# Patient Record
Sex: MALE | Race: WHITE | HISPANIC OR LATINO
[De-identification: names, ages, dates, MRNs, and addresses within clinical notes are randomized per-mention and may not be internally consistent; named-entity substitution may affect disease eponyms.]

---

## 2017-01-05 ENCOUNTER — FORM ENCOUNTER (OUTPATIENT)
Age: 77
End: 2017-01-05

## 2017-01-06 ENCOUNTER — OUTPATIENT (OUTPATIENT)
Dept: OUTPATIENT SERVICES | Facility: HOSPITAL | Age: 77
LOS: 1 days | End: 2017-01-06
Payer: MEDICARE

## 2017-01-06 ENCOUNTER — APPOINTMENT (OUTPATIENT)
Dept: ULTRASOUND IMAGING | Facility: IMAGING CENTER | Age: 77
End: 2017-01-06

## 2017-01-06 DIAGNOSIS — N45.3 EPIDIDYMO-ORCHITIS: ICD-10-CM

## 2017-01-06 PROCEDURE — 76870 US EXAM SCROTUM: CPT

## 2017-01-12 ENCOUNTER — APPOINTMENT (OUTPATIENT)
Dept: INFECTIOUS DISEASE | Facility: CLINIC | Age: 77
End: 2017-01-12

## 2017-01-12 ENCOUNTER — LABORATORY RESULT (OUTPATIENT)
Age: 77
End: 2017-01-12

## 2017-01-12 VITALS
BODY MASS INDEX: 24.51 KG/M2 | DIASTOLIC BLOOD PRESSURE: 77 MMHG | WEIGHT: 191 LBS | OXYGEN SATURATION: 96 % | HEART RATE: 93 BPM | TEMPERATURE: 98.7 F | HEIGHT: 74 IN | SYSTOLIC BLOOD PRESSURE: 141 MMHG

## 2017-01-13 LAB
ALBUMIN SERPL ELPH-MCNC: 4.1 G/DL
ALP BLD-CCNC: 90 U/L
ALT SERPL-CCNC: 11 U/L
ANION GAP SERPL CALC-SCNC: 13 MMOL/L
AST SERPL-CCNC: 21 U/L
BASOPHILS # BLD AUTO: 0.02 K/UL
BASOPHILS NFR BLD AUTO: 0.2 %
BILIRUB SERPL-MCNC: 0.4 MG/DL
BUN SERPL-MCNC: 27 MG/DL
CALCIUM SERPL-MCNC: 9.1 MG/DL
CHLORIDE SERPL-SCNC: 104 MMOL/L
CO2 SERPL-SCNC: 25 MMOL/L
CREAT SERPL-MCNC: 1.27 MG/DL
EOSINOPHIL # BLD AUTO: 0.09 K/UL
EOSINOPHIL NFR BLD AUTO: 0.9 %
GLUCOSE SERPL-MCNC: 100 MG/DL
HCT VFR BLD CALC: 43.3 %
HGB BLD-MCNC: 14.2 G/DL
IMM GRANULOCYTES NFR BLD AUTO: 0.3 %
LYMPHOCYTES # BLD AUTO: 2.95 K/UL
LYMPHOCYTES NFR BLD AUTO: 30.4 %
MAN DIFF?: NORMAL
MCHC RBC-ENTMCNC: 32.8 GM/DL
MCHC RBC-ENTMCNC: 35.1 PG
MCV RBC AUTO: 107.2 FL
MONOCYTES # BLD AUTO: 0.67 K/UL
MONOCYTES NFR BLD AUTO: 6.9 %
NEUTROPHILS # BLD AUTO: 5.94 K/UL
NEUTROPHILS NFR BLD AUTO: 61.3 %
PHOSPHATE SERPL-MCNC: 2.7 MG/DL
PLATELET # BLD AUTO: 227 K/UL
POTASSIUM SERPL-SCNC: 4.4 MMOL/L
PROT SERPL-MCNC: 7.5 G/DL
RBC # BLD: 4.04 M/UL
RBC # FLD: 13.7 %
SODIUM SERPL-SCNC: 142 MMOL/L
WBC # FLD AUTO: 9.7 K/UL

## 2017-01-23 ENCOUNTER — OTHER (OUTPATIENT)
Age: 77
End: 2017-01-23

## 2017-01-25 LAB
25(OH)D3 SERPL-MCNC: 25 NG/ML
APPEARANCE: ABNORMAL
BACTERIA: NEGATIVE
BILIRUBIN URINE: NEGATIVE
BLOOD URINE: ABNORMAL
CALCIUM SERPL-MCNC: 9.1 MG/DL
CD3 CELLS # BLD: 1587 /UL
CD3 CELLS NFR BLD: 56 %
CD3+CD4+ CELLS # BLD: 398 /UL
CD3+CD4+ CELLS NFR BLD: 14 %
CD3+CD4+ CELLS/CD3+CD8+ CLL SPEC: 0.34 RATIO
CD3+CD8+ CELLS # SPEC: 1168 /UL
CD3+CD8+ CELLS NFR BLD: 41 %
COLOR: YELLOW
CREAT SPEC-SCNC: 70 MG/DL
CREAT/PROT UR: 0.4 RATIO
GLUCOSE QUALITATIVE U: NORMAL MG/DL
HIV1 RNA # SERPL NAA+PROBE: NOT DETECTED COPIES/ML
HYALINE CASTS: 1 /LPF
KETONES URINE: NEGATIVE
LEUKOCYTE ESTERASE URINE: ABNORMAL
MICROSCOPIC-UA: NORMAL
NITRITE URINE: NEGATIVE
PARATHYROID HORMONE INTACT: 56 PG/ML
PH URINE: 6.5
PROT UR-MCNC: 31 MG/DL
PROTEIN URINE: ABNORMAL MG/DL
RED BLOOD CELLS URINE: 495 /HPF
SPECIFIC GRAVITY URINE: 1.02
SQUAMOUS EPITHELIAL CELLS: 0 /HPF
UROBILINOGEN URINE: NORMAL MG/DL
VIRAL LOAD LOG: NOT DETECTED LG10COP/ML
WHITE BLOOD CELLS URINE: 94 /HPF

## 2017-02-02 ENCOUNTER — APPOINTMENT (OUTPATIENT)
Dept: UROLOGY | Facility: CLINIC | Age: 77
End: 2017-02-02

## 2017-02-10 ENCOUNTER — APPOINTMENT (OUTPATIENT)
Dept: UROLOGY | Facility: CLINIC | Age: 77
End: 2017-02-10

## 2017-02-10 ENCOUNTER — OUTPATIENT (OUTPATIENT)
Dept: OUTPATIENT SERVICES | Facility: HOSPITAL | Age: 77
LOS: 1 days | End: 2017-02-10
Payer: MEDICARE

## 2017-02-10 VITALS
DIASTOLIC BLOOD PRESSURE: 77 MMHG | SYSTOLIC BLOOD PRESSURE: 155 MMHG | TEMPERATURE: 99.8 F | RESPIRATION RATE: 16 BRPM | HEART RATE: 66 BPM

## 2017-02-10 DIAGNOSIS — R35.0 FREQUENCY OF MICTURITION: ICD-10-CM

## 2017-02-10 PROCEDURE — 52000 CYSTOURETHROSCOPY: CPT

## 2017-02-10 PROCEDURE — 88112 CYTOPATH CELL ENHANCE TECH: CPT | Mod: 26

## 2017-02-17 ENCOUNTER — FORM ENCOUNTER (OUTPATIENT)
Age: 77
End: 2017-02-17

## 2017-02-17 DIAGNOSIS — C67.9 MALIGNANT NEOPLASM OF BLADDER, UNSPECIFIED: ICD-10-CM

## 2017-02-18 ENCOUNTER — APPOINTMENT (OUTPATIENT)
Dept: CT IMAGING | Facility: IMAGING CENTER | Age: 77
End: 2017-02-18

## 2017-02-18 ENCOUNTER — OUTPATIENT (OUTPATIENT)
Dept: OUTPATIENT SERVICES | Facility: HOSPITAL | Age: 77
LOS: 1 days | End: 2017-02-18
Payer: MEDICARE

## 2017-02-18 DIAGNOSIS — C67.9 MALIGNANT NEOPLASM OF BLADDER, UNSPECIFIED: ICD-10-CM

## 2017-02-18 PROCEDURE — 74178 CT ABD&PLV WO CNTR FLWD CNTR: CPT

## 2017-02-21 ENCOUNTER — APPOINTMENT (OUTPATIENT)
Dept: INFECTIOUS DISEASE | Facility: CLINIC | Age: 77
End: 2017-02-21

## 2017-02-21 ENCOUNTER — LABORATORY RESULT (OUTPATIENT)
Age: 77
End: 2017-02-21

## 2017-02-21 ENCOUNTER — OUTPATIENT (OUTPATIENT)
Dept: OUTPATIENT SERVICES | Facility: HOSPITAL | Age: 77
LOS: 1 days | End: 2017-02-21
Payer: MEDICARE

## 2017-02-21 VITALS
DIASTOLIC BLOOD PRESSURE: 76 MMHG | HEIGHT: 74 IN | BODY MASS INDEX: 24 KG/M2 | TEMPERATURE: 99.2 F | SYSTOLIC BLOOD PRESSURE: 148 MMHG | OXYGEN SATURATION: 97 % | HEART RATE: 89 BPM | WEIGHT: 187 LBS

## 2017-02-21 DIAGNOSIS — B20 HUMAN IMMUNODEFICIENCY VIRUS [HIV] DISEASE: ICD-10-CM

## 2017-02-21 PROCEDURE — 71020: CPT | Mod: 26

## 2017-02-21 PROCEDURE — 71046 X-RAY EXAM CHEST 2 VIEWS: CPT

## 2017-02-22 ENCOUNTER — OUTPATIENT (OUTPATIENT)
Dept: OUTPATIENT SERVICES | Facility: HOSPITAL | Age: 77
LOS: 1 days | End: 2017-02-22
Payer: MEDICARE

## 2017-02-22 VITALS
DIASTOLIC BLOOD PRESSURE: 74 MMHG | HEIGHT: 75 IN | HEART RATE: 67 BPM | TEMPERATURE: 99 F | SYSTOLIC BLOOD PRESSURE: 149 MMHG | OXYGEN SATURATION: 97 % | WEIGHT: 185.19 LBS | RESPIRATION RATE: 16 BRPM

## 2017-02-22 DIAGNOSIS — N32.89 OTHER SPECIFIED DISORDERS OF BLADDER: ICD-10-CM

## 2017-02-22 DIAGNOSIS — Z01.818 ENCOUNTER FOR OTHER PREPROCEDURAL EXAMINATION: ICD-10-CM

## 2017-02-22 DIAGNOSIS — C67.9 MALIGNANT NEOPLASM OF BLADDER, UNSPECIFIED: ICD-10-CM

## 2017-02-22 PROCEDURE — G0463: CPT

## 2017-02-22 PROCEDURE — 87086 URINE CULTURE/COLONY COUNT: CPT

## 2017-02-22 RX ORDER — CEFAZOLIN SODIUM 1 G
2000 VIAL (EA) INJECTION ONCE
Qty: 0 | Refills: 0 | Status: DISCONTINUED | OUTPATIENT
Start: 2017-03-01 | End: 2017-03-16

## 2017-02-22 NOTE — H&P PST ADULT - HISTORY OF PRESENT ILLNESS
76 year old male with recent history of epididymoorchitis in december of 2016 treated with Levaquin  Patient now scheduled for cystoscopy and transurethral resection of bladder tumor. 76 year old male with recent history of epididymoorchitis in december of 2016 treated with Levaquin.  Further work up done.   CT Chest Abdomen and Pelvis done this year showed  L bladder wall thickening.   Patient now scheduled for cystoscopy and transurethral resection of bladder tumor.

## 2017-02-22 NOTE — H&P PST ADULT - NSANTHOSAYNRD_GEN_A_CORE
No. KATELYN screening performed.  STOP BANG Legend: 0-2 = LOW Risk; 3-4 = INTERMEDIATE Risk; 5-8 = HIGH Risk

## 2017-02-22 NOTE — H&P PST ADULT - RS GEN PE MLT RESP DETAILS PC
clear to auscultation bilaterally/airway patent/respirations non-labored/breath sounds equal/good air movement

## 2017-02-22 NOTE — H&P PST ADULT - PROBLEM SELECTOR PLAN 1
patient scheduled for cystoscopy and transurethral resection of bladder tumor.  Patient instructed to remain on aspirin 81mg and to continue all medications.  Patient verbalized understanding.  Patient stated he is trying to get someone to bring him to the hospital and to remain with him at home overnight.  Dr. Gruber's office made aware.

## 2017-02-22 NOTE — H&P PST ADULT - PMH
Bladder mass    Chronic kidney disease, unspecified stage    Constipation, unspecified constipation type    Edema of both legs    HIV (human immunodeficiency virus infection)    Orchitis and epididymitis    Osteoporosis    Seborrheic keratosis    Unsteady gait    Vitamin D deficiency

## 2017-02-23 LAB
CULTURE RESULTS: NO GROWTH — SIGNIFICANT CHANGE UP
SPECIMEN SOURCE: SIGNIFICANT CHANGE UP

## 2017-02-28 ENCOUNTER — RESULT REVIEW (OUTPATIENT)
Age: 77
End: 2017-02-28

## 2017-02-28 ENCOUNTER — RX RENEWAL (OUTPATIENT)
Age: 77
End: 2017-02-28

## 2017-03-01 ENCOUNTER — OUTPATIENT (OUTPATIENT)
Dept: OUTPATIENT SERVICES | Facility: HOSPITAL | Age: 77
LOS: 1 days | End: 2017-03-01
Payer: MEDICARE

## 2017-03-01 ENCOUNTER — APPOINTMENT (OUTPATIENT)
Dept: UROLOGY | Facility: HOSPITAL | Age: 77
End: 2017-03-01

## 2017-03-01 ENCOUNTER — TRANSCRIPTION ENCOUNTER (OUTPATIENT)
Age: 77
End: 2017-03-01

## 2017-03-01 VITALS
SYSTOLIC BLOOD PRESSURE: 144 MMHG | RESPIRATION RATE: 18 BRPM | HEART RATE: 79 BPM | DIASTOLIC BLOOD PRESSURE: 76 MMHG | HEIGHT: 75 IN | WEIGHT: 185.19 LBS | OXYGEN SATURATION: 98 % | TEMPERATURE: 98 F

## 2017-03-01 VITALS
RESPIRATION RATE: 16 BRPM | TEMPERATURE: 98 F | OXYGEN SATURATION: 99 % | SYSTOLIC BLOOD PRESSURE: 142 MMHG | DIASTOLIC BLOOD PRESSURE: 69 MMHG | HEART RATE: 73 BPM

## 2017-03-01 DIAGNOSIS — C67.9 MALIGNANT NEOPLASM OF BLADDER, UNSPECIFIED: ICD-10-CM

## 2017-03-01 PROCEDURE — 88342 IMHCHEM/IMCYTCHM 1ST ANTB: CPT | Mod: 26

## 2017-03-01 PROCEDURE — 88307 TISSUE EXAM BY PATHOLOGIST: CPT | Mod: 26

## 2017-03-01 PROCEDURE — 88307 TISSUE EXAM BY PATHOLOGIST: CPT

## 2017-03-01 PROCEDURE — 76000 FLUOROSCOPY <1 HR PHYS/QHP: CPT

## 2017-03-01 PROCEDURE — 52240 CYSTOSCOPY AND TREATMENT: CPT

## 2017-03-01 PROCEDURE — 88341 IMHCHEM/IMCYTCHM EA ADD ANTB: CPT | Mod: 26

## 2017-03-01 PROCEDURE — 88341 IMHCHEM/IMCYTCHM EA ADD ANTB: CPT

## 2017-03-01 PROCEDURE — 74420 UROGRAPHY RTRGR +-KUB: CPT | Mod: 26

## 2017-03-01 PROCEDURE — 88342 IMHCHEM/IMCYTCHM 1ST ANTB: CPT

## 2017-03-01 PROCEDURE — 74420 UROGRAPHY RTRGR +-KUB: CPT

## 2017-03-01 RX ORDER — LIDOCAINE 4 G/100G
1 CREAM TOPICAL
Qty: 0 | Refills: 0 | Status: DISCONTINUED | OUTPATIENT
Start: 2017-03-01 | End: 2017-03-01

## 2017-03-01 RX ORDER — HYDROMORPHONE HYDROCHLORIDE 2 MG/ML
0.5 INJECTION INTRAMUSCULAR; INTRAVENOUS; SUBCUTANEOUS
Qty: 0 | Refills: 0 | Status: DISCONTINUED | OUTPATIENT
Start: 2017-03-01 | End: 2017-03-01

## 2017-03-01 RX ORDER — SODIUM CHLORIDE 9 MG/ML
1000 INJECTION, SOLUTION INTRAVENOUS
Qty: 0 | Refills: 0 | Status: DISCONTINUED | OUTPATIENT
Start: 2017-03-01 | End: 2017-03-16

## 2017-03-01 RX ORDER — ACETAMINOPHEN WITH CODEINE 300MG-30MG
1 TABLET ORAL
Qty: 30 | Refills: 0
Start: 2017-03-01

## 2017-03-01 RX ORDER — SODIUM CHLORIDE 9 MG/ML
3 INJECTION INTRAMUSCULAR; INTRAVENOUS; SUBCUTANEOUS EVERY 8 HOURS
Qty: 0 | Refills: 0 | Status: DISCONTINUED | OUTPATIENT
Start: 2017-03-01 | End: 2017-03-01

## 2017-03-01 RX ORDER — CEPHALEXIN 500 MG
1 CAPSULE ORAL
Qty: 9 | Refills: 0
Start: 2017-03-01 | End: 2017-03-04

## 2017-03-01 RX ORDER — LIDOCAINE HCL 20 MG/ML
20 VIAL (ML) INJECTION ONCE
Qty: 0 | Refills: 0 | Status: DISCONTINUED | OUTPATIENT
Start: 2017-03-01 | End: 2017-03-16

## 2017-03-01 RX ORDER — ONDANSETRON 8 MG/1
4 TABLET, FILM COATED ORAL ONCE
Qty: 0 | Refills: 0 | Status: DISCONTINUED | OUTPATIENT
Start: 2017-03-01 | End: 2017-03-01

## 2017-03-01 RX ADMIN — HYDROMORPHONE HYDROCHLORIDE 0.5 MILLIGRAM(S): 2 INJECTION INTRAMUSCULAR; INTRAVENOUS; SUBCUTANEOUS at 16:30

## 2017-03-01 NOTE — ASU DISCHARGE PLAN (ADULT/PEDIATRIC). - MEDICATION SUMMARY - MEDICATIONS TO TAKE
I will START or STAY ON the medications listed below when I get home from the hospital:    finasteride  -- 5 milligram(s) by mouth once a day  -- Indication: For BPH    acetaminophen-codeine 300 mg-30 mg oral tablet  -- 1 tab(s) by mouth every 4 hours, As Needed MDD:6  -- Caution federal law prohibits the transfer of this drug to any person other  than the person for whom it was prescribed.  May cause drowsiness.  Alcohol may intensify this effect.  Use care when operating dangerous machinery.  This product contains acetaminophen.  Do not use  with any other product containing acetaminophen to prevent possible liver damage.  Using more of this medication than prescribed may cause serious breathing problems.    -- Indication: For Moderate/severe pain    Aspirin Enteric Coated 81 mg oral delayed release tablet  -- 1 tab(s) by mouth once a day  -- Indication: For home med    tamsulosin 0.4 mg oral capsule  -- 1 cap(s) by mouth once a day  -- Indication: For BPH    Genvoya oral tablet  -- 1 tab(s) by mouth once a day  -- Indication: For home med    alendronate weekly  -- 70 milligram(s) by mouth once a week  -- Indication: For home med    Keflex 500 mg oral capsule  -- 1 cap(s) by mouth every 8 hours  -- Finish all this medication unless otherwise directed by prescriber.    -- Indication: For UTI prevention    ketoconazole 2% topical shampoo  -- Apply on skin to affected area 2 times a day, As Needed  -- Indication: For home med    polyethylene glycol 3350 with electrolytes oral powder  -- 1 application by mouth once a day, As Needed  -- Indication: For home med    multivitamin  -- 1 tab(s) by mouth once a day  -- Indication: For home med    Vitamin D3 2000 intl units oral capsule  -- 2 cap(s) by mouth once a day  -- Indication: For home med

## 2017-03-01 NOTE — ASU DISCHARGE PLAN (ADULT/PEDIATRIC). - SPECIAL INSTRUCTIONS
Take 3 days of Keflex  You will go home with tovar    Dr. Millan would like to see you next Thursday for tovar removal. Please call the office to schedule an appointment.    [facility]  [address] [place] [phone]

## 2017-03-06 ENCOUNTER — APPOINTMENT (OUTPATIENT)
Dept: UROLOGY | Facility: CLINIC | Age: 77
End: 2017-03-06

## 2017-03-06 ENCOUNTER — APPOINTMENT (OUTPATIENT)
Dept: ENDOCRINOLOGY | Facility: CLINIC | Age: 77
End: 2017-03-06

## 2017-03-06 VITALS
SYSTOLIC BLOOD PRESSURE: 142 MMHG | TEMPERATURE: 98.6 F | RESPIRATION RATE: 16 BRPM | DIASTOLIC BLOOD PRESSURE: 60 MMHG | HEART RATE: 61 BPM

## 2017-03-09 ENCOUNTER — APPOINTMENT (OUTPATIENT)
Dept: UROLOGY | Facility: CLINIC | Age: 77
End: 2017-03-09

## 2017-03-09 DIAGNOSIS — D30.3 BENIGN NEOPLASM OF BLADDER: ICD-10-CM

## 2017-03-10 LAB — SURGICAL PATHOLOGY STUDY: SIGNIFICANT CHANGE UP

## 2017-03-30 ENCOUNTER — RX RENEWAL (OUTPATIENT)
Age: 77
End: 2017-03-30

## 2017-05-15 ENCOUNTER — APPOINTMENT (OUTPATIENT)
Dept: INFECTIOUS DISEASE | Facility: CLINIC | Age: 77
End: 2017-05-15

## 2017-05-15 VITALS
HEART RATE: 63 BPM | TEMPERATURE: 98.4 F | DIASTOLIC BLOOD PRESSURE: 72 MMHG | SYSTOLIC BLOOD PRESSURE: 141 MMHG | BODY MASS INDEX: 23.87 KG/M2 | HEIGHT: 74 IN | WEIGHT: 186 LBS | OXYGEN SATURATION: 95 %

## 2017-05-23 LAB
25(OH)D3 SERPL-MCNC: 32.2 NG/ML
ADJUSTED MITOGEN: >10 IU/ML
ADJUSTED TB AG: 0.14 IU/ML
ALBUMIN SERPL ELPH-MCNC: 4 G/DL
ALP BLD-CCNC: 86 U/L
ALT SERPL-CCNC: 11 U/L
ANION GAP SERPL CALC-SCNC: 15 MMOL/L
AST SERPL-CCNC: 18 U/L
BASOPHILS # BLD AUTO: 0.02 K/UL
BASOPHILS NFR BLD AUTO: 0.2 %
BILIRUB SERPL-MCNC: 0.5 MG/DL
BUN SERPL-MCNC: 30 MG/DL
C TRACH RRNA SPEC QL NAA+PROBE: NORMAL
CALCIUM SERPL-MCNC: 9.7 MG/DL
CD3 CELLS # BLD: 1440 /UL
CD3 CELLS NFR BLD: 60 %
CD3+CD4+ CELLS # BLD: 399 /UL
CD3+CD4+ CELLS NFR BLD: 17 %
CD3+CD4+ CELLS/CD3+CD8+ CLL SPEC: 0.39 RATIO
CD3+CD8+ CELLS # SPEC: 1020 /UL
CD3+CD8+ CELLS NFR BLD: 42 %
CHLORIDE SERPL-SCNC: 101 MMOL/L
CHOLEST SERPL-MCNC: 182 MG/DL
CHOLEST/HDLC SERPL: 3 RATIO
CO2 SERPL-SCNC: 23 MMOL/L
CREAT SERPL-MCNC: 1.31 MG/DL
EOSINOPHIL # BLD AUTO: 0.17 K/UL
EOSINOPHIL NFR BLD AUTO: 2 %
GLUCOSE SERPL-MCNC: 91 MG/DL
HBA1C MFR BLD HPLC: 5.9 %
HCT VFR BLD CALC: 40.7 %
HCV AB SER QL: NONREACTIVE
HCV S/CO RATIO: 0.25 S/CO
HDLC SERPL-MCNC: 72 MG/DL
HGB BLD-MCNC: 13.6 G/DL
HIV1 RNA # SERPL NAA+PROBE: NOT DETECTED COPIES/ML
IMM GRANULOCYTES NFR BLD AUTO: 0.2 %
LDLC SERPL CALC-MCNC: 90 MG/DL
LYMPHOCYTES # BLD AUTO: 2.41 K/UL
LYMPHOCYTES NFR BLD AUTO: 29 %
M TB IFN-G BLD-IMP: NEGATIVE
MAN DIFF?: NORMAL
MCHC RBC-ENTMCNC: 33.4 GM/DL
MCHC RBC-ENTMCNC: 34.9 PG
MCV RBC AUTO: 104.4 FL
MONOCYTES # BLD AUTO: 0.64 K/UL
MONOCYTES NFR BLD AUTO: 7.7 %
N GONORRHOEA RRNA SPEC QL NAA+PROBE: NORMAL
NEUTROPHILS # BLD AUTO: 5.06 K/UL
NEUTROPHILS NFR BLD AUTO: 60.9 %
PLATELET # BLD AUTO: 246 K/UL
POTASSIUM SERPL-SCNC: 4.4 MMOL/L
PROT SERPL-MCNC: 7.1 G/DL
PSA SERPL-MCNC: 1.24 NG/ML
QUANTIFERON GOLD NIL: 0.32 IU/ML
RBC # BLD: 3.9 M/UL
RBC # FLD: 14.1 %
SODIUM SERPL-SCNC: 139 MMOL/L
SOURCE AMPLIFICATION: NORMAL
T PALLIDUM AB SER QL IA: NEGATIVE
TRIGL SERPL-MCNC: 100 MG/DL
VIRAL LOAD LOG: NOT DETECTED LG10COP/ML
WBC # FLD AUTO: 8.32 K/UL

## 2017-06-07 LAB
APPEARANCE: ABNORMAL
BACTERIA: NEGATIVE
BILIRUBIN URINE: NEGATIVE
BLOOD URINE: ABNORMAL
COLOR: YELLOW
GLUCOSE QUALITATIVE U: NORMAL MG/DL
HYALINE CASTS: 5 /LPF
KETONES URINE: NEGATIVE
LEUKOCYTE ESTERASE URINE: ABNORMAL
MICROSCOPIC-UA: NORMAL
NITRITE URINE: NEGATIVE
PH URINE: 7
PROTEIN URINE: 30 MG/DL
RED BLOOD CELLS URINE: 97 /HPF
SPECIFIC GRAVITY URINE: 1.02
SQUAMOUS EPITHELIAL CELLS: 1 /HPF
UROBILINOGEN URINE: NORMAL MG/DL
WHITE BLOOD CELLS URINE: >720 /HPF

## 2017-08-15 ENCOUNTER — RX RENEWAL (OUTPATIENT)
Age: 77
End: 2017-08-15

## 2017-09-15 ENCOUNTER — APPOINTMENT (OUTPATIENT)
Dept: INFECTIOUS DISEASE | Facility: CLINIC | Age: 77
End: 2017-09-15

## 2017-09-15 ENCOUNTER — LABORATORY RESULT (OUTPATIENT)
Age: 77
End: 2017-09-15

## 2017-09-15 ENCOUNTER — APPOINTMENT (OUTPATIENT)
Dept: INFECTIOUS DISEASE | Facility: CLINIC | Age: 77
End: 2017-09-15
Payer: MEDICARE

## 2017-09-15 VITALS
SYSTOLIC BLOOD PRESSURE: 142 MMHG | HEART RATE: 62 BPM | TEMPERATURE: 98.3 F | RESPIRATION RATE: 16 BRPM | HEIGHT: 74 IN | DIASTOLIC BLOOD PRESSURE: 76 MMHG | OXYGEN SATURATION: 95 % | WEIGHT: 194 LBS | BODY MASS INDEX: 24.9 KG/M2

## 2017-09-15 DIAGNOSIS — R06.09 OTHER FORMS OF DYSPNEA: ICD-10-CM

## 2017-09-15 PROCEDURE — 99215 OFFICE O/P EST HI 40 MIN: CPT

## 2017-09-20 LAB
25(OH)D3 SERPL-MCNC: 34.9 NG/ML
ALBUMIN SERPL ELPH-MCNC: 3.9 G/DL
ALP BLD-CCNC: 101 U/L
ALT SERPL-CCNC: 12 U/L
ANION GAP SERPL CALC-SCNC: 16 MMOL/L
AST SERPL-CCNC: 24 U/L
BASOPHILS # BLD AUTO: 0.02 K/UL
BASOPHILS NFR BLD AUTO: 0.2 %
BILIRUB SERPL-MCNC: 0.4 MG/DL
BUN SERPL-MCNC: 31 MG/DL
CALCIUM SERPL-MCNC: 9.7 MG/DL
CALCIUM SERPL-MCNC: 9.7 MG/DL
CD3 CELLS # BLD: 1388 /UL
CD3 CELLS NFR BLD: 53 %
CD3+CD4+ CELLS # BLD: 371 /UL
CD3+CD4+ CELLS NFR BLD: 14 %
CD3+CD4+ CELLS/CD3+CD8+ CLL SPEC: 0.37 RATIO
CD3+CD8+ CELLS # SPEC: 1004 /UL
CD3+CD8+ CELLS NFR BLD: 38 %
CHLORIDE SERPL-SCNC: 101 MMOL/L
CO2 SERPL-SCNC: 24 MMOL/L
CORE LAB FLUID CYTOLOGY: NORMAL
CREAT SERPL-MCNC: 1.53 MG/DL
CREAT SPEC-SCNC: 87 MG/DL
CREAT/PROT UR: 0.4 RATIO
EOSINOPHIL # BLD AUTO: 0.3 K/UL
EOSINOPHIL NFR BLD AUTO: 3.4 %
GLUCOSE SERPL-MCNC: 108 MG/DL
HCT VFR BLD CALC: 41.9 %
HGB BLD-MCNC: 14.1 G/DL
HIV1 RNA # SERPL NAA+PROBE: NORMAL
HIV1 RNA # SERPL NAA+PROBE: NOT DETECTED COPIES/ML
IMM GRANULOCYTES NFR BLD AUTO: 0.4 %
LYMPHOCYTES # BLD AUTO: 2.34 K/UL
LYMPHOCYTES NFR BLD AUTO: 26.3 %
MAN DIFF?: NORMAL
MCHC RBC-ENTMCNC: 33.7 GM/DL
MCHC RBC-ENTMCNC: 35.6 PG
MCV RBC AUTO: 105.8 FL
MONOCYTES # BLD AUTO: 0.69 K/UL
MONOCYTES NFR BLD AUTO: 7.8 %
NEUTROPHILS # BLD AUTO: 5.5 K/UL
NEUTROPHILS NFR BLD AUTO: 61.9 %
PARATHYROID HORMONE INTACT: 25 PG/ML
PHOSPHATE SERPL-MCNC: 3.4 MG/DL
PLATELET # BLD AUTO: 238 K/UL
POTASSIUM SERPL-SCNC: 3.9 MMOL/L
PROT SERPL-MCNC: 7.4 G/DL
PROT UR-MCNC: 31 MG/DL
RBC # BLD: 3.96 M/UL
RBC # FLD: 13.8 %
SODIUM SERPL-SCNC: 141 MMOL/L
VIRAL LOAD INTERP: NORMAL
VIRAL LOAD LOG: NOT DETECTED LG COP/ML
WBC # FLD AUTO: 8.89 K/UL

## 2017-11-03 ENCOUNTER — APPOINTMENT (OUTPATIENT)
Dept: CARDIOLOGY | Facility: CLINIC | Age: 77
End: 2017-11-03
Payer: MEDICARE

## 2017-11-03 ENCOUNTER — NON-APPOINTMENT (OUTPATIENT)
Age: 77
End: 2017-11-03

## 2017-11-03 VITALS — DIASTOLIC BLOOD PRESSURE: 73 MMHG | HEART RATE: 87 BPM | OXYGEN SATURATION: 98 % | SYSTOLIC BLOOD PRESSURE: 170 MMHG

## 2017-11-03 VITALS — SYSTOLIC BLOOD PRESSURE: 126 MMHG | DIASTOLIC BLOOD PRESSURE: 74 MMHG

## 2017-11-03 DIAGNOSIS — R60.0 LOCALIZED EDEMA: ICD-10-CM

## 2017-11-03 PROCEDURE — 93000 ELECTROCARDIOGRAM COMPLETE: CPT

## 2017-11-03 PROCEDURE — 99204 OFFICE O/P NEW MOD 45 MIN: CPT

## 2017-11-27 ENCOUNTER — RX RENEWAL (OUTPATIENT)
Age: 77
End: 2017-11-27

## 2017-12-27 ENCOUNTER — RX RENEWAL (OUTPATIENT)
Age: 77
End: 2017-12-27

## 2018-01-05 ENCOUNTER — RX RENEWAL (OUTPATIENT)
Age: 78
End: 2018-01-05

## 2018-01-19 ENCOUNTER — LABORATORY RESULT (OUTPATIENT)
Age: 78
End: 2018-01-19

## 2018-01-19 ENCOUNTER — APPOINTMENT (OUTPATIENT)
Dept: INFECTIOUS DISEASE | Facility: CLINIC | Age: 78
End: 2018-01-19
Payer: MEDICARE

## 2018-01-19 VITALS
BODY MASS INDEX: 24.38 KG/M2 | DIASTOLIC BLOOD PRESSURE: 64 MMHG | OXYGEN SATURATION: 98 % | WEIGHT: 190 LBS | TEMPERATURE: 98.3 F | HEIGHT: 74 IN | SYSTOLIC BLOOD PRESSURE: 138 MMHG | HEART RATE: 68 BPM

## 2018-01-19 PROCEDURE — 99215 OFFICE O/P EST HI 40 MIN: CPT

## 2018-02-16 LAB
25(OH)D3 SERPL-MCNC: 40 NG/ML
ADJUSTED MITOGEN: 7.7 IU/ML
ADJUSTED TB AG: -0.08 IU/ML
ALBUMIN SERPL ELPH-MCNC: 4 G/DL
ALP BLD-CCNC: 91 U/L
ALT SERPL-CCNC: 12 U/L
ANION GAP SERPL CALC-SCNC: 16 MMOL/L
APPEARANCE: ABNORMAL
AST SERPL-CCNC: 18 U/L
BACTERIA: NEGATIVE
BASOPHILS # BLD AUTO: 0.08 K/UL
BASOPHILS NFR BLD AUTO: 0.9 %
BILIRUB SERPL-MCNC: 0.4 MG/DL
BILIRUBIN URINE: NEGATIVE
BLOOD URINE: ABNORMAL
BUN SERPL-MCNC: 28 MG/DL
C TRACH RRNA SPEC QL NAA+PROBE: NOT DETECTED
CALCIUM SERPL-MCNC: 10.5 MG/DL
CD3 CELLS # BLD: 1185 /UL
CD3 CELLS NFR BLD: 54 %
CD3+CD4+ CELLS # BLD: 329 /UL
CD3+CD4+ CELLS NFR BLD: 15 %
CD3+CD4+ CELLS/CD3+CD8+ CLL SPEC: 0.39 RATIO
CD3+CD8+ CELLS # SPEC: 850 /UL
CD3+CD8+ CELLS NFR BLD: 39 %
CHLORIDE SERPL-SCNC: 101 MMOL/L
CHOLEST SERPL-MCNC: 210 MG/DL
CHOLEST/HDLC SERPL: 3.2 RATIO
CO2 SERPL-SCNC: 26 MMOL/L
COLOR: YELLOW
CREAT SERPL-MCNC: 1.56 MG/DL
EOSINOPHIL # BLD AUTO: 0.38 K/UL
EOSINOPHIL NFR BLD AUTO: 4.3
GLUCOSE QUALITATIVE U: NEGATIVE MG/DL
GLUCOSE SERPL-MCNC: 90 MG/DL
HBA1C MFR BLD HPLC: 5.6 %
HCT VFR BLD CALC: 43.3 %
HCV AB SER QL: NONREACTIVE
HCV S/CO RATIO: 0.17 S/CO
HDLC SERPL-MCNC: 65 MG/DL
HGB BLD-MCNC: 14.4 G/DL
HIV1 RNA # SERPL NAA+PROBE: NORMAL
HIV1 RNA # SERPL NAA+PROBE: NORMAL COPIES/ML
HYALINE CASTS: 2 /LPF
KETONES URINE: NEGATIVE
LDLC SERPL CALC-MCNC: 120 MG/DL
LEUKOCYTE ESTERASE URINE: ABNORMAL
LYMPHOCYTES # BLD AUTO: 1.99 K/UL
LYMPHOCYTES NFR BLD AUTO: 22.6 %
M TB IFN-G BLD-IMP: NEGATIVE
MAN DIFF?: NORMAL
MCHC RBC-ENTMCNC: 33.3 GM/DL
MCHC RBC-ENTMCNC: 36 PG
MCV RBC AUTO: 108.3 FL
MICROSCOPIC-UA: NORMAL
MONOCYTES # BLD AUTO: 0.85 K/UL
MONOCYTES NFR BLD AUTO: 9.6 %
N GONORRHOEA RRNA SPEC QL NAA+PROBE: NOT DETECTED
NEUTROPHILS # BLD AUTO: 5.44 K/UL
NEUTROPHILS NFR BLD AUTO: 61.7 %
NITRITE URINE: NEGATIVE
PH URINE: 6.5
PLATELET # BLD AUTO: 247 K/UL
POTASSIUM SERPL-SCNC: 4.3 MMOL/L
PROT SERPL-MCNC: 7.5 G/DL
PROTEIN URINE: ABNORMAL MG/DL
QUANTIFERON GOLD NIL: 0.4 IU/ML
RBC # BLD: 4 M/UL
RBC # FLD: 14.1 %
RED BLOOD CELLS URINE: 41 /HPF
SODIUM SERPL-SCNC: 143 MMOL/L
SOURCE AMPLIFICATION: NORMAL
SPECIFIC GRAVITY URINE: 1.01
SQUAMOUS EPITHELIAL CELLS: 0 /HPF
T PALLIDUM AB SER QL IA: NEGATIVE
TRIGL SERPL-MCNC: 127 MG/DL
UROBILINOGEN URINE: NEGATIVE MG/DL
VIRAL LOAD INTERP: NORMAL
VIRAL LOAD LOG: NORMAL LG COP/ML
WBC # FLD AUTO: 8.81 K/UL
WHITE BLOOD CELLS URINE: >720 /HPF

## 2018-05-21 ENCOUNTER — APPOINTMENT (OUTPATIENT)
Dept: INFECTIOUS DISEASE | Facility: CLINIC | Age: 78
End: 2018-05-21
Payer: MEDICARE

## 2018-05-21 ENCOUNTER — LABORATORY RESULT (OUTPATIENT)
Age: 78
End: 2018-05-21

## 2018-05-21 VITALS
HEART RATE: 72 BPM | HEIGHT: 74 IN | WEIGHT: 182 LBS | BODY MASS INDEX: 23.36 KG/M2 | TEMPERATURE: 98.7 F | OXYGEN SATURATION: 98 %

## 2018-05-21 VITALS — SYSTOLIC BLOOD PRESSURE: 136 MMHG | DIASTOLIC BLOOD PRESSURE: 70 MMHG

## 2018-05-21 DIAGNOSIS — E55.9 VITAMIN D DEFICIENCY, UNSPECIFIED: ICD-10-CM

## 2018-05-21 PROCEDURE — 99215 OFFICE O/P EST HI 40 MIN: CPT

## 2018-05-24 LAB
ALBUMIN SERPL ELPH-MCNC: 4 G/DL
ALP BLD-CCNC: 125 U/L
ALT SERPL-CCNC: 20 U/L
ANION GAP SERPL CALC-SCNC: 13 MMOL/L
AST SERPL-CCNC: 27 U/L
BASOPHILS # BLD AUTO: 0.02 K/UL
BASOPHILS NFR BLD AUTO: 0.2 %
BILIRUB SERPL-MCNC: 0.5 MG/DL
BUN SERPL-MCNC: 26 MG/DL
CALCIUM SERPL-MCNC: 9.7 MG/DL
CD3 CELLS # BLD: 1409 /UL
CD3 CELLS NFR BLD: 48 %
CD3+CD4+ CELLS # BLD: 326 /UL
CD3+CD4+ CELLS NFR BLD: 11 %
CD3+CD4+ CELLS/CD3+CD8+ CLL SPEC: 0.3 RATIO
CD3+CD8+ CELLS # SPEC: 1072 /UL
CD3+CD8+ CELLS NFR BLD: 37 %
CHLORIDE SERPL-SCNC: 104 MMOL/L
CO2 SERPL-SCNC: 26 MMOL/L
CREAT SERPL-MCNC: 1.47 MG/DL
EOSINOPHIL # BLD AUTO: 0.08 K/UL
EOSINOPHIL NFR BLD AUTO: 0.8 %
GLUCOSE SERPL-MCNC: 100 MG/DL
HCT VFR BLD CALC: 42 %
HGB BLD-MCNC: 14.1 G/DL
HIV1 RNA # SERPL NAA+PROBE: NORMAL
HIV1 RNA # SERPL NAA+PROBE: NORMAL COPIES/ML
IMM GRANULOCYTES NFR BLD AUTO: 0.4 %
LYMPHOCYTES # BLD AUTO: 2.52 K/UL
LYMPHOCYTES NFR BLD AUTO: 26.1 %
MAN DIFF?: NORMAL
MCHC RBC-ENTMCNC: 33.6 GM/DL
MCHC RBC-ENTMCNC: 35.7 PG
MCV RBC AUTO: 106.3 FL
MONOCYTES # BLD AUTO: 0.95 K/UL
MONOCYTES NFR BLD AUTO: 9.8 %
NEUTROPHILS # BLD AUTO: 6.06 K/UL
NEUTROPHILS NFR BLD AUTO: 62.7 %
PLATELET # BLD AUTO: 245 K/UL
POTASSIUM SERPL-SCNC: 4.4 MMOL/L
PROT SERPL-MCNC: 7.6 G/DL
RBC # BLD: 3.95 M/UL
RBC # FLD: 14.2 %
SODIUM SERPL-SCNC: 143 MMOL/L
VIRAL LOAD INTERP: NORMAL
VIRAL LOAD LOG: NORMAL LG COP/ML
WBC # FLD AUTO: 9.67 K/UL

## 2018-08-20 ENCOUNTER — LABORATORY RESULT (OUTPATIENT)
Age: 78
End: 2018-08-20

## 2018-08-20 ENCOUNTER — APPOINTMENT (OUTPATIENT)
Dept: INFECTIOUS DISEASE | Facility: CLINIC | Age: 78
End: 2018-08-20
Payer: MEDICARE

## 2018-08-20 VITALS
BODY MASS INDEX: 22.84 KG/M2 | OXYGEN SATURATION: 100 % | HEART RATE: 63 BPM | HEIGHT: 74 IN | DIASTOLIC BLOOD PRESSURE: 72 MMHG | WEIGHT: 178 LBS | SYSTOLIC BLOOD PRESSURE: 130 MMHG | TEMPERATURE: 98.4 F

## 2018-08-20 DIAGNOSIS — M81.0 AGE-RELATED OSTEOPOROSIS W/OUT CURRENT PATHOLOGICAL FRACTURE: ICD-10-CM

## 2018-08-20 PROCEDURE — 99215 OFFICE O/P EST HI 40 MIN: CPT

## 2018-08-21 PROBLEM — N18.9 CHRONIC KIDNEY DISEASE, UNSPECIFIED: Chronic | Status: ACTIVE | Noted: 2017-02-22

## 2018-08-21 PROBLEM — N45.3 EPIDIDYMO-ORCHITIS: Chronic | Status: ACTIVE | Noted: 2017-02-22

## 2018-08-21 PROBLEM — R60.0 LOCALIZED EDEMA: Chronic | Status: ACTIVE | Noted: 2017-02-22

## 2018-08-21 PROBLEM — M81.0 AGE-RELATED OSTEOPOROSIS WITHOUT CURRENT PATHOLOGICAL FRACTURE: Chronic | Status: ACTIVE | Noted: 2017-02-22

## 2018-08-21 PROBLEM — Z21 ASYMPTOMATIC HUMAN IMMUNODEFICIENCY VIRUS [HIV] INFECTION STATUS: Chronic | Status: ACTIVE | Noted: 2017-02-22

## 2018-08-21 PROBLEM — E55.9 VITAMIN D DEFICIENCY, UNSPECIFIED: Chronic | Status: ACTIVE | Noted: 2017-02-22

## 2018-08-21 PROBLEM — K59.00 CONSTIPATION, UNSPECIFIED: Chronic | Status: ACTIVE | Noted: 2017-02-22

## 2018-08-21 PROBLEM — N32.89 OTHER SPECIFIED DISORDERS OF BLADDER: Chronic | Status: ACTIVE | Noted: 2017-02-22

## 2018-08-21 PROBLEM — L82.1 OTHER SEBORRHEIC KERATOSIS: Chronic | Status: ACTIVE | Noted: 2017-02-22

## 2018-08-21 LAB
ALBUMIN SERPL ELPH-MCNC: 4.2 G/DL
ALP BLD-CCNC: 123 U/L
ALT SERPL-CCNC: 9 U/L
ANION GAP SERPL CALC-SCNC: 13 MMOL/L
AST SERPL-CCNC: 17 U/L
BASOPHILS # BLD AUTO: 0.09 K/UL
BASOPHILS NFR BLD AUTO: 0.9 %
BILIRUB SERPL-MCNC: 0.2 MG/DL
BUN SERPL-MCNC: 29 MG/DL
CALCIUM SERPL-MCNC: 10.4 MG/DL
CHLORIDE SERPL-SCNC: 99 MMOL/L
CO2 SERPL-SCNC: 28 MMOL/L
CREAT SERPL-MCNC: 1.61 MG/DL
EOSINOPHIL # BLD AUTO: 0.25 K/UL
EOSINOPHIL NFR BLD AUTO: 2.6 %
GLUCOSE SERPL-MCNC: 79 MG/DL
HCT VFR BLD CALC: 40.2 %
HGB BLD-MCNC: 13.7 G/DL
HIV1 RNA # SERPL NAA+PROBE: NORMAL
HIV1 RNA # SERPL NAA+PROBE: NORMAL COPIES/ML
LYMPHOCYTES # BLD AUTO: 2.36 K/UL
LYMPHOCYTES NFR BLD AUTO: 24.6 %
MAN DIFF?: NORMAL
MCHC RBC-ENTMCNC: 34.1 GM/DL
MCHC RBC-ENTMCNC: 36.4 PG
MCV RBC AUTO: 106.9 FL
MONOCYTES # BLD AUTO: 0.59 K/UL
MONOCYTES NFR BLD AUTO: 6.1 %
NEUTROPHILS # BLD AUTO: 5.98 K/UL
NEUTROPHILS NFR BLD AUTO: 62.3 %
PLATELET # BLD AUTO: 250 K/UL
POTASSIUM SERPL-SCNC: 4 MMOL/L
PROT SERPL-MCNC: 7.8 G/DL
RBC # BLD: 3.76 M/UL
RBC # FLD: 14 %
SODIUM SERPL-SCNC: 140 MMOL/L
VIRAL LOAD INTERP: NORMAL
VIRAL LOAD LOG: NORMAL LG COP/ML
WBC # FLD AUTO: 9.6 K/UL

## 2018-08-22 LAB
CD3 CELLS # BLD: 2225 /UL
CD3 CELLS NFR BLD: 63 %
CD3+CD4+ CELLS # BLD: 665 /UL
CD3+CD4+ CELLS NFR BLD: 19 %
CD3+CD4+ CELLS/CD3+CD8+ CLL SPEC: 0.43 RATIO
CD3+CD8+ CELLS # SPEC: 1543 /UL
CD3+CD8+ CELLS NFR BLD: 43 %

## 2018-08-28 ENCOUNTER — MEDICATION RENEWAL (OUTPATIENT)
Age: 78
End: 2018-08-28

## 2019-01-07 ENCOUNTER — LABORATORY RESULT (OUTPATIENT)
Age: 79
End: 2019-01-07

## 2019-01-07 ENCOUNTER — APPOINTMENT (OUTPATIENT)
Dept: INFECTIOUS DISEASE | Facility: CLINIC | Age: 79
End: 2019-01-07
Payer: MEDICARE

## 2019-01-07 VITALS
DIASTOLIC BLOOD PRESSURE: 68 MMHG | TEMPERATURE: 99.1 F | SYSTOLIC BLOOD PRESSURE: 139 MMHG | HEART RATE: 86 BPM | BODY MASS INDEX: 21.82 KG/M2 | WEIGHT: 170 LBS | HEIGHT: 74 IN | OXYGEN SATURATION: 98 %

## 2019-01-07 DIAGNOSIS — Z28.21 IMMUNIZATION NOT CARRIED OUT BECAUSE OF PATIENT REFUSAL: ICD-10-CM

## 2019-01-07 PROCEDURE — 99214 OFFICE O/P EST MOD 30 MIN: CPT

## 2019-01-10 NOTE — PHYSICAL EXAM
[General Appearance - Alert] : alert [General Appearance - In No Acute Distress] : in no acute distress [General Appearance - Well Nourished] : well nourished [Sclera] : the sclera and conjunctiva were normal [PERRL With Normal Accommodation] : pupils were equal in size, round, reactive to light [Examination Of The Oral Cavity] : the lips and gums were normal [Oropharynx] : the oropharynx was normal with no thrush [Neck Appearance] : the appearance of the neck was normal [Neck Cervical Mass (___cm)] : no neck mass was observed [Thyroid Diffuse Enlargement] : the thyroid was not enlarged [Respiration, Rhythm And Depth] : normal respiratory rhythm and effort [Auscultation Breath Sounds / Voice Sounds] : lungs were clear to auscultation bilaterally [Heart Rate And Rhythm] : heart rate was normal and rhythm regular [Heart Sounds] : normal S1 and S2 [Full Pulse] : the pedal pulses are present [Cervical Lymph Nodes Enlarged Posterior Bilaterally] : posterior cervical [Cervical Lymph Nodes Enlarged Anterior Bilaterally] : anterior cervical [Supraclavicular Lymph Nodes Enlarged Bilaterally] : supraclavicular [Skin Color & Pigmentation] : normal skin color and pigmentation [] : no rash [Skin Lesions] : no skin lesions [No Focal Deficits] : no focal deficits [Oriented To Time, Place, And Person] : oriented to person, place, and time [Affect] : the affect was normal [Edema] : there was no peripheral edema

## 2019-01-10 NOTE — REVIEW OF SYSTEMS
[As noted in HPI] : as noted in HPI [___ # of Missed Doses in The Past Week] : [unfilled] doses missed in the past week  [As Noted in HPI] : as noted in HPI [Negative] : Respiratory

## 2019-02-28 NOTE — ASSESSMENT
[Treatment Education] : treatment education [Treatment Adherence] : treatment adherence [Rx Dose / Side Effects] : Rx dose/side effects [Medical Care Issues] : medical care issues [HIV Education] : HIV Education [FreeTextEntry1] : 78yoM with HIV/AIDS, CKD, proteinuria, recurrent abn UAs likely 2/2 stone, dandruff, seborrheic keratosis, osteopenia, and LE edema.\par Cardiology - Dr. Celestin\par \par 1) HIV: Well controlled on HAART. Cont Genvoya PO daily with food.  Would prefer to switch to Biktarvy to eliminate booster.  Call pt s/p lab results to discuss further.  Cont CD4/VL monitoring.  Routine labs today.\par 2) CKD stage 3: Encouraged nephrology appt, last seen 8/2015. Encouraged hydration.\par 3) Osteopenia: previously on fosamax 70mg qweekly, pt stopped taking.  Due for repeat bone density scan 9/2018, referral last visit.  Encouraged endo appt.\par 4) HCM\par Vaccines:  Refuses all.\par Annual labs today\par Anal pap done 9/2017, pt refuses\par Dental 2018\par Pt plans to prioritize cardiology/stress echo, bone density, urology \par Then due for nephrology, cystoscopy, colonoscopy.\par \par RTC 4 months

## 2019-02-28 NOTE — HISTORY OF PRESENT ILLNESS
[FreeTextEntry1] : 78yoM with HIV, CKD, seborrheic keratosis, osteopenia, dandruff, here for f/u.  Pt doing well on Genvoya. Takes at lunch daily. \par \par Pt feeling well today.\par Having dental issues, needs 4 teeth pulled and then implants.\par Still pending cardiology (needs stress echo) and bone density.\par Plans to return to urologist, going to the bathroom every 1h15m at night\par Discussed kidney function, pt very reluctant to switch off Genvoya.\par \par Insurance issues and dental work have been consuming his time. Now on [employer] plan.\par Not on fosamax, didn't like dosing. Has not seen endo.   [Sexually Active] : The patient is not sexually active [de-identified] : assisted living facility.  very happy there, has a lot of friends

## 2019-03-15 LAB
25(OH)D3 SERPL-MCNC: 50.4 NG/ML
ALBUMIN SERPL ELPH-MCNC: 4.1 G/DL
ALP BLD-CCNC: 115 U/L
ALT SERPL-CCNC: 13 U/L
ANION GAP SERPL CALC-SCNC: 11 MMOL/L
APPEARANCE: ABNORMAL
AST SERPL-CCNC: 23 U/L
BACTERIA: ABNORMAL
BASOPHILS # BLD AUTO: 0.02 K/UL
BASOPHILS NFR BLD AUTO: 0.2 %
BILIRUB SERPL-MCNC: 0.3 MG/DL
BILIRUBIN URINE: NEGATIVE
BLOOD URINE: ABNORMAL
BUN SERPL-MCNC: 37 MG/DL
C TRACH RRNA SPEC QL NAA+PROBE: NOT DETECTED
CALCIUM SERPL-MCNC: 9.9 MG/DL
CD3 CELLS # BLD: 1168 /UL
CD3 CELLS NFR BLD: 50 %
CD3+CD4+ CELLS # BLD: 293 /UL
CD3+CD4+ CELLS NFR BLD: 12 %
CD3+CD4+ CELLS/CD3+CD8+ CLL SPEC: 0.34 RATIO
CD3+CD8+ CELLS # SPEC: 866 /UL
CD3+CD8+ CELLS NFR BLD: 37 %
CHLORIDE SERPL-SCNC: 101 MMOL/L
CHOLEST SERPL-MCNC: 182 MG/DL
CHOLEST/HDLC SERPL: 2.9 RATIO
CO2 SERPL-SCNC: 26 MMOL/L
COLOR: YELLOW
CREAT SERPL-MCNC: 1.82 MG/DL
EOSINOPHIL # BLD AUTO: 0.07 K/UL
EOSINOPHIL NFR BLD AUTO: 0.6 %
GLUCOSE QUALITATIVE U: NEGATIVE MG/DL
GLUCOSE SERPL-MCNC: 92 MG/DL
HBA1C MFR BLD HPLC: 5.7 %
HCT VFR BLD CALC: 38 %
HCV AB SER QL: NONREACTIVE
HCV S/CO RATIO: 0.17 S/CO
HDLC SERPL-MCNC: 63 MG/DL
HGB BLD-MCNC: 12.3 G/DL
HIV1 RNA # SERPL NAA+PROBE: ABNORMAL
HIV1 RNA # SERPL NAA+PROBE: ABNORMAL COPIES/ML
HYALINE CASTS: 0 /LPF
IMM GRANULOCYTES NFR BLD AUTO: 0.4 %
KETONES URINE: NEGATIVE
LDLC SERPL CALC-MCNC: 79 MG/DL
LEUKOCYTE ESTERASE URINE: ABNORMAL
LYMPHOCYTES # BLD AUTO: 2.44 K/UL
LYMPHOCYTES NFR BLD AUTO: 22.6 %
M TB IFN-G BLD-IMP: NEGATIVE
MAN DIFF?: NORMAL
MCHC RBC-ENTMCNC: 32.4 GM/DL
MCHC RBC-ENTMCNC: 35.1 PG
MCV RBC AUTO: 108.6 FL
MICROSCOPIC-UA: NORMAL
MONOCYTES # BLD AUTO: 1.17 K/UL
MONOCYTES NFR BLD AUTO: 10.9 %
N GONORRHOEA RRNA SPEC QL NAA+PROBE: NOT DETECTED
NEUTROPHILS # BLD AUTO: 7.04 K/UL
NEUTROPHILS NFR BLD AUTO: 65.3 %
NITRITE URINE: NEGATIVE
PH URINE: 6.5
PLATELET # BLD AUTO: 289 K/UL
POTASSIUM SERPL-SCNC: 4.4 MMOL/L
PROT SERPL-MCNC: 7.9 G/DL
PROTEIN URINE: 30 MG/DL
QUANTIFERON TB PLUS MITOGEN MINUS NIL: 6.85 IU/ML
QUANTIFERON TB PLUS NIL: 0.29 IU/ML
QUANTIFERON TB PLUS TB1 MINUS NIL: -0.17 IU/ML
QUANTIFERON TB PLUS TB2 MINUS NIL: -0.07 IU/ML
RBC # BLD: 3.5 M/UL
RBC # FLD: 14.2 %
RED BLOOD CELLS URINE: 263 /HPF
SODIUM SERPL-SCNC: 138 MMOL/L
SOURCE AMPLIFICATION: NORMAL
SPECIFIC GRAVITY URINE: 1.01
SQUAMOUS EPITHELIAL CELLS: 0 /HPF
T PALLIDUM AB SER QL IA: NEGATIVE
TRIGL SERPL-MCNC: 199 MG/DL
UROBILINOGEN URINE: NEGATIVE MG/DL
VIRAL LOAD INTERP: NORMAL
VIRAL LOAD LOG: ABNORMAL LG COP/ML
WBC # FLD AUTO: 10.78 K/UL
WHITE BLOOD CELLS URINE: >720 /HPF

## 2019-05-06 ENCOUNTER — APPOINTMENT (OUTPATIENT)
Dept: INFECTIOUS DISEASE | Facility: CLINIC | Age: 79
End: 2019-05-06
Payer: MEDICARE

## 2019-05-06 VITALS
WEIGHT: 170 LBS | OXYGEN SATURATION: 97 % | SYSTOLIC BLOOD PRESSURE: 149 MMHG | HEART RATE: 65 BPM | TEMPERATURE: 98.4 F | HEIGHT: 74 IN | BODY MASS INDEX: 21.82 KG/M2 | DIASTOLIC BLOOD PRESSURE: 70 MMHG

## 2019-05-06 DIAGNOSIS — L21.0 SEBORRHEA CAPITIS: ICD-10-CM

## 2019-05-06 PROCEDURE — 99214 OFFICE O/P EST MOD 30 MIN: CPT

## 2019-05-06 NOTE — REVIEW OF SYSTEMS
[As noted in HPI] : as noted in HPI [As Noted in HPI] : as noted in HPI [Negative] : Heme/Lymph [___ # of Missed Doses in The Past Week] : [unfilled] doses missed in the past week

## 2019-05-06 NOTE — PHYSICAL EXAM
[General Appearance - Alert] : alert [General Appearance - In No Acute Distress] : in no acute distress [General Appearance - Well Nourished] : well nourished [Sclera] : the sclera and conjunctiva were normal [PERRL With Normal Accommodation] : pupils were equal in size, round, reactive to light [Examination Of The Oral Cavity] : the lips and gums were normal [Oropharynx] : the oropharynx was normal with no thrush [Neck Appearance] : the appearance of the neck was normal [Neck Cervical Mass (___cm)] : no neck mass was observed [Thyroid Diffuse Enlargement] : the thyroid was not enlarged [Respiration, Rhythm And Depth] : normal respiratory rhythm and effort [Auscultation Breath Sounds / Voice Sounds] : lungs were clear to auscultation bilaterally [Heart Rate And Rhythm] : heart rate was normal and rhythm regular [Heart Sounds] : normal S1 and S2 [Full Pulse] : the pedal pulses are present [Cervical Lymph Nodes Enlarged Posterior Bilaterally] : posterior cervical [Edema] : there was no peripheral edema [Cervical Lymph Nodes Enlarged Anterior Bilaterally] : anterior cervical [Supraclavicular Lymph Nodes Enlarged Bilaterally] : supraclavicular [] : no rash [Skin Color & Pigmentation] : normal skin color and pigmentation [Skin Lesions] : no skin lesions [No Focal Deficits] : no focal deficits [Oriented To Time, Place, And Person] : oriented to person, place, and time [Affect] : the affect was normal

## 2019-05-08 NOTE — HISTORY OF PRESENT ILLNESS
[FreeTextEntry1] : 78yoM with HIV (dx 2013), CKD, seborrheic keratosis, osteopenia, dandruff, here for f/u.  Pt doing well on biktarvy. Takes at lunch daily. No missed doses.\par \par c/o worsening incontinence, not getting any sleep.  Pt long overdue for urology f/u, have previously discussed UA results at length.  Last appt was 2017, needed repeat cystoscopy at that time.  Has not been taking flomax, ran out.\par \par Also needs: nephrology, cardiology, bone density.\par Never restarted fosamax.  \par Requesting dandruff shampoo renewal\par Requesting handicapped parking permit, unsteady gait/shuffle, had outside eval by neuro several years ago to r/o Parkinsons.\par Had several teeth extracted [Sexually Active] : The patient is not sexually active [de-identified] : assisted living facility.  very happy there, has a lot of friends

## 2019-05-08 NOTE — ASSESSMENT
[Treatment Education] : treatment education [Treatment Adherence] : treatment adherence [Medical Care Issues] : medical care issues [Rx Dose / Side Effects] : Rx dose/side effects [HIV Education] : HIV Education [Drug Interactions / Side Effects] : drug interactions/side effects [FreeTextEntry1] : 78yoM with HIV/AIDS, CKD, proteinuria, recurrent abn UAs likely 2/2 stone, dandruff, seborrheic keratosis, osteopenia, and LE edema.\par Cardiology - Dr. Celestin\par \par 1) HIV: Well controlled on HAART with Biktarvy PO daily. Encouraged adherence.  Do routine labs today.\par 2) Urinary incontinence:  Pt agrees to make urology appt, will contact Dr. Millan's office.  Do UA, phos, pro/cr ratio today. Will renew tamsulosin for symptomatic relief pending uro appt.\par 3) CKD stage 3: Encouraged nephrology appt, last seen over 3 years ago. Encouraged hydration.  Pt says he will see urology first, then schedule nephrology appt.\par 4) Osteopenia: previously on fosamax 70mg qweekly, pt stopped taking.  Due for repeat bone density scan 9/2018, referral given last year. \par 5) HCM\par Vaccines:  Refuses all.\par Annual labs done 1/2019\par Anal pap last done 9/2017, pt refuses\par Dental utd 2019\par \par Pt plan:  urology, then nephrology, then cardiology/stress echo\par Bone density and colonoscopy are lower priorities for him\par \par RTC 3 months

## 2019-06-04 ENCOUNTER — RX RENEWAL (OUTPATIENT)
Age: 79
End: 2019-06-04

## 2019-06-10 LAB
ALBUMIN SERPL ELPH-MCNC: 4 G/DL
ALP BLD-CCNC: 108 U/L
ALT SERPL-CCNC: 13 U/L
ANION GAP SERPL CALC-SCNC: 14 MMOL/L
APPEARANCE: ABNORMAL
AST SERPL-CCNC: 19 U/L
BACTERIA: NEGATIVE
BASOPHILS # BLD AUTO: 0.05 K/UL
BASOPHILS NFR BLD AUTO: 0.4 %
BILIRUB SERPL-MCNC: 0.2 MG/DL
BILIRUBIN URINE: NEGATIVE
BLOOD URINE: ABNORMAL
BUN SERPL-MCNC: 42 MG/DL
CALCIUM SERPL-MCNC: 10 MG/DL
CD3 CELLS # BLD: 1382 /UL
CD3 CELLS NFR BLD: 47 %
CD3+CD4+ CELLS # BLD: 387 /UL
CD3+CD4+ CELLS NFR BLD: 13 %
CD3+CD4+ CELLS/CD3+CD8+ CLL SPEC: 0.39 RATIO
CD3+CD8+ CELLS # SPEC: 981 /UL
CD3+CD8+ CELLS NFR BLD: 34 %
CHLORIDE SERPL-SCNC: 102 MMOL/L
CO2 SERPL-SCNC: 26 MMOL/L
COLOR: YELLOW
CREAT SERPL-MCNC: 1.85 MG/DL
CREAT SPEC-SCNC: 60 MG/DL
CREAT/PROT UR: 0.9 RATIO
EOSINOPHIL # BLD AUTO: 0.12 K/UL
EOSINOPHIL NFR BLD AUTO: 1 %
GLUCOSE QUALITATIVE U: NEGATIVE
GLUCOSE SERPL-MCNC: 69 MG/DL
HCT VFR BLD CALC: 36.5 %
HGB BLD-MCNC: 11.9 G/DL
HIV1 RNA # SERPL NAA+PROBE: ABNORMAL
HIV1 RNA # SERPL NAA+PROBE: ABNORMAL COPIES/ML
HYALINE CASTS: 0 /LPF
IMM GRANULOCYTES NFR BLD AUTO: 0.6 %
KETONES URINE: NEGATIVE
LEUKOCYTE ESTERASE URINE: ABNORMAL
LYMPHOCYTES # BLD AUTO: 3.15 K/UL
LYMPHOCYTES NFR BLD AUTO: 27 %
MAN DIFF?: NORMAL
MCHC RBC-ENTMCNC: 32.6 GM/DL
MCHC RBC-ENTMCNC: 35.3 PG
MCV RBC AUTO: 108.3 FL
MICROSCOPIC-UA: NORMAL
MONOCYTES # BLD AUTO: 0.99 K/UL
MONOCYTES NFR BLD AUTO: 8.5 %
NEUTROPHILS # BLD AUTO: 7.29 K/UL
NEUTROPHILS NFR BLD AUTO: 62.5 %
NITRITE URINE: NEGATIVE
PH URINE: 6.5
PHOSPHATE SERPL-MCNC: 3.5 MG/DL
PLATELET # BLD AUTO: 250 K/UL
POTASSIUM SERPL-SCNC: 4 MMOL/L
PROT SERPL-MCNC: 7.1 G/DL
PROT UR-MCNC: 53 MG/DL
PROTEIN URINE: ABNORMAL
PSA SERPL-MCNC: 1.23 NG/ML
RBC # BLD: 3.37 M/UL
RBC # FLD: 13.3 %
RED BLOOD CELLS URINE: 109 /HPF
SODIUM SERPL-SCNC: 142 MMOL/L
SPECIFIC GRAVITY URINE: 1.02
SQUAMOUS EPITHELIAL CELLS: 0 /HPF
UROBILINOGEN URINE: NORMAL
VIRAL LOAD INTERP: NORMAL
VIRAL LOAD LOG: ABNORMAL LG COP/ML
WBC # FLD AUTO: 11.67 K/UL
WHITE BLOOD CELLS URINE: >720 /HPF

## 2019-07-23 ENCOUNTER — RX RENEWAL (OUTPATIENT)
Age: 79
End: 2019-07-23

## 2019-08-06 ENCOUNTER — APPOINTMENT (OUTPATIENT)
Dept: INFECTIOUS DISEASE | Facility: CLINIC | Age: 79
End: 2019-08-06
Payer: MEDICARE

## 2019-08-06 VITALS
DIASTOLIC BLOOD PRESSURE: 66 MMHG | OXYGEN SATURATION: 99 % | BODY MASS INDEX: 21.94 KG/M2 | HEART RATE: 64 BPM | WEIGHT: 171 LBS | SYSTOLIC BLOOD PRESSURE: 143 MMHG | HEIGHT: 74 IN | TEMPERATURE: 98.6 F

## 2019-08-06 LAB
APPEARANCE: ABNORMAL
BACTERIA: NEGATIVE
BILIRUBIN URINE: NEGATIVE
BLOOD URINE: ABNORMAL
CD3 CELLS # BLD: 1405 /UL
CD3 CELLS NFR BLD: 51 %
CD3+CD4+ CELLS # BLD: 403 /UL
CD3+CD4+ CELLS NFR BLD: 15 %
CD3+CD4+ CELLS/CD3+CD8+ CLL SPEC: 0.41 RATIO
CD3+CD8+ CELLS # SPEC: 983 /UL
CD3+CD8+ CELLS NFR BLD: 35 %
COLOR: YELLOW
GLUCOSE QUALITATIVE U: NEGATIVE
HYALINE CASTS: 0 /LPF
KETONES URINE: NEGATIVE
LEUKOCYTE ESTERASE URINE: ABNORMAL
MICROSCOPIC-UA: NORMAL
NITRITE URINE: NEGATIVE
PH URINE: 6.5
PROTEIN URINE: ABNORMAL
RED BLOOD CELLS URINE: 24 /HPF
SPECIFIC GRAVITY URINE: 1.01
SQUAMOUS EPITHELIAL CELLS: 1 /HPF
UROBILINOGEN URINE: NORMAL
WHITE BLOOD CELLS URINE: >720 /HPF

## 2019-08-06 PROCEDURE — 99214 OFFICE O/P EST MOD 30 MIN: CPT

## 2019-08-06 NOTE — PHYSICAL EXAM
[General Appearance - Alert] : alert [Sclera] : the sclera and conjunctiva were normal [General Appearance - In No Acute Distress] : in no acute distress [PERRL With Normal Accommodation] : pupils were equal in size, round, reactive to light [Extraocular Movements] : extraocular movements were intact [Outer Ear] : the ears and nose were normal in appearance [Oropharynx] : the oropharynx was normal with no thrush [Neck Appearance] : the appearance of the neck was normal [Neck Cervical Mass (___cm)] : no neck mass was observed [Jugular Venous Distention Increased] : there was no jugular-venous distention [Thyroid Diffuse Enlargement] : the thyroid was not enlarged [Heart Rate And Rhythm] : heart rate was normal and rhythm regular [Auscultation Breath Sounds / Voice Sounds] : lungs were clear to auscultation bilaterally [Heart Sounds Gallop] : no gallops [Heart Sounds] : normal S1 and S2 [Murmurs] : no murmurs [Full Pulse] : the pedal pulses are present [Heart Sounds Pericardial Friction Rub] : no pericardial rub [Edema] : there was no peripheral edema [Bowel Sounds] : normal bowel sounds [Abdomen Tenderness] : non-tender [Abdomen Soft] : soft [Abdomen Mass (___ Cm)] : no abdominal mass palpated [Costovertebral Angle Tenderness] : no CVA tenderness [Nail Clubbing] : no clubbing  or cyanosis of the fingernails [Musculoskeletal - Swelling] : no joint swelling [Skin Color & Pigmentation] : normal skin color and pigmentation [Motor Tone] : muscle strength and tone were normal [Oriented To Time, Place, And Person] : oriented to person, place, and time [] : no rash [Affect] : the affect was normal

## 2019-08-06 NOTE — ASSESSMENT
[FreeTextEntry1] : 8/6/2019----79 yoM with HIV (dx 2013), CKD, seborrheic keratosis, osteopenia, dandruff, here for f/u. Pt doing well on biktarvy. Takes at lunch daily. No missed doses. Concern of elevated creatinine, wbc, appears as ongoing urinary tract infection. Needs urology today. labs today see in a month. Needs Nephrolgy, with Sahil Mack.  [Treatment Education] : treatment education [Drug Interactions / Side Effects] : drug interactions/side effects [Treatment Adherence] : treatment adherence [HIV Education] : HIV Education [Anticipatory Guidance] : anticipatory guidance

## 2019-08-06 NOTE — HISTORY OF PRESENT ILLNESS
[FreeTextEntry1] : \par History of Present Illness\par 8/6/2019----79 yoM with HIV (dx 2013), CKD, seborrheic keratosis, osteopenia, dandruff, here for f/u. Pt doing well on biktarvy. Takes at lunch daily. No missed doses. Concern of elevated creatinine, wbc, appears as ongoing urinary tract infection. Needs urology today. labs today see in a month. Needs Nephrolgy, with Sahil Mack. \par \par c/o worsening incontinence, not getting any sleep. Pt long overdue for urology f/u, have previously discussed UA results at length. Last appt was 2017, needed repeat cystoscopy at that time. Has not been taking flomax, ran out.\par \par Also needs: nephrology, cardiology, bone density.\par Never restarted fosamax. \par Requesting dandruff shampoo renewal\par Requesting handicapped parking permit, unsteady gait/shuffle, had outside eval by neuro several years ago to r/o Parkinsons.\par Had several teeth extracted \par \par Sexual History: The patient is not sexually active. \par Lives with assisted living facility. very happy there, has a lot of friends. 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)\par Orchitis and epididymitis (604.90) (N45.3)\par Osteoporosis (733.00) (M81.0)\par Pneumococcal vaccine refused (V64.06) (Z28.21)\par Proteinuria (791.0) (R80.9)\par Refused influenza vaccine (V64.06) (Z28.21)\par Renal mass (593.9) (N28.89)\par Seborrheic keratosis (702.19) (L82.1)\par Shortness of breath on exertion (786.05) (R06.02)\par Skin tag (701.9) (L91.8)\par Urinary retention (788.20) (R33.9)\par Vitamin D deficiency (268.9) (E55.9)\par \par Past Medical History\par History of Antinuclear antibody (OWEN) titer greater than 1:80 (795.79) (R76.0)\par History of Creatinine elevation (790.99) (R79.89)\par History of Enlarged prostate (600.00) (N40.0)\par History of Hemorrhagic cyst\par History of anemia (V12.3) (Z86.2)\par History of athlete's foot (V12.09) (Z86.19)\par History of degenerative disc disease (V13.59) (Z87.39)\par History of fever (V13.89) (Z87.898)\par History of lymphadenopathy (V13.89) (Z87.898)\par History of osteopenia (V13.59) (Z87.39)\par History of Parkinson's disease (V12.49) (Z86.69)\par History of recurrent urinary tract infection (V13.02) (Z87.440)\par History of renal insufficiency syndrome (V13.09) (Z87.448)\par History of Left knee pain (719.46) (M25.562)\par Personal history of multiple pulmonary nodules (V12.69) (Z87.898)\par Personal history of scoliosis (V13.59) (Z87.39)\par Personal history of urinary tract infection (V13.02) (Z87.440)\par History of Tinea unguium (110.1) (B35.1)\par History of Tooth pain (525.9) (K08.89)\par History of Vaccine refused by parent (V64.05) (Z28.82)\par History of Visit for dental examination (V72.2) (Z01.20)\par History of Visit for eye and vision exam (V72.0) (Z01.00)\par History of Xerosis of skin (706.8) (L85.3)\par \par Current Meds\par Aspirin EC 81 MG Oral Tablet Delayed Release; TAKE 1 TABLET DAILY\par Biktarvy -25 MG Oral Tablet; TAKE 1 TABLET BY MOUTH EVERY DAY\par Finasteride 5 MG Oral Tablet; TAKE 1 TABLET BY MOUTH ONCE DAILY\par Fosamax 70 MG Oral Tablet; Take once a week, 1 hr before eating, with full glass of\par water. Do not lay down 1 hr after taking pill\par Ketoconazole 2 % External Shampoo; apply to scalp twice weekly. may use as body\par wash\par Multi-Vitamin Daily Oral Tablet; TAKE 1 TABLET DAILY\par Vitamin D3 2000 UNIT Oral Capsule; TAKE 2 CAPSULE Daily\par \par Allergies\par No Known Drug Allergies\par \par

## 2019-08-08 LAB
25(OH)D3 SERPL-MCNC: 53 NG/ML
ALBUMIN SERPL ELPH-MCNC: 3.9 G/DL
ALP BLD-CCNC: 121 U/L
ALT SERPL-CCNC: 13 U/L
ANION GAP SERPL CALC-SCNC: 12 MMOL/L
AST SERPL-CCNC: 15 U/L
BASOPHILS # BLD AUTO: 0.03 K/UL
BASOPHILS NFR BLD AUTO: 0.3 %
BILIRUB SERPL-MCNC: 0.3 MG/DL
BUN SERPL-MCNC: 38 MG/DL
CALCIUM SERPL-MCNC: 9.8 MG/DL
CHLORIDE SERPL-SCNC: 102 MMOL/L
CHOLEST SERPL-MCNC: 185 MG/DL
CHOLEST/HDLC SERPL: 3.4 RATIO
CO2 SERPL-SCNC: 27 MMOL/L
CREAT SERPL-MCNC: 2.43 MG/DL
CYSTATIN C SERPL-MCNC: 1.83 MG/L
EOSINOPHIL # BLD AUTO: 0.1 K/UL
EOSINOPHIL NFR BLD AUTO: 1 %
GFR/BSA.PRED SERPLBLD CYS-BASED-ARV: 33 ML/MIN
GLUCOSE SERPL-MCNC: 73 MG/DL
HCT VFR BLD CALC: 38 %
HDLC SERPL-MCNC: 55 MG/DL
HGB BLD-MCNC: 12.1 G/DL
HIV1 RNA # SERPL NAA+PROBE: NORMAL
HIV1 RNA # SERPL NAA+PROBE: NORMAL COPIES/ML
IMM GRANULOCYTES NFR BLD AUTO: 0.7 %
LDLC SERPL CALC-MCNC: 106 MG/DL
LYMPHOCYTES # BLD AUTO: 2.9 K/UL
LYMPHOCYTES NFR BLD AUTO: 28.6 %
MAN DIFF?: NORMAL
MCHC RBC-ENTMCNC: 31.8 GM/DL
MCHC RBC-ENTMCNC: 35.2 PG
MCV RBC AUTO: 110.5 FL
MONOCYTES # BLD AUTO: 1.08 K/UL
MONOCYTES NFR BLD AUTO: 10.7 %
NEUTROPHILS # BLD AUTO: 5.96 K/UL
NEUTROPHILS NFR BLD AUTO: 58.7 %
PLATELET # BLD AUTO: 279 K/UL
POTASSIUM SERPL-SCNC: 4.3 MMOL/L
PROT SERPL-MCNC: 7.5 G/DL
PSA SERPL-MCNC: 3.07 NG/ML
RBC # BLD: 3.44 M/UL
RBC # FLD: 13.2 %
SODIUM SERPL-SCNC: 141 MMOL/L
T PALLIDUM AB SER QL IA: NEGATIVE
TRIGL SERPL-MCNC: 118 MG/DL
VIRAL LOAD INTERP: NORMAL
VIRAL LOAD LOG: NORMAL LG COP/ML
WBC # FLD AUTO: 10.14 K/UL

## 2019-08-09 ENCOUNTER — RX RENEWAL (OUTPATIENT)
Age: 79
End: 2019-08-09

## 2019-08-09 LAB
M TB IFN-G BLD-IMP: NEGATIVE
QUANTIFERON TB PLUS MITOGEN MINUS NIL: 0.77 IU/ML
QUANTIFERON TB PLUS NIL: 0.04 IU/ML
QUANTIFERON TB PLUS TB1 MINUS NIL: -0.01 IU/ML
QUANTIFERON TB PLUS TB2 MINUS NIL: -0.02 IU/ML

## 2019-08-15 ENCOUNTER — APPOINTMENT (OUTPATIENT)
Dept: NEPHROLOGY | Facility: CLINIC | Age: 79
End: 2019-08-15
Payer: MEDICARE

## 2019-08-15 VITALS
WEIGHT: 175 LBS | HEIGHT: 72 IN | HEART RATE: 62 BPM | BODY MASS INDEX: 23.7 KG/M2 | OXYGEN SATURATION: 97 % | SYSTOLIC BLOOD PRESSURE: 153 MMHG | DIASTOLIC BLOOD PRESSURE: 71 MMHG

## 2019-08-15 DIAGNOSIS — N20.0 CALCULUS OF KIDNEY: ICD-10-CM

## 2019-08-15 DIAGNOSIS — Z87.448 PERSONAL HISTORY OF OTHER DISEASES OF URINARY SYSTEM: ICD-10-CM

## 2019-08-15 DIAGNOSIS — Z87.442 PERSONAL HISTORY OF URINARY CALCULI: ICD-10-CM

## 2019-08-15 PROCEDURE — 99205 OFFICE O/P NEW HI 60 MIN: CPT

## 2019-08-16 PROBLEM — N20.0 NEPHROLITHIASIS: Status: ACTIVE | Noted: 2019-08-16

## 2019-08-16 NOTE — ASSESSMENT
[FreeTextEntry1] : Mr. Monsivais is a 79 year old man with chronic kidney disease stage III with secondary hyperparathyroidism, a history of calcium-based nephrolithiasis, a host of urologic issues (including urinary incontinence, history of UTIs, a history of nephrogenic adenoma, and a history of hydronephrosis), HIV controlled on Biktarvy, and osteopenia/osteoporosis, here for renal evaluation and management.\par \par Mr. Monsivais has long-standing CKD, dating back to 2013. It has mostly been mild and stable, but he has had persistent proteinuria, although this is confounded to some degree by his UTIs. The precise cause of his renal dysfunction is uncertain, as no prior diagnostic workup has been confirmatory, but the timing of its initiation with his HIV diagnosis and medications suggest a link. As well, he has a multitude of urologic issues, including stones, UTIs, and a history of hydronephrosis, all of which could be contributory.\par \par Most urgently, while his baseline creatinine appears to have been closer to 1.8, his most recent creatinine has risen inexplicably to 2.4. Whether this is a mere blip or a sustained rise is as yet uncertain, so we will investigate with repeat labs. Regardless, we will also begin an investigation into the underlying cause of his CKD, with serologies and a renal ultrasound (the latter is particularly important given his history of nephrolithiasis and hydronephrosis).\par \par I would be cautious in making any adjustments to his medication regimen, and he should continue taking Biktarvy. The results of labs today should be elucidative of next steps. For today, however, no changes.\par \par Mr. Monsivais's blood pressure is high today, and appears to hover in the 140s frequently (occasionally into the 130s or as high as 170). I suspect he would benefit from antihypertensive therapy, but this is not urgent and we will address once his underlying renal issues are stabilized.\par 	\par For Mr. Monsivais's stone disease, recommended fluid intake, > 2 liters per day.\par \par Labs today.\par Mr. Monsivais should follow up in ~1 month.\par 	\par I have spent a total of 60 minutes in which >50% was spent in discussion with patient regarding chronic kidney disease, proteinuria, nephrolithiasis, urologic disorders, hypertension, and diet.

## 2019-08-16 NOTE — HISTORY OF PRESENT ILLNESS
[FreeTextEntry1] : Contacts:\par 	cell, [phone]\par 	Dr. 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 Monsivais is a 79 year old man with chronic kidney disease stage III with secondary hyperparathyroidism, a history of calcium-based nephrolithiasis, a host of urologic issues (including urinary incontinence, history of UTIs, a history of nephrogenic adenoma, and a history of hydronephrosis), HIV controlled on Biktarvy, and osteopenia/osteoporosis, here for renal evaluation and management.\par \par Mr. Monsivais has been in care at [facility] since  (with data available to me), when he was diagnosed with HIV. His initial creatinine was 1, but this quickly bulmaro later in  to ~1.2, and subsequently:\par 	2014: 1.2-1.4\par 	2015: 1.2-1.6\par 	2016: 1.3-1.4\par 	2017: 1.3-1.6\par 	2018: 1.5-1.6\par 	2019: 1.8\par \par Most recently, however, his creatinine was 2.4. There has been some mild degree of proteinuria on almost all urine dipstick samples since at least . Quantitative proteinuria has also been positive, >= 0.4 g/g, since .\par \par The patient's HIV regimen appears to have been the following, with Biktarvy current:\par elvitegravir/cobicistat/emtricitabine/tenofovir alafenamide\par 	-- started  -> ended Sep. 2016\par Genvoya (Elvitegravir, cobicistat, emtricitabine, and tenofovir alafenamide)\par 	-- started Sep. 2016 -> ended 2019\par Biktarvy (bictegravir, emtricitabine, and tenofovir alafenamide)\par 	-- started 2019\par 	\par Notably, the rise in creatinine to 1.8 in  predated the switch to Biktarvy.\par \par Mr. Monsivais saw nephrology in  for CKD III, with notable findings of left hydronephrosis, bladder stone, elevated OWEN, and culture positive urinalysis. There was no additional nephrology follow-up, but he was recommended to see urology. He followed with urology for some time (though last in ), and was noted to have LUTS with elevated residual and hydro thought to be from a passed stone. 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 Monsivais has a history of urinary infections, largely detected in urine cultures, but otherwise asymptomatic. He has chronic urinary incontinence. He has had no recent symptomatic stones. And he has not followed with urology for some time, nor has he taken finasteride or tamsulosin, although he has an upcoming appointment with them.\par \par Otherwise, Mr. Monsivais is feeling well. 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 These days, if he "exerts [himself]", he feels it, with some mild dyspnea with exertion. Has occaional lightheadedness, but not too much. No headaches, chest pain, abdominal pain, nausea, vomiting, diarrhea, dysuria, hematuria, joint swelling (although he has some mild ankle swelling). He has occasional constipation, alleviated by Miralax. He drinks when thirsty, but notes that he is not thirsty all that often.\par \par For occasional toothache, he takes occasional Advil. He averages about 4 doses per month.\par \par ROS: All other systems were reviewed and are negative, except as per HPI.\par 	\par -------------------------------------------------------------------------------\par Social History:\par 	Lifelong [place] Yorker; from [place], nows lives in [place] (at the [place])\par 	Never , no children; has no siblings\par 	Former  at [employer] for 40+ years\par 	No history of tobacco\par \par Family History:\par 	Mother  of leukemia\par 	Father had \par 	No known history of renal disease, hematuria, proteinuria, ESRD, dialysis, transplant\par \par -------------------------------------------------------------------------------\par Allergies: NKDA\par \par Medications:\par 	Biktarvy (bictegravir, emtricitabine, and tenofovir alafenamide)\par 	Multivitamin\par 	Tamar-C (vitamin C)\par 	Vitamin D 5000 units\par 	Calcium 1000mg\par 	Magnesium 400mg\par 	Zinc 15mg	\par 	\par -------------------------------------------------------------------------------\par Physical Exam:\par 	Gen: NAD, well-appearing; walks with cane\par 	HEENT: Supple neck\par 	Pulm: CTA\par 	CV: RRR\par 	Back: No spinal or CVA terndeness\par 	Abd: +BS, soft, nontender/nondistended\par 	UE: Warm, FROM, intact strength\par 	LE: Warm, FROM, intact strength; 1+ edema, R > L (chronic)\par 	Neuro: No focal deficits\par 	Psych: Normal affect\par 	Skin: Warm, without rashes\par 	\par -------------------------------------------------------------------------------\par Labs/Studies:\par \par 	2019\par \par 		141 | 102 | 38\par 		------------------< 73 Ca 9.8 \par 		4.3 |  27 | 2.43\par 		\par 		Albumin 3.9	\par 		\par 	Creatinine Trend\par 		2019 SCr 2.43\par 		2019 SCr 1.85\par 		2019 SCr 1.82\par 		2018 SCr 1.61\par 		2018 SCr 1.47\par 		2018 SCr 1.56\par 		2017-09-15 SCr 1.53\par 		2017-05-15 SCr 1.31\par 		2017 SCr 1.37\par 		2017 SCr 1.27\par 		2016-10-11 SCr 1.27\par 		2016 SCr 1.34\par 		2016 SCr 1.4\par 		2016 SCr 1.24\par 		2016 SCr 1.26\par 		2016 SCr 1.26\par 		2015 SCr 1.32\par 		2015 SCr 1.46\par 		2015 SCr 1.35\par 		2015-08-10 SCr 1.45\par 		2015 SCr 1.37\par 		2015 SCr 1.52\par 		2015 SCr 1.56\par 		2015 SCr 1.2\par 		2015 SCr 1.37\par 		2014-10-08 SCr 1.31\par 		2014 SCr 1.23\par 		2014 SCr 1.32\par 		2014 SCr 1.19\par 		2013 SCr 1.4\par 		2013 SCr 1.35\par 		2013-10-08 SCr 1.12\par 		2013-09-10 SCr 1.24\par 		2013 SCr 1.17\par 		2013 SCr 1\par 		2013 SCr 1.03\par \par 	2019 UPC ratio = 0.9 g/g\par 	2017-09-15 UPC ratio = 0.4 g/g\par 	2017 UPC ratio = 0.4 g/g\par 	2015 UPC ratio = 0.6 g/g\par 	2014 UPC ratio = 0.4 g/g\par 	\par 	2015-08-10 UAC ratio = 137 mg/g\par 	2014-10-08 UAC ratio = 34 mg/g\par 		\par 	2015-08-10 U/A: protein 30, blood moderate, LE large, RBC 24, WBC > 720, bacteria negative\par \par 	2019 CBC: WBC 10.1 / Hgb 12.1 / plt 279\par 	2019 Lipid: chol 185, , HDL 55, \par 	2019 HbA1c 5.7\par 	2019 Vitamin D (25OH) 53\par 	2017-09-15 PTH 25 (Ca 9.7)\par \par 	2019 HCV nonreactive\par \par 	2015-08-10 Complement C3 104\par 	2015-08-10 KL FLC: kappa 5.4, lambda 3.43\par \par 	2015-08-10 ANCA: negative (cANCA/pANCA)\par \par 	2016 OWEN < 1:80\par 	2015-08-10 OWEN 1:160 homogeneous\par 	2015 dsDNA < 12

## 2019-08-16 NOTE — CONSULT LETTER
[Dear  ___] : Dear  [unfilled], [Courtesy Letter:] : I had the pleasure of seeing your patient, [unfilled], in my office today. [Please see my note below.] : Please see my note below. [Sincerely,] : Sincerely, [FreeTextEntry3] : Presley Ferguson MD\par Nephrology\par  [___] : [unfilled]

## 2019-09-06 ENCOUNTER — APPOINTMENT (OUTPATIENT)
Dept: INFECTIOUS DISEASE | Facility: CLINIC | Age: 79
End: 2019-09-06
Payer: MEDICARE

## 2019-09-06 VITALS
OXYGEN SATURATION: 98 % | HEIGHT: 72 IN | TEMPERATURE: 98.1 F | WEIGHT: 172 LBS | HEART RATE: 62 BPM | BODY MASS INDEX: 23.3 KG/M2 | SYSTOLIC BLOOD PRESSURE: 146 MMHG | DIASTOLIC BLOOD PRESSURE: 65 MMHG

## 2019-09-06 LAB
BASOPHILS # BLD AUTO: 0.05 K/UL
BASOPHILS NFR BLD AUTO: 0.4 %
CYSTATIN C SERPL-MCNC: 1.8 MG/L
EOSINOPHIL # BLD AUTO: 0.13 K/UL
EOSINOPHIL NFR BLD AUTO: 1.1 %
GFR/BSA.PRED SERPLBLD CYS-BASED-ARV: 33 ML/MIN
HCT VFR BLD CALC: 35.9 %
HGB BLD-MCNC: 11.8 G/DL
IMM GRANULOCYTES NFR BLD AUTO: 0.7 %
LYMPHOCYTES # BLD AUTO: 2.51 K/UL
LYMPHOCYTES NFR BLD AUTO: 22.2 %
MAN DIFF?: NORMAL
MCHC RBC-ENTMCNC: 32.9 GM/DL
MCHC RBC-ENTMCNC: 36.3 PG
MCV RBC AUTO: 110.5 FL
MONOCYTES # BLD AUTO: 0.99 K/UL
MONOCYTES NFR BLD AUTO: 8.7 %
NEUTROPHILS # BLD AUTO: 7.56 K/UL
NEUTROPHILS NFR BLD AUTO: 66.9 %
PLATELET # BLD AUTO: 259 K/UL
RBC # BLD: 3.25 M/UL
RBC # FLD: 13.9 %
WBC # FLD AUTO: 11.32 K/UL

## 2019-09-06 PROCEDURE — 99214 OFFICE O/P EST MOD 30 MIN: CPT

## 2019-09-06 NOTE — PHYSICAL EXAM
[General Appearance - Alert] : alert [General Appearance - In No Acute Distress] : in no acute distress [Sclera] : the sclera and conjunctiva were normal [PERRL With Normal Accommodation] : pupils were equal in size, round, reactive to light [Extraocular Movements] : extraocular movements were intact [Outer Ear] : the ears and nose were normal in appearance [Oropharynx] : the oropharynx was normal with no thrush [Neck Appearance] : the appearance of the neck was normal [Jugular Venous Distention Increased] : there was no jugular-venous distention [Neck Cervical Mass (___cm)] : no neck mass was observed [Thyroid Diffuse Enlargement] : the thyroid was not enlarged [Auscultation Breath Sounds / Voice Sounds] : lungs were clear to auscultation bilaterally [Heart Rate And Rhythm] : heart rate was normal and rhythm regular [Heart Sounds] : normal S1 and S2 [Heart Sounds Gallop] : no gallops [Murmurs] : no murmurs [Heart Sounds Pericardial Friction Rub] : no pericardial rub [Full Pulse] : the pedal pulses are present [Edema] : there was no peripheral edema [Bowel Sounds] : normal bowel sounds [Abdomen Soft] : soft [Abdomen Tenderness] : non-tender [Abdomen Mass (___ Cm)] : no abdominal mass palpated [Costovertebral Angle Tenderness] : no CVA tenderness [Musculoskeletal - Swelling] : no joint swelling [Nail Clubbing] : no clubbing  or cyanosis of the fingernails [Motor Tone] : muscle strength and tone were normal [Skin Color & Pigmentation] : normal skin color and pigmentation [] : no rash [Oriented To Time, Place, And Person] : oriented to person, place, and time [Affect] : the affect was normal

## 2019-09-06 NOTE — HISTORY OF PRESENT ILLNESS
[FreeTextEntry1] : Reason For Visit\par 9/6/2019-----CARLINE GARBER is a 79 year old male being seen for a follow-up visit. seen for elevated creatinine...continue Biktarvy for now, I will place a call to Dr. Ferguson regarding should I make any changes. Has upcoming urology appt 9/12/19. Sahil Ferguson 10/4/19 at 11am . labs today see in 3 months. \par  \par \par \par History of Present Illness\par 8/6/2019----79 yoM with HIV (dx 2013), CKD, seborrheic keratosis, osteopenia, dandruff, here for f/u. Pt doing well on biktarvy. Takes at lunch daily. No missed doses. Concern of elevated creatinine, wbc, appears as ongoing urinary tract infection. Needs urology today. labs today see in a month. Needs Nephrolgy, with Sahil Mack. \par \par c/o worsening incontinence, not getting any sleep. Pt long overdue for urology f/u, have previously discussed UA results at length. Last appt was 2017, needed repeat cystoscopy at that time. Has not been taking flomax, ran out.\par \par Also needs: nephrology, cardiology, bone density.\par Never restarted fosamax. \par Requesting dandruff shampoo renewal\par Requesting handicapped parking permit, unsteady gait/shuffle, had outside eval by neuro several years ago to r/o Parkinsons.\par Had several teeth extracted \par \par Sexual History: The patient is not sexually active. \par Lives with assisted living facility. very happy there, has a lot of friends. 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)\par Orchitis and epididymitis (604.90) (N45.3)\par Osteoporosis (733.00) (M81.0)\par Pneumococcal vaccine refused (V64.06) (Z28.21)\par Proteinuria (791.0) (R80.9)\par Refused influenza vaccine (V64.06) (Z28.21)\par Renal mass (593.9) (N28.89)\par Seborrheic keratosis (702.19) (L82.1)\par Shortness of breath on exertion (786.05) (R06.02)\par Skin tag (701.9) (L91.8)\par Urinary retention (788.20) (R33.9)\par Vitamin D deficiency (268.9) (E55.9)\par \par Past Medical History\par History of Antinuclear antibody (OWEN) titer greater than 1:80 (795.79) (R76.0)\par History of Creatinine elevation (790.99) (R79.89)\par History of Enlarged prostate (600.00) (N40.0)\par History of Hemorrhagic cyst\par History of anemia (V12.3) (Z86.2)\par History of athlete's foot (V12.09) (Z86.19)\par History of degenerative disc disease (V13.59) (Z87.39)\par History of fever (V13.89) (Z87.898)\par History of lymphadenopathy (V13.89) (Z87.898)\par History of osteopenia (V13.59) (Z87.39)\par History of Parkinson's disease (V12.49) (Z86.69)\par History of recurrent urinary tract infection (V13.02) (Z87.440)\par History of renal insufficiency syndrome (V13.09) (Z87.448)\par History of Left knee pain (719.46) (M25.562)\par Personal history of multiple pulmonary nodules (V12.69) (Z87.898)\par Personal history of scoliosis (V13.59) (Z87.39)\par Personal history of urinary tract infection (V13.02) (Z87.440)\par History of Tinea unguium (110.1) (B35.1)\par History of Tooth pain (525.9) (K08.89)\par History of Vaccine refused by parent (V64.05) (Z28.82)\par History of Visit for dental examination (V72.2) (Z01.20)\par History of Visit for eye and vision exam (V72.0) (Z01.00)\par History of Xerosis of skin (706.8) (L85.3)\par \par Current Meds\par Aspirin EC 81 MG Oral Tablet Delayed Release; TAKE 1 TABLET DAILY\par Biktarvy -25 MG Oral Tablet; TAKE 1 TABLET BY MOUTH EVERY DAY\par Finasteride 5 MG Oral Tablet; TAKE 1 TABLET BY MOUTH ONCE DAILY\par Fosamax 70 MG Oral Tablet; Take once a week, 1 hr before eating, with full glass of\par water. Do not lay down 1 hr after taking pill\par Ketoconazole 2 % External Shampoo; apply to scalp twice weekly. may use as body\par wash\par Multi-Vitamin Daily Oral Tablet; TAKE 1 TABLET DAILY\par Vitamin D3 2000 UNIT Oral Capsule; TAKE 2 CAPSULE Daily\par \par Allergies\par No Known Drug Allergies\par \par 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)\par Orchitis and epididymitis (604.90) (N45.3)\par Osteoporosis (733.00) (M81.0)\par Pneumococcal vaccine refused (V64.06) (Z28.21)\par Proteinuria (791.0) (R80.9)\par Refused influenza vaccine (V64.06) (Z28.21)\par Renal mass (593.9) (N28.89)\par Seborrheic keratosis (702.19) (L82.1)\par Shortness of breath on exertion (786.05) (R06.02)\par Skin tag (701.9) (L91.8)\par Urinary incontinence, unspecified type (788.30) (R32)\par Urinary retention (788.20) (R33.9)\par Vitamin D deficiency (268.9) (E55.9)\par \par Past Medical History\par History of Antinuclear antibody (OWEN) titer greater than 1:80 (795.79) (R76.0)\par History of Creatinine elevation (790.99) (R79.89)\par History of Enlarged prostate (600.00) (N40.0)\par History of Hemorrhagic cyst\par History of anemia (V12.3) (Z86.2)\par History of athlete's foot (V12.09) (Z86.19)\par History of degenerative disc disease (V13.59) (Z87.39)\par History of fever (V13.89) (Z87.898)\par History of lymphadenopathy (V13.89) (Z87.898)\par History of osteopenia (V13.59) (Z87.39)\par History of Parkinson's disease (V12.49) (Z86.69)\par History of recurrent urinary tract infection (V13.02) (Z87.440)\par History of renal insufficiency syndrome (V13.09) (Z87.448)\par History of Left knee pain (719.46) (M25.562)\par Personal history of multiple pulmonary nodules (V12.69) (Z87.898)\par Personal history of scoliosis (V13.59) (Z87.39)\par Personal history of urinary tract infection (V13.02) (Z87.440)\par History of Tinea unguium (110.1) (B35.1)\par History of Tooth pain (525.9) (K08.89)\par History of Vaccine refused by parent (V64.05) (Z28.82)\par History of Visit for dental examination (V72.2) (Z01.20)\par History of Visit for eye and vision exam (V72.0) (Z01.00)\par History of Xerosis of skin (706.8) (L85.3)\par \par Current Meds\par Aspirin EC 81 MG Oral Tablet Delayed Release; TAKE 1 TABLET DAILY\par Biktarvy -25 MG Oral Tablet; TAKE 1 TABLET BY MOUTH EVERY DAY\par Finasteride 5 MG Oral Tablet; TAKE 1 TABLET BY MOUTH ONCE DAILY\par Fosamax 70 MG Oral Tablet; Take once a week, 1 hr before eating, with full glass of\par water. Do not lay down 1 hr after taking pill\par Ketoconazole 2 % External Shampoo; apply to scalp twice weekly. may use as body\par wash\par Multi-Vitamin Daily Oral Tablet; TAKE 1 TABLET DAILY\par Tamsulosin HCl - 0.4 MG Oral Capsule; TAKE 1 CAPSULE Daily\par Vitamin D3 2000 UNIT Oral Capsule; TAKE 2 CAPSULE Daily\par \par Allergies\par No Known Drug Allergies\par

## 2019-09-06 NOTE — ASSESSMENT
[FreeTextEntry1] : 9/6/2019-----CARLINE GARBER is a 79 year old male being seen for a follow-up visit. seen for elevated creatinine...continue Biktarvy for now, I will place a call to Dr. Ferguson regarding should I make any changes. Has upcoming urology appt 9/12/19. Sahil Ferguson 10/4/19 at 11am . labs today see in 3 months.  [Treatment Education] : treatment education [Treatment Adherence] : treatment adherence [Drug Interactions / Side Effects] : drug interactions/side effects [HIV Education] : HIV Education [Anticipatory Guidance] : anticipatory guidance

## 2019-09-09 ENCOUNTER — RX RENEWAL (OUTPATIENT)
Age: 79
End: 2019-09-09

## 2019-09-09 LAB
ALBUMIN SERPL ELPH-MCNC: 4 G/DL
ALP BLD-CCNC: 123 U/L
ALT SERPL-CCNC: 9 U/L
ANION GAP SERPL CALC-SCNC: 14 MMOL/L
APPEARANCE: ABNORMAL
AST SERPL-CCNC: 14 U/L
BACTERIA: NEGATIVE
BILIRUB SERPL-MCNC: 0.3 MG/DL
BILIRUBIN URINE: NEGATIVE
BLOOD URINE: ABNORMAL
BUN SERPL-MCNC: 31 MG/DL
CALCIUM SERPL-MCNC: 10.2 MG/DL
CHLORIDE SERPL-SCNC: 102 MMOL/L
CO2 SERPL-SCNC: 25 MMOL/L
COLOR: YELLOW
CREAT SERPL-MCNC: 2.05 MG/DL
GLUCOSE QUALITATIVE U: NEGATIVE
GLUCOSE SERPL-MCNC: 92 MG/DL
HIV1 RNA # SERPL NAA+PROBE: NORMAL
HIV1 RNA # SERPL NAA+PROBE: NORMAL COPIES/ML
HYALINE CASTS: 0 /LPF
KETONES URINE: NEGATIVE
LEUKOCYTE ESTERASE URINE: ABNORMAL
MICROSCOPIC-UA: NORMAL
NITRITE URINE: NEGATIVE
PH URINE: 6.5
POTASSIUM SERPL-SCNC: 4.5 MMOL/L
PROT SERPL-MCNC: 7.2 G/DL
PROTEIN URINE: ABNORMAL
RED BLOOD CELLS URINE: 23 /HPF
SODIUM SERPL-SCNC: 141 MMOL/L
SPECIFIC GRAVITY URINE: 1.01
SQUAMOUS EPITHELIAL CELLS: 0 /HPF
URINE COMMENTS: NORMAL
UROBILINOGEN URINE: NORMAL
VIRAL LOAD INTERP: NORMAL
VIRAL LOAD LOG: NORMAL LG COP/ML
WHITE BLOOD CELLS URINE: >720 /HPF

## 2019-09-12 ENCOUNTER — APPOINTMENT (OUTPATIENT)
Dept: UROLOGY | Facility: CLINIC | Age: 79
End: 2019-09-12
Payer: MEDICARE

## 2019-09-12 DIAGNOSIS — R32 UNSPECIFIED URINARY INCONTINENCE: ICD-10-CM

## 2019-09-12 PROCEDURE — 51798 US URINE CAPACITY MEASURE: CPT

## 2019-09-12 PROCEDURE — 99214 OFFICE O/P EST MOD 30 MIN: CPT | Mod: 25

## 2019-09-16 LAB
APPEARANCE: ABNORMAL
BACTERIA UR CULT: ABNORMAL
BACTERIA: NEGATIVE
BILIRUBIN URINE: NEGATIVE
BLOOD URINE: ABNORMAL
COLOR: YELLOW
GLUCOSE QUALITATIVE U: NEGATIVE
HYALINE CASTS: 0 /LPF
KETONES URINE: NEGATIVE
LEUKOCYTE ESTERASE URINE: ABNORMAL
MICROSCOPIC-UA: NORMAL
NITRITE URINE: NEGATIVE
PH URINE: 6.5
PROTEIN URINE: ABNORMAL
RED BLOOD CELLS URINE: 25 /HPF
SPECIFIC GRAVITY URINE: 1.01
SQUAMOUS EPITHELIAL CELLS: 0 /HPF
URINE COMMENTS: NORMAL
UROBILINOGEN URINE: NORMAL
WHITE BLOOD CELLS URINE: >720 /HPF

## 2019-09-25 LAB
HIV GENOSURE ARCHIVE 1: NORMAL
HIV1 PROVIR DNA RT + PR + IN MUT DET SEQ: NORMAL
HIV1 PROVIRAL DNA GENTYP BLD MC NAR: NORMAL

## 2019-10-04 ENCOUNTER — APPOINTMENT (OUTPATIENT)
Dept: NEPHROLOGY | Facility: CLINIC | Age: 79
End: 2019-10-04
Payer: MEDICARE

## 2019-10-04 VITALS
OXYGEN SATURATION: 96 % | BODY MASS INDEX: 23.03 KG/M2 | HEART RATE: 63 BPM | WEIGHT: 170 LBS | HEIGHT: 72 IN | DIASTOLIC BLOOD PRESSURE: 63 MMHG | SYSTOLIC BLOOD PRESSURE: 147 MMHG

## 2019-10-04 PROCEDURE — 99215 OFFICE O/P EST HI 40 MIN: CPT

## 2019-10-04 NOTE — HISTORY OF PRESENT ILLNESS
[FreeTextEntry1] : Contacts:\par 	cell, [phone]\par 	Dr. 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\par 		Secondary hyperparathyroidism\par 		\par 		2014: 1.2-1.4\par 		2015: 1.2-1.6\par 		2016: 1.3-1.4\par 		2017: 1.3-1.6\par 		2018: 1.5-1.6\par 		2019: 1.8\par 		\par 	Nephrolithiasis (60% calcium oxalate, 40% calcium phosphate)\par 	Urologic issues: urinary incontinence; history of UTIs; history of nephrogenic adenoma; hydronephrosis)\par 	IgG kappa monoclonal gammopathy\par 	HIV (diagnosed )\par 	Osteopenia/osteoporosis\par 	Chronic lower extremity edema, possible venous insufficiency\par \par -------------------------------------------------------------------------------\par HPI:\par Mr. Monsivais is a 79 year old man with chronic kidney disease stage III with proteinuria and secondary hyperparathyroidism, a history of calcium-based nephrolithiasis, a host of urologic issues (including urinary incontinence, history of UTIs, a history of nephrogenic adenoma, and a history of hydronephrosis), HIV controlled on Biktarvy, and osteopenia/osteoporosis, here for renal follow up.\par \par Met Mr. Monsivais is August. At that appt., we made no changes to his regimen, but checked some labs, and he returns today for follow up. In the interim, he followed with infectious idsease and urology, the latter for an E. coli UTI requiring ciprofloxacin. He is also on tamsulosin and finasteride. Labs that were done over the last 1-2 months, reveals elevated creatinine, though resolving from peak, and a monoclonal gammopathy.\par \par Otherwise, Mr. Monsivais is feeling well without significant interval change. Has occaional mild lightheadedness. No headaches, chest pain, abdominal pain, nausea, vomiting, diarrhea, dysuria, hematuria, joint swelling (although has some mild ankle swelling). Occasional constipation, alleviated by Miralax. Drinks when thirsty, but notes that he is not thirsty all that often. 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\par 	Never , no children; has no siblings\par 	Former  at [employer] for 40+ years\par 	No history of tobacco\par \par Family History:\par 	Mother  of leukemia\par 	Father had \par 	No known history of renal disease, hematuria, proteinuria, ESRD, dialysis, transplant\par \par -------------------------------------------------------------------------------\par Allergies: NKDA\par \par Medications:\par 	Biktarvy (bictegravir, emtricitabine, and tenofovir alafenamide)\par 	Multivitamin\par 	Tmaar-C (vitamin C)\par 	Vitamin D 5000 units\par 	Calcium 1000mg\par 	Magnesium 400mg\par 	Zinc 15mg	\par 	\par -------------------------------------------------------------------------------\par Physical Exam:\par 	Gen: NAD, well-appearing\par 	HEENT: Supple neck\par 	Pulm: CTA\par 	CV: RRR\par 	Back: No spinal or CVA terndeness\par 	Abd: +BS, soft, nontender/nondistended\par 	UE: Warm, FROM, intact strength\par 	LE: Warm, FROM, intact strength; 1+ edema, R > L (chronic)\par 	Neuro: No focal deficits\par 	Psych: Normal affect\par 	Skin: Warm, without rashes\par 	\par -------------------------------------------------------------------------------\par Labs/Studies:\par \par 	2019\par \par 		141 | 102 | 31\par 		------------------< 92 Ca 10.2 eGFR 30\par 		4.5 |  25 | 2.05\par 		\par 		Albumin 4		\par 		Cystatin-C eGFR 33\par 		\par 	Creatinine Trend\par 		2019 SCr 2.05\par 		2019-08-15 SCr 2.27\par 		2019 SCr 2.43\par 		2019 SCr 1.85\par 		2019 SCr 1.82\par 		2018 SCr 1.61\par 		2018 SCr 1.47\par 		2018 SCr 1.56\par 		2017-09-15 SCr 1.53\par 		2017-05-15 SCr 1.31\par 		2017 SCr 1.37\par 		2017 SCr 1.27\par 		2016-10-11 SCr 1.27\par 		2016 SCr 1.34\par 		2016 SCr 1.4\par 		2016 SCr 1.24\par 		2016 SCr 1.26\par 		2016 SCr 1.26\par 		2015 SCr 1.32\par 		2015 SCr 1.46\par 		2015 SCr 1.35\par 		2015-08-10 SCr 1.45\par 		2015 SCr 1.37\par 		2015 SCr 1.52\par 		2015 SCr 1.56\par 		2015 SCr 1.2\par 		2015 SCr 1.37\par 		2014-10-08 SCr 1.31\par 		2014 SCr 1.23\par 		2014 SCr 1.32\par 		2014 SCr 1.19\par 		2013 SCr 1.4\par 		2013 SCr 1.35\par 		2013-10-08 SCr 1.12\par 		2013-09-10 SCr 1.24\par 		2013 SCr 1.17\par 		2013 SCr 1\par 		2013 SCr 1.03\par \par 	2019-08-15 UPC ratio = 0.7 g/g\par 	2019 UPC ratio = 0.9 g/g\par 	2017-09-15 UPC ratio = 0.4 g/g\par 	2017 UPC ratio = 0.4 g/g\par 	2015 UPC ratio = 0.6 g/g\par 	2014 UPC ratio = 0.4 g/g\par 	\par 	2019-08-15 UAC ratio = 186 mg/g\par 	2015-08-10 UAC ratio = 137 mg/g\par 	2014-10-08 UAC ratio = 34 mg/g\par 		\par 	2019 U/A: portein 30, blood moderate, LE large, RBC 25, WBC > 720\par \par 	2019 CBC: WBC 11.3 / Hgb 11.8 / plt 259\par 	2019 Lipid: chol 185, , HDL 55, \par 	2019 HbA1c 5.7\par 	2019 Vitamin D (25OH) 53\par 	2019-08-15 PTH 15 (Ca 10.1)\par \par 	2019 HCV nonreactive\par \par 	2019-08-15 SPEP/BROCK: IgG kappa band\par 	2019-08-15 KL FLC: kappa 9.6, lambda 5.2, ratio = 1.86\par 	2019-08-15 Complement C3 129, C4 47\par 	2019-08-15 Cryoglobulin negative\par 	\par 	2015-08-10 ANCA: negative (cANCA/pANCA)\par \par 	2016 OWEN < 1:80\par 	2015-08-10 OWEN 1:160 homogeneous\par 	2015 dsDNA < 12 \par  \par 	2019-08-15 HBsAg nonreactive\par 	2019-08-15 HBcAb nonreactive\par 	2019-08-15 HBsAb nonreactive

## 2019-10-04 NOTE — ASSESSMENT
[FreeTextEntry1] : Mr. Monsivais is a 79 year old man with chronic kidney disease stage III with proteinuria and secondary hyperparathyroidism, a history of calcium-based nephrolithiasis, a host of urologic issues (including urinary incontinence, history of UTIs, a history of nephrogenic adenoma, and a history of hydronephrosis), HIV controlled on Biktarvy, and osteopenia/osteoporosis, here for renal follow up.\par \par Mr. Monsivais has long-standing CKD (at least since 2013) of uncertain etiology. He also has persistent proteinuria (with non-albumin protein out of proportion to albumin component), although interpretation is somewhat challenging with the UTIs. His serologic workup has revealed IgG kappa monoclonal gammopathy, although it it unclear what, if any, this is playing in his renal disease.\par \par Mr. Monsivais also has higher than target blood pressure, and would likely benefit from a proteinuria-reducing antihypertensive, which we will get to in short order. Firstly, though I would like to get his UTI situation under control and have him seen by hematology for gammopathy evaluation.\par \par We discussed the possibility of performing a renal biopsy for definitive diagnosis. I am inclined toward proceeding given that his renal function has worsened and he has proteinuria, although I am unsure as yet if this is necessary. We will monitor over the next couple of months and re-engage on this issue at the next appt.\par \par For today, no changes. Referred to hematology. Follow up in about 2 months.\par \par \par \par I have spent a total of 40 minutes in which >50% was spent in discussion with patient regarding chronic kidney disease, proteinuria, nephrolithiasis, urologic disorders, hypertension, and diet.

## 2019-10-30 ENCOUNTER — RX RENEWAL (OUTPATIENT)
Age: 79
End: 2019-10-30

## 2019-12-04 ENCOUNTER — OUTPATIENT (OUTPATIENT)
Dept: OUTPATIENT SERVICES | Facility: HOSPITAL | Age: 79
LOS: 1 days | Discharge: HOME | End: 2019-12-04

## 2019-12-04 DIAGNOSIS — D47.2 MONOCLONAL GAMMOPATHY: ICD-10-CM

## 2019-12-06 ENCOUNTER — LABORATORY RESULT (OUTPATIENT)
Age: 79
End: 2019-12-06

## 2019-12-06 ENCOUNTER — OUTPATIENT (OUTPATIENT)
Dept: OUTPATIENT SERVICES | Facility: HOSPITAL | Age: 79
LOS: 1 days | End: 2019-12-06
Payer: COMMERCIAL

## 2019-12-06 ENCOUNTER — APPOINTMENT (OUTPATIENT)
Dept: INFECTIOUS DISEASE | Facility: CLINIC | Age: 79
End: 2019-12-06

## 2019-12-06 VITALS
WEIGHT: 160 LBS | DIASTOLIC BLOOD PRESSURE: 65 MMHG | HEART RATE: 71 BPM | OXYGEN SATURATION: 98 % | TEMPERATURE: 97.4 F | HEIGHT: 72 IN | BODY MASS INDEX: 21.67 KG/M2 | SYSTOLIC BLOOD PRESSURE: 135 MMHG

## 2019-12-06 DIAGNOSIS — B20 HUMAN IMMUNODEFICIENCY VIRUS [HIV] DISEASE: ICD-10-CM

## 2019-12-06 LAB
25(OH)D3 SERPL-MCNC: 58.7 NG/ML
ALBUMIN SERPL ELPH-MCNC: 4.2 G/DL
ALP BLD-CCNC: 126 U/L
ALT SERPL-CCNC: 23 U/L
ANION GAP SERPL CALC-SCNC: 13 MMOL/L
AST SERPL-CCNC: 26 U/L
BASOPHILS # BLD AUTO: 0.06 K/UL
BASOPHILS NFR BLD AUTO: 0.6 %
BILIRUB SERPL-MCNC: 0.3 MG/DL
BUN SERPL-MCNC: 29 MG/DL
CALCIUM SERPL-MCNC: 9.6 MG/DL
CD3 CELLS # BLD: 1241 /UL
CD3 CELLS NFR BLD: 41 %
CD3+CD4+ CELLS # BLD: 354 /UL
CD3+CD4+ CELLS NFR BLD: 12 %
CD3+CD4+ CELLS/CD3+CD8+ CLL SPEC: 0.41 RATIO
CD3+CD8+ CELLS # SPEC: 868 /UL
CD3+CD8+ CELLS NFR BLD: 28 %
CHLORIDE SERPL-SCNC: 102 MMOL/L
CHOLEST SERPL-MCNC: 155 MG/DL
CHOLEST/HDLC SERPL: 2.8 RATIO
CO2 SERPL-SCNC: 26 MMOL/L
CREAT SERPL-MCNC: 1.79 MG/DL
CYSTATIN C SERPL-MCNC: 1.65 MG/L
EOSINOPHIL # BLD AUTO: 0.07 K/UL
EOSINOPHIL NFR BLD AUTO: 0.7 %
GFR/BSA.PRED SERPLBLD CYS-BASED-ARV: 37 ML/MIN
GLUCOSE SERPL-MCNC: 108 MG/DL
HCT VFR BLD CALC: 39.9 %
HDLC SERPL-MCNC: 55 MG/DL
HGB BLD-MCNC: 12.9 G/DL
IMM GRANULOCYTES NFR BLD AUTO: 0.8 %
LDLC SERPL CALC-MCNC: 81 MG/DL
LYMPHOCYTES # BLD AUTO: 2.88 K/UL
LYMPHOCYTES NFR BLD AUTO: 27.8 %
MAN DIFF?: NORMAL
MCHC RBC-ENTMCNC: 32.3 GM/DL
MCHC RBC-ENTMCNC: 34.7 PG
MCV RBC AUTO: 107.3 FL
MONOCYTES # BLD AUTO: 1.11 K/UL
MONOCYTES NFR BLD AUTO: 10.7 %
NEUTROPHILS # BLD AUTO: 6.17 K/UL
NEUTROPHILS NFR BLD AUTO: 59.4 %
PLATELET # BLD AUTO: 270 K/UL
POTASSIUM SERPL-SCNC: 4.4 MMOL/L
PROT SERPL-MCNC: 7.4 G/DL
PSA SERPL-MCNC: 0.75 NG/ML
RBC # BLD: 3.72 M/UL
RBC # FLD: 13.1 %
SODIUM SERPL-SCNC: 141 MMOL/L
TRIGL SERPL-MCNC: 96 MG/DL
WBC # FLD AUTO: 10.37 K/UL

## 2019-12-06 PROCEDURE — G0463: CPT

## 2019-12-06 NOTE — PHYSICAL EXAM
[General Appearance - Alert] : alert [General Appearance - In No Acute Distress] : in no acute distress [Sclera] : the sclera and conjunctiva were normal [PERRL With Normal Accommodation] : pupils were equal in size, round, reactive to light [Extraocular Movements] : extraocular movements were intact [Outer Ear] : the ears and nose were normal in appearance [Oropharynx] : the oropharynx was normal with no thrush [Neck Appearance] : the appearance of the neck was normal [Neck Cervical Mass (___cm)] : no neck mass was observed [Thyroid Diffuse Enlargement] : the thyroid was not enlarged [Jugular Venous Distention Increased] : there was no jugular-venous distention [Auscultation Breath Sounds / Voice Sounds] : lungs were clear to auscultation bilaterally [Heart Rate And Rhythm] : heart rate was normal and rhythm regular [Heart Sounds] : normal S1 and S2 [Heart Sounds Gallop] : no gallops [Murmurs] : no murmurs [Heart Sounds Pericardial Friction Rub] : no pericardial rub [Edema] : there was no peripheral edema [Full Pulse] : the pedal pulses are present [Abdomen Soft] : soft [Bowel Sounds] : normal bowel sounds [Abdomen Tenderness] : non-tender [Abdomen Mass (___ Cm)] : no abdominal mass palpated [Costovertebral Angle Tenderness] : no CVA tenderness [Nail Clubbing] : no clubbing  or cyanosis of the fingernails [Musculoskeletal - Swelling] : no joint swelling [Motor Tone] : muscle strength and tone were normal [Skin Color & Pigmentation] : normal skin color and pigmentation [FreeTextEntry1] : s [] : no rash [Oriented To Time, Place, And Person] : oriented to person, place, and time [Affect] : the affect was normal

## 2019-12-06 NOTE — ASSESSMENT
[Treatment Education] : treatment education [FreeTextEntry1] : 12/06/2019---- Kel Monsivais 80 y/o male patient in for a follow up visit. He is being followed by Nephrology, Urology. Patient reports noticing blood in his urine Thanksgiving but has since cleared up. He denies any pain or discomfort or any other complaints. He continues Biktarvy. Due to his high creatnine and low GFR, we will consider switching his ARV medications for a more renal friendly regimen. Special blood test will be done today amongst routine labs. F/u in 1 month.  [Treatment Adherence] : treatment adherence [HIV Education] : HIV Education [Anticipatory Guidance] : anticipatory guidance

## 2019-12-06 NOTE — HISTORY OF PRESENT ILLNESS
[FreeTextEntry1] : Reason For Visit\par 12/06/2019---- Carline Garber 78 y/o male patient in for a follow up visit. He is being followed by Nephrology, Urology. Patient reports noticing blood in his urine Thanksgiving but has since cleared up. He denies any pain or discomfort or any other complaints. He continues Biktarvy. Due to his high creatnine and low GFR, we will consider switching his ARV medications for a more renal friendly regimen. Special blood test will be done today amongst routine labs. F/u in 1 month. \par \par \par 9/6/2019-----CARLINE GARBER is a 79 year old male being seen for a follow-up visit. seen for elevated creatinine...continue Biktarvy for now, I will place a call to Dr. Ferguson regarding should I make any changes. Has upcoming urology appt 9/12/19. Sahil Ferguson 10/4/19 at 11am . labs today see in 3 months. \par  \par \par \par History of Present Illness\par 8/6/2019----79 yoM with HIV (dx 2013), CKD, seborrheic keratosis, osteopenia, dandruff, here for f/u. Pt doing well on biktarvy. Takes at lunch daily. No missed doses. Concern of elevated creatinine, wbc, appears as ongoing urinary tract infection. Needs urology today. labs today see in a month. Needs Nephrolgy, with Sahil Mack. \par \par c/o worsening incontinence, not getting any sleep. Pt long overdue for urology f/u, have previously discussed UA results at length. Last appt was 2017, needed repeat cystoscopy at that time. Has not been taking flomax, ran out.\par \par Also needs: nephrology, cardiology, bone density.\par Never restarted fosamax. \par Requesting dandruff shampoo renewal\par Requesting handicapped parking permit, unsteady gait/shuffle, had outside eval by neuro several years ago to r/o Parkinsons.\par Had several teeth extracted \par \par Sexual History: The patient is not sexually active. \par Lives with assisted living facility. very happy there, has a lot of friends. \par  \par

## 2019-12-09 LAB
APPEARANCE: ABNORMAL
BACTERIA: NEGATIVE
BILIRUBIN URINE: NEGATIVE
BLOOD URINE: ABNORMAL
COLOR: YELLOW
ESTIMATED AVERAGE GLUCOSE: 123 MG/DL
GLUCOSE QUALITATIVE U: NEGATIVE
HBA1C MFR BLD HPLC: 5.9 %
HIV1 RNA # SERPL NAA+PROBE: ABNORMAL
HIV1 RNA # SERPL NAA+PROBE: ABNORMAL COPIES/ML
HYALINE CASTS: 0 /LPF
KETONES URINE: NEGATIVE
LEUKOCYTE ESTERASE URINE: ABNORMAL
MICROSCOPIC-UA: NORMAL
NITRITE URINE: NEGATIVE
PH URINE: 7.5
PROTEIN URINE: NORMAL
RED BLOOD CELLS URINE: 15 /HPF
SPECIFIC GRAVITY URINE: 1.01
SQUAMOUS EPITHELIAL CELLS: 0 /HPF
UROBILINOGEN URINE: NORMAL
VIRAL LOAD INTERP: NORMAL
VIRAL LOAD LOG: ABNORMAL LG COP/ML
WHITE BLOOD CELLS URINE: 572 /HPF

## 2019-12-10 ENCOUNTER — APPOINTMENT (OUTPATIENT)
Dept: UROLOGY | Facility: CLINIC | Age: 79
End: 2019-12-10
Payer: MEDICARE

## 2019-12-10 PROCEDURE — 99214 OFFICE O/P EST MOD 30 MIN: CPT

## 2019-12-10 NOTE — ASSESSMENT
[FreeTextEntry1] : A new urine sample will be assessed today for culture.  I will empirically prescribe him Cipro based on his prior urinary tract infection history with enterococcus in the urine in September.  I do suspect he likely has another urinary tract infection causing the worsening symptoms.\par \par For now he will continue on tamsulosin and finasteride.  I did discuss with him that men who have recurrent urinary tract infections will often continue to experience them.  I would suggest that he consider undergoing a transurethral resection of the prostate in order to help minimize the risk of future infection.  Given that he had a significant residual volume in the bladder of 335 mL at his prior visit with me, I do suspect the surgery would be of good benefit to him to assist with the bladder emptying and to minimize the risk of infection.  Risks and benefits of this were briefly reviewed with the patient and I will discuss it with him again when he comes for follow-up.\par \par I will discuss the culture results with him by phone once available for review.

## 2019-12-10 NOTE — PHYSICAL EXAM
[General Appearance - Well Developed] : well developed [General Appearance - Well Nourished] : well nourished [Well Groomed] : well groomed [Normal Appearance] : normal appearance [General Appearance - In No Acute Distress] : no acute distress [Abdomen Soft] : soft [Abdomen Tenderness] : non-tender [Costovertebral Angle Tenderness] : no ~M costovertebral angle tenderness [Urethral Meatus] : meatus normal [Urinary Bladder Findings] : the bladder was normal on palpation [Scrotum] : the scrotum was normal [Testes Mass (___cm)] : there were no testicular masses [] : no respiratory distress [Edema] : no peripheral edema [Respiration, Rhythm And Depth] : normal respiratory rhythm and effort [Exaggerated Use Of Accessory Muscles For Inspiration] : no accessory muscle use [Oriented To Time, Place, And Person] : oriented to person, place, and time [Affect] : the affect was normal [Mood] : the mood was normal [Not Anxious] : not anxious [Normal Station and Gait] : the gait and station were normal for the patient's age [No Focal Deficits] : no focal deficits [No Palpable Adenopathy] : no palpable adenopathy

## 2019-12-10 NOTE — HISTORY OF PRESENT ILLNESS
[FreeTextEntry1] : Kel Monsivais returns to the office today.  He is a 79-year-old man who I had seen in September with worsening urinary symptoms including urinary incontinence, double voiding, sensations of incomplete bladder emptying after being off of his typical medications including finasteride and tamsulosin after he had run out of them.  I found him to have a urinary tract infection at that time which was treated with Cipro.  He had immediate improvement in his symptoms although he still does have some element of obstructive voiding symptoms that is present at baseline.  He says that a few weeks ago he started to develop dark-colored urine which cleared by the next day.  He is now again feeling increase in frequency and dysuria and thinks he may have another infection.  He denies any gross hematuria or suprapubic pain.  There have been no fevers or chills.  He denies any nausea or vomiting.

## 2019-12-13 LAB — HLA-B*57:01 QL: NEGATIVE

## 2019-12-14 LAB — BACTERIA UR CULT: NORMAL

## 2019-12-16 ENCOUNTER — LABORATORY RESULT (OUTPATIENT)
Age: 79
End: 2019-12-16

## 2019-12-16 ENCOUNTER — APPOINTMENT (OUTPATIENT)
Dept: HEMATOLOGY ONCOLOGY | Facility: CLINIC | Age: 79
End: 2019-12-16
Payer: MEDICARE

## 2019-12-16 ENCOUNTER — RESULT REVIEW (OUTPATIENT)
Age: 79
End: 2019-12-16

## 2019-12-16 VITALS
RESPIRATION RATE: 18 BRPM | SYSTOLIC BLOOD PRESSURE: 162 MMHG | HEIGHT: 72 IN | BODY MASS INDEX: 21.67 KG/M2 | WEIGHT: 159.99 LBS | TEMPERATURE: 98.6 F | DIASTOLIC BLOOD PRESSURE: 71 MMHG | HEART RATE: 74 BPM | OXYGEN SATURATION: 98 %

## 2019-12-16 LAB
APPEARANCE: ABNORMAL
BACTERIA: NEGATIVE
BASOPHILS # BLD AUTO: 0 K/UL — SIGNIFICANT CHANGE UP (ref 0–0.2)
BASOPHILS NFR BLD AUTO: 0.1 % — SIGNIFICANT CHANGE UP (ref 0–2)
BILIRUBIN URINE: NEGATIVE
BLOOD URINE: ABNORMAL
COLOR: YELLOW
EOSINOPHIL # BLD AUTO: 0.1 K/UL — SIGNIFICANT CHANGE UP (ref 0–0.5)
EOSINOPHIL NFR BLD AUTO: 1.6 % — SIGNIFICANT CHANGE UP (ref 0–6)
GLUCOSE QUALITATIVE U: NEGATIVE
HCT VFR BLD CALC: 38.3 % — LOW (ref 39–50)
HGB BLD-MCNC: 12.3 G/DL — LOW (ref 13–17)
HYALINE CASTS: 1 /LPF
KETONES URINE: NEGATIVE
LEUKOCYTE ESTERASE URINE: ABNORMAL
LYMPHOCYTES # BLD AUTO: 2.6 K/UL — SIGNIFICANT CHANGE UP (ref 1–3.3)
LYMPHOCYTES # BLD AUTO: 29.8 % — SIGNIFICANT CHANGE UP (ref 13–44)
MCHC RBC-ENTMCNC: 32.1 G/DL — SIGNIFICANT CHANGE UP (ref 32–36)
MCHC RBC-ENTMCNC: 34 PG — SIGNIFICANT CHANGE UP (ref 27–34)
MCV RBC AUTO: 106 FL — HIGH (ref 80–100)
MICROSCOPIC-UA: NORMAL
MONOCYTES # BLD AUTO: 0.9 K/UL — SIGNIFICANT CHANGE UP (ref 0–0.9)
MONOCYTES NFR BLD AUTO: 9.8 % — SIGNIFICANT CHANGE UP (ref 2–14)
NEUTROPHILS # BLD AUTO: 5.2 K/UL — SIGNIFICANT CHANGE UP (ref 1.8–7.4)
NEUTROPHILS NFR BLD AUTO: 58.6 % — SIGNIFICANT CHANGE UP (ref 43–77)
NITRITE URINE: NEGATIVE
PH URINE: 6.5
PLATELET # BLD AUTO: 231 K/UL — SIGNIFICANT CHANGE UP (ref 150–400)
PROTEIN URINE: ABNORMAL
RBC # BLD: 3.62 M/UL — LOW (ref 4.2–5.8)
RBC # FLD: 12.4 % — SIGNIFICANT CHANGE UP (ref 10.3–14.5)
RED BLOOD CELLS URINE: 14 /HPF
SPECIFIC GRAVITY URINE: 1.01
SQUAMOUS EPITHELIAL CELLS: 0 /HPF
UROBILINOGEN URINE: NORMAL
WBC # BLD: 8.9 K/UL — SIGNIFICANT CHANGE UP (ref 3.8–10.5)
WBC # FLD AUTO: 8.9 K/UL — SIGNIFICANT CHANGE UP (ref 3.8–10.5)
WHITE BLOOD CELLS URINE: >720 /HPF

## 2019-12-16 PROCEDURE — 99205 OFFICE O/P NEW HI 60 MIN: CPT

## 2019-12-16 RX ORDER — MULTIVIT-MIN/IRON/FOLIC ACID/K 18-600-40
500 CAPSULE ORAL
Qty: 30 | Refills: 5 | Status: ACTIVE | COMMUNITY
Start: 2019-12-16

## 2019-12-16 NOTE — REVIEW OF SYSTEMS
[Lower Ext Edema] : lower extremity edema [SOB on Exertion] : shortness of breath during exertion [Constipation] : constipation [Difficulty Walking] : difficulty walking [Negative] : Allergic/Immunologic [Fever] : no fever [Chills] : no chills [Fatigue] : no fatigue [Night Sweats] : no night sweats [Recent Change In Weight] : ~T no recent weight change [Dysphagia] : no dysphagia [Vision Problems] : no vision problems [Loss of Hearing] : no loss of hearing [Odynophagia] : no odynophagia [Mucosal Pain] : no mucosal pain [Chest Pain] : no chest pain [Palpitations] : no palpitations [Leg Claudication] : no intermittent leg claudication [Dysuria] : no dysuria [Shortness Of Breath] : no shortness of breath [Dizziness] : no dizziness [Confused] : no confusion [Incontinence] : no incontinence [Proptosis] : no proptosis [Fainting] : no fainting [Easy Bleeding] : no tendency for easy bleeding [Swollen Glands] : no swollen glands [Easy Bruising] : no tendency for easy bruising [FreeTextEntry5] : ankle swelling [de-identified] : uses cane for balance [FreeTextEntry7] : uses Miralax. Has never had a colonoscopy. Has  follow up for hx bladder tumor [FreeTextEntry8] : hx UTI's -currently on Abx for it

## 2019-12-16 NOTE — ASSESSMENT
[FreeTextEntry1] : 78 yo M with hx HIV on HAART, CRI (baseline creat 1.5-2.1), here for evaluation of monoclonal gammopathy IgG kappa\par \par -patient had a serum M spike of 0.2g/dl, creatinine stable around 1.5-2.1. Discussed with patient at length diagnosis of of MGUS vs. multiple myeloma and 1% annual risk of transformation to MM. Will repeat full blood work today -CMP, SPEP/BROCK/quantitative Ig's and free light chains. I answered all his questions\par \par -will get skeletal survey to r/o bone lesions\par \par -if renal fct worsens and M protein remains unchanged, pt may benefit from renal biopsy to r/o MGRS (Monoclonal Gammopathy of Renal Significance)\par \par -follow up in 4 months; will d/w pt results when available

## 2019-12-16 NOTE — PHYSICAL EXAM
[Restricted in physically strenuous activity but ambulatory and able to carry out work of a light or sedentary nature] : Status 1- Restricted in physically strenuous activity but ambulatory and able to carry out work of a light or sedentary nature, e.g., light house work, office work [Normal] : normal spine exam without palpable tenderness, no kyphosis or scoliosis [de-identified] : min LE edema b/l [de-identified] : no point tenderness [de-identified] : hyperpigmented shins c/w chronic stasis changes

## 2019-12-16 NOTE — HISTORY OF PRESENT ILLNESS
[de-identified] : Mr. Monsivais was referred to my office for evaluation of IgG kappa monoclonal gammopathy. The patient has been followed by his nephrologist who sent blood work in August 2019 for SPEP/BROCK and noted an M-spike of 0.2g/dl, IgG kappa. He has a history of HIV since 2013 on HAART, well controlled. He reports that he developed renal insufficiency since taking his HIV medications. He has some swelling in his feet chronically, and his renal function has been stable. He has no back pains.

## 2019-12-18 DIAGNOSIS — N40.1 BENIGN PROSTATIC HYPERPLASIA WITH LOWER URINARY TRACT SYMPTOMS: ICD-10-CM

## 2019-12-18 DIAGNOSIS — N18.3 CHRONIC KIDNEY DISEASE, STAGE 3 (MODERATE): ICD-10-CM

## 2019-12-18 DIAGNOSIS — I10 ESSENTIAL (PRIMARY) HYPERTENSION: ICD-10-CM

## 2020-01-07 ENCOUNTER — APPOINTMENT (OUTPATIENT)
Dept: INFECTIOUS DISEASE | Facility: CLINIC | Age: 80
End: 2020-01-07

## 2020-01-07 ENCOUNTER — OUTPATIENT (OUTPATIENT)
Dept: OUTPATIENT SERVICES | Facility: HOSPITAL | Age: 80
LOS: 1 days | End: 2020-01-07
Payer: COMMERCIAL

## 2020-01-07 VITALS
DIASTOLIC BLOOD PRESSURE: 74 MMHG | BODY MASS INDEX: 22.33 KG/M2 | HEIGHT: 74 IN | TEMPERATURE: 99.5 F | RESPIRATION RATE: 18 BRPM | SYSTOLIC BLOOD PRESSURE: 154 MMHG | HEART RATE: 81 BPM | OXYGEN SATURATION: 97 % | WEIGHT: 174 LBS

## 2020-01-07 DIAGNOSIS — B20 HUMAN IMMUNODEFICIENCY VIRUS [HIV] DISEASE: ICD-10-CM

## 2020-01-07 PROCEDURE — G0463: CPT

## 2020-01-07 RX ORDER — BICTEGRAVIR SODIUM, EMTRICITABINE, AND TENOFOVIR ALAFENAMIDE FUMARATE 50; 200; 25 MG/1; MG/1; MG/1
50-200-25 TABLET ORAL
Qty: 30 | Refills: 1 | Status: DISCONTINUED | COMMUNITY
Start: 2019-02-28 | End: 2020-01-07

## 2020-01-07 RX ORDER — VITAMIN E (DL,TOCOPHERYL ACET) 180 MG
CAPSULE ORAL DAILY
Refills: 0 | Status: DISCONTINUED | COMMUNITY
Start: 2019-12-16 | End: 2020-01-07

## 2020-01-07 NOTE — PHYSICAL EXAM
[General Appearance - Alert] : alert [Sclera] : the sclera and conjunctiva were normal [General Appearance - In No Acute Distress] : in no acute distress [PERRL With Normal Accommodation] : pupils were equal in size, round, reactive to light [Outer Ear] : the ears and nose were normal in appearance [Extraocular Movements] : extraocular movements were intact [Oropharynx] : the oropharynx was normal with no thrush [Heart Rate And Rhythm] : heart rate was normal and rhythm regular [Auscultation Breath Sounds / Voice Sounds] : lungs were clear to auscultation bilaterally [Murmurs] : no murmurs [Heart Sounds] : normal S1 and S2 [Heart Sounds Gallop] : no gallops [Heart Sounds Pericardial Friction Rub] : no pericardial rub [Deep Tendon Reflexes (DTR)] : deep tendon reflexes were 2+ and symmetric [] : no rash [Skin Color & Pigmentation] : normal skin color and pigmentation [Oriented To Time, Place, And Person] : oriented to person, place, and time [Sensation] : the sensory exam was normal to light touch and pinprick [No Focal Deficits] : no focal deficits [Affect] : the affect was normal

## 2020-01-07 NOTE — HISTORY OF PRESENT ILLNESS
[FreeTextEntry1] : 1/07/2020 Kel Monsivais 80 y/o male patient in for a 1 month follow up visit. He has seen Hem/Onc and will follow up in 4 months. Pt to make appt with Nephrology. Patient continues Biktarvy daily. Pt gives no complaints today. \par \par 12/06/2019---- Kel Monsivais 80 y/o male patient in for a follow up visit. He is being followed by Nephrology, Urology. Patient reports noticing blood in his urine Thanksgiving but has since cleared up. He denies any pain or discomfort or any other complaints. He continues Biktarvy. Due to his high creatnine and low GFR, we will consider switching his ARV medications for a more renal friendly regimen. Special blood test will be done today amongst routine labs. F/u in 1 month. \par

## 2020-01-07 NOTE — ASSESSMENT
[Treatment Education] : treatment education [Treatment Adherence] : treatment adherence [HIV Education] : HIV Education [Medical Care Issues] : medical care issues [Anticipatory Guidance] : anticipatory guidance [FreeTextEntry1] : 1/07/2020 Kel Anneaaliyah 80 y/o male patient in for a 1 month follow up visit. He has seen Hem/Onc and will follow up in 4 months. Pt to make appt with Nephrology. Patient continues Biktarvy daily. Pt gives no complaints today. Will switch ARV regimen to a more kidney friendly regimen today.

## 2020-01-08 DIAGNOSIS — N18.3 CHRONIC KIDNEY DISEASE, STAGE 3 (MODERATE): ICD-10-CM

## 2020-01-08 DIAGNOSIS — Z85.51 PERSONAL HISTORY OF MALIGNANT NEOPLASM OF BLADDER: ICD-10-CM

## 2020-01-08 LAB
ALBUMIN SERPL ELPH-MCNC: 4.1 G/DL
ALP BLD-CCNC: 122 U/L
ALT SERPL-CCNC: 10 U/L
ANION GAP SERPL CALC-SCNC: 15 MMOL/L
AST SERPL-CCNC: 18 U/L
BILIRUB SERPL-MCNC: 0.4 MG/DL
BUN SERPL-MCNC: 40 MG/DL
CALCIUM SERPL-MCNC: 9.9 MG/DL
CHLORIDE SERPL-SCNC: 100 MMOL/L
CO2 SERPL-SCNC: 24 MMOL/L
CREAT SERPL-MCNC: 1.84 MG/DL
CYSTATIN C SERPL-MCNC: 1.75 MG/L
GFR/BSA.PRED SERPLBLD CYS-BASED-ARV: 35 ML/MIN
GLUCOSE SERPL-MCNC: 85 MG/DL
MAGNESIUM SERPL-MCNC: 2.1 MG/DL
POTASSIUM SERPL-SCNC: 4.2 MMOL/L
PROT SERPL-MCNC: 7.5 G/DL
SODIUM SERPL-SCNC: 139 MMOL/L

## 2020-01-09 LAB — ZINC SERPL-MCNC: 78 UG/DL

## 2020-02-07 ENCOUNTER — APPOINTMENT (OUTPATIENT)
Dept: INFECTIOUS DISEASE | Facility: CLINIC | Age: 80
End: 2020-02-07

## 2020-02-07 ENCOUNTER — OUTPATIENT (OUTPATIENT)
Dept: OUTPATIENT SERVICES | Facility: HOSPITAL | Age: 80
LOS: 1 days | End: 2020-02-07
Payer: COMMERCIAL

## 2020-02-07 VITALS
WEIGHT: 172 LBS | OXYGEN SATURATION: 98 % | HEART RATE: 78 BPM | TEMPERATURE: 99 F | DIASTOLIC BLOOD PRESSURE: 72 MMHG | BODY MASS INDEX: 22.07 KG/M2 | HEIGHT: 74 IN | SYSTOLIC BLOOD PRESSURE: 158 MMHG | RESPIRATION RATE: 14 BRPM

## 2020-02-07 DIAGNOSIS — B20 HUMAN IMMUNODEFICIENCY VIRUS [HIV] DISEASE: ICD-10-CM

## 2020-02-07 LAB
ALBUMIN SERPL ELPH-MCNC: 4 G/DL
ALP BLD-CCNC: 109 U/L
ALT SERPL-CCNC: 12 U/L
ANION GAP SERPL CALC-SCNC: 14 MMOL/L
APPEARANCE: ABNORMAL
AST SERPL-CCNC: 16 U/L
BACTERIA: NEGATIVE
BASOPHILS # BLD AUTO: 0.07 K/UL
BASOPHILS NFR BLD AUTO: 0.6 %
BILIRUB SERPL-MCNC: 0.2 MG/DL
BILIRUBIN URINE: NEGATIVE
BLOOD URINE: ABNORMAL
BUN SERPL-MCNC: 31 MG/DL
CALCIUM SERPL-MCNC: 10 MG/DL
CD3 CELLS # BLD: 1549 /UL
CD3 CELLS NFR BLD: 45 %
CD3+CD4+ CELLS # BLD: 463 /UL
CD3+CD4+ CELLS NFR BLD: 14 %
CD3+CD4+ CELLS/CD3+CD8+ CLL SPEC: 0.44 RATIO
CD3+CD8+ CELLS # SPEC: 1053 /UL
CD3+CD8+ CELLS NFR BLD: 31 %
CHLORIDE SERPL-SCNC: 102 MMOL/L
CO2 SERPL-SCNC: 26 MMOL/L
COLOR: YELLOW
CREAT SERPL-MCNC: 1.81 MG/DL
CYSTATIN C SERPL-MCNC: 1.72 MG/L
EOSINOPHIL # BLD AUTO: 0.14 K/UL
EOSINOPHIL NFR BLD AUTO: 1.2 %
GFR/BSA.PRED SERPLBLD CYS-BASED-ARV: 35 ML/MIN
GLUCOSE QUALITATIVE U: NEGATIVE
GLUCOSE SERPL-MCNC: 74 MG/DL
HCT VFR BLD CALC: 38.1 %
HGB BLD-MCNC: 12.3 G/DL
HYALINE CASTS: 2 /LPF
IMM GRANULOCYTES NFR BLD AUTO: 1.1 %
KETONES URINE: NEGATIVE
LEUKOCYTE ESTERASE URINE: ABNORMAL
LYMPHOCYTES # BLD AUTO: 3.25 K/UL
LYMPHOCYTES NFR BLD AUTO: 27 %
MAN DIFF?: NORMAL
MCHC RBC-ENTMCNC: 32.3 GM/DL
MCHC RBC-ENTMCNC: 34.6 PG
MCV RBC AUTO: 107.3 FL
MICROSCOPIC-UA: NORMAL
MONOCYTES # BLD AUTO: 1.08 K/UL
MONOCYTES NFR BLD AUTO: 9 %
NEUTROPHILS # BLD AUTO: 7.38 K/UL
NEUTROPHILS NFR BLD AUTO: 61.1 %
NITRITE URINE: NEGATIVE
PH URINE: 6.5
PLATELET # BLD AUTO: 356 K/UL
POTASSIUM SERPL-SCNC: 4.5 MMOL/L
PROT SERPL-MCNC: 7.3 G/DL
PROTEIN URINE: NORMAL
RBC # BLD: 3.55 M/UL
RBC # FLD: 13.3 %
RED BLOOD CELLS URINE: 22 /HPF
SODIUM SERPL-SCNC: 141 MMOL/L
SPECIFIC GRAVITY URINE: 1.01
SQUAMOUS EPITHELIAL CELLS: 0 /HPF
UROBILINOGEN URINE: NORMAL
WBC # FLD AUTO: 12.05 K/UL
WHITE BLOOD CELLS URINE: 634 /HPF

## 2020-02-07 PROCEDURE — G0463: CPT

## 2020-02-07 RX ORDER — CIPROFLOXACIN HYDROCHLORIDE 250 MG/1
250 TABLET, FILM COATED ORAL
Qty: 6 | Refills: 0 | Status: DISCONTINUED | COMMUNITY
Start: 2020-02-07 | End: 2020-02-07

## 2020-02-07 NOTE — PHYSICAL EXAM
[General Appearance - Alert] : alert [General Appearance - In No Acute Distress] : in no acute distress [Sclera] : the sclera and conjunctiva were normal [PERRL With Normal Accommodation] : pupils were equal in size, round, reactive to light [Extraocular Movements] : extraocular movements were intact [Outer Ear] : the ears and nose were normal in appearance [Oropharynx] : the oropharynx was normal with no thrush [Neck Appearance] : the appearance of the neck was normal [Neck Cervical Mass (___cm)] : no neck mass was observed [Jugular Venous Distention Increased] : there was no jugular-venous distention [Thyroid Diffuse Enlargement] : the thyroid was not enlarged [Auscultation Breath Sounds / Voice Sounds] : lungs were clear to auscultation bilaterally [Heart Rate And Rhythm] : heart rate was normal and rhythm regular [Heart Sounds Gallop] : no gallops [Murmurs] : no murmurs [Heart Sounds] : normal S1 and S2 [Heart Sounds Pericardial Friction Rub] : no pericardial rub [Full Pulse] : the pedal pulses are present [Edema] : there was no peripheral edema [Abdomen Soft] : soft [Bowel Sounds] : normal bowel sounds [Abdomen Tenderness] : non-tender [Abdomen Mass (___ Cm)] : no abdominal mass palpated [Costovertebral Angle Tenderness] : no CVA tenderness [Musculoskeletal - Swelling] : no joint swelling [Nail Clubbing] : no clubbing  or cyanosis of the fingernails [Motor Tone] : muscle strength and tone were normal [Skin Color & Pigmentation] : normal skin color and pigmentation [] : no rash [Deep Tendon Reflexes (DTR)] : deep tendon reflexes were 2+ and symmetric [Sensation] : the sensory exam was normal to light touch and pinprick [No Focal Deficits] : no focal deficits [Oriented To Time, Place, And Person] : oriented to person, place, and time [Affect] : the affect was normal

## 2020-02-07 NOTE — HISTORY OF PRESENT ILLNESS
[FreeTextEntry1] : History of Present Illness\par 2/7/2020 Kel Monsivais 78 y/o male patient in for a 1 month follow up visit. He has seen Hem/Onc and will follow up again  in 4 months. Pt to make appt with Nephrology. Discontinued Biktarvy and start Juluca daily. Pt gives no complaints today. Here to check labs. \par \par 12/06/2019---- Kel Monsivais 78 y/o male patient in for a follow up visit. He is being followed by Nephrology, Urology. Patient reports noticing blood in his urine Thanksgiving but has since cleared up. He denies any pain or discomfort or any other complaints. He continues Biktarvy. Due to his high creatnine and low GFR, we will consider switching his ARV medications for a more renal friendly regimen. Special blood test will be done today amongst routine labs. F/u in 1 month. 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3)\par Osteoporosis (733.00) (M81.0)\par Pneumococcal vaccine refused (V64.06) (Z28.21)\par Proteinuria (791.0) (R80.9)\par Recurrent urinary tract infection (599.0) (N39.0)\par Refused influenza vaccine (V64.06) (Z28.21)\par Renal mass (593.9) (N28.89)\par Seborrheic keratosis (702.19) (L82.1)\par Shortness of breath on exertion (786.05) (R06.02)\par Skin tag (701.9) (L91.8)\par Urinary incontinence, unspecified type (788.30) (R32)\par Urinary retention (788.20) (R33.9)\par Vitamin D deficiency (268.9) (E55.9)\par \par Past Medical History\par History of Antinuclear antibody (OWEN) titer greater than 1:80 (795.79) (R76.0)\par History of Creatinine elevation (790.99) (R79.89)\par History of Enlarged prostate (600.00) (N40.0)\par History of Hemorrhagic cyst\par History of anemia (V12.3) (Z86.2)\par History of athlete's foot (V12.09) (Z86.19)\par History of chronic kidney disease (V13.09) (Z87.448)\par History of degenerative disc disease (V13.59) (Z87.39)\par History of fever (V13.89) (Z87.898)\par History of hydronephrosis (V13.09) (Z87.448)\par History of lymphadenopathy (V13.89) (Z87.898)\par History of osteopenia (V13.59) (Z87.39)\par History of Parkinson's disease (V12.49) (Z86.69)\par History of renal calculi (V13.01) (Z87.442)\par History of renal insufficiency syndrome (V13.09) (Z87.448)\par History of Left knee pain (719.46) (M25.562)\par Personal history of multiple pulmonary nodules (V12.69) (Z87.898)\par Personal history of scoliosis (V13.59) (Z87.39)\par Personal history of urinary tract infection (V13.02) (Z87.440)\par History of Tinea unguium (110.1) (B35.1)\par History of Tooth pain (525.9) (K08.89)\par History of Vaccine refused by parent (V64.05) (Z28.82)\par History of Visit for dental examination (V72.2) (Z01.20)\par History of Visit for eye and vision exam (V72.0) (Z01.00)\par History of Xerosis of skin (706.8) (L85.3)\par \par Current Meds\par Juluca\par Calcium-Magnesium-Zinc-D3 Oral Tablet; TAKE 1 TABLET DAILY\par Ciprofloxacin HCl - 500 MG Oral Tablet; TAKE 1 TABLET Every twelve hours\par Finasteride 5 MG Oral Tablet; TAKE 1 TABLET BY MOUTH ONCE DAILY\par Fosamax 70 MG Oral Tablet; Take once a week, 1 hr before eating, with full glass of\par water. Do not lay down 1 hr after taking pill\par Ketoconazole 2 % External Shampoo; apply to scalp twice weekly. may use as body\par wash\par Multi-Vitamin Daily Oral Tablet; TAKE 1 TABLET DAILY\par SM Vitamin D3 125 MCG (5000 UT) Oral Capsule; TAKE 1 CAPSULE Daily\par Tamsulosin HCl - 0.4 MG Oral Capsule; TAKE 1 CAPSULE Daily\par Vitamin C 500 MG Oral Capsule; TAKE 1 CAPSULE Daily\par \par Allergies\par No Known Drug Allergies\par

## 2020-02-10 DIAGNOSIS — N18.3 CHRONIC KIDNEY DISEASE, STAGE 3 (MODERATE): ICD-10-CM

## 2020-02-10 LAB
HIV1 RNA # SERPL NAA+PROBE: NORMAL
HIV1 RNA # SERPL NAA+PROBE: NORMAL COPIES/ML
VIRAL LOAD INTERP: NORMAL
VIRAL LOAD LOG: NORMAL LG COP/ML

## 2020-03-26 ENCOUNTER — OUTPATIENT (OUTPATIENT)
Dept: OUTPATIENT SERVICES | Facility: HOSPITAL | Age: 80
LOS: 1 days | End: 2020-03-26
Payer: COMMERCIAL

## 2020-03-26 ENCOUNTER — APPOINTMENT (OUTPATIENT)
Dept: INFECTIOUS DISEASE | Facility: CLINIC | Age: 80
End: 2020-03-26

## 2020-03-26 PROCEDURE — G0463: CPT

## 2020-03-30 ENCOUNTER — APPOINTMENT (OUTPATIENT)
Dept: INFECTIOUS DISEASE | Facility: CLINIC | Age: 80
End: 2020-03-30

## 2020-03-30 ENCOUNTER — OUTPATIENT (OUTPATIENT)
Dept: OUTPATIENT SERVICES | Facility: HOSPITAL | Age: 80
LOS: 1 days | End: 2020-03-30

## 2020-03-30 DIAGNOSIS — B20 HUMAN IMMUNODEFICIENCY VIRUS [HIV] DISEASE: ICD-10-CM

## 2020-03-30 DIAGNOSIS — N18.3 CHRONIC KIDNEY DISEASE, STAGE 3 (MODERATE): ICD-10-CM

## 2020-03-31 RX ORDER — DOLUTEGRAVIR SODIUM AND RILPIVIRINE HYDROCHLORIDE 50; 25 MG/1; MG/1
50-25 TABLET, FILM COATED ORAL
Qty: 30 | Refills: 3 | Status: DISCONTINUED | COMMUNITY
Start: 2020-01-07 | End: 2020-03-31

## 2020-04-01 DIAGNOSIS — B20 HUMAN IMMUNODEFICIENCY VIRUS [HIV] DISEASE: ICD-10-CM

## 2020-04-01 LAB
ALBUMIN SERPL ELPH-MCNC: 3.3 G/DL
ALP BLD-CCNC: 123 U/L
ALT SERPL-CCNC: 17 U/L
ANION GAP SERPL CALC-SCNC: 15 MMOL/L
APPEARANCE: ABNORMAL
AST SERPL-CCNC: 16 U/L
BACTERIA: NEGATIVE
BASOPHILS # BLD AUTO: 0.06 K/UL
BASOPHILS NFR BLD AUTO: 0.5 %
BILIRUB SERPL-MCNC: 0.2 MG/DL
BILIRUBIN URINE: NEGATIVE
BLOOD URINE: ABNORMAL
BUN SERPL-MCNC: 56 MG/DL
CALCIUM SERPL-MCNC: 9.4 MG/DL
CD3 CELLS # BLD: 1361 /UL
CD3 CELLS NFR BLD: 52 %
CD3+CD4+ CELLS # BLD: 423 /UL
CD3+CD4+ CELLS NFR BLD: 16 %
CD3+CD4+ CELLS/CD3+CD8+ CLL SPEC: 0.44 RATIO
CD3+CD8+ CELLS # SPEC: 959 /UL
CD3+CD8+ CELLS NFR BLD: 37 %
CHLORIDE SERPL-SCNC: 99 MMOL/L
CO2 SERPL-SCNC: 24 MMOL/L
COLOR: YELLOW
CREAT SERPL-MCNC: 3.1 MG/DL
CYSTATIN C SERPL-MCNC: 2.75 MG/L
EOSINOPHIL # BLD AUTO: 0.26 K/UL
EOSINOPHIL NFR BLD AUTO: 2.1 %
ESTIMATED AVERAGE GLUCOSE: 140 MG/DL
GFR/BSA.PRED SERPLBLD CYS-BASED-ARV: 19 ML/MIN
GLUCOSE QUALITATIVE U: NEGATIVE
GLUCOSE SERPL-MCNC: 89 MG/DL
HBA1C MFR BLD HPLC: 6.5 %
HCT VFR BLD CALC: 35.9 %
HGB BLD-MCNC: 11.1 G/DL
HIV1 RNA # SERPL NAA+PROBE: NORMAL
HIV1 RNA # SERPL NAA+PROBE: NORMAL COPIES/ML
HYALINE CASTS: 0 /LPF
IMM GRANULOCYTES NFR BLD AUTO: 1.6 %
KETONES URINE: NEGATIVE
LEUKOCYTE ESTERASE URINE: ABNORMAL
LYMPHOCYTES # BLD AUTO: 2.73 K/UL
LYMPHOCYTES NFR BLD AUTO: 22.4 %
MAN DIFF?: NORMAL
MCHC RBC-ENTMCNC: 30.9 GM/DL
MCHC RBC-ENTMCNC: 32.3 PG
MCV RBC AUTO: 104.4 FL
MICROSCOPIC-UA: NORMAL
MONOCYTES # BLD AUTO: 1.23 K/UL
MONOCYTES NFR BLD AUTO: 10.1 %
NEUTROPHILS # BLD AUTO: 7.71 K/UL
NEUTROPHILS NFR BLD AUTO: 63.3 %
NITRITE URINE: NEGATIVE
PH URINE: 6.5
PLATELET # BLD AUTO: 422 K/UL
POTASSIUM SERPL-SCNC: 5.4 MMOL/L
PROT SERPL-MCNC: 7.5 G/DL
PROTEIN URINE: ABNORMAL
RBC # BLD: 3.44 M/UL
RBC # FLD: 14.1 %
RED BLOOD CELLS URINE: 7 /HPF
SODIUM SERPL-SCNC: 138 MMOL/L
SPECIFIC GRAVITY URINE: 1.01
SQUAMOUS EPITHELIAL CELLS: 0 /HPF
URINE COMMENTS: NORMAL
UROBILINOGEN URINE: NORMAL
VIRAL LOAD INTERP: NORMAL
VIRAL LOAD LOG: NORMAL LG COP/ML
WBC # FLD AUTO: 12.19 K/UL
WHITE BLOOD CELLS URINE: >720 /HPF

## 2020-04-02 ENCOUNTER — OUTPATIENT (OUTPATIENT)
Dept: OUTPATIENT SERVICES | Facility: HOSPITAL | Age: 80
LOS: 1 days | End: 2020-04-02
Payer: MEDICARE

## 2020-04-02 ENCOUNTER — APPOINTMENT (OUTPATIENT)
Dept: UROLOGY | Facility: CLINIC | Age: 80
End: 2020-04-02
Payer: MEDICARE

## 2020-04-02 VITALS
TEMPERATURE: 97.9 F | HEIGHT: 74 IN | SYSTOLIC BLOOD PRESSURE: 166 MMHG | DIASTOLIC BLOOD PRESSURE: 72 MMHG | WEIGHT: 165 LBS | RESPIRATION RATE: 17 BRPM | BODY MASS INDEX: 21.17 KG/M2 | HEART RATE: 103 BPM

## 2020-04-02 PROCEDURE — 99214 OFFICE O/P EST MOD 30 MIN: CPT | Mod: 25

## 2020-04-02 PROCEDURE — 51701 INSERT BLADDER CATHETER: CPT | Mod: 59

## 2020-04-02 PROCEDURE — 76775 US EXAM ABDO BACK WALL LIM: CPT | Mod: 26

## 2020-04-02 PROCEDURE — 76775 US EXAM ABDO BACK WALL LIM: CPT

## 2020-04-02 PROCEDURE — 51798 US URINE CAPACITY MEASURE: CPT

## 2020-04-03 DIAGNOSIS — N13.30 UNSPECIFIED HYDRONEPHROSIS: ICD-10-CM

## 2020-04-03 DIAGNOSIS — N40.1 BENIGN PROSTATIC HYPERPLASIA WITH LOWER URINARY TRACT SYMPTOMS: ICD-10-CM

## 2020-04-03 LAB
APPEARANCE: ABNORMAL
BACTERIA: NEGATIVE
BILIRUBIN URINE: NEGATIVE
BLOOD URINE: ABNORMAL
COLOR: YELLOW
GLUCOSE QUALITATIVE U: NEGATIVE
HYALINE CASTS: 3 /LPF
KETONES URINE: NEGATIVE
LEUKOCYTE ESTERASE URINE: ABNORMAL
MICROSCOPIC-UA: NORMAL
NITRITE URINE: NEGATIVE
PH URINE: 7
PROTEIN URINE: ABNORMAL
RED BLOOD CELLS URINE: 10 /HPF
SPECIFIC GRAVITY URINE: 1.01
SQUAMOUS EPITHELIAL CELLS: 0 /HPF
UROBILINOGEN URINE: NORMAL
WHITE BLOOD CELLS URINE: >720 /HPF

## 2020-04-05 LAB — BACTERIA UR CULT: ABNORMAL

## 2020-04-09 ENCOUNTER — OUTPATIENT (OUTPATIENT)
Dept: OUTPATIENT SERVICES | Facility: HOSPITAL | Age: 80
LOS: 1 days | End: 2020-04-09
Payer: MEDICARE

## 2020-04-09 ENCOUNTER — APPOINTMENT (OUTPATIENT)
Dept: UROLOGY | Facility: CLINIC | Age: 80
End: 2020-04-09
Payer: MEDICARE

## 2020-04-09 ENCOUNTER — APPOINTMENT (OUTPATIENT)
Dept: CT IMAGING | Facility: IMAGING CENTER | Age: 80
End: 2020-04-09
Payer: MEDICARE

## 2020-04-09 ENCOUNTER — RESULT REVIEW (OUTPATIENT)
Age: 80
End: 2020-04-09

## 2020-04-09 VITALS
SYSTOLIC BLOOD PRESSURE: 148 MMHG | HEIGHT: 74 IN | TEMPERATURE: 98.6 F | HEART RATE: 92 BPM | DIASTOLIC BLOOD PRESSURE: 76 MMHG | WEIGHT: 159 LBS | RESPIRATION RATE: 17 BRPM | BODY MASS INDEX: 20.41 KG/M2

## 2020-04-09 DIAGNOSIS — N13.30 UNSPECIFIED HYDRONEPHROSIS: ICD-10-CM

## 2020-04-09 PROCEDURE — 51798 US URINE CAPACITY MEASURE: CPT

## 2020-04-09 PROCEDURE — 74176 CT ABD & PELVIS W/O CONTRAST: CPT

## 2020-04-09 PROCEDURE — 74176 CT ABD & PELVIS W/O CONTRAST: CPT | Mod: 26

## 2020-04-09 PROCEDURE — 99214 OFFICE O/P EST MOD 30 MIN: CPT | Mod: 25

## 2020-04-09 RX ORDER — AMOXICILLIN 500 MG/1
500 TABLET, FILM COATED ORAL
Qty: 21 | Refills: 0 | Status: DISCONTINUED | COMMUNITY
Start: 2020-02-07 | End: 2020-04-09

## 2020-04-10 LAB
ANION GAP SERPL CALC-SCNC: 14 MMOL/L
BUN SERPL-MCNC: 40 MG/DL
CALCIUM SERPL-MCNC: 9.6 MG/DL
CHLORIDE SERPL-SCNC: 102 MMOL/L
CO2 SERPL-SCNC: 24 MMOL/L
CREAT SERPL-MCNC: 2.2 MG/DL
GLUCOSE SERPL-MCNC: 99 MG/DL
POTASSIUM SERPL-SCNC: 4.8 MMOL/L
SODIUM SERPL-SCNC: 140 MMOL/L

## 2020-05-07 ENCOUNTER — APPOINTMENT (OUTPATIENT)
Dept: INFECTIOUS DISEASE | Facility: CLINIC | Age: 80
End: 2020-05-07

## 2020-05-07 ENCOUNTER — OUTPATIENT (OUTPATIENT)
Dept: OUTPATIENT SERVICES | Facility: HOSPITAL | Age: 80
LOS: 1 days | End: 2020-05-07
Payer: COMMERCIAL

## 2020-05-07 ENCOUNTER — OUTPATIENT (OUTPATIENT)
Dept: OUTPATIENT SERVICES | Facility: HOSPITAL | Age: 80
LOS: 1 days | Discharge: HOME | End: 2020-05-07

## 2020-05-07 DIAGNOSIS — D47.2 MONOCLONAL GAMMOPATHY: ICD-10-CM

## 2020-05-07 DIAGNOSIS — B20 HUMAN IMMUNODEFICIENCY VIRUS [HIV] DISEASE: ICD-10-CM

## 2020-05-07 PROCEDURE — G0463: CPT

## 2020-05-11 ENCOUNTER — APPOINTMENT (OUTPATIENT)
Dept: HEMATOLOGY ONCOLOGY | Facility: CLINIC | Age: 80
End: 2020-05-11
Payer: MEDICARE

## 2020-05-11 PROCEDURE — 99441: CPT | Mod: CR

## 2020-05-11 RX ORDER — CEFPODOXIME PROXETIL 100 MG/1
100 TABLET, FILM COATED ORAL
Qty: 14 | Refills: 0 | Status: DISCONTINUED | COMMUNITY
Start: 2020-04-05 | End: 2020-05-11

## 2020-05-11 RX ORDER — CIPROFLOXACIN HYDROCHLORIDE 500 MG/1
500 TABLET, FILM COATED ORAL
Qty: 14 | Refills: 0 | Status: DISCONTINUED | COMMUNITY
Start: 2019-09-16 | End: 2020-05-11

## 2020-05-11 NOTE — REVIEW OF SYSTEMS
[Lower Ext Edema] : lower extremity edema [Constipation] : constipation [Difficulty Walking] : difficulty walking [Negative] : Allergic/Immunologic [Fever] : no fever [Chills] : no chills [Night Sweats] : no night sweats [Fatigue] : no fatigue [Recent Change In Weight] : ~T no recent weight change [Vision Problems] : no vision problems [Loss of Hearing] : no loss of hearing [Dysphagia] : no dysphagia [Odynophagia] : no odynophagia [Mucosal Pain] : no mucosal pain [Chest Pain] : no chest pain [Palpitations] : no palpitations [Leg Claudication] : no intermittent leg claudication [Shortness Of Breath] : no shortness of breath [SOB on Exertion] : no shortness of breath during exertion [Dysuria] : no dysuria [Incontinence] : no incontinence [Confused] : no confusion [Dizziness] : no dizziness [Fainting] : no fainting [Easy Bleeding] : no tendency for easy bleeding [Proptosis] : no proptosis [Easy Bruising] : no tendency for easy bruising [Swollen Glands] : no swollen glands [FreeTextEntry7] : uses Miralax. Has never had a colonoscopy. Has  follow up for hx bladder tumor [FreeTextEntry5] : ankle swelling [FreeTextEntry8] : hx UTI's -currently on Abx for it [de-identified] : uses cane for balance

## 2020-05-11 NOTE — HISTORY OF PRESENT ILLNESS
[Home] : at home, [unfilled] , at the time of the visit. [Patient] : the patient [Medical Office: ([facility])___] : at the medical office located in  [Self] : self [de-identified] : Mr. Monsivais was referred to my office for evaluation of IgG kappa monoclonal gammopathy. The patient has been followed by his nephrologist who sent blood work in August 2019 for SPEP/BROCK and noted an M-spike of 0.2g/dl, IgG kappa. He has a history of HIV since 2013 on HAART, well controlled. He reports that he developed renal insufficiency since taking his HIV medications. He has some swelling in his feet chronically, and his renal function has been stable. He has no back pains.  [de-identified] : The patient is followed for IgG MGUS. Patient seen via Telehealth today in order to minimize risk of sheela COVID-19 infection. Verbal consent given on 5/11/20 at 12pm patient. The patient lives alone, is going grocery shopping once a week. He has not been sick. No back pains. \par \par \par

## 2020-05-11 NOTE — ASSESSMENT
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 This was done via telephone (pt does not have a cell phone). Patient reports he is going to [facility] tomorrow to get blood work done for his ID physician -thus, will add on SPEP/BROCK/Free light chains to his blood work. He feels well, asymptomatic, lives alone. Also discussed with patient signs/symptoms of COVID-19 infection and preventative measures\par \par -at last visit, patient had a serum M spike of 0.2g/dl, creatinine stable around 1.5-2.1; thus, likely MGUS. Skeletal survey not done -will schedule at next visit, pt has no back pain. \par \par -if renal fct worsens and M protein remains unchanged, pt may benefit from renal biopsy to r/o MGRS (Monoclonal Gammopathy of Renal Significance)\par \par -follow up in 6 months

## 2020-05-12 ENCOUNTER — OUTPATIENT (OUTPATIENT)
Dept: OUTPATIENT SERVICES | Facility: HOSPITAL | Age: 80
LOS: 1 days | End: 2020-05-12

## 2020-05-12 ENCOUNTER — APPOINTMENT (OUTPATIENT)
Dept: INFECTIOUS DISEASE | Facility: CLINIC | Age: 80
End: 2020-05-12

## 2020-05-12 DIAGNOSIS — B20 HUMAN IMMUNODEFICIENCY VIRUS [HIV] DISEASE: ICD-10-CM

## 2020-05-12 LAB
ALBUMIN SERPL ELPH-MCNC: 4 G/DL
ALBUMIN SERPL ELPH-MCNC: 4 G/DL
ALP BLD-CCNC: 87 U/L
ALP BLD-CCNC: 88 U/L
ALT SERPL-CCNC: 12 U/L
ALT SERPL-CCNC: 9 U/L
ANION GAP SERPL CALC-SCNC: 10 MMOL/L
ANION GAP SERPL CALC-SCNC: 11 MMOL/L
AST SERPL-CCNC: 16 U/L
AST SERPL-CCNC: 19 U/L
BASOPHILS # BLD AUTO: 0.04 K/UL
BASOPHILS NFR BLD AUTO: 0.5 %
BILIRUB SERPL-MCNC: 0.4 MG/DL
BILIRUB SERPL-MCNC: 0.4 MG/DL
BUN SERPL-MCNC: 24 MG/DL
BUN SERPL-MCNC: 24 MG/DL
CALCIUM SERPL-MCNC: 9.6 MG/DL
CALCIUM SERPL-MCNC: 9.7 MG/DL
CD3 CELLS # BLD: 1134 /UL
CD3 CELLS NFR BLD: 46 %
CD3+CD4+ CELLS # BLD: 335 /UL
CD3+CD4+ CELLS NFR BLD: 14 %
CD3+CD4+ CELLS/CD3+CD8+ CLL SPEC: 0.43 RATIO
CD3+CD8+ CELLS # SPEC: 780 /UL
CD3+CD8+ CELLS NFR BLD: 32 %
CHLORIDE SERPL-SCNC: 103 MMOL/L
CHLORIDE SERPL-SCNC: 105 MMOL/L
CO2 SERPL-SCNC: 27 MMOL/L
CO2 SERPL-SCNC: 27 MMOL/L
CREAT SERPL-MCNC: 1.79 MG/DL
CREAT SERPL-MCNC: 1.85 MG/DL
CYSTATIN C SERPL-MCNC: 1.86 MG/L
EOSINOPHIL # BLD AUTO: 0.12 K/UL
EOSINOPHIL NFR BLD AUTO: 1.4 %
GFR/BSA.PRED SERPLBLD CYS-BASED-ARV: 32 ML/MIN
GLUCOSE SERPL-MCNC: 150 MG/DL
GLUCOSE SERPL-MCNC: 151 MG/DL
HCT VFR BLD CALC: 36.6 %
HGB BLD-MCNC: 11.7 G/DL
IMM GRANULOCYTES NFR BLD AUTO: 0.7 %
LYMPHOCYTES # BLD AUTO: 2.49 K/UL
LYMPHOCYTES NFR BLD AUTO: 29.3 %
MAN DIFF?: NORMAL
MCHC RBC-ENTMCNC: 32 GM/DL
MCHC RBC-ENTMCNC: 33.5 PG
MCV RBC AUTO: 104.9 FL
MONOCYTES # BLD AUTO: 0.61 K/UL
MONOCYTES NFR BLD AUTO: 7.2 %
NEUTROPHILS # BLD AUTO: 5.19 K/UL
NEUTROPHILS NFR BLD AUTO: 60.9 %
PLATELET # BLD AUTO: 232 K/UL
POTASSIUM SERPL-SCNC: 4.5 MMOL/L
POTASSIUM SERPL-SCNC: 4.5 MMOL/L
PROT SERPL-MCNC: 7.1 G/DL
PROT SERPL-MCNC: 7.1 G/DL
PSA SERPL-MCNC: 0.86 NG/ML
RBC # BLD: 3.49 M/UL
RBC # FLD: 19.2 %
SODIUM SERPL-SCNC: 141 MMOL/L
SODIUM SERPL-SCNC: 142 MMOL/L
WBC # FLD AUTO: 8.51 K/UL

## 2020-05-13 LAB
ALBUMIN MFR SERPL ELPH: 53.2 %
ALBUMIN SERPL-MCNC: 3.8 G/DL
ALBUMIN/GLOB SERPL: 1.2 RATIO
ALPHA1 GLOB MFR SERPL ELPH: 3.4 %
ALPHA1 GLOB SERPL ELPH-MCNC: 0.2 G/DL
ALPHA2 GLOB MFR SERPL ELPH: 10.7 %
ALPHA2 GLOB SERPL ELPH-MCNC: 0.8 G/DL
APPEARANCE: ABNORMAL
B-GLOBULIN MFR SERPL ELPH: 12.7 %
B-GLOBULIN SERPL ELPH-MCNC: 0.9 G/DL
BACTERIA: ABNORMAL
BILIRUBIN URINE: NEGATIVE
BLOOD URINE: ABNORMAL
COLOR: YELLOW
DEPRECATED KAPPA LC FREE/LAMBDA SER: 1.35 RATIO
DEPRECATED KAPPA LC FREE/LAMBDA SER: 1.35 RATIO
GAMMA GLOB FLD ELPH-MCNC: 1.4 G/DL
GAMMA GLOB MFR SERPL ELPH: 20 %
GLUCOSE QUALITATIVE U: NEGATIVE
HIV1 RNA # SERPL NAA+PROBE: NORMAL
HIV1 RNA # SERPL NAA+PROBE: NORMAL COPIES/ML
HYALINE CASTS: 0 /LPF
IGA SER QL IEP: 475 MG/DL
IGG SER QL IEP: 1387 MG/DL
IGM SER QL IEP: 41 MG/DL
INTERPRETATION SERPL IEP-IMP: NORMAL
KAPPA LC CSF-MCNC: 5.02 MG/DL
KAPPA LC CSF-MCNC: 5.02 MG/DL
KAPPA LC SERPL-MCNC: 6.79 MG/DL
KAPPA LC SERPL-MCNC: 6.79 MG/DL
KETONES URINE: NEGATIVE
LEUKOCYTE ESTERASE URINE: ABNORMAL
M PROTEIN MFR SERPL ELPH: 4.6 %
M PROTEIN SPEC IFE-MCNC: NORMAL
MICROSCOPIC-UA: NORMAL
MONOCLON BAND OBS SERPL: 0.3 G/DL
NITRITE URINE: POSITIVE
PH URINE: 6.5
PROT SERPL-MCNC: 7.1 G/DL
PROT SERPL-MCNC: 7.1 G/DL
PROTEIN URINE: ABNORMAL
RED BLOOD CELLS URINE: 14 /HPF
SPECIFIC GRAVITY URINE: 1.01
SQUAMOUS EPITHELIAL CELLS: 3 /HPF
UROBILINOGEN URINE: NORMAL
VIRAL LOAD INTERP: NORMAL
VIRAL LOAD LOG: NORMAL LG COP/ML
WHITE BLOOD CELLS URINE: 497 /HPF

## 2020-05-18 ENCOUNTER — APPOINTMENT (OUTPATIENT)
Dept: HEMATOLOGY ONCOLOGY | Facility: CLINIC | Age: 80
End: 2020-05-18

## 2020-06-08 ENCOUNTER — RX RENEWAL (OUTPATIENT)
Age: 80
End: 2020-06-08

## 2020-06-09 ENCOUNTER — APPOINTMENT (OUTPATIENT)
Dept: UROLOGY | Facility: CLINIC | Age: 80
End: 2020-06-09
Payer: MEDICARE

## 2020-06-09 VITALS — TEMPERATURE: 98 F

## 2020-06-09 VITALS
SYSTOLIC BLOOD PRESSURE: 130 MMHG | HEART RATE: 87 BPM | TEMPERATURE: 98 F | DIASTOLIC BLOOD PRESSURE: 70 MMHG | RESPIRATION RATE: 17 BRPM

## 2020-06-09 PROCEDURE — 99213 OFFICE O/P EST LOW 20 MIN: CPT

## 2020-06-10 LAB
APPEARANCE: ABNORMAL
BACTERIA: ABNORMAL
BILIRUBIN URINE: NEGATIVE
BLOOD URINE: ABNORMAL
COLOR: YELLOW
GLUCOSE QUALITATIVE U: NORMAL
HYALINE CASTS: 1 /LPF
KETONES URINE: NEGATIVE
LEUKOCYTE ESTERASE URINE: ABNORMAL
MICROSCOPIC-UA: NORMAL
NITRITE URINE: NEGATIVE
PH URINE: 6.5
PROTEIN URINE: ABNORMAL
RED BLOOD CELLS URINE: 131 /HPF
SPECIFIC GRAVITY URINE: 1.02
SQUAMOUS EPITHELIAL CELLS: 1 /HPF
UROBILINOGEN URINE: NORMAL
WHITE BLOOD CELLS URINE: 372 /HPF

## 2020-06-15 LAB — BACTERIA UR CULT: ABNORMAL

## 2020-06-26 ENCOUNTER — OUTPATIENT (OUTPATIENT)
Dept: OUTPATIENT SERVICES | Facility: HOSPITAL | Age: 80
LOS: 1 days | End: 2020-06-26
Payer: MEDICARE

## 2020-06-26 ENCOUNTER — APPOINTMENT (OUTPATIENT)
Dept: UROLOGY | Facility: CLINIC | Age: 80
End: 2020-06-26
Payer: MEDICARE

## 2020-06-26 VITALS — DIASTOLIC BLOOD PRESSURE: 69 MMHG | OXYGEN SATURATION: 100 % | HEART RATE: 88 BPM | SYSTOLIC BLOOD PRESSURE: 141 MMHG

## 2020-06-26 VITALS — TEMPERATURE: 97.8 F

## 2020-06-26 DIAGNOSIS — R35.0 FREQUENCY OF MICTURITION: ICD-10-CM

## 2020-06-26 PROCEDURE — 51741 ELECTRO-UROFLOWMETRY FIRST: CPT | Mod: 26

## 2020-06-26 PROCEDURE — 51728 CYSTOMETROGRAM W/VP: CPT

## 2020-06-26 PROCEDURE — 51797 INTRAABDOMINAL PRESSURE TEST: CPT | Mod: 26

## 2020-06-26 PROCEDURE — 99213 OFFICE O/P EST LOW 20 MIN: CPT | Mod: 25

## 2020-06-26 PROCEDURE — 51784 ANAL/URINARY MUSCLE STUDY: CPT | Mod: 26

## 2020-06-26 PROCEDURE — 51728 CYSTOMETROGRAM W/VP: CPT | Mod: 26

## 2020-06-26 PROCEDURE — 51741 ELECTRO-UROFLOWMETRY FIRST: CPT

## 2020-06-26 PROCEDURE — 51784 ANAL/URINARY MUSCLE STUDY: CPT

## 2020-06-26 PROCEDURE — 51797 INTRAABDOMINAL PRESSURE TEST: CPT

## 2020-06-30 DIAGNOSIS — R33.9 RETENTION OF URINE, UNSPECIFIED: ICD-10-CM

## 2020-07-07 ENCOUNTER — APPOINTMENT (OUTPATIENT)
Dept: INFECTIOUS DISEASE | Facility: CLINIC | Age: 80
End: 2020-07-07

## 2020-07-12 ENCOUNTER — FORM ENCOUNTER (OUTPATIENT)
Age: 80
End: 2020-07-12

## 2020-07-13 ENCOUNTER — APPOINTMENT (OUTPATIENT)
Dept: INFECTIOUS DISEASE | Facility: CLINIC | Age: 80
End: 2020-07-13

## 2020-07-13 ENCOUNTER — OUTPATIENT (OUTPATIENT)
Dept: OUTPATIENT SERVICES | Facility: HOSPITAL | Age: 80
LOS: 1 days | End: 2020-07-13
Payer: COMMERCIAL

## 2020-07-13 DIAGNOSIS — R79.89 OTHER SPECIFIED ABNORMAL FINDINGS OF BLOOD CHEMISTRY: ICD-10-CM

## 2020-07-13 DIAGNOSIS — B20 HUMAN IMMUNODEFICIENCY VIRUS [HIV] DISEASE: ICD-10-CM

## 2020-07-13 PROCEDURE — G0463: CPT

## 2020-07-20 ENCOUNTER — APPOINTMENT (OUTPATIENT)
Dept: INFECTIOUS DISEASE | Facility: CLINIC | Age: 80
End: 2020-07-20

## 2020-07-20 ENCOUNTER — LABORATORY RESULT (OUTPATIENT)
Age: 80
End: 2020-07-20

## 2020-07-21 ENCOUNTER — LABORATORY RESULT (OUTPATIENT)
Age: 80
End: 2020-07-21

## 2020-07-21 LAB
ALBUMIN SERPL ELPH-MCNC: 3.8 G/DL
ALP BLD-CCNC: 120 U/L
ALT SERPL-CCNC: 10 U/L
ANION GAP SERPL CALC-SCNC: 13 MMOL/L
AST SERPL-CCNC: 16 U/L
BASOPHILS # BLD AUTO: 0.02 K/UL
BASOPHILS NFR BLD AUTO: 0.2 %
BILIRUB SERPL-MCNC: 0.2 MG/DL
BUN SERPL-MCNC: 29 MG/DL
CALCIUM SERPL-MCNC: 9.6 MG/DL
CD3 CELLS # BLD: 1168 /UL
CD3 CELLS NFR BLD: 53 %
CD3+CD4+ CELLS # BLD: 368 /UL
CD3+CD4+ CELLS NFR BLD: 17 %
CD3+CD4+ CELLS/CD3+CD8+ CLL SPEC: 0.47 RATIO
CD3+CD8+ CELLS # SPEC: 782 /UL
CD3+CD8+ CELLS NFR BLD: 36 %
CHLORIDE SERPL-SCNC: 103 MMOL/L
CHOLEST SERPL-MCNC: 168 MG/DL
CHOLEST/HDLC SERPL: 2.5 RATIO
CO2 SERPL-SCNC: 24 MMOL/L
CREAT SERPL-MCNC: 1.72 MG/DL
CYSTATIN C SERPL-MCNC: 1.87 MG/L
EOSINOPHIL # BLD AUTO: 0.05 K/UL
EOSINOPHIL NFR BLD AUTO: 0.4 %
ESTIMATED AVERAGE GLUCOSE: 114 MG/DL
GFR/BSA.PRED SERPLBLD CYS-BASED-ARV: 32 ML/MIN
GLUCOSE SERPL-MCNC: 84 MG/DL
HBA1C MFR BLD HPLC: 5.6 %
HCT VFR BLD CALC: 37.4 %
HDLC SERPL-MCNC: 66 MG/DL
HGB BLD-MCNC: 11.6 G/DL
IMM GRANULOCYTES NFR BLD AUTO: 0.5 %
LDLC SERPL CALC-MCNC: 76 MG/DL
LYMPHOCYTES # BLD AUTO: 2.38 K/UL
LYMPHOCYTES NFR BLD AUTO: 19.9 %
MAN DIFF?: NORMAL
MCHC RBC-ENTMCNC: 31 GM/DL
MCHC RBC-ENTMCNC: 34.4 PG
MCV RBC AUTO: 111 FL
MONOCYTES # BLD AUTO: 0.92 K/UL
MONOCYTES NFR BLD AUTO: 7.7 %
NEUTROPHILS # BLD AUTO: 8.51 K/UL
NEUTROPHILS NFR BLD AUTO: 71.3 %
PHOSPHATE SERPL-MCNC: 3.5 MG/DL
PLATELET # BLD AUTO: 197 K/UL
POTASSIUM SERPL-SCNC: 4.3 MMOL/L
PROT SERPL-MCNC: 7.2 G/DL
PSA SERPL-MCNC: 0.76 NG/ML
RBC # BLD: 3.37 M/UL
RBC # FLD: 14.1 %
SARS-COV-2 IGG SERPL IA-ACNC: 0.42 INDEX
SARS-COV-2 IGG SERPL QL IA: NEGATIVE
SODIUM SERPL-SCNC: 140 MMOL/L
TRIGL SERPL-MCNC: 129 MG/DL
WBC # FLD AUTO: 11.94 K/UL

## 2020-07-22 LAB
ALBUMIN MFR SERPL ELPH: 50.8 %
ALBUMIN SERPL ELPH-MCNC: 3.9 G/DL
ALBUMIN SERPL-MCNC: 3.8 G/DL
ALBUMIN/GLOB SERPL: 1 RATIO
ALPHA1 GLOB MFR SERPL ELPH: 3.7 %
ALPHA1 GLOB SERPL ELPH-MCNC: 0.3 G/DL
ALPHA2 GLOB MFR SERPL ELPH: 12.6 %
ALPHA2 GLOB SERPL ELPH-MCNC: 0.9 G/DL
ANION GAP SERPL CALC-SCNC: 15 MMOL/L
APPEARANCE: ABNORMAL
B-GLOBULIN MFR SERPL ELPH: 13.3 %
B-GLOBULIN SERPL ELPH-MCNC: 1 G/DL
BACTERIA UR CULT: NORMAL
BACTERIA: NEGATIVE
BILIRUBIN URINE: NEGATIVE
BLOOD URINE: ABNORMAL
BUN SERPL-MCNC: 37 MG/DL
C3 SERPL-MCNC: 129 MG/DL
C4 SERPL-MCNC: 47 MG/DL
CALCIUM SERPL-MCNC: 10.1 MG/DL
CALCIUM SERPL-MCNC: 10.1 MG/DL
CHLORIDE SERPL-SCNC: 101 MMOL/L
CO2 SERPL-SCNC: 26 MMOL/L
COLOR: YELLOW
CREAT SERPL-MCNC: 2.27 MG/DL
CREAT SPEC-SCNC: 58 MG/DL
CREAT SPEC-SCNC: 58 MG/DL
CREAT/PROT UR: 0.7 RATIO
CRYOGLOB SERPL-MCNC: NEGATIVE
DEPRECATED KAPPA LC FREE/LAMBDA SER: 1.86 RATIO
GAMMA GLOB FLD ELPH-MCNC: 1.5 G/DL
GAMMA GLOB MFR SERPL ELPH: 19.6 %
GLUCOSE QUALITATIVE U: NEGATIVE
GLUCOSE SERPL-MCNC: 82 MG/DL
HBV CORE IGG+IGM SER QL: NONREACTIVE
HBV SURFACE AB SER QL: NONREACTIVE
HBV SURFACE AG SER QL: NONREACTIVE
HYALINE CASTS: 0 /LPF
INTERPRETATION SERPL IEP-IMP: NORMAL
KAPPA LC CSF-MCNC: 5.16 MG/DL
KAPPA LC SERPL-MCNC: 9.62 MG/DL
KETONES URINE: NEGATIVE
LEUKOCYTE ESTERASE URINE: ABNORMAL
M PROTEIN MFR SERPL ELPH: 2.3 %
M PROTEIN SPEC IFE-MCNC: NORMAL
MICROALBUMIN 24H UR DL<=1MG/L-MCNC: 10.7 MG/DL
MICROALBUMIN/CREAT 24H UR-RTO: 186 MG/G
MICROSCOPIC-UA: NORMAL
MONOCLON BAND OBS SERPL: 0.2 G/DL
NITRITE URINE: NEGATIVE
PARATHYROID HORMONE INTACT: 15 PG/ML
PH URINE: 6.5
PHOSPHATE SERPL-MCNC: 3.8 MG/DL
POTASSIUM SERPL-SCNC: 4.6 MMOL/L
PROT SERPL-MCNC: 7.5 G/DL
PROT SERPL-MCNC: 7.5 G/DL
PROT UR-MCNC: 39 MG/DL
PROTEIN URINE: ABNORMAL
RED BLOOD CELLS URINE: 23 /HPF
SODIUM SERPL-SCNC: 142 MMOL/L
SPECIFIC GRAVITY URINE: 1.01
SQUAMOUS EPITHELIAL CELLS: 0 /HPF
URATE SERPL-MCNC: 6.8 MG/DL
UROBILINOGEN URINE: NORMAL
WHITE BLOOD CELLS URINE: >720 /HPF

## 2020-07-23 LAB
APPEARANCE: ABNORMAL
BILIRUBIN URINE: NEGATIVE
BLOOD URINE: ABNORMAL
COLOR: YELLOW
GLUCOSE QUALITATIVE U: ABNORMAL
KETONES URINE: NEGATIVE
LEUKOCYTE ESTERASE URINE: ABNORMAL
NITRITE URINE: NEGATIVE
PH URINE: 6.5
PROTEIN URINE: ABNORMAL
SPECIFIC GRAVITY URINE: 1.02
UROBILINOGEN URINE: NORMAL

## 2020-08-18 ENCOUNTER — INPATIENT (INPATIENT)
Facility: HOSPITAL | Age: 80
LOS: 23 days | Discharge: HOME | DRG: 853 | End: 2020-09-11
Attending: THORACIC SURGERY (CARDIOTHORACIC VASCULAR SURGERY) | Admitting: HOSPITALIST
Payer: MEDICARE

## 2020-08-18 VITALS
OXYGEN SATURATION: 97 % | RESPIRATION RATE: 16 BRPM | HEART RATE: 103 BPM | DIASTOLIC BLOOD PRESSURE: 79 MMHG | SYSTOLIC BLOOD PRESSURE: 136 MMHG

## 2020-08-18 LAB
ALBUMIN SERPL ELPH-MCNC: 3.7 G/DL — SIGNIFICANT CHANGE UP (ref 3.3–5)
ALP SERPL-CCNC: 122 U/L — HIGH (ref 40–120)
ALT FLD-CCNC: 31 U/L — SIGNIFICANT CHANGE UP (ref 10–45)
ANION GAP SERPL CALC-SCNC: 14 MMOL/L — SIGNIFICANT CHANGE UP (ref 5–17)
APTT BLD: 31.2 SEC — SIGNIFICANT CHANGE UP (ref 27.5–35.5)
AST SERPL-CCNC: 97 U/L — HIGH (ref 10–40)
BASE EXCESS BLDV CALC-SCNC: -1.1 MMOL/L — SIGNIFICANT CHANGE UP (ref -2–2)
BILIRUB SERPL-MCNC: 0.8 MG/DL — SIGNIFICANT CHANGE UP (ref 0.2–1.2)
BUN SERPL-MCNC: 46 MG/DL — HIGH (ref 7–23)
CA-I SERPL-SCNC: 1.15 MMOL/L — SIGNIFICANT CHANGE UP (ref 1.12–1.3)
CALCIUM SERPL-MCNC: 9 MG/DL — SIGNIFICANT CHANGE UP (ref 8.4–10.5)
CHLORIDE BLDV-SCNC: 108 MMOL/L — SIGNIFICANT CHANGE UP (ref 96–108)
CHLORIDE SERPL-SCNC: 106 MMOL/L — SIGNIFICANT CHANGE UP (ref 96–108)
CO2 BLDV-SCNC: 24 MMOL/L — SIGNIFICANT CHANGE UP (ref 22–30)
CO2 SERPL-SCNC: 22 MMOL/L — SIGNIFICANT CHANGE UP (ref 22–31)
CREAT SERPL-MCNC: 2.02 MG/DL — HIGH (ref 0.5–1.3)
GAS PNL BLDV: 143 MMOL/L — SIGNIFICANT CHANGE UP (ref 135–145)
GAS PNL BLDV: SIGNIFICANT CHANGE UP
GAS PNL BLDV: SIGNIFICANT CHANGE UP
GLUCOSE BLDV-MCNC: 145 MG/DL — HIGH (ref 70–99)
GLUCOSE SERPL-MCNC: 151 MG/DL — HIGH (ref 70–99)
HCO3 BLDV-SCNC: 23 MMOL/L — SIGNIFICANT CHANGE UP (ref 21–29)
HCT VFR BLD CALC: 38.9 % — LOW (ref 39–50)
HCT VFR BLDA CALC: 37 % — LOW (ref 39–50)
HGB BLD CALC-MCNC: 12.2 G/DL — LOW (ref 13–17)
HGB BLD-MCNC: 12.6 G/DL — LOW (ref 13–17)
INR BLD: 1.06 RATIO — SIGNIFICANT CHANGE UP (ref 0.88–1.16)
LACTATE BLDV-MCNC: 3.1 MMOL/L — HIGH (ref 0.7–2)
MCHC RBC-ENTMCNC: 32.4 GM/DL — SIGNIFICANT CHANGE UP (ref 32–36)
MCHC RBC-ENTMCNC: 33.7 PG — SIGNIFICANT CHANGE UP (ref 27–34)
MCV RBC AUTO: 104 FL — HIGH (ref 80–100)
OTHER CELLS CSF MANUAL: 9 ML/DL — LOW (ref 18–23)
PCO2 BLDV: 39 MMHG — SIGNIFICANT CHANGE UP (ref 35–50)
PH BLDV: 7.39 — SIGNIFICANT CHANGE UP (ref 7.35–7.45)
PLATELET # BLD AUTO: 273 K/UL — SIGNIFICANT CHANGE UP (ref 150–400)
PO2 BLDV: 31 MMHG — SIGNIFICANT CHANGE UP (ref 25–45)
POTASSIUM BLDV-SCNC: 4.2 MMOL/L — SIGNIFICANT CHANGE UP (ref 3.5–5.3)
POTASSIUM SERPL-MCNC: 4.2 MMOL/L — SIGNIFICANT CHANGE UP (ref 3.5–5.3)
POTASSIUM SERPL-SCNC: 4.2 MMOL/L — SIGNIFICANT CHANGE UP (ref 3.5–5.3)
PROT SERPL-MCNC: 7.7 G/DL — SIGNIFICANT CHANGE UP (ref 6–8.3)
PROTHROM AB SERPL-ACNC: 12.6 SEC — SIGNIFICANT CHANGE UP (ref 10.6–13.6)
RBC # BLD: 3.74 M/UL — LOW (ref 4.2–5.8)
RBC # FLD: 13.7 % — SIGNIFICANT CHANGE UP (ref 10.3–14.5)
SAO2 % BLDV: 55 % — LOW (ref 67–88)
SODIUM SERPL-SCNC: 142 MMOL/L — SIGNIFICANT CHANGE UP (ref 135–145)
WBC # BLD: 16.5 K/UL — HIGH (ref 3.8–10.5)
WBC # FLD AUTO: 16.5 K/UL — HIGH (ref 3.8–10.5)

## 2020-08-18 PROCEDURE — 72170 X-RAY EXAM OF PELVIS: CPT | Mod: 26

## 2020-08-18 PROCEDURE — 99285 EMERGENCY DEPT VISIT HI MDM: CPT | Mod: CS,GC

## 2020-08-18 PROCEDURE — 71045 X-RAY EXAM CHEST 1 VIEW: CPT | Mod: 26

## 2020-08-18 PROCEDURE — 93010 ELECTROCARDIOGRAM REPORT: CPT

## 2020-08-18 RX ORDER — SODIUM CHLORIDE 9 MG/ML
1000 INJECTION, SOLUTION INTRAVENOUS ONCE
Refills: 0 | Status: COMPLETED | OUTPATIENT
Start: 2020-08-18 | End: 2020-08-18

## 2020-08-18 RX ORDER — AMPICILLIN SODIUM AND SULBACTAM SODIUM 250; 125 MG/ML; MG/ML
1.5 INJECTION, POWDER, FOR SUSPENSION INTRAMUSCULAR; INTRAVENOUS EVERY 6 HOURS
Refills: 0 | Status: DISCONTINUED | OUTPATIENT
Start: 2020-08-18 | End: 2020-08-19

## 2020-08-18 RX ORDER — SODIUM CHLORIDE 9 MG/ML
1000 INJECTION, SOLUTION INTRAVENOUS
Refills: 0 | Status: DISCONTINUED | OUTPATIENT
Start: 2020-08-18 | End: 2020-08-19

## 2020-08-18 RX ORDER — TETANUS TOXOID, REDUCED DIPHTHERIA TOXOID AND ACELLULAR PERTUSSIS VACCINE, ADSORBED 5; 2.5; 8; 8; 2.5 [IU]/.5ML; [IU]/.5ML; UG/.5ML; UG/.5ML; UG/.5ML
0.5 SUSPENSION INTRAMUSCULAR ONCE
Refills: 0 | Status: COMPLETED | OUTPATIENT
Start: 2020-08-18 | End: 2020-08-18

## 2020-08-18 RX ADMIN — TETANUS TOXOID, REDUCED DIPHTHERIA TOXOID AND ACELLULAR PERTUSSIS VACCINE, ADSORBED 0.5 MILLILITER(S): 5; 2.5; 8; 8; 2.5 SUSPENSION INTRAMUSCULAR at 22:54

## 2020-08-18 RX ADMIN — AMPICILLIN SODIUM AND SULBACTAM SODIUM 100 GRAM(S): 250; 125 INJECTION, POWDER, FOR SUSPENSION INTRAMUSCULAR; INTRAVENOUS at 22:53

## 2020-08-18 RX ADMIN — SODIUM CHLORIDE 1000 MILLILITER(S): 9 INJECTION, SOLUTION INTRAVENOUS at 22:54

## 2020-08-18 NOTE — ED PROVIDER NOTE - OBJECTIVE STATEMENT
80M hx of HIV+, BPH, CAD s/p stent, no other known pmhx, presenting found down in his home in own urine/feces, reportedly per EMS pt had been using a scrotal device to clamp the testes but was unable to remove it and then slid to the ground while trying to maneuvering the device, and then was unable to get himself up. Per EMS, the heat in the home was significantly elevated. Pt denies anticoagulants or aspirin, denies any LOC or head trauma. Denies falling. Pt states he was exhausted and then could not get up. States he sometimes straight caths due to his BPH. Denies any chest pain or sob. 80M hx of HIV+, BPH, CAD s/p stent, no other known pmhx, presenting found down in his home in own urine/feces, reportedly per EMS pt had been using a scrotal device to clamp the testes but was unable to remove it and then slid to the ground while trying to maneuvering the device, and then was unable to get himself up. Per EMS, the heat in the home was significantly elevated. Suspected pt has been immobile x24 hours. Pt denies anticoagulants or aspirin, denies any LOC or head trauma. Denies falling. Pt states he was exhausted and then could not get up. States he recalls all events. Denies recent illness or fevers. States he sometimes straight caths due to his BPH. Denies any chest pain or sob.

## 2020-08-18 NOTE — ED PROVIDER NOTE - ATTENDING CONTRIBUTION TO CARE
Nina DO: I have personally performed a face to face medical and diagnostic evaluation of the patient. I have discussed with and reviewed the resident's note and agree with the History, ROS, Physical Exam and MDM unless otherwise indicated. A brief summary of my personal evaluation and impression can be found below.    80M immobile in hot home x 24 hours, swelling over left periorbital area with intact extraocular eye movement and vision grossly intact, no neuro deficits, oriented x 3, appears to recall all events and denies head trauma or LOC, however with scattered abrasions an a few early pressure ulcers, suspect fall, no spinal ttp, scattered left sided upper back ecchymosis, plan for ck to eval for suspected rhabdo, ekg to screen for arrhythmia, ct head/c-spine/ max/face , xrays, to eval for injuries, and tetanus update, pt afebrile but suspect infection could have precipitated this generalized weakness, plan for unasyn to cover for cellulitis, plan to expand abx if focal findings present on imaging. pt not endorsing symptoms.

## 2020-08-18 NOTE — ED PROVIDER NOTE - CLINICAL SUMMARY MEDICAL DECISION MAKING FREE TEXT BOX
Nina DO: 80M immobile in hot home x 24 hours, swelling over left periorbital area with intact extraocular eye movement and vision grossly intact, no neuro deficits, oriented x 3, appears to recall all events and denies head trauma or LOC, however with scattered abrasions an a few early pressure ulcers, suspect fall, no spinal ttp, mild left sided ecchymosis, plan for ck to eval for suspected rhabdo, ekg to screen for arrhythmia, ct head/c-spine/ max/face to eval for injuries, and tetanus update, pt afebrile but suspect infection could have precipitated this generalized weakness, plan for unasyn to cover for cellulitis, plan to expand abx if focal findings present on imaging. pt not endorsing symptoms. see attending attestation .

## 2020-08-18 NOTE — ED PROVIDER NOTE - NS ED ROS FT
Gen: No fever, normal appetite, + generalized weakness.   Eyes: No eye irritation or discharge  ENT: No ear pain, congestion, sore throat  Resp: No cough or trouble breathing  Cardiovascular: No chest pain or palpitation  Gastroenteric: No nausea/vomiting, diarrhea, constipation  :  No change in urine output; no dysuria  MS: + myalgias   Skin: No rashes  Neuro: No headache; no abnormal movements  Remainder negative, except as per the HPI

## 2020-08-18 NOTE — ED PROVIDER NOTE - PHYSICAL EXAMINATION
Gen: AAOx3, NAD  Head: NCAT  ENT: Airway patent, moist mucous membranes, nasal passageways clear, no pharyngeal erythema or exudates, uvula midline, no cervical lymphadenopathy  Cardiac: Normal rate, normal rhythm, no murmurs/rubs/gallops appreciated  Respiratory: Lungs CTA B/L  Gastrointestinal: +BS, Abdomen soft, nontender, nondistended, no rebound, no guarding, no organomegaly   MSK: No gross abnormalities, FROM of all four extremities, no edema  HEME: Extremities warm, pulses intact and symmetrical in all four extremities  Skin: No rashes, no lesions  Neuro: No gross neurologic deficits, CN II-XII intact, no facial asymmetry, no dysarthria, dysdiadochokinesia, strength equal in all four extremities, no gait abnormality Gen: AAOx3, NAD  Head: NCAT  ENT: Airway patent, dry mucous membranes, nasal passageways clear, no pharyngeal erythema or exudates, uvula midline, no cervical lymphadenopathy  Cardiac: Normal rate, normal rhythm, no murmurs/rubs/gallops appreciated  Respiratory: Lungs CTA B/L  Gastrointestinal:  Abdomen soft, nontender, nondistended, no rebound, no guarding   MSK: No gross abnormalities, FROM of all four extremities, no edema  HEME: Extremities warm, pulses intact and symmetrical in all four extremities  Skin: + stage II skin ulcer 2cm b/l knees   Neuro: No gross neurologic deficits, CN II-XII intact, no facial asymmetry except for mild + periorbital soft tissue edema,, no dysarthria, EOMI, b/l PERRLA, strength equal in all four extremities, unable to assess gait. Gen: AAOx3, NAD  Head: NCAT  ENT: Airway patent, dry mucous membranes, nasal passageways clear, c-collar in place , + L eye periorbital edema, but sclear clear and vision grossly intact, pupils reactive and normal in shape and symmetrical  Cardiac: Normal rate, normal rhythm, no murmurs  Respiratory: Lungs CTA B/L  Gastrointestinal:  Abdomen soft, nontender, nondistended, no rebound, no guarding   MSK: No gross abnormalities, FROM of all four extremities, no edema, no midline spinal ttp, pelvis stable, no pain on hip ROM. No chest wall ttp. No rib ttp.   HEME: Extremities warm, pulses intact and symmetrical in all four extremities  Skin: + stage II skin ulcer 2cm b/l knees , L upper thorax scattered ecchymosis   Neuro: No gross neurologic deficits, CN II-XII intact, no facial asymmetry except for mild + periorbital soft tissue edema,, no dysarthria, EOMI, b/l PERRLA, strength equal in all four extremities, unable to assess gait.

## 2020-08-18 NOTE — ED PROVIDER NOTE - PROGRESS NOTE DETAILS
Maryanne Bower MD 80 M hx of HIV CD4 unknown, viral count undetectable presents to the ED w/ trip and fall on the bathroom tile, pt reports laying on the L side of the face; and body, has bruising on the L upper back and anterior pelvis, was unable to stand up s/p fall, has elevated CPK, Dr Cagle: C collar cleared after neg CT neck and re-examination, FROM and no cervical midspine ttp.

## 2020-08-19 ENCOUNTER — TRANSCRIPTION ENCOUNTER (OUTPATIENT)
Age: 80
End: 2020-08-19

## 2020-08-19 DIAGNOSIS — B20 HUMAN IMMUNODEFICIENCY VIRUS [HIV] DISEASE: ICD-10-CM

## 2020-08-19 DIAGNOSIS — N40.1 BENIGN PROSTATIC HYPERPLASIA WITH LOWER URINARY TRACT SYMPTOMS: ICD-10-CM

## 2020-08-19 DIAGNOSIS — N13.30 UNSPECIFIED HYDRONEPHROSIS: ICD-10-CM

## 2020-08-19 DIAGNOSIS — N18.9 CHRONIC KIDNEY DISEASE, UNSPECIFIED: ICD-10-CM

## 2020-08-19 DIAGNOSIS — M62.82 RHABDOMYOLYSIS: ICD-10-CM
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
## 2020-08-19 DIAGNOSIS — A41.9 SEPSIS, UNSPECIFIED ORGANISM: ICD-10-CM

## 2020-08-19 DIAGNOSIS — N39.0 URINARY TRACT INFECTION, SITE NOT SPECIFIED: ICD-10-CM

## 2020-08-19 DIAGNOSIS — Z29.9 ENCOUNTER FOR PROPHYLACTIC MEASURES, UNSPECIFIED: ICD-10-CM

## 2020-08-19 DIAGNOSIS — Z02.9 ENCOUNTER FOR ADMINISTRATIVE EXAMINATIONS, UNSPECIFIED: ICD-10-CM

## 2020-08-19 DIAGNOSIS — I25.10 ATHEROSCLEROTIC HEART DISEASE OF NATIVE CORONARY ARTERY WITHOUT ANGINA PECTORIS: ICD-10-CM

## 2020-08-19 LAB
-  COAGULASE NEGATIVE STAPHYLOCOCCUS: SIGNIFICANT CHANGE UP
4/8 RATIO: 0.55 RATIO — LOW (ref 0.86–4.14)
ABS CD8: 585 /UL — SIGNIFICANT CHANGE UP (ref 90–775)
ALBUMIN SERPL ELPH-MCNC: 3.2 G/DL — LOW (ref 3.3–5)
ALBUMIN SERPL ELPH-MCNC: 3.2 G/DL — LOW (ref 3.3–5)
ALP SERPL-CCNC: 102 U/L — SIGNIFICANT CHANGE UP (ref 40–120)
ALP SERPL-CCNC: 124 U/L — HIGH (ref 40–120)
ALT FLD-CCNC: 28 U/L — SIGNIFICANT CHANGE UP (ref 10–45)
ALT FLD-CCNC: 36 U/L — SIGNIFICANT CHANGE UP (ref 10–45)
ANION GAP SERPL CALC-SCNC: 10 MMOL/L — SIGNIFICANT CHANGE UP (ref 5–17)
ANION GAP SERPL CALC-SCNC: 11 MMOL/L — SIGNIFICANT CHANGE UP (ref 5–17)
ANION GAP SERPL CALC-SCNC: 8 MMOL/L — SIGNIFICANT CHANGE UP (ref 5–17)
ANISOCYTOSIS BLD QL: SIGNIFICANT CHANGE UP
APPEARANCE UR: ABNORMAL
APTT BLD: 38.1 SEC — HIGH (ref 27.5–35.5)
AST SERPL-CCNC: 93 U/L — HIGH (ref 10–40)
AST SERPL-CCNC: 98 U/L — HIGH (ref 10–40)
BACTERIA # UR AUTO: ABNORMAL
BASE EXCESS BLDA CALC-SCNC: -1.9 MMOL/L — SIGNIFICANT CHANGE UP (ref -2–2)
BASOPHILS # BLD AUTO: 0 K/UL — SIGNIFICANT CHANGE UP (ref 0–0.2)
BASOPHILS # BLD AUTO: 0 K/UL — SIGNIFICANT CHANGE UP (ref 0–0.2)
BASOPHILS NFR BLD AUTO: 0 % — SIGNIFICANT CHANGE UP (ref 0–2)
BASOPHILS NFR BLD AUTO: 0 % — SIGNIFICANT CHANGE UP (ref 0–2)
BILIRUB SERPL-MCNC: 0.4 MG/DL — SIGNIFICANT CHANGE UP (ref 0.2–1.2)
BILIRUB SERPL-MCNC: 0.7 MG/DL — SIGNIFICANT CHANGE UP (ref 0.2–1.2)
BILIRUB UR-MCNC: NEGATIVE — SIGNIFICANT CHANGE UP
BUN SERPL-MCNC: 40 MG/DL — HIGH (ref 7–23)
BUN SERPL-MCNC: 43 MG/DL — HIGH (ref 7–23)
BUN SERPL-MCNC: 44 MG/DL — HIGH (ref 7–23)
CALCIUM SERPL-MCNC: 8.7 MG/DL — SIGNIFICANT CHANGE UP (ref 8.4–10.5)
CD16+CD56+ CELLS NFR BLD: 19 % — SIGNIFICANT CHANGE UP (ref 7–27)
CD16+CD56+ CELLS NFR SPEC: 257 /UL — SIGNIFICANT CHANGE UP (ref 80–426)
CD19 BLASTS SPEC-ACNC: 11 % — SIGNIFICANT CHANGE UP (ref 4–18)
CD19 BLASTS SPEC-ACNC: 148 /UL — SIGNIFICANT CHANGE UP (ref 32–326)
CD3 BLASTS SPEC-ACNC: 68 % — SIGNIFICANT CHANGE UP (ref 58–84)
CD3 BLASTS SPEC-ACNC: 912 /UL — SIGNIFICANT CHANGE UP (ref 396–2024)
CD4 %: 24 % — LOW (ref 30–56)
CD8 %: 43 % — SIGNIFICANT CHANGE UP (ref 11–43)
CHLORIDE SERPL-SCNC: 106 MMOL/L — SIGNIFICANT CHANGE UP (ref 96–108)
CHLORIDE SERPL-SCNC: 107 MMOL/L — SIGNIFICANT CHANGE UP (ref 96–108)
CHLORIDE SERPL-SCNC: 107 MMOL/L — SIGNIFICANT CHANGE UP (ref 96–108)
CK SERPL-CCNC: 2469 U/L — HIGH (ref 30–200)
CK SERPL-CCNC: 3333 U/L — HIGH (ref 30–200)
CO2 BLDA-SCNC: 28 MMOL/L — SIGNIFICANT CHANGE UP (ref 22–30)
CO2 SERPL-SCNC: 23 MMOL/L — SIGNIFICANT CHANGE UP (ref 22–31)
CO2 SERPL-SCNC: 24 MMOL/L — SIGNIFICANT CHANGE UP (ref 22–31)
CO2 SERPL-SCNC: 24 MMOL/L — SIGNIFICANT CHANGE UP (ref 22–31)
COLOR SPEC: YELLOW — SIGNIFICANT CHANGE UP
CREAT SERPL-MCNC: 1.58 MG/DL — HIGH (ref 0.5–1.3)
CREAT SERPL-MCNC: 1.59 MG/DL — HIGH (ref 0.5–1.3)
CREAT SERPL-MCNC: 1.83 MG/DL — HIGH (ref 0.5–1.3)
DACRYOCYTES BLD QL SMEAR: SLIGHT — SIGNIFICANT CHANGE UP
DIFF PNL FLD: ABNORMAL
EOSINOPHIL # BLD AUTO: 0 K/UL — SIGNIFICANT CHANGE UP (ref 0–0.5)
EOSINOPHIL # BLD AUTO: 0 K/UL — SIGNIFICANT CHANGE UP (ref 0–0.5)
EOSINOPHIL NFR BLD AUTO: 0 % — SIGNIFICANT CHANGE UP (ref 0–6)
EOSINOPHIL NFR BLD AUTO: 0 % — SIGNIFICANT CHANGE UP (ref 0–6)
EPI CELLS # UR: 3 — SIGNIFICANT CHANGE UP
FOLATE SERPL-MCNC: 14.9 NG/ML — SIGNIFICANT CHANGE UP
GAS PNL BLDA: SIGNIFICANT CHANGE UP
GLUCOSE BLDC GLUCOMTR-MCNC: 124 MG/DL — HIGH (ref 70–99)
GLUCOSE SERPL-MCNC: 123 MG/DL — HIGH (ref 70–99)
GLUCOSE SERPL-MCNC: 126 MG/DL — HIGH (ref 70–99)
GLUCOSE SERPL-MCNC: 137 MG/DL — HIGH (ref 70–99)
GLUCOSE UR QL: NEGATIVE — SIGNIFICANT CHANGE UP
GRAM STN FLD: SIGNIFICANT CHANGE UP
HCO3 BLDA-SCNC: 26 MMOL/L — SIGNIFICANT CHANGE UP (ref 21–29)
HCT VFR BLD CALC: 34.8 % — LOW (ref 39–50)
HCT VFR BLD CALC: 36.9 % — LOW (ref 39–50)
HGB BLD-MCNC: 11.6 G/DL — LOW (ref 13–17)
HGB BLD-MCNC: 11.9 G/DL — LOW (ref 13–17)
HOROWITZ INDEX BLDA+IHG-RTO: 100 — SIGNIFICANT CHANGE UP
HYALINE CASTS # UR AUTO: 0 /LPF — SIGNIFICANT CHANGE UP (ref 0–7)
INR BLD: 1.01 RATIO — SIGNIFICANT CHANGE UP (ref 0.88–1.16)
KETONES UR-MCNC: SIGNIFICANT CHANGE UP
LACTATE BLDV-MCNC: 2 MMOL/L — SIGNIFICANT CHANGE UP (ref 0.7–2)
LEUKOCYTE ESTERASE UR-ACNC: ABNORMAL
LYMPHOCYTES # BLD AUTO: 0.43 K/UL — LOW (ref 1–3.3)
LYMPHOCYTES # BLD AUTO: 0.86 K/UL — LOW (ref 1–3.3)
LYMPHOCYTES # BLD AUTO: 2.6 % — LOW (ref 13–44)
LYMPHOCYTES # BLD AUTO: 5.2 % — LOW (ref 13–44)
MACROCYTES BLD QL: SIGNIFICANT CHANGE UP
MAGNESIUM SERPL-MCNC: 2.2 MG/DL — SIGNIFICANT CHANGE UP (ref 1.6–2.6)
MANUAL SMEAR VERIFICATION: SIGNIFICANT CHANGE UP
MCHC RBC-ENTMCNC: 32.2 GM/DL — SIGNIFICANT CHANGE UP (ref 32–36)
MCHC RBC-ENTMCNC: 33.3 GM/DL — SIGNIFICANT CHANGE UP (ref 32–36)
MCHC RBC-ENTMCNC: 34.4 PG — HIGH (ref 27–34)
MCHC RBC-ENTMCNC: 35 PG — HIGH (ref 27–34)
MCV RBC AUTO: 105.1 FL — HIGH (ref 80–100)
MCV RBC AUTO: 106.6 FL — HIGH (ref 80–100)
METHOD TYPE: SIGNIFICANT CHANGE UP
MONOCYTES # BLD AUTO: 0.99 K/UL — HIGH (ref 0–0.9)
MONOCYTES # BLD AUTO: 1.15 K/UL — HIGH (ref 0–0.9)
MONOCYTES NFR BLD AUTO: 6 % — SIGNIFICANT CHANGE UP (ref 2–14)
MONOCYTES NFR BLD AUTO: 7 % — SIGNIFICANT CHANGE UP (ref 2–14)
NEUTROPHILS # BLD AUTO: 14.65 K/UL — HIGH (ref 1.8–7.4)
NEUTROPHILS # BLD AUTO: 14.89 K/UL — HIGH (ref 1.8–7.4)
NEUTROPHILS NFR BLD AUTO: 88.8 % — HIGH (ref 43–77)
NEUTROPHILS NFR BLD AUTO: 90.4 % — HIGH (ref 43–77)
NITRITE UR-MCNC: NEGATIVE — SIGNIFICANT CHANGE UP
NRBC # BLD: 0 /100 WBCS — SIGNIFICANT CHANGE UP (ref 0–0)
NT-PROBNP SERPL-SCNC: 4864 PG/ML — HIGH (ref 0–300)
PCO2 BLDA: 60 MMHG — HIGH (ref 32–46)
PH BLDA: 7.26 — LOW (ref 7.35–7.45)
PH UR: 7 — SIGNIFICANT CHANGE UP (ref 5–8)
PHOSPHATE SERPL-MCNC: 2.7 MG/DL — SIGNIFICANT CHANGE UP (ref 2.5–4.5)
PLAT MORPH BLD: NORMAL — SIGNIFICANT CHANGE UP
PLATELET # BLD AUTO: 218 K/UL — SIGNIFICANT CHANGE UP (ref 150–400)
PLATELET # BLD AUTO: 239 K/UL — SIGNIFICANT CHANGE UP (ref 150–400)
PLATELET COUNT - ESTIMATE: NORMAL — SIGNIFICANT CHANGE UP
PO2 BLDA: 138 MMHG — HIGH (ref 74–108)
POTASSIUM SERPL-MCNC: 4 MMOL/L — SIGNIFICANT CHANGE UP (ref 3.5–5.3)
POTASSIUM SERPL-MCNC: 4.1 MMOL/L — SIGNIFICANT CHANGE UP (ref 3.5–5.3)
POTASSIUM SERPL-MCNC: 4.2 MMOL/L — SIGNIFICANT CHANGE UP (ref 3.5–5.3)
POTASSIUM SERPL-SCNC: 4 MMOL/L — SIGNIFICANT CHANGE UP (ref 3.5–5.3)
POTASSIUM SERPL-SCNC: 4.1 MMOL/L — SIGNIFICANT CHANGE UP (ref 3.5–5.3)
POTASSIUM SERPL-SCNC: 4.2 MMOL/L — SIGNIFICANT CHANGE UP (ref 3.5–5.3)
PROT SERPL-MCNC: 6.5 G/DL — SIGNIFICANT CHANGE UP (ref 6–8.3)
PROT SERPL-MCNC: 7.3 G/DL — SIGNIFICANT CHANGE UP (ref 6–8.3)
PROT UR-MCNC: ABNORMAL
PROTHROM AB SERPL-ACNC: 12 SEC — SIGNIFICANT CHANGE UP (ref 10.6–13.6)
RBC # BLD: 3.31 M/UL — LOW (ref 4.2–5.8)
RBC # BLD: 3.46 M/UL — LOW (ref 4.2–5.8)
RBC # FLD: 13.9 % — SIGNIFICANT CHANGE UP (ref 10.3–14.5)
RBC # FLD: 14 % — SIGNIFICANT CHANGE UP (ref 10.3–14.5)
RBC BLD AUTO: ABNORMAL
RBC CASTS # UR COMP ASSIST: 10 /HPF — HIGH (ref 0–4)
SAO2 % BLDA: 98 % — HIGH (ref 92–96)
SARS-COV-2 IGG SERPL QL IA: NEGATIVE — SIGNIFICANT CHANGE UP
SARS-COV-2 IGM SERPL IA-ACNC: 0.92 INDEX — SIGNIFICANT CHANGE UP
SARS-COV-2 RNA SPEC QL NAA+PROBE: SIGNIFICANT CHANGE UP
SODIUM SERPL-SCNC: 137 MMOL/L — SIGNIFICANT CHANGE UP (ref 135–145)
SODIUM SERPL-SCNC: 141 MMOL/L — SIGNIFICANT CHANGE UP (ref 135–145)
SODIUM SERPL-SCNC: 142 MMOL/L — SIGNIFICANT CHANGE UP (ref 135–145)
SP GR SPEC: 1.01 — SIGNIFICANT CHANGE UP (ref 1.01–1.02)
SPECIMEN SOURCE: SIGNIFICANT CHANGE UP
SPECIMEN SOURCE: SIGNIFICANT CHANGE UP
SPHEROCYTES BLD QL SMEAR: SLIGHT — SIGNIFICANT CHANGE UP
T-CELL CD4 SUBSET PNL BLD: 324 /UL — LOW (ref 325–1251)
TROPONIN T, HIGH SENSITIVITY RESULT: 75 NG/L — HIGH (ref 0–51)
TROPONIN T, HIGH SENSITIVITY RESULT: 77 NG/L — HIGH (ref 0–51)
UROBILINOGEN FLD QL: NEGATIVE — SIGNIFICANT CHANGE UP
VIT B12 SERPL-MCNC: 632 PG/ML — SIGNIFICANT CHANGE UP (ref 232–1245)
WBC # BLD: 16.47 K/UL — HIGH (ref 3.8–10.5)
WBC # BLD: 21.13 K/UL — HIGH (ref 3.8–10.5)
WBC # FLD AUTO: 16.47 K/UL — HIGH (ref 3.8–10.5)
WBC # FLD AUTO: 21.13 K/UL — HIGH (ref 3.8–10.5)
WBC UR QL: >50 /HPF — HIGH (ref 0–5)

## 2020-08-19 PROCEDURE — 76377 3D RENDER W/INTRP POSTPROCES: CPT | Mod: 26

## 2020-08-19 PROCEDURE — 93010 ELECTROCARDIOGRAM REPORT: CPT | Mod: 76

## 2020-08-19 PROCEDURE — 71250 CT THORAX DX C-: CPT | Mod: 26

## 2020-08-19 PROCEDURE — 72125 CT NECK SPINE W/O DYE: CPT | Mod: 26

## 2020-08-19 PROCEDURE — 99291 CRITICAL CARE FIRST HOUR: CPT

## 2020-08-19 PROCEDURE — 74176 CT ABD & PELVIS W/O CONTRAST: CPT | Mod: 26

## 2020-08-19 PROCEDURE — 71045 X-RAY EXAM CHEST 1 VIEW: CPT | Mod: 26

## 2020-08-19 PROCEDURE — 70486 CT MAXILLOFACIAL W/O DYE: CPT | Mod: 26

## 2020-08-19 PROCEDURE — 99222 1ST HOSP IP/OBS MODERATE 55: CPT | Mod: GC

## 2020-08-19 PROCEDURE — 70450 CT HEAD/BRAIN W/O DYE: CPT | Mod: 26

## 2020-08-19 RX ORDER — SODIUM CHLORIDE 9 MG/ML
1000 INJECTION, SOLUTION INTRAVENOUS
Refills: 0 | Status: DISCONTINUED | OUTPATIENT
Start: 2020-08-19 | End: 2020-08-19

## 2020-08-19 RX ORDER — PIPERACILLIN AND TAZOBACTAM 4; .5 G/20ML; G/20ML
3.38 INJECTION, POWDER, LYOPHILIZED, FOR SOLUTION INTRAVENOUS ONCE
Refills: 0 | Status: COMPLETED | OUTPATIENT
Start: 2020-08-19 | End: 2020-08-19

## 2020-08-19 RX ORDER — ELVITEGRAVIR, COBICISTAT, EMTRICITABINE, AND TENOFOVIR ALAFENAMIDE 150; 150; 200; 10 MG/1; MG/1; MG/1; MG/1
1 TABLET ORAL
Qty: 0 | Refills: 0 | DISCHARGE

## 2020-08-19 RX ORDER — ABACAVIR 20 MG/ML
300 SOLUTION ORAL EVERY 12 HOURS
Refills: 0 | Status: DISCONTINUED | OUTPATIENT
Start: 2020-08-20 | End: 2020-08-31

## 2020-08-19 RX ORDER — ASPIRIN/CALCIUM CARB/MAGNESIUM 324 MG
1 TABLET ORAL
Qty: 0 | Refills: 0 | DISCHARGE

## 2020-08-19 RX ORDER — CEFTRIAXONE 500 MG/1
1 INJECTION, POWDER, FOR SOLUTION INTRAMUSCULAR; INTRAVENOUS EVERY 24 HOURS
Refills: 0 | Status: DISCONTINUED | OUTPATIENT
Start: 2020-08-19 | End: 2020-08-19

## 2020-08-19 RX ORDER — VANCOMYCIN HCL 1 G
1000 VIAL (EA) INTRAVENOUS EVERY 12 HOURS
Refills: 0 | Status: DISCONTINUED | OUTPATIENT
Start: 2020-08-19 | End: 2020-08-20

## 2020-08-19 RX ORDER — HEPARIN SODIUM 5000 [USP'U]/ML
5000 INJECTION INTRAVENOUS; SUBCUTANEOUS EVERY 8 HOURS
Refills: 0 | Status: COMPLETED | OUTPATIENT
Start: 2020-08-19 | End: 2020-08-23

## 2020-08-19 RX ORDER — TAMSULOSIN HYDROCHLORIDE 0.4 MG/1
0.4 CAPSULE ORAL AT BEDTIME
Refills: 0 | Status: DISCONTINUED | OUTPATIENT
Start: 2020-08-19 | End: 2020-08-31

## 2020-08-19 RX ORDER — IPRATROPIUM/ALBUTEROL SULFATE 18-103MCG
3 AEROSOL WITH ADAPTER (GRAM) INHALATION ONCE
Refills: 0 | Status: COMPLETED | OUTPATIENT
Start: 2020-08-19 | End: 2020-08-19

## 2020-08-19 RX ORDER — PIPERACILLIN AND TAZOBACTAM 4; .5 G/20ML; G/20ML
3.38 INJECTION, POWDER, LYOPHILIZED, FOR SOLUTION INTRAVENOUS EVERY 12 HOURS
Refills: 0 | Status: DISCONTINUED | OUTPATIENT
Start: 2020-08-19 | End: 2020-08-20

## 2020-08-19 RX ORDER — ALENDRONATE SODIUM 70 MG/1
70 TABLET ORAL
Qty: 0 | Refills: 0 | DISCHARGE

## 2020-08-19 RX ORDER — FINASTERIDE 5 MG/1
5 TABLET, FILM COATED ORAL DAILY
Refills: 0 | Status: DISCONTINUED | OUTPATIENT
Start: 2020-08-19 | End: 2020-08-31

## 2020-08-19 RX ORDER — ASCORBIC ACID 60 MG
500 TABLET,CHEWABLE ORAL DAILY
Refills: 0 | Status: DISCONTINUED | OUTPATIENT
Start: 2020-08-19 | End: 2020-08-31

## 2020-08-19 RX ORDER — LAMIVUDINE 150 MG
100 TABLET ORAL DAILY
Refills: 0 | Status: DISCONTINUED | OUTPATIENT
Start: 2020-08-19 | End: 2020-08-31

## 2020-08-19 RX ORDER — ABACAVIR 20 MG/ML
600 SOLUTION ORAL DAILY
Refills: 0 | Status: DISCONTINUED | OUTPATIENT
Start: 2020-08-19 | End: 2020-08-19

## 2020-08-19 RX ORDER — KETOCONAZOLE 20 MG/G
1 AEROSOL, FOAM TOPICAL
Qty: 0 | Refills: 0 | DISCHARGE

## 2020-08-19 RX ORDER — CHOLECALCIFEROL (VITAMIN D3) 125 MCG
2 CAPSULE ORAL
Qty: 0 | Refills: 0 | DISCHARGE

## 2020-08-19 RX ADMIN — Medication 1 TABLET(S): at 11:01

## 2020-08-19 RX ADMIN — Medication 100 MILLIGRAM(S): at 11:01

## 2020-08-19 RX ADMIN — AMPICILLIN SODIUM AND SULBACTAM SODIUM 1.5 GRAM(S): 250; 125 INJECTION, POWDER, FOR SUSPENSION INTRAMUSCULAR; INTRAVENOUS at 00:15

## 2020-08-19 RX ADMIN — AMPICILLIN SODIUM AND SULBACTAM SODIUM 100 GRAM(S): 250; 125 INJECTION, POWDER, FOR SUSPENSION INTRAMUSCULAR; INTRAVENOUS at 06:22

## 2020-08-19 RX ADMIN — ABACAVIR 600 MILLIGRAM(S): 20 SOLUTION ORAL at 11:01

## 2020-08-19 RX ADMIN — TAMSULOSIN HYDROCHLORIDE 0.4 MILLIGRAM(S): 0.4 CAPSULE ORAL at 21:35

## 2020-08-19 RX ADMIN — FINASTERIDE 5 MILLIGRAM(S): 5 TABLET, FILM COATED ORAL at 11:01

## 2020-08-19 RX ADMIN — PIPERACILLIN AND TAZOBACTAM 200 GRAM(S): 4; .5 INJECTION, POWDER, LYOPHILIZED, FOR SOLUTION INTRAVENOUS at 18:19

## 2020-08-19 RX ADMIN — Medication 500 MILLIGRAM(S): at 11:01

## 2020-08-19 RX ADMIN — SODIUM CHLORIDE 125 MILLILITER(S): 9 INJECTION, SOLUTION INTRAVENOUS at 21:35

## 2020-08-19 RX ADMIN — HEPARIN SODIUM 5000 UNIT(S): 5000 INJECTION INTRAVENOUS; SUBCUTANEOUS at 13:11

## 2020-08-19 RX ADMIN — SODIUM CHLORIDE 1000 MILLILITER(S): 9 INJECTION, SOLUTION INTRAVENOUS at 00:15

## 2020-08-19 RX ADMIN — SODIUM CHLORIDE 125 MILLILITER(S): 9 INJECTION, SOLUTION INTRAVENOUS at 00:50

## 2020-08-19 RX ADMIN — PIPERACILLIN AND TAZOBACTAM 25 GRAM(S): 4; .5 INJECTION, POWDER, LYOPHILIZED, FOR SOLUTION INTRAVENOUS at 23:44

## 2020-08-19 RX ADMIN — SODIUM CHLORIDE 125 MILLILITER(S): 9 INJECTION, SOLUTION INTRAVENOUS at 06:22

## 2020-08-19 RX ADMIN — CEFTRIAXONE 100 MILLIGRAM(S): 500 INJECTION, POWDER, FOR SOLUTION INTRAMUSCULAR; INTRAVENOUS at 11:02

## 2020-08-19 RX ADMIN — Medication 250 MILLIGRAM(S): at 20:24

## 2020-08-19 RX ADMIN — HEPARIN SODIUM 5000 UNIT(S): 5000 INJECTION INTRAVENOUS; SUBCUTANEOUS at 21:35

## 2020-08-19 RX ADMIN — SODIUM CHLORIDE 125 MILLILITER(S): 9 INJECTION, SOLUTION INTRAVENOUS at 11:02

## 2020-08-19 RX ADMIN — SODIUM CHLORIDE 1000 MILLILITER(S): 9 INJECTION, SOLUTION INTRAVENOUS at 01:15

## 2020-08-19 NOTE — PROGRESS NOTE ADULT - PROBLEM SELECTOR PLAN 3
- CK 3857 -> 3333 overnight s/p 2L IVF NS and mIVF  cc/hr  - rhabdo 2/2 being down 24 hrs after fall  - pt currently has weakness in bilateral shoulders, stiffness in UE and LE, decreased mobility and str  [ ] continue mIVF  cc/hr  [ ] CTM CK

## 2020-08-19 NOTE — PROGRESS NOTE ADULT - PROBLEM SELECTOR PLAN 5
Controlled; last CD4 368; undetectable viral load in July  - continue home abacavir, lamivudine, pifelo Controlled; last CD4 368; undetectable viral load in July  [ ] continue home abacavir, lamivudine, pifelo

## 2020-08-19 NOTE — H&P ADULT - ASSESSMENT
80M hx of HIV+, BPH, CAD presented after a fall at home and found in own urine/feces and is admitted for rhabdomyolysis. Also found to have UTI and severe b/l hydroureteronephrosis now s/p tovar placement 80M hx of HIV+, BPH, CAD presented after a mechanical fall at home and found in own urine/feces and is admitted for rhabdomyolysis. Also found to have UTI and severe b/l hydroureteronephrosis now s/p tovar placement

## 2020-08-19 NOTE — CONSULT NOTE ADULT - SUBJECTIVE AND OBJECTIVE BOX
Patient is a 80y old  Male who presents with a chief complaint of fall (19 Aug 2020 13:00)    HPI:  80M hx of HIV+, BPH, CAD presented after a fall at home and found in own urine/feces. Reportedly per EMS, patient had been using a scrotal device to clamp the testes but was unable to remove it and then slid to the ground while trying to maneuvering the device. Patient reports that he was too weak to get himself up. Denies trauma to his head and was laying on his L side. Per EMS, the heat in the home was significantly elevated. Denies anticoagulants or aspirin, denies any LOC and states he recalls all events. Denies recent illness, fever, chills, dysuria, dizziness, cp, sob, abd pain. Denies cough, sore throat. Of note, patient reports that he self straight caths 3 times a day due to BPH. He also had a bladder mass s/p resection and was told it was benign.    In the ED, vitals: T 99.2, , /60, RR 24 satting 97 ORA. got 2L NS, started on maintenance fluid. Urology placed tovar, draining blood tinged urine. Labs noted for WBC 16.5, Cr 2.2, CPK 3857, lactate 3.1, positive UA. (19 Aug 2020 04:49)    On exam, found to have mild left cheek tenderness and redness. He has significant left knee redness and there is an eschar, likely from the fall, conerning that he has a left knee cellulitis.    Reviewed clinic note, his HIV med should be abacavir 300mg BID, Lamivudine 100mg, Doravirine 100mg daily. Was on Juluca but due to up trending kidney function so now on this regimen.  Last VL undetectable on   Outpt urine culture: MSSA(), Coag neg Staph(), K.pneumoniae()  Based on his serum cystatin C level on , his GFR=32.  Per last  note on , based on urodynamic exam, he has detrusor muscle weakness rather than outlet obstruction. Will continue self catherization.     prior hospital charts reviewed [V]  primary team notes reviewed [V]  other consultant notes reviewed [V]    PAST MEDICAL & SURGICAL HISTORY:  Orchitis and epididymitis  Vitamin D deficiency  Bladder mass  Edema of both legs  Osteoporosis  Seborrheic keratosis  Constipation, unspecified constipation type  Chronic kidney disease, unspecified stage  HIV (human immunodeficiency virus infection)  Unsteady gait  S/P coronary artery stent placement  No Past Surgical History      SOCIAL HISTORY:    - denied smoking/vaping/alcohol/recreational drug use  - lives alone, no pet  - born in [place], no recent travel  - homosexual per clinic note  - still drives a car, has a cane and walker    FAMILY HISTORY:  Mother  of leukemia  Father  of heart failure    Allergies  No Known Allergies    ANTIMICROBIALS:  abacavir 600 daily  cefTRIAXone   IVPB 1 every 24 hours  lamiVUDine- daily      ANTIMICROBIALS (past 90 days):  MEDICATIONS  (STANDING):  abacavir   600 milliGRAM(s) Oral (20 @ 11:01)    ampicillin/sulbactam  IVPB   100 mL/Hr IV Intermittent (20 @ 06:22)   100 mL/Hr IV Intermittent (20 @ 22:53)    cefTRIAXone   IVPB   100 mL/Hr IV Intermittent (20 @ 11:02)    lamiVUDine-HBV   100 milliGRAM(s) Oral (20 @ 11:01)        OTHER MEDS:   MEDICATIONS  (STANDING):  finasteride 5 daily  heparin   Injectable 5000 every 8 hours  tamsulosin 0.4 at bedtime      REVIEW OF SYSTEMS  [  ] ROS unobtainable because:    [V  ] All other systems negative except as noted below:	    Constitutional:  [ ] fever [ ] chills  [ ] weight loss  [ ] weakness  Skin:  [V ] rash [ ] phlebitis	  Eyes: [ ] icterus [ ] pain  [ ] discharge	  ENMT: [ ] sore throat  [ ] thrush [ ] ulcers [ ] exudates  Respiratory: [ ] dyspnea [ ] hemoptysis [ ] cough [ ] sputum	  Cardiovascular:  [ ] chest pain [ ] palpitations [ ] edema	  Gastrointestinal:  [ ] nausea [ ] vomiting [ ] diarrhea [ ] constipation [ ] pain	  Genitourinary:  [ ] dysuria [ ] frequency [ ] hematuria [ ] discharge [ ] flank pain  [ ] incontinence  Musculoskeletal:  [ ] myalgias [V ] arthralgias [ ] arthritis  [ ] back pain  Neurological:  [ ] headache [ ] seizures  [ ] confusion/altered mental status  Psychiatric:  [ ] anxiety [ ] depression	  Hematology/Lymphatics:  [ ] lymphadenopathy  Endocrine:  [ ] adrenal [ ] thyroid  Allergic/Immunologic:	 [ ] transplant [ ] seasonal    Vital Signs Last 24 Hrs  T(F): 98 (20 @ 13:19), Max: 99.8 (20 @ 22:27)    Vital Signs Last 24 Hrs  HR: 105 (20 @ 14:58) (83 - 109)  BP: 121/68 (20 @ 14:58) (121/68 - 157/80)  RR: 19 (20 @ 13:19)  SpO2: 93% (20 @ 14:58) (93% - 98%)  Wt(kg): --    EXAM:  Constitutional: Not in acute distress  Eyes: pupils bilaterally reactive to light. No icterus.  Oral cavity: Clear, no lesions  Neck: No neck vein distension noted  RS: Chest clear to auscultation bilaterally. No wheeze/rhonchi/crepitations.  CVS: S1, S2 heard. Regular rate and rhythm. No murmurs/rubs/gallops.  Abdomen: Soft. No guarding/rigidity/tenderness.  : No acute abnormalities. Tovar in place. No CVA tenderness  Extremities: Warm. No pedal edema  Skin: No lesions noted  Vascular: No evidence of phlebitis  Neuro: Alert, oriented to time/place/person    Labs:                        11.6   16.47 )-----------( 218      ( 19 Aug 2020 03:25 )             34.8         141  |  107  |  43<H>  ----------------------------<  123<H>  4.1   |  24  |  1.58<H>    Ca    8.7      19 Aug 2020 15:18    TPro  6.5  /  Alb  3.2<L>  /  TBili  0.7  /  DBili  x   /  AST  93<H>  /  ALT  28  /  AlkPhos  102        Auto Eosinophil %: 0.0 % (20 @ 03:25)  Auto Eosinophil %: 0.0 % (20 @ 22:39)      WBC Trend:  WBC Count: 16.47 (20 @ 03:25)  WBC Count: 16.50 (20 @ 22:39)      Creatine Trend:  Creatinine, Serum: 1.58 (-)  Creatinine, Serum: 1.83 (-)  Creatinine, Serum: 2.02 (-)      Urinalysis Basic - ( 19 Aug 2020 03:23 )    Color: Yellow / Appearance: Turbid / S.015 / pH: x  Gluc: x / Ketone: Trace  / Bili: Negative / Urobili: Negative   Blood: x / Protein: 100 mg/dL / Nitrite: Negative   Leuk Esterase: Large / RBC: 10 /hpf / WBC >50 /HPF   Sq Epi: x / Non Sq Epi: 3 / Bacteria: Many        MICROBIOLOGY:    Full T Cell Subset (20 @ 03:26)    CD19 %: 11 %    CD3 %: 68: Reference ranges for pediatric population (0-18 years old) are from  Kel Multani et al. "Lymphocyte subsets in healthy children from  birth through 18 years of age: the Pediatric AIDS Clinical Trials Group   study. "Journal of Allergy and Clinical Immunology 112.5 (2003):  973-980.  Reference range for adult (18-65 years old) and geriatric (65 years old  and above) populations are developed at [place], [place]. %    CD4 %: 24 %    CD8 %: 43 %    ABS [id number]: 257 /uL    ABS CD19: 148 /uL    ABS CD3: 912 /uL    ABS CD4: 324 /uL    ABS CD8: 585 /uL    [id number] %: 19 %      RADIOLOGY:  imaging below personally reviewed    < from: CT Maxillofacial No Cont (20 @ 00:48) >  IMPRESSION:    HEAD CT:  No acute intracranial hemorrhage, mass effect, or acute osseous fracture. Left frontal scalp hematoma.    Mild chronic ischemic changes in the frontoparietal white matter and slitlike encephalomalacia in the left basal ganglia which may be secondary to an old infarct or old hemorrhage.    CERVICAL SPINE CT:  No acute cervical spine fracture or evidence of traumatic malalignment. Cervical spondylosis.    MAXILLOFACIAL CT:  Left periorbital and premalar soft tissue swelling/hematoma. No acute maxillofacial bone or mandibular fracture.    < end of copied text >    < from: CT Abdomen and Pelvis No Cont (20 @ 00:48) >    IMPRESSION:    Lack of intravenous contrast limits evaluation of the solid abdominal organs and vasculature. No evidence of acute traumatic injury to the chest, abdomen, or pelvis on this unenhanced exam.    Severe bilateral hydroureteronephrosis to the level of the bladder. Circumferential thickening of the bladder walls with mild distention of the bladder which may be due to chronic outlet obstruction in the setting of an enlarged prostate gland or cystitis. Recommend clinical correlation with urinalysis.    Clusters of nodular and tree-in-bud opacities in the left upper lobe and bilateral lower lobes may represent infection versus mucoid impaction of the distal airways.    < end of copied text >      OTHER TESTS:  COVID-19 PCR: NotDetec (20 @ 02:39)  Blood Gas Venous - Lactate: 2.0 ( @ 03:25)  Blood Gas Venous - Lactate: 3.1 ( @ 22:39) Patient is a 80y old  Male who presents with a chief complaint of fall (19 Aug 2020 13:00)    HPI:  80M hx of HIV+, BPH, CAD presented after a fall at home and found in own urine/feces. Reportedly per EMS, patient had been using a scrotal device to clamp the testes but was unable to remove it and then slid to the ground while trying to maneuvering the device. Patient reports that he was too weak to get himself up. Denies trauma to his head and was laying on his L side. Per EMS, the heat in the home was significantly elevated. Denies anticoagulants or aspirin, denies any LOC and states he recalls all events. Denies recent illness, fever, chills, dysuria, dizziness, cp, sob, abd pain. Denies cough, sore throat. Of note, patient reports that he self straight caths 3 times a day due to BPH. He also had a bladder mass s/p resection and was told it was benign.    In the ED, vitals: T 99.2, , /60, RR 24 satting 97 ORA. got 2L NS, started on maintenance fluid. Urology placed tovar, draining blood tinged urine. Labs noted for WBC 16.5, Cr 2.2, CPK 3857, lactate 3.1, positive UA. (19 Aug 2020 04:49)    On exam, found to have mild left cheek tenderness and redness. He has significant left knee redness and there is an eschar, likely from the fall, conerning that he has a left knee cellulitis.    Reviewed clinic note, his HIV med should be abacavir 300mg BID, Lamivudine 100mg, Doravirine 100mg daily. Was on Juluca but due to up trending kidney function so now on this regimen.  Last VL undetectable on   Outpt urine culture: MSSA(), Coag neg Staph(), K.pneumoniae()  Based on his serum cystatin C level on , his GFR=32.  Per last  note on , based on urodynamic exam, he has detrusor muscle weakness rather than outlet obstruction. Will continue self catherization.     prior hospital charts reviewed [V]  primary team notes reviewed [V]  other consultant notes reviewed [V]    PAST MEDICAL & SURGICAL HISTORY:  Orchitis and epididymitis  Vitamin D deficiency  Bladder mass  Edema of both legs  Osteoporosis  Seborrheic keratosis  Constipation, unspecified constipation type  Chronic kidney disease, unspecified stage  HIV (human immunodeficiency virus infection)  Unsteady gait  S/P coronary artery stent placement  No Past Surgical History      SOCIAL HISTORY:    - denied smoking/vaping/alcohol/recreational drug use  - lives alone, no pet  - born in [place], no recent travel  - homosexual per clinic note  - still drives a car, has a cane and walker    FAMILY HISTORY:  Mother  of leukemia  Father  of heart failure    Allergies  No Known Allergies    ANTIMICROBIALS:  abacavir 600 daily  cefTRIAXone   IVPB 1 every 24 hours  lamiVUDine- daily      ANTIMICROBIALS (past 90 days):  MEDICATIONS  (STANDING):  abacavir   600 milliGRAM(s) Oral (20 @ 11:01)    ampicillin/sulbactam  IVPB   100 mL/Hr IV Intermittent (20 @ 06:22)   100 mL/Hr IV Intermittent (20 @ 22:53)    cefTRIAXone   IVPB   100 mL/Hr IV Intermittent (20 @ 11:02)    lamiVUDine-HBV   100 milliGRAM(s) Oral (20 @ 11:01)        OTHER MEDS:   MEDICATIONS  (STANDING):  finasteride 5 daily  heparin   Injectable 5000 every 8 hours  tamsulosin 0.4 at bedtime      REVIEW OF SYSTEMS  [  ] ROS unobtainable because:    [V  ] All other systems negative except as noted below:	    Constitutional:  [ ] fever [ ] chills  [ ] weight loss  [ ] weakness  Skin:  [V ] rash [ ] phlebitis	  Eyes: [ ] icterus [ ] pain  [ ] discharge	  ENMT: [ ] sore throat  [ ] thrush [ ] ulcers [ ] exudates  Respiratory: [ ] dyspnea [ ] hemoptysis [ ] cough [ ] sputum	  Cardiovascular:  [ ] chest pain [ ] palpitations [ ] edema	  Gastrointestinal:  [ ] nausea [ ] vomiting [ ] diarrhea [ ] constipation [ ] pain	  Genitourinary:  [ ] dysuria [ ] frequency [ ] hematuria [ ] discharge [ ] flank pain  [ ] incontinence  Musculoskeletal:  [ ] myalgias [V ] arthralgias [ ] arthritis  [ ] back pain  Neurological:  [ ] headache [ ] seizures  [ ] confusion/altered mental status  Psychiatric:  [ ] anxiety [ ] depression	  Hematology/Lymphatics:  [ ] lymphadenopathy  Endocrine:  [ ] adrenal [ ] thyroid  Allergic/Immunologic:	 [ ] transplant [ ] seasonal    Vital Signs Last 24 Hrs  T(F): 98 (20 @ 13:19), Max: 99.8 (20 @ 22:27)    Vital Signs Last 24 Hrs  HR: 105 (20 @ 14:58) (83 - 109)  BP: 121/68 (20 @ 14:58) (121/68 - 157/80)  RR: 19 (20 @ 13:19)  SpO2: 93% (20 @ 14:58) (93% - 98%)  Wt(kg): --    EXAM:  Constitutional: Not in acute distress  Eyes: pupils bilaterally reactive to light. No icterus.  Oral cavity: Clear, no lesions  Neck: No neck vein distension noted  RS: Chest clear to auscultation bilaterally. No wheeze/rhonchi/crepitations.  CVS: S1, S2 heard. Regular rate and rhythm. Very loud systolic murmur that can hear on the back.  Abdomen: Soft. No guarding/rigidity/tenderness.  : No acute abnormalities. Tovar in place. No CVA tenderness  Extremities: Warm. No pedal edema  Skin: No lesions noted  Vascular: No evidence of phlebitis  Neuro: Alert, oriented to time/place/person    Labs:                        11.6   16.47 )-----------( 218      ( 19 Aug 2020 03:25 )             34.8         141  |  107  |  43<H>  ----------------------------<  123<H>  4.1   |  24  |  1.58<H>    Ca    8.7      19 Aug 2020 15:18    TPro  6.5  /  Alb  3.2<L>  /  TBili  0.7  /  DBili  x   /  AST  93<H>  /  ALT  28  /  AlkPhos  102        Auto Eosinophil %: 0.0 % (20 @ 03:25)  Auto Eosinophil %: 0.0 % (20 @ 22:39)      WBC Trend:  WBC Count: 16.47 (20 @ 03:25)  WBC Count: 16.50 (20 @ 22:39)      Creatine Trend:  Creatinine, Serum: 1.58 (-)  Creatinine, Serum: 1.83 (-)  Creatinine, Serum: 2.02 (-)      Urinalysis Basic - ( 19 Aug 2020 03:23 )    Color: Yellow / Appearance: Turbid / S.015 / pH: x  Gluc: x / Ketone: Trace  / Bili: Negative / Urobili: Negative   Blood: x / Protein: 100 mg/dL / Nitrite: Negative   Leuk Esterase: Large / RBC: 10 /hpf / WBC >50 /HPF   Sq Epi: x / Non Sq Epi: 3 / Bacteria: Many        MICROBIOLOGY:    Full T Cell Subset (20 @ 03:26)    CD19 %: 11 %    CD3 %: 68: Reference ranges for pediatric population (0-18 years old) are from  Kel Multani et al. "Lymphocyte subsets in healthy children from  birth through 18 years of age: the Pediatric AIDS Clinical Trials Group   study. "Journal of Allergy and Clinical Immunology 112.5 (2003):  973-980.  Reference range for adult (18-65 years old) and geriatric (65 years old  and above) populations are developed at [place], [place]. %    CD4 %: 24 %    CD8 %: 43 %    ABS [id number]: 257 /uL    ABS CD19: 148 /uL    ABS CD3: 912 /uL    ABS CD4: 324 /uL    ABS CD8: 585 /uL    [id number] %: 19 %      RADIOLOGY:  imaging below personally reviewed    < from: CT Maxillofacial No Cont (20 @ 00:48) >  IMPRESSION:    HEAD CT:  No acute intracranial hemorrhage, mass effect, or acute osseous fracture. Left frontal scalp hematoma.    Mild chronic ischemic changes in the frontoparietal white matter and slitlike encephalomalacia in the left basal ganglia which may be secondary to an old infarct or old hemorrhage.    CERVICAL SPINE CT:  No acute cervical spine fracture or evidence of traumatic malalignment. Cervical spondylosis.    MAXILLOFACIAL CT:  Left periorbital and premalar soft tissue swelling/hematoma. No acute maxillofacial bone or mandibular fracture.    < end of copied text >    < from: CT Abdomen and Pelvis No Cont (20 @ 00:48) >    IMPRESSION:    Lack of intravenous contrast limits evaluation of the solid abdominal organs and vasculature. No evidence of acute traumatic injury to the chest, abdomen, or pelvis on this unenhanced exam.    Severe bilateral hydroureteronephrosis to the level of the bladder. Circumferential thickening of the bladder walls with mild distention of the bladder which may be due to chronic outlet obstruction in the setting of an enlarged prostate gland or cystitis. Recommend clinical correlation with urinalysis.    Clusters of nodular and tree-in-bud opacities in the left upper lobe and bilateral lower lobes may represent infection versus mucoid impaction of the distal airways.    < end of copied text >      OTHER TESTS:  COVID-19 PCR: NotDetec (20 @ 02:39)  Blood Gas Venous - Lactate: 2.0 ( @ 03:25)  Blood Gas Venous - Lactate: 3.1 ( @ 22:39) Patient is a 80y old  Male who presents with a chief complaint of fall (19 Aug 2020 13:00)    HPI:  80M hx of HIV+, BPH, CAD presented after a fall at home and found in own urine/feces. Reportedly per EMS, patient had been using a scrotal device to clamp the testes but was unable to remove it and then slid to the ground while trying to maneuvering the device. Patient reports that he was too weak to get himself up. Denies trauma to his head and was laying on his L side. Per EMS, the heat in the home was significantly elevated. Denies anticoagulants or aspirin, denies any LOC and states he recalls all events. Denies recent illness, fever, chills, dysuria, dizziness, cp, sob, abd pain. Denies cough, sore throat. Of note, patient reports that he self straight caths 3 times a day due to BPH. He also had a bladder mass s/p resection and was told it was benign.    In the ED, vitals: T 99.2, , /60, RR 24 satting 97 ORA. got 2L NS, started on maintenance fluid. Urology placed tovar, draining blood tinged urine. Labs noted for WBC 16.5, Cr 2.2, CPK 3857, lactate 3.1, positive UA. (19 Aug 2020 04:49)    On exam, found to have mild left cheek tenderness and redness. He has significant left knee redness and there is an eschar, likely from the fall, conerning that he has a left knee cellulitis.    Reviewed clinic note, his HIV med should be abacavir 300mg BID, Lamivudine 100mg, Doravirine 100mg daily. Was on Juluca but due to up trending kidney function so now on this regimen.  Last VL undetectable on   Outpt urine culture: MSSA(), Coag neg Staph(), K.pneumoniae()  Based on his serum cystatin C level on , his GFR=32.  Per last  note on , based on urodynamic exam, he has detrusor muscle weakness rather than outlet obstruction. Will continue self catherization.     prior hospital charts reviewed [V]  primary team notes reviewed [V]  other consultant notes reviewed [V]    PAST MEDICAL & SURGICAL HISTORY:  Orchitis and epididymitis  Vitamin D deficiency  Bladder mass  Edema of both legs  Osteoporosis  Seborrheic keratosis  Constipation, unspecified constipation type  Chronic kidney disease, unspecified stage  HIV (human immunodeficiency virus infection)  Unsteady gait  S/P coronary artery stent placement  No Past Surgical History      SOCIAL HISTORY:    - denied smoking/vaping/alcohol/recreational drug use  - lives alone, no pet  - born in [place], no recent travel  - homosexual per clinic note  - still drives a car, has a cane and walker    FAMILY HISTORY:  Mother  of leukemia  Father  of heart failure    Allergies  No Known Allergies    ANTIMICROBIALS:  abacavir 600 daily  cefTRIAXone   IVPB 1 every 24 hours  lamiVUDine- daily      ANTIMICROBIALS (past 90 days):  MEDICATIONS  (STANDING):  abacavir   600 milliGRAM(s) Oral (20 @ 11:01)    ampicillin/sulbactam  IVPB   100 mL/Hr IV Intermittent (20 @ 06:22)   100 mL/Hr IV Intermittent (20 @ 22:53)    cefTRIAXone   IVPB   100 mL/Hr IV Intermittent (20 @ 11:02)    lamiVUDine-HBV   100 milliGRAM(s) Oral (20 @ 11:01)        OTHER MEDS:   MEDICATIONS  (STANDING):  finasteride 5 daily  heparin   Injectable 5000 every 8 hours  tamsulosin 0.4 at bedtime      REVIEW OF SYSTEMS  [  ] ROS unobtainable because:    [V  ] All other systems negative except as noted below:	    Constitutional:  [ ] fever [ ] chills  [ ] weight loss  [ ] weakness  Skin:  [V ] rash [ ] phlebitis	  Eyes: [ ] icterus [ ] pain  [ ] discharge	  ENMT: [ ] sore throat  [ ] thrush [ ] ulcers [ ] exudates  Respiratory: [ ] dyspnea [ ] hemoptysis [ ] cough [ ] sputum	  Cardiovascular:  [ ] chest pain [ ] palpitations [ ] edema	  Gastrointestinal:  [ ] nausea [ ] vomiting [ ] diarrhea [ ] constipation [ ] pain	  Genitourinary:  [ ] dysuria [ ] frequency [ ] hematuria [ ] discharge [ ] flank pain  [ ] incontinence  Musculoskeletal:  [ ] myalgias [V ] arthralgias [ ] arthritis  [ ] back pain  Neurological:  [ ] headache [ ] seizures  [ ] confusion/altered mental status  Psychiatric:  [ ] anxiety [ ] depression	  Hematology/Lymphatics:  [ ] lymphadenopathy  Endocrine:  [ ] adrenal [ ] thyroid  Allergic/Immunologic:	 [ ] transplant [ ] seasonal    Vital Signs Last 24 Hrs  T(F): 98 (20 @ 13:19), Max: 99.8 (20 @ 22:27)    Vital Signs Last 24 Hrs  HR: 105 (20 @ 14:58) (83 - 109)  BP: 121/68 (20 @ 14:58) (121/68 - 157/80)  RR: 19 (20 @ 13:19)  SpO2: 93% (20 @ 14:58) (93% - 98%)  Wt(kg): --    EXAM:  Constitutional: Not in acute distress  Eyes: pupils bilaterally reactive to light. No icterus.  Oral cavity: Clear, no lesions  Neck: No neck vein distension noted  RS: Chest clear to auscultation bilaterally. No wheeze/rhonchi/crepitations.  CVS: S1, S2 heard. Regular rate and rhythm. Very loud systolic murmur that can hear on the back.  Abdomen: Soft. No guarding/rigidity/tenderness.  : No acute abnormalities. Tovar in place. No CVA tenderness  Extremities: Warm. No pedal edema  Skin: No lesions noted  Vascular: No evidence of phlebitis  Neuro: Alert, oriented to time/place/person    Labs:                        11.6   16.47 )-----------( 218      ( 19 Aug 2020 03:25 )             34.8         141  |  107  |  43<H>  ----------------------------<  123<H>  4.1   |  24  |  1.58<H>    Ca    8.7      19 Aug 2020 15:18    TPro  6.5  /  Alb  3.2<L>  /  TBili  0.7  /  DBili  x   /  AST  93<H>  /  ALT  28  /  AlkPhos  102        Auto Eosinophil %: 0.0 % (20 @ 03:25)  Auto Eosinophil %: 0.0 % (20 @ 22:39)      WBC Trend:  WBC Count: 16.47 (20 @ 03:25)  WBC Count: 16.50 (20 @ 22:39)      Creatine Trend:  Creatinine, Serum: 1.58 (-)  Creatinine, Serum: 1.83 ()  Creatinine, Serum: 2.02 (-)      Urinalysis Basic - ( 19 Aug 2020 03:23 )    Color: Yellow / Appearance: Turbid / S.015 / pH: x  Gluc: x / Ketone: Trace  / Bili: Negative / Urobili: Negative   Blood: x / Protein: 100 mg/dL / Nitrite: Negative   Leuk Esterase: Large / RBC: 10 /hpf / WBC >50 /HPF   Sq Epi: x / Non Sq Epi: 3 / Bacteria: Many        MICROBIOLOGY:    Full T Cell Subset (20 @ 03:26)    CD19 %: 11 %    CD3 %: 68: Reference ranges for pediatric population (0-18 years old) are from  Kel Multani et al. "Lymphocyte subsets in healthy children from  birth through 18 years of age: the Pediatric AIDS Clinical Trials Group   study. "Journal of Allergy and Clinical Immunology 112.5 (2003):  973-980.  Reference range for adult (18-65 years old) and geriatric (65 years old  and above) populations are developed at [place], [place]. %    CD4 %: 24 %    CD8 %: 43 %    ABS [id number]: 257 /uL    ABS CD19: 148 /uL    ABS CD3: 912 /uL    ABS CD4: 324 /uL    ABS CD8: 585 /uL    [id number] %: 19 %    Urine Microscopic-Add On (NC) (20 @ 03:23)    Red Blood Cell - Urine: 10 /hpf    White Blood Cell - Urine: >50 /HPF    Hyaline Casts: 0 /LPF    Bacteria: Many    Epithelial Cells: 3      RADIOLOGY:  imaging below personally reviewed    < from: CT Maxillofacial No Cont (20 @ 00:48) >  IMPRESSION:    HEAD CT:  No acute intracranial hemorrhage, mass effect, or acute osseous fracture. Left frontal scalp hematoma.    Mild chronic ischemic changes in the frontoparietal white matter and slitlike encephalomalacia in the left basal ganglia which may be secondary to an old infarct or old hemorrhage.    CERVICAL SPINE CT:  No acute cervical spine fracture or evidence of traumatic malalignment. Cervical spondylosis.    MAXILLOFACIAL CT:  Left periorbital and premalar soft tissue swelling/hematoma. No acute maxillofacial bone or mandibular fracture.    < end of copied text >    < from: CT Abdomen and Pelvis No Cont (20 @ 00:48) >    IMPRESSION:    Lack of intravenous contrast limits evaluation of the solid abdominal organs and vasculature. No evidence of acute traumatic injury to the chest, abdomen, or pelvis on this unenhanced exam.    Severe bilateral hydroureteronephrosis to the level of the bladder. Circumferential thickening of the bladder walls with mild distention of the bladder which may be due to chronic outlet obstruction in the setting of an enlarged prostate gland or cystitis. Recommend clinical correlation with urinalysis.    Clusters of nodular and tree-in-bud opacities in the left upper lobe and bilateral lower lobes may represent infection versus mucoid impaction of the distal airways.    < end of copied text >      OTHER TESTS:  COVID-19 PCR: NotDetec (20 @ 02:39)  Blood Gas Venous - Lactate: 2.0 ( @ 03:25)  Blood Gas Venous - Lactate: 3.1 ( @ 22:39)

## 2020-08-19 NOTE — PROCEDURE NOTE - ADDITIONAL PROCEDURE DETAILS
Urology was called to place a Aden for the patient who been self cath for 4 month due to high PVR  Patient was seen and examined at bedside.    Procedure note: Patient is prepped and draped aseptically. A 16 Fr Aden was placed through urethral meatus with cloudy yellow urine return without difficulty. Patient tolerated the procedure very well. Procedure under sterile technique.    pt urologist Dr Silver

## 2020-08-19 NOTE — ED ADULT NURSE NOTE - NSIMPLEMENTINTERV_GEN_ALL_ED
Implemented All Fall with Harm Risk Interventions:  [place] to call system. Call bell, personal items and telephone within reach. Instruct patient to call for assistance. Room bathroom lighting operational. Non-slip footwear when patient is off stretcher. Physically safe environment: no spills, clutter or unnecessary equipment. Stretcher in lowest position, wheels locked, appropriate side rails in place. Provide visual cue, wrist band, yellow gown, etc. Monitor gait and stability. Monitor for mental status changes and reorient to person, place, and time. Review medications for side effects contributing to fall risk. Reinforce activity limits and safety measures with patient and family. Provide visual clues: red socks.

## 2020-08-19 NOTE — H&P ADULT - NSHPREVIEWOFSYSTEMS_GEN_ALL_CORE
CONSTITUTIONAL: +weakness, no fevers or chills  EYES/ENT: + visual changes (now improved);  No vertigo or throat pain   NECK: No pain or stiffness  RESPIRATORY: No cough, wheezing, hemoptysis; No shortness of breath  CARDIOVASCULAR: No chest pain or palpitations  GASTROINTESTINAL: No abdominal or epigastric pain. No nausea, vomiting, or hematemesis; No diarrhea or constipation. No melena or hematochezia.  GENITOURINARY: No dysuria, frequency or hematuria  NEUROLOGICAL: No numbness or weakness  HEME: easy bruising  SKIN: bruising on his face and L arm, small abrasion on L knee CONSTITUTIONAL: +weakness, no fevers or chills  EYES/ENT: + visual changes (now improved);  No vertigo or throat pain   NECK: No pain or stiffness  RESPIRATORY: No cough, wheezing, hemoptysis; No shortness of breath  CARDIOVASCULAR: No chest pain or palpitations  GASTROINTESTINAL: No abdominal or epigastric pain. No nausea, vomiting, or hematemesis; No diarrhea or constipation. No melena or hematochezia.  GENITOURINARY: No dysuria, frequency or hematuria, +BPH  NEUROLOGICAL: No numbness or weakness  HEME: easy bruising  SKIN: bruising on his face and L arm, small abrasion on L knee  MKS: No back pain  PSYCH: No anxiety or depression

## 2020-08-19 NOTE — H&P ADULT - NSICDXPASTMEDICALHX_GEN_ALL_CORE_FT
PAST MEDICAL HISTORY:  Bladder mass     Chronic kidney disease, unspecified stage     Constipation, unspecified constipation type     Edema of both legs     HIV (human immunodeficiency virus infection)     Orchitis and epididymitis     Osteoporosis     Seborrheic keratosis     Unsteady gait     Vitamin D deficiency

## 2020-08-19 NOTE — PROGRESS NOTE ADULT - ATTENDING COMMENTS
Seen, examined the patient this am with house staff  80M with h/o HIV on meds, CAD, BPH Admitted for sepsis due to UTI ( secondary to self cath), B/L severe hydronephrosis with SUDHEER, fall with rhabdomyolysis, debility  Sitting in bed, c/o weakness and muscle pain after he fell at the facility, no LOC, chest pain, palpitations  - Reviewed labs, imaging    WBC 16, CPK 3333 from 3857, Scr 1.8 from 2.0 with IV hydration. will f/u CPK daily  - On IV Ceftriaxone, Flomax, Proscar, will f/u urine and blood c/s    Urology consult requested for B/L severe hydronephrosis & SUDHEER  - ID consult called for HIV on treatment currently  - PT eval, fall precaution

## 2020-08-19 NOTE — PROGRESS NOTE ADULT - PROBLEM SELECTOR PLAN 7
- SUDHEER resolved  - sees Dr. Presley Ferguson outpt  - continue IVF for rhabdo, avoid nephrotoxic meds

## 2020-08-19 NOTE — CHART NOTE - NSCHARTNOTEFT_GEN_A_CORE
80M hx of HIV+, BPH, CAD presented after a mechanical fall at home, a/w rhabdomyolysis, sepsis 2/2 UTI and possible facial cellulitis, severe b/l hydroureteronephrosis s/p tovar placement - RRT called for acute hypoxia to 77% RA. On arrival, pt on 100%NRB, tachycardic to 120-130s and elevated BP 180s/110s, rectal temp 100.7; on exam, pt diffusely wheezing and b/l crackles. Most recent blood work showing Bcx+ coag neg staph in all bottles, abx broadened to vanco/zosyn. CXR showed significant vascular congestion compared to prior imaging and ABG showing respiratory acidosis with PCO2 60s. Pt given lasix 60 IV once, tovar in place and draining, IVF discontinued, labetalol 10mg IV for elevated BP and started on BiPAP; on monitor, pt tachycardic and irregular, c/f new afib 2/2 sepsis - pt transferred to tele floor for further monitoring.      #Acute hypoxic respiratory failure 2/2 flash pulmonary edema in setting of hypertensive urgency  #Sepsis 2/2 Staph bacteremia   #New Afib with RVR     See RRT sheet for detailed assessment and plan. I personally provided 35 minutes for critical care time for above diagnoses evaluated with CXR/ABG treated with lasix, labetalol and BiPAP. Case discussed with housestaff. 80M hx of HIV+, BPH, CAD presented after a mechanical fall at home, a/w rhabdomyolysis, sepsis 2/2 UTI and possible facial cellulitis, severe b/l hydroureteronephrosis s/p tovar placement - RRT called for acute hypoxia to 77% RA. On arrival, pt on 100%NRB, tachycardic to 120-130s and elevated BP 180s/110s, rectal temp 100.7; on exam, pt diffusely wheezing and b/l crackles. Most recent blood work showing Bcx+ coag neg staph in all bottles, abx broadened to vanco/zosyn. CXR showed significant vascular congestion compared to prior imaging and ABG showing respiratory acidosis with PCO2 60s. Pt given lasix 60 IV once, tovar in place and draining, IVF discontinued, labetalol 10mg IV for elevated BP and started on BiPAP; on monitor, pt tachycardic and ?irregular, - pt transferred to tele floor for further monitoring.      #Acute hypoxic respiratory failure 2/2 flash pulmonary edema in setting of hypertensive urgency  #Sepsis 2/2 Staph bacteremia     See RRT sheet for detailed assessment and plan. I personally provided 35 minutes for critical care time for above diagnoses evaluated with CXR/ABG treated with lasix, labetalol and BiPAP. Case discussed with housestaff.

## 2020-08-19 NOTE — H&P ADULT - NSHPSOCIALHISTORY_GEN_ALL_CORE
lives along in [facility], walks with a walker and cane  denies tobacco use. social drinker

## 2020-08-19 NOTE — PROGRESS NOTE ADULT - PROBLEM SELECTOR PLAN 2
- UA + large LE, >50 WBC, many bacteria, 10 RBC  - UCx with 50,000-99,000 staph aureus sensitive to cefazolin   - BCx pending  [ ] continue ceftriaxone 1g daily for 10 days

## 2020-08-19 NOTE — PROGRESS NOTE ADULT - PROBLEM SELECTOR PLAN 1
- CTAP with severe b/l ureterohydronephrosis, thickened bladder 2/2 inability to straight cath himself for 24 hrs after fall (normally straight caths 3x daily for BPH, follow urology outpt)  [ ] consulted urology, appreciate recs  [ ] tovar in place, draining brownish-red urine (rhabdo) - CTAP with severe b/l ureterohydronephrosis, thickened bladder 2/2 inability to straight cath himself for 24 hrs after fall (normally straight caths 3x daily for BPH, follow urology outpt)  [ ] consulted urology, appreciate recs  [ ] tovar in place, draining brownish-red urine (rhabdo)  [ ] continue home tamsulosin and finasteride

## 2020-08-19 NOTE — DISCHARGE NOTE NURSING/CASE MANAGEMENT/SOCIAL WORK - FLU SEASON?
1. I have entered a referral to pulmonology.  They are located at [address] [place] [address] on the 2nd floor.  Their direct line is [phone].  They typically have more openings at their [place] office.  They will review your chart and contact you to schedule an appointment.  I do recommend taking their first available appointment; even if it is in [place].     2. You lungs were clear today; I am not suspicious for pneumonia. Rest, stay well hydrated.  Please call my office if your symptoms worsen or do not improve.     3. Please keep your appointment with Dr. Solis on Monday 8/28/17.       Pulmonary Nodule  A pulmonary nodule is small area of abnormal tissue in the lung. It is usually found on an X-ray taken for other reasons. It is a single spot (lesion) up to about an inch in size, surrounded by normal lung tissue.   Most nodules are not cancerous (benign). However, a nodule could be an early stage of lung cancer. Or it may be a sign of cancer that has spread from another part of the body. When a nodule is found on a chest X-ray, further testing is needed to determine if it is benign or cancerous (malignant). To give your healthcare provider more information about the nodule, you may have one or more of these tests:  · Comparison of a new X-ray to earlier X-rays  · Chest CT scan  · Bronchoscopy (a procedure that allows the healthcare provider to see the air passages inside the lung)  · Needle biopsy  Test results  · If your nodule is benign, continued follow-up over the next 5 years is usually advised.  · If tests do not determine whether your nodule is benign or malignant, surgery may be advised.  · If tests show that the nodule is definitely malignant, surgery will probably be advised.  The best survival rates from lung cancer occur when the original tumor is small (less than 1 inch).  Follow your healthcare provider's advice on the timing of further testing. Prompt treatment gives the best chance of curing  lung cancer.  Prevention  Smoking remains one of the biggest risk factors for lung cancer. If you smoke, it is essential that you quit to lower your risk of lung cancer. Talk to your healthcare provider about things that can help you quit, including medicines and support groups. See the following websites for more information:  · www.smokefree.gov  · www.quitnet.com  Home care  Most people with a pulmonary nodule have no symptoms. So no special home care is required. You may return to your usual activities and diet.  Follow-up care  Follow up with your healthcare provider, or as advised.  More information about lung cancer is available from these resources:  · American Lung Association: 967.100.7024, www.lung.org  · National Cancer [place]: 170.862.2560, www.cancer.gov  When to seek medical advice  Call your healthcare provider right away if any of these occur:  · Fever of 100.4°F (38°C) or higher  · Unintended weight change  Call 911  Contact emergency services right away if any of these occur:  · Coughing up blood  · Chest pain or shortness of breath  © 5620-4924 [employer]. [address] [place] [address], [place] [address]. All rights reserved. This information is not intended as a substitute for professional medical care. Always follow your healthcare professional's instructions.         Yes...

## 2020-08-19 NOTE — CONSULT NOTE ADULT - ASSESSMENT
ASSESSMENT/RECOMMENDATIONS::  80M hx of HIV(YV2=320, VL undet), detrusor muscle weakness with self straight cath, CAD presented after a fall at home.  Labs noted for WBC 16.5, Cr 2.2, CPK 3857, lactate 3.1, positive UA.  On exam, found to have mild left cheek tenderness and redness. He has significant left knee redness and there is an eschar, likely from the fall, conerning that he has a left knee cellulitis.    #HIV:  - SX5=842, VL undet  - Follow VL this admission  - Change Abacavir from 600mg daily to 300mg BID  - c/w Lamivudine 100mg daily  - We dont have Doravirine here, but we will get it tomorrow, can leave it for now    #Left knee cellulitis:  - Will do Zosyn 3.375g q12h infuse over 12 hr, which can cover cellulitis and also if possible UTI although more like colonization  - D/C ceftriaxone  - follow up urine culture and blood culture    Vannessa Gamble MD, PGY4  Fellow, Infectious Diseases  Pager: [phone]  If no response, after 5pm and on Weekends: Call [phone]    d/w Dr. Almazan  Recs conveyed to primary team resident Kylah ASSESSMENT/RECOMMENDATIONS::  80M hx of HIV(PT7=173, VL undet), detrusor muscle weakness with self straight cath, CAD presented after a fall at home.  Labs noted for WBC 16.5, Cr 2.2, CPK 3857, lactate 3.1, positive UA.  On exam, found to have mild left cheek tenderness and redness. He has significant left knee redness and there is an eschar, likely from the fall, conerning that he has a left knee cellulitis.    #HIV:  - WH3=058, VL undet  - Follow VL this admission  - Change Abacavir from 600mg daily to 300mg BID  - c/w Lamivudine 100mg daily  - We dont have Doravirine here, but we will get it tomorrow, can leave it for now    #Left knee cellulitis:  - Will do Zosyn 3.375g q12h infuse over 12 hr, which can cover cellulitis and also if possible UTI although more like colonization  - D/C ceftriaxone  - follow up urine culture and blood culture  - Pt has very loud systolic murmur, please obtain Echo    Vannessa Gamble MD, PGY4  Fellow, Infectious Diseases  Pager: [phone]  If no response, after 5pm and on Weekends: Call [phone]    d/w Dr. Tha Sousa conveyed to primary team resident Kylah ASSESSMENT/RECOMMENDATIONS::  80M hx of HIV(ZG6=851, VL undet), detrusor muscle weakness with self straight cath, CAD presented after a fall at home.  Labs noted for WBC 16.5, Cr 2.2, CPK 3857, lactate 3.1, positive UA.  On exam, found to have mild left cheek tenderness and redness. He has significant left knee redness and there is an eschar, likely from the fall, conerning that he has a left knee cellulitis.    #HIV:  - QI7=695, VL undet  - Follow VL this admission  - Change Abacavir from 600mg daily to 300mg BID  - c/w Lamivudine 100mg daily  - Pharmacist ordered Doravirine 100mg daily, but will not get it until Friday, will do Tivicay 50mg daily for now until we have Doravirine here (We have ordered Tivicay)    #Left knee cellulitis:  - Will do Zosyn 3.375g q12h infuse over 12 hr, which can cover cellulitis and also if possible UTI although more like colonization  - D/C ceftriaxone  - follow up urine culture and blood culture  - Pt has very loud systolic murmur, please obtain Echo    Vannessa Gamble MD, PGY4  Fellow, Infectious Diseases  Pager: [phone]  If no response, after 5pm and on Weekends: Call [phone]    d/w Dr. Almazan  Recs conveyed to primary team resident Kylah

## 2020-08-19 NOTE — H&P ADULT - PROBLEM SELECTOR PLAN 3
controlled, last CD4 368, undetectable viral load in july  -continue home abacavir, lamivudine and pifelo

## 2020-08-19 NOTE — PHYSICAL THERAPY INITIAL EVALUATION ADULT - LEVEL OF INDEPENDENCE: GAIT, REHAB EVAL
TBD, unable to safely perform with 1 person assist 2/2 fatigue/weakness contact guard/as of 8/20/20/minimum assist (75% patients effort)

## 2020-08-19 NOTE — PROGRESS NOTE ADULT - PROBLEM SELECTOR PLAN 4
- pt denies palpitations, chest pain, dizziness. Endorses weakness prior to and since fall.   - ECG sinus tachy, LA enlargement, no ST elevation/depression or T wave inversion  - trop elevated to 75; repeat pending  - pt not volume overloaded on exam   - possibly 2/2 dehydration (warm in apt per EMS)  [ ] f/u repeat trop for trend - pt denies palpitations, chest pain, dizziness. Endorses weakness prior to and since fall.   - ECG sinus tachy, LA enlargement, no ST elevation/depression or T wave inversion  - trop elevated to 75; repeat pending  - pt not volume overloaded on exam   - possibly 2/2 dehydration (warm in apt per EMS)  [ ] f/u repeat trop for trend  [ ] PT consulted; f/u recs

## 2020-08-19 NOTE — PHARMACOTHERAPY INTERVENTION NOTE - COMMENTS
Dr. Almazan requested help regarding non-formulary doravirine which is part of patient's outpatient antiretroviral regimen.  Unfortunately, inpatient pharmacy as well as [facility] did not have any doravirine in stock so requested pharmacy to order (with ETA either Thursday or Friday).  Dr. Almazan will substitute dolutegravir in the meantime.  Doravirine will be entered in [place] when ETA confirmed.      Patient received abacavir 600mg and lamivudine around 11am this morning but per ID recommendations, abacavir changed to 300mg q12h but scheduled to begin 8/19 so adjusted to start 8/20 morning. Dr. Almazan requested help regarding non-formulary doravirine which is part of patient's outpatient antiretroviral regimen.  Unfortunately, inpatient pharmacy as well as [facility] did not have any doravirine in stock so requested pharmacy to order (with ETA either Thursday or Friday).  Dr. Almazan will substitute dolutegravir in the meantime.  Doravirine will be entered in [place] when ETA confirmed.      Patient received abacavir 600mg and lamivudine around 11am this morning but per ID recommendations, abacavir changed to 300mg q12h but scheduled to begin 8/19 so adjusted to start 8/20 morning.    Claudia Vance, PharmD  Clinical Pharmacist, Infectious Diseases  [phone]

## 2020-08-19 NOTE — PROGRESS NOTE ADULT - ASSESSMENT
Mr. Monsivais is a 81 y/o man with hx HIV, CAD, BPH presenting for fall, down for 24 hrs, admitted with rhabdo, bilateral severe ureterohydronephrosis and thickened bladder on CTAP 2/2 inability to continue his home straight cath 3x daily after fall, tovar in place. On mIVF LR and ceftriaxone. SUDHEER resolved.

## 2020-08-19 NOTE — CHART NOTE - NSCHARTNOTEFT_GEN_A_CORE
TO BE COMPLETED WITHIN 6 HOURS OF INITIAL ASSESSMENT:    For use in patients that have 2 sepsis criteria and new organ dysfunction   •	New or increased oxygen requirement  •	Creatinine >2mg/dL  •	Bilirubin>2mg/dL  •	Platelet <100,00/mm3  •	INR >1.5, PTT>60  •	Lactate >2    If patient persistent hypotension (SBP<90) or any lactate >4 then provider evaluation (Physician/PA/NP) within 30 minutes of bolus completion is required.    Vital Signs Last 24 Hrs  T(C): 37.1 (19 Aug 2020 03:00), Max: 37.7 (18 Aug 2020 22:27)  T(F): 98.7 (19 Aug 2020 03:00), Max: 99.8 (18 Aug 2020 22:27)  HR: 101 (19 Aug 2020 03:00) (83 - 109)  BP: 124/70 (19 Aug 2020 03:00) (124/70 - 152/91)  BP(mean): --  RR: 21 (19 Aug 2020 03:00) (16 - 24)  SpO2: 97% (19 Aug 2020 03:00) (94% - 98%)  		  LUNGS:  [x  ]Clear bilaterally [  ] Wheeze [  ] Rhonchi [  ] Rales [  ] Crackles; Other:  HEART: [x  ]RRR [  ] No murmur[  ]  Normal S1S2[  ] Tachycardia;  Other:  CAPILLARY REFULL:  	Fingers: [ x ] less than 2 seconds [  ] more than 2 seconds                                           Toes: [ x ]  less than 2 seconds [  ] more than 2 seconds   PERIPHERAL PULSES:  Radial: [x  ] Palpable  [  ]  non-palpable                                         Dorsalis Pedis: [ x ] Palpable  [  ] non-palpable                                         Posterior Tibial: [  ] Palpable  [  ] non-palpable                                          Other:  SKIN:   [  ]  Diaphoretic  [  ]  mottling  [  ]  Cold extremities  [ x ]  Warm [  x]  Dry                      Other:    BEDSIDE ULTRASOUND FINDINGS (IF APPLICABLE):    Labs:  18 Aug 2020 22:33    142    |  106    |  46     ----------------------------<  151    4.2     |  22     |  2.02     Ca    9.0        18 Aug 2020 22:33    TPro  7.7    /  Alb  3.7    /  TBili  0.8    /  DBili  x      /  AST  97     /  ALT  31     /  AlkPhos  122    18 Aug 2020 22:33                          11.6   16.47 )-----------( 218      ( 19 Aug 2020 03:25 )             34.8     PT/INR - ( 18 Aug 2020 22:39 )   PT: 12.6 sec;   INR: 1.06 ratio         PTT - ( 18 Aug 2020 22:39 )  PTT:31.2 sec  Lactate:    Plan (orders must be placed in EMR):     [ x ]  Check Repeat Lactate   [  ]  No change in current plan  [  ]  Start Vasopressors:  [  ]  Repeat Fluid Bolus:  [ x ] other: will check UA and start appropriate antibiotics depending on result.     Care Discussed with Consultants/Other Providers [ x] YES  [ ] NO

## 2020-08-19 NOTE — CONSULT NOTE ADULT - ATTENDING COMMENTS
Patient seen and examined with Dr. 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 He was  admitted with rhabdomyolysis following a fall off the toilet while at home and where he remained on the floor for 24hours    He also has a history of recurrent UTIs- straight caths himself at home  CT of head without evidence of fx or bleed  Exam notable for a loud systolic murmur heard throughout precordium    UTI-  WBCs and many bacteria  culture pending    left knee-  abrasion and cellulitis      Blood cultures with gram + cocci in clusters-    Suspect patient has endocarditis  Would obtain additional sets of blood cultures x2  Would start Vancomycin 1 gram IV q 12hr  check trough level prior to fourth dose  Would ask Cardiology to see patient  Obtain TTE and possibly NOHEMI if needed    Tung Almazan MD  [phone]  After 5pm/weekends [phone] Patient seen and examined with Dr. 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 He was  admitted with rhabdomyolysis following a fall off the toilet while at home and where he remained on the floor for 24hours    He also has a history of recurrent UTIs- straight caths himself at home  CT of head without evidence of fx or bleed  Exam notable for a loud systolic murmur heard throughout precordium    UTI-  WBCs and many bacteria  culture pending    left knee-  abrasion and cellulitis      Blood cultures with gram + cocci in clusters-    Suspect patient has endocarditis  Would obtain additional sets of blood cultures x2  Would start Vancomycin 1 gram IV q 12hr  check trough level prior to fourth dose  Would ask Cardiology to see patient  Obtain TTE and possibly NOHEMI if needed    would adjust antimicrobial regimen once organims are identified    Tung Almazan MD  [phone]  After 5pm/weekends [phone]

## 2020-08-19 NOTE — H&P ADULT - PROBLEM SELECTOR PLAN 1
2/2 immobility x24 hrs, CPK in 3000s, now downtrending on IVF  -continue IVF  -monitor UOP   -trend CPK and BMP 2/2 immobility x24 hrs, CPK in 3000s, now downtrending on IVF  -continue IVF throughout the day  -monitor UOP   -strict I&Os  -trend CPK and BMP q12

## 2020-08-19 NOTE — H&P ADULT - PROBLEM SELECTOR PLAN 5
severe hydroureteronephrosis (<--mild to moderate in april), likely 2/2 inability to straight cath while on the floor s/p fall  -s/p tovar placement  -monitor UOP, BMP  -c/w home flomax and finasteride severe hydroureteronephrosis (<--mild to moderate in april), likely 2/2 inability to straight cath while on the floor s/p fall  -s/p tovar placement per Urology  -monitor UOP, BMP  -c/w home flomax and finasteride

## 2020-08-19 NOTE — H&P ADULT - HISTORY OF PRESENT ILLNESS
80M hx of HIV+, BPH, CAD presented after a fall at home and found in own urine/feces. Reportedly per EMS, patient had been using a scrotal device to clamp the testes but was unable to remove it and then slid to the ground while trying to maneuvering the device. Patient reports that he was too weak to get himself up. Denies trauma to his head and was laying on his L side. Per EMS, the heat in the home was significantly elevated. Denies anticoagulants or aspirin, denies any LOC. Pt states he was exhausted and then could not get up. States he recalls all events. Denies recent illness, fever, chills, dysuria, dizziness, cp, sob, abd pain. Denies cough, sore throat. Of note, patient reports that he self straight caths 3 times a day due to BPH. He also had a bladder mass s/p resection and was told it was benign.    In the ED, vitals: T 99.2, , /60, RR 24 satting 97 ORA. got 2L NS, started on maintenance fluid. Urology placed tovar, draining blood tinged urine. Labs noted for WBC 16.5, Cr 2.2, CPK 3857, lactate 3.1, positive UA. 80M hx of HIV+, BPH, CAD presented after a fall at home and found in own urine/feces. Reportedly per EMS, patient had been using a scrotal device to clamp the testes but was unable to remove it and then slid to the ground while trying to maneuvering the device. Patient reports that he was too weak to get himself up. Denies trauma to his head and was laying on his L side. Per EMS, the heat in the home was significantly elevated. Denies anticoagulants or aspirin, denies any LOC and states he recalls all events. Denies recent illness, fever, chills, dysuria, dizziness, cp, sob, abd pain. Denies cough, sore throat. Of note, patient reports that he self straight caths 3 times a day due to BPH. He also had a bladder mass s/p resection and was told it was benign.    In the ED, vitals: T 99.2, , /60, RR 24 satting 97 ORA. got 2L NS, started on maintenance fluid. Urology placed tovar, draining blood tinged urine. Labs noted for WBC 16.5, Cr 2.2, CPK 3857, lactate 3.1, positive UA.

## 2020-08-19 NOTE — H&P ADULT - PROBLEM SELECTOR PLAN 4
baseline Cr 1.7-1.8, elevated Cr on admission, now back to baseline  -on maintanence fluid for rhado  -monitor UOP

## 2020-08-19 NOTE — RAPID RESPONSE TEAM SUMMARY - NSSITUATIONBACKGROUNDRRT_GEN_ALL_CORE
80M hx of HIV+, BPH, CAD presented after a fall at home and found in own urine/feces. In the ED, vitals: T 99.2, , /60, RR 24 satting 97 ORA. got 2L NS, started on maintenance fluid. Urology placed tovar, draining blood tinged urine. Labs noted for WBC 16.5, Cr 2.2, CPK 3857, lactate 3.1, positive UA. RRT called fro increased WOB and hypoxia low 80's.

## 2020-08-19 NOTE — PROGRESS NOTE ADULT - SUBJECTIVE AND OBJECTIVE BOX
Rosa M Riderathy PGY1    Patient is a 80y old  Male who presents with a chief complaint of fall (19 Aug 2020 04:49)      SUBJECTIVE / OVERNIGHT EVENTS:  Pt is a 81 y/o admitted last night from [facility] (rehab facility) for fall. Denies palpitations, chest pain, dizziness prior to fall; denies LOC; was down 24 hrs, EMS called by rehab, found pt covered in urine/feces. Pt straight caths himself 3x daily for BPH. Urinates little without straight cath. Sees urology outpatient. Found to have severe bilateral hydroureteronephrosis on CT AP. Urology consulted. Aden placed, draining red brown urine. Found to have rhabdo, CK 3333 today. On mIVF  cc/hr (s/p 2L NS bolus in ED). Cr back to baseline. ECG without ST changes or T wave inversions. Trop elevated to 85; repeat pending. On ceftriaxone for positive UA + thickened bladder wall on CT AP.     MEDICATIONS  (STANDING):  abacavir 600 milliGRAM(s) Oral daily  ascorbic acid 500 milliGRAM(s) Oral daily  cefTRIAXone   IVPB 1 milliGRAM(s) IV Intermittent every 24 hours  finasteride 5 milliGRAM(s) Oral daily  heparin   Injectable 5000 Unit(s) SubCutaneous every 8 hours  lactated ringers. 1000 milliLiter(s) (125 mL/Hr) IV Continuous <Continuous>  lamiVUDine- milliGRAM(s) Oral daily  multivitamin 1 Tablet(s) Oral daily  tamsulosin 0.4 milliGRAM(s) Oral at bedtime    MEDICATIONS  (PRN):      CAPILLARY BLOOD GLUCOSE        I&O's Summary    18 Aug 2020 07:01  -  19 Aug 2020 07:00  --------------------------------------------------------  IN: 400 mL / OUT: 800 mL / NET: -400 mL        Vital Signs Last 24 Hrs  T(C): 37.4 (19 Aug 2020 05:13), Max: 37.7 (18 Aug 2020 22:27)  T(F): 99.3 (19 Aug 2020 05:13), Max: 99.8 (18 Aug 2020 22:27)  HR: 107 (19 Aug 2020 05:13) (83 - 109)  BP: 135/76 (19 Aug 2020 05:13) (124/70 - 157/80)  BP(mean): --  RR: 20 (19 Aug 2020 05:13) (16 - 24)  SpO2: 94% (19 Aug 2020 05:13) (94% - 98%)    PHYSICAL EXAM:  GENERAL: thin, lying down, difficulty with mobility, no distress  PSYCH: A&Ox3  HEAD: L sided periorbital hematoma; swelling, no active bleeding   EYES: EOM intact  CHEST/LUNG: Clear to auscultation bilaterally; No wheezes or crackles  HEART: regular rate and rhythm, no murmurs  ABDOMEN: soft, nontender to palpation, no rebound tenderness  EXTREMITIES: no edema pulses 2+ PT  NEUROLOGY: able to move bilateral LE, 5/5 str in legs. Weakness in bilateral shoulder extension.   SKIN: No rashes or lesions    LABS:                        11.6   16.47 )-----------( 218      ( 19 Aug 2020 03:25 )             34.8      08-19    142  |  107  |  44<H>  ----------------------------<  126<H>  4.0   |  24  |  1.83<H>    Ca    8.7      19 Aug 2020 03:25    TPro  6.5  /  Alb  3.2<L>  /  TBili  0.7  /  DBili  x   /  AST  93<H>  /  ALT  28  /  AlkPhos  102  08-19    PT/INR - ( 18 Aug 2020 22:39 )   PT: 12.6 sec;   INR: 1.06 ratio         PTT - ( 18 Aug 2020 22:39 )  PTT:31.2 sec  CARDIAC MARKERS ( 19 Aug 2020 03:25 )  x     / x     / 3333 U/L / x     / x      CARDIAC MARKERS ( 18 Aug 2020 22:33 )  x     / x     / 3857 U/L / x     / x          Urinalysis Basic - ( 19 Aug 2020 03:23 )    Color: Yellow / Appearance: Turbid / S.015 / pH: x  Gluc: x / Ketone: Trace  / Bili: Negative / Urobili: Negative   Blood: x / Protein: 100 mg/dL / Nitrite: Negative   Leuk Esterase: Large / RBC: 10 /hpf / WBC >50 /HPF   Sq Epi: x / Non Sq Epi: 3 / Bacteria: Many        RADIOLOGY & ADDITIONAL TESTS:    Imaging Personally Reviewed:    Consultant(s) Notes Reviewed:      Care Discussed with Consultants/Other Providers: Rosa M Palma PGY1    Patient is a 80y old  Male who presents with a chief complaint of fall (19 Aug 2020 04:49)      SUBJECTIVE / OVERNIGHT EVENTS:  Pt is a 81 y/o admitted last night from [place] (rehab facility) for fall. Denies palpitations, chest pain, dizziness prior to fall; denies LOC; was down 24 hrs, EMS called by rehab, found pt covered in urine/feces. Pt straight caths himself 3x daily for BPH. Urinates little without straight cath. Sees urology outpatient. Found to have severe bilateral hydroureteronephrosis on CT AP 2/2 inability to straight cath himself after fall. Urology consulted. Aden placed, draining red brown urine. Found to have rhabdo, CK 3333 today. On mIVF  cc/hr (s/p 2L NS bolus in ED). Cr back to baseline. ECG without ST changes or T wave inversions. Trop elevated to 85; repeat pending. On ceftriaxone for positive UA + thickened bladder wall on CT AP.     Today: denies pain in abdomen, back, anywhere. Endorses weakness generalized. Weakness in b/l shoulder extension. Able to move arms and legs.     MEDICATIONS  (STANDING):  abacavir 600 milliGRAM(s) Oral daily  ascorbic acid 500 milliGRAM(s) Oral daily  cefTRIAXone   IVPB 1 milliGRAM(s) IV Intermittent every 24 hours  finasteride 5 milliGRAM(s) Oral daily  heparin   Injectable 5000 Unit(s) SubCutaneous every 8 hours  lactated ringers. 1000 milliLiter(s) (125 mL/Hr) IV Continuous <Continuous>  lamiVUDine- milliGRAM(s) Oral daily  multivitamin 1 Tablet(s) Oral daily  tamsulosin 0.4 milliGRAM(s) Oral at bedtime    MEDICATIONS  (PRN):      CAPILLARY BLOOD GLUCOSE        I&O's Summary    18 Aug 2020 07:01  -  19 Aug 2020 07:00  --------------------------------------------------------  IN: 400 mL / OUT: 800 mL / NET: -400 mL        Vital Signs Last 24 Hrs  T(C): 37.4 (19 Aug 2020 05:13), Max: 37.7 (18 Aug 2020 22:27)  T(F): 99.3 (19 Aug 2020 05:13), Max: 99.8 (18 Aug 2020 22:27)  HR: 107 (19 Aug 2020 05:13) (83 - 109)  BP: 135/76 (19 Aug 2020 05:13) (124/70 - 157/80)  BP(mean): --  RR: 20 (19 Aug 2020 05:13) (16 - 24)  SpO2: 94% (19 Aug 2020 05:13) (94% - 98%)    PHYSICAL EXAM:  GENERAL: thin, lying down, difficulty with mobility, no distress  PSYCH: A&Ox3  HEAD: L sided periorbital hematoma; swelling, no active bleeding   EYES: EOM intact  CHEST/LUNG: Clear to auscultation bilaterally; No wheezes or crackles  HEART: regular rate and rhythm, no murmurs  ABDOMEN: soft, nontender to palpation, no rebound tenderness  EXTREMITIES: no edema pulses 2+ PT  NEUROLOGY: able to move bilateral LE, 5/5 str in legs. Weakness in bilateral shoulder extension.   SKIN: No rashes or lesions    LABS:                        11.6   16.47 )-----------( 218      ( 19 Aug 2020 03:25 )             34.8      08-19    142  |  107  |  44<H>  ----------------------------<  126<H>  4.0   |  24  |  1.83<H>    Ca    8.7      19 Aug 2020 03:25    TPro  6.5  /  Alb  3.2<L>  /  TBili  0.7  /  DBili  x   /  AST  93<H>  /  ALT  28  /  AlkPhos  102  08-19    PT/INR - ( 18 Aug 2020 22:39 )   PT: 12.6 sec;   INR: 1.06 ratio         PTT - ( 18 Aug 2020 22:39 )  PTT:31.2 sec  CARDIAC MARKERS ( 19 Aug 2020 03:25 )  x     / x     / 3333 U/L / x     / x      CARDIAC MARKERS ( 18 Aug 2020 22:33 )  x     / x     / 3857 U/L / x     / x          Urinalysis Basic - ( 19 Aug 2020 03:23 )    Color: Yellow / Appearance: Turbid / S.015 / pH: x  Gluc: x / Ketone: Trace  / Bili: Negative / Urobili: Negative   Blood: x / Protein: 100 mg/dL / Nitrite: Negative   Leuk Esterase: Large / RBC: 10 /hpf / WBC >50 /HPF   Sq Epi: x / Non Sq Epi: 3 / Bacteria: Many        RADIOLOGY & ADDITIONAL TESTS:    Imaging Personally Reviewed:    Consultant(s) Notes Reviewed:      Care Discussed with Consultants/Other Providers:

## 2020-08-19 NOTE — H&P ADULT - NSHPLABSRESULTS_GEN_ALL_CORE
11.6   16.47 )-----------( 218      ( 19 Aug 2020 03:25 )             34.8     08-    142  |  107  |  44<H>  ----------------------------<  126<H>  4.0   |  24  |  1.83<H>    Ca    8.7      19 Aug 2020 03:25    TPro  6.5  /  Alb  3.2<L>  /  TBili  0.7  /  DBili  x   /  AST  93<H>  /  ALT  28  /  AlkPhos  102        Urinalysis Basic - ( 19 Aug 2020 03:23 )    Color: Yellow / Appearance: Turbid / S.015 / pH: x  Gluc: x / Ketone: Trace  / Bili: Negative / Urobili: Negative   Blood: x / Protein: 100 mg/dL / Nitrite: Negative   Leuk Esterase: Large / RBC: 10 /hpf / WBC >50 /HPF   Sq Epi: x / Non Sq Epi: 3 / Bacteria: Many    IMPRESSION:    Lack of intravenous contrast limits evaluation of the solid abdominal organs and vasculature. No evidence of acute traumatic injury to the chest, abdomen, or pelvis on this unenhanced exam.    Severe bilateral hydroureteronephrosis to the level of the bladder. Circumferential thickening of the bladder walls with mild distention of the bladder which may be due to chronic outlet obstruction in the setting of an enlarged prostate gland or cystitis. Recommend clinical correlation with urinalysis.    Clusters of nodular and tree-in-bud opacities in the left upper lobe and bilateral lower lobes may represent infection versus mucoid impaction of the distal airways.      IMPRESSION:    HEAD CT:  No acute intracranial hemorrhage, mass effect, or acute osseous fracture. Left frontal scalp hematoma.    Mild chronic ischemic changes in the frontoparietal white matter and slitlike encephalomalacia in the left basal ganglia which may be secondary to an old infarct or old hemorrhage.    CERVICAL SPINE CT:  No acute cervical spine fracture or evidence of traumatic malalignment. Cervical spondylosis.    MAXILLOFACIAL CT:  Left periorbital and premalar soft tissue swelling/hematoma. No acute maxillofacial bone or mandibular fracture. Personally reviewed labs.  Personally reviewed EKG.  Personally reviewed imaging.                11.6   16.47 )-----------( 218      ( 19 Aug 2020 03:25 )             34.8         142  |  107  |  44<H>  ----------------------------<  126<H>  4.0   |  24  |  1.83<H>    Ca    8.7      19 Aug 2020 03:25    TPro  6.5  /  Alb  3.2<L>  /  TBili  0.7  /  DBili  x   /  AST  93<H>  /  ALT  28  /  AlkPhos  102        Urinalysis Basic - ( 19 Aug 2020 03:23 )    Color: Yellow / Appearance: Turbid / S.015 / pH: x  Gluc: x / Ketone: Trace  / Bili: Negative / Urobili: Negative   Blood: x / Protein: 100 mg/dL / Nitrite: Negative   Leuk Esterase: Large / RBC: 10 /hpf / WBC >50 /HPF   Sq Epi: x / Non Sq Epi: 3 / Bacteria: Many    EKG: Sinus tachycardia, No ST elevations or depressions,     IMPRESSION:    Lack of intravenous contrast limits evaluation of the solid abdominal organs and vasculature. No evidence of acute traumatic injury to the chest, abdomen, or pelvis on this unenhanced exam.    Severe bilateral hydroureteronephrosis to the level of the bladder. Circumferential thickening of the bladder walls with mild distention of the bladder which may be due to chronic outlet obstruction in the setting of an enlarged prostate gland or cystitis. Recommend clinical correlation with urinalysis.    Clusters of nodular and tree-in-bud opacities in the left upper lobe and bilateral lower lobes may represent infection versus mucoid impaction of the distal airways.      IMPRESSION:    HEAD CT:  No acute intracranial hemorrhage, mass effect, or acute osseous fracture. Left frontal scalp hematoma.    Mild chronic ischemic changes in the frontoparietal white matter and slitlike encephalomalacia in the left basal ganglia which may be secondary to an old infarct or old hemorrhage.    CERVICAL SPINE CT:  No acute cervical spine fracture or evidence of traumatic malalignment. Cervical spondylosis.    MAXILLOFACIAL CT:  Left periorbital and premalar soft tissue swelling/hematoma. No acute maxillofacial bone or mandibular fracture.

## 2020-08-19 NOTE — RAPID RESPONSE TEAM SUMMARY - NSADDTLFINDINGSRRT_GEN_ALL_CORE
On arrival pt AOx3, follows commands and answers questions appropriately. RR 30's . T 100.7. 's 's -150's   Reports to have difficulty breathing however felt better post diuresis and NIPPV.

## 2020-08-19 NOTE — PROCEDURE NOTE - NSURITECHNIQUE_GU_A_CORE
The site was cleaned with soap/water and sterile solution (betadine)/The urinary drainage system is closed at the end of the procedure/All applicable medical record documentation is completed/The catheter was secured with a securement device (e.g. StatLock)/The collection bag is below the level of the patient and urinary bladder/Proper hand hygiene was performed/A sterile drape was used to cover all adjacent areas/The catheter was appropriately lubricated

## 2020-08-19 NOTE — H&P ADULT - ATTENDING COMMENTS
80M hx of HIV+, BPH, CAD presented after a mechanical fall at home. He was unable to get up and remained on the ground for 24 hrs. Labs concerning for rhabdomyolysis. Also found to have UTI and severe b/l hydroureteronephrosis and is now s/p tovar placement.    Management as per resident's note which I have reviewed and edited as appropriate.

## 2020-08-19 NOTE — DISCHARGE NOTE NURSING/CASE MANAGEMENT/SOCIAL WORK - PATIENT PORTAL LINK FT
You can access the FollowMyHealth Patient Portal offered by [facility] by registering at the following website: [email or web address]. By joining [facility]’s FollowMyHealth portal, you will also be able to view your health information using other applications (apps) compatible with our system.

## 2020-08-19 NOTE — H&P ADULT - NSHPPHYSICALEXAM_GEN_ALL_CORE
GENERAL: NAD, well-developed  HEAD:  Atraumatic, Normocephalic  EYES: EOMI, PERRLA, conjunctiva and sclera clear. Edema around the L eye  NECK: Supple, No JVD  CHEST/LUNG: Clear to auscultation bilaterally; No wheeze, ronchi or rales  HEART: Regular rate and rhythm; No murmurs, rubs, or gallops  ABDOMEN: Soft, Nontender, Nondistended; Bowel sounds present  EXTREMITIES:  2+ Peripheral Pulses, No clubbing, cyanosis, or edema  PSYCH: AAOx3  NEUROLOGY: non-focal  SKIN: bruises on L face and L arm, small 2cm abrasion on L knee with erythema around GENERAL: NAD, well-developed  HEAD:  +Facial bruising, Normocephalic  EYES: EOMI, PERRLA, conjunctiva and sclera clear. Edema around the L eye  NECK: Supple, No JVD  CHEST/LUNG: Clear to auscultation bilaterally; No wheeze, ronchi or rales  HEART: Regular rate and rhythm; No murmurs, rubs, or gallops  ABDOMEN: Soft, Nontender, Nondistended; Bowel sounds present  EXTREMITIES:  2+ Peripheral Pulses, No clubbing, cyanosis, or edema  PSYCH: AAOx3  NEUROLOGY: non-focal  SKIN: bruises on L face and L arm, small 2cm abrasion on L knee with erythema around

## 2020-08-19 NOTE — H&P ADULT - PROBLEM SELECTOR PLAN 2
leukocytosis, tachycardia, positive UA, abrasions s/p fall  -started on unasyn in ED for skin infection coverage  -given UTI, add gram negative coverage with ceftriaxone leukocytosis, tachycardia, positive UA, abrasions s/p fall  -started on unasyn in ED for skin infection coverage  -given UTI, would switch to ceftriaxone

## 2020-08-20 ENCOUNTER — TRANSCRIPTION ENCOUNTER (OUTPATIENT)
Age: 80
End: 2020-08-20

## 2020-08-20 ENCOUNTER — RX RENEWAL (OUTPATIENT)
Age: 80
End: 2020-08-20

## 2020-08-20 DIAGNOSIS — R78.81 BACTEREMIA: ICD-10-CM

## 2020-08-20 DIAGNOSIS — J81.0 ACUTE PULMONARY EDEMA: ICD-10-CM

## 2020-08-20 LAB
ALBUMIN SERPL ELPH-MCNC: 2.7 G/DL — LOW (ref 3.3–5)
ALP SERPL-CCNC: 131 U/L — HIGH (ref 40–120)
ALT FLD-CCNC: 34 U/L — SIGNIFICANT CHANGE UP (ref 10–45)
ANION GAP SERPL CALC-SCNC: 11 MMOL/L — SIGNIFICANT CHANGE UP (ref 5–17)
AST SERPL-CCNC: 75 U/L — HIGH (ref 10–40)
BILIRUB SERPL-MCNC: 0.4 MG/DL — SIGNIFICANT CHANGE UP (ref 0.2–1.2)
BUN SERPL-MCNC: 40 MG/DL — HIGH (ref 7–23)
CALCIUM SERPL-MCNC: 8.4 MG/DL — SIGNIFICANT CHANGE UP (ref 8.4–10.5)
CHLORIDE SERPL-SCNC: 101 MMOL/L — SIGNIFICANT CHANGE UP (ref 96–108)
CK SERPL-CCNC: 1248 U/L — HIGH (ref 30–200)
CO2 SERPL-SCNC: 23 MMOL/L — SIGNIFICANT CHANGE UP (ref 22–31)
CREAT SERPL-MCNC: 1.8 MG/DL — HIGH (ref 0.5–1.3)
CULTURE RESULTS: SIGNIFICANT CHANGE UP
CULTURE RESULTS: SIGNIFICANT CHANGE UP
GLUCOSE SERPL-MCNC: 138 MG/DL — HIGH (ref 70–99)
HCT VFR BLD CALC: 34.4 % — LOW (ref 39–50)
HGB BLD-MCNC: 11.2 G/DL — LOW (ref 13–17)
HIV-1 VIRAL LOAD RESULT: SIGNIFICANT CHANGE UP
HIV1 RNA # SERPL NAA+PROBE: SIGNIFICANT CHANGE UP
HIV1 RNA SER-IMP: SIGNIFICANT CHANGE UP
HIV1 RNA SERPL NAA+PROBE-ACNC: SIGNIFICANT CHANGE UP
HIV1 RNA SERPL NAA+PROBE-LOG#: SIGNIFICANT CHANGE UP LG COP/ML
MAGNESIUM SERPL-MCNC: 2.1 MG/DL — SIGNIFICANT CHANGE UP (ref 1.6–2.6)
MCHC RBC-ENTMCNC: 32.6 GM/DL — SIGNIFICANT CHANGE UP (ref 32–36)
MCHC RBC-ENTMCNC: 34.8 PG — HIGH (ref 27–34)
MCV RBC AUTO: 106.8 FL — HIGH (ref 80–100)
NRBC # BLD: 0 /100 WBCS — SIGNIFICANT CHANGE UP (ref 0–0)
ORGANISM # SPEC MICROSCOPIC CNT: SIGNIFICANT CHANGE UP
ORGANISM # SPEC MICROSCOPIC CNT: SIGNIFICANT CHANGE UP
PHOSPHATE SERPL-MCNC: 3.3 MG/DL — SIGNIFICANT CHANGE UP (ref 2.5–4.5)
PLATELET # BLD AUTO: 172 K/UL — SIGNIFICANT CHANGE UP (ref 150–400)
POTASSIUM SERPL-MCNC: 3.6 MMOL/L — SIGNIFICANT CHANGE UP (ref 3.5–5.3)
POTASSIUM SERPL-SCNC: 3.6 MMOL/L — SIGNIFICANT CHANGE UP (ref 3.5–5.3)
PROT SERPL-MCNC: 6.2 G/DL — SIGNIFICANT CHANGE UP (ref 6–8.3)
RBC # BLD: 3.22 M/UL — LOW (ref 4.2–5.8)
RBC # FLD: 13.8 % — SIGNIFICANT CHANGE UP (ref 10.3–14.5)
SODIUM SERPL-SCNC: 135 MMOL/L — SIGNIFICANT CHANGE UP (ref 135–145)
SPECIMEN SOURCE: SIGNIFICANT CHANGE UP
SPECIMEN SOURCE: SIGNIFICANT CHANGE UP
TROPONIN T, HIGH SENSITIVITY RESULT: 102 NG/L — HIGH (ref 0–51)
TROPONIN T, HIGH SENSITIVITY RESULT: 72 NG/L — HIGH (ref 0–51)
TROPONIN T, HIGH SENSITIVITY RESULT: 86 NG/L — HIGH (ref 0–51)
WBC # BLD: 15.56 K/UL — HIGH (ref 3.8–10.5)
WBC # FLD AUTO: 15.56 K/UL — HIGH (ref 3.8–10.5)

## 2020-08-20 PROCEDURE — 71045 X-RAY EXAM CHEST 1 VIEW: CPT | Mod: 26,77

## 2020-08-20 PROCEDURE — 99232 SBSQ HOSP IP/OBS MODERATE 35: CPT | Mod: GC

## 2020-08-20 PROCEDURE — 76770 US EXAM ABDO BACK WALL COMP: CPT | Mod: 26

## 2020-08-20 PROCEDURE — 99223 1ST HOSP IP/OBS HIGH 75: CPT | Mod: AI,GC

## 2020-08-20 PROCEDURE — 93010 ELECTROCARDIOGRAM REPORT: CPT

## 2020-08-20 PROCEDURE — 71045 X-RAY EXAM CHEST 1 VIEW: CPT | Mod: 26

## 2020-08-20 PROCEDURE — 12345: CPT | Mod: NC,GC

## 2020-08-20 PROCEDURE — 93306 TTE W/DOPPLER COMPLETE: CPT | Mod: 26

## 2020-08-20 RX ORDER — ALBUTEROL 90 UG/1
2.5 AEROSOL, METERED ORAL
Refills: 0 | Status: DISCONTINUED | OUTPATIENT
Start: 2020-08-20 | End: 2020-08-20

## 2020-08-20 RX ORDER — POTASSIUM CHLORIDE 20 MEQ
40 PACKET (EA) ORAL ONCE
Refills: 0 | Status: COMPLETED | OUTPATIENT
Start: 2020-08-20 | End: 2020-08-20

## 2020-08-20 RX ORDER — VANCOMYCIN HCL 1 G
1000 VIAL (EA) INTRAVENOUS ONCE
Refills: 0 | Status: COMPLETED | OUTPATIENT
Start: 2020-08-20 | End: 2020-08-20

## 2020-08-20 RX ORDER — VANCOMYCIN HCL 1 G
VIAL (EA) INTRAVENOUS
Refills: 0 | Status: DISCONTINUED | OUTPATIENT
Start: 2020-08-20 | End: 2020-08-22

## 2020-08-20 RX ORDER — VANCOMYCIN HCL 1 G
1000 VIAL (EA) INTRAVENOUS DAILY
Refills: 0 | Status: DISCONTINUED | OUTPATIENT
Start: 2020-08-21 | End: 2020-08-22

## 2020-08-20 RX ORDER — FUROSEMIDE 40 MG
20 TABLET ORAL ONCE
Refills: 0 | Status: COMPLETED | OUTPATIENT
Start: 2020-08-20 | End: 2020-08-20

## 2020-08-20 RX ORDER — ALBUTEROL 90 UG/1
2.5 AEROSOL, METERED ORAL
Refills: 0 | Status: DISCONTINUED | OUTPATIENT
Start: 2020-08-20 | End: 2020-08-26

## 2020-08-20 RX ORDER — DOLUTEGRAVIR SODIUM 25 MG/1
50 TABLET, FILM COATED ORAL DAILY
Refills: 0 | Status: DISCONTINUED | OUTPATIENT
Start: 2020-08-20 | End: 2020-08-20

## 2020-08-20 RX ORDER — PIPERACILLIN AND TAZOBACTAM 4; .5 G/20ML; G/20ML
3.38 INJECTION, POWDER, LYOPHILIZED, FOR SOLUTION INTRAVENOUS EVERY 8 HOURS
Refills: 0 | Status: DISCONTINUED | OUTPATIENT
Start: 2020-08-20 | End: 2020-08-22

## 2020-08-20 RX ADMIN — Medication 500 MILLIGRAM(S): at 12:19

## 2020-08-20 RX ADMIN — ALBUTEROL 2.5 MILLIGRAM(S): 90 AEROSOL, METERED ORAL at 17:50

## 2020-08-20 RX ADMIN — PIPERACILLIN AND TAZOBACTAM 25 GRAM(S): 4; .5 INJECTION, POWDER, LYOPHILIZED, FOR SOLUTION INTRAVENOUS at 21:51

## 2020-08-20 RX ADMIN — Medication 100 MILLIGRAM(S): at 12:20

## 2020-08-20 RX ADMIN — ABACAVIR 300 MILLIGRAM(S): 20 SOLUTION ORAL at 21:51

## 2020-08-20 RX ADMIN — Medication 20 MILLIGRAM(S): at 13:13

## 2020-08-20 RX ADMIN — HEPARIN SODIUM 5000 UNIT(S): 5000 INJECTION INTRAVENOUS; SUBCUTANEOUS at 21:51

## 2020-08-20 RX ADMIN — Medication 40 MILLIEQUIVALENT(S): at 12:19

## 2020-08-20 RX ADMIN — PIPERACILLIN AND TAZOBACTAM 25 GRAM(S): 4; .5 INJECTION, POWDER, LYOPHILIZED, FOR SOLUTION INTRAVENOUS at 13:13

## 2020-08-20 RX ADMIN — Medication 250 MILLIGRAM(S): at 09:51

## 2020-08-20 RX ADMIN — TAMSULOSIN HYDROCHLORIDE 0.4 MILLIGRAM(S): 0.4 CAPSULE ORAL at 21:51

## 2020-08-20 RX ADMIN — HEPARIN SODIUM 5000 UNIT(S): 5000 INJECTION INTRAVENOUS; SUBCUTANEOUS at 06:16

## 2020-08-20 RX ADMIN — Medication 1 TABLET(S): at 12:20

## 2020-08-20 RX ADMIN — HEPARIN SODIUM 5000 UNIT(S): 5000 INJECTION INTRAVENOUS; SUBCUTANEOUS at 12:22

## 2020-08-20 RX ADMIN — FINASTERIDE 5 MILLIGRAM(S): 5 TABLET, FILM COATED ORAL at 21:51

## 2020-08-20 RX ADMIN — ALBUTEROL 2.5 MILLIGRAM(S): 90 AEROSOL, METERED ORAL at 23:09

## 2020-08-20 RX ADMIN — Medication 3 MILLILITER(S): at 00:41

## 2020-08-20 RX ADMIN — ABACAVIR 300 MILLIGRAM(S): 20 SOLUTION ORAL at 12:20

## 2020-08-20 NOTE — DISCHARGE NOTE PROVIDER - NSDCACTIVITY_GEN_ALL_CORE
Walking - Outdoors allowed/Do not drive or operate machinery/Do not make important decisions/Stairs allowed/No heavy lifting/straining/Showering allowed/Walking - Indoors allowed

## 2020-08-20 NOTE — DISCHARGE NOTE PROVIDER - NSDCFUSCHEDAPPT_GEN_ALL_CORE_FT
CARLINE GARBER ; 10/08/2020 ; [facility] ; 11/11/2020 ; [facility] CARLINE GARBER ; 10/08/2020 ; [facility] [place] ; 10/13/2020 ; [facility] [place] ; 11/11/2020 ; [facility]

## 2020-08-20 NOTE — PROGRESS NOTE ADULT - ATTENDING COMMENTS
Patient seen and examined with Dr. Gamble    I agree with his interval history exam and plans as noted above  Mental status appears to be at baseline  Cardiac murmur- softer on exam today  Blood cultures 2/2 sets grew coag negative staph  right knee cellulitis improved    Repeat specimens sent and pending  Would obtain TTE to evaluate valves  Continue out pt ARV meds    Tung Almazan MD  [phone]  After 5pm/weekends [phone]

## 2020-08-20 NOTE — DISCHARGE NOTE PROVIDER - PROVIDER TOKENS
PROVIDER:[TOKEN:[[id number]],FOLLOWUP:[1 week]],PROVIDER:[TOKEN:[[id number]],FOLLOWUP:[Routine]],PROVIDER:[TOKEN:[[id number]],FOLLOWUP:[1 month]],PROVIDER:[TOKEN:[[id number]],SCHEDULEDAPPT:[10/13/2020],SCHEDULEDAPPTTIME:[02:30 PM]]

## 2020-08-20 NOTE — PROGRESS NOTE ADULT - SUBJECTIVE AND OBJECTIVE BOX
Rosa M Palma PGY1    Patient is a 80y old  Male who presents with a chief complaint of fall (19 Aug 2020 17:32)      SUBJECTIVE / OVERNIGHT EVENTS:  - overnight: RRT called for acute hypoxia with spo2 77% on RA. Pt was getting IVF LR for rhabdo. Placed on 100%NRB, tachy to 130s, BP elevated to 180/110, febrile to 100.7. On exam, diffuse wheeze and crackles bilaterally. CXR with mild pulm edema, more vascular congestion compared to prior. ABG showed resp acidosis and pCO2 ~60. Pt given lasix 60 mg IV once, IVF discontinued. labetalol 10 mg IV for elevated BP. Placed on BiPAP. Transferred to tele for further monitoring.      - am: pt denies chest pain. Feels better with BiPAP but it makes his throat dry and he wants to eat. On exam, mild crackles bilaterally posteriorly. 3/6 systolic murmur in lower sternal border. SpO2 99%. No edema in b/l LE.   - tele shows sinus 80-90s, 2.16 seconds paroxysmal atrial tachycardia, PVCs.   - repeat CXR pending.     MEDICATIONS  (STANDING):  abacavir 300 milliGRAM(s) Oral every 12 hours  ascorbic acid 500 milliGRAM(s) Oral daily  finasteride 5 milliGRAM(s) Oral daily  heparin   Injectable 5000 Unit(s) SubCutaneous every 8 hours  lamiVUDine- milliGRAM(s) Oral daily  multivitamin 1 Tablet(s) Oral daily  piperacillin/tazobactam IVPB.. 3.375 Gram(s) IV Intermittent every 12 hours  potassium chloride    Tablet ER 40 milliEquivalent(s) Oral once  tamsulosin 0.4 milliGRAM(s) Oral at bedtime  vancomycin  IVPB 1000 milliGRAM(s) IV Intermittent every 12 hours    MEDICATIONS  (PRN):      CAPILLARY BLOOD GLUCOSE      POCT Blood Glucose.: 124 mg/dL (19 Aug 2020 22:17)    I&O's Summary    19 Aug 2020 07:01  -  20 Aug 2020 07:00  --------------------------------------------------------  IN: 1320 mL / OUT: 1850 mL / NET: -530 mL        Vital Signs Last 24 Hrs  T(C): 36.4 (20 Aug 2020 04:47), Max: 37.7 (19 Aug 2020 20:11)  T(F): 97.5 (20 Aug 2020 04:47), Max: 99.9 (19 Aug 2020 20:11)  HR: 80 (20 Aug 2020 06:47) (79 - 105)  BP: 114/67 (20 Aug 2020 04:47) (106/58 - 130/87)  BP(mean): --  RR: 18 (20 Aug 2020 04:47) (18 - 19)  SpO2: 99% (20 Aug 2020 06:47) (93% - 100%)    PHYSICAL EXAM:  GENERAL: NAD, well-developed, well-nourished  HEAD: Atraumatic, Normocephalic  EYES: EOMI, PERRLA, conjunctiva and sclera clear  NECK: Supple, No JVD  CHEST/LUNG: Clear to auscultation bilaterally; No wheezes or crackles  HEART: Normal S1/S2; Regular rate and rhythm; No murmurs, rubs, or gallops  ABDOMEN: Soft, Nontender, Nondistended; Bowel sounds present  EXTREMITIES: 2+ Peripheral Pulses; No clubbing, cyanosis, or edema  PSYCH: A&Ox3  NEUROLOGY: no focal neurologic deficit  SKIN: No rashes or lesions    LABS:                        11.2   15.56 )-----------( 172      ( 20 Aug 2020 06:09 )             34.4      08-20    135  |  101  |  40<H>  ----------------------------<  138<H>  3.6   |  23  |  1.80<H>    Ca    8.4      20 Aug 2020 06:10  Phos  3.3     08-20  Mg     2.1     08-20    TPro  6.2  /  Alb  2.7<L>  /  TBili  0.4  /  DBili  x   /  AST  75<H>  /  ALT  34  /  AlkPhos  131<H>  08-20    PT/INR - ( 19 Aug 2020 22:35 )   PT: 12.0 sec;   INR: 1.01 ratio         PTT - ( 19 Aug 2020 22:35 )  PTT:38.1 sec  CARDIAC MARKERS ( 20 Aug 2020 06:10 )  x     / x     / 1248 U/L / x     / x      CARDIAC MARKERS ( 19 Aug 2020 15:18 )  x     / x     / 2469 U/L / x     / x      CARDIAC MARKERS ( 19 Aug 2020 03:25 )  x     / x     / 3333 U/L / x     / x      CARDIAC MARKERS ( 18 Aug 2020 22:33 )  x     / x     / 3857 U/L / x     / x          Urinalysis Basic - ( 19 Aug 2020 03:23 )    Color: Yellow / Appearance: Turbid / S.015 / pH: x  Gluc: x / Ketone: Trace  / Bili: Negative / Urobili: Negative   Blood: x / Protein: 100 mg/dL / Nitrite: Negative   Leuk Esterase: Large / RBC: 10 /hpf / WBC >50 /HPF   Sq Epi: x / Non Sq Epi: 3 / Bacteria: Many        RADIOLOGY & ADDITIONAL TESTS:    Imaging Personally Reviewed:    Consultant(s) Notes Reviewed:      Care Discussed with Consultants/Other Providers: Rosa M Palma PGY1    Patient is a 80y old  Male who presents with a chief complaint of fall (19 Aug 2020 17:32)      SUBJECTIVE / OVERNIGHT EVENTS:  - overnight: RRT called for acute hypoxia with spo2 77% on RA. Pt was getting IVF LR for rhabdo. Placed on 100%NRB, tachy to 130s, BP elevated to 180/110, febrile to 100.7. On exam, diffuse wheeze and crackles bilaterally. CXR with mild pulm edema, more vascular congestion compared to prior. ABG showed resp acidosis and pCO2 ~60. Pt given lasix 60 mg IV once, IVF discontinued. labetalol 10 mg IV for elevated BP. Placed on BiPAP. Transferred to tele for further monitoring.      - am: pt denies chest pain. Feels better with BiPAP but it makes his throat dry and he wants to eat. On exam, mild crackles bilaterally posteriorly. 3/6 systolic murmur in lower sternal border. SpO2 99%. No edema in b/l LE.   - tele shows sinus 80-90s, 2.16 seconds paroxysmal atrial tachycardia, PVCs.   - repeat CXR with decreased vascular congestion    MEDICATIONS  (STANDING):  abacavir 300 milliGRAM(s) Oral every 12 hours  ascorbic acid 500 milliGRAM(s) Oral daily  finasteride 5 milliGRAM(s) Oral daily  heparin   Injectable 5000 Unit(s) SubCutaneous every 8 hours  lamiVUDine- milliGRAM(s) Oral daily  multivitamin 1 Tablet(s) Oral daily  piperacillin/tazobactam IVPB.. 3.375 Gram(s) IV Intermittent every 12 hours  potassium chloride    Tablet ER 40 milliEquivalent(s) Oral once  tamsulosin 0.4 milliGRAM(s) Oral at bedtime  vancomycin  IVPB 1000 milliGRAM(s) IV Intermittent every 12 hours    MEDICATIONS  (PRN):      CAPILLARY BLOOD GLUCOSE      POCT Blood Glucose.: 124 mg/dL (19 Aug 2020 22:17)    I&O's Summary    19 Aug 2020 07:01  -  20 Aug 2020 07:00  --------------------------------------------------------  IN: 1320 mL / OUT: 1850 mL / NET: -530 mL        Vital Signs Last 24 Hrs  T(C): 36.4 (20 Aug 2020 04:47), Max: 37.7 (19 Aug 2020 20:11)  T(F): 97.5 (20 Aug 2020 04:47), Max: 99.9 (19 Aug 2020 20:11)  HR: 80 (20 Aug 2020 06:47) (79 - 105)  BP: 114/67 (20 Aug 2020 04:47) (106/58 - 130/87)  BP(mean): --  RR: 18 (20 Aug 2020 04:47) (18 - 19)  SpO2: 99% (20 Aug 2020 06:47) (93% - 100%)    PHYSICAL EXAM:  GENERAL: lying down with bipap in place, not in distress  HEAD: Atraumatic, Normocephalic  EYES: EOMI, PERRLA, conjunctiva and sclera clear  NECK: Supple, No JVD  CHEST/LUNG: clear to auscultation bilaterally anteriorly  HEART: regular rate and rhythm, 3/6 systolic murmur most noticeable at lower sternal borders  ABDOMEN: Soft, Nontender, Nondistended; Bowel sounds present  EXTREMITIES: 6 cm area of erythema and mild swelling in L knee concerning for cellulitis, scar likely from fall   PSYCH: A&Ox3  NEUROLOGY: no focal neurologic deficit, able to move his arm and legs, weakness in shoulder extension bilaterally  SKIN: No rashes or lesions    LABS:                        11.2   15.56 )-----------( 172      ( 20 Aug 2020 06:09 )             34.4      08-20    135  |  101  |  40<H>  ----------------------------<  138<H>  3.6   |  23  |  1.80<H>    Ca    8.4      20 Aug 2020 06:10  Phos  3.3     08-20  Mg     2.1     08-20    TPro  6.2  /  Alb  2.7<L>  /  TBili  0.4  /  DBili  x   /  AST  75<H>  /  ALT  34  /  AlkPhos  131<H>  08-20    PT/INR - ( 19 Aug 2020 22:35 )   PT: 12.0 sec;   INR: 1.01 ratio         PTT - ( 19 Aug 2020 22:35 )  PTT:38.1 sec  CARDIAC MARKERS ( 20 Aug 2020 06:10 )  x     / x     / 1248 U/L / x     / x      CARDIAC MARKERS ( 19 Aug 2020 15:18 )  x     / x     / 2469 U/L / x     / x      CARDIAC MARKERS ( 19 Aug 2020 03:25 )  x     / x     / 3333 U/L / x     / x      CARDIAC MARKERS ( 18 Aug 2020 22:33 )  x     / x     / 3857 U/L / x     / x          Urinalysis Basic - ( 19 Aug 2020 03:23 )    Color: Yellow / Appearance: Turbid / S.015 / pH: x  Gluc: x / Ketone: Trace  / Bili: Negative / Urobili: Negative   Blood: x / Protein: 100 mg/dL / Nitrite: Negative   Leuk Esterase: Large / RBC: 10 /hpf / WBC >50 /HPF   Sq Epi: x / Non Sq Epi: 3 / Bacteria: Many        RADIOLOGY & ADDITIONAL TESTS:    Imaging Personally Reviewed:    Consultant(s) Notes Reviewed:      Care Discussed with Consultants/Other Providers:

## 2020-08-20 NOTE — PROGRESS NOTE ADULT - PROBLEM SELECTOR PLAN 5
Controlled; last CD4 368; undetectable viral load in July  [ ] continue home abacavir, lamivudine, pifelo - CK 2469 -> 1248 s/p 2L IVF NS and mIVF  cc/hr. IVF stopped after flash pulm edema overnight.  - rhabdo 2/2 being down 24 hrs after fall  - pt currently has weakness in bilateral shoulders, stiffness in UE and LE, decreased mobility and str  - PT rec CYNTHIA  [ ] wait for echo read and decide on whether to restart IVF at 75 cc/hr for rhabdo  [ ] CTM CK

## 2020-08-20 NOTE — CONSULT NOTE ADULT - ASSESSMENT
79y/o M with PMHx of HIV, BPH, CAD, urinary retention self catheterizes TID, admitted after a fall found to have hydronephrosis likely from urinary retention    -Keep tovar for maximal bladder decompression  -Follow up urine culture  -Trend Scr   -Will consider repeating renal u/s if no improvement in SCr    ***Final plan to be discussed with attending 79y/o M with PMHx of HIV, BPH, CAD, urinary retention self catheterizes TID, admitted after a fall found to have hydronephrosis likely from urinary retention    - Keep Aden for maximal bladder decompression  - Follow up urine culture  - Trend SCr   - If SCr downtrends, would repeat renal US in 3 days to evaluate for improved hydro (renal US 1 day after Aden placement is not sufficiently long enough to identify improvement hydro)  - If SCr does not improve, would consider renal lasix scan to evaluate for evidence of obstruction   - Will follow        [facility]  [address] [place] [address], [address] [address] [phone]

## 2020-08-20 NOTE — DISCHARGE NOTE PROVIDER - HOSPITAL COURSE
Pt is a 79 y/o admitted last night from [facility] (rehab facility) for fall. Denies palpitations, chest pain, dizziness prior to fall; denies LOC; was down 24 hrs, EMS called by rehab, found pt covered in urine/feces. Pt straight caths himself 3x daily for detrusor muscle weakness + BPH. Urinates little without straight cath. Sees urology outpatient. Found to have severe bilateral hydroureteronephrosis on CT AP 2/2 inability to straight cath himself after fall. Urology consulted; recommended repeat US to check progress of hydronephrosis. Aden placed, draining red brown urine. Found to have rhabdo, CK . On mIVF  cc/hr (s/p 2L NS bolus in ED). SUDHEER resolved overnight with fluids. ECG without ST changes or T wave inversions. Trop elevated to 85; repeat pending. On ceftriaxone for positive UA + thickened bladder wall on CT AP.         BCx + for noncoag staph. Loud murmur in lower sternal borders concerning for possible endocarditis; echo showed: ____. ID following, recommended vanc 1 g daily for empiric coverage endocarditis and zosyn q 12 hrs for L knee cellulitis. UA + but UCx <10,000 normal oswaldo. Repeat BCx ____.         PT recommends CYNTHIA.         Rapid response called for SOB, hypoxic spo2 77 on RA, CXR with mild pulm edema s/p 60 mg IV lasix with improved repeat CXR. Pt placed on bipap -> weaned to 4L NC. IVF d/c'ed (was on it for rhabdo). ECG with rvr' in V1 concerning for RBBB, irregular, sinus rhythm, no ST changes or T wave inversions. Echo as above. 81 y/o man with PMHx of HIV, CAD, detrusor muscle weakness, BPH presenting for fall, unresponsive for 24 hrs, admitted with rhabdo, bilateral severe ureterohydronephrosis and thickened bladder on CTAP 2/2 inability to continue his home straight cath 3x daily after fall, tovar in place. Rhabdo improved, hydronephrosis improved with tovar (urology following). NOHEMI shows endocarditis (NOHEMI: 1.8x1.3 vegetation on medial aspect of anterior leaflet with partial flail component + 0.5x1.3cm vegetation no posterior leaflet w severe eccentric mitral regurg). BCx staph epidermidis, repeat NGTD, on vancomycin. s/p MRA without septic emboli. s/p cardiac cath  normal cors. Followed by ID Dr. Almazan cleared for OR. Elevated creatinine followed by Nephrology monitoring creatinine this afternoon. Endocrine consulted Dr. Izaguirre.    On 8/31/20 s/p MVR (T) / SHELLIE clip     Post op Course: Extubated POD #0    ID following for endocarditis; and HIV à Vancomycin 1g daily for now; Check daily labs for vancomycin random level, goal is 15-20.    Hyperglycemia à Endocrine following     SUDHEER on CKD à Renal following. Tovar in place.     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    9/4 overnight noted with Accelerated junctional / AV disassociation. Continue on AC therapy Coumadin 5 mg PO tonight.  V Paced à 80.  Monitor renal function.  Creatinine 2.1 will repeat BMP this afternoon.     9/5 EPM rate decreased to rate 70. Coumadin dosing tonight for INR 1.6. Tovar maintained. As per urology plan to d/c tovar when creatinine becomes stable, then pt can continue self cath at home for his baseline urinary retention. Lasix 40 mg PO added.     9/6 meds bulb 35 cc x 24 hrs. ext pacer turned down to 50? HB to 50's and sr with first degree currently off BB with EPS consult called today,   creat down 1.71    9/7 VVS; Continue with current medication regimen. Low dose coumadin for anticipated PICC lime placement.  Vanco dosed by level.  Awaiting this afternoon Vanco (T). ID following. EP following --> Afib w/ occasional V-Pacing @ 50; No indication for PPM at this time.     9/8 VSS, cleared by IR for bedside PICC line placement today. INR 1.86. D/C tovar and trial self catheterization. Pt still AV dissociation &  on tele - EP re-eval for possible Micra PPM.     9/9 VSS, RUE PICC in place. EPM lowered to VVI 30, no pacing required overnight. Continue to observe, INR 1.82 low dose Coumadin 1mg. Likely discharge to Rehab Friday.    9/10 VSS, PW cut per Dr. Camacho, no pacing requiring on tele, plan to discharge to Rehab Friday. INR 1.74, Coumadin 3mg.    9/11 VVS; No further pacing, temp. Epicardial pacing was removed yesterday. Per EP; PPM not indicated at this time. Patient to follow up with Dr. Cifuentes as scheduled on 10/13/20 at 2:15pm.  Per Dr. Camacho patient is medically cleared for discharged to Rehab today.

## 2020-08-20 NOTE — PROGRESS NOTE ADULT - SUBJECTIVE AND OBJECTIVE BOX
Follow Up:  1. GPC bacteremia  2. Left knee cellulitis  3. HIV  4. Pyuria    Interval History/ROS:Patient is a 80y old  Male who presents with a chief complaint of fall (20 Aug 2020 07:58)  - BCx: 2/2 Cog neg Staph 2/2 GPC  - On vancomycin now  - UC: < 1000 noral  oswaldo  - HIV VL undetec  - Cr=1.8  - WBC=15.56  - Getting Zosyn for left knee cellulitis tx      Allergies  No Known Allergies      ANTIMICROBIALS:    abacavir 300 every 12 hours  lamiVUDine- daily  piperacillin/tazobactam IVPB.. 3.375 every 8 hours  vancomycin  IVPB        OTHER MEDS: MEDICATIONS  (STANDING):  finasteride 5 daily  heparin   Injectable 5000 every 8 hours  tamsulosin 0.4 at bedtime      Vital Signs Last 24 Hrs  T(F): 97.5 (08-20-20 @ 04:47), Max: 99.9 (08-19-20 @ 20:11)    Vital Signs Last 24 Hrs  HR: 77 (--20 @ 10:01) (77 - 105)  BP: 114/67 (08-20-20 @ 04:47) (106/58 - 130/87)  RR: 18 (08-20-20 @ 04:47)  SpO2: 100% (08-20-20 @ 10:01) (93% - 100%)  Wt(kg): --    EXAM:  Constitutional: Not in acute distress  Eyes: pupils bilaterally reactive to light. No icterus.  Oral cavity: Clear, no lesions  Neck: No neck vein distension noted  RS: Chest clear to auscultation bilaterally. No wheeze/rhonchi/crepitations.  CVS: S1, S2 heard. Regular rate and rhythm. No murmurs/rubs/gallops.  Abdomen: Soft. No guarding/rigidity/tenderness.  : No acute abnormalities  Extremities: Warm. No pedal edema  Skin: No lesions noted  Vascular: No evidence of phlebitis  Neuro: Alert, oriented to time/place/person    Labs:                        11.2   15.56 )-----------( 172      ( 20 Aug 2020 06:09 )             34.4     08-20    135  |  101  |  40<H>  ----------------------------<  138<H>  3.6   |  23  |  1.80<H>    Ca    8.4      20 Aug 2020 06:10  Phos  3.3       Mg     2.1         TPro  6.2  /  Alb  2.7<L>  /  TBili  0.4  /  DBili  x   /  AST  75<H>  /  ALT  34  /  AlkPhos  131<H>        WBC Trend:  WBC Count: 15.56 (20 @ 06:09)  WBC Count: 21.13 (20 @ 22:46)  WBC Count: 16.47 (20 @ 03:25)  WBC Count: 16.50 (20 @ 22:39)      Creatine Trend:  Creatinine, Serum: 1.80 ()  Creatinine, Serum: 1.59 ()  Creatinine, Serum: 1.58 ()  Creatinine, Serum: 1.83 ()  Creatinine, Serum: 2.02 ()      Liver Biochemical Testing Trend:  Alanine Aminotransferase (ALT/SGPT): 34 ()  Alanine Aminotransferase (ALT/SGPT): 36 ()  Alanine Aminotransferase (ALT/SGPT): 28 ()  Aspartate Aminotransferase (AST/SGOT): 75 (20 @ 06:10)  Aspartate Aminotransferase (AST/SGOT): 98 (20 @ 22:35)  Aspartate Aminotransferase (AST/SGOT): 93 (20 @ 03:25)  Aspartate Aminotransferase (AST/SGOT): 97 (20 @ 22:33)  Bilirubin Total, Serum: 0.4 ()  Bilirubin Total, Serum: 0.4 ()  Bilirubin Total, Serum: 0.7 ()  Bilirubin Total, Serum: 0.8 ()      Urinalysis Basic - ( 19 Aug 2020 03:23 )    Color: Yellow / Appearance: Turbid / S.015 / pH: x  Gluc: x / Ketone: Trace  / Bili: Negative / Urobili: Negative   Blood: x / Protein: 100 mg/dL / Nitrite: Negative   Leuk Esterase: Large / RBC: 10 /hpf / WBC >50 /HPF   Sq Epi: x / Non Sq Epi: 3 / Bacteria: Many        MICROBIOLOGY:        Culture - Urine (collected 19 Aug 2020 09:00)  Source: .Urine Clean Catch (Midstream)  Final Report (20 Aug 2020 09:42):    <10,000 CFU/mL Normal Urogenital Oswaldo    Culture - Blood (collected 19 Aug 2020 01:39)  Source: .Blood Blood-Peripheral  Gram Stain (19 Aug 2020 19:50):    Growth in aerobic bottle: Gram Positive Cocci in Clusters    Growth in anaerobic bottle: Gram Positive Cocci in Clusters  Preliminary Report (19 Aug 2020 19:50):    Growth in aerobic bottle: Gram Positive Cocci in Clusters    Growth in anaerobic bottle: Gram Positive Cocci in Clusters    Culture - Blood (collected 19 Aug 2020 01:39)  Source: .Blood Blood-Peripheral  Gram Stain (19 Aug 2020 21:38):    Growth in aerobic bottle: Gram Positive Cocci in Clusters    Growth in anaerobic bottle: Gram Positive Cocci in Clusters  Preliminary Report (19 Aug 2020 21:38):    Growth in aerobic bottle: Gram Positive Cocci in Clusters    Growth in anaerobic bottle: Gram Positive Cocci in Clusters    "Due to technical problems, Proteus sp. will Not be reported as part of    the BCID panel until further notice"    ***Blood Panel PCR results on this specimen are available    approximately 3 hours after the Gram stain result.***    Gram stain, PCR, and/or culture results may not always    correspond due to difference in methodologies.    ************************************************************  Organism: Blood Culture PCR (19 Aug 2020 20:41)  Organism: Blood Culture PCR (19 Aug 2020 20:41)      -  Coagulase negative Staphylococcus: Detec      Method Type: PCR      HIV Virology:  HIV-1 Viral Load Result: NOT DET. (20 @ 04:46)  ABS CD4: 324 /uL (20 @ 03:26)      RADIOLOGY:    OTHER TESTS:  COVID-19 PCR: NotDetec (20 @ 02:39)  COVID-19 IgG Antibody Index: 0.92 Index (20 @ 09:26)    Blood Gas Venous - Lactate: 2.0 ( @ 03:25)  Blood Gas Venous - Lactate: 3.1 ( @ 22:39) Follow Up:  1. GPC bacteremia  2. Left knee cellulitis  3. HIV  4. Pyuria    Interval History/ROS:Patient is a 80y old  Male who presents with a chief complaint of fall (20 Aug 2020 07:58)  - Overnight: RRT called for acute hypoxia with spo2 77% on RA. Pt was getting IVF LR for rhabdo. Placed on 100%NRB, tachy to 130s, BP elevated to 180/110, febrile to 100.7. On exam, diffuse wheeze and crackles bilaterally. CXR with mild pulm edema, more vascular congestion compared to prior. ABG showed resp acidosis and pCO2 ~60. Pt given lasix 60 mg IV once, IVF discontinued. labetalol 10 mg IV for elevated BP. Placed on BiPAP. Transferred to [facility] for further monitoring.   - BCx: 2/2 Cog neg Staph 2/2 GPC  - On vancomycin now  - UC: < 1000 noral  oswaldo  - HIV VL undetec  - Cr=1.8  - WBC=15.56  - Getting Zosyn for left knee cellulitis tx    Allergies  No Known Allergies    ANTIMICROBIALS:    abacavir 300 every 12 hours  lamiVUDine- daily  piperacillin/tazobactam IVPB.. 3.375 every 8 hours  vancomycin  IVPB        OTHER MEDS: MEDICATIONS  (STANDING):  finasteride 5 daily  heparin   Injectable 5000 every 8 hours  tamsulosin 0.4 at bedtime    Vital Signs Last 24 Hrs  T(F): 97.5 (-20-20 @ 04:47), Max: 99.9 (08-19-20 @ 20:11)    Vital Signs Last 24 Hrs  HR: 77 (-20-20 @ 10:01) (77 - 105)  BP: 114/67 (-20-20 @ 04:47) (106/58 - 130/87)  RR: 18 (08-20-20 @ 04:47)  SpO2: 100% (-20-20 @ 10:01) (93% - 100%)  Wt(kg): --    EXAM:  Constitutional: Not in acute distress  Eyes: pupils bilaterally reactive to light. No icterus.  Oral cavity: Clear, no lesions  Neck: No neck vein distension noted  RS: Chest clear to auscultation bilaterally. No wheeze/rhonchi/crepitations.  CVS: S1, S2 heard. Regular rate and rhythm. Murmur disappeared.  Abdomen: Soft. No guarding/rigidity/tenderness.  : No acute abnormalities. Aden in place.  Extremities: Warm. No pedal edema. Left knee redness decreased. There is one eschar lesion on the left knee and right knee.  Vascular: No evidence of phlebitis  Neuro: Alert, oriented to time/place/person    Labs:                        11.2   15.56 )-----------( 172      ( 20 Aug 2020 06:09 )             34.4         135  |  101  |  40<H>  ----------------------------<  138<H>  3.6   |  23  |  1.80<H>    Ca    8.4      20 Aug 2020 06:10  Phos  3.3       Mg     2.1         TPro  6.2  /  Alb  2.7<L>  /  TBili  0.4  /  DBili  x   /  AST  75<H>  /  ALT  34  /  AlkPhos  131<H>        WBC Trend:  WBC Count: 15.56 (20 @ 06:09)  WBC Count: 21.13 (20 @ 22:46)  WBC Count: 16.47 (20 @ 03:25)  WBC Count: 16.50 (20 @ 22:39)      Creatine Trend:  Creatinine, Serum: 1.80 ()  Creatinine, Serum: 1.59 ()  Creatinine, Serum: 1.58 ()  Creatinine, Serum: 1.83 ()  Creatinine, Serum: 2.02 ()      Liver Biochemical Testing Trend:  Alanine Aminotransferase (ALT/SGPT): 34 ()  Alanine Aminotransferase (ALT/SGPT): 36 ()  Alanine Aminotransferase (ALT/SGPT): 28 ()  Aspartate Aminotransferase (AST/SGOT): 75 (20 @ 06:10)  Aspartate Aminotransferase (AST/SGOT): 98 (20 @ 22:35)  Aspartate Aminotransferase (AST/SGOT): 93 (20 @ 03:25)  Aspartate Aminotransferase (AST/SGOT): 97 (20 @ 22:33)  Bilirubin Total, Serum: 0.4 (08-20)  Bilirubin Total, Serum: 0.4 (08-19)  Bilirubin Total, Serum: 0.7 (08-19)  Bilirubin Total, Serum: 0.8 (08-18)      Urinalysis Basic - ( 19 Aug 2020 03:23 )    Color: Yellow / Appearance: Turbid / S.015 / pH: x  Gluc: x / Ketone: Trace  / Bili: Negative / Urobili: Negative   Blood: x / Protein: 100 mg/dL / Nitrite: Negative   Leuk Esterase: Large / RBC: 10 /hpf / WBC >50 /HPF   Sq Epi: x / Non Sq Epi: 3 / Bacteria: Many        MICROBIOLOGY:        Culture - Urine (collected 19 Aug 2020 09:00)  Source: .Urine Clean Catch (Midstream)  Final Report (20 Aug 2020 09:42):    <10,000 CFU/mL Normal Urogenital Oswaldo    Culture - Blood (collected 19 Aug 2020 01:39)  Source: .Blood Blood-Peripheral  Gram Stain (19 Aug 2020 19:50):    Growth in aerobic bottle: Gram Positive Cocci in Clusters    Growth in anaerobic bottle: Gram Positive Cocci in Clusters  Preliminary Report (19 Aug 2020 19:50):    Growth in aerobic bottle: Gram Positive Cocci in Clusters    Growth in anaerobic bottle: Gram Positive Cocci in Clusters    Culture - Blood (collected 19 Aug 2020 01:39)  Source: .Blood Blood-Peripheral  Gram Stain (19 Aug 2020 21:38):    Growth in aerobic bottle: Gram Positive Cocci in Clusters    Growth in anaerobic bottle: Gram Positive Cocci in Clusters  Preliminary Report (19 Aug 2020 21:38):    Growth in aerobic bottle: Gram Positive Cocci in Clusters    Growth in anaerobic bottle: Gram Positive Cocci in Clusters    "Due to technical problems, Proteus sp. will Not be reported as part of    the BCID panel until further notice"    ***Blood Panel PCR results on this specimen are available    approximately 3 hours after the Gram stain result.***    Gram stain, PCR, and/or culture results may not always    correspond due to difference in methodologies.    ************************************************************  Organism: Blood Culture PCR (19 Aug 2020 20:41)  Organism: Blood Culture PCR (19 Aug 2020 20:41)      -  Coagulase negative Staphylococcus: Detec      Method Type: PCR      HIV Virology:  HIV-1 Viral Load Result: NOT DET. (20 @ 04:46)  ABS CD4: 324 /uL (20 @ 03:26)      RADIOLOGY:  < from: Xray Chest 1 View-PORTABLE IMMEDIATE (20 @ 22:36) >  INTERPRETATION:  New onset mild pulmonary edema.     < end of copied text >      OTHER TESTS:  COVID-19 PCR: NotDetec (20 @ 02:39)  COVID-19 IgG Antibody Index: 0.92 Index (20 @ 09:26)    Blood Gas Venous - Lactate: 2.0 ( @ 03:25)  Blood Gas Venous - Lactate: 3.1 ( @ 22:39)

## 2020-08-20 NOTE — PROGRESS NOTE ADULT - ASSESSMENT
Mr. Monsivais is a 79 y/o man with hx HIV, CAD, BPH presenting for fall, down for 24 hrs, admitted with rhabdo, bilateral severe ureterohydronephrosis and thickened bladder on CTAP 2/2 inability to continue his home straight cath 3x daily after fall, tovar in place. On mIVF LR and ceftriaxone. SUDHEER resolved. Mr. Monsivais is a 79 y/o man with hx HIV, CAD, detrusor muscle weakness, BPH presenting for fall, down for 24 hrs, admitted with rhabdo, bilateral severe ureterohydronephrosis and thickened bladder on CTAP 2/2 inability to continue his home straight cath 3x daily after fall, tovar in place. On mIVF LR and ceftriaxone. SUDHEER resolved.

## 2020-08-20 NOTE — DISCHARGE NOTE PROVIDER - NSDCMRMEDTOKEN_GEN_ALL_CORE_FT
abacavir 300 mg oral tablet: 2 tab(s) orally once a day  finasteride: 5 milligram(s) orally once a day  lamiVUDine 100 mg oral tablet: 1 tab(s) orally once a day  Multiple Vitamins oral tablet: 1 tab(s) orally once a day  multivitamin: 1 tab(s) orally once a day  Pifeltro 100 mg oral tablet: 1 tab(s) orally once a day  polyethylene glycol 3350 with electrolytes oral powder: 1 application orally once a day, As Needed  tamsulosin 0.4 mg oral capsule: 1 cap(s) orally once a day  Vitamin C 500 mg oral tablet: 1 tab(s) orally once a day abacavir 300 mg oral tablet: 2 tab(s) orally once a day  acetaminophen 325 mg oral tablet: 2 tab(s) orally every 6 hours, As needed, Mild Pain (1 - 3)  ascorbic acid 500 mg oral tablet: 1 tab(s) orally once a day  aspirin 81 mg oral delayed release tablet: 1 tab(s) orally once a day  Coumadin 5 mg oral tablet: 1 tab(s) orally once a day (at bedtime)  for a Therapeutic INR 2-3  ferrous sulfate 325 mg (65 mg elemental iron) oral tablet: 1 tab(s) orally once a day  finasteride: 5 milligram(s) orally once a day  folic acid 1 mg oral tablet: 1 tab(s) orally once a day  furosemide 40 mg oral tablet: 1 tab(s) orally once a day  lamiVUDine 100 mg oral tablet: 1 tab(s) orally once a day  Multiple Vitamins oral tablet: 1 tab(s) orally once a day  multivitamin: 1 tab(s) orally once a day  pantoprazole 40 mg oral delayed release tablet: 1 tab(s) orally once a day (before a meal)  Pifeltro 100 mg oral tablet: 1 tab(s) orally once a day  polyethylene glycol 3350 with electrolytes oral powder: 1 application orally once a day, As Needed  PT / INR STAT on Monday 9/14 and on  Thursday 10/17 then weekly: PT / INR STAT on Monday 9/14 and on  Thursday 10/17 then weekly.  Please have your PCP dose your INR for a goal of 2-3  tamsulosin 0.4 mg oral capsule: 1 cap(s) orally once a day  vancomycin 1 g intravenous injection: 1 gram(s) intravenous once a day to be dose by level.  until 10/12/20 then discontinue  Vitamin C 500 mg oral tablet: 1 tab(s) orally once a day abacavir 300 mg oral tablet: 2 tab(s) orally once a day  acetaminophen 325 mg oral tablet: 2 tab(s) orally every 6 hours, As needed, Mild Pain (1 - 3)  aspirin 81 mg oral delayed release tablet: 1 tab(s) orally once a day  Coumadin 5 mg oral tablet: 1 tab(s) orally once a day (at bedtime)  for a Therapeutic INR 2-3  ferrous sulfate 325 mg (65 mg elemental iron) oral tablet: 1 tab(s) orally once a day  finasteride: 5 milligram(s) orally once a day  folic acid 1 mg oral tablet: 1 tab(s) orally once a day  furosemide 40 mg oral tablet: 1 tab(s) orally once a day  lamiVUDine 100 mg oral tablet: 1 tab(s) orally once a day  Multiple Vitamins oral tablet: 1 tab(s) orally once a day  pantoprazole 40 mg oral delayed release tablet: 1 tab(s) orally once a day (before a meal)  Pifeltro 100 mg oral tablet: 1 tab(s) orally once a day  polyethylene glycol 3350 with electrolytes oral powder: 1 application orally once a day, As Needed  PT / INR STAT on Monday 9/14 and on  Thursday 10/17 then weekly: PT / INR STAT on Monday 9/14 and on  Thursday 10/17 then weekly.  Please have your PCP dose your INR for a goal of 2-3  tamsulosin 0.4 mg oral capsule: 1 cap(s) orally once a day  vancomycin 1 g intravenous injection: 1 gram(s) intravenous once a day to be dose by level.  until 10/12/20 then discontinue  Vitamin C 500 mg oral tablet: 1 tab(s) orally once a day abacavir 300 mg oral tablet: 2 tab(s) orally once a day  acetaminophen 325 mg oral tablet: 2 tab(s) orally every 6 hours, As needed, Mild Pain (1 - 3)  aspirin 81 mg oral delayed release tablet: 1 tab(s) orally once a day  Coumadin 5 mg oral tablet: 1 tab(s) orally once a day (at bedtime)  for a Therapeutic INR 2-3  ferrous sulfate 325 mg (65 mg elemental iron) oral tablet: 1 tab(s) orally once a day  finasteride: 5 milligram(s) orally once a day  folic acid 1 mg oral tablet: 1 tab(s) orally once a day  furosemide 40 mg oral tablet: 1 tab(s) orally once a day x 5 days   lamiVUDine 100 mg oral tablet: 1 tab(s) orally once a day  Multiple Vitamins oral tablet: 1 tab(s) orally once a day  pantoprazole 40 mg oral delayed release tablet: 1 tab(s) orally once a day (before a meal)  Pifeltro 100 mg oral tablet: 1 tab(s) orally once a day  polyethylene glycol 3350 with electrolytes oral powder: 1 application orally once a day, As Needed  PT / INR STAT on Monday 9/14 and on  Thursday 10/17 then weekly: PT / INR STAT on Monday 9/14 and on  Thursday 10/17 then weekly.  Please have your PCP dose your INR for a goal of 2-3  tamsulosin 0.4 mg oral capsule: 1 cap(s) orally once a day  vancomycin 1 g intravenous injection: 1 gram(s) intravenous once a day to be dose by level.  until 10/12/20 then discontinue  Vitamin C 500 mg oral tablet: 1 tab(s) orally once a day

## 2020-08-20 NOTE — DISCHARGE NOTE PROVIDER - NSDCPNSUBOBJ_GEN_ALL_CORE
VITAL SIGNS        Subjective: Denies CP, palpitation, SOB, PRESCOTT, HA, dizziness, N/V/D, fever or chills.  No acute event noted overnight.         Telemetry: Aflutter 40-70          Vital Signs Last 24 Hrs    T(C): 37.1 (20 @ 04:11), Max: 37.1 (20 @ 04:11)    T(F): 98.8 (20 @ 04:11), Max: 98.8 (20 @ 04:11)    HR: 36 (20 @ :11) (36 - 78)    BP: 122/52 (20 @ 04:11) (101/52 - 122/52)    RR: 18 (20 @ :11) (18 - 18)    SpO2: 98% (20 @ 04:11) (98% - 99%)               09-10 @ 07:01  -   @ 07:00    --------------------------------------------------------    IN: 1210 mL / OUT: 2150 mL / NET: -940 mL        Daily       Daily Weight in k.5 (11 Sep 2020 08:13)        PHYSICAL EXAM        Neurology: alert and oriented x 3, nonfocal, no gross deficits        CV: (+) S1 and S2, No murmurs, rubs, gallops or clicks         Sternal Wound: MSI -->CDI , sternum stable        Lungs: CTA B/L         Abdomen: soft, nontender, nondistended, positive bowel sounds, (+) Flatus; (+) BM         :  Voiding                   Extremities:  B/L LE (+) 1 edema; negative calf tenderness; (+) 2 DP palpable ; RUE PICC line with occlusive dressing C/D/I            abacavir 600 milliGRAM(s) Oral daily    acetaminophen   Tablet .. 650 milliGRAM(s) Oral every 6 hours PRN    ascorbic acid 500 milliGRAM(s) Oral daily    aspirin enteric coated 81 milliGRAM(s) Oral daily    Doravirine (Pefeltro), 100 mG 1 Tablet(s) 1 Tablet(s) Oral daily    ferrous sulfate 325 milliGRAM(s) Oral daily    finasteride 5 milliGRAM(s) Oral daily    folic acid 1 milliGRAM(s) Oral daily    furosemide Tablet 40 milliGRAM(s) Oral daily    glucagon  Injectable 1 milliGRAM(s) IntraMuscular once PRN    lamiVUDine- milliGRAM(s) Oral daily    pantoprazole    Tablet 40 milliGRAM(s) Oral before breakfast    polyethylene glycol 3350 17 Gram(s) Oral daily    sodium chloride 0.9% lock flush 3 milliLiter(s) IV Push every 8 hours    tamsulosin 0.4 milliGRAM(s) Oral at bedtime    vancomycin  IVPB 1000 milliGRAM(s) IV Intermittent daily        Discussed with Cardiothoracic Team at AM rounds.        Spent 45 min face to face encounter with patient and discharge note.

## 2020-08-20 NOTE — DISCHARGE NOTE PROVIDER - CARE PROVIDERS DIRECT ADDRESSES
[email or web address]

## 2020-08-20 NOTE — PROGRESS NOTE ADULT - ATTENDING COMMENTS
Seen, examined the patient this am with house staff  80M with h/o HIV on meds, CAD, BPH Admitted for sepsis due to UTI ( secondary to self cath), B/L severe hydronephrosis with SUDHEER, fall with rhabdomyolysis, debility. Developed SOB, pulmonary edema  Resting in bed, on Bipap, overnight c/o SOB, CXR- Pulmonary edema. Also c/o weakness and muscle pain after he fell at the facility, no LOC, chest pain, palpitations  1. Bacteremia- coags neg staph, UTI  2. Pulmonary edema, from IV hydration ( likely non cardiogenic)  3. Rhabdomyolysis, s/p fall  4. Elevated Trop from demand ischemia in the setting of pulmonary edema and SUDHEER  5. HIV, on ART  6. Debility  - Reviewed labs, imaging- CXR Pulmonary edema    WBC 15, CPK 1242, Scr 1.8 from 2.0, Trop 102 from 72  - IV Lasix given 20mg x 1, on O2 via NC 2-4L, off Bispap    Echo pending result, daily I/O  - ID consult appreciated, noted bacteremia with coag neg staph, repeat blood c/s ordered    on IV zosyn and Vanco per ID    c/w ART, f/u Echo for Veg  - Urology consult requested for B/L severe hydronephrosis & SUDHEER    renal US requested  - OOB to chair, PT irving, erick precaution

## 2020-08-20 NOTE — DISCHARGE NOTE PROVIDER - CARE PROVIDER_API CALL
Elizabeth Camacho  SURGERY  [address] [place] [address]  Phone: [phone]  Fax: [phone]  Follow Up Time: 1 week    Nesha Izaguirre  Endocrinology, Diabetes and Metabolism  [address] [place] [address]  Phone: [phone]  Fax: [phone]  Follow Up Time: Routine    Dorita Almazan  INFECTIOUS DISEASE  [address] [place] [address]  Phone: [phone]  Fax: [phone]  Follow Up Time: 1 month    Thom Cifuentes)  Cardiac Electrophysiology; Cardiology  [address] [place] [id number]  Phone: [phone]  Fax: [phone]  Scheduled Appointment: 10/13/2020 02:30 PM

## 2020-08-20 NOTE — PROGRESS NOTE ADULT - ASSESSMENT
80M hx of HIV(GC1=408, VL undet), detrusor muscle weakness with self straight cath, CAD presented after a fall at home.  Labs noted for WBC 16.5, Cr 2.2, CPK 3857, lactate 3.1, positive UA.  On exam, found to have mild left cheek tenderness and redness. He has significant left knee redness and there is an eschar, likely from the fall, conerning that he has a left knee cellulitis.    #GPC bacteremia:  - BCx: 2/2 Cog neg Staph 2/2 GPC  - On vancomycin now  - Given loud murmur, will get Echo  - Repeat BCx * 2 sets    #HIV:  - ZG6=891, VL undet  - c/w Abacavir 300mg BID  - c/w Lamivudine 100mg daily  - Pharmacist ordered Doravirine 100mg daily, but will not get it until Friday, will do Tivicay 50mg daily for now until we have Doravirine here    #Left knee cellulitis:  - Will do Zosyn 3.375g q12h infuse over 12 hr    #Pyuria:  - UCx<1000 normal  oswaldo  - Likely colonization  - Aden should be removed before discharge from hospital, pt will do straight cath at home    Vannessa Gamble MD, PGY4  Fellow, Infectious Diseases  Pager: [phone]  If no response, after 5pm and on Weekends: Call [phone]    d/w Dr. Almazan 80M hx of HIV(YH5=705, VL undet), detrusor muscle weakness with self straight cath, CAD presented after a fall at home.  Labs noted for WBC 16.5, Cr 2.2, CPK 3857, lactate 3.1, positive UA.  On exam, found to have mild left cheek tenderness and redness. He has significant left knee redness and there is an eschar, likely from the fall, conerning that he has a left knee cellulitis.    #GPC bacteremia:  - BCx: 2/2 Cog neg Staph 2/2 GPC, maybe contamination, will need to wait for final BCx and repeat BCx result  - On vancomycin 1g q12h, Please check Vancomycin trough before 4th sequential dose. Target trough levels 15-20  - Given loud murmur (disappeared today though), will get Echo  - Repeat BCx * 2 sets    #HIV:  - ZM7=012, VL undet  - c/w Abacavir 300mg BID  - c/w Lamivudine 100mg daily  - Pharmacist ordered Doravirine 100mg daily, but will not get it until Friday, will do Tivicay 50mg daily for now until we have Doravirine here    #Left knee cellulitis:  - Will do Zosyn 3.375g q12h infuse over 12 hr    #Pyuria:  - UCx<1000 normal  oswaldo  - Likely colonization  - Aden should be removed before discharge from hospital, pt will do straight cath at home    Vannessa Gamble MD, PGY4  Fellow, Infectious Diseases  Pager: [phone]  If no response, after 5pm and on Weekends: Call [phone]    d/w Dr. Almazan

## 2020-08-20 NOTE — DISCHARGE NOTE PROVIDER - INSTRUCTIONS
Regular DASH / low carb diabetic diet - low fat, low cholesterol, no added salt.   Please add 3 cans of Glucerna 1.2 supplements daily Dysphagia 2 mechanical soft DASH / low carb diabetic diet - low fat, low cholesterol, no added salt.   Please add 3 cans of Glucerna 1.2 supplements daily

## 2020-08-20 NOTE — PHARMACOTHERAPY INTERVENTION NOTE - COMMENTS
Patient ordered for vancomycin 1g q12h 8/19 but order discontinued after 1 dose last night around 8pm.  Recommended restarting vancomycin 1g q24h based on renal function but because of concern of possible endocarditis, recommended starting in AM.  Would recommend checking vanco trough on 8/22.      Claudia Vance, PharmD  Clinical Pharmacist, Infectious Diseases  [phone]

## 2020-08-20 NOTE — PROGRESS NOTE ADULT - PROBLEM SELECTOR PLAN 9
- SUDHEER resolved  - sees Dr. Presley Ferguson outpt  - continue IVF for rhabdo, avoid nephrotoxic meds    #cellulitis  [ ] zosyn q 12 hrs daily per ID recs

## 2020-08-20 NOTE — PROGRESS NOTE ADULT - PROBLEM SELECTOR PLAN 4
- pt denies palpitations, chest pain, dizziness. Endorses weakness prior to and since fall.   - ECG sinus tachy, LA enlargement, no ST elevation/depression or T wave inversion  - trop elevated to 75; repeat pending  - pt not volume overloaded on exam   - possibly 2/2 dehydration (warm in apt per EMS)  [ ] f/u repeat trop for trend  [ ] PT consulted; f/u recs - UA + large LE, >50 WBC, many bacteria, 10 RBC  - UCx with <10,000 normal urine oswaldo  - thickened bladder seen on CT AP  - pt straight caths himself 3x daily for detrusor muscle weakness + BPH  - no dysuria, abdominal/back pain  - s/p 1 d ceftriaxone for concern for UTI  [ ] continue zosyn for L knee cellulitis and UTI

## 2020-08-20 NOTE — DISCHARGE NOTE PROVIDER - NSDCCPCAREPLAN_GEN_ALL_CORE_FT
PRINCIPAL DISCHARGE DIAGNOSIS  Diagnosis: Rhabdomyolysis  Assessment and Plan of Treatment: You were admitted for a fall.      SECONDARY DISCHARGE DIAGNOSES  Diagnosis: SUDHEER (acute kidney injury)  Assessment and Plan of Treatment: PRINCIPAL DISCHARGE DIAGNOSIS  Diagnosis: S/P MVR (mitral valve replacement)  Assessment and Plan of Treatment: 1. Daily Shower  2. Weight yourself daily and notify any weight gain greater than 2-3 pounds in 24 hours.  3. Regular DASH / low carb diabetic diet diet - low fat, low cholesterol, no added salt. Plus 3 cans of Glucerna 1.2 supplements daily.   4. Cleanse Midsternal incision daily while showering with warm water and mild soap, pat dry and maintain open to air.   5. Follow Cardiac Surgery Do's and Don'ts discharge instructions.   6. No driving until cleared by MD.   7. No heavy lifting nothing greater than 5 pounds until cleared by MD.   8. Call / Notify MD any fever greater than 101.0  9. Increase Activity as tolerated.   10. Right upper PICC line to be maintained clean an dry; Please have the dressing changed sterile every 7 days   11. Blood work CBC with diff / CMP / CRP /ESR weekly every Monday   please fax results to Dr. Almazan ID at [phone].  12. Please have your PT/INR levels checked on Monday 9/14 and Thursday 9/17 and then weekly, and follow up with your PCP for your coumadin dosage.      SECONDARY DISCHARGE DIAGNOSES  Diagnosis: Atrial flutter  Assessment and Plan of Treatment: 1. Patient to follow up with  Electrophysiology Dr. Cifuentes as scheduled on 10/13/20 at 2:15pm.   2. Coumadin 5 mg PO nightky   3. Please have your PT/INR levels checked on Monday 9/14 and Thursday 9/17 and then weekly, and follow up with your PCP for your coumadin dosage (Therapeutic INR level 2-3)      Diagnosis: Anticoagulated on Coumadin  Assessment and Plan of Treatment: 1. Coumadin 5 mg PO nightky   2 Please have your PT/INR levels checked on Monday 9/14 and Thursday 9/17 and then weekly, and follow up with your PCP for your coumadin dosage (Therapeutic INR level 2-3)    Diagnosis: S/P infectious endocarditis  Assessment and Plan of Treatment: 1. Continue with Vancomycin 1 gram IV every 24 hours until 10/12/20.  Please dose by level.  Vanco Trough to be maintain 15-20    Diagnosis: Hypothyroidism  Assessment and Plan of Treatment: 1. Repeat TFTs still subclinical; No need for management at this time. HIV meds may be contributing to suppressed TSH. Reccomend to follow up repeat TFTs in 4-6 weeks.    Diagnosis: SUDHEER (acute kidney injury)  Assessment and Plan of Treatment: 1. Creatinine 1.88 today   2. Monitor renal function  3. Avoid Nephrotoxin agents   4. Patient to resume self striaght cath three times a day per prior routine schedule PRINCIPAL DISCHARGE DIAGNOSIS  Diagnosis: S/P MVR (mitral valve replacement)  Assessment and Plan of Treatment: 1. Daily Shower  2. Weight yourself daily and notify any weight gain greater than 2-3 pounds in 24 hours.  3. Regular DASH / low carb diabetic diet diet - low fat, low cholesterol, no added salt. Plus 3 cans of Glucerna 1.2 supplements daily.   4. Cleanse Midsternal incision daily while showering with warm water and mild soap, pat dry and maintain open to air.   5. Follow Cardiac Surgery Do's and Don'ts discharge instructions.   6. No driving until cleared by MD.   7. No heavy lifting nothing greater than 5 pounds until cleared by MD.   8. Call / Notify MD any fever greater than 101.0  9. Increase Activity as tolerated.   10. Right upper PICC line to be maintained clean an dry; Please have the dressing changed sterile every 7 days.  PICC line to be removed after 10/12/20 upon completion of IV Antibiotic therapy.   11. Blood work CBC with diff / CMP / CRP /ESR weekly every Monday   please fax results to Dr. Almazan ID at [phone].  12. Please have your PT/INR levels checked on Monday 9/14 and Thursday 9/17 and then weekly, and follow up with your PCP for your coumadin dosage.  13. Notify MD and Dentist the need of antibiotic prophylaxis before any dental procedure to prevent infection of your new valve.      SECONDARY DISCHARGE DIAGNOSES  Diagnosis: Atrial flutter  Assessment and Plan of Treatment: 1. Patient to follow up with  Electrophysiology Dr. Cifuentes as scheduled on 10/13/20 at 2:15pm.   2. Coumadin 5 mg PO nightky   3. Please have your PT/INR levels checked on Monday 9/14 and Thursday 9/17 and then weekly, and follow up with your PCP for your coumadin dosage (Therapeutic INR level 2-3)      Diagnosis: Anticoagulated on Coumadin  Assessment and Plan of Treatment: 1. Coumadin 5 mg PO nightky   2 Please have your PT/INR levels checked on Monday 9/14 and Thursday 9/17 and then weekly, and follow up with your PCP for your coumadin dosage (Therapeutic INR level 2-3)    Diagnosis: S/P infectious endocarditis  Assessment and Plan of Treatment: 1. Continue with Vancomycin 1 gram IV every 24 hours until 10/12/20.  Please dose by level.  Vanco Trough to be maintain 15-20    Diagnosis: Hypothyroidism  Assessment and Plan of Treatment: 1. Repeat TFTs still subclinical; No need for management at this time. HIV meds may be contributing to suppressed TSH. Reccomend to follow up repeat TFTs in 4-6 weeks.    Diagnosis: SUDHEER (acute kidney injury)  Assessment and Plan of Treatment: 1. Creatinine 1.88 today   2. Monitor renal function  3. Avoid Nephrotoxin agents   4. Patient to resume self striaght cath three times a day per prior routine schedule PRINCIPAL DISCHARGE DIAGNOSIS  Diagnosis: S/P MVR (mitral valve replacement)  Assessment and Plan of Treatment: 1. Daily Shower  2. Weight yourself daily and notify any weight gain greater than 2-3 pounds in 24 hours.  3. Regular DASH / low carb diabetic diet diet - low fat, low cholesterol, no added salt. Plus 3 cans of Glucerna 1.2 supplements daily.   4. Cleanse Midsternal incision daily while showering with warm water and mild soap, pat dry and maintain open to air.   5. Follow Cardiac Surgery Do's and Don'ts discharge instructions.   6. No driving until cleared by MD.   7. No heavy lifting nothing greater than 5 pounds until cleared by MD.   8. Call / Notify MD any fever greater than 101.0  9. Increase Activity as tolerated.   10. Right upper PICC line to be maintained clean an dry; Please have the dressing changed sterile every 7 days.  PICC line to be removed after 10/12/20 upon completion of IV Antibiotic therapy.   11. Blood work CBC with diff / CMP / CRP /ESR weekly every Monday   please fax results to Dr. Almazan ID at [phone].  12. Please have your PT/INR levels checked on Monday 9/14 and Thursday 9/17 and then weekly, and follow up with your PCP for your coumadin dosage.  13. Notify MD and Dentist the need of antibiotic prophylaxis before any dental procedure to prevent infection of your new valve.      SECONDARY DISCHARGE DIAGNOSES  Diagnosis: Atrial flutter  Assessment and Plan of Treatment: 1. Patient to follow up with  Electrophysiology Dr. Cifuentes as scheduled on 10/13/20 at 2:15pm.   2. Coumadin 5 mg PO nightky   3. Please have your PT/INR levels checked on Monday 9/14 and Thursday 9/17 and then weekly, and follow up with your PCP for your coumadin dosage (Therapeutic INR level 2-3)      Diagnosis: Anticoagulated on Coumadin  Assessment and Plan of Treatment: 1. Coumadin 5 mg PO nightky   2 Please have your PT/INR levels checked on Monday 9/14 and Thursday 9/17 and then weekly, and follow up with your PCP for your coumadin dosage (Therapeutic INR level 2-3)    Diagnosis: S/P infectious endocarditis  Assessment and Plan of Treatment: 1. Continue with Vancomycin 1 gram IV every 24 hours until 10/12/20.  Please dose by level.  Vanco Trough to be maintain 15-20    Diagnosis: Hypothyroidism  Assessment and Plan of Treatment: 1. Repeat TFTs still subclinical; No need for management at this time. HIV meds may be contributing to suppressed TSH. Reccomend to follow up repeat TFTs in 4-6 weeks.    Diagnosis: SUDHEER (acute kidney injury)  Assessment and Plan of Treatment: 1. Creatinine 1.88 today (Baseline 1.6-1.8)  2. Monitor renal function  3. Avoid Nephrotoxin agents   4. Patient to resume self striaght cath three times a day per prior routine schedule PRINCIPAL DISCHARGE DIAGNOSIS  Diagnosis: S/P MVR (mitral valve replacement)  Assessment and Plan of Treatment: 1. Daily Shower  2. Weight yourself daily and notify any weight gain greater than 2-3 pounds in 24 hours.  3. Dysphagia 2 mechanical soft  DASH / low carb diabetic diet diet - low fat, low cholesterol, no added salt. Plus 3 cans of Glucerna 1.2 supplements daily.   4. Cleanse Midsternal incision daily while showering with warm water and mild soap, pat dry and maintain open to air.   5. Follow Cardiac Surgery Do's and Don'ts discharge instructions.   6. No driving until cleared by MD.   7. No heavy lifting nothing greater than 5 pounds until cleared by MD.   8. Call / Notify MD any fever greater than 101.0  9. Increase Activity as tolerated.   10. Right upper PICC line to be maintained clean an dry; Please have the dressing changed sterile every 7 days.  PICC line to be removed after 10/12/20 upon completion of IV Antibiotic therapy.   11. Blood work CBC with diff / CMP / CRP /ESR weekly every Monday   please fax results to Dr. Almazan ID at [phone].  12. Please have your PT/INR levels checked on Monday 9/14 and Thursday 9/17 and then weekly, and follow up with your PCP for your coumadin dosage.  13. Notify MD and Dentist the need of antibiotic prophylaxis before any dental procedure to prevent infection of your new valve.      SECONDARY DISCHARGE DIAGNOSES  Diagnosis: Atrial flutter  Assessment and Plan of Treatment: 1. Patient to follow up with  Electrophysiology Dr. Cifuentes as scheduled on 10/13/20 at 2:15pm.   2. Coumadin 5 mg PO nightky   3. Please have your PT/INR levels checked on Monday 9/14 and Thursday 9/17 and then weekly, and follow up with your PCP for your coumadin dosage (Therapeutic INR level 2-3)      Diagnosis: Anticoagulated on Coumadin  Assessment and Plan of Treatment: 1. Coumadin 5 mg PO nightky   2 Please have your PT/INR levels checked on Monday 9/14 and Thursday 9/17 and then weekly, and follow up with your PCP for your coumadin dosage (Therapeutic INR level 2-3)    Diagnosis: S/P infectious endocarditis  Assessment and Plan of Treatment: 1. Continue with Vancomycin 1 gram IV every 24 hours until 10/12/20.  Please dose by level.  Vanco Trough to be maintain 15-20    Diagnosis: Hypothyroidism  Assessment and Plan of Treatment: 1. Repeat TFTs still subclinical; No need for management at this time. HIV meds may be contributing to suppressed TSH. Reccomend to follow up repeat TFTs in 4-6 weeks.    Diagnosis: SUDHEER (acute kidney injury)  Assessment and Plan of Treatment: 1. Creatinine 1.88 today (Baseline 1.6-1.8)  2. Monitor renal function  3. Avoid Nephrotoxin agents   4. Patient to resume self striaght cath three times a day per prior routine schedule

## 2020-08-20 NOTE — PROGRESS NOTE ADULT - PROBLEM SELECTOR PLAN 8
[ ] continue heparin subq daily for DVT ppx  [ ] continue pureed diet Not on home meds.  [ ] echo pending read as above

## 2020-08-20 NOTE — DISCHARGE NOTE PROVIDER - NSDCFUADDAPPT_GEN_ALL_CORE_FT
1. Please schedule an appointment with Dr. Mcmahan in 7-10 days after discharged from rehab, please call the [facility] [place] office to schedule an appointment at [phone].  2. Please schedule an appointment with your Cardiologist in 1-2 weeks after discharged from rehab, please call the office to schedule an appointment.  3. Please schedule an appointment with your PCP for Coumadin dosing and weekly PT/INR blood draw after discharged from rehab, call the office to schedule an appointment.   4. Please schedule an appointment with Endocrinologist Dr. Izaguirre in 4-6 weeks to follow up on Thyroid function testing, please call the office to schedule an appointment.   5. Patient to follow up with  Electrophysiology Dr. Cifuentes as scheduled on 10/13/20 at 2:15pm. 1. Please schedule an appointment with Dr. Mcmahan in 7-10 days after discharged from rehab, please call the [facility] office to schedule an appointment at [phone].  2. Please schedule an appointment with your Cardiologist in 1-2 weeks after discharged from rehab, please call the office to schedule an appointment.  3. Please schedule an appointment with your PCP for Coumadin dosing and weekly PT/INR blood draw after discharged from rehab, call the office to schedule an appointment.   4. Please schedule an appointment with Endocrinologist Dr. Izaguirre in 4-6 weeks to follow up on Thyroid function testing, please call the office to schedule an appointment.   5. Patient to follow up with  Electrophysiology Dr. Cifuentes as scheduled on 10/13/20 at 2:15pm.  6. Please schedule an appointment with Dr. Almazan Infectious Disease after 10/12/20 upon completion of the antibiotic therapy, please call the office to schedule an appointment.     ***** Notify MD and Dentist the need of antibiotic prophylaxis before any dental procedure to prevent infection of your new valve. *****

## 2020-08-20 NOTE — PROGRESS NOTE ADULT - PROBLEM SELECTOR PLAN 2
- UA + large LE, >50 WBC, many bacteria, 10 RBC  - UCx with 50,000-99,000 staph aureus sensitive to cefazolin   - BCx pending  [ ] continue ceftriaxone 1g daily for 10 days BCx 8/19 one pos for coag neg staph; one pos for gram + cocci in clusters (pending final). Source likely UTI. ID following. Concern for endocarditis given 3/6 murmur in lower sternal borders.   [ ] vanc 1 g daily (also on zosyn q 12 hrs for cellulitis in L knee)  [ ] echo done 8/20; pending read  [ ] repeat BCx pending per ID recs

## 2020-08-20 NOTE — PROGRESS NOTE ADULT - PROBLEM SELECTOR PLAN 6
Not on home meds. - pt denies palpitations, chest pain, dizziness. Endorses weakness prior to and since fall.   - ECG 8/19 on admission sinus tachy, LA enlargement, no ST elevation/depression or T wave inversion. Repeat ECG 8/20 with RBBB (rsr' on V1), irregular, left atrial enlargement. No ST changes or T wave inversions.   - trop elevated to 75 8/19; 100 overnight during flash pulm edema; am pending.   - possibly 2/2 dehydration (warm in apt per EMS)   [ ] f/u repeat trop for trend  [ ] PT recommended: CYNTHIA on d/c

## 2020-08-20 NOTE — PROGRESS NOTE ADULT - PROBLEM SELECTOR PLAN 7
- SUDHEER resolved  - sees Dr. Presley Ferguson outpt  - continue IVF for rhabdo, avoid nephrotoxic meds Controlled; last CD4 368; undetectable viral load in July  [ ] continue home abacavir, lamivudine, pifelo

## 2020-08-20 NOTE — PROGRESS NOTE ADULT - PROBLEM SELECTOR PLAN 1
- CTAP with severe b/l ureterohydronephrosis, thickened bladder 2/2 inability to straight cath himself for 24 hrs after fall (normally straight caths 3x daily for BPH, follow urology outpt)  [ ] consulted urology, appreciate recs  [ ] tovar in place, draining brownish-red urine (rhabdo)  [ ] continue home tamsulosin and finasteride - overnight had shortness of breath with spo2 on RA 77. s/p lasix 60 mg IV, improved CXR and lung exam. Pt on Bipap 50% O2, transitioned to NC 4L this am. Hx CAD, concern for HF exacerbation. ECG today with more prominent RBBB (rvr' on V1) compared to yesterday, irregular, low voltage qrs. No ST changes or T wave inversions.  [ ] NC 4L, titrate as needed  [ ] q4 vitals   [ ] d/c IVF (was on it for rhabdo)  [ ] echo done, awaiting read to evaluate cardiac function

## 2020-08-21 DIAGNOSIS — R78.81 BACTEREMIA: ICD-10-CM

## 2020-08-21 DIAGNOSIS — I38 ENDOCARDITIS, VALVE UNSPECIFIED: ICD-10-CM

## 2020-08-21 LAB
-  AMPICILLIN/SULBACTAM: SIGNIFICANT CHANGE UP
-  CEFAZOLIN: SIGNIFICANT CHANGE UP
-  CLINDAMYCIN: SIGNIFICANT CHANGE UP
-  ERYTHROMYCIN: SIGNIFICANT CHANGE UP
-  GENTAMICIN: SIGNIFICANT CHANGE UP
-  OXACILLIN: SIGNIFICANT CHANGE UP
-  PENICILLIN: SIGNIFICANT CHANGE UP
-  RIFAMPIN: SIGNIFICANT CHANGE UP
-  TETRACYCLINE: SIGNIFICANT CHANGE UP
-  TRIMETHOPRIM/SULFAMETHOXAZOLE: SIGNIFICANT CHANGE UP
-  VANCOMYCIN: SIGNIFICANT CHANGE UP
ALBUMIN SERPL ELPH-MCNC: 2.8 G/DL — LOW (ref 3.3–5)
ALP SERPL-CCNC: 105 U/L — SIGNIFICANT CHANGE UP (ref 40–120)
ALT FLD-CCNC: 31 U/L — SIGNIFICANT CHANGE UP (ref 10–45)
ANION GAP SERPL CALC-SCNC: 8 MMOL/L — SIGNIFICANT CHANGE UP (ref 5–17)
AST SERPL-CCNC: 62 U/L — HIGH (ref 10–40)
BILIRUB SERPL-MCNC: 0.4 MG/DL — SIGNIFICANT CHANGE UP (ref 0.2–1.2)
BUN SERPL-MCNC: 33 MG/DL — HIGH (ref 7–23)
CALCIUM SERPL-MCNC: 8.3 MG/DL — LOW (ref 8.4–10.5)
CHLORIDE SERPL-SCNC: 105 MMOL/L — SIGNIFICANT CHANGE UP (ref 96–108)
CK SERPL-CCNC: 929 U/L — HIGH (ref 30–200)
CO2 SERPL-SCNC: 25 MMOL/L — SIGNIFICANT CHANGE UP (ref 22–31)
CREAT SERPL-MCNC: 1.54 MG/DL — HIGH (ref 0.5–1.3)
CULTURE RESULTS: SIGNIFICANT CHANGE UP
GLUCOSE SERPL-MCNC: 92 MG/DL — SIGNIFICANT CHANGE UP (ref 70–99)
HCT VFR BLD CALC: 31.1 % — LOW (ref 39–50)
HGB BLD-MCNC: 10.2 G/DL — LOW (ref 13–17)
MAGNESIUM SERPL-MCNC: 2 MG/DL — SIGNIFICANT CHANGE UP (ref 1.6–2.6)
MCHC RBC-ENTMCNC: 32.8 GM/DL — SIGNIFICANT CHANGE UP (ref 32–36)
MCHC RBC-ENTMCNC: 34.6 PG — HIGH (ref 27–34)
MCV RBC AUTO: 105.4 FL — HIGH (ref 80–100)
METHOD TYPE: SIGNIFICANT CHANGE UP
NRBC # BLD: 0 /100 WBCS — SIGNIFICANT CHANGE UP (ref 0–0)
ORGANISM # SPEC MICROSCOPIC CNT: SIGNIFICANT CHANGE UP
ORGANISM # SPEC MICROSCOPIC CNT: SIGNIFICANT CHANGE UP
PHOSPHATE SERPL-MCNC: 2.1 MG/DL — LOW (ref 2.5–4.5)
PLATELET # BLD AUTO: 179 K/UL — SIGNIFICANT CHANGE UP (ref 150–400)
POTASSIUM SERPL-MCNC: 3.5 MMOL/L — SIGNIFICANT CHANGE UP (ref 3.5–5.3)
POTASSIUM SERPL-SCNC: 3.5 MMOL/L — SIGNIFICANT CHANGE UP (ref 3.5–5.3)
PROT SERPL-MCNC: 6.1 G/DL — SIGNIFICANT CHANGE UP (ref 6–8.3)
RBC # BLD: 2.95 M/UL — LOW (ref 4.2–5.8)
RBC # FLD: 13.8 % — SIGNIFICANT CHANGE UP (ref 10.3–14.5)
SODIUM SERPL-SCNC: 138 MMOL/L — SIGNIFICANT CHANGE UP (ref 135–145)
SPECIMEN SOURCE: SIGNIFICANT CHANGE UP
WBC # BLD: 9.46 K/UL — SIGNIFICANT CHANGE UP (ref 3.8–10.5)
WBC # FLD AUTO: 9.46 K/UL — SIGNIFICANT CHANGE UP (ref 3.8–10.5)

## 2020-08-21 PROCEDURE — 99222 1ST HOSP IP/OBS MODERATE 55: CPT

## 2020-08-21 PROCEDURE — 99233 SBSQ HOSP IP/OBS HIGH 50: CPT | Mod: GC

## 2020-08-21 PROCEDURE — 99223 1ST HOSP IP/OBS HIGH 75: CPT

## 2020-08-21 PROCEDURE — 99221 1ST HOSP IP/OBS SF/LOW 40: CPT

## 2020-08-21 PROCEDURE — 99232 SBSQ HOSP IP/OBS MODERATE 35: CPT | Mod: GC

## 2020-08-21 RX ORDER — DOLUTEGRAVIR SODIUM 25 MG/1
50 TABLET, FILM COATED ORAL DAILY
Refills: 0 | Status: DISCONTINUED | OUTPATIENT
Start: 2020-08-21 | End: 2020-08-22

## 2020-08-21 RX ORDER — FUROSEMIDE 40 MG
60 TABLET ORAL
Refills: 0 | Status: DISCONTINUED | OUTPATIENT
Start: 2020-08-21 | End: 2020-08-21

## 2020-08-21 RX ORDER — POTASSIUM PHOSPHATE, MONOBASIC POTASSIUM PHOSPHATE, DIBASIC 236; 224 MG/ML; MG/ML
15 INJECTION, SOLUTION INTRAVENOUS ONCE
Refills: 0 | Status: COMPLETED | OUTPATIENT
Start: 2020-08-21 | End: 2020-08-21

## 2020-08-21 RX ORDER — FUROSEMIDE 40 MG
40 TABLET ORAL
Refills: 0 | Status: DISCONTINUED | OUTPATIENT
Start: 2020-08-21 | End: 2020-08-25

## 2020-08-21 RX ORDER — HYDRALAZINE HCL 50 MG
10 TABLET ORAL THREE TIMES A DAY
Refills: 0 | Status: DISCONTINUED | OUTPATIENT
Start: 2020-08-21 | End: 2020-08-31

## 2020-08-21 RX ADMIN — FINASTERIDE 5 MILLIGRAM(S): 5 TABLET, FILM COATED ORAL at 21:43

## 2020-08-21 RX ADMIN — TAMSULOSIN HYDROCHLORIDE 0.4 MILLIGRAM(S): 0.4 CAPSULE ORAL at 21:43

## 2020-08-21 RX ADMIN — PIPERACILLIN AND TAZOBACTAM 25 GRAM(S): 4; .5 INJECTION, POWDER, LYOPHILIZED, FOR SOLUTION INTRAVENOUS at 21:43

## 2020-08-21 RX ADMIN — Medication 1 TABLET(S): at 10:47

## 2020-08-21 RX ADMIN — Medication 500 MILLIGRAM(S): at 10:47

## 2020-08-21 RX ADMIN — HEPARIN SODIUM 5000 UNIT(S): 5000 INJECTION INTRAVENOUS; SUBCUTANEOUS at 21:43

## 2020-08-21 RX ADMIN — Medication 250 MILLIGRAM(S): at 11:46

## 2020-08-21 RX ADMIN — ALBUTEROL 2.5 MILLIGRAM(S): 90 AEROSOL, METERED ORAL at 17:46

## 2020-08-21 RX ADMIN — Medication 10 MILLIGRAM(S): at 21:43

## 2020-08-21 RX ADMIN — DOLUTEGRAVIR SODIUM 50 MILLIGRAM(S): 25 TABLET, FILM COATED ORAL at 13:14

## 2020-08-21 RX ADMIN — Medication 40 MILLIGRAM(S): at 17:45

## 2020-08-21 RX ADMIN — POTASSIUM PHOSPHATE, MONOBASIC POTASSIUM PHOSPHATE, DIBASIC 62.5 MILLIMOLE(S): 236; 224 INJECTION, SOLUTION INTRAVENOUS at 09:23

## 2020-08-21 RX ADMIN — ALBUTEROL 2.5 MILLIGRAM(S): 90 AEROSOL, METERED ORAL at 06:33

## 2020-08-21 RX ADMIN — PIPERACILLIN AND TAZOBACTAM 25 GRAM(S): 4; .5 INJECTION, POWDER, LYOPHILIZED, FOR SOLUTION INTRAVENOUS at 06:33

## 2020-08-21 RX ADMIN — ABACAVIR 300 MILLIGRAM(S): 20 SOLUTION ORAL at 10:46

## 2020-08-21 RX ADMIN — ALBUTEROL 2.5 MILLIGRAM(S): 90 AEROSOL, METERED ORAL at 12:00

## 2020-08-21 RX ADMIN — Medication 10 MILLIGRAM(S): at 14:20

## 2020-08-21 RX ADMIN — HEPARIN SODIUM 5000 UNIT(S): 5000 INJECTION INTRAVENOUS; SUBCUTANEOUS at 06:33

## 2020-08-21 RX ADMIN — PIPERACILLIN AND TAZOBACTAM 25 GRAM(S): 4; .5 INJECTION, POWDER, LYOPHILIZED, FOR SOLUTION INTRAVENOUS at 13:11

## 2020-08-21 RX ADMIN — ABACAVIR 300 MILLIGRAM(S): 20 SOLUTION ORAL at 21:42

## 2020-08-21 RX ADMIN — HEPARIN SODIUM 5000 UNIT(S): 5000 INJECTION INTRAVENOUS; SUBCUTANEOUS at 13:11

## 2020-08-21 RX ADMIN — Medication 100 MILLIGRAM(S): at 10:46

## 2020-08-21 NOTE — PROGRESS NOTE ADULT - PROBLEM SELECTOR PLAN 6
- pt denies palpitations, chest pain, dizziness. Endorses weakness prior to and since fall.   - ECG 8/19 on admission sinus tachy, LA enlargement, no ST elevation/depression or T wave inversion. Repeat ECG 8/20 with RBBB (rsr' on V1), irregular, left atrial enlargement. No ST changes or T wave inversions.   - possibly 2/2 dehydration (warm in apt per EMS)   [ ] PT recommended: CYNTHIA on d/c

## 2020-08-21 NOTE — PROGRESS NOTE ADULT - PROBLEM SELECTOR PLAN 2
rapid response called overnight 8/20 for flash pulm edema (via exam and CXR) and spo2 77 s/p lasix IV 60 mg -> bipap -> transitioned to NC 3L during day. Repeat CXR improved pulm edema.   [ ] continue to wean oxygen on NC  [ ] no further IVF for rhabdo (improved CK, does not need further IVF)  [ ] cardiology consulted, appreciate recs

## 2020-08-21 NOTE — PROGRESS NOTE ADULT - PROBLEM SELECTOR PLAN 3
- CTAP with severe b/l ureterohydronephrosis, thickened bladder 2/2 inability to straight cath himself for 24 hrs after fall (normally straight caths 3x daily for detrusor muscle weakness + BPH, follows urology outpt)  [ ] urology following, appreciate recs. repeat ultrasound 8/20 with mild - mod hydronephrosis  [ ] tovar in place  [ ] continue home tamsulosin and finasteride

## 2020-08-21 NOTE — SBIRT NOTE ADULT - NSSBIRTALCNOACTINTDET_GEN_A_CORE
Patient resides at [facility]. Patient reports he drinks socially. Patient denied that his drinking is a problem.

## 2020-08-21 NOTE — PROGRESS NOTE ADULT - PROBLEM SELECTOR PLAN 7
Controlled; last CD4 368; undetectable viral load in July  [ ] continue home abacavir, lamivudine, pifelo

## 2020-08-21 NOTE — CONSULT NOTE ADULT - ATTENDING COMMENTS
Agree with plan as outlined above.  Possible vegetation(s). Severe MR.   However, HD stable, afebrile, cleared cultures.  CTS consult  NOHEMI monday

## 2020-08-21 NOTE — CONSULT NOTE ADULT - SUBJECTIVE AND OBJECTIVE BOX
In-House Cardiology Consult Note  ---------------------------------------------    HPI:  81yo man PMHx of HIV+ (undetectable VL), detrusor muscle weakness requiring daily straight cath, BPH presented after a fall at home and found in own urine/feces. Reportedly per EMS, patient had been using a scrotal device to clamp the testes but was unable to remove it and then slid to the ground while trying to maneuvering the device. Patient reports that he was too weak to get himself up. Denies trauma to his head and was laying on his L side. Per EMS, the heat in the home was significantly elevated. Denies anticoagulants or aspirin, denies any LOC and states he recalls all events. Denies recent illness, fever, chills, dysuria, dizziness, cp, sob, abd pain. Denies cough, sore throat. Of note, patient reports that he self straight caths 3 times a day due to BPH. He also had a bladder mass s/p resection and was told it was benign.    In the ED, vitals: T 99.2, , /60, RR 24 satting 97 ORA. got 2L NS, started on maintenance fluid. Urology placed tovar, draining blood tinged urine. Labs noted for WBC 16.5, Cr 2.2, CPK 3857, lactate 3.1, positive UA. On exam, found to have mild left cheek tenderness and redness. He has significant left knee redness and there is an eschar, likely from the fall, concerning that he has a left knee cellulitis.  RRT was called 2 days ago for hypoxia; On exam, pt diffusely wheezing and b/l crackles. Most recent blood work showing Bcx+ staph epidermidis in all bottles, ABx broadened to vanco/Zosyn. CXR showed significant vascular congestion compared to prior imaging and ABG showing respiratory acidosis with PCO2 60s. Pt given lasix 60 IV once, tovar in place and draining, IVF discontinued, labetalol 10mg IV for elevated BP and started on BiPAP; on monitor, pt tachycardic and ?irregular, - pt transferred to tele floor for further monitoring.      Cardiology consulted because of 1.3cm echodensity on MV with severe MR.     PMHx:   Orchitis and epididymitis  Vitamin D deficiency  Bladder mass  Osteoporosis  Seborrheic keratosis  Constipation, unspecified constipation type  Chronic kidney disease, unspecified stage  HIV (human immunodeficiency virus infection)    PSHx:   -Bladder mass s/p resection     Allergies:  No Known Allergies    Current Medications:   abacavir 300 milliGRAM(s) Oral every 12 hours  ALBUTerol    0.083% 2.5 milliGRAM(s) Nebulizer four times a day  ascorbic acid 500 milliGRAM(s) Oral daily  Doravirine 100mg tablets (pifeltro) 100 milliGRAM(s) 100 milliGRAM(s) Oral daily  finasteride 5 milliGRAM(s) Oral daily  heparin   Injectable 5000 Unit(s) SubCutaneous every 8 hours  lamiVUDine- milliGRAM(s) Oral daily  multivitamin 1 Tablet(s) Oral daily  piperacillin/tazobactam IVPB.. 3.375 Gram(s) IV Intermittent every 8 hours  tamsulosin 0.4 milliGRAM(s) Oral at bedtime  vancomycin  IVPB      vancomycin  IVPB 1000 milliGRAM(s) IV Intermittent daily    FAMILY HISTORY:  No pertinent family history in first degree relatives    Social History:  Smoking History: Denies  Alcohol Use: Denies  Drug Use: Denies    REVIEW OF SYSTEMS:  CONSTITUTIONAL: No weakness, fevers or chills  EYES/ENT: No visual changes;  No dysphagia  NECK: No pain or stiffness  RESPIRATORY: No cough, wheezing, hemoptysis; No shortness of breath  CARDIOVASCULAR: No chest pain or palpitations; No lower extremity edema  GASTROINTESTINAL: No abdominal or epigastric pain. No nausea, vomiting, or hematemesis; No diarrhea or constipation. No melena or hematochezia.  GENITOURINARY: No dysuria, frequency or hematuria  NEUROLOGICAL: No numbness or weakness  SKIN: No itching, burning, rashes, or lesions   All other review of systems is negative unless indicated above.    Physical Exam:  T(F): 98.4 (08-21), Max: 99.5 (08-20)  HR: 83 (08-21) (83 - 91)  BP: 109/65 (08-21) (107/64 - 119/73)  RR: 18 (08-21)  SpO2: 98% (08-21)    GENERAL: No acute distress, well-developed  HEAD:  Atraumatic, Normocephalic  ENT: EOMI, PERRLA, conjunctiva and sclera clear, Neck supple, mild JVD, moist mucosa  CHEST/LUNG: Rales b/l in all lung fields  HEART: Regular rate and rhythm; S1/S2, grade 3/6 with systolic ejection murmur at the apex with radiation to axilla.  ABDOMEN: Soft, Nontender, Nondistended; Bowel sounds present  EXTREMITIES:  No clubbing, cyanosis, or edema  PSYCH: Nl behavior, nl affect  NEUROLOGY: AAOx3, non-focal, cranial nerves intact  SKIN: Normal color, No rashes or lesions    CXR: Personally reviewed    Labs: Personally reviewed                        10.2   9.46  )-----------( 179      ( 21 Aug 2020 06:15 )             31.1     08-21    138  |  105  |  33<H>  ----------------------------<  92  3.5   |  25  |  1.54<H>    Ca    8.3<L>      21 Aug 2020 06:15  Phos  2.1     08-21  Mg     2.0     08-21    TPro  6.1  /  Alb  2.8<L>  /  TBili  0.4  /  DBili  x   /  AST  62<H>  /  ALT  31  /  AlkPhos  105  08-21    PT/INR - ( 19 Aug 2020 22:35 )   PT: 12.0 sec;   INR: 1.01 ratio      PTT - ( 19 Aug 2020 22:35 )  PTT:38.1 sec    CARDIAC MARKERS ( 21 Aug 2020 06:15 )  x     / x     / x     / 929 U/L / x     / x      CARDIAC MARKERS ( 20 Aug 2020 11:43 )  86 ng/L / x     / x     / x     / x     / x      CARDIAC MARKERS ( 20 Aug 2020 06:10 )  102 ng/L / x     / x     / 1248 U/L / x     / x      CARDIAC MARKERS ( 19 Aug 2020 22:35 )  72 ng/L / x     / x     / x     / x     / x      CARDIAC MARKERS ( 19 Aug 2020 15:18 )  x     / x     / x     / 2469 U/L / x     / x            Serum Pro-Brain Natriuretic Peptide: 4864 pg/mL (08-19 @ 13:43)

## 2020-08-21 NOTE — PROGRESS NOTE ADULT - ATTENDING COMMENTS
Patient seen and examined with Dr. Gamble    I agree with his interval history exam and plans as noted above    Mental status backt o baseline  cellulitis of knees resolved  Murmur remains MR type    Bacteremia with coag negative staph on 2/2 sets blood cultures at time of admission  sensitivities pending  Check Vanco trough  Agree with changing antibiotics to Vancomycin plus Cefazolin pending sensitivities and repeat blood cultures  Will need NOHEMI to better assess valves and for vegetations  Appreciate Cardiology and CTS consults    Tung Almazan MD  [phone]  After 5pm/weekends [phone]

## 2020-08-21 NOTE — CONSULT NOTE ADULT - ASSESSMENT
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 Presented after mechanical fall, found down after 24 hours. Denies LOC. Inpatient, found to be febrile with leukocytosis and UTI. BCx with Staph epi in all 4 bottles as of 8/19. RRT called for hypoxia on 8/19, found to be in flash pulm edema. S/p Lasix, now on broad spectrum Abx.    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 LA appears normal in size, meaning valve insufficiency is likely acute in nature.  -Has been NPO since MN for NOHEMI; will f/u.  -Start Lasix 60mg IV BID to maintain euvolemic state.  -Consider starting hydralazine 10mg PO TID to initiate good afterload reduction (hold for SBP <90).  -Consult CT surgery regarding MVR evaluation.    Case discussed with Dr. Mack.    Ave Wadsworth MD PGY-5  Cardiology Fellow  All Cardiology service information can be found 24/7 on [email or web address], password: [email or web address]  Note is preliminary until signed by the attending. 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 Presented after mechanical fall, found down after 24 hours. Denies LOC. Inpatient, found to be febrile with leukocytosis and UTI. BCx with Staph epi in all 4 bottles as of 8/19. RRT called for hypoxia on 8/19, found to be in flash pulm edema. S/p Lasix, now on broad spectrum Abx.    #Severe Posteriorly Directed MR  #1.3cm MV echodensity  -Source of infection likely urinary considering Hx detrusor muscle weakness requiring frequent self-straight cath.  -ABx tailoring and BCx trending as per primary team and ID.  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 LA appears normal in size, meaning valve insufficiency is likely acute in nature.  -Has been NPO since MN for NOHEMI; will f/u.  -Start Lasix 60mg IV BID to maintain euvolemic state.  -Consider starting hydralazine 10mg PO TID to initiate good afterload reduction (hold for SBP <90).  -Consult CT surgery regarding MVR evaluation.    Case discussed with Dr. Mack.    Ave Wadsworth MD PGY-5  Cardiology Fellow  All Cardiology service information can be found 24/7 on [email or web address], password: [email or web address]  Note is preliminary until signed by the attending.

## 2020-08-21 NOTE — PROGRESS NOTE ADULT - PROBLEM SELECTOR PLAN 4
- UA + large LE, >50 WBC, many bacteria, 10 RBC  - UCx with <10,000 normal urine oswaldo  - thickened bladder seen on CT AP  - pt straight caths himself 3x daily for detrusor muscle weakness + BPH  - no dysuria, abdominal/back pain  - s/p 1 d ceftriaxone for concern for UTI  [ ] continue zosyn for L knee cellulitis and UTI

## 2020-08-21 NOTE — PROGRESS NOTE ADULT - PROBLEM SELECTOR PLAN 1
- BCx pos for staph epidermidis, on empiric vanc (also on zosyn for cellulitis)  - TTE showed 1.3-1.4 cm echodensity on atrial echodensity on atrial surface with severe acute mitral regurg (no dilation in mitral valve), tricupid valve leaflet thickening with mild tricuspid regurg concerning for vegetations. Normal LV and RV systolic function.  [ ] NOHEMI pending for endocarditis   [ ] continue vanc (also on zosyn for cellulitis)  [ ] repeat BCx pending (but done after pt started on abx)  [ ] f/u sensitivities for BCx  [ ] cardiology consulted for acute mitral regurg and endocarditis, appreciate recs  [ ] ID following, appreciate recs Afebrile, BCx pos for staph epidermidis, on empiric IV vanc (also on zosyn for cellulitis)  - TTE showed 1.3-1.4 cm echodensity on atrial echodensity on atrial surface with severe acute mitral regurg (no dilation in mitral valve), tricupid valve leaflet thickening with mild tricuspid regurg concerning for vegetations. Normal LV and RV systolic function.  [ ] NOHEMI pending for endocarditis   [ ] continue vanc (also on zosyn for cellulitis)  [ ] repeat BCx pending (but done after pt started on abx)  [ ] f/u sensitivities for BCx  [ ] cardiology consulted for acute mitral regurg and endocarditis, appreciate recs  [ ] ID following, appreciate recs

## 2020-08-21 NOTE — PROGRESS NOTE ADULT - ASSESSMENT
Mr. Monsivais is a 79 y/o man with hx HIV, CAD, detrusor muscle weakness, BPH presenting for fall, down for 24 hrs, admitted with rhabdo, bilateral severe ureterohydronephrosis and thickened bladder on CTAP 2/2 inability to continue his home straight cath 3x daily after fall, tovar in place. On mIVF LR and ceftriaxone. SUDHEER resolved. Mr. Monsivais is a 81 y/o man with hx HIV, CAD, detrusor muscle weakness, BPH presenting for fall, down for 24 hrs, admitted with rhabdo, bilateral severe ureterohydronephrosis and thickened bladder on CTAP 2/2 inability to continue his home straight cath 3x daily after fall, tovar in place. Rhabdo improved, hydronephrosis improving with tovar (urology following). 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 On vanc (also on zosyn for cellulitis). NOHEMI pending, ID and cardiology recs appreciated.

## 2020-08-21 NOTE — PROGRESS NOTE ADULT - SUBJECTIVE AND OBJECTIVE BOX
Urology Daily Progress Note    SUBJECTIVE:  Pt seen and examined, and is resting comfortably in bed. No acute events overnight. Pain is adequately controlled on current regimen. Pt has no complaints at this time.     OBJECTIVE:  Vital Signs Last 24 Hrs  T(C): 36.9 (21 Aug 2020 04:59), Max: 37.5 (20 Aug 2020 20:40)  T(F): 98.4 (21 Aug 2020 04:59), Max: 99.5 (20 Aug 2020 20:40)  HR: 83 (21 Aug 2020 04:59) (77 - 91)  BP: 109/65 (21 Aug 2020 04:59) (107/64 - 119/73)  BP(mean): --  RR: 18 (21 Aug 2020 04:59) (18 - 18)  SpO2: 98% (21 Aug 2020 04:59) (97% - 100%)    I&O's Detail    20 Aug 2020 07:01  -  21 Aug 2020 07:00  --------------------------------------------------------  IN:    IV PiggyBack: 650 mL    Oral Fluid: 360 mL  Total IN: 1010 mL    OUT:    Indwelling Catheter - Urethral: 3075 mL  Total OUT: 3075 mL    Total NET: -2065 mL        Exam:  GEN: A&Ox3, NAD  HEENT: atraumatic, normocephalic  RESP: no increased work of breathing, no use of accessory muscles  GI/ABD: soft, NT, ND  : urine with clear yellow urine  EXTREMITIES: warm, pink, well-perfused                        10.2   9.46  )-----------( 179      ( 21 Aug 2020 06:15 )             31.1       08-21    138  |  105  |  33<H>  ----------------------------<  92  3.5   |  25  |  1.54<H>    Ca    8.3<L>      21 Aug 2020 06:15  Phos  2.1     08-21  Mg     2.0     08-21    TPro  6.1  /  Alb  2.8<L>  /  TBili  0.4  /  DBili  x   /  AST  62<H>  /  ALT  31  /  AlkPhos  105  08-21      PT/INR - ( 19 Aug 2020 22:35 )   PT: 12.0 sec;   INR: 1.01 ratio         PTT - ( 19 Aug 2020 22:35 )  PTT:38.1 sec

## 2020-08-21 NOTE — PROGRESS NOTE ADULT - ASSESSMENT
79y/o M with PMHx of HIV, BPH, CAD, urinary retention self catheterizes TID, admitted after a fall found to have hydronephrosis likely from urinary retention.  SCr is downtrending    - Keep Aden for maximal bladder decompression  - Follow up urine culture  - Trend SCr   - If SCr downtrends, would repeat renal US in 3 days to evaluate for improved hydro (renal US 1 day after Aden placement is not sufficiently long enough to identify improvement hydro)  - If SCr does not continue to improve, would consider renal lasix scan to evaluate for evidence of obstruction   - Will follow        [facility]  [address] [place] [address], [place] [id number]  [place] [address] [phone]

## 2020-08-21 NOTE — PROGRESS NOTE ADULT - SUBJECTIVE AND OBJECTIVE BOX
Follow Up:  1. Staph epi bacteremia, possible endocarditis  2. Left knee cellulitis  3. HIV  4. Pyuria    Interval History/ROS:Patient is a 80y old  Male who presents with a chief complaint of fall (20 Aug 2020 07:58)  - Overnight: RRT called for acute hypoxia with spo2 77% on RA. Pt was getting IVF LR for rhabdo. Placed on 100%NRB, tachy to 130s, BP elevated to 180/110, febrile to 100.7. On exam, diffuse wheeze and crackles bilaterally. CXR with mild pulm edema, more vascular congestion compared to prior. ABG showed resp acidosis and pCO2 ~60. Pt given lasix 60 mg IV once, IVF discontinued. labetalol 10 mg IV for elevated BP. Placed on BiPAP. Transferred to [facility] for further monitoring.   - Staph.epi 2/2 bottles *2   - TTE: possible vegatations  - T=99.5 at 8:40pm  - WBC down trending to 9.46    Allergies  No Known Allergies    ANTIMICROBIALS:    abacavir 300 every 12 hours  lamiVUDine- daily  piperacillin/tazobactam IVPB.. 3.375 every 8 hours  vancomycin  IVPB    vancomycin  IVPB 1000 daily    OTHER MEDS:  MEDICATIONS  (STANDING):  ALBUTerol    0.083% 2.5 four times a day  finasteride 5 daily  heparin   Injectable 5000 every 8 hours  tamsulosin 0.4 at bedtime  SpO2: 100% (08-20-20 @ 10:01) (93% - 100%)  Wt(kg): --    EXAM:  Constitutional: Not in acute distress  Eyes: pupils bilaterally reactive to light. No icterus.  Oral cavity: Clear, no lesions  Neck: No neck vein distension noted  RS: Chest clear to auscultation bilaterally. No wheeze/rhonchi/crepitations.  CVS: S1, S2 heard. Regular rate and rhythm. Murmur disappeared.  Abdomen: Soft. No guarding/rigidity/tenderness.  : No acute abnormalities. Aedn in place.  Extremities: Warm. No pedal edema. Left knee redness decreased. There is one eschar lesion on the left knee and right knee.  Vascular: No evidence of phlebitis  Neuro: Alert, oriented to time/place/person    Labs:                        10.2   9.46  )-----------( 179      ( 21 Aug 2020 06:15 )             31.1     08-21    138  |  105  |  33<H>  ----------------------------<  92  3.5   |  25  |  1.54<H>    Ca    8.3<L>      21 Aug 2020 06:15  Phos  2.1     08-21  Mg     2.0     08-21    TPro  6.1  /  Alb  2.8<L>  /  TBili  0.4  /  DBili  x   /  AST  62<H>  /  ALT  31  /  AlkPhos  105  08-21      WBC Trend:  WBC Count: 9.46 (08-21-20 @ 06:15)  WBC Count: 15.56 (08-20-20 @ 06:09)  WBC Count: 21.13 (08-19-20 @ 22:46)  WBC Count: 16.47 (08-19-20 @ 03:25)      Creatine Trend:  Creatinine, Serum: 1.54 (08-21)  Creatinine, Serum: 1.80 (08-20)  Creatinine, Serum: 1.59 (08-19)  Creatinine, Serum: 1.58 (08-19)      Liver Biochemical Testing Trend:  Alanine Aminotransferase (ALT/SGPT): 31 (08-21)  Alanine Aminotransferase (ALT/SGPT): 34 (08-20)  Alanine Aminotransferase (ALT/SGPT): 36 (08-19)  Aspartate Aminotransferase (AST/SGOT): 62 (08-21-20 @ 06:15)  Aspartate Aminotransferase (AST/SGOT): 75 (08-20-20 @ 06:10)  Aspartate Aminotransferase (AST/SGOT): 98 (08-19-20 @ 22:35)  Aspartate Aminotransferase (AST/SGOT): 93 (08-19-20 @ 03:25)  Aspartate Aminotransferase (AST/SGOT): 97 (08-18-20 @ 22:33)  Bilirubin Total, Serum: 0.4 (08-21)  Bilirubin Total, Serum: 0.4 (08-20)  Bilirubin Total, Serum: 0.4 (08-19)  Bilirubin Total, Serum: 0.7 (08-19)  Bilirubin Total, Serum: 0.8 (08-18)      MICROBIOLOGY:      Culture - Urine (collected 19 Aug 2020 09:00)  Source: .Urine Clean Catch (Midstream)  Final Report:    <10,000 CFU/mL Normal Urogenital Kami    Culture - Blood (collected 19 Aug 2020 01:39)  Source: .Blood Blood-Peripheral  Preliminary Report:    Growth in aerobic and anaerobic bottles: Staphylococcus epidermidis    Culture - Blood (collected 19 Aug 2020 01:39)  Source: .Blood Blood-Peripheral  Final Report:    Growth in aerobic and anaerobic bottles: Staphylococcus epidermidis    "Due to technical problems, Proteus sp. will Not be reported as part of    the BCID panel until further notice"    ***Blood Panel PCR results on this specimen are available    approximately 3 hours after the Gram stain result.***    Gram stain, PCR, and/or culture results may not always    correspond due to difference in methodologies.    ************************************************************  Organism: Blood Culture PCR  Organism: Blood Culture PCR    Sensitivities:      -  Coagulase negative Staphylococcus: Detec      Method Type: PCR      ABS CD4: 324 /uL (08-19-20 @ 03:26)  HIV-1 Viral Load Result: NOT DET. (08-19-20 @ 04:46)        OTHER TESTS:  COVID-19 PCR: NotDetec (08-19-20 @ 02:39)    COVID-19 IgG Antibody Index: 0.92 Index (08-19-20 @ 09:26)      Blood Gas Venous - Lactate: 2.0 (08-19 @ 03:25)  Blood Gas Venous - Lactate: 3.1 (08-18 @ 22:39)      RADIOLOGY:  < from: Transthoracic Echocardiogram (08.20.20 @ 09:27) >  Conclusions:  1. Mitral valve not well visualized; however, the anterior  leaflet of the mitral valve appears thickened with a 1.3 cm  x 1.4 cm echodensity on the atrial surface (not well  visualized), possibly a vegetation (the ehcodensity does  not display obvious independent mobility, but study is  technically limited), which may be suggestive of  endocarditis given the clinical history. Findings  communicated to Ольга RICKS. Consider NOHEMI if clinically  indicated. Severe, eccentric posteriorly directed mitral  regurgitation (notably the left atrium is not dilated,  suggestive of acute mitral regurgitation).  2. Normal left ventricular internal dimensions and wall  thicknesses.  3. Normal left ventricular systolic function. No segmental  wall motion abnormalities.  4. Normal right ventricular size and function.  5. In some views, there is thickening of the tricuspid  leaflet, which may be suggestive of vegetation (not well  visualized). Mild tricuspid regurgitation.    < end of copied text >      < from: Xray Chest 1 View-PORTABLE IMMEDIATE (08.19.20 @ 22:36) >  INTERPRETATION:  New onset mild pulmonary edema.     < end of copied text >      OTHER TESTS:  COVID-19 PCR: NotDetec (08-19-20 @ 02:39)  COVID-19 IgG Antibody Index: 0.92 Index (08-19-20 @ 09:26)    Blood Gas Venous - Lactate: 2.0 (08-19 @ 03:25)  Blood Gas Venous - Lactate: 3.1 (08-18 @ 22:39) Follow Up:  1. Staph epi bacteremia, possible endocarditis  2. Left knee cellulitis  3. HIV  4. Pyuria    Interval History/ROS:Patient is a 80y old  Male who presents with a chief complaint of fall (20 Aug 2020 07:58)  - Overnight: RRT called for acute hypoxia with spo2 77% on RA. Pt was getting IVF LR for rhabdo. Placed on 100%NRB, tachy to 130s, BP elevated to 180/110, febrile to 100.7. On exam, diffuse wheeze and crackles bilaterally. CXR with mild pulm edema, more vascular congestion compared to prior. ABG showed resp acidosis and pCO2 ~60. Pt given lasix 60 mg IV once, IVF discontinued. labetalol 10 mg IV for elevated BP. Placed on BiPAP. Transferred to [facility] for further monitoring.   - Staph.epi 2/2 bottles *2   - TTE: possible vegatations  - T=99.5 at 8:40pm  - WBC down trending to 9.46    Allergies  No Known Allergies    ANTIMICROBIALS:    abacavir 300 every 12 hours  lamiVUDine- daily  piperacillin/tazobactam IVPB.. 3.375 every 8 hours  vancomycin  IVPB    vancomycin  IVPB 1000 daily    OTHER MEDS:  MEDICATIONS  (STANDING):  ALBUTerol    0.083% 2.5 four times a day  finasteride 5 daily  heparin   Injectable 5000 every 8 hours  tamsulosin 0.4 at bedtime  SpO2: 100% (08-20-20 @ 10:01) (93% - 100%)  Wt(kg): --    EXAM:  Constitutional: Not in acute distress  Eyes: pupils bilaterally reactive to light. No icterus.  Oral cavity: Clear, no lesions  Neck: No neck vein distension noted  RS: Chest clear to auscultation bilaterally. No wheeze/rhonchi/crepitations.  CVS: S1, S2 heard. Regular rate and rhythm. Pansystolic murmur at apex.  Abdomen: Soft. No guarding/rigidity/tenderness.  : No acute abnormalities. Aden in place.  Extremities: Warm. No pedal edema. Left knee redness decreased. There is one eschar lesion on the left knee and right knee.  Vascular: No evidence of phlebitis  Neuro: Alert, oriented to time/place/person    Labs:                        10.2   9.46  )-----------( 179      ( 21 Aug 2020 06:15 )             31.1     08-21    138  |  105  |  33<H>  ----------------------------<  92  3.5   |  25  |  1.54<H>    Ca    8.3<L>      21 Aug 2020 06:15  Phos  2.1     08-21  Mg     2.0     08-21    TPro  6.1  /  Alb  2.8<L>  /  TBili  0.4  /  DBili  x   /  AST  62<H>  /  ALT  31  /  AlkPhos  105  08-21      WBC Trend:  WBC Count: 9.46 (08-21-20 @ 06:15)  WBC Count: 15.56 (08-20-20 @ 06:09)  WBC Count: 21.13 (08-19-20 @ 22:46)  WBC Count: 16.47 (08-19-20 @ 03:25)      Creatine Trend:  Creatinine, Serum: 1.54 (08-21)  Creatinine, Serum: 1.80 (08-20)  Creatinine, Serum: 1.59 (08-19)  Creatinine, Serum: 1.58 (08-19)      Liver Biochemical Testing Trend:  Alanine Aminotransferase (ALT/SGPT): 31 (08-21)  Alanine Aminotransferase (ALT/SGPT): 34 (08-20)  Alanine Aminotransferase (ALT/SGPT): 36 (08-19)  Aspartate Aminotransferase (AST/SGOT): 62 (08-21-20 @ 06:15)  Aspartate Aminotransferase (AST/SGOT): 75 (08-20-20 @ 06:10)  Aspartate Aminotransferase (AST/SGOT): 98 (08-19-20 @ 22:35)  Aspartate Aminotransferase (AST/SGOT): 93 (08-19-20 @ 03:25)  Aspartate Aminotransferase (AST/SGOT): 97 (08-18-20 @ 22:33)  Bilirubin Total, Serum: 0.4 (08-21)  Bilirubin Total, Serum: 0.4 (08-20)  Bilirubin Total, Serum: 0.4 (08-19)  Bilirubin Total, Serum: 0.7 (08-19)  Bilirubin Total, Serum: 0.8 (08-18)      MICROBIOLOGY:      Culture - Urine (collected 19 Aug 2020 09:00)  Source: .Urine Clean Catch (Midstream)  Final Report:    <10,000 CFU/mL Normal Urogenital Kami    Culture - Blood (collected 19 Aug 2020 01:39)  Source: .Blood Blood-Peripheral  Preliminary Report:    Growth in aerobic and anaerobic bottles: Staphylococcus epidermidis    Culture - Blood (collected 19 Aug 2020 01:39)  Source: .Blood Blood-Peripheral  Final Report:    Growth in aerobic and anaerobic bottles: Staphylococcus epidermidis    "Due to technical problems, Proteus sp. will Not be reported as part of    the BCID panel until further notice"    ***Blood Panel PCR results on this specimen are available    approximately 3 hours after the Gram stain result.***    Gram stain, PCR, and/or culture results may not always    correspond due to difference in methodologies.    ************************************************************  Organism: Blood Culture PCR  Organism: Blood Culture PCR    Sensitivities:      -  Coagulase negative Staphylococcus: Detec      Method Type: PCR      ABS CD4: 324 /uL (08-19-20 @ 03:26)  HIV-1 Viral Load Result: NOT DET. (08-19-20 @ 04:46)        OTHER TESTS:  COVID-19 PCR: NotDetec (08-19-20 @ 02:39)    COVID-19 IgG Antibody Index: 0.92 Index (08-19-20 @ 09:26)      Blood Gas Venous - Lactate: 2.0 (08-19 @ 03:25)  Blood Gas Venous - Lactate: 3.1 (08-18 @ 22:39)      RADIOLOGY:  < from: Transthoracic Echocardiogram (08.20.20 @ 09:27) >  Conclusions:  1. Mitral valve not well visualized; however, the anterior  leaflet of the mitral valve appears thickened with a 1.3 cm  x 1.4 cm echodensity on the atrial surface (not well  visualized), possibly a vegetation (the ehcodensity does  not display obvious independent mobility, but study is  technically limited), which may be suggestive of  endocarditis given the clinical history. Findings  communicated to Ольга RICKS. Consider NOHEMI if clinically  indicated. Severe, eccentric posteriorly directed mitral  regurgitation (notably the left atrium is not dilated,  suggestive of acute mitral regurgitation).  2. Normal left ventricular internal dimensions and wall  thicknesses.  3. Normal left ventricular systolic function. No segmental  wall motion abnormalities.  4. Normal right ventricular size and function.  5. In some views, there is thickening of the tricuspid  leaflet, which may be suggestive of vegetation (not well  visualized). Mild tricuspid regurgitation.    < end of copied text >      < from: Xray Chest 1 View-PORTABLE IMMEDIATE (08.19.20 @ 22:36) >  INTERPRETATION:  New onset mild pulmonary edema.     < end of copied text >      OTHER TESTS:  COVID-19 PCR: NotDetec (08-19-20 @ 02:39)  COVID-19 IgG Antibody Index: 0.92 Index (08-19-20 @ 09:26)    Blood Gas Venous - Lactate: 2.0 (08-19 @ 03:25)  Blood Gas Venous - Lactate: 3.1 (08-18 @ 22:39)

## 2020-08-21 NOTE — CONSULT NOTE ADULT - SUBJECTIVE AND OBJECTIVE BOX
History of Present Illness:  80y Male    Past Medical History  Orchitis and epididymitis  Vitamin D deficiency  Bladder mass  Edema of both legs  Osteoporosis  Seborrheic keratosis  Constipation, unspecified constipation type  Chronic kidney disease, unspecified stage  HIV (human immunodeficiency virus infection)  Unsteady gait      Past Surgical History  S/P coronary artery stent placement  No Past Surgical History      MEDICATIONS  (STANDING):  abacavir 300 milliGRAM(s) Oral every 12 hours  ALBUTerol    0.083% 2.5 milliGRAM(s) Nebulizer four times a day  ascorbic acid 500 milliGRAM(s) Oral daily  dolutegravir 50 milliGRAM(s) Oral daily  Doravirine 100mg tablets (pifeltro) 100 milliGRAM(s) 100 milliGRAM(s) Oral daily  finasteride 5 milliGRAM(s) Oral daily  furosemide    Tablet 40 milliGRAM(s) Oral two times a day  heparin   Injectable 5000 Unit(s) SubCutaneous every 8 hours  hydrALAZINE 10 milliGRAM(s) Oral three times a day  lamiVUDine- milliGRAM(s) Oral daily  multivitamin 1 Tablet(s) Oral daily  piperacillin/tazobactam IVPB.. 3.375 Gram(s) IV Intermittent every 8 hours  tamsulosin 0.4 milliGRAM(s) Oral at bedtime  vancomycin  IVPB      vancomycin  IVPB 1000 milliGRAM(s) IV Intermittent daily    MEDICATIONS  (PRN):    Antiplatelet therapy:                           Last dose/amt:    Allergies: No Known Allergies      SOCIAL HISTORY:  Smoker: [ ] Yes  [ ] No        PACK YEARS:                         WHEN QUIT?  ETOH use: [ ] Yes  [ ] No              FREQUENCY / QUANTITY:  Ilicit Drug use:  [ ] Yes  [ ] No  Occupation:  Live with:  Assist device use:    Relevant Family History  FAMILY HISTORY:  No pertinent family history in first degree relatives      Review of Systems  GENERAL:  Fevers[] chills[] sweats[] fatigue[] weight loss[] weight gain []                                        NEURO:  parathesias[] seizures []  syncope []  confusion []                                                                                  EYES: glasses[]  blurry vision[]  discharge[] pain[] glaucoma []                                                                            ENMT:  difficulty hearing []  vertigo[]  dysphagia[] epistaxis[] recent dental work []                                      CV:  chest pain[] palpitations[] PRESCOTT [] diaphoresis [] edema[]                                                                                             RESPIRATORY:  wheezing[] SOB[] cough [] sputum[] hemoptysis[]                                                                    GI:  nausea[]  vomiting []  diarrhea[] constipation [] melena []                                                                        : hematuria[ ]  dysuria[ ] urgency[] incontinence[]                                                                                              MUSKULOSKELETAL:  arthritis[ ]  joint swelling [ ] muscle weakness [ ]                                                                  SKIN/BREAST:  rash[ ] itching [ ]  hair loss[ ] masses[ ]                                                                                                PSYCH:  dementia [ ] depresion [ ] anxiety[ ]                                                                                                                  HEME/LYMPH:  bruises easily[ ] enlarged lymph nodes[ ] tender lymph nodes[ ]                                                 ENDOCRINE:  cold intolerance[ ] heat intolerance[ ] polydipsia[ ]                                                                              PHYSICAL EXAM  Vital Signs Last 24 Hrs  T(C): 36.9 (21 Aug 2020 16:05), Max: 37.5 (20 Aug 2020 20:40)  T(F): 98.4 (21 Aug 2020 16:05), Max: 99.5 (20 Aug 2020 20:40)  HR: 88 (21 Aug 2020 16:05) (83 - 89)  BP: 113/64 (21 Aug 2020 16:05) (109/65 - 119/73)  BP(mean): --  RR: 18 (21 Aug 2020 16:05) (18 - 18)  SpO2: 96% (21 Aug 2020 16:17) (96% - 99%)    General: Well nourished, well developed, no acute distress.                                                         Neuro: Normal exam oriented to person/place & time with no focal motor or sensory  deficits.                    Eyes: Normal exam of conjunctiva & lids, pupils equally reactive.   ENT: Normal exam of nasal/oral mucosa with absence of cyanosis.   Neck: Normal exam of jugular veins, trachea & thyroid.   Chest: Normal lung exam with good air movement absence of wheezes, rales, or rhonchi:                                                                          CV:  Auscultation: normal [ ] S3[ ] S4[ ] Irregular [ ] Rub[ ] Clicks[ ]  Murmurs none:[ ]systolic [ ]  diastolic [ ] holosystolic [ ]  Carotids: No Bruits[ ] Other____________ Abdominal Aorta: normal [ ] nonpalpable[ ]                                                                         GI: Normal exam of abdomen, liver & spleen with no noted masses or tenderness.                                                                                              Extremities: Normal no evidence of cyanosis or deformity Edema: none[ ]trace[ ]1+[ ]2+[ ]3+[ ]4+[ ]  Lower Extremity Pulses: Right[ ] Left[ ]Varicosities[ ]  SKIN : Normal exam to inspection & palpation.                                                           LABS:                        10.2   9.46  )-----------( 179      ( 21 Aug 2020 06:15 )             31.1     08-21    138  |  105  |  33<H>  ----------------------------<  92  3.5   |  25  |  1.54<H>    Ca    8.3<L>      21 Aug 2020 06:15  Phos  2.1     08-21  Mg     2.0     08-21    TPro  6.1  /  Alb  2.8<L>  /  TBili  0.4  /  DBili  x   /  AST  62<H>  /  ALT  31  /  AlkPhos  105  08-21    PT/INR - ( 19 Aug 2020 22:35 )   PT: 12.0 sec;   INR: 1.01 ratio         PTT - ( 19 Aug 2020 22:35 )  PTT:38.1 sec    CARDIAC MARKERS ( 21 Aug 2020 06:15 )  x     / x     / 929 U/L / x     / x      CARDIAC MARKERS ( 20 Aug 2020 06:10 )  x     / x     / 1248 U/L / x     / x            TTE: < from: Transthoracic Echocardiogram (08.20.20 @ 09:27) >  Conclusions:  1. Mitral valve not well visualized; however, the anterior  leaflet of the mitral valve appears thickened with a 1.3 cm  x 1.4 cm echodensity on the atrial surface (not well  visualized), possibly a vegetation (the ehcodensity does  not display obvious independent mobility, but study is  technically limited), which may be suggestive of  endocarditis given the clinical history. Findings  communicated to Ольга RICKS. Consider NOHEMI if clinically  indicated. Severe, eccentric posteriorly directed mitral  regurgitation (notably the left atrium is not dilated,  suggestive of acute mitral regurgitation).  2. Normal left ventricular internal dimensions and wall  thicknesses.  3. Normal left ventricular systolic function. No segmental  wall motion abnormalities.  4. Normal right ventricular size and function.  5. In some views, there is thickening of the tricuspid  leaflet, which may be suggestive of vegetation (not well  visualized). Mild tricuspid regurgitation.    < end of copied text >      Assessment:  80y Male presents with Rhabdomyolysis echo revealed mitral regurgitation  and suspected endocarditis>referred for CTS eval    Plan:    NOHEMI planned for Monday evaluation of echodensity  Will need cath  Will discuss with Dr Camacho History of Present Illness:  79 y/o man with hx HIV, CAD, detrusor muscle weakness, BPH presenting for fall, down for 24 hrs, admitted with rhabdo, bilateral severe ureterohydronephrosis and thickened bladder on CTAP 2/2 inability to continue his home straight cath 3x daily after fall, tovar in place. Rhabdo improved, hydronephrosis improving with tovar (urology following). Echo shows1.3-1.4 cm echodensity on atrial surface and thickened tricuspid leaflet concerning for possible vegetations. Severe acute mitral regurg (acute because no dilation), mild tricupid regurg. Normal systolic LV function, normal RV function endocarditis with + BCx staph epidermidis. On vanc (also on zosyn for cellulitis).     Past Medical History  Orchitis and epididymitis  Vitamin D deficiency  Bladder mass  Edema of both legs  Osteoporosis  Seborrheic keratosis  Constipation, unspecified constipation type  Chronic kidney disease, unspecified stage  HIV (human immunodeficiency virus infection)  Unsteady gait      Past Surgical History  S/P coronary artery stent placement  No Past Surgical History      MEDICATIONS  (STANDING):  abacavir 300 milliGRAM(s) Oral every 12 hours  ALBUTerol    0.083% 2.5 milliGRAM(s) Nebulizer four times a day  ascorbic acid 500 milliGRAM(s) Oral daily  dolutegravir 50 milliGRAM(s) Oral daily  Doravirine 100mg tablets (pifeltro) 100 milliGRAM(s) 100 milliGRAM(s) Oral daily  finasteride 5 milliGRAM(s) Oral daily  furosemide    Tablet 40 milliGRAM(s) Oral two times a day  heparin   Injectable 5000 Unit(s) SubCutaneous every 8 hours  hydrALAZINE 10 milliGRAM(s) Oral three times a day  lamiVUDine- milliGRAM(s) Oral daily  multivitamin 1 Tablet(s) Oral daily  piperacillin/tazobactam IVPB.. 3.375 Gram(s) IV Intermittent every 8 hours  tamsulosin 0.4 milliGRAM(s) Oral at bedtime  vancomycin  IVPB      vancomycin  IVPB 1000 milliGRAM(s) IV Intermittent daily      Allergies: No Known Allergies      SOCIAL HISTORY:  Smoker: [ ] Yes  [x ] No        PACK YEARS:                         WHEN QUIT?  ETOH use: [ ] Yes  [x ] No              FREQUENCY / QUANTITY:  Ilicit Drug use:  [ ] Yes  [ x] No  Occupation: retired   Live with: [facility]  Assist device use: none    Relevant Family History  FAMILY HISTORY:  No pertinent family history in first degree relatives      Review of Systems  GENERAL:  Fevers[] chills[] sweats[] fatigue[] weight loss[] weight gain []                                        NEURO:  parathesias[] seizures []  syncope []  confusion []                                                                                  EYES: glasses[]  blurry vision[]  discharge[] pain[] glaucoma []                                                                            ENMT:  difficulty hearing []  vertigo[]  dysphagia[] epistaxis[] recent dental work [x]  ** extractions w/in last few months in preparation dental implants                                     CV:  chest pain[] palpitations[] PRESCOTT [] diaphoresis [] edema[]                                                                                             RESPIRATORY:  wheezing[] SOBx[] cough [] sputum[] hemoptysis[]                                                                    GI:  nausea[]  vomiting []  diarrhea[] constipation [] melena []                                                                        : hematuria[ ]  dysuria[ ] urgency[] incontinence[x]     > self caths 3 x daily                                                                                           MUSKULOSKELETAL:  arthritis[ ]  joint swelling [ ] muscle weakness [x ]                                                                  SKIN/BREAST:  rash[ ] itching [ ]  hair loss[ ] masses[ ]                                                                                                PSYCH:  dementia [ ] depresion [ ] anxiety[ ]                                                                                                                  HEME/LYMPH:  bruises easily[ ] enlarged lymph nodes[ ] tender lymph nodes[ ]                                                 ENDOCRINE:  cold intolerance[ ] heat intolerance[ ] polydipsia[ ]                                                                              PHYSICAL EXAM  Vital Signs Last 24 Hrs  T(C): 36.9 (21 Aug 2020 16:05), Max: 37.5 (20 Aug 2020 20:40)  T(F): 98.4 (21 Aug 2020 16:05), Max: 99.5 (20 Aug 2020 20:40)  HR: 88 (21 Aug 2020 16:05) (83 - 89)  BP: 113/64 (21 Aug 2020 16:05) (109/65 - 119/73)  BP(mean): --  RR: 18 (21 Aug 2020 16:05) (18 - 18)  SpO2: 96% (21 Aug 2020 16:17) (96% - 99%)    General: Well nourished, well developed, no acute distress.                                                         Neuro: Normal exam oriented to person/place & time with no focal motor or sensory  deficits.                    Eyes: Normal exam of conjunctiva & lids, pupils equally reactive.   ENT: Normal exam of nasal/oral mucosa with absence of cyanosis.   Neck: Normal exam of jugular veins, trachea & thyroid.   Chest: Normal lung exam with good air movement absence of wheezes, rales, or rhonchi:                                                                          CV:  Auscultation: normal [x ] S3[ ] S4[ ] Irregular [ ] Rub[ ] Clicks[ ]  Murmurs none:[ ]systolic [ ]  diastolic [ ] holosystolic [ ]  Carotids: No Bruits[x ] Other____________ Abdominal Aorta: normal [ ] nonpalpable[ ]                                                                         GI: Normal exam of abdomen, liver & spleen with no noted masses or tenderness.                                                                                              Extremities: Normal no evidence of cyanosis or deformity Edema: none[ ]trace[ ]1+[ ]2+[x ]3+[ ]4+[ ]  Lower Extremity Pulses: Right[ ] Left[ ]Varicosities[ ]  SKIN : Normal exam to inspection & palpation.                                                           LABS:                        10.2   9.46  )-----------( 179      ( 21 Aug 2020 06:15 )             31.1     08-21    138  |  105  |  33<H>  ----------------------------<  92  3.5   |  25  |  1.54<H>    Ca    8.3<L>      21 Aug 2020 06:15  Phos  2.1     08-21  Mg     2.0     08-21    TPro  6.1  /  Alb  2.8<L>  /  TBili  0.4  /  DBili  x   /  AST  62<H>  /  ALT  31  /  AlkPhos  105  08-21    PT/INR - ( 19 Aug 2020 22:35 )   PT: 12.0 sec;   INR: 1.01 ratio         PTT - ( 19 Aug 2020 22:35 )  PTT:38.1 sec    CARDIAC MARKERS ( 21 Aug 2020 06:15 )  x     / x     / 929 U/L / x     / x      CARDIAC MARKERS ( 20 Aug 2020 06:10 )  x     / x     / 1248 U/L / x     / x            TTE: < from: Transthoracic Echocardiogram (08.20.20 @ 09:27) >  Conclusions:  1. Mitral valve not well visualized; however, the anterior  leaflet of the mitral valve appears thickened with a 1.3 cm  x 1.4 cm echodensity on the atrial surface (not well  visualized), possibly a vegetation (the ehcodensity does  not display obvious independent mobility, but study is  technically limited), which may be suggestive of  endocarditis given the clinical history. Findings  communicated to Ольга RICKS. Consider NOHEMI if clinically  indicated. Severe, eccentric posteriorly directed mitral  regurgitation (notably the left atrium is not dilated,  suggestive of acute mitral regurgitation).  2. Normal left ventricular internal dimensions and wall  thicknesses.  3. Normal left ventricular systolic function. No segmental  wall motion abnormalities.  4. Normal right ventricular size and function.  5. In some views, there is thickening of the tricuspid  leaflet, which may be suggestive of vegetation (not well  visualized). Mild tricuspid regurgitation.    < end of copied text >      Assessment:  80y Male presents with Rhabdomyolysis echo revealed mitral regurgitation  and suspected endocarditis>referred for CTS eval    Plan:    NOHEMI planned for Monday evaluation of echodensity  Will need cath  Will discuss with Dr Camacho

## 2020-08-21 NOTE — PROGRESS NOTE ADULT - ATTENDING COMMENTS
Seen, examined the patient this am with house staff  80M with h/o HIV on meds, CAD, BPH Admitted for sepsis due to UTI ( secondary to self cath), B/L severe hydronephrosis with SUDHEER, fall with rhabdomyolysis, debility. Found to have severe MR +/_ IE w pulmonary edema  Resting in bed, no acute SOB, on 3L NC, no fever, chest pain, AAO x3, hemodyanmically stable  still c/o weakness and muscle pain after he fell.  1. Bacteremia- coags neg staph, UTI  2. Pulmonary edema, from severe MR/IV hydration  3. Rhabdomyolysis, s/p fall  4. Elevated Trop from demand ischemia in the setting of pulmonary edema and SUDHEER  5. HIV, on ART  6. Debility  - Reviewed labs, imaging- Echo showed preserved EF w severe MR +/- veg    WBC 9.4 from 15, , Scr 1.5  - Cardiology consult appreciated    On IV Lasix 40mg bid, added hydralazine 10mg tid for afterload reducer    NOHEMI ordered- r/o Veg and status for MR    CTS consult called    daily I/O  - Spoke to ID attending- c/w IV Vanco/zosyn for Staph bacteremia/UTI/cellulitis    c/w ART, c/w Abacavir 300mg BID and Lamivudine 100mg daily    Per ID Pharmacist ordered Doravirine 100mg daily, but will not get it until Friday, will do Tivicay 50mg daily until  - Urology plans to c/w tovar and repeat renal US in 2-3 days for left hydro    closely monitor ScrI  - OOB to chair, PT eval, fall precaution

## 2020-08-21 NOTE — PROGRESS NOTE ADULT - PROBLEM SELECTOR PLAN 5
Improved.  - CK 1248 -> 929 s/p 2L IVF NS and mIVF  cc/hr. IVF stopped after flash pulm edema 8/20 overnight.   - rhabdo 2/2 being down 24 hrs after fall  - pt currently has weakness in bilateral shoulders, stiffness in UE and LE, decreased mobility and str  [ ] CTM CK

## 2020-08-21 NOTE — PROGRESS NOTE ADULT - SUBJECTIVE AND OBJECTIVE BOX
Rosa M Palma PGY1    Patient is a 80y old  Male who presents with a chief complaint of fall (21 Aug 2020 07:50)      SUBJECTIVE / OVERNIGHT EVENTS:  - overnight - no events  - am - echo read came back with 1.3-1.4 cm echodensity on atrial surface and thickened tricuspid leaflet concerning for possible vegetations. Severe acute mitral regurg (acute because no dilation), mild tricupid regurg. Normal systolic LV function, normal RV function.     MEDICATIONS  (STANDING):  abacavir 300 milliGRAM(s) Oral every 12 hours  ALBUTerol    0.083% 2.5 milliGRAM(s) Nebulizer four times a day  ascorbic acid 500 milliGRAM(s) Oral daily  Doravirine 100mg tablets (pifeltro) 100 milliGRAM(s) 100 milliGRAM(s) Oral daily  finasteride 5 milliGRAM(s) Oral daily  heparin   Injectable 5000 Unit(s) SubCutaneous every 8 hours  lamiVUDine- milliGRAM(s) Oral daily  multivitamin 1 Tablet(s) Oral daily  piperacillin/tazobactam IVPB.. 3.375 Gram(s) IV Intermittent every 8 hours  potassium phosphate IVPB 15 milliMole(s) IV Intermittent once  tamsulosin 0.4 milliGRAM(s) Oral at bedtime  vancomycin  IVPB      vancomycin  IVPB 1000 milliGRAM(s) IV Intermittent daily    MEDICATIONS  (PRN):      CAPILLARY BLOOD GLUCOSE        I&O's Summary    20 Aug 2020 07:01  -  21 Aug 2020 07:00  --------------------------------------------------------  IN: 1010 mL / OUT: 3075 mL / NET: -2065 mL        Vital Signs Last 24 Hrs  T(C): 36.9 (21 Aug 2020 04:59), Max: 37.5 (20 Aug 2020 20:40)  T(F): 98.4 (21 Aug 2020 04:59), Max: 99.5 (20 Aug 2020 20:40)  HR: 83 (21 Aug 2020 04:59) (77 - 91)  BP: 109/65 (21 Aug 2020 04:59) (107/64 - 119/73)  BP(mean): --  RR: 18 (21 Aug 2020 04:59) (18 - 18)  SpO2: 98% (21 Aug 2020 04:59) (97% - 100%)    PHYSICAL EXAM:  GENERAL:   HEAD: Atraumatic, Normocephalic  EYES: EOMI, PERRLA, conjunctiva and sclera clear  NECK: Supple, No JVD  CHEST/LUNG: Clear to auscultation bilaterally; No wheezes or crackles  HEART: Normal S1/S2; Regular rate and rhythm; No murmurs, rubs, or gallops  ABDOMEN: Soft, Nontender, Nondistended; Bowel sounds present  EXTREMITIES: 2+ Peripheral Pulses; No clubbing, cyanosis, or edema  PSYCH: A&Ox3  NEUROLOGY: no focal neurologic deficit  SKIN: No rashes or lesions    LABS:                        10.2   9.46  )-----------( 179      ( 21 Aug 2020 06:15 )             31.1      08-21    138  |  105  |  33<H>  ----------------------------<  92  3.5   |  25  |  1.54<H>    Ca    8.3<L>      21 Aug 2020 06:15  Phos  2.1     08-21  Mg     2.0     08-21    TPro  6.1  /  Alb  2.8<L>  /  TBili  0.4  /  DBili  x   /  AST  62<H>  /  ALT  31  /  AlkPhos  105  08-21    PT/INR - ( 19 Aug 2020 22:35 )   PT: 12.0 sec;   INR: 1.01 ratio         PTT - ( 19 Aug 2020 22:35 )  PTT:38.1 sec  CARDIAC MARKERS ( 21 Aug 2020 06:15 )  x     / x     / 929 U/L / x     / x      CARDIAC MARKERS ( 20 Aug 2020 06:10 )  x     / x     / 1248 U/L / x     / x      CARDIAC MARKERS ( 19 Aug 2020 15:18 )  x     / x     / 2469 U/L / x     / x              RADIOLOGY & ADDITIONAL TESTS:    Imaging Personally Reviewed:    Consultant(s) Notes Reviewed:      Care Discussed with Consultants/Other Providers: Rosa M Palma PGY1    Patient is a 80y old  Male who presents with a chief complaint of fall (21 Aug 2020 07:50)      SUBJECTIVE / OVERNIGHT EVENTS:  - overnight - no events  - am - echo read came back with 1.3-1.4 cm echodensity on atrial surface and thickened tricuspid leaflet concerning for possible vegetations. Severe acute mitral regurg (acute because no dilation), mild tricupid regurg. Normal systolic LV function, normal RV function. Unable to do NOHEMI today; NPO sunday for NOHEMI.   Pt feels well, no SOB. sating well on 3L o2. Improved muscle str/weakness.     MEDICATIONS  (STANDING):  abacavir 300 milliGRAM(s) Oral every 12 hours  ALBUTerol    0.083% 2.5 milliGRAM(s) Nebulizer four times a day  ascorbic acid 500 milliGRAM(s) Oral daily  Doravirine 100mg tablets (pifeltro) 100 milliGRAM(s) 100 milliGRAM(s) Oral daily  finasteride 5 milliGRAM(s) Oral daily  heparin   Injectable 5000 Unit(s) SubCutaneous every 8 hours  lamiVUDine- milliGRAM(s) Oral daily  multivitamin 1 Tablet(s) Oral daily  piperacillin/tazobactam IVPB.. 3.375 Gram(s) IV Intermittent every 8 hours  potassium phosphate IVPB 15 milliMole(s) IV Intermittent once  tamsulosin 0.4 milliGRAM(s) Oral at bedtime  vancomycin  IVPB      vancomycin  IVPB 1000 milliGRAM(s) IV Intermittent daily    MEDICATIONS  (PRN):      CAPILLARY BLOOD GLUCOSE        I&O's Summary    20 Aug 2020 07:01  -  21 Aug 2020 07:00  --------------------------------------------------------  IN: 1010 mL / OUT: 3075 mL / NET: -2065 mL        Vital Signs Last 24 Hrs  T(C): 36.9 (21 Aug 2020 04:59), Max: 37.5 (20 Aug 2020 20:40)  T(F): 98.4 (21 Aug 2020 04:59), Max: 99.5 (20 Aug 2020 20:40)  HR: 83 (21 Aug 2020 04:59) (77 - 91)  BP: 109/65 (21 Aug 2020 04:59) (107/64 - 119/73)  BP(mean): --  RR: 18 (21 Aug 2020 04:59) (18 - 18)  SpO2: 98% (21 Aug 2020 04:59) (97% - 100%)    PHYSICAL EXAM:  GENERAL: lying down, comfortable  HEAD: Atraumatic, Normocephalic  EYES: EOMI, PERRLA, conjunctiva and sclera clear  NECK: Supple, No JVD  CHEST/LUNG: clear to auscultation, no crackles or wheezing  HEART: regular rate rhythm, 3/6 systolic murmur in lower sternal border, most noticeable on apex.   ABDOMEN: Soft, Nontender, Nondistended; Bowel sounds present  EXTREMITIES: improved str and mobility  PSYCH: A&Ox3  NEUROLOGY: no focal neurologic deficit  SKIN: No rashes or lesions    LABS:                        10.2   9.46  )-----------( 179      ( 21 Aug 2020 06:15 )             31.1      08-21    138  |  105  |  33<H>  ----------------------------<  92  3.5   |  25  |  1.54<H>    Ca    8.3<L>      21 Aug 2020 06:15  Phos  2.1     08-21  Mg     2.0     08-21    TPro  6.1  /  Alb  2.8<L>  /  TBili  0.4  /  DBili  x   /  AST  62<H>  /  ALT  31  /  AlkPhos  105  08-21    PT/INR - ( 19 Aug 2020 22:35 )   PT: 12.0 sec;   INR: 1.01 ratio         PTT - ( 19 Aug 2020 22:35 )  PTT:38.1 sec  CARDIAC MARKERS ( 21 Aug 2020 06:15 )  x     / x     / 929 U/L / x     / x      CARDIAC MARKERS ( 20 Aug 2020 06:10 )  x     / x     / 1248 U/L / x     / x      CARDIAC MARKERS ( 19 Aug 2020 15:18 )  x     / x     / 2469 U/L / x     / x              RADIOLOGY & ADDITIONAL TESTS:    Imaging Personally Reviewed:    Consultant(s) Notes Reviewed:      Care Discussed with Consultants/Other Providers:

## 2020-08-22 DIAGNOSIS — L03.90 CELLULITIS, UNSPECIFIED: ICD-10-CM

## 2020-08-22 LAB
A1C WITH ESTIMATED AVERAGE GLUCOSE RESULT: 5.6 % — SIGNIFICANT CHANGE UP (ref 4–5.6)
ALBUMIN SERPL ELPH-MCNC: 2.8 G/DL — LOW (ref 3.3–5)
ALP SERPL-CCNC: 106 U/L — SIGNIFICANT CHANGE UP (ref 40–120)
ALT FLD-CCNC: 33 U/L — SIGNIFICANT CHANGE UP (ref 10–45)
ANION GAP SERPL CALC-SCNC: 13 MMOL/L — SIGNIFICANT CHANGE UP (ref 5–17)
AST SERPL-CCNC: 52 U/L — HIGH (ref 10–40)
BILIRUB SERPL-MCNC: 0.4 MG/DL — SIGNIFICANT CHANGE UP (ref 0.2–1.2)
BUN SERPL-MCNC: 22 MG/DL — SIGNIFICANT CHANGE UP (ref 7–23)
CALCIUM SERPL-MCNC: 8.9 MG/DL — SIGNIFICANT CHANGE UP (ref 8.4–10.5)
CHLORIDE SERPL-SCNC: 103 MMOL/L — SIGNIFICANT CHANGE UP (ref 96–108)
CK SERPL-CCNC: 525 U/L — HIGH (ref 30–200)
CO2 SERPL-SCNC: 24 MMOL/L — SIGNIFICANT CHANGE UP (ref 22–31)
CREAT SERPL-MCNC: 1.51 MG/DL — HIGH (ref 0.5–1.3)
ESTIMATED AVERAGE GLUCOSE: 114 MG/DL — SIGNIFICANT CHANGE UP (ref 68–114)
GLUCOSE SERPL-MCNC: 88 MG/DL — SIGNIFICANT CHANGE UP (ref 70–99)
HCT VFR BLD CALC: 33.3 % — LOW (ref 39–50)
HGB BLD-MCNC: 10.9 G/DL — LOW (ref 13–17)
MAGNESIUM SERPL-MCNC: 2 MG/DL — SIGNIFICANT CHANGE UP (ref 1.6–2.6)
MCHC RBC-ENTMCNC: 32.7 GM/DL — SIGNIFICANT CHANGE UP (ref 32–36)
MCHC RBC-ENTMCNC: 34.5 PG — HIGH (ref 27–34)
MCV RBC AUTO: 105.4 FL — HIGH (ref 80–100)
MRSA PCR RESULT.: SIGNIFICANT CHANGE UP
NRBC # BLD: 0 /100 WBCS — SIGNIFICANT CHANGE UP (ref 0–0)
NT-PROBNP SERPL-SCNC: 1987 PG/ML — HIGH (ref 0–300)
PHOSPHATE SERPL-MCNC: 3.2 MG/DL — SIGNIFICANT CHANGE UP (ref 2.5–4.5)
PLATELET # BLD AUTO: 212 K/UL — SIGNIFICANT CHANGE UP (ref 150–400)
POTASSIUM SERPL-MCNC: 3.7 MMOL/L — SIGNIFICANT CHANGE UP (ref 3.5–5.3)
POTASSIUM SERPL-SCNC: 3.7 MMOL/L — SIGNIFICANT CHANGE UP (ref 3.5–5.3)
PROT SERPL-MCNC: 5.9 G/DL — LOW (ref 6–8.3)
RBC # BLD: 3.16 M/UL — LOW (ref 4.2–5.8)
RBC # FLD: 13.7 % — SIGNIFICANT CHANGE UP (ref 10.3–14.5)
S AUREUS DNA NOSE QL NAA+PROBE: DETECTED
SODIUM SERPL-SCNC: 140 MMOL/L — SIGNIFICANT CHANGE UP (ref 135–145)
T3 SERPL-MCNC: 78 NG/DL — LOW (ref 80–200)
T4 AB SER-ACNC: 5.3 UG/DL — SIGNIFICANT CHANGE UP (ref 4.6–12)
TSH SERPL-MCNC: 4.24 UIU/ML — HIGH (ref 0.27–4.2)
VANCOMYCIN TROUGH SERPL-MCNC: 11.2 UG/ML — SIGNIFICANT CHANGE UP (ref 10–20)
WBC # BLD: 9.88 K/UL — SIGNIFICANT CHANGE UP (ref 3.8–10.5)
WBC # FLD AUTO: 9.88 K/UL — SIGNIFICANT CHANGE UP (ref 3.8–10.5)

## 2020-08-22 PROCEDURE — 99233 SBSQ HOSP IP/OBS HIGH 50: CPT | Mod: GC

## 2020-08-22 RX ORDER — CEFAZOLIN SODIUM 1 G
1000 VIAL (EA) INJECTION ONCE
Refills: 0 | Status: COMPLETED | OUTPATIENT
Start: 2020-08-22 | End: 2020-08-22

## 2020-08-22 RX ORDER — VANCOMYCIN HCL 1 G
1250 VIAL (EA) INTRAVENOUS ONCE
Refills: 0 | Status: COMPLETED | OUTPATIENT
Start: 2020-08-22 | End: 2020-08-22

## 2020-08-22 RX ORDER — CEFAZOLIN SODIUM 1 G
VIAL (EA) INJECTION
Refills: 0 | Status: DISCONTINUED | OUTPATIENT
Start: 2020-08-22 | End: 2020-08-22

## 2020-08-22 RX ORDER — CEFAZOLIN SODIUM 1 G
VIAL (EA) INJECTION
Refills: 0 | Status: DISCONTINUED | OUTPATIENT
Start: 2020-08-22 | End: 2020-08-23

## 2020-08-22 RX ORDER — VANCOMYCIN HCL 1 G
1250 VIAL (EA) INTRAVENOUS DAILY
Refills: 0 | Status: DISCONTINUED | OUTPATIENT
Start: 2020-08-23 | End: 2020-08-24

## 2020-08-22 RX ORDER — VANCOMYCIN HCL 1 G
VIAL (EA) INTRAVENOUS
Refills: 0 | Status: DISCONTINUED | OUTPATIENT
Start: 2020-08-22 | End: 2020-08-24

## 2020-08-22 RX ORDER — CEFAZOLIN SODIUM 1 G
1000 VIAL (EA) INJECTION EVERY 12 HOURS
Refills: 0 | Status: DISCONTINUED | OUTPATIENT
Start: 2020-08-22 | End: 2020-08-23

## 2020-08-22 RX ADMIN — Medication 100 MILLIGRAM(S): at 09:31

## 2020-08-22 RX ADMIN — ALBUTEROL 2.5 MILLIGRAM(S): 90 AEROSOL, METERED ORAL at 05:13

## 2020-08-22 RX ADMIN — Medication 10 MILLIGRAM(S): at 22:03

## 2020-08-22 RX ADMIN — Medication 100 MILLIGRAM(S): at 09:33

## 2020-08-22 RX ADMIN — HEPARIN SODIUM 5000 UNIT(S): 5000 INJECTION INTRAVENOUS; SUBCUTANEOUS at 22:03

## 2020-08-22 RX ADMIN — Medication 100 MILLIGRAM(S): at 22:02

## 2020-08-22 RX ADMIN — ALBUTEROL 2.5 MILLIGRAM(S): 90 AEROSOL, METERED ORAL at 00:00

## 2020-08-22 RX ADMIN — Medication 10 MILLIGRAM(S): at 05:12

## 2020-08-22 RX ADMIN — Medication 10 MILLIGRAM(S): at 13:30

## 2020-08-22 RX ADMIN — ALBUTEROL 2.5 MILLIGRAM(S): 90 AEROSOL, METERED ORAL at 17:05

## 2020-08-22 RX ADMIN — Medication 166.67 MILLIGRAM(S): at 18:08

## 2020-08-22 RX ADMIN — Medication 500 MILLIGRAM(S): at 09:31

## 2020-08-22 RX ADMIN — ABACAVIR 300 MILLIGRAM(S): 20 SOLUTION ORAL at 22:02

## 2020-08-22 RX ADMIN — TAMSULOSIN HYDROCHLORIDE 0.4 MILLIGRAM(S): 0.4 CAPSULE ORAL at 22:03

## 2020-08-22 RX ADMIN — ABACAVIR 300 MILLIGRAM(S): 20 SOLUTION ORAL at 09:31

## 2020-08-22 RX ADMIN — HEPARIN SODIUM 5000 UNIT(S): 5000 INJECTION INTRAVENOUS; SUBCUTANEOUS at 05:12

## 2020-08-22 RX ADMIN — FINASTERIDE 5 MILLIGRAM(S): 5 TABLET, FILM COATED ORAL at 22:02

## 2020-08-22 RX ADMIN — Medication 40 MILLIGRAM(S): at 17:07

## 2020-08-22 RX ADMIN — ALBUTEROL 2.5 MILLIGRAM(S): 90 AEROSOL, METERED ORAL at 12:07

## 2020-08-22 RX ADMIN — Medication 1 TABLET(S): at 09:31

## 2020-08-22 RX ADMIN — HEPARIN SODIUM 5000 UNIT(S): 5000 INJECTION INTRAVENOUS; SUBCUTANEOUS at 13:30

## 2020-08-22 RX ADMIN — ALBUTEROL 2.5 MILLIGRAM(S): 90 AEROSOL, METERED ORAL at 23:53

## 2020-08-22 RX ADMIN — Medication 40 MILLIGRAM(S): at 05:12

## 2020-08-22 RX ADMIN — PIPERACILLIN AND TAZOBACTAM 25 GRAM(S): 4; .5 INJECTION, POWDER, LYOPHILIZED, FOR SOLUTION INTRAVENOUS at 05:13

## 2020-08-22 NOTE — PROGRESS NOTE ADULT - PROBLEM SELECTOR PLAN 2
rapid response called overnight 8/20 for flash pulm edema (via exam and CXR) and spo2 77 s/p lasix IV 60 mg -> bipap -> transitioned to NC 3L during day. Repeat CXR improved pulm edema.   [ ] continue to wean oxygen on NC  [ ] no further IVF for rhabdo (improved CK, does not need further IVF)  [ ] cardiology consulted, appreciate recs 2/2 severe acute mitral valve regurg on echo, rapid called 8/20 overnight, improved with lasix IV 60 mg  [ ] cardiology following: lasix 40 BID + hydralazine 10 TID, hold for SBP <100  [ ] continue to wean oxygen on NC  [ ] avoid IVF

## 2020-08-22 NOTE — PROGRESS NOTE ADULT - PROBLEM SELECTOR PLAN 7
Controlled; last CD4 368; undetectable viral load in July  [ ] continue home abacavir, lamivudine, Doravirine - L knee with abrasion and 5 cm area of erythema + mild swelling likely cellulitis 2/2 fall, improved clinically  [ ] cefazolin 1g q12h per ID recs

## 2020-08-22 NOTE — PROGRESS NOTE ADULT - PROBLEM SELECTOR PLAN 1
Afebrile, BCx pos for staph epidermidis, on empiric IV vanc (also on zosyn for cellulitis)  - TTE showed 1.3-1.4 cm echodensity on atrial echodensity on atrial surface with severe acute mitral regurg (no dilation in mitral valve), tricupid valve leaflet thickening with mild tricuspid regurg concerning for vegetations. Normal LV and RV systolic function.  [ ] NOHEMI Monday, NPO MN sunday  [ ] continue vanc 1g q24h with daily trough lvl + cefazolin 1g q12h  [ ] repeat BCx 8/20 NGTD (obtained after abx started)  [ ] f/u sensitivities for BCx  [ ] ID, cardiology, CT surgery following for endocarditis and severe acute mitral regurg Afebrile, BCx pos for staph epidermidis, on empiric IV vanc (also on zosyn for cellulitis)  - TTE showed 1.3-1.4 cm echodensity on atrial echodensity on atrial surface with severe acute mitral regurg (no dilation in mitral valve), tricupid valve leaflet thickening with mild tricuspid regurg concerning for vegetations. Normal LV and RV systolic function.  [ ] NOHEMI Monday, NPO MN sunday  [ ] continue vanc 1g q24h with daily trough lvl (also on cefazolin 1g q12h for cellulitis, UTI)  [ ] admission BCx both pos for staph epidermidis; repeat BCx 8/20 NGTD (obtained after abx started)  [ ] f/u sensitivities for BCx  [ ] ID, cardiology, CT surgery following for endocarditis and severe acute mitral regurg

## 2020-08-22 NOTE — PROGRESS NOTE ADULT - ATTENDING COMMENTS
Saw and examined patient and discussed with house staff.  80M with h/o HIV on ART, CAD, BPH, urinary retention (self caths at home). Admitted for sepsis due to UTI (secondary to self cath), B/L severe hydronephrosis with SUDHEER, fall with rhabdomyolysis, debility. Found to have severe MR +/- IE w pulmonary edema  Resting in bed, no acute SOB, on 3L NC, no fever, chest pain, AAO x3, hemodynamically stable  still c/o weakness and muscle pain after he fell.  1. Bacteremia, Staph MV endocarditis  - coag neg staph on blood cx, repeat negative  - Echo showed preserved EF w severe MR +/- veg  - NOHEMI on Monday for further eval of mitral valve  - f/u ID, CTS consults  - cont vancomycin  2. Pulmonary edema, from severe MR/IV hydration  - resolved, continue on Lasix, hydralazine  - f/u cardiology consult  - strict intake and output  3. Rhabdomyolysis, s/p fall  - resolving s/p fluids  4. Elevated Trop from demand ischemia in the setting of pulmonary edema and SUDHEER  5. HIV, on ART  - c/w ART, c/w Abacavir 300mg BID and Lamivudine 100mg daily  - Per ID Pharmacist ordered Doravirine 100mg daily, but will not get it until Friday, will do Tivicay 50mg daily until  6. Debility  - OOB to chair, PT eval, fall precaution  7. L knee cellulitis  - cont cefazolin  8. SUDHEER and hydronephrosis    - Urology plans to c/w tovar and repeat renal US on Monday for resolution of hydro  - closely monitor ScrI    Discussed with resident Dr. Louie Ramos DO  Pager #: [phone] Saw and examined patient and discussed with house staff.  80M with h/o HIV on ART, CAD, BPH, urinary retention (self caths at home). Admitted for sepsis due to UTI (secondary to self cath), B/L severe hydronephrosis with SUDHEER, fall with rhabdomyolysis, debility. Found to have severe MR +/- IE w pulmonary edema  Resting in bed, no acute SOB, on 3L NC, no fever, chest pain, AAO x3, hemodynamically stable  still c/o weakness and muscle pain after he fell.  1. Bacteremia, Staph MV endocarditis  - coag neg staph on blood cx, repeat negative  - Echo showed preserved EF w severe MR +/- veg  - NOHEMI on Monday for further eval of mitral valve  - f/u ID, CTS consults  - cont vancomycin  2. Pulmonary edema, from severe MR/IV hydration  - resolved, continue on Lasix, hydralazine  - f/u cardiology consult  - strict intake and output  3. Rhabdomyolysis, s/p fall  - resolving s/p fluids  4. Elevated Trop from demand ischemia in the setting of pulmonary edema and SUDHEER  5. HIV, on ART  - c/w ART, c/w Abacavir 300mg BID and Lamivudine 100mg daily  - Per ID Pharmacist ordered Doravirine 100mg daily, but will not get it until Friday, will do Tivicay 50mg daily until  6. Debility  - OOB to chair, PT eval, fall precaution  7. L knee cellulitis  - cont cefazolin  8. SUDHEER and hydronephrosis    - Urology plans to c/w tovar and repeat renal US on Monday for resolution of hydro  - closely monitor ScrI    Dispo: DC to [facility] when medically stable.    Discussed with resident Dr. Louie Ramos DO  Pager #: [phone]

## 2020-08-22 NOTE — PROGRESS NOTE ADULT - ASSESSMENT
Mr. Monsivais is a 79 y/o man with hx HIV, CAD, detrusor muscle weakness, BPH presenting for fall, down for 24 hrs, admitted with rhabdo, bilateral severe ureterohydronephrosis and thickened bladder on CTAP 2/2 inability to continue his home straight cath 3x daily after fall, tovar in place. Rhabdo improved, hydronephrosis improving with tovar (urology following). 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 On vanc (also on zosyn for cellulitis). NOHEMI pending, ID and cardiology recs appreciated. Mr. Monsivais is a 79 y/o man with hx HIV, CAD, detrusor muscle weakness, BPH presenting for fall, down for 24 hrs, admitted with rhabdo, bilateral severe ureterohydronephrosis and thickened bladder on CTAP 2/2 inability to continue his home straight cath 3x daily after fall, tovar in place. Rhabdo improved, hydronephrosis improving with tovar (urology following). Echo shows severe acute mitral regurg and possible vegetations with + BCx staph epidermidis. On vanc (also on cefazolin for cellulitis) concerning for endocarditis. NOHEMI pending Monday, ID, cardiology, CT surgery recs appreciated.

## 2020-08-22 NOTE — PROGRESS NOTE ADULT - PROBLEM SELECTOR PLAN 4
- UA + large LE, >50 WBC, many bacteria, 10 RBC  - UCx with <10,000 normal urine oswaldo  - thickened bladder seen on CT AP  - pt straight caths himself 3x daily for detrusor muscle weakness + BPH  - no dysuria, abdominal/back pain  - s/p 1 d ceftriaxone for concern for UTI  [ ] continue zosyn for L knee cellulitis and UTI - UA + large LE, >50 WBC, many bacteria, 10 RBC  - UCx with <10,000 normal urine oswaldo  - thickened bladder seen on CT AP  - pt straight caths himself 3x daily for detrusor muscle weakness + BPH  - no dysuria, abdominal/back pain  - s/p 1 d ceftriaxone for concern for UTI  [ ] cefazolin 1g q12h for suspected UTI + L knee cellulitis

## 2020-08-22 NOTE — PROGRESS NOTE ADULT - SUBJECTIVE AND OBJECTIVE BOX
Rosa M Palma PGY1    Patient is a 80y old  Male who presents with a chief complaint of fall (21 Aug 2020 17:45)      SUBJECTIVE / OVERNIGHT EVENTS:    MEDICATIONS  (STANDING):  abacavir 300 milliGRAM(s) Oral every 12 hours  ALBUTerol    0.083% 2.5 milliGRAM(s) Nebulizer four times a day  ascorbic acid 500 milliGRAM(s) Oral daily  dolutegravir 50 milliGRAM(s) Oral daily  Doravirine 100mg tablets (pifeltro) 100 milliGRAM(s) 100 milliGRAM(s) Oral daily  finasteride 5 milliGRAM(s) Oral daily  furosemide    Tablet 40 milliGRAM(s) Oral two times a day  heparin   Injectable 5000 Unit(s) SubCutaneous every 8 hours  hydrALAZINE 10 milliGRAM(s) Oral three times a day  lamiVUDine- milliGRAM(s) Oral daily  multivitamin 1 Tablet(s) Oral daily  piperacillin/tazobactam IVPB.. 3.375 Gram(s) IV Intermittent every 8 hours  tamsulosin 0.4 milliGRAM(s) Oral at bedtime  vancomycin  IVPB      vancomycin  IVPB 1000 milliGRAM(s) IV Intermittent daily    MEDICATIONS  (PRN):      CAPILLARY BLOOD GLUCOSE        I&O's Summary    21 Aug 2020 07:01  -  22 Aug 2020 07:00  --------------------------------------------------------  IN: 460 mL / OUT: 2550 mL / NET: -2090 mL        Vital Signs Last 24 Hrs  T(C): 36.7 (22 Aug 2020 04:20), Max: 37 (21 Aug 2020 12:07)  T(F): 98.1 (22 Aug 2020 04:20), Max: 98.6 (21 Aug 2020 12:07)  HR: 84 (22 Aug 2020 04:20) (80 - 89)  BP: 110/65 (22 Aug 2020 04:20) (109/64 - 121/66)  BP(mean): --  RR: 18 (22 Aug 2020 04:20) (18 - 18)  SpO2: 96% (22 Aug 2020 04:20) (96% - 100%)    PHYSICAL EXAM:  GENERAL: NAD, well-developed, well-nourished  HEAD: Atraumatic, Normocephalic  EYES: EOMI, PERRLA, conjunctiva and sclera clear  NECK: Supple, No JVD  CHEST/LUNG: Clear to auscultation bilaterally; No wheezes or crackles  HEART: Normal S1/S2; Regular rate and rhythm; No murmurs, rubs, or gallops  ABDOMEN: Soft, Nontender, Nondistended; Bowel sounds present  EXTREMITIES: 2+ Peripheral Pulses; No clubbing, cyanosis, or edema  PSYCH: A&Ox3  NEUROLOGY: no focal neurologic deficit  SKIN: No rashes or lesions    LABS:                        10.9   9.88  )-----------( 212      ( 22 Aug 2020 06:32 )             33.3      08-21    138  |  105  |  33<H>  ----------------------------<  92  3.5   |  25  |  1.54<H>    Ca    8.3<L>      21 Aug 2020 06:15  Phos  2.1     08-21  Mg     2.0     08-21    TPro  6.1  /  Alb  2.8<L>  /  TBili  0.4  /  DBili  x   /  AST  62<H>  /  ALT  31  /  AlkPhos  105  08-21      CARDIAC MARKERS ( 21 Aug 2020 06:15 )  x     / x     / 929 U/L / x     / x              RADIOLOGY & ADDITIONAL TESTS:    Imaging Personally Reviewed:    Consultant(s) Notes Reviewed:      Care Discussed with Consultants/Other Providers: Rosa M Palma PGY1    Patient is a 80y old  Male who presents with a chief complaint of fall (21 Aug 2020 17:45)      SUBJECTIVE / OVERNIGHT EVENTS:  - overnight - no events  - am - feels he is regaining str, improved mobility in extremities. Has diarrhea since yesterday "chocolate pudding" consistency, not watery but soft, ordered bismuth salicylate.  denies shortness of breath/difficulty breathing. On 2L O2. Denies chest pain.     MEDICATIONS  (STANDING):  abacavir 300 milliGRAM(s) Oral every 12 hours  ALBUTerol    0.083% 2.5 milliGRAM(s) Nebulizer four times a day  ascorbic acid 500 milliGRAM(s) Oral daily  dolutegravir 50 milliGRAM(s) Oral daily  Doravirine 100mg tablets (pifeltro) 100 milliGRAM(s) 100 milliGRAM(s) Oral daily  finasteride 5 milliGRAM(s) Oral daily  furosemide    Tablet 40 milliGRAM(s) Oral two times a day  heparin   Injectable 5000 Unit(s) SubCutaneous every 8 hours  hydrALAZINE 10 milliGRAM(s) Oral three times a day  lamiVUDine- milliGRAM(s) Oral daily  multivitamin 1 Tablet(s) Oral daily  piperacillin/tazobactam IVPB.. 3.375 Gram(s) IV Intermittent every 8 hours  tamsulosin 0.4 milliGRAM(s) Oral at bedtime  vancomycin  IVPB      vancomycin  IVPB 1000 milliGRAM(s) IV Intermittent daily    MEDICATIONS  (PRN):      CAPILLARY BLOOD GLUCOSE        I&O's Summary    21 Aug 2020 07:01  -  22 Aug 2020 07:00  --------------------------------------------------------  IN: 460 mL / OUT: 2550 mL / NET: -2090 mL        Vital Signs Last 24 Hrs  T(C): 36.7 (22 Aug 2020 04:20), Max: 37 (21 Aug 2020 12:07)  T(F): 98.1 (22 Aug 2020 04:20), Max: 98.6 (21 Aug 2020 12:07)  HR: 84 (22 Aug 2020 04:20) (80 - 89)  BP: 110/65 (22 Aug 2020 04:20) (109/64 - 121/66)  BP(mean): --  RR: 18 (22 Aug 2020 04:20) (18 - 18)  SpO2: 96% (22 Aug 2020 04:20) (96% - 100%)    PHYSICAL EXAM:  GENERAL: thin, looks comfortable, lying down  CHEST/LUNG: clear to auscultation bilaterally  HEART: regular rate and rhythm, 2/6 holosystolic murmur lower sternal borders, most noticeable at apex   ABDOMEN: nontender to palpation, no rebound/guarding  EXTREMITIES: L knee with abrasion, healing, resolved erythema/swelling from cellulitis  PSYCH: A&Ox3  NEUROLOGY: mild weakness generalized     LABS:                        10.9   9.88  )-----------( 212      ( 22 Aug 2020 06:32 )             33.3      08-21    138  |  105  |  33<H>  ----------------------------<  92  3.5   |  25  |  1.54<H>    Ca    8.3<L>      21 Aug 2020 06:15  Phos  2.1     08-21  Mg     2.0     08-21    TPro  6.1  /  Alb  2.8<L>  /  TBili  0.4  /  DBili  x   /  AST  62<H>  /  ALT  31  /  AlkPhos  105  08-21      CARDIAC MARKERS ( 21 Aug 2020 06:15 )  x     / x     / 929 U/L / x     / x              RADIOLOGY & ADDITIONAL TESTS:    Imaging Personally Reviewed:    Consultant(s) Notes Reviewed:      Care Discussed with Consultants/Other Providers:

## 2020-08-22 NOTE — PROGRESS NOTE ADULT - ASSESSMENT
79y/o M with PMHx of HIV, BPH, CAD, urinary retention self catheterizes TID, admitted after a fall found to have hydronephrosis likely from urinary retention.  SCr stable ~1.5    - Keep Aden for maximal bladder decompression  - Monitor SCr (appears at baseline)  - Repeat renal ultrasound on Monday 8/24 to evaluate for improved hydronephrosis (renal US 1 day after Aden placement is not sufficiently long enough to identify improvement hydro)  - If SCr does not continue to improve, would consider renal lasix scan to evaluate for evidence of obstruction   - Will follow        [facility]  [address] [place] [address], [address] [address] [phone]

## 2020-08-22 NOTE — PROGRESS NOTE ADULT - PROBLEM SELECTOR PLAN 5
Improved.  - CK 1248 -> 929 s/p 2L IVF NS and mIVF  cc/hr. IVF stopped after flash pulm edema 8/20 overnight.   - rhabdo 2/2 being down 24 hrs after fall  - improved mobility/str  [ ] CTM CK Improved.  -  today s/p 2L IVF NS and mIVF  cc/hr. IVF stopped after flash pulm edema 8/20 overnight 2/2 MVR  - rhabdo 2/2 being down 24 hrs after fall  - improved mobility/str  [ ] CTM CK

## 2020-08-22 NOTE — PROGRESS NOTE ADULT - PROBLEM SELECTOR PLAN 10
[ ] continue heparin subq daily for DVT ppx  [ ] continue pureed diet [ ] continue heparin subq daily for DVT ppx  [ ] continue DASH diet, ensure enlive [ ] continue heparin subq daily for DVT ppx  [ ] continue DASH diet, ensure enlive  [ ] bismuth salicylate ordered prn for diarrhea, CTM for signs of cdiff

## 2020-08-22 NOTE — PROGRESS NOTE ADULT - PROBLEM SELECTOR PLAN 6
- pt denies palpitations, chest pain, dizziness. Endorses weakness prior to and since fall.   - ECG 8/19 on admission sinus tachy, LA enlargement, no ST elevation/depression or T wave inversion. Repeat ECG 8/20 with RBBB (rsr' on V1), irregular, left atrial enlargement. No ST changes or T wave inversions.   - possibly 2/2 dehydration (warm in apt per EMS)   [ ] PT recommended: CYNTHIA on d/c - pt denies palpitations, chest pain, dizziness. Endorses weakness prior to and since fall.   - ECG 8/19 on admission sinus tachy, LA enlargement, no ST elevation/depression or T wave inversion. Repeat ECG 8/20 with RBBB (rsr' on V1), irregular, left atrial enlargement. No ST changes or T wave inversions.   - possibly 2/2 cardiogenic syncope (severe acute MVR)  [ ] PT recommended: CYNTHIA on d/c

## 2020-08-22 NOTE — PROGRESS NOTE ADULT - PROBLEM SELECTOR PLAN 3
- CTAP with severe b/l ureterohydronephrosis, thickened bladder 2/2 inability to straight cath himself for 24 hrs after fall (normally straight caths 3x daily for detrusor muscle weakness + BPH, follows urology outpt)  [ ] urology following, appreciate recs. repeat ultrasound 8/20 with mild - mod hydronephrosis  [ ] tovar in place  [ ] continue home tamsulosin and finasteride - CTAP with severe b/l ureterohydronephrosis, thickened bladder 2/2 inability to straight cath himself for 24 hrs after fall (normally straight caths 3x daily for detrusor muscle weakness + BPH, follows urology outpt)  - repeat US kidney + bladder with mild right and moderate left hydronephrosis  [ ] urology following, appreciate recs. repeat ultrasound 8/24 to monitor hydronephrosis, continue tovar, CTM Cr, if worsening Cr/hydronephrosis, consider lasix renal scan  [ ] tovar for bladder decompression  [ ] continue home tamsulosin and finasteride

## 2020-08-22 NOTE — PROGRESS NOTE ADULT - SUBJECTIVE AND OBJECTIVE BOX
UROLOGY DAILY PROGRESS NOTE:     Subjective:    No events overnight. Feels okay.  Tolerating diet. Feels a little fatigued.  Having soft bowel movements with fecal urgency.    Objective:    NAD, awake and alert  Respirations nonlabored  Abdomen soft, nontender, nondistended  Aden clear    MEDICATIONS  (STANDING):  abacavir 300 milliGRAM(s) Oral every 12 hours  ALBUTerol    0.083% 2.5 milliGRAM(s) Nebulizer four times a day  ascorbic acid 500 milliGRAM(s) Oral daily  ceFAZolin  Injectable.      Doravirine 100mg tablets (pifeltro) 100 milliGRAM(s) 100 milliGRAM(s) Oral daily  finasteride 5 milliGRAM(s) Oral daily  furosemide    Tablet 40 milliGRAM(s) Oral two times a day  heparin   Injectable 5000 Unit(s) SubCutaneous every 8 hours  hydrALAZINE 10 milliGRAM(s) Oral three times a day  lamiVUDine- milliGRAM(s) Oral daily  multivitamin 1 Tablet(s) Oral daily  tamsulosin 0.4 milliGRAM(s) Oral at bedtime    MEDICATIONS  (PRN):      Vital Signs Last 24 Hrs  T(C): 36.7 (22 Aug 2020 04:20), Max: 37 (21 Aug 2020 12:07)  T(F): 98.1 (22 Aug 2020 04:20), Max: 98.6 (21 Aug 2020 12:07)  HR: 84 (22 Aug 2020 04:20) (80 - 89)  BP: 110/65 (22 Aug 2020 04:20) (109/64 - 121/66)  BP(mean): --  RR: 18 (22 Aug 2020 04:20) (18 - 18)  SpO2: 96% (22 Aug 2020 04:20) (96% - 100%)    I&O's Detail    21 Aug 2020 07:01  -  22 Aug 2020 07:00  --------------------------------------------------------  IN:    IV PiggyBack: 100 mL    Oral Fluid: 360 mL  Total IN: 460 mL    OUT:    Indwelling Catheter - Urethral: 2550 mL  Total OUT: 2550 mL    Total NET: -2090 mL          Daily     Daily Weight in k.9 (22 Aug 2020 04:20)    LABS:                        10.9   9.88  )-----------( 212      ( 22 Aug 2020 06:32 )             33.3     08    140  |  103  |  22  ----------------------------<  88  3.7   |  24  |  1.51<H>    Ca    8.9      22 Aug 2020 06:32  Phos  3.2       Mg     2.0         TPro  5.9<L>  /  Alb  2.8<L>  /  TBili  0.4  /  DBili  x   /  AST  x   /  ALT  33  /  AlkPhos  106

## 2020-08-22 NOTE — PROGRESS NOTE ADULT - PROBLEM SELECTOR PLAN 9
- SUDHEER resolved  - sees Dr. Presley Ferguson outpt  - continue IVF for rhabdo, avoid nephrotoxic meds    #cellulitis  [ ] zosyn q 12 hrs daily per ID recs - sees Dr. Presley Ferguson outpt

## 2020-08-22 NOTE — PROGRESS NOTE ADULT - PROBLEM SELECTOR PLAN 8
Not on home meds.  [ ] echo as above  [ ] f/u outpatient cardiology Controlled; last CD4 368; undetectable viral load in July  [ ] continue home abacavir, lamivudine, Doravirine

## 2020-08-23 LAB
ALBUMIN SERPL ELPH-MCNC: 3.1 G/DL — LOW (ref 3.3–5)
ALP SERPL-CCNC: 115 U/L — SIGNIFICANT CHANGE UP (ref 40–120)
ALT FLD-CCNC: 31 U/L — SIGNIFICANT CHANGE UP (ref 10–45)
ANION GAP SERPL CALC-SCNC: 11 MMOL/L — SIGNIFICANT CHANGE UP (ref 5–17)
AST SERPL-CCNC: 37 U/L — SIGNIFICANT CHANGE UP (ref 10–40)
BILIRUB SERPL-MCNC: 0.3 MG/DL — SIGNIFICANT CHANGE UP (ref 0.2–1.2)
BUN SERPL-MCNC: 23 MG/DL — SIGNIFICANT CHANGE UP (ref 7–23)
CALCIUM SERPL-MCNC: 9.7 MG/DL — SIGNIFICANT CHANGE UP (ref 8.4–10.5)
CHLORIDE SERPL-SCNC: 100 MMOL/L — SIGNIFICANT CHANGE UP (ref 96–108)
CO2 SERPL-SCNC: 27 MMOL/L — SIGNIFICANT CHANGE UP (ref 22–31)
CREAT SERPL-MCNC: 1.59 MG/DL — HIGH (ref 0.5–1.3)
GLUCOSE SERPL-MCNC: 103 MG/DL — HIGH (ref 70–99)
HCT VFR BLD CALC: 36 % — LOW (ref 39–50)
HGB BLD-MCNC: 11.9 G/DL — LOW (ref 13–17)
MAGNESIUM SERPL-MCNC: 2 MG/DL — SIGNIFICANT CHANGE UP (ref 1.6–2.6)
MCHC RBC-ENTMCNC: 33.1 GM/DL — SIGNIFICANT CHANGE UP (ref 32–36)
MCHC RBC-ENTMCNC: 34.4 PG — HIGH (ref 27–34)
MCV RBC AUTO: 104 FL — HIGH (ref 80–100)
NRBC # BLD: 0 /100 WBCS — SIGNIFICANT CHANGE UP (ref 0–0)
PHOSPHATE SERPL-MCNC: 3.3 MG/DL — SIGNIFICANT CHANGE UP (ref 2.5–4.5)
PLATELET # BLD AUTO: 239 K/UL — SIGNIFICANT CHANGE UP (ref 150–400)
POTASSIUM SERPL-MCNC: 3.5 MMOL/L — SIGNIFICANT CHANGE UP (ref 3.5–5.3)
POTASSIUM SERPL-SCNC: 3.5 MMOL/L — SIGNIFICANT CHANGE UP (ref 3.5–5.3)
PROT SERPL-MCNC: 6.7 G/DL — SIGNIFICANT CHANGE UP (ref 6–8.3)
RBC # BLD: 3.46 M/UL — LOW (ref 4.2–5.8)
RBC # FLD: 13.5 % — SIGNIFICANT CHANGE UP (ref 10.3–14.5)
SODIUM SERPL-SCNC: 138 MMOL/L — SIGNIFICANT CHANGE UP (ref 135–145)
T4 FREE SERPL-MCNC: 1.2 NG/DL — SIGNIFICANT CHANGE UP (ref 0.9–1.8)
VANCOMYCIN TROUGH SERPL-MCNC: 18.1 UG/ML — SIGNIFICANT CHANGE UP (ref 10–20)
VIT B12 SERPL-MCNC: 542 PG/ML — SIGNIFICANT CHANGE UP (ref 232–1245)
WBC # BLD: 9.66 K/UL — SIGNIFICANT CHANGE UP (ref 3.8–10.5)
WBC # FLD AUTO: 9.66 K/UL — SIGNIFICANT CHANGE UP (ref 3.8–10.5)

## 2020-08-23 PROCEDURE — 99232 SBSQ HOSP IP/OBS MODERATE 35: CPT | Mod: GC

## 2020-08-23 PROCEDURE — 99233 SBSQ HOSP IP/OBS HIGH 50: CPT | Mod: GC

## 2020-08-23 PROCEDURE — 99232 SBSQ HOSP IP/OBS MODERATE 35: CPT

## 2020-08-23 RX ORDER — DOLUTEGRAVIR SODIUM 25 MG/1
50 TABLET, FILM COATED ORAL DAILY
Refills: 0 | Status: DISCONTINUED | OUTPATIENT
Start: 2020-08-23 | End: 2020-08-23

## 2020-08-23 RX ORDER — LACTOBACILLUS ACIDOPH-L.BULGARICUS 1 MILLION CELL CHEWABLE TABLET 1MM CELL
2 TABLET,CHEWABLE ORAL ONCE
Refills: 0 | Status: COMPLETED | OUTPATIENT
Start: 2020-08-23 | End: 2020-08-23

## 2020-08-23 RX ORDER — POTASSIUM CHLORIDE 20 MEQ
40 PACKET (EA) ORAL ONCE
Refills: 0 | Status: COMPLETED | OUTPATIENT
Start: 2020-08-23 | End: 2020-08-23

## 2020-08-23 RX ORDER — LACTOBACILLUS ACIDOPH-L.BULGARICUS 1 MILLION CELL CHEWABLE TABLET 1MM CELL
2 TABLET,CHEWABLE ORAL
Refills: 0 | Status: DISCONTINUED | OUTPATIENT
Start: 2020-08-23 | End: 2020-08-31

## 2020-08-23 RX ORDER — LACTOBACILLUS ACIDOPH-L.BULGARICUS 1 MILLION CELL CHEWABLE TABLET 1MM CELL
TABLET,CHEWABLE ORAL
Refills: 0 | Status: DISCONTINUED | OUTPATIENT
Start: 2020-08-23 | End: 2020-08-31

## 2020-08-23 RX ADMIN — LACTOBACILLUS ACIDOPH-L.BULGARICUS 1 MILLION CELL CHEWABLE TABLET 2 TABLET(S): at 17:52

## 2020-08-23 RX ADMIN — ABACAVIR 300 MILLIGRAM(S): 20 SOLUTION ORAL at 09:26

## 2020-08-23 RX ADMIN — Medication 500 MILLIGRAM(S): at 09:26

## 2020-08-23 RX ADMIN — HEPARIN SODIUM 5000 UNIT(S): 5000 INJECTION INTRAVENOUS; SUBCUTANEOUS at 22:24

## 2020-08-23 RX ADMIN — HEPARIN SODIUM 5000 UNIT(S): 5000 INJECTION INTRAVENOUS; SUBCUTANEOUS at 15:43

## 2020-08-23 RX ADMIN — Medication 40 MILLIEQUIVALENT(S): at 09:24

## 2020-08-23 RX ADMIN — Medication 1 TABLET(S): at 09:26

## 2020-08-23 RX ADMIN — LACTOBACILLUS ACIDOPH-L.BULGARICUS 1 MILLION CELL CHEWABLE TABLET 2 TABLET(S): at 15:43

## 2020-08-23 RX ADMIN — Medication 40 MILLIGRAM(S): at 17:52

## 2020-08-23 RX ADMIN — Medication 100 MILLIGRAM(S): at 09:26

## 2020-08-23 RX ADMIN — FINASTERIDE 5 MILLIGRAM(S): 5 TABLET, FILM COATED ORAL at 22:24

## 2020-08-23 RX ADMIN — TAMSULOSIN HYDROCHLORIDE 0.4 MILLIGRAM(S): 0.4 CAPSULE ORAL at 22:24

## 2020-08-23 RX ADMIN — Medication 10 MILLIGRAM(S): at 15:43

## 2020-08-23 RX ADMIN — ALBUTEROL 2.5 MILLIGRAM(S): 90 AEROSOL, METERED ORAL at 13:29

## 2020-08-23 RX ADMIN — Medication 10 MILLIGRAM(S): at 22:24

## 2020-08-23 RX ADMIN — ABACAVIR 300 MILLIGRAM(S): 20 SOLUTION ORAL at 22:24

## 2020-08-23 RX ADMIN — Medication 100 MILLIGRAM(S): at 09:25

## 2020-08-23 RX ADMIN — Medication 10 MILLIGRAM(S): at 05:42

## 2020-08-23 RX ADMIN — HEPARIN SODIUM 5000 UNIT(S): 5000 INJECTION INTRAVENOUS; SUBCUTANEOUS at 05:42

## 2020-08-23 RX ADMIN — ALBUTEROL 2.5 MILLIGRAM(S): 90 AEROSOL, METERED ORAL at 23:28

## 2020-08-23 RX ADMIN — Medication 40 MILLIGRAM(S): at 05:42

## 2020-08-23 RX ADMIN — ALBUTEROL 2.5 MILLIGRAM(S): 90 AEROSOL, METERED ORAL at 05:42

## 2020-08-23 RX ADMIN — ALBUTEROL 2.5 MILLIGRAM(S): 90 AEROSOL, METERED ORAL at 17:52

## 2020-08-23 RX ADMIN — Medication 166.67 MILLIGRAM(S): at 13:28

## 2020-08-23 NOTE — PROGRESS NOTE ADULT - ATTENDING COMMENTS
Patient seen and examined. Agree with assessment and plan as outlined above. Volume status seems compensated. Continue lasix. Plan for NOHEMI tomorrow. Continue Abx per ID.

## 2020-08-23 NOTE — PROGRESS NOTE ADULT - PROBLEM SELECTOR PLAN 6
- pt denies palpitations, chest pain, dizziness. Endorses weakness prior to and since fall.   - ECG 8/19 on admission sinus tachy, LA enlargement, no ST elevation/depression or T wave inversion. Repeat ECG 8/20 with RBBB (rsr' on V1), irregular, left atrial enlargement. No ST changes or T wave inversions.   - possibly 2/2 cardiogenic syncope (severe acute MVR)  [ ] PT recommended: CYNTHIA on d/c

## 2020-08-23 NOTE — PROGRESS NOTE ADULT - ASSESSMENT
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    #Severe Posteriorly Directed MR c/b flash pulmonary edema  #1.3cm MV echodensity  - Mitral valve regurgitation with suspected kimberlyn-annular insuffiencey with echodensity (valve appears thickened). LA appears normal in size, meaning valve insufficiency is likely acute in nature.  - Source of infection likely urinary, urology following  - ABx tailoring and BCx trending as per primary team and ID  - CTS following, maintain NPO after midnight for NOHEMI tomorrow  - Appear euvolemic on exam, continue Lasix 40mg PO BID  - Continue hydralazine 10mg PO TID for afterload reduction (hold for SBP <90)    Patient to be staffed with attending. Please await attending addendum.    Naima Miguel MD  Cardiology Fellow  [phone]  All Cardiology service information can be found 24/7 on [email or web address], password: [email or web address]

## 2020-08-23 NOTE — PROGRESS NOTE ADULT - SUBJECTIVE AND OBJECTIVE BOX
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   interval development of multiple episodes of watery stool      ROS:  CONSTITUTIONAL:  No fever, poor appetite  CARDIOVASCULAR:  No chest pain or palpitations  RESPIRATORY:  No dyspnea  GASTROINTESTINAL:  No nausea, vomiting,  c/o diarrhea, no abdominal pain  GENITOURINARY:  No dysuria  NEUROLOGIC:  No headache,     Allergies    No Known Allergies    Intolerances        ANTIBIOTICS/RELEVANT:  antimicrobials  abacavir 300 milliGRAM(s) Oral every 12 hours  lamiVUDine- milliGRAM(s) Oral daily  vancomycin  IVPB 1250 milliGRAM(s) IV Intermittent daily  vancomycin  IVPB        immunologic:    OTHER:  ALBUTerol    0.083% 2.5 milliGRAM(s) Nebulizer four times a day  ascorbic acid 500 milliGRAM(s) Oral daily  Doravirine 100mg tablets (pifeltro) 100 milliGRAM(s) 100 milliGRAM(s) Oral daily  finasteride 5 milliGRAM(s) Oral daily  furosemide    Tablet 40 milliGRAM(s) Oral two times a day  heparin   Injectable 5000 Unit(s) SubCutaneous every 8 hours  hydrALAZINE 10 milliGRAM(s) Oral three times a day  lactobacillus acidophilus and bulgaricus Chewable 2 Tablet(s) Chew once  lactobacillus acidophilus and bulgaricus Chewable 2 Tablet(s) Chew three times a day with meals  lactobacillus acidophilus and bulgaricus Chewable      multivitamin 1 Tablet(s) Oral daily  tamsulosin 0.4 milliGRAM(s) Oral at bedtime      Objective:  Vital Signs Last 24 Hrs  T(C): 36.3 (23 Aug 2020 11:53), Max: 36.7 (22 Aug 2020 21:37)  T(F): 97.4 (23 Aug 2020 11:53), Max: 98.1 (22 Aug 2020 21:37)  HR: 86 (23 Aug 2020 11:53) (79 - 90)  BP: 102/64 (23 Aug 2020 11:53) (102/64 - 113/61)  BP(mean): --  RR: 18 (23 Aug 2020 11:53) (17 - 18)  SpO2: 98% (23 Aug 2020 11:53) (97% - 100%)    PHYSICAL EXAM:  Constitutional:no acute distress, sitting in a chair  Eyes:EMILY, EOMI  Ear/Nose/Throat: no oral lesions, 	  Respiratory: clear BL  Cardiovascular: S1S2 soft systolic murmur best heard at apex  Gastrointestinal:soft, some RLQ tenderness  Extremities:no e/e/c cellulitis resolved  No Lymphadenopathy  IV sites not inflammed.    LABS:                        11.9   9.66  )-----------( 239      ( 23 Aug 2020 06:26 )             36.0     08-23    138  |  100  |  23  ----------------------------<  103<H>  3.5   |  27  |  1.59<H>    Ca    9.7      23 Aug 2020 06:26  Phos  3.3     08-23  Mg     2.0     08-23    TPro  6.7  /  Alb  3.1<L>  /  TBili  0.3  /  DBili  x   /  AST  37  /  ALT  31  /  AlkPhos  115  08-23          MICROBIOLOGY:    Vancomycin Level, Trough: 18.1: Vancomycin trough levels should be rapidly reached and maintained at  15-20 ug/ml for life threatening MRSA  infections such as sepsis, endocarditis, osteomyelitis and pneumonia. A  first trough level should be drawn  before the 3rd or 4th dose.  Risk of renal toxicity is increased for levels >15 ug/ml, in patients on  other nephrotoxic drugs, who are  hemodynamically unstable, have unstable renal function, or are on  Vancomycin therapy for >14 days. Renal function with  creatinine levels should be monitored for those patients. ug/mL (08.23.20 @ 06:26)            RECENT CULTURES:  08-20 @ 15:01  .Blood Blood-Peripheral  --  --  --    No growth to date.  --  08-19 @ 09:00  .Urine Clean Catch (Midstream)  --  --  --    <10,000 CFU/mL Normal Urogenital Kami  --  08-19 @ 01:39  .Blood Blood-Peripheral  Blood Culture PCR  Blood Culture PCR  PCR    Growth in aerobic and anaerobic bottles: Staphylococcus epidermidis  "Due to technical problems, Proteus sp. will Not be reported as part of  the BCID panel until further notice"  ***Blood Panel PCR results on this specimen are available  approximately 3 hours after the Gram stain result.***  Gram stain, PCR, and/or culture results may not always  correspond due to difference in methodologies.  ************************************************************  This PCR assay was performed using [employer].  The following targets are tested for: Enterococcus,  vancomycin resistant enterococci, Listeria monocytogenes,  coagulase negative staphylococci, S. aureus,  methicillin resistant S. aureus, Streptococcus agalactiae  (Group B), S. pneumoniae, S. pyogenes (Group A),  Acinetobacter baumannii, Enterobacter cloacae, E. coli,  Klebsiella oxytoca, K. pneumoniae, Proteus sp.,  Serratia marcescens, Haemophilus influenzae,  Neisseria meningitidis, Pseudomonas aeruginosa, Candida  albicans, C. glabrata, C krusei, C parapsilosis,  C. tropicalis and the KPC resistance gene.  --      RADIOLOGY & ADDITIONAL STUDIES:    < from: Xray Chest 1 View- PORTABLE-Routine (08.20.20 @ 16:55) >  IMPRESSION:    Left lower lung hazy opacity correlates with nodular opacity seen CT chest abdomen and pelvis from 8/19/2020. A follow-up is recommended to confirm resolution in 4-6 weeks.    < end of copied text >

## 2020-08-23 NOTE — PROGRESS NOTE ADULT - SUBJECTIVE AND OBJECTIVE BOX
Interval History: Doing well, weaning down on oxygen support. Continues to have robust diuresis    Review Of Systems:  Constitutional: [ ] Fever [ ] Chills [ ] Fatigue [ ] Weight change   HEENT: [ ] Blurred vision [ ] Eye Pain [ ] Headache [ ] Runny nose [ ] Sore Throat   Respiratory: [ ] Cough [ ] Wheezing [x] Shortness of breath  Cardiovascular: [ ] Chest Pain [ ] Palpitations [ ] PRESCOTT [ ] PND [ ] Orthopnea  Gastrointestinal: [ ] Abdominal Pain [ ] Diarrhea [ ] Constipation [ ] Hemorrhoids [ ] Nausea [ ] Vomiting  Genitourinary: [ ] Nocturia [ ] Dysuria [ ] Incontinence  Extremities: [ ] Swelling [ ] Joint Pain  Neurologic: [ ] Focal deficit [ ] Paresthesias [ ] Syncope  Lymphatic: [ ] Swelling [ ] Lymphadenopathy   Skin: [ ] Rash [ ] Ecchymoses [ ] Wounds [ ] Lesions  Psychiatry: [ ] Depression [ ] Suicidal/Homicidal Ideation [ ] Anxiety [ ] Sleep Disturbances  [x] 10 point review of systems is otherwise negative except as mentioned above    Medications:  abacavir 300 milliGRAM(s) Oral every 12 hours  ALBUTerol    0.083% 2.5 milliGRAM(s) Nebulizer four times a day  ascorbic acid 500 milliGRAM(s) Oral daily  ceFAZolin   IVPB      ceFAZolin   IVPB 1000 milliGRAM(s) IV Intermittent every 12 hours  Doravirine 100mg tablets (pifeltro) 100 milliGRAM(s) 100 milliGRAM(s) Oral daily  finasteride 5 milliGRAM(s) Oral daily  furosemide    Tablet 40 milliGRAM(s) Oral two times a day  heparin   Injectable 5000 Unit(s) SubCutaneous every 8 hours  hydrALAZINE 10 milliGRAM(s) Oral three times a day  lamiVUDine- milliGRAM(s) Oral daily  multivitamin 1 Tablet(s) Oral daily  potassium chloride    Tablet ER 40 milliEquivalent(s) Oral once  tamsulosin 0.4 milliGRAM(s) Oral at bedtime  vancomycin  IVPB 1250 milliGRAM(s) IV Intermittent daily  vancomycin  IVPB        Vitals:  ICU Vital Signs Last 24 Hrs  T(C): 36.7 (23 Aug 2020 05:40), Max: 36.7 (22 Aug 2020 21:37)  T(F): 98 (23 Aug 2020 05:40), Max: 98.1 (22 Aug 2020 21:37)  HR: 79 (23 Aug 2020 05:40) (79 - 90)  BP: 109/58 (23 Aug 2020 05:40) (104/55 - 113/61)  BP(mean): --  ABP: --  ABP(mean): --  RR: 17 (23 Aug 2020 05:40) (17 - 18)  SpO2: 99% (23 Aug 2020 05:40) (97% - 100%)    Daily     Daily Weight in k.7 (23 Aug 2020 06:39)  I&O's Summary    22 Aug 2020 07:01  -  23 Aug 2020 07:00  --------------------------------------------------------  IN: 1370 mL / OUT: 4125 mL / NET: -2755 mL    Physical Exam:  Appearance:  NAD, thin, wearing NC without increased WOB  HENT: NC/AT  Cardiovascular: Regular rate and rhythm, II/VI systolic murmur appreciated at apex radiating to back, no LE Edema or JVD Present  Respiratory: Clear to auscultation bilaterally with decreased breath sounds at bases  Gastrointestinal: Soft  Psychiatry: [x] AAOx3  [x] Follows Commands  Skin: Intact    Labs:                        11.9   9.66  )-----------( 239      ( 23 Aug 2020 06:26 )             36.0     08-23    138  |  100  |  23  ----------------------------<  103<H>  3.5   |  27  |  1.59<H>    Ca    9.7      23 Aug 2020 06:26  Phos  3.3     08-23  Mg     2.0         TPro  6.7  /  Alb  3.1<L>  /  TBili  0.3  /  DBili  x   /  AST  37  /  ALT  31  /  AlkPhos  115  08-23      CARDIAC MARKERS ( 22 Aug 2020 06:32 )  x     / x     / 525 U/L / x     / x          Serum Pro-Brain Natriuretic Peptide: 1987 pg/mL ( @ 06:32)  Serum Pro-Brain Natriuretic Peptide: 4864 pg/mL ( @ 13:43)      Culture - Blood (collected 20 @ 15:01)  Source: .Blood Blood-Venous  Preliminary Report (20 @ 16:01):    No growth to date.    Culture - Blood (collected 20 @ 15:01)  Source: .Blood Blood-Peripheral  Preliminary Report (20 @ 16:01):    No growth to date.    Culture - Urine (collected 20 @ 09:00)  Source: .Urine Clean Catch (Midstream)  Final Report (20 @ 09:42):    <10,000 CFU/mL Normal Urogenital Kami    Culture - Blood (collected 20 @ 01:39)  Source: .Blood Blood-Peripheral  Gram Stain (20 @ 19:50):    Growth in aerobic bottle: Gram Positive Cocci in Clusters    Growth in anaerobic bottle: Gram Positive Cocci in Clusters  Final Report (20 @ 17:31):    Growth in aerobic and anaerobic bottles: Staphylococcus epidermidis  Organism: Staphylococcus epidermidis (20 @ 17:31)  Organism: Staphylococcus epidermidis (20 @ 17:31)      -  Ampicillin/Sulbactam: R <=8/4      -  Cefazolin: R <=4      -  Clindamycin: S 0.5      -  Erythromycin: S <=0.25      -  Gentamicin: S <=1 Should not be used as monotherapy      -  Oxacillin: R >2      -  Penicillin: R >8      -  RIF- Rifampin: S <=1 Should not be used as monotherapy      -  Tetra/Doxy: S 2      -  Trimethoprim/Sulfamethoxazole: S <=0.5/9.5      -  Vancomycin: S 2      Method Type: FLORENCIA    Culture - Blood (collected 20 @ 01:39)  Source: .Blood Blood-Peripheral  Gram Stain (20 @ 21:38):    Growth in aerobic bottle: Gram Positive Cocci in Clusters    Growth in anaerobic bottle: Gram Positive Cocci in Clusters  Final Report (20 @ 19:25):    Growth in aerobic and anaerobic bottles: Staphylococcus epidermidis    "Due to technical problems, Proteus sp. will Not be reported as part of    the BCID panel until further notice"    ***Blood Panel PCR results on this specimen are available    approximately 3 hours after the Gram stain result.***    Gram stain, PCR, and/or culture results may not always    correspond due to difference in methodologies.    ************************************************************    This PCR assay was performed using [employer].    The following targets are tested for: Enterococcus,    vancomycin resistant enterococci, Listeria monocytogenes,    coagulase negative staphylococci, S. aureus,    methicillin resistant S. aureus, Streptococcus agalactiae    (Group B), S. pneumoniae, S. pyogenes (Group A),    Acinetobacter baumannii, Enterobacter cloacae, E. coli,    Klebsiella oxytoca, K. pneumoniae, Proteus sp.,    Serratia marcescens, Haemophilus influenzae,    Neisseria meningitidis, Pseudomonas aeruginosa, Candida    albicans, C. glabrata, C krusei, C parapsilosis,    C. tropicalis and the KPC resistance gene.  Organism: Blood Culture PCR (20 @ 19:25)  Organism: Blood Culture PCR (20 @ 19:25)      -  Coagulase negative Staphylococcus: Detec      Method Type: PCR    Echo:  < from: Transthoracic Echocardiogram (20 @ 09:27) >  Conclusions:  1. Mitral valve not well visualized; however, the anterior  leaflet of the mitral valve appears thickened with a 1.3 cm  x 1.4 cm echodensity on the atrial surface (not well  visualized), possibly a vegetation (the ehcodensity does  not display obvious independent mobility, but study is  technically limited), which may be suggestive of  endocarditis given the clinical history. Findings  communicated to Ольга RICKS. Consider NOHEMI if clinically  indicated. Severe, eccentric posteriorly directed mitral  regurgitation (notably the left atrium is not dilated,  suggestive of acute mitral regurgitation).  2. Normal left ventricular internal dimensions and wall  thicknesses.  3. Normal left ventricular systolic function. No segmental  wall motion abnormalities.  4. Normal right ventricular size and function.  5. In some views, there is thickening of the tricuspid  leaflet, which may be suggestive of vegetation (not well  visualized). Mild tricuspid regurgitation.    < end of copied text >    Imaging:  < from: Xray Chest 1 View- PORTABLE-Routine (20 @ 16:55) >  IMPRESSION:    Left lower lung hazy opacity correlates with nodular opacity seen CT chest abdomen and pelvis from 2020. A follow-up is recommended to confirm resolution in 4-6 weeks.    < end of copied text >    Interpretation of Telemetry: sinus rhythm

## 2020-08-23 NOTE — PROGRESS NOTE ADULT - ASSESSMENT
81y/o M with PMHx of HIV, BPH, CAD, urinary retention self catheterizes TID, admitted after a fall found to have hydronephrosis likely from urinary retention.  SCr stable ~1.5    - C/w Aden for maximal bladder decompression  - Trend SCr (appears at baseline), 1.59 today from 1.51  - Repeat renal ultrasound on Monday 8/24 to evaluate for improved hydronephrosis (renal US 1 day after Aden placement is not sufficiently long enough to identify improvement hydro)  - If SCr does not continue to improve, would consider renal lasix scan to evaluate for evidence of obstruction   - Will follow        [facility]  [address] [place] [address], [place] [id number]  [place] [address] [phone]

## 2020-08-23 NOTE — PROGRESS NOTE ADULT - SUBJECTIVE AND OBJECTIVE BOX
Subjective  No acute issues overnight.  Patient feels well, denies flank and abdominal pain. Tovar draining clear yellow urine.  Planned for endoscopy tomorrow.    Objective    Vital signs  T(F): , Max: 98.1 (08-22-20 @ 21:37)  HR: 86 (08-23-20 @ 11:53)  BP: 102/64 (08-23-20 @ 11:53)  SpO2: 98% (08-23-20 @ 11:53)  Wt(kg): --    Output     OUT:    Indwelling Catheter - Urethral: 4125 mL  Total OUT: 4125 mL    Total NET: -4125 mL      OUT:    Indwelling Catheter - Urethral: 700 mL  Total OUT: 700 mL    Total NET: -700 mL          Gen: NAD  Abd: soft, nontender, nondistended  : tovar secured in place, draining clear yellow urine    Labs      08-23 @ 06:26    WBC 9.66  / Hct 36.0  / SCr 1.59     08-22 @ 06:32    WBC 9.88  / Hct 33.3  / SCr 1.51           Urine Cx: ?  Blood Cx: ?    Imaging Subjective  No acute issues overnight.  Patient feels well, denies flank and abdominal pain. Tovar draining clear yellow urine.  Planned for NOHEMI tomorrow.    Objective    Vital signs  T(F): , Max: 98.1 (08-22-20 @ 21:37)  HR: 86 (08-23-20 @ 11:53)  BP: 102/64 (08-23-20 @ 11:53)  SpO2: 98% (08-23-20 @ 11:53)  Wt(kg): --    Output     OUT:    Indwelling Catheter - Urethral: 4125 mL  Total OUT: 4125 mL    Total NET: -4125 mL      OUT:    Indwelling Catheter - Urethral: 700 mL  Total OUT: 700 mL    Total NET: -700 mL          Gen: NAD  Abd: soft, nontender, nondistended  : tovar secured in place, draining clear yellow urine    Labs      08-23 @ 06:26    WBC 9.66  / Hct 36.0  / SCr 1.59     08-22 @ 06:32    WBC 9.88  / Hct 33.3  / SCr 1.51           Urine Cx: ?  Blood Cx: ?    Imaging normal...

## 2020-08-23 NOTE — PROGRESS NOTE ADULT - PROBLEM SELECTOR PLAN 10
[ ] continue heparin subq daily for DVT ppx; hold after mn for marco tomorrow  [ ] continue DASH diet, ensure enlive  [ ] bismuth salicylate ordered prn for diarrhea, CTM for signs of cdiff

## 2020-08-23 NOTE — PROGRESS NOTE ADULT - PROBLEM SELECTOR PLAN 3
- CTAP with severe b/l ureterohydronephrosis, thickened bladder 2/2 inability to straight cath himself for 24 hrs after fall (normally straight caths 3x daily for detrusor muscle weakness + BPH, follows urology outpt)  - repeat US kidney + bladder with mild right and moderate left hydronephrosis  [ ] urology following, appreciate recs. repeat ultrasound 8/24 to monitor hydronephrosis, continue tovar, CTM Cr, if worsening Cr/hydronephrosis, consider lasix renal scan  [ ] tovar for bladder decompression  [ ] continue home tamsulosin and finasteride  [ ] kidney/bladder US in am tomorrow

## 2020-08-23 NOTE — PROGRESS NOTE ADULT - PROBLEM SELECTOR PLAN 7
- L knee with abrasion and 5 cm area of erythema + mild swelling likely cellulitis 2/2 fall, improved clinically  [ ] cefazolin 1g q12h per ID recs

## 2020-08-23 NOTE — PROGRESS NOTE ADULT - ATTENDING COMMENTS
Saw and examined patient and discussed with house staff.  80M with h/o HIV on ART, CAD, BPH, urinary retention (self caths at home). Admitted for sepsis due to UTI (secondary to self cath), B/L severe hydronephrosis with SUDHEER, fall with rhabdomyolysis, debility. Found to have severe MR +/- IE w pulmonary edema  Resting in chair, no acute SOB, on 1L NC, no fever, chest pain, AAO x3, hemodynamically stable, in good spirits  1. Bacteremia, Staph MV endocarditis  - coag neg staph on blood cx, repeat negative  - Echo showed preserved EF w severe MR +/- veg  - NOHEMI on Monday for further eval of mitral valve  - f/u ID, CTS consults  - cont vancomycin  - hold AM heparin dose  2. Pulmonary edema, from severe MR/IV hydration  - resolved, continue on Lasix, hydralazine  - f/u cardiology consult  - strict intake and output  3. Rhabdomyolysis, s/p fall  - resolving s/p fluids  4. Elevated Trop from demand ischemia in the setting of pulmonary edema and SUDHEER  5. HIV, on ART  - c/w ART, c/w Abacavir 300mg BID and Lamivudine 100mg daily  - Per ID Pharmacist ordered Doravirine 100mg daily, but will not get it until Friday, will do Tivicay 50mg daily until  6. Debility  - OOB to chair, PT eval, fall precaution  7. L knee cellulitis  - cont cefazolin  8. SUDHEER and hydronephrosis    - Urology plans to c/w tovar and repeat renal US on Monday for resolution of hydro  - closely monitor ScrI    Dispo: DC to [facility] when medically stable.    Discussed with resident Dr. Louie Ramos DO  Pager #: [phone] Saw and examined patient and discussed with house staff.  80M with h/o HIV on ART, CAD, BPH, urinary retention (self caths at home). Admitted for sepsis due to UTI (secondary to self cath), B/L severe hydronephrosis with SUDHEER, fall with rhabdomyolysis, debility. Found to have severe MR +/- IE w pulmonary edema  Resting in chair, no acute SOB, on 1L NC, no fever, chest pain, AAO x3, hemodynamically stable, in good spirits  1. Bacteremia, Staph MV endocarditis  - coag neg staph on blood cx, repeat negative  - Echo showed preserved EF w severe MR +/- veg  - NOHEMI on Monday for further eval of mitral valve  - f/u ID, CTS consults  - cont vancomycin  - hold AM heparin dose  2. Pulmonary edema, from severe MR/IV hydration  - resolved, continue on Lasix, hydralazine  - f/u cardiology consult  - strict intake and output  3. Rhabdomyolysis, s/p fall  - resolving s/p fluids  4. Elevated Trop from demand ischemia in the setting of pulmonary edema and SUDHEER  5. HIV, on ART  - c/w ART, c/w Abacavir 300mg BID and Lamivudine 100mg daily  - Per ID Pharmacist ordered Doravirine 100mg daily, but will not get it until Friday, will do Tivicay 50mg daily until  6. Debility  - OOB to chair, PT eval, fall precaution  7. L knee cellulitis  - cont cefazolin  8. SUDHEER and hydronephrosis    - Urology plans to c/w tovar and repeat renal US on Monday for resolution of hydro  - closely monitor ScrI  9. Diarrhea  - prior to today described diarrhea as soft, but today says it has become watery  - send for C diff    Dispo: DC to [facility] when medically stable.    Discussed with resident Dr. Louie Ramos DO  Pager #: [phone]

## 2020-08-23 NOTE — PROGRESS NOTE ADULT - ASSESSMENT
Mr. Monsivais is a 81 y/o man with hx HIV, CAD, detrusor muscle weakness, BPH presenting for fall, down for 24 hrs, admitted with rhabdo, bilateral severe ureterohydronephrosis and thickened bladder on CTAP 2/2 inability to continue his home straight cath 3x daily after fall, tovar in place. Rhabdo improved, hydronephrosis improving with tovar (urology following). Echo shows severe acute mitral regurg and possible vegetations with + BCx staph epidermidis. On vanc (also on cefazolin for cellulitis) concerning for endocarditis. NOHEMI pending Monday, ID, cardiology, CT surgery recs appreciated.

## 2020-08-23 NOTE — PROGRESS NOTE ADULT - PROBLEM SELECTOR PLAN 1
Afebrile, BCx pos for staph epidermidis, on empiric IV vanc (also on zosyn for cellulitis)  - TTE showed 1.3-1.4 cm echodensity on atrial echodensity on atrial surface with severe acute mitral regurg (no dilation in mitral valve), tricupid valve leaflet thickening with mild tricuspid regurg concerning for vegetations. Normal LV and RV systolic function.  [ ] NOHEMI Monday, NPO MN sunday  [ ] continue vanc 1g q24h with daily trough lvl (also on cefazolin 1g q12h for cellulitis, UTI)  [ ] admission BCx both pos for staph epidermidis; repeat BCx 8/20 NGTD (obtained after abx started)  [ ] f/u sensitivities for BCx  [ ] ID, cardiology, CT surgery following for endocarditis and severe acute mitral regurg Afebrile, BCx pos for staph epidermidis, on empiric IV vanc (also on zosyn for cellulitis)  - TTE showed 1.3-1.4 cm echodensity on atrial echodensity on atrial surface with severe acute mitral regurg (no dilation in mitral valve), tricupid valve leaflet thickening with mild tricuspid regurg concerning for vegetations. Normal LV and RV systolic function.  [ ] NOHEMI Monday, NPO MN sunday  [ ] continue vanc 1g q24h with daily trough lvl (also on cefazolin 1g q12h for cellulitis, UTI)  [ ] admission BCx both pos for staph epidermidis, sensitive to vanc; repeat BCx 8/20 NGTD (obtained after abx started).  [ ] ID, cardiology, CT surgery following for endocarditis and severe acute mitral regurg

## 2020-08-23 NOTE — PROGRESS NOTE ADULT - PROBLEM SELECTOR PLAN 2
2/2 severe acute mitral valve regurg on echo, rapid called 8/20 overnight, improved with lasix IV 60 mg  [ ] cardiology following: lasix 40 BID + hydralazine 10 TID, hold for SBP <100  [ ] continue to wean oxygen on NC  [ ] avoid IVF

## 2020-08-23 NOTE — PROGRESS NOTE ADULT - SUBJECTIVE AND OBJECTIVE BOX
Rosa M Palma PGY1    Patient is a 80y old  Male who presents with a chief complaint of fall (22 Aug 2020 07:33)      SUBJECTIVE / OVERNIGHT EVENTS:  - overnight - no events  - am -     MEDICATIONS  (STANDING):  abacavir 300 milliGRAM(s) Oral every 12 hours  ALBUTerol    0.083% 2.5 milliGRAM(s) Nebulizer four times a day  ascorbic acid 500 milliGRAM(s) Oral daily  ceFAZolin   IVPB      ceFAZolin   IVPB 1000 milliGRAM(s) IV Intermittent every 12 hours  Doravirine 100mg tablets (pifeltro) 100 milliGRAM(s) 100 milliGRAM(s) Oral daily  finasteride 5 milliGRAM(s) Oral daily  furosemide    Tablet 40 milliGRAM(s) Oral two times a day  heparin   Injectable 5000 Unit(s) SubCutaneous every 8 hours  hydrALAZINE 10 milliGRAM(s) Oral three times a day  lamiVUDine- milliGRAM(s) Oral daily  multivitamin 1 Tablet(s) Oral daily  tamsulosin 0.4 milliGRAM(s) Oral at bedtime  vancomycin  IVPB 1250 milliGRAM(s) IV Intermittent daily  vancomycin  IVPB        MEDICATIONS  (PRN):      CAPILLARY BLOOD GLUCOSE        I&O's Summary    22 Aug 2020 07:01  -  23 Aug 2020 07:00  --------------------------------------------------------  IN: 1370 mL / OUT: 4125 mL / NET: -2755 mL        Vital Signs Last 24 Hrs  T(C): 36.7 (23 Aug 2020 05:40), Max: 36.7 (22 Aug 2020 21:37)  T(F): 98 (23 Aug 2020 05:40), Max: 98.1 (22 Aug 2020 21:37)  HR: 79 (23 Aug 2020 05:40) (79 - 90)  BP: 109/58 (23 Aug 2020 05:40) (104/55 - 113/61)  BP(mean): --  RR: 17 (23 Aug 2020 05:40) (17 - 18)  SpO2: 99% (23 Aug 2020 05:40) (97% - 100%)    PHYSICAL EXAM:  GENERAL: no distress  PSYCH: A&O x3  HEAD: Atraumatic, Normocephalic  NECK: Supple, No JVD  CHEST/LUNG: clear to auscultation bilaterally  HEART: regular rate and rhythm, no murmurs  ABDOMEN: nontender to palpation, no rebound tenderness/guarding  EXTREMITIES: no edema on bilateral LE  NEUROLOGY: no focal neurologic deficit  SKIN: No rashes or lesions    LABS:                        11.9   9.66  )-----------( 239      ( 23 Aug 2020 06:26 )             36.0      08-23    138  |  100  |  23  ----------------------------<  103<H>  3.5   |  27  |  1.59<H>    Ca    9.7      23 Aug 2020 06:26  Phos  3.3     08-23  Mg     2.0     08-23    TPro  6.7  /  Alb  3.1<L>  /  TBili  0.3  /  DBili  x   /  AST  37  /  ALT  31  /  AlkPhos  115  08-23      CARDIAC MARKERS ( 22 Aug 2020 06:32 )  x     / x     / 525 U/L / x     / x              RADIOLOGY & ADDITIONAL TESTS:    Imaging Personally Reviewed:    Consultant(s) Notes Reviewed:      Care Discussed with Consultants/Other Providers: Rosa M Louie PGY1    Patient is a 80y old  Male who presents with a chief complaint of fall (22 Aug 2020 07:33)      SUBJECTIVE / OVERNIGHT EVENTS:  - overnight - no events  - am - improved str and mobility. denies chest pain, sob, palpitations. continues to have "soft" BM, but not watery/no blood. No n/v. Aware about NOHEMI tomorrow, NPO after MN today.     MEDICATIONS  (STANDING):  abacavir 300 milliGRAM(s) Oral every 12 hours  ALBUTerol    0.083% 2.5 milliGRAM(s) Nebulizer four times a day  ascorbic acid 500 milliGRAM(s) Oral daily  ceFAZolin   IVPB      ceFAZolin   IVPB 1000 milliGRAM(s) IV Intermittent every 12 hours  Doravirine 100mg tablets (pifeltro) 100 milliGRAM(s) 100 milliGRAM(s) Oral daily  finasteride 5 milliGRAM(s) Oral daily  furosemide    Tablet 40 milliGRAM(s) Oral two times a day  heparin   Injectable 5000 Unit(s) SubCutaneous every 8 hours  hydrALAZINE 10 milliGRAM(s) Oral three times a day  lamiVUDine- milliGRAM(s) Oral daily  multivitamin 1 Tablet(s) Oral daily  tamsulosin 0.4 milliGRAM(s) Oral at bedtime  vancomycin  IVPB 1250 milliGRAM(s) IV Intermittent daily  vancomycin  IVPB        MEDICATIONS  (PRN):      CAPILLARY BLOOD GLUCOSE        I&O's Summary    22 Aug 2020 07:01  -  23 Aug 2020 07:00  --------------------------------------------------------  IN: 1370 mL / OUT: 4125 mL / NET: -2755 mL        Vital Signs Last 24 Hrs  T(C): 36.7 (23 Aug 2020 05:40), Max: 36.7 (22 Aug 2020 21:37)  T(F): 98 (23 Aug 2020 05:40), Max: 98.1 (22 Aug 2020 21:37)  HR: 79 (23 Aug 2020 05:40) (79 - 90)  BP: 109/58 (23 Aug 2020 05:40) (104/55 - 113/61)  BP(mean): --  RR: 17 (23 Aug 2020 05:40) (17 - 18)  SpO2: 99% (23 Aug 2020 05:40) (97% - 100%)    PHYSICAL EXAM:  GENERAL: thin, looks comfortable, lying down  CHEST/LUNG: clear to auscultation bilaterally  HEART: regular rate and rhythm, 2/6 holosystolic murmur lower sternal borders, most noticeable at apex   ABDOMEN: nontender to palpation, no rebound/guarding  EXTREMITIES: L knee with abrasion, healing, resolved erythema/swelling from cellulitis  PSYCH: A&Ox3  NEUROLOGY: no neuro deficits    LABS:                        11.9   9.66  )-----------( 239      ( 23 Aug 2020 06:26 )             36.0      08-23    138  |  100  |  23  ----------------------------<  103<H>  3.5   |  27  |  1.59<H>    Ca    9.7      23 Aug 2020 06:26  Phos  3.3     08-23  Mg     2.0     08-23    TPro  6.7  /  Alb  3.1<L>  /  TBili  0.3  /  DBili  x   /  AST  37  /  ALT  31  /  AlkPhos  115  08-23      CARDIAC MARKERS ( 22 Aug 2020 06:32 )  x     / x     / 525 U/L / x     / x              RADIOLOGY & ADDITIONAL TESTS:    Imaging Personally Reviewed:    Consultant(s) Notes Reviewed:      Care Discussed with Consultants/Other Providers:

## 2020-08-23 NOTE — PROGRESS NOTE ADULT - PROBLEM SELECTOR PLAN 8
Controlled; last CD4 368; undetectable viral load in July  [ ] continue home abacavir, lamivudine, Doravirine

## 2020-08-23 NOTE — PROGRESS NOTE ADULT - PROBLEM SELECTOR PLAN 4
- UA + large LE, >50 WBC, many bacteria, 10 RBC  - UCx with <10,000 normal urine oswaldo  - thickened bladder seen on CT AP  - pt straight caths himself 3x daily for detrusor muscle weakness + BPH  - no dysuria, abdominal/back pain  - s/p 1 d ceftriaxone for concern for UTI  [ ] cefazolin 1g q12h for suspected UTI + L knee cellulitis

## 2020-08-23 NOTE — PROGRESS NOTE ADULT - ASSESSMENT
80M hx of HIV(HQ0=655, VL undet), detrusor muscle weakness with self straight cath, CAD presented after a fall at home.  Labs noted for WBC 16.5, Cr 2.2, CPK 3857, lactate 3.1, positive UA on admission  cellulitis on left knee- resolved  bacteremia with 2/2 sets on 8/19 growing coag. negative staph in the setting of valvular heart disease    #GPC bacteremia:  - BCx: Staph epi 2/2 *2, could be contamination, but could be true pathogen if persistent bacteremia and endocarditis+  - TTE: possible vegetations noted with plans for NOHEMI on 8/24  -repeat blood culture on 8/20 is negative    #HIV:  - ZR4=484, VL undet  - c/w ABC/3TC/HUEY      #Left knee cellulitis:  - resolved    #Pyuria:  - UCx<1000 normal  oswaldo  - Likely colonization    # Diarrhea  multiple loose BMs  antibiotic related  add lactobacillus  would send stool for C.diff testing    Tung Almazan MD  [phone]  After 5pm/weekends [phone]

## 2020-08-23 NOTE — PROGRESS NOTE ADULT - PROBLEM SELECTOR PLAN 5
improved, not monitoring CK yet.   -  8/22 s/p 2L IVF NS and mIVF  cc/hr. IVF stopped after flash pulm edema 8/20 overnight 2/2 MVR  - rhabdo 2/2 being down 24 hrs after fall  - improved mobility/str

## 2020-08-24 DIAGNOSIS — I05.8 OTHER RHEUMATIC MITRAL VALVE DISEASES: ICD-10-CM

## 2020-08-24 DIAGNOSIS — R19.7 DIARRHEA, UNSPECIFIED: ICD-10-CM

## 2020-08-24 LAB
ALBUMIN SERPL ELPH-MCNC: 3.2 G/DL — LOW (ref 3.3–5)
ALP SERPL-CCNC: 121 U/L — HIGH (ref 40–120)
ALT FLD-CCNC: 29 U/L — SIGNIFICANT CHANGE UP (ref 10–45)
ANION GAP SERPL CALC-SCNC: 13 MMOL/L — SIGNIFICANT CHANGE UP (ref 5–17)
APTT BLD: 31.7 SEC — SIGNIFICANT CHANGE UP (ref 27.5–35.5)
AST SERPL-CCNC: 36 U/L — SIGNIFICANT CHANGE UP (ref 10–40)
BASOPHILS # BLD AUTO: 0.06 K/UL — SIGNIFICANT CHANGE UP (ref 0–0.2)
BASOPHILS NFR BLD AUTO: 0.6 % — SIGNIFICANT CHANGE UP (ref 0–2)
BILIRUB SERPL-MCNC: 0.2 MG/DL — SIGNIFICANT CHANGE UP (ref 0.2–1.2)
BLD GP AB SCN SERPL QL: NEGATIVE — SIGNIFICANT CHANGE UP
BUN SERPL-MCNC: 34 MG/DL — HIGH (ref 7–23)
CALCIUM SERPL-MCNC: 10.4 MG/DL — SIGNIFICANT CHANGE UP (ref 8.4–10.5)
CHLORIDE SERPL-SCNC: 97 MMOL/L — SIGNIFICANT CHANGE UP (ref 96–108)
CO2 SERPL-SCNC: 28 MMOL/L — SIGNIFICANT CHANGE UP (ref 22–31)
CREAT SERPL-MCNC: 1.64 MG/DL — HIGH (ref 0.5–1.3)
EOSINOPHIL # BLD AUTO: 0.43 K/UL — SIGNIFICANT CHANGE UP (ref 0–0.5)
EOSINOPHIL NFR BLD AUTO: 4.4 % — SIGNIFICANT CHANGE UP (ref 0–6)
GLUCOSE SERPL-MCNC: 103 MG/DL — HIGH (ref 70–99)
HCT VFR BLD CALC: 34.5 % — LOW (ref 39–50)
HGB BLD-MCNC: 11.4 G/DL — LOW (ref 13–17)
IMM GRANULOCYTES NFR BLD AUTO: 1.9 % — HIGH (ref 0–1.5)
INR BLD: 0.91 RATIO — SIGNIFICANT CHANGE UP (ref 0.88–1.16)
LYMPHOCYTES # BLD AUTO: 2.47 K/UL — SIGNIFICANT CHANGE UP (ref 1–3.3)
LYMPHOCYTES # BLD AUTO: 25.3 % — SIGNIFICANT CHANGE UP (ref 13–44)
MAGNESIUM SERPL-MCNC: 2.1 MG/DL — SIGNIFICANT CHANGE UP (ref 1.6–2.6)
MCHC RBC-ENTMCNC: 33 GM/DL — SIGNIFICANT CHANGE UP (ref 32–36)
MCHC RBC-ENTMCNC: 34.2 PG — HIGH (ref 27–34)
MCV RBC AUTO: 103.6 FL — HIGH (ref 80–100)
MONOCYTES # BLD AUTO: 0.87 K/UL — SIGNIFICANT CHANGE UP (ref 0–0.9)
MONOCYTES NFR BLD AUTO: 8.9 % — SIGNIFICANT CHANGE UP (ref 2–14)
NEUTROPHILS # BLD AUTO: 5.76 K/UL — SIGNIFICANT CHANGE UP (ref 1.8–7.4)
NEUTROPHILS NFR BLD AUTO: 58.9 % — SIGNIFICANT CHANGE UP (ref 43–77)
NRBC # BLD: 0 /100 WBCS — SIGNIFICANT CHANGE UP (ref 0–0)
PHOSPHATE SERPL-MCNC: 4.1 MG/DL — SIGNIFICANT CHANGE UP (ref 2.5–4.5)
PLATELET # BLD AUTO: 249 K/UL — SIGNIFICANT CHANGE UP (ref 150–400)
POTASSIUM SERPL-MCNC: 3.8 MMOL/L — SIGNIFICANT CHANGE UP (ref 3.5–5.3)
POTASSIUM SERPL-SCNC: 3.8 MMOL/L — SIGNIFICANT CHANGE UP (ref 3.5–5.3)
PROT SERPL-MCNC: 6.7 G/DL — SIGNIFICANT CHANGE UP (ref 6–8.3)
PROTHROM AB SERPL-ACNC: 10.9 SEC — SIGNIFICANT CHANGE UP (ref 10.6–13.6)
RBC # BLD: 3.33 M/UL — LOW (ref 4.2–5.8)
RBC # FLD: 13.5 % — SIGNIFICANT CHANGE UP (ref 10.3–14.5)
RH IG SCN BLD-IMP: POSITIVE — SIGNIFICANT CHANGE UP
SODIUM SERPL-SCNC: 138 MMOL/L — SIGNIFICANT CHANGE UP (ref 135–145)
VANCOMYCIN TROUGH SERPL-MCNC: 20.2 UG/ML — HIGH (ref 10–20)
WBC # BLD: 9.78 K/UL — SIGNIFICANT CHANGE UP (ref 3.8–10.5)
WBC # FLD AUTO: 9.78 K/UL — SIGNIFICANT CHANGE UP (ref 3.8–10.5)

## 2020-08-24 PROCEDURE — 99232 SBSQ HOSP IP/OBS MODERATE 35: CPT | Mod: GC

## 2020-08-24 PROCEDURE — 70546 MR ANGIOGRAPH HEAD W/O&W/DYE: CPT | Mod: 26

## 2020-08-24 PROCEDURE — 70544 MR ANGIOGRAPHY HEAD W/O DYE: CPT | Mod: 26

## 2020-08-24 PROCEDURE — 99232 SBSQ HOSP IP/OBS MODERATE 35: CPT

## 2020-08-24 PROCEDURE — 99233 SBSQ HOSP IP/OBS HIGH 50: CPT | Mod: GC

## 2020-08-24 PROCEDURE — 76770 US EXAM ABDO BACK WALL COMP: CPT | Mod: 26

## 2020-08-24 PROCEDURE — 93312 ECHO TRANSESOPHAGEAL: CPT | Mod: 26

## 2020-08-24 PROCEDURE — 93320 DOPPLER ECHO COMPLETE: CPT | Mod: 26

## 2020-08-24 PROCEDURE — 94010 BREATHING CAPACITY TEST: CPT | Mod: 26

## 2020-08-24 PROCEDURE — 76377 3D RENDER W/INTRP POSTPROCES: CPT | Mod: 26

## 2020-08-24 PROCEDURE — 93325 DOPPLER ECHO COLOR FLOW MAPG: CPT | Mod: 26

## 2020-08-24 RX ORDER — HEPARIN SODIUM 5000 [USP'U]/ML
5000 INJECTION INTRAVENOUS; SUBCUTANEOUS EVERY 8 HOURS
Refills: 0 | Status: COMPLETED | OUTPATIENT
Start: 2020-08-24 | End: 2020-08-27

## 2020-08-24 RX ORDER — VANCOMYCIN HCL 1 G
1000 VIAL (EA) INTRAVENOUS DAILY
Refills: 0 | Status: DISCONTINUED | OUTPATIENT
Start: 2020-08-24 | End: 2020-08-25

## 2020-08-24 RX ORDER — PREGABALIN 225 MG/1
1000 CAPSULE ORAL DAILY
Refills: 0 | Status: DISCONTINUED | OUTPATIENT
Start: 2020-08-24 | End: 2020-08-31

## 2020-08-24 RX ADMIN — HEPARIN SODIUM 5000 UNIT(S): 5000 INJECTION INTRAVENOUS; SUBCUTANEOUS at 13:16

## 2020-08-24 RX ADMIN — Medication 10 MILLIGRAM(S): at 22:21

## 2020-08-24 RX ADMIN — Medication 10 MILLIGRAM(S): at 06:11

## 2020-08-24 RX ADMIN — PREGABALIN 1000 MICROGRAM(S): 225 CAPSULE ORAL at 13:31

## 2020-08-24 RX ADMIN — ALBUTEROL 2.5 MILLIGRAM(S): 90 AEROSOL, METERED ORAL at 13:28

## 2020-08-24 RX ADMIN — Medication 250 MILLIGRAM(S): at 13:28

## 2020-08-24 RX ADMIN — TAMSULOSIN HYDROCHLORIDE 0.4 MILLIGRAM(S): 0.4 CAPSULE ORAL at 22:21

## 2020-08-24 RX ADMIN — ALBUTEROL 2.5 MILLIGRAM(S): 90 AEROSOL, METERED ORAL at 06:11

## 2020-08-24 RX ADMIN — ABACAVIR 300 MILLIGRAM(S): 20 SOLUTION ORAL at 22:21

## 2020-08-24 RX ADMIN — FINASTERIDE 5 MILLIGRAM(S): 5 TABLET, FILM COATED ORAL at 22:21

## 2020-08-24 RX ADMIN — ALBUTEROL 2.5 MILLIGRAM(S): 90 AEROSOL, METERED ORAL at 18:10

## 2020-08-24 RX ADMIN — ABACAVIR 300 MILLIGRAM(S): 20 SOLUTION ORAL at 13:28

## 2020-08-24 RX ADMIN — Medication 40 MILLIGRAM(S): at 06:11

## 2020-08-24 RX ADMIN — Medication 500 MILLIGRAM(S): at 13:28

## 2020-08-24 RX ADMIN — LACTOBACILLUS ACIDOPH-L.BULGARICUS 1 MILLION CELL CHEWABLE TABLET 2 TABLET(S): at 12:17

## 2020-08-24 RX ADMIN — Medication 100 MILLIGRAM(S): at 13:28

## 2020-08-24 RX ADMIN — Medication 1 TABLET(S): at 13:28

## 2020-08-24 RX ADMIN — HEPARIN SODIUM 5000 UNIT(S): 5000 INJECTION INTRAVENOUS; SUBCUTANEOUS at 22:24

## 2020-08-24 RX ADMIN — ALBUTEROL 2.5 MILLIGRAM(S): 90 AEROSOL, METERED ORAL at 23:07

## 2020-08-24 RX ADMIN — LACTOBACILLUS ACIDOPH-L.BULGARICUS 1 MILLION CELL CHEWABLE TABLET 2 TABLET(S): at 19:30

## 2020-08-24 RX ADMIN — Medication 40 MILLIGRAM(S): at 18:10

## 2020-08-24 NOTE — PROGRESS NOTE ADULT - PROBLEM SELECTOR PLAN 1
Afebrile, BCx pos for staph epidermidis, on empiric IV vanc (also on zosyn for cellulitis)  - TTE showed 1.3-1.4 cm echodensity on atrial echodensity on atrial surface with severe acute mitral regurg (no dilation in mitral valve), tricupid valve leaflet thickening with mild tricuspid regurg concerning for vegetations. Normal LV and RV systolic function.  [ ] NOHEMI Monday, NPO MN sunday  [ ] continue vanc 1g q24h with daily trough lvl (also on cefazolin 1g q12h for cellulitis, UTI)  [ ] admission BCx both pos for staph epidermidis, sensitive to vanc; repeat BCx 8/20 NGTD (obtained after abx started).  [ ] ID, cardiology, CT surgery following for endocarditis and severe acute mitral regurg Afebrile, BCx pos for staph epidermidis, on empiric IV vanc (also on zosyn for cellulitis)  - TTE showed 1.3-1.4 cm echodensity on atrial echodensity on atrial surface with severe acute mitral regurg (no dilation in mitral valve), tricupid valve leaflet thickening with mild tricuspid regurg concerning for vegetations. Normal LV and RV systolic function.  [ ] NOHEMI today; f/u results  [ ] continue vanc 1g q24h with daily trough lvl (also on cefazolin 1g q12h for cellulitis, UTI)  [ ] admission BCx both pos for staph epidermidis, sensitive to vanc; repeat BCx 8/20 NGTD (obtained after abx started).  [ ] ID, cardiology, CT surgery following for endocarditis and severe acute mitral regurg Afebrile, BCx pos for staph epidermidis, repeat BCx negative (drawn after abx)  - TTE showed 1.3-1.4 cm echodensity atrial surface with severe acute mitral regurg, tricupid valve leaflet thickening with mild tricuspid regurg concerning for vegetations. Normal LV and RV systolic function.  [ ] NOHEMI today; f/u results  [ ] continue vanc 1g q24h with daily trough lvl   [ ] admission BCx both pos for staph epidermidis, sensitive to vanc; repeat BCx 8/20 NGTD (obtained after abx started).  [ ] ID, cardiology, CT surgery following for endocarditis and severe acute mitral regurg

## 2020-08-24 NOTE — PROGRESS NOTE ADULT - SUBJECTIVE AND OBJECTIVE BOX
INFECTIOUS DISEASES FOLLOW UP    This is a follow up note for this  81 yo Male with  Rhabdomyolysis  Staph epidermidis bacteremia and possible endocarditis   interval development of multiple episodes of watery stool  Left knee cellulitis    Interval event:  - Went for NOHEMI this morning    ROS:  CONSTITUTIONAL:  No fever, poor appetite  CARDIOVASCULAR:  No chest pain or palpitations  RESPIRATORY:  No dyspnea  GASTROINTESTINAL:  No nausea, vomiting,  c/o diarrhea, no abdominal pain  GENITOURINARY:  No dysuria    ANTIMICROBIALS:    abacavir 300 every 12 hours  lamiVUDine- daily  vancomycin  IVPB 1000 daily    OTHER MEDS:  MEDICATIONS  (STANDING):  ALBUTerol    0.083% 2.5 four times a day  finasteride 5 daily  furosemide    Tablet 40 two times a day  hydrALAZINE 10 three times a day  tamsulosin 0.4 at bedtime  NEUROLOGIC:  No headache,     Allergies  No Known Allergies    Vital Signs Last 24 Hrs  T(F): 98 (08-24-20 @ 04:35), Max: 99.9 (08-19-20 @ 20:11)    Vital Signs Last 24 Hrs  HR: 88 (08-24-20 @ 04:35) (88 - 88)  BP: 105/55 (08-24-20 @ 09:40) (105/55 - 120/69)  RR: 18 (08-24-20 @ 04:35)  SpO2: 99% (08-24-20 @ 04:35) (97% - 99%)  Wt(kg): --    PHYSICAL EXAM:  Constitutional:no acute distress, sitting in a chair  Eyes:EMILY, EOMI  Ear/Nose/Throat: no oral lesions, 	  Respiratory: clear BL  Cardiovascular: S1S2 soft systolic murmur best heard at apex  Gastrointestinal:soft, some RLQ tenderness  Extremities:no e/e/c cellulitis resolved  No Lymphadenopathy  IV sites not inflammed.      Labs:                        11.4   9.78  )-----------( 249      ( 24 Aug 2020 06:50 )             34.5     08-24    138  |  97  |  34<H>  ----------------------------<  103<H>  3.8   |  28  |  1.64<H>    Ca    10.4      24 Aug 2020 06:50  Phos  4.1     08-24  Mg     2.1     08-24    TPro  6.7  /  Alb  3.2<L>  /  TBili  0.2  /  DBili  x   /  AST  36  /  ALT  29  /  AlkPhos  121<H>  08-24      WBC Trend:  WBC Count: 9.78 (08-24-20 @ 06:50)  WBC Count: 9.66 (08-23-20 @ 06:26)  WBC Count: 9.88 (08-22-20 @ 06:32)  WBC Count: 9.46 (08-21-20 @ 06:15)      Creatine Trend:  Creatinine, Serum: 1.64 (08-24)  Creatinine, Serum: 1.59 (08-23)  Creatinine, Serum: 1.51 (08-22)  Creatinine, Serum: 1.54 (08-21)      Liver Biochemical Testing Trend:  Alanine Aminotransferase (ALT/SGPT): 29 (08-24)  Alanine Aminotransferase (ALT/SGPT): 31 (08-23)  Alanine Aminotransferase (ALT/SGPT): 33 (08-22)  Aspartate Aminotransferase (AST/SGOT): 36 (08-24-20 @ 06:50)  Aspartate Aminotransferase (AST/SGOT): 37 (08-23-20 @ 06:26)  Aspartate Aminotransferase (AST/SGOT): 52 (08-22-20 @ 06:32)  Bilirubin Total, Serum: 0.2 (08-24)  Bilirubin Total, Serum: 0.3 (08-23)  Bilirubin Total, Serum: 0.4 (08-22)      MICROBIOLOGY:  Vancomycin Level, Trough: 20.2 (08-24 @ 06:50)  Vancomycin Level, Trough: 18.1 (08-23 @ 06:26)  Vancomycin Level, Trough: 11.2 (08-22 @ 15:33)      Culture - Blood (collected 20 Aug 2020 15:01)  Source: .Blood Blood-Venous  Preliminary Report:    No growth to date.    Culture - Blood (collected 20 Aug 2020 15:01)  Source: .Blood Blood-Peripheral  Preliminary Report:    No growth to date.    Culture - Urine (collected 19 Aug 2020 09:00)  Source: .Urine Clean Catch (Midstream)  Final Report:    <10,000 CFU/mL Normal Urogenital Kami    Culture - Blood (collected 19 Aug 2020 01:39)  Source: .Blood Blood-Peripheral  Final Report:    Growth in aerobic and anaerobic bottles: Staphylococcus epidermidis  Organism: Staphylococcus epidermidis  Organism: Staphylococcus epidermidis    Sensitivities:      -  Ampicillin/Sulbactam: R <=8/4      -  Cefazolin: R <=4      -  Clindamycin: S 0.5      -  Erythromycin: S <=0.25      -  Gentamicin: S <=1 Should not be used as monotherapy      -  Oxacillin: R >2      -  Penicillin: R >8      -  RIF- Rifampin: S <=1 Should not be used as monotherapy      -  Tetra/Doxy: S 2      -  Trimethoprim/Sulfamethoxazole: S <=0.5/9.5      -  Vancomycin: S 2      Method Type: FLORENCIA    Culture - Blood (collected 19 Aug 2020 01:39)  Source: .Blood Blood-Peripheral  Final Report:    Growth in aerobic and anaerobic bottles: Staphylococcus epidermidis    "Due to technical problems, Proteus sp. will Not be reported as part of    the BCID panel until further notice"    ***Blood Panel PCR results on this specimen are available    approximately 3 hours after the Gram stain result.***    Gram stain, PCR, and/or culture results may not always    correspond due to difference in methodologies.    ************************************************************    This PCR assay was performed using [employer].    The following targets are tested for: Enterococcus,    vancomycin resistant enterococci, Listeria monocytogenes,    coagulase negative staphylococci, S. aureus,    methicillin resistant S. aureus, Streptococcus agalactiae    (Group B), S. pneumoniae, S. pyogenes (Group A),    Acinetobacter baumannii, Enterobacter cloacae, E. coli,    Klebsiella oxytoca, K. pneumoniae, Proteus sp.,    Serratia marcescens, Haemophilus influenzae,    Neisseria meningitidis, Pseudomonas aeruginosa, Candida    albicans, C. glabrata, C krusei, C parapsilosis,    C. tropicalis and the KPC resistance gene.  Organism: Blood Culture PCR  Organism: Blood Culture PCR    Sensitivities:      -  Coagulase negative Staphylococcus: Detec      Method Type: PCR      ABS CD4: 324 /uL (08-19-20 @ 03:26)  HIV-1 RNA Quantitative, Viral Load: NOT DET. (08-19-20 @ 04:46)      OTHER TESTS:  COVID-19 PCR: NotDetec (08-19-20 @ 02:39)  COVID-19 IgG Antibody Index: 0.92 Index (08-19-20 @ 09:26)            RADIOLOGY & ADDITIONAL STUDIES:    < from: Xray Chest 1 View- PORTABLE-Routine (08.20.20 @ 16:55) >  IMPRESSION:    Left lower lung hazy opacity correlates with nodular opacity seen CT chest abdomen and pelvis from 8/19/2020. A follow-up is recommended to confirm resolution in 4-6 weeks.    < end of copied text >    < from: Transthoracic Echocardiogram (08.20.20 @ 09:27) >  Conclusions:  1. Mitral valve not well visualized; however, the anterior  leaflet of the mitral valve appears thickened with a 1.3 cm  x 1.4 cm echodensity on the atrial surface (not well  visualized), possibly a vegetation (the ehcodensity does  not display obvious independent mobility, but study is  technically limited), which may be suggestive of  endocarditis given the clinical history. Findings  communicated to Ольга RICKS. Consider NOHEMI if clinically  indicated. Severe, eccentric posteriorly directed mitral  regurgitation (notably the left atrium is not dilated,  suggestive of acute mitral regurgitation).  2. Normal left ventricular internal dimensions and wall  thicknesses.  3. Normal left ventricular systolic function. No segmental  wall motion abnormalities.  4. Normal right ventricular size and function.  5. In some views, there is thickening of the tricuspid  leaflet, which may be suggestive of vegetation (not well  visualized). Mild tricuspid regurgitation.    < end of copied text > INFECTIOUS DISEASES FOLLOW UP    This is a follow up note for this  79 yo Male with  Rhabdomyolysis  Staph epidermidis bacteremia and possible endocarditis   interval development of multiple episodes of watery stool  Left knee cellulitis    Interval event:  - Went for NOHEMI this morning  - Per primary team intern, there is a >1cm vegetations, will call CTS    ROS:  CONSTITUTIONAL:  No fever, poor appetite  CARDIOVASCULAR:  No chest pain or palpitations  RESPIRATORY:  No dyspnea  GASTROINTESTINAL:  No nausea, vomiting,  c/o diarrhea, no abdominal pain  GENITOURINARY:  No dysuria    ANTIMICROBIALS:    abacavir 300 every 12 hours  lamiVUDine- daily  vancomycin  IVPB 1000 daily    OTHER MEDS:  MEDICATIONS  (STANDING):  ALBUTerol    0.083% 2.5 four times a day  finasteride 5 daily  furosemide    Tablet 40 two times a day  hydrALAZINE 10 three times a day  tamsulosin 0.4 at bedtime  NEUROLOGIC:  No headache,     Allergies  No Known Allergies    Vital Signs Last 24 Hrs  T(F): 98 (08-24-20 @ 04:35), Max: 99.9 (08-19-20 @ 20:11)    Vital Signs Last 24 Hrs  HR: 88 (08-24-20 @ 04:35) (88 - 88)  BP: 105/55 (08-24-20 @ 09:40) (105/55 - 120/69)  RR: 18 (08-24-20 @ 04:35)  SpO2: 99% (08-24-20 @ 04:35) (97% - 99%)  Wt(kg): --    PHYSICAL EXAM:  Constitutional:no acute distress, sitting in a chair  Eyes:EMILY, EOMI  Ear/Nose/Throat: no oral lesions, 	  Respiratory: clear BL  Cardiovascular: S1S2 soft systolic murmur best heard at apex  Gastrointestinal:soft, some RLQ tenderness  Extremities:no e/e/c cellulitis resolved  No Lymphadenopathy  IV sites not inflammed.      Labs:                        11.4   9.78  )-----------( 249      ( 24 Aug 2020 06:50 )             34.5     08-24    138  |  97  |  34<H>  ----------------------------<  103<H>  3.8   |  28  |  1.64<H>    Ca    10.4      24 Aug 2020 06:50  Phos  4.1     08-24  Mg     2.1     08-24    TPro  6.7  /  Alb  3.2<L>  /  TBili  0.2  /  DBili  x   /  AST  36  /  ALT  29  /  AlkPhos  121<H>  08-24      WBC Trend:  WBC Count: 9.78 (08-24-20 @ 06:50)  WBC Count: 9.66 (08-23-20 @ 06:26)  WBC Count: 9.88 (08-22-20 @ 06:32)  WBC Count: 9.46 (08-21-20 @ 06:15)      Creatine Trend:  Creatinine, Serum: 1.64 (08-24)  Creatinine, Serum: 1.59 (08-23)  Creatinine, Serum: 1.51 (08-22)  Creatinine, Serum: 1.54 (08-21)      Liver Biochemical Testing Trend:  Alanine Aminotransferase (ALT/SGPT): 29 (08-24)  Alanine Aminotransferase (ALT/SGPT): 31 (08-23)  Alanine Aminotransferase (ALT/SGPT): 33 (08-22)  Aspartate Aminotransferase (AST/SGOT): 36 (08-24-20 @ 06:50)  Aspartate Aminotransferase (AST/SGOT): 37 (08-23-20 @ 06:26)  Aspartate Aminotransferase (AST/SGOT): 52 (08-22-20 @ 06:32)  Bilirubin Total, Serum: 0.2 (08-24)  Bilirubin Total, Serum: 0.3 (08-23)  Bilirubin Total, Serum: 0.4 (08-22)      MICROBIOLOGY:  Vancomycin Level, Trough: 20.2 (08-24 @ 06:50)  Vancomycin Level, Trough: 18.1 (08-23 @ 06:26)  Vancomycin Level, Trough: 11.2 (08-22 @ 15:33)      Culture - Blood (collected 20 Aug 2020 15:01)  Source: .Blood Blood-Venous  Preliminary Report:    No growth to date.    Culture - Blood (collected 20 Aug 2020 15:01)  Source: .Blood Blood-Peripheral  Preliminary Report:    No growth to date.    Culture - Urine (collected 19 Aug 2020 09:00)  Source: .Urine Clean Catch (Midstream)  Final Report:    <10,000 CFU/mL Normal Urogenital Kami    Culture - Blood (collected 19 Aug 2020 01:39)  Source: .Blood Blood-Peripheral  Final Report:    Growth in aerobic and anaerobic bottles: Staphylococcus epidermidis  Organism: Staphylococcus epidermidis  Organism: Staphylococcus epidermidis    Sensitivities:      -  Ampicillin/Sulbactam: R <=8/4      -  Cefazolin: R <=4      -  Clindamycin: S 0.5      -  Erythromycin: S <=0.25      -  Gentamicin: S <=1 Should not be used as monotherapy      -  Oxacillin: R >2      -  Penicillin: R >8      -  RIF- Rifampin: S <=1 Should not be used as monotherapy      -  Tetra/Doxy: S 2      -  Trimethoprim/Sulfamethoxazole: S <=0.5/9.5      -  Vancomycin: S 2      Method Type: FLORENCIA    Culture - Blood (collected 19 Aug 2020 01:39)  Source: .Blood Blood-Peripheral  Final Report:    Growth in aerobic and anaerobic bottles: Staphylococcus epidermidis    "Due to technical problems, Proteus sp. will Not be reported as part of    the BCID panel until further notice"    ***Blood Panel PCR results on this specimen are available    approximately 3 hours after the Gram stain result.***    Gram stain, PCR, and/or culture results may not always    correspond due to difference in methodologies.    ************************************************************    This PCR assay was performed using [employer].    The following targets are tested for: Enterococcus,    vancomycin resistant enterococci, Listeria monocytogenes,    coagulase negative staphylococci, S. aureus,    methicillin resistant S. aureus, Streptococcus agalactiae    (Group B), S. pneumoniae, S. pyogenes (Group A),    Acinetobacter baumannii, Enterobacter cloacae, E. coli,    Klebsiella oxytoca, K. pneumoniae, Proteus sp.,    Serratia marcescens, Haemophilus influenzae,    Neisseria meningitidis, Pseudomonas aeruginosa, Candida    albicans, C. glabrata, C krusei, C parapsilosis,    C. tropicalis and the KPC resistance gene.  Organism: Blood Culture PCR  Organism: Blood Culture PCR    Sensitivities:      -  Coagulase negative Staphylococcus: Detec      Method Type: PCR      ABS CD4: 324 /uL (08-19-20 @ 03:26)  HIV-1 RNA Quantitative, Viral Load: NOT DET. (08-19-20 @ 04:46)      OTHER TESTS:  COVID-19 PCR: NotDetec (08-19-20 @ 02:39)  COVID-19 IgG Antibody Index: 0.92 Index (08-19-20 @ 09:26)            RADIOLOGY & ADDITIONAL STUDIES:    < from: Xray Chest 1 View- PORTABLE-Routine (08.20.20 @ 16:55) >  IMPRESSION:    Left lower lung hazy opacity correlates with nodular opacity seen CT chest abdomen and pelvis from 8/19/2020. A follow-up is recommended to confirm resolution in 4-6 weeks.    < end of copied text >    < from: Transthoracic Echocardiogram (08.20.20 @ 09:27) >  Conclusions:  1. Mitral valve not well visualized; however, the anterior  leaflet of the mitral valve appears thickened with a 1.3 cm  x 1.4 cm echodensity on the atrial surface (not well  visualized), possibly a vegetation (the ehcodensity does  not display obvious independent mobility, but study is  technically limited), which may be suggestive of  endocarditis given the clinical history. Findings  communicated to Ольга RICKS. Consider NOHEMI if clinically  indicated. Severe, eccentric posteriorly directed mitral  regurgitation (notably the left atrium is not dilated,  suggestive of acute mitral regurgitation).  2. Normal left ventricular internal dimensions and wall  thicknesses.  3. Normal left ventricular systolic function. No segmental  wall motion abnormalities.  4. Normal right ventricular size and function.  5. In some views, there is thickening of the tricuspid  leaflet, which may be suggestive of vegetation (not well  visualized). Mild tricuspid regurgitation.    < end of copied text >

## 2020-08-24 NOTE — PROGRESS NOTE ADULT - SUBJECTIVE AND OBJECTIVE BOX
Daily In-House Cardiology Progress Note  -------------------------------------------------------    24-Hour Events/Subjective:      -No CP, SOB, or subjective fever.    Telemetry:  -NSR 80-100s.    ROS:   Constitutional: No Fatigue, weakness, fever, chills  HEENT: No sore throat, dryness, hoarseness, congestion  Cardiovascular: No chest pain, SOB, palpitations, PRESCOTT, lightheadedness, dizziness  Respiratory: No wheezing, cough, phlegm  GI: No nausea, vomiting, diarrhea, poor PO tolerance, dyspepsia, dysphagia  Musculoskeletal: No myalgias, arthralgias, joint swelling, back pain  Extremities: No swelling, coldness  Skin: No rashes, lesions, pruritis   Neuro: No weakness, confusion, blurry vision  Psych: No anxiety, depression    Current Meds:  abacavir 300 milliGRAM(s) Oral every 12 hours  ALBUTerol    0.083% 2.5 milliGRAM(s) Nebulizer four times a day  ascorbic acid 500 milliGRAM(s) Oral daily  cyanocobalamin 1000 MICROGram(s) Oral daily  Doravirine 100mg tablets (pifeltro) 100 milliGRAM(s) 100 milliGRAM(s) Oral daily  finasteride 5 milliGRAM(s) Oral daily  furosemide    Tablet 40 milliGRAM(s) Oral two times a day  hydrALAZINE 10 milliGRAM(s) Oral three times a day  lactobacillus acidophilus and bulgaricus Chewable 2 Tablet(s) Chew three times a day with meals  lactobacillus acidophilus and bulgaricus Chewable      lamiVUDine- milliGRAM(s) Oral daily  multivitamin 1 Tablet(s) Oral daily  tamsulosin 0.4 milliGRAM(s) Oral at bedtime  vancomycin  IVPB 1250 milliGRAM(s) IV Intermittent daily  vancomycin  IVPB        Vitals:  T(F): 98 (08-24), Max: 98.6 (08-23)  HR: 88 (08-24) (86 - 88)  BP: 105/55 (08-24) (102/64 - 120/69)  RR: 18 (08-24)  SpO2: 99% (08-24)    I&O's Summary  23 Aug 2020 07:01  -  24 Aug 2020 07:00  --------------------------------------------------------  IN: 560 mL / OUT: 2800 mL / NET: -2240 mL    Physical Exam:  Appearance: No acute distress; well appearing  Eyes: PERRL, EOMI, pink conjunctiva  HEENT: Normal oral mucosa  Cardiovascular: RRR, S1, S2, grade 3/6 systolic ejection murmur at apex and radiating to axilla; no edema; no JVD  Respiratory: Clear to auscultation bilaterally; decreased breath sounds at bases  Gastrointestinal: soft, non-tender, non-distended with normal bowel sounds  Musculoskeletal: No clubbing; no joint deformity   Neurologic: Non-focal  Lymphatic: No lymphadenopathy  Psychiatry: AAOx3, mood & affect appropriate  Skin: No rashes, ecchymoses, or cyanosis                          11.4   9.78  )-----------( 249      ( 24 Aug 2020 06:50 )             34.5     08-24    138  |  97  |  34<H>  ----------------------------<  103<H>  3.8   |  28  |  1.64<H>    Ca    10.4      24 Aug 2020 06:50  Phos  4.1     08-24  Mg     2.1     08-24    TPro  6.7  /  Alb  3.2<L>  /  TBili  0.2  /  DBili  x   /  AST  36  /  ALT  29  /  AlkPhos  121<H>  08-24    PT/INR - ( 24 Aug 2020 06:50 )   PT: 10.9 sec;   INR: 0.91 ratio         PTT - ( 24 Aug 2020 06:50 )  PTT:31.7 sec  CARDIAC MARKERS ( 22 Aug 2020 06:32 )  x     / x     / x     / 525 U/L / x     / x      CARDIAC MARKERS ( 21 Aug 2020 06:15 )  x     / x     / x     / 929 U/L / x     / x      CARDIAC MARKERS ( 20 Aug 2020 11:43 )  86 ng/L / x     / x     / x     / x     / x      CARDIAC MARKERS ( 20 Aug 2020 06:10 )  102 ng/L / x     / x     / 1248 U/L / x     / x      CARDIAC MARKERS ( 19 Aug 2020 22:35 )  72 ng/L / x     / x     / x     / x     / x          Serum Pro-Brain Natriuretic Peptide: 1987 pg/mL (08-22 @ 06:32)  Serum Pro-Brain Natriuretic Peptide: 4864 pg/mL (08-19 @ 13:43)

## 2020-08-24 NOTE — PROGRESS NOTE ADULT - PROBLEM SELECTOR PLAN 8
Controlled; last CD4 368; undetectable viral load in July  [ ] continue home abacavir, lamivudine, Doravirine - sees Dr. Presley Ferguson outpt

## 2020-08-24 NOTE — PROGRESS NOTE ADULT - PROBLEM SELECTOR PLAN 5
improved, not monitoring CK yet.   -  8/22 s/p 2L IVF NS and mIVF  cc/hr. IVF stopped after flash pulm edema 8/20 overnight 2/2 MVR  - rhabdo 2/2 being down 24 hrs after fall  - improved mobility/str - pt denies palpitations, chest pain, dizziness. Endorses weakness prior to and since fall.   - ECG 8/19 on admission sinus tachy, LA enlargement, no ST elevation/depression or T wave inversion. Repeat ECG 8/20 with RBBB (rsr' on V1), irregular, left atrial enlargement. No ST changes or T wave inversions.   - possibly 2/2 cardiogenic syncope (severe acute MVR)  [ ] PT recommended: CYNTHIA on d/c - rhabdo 2/2 being down 24 hrs after fall  - resolved; downtrended CK, improved mobility/str

## 2020-08-24 NOTE — PROGRESS NOTE ADULT - ATTENDING COMMENTS
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 For TE echo for better definition.

## 2020-08-24 NOTE — PROGRESS NOTE ADULT - SUBJECTIVE AND OBJECTIVE BOX
Rosa M Palma PGY1    Patient is a 80y old  Male who presents with a chief complaint of fall (23 Aug 2020 16:17)      SUBJECTIVE / OVERNIGHT EVENTS:  - overnight - no events  - am -     MEDICATIONS  (STANDING):  abacavir 300 milliGRAM(s) Oral every 12 hours  ALBUTerol    0.083% 2.5 milliGRAM(s) Nebulizer four times a day  ascorbic acid 500 milliGRAM(s) Oral daily  cyanocobalamin 1000 MICROGram(s) Oral daily  Doravirine 100mg tablets (pifeltro) 100 milliGRAM(s) 100 milliGRAM(s) Oral daily  finasteride 5 milliGRAM(s) Oral daily  furosemide    Tablet 40 milliGRAM(s) Oral two times a day  hydrALAZINE 10 milliGRAM(s) Oral three times a day  lactobacillus acidophilus and bulgaricus Chewable 2 Tablet(s) Chew three times a day with meals  lactobacillus acidophilus and bulgaricus Chewable      lamiVUDine- milliGRAM(s) Oral daily  multivitamin 1 Tablet(s) Oral daily  tamsulosin 0.4 milliGRAM(s) Oral at bedtime  vancomycin  IVPB 1250 milliGRAM(s) IV Intermittent daily  vancomycin  IVPB        MEDICATIONS  (PRN):      CAPILLARY BLOOD GLUCOSE        I&O's Summary    23 Aug 2020 07:01  -  24 Aug 2020 07:00  --------------------------------------------------------  IN: 560 mL / OUT: 2800 mL / NET: -2240 mL        Vital Signs Last 24 Hrs  T(C): 36.7 (24 Aug 2020 04:35), Max: 37 (23 Aug 2020 21:34)  T(F): 98 (24 Aug 2020 04:35), Max: 98.6 (23 Aug 2020 21:34)  HR: 88 (24 Aug 2020 04:35) (86 - 88)  BP: 105/55 (24 Aug 2020 06:09) (102/64 - 120/69)  BP(mean): --  RR: 18 (24 Aug 2020 04:35) (18 - 18)  SpO2: 99% (24 Aug 2020 04:35) (97% - 99%)    PHYSICAL EXAM:  GENERAL: no distress  PSYCH: A&O x3  HEAD: Atraumatic, Normocephalic  NECK: Supple, No JVD  CHEST/LUNG: clear to auscultation bilaterally  HEART: regular rate and rhythm, no murmurs  ABDOMEN: nontender to palpation, no rebound tenderness/guarding  EXTREMITIES: no edema on bilateral LE  NEUROLOGY: no focal neurologic deficit  SKIN: No rashes or lesions    LABS:                        11.4   9.78  )-----------( 249      ( 24 Aug 2020 06:50 )             34.5      08-23    138  |  100  |  23  ----------------------------<  103<H>  3.5   |  27  |  1.59<H>    Ca    9.7      23 Aug 2020 06:26  Phos  3.3     08-23  Mg     2.0     08-23    TPro  6.7  /  Alb  3.1<L>  /  TBili  0.3  /  DBili  x   /  AST  37  /  ALT  31  /  AlkPhos  115  08-23    PT/INR - ( 24 Aug 2020 06:50 )   PT: 10.9 sec;   INR: 0.91 ratio         PTT - ( 24 Aug 2020 06:50 )  PTT:31.7 sec          RADIOLOGY & ADDITIONAL TESTS:    Imaging Personally Reviewed:    Consultant(s) Notes Reviewed:      Care Discussed with Consultants/Other Providers: Rosa M Palma PGY1    Patient is a 80y old  Male who presents with a chief complaint of fall (23 Aug 2020 16:17)      SUBJECTIVE / OVERNIGHT EVENTS:  - overnight - no events  - am - feels good, off NC oxygen, was sitting in chair yesterday, regained str. No chest pain/sob. Went for NOHEMI this am. Said L scalp doesn't feel same as right, some numbness - no asymmetry in facial muscle movements, no weakness/numbness/tingling elsewhere.     MEDICATIONS  (STANDING):  abacavir 300 milliGRAM(s) Oral every 12 hours  ALBUTerol    0.083% 2.5 milliGRAM(s) Nebulizer four times a day  ascorbic acid 500 milliGRAM(s) Oral daily  cyanocobalamin 1000 MICROGram(s) Oral daily  Doravirine 100mg tablets (pifeltro) 100 milliGRAM(s) 100 milliGRAM(s) Oral daily  finasteride 5 milliGRAM(s) Oral daily  furosemide    Tablet 40 milliGRAM(s) Oral two times a day  hydrALAZINE 10 milliGRAM(s) Oral three times a day  lactobacillus acidophilus and bulgaricus Chewable 2 Tablet(s) Chew three times a day with meals  lactobacillus acidophilus and bulgaricus Chewable      lamiVUDine- milliGRAM(s) Oral daily  multivitamin 1 Tablet(s) Oral daily  tamsulosin 0.4 milliGRAM(s) Oral at bedtime  vancomycin  IVPB 1250 milliGRAM(s) IV Intermittent daily  vancomycin  IVPB        MEDICATIONS  (PRN):      CAPILLARY BLOOD GLUCOSE        I&O's Summary    23 Aug 2020 07:01  -  24 Aug 2020 07:00  --------------------------------------------------------  IN: 560 mL / OUT: 2800 mL / NET: -2240 mL        Vital Signs Last 24 Hrs  T(C): 36.7 (24 Aug 2020 04:35), Max: 37 (23 Aug 2020 21:34)  T(F): 98 (24 Aug 2020 04:35), Max: 98.6 (23 Aug 2020 21:34)  HR: 88 (24 Aug 2020 04:35) (86 - 88)  BP: 105/55 (24 Aug 2020 06:09) (102/64 - 120/69)  BP(mean): --  RR: 18 (24 Aug 2020 04:35) (18 - 18)  SpO2: 99% (24 Aug 2020 04:35) (97% - 99%)    PHYSICAL EXAM:  GENERAL: thin, looks comfortable, lying down  CHEST/LUNG: clear to auscultation bilaterally  HEART: regular rate and rhythm, 2/6 holosystolic murmur lower sternal borders, most noticeable at apex   ABDOMEN: nontender to palpation, no rebound/guarding  EXTREMITIES: L knee with abrasion, healing, resolved erythema/swelling from cellulitis  PSYCH: A&Ox3  NEUROLOGY: no neuro deficits  LABS:                        11.4   9.78  )-----------( 249      ( 24 Aug 2020 06:50 )             34.5      08-23    138  |  100  |  23  ----------------------------<  103<H>  3.5   |  27  |  1.59<H>    Ca    9.7      23 Aug 2020 06:26  Phos  3.3     08-23  Mg     2.0     08-23    TPro  6.7  /  Alb  3.1<L>  /  TBili  0.3  /  DBili  x   /  AST  37  /  ALT  31  /  AlkPhos  115  08-23    PT/INR - ( 24 Aug 2020 06:50 )   PT: 10.9 sec;   INR: 0.91 ratio         PTT - ( 24 Aug 2020 06:50 )  PTT:31.7 sec          RADIOLOGY & ADDITIONAL TESTS:    Imaging Personally Reviewed:    Consultant(s) Notes Reviewed:      Care Discussed with Consultants/Other Providers:

## 2020-08-24 NOTE — PROGRESS NOTE ADULT - ASSESSMENT
Mr. Monsivais is a 79 y/o man with hx HIV, CAD, detrusor muscle weakness, BPH presenting for fall, down for 24 hrs, admitted with rhabdo, bilateral severe ureterohydronephrosis and thickened bladder on CTAP 2/2 inability to continue his home straight cath 3x daily after fall, tovar in place. Rhabdo improved, hydronephrosis improving with tovar (urology following). Echo shows severe acute mitral regurg and possible vegetations with + BCx staph epidermidis. On vanc (also on cefazolin for cellulitis) concerning for endocarditis. NOHEMI pending Monday, ID, cardiology, CT surgery recs appreciated. Mr. Monsivais is a 79 y/o man with hx HIV, CAD, detrusor muscle weakness, BPH presenting for fall, down for 24 hrs, admitted with rhabdo, bilateral severe ureterohydronephrosis and thickened bladder on CTAP 2/2 inability to continue his home straight cath 3x daily after fall, tovar in place. Rhabdo improved, hydronephrosis improving with tovar (urology following). Echo shows severe acute mitral regurg and possible vegetations with + BCx staph epidermidis. On vanc (also on cefazolin for cellulitis) concerning for endocarditis. NOHEMI 8/24, ID, cardiology, CT surgery recs appreciated.

## 2020-08-24 NOTE — PROGRESS NOTE ADULT - ATTENDING COMMENTS
Agree with above with following addendum:    #Mitral valve endocarditis with resultant severe TR and flash pulm edema in setting of S. Epi bacteremia  -awaiting CT Sx recs in regards to surgery  -ordered MRA to evaluatee for mycotic aneurysms  -continue with vancomycin, appreciate ID recs  -monitor for further signs of surgical indication  -I discussed with patient who would be amenable to surgery if indicated.     #SUDHEER  -obstructive in nature, at around baseline  -repeat US renal today to look for resolution of hydro.  -avoid nephrotoxins, appreciate urology recs.     #Rhabdo  -resolved    #HIV  -continue with ART therapy.     rest as above

## 2020-08-24 NOTE — PROGRESS NOTE ADULT - PROBLEM SELECTOR PLAN 9
- sees Dr. Presley Ferguson outpt [ ] continue heparin subq daily for DVT ppx  [ ] continue DASH diet, ensure enlive  [ ] bismuth salicylate ordered prn for diarrhea, CTM for signs of cdiff

## 2020-08-24 NOTE — PROGRESS NOTE ADULT - ASSESSMENT
81 yo man PMHx of HIV+ (undetectable VL), detrusor muscle weakness requiring daily straight cath, BPH, found with Staph epi bacteremia 8/19, c/b flash pulmonary edema, TTE with findings concerning for mitral valve endocarditis.    #Severe Posteriorly Directed MR c/b flash pulmonary edema  #1.3cm MV echodensity  -Mitral valve regurgitation with suspected kimberlyn-annular insuffiencey with echodensity (valve appears thickened). LA appears normal in size, meaning valve insufficiency is likely acute in nature.  -Source of infection likely urinary, urology following  -ABx tailoring and BCx trending as per primary team and ID  -CTS following, f/u NOHEMI to be performed today  -Appear euvolemic on exam, continue Lasix 40mg PO BID  -Continue hydralazine 10mg PO TID for afterload reduction (hold for SBP <90)    Case discussed with Dr. Lance.    Ave Wadsworth MD PGY-5  Cardiology Fellow  All Cardiology service information can be found 24/7 on [email or web address], password: [email or web address]  Note is preliminary until signed by the attending. 81 yo man PMHx of HIV+ (undetectable VL), detrusor muscle weakness requiring daily straight cath, BPH, found with Staph epi bacteremia 8/19, c/b flash pulmonary edema, TTE with findings concerning for mitral valve endocarditis.    #Severe Posteriorly Directed MR c/b flash pulmonary edema  #1.3cm MV echodensity  -Mitral valve regurgitation with suspected kimberlyn-annular insuffiencey with echodensity (valve appears thickened). LA appears normal in size, meaning valve insufficiency is likely acute in nature.  -Source of infection likely urinary, urology following  -ABx tailoring and BCx trending as per primary team and ID  -CTS following, f/u NOHEMI to be performed today  -Appear euvolemic on exam, continue Lasix 40mg PO BID  -Continue hydralazine 10mg PO TID for afterload reduction (hold for SBP <90)    Case discussed with Dr. Lance.    Ave Wadsworth MD PGY-5  Cardiology Fellow  All Cardiology service information can be found 24/7 on [email or web address], password: [email or web address]

## 2020-08-24 NOTE — PROGRESS NOTE ADULT - SUBJECTIVE AND OBJECTIVE BOX
VITAL SIGNS      Vital Signs Last 24 Hrs  T(C): 36.6 (20 @ 13:23), Max: 37 (20 @ 21:34)  T(F): 97.8 (20 @ 13:23), Max: 98.6 (20 @ 21:34)  HR: 80 (20 @ 13:23) (80 - 88)  BP: 98/55 (20 @ 13:23) (98/55 - 120/69)  RR: 18 (20 @ 13:23) (18 - 18)  SpO2: 97% (20 @ 13:23) (97% - 99%)             @ 07:01  -   @ 07:00  --------------------------------------------------------  IN: 560 mL / OUT: 2800 mL / NET: -2240 mL     @ 07:01  -   @ 17:36  --------------------------------------------------------  IN: 120 mL / OUT: 1600 mL / NET: -1480 mL       Daily Height in cm: 190.5 (24 Aug 2020 09:40)    Daily Weight in k.1 (24 Aug 2020 04:35)  Admit Wt: Drug Dosing Weight  Height (cm): 190.5 (24 Aug 2020 09:40)  Weight (kg): 70.9 (24 Aug 2020 09:40)  BMI (kg/m2): 19.5 (24 Aug 2020 09:40)  BSA (m2): 1.98 (24 Aug 2020 09:40)    Bilirubin Total, Serum: 0.2 mg/dL ( @ 06:50)    CAPILLARY BLOOD GLUCOSE              MEDICATIONS  abacavir 300 milliGRAM(s) Oral every 12 hours  ALBUTerol    0.083% 2.5 milliGRAM(s) Nebulizer four times a day  ascorbic acid 500 milliGRAM(s) Oral daily  cyanocobalamin 1000 MICROGram(s) Oral daily  Doravirine 100mg tablets (pifeltro) 100 milliGRAM(s) 100 milliGRAM(s) Oral daily  finasteride 5 milliGRAM(s) Oral daily  furosemide    Tablet 40 milliGRAM(s) Oral two times a day  heparin   Injectable 5000 Unit(s) SubCutaneous every 8 hours  hydrALAZINE 10 milliGRAM(s) Oral three times a day  lactobacillus acidophilus and bulgaricus Chewable 2 Tablet(s) Chew three times a day with meals  lactobacillus acidophilus and bulgaricus Chewable      lamiVUDine- milliGRAM(s) Oral daily  multivitamin 1 Tablet(s) Oral daily  tamsulosin 0.4 milliGRAM(s) Oral at bedtime  vancomycin  IVPB 1000 milliGRAM(s) IV Intermittent daily          LABS      138  |  97  |  34<H>  ----------------------------<  103<H>  3.8   |  28  |  1.64<H>    Ca    10.4      24 Aug 2020 06:50  Phos  4.1       Mg     2.1         TPro  6.7  /  Alb  3.2<L>  /  TBili  0.2  /  DBili  x   /  AST  36  /  ALT  29  /  AlkPhos  121<H>                                   11.4   9.78  )-----------( 249      ( 24 Aug 2020 06:50 )             34.5          PT/INR - ( 24 Aug 2020 06:50 )   PT: 10.9 sec;   INR: 0.91 ratio         PTT - ( 24 Aug 2020 06:50 )  PTT:31.7 sec    Culture - Blood (20 @ 15:01)    Specimen Source: .Blood Blood-Venous    Culture Results:   No growth to date.       PAST MEDICAL & SURGICAL HISTORY:  Orchitis and epididymitis  Vitamin D deficiency  Bladder mass  Edema of both legs  Osteoporosis  Seborrheic keratosis  Constipation, unspecified constipation type  Chronic kidney disease, unspecified stage  HIV (human immunodeficiency virus infection)  Unsteady gait  S/P coronary artery stent placement  No Past Surgical History         < from: Transthoracic Echocardiogram (20 @ 09:27) >  1. Mitral valve not well visualized; however, the anterior  leaflet of the mitral valve appears thickened with a 1.3 cm  x 1.4 cm echodensity on the atrial surface (not well  visualized), possibly a vegetation (the ehcodensity does  not display obvious independent mobility, but study is  technically limited), which may be suggestive of  endocarditis given the clinical history. Findings  communicated to Ольга RICKS. Consider NOHEMI if clinically  indicated. Severe, eccentric posteriorly directed mitral  regurgitation (notably the left atrium is not dilated,  suggestive of acute mitral regurgitation).  2. Normal left ventricular internal dimensions and wall  thicknesses.  3. Normal left ventricular systolic function. No segmental  wall motion abnormalities.  4. Normal right ventricular size and function.  5. In some views, there is thickening of the tricuspid  leaflet, which may be suggestive of vegetation (not well  visualized). Mild tricuspid regurgitation.    < end of copied text >

## 2020-08-24 NOTE — PROGRESS NOTE ADULT - PROBLEM SELECTOR PLAN 7
- L knee with abrasion and 5 cm area of erythema + mild swelling likely cellulitis 2/2 fall, improved clinically  [ ] cefazolin 1g q12h per ID recs Controlled; last CD4 368; undetectable viral load in July  [ ] continue home abacavir, lamivudine, Doravirine - L knee with abrasion and 5 cm area of erythema + mild swelling likely cellulitis 2/2 fall   - resolved s/p cefazolin, off abx

## 2020-08-24 NOTE — PROGRESS NOTE ADULT - PROBLEM SELECTOR PLAN 1
Continue abx for CNS endocarditis  House nephrology consulted  Coronary cath scheduled this week depending on creatinine  CT angio brain r/o septic emboli  OR date for MVR with Dr Camacho to be determined

## 2020-08-24 NOTE — PROGRESS NOTE ADULT - SUBJECTIVE AND OBJECTIVE BOX
UROLOGY DAILY PROGRESS NOTE:     Subjective:    No events overnight.  Feels well. Tolerating tovar.    Objective:    NAD, awake and alert  Respirations nonlabored  Abdomen soft, nontender, nondistended  Tovar clear urine    MEDICATIONS  (STANDING):  abacavir 300 milliGRAM(s) Oral every 12 hours  ALBUTerol    0.083% 2.5 milliGRAM(s) Nebulizer four times a day  ascorbic acid 500 milliGRAM(s) Oral daily  cyanocobalamin 1000 MICROGram(s) Oral daily  Doravirine 100mg tablets (pifeltro) 100 milliGRAM(s) 100 milliGRAM(s) Oral daily  finasteride 5 milliGRAM(s) Oral daily  furosemide    Tablet 40 milliGRAM(s) Oral two times a day  hydrALAZINE 10 milliGRAM(s) Oral three times a day  lactobacillus acidophilus and bulgaricus Chewable 2 Tablet(s) Chew three times a day with meals  lactobacillus acidophilus and bulgaricus Chewable      lamiVUDine- milliGRAM(s) Oral daily  multivitamin 1 Tablet(s) Oral daily  tamsulosin 0.4 milliGRAM(s) Oral at bedtime  vancomycin  IVPB 1250 milliGRAM(s) IV Intermittent daily  vancomycin  IVPB        MEDICATIONS  (PRN):      Vital Signs Last 24 Hrs  T(C): 36.7 (24 Aug 2020 04:35), Max: 37 (23 Aug 2020 21:34)  T(F): 98 (24 Aug 2020 04:35), Max: 98.6 (23 Aug 2020 21:34)  HR: 88 (24 Aug 2020 04:35) (86 - 88)  BP: 105/55 (24 Aug 2020 06:09) (102/64 - 120/69)  BP(mean): --  RR: 18 (24 Aug 2020 04:35) (18 - 18)  SpO2: 99% (24 Aug 2020 04:35) (97% - 99%)    I&O's Detail    23 Aug 2020 07:01  -  24 Aug 2020 07:00  --------------------------------------------------------  IN:    Oral Fluid: 560 mL  Total IN: 560 mL    OUT:    Indwelling Catheter - Urethral: 2800 mL  Total OUT: 2800 mL    Total NET: -2240 mL          Daily     Daily Weight in k.1 (24 Aug 2020 04:35)    LABS:                        11.4   9.78  )-----------( 249      ( 24 Aug 2020 06:50 )             34.5     08-24    138  |  97  |  34<H>  ----------------------------<  103<H>  3.8   |  28  |  1.64<H>    Ca    10.4      24 Aug 2020 06:50  Phos  4.1     08-24  Mg     2.1     08-24    TPro  6.7  /  Alb  3.2<L>  /  TBili  0.2  /  DBili  x   /  AST  36  /  ALT  29  /  AlkPhos  121<H>  08-24    PT/INR - ( 24 Aug 2020 06:50 )   PT: 10.9 sec;   INR: 0.91 ratio         PTT - ( 24 Aug 2020 06:50 )  PTT:31.7 sec

## 2020-08-24 NOTE — PROGRESS NOTE ADULT - ATTENDING COMMENTS
Patient seen and discussed with Dr Guzman    I agree with his interval history exam and plans as noted above    NOHEMI with possible vegetation seen  CT surgery to weigh in  Vancomycin dosing to be 1 gram a day  Repeat level trough prior to next dose    Tung Almazan MD  [phone]  After 5pm/weekends [phone]

## 2020-08-24 NOTE — PROGRESS NOTE ADULT - PROBLEM SELECTOR PLAN 2
2/2 severe acute mitral valve regurg on echo, rapid called 8/20 overnight, improved with lasix IV 60 mg  [ ] cardiology following: lasix 40 BID + hydralazine 10 TID, hold for SBP <100  [ ] continue to wean oxygen on NC  [ ] avoid IVF 2/2 severe acute mitral valve regurg on echo, rapid called 8/20 overnight, improved with lasix IV 60 mg  - now on RA  [ ] cardiology following: lasix 40 BID + hydralazine 10 TID, hold for SBP <100  [ ] avoid IVF 2/2 severe acute mitral valve regurg on echo, rapid called 8/20 overnight, improved with lasix IV 60 mg  - on RA  [ ] cardiology following: lasix 40 BID + hydralazine 10 TID, hold for SBP <100  [ ] avoid IVF 2/2 severe acute mitral valve regurg on echo, rapid called 8/20 overnight, improved with lasix IV 60 mg  - on RA  [ ] cardiology following: lasix 40 BID + hydralazine 10 TID, hold for SBP <100

## 2020-08-24 NOTE — PROGRESS NOTE ADULT - PROBLEM SELECTOR PLAN 3
- CTAP with severe b/l ureterohydronephrosis, thickened bladder 2/2 inability to straight cath himself for 24 hrs after fall (normally straight caths 3x daily for detrusor muscle weakness + BPH, follows urology outpt)  - repeat US kidney + bladder with mild right and moderate left hydronephrosis  [ ] urology following, appreciate recs. repeat ultrasound 8/24 to monitor hydronephrosis, continue tovar, CTM Cr, if worsening Cr/hydronephrosis, consider lasix renal scan  [ ] tovar for bladder decompression  [ ] continue home tamsulosin and finasteride  [ ] kidney/bladder US today - CTAP with severe b/l ureterohydronephrosis, thickened bladder 2/2 inability to straight cath himself for 24 hrs after fall (normally straight caths 3x daily for detrusor muscle weakness + BPH, follows urology outpt)  - repeat US kidney + bladder with mild right and moderate left hydronephrosis  [ ] urology following, appreciate recs. repeat ultrasound 8/24 to monitor hydronephrosis, continue tovar, CTM Cr, if worsening Cr/hydronephrosis, consider lasix renal scan  [ ] continue tovar for bladder decompression  [ ] continue home tamsulosin and finasteride  [ ] kidney/bladder US 8/24; f/u results

## 2020-08-24 NOTE — PROGRESS NOTE ADULT - PROBLEM SELECTOR PLAN 6
- pt denies palpitations, chest pain, dizziness. Endorses weakness prior to and since fall.   - ECG 8/19 on admission sinus tachy, LA enlargement, no ST elevation/depression or T wave inversion. Repeat ECG 8/20 with RBBB (rsr' on V1), irregular, left atrial enlargement. No ST changes or T wave inversions.   - possibly 2/2 cardiogenic syncope (severe acute MVR)  [ ] PT recommended: CYNTHIA on d/c - L knee with abrasion and 5 cm area of erythema + mild swelling likely cellulitis 2/2 fall   - resolved s/p cefazolin, off abx

## 2020-08-24 NOTE — PROGRESS NOTE ADULT - ASSESSMENT
80y Male presents with Rhabdomyolysis echo revealed mitral regurgitation  and suspected endocarditis. NOHEMI confirms mitral valve vegetation.

## 2020-08-24 NOTE — PROGRESS NOTE ADULT - ASSESSMENT
79y/o M with PMHx of HIV, BPH, CAD, urinary retention self catheterizes TID, admitted after a fall found to have hydronephrosis likely from urinary retention.    SCr stable ~1.5-1.6    - C/w Aden for maximal bladder decompression  - Trend SCr (appears around baseline)  - Followup repeat renal ultrasound to evaluate for improved hydronephrosis (renal US 1 day after Aden placement is not sufficiently long enough to identify improvement hydro)  - Further plan pending US results  - Will follow        [facility]  [address] [place] [address], [address] [address] [phone]

## 2020-08-24 NOTE — PROGRESS NOTE ADULT - ASSESSMENT
80M hx of HIV(PD4=896, VL undet), detrusor muscle weakness with self straight cath, CAD presented after a fall at home.  Labs noted for WBC 16.5, Cr 2.2, CPK 3857, lactate 3.1, positive UA on admission  cellulitis on left knee- resolved  bacteremia with 2/2 sets on 8/19 growing coag. negative staph in the setting of valvular heart disease    #GPC bacteremia:  - BCx: Staph epi 2/2 *2, could be contamination, but could be true pathogen if persistent bacteremia and endocarditis+  - TTE: possible vegetations noted with plans for NOHEMI on 8/24  - repeat blood culture on 8/20 is negative  - Check daily vancomycin random level  - c/w Vancomycin by level, give 1g if level < 15    #Diarrhea  multiple loose BMs  antibiotic related  c/w lactobacillus  would send stool for C.diff testing    #HIV:  - AN6=647, VL undet  - c/w ABC/3TC/HUEY    #Left knee cellulitis:  - resolved  - s/p Zosyn and Cefazolin, no need to continue    #Pyuria:  - UCx<1000 normal  oswaldo  - Likely colonization    Vannessa Gamble MD, PGY4   ID fellow  Pager: [phone]  After 5pm/weekends call [phone] 80M hx of HIV(JX3=268, VL undet), detrusor muscle weakness with self straight cath, CAD presented after a fall at home.  Labs noted for WBC 16.5, Cr 2.2, CPK 3857, lactate 3.1, positive UA on admission  cellulitis on left knee- resolved  bacteremia with 2/2 sets on 8/19 growing coag. negative staph in the setting of valvular heart disease    #GPC bacteremia:  - BCx: Staph epi 2/2 *2, could be contamination, but could be true pathogen if persistent bacteremia and endocarditis+  - TTE: possible vegetations noted with plans for NOHEMI on 8/24  - repeat blood culture on 8/20 is negative  - Follow up NOHEMI result on 8/24  - Consult CTS for vegetation > 1cm  - Vancomycin 1g daily for now  - Check daily labs for vancomycin random level, goal is 15-20    #Diarrhea  multiple loose BMs  antibiotic related  c/w lactobacillus  would send stool for C.diff testing    #HIV:  - XK8=259, VL undet  - c/w ABC/3TC/HUEY    #Left knee cellulitis:  - resolved  - s/p Zosyn and Cefazolin, no need to continue    #Pyuria:  - UCx<1000 normal  oswaldo  - Likely colonization    Vannessa Gamble MD, PGY4   ID fellow  Pager: [phone]  After 5pm/weekends call [phone]    d/w Dr. Almazan  Recs conveyed to primary team

## 2020-08-24 NOTE — PROGRESS NOTE ADULT - PROBLEM SELECTOR PLAN 4
- UA + large LE, >50 WBC, many bacteria, 10 RBC  - UCx with <10,000 normal urine oswaldo  - thickened bladder seen on CT AP  - pt straight caths himself 3x daily for detrusor muscle weakness + BPH  - no dysuria, abdominal/back pain  - s/p 1 d ceftriaxone for concern for UTI  [ ] cefazolin 1g q12h for suspected UTI + L knee cellulitis - rhabdo 2/2 being down 24 hrs after fall  - resolved; downtrended CK, improved mobility/str "chocolate pudding" consistency, not dominick watery, likely 2/2 abx  - C diff sent, rejected as not watery  [ ] lactobacillus per ID recs

## 2020-08-25 DIAGNOSIS — N18.3 CHRONIC KIDNEY DISEASE, STAGE 3 (MODERATE): ICD-10-CM

## 2020-08-25 LAB
ALBUMIN SERPL ELPH-MCNC: 3.3 G/DL — SIGNIFICANT CHANGE UP (ref 3.3–5)
ALP SERPL-CCNC: 123 U/L — HIGH (ref 40–120)
ALT FLD-CCNC: 37 U/L — SIGNIFICANT CHANGE UP (ref 10–45)
ANION GAP SERPL CALC-SCNC: 12 MMOL/L — SIGNIFICANT CHANGE UP (ref 5–17)
APTT BLD: 30.9 SEC — SIGNIFICANT CHANGE UP (ref 27.5–35.5)
AST SERPL-CCNC: 51 U/L — HIGH (ref 10–40)
BASOPHILS # BLD AUTO: 0.04 K/UL — SIGNIFICANT CHANGE UP (ref 0–0.2)
BASOPHILS NFR BLD AUTO: 0.4 % — SIGNIFICANT CHANGE UP (ref 0–2)
BILIRUB SERPL-MCNC: 0.2 MG/DL — SIGNIFICANT CHANGE UP (ref 0.2–1.2)
BUN SERPL-MCNC: 37 MG/DL — HIGH (ref 7–23)
CALCIUM SERPL-MCNC: 9.5 MG/DL — SIGNIFICANT CHANGE UP (ref 8.4–10.5)
CHLORIDE SERPL-SCNC: 96 MMOL/L — SIGNIFICANT CHANGE UP (ref 96–108)
CO2 SERPL-SCNC: 27 MMOL/L — SIGNIFICANT CHANGE UP (ref 22–31)
CREAT SERPL-MCNC: 1.6 MG/DL — HIGH (ref 0.5–1.3)
CULTURE RESULTS: SIGNIFICANT CHANGE UP
CULTURE RESULTS: SIGNIFICANT CHANGE UP
EOSINOPHIL # BLD AUTO: 0.37 K/UL — SIGNIFICANT CHANGE UP (ref 0–0.5)
EOSINOPHIL NFR BLD AUTO: 3.8 % — SIGNIFICANT CHANGE UP (ref 0–6)
GLUCOSE SERPL-MCNC: 101 MG/DL — HIGH (ref 70–99)
HCT VFR BLD CALC: 34.4 % — LOW (ref 39–50)
HGB BLD-MCNC: 11.4 G/DL — LOW (ref 13–17)
IMM GRANULOCYTES NFR BLD AUTO: 1.8 % — HIGH (ref 0–1.5)
INR BLD: 0.98 RATIO — SIGNIFICANT CHANGE UP (ref 0.88–1.16)
LYMPHOCYTES # BLD AUTO: 2.6 K/UL — SIGNIFICANT CHANGE UP (ref 1–3.3)
LYMPHOCYTES # BLD AUTO: 26.4 % — SIGNIFICANT CHANGE UP (ref 13–44)
MAGNESIUM SERPL-MCNC: 2.2 MG/DL — SIGNIFICANT CHANGE UP (ref 1.6–2.6)
MCHC RBC-ENTMCNC: 33.1 GM/DL — SIGNIFICANT CHANGE UP (ref 32–36)
MCHC RBC-ENTMCNC: 34.5 PG — HIGH (ref 27–34)
MCV RBC AUTO: 104.2 FL — HIGH (ref 80–100)
MONOCYTES # BLD AUTO: 0.78 K/UL — SIGNIFICANT CHANGE UP (ref 0–0.9)
MONOCYTES NFR BLD AUTO: 7.9 % — SIGNIFICANT CHANGE UP (ref 2–14)
NEUTROPHILS # BLD AUTO: 5.89 K/UL — SIGNIFICANT CHANGE UP (ref 1.8–7.4)
NEUTROPHILS NFR BLD AUTO: 59.7 % — SIGNIFICANT CHANGE UP (ref 43–77)
NRBC # BLD: 0 /100 WBCS — SIGNIFICANT CHANGE UP (ref 0–0)
PHOSPHATE SERPL-MCNC: 4.2 MG/DL — SIGNIFICANT CHANGE UP (ref 2.5–4.5)
PLATELET # BLD AUTO: 245 K/UL — SIGNIFICANT CHANGE UP (ref 150–400)
POTASSIUM SERPL-MCNC: 3.5 MMOL/L — SIGNIFICANT CHANGE UP (ref 3.5–5.3)
POTASSIUM SERPL-SCNC: 3.5 MMOL/L — SIGNIFICANT CHANGE UP (ref 3.5–5.3)
PROT SERPL-MCNC: 7.1 G/DL — SIGNIFICANT CHANGE UP (ref 6–8.3)
PROTHROM AB SERPL-ACNC: 11.7 SEC — SIGNIFICANT CHANGE UP (ref 10.6–13.6)
RBC # BLD: 3.3 M/UL — LOW (ref 4.2–5.8)
RBC # FLD: 13.9 % — SIGNIFICANT CHANGE UP (ref 10.3–14.5)
SODIUM SERPL-SCNC: 135 MMOL/L — SIGNIFICANT CHANGE UP (ref 135–145)
SPECIMEN SOURCE: SIGNIFICANT CHANGE UP
SPECIMEN SOURCE: SIGNIFICANT CHANGE UP
VANCOMYCIN FLD-MCNC: 21.9 UG/ML — SIGNIFICANT CHANGE UP
WBC # BLD: 9.86 K/UL — SIGNIFICANT CHANGE UP (ref 3.8–10.5)
WBC # FLD AUTO: 9.86 K/UL — SIGNIFICANT CHANGE UP (ref 3.8–10.5)

## 2020-08-25 PROCEDURE — 99222 1ST HOSP IP/OBS MODERATE 55: CPT | Mod: GC

## 2020-08-25 PROCEDURE — 99152 MOD SED SAME PHYS/QHP 5/>YRS: CPT

## 2020-08-25 PROCEDURE — 93458 L HRT ARTERY/VENTRICLE ANGIO: CPT | Mod: 26

## 2020-08-25 PROCEDURE — 99232 SBSQ HOSP IP/OBS MODERATE 35: CPT | Mod: GC

## 2020-08-25 PROCEDURE — 99233 SBSQ HOSP IP/OBS HIGH 50: CPT | Mod: GC

## 2020-08-25 RX ORDER — FUROSEMIDE 40 MG
40 TABLET ORAL DAILY
Refills: 0 | Status: DISCONTINUED | OUTPATIENT
Start: 2020-08-26 | End: 2020-08-27

## 2020-08-25 RX ORDER — MUPIROCIN 20 MG/G
1 OINTMENT TOPICAL
Refills: 0 | Status: DISCONTINUED | OUTPATIENT
Start: 2020-08-25 | End: 2020-08-27

## 2020-08-25 RX ADMIN — TAMSULOSIN HYDROCHLORIDE 0.4 MILLIGRAM(S): 0.4 CAPSULE ORAL at 21:32

## 2020-08-25 RX ADMIN — FINASTERIDE 5 MILLIGRAM(S): 5 TABLET, FILM COATED ORAL at 21:32

## 2020-08-25 RX ADMIN — ABACAVIR 300 MILLIGRAM(S): 20 SOLUTION ORAL at 21:32

## 2020-08-25 RX ADMIN — LACTOBACILLUS ACIDOPH-L.BULGARICUS 1 MILLION CELL CHEWABLE TABLET 2 TABLET(S): at 09:03

## 2020-08-25 RX ADMIN — Medication 10 MILLIGRAM(S): at 21:32

## 2020-08-25 RX ADMIN — Medication 10 MILLIGRAM(S): at 06:20

## 2020-08-25 RX ADMIN — ALBUTEROL 2.5 MILLIGRAM(S): 90 AEROSOL, METERED ORAL at 23:34

## 2020-08-25 RX ADMIN — ALBUTEROL 2.5 MILLIGRAM(S): 90 AEROSOL, METERED ORAL at 18:37

## 2020-08-25 RX ADMIN — MUPIROCIN 1 APPLICATION(S): 20 OINTMENT TOPICAL at 18:37

## 2020-08-25 RX ADMIN — HEPARIN SODIUM 5000 UNIT(S): 5000 INJECTION INTRAVENOUS; SUBCUTANEOUS at 06:21

## 2020-08-25 RX ADMIN — HEPARIN SODIUM 5000 UNIT(S): 5000 INJECTION INTRAVENOUS; SUBCUTANEOUS at 21:32

## 2020-08-25 RX ADMIN — LACTOBACILLUS ACIDOPH-L.BULGARICUS 1 MILLION CELL CHEWABLE TABLET 2 TABLET(S): at 18:36

## 2020-08-25 RX ADMIN — Medication 40 MILLIGRAM(S): at 06:20

## 2020-08-25 RX ADMIN — ABACAVIR 300 MILLIGRAM(S): 20 SOLUTION ORAL at 09:04

## 2020-08-25 RX ADMIN — Medication 100 MILLIGRAM(S): at 18:36

## 2020-08-25 RX ADMIN — ALBUTEROL 2.5 MILLIGRAM(S): 90 AEROSOL, METERED ORAL at 06:25

## 2020-08-25 NOTE — CONSULT NOTE ADULT - ASSESSMENT
80y M with CKD, hydroureteronephrosis, endocarditis    #CKD3  - Patient with baseline CKD since 2015, Scr baseline 1.6-1.8mg/dl  - Pt currently at his baseline Scr  - Repeat renal US with improved hydro, would c/w tovar at this time   - On the day of cardiac catherization would recommend to hold diuretics to minimize the risk of contrast associated nephropathy, would not give IVF or alkalinizing therapy  - Will monitor closely post cath and prior to MVR replacement  -Monitor UOP and daily weights. Avoid volume depletion, NSAIDs, PPI's, ARB/ACE-I. Dose medications as per eGFR.

## 2020-08-25 NOTE — PROGRESS NOTE ADULT - ATTENDING COMMENTS
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  -hold lasix tonight and restart tomorrow at 40mg daily.

## 2020-08-25 NOTE — PROGRESS NOTE ADULT - SUBJECTIVE AND OBJECTIVE BOX
Daily In-House Cardiology Progress Note  -------------------------------------------------------    24-Hour Events/Subjective:      -No SOB, palpitations or CP.    Telemetry:  -NSR 80-90s.    ROS:   Constitutional: No Fatigue, weakness, fever, chills  HEENT: No sore throat, dryness, hoarseness, congestion  Cardiovascular: No chest pain, SOB, palpitations, PRESCOTT, lightheadedness, dizziness  Respiratory: No wheezing, cough, phlegm  GI: No nausea, vomiting, diarrhea, poor PO tolerance, dyspepsia, dysphagia  Musculoskeletal: No myalgias, arthralgias, joint swelling, back pain  Extremities: No swelling, coldness  Skin: No rashes, lesions, pruritis   Neuro: No weakness, confusion, blurry vision  Psych: No anxiety, depression    Current Meds:  abacavir 300 milliGRAM(s) Oral every 12 hours  ALBUTerol    0.083% 2.5 milliGRAM(s) Nebulizer four times a day  ascorbic acid 500 milliGRAM(s) Oral daily  cyanocobalamin 1000 MICROGram(s) Oral daily  Doravirine 100mg tablets (pifeltro) 100 milliGRAM(s) 100 milliGRAM(s) Oral daily  finasteride 5 milliGRAM(s) Oral daily  furosemide    Tablet 40 milliGRAM(s) Oral two times a day  heparin   Injectable 5000 Unit(s) SubCutaneous every 8 hours  hydrALAZINE 10 milliGRAM(s) Oral three times a day  lactobacillus acidophilus and bulgaricus Chewable 2 Tablet(s) Chew three times a day with meals  lactobacillus acidophilus and bulgaricus Chewable      lamiVUDine- milliGRAM(s) Oral daily  multivitamin 1 Tablet(s) Oral daily  tamsulosin 0.4 milliGRAM(s) Oral at bedtime    Vitals:  T(F): 98.3 (08-25), Max: 98.3 (08-25)  HR: 76 (08-25) (76 - 93)  BP: 105/61 (08-25) (97/49 - 120/69)  RR: 18 (08-25)  SpO2: 96% (08-25)    I&O's Summary  24 Aug 2020 07:01  -  25 Aug 2020 07:00  --------------------------------------------------------  IN: 960 mL / OUT: 3020 mL / NET: -2060 mL    25 Aug 2020 07:01  -  25 Aug 2020 10:11  --------------------------------------------------------  IN: 240 mL / OUT: 0 mL / NET: 240 mL    Physical Exam:  Appearance: No acute distress; well appearing  Eyes: PERRL, EOMI, pink conjunctiva  HEENT: Normal oral mucosa  Cardiovascular: RRR, S1, S2, grade 3/6 systolic ejection murmur at apex and radiating to axilla; no edema; no JVD  Respiratory: Clear to auscultation bilaterally; decreased breath sounds at bases  Gastrointestinal: soft, non-tender, non-distended with normal bowel sounds  Musculoskeletal: No clubbing; no joint deformity   Neurologic: Non-focal  Lymphatic: No lymphadenopathy  Psychiatry: AAOx3, mood & affect appropriate  Skin: No rashes, ecchymoses, or cyanosis               11.4   9.86  )-----------( 245      ( 25 Aug 2020 05:19 )             34.4     08-25    135  |  96  |  37<H>  ----------------------------<  101<H>  3.5   |  27  |  1.60<H>    Ca    9.5      25 Aug 2020 05:19  Phos  4.2     08-25  Mg     2.2     08-25    TPro  7.1  /  Alb  3.3  /  TBili  0.2  /  DBili  x   /  AST  51<H>  /  ALT  37  /  AlkPhos  123<H>  08-25    PT/INR - ( 25 Aug 2020 05:20 )   PT: 11.7 sec;   INR: 0.98 ratio       PTT - ( 25 Aug 2020 05:20 )  PTT:30.9 sec  CARDIAC MARKERS ( 22 Aug 2020 06:32 )  x     / x     / x     / 525 U/L / x     / x      CARDIAC MARKERS ( 21 Aug 2020 06:15 )  x     / x     / x     / 929 U/L / x     / x      CARDIAC MARKERS ( 20 Aug 2020 11:43 )  86 ng/L / x     / x     / x     / x     / x      CARDIAC MARKERS ( 20 Aug 2020 06:10 )  102 ng/L / x     / x     / 1248 U/L / x     / x      CARDIAC MARKERS ( 19 Aug 2020 22:35 )  72 ng/L / x     / x     / x     / x     / x        Serum Pro-Brain Natriuretic Peptide: 1987 pg/mL (08-22 @ 06:32)  Serum Pro-Brain Natriuretic Peptide: 4864 pg/mL (08-19 @ 13:43)

## 2020-08-25 NOTE — DIETITIAN INITIAL EVALUATION ADULT. - PROBLEM SELECTOR PLAN 5
severe hydroureteronephrosis (<--mild to moderate in april), likely 2/2 inability to straight cath while on the floor s/p fall  -s/p tovar placement per Urology  -monitor UOP, BMP  -c/w home flomax and finasteride

## 2020-08-25 NOTE — PROGRESS NOTE ADULT - SUBJECTIVE AND OBJECTIVE BOX
UROLOGY DAILY PROGRESS NOTE:     Subjective:    NOHEMI with evidence of mitral vegetation.  No events overnight. Pt feels well.    Objective:    NAD, awake and alert  Respirations nonlabored  Abdomen soft, nontender, nondistended  Aden clear    MEDICATIONS  (STANDING):  abacavir 300 milliGRAM(s) Oral every 12 hours  ALBUTerol    0.083% 2.5 milliGRAM(s) Nebulizer four times a day  ascorbic acid 500 milliGRAM(s) Oral daily  cyanocobalamin 1000 MICROGram(s) Oral daily  Doravirine 100mg tablets (pifeltro) 100 milliGRAM(s) 100 milliGRAM(s) Oral daily  finasteride 5 milliGRAM(s) Oral daily  furosemide    Tablet 40 milliGRAM(s) Oral two times a day  heparin   Injectable 5000 Unit(s) SubCutaneous every 8 hours  hydrALAZINE 10 milliGRAM(s) Oral three times a day  lactobacillus acidophilus and bulgaricus Chewable 2 Tablet(s) Chew three times a day with meals  lactobacillus acidophilus and bulgaricus Chewable      lamiVUDine- milliGRAM(s) Oral daily  multivitamin 1 Tablet(s) Oral daily  mupirocin 2% Nasal 1 Application(s) Nasal two times a day  tamsulosin 0.4 milliGRAM(s) Oral at bedtime    MEDICATIONS  (PRN):      Vital Signs Last 24 Hrs  T(C): 36.8 (25 Aug 2020 08:14), Max: 36.8 (25 Aug 2020 00:16)  T(F): 98.3 (25 Aug 2020 08:14), Max: 98.3 (25 Aug 2020 08:14)  HR: 76 (25 Aug 2020 08:14) (76 - 93)  BP: 105/61 (25 Aug 2020 08:14) (97/49 - 120/69)  BP(mean): --  RR: 18 (25 Aug 2020 08:14) (18 - 18)  SpO2: 96% (25 Aug 2020 08:14) (95% - 97%)    I&O's Detail    24 Aug 2020 07:01  -  25 Aug 2020 07:00  --------------------------------------------------------  IN:    Oral Fluid: 960 mL  Total IN: 960 mL    OUT:    Indwelling Catheter - Urethral: 3020 mL  Total OUT: 3020 mL    Total NET: -2060 mL      25 Aug 2020 07:01  -  25 Aug 2020 11:11  --------------------------------------------------------  IN:    Oral Fluid: 240 mL  Total IN: 240 mL    OUT:  Total OUT: 0 mL    Total NET: 240 mL          Daily     Daily Weight in k.8 (25 Aug 2020 10:42)    LABS:                        11.4   9.86  )-----------( 245      ( 25 Aug 2020 05:19 )             34.4     08    135  |  96  |  37<H>  ----------------------------<  101<H>  3.5   |  27  |  1.60<H>    Ca    9.5      25 Aug 2020 05:19  Phos  4.2       Mg     2.2         TPro  7.1  /  Alb  3.3  /  TBili  0.2  /  DBili  x   /  AST  51<H>  /  ALT  37  /  AlkPhos  123<H>  0825    PT/INR - ( 25 Aug 2020 05:20 )   PT: 11.7 sec;   INR: 0.98 ratio         PTT - ( 25 Aug 2020 05:20 )  PTT:30.9 sec

## 2020-08-25 NOTE — PROGRESS NOTE ADULT - ASSESSMENT
Mr. Monsivais is a 81 y/o man with hx HIV, CAD, detrusor muscle weakness, BPH presenting for fall, down for 24 hrs, admitted with rhabdo, bilateral severe ureterohydronephrosis and thickened bladder on CTAP 2/2 inability to continue his home straight cath 3x daily after fall, tovar in place. Rhabdo improved, hydronephrosis improving with tovar (urology following). Echo shows severe acute mitral regurg and possible vegetations with + BCx staph epidermidis. On vanc (also on cefazolin for cellulitis) concerning for endocarditis. NOHEMI 8/24, ID, cardiology, CT surgery recs appreciated. Mr. Monsivais is a 81 y/o man with hx HIV, CAD, detrusor muscle weakness, BPH presenting for fall, down for 24 hrs, admitted with rhabdo, bilateral severe ureterohydronephrosis and thickened bladder on CTAP 2/2 inability to continue his home straight cath 3x daily after fall, tovar in place. Rhabdo improved, hydronephrosis improved with tovar (urology following).   - NOHEMI shows endocarditis (NOHEMI: 1.8x1.3 vegetation on medial aspect of anterior leaflet with partial flail component + 0.5x1.3cm vegetation no posterior leaflet w severe eccentric mitral regurg). BCx staph epidermidis, repeat NGTD, on vancomycin. s/p MRA without septic emboli. s/p cardiac cath to eval prior to valve surgery per CT surgery.

## 2020-08-25 NOTE — PROGRESS NOTE ADULT - PROBLEM SELECTOR PLAN 8
- sees Dr. Presley Ferguson outpt [ ] continue heparin subq daily for DVT ppx  [ ] continue DASH diet, ensure enlive

## 2020-08-25 NOTE — PROGRESS NOTE ADULT - ASSESSMENT
80M hx of HIV(VI4=304, VL undet), detrusor muscle weakness with self straight cath, CAD presented after a fall at home.  Labs noted for WBC 16.5, Cr 2.2, CPK 3857, lactate 3.1, positive UA on admission  cellulitis on left knee- resolved  bacteremia with 2/2 sets on 8/19 growing coag. negative staph in the setting of valvular heart disease    #Infective endocarditis:  - NOHEMI:  1. Large mobile about 1.8cm x 1.3 cm vegetation seen on the medial aspect of the anterior leaflet with a partial flail component of the medial A2/ A3 area. There us also a vegetation see at the annulus of P3 which measures 0.5cm x 1.3cm  Severe eccentric posterior mitral regurgitation.  - On exam, pansystolic murmur at apex consistent with MR  - BCx(8/19): Growth in aerobic and anaerobic bottles: Staphylococcus epidermidis  - BCx(8/20): no growth  - Vancomycin 1g daily for now  - Check daily labs for vancomycin random level, goal is 15-20  - CTS planning to do: Coronary cath scheduled this week depending on creatinine. CT angio brain r/o septic emboli. OR date for MVR with Dr Camacho to be determined.     #Diarrhea  - multiple loose BMs  - antibiotic related  - c/w lactobacillus  - Stool rejected for C.diff as not watery    #HIV:  - YQ4=794, VL undet  - c/w ABC/3TC/HUEY    #Left knee cellulitis:  - resolved  - s/p Zosyn and Cefazolin, no need to continue    #Pyuria:  - UCx<1000 normal  oswaldo  - Likely colonization    Vannessa Gamble MD, PGY4   ID fellow  Pager: [phone]  After 5pm/weekends call [phone]    d/w Dr. Almazan 80M hx of HIV(OI8=640, VL undet), detrusor muscle weakness with self straight cath, CAD presented after a fall at home.  Labs noted for WBC 16.5, Cr 2.2, CPK 3857, lactate 3.1, positive UA on admission  cellulitis on left knee- resolved  bacteremia with 2/2 sets on 8/19 growing coag. negative staph in the setting of valvular heart disease    #Infective endocarditis:  - NOHEMI:  1. Large mobile about 1.8cm x 1.3 cm vegetation seen on the medial aspect of the anterior leaflet with a partial flail component of the medial A2/ A3 area. There us also a vegetation see at the annulus of P3 which measures 0.5cm x 1.3cm  Severe eccentric posterior mitral regurgitation.  - On exam, pansystolic murmur at apex consistent with MR  - BCx(8/19): Growth in aerobic and anaerobic bottles: Staphylococcus epidermidis  - BCx(8/20): no growth  - Vancomycin 1g daily for now  - Check daily labs for vancomycin random level, goal is 15-20  - CTS planning to do: Coronary cath scheduled this week depending on creatinine. CT angio brain r/o septic emboli. OR date for MVR with Dr Camacho to be determined.     #Diarrhea  - multiple loose BMs  - antibiotic related  - c/w lactobacillus  - Stool rejected for C.diff as not watery    #HIV:  - HN6=425, VL undet  - c/w ABC/3TC/HUEY    #Left knee cellulitis:  - resolved  - s/p Zosyn and Cefazolin, no need to continue    #Pyuria:  - UCx<1000 normal  oswaldo  - Likely colonization  - Per , Patient's significant bilateral hydronephrosis has resolved since admission. Recommend removing tovar catheter and having patient return back to his [facility] routine    Vannessa Gamble MD, PGY4   ID fellow  Pager: [phone]  After 5pm/weekends call [phone]    d/w Dr. Almazan Breath sounds clear and equal bilaterally.

## 2020-08-25 NOTE — PROGRESS NOTE ADULT - ASSESSMENT
79y/o M with PMHx of HIV, BPH, CAD, urinary retention self catheterizes TID, admitted after a fall found to have hydronephrosis likely from urinary retention.    SCr stable ~1.5-1.6    US with mild left hydronephrosis and no right hydronephrosis    - Patient's significant bilateral hydronephrosis has resolved since admission  - Recommend removing tovar catheter and having patient return back to his [facility] routine  - Patient agrees with this plan  - Trend SCr  - D/w Dr. Silver      [facility]  [address] [place] [address], [address]  [place] [address] [phone]

## 2020-08-25 NOTE — DIETITIAN INITIAL EVALUATION ADULT. - OTHER INFO
Reason for admission : fall  Diet PTA : roll with milk and vanilla in the microwave to make a bread pudding, soup or leftovers for lunch, chicken or whatever "[employer] [facility]" provides; which is where he lives. he drinks Ensure in between meals.   Intake : >75%  Denies nausea/vomit :  Denies difficulty swallow : he is having difficulty chewing at this time. prior to COVID he was in the process of having implants - he doesn't have any teeth now.   Denies diarrhea/constipation:  Last BM : this morning  NKFA  IBW +/- 10%= 196pounds  Ht: 75"  Ht taken from pt  Usual Weight PTA: 165pounds  BMI: 19.3  BMI calculated using wt from flow sheet  BMI calculated using ht from pt  Education Provided : N/A  pressure injury: none  edema: none

## 2020-08-25 NOTE — CONSULT NOTE ADULT - ATTENDING COMMENTS
I have seen this patient with the fellow and agree with their assessment and plan. In addition,    Patient has underlying Stage 3 CKD/ urinary retention requiring self-catheterization. He was admitted for altered mental status and found to have Staph epi bacteremia and MV vegetation. Renal was consulted for CKD management prior to MVR.     # CKD stage 3 (follows Dr Presley Ferguson). His baseline serum creatinine 1.6-1.8. Serum creatinine appears stable.     # HTN - blood pressure acceptable. Continue hydralazine.     # Medication toxicity Monitoring: medication dose reviewed. Please dose medications to CrCl~30    The rest of the recommendations as per fellow's note.    Beth Stanford MD  Attending Nephrologist  [phone]

## 2020-08-25 NOTE — PROGRESS NOTE ADULT - ATTENDING COMMENTS
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 TE echo confirms large vegetation on mitral valve with torrential MR. Heart failure due to valvular disease maintained compensated on hospital regimen. Seeking control of apparent  source of infection and CT Surgery involved for anticipated valve replacement.

## 2020-08-25 NOTE — PROGRESS NOTE ADULT - ASSESSMENT
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    #Severe Posteriorly Directed MR c/b flash pulmonary edema  #1.3cm x 1.8cm MV Anterior Leaflet Vegetation   -Vegetation confirmed per NOHEMI; CTS now following.  -C today.  -Source of infection likely urinary, urology following  -ABx tailoring and BCx trending as per primary team and ID  -Appear euvolemic on exam, continue Lasix 40mg PO BID  -Continue hydralazine 10mg PO TID for afterload reduction (hold for SBP <90)    Case discussed with Dr. Lance.    Ave Wadsworth MD PGY-5  Cardiology Fellow  All Cardiology service information can be found 24/7 on [email or web address], password: [email or web address]

## 2020-08-25 NOTE — DIETITIAN INITIAL EVALUATION ADULT. - PROBLEM SELECTOR PLAN 2
leukocytosis, tachycardia, positive UA, abrasions s/p fall  -started on unasyn in ED for skin infection coverage  -given UTI, would switch to ceftriaxone

## 2020-08-25 NOTE — PROGRESS NOTE ADULT - PROBLEM SELECTOR PLAN 3
- pt straight caths daily 3x at home for detrusor muscle weakness and BPH, able to urinate little without straight cath; admitted with severe hydronephrosis  - repeat US kidney/bladder with resolved R hydronephrosis; mild L hydronephrosis, urothelial thickening  [ ] urology following, appreciate recs. continue tovar + CTM Cr  [ ] continue tovar for bladder decompression  [ ] continue home tamsulosin and finasteride

## 2020-08-25 NOTE — DIETITIAN INITIAL EVALUATION ADULT. - PROBLEM SELECTOR PLAN 1
2/2 immobility x24 hrs, CPK in 3000s, now downtrending on IVF  -continue IVF throughout the day  -monitor UOP   -strict I&Os  -trend CPK and BMP q12

## 2020-08-25 NOTE — CONSULT NOTE ADULT - SUBJECTIVE AND OBJECTIVE BOX
[facility] DIVISION OF KIDNEY DISEASES AND HYPERTENSION -- INITIAL CONSULT NOTE  --------------------------------------------------------------------------------  Chester Trujillo   Nephrology Fellow  Pager NS: [phone]/ [facility]: [phone]  (After 5 pm or on weekends please page the on-call fellow)    HPI: Patient is a 80y old M HIV, BPH, CAD, urinary retention with straight catherizations presented from home with AMS, found to have rhabdo and b/l hydroureteronephrosis. Hospital course has been complicated by bacteremia,  BCx staph epidermidis, NOHEMI with large mitral valve vegetation. Ct surgery following for MVR. Nephrology consulted for CKD management. On review of [facility] patient has had CKD since at least 2015, Scr ~1.3-1.5mg/dl, slowly increased throughout the years and now his baseline is noted to be around 1.6-1.8mg/dl ( 2135-6202). On this admission his Scr was noted to be 2mg/dl, now improved to 1.6mg/dl. He is non-oliguric on lasix 40mg BID. Vital signs stable. Denies any complaints            PAST HISTORY  --------------------------------------------------------------------------------  PAST MEDICAL & SURGICAL HISTORY:  Orchitis and epididymitis  Vitamin D deficiency  Bladder mass  Edema of both legs  Osteoporosis  Seborrheic keratosis  Constipation, unspecified constipation type  Chronic kidney disease, unspecified stage  HIV (human immunodeficiency virus infection)  Unsteady gait  S/P coronary artery stent placement  No Past Surgical History    FAMILY HISTORY:  No pertinent family history in first degree relatives    PAST SOCIAL HISTORY: No smoking hx, denies ETOH use    ALLERGIES & MEDICATIONS  --------------------------------------------------------------------------------  Allergies    No Known Allergies    Intolerances      Standing Inpatient Medications  abacavir 300 milliGRAM(s) Oral every 12 hours  ALBUTerol    0.083% 2.5 milliGRAM(s) Nebulizer four times a day  ascorbic acid 500 milliGRAM(s) Oral daily  cyanocobalamin 1000 MICROGram(s) Oral daily  Doravirine 100mg tablets (pifeltro) 100 milliGRAM(s) 100 milliGRAM(s) Oral daily  finasteride 5 milliGRAM(s) Oral daily  furosemide    Tablet 40 milliGRAM(s) Oral two times a day  heparin   Injectable 5000 Unit(s) SubCutaneous every 8 hours  hydrALAZINE 10 milliGRAM(s) Oral three times a day  lactobacillus acidophilus and bulgaricus Chewable 2 Tablet(s) Chew three times a day with meals  lactobacillus acidophilus and bulgaricus Chewable      lamiVUDine- milliGRAM(s) Oral daily  multivitamin 1 Tablet(s) Oral daily  tamsulosin 0.4 milliGRAM(s) Oral at bedtime    PRN Inpatient Medications      REVIEW OF SYSTEMS  --------------------------------------------------------------------------------  Gen: No fevers/chills  Head/Eyes/Ears/Mouth: No headache; Normal hearing; Normal vision    Respiratory: No dyspnea, cough  CV: No chest pain  GI: No abdominal pain, diarrhea, constipation, nausea, vomiting  : No increased frequency, dysuria  MSK: No joint pain/swelling; no edema    All other systems were reviewed and are negative, except as noted.    VITALS/PHYSICAL EXAM  --------------------------------------------------------------------------------  T(C): 36.8 (08-25-20 @ 08:14), Max: 36.8 (08-25-20 @ 00:16)  HR: 76 (08-25-20 @ 08:14) (76 - 93)  BP: 105/61 (08-25-20 @ 08:14) (97/49 - 120/69)  RR: 18 (08-25-20 @ 08:14) (18 - 18)  SpO2: 96% (08-25-20 @ 08:14) (95% - 97%)  Wt(kg): --  Height (cm): 190.5 (08-24-20 @ 09:40)  Weight (kg): 70.9 (08-24-20 @ 09:40)  BMI (kg/m2): 19.5 (08-24-20 @ 09:40)  BSA (m2): 1.98 (08-24-20 @ 09:40)      08-24-20 @ 07:01  -  08-25-20 @ 07:00  --------------------------------------------------------  IN: 960 mL / OUT: 3020 mL / NET: -2060 mL    08-25-20 @ 07:01  -  08-25-20 @ 09:53  --------------------------------------------------------  IN: 240 mL / OUT: 0 mL / NET: 240 mL      Physical Exam:    	Gen: NAD, well-appearing  	HEENT: Anicteric   	Pulm: CTA B/L  	CV: RRR, S1S2; no rub  	Abd: +BS, soft, nontender/nondistended       	: No suprapubic tenderness  	MSK: Warm, no clubbing, intact strength; no edema  	Neuro: No focal deficits, AOX3, no asterixis       	Vascular Access:      LABS/STUDIES  --------------------------------------------------------------------------------              11.4   9.86  >-----------<  245      [08-25-20 @ 05:19]              34.4     135  |  96  |  37  ----------------------------<  101      [08-25-20 @ 05:19]  3.5   |  27  |  1.60        Ca     9.5     [08-25-20 @ 05:19]      Mg     2.2     [08-25-20 @ 05:19]      Phos  4.2     [08-25-20 @ 05:19]    TPro  7.1  /  Alb  3.3  /  TBili  0.2  /  DBili  x   /  AST  51  /  ALT  37  /  AlkPhos  123  [08-25-20 @ 05:19]    PT/INR: PT 11.7 , INR 0.98       [08-25-20 @ 05:20]  PTT: 30.9       [08-25-20 @ 05:20]      Creatinine Trend:  SCr 1.60 [08-25 @ 05:19]  SCr 1.64 [08-24 @ 06:50]  SCr 1.59 [08-23 @ 06:26]  SCr 1.51 [08-22 @ 06:32]  SCr 1.54 [08-21 @ 06:15]    Urinalysis - [08-19-20 @ 03:23]      Color Yellow / Appearance Turbid / SG 1.015 / pH 7.0      Gluc Negative / Ketone Trace  / Bili Negative / Urobili Negative       Blood Moderate / Protein 100 mg/dL / Leuk Est Large / Nitrite Negative      RBC 10 / WBC >50 / Hyaline 0 / Gran  / Sq Epi  / Non Sq Epi 3 / Bacteria Many      TSH 4.24      [08-22-20 @ 12:42] [facility] DIVISION OF KIDNEY DISEASES AND HYPERTENSION -- INITIAL CONSULT NOTE  --------------------------------------------------------------------------------  Chester Trujillo   Nephrology Fellow  Pager NS: [phone]/ [facility]: [phone]  (After 5 pm or on weekends please page the on-call fellow)    HPI: Patient is a 80y old M HIV, BPH, CAD, urinary retention with straight catherizations presented from home with AMS, found to have rhabdo and b/l hydroureteronephrosis. Hospital course has been complicated by bacteremia,  BCx staph epidermidis, NOHEMI with large mitral valve vegetation. Ct surgery following for MVR. Nephrology consulted for CKD management. On review of [facility] patient has had CKD since at least 2015, Scr ~1.3-1.5mg/dl, slowly increased throughout the years and now his baseline is noted to be around 1.6-1.8mg/dl ( [id number]). On this admission his Scr was noted to be 2mg/dl, now improved to 1.6mg/dl. He is non-oliguric on lasix 40mg BID. Vital signs stable. Denies any complaints            PAST HISTORY  --------------------------------------------------------------------------------  PAST MEDICAL & SURGICAL HISTORY:  Orchitis and epididymitis  Vitamin D deficiency  Bladder mass  Edema of both legs  Osteoporosis  Seborrheic keratosis  Constipation, unspecified constipation type  Chronic kidney disease, unspecified stage  HIV (human immunodeficiency virus infection)  Unsteady gait  S/P coronary artery stent placement  No Past Surgical History    FAMILY HISTORY:  No pertinent family history in first degree relatives    PAST SOCIAL HISTORY: No smoking hx, denies ETOH use    ALLERGIES & MEDICATIONS  --------------------------------------------------------------------------------  Allergies    No Known Allergies    Intolerances      Standing Inpatient Medications  abacavir 300 milliGRAM(s) Oral every 12 hours  ALBUTerol    0.083% 2.5 milliGRAM(s) Nebulizer four times a day  ascorbic acid 500 milliGRAM(s) Oral daily  cyanocobalamin 1000 MICROGram(s) Oral daily  Doravirine 100mg tablets (pifeltro) 100 milliGRAM(s) 100 milliGRAM(s) Oral daily  finasteride 5 milliGRAM(s) Oral daily  furosemide    Tablet 40 milliGRAM(s) Oral two times a day  heparin   Injectable 5000 Unit(s) SubCutaneous every 8 hours  hydrALAZINE 10 milliGRAM(s) Oral three times a day  lactobacillus acidophilus and bulgaricus Chewable 2 Tablet(s) Chew three times a day with meals  lactobacillus acidophilus and bulgaricus Chewable      lamiVUDine- milliGRAM(s) Oral daily  multivitamin 1 Tablet(s) Oral daily  tamsulosin 0.4 milliGRAM(s) Oral at bedtime    PRN Inpatient Medications      REVIEW OF SYSTEMS  --------------------------------------------------------------------------------  Gen: No fevers/chills  Head/Eyes/Ears/Mouth: No headache; Normal hearing; Normal vision    Respiratory: No dyspnea, cough  CV: No chest pain  GI: No abdominal pain, diarrhea, constipation, nausea, vomiting  : No increased frequency, dysuria  MSK: No joint pain/swelling; no edema    All other systems were reviewed and are negative, except as noted.    VITALS/PHYSICAL EXAM  --------------------------------------------------------------------------------  T(C): 36.8 (08-25-20 @ 08:14), Max: 36.8 (08-25-20 @ 00:16)  HR: 76 (08-25-20 @ 08:14) (76 - 93)  BP: 105/61 (08-25-20 @ 08:14) (97/49 - 120/69)  RR: 18 (08-25-20 @ 08:14) (18 - 18)  SpO2: 96% (08-25-20 @ 08:14) (95% - 97%)  Wt(kg): --  Height (cm): 190.5 (08-24-20 @ 09:40)  Weight (kg): 70.9 (08-24-20 @ 09:40)  BMI (kg/m2): 19.5 (08-24-20 @ 09:40)  BSA (m2): 1.98 (08-24-20 @ 09:40)      08-24-20 @ 07:01  -  08-25-20 @ 07:00  --------------------------------------------------------  IN: 960 mL / OUT: 3020 mL / NET: -2060 mL    08-25-20 @ 07:01  -  08-25-20 @ 09:53  --------------------------------------------------------  IN: 240 mL / OUT: 0 mL / NET: 240 mL      Physical Exam:    	Gen: NAD, well-appearing  	HEENT: Anicteric   	Pulm: CTA B/L  	CV: RRR, S1S2; no rub  	Abd: +BS, soft, nontender/nondistended       	: + tovar  	MSK: Warm, no clubbing, intact strength; no edema  	Neuro: No focal deficits, AOX3, no asterixis       	Vascular Access:      LABS/STUDIES  --------------------------------------------------------------------------------              11.4   9.86  >-----------<  245      [08-25-20 @ 05:19]              34.4     135  |  96  |  37  ----------------------------<  101      [08-25-20 @ 05:19]  3.5   |  27  |  1.60        Ca     9.5     [08-25-20 @ 05:19]      Mg     2.2     [08-25-20 @ 05:19]      Phos  4.2     [08-25-20 @ 05:19]    TPro  7.1  /  Alb  3.3  /  TBili  0.2  /  DBili  x   /  AST  51  /  ALT  37  /  AlkPhos  123  [08-25-20 @ 05:19]    PT/INR: PT 11.7 , INR 0.98       [08-25-20 @ 05:20]  PTT: 30.9       [08-25-20 @ 05:20]      Creatinine Trend:  SCr 1.60 [08-25 @ 05:19]  SCr 1.64 [08-24 @ 06:50]  SCr 1.59 [08-23 @ 06:26]  SCr 1.51 [08-22 @ 06:32]  SCr 1.54 [08-21 @ 06:15]    Urinalysis - [08-19-20 @ 03:23]      Color Yellow / Appearance Turbid / SG 1.015 / pH 7.0      Gluc Negative / Ketone Trace  / Bili Negative / Urobili Negative       Blood Moderate / Protein 100 mg/dL / Leuk Est Large / Nitrite Negative      RBC 10 / WBC >50 / Hyaline 0 / Gran  / Sq Epi  / Non Sq Epi 3 / Bacteria Many      TSH 4.24      [08-22-20 @ 12:42]

## 2020-08-25 NOTE — PROGRESS NOTE ADULT - PROBLEM SELECTOR PLAN 4
"chocolate pudding" consistency, not dominick watery, likely 2/2 abx  - C diff sent, rejected as not watery  [ ] lactobacillus per ID recs

## 2020-08-25 NOTE — PROGRESS NOTE ADULT - PROBLEM SELECTOR PLAN 2
2/2 severe acute mitral valve regurg on echo with partial flail leaflet component 2/2 vegetation visualized on NOHEMI  - on RA spo2 wnl  [ ] cardiology following: lasix 40 BID + hydralazine 10 TID, hold for SBP <100 2/2 severe acute mitral valve regurg on echo with partial flail leaflet component 2/2 vegetation visualized on NOHEMI  - on RA spo2 wnl  [ ] cardiology following: lasix 40 daily + hydralazine 10 TID, hold for SBP <100  (lasix reduced from BID to QD as good UO and will be getting contrast during cath/surgery)

## 2020-08-25 NOTE — PROGRESS NOTE ADULT - SUBJECTIVE AND OBJECTIVE BOX
INFECTIOUS DISEASES FOLLOW UP    This is a follow up note for this  79 yo Male with  Rhabdomyolysis  Staph epidermidis bacteremia and MV endocarditis   interval development of multiple episodes of watery stool  Left knee cellulitis (resolved)    Interval event:  - CTS planning to do: Coronary cath scheduled this week depending on creatinine. CT angio brain r/o septic emboli. OR date for MVR with Dr Camacho to be determined.   - Stool rejected for C.diff as not watery    ROS:  CONSTITUTIONAL:  No fever, poor appetite  CARDIOVASCULAR:  No chest pain or palpitations  RESPIRATORY:  No dyspnea  GASTROINTESTINAL:  No nausea, vomiting,  c/o diarrhea, no abdominal pain  GENITOURINARY:  No dysuria    ANTIMICROBIALS:    abacavir 300 every 12 hours  lamiVUDine- daily  doravirine 100mg daily (freetext)    OTHER MEDS:  MEDICATIONS  (STANDING):  ALBUTerol    0.083% 2.5 four times a day  finasteride 5 daily  furosemide    Tablet 40 two times a day  heparin   Injectable 5000 every 8 hours  hydrALAZINE 10 three times a day  tamsulosin 0.4 at bedtime      Allergies  No Known Allergies    Vital Signs Last 24 Hrs  T(F): 98.3 (08-25-20 @ 08:14), Max: 99.9 (08-19-20 @ 20:11)    Vital Signs Last 24 Hrs  HR: 76 (08-25-20 @ 08:14) (76 - 93)  BP: 105/61 (08-25-20 @ 08:14) (97/49 - 120/69)  RR: 18 (08-25-20 @ 08:14)  SpO2: 96% (08-25-20 @ 08:14) (95% - 97%)  Wt(kg): --    PHYSICAL EXAM:  Constitutional:no acute distress, sitting in a chair  Eyes:EMILY, EOMI  Ear/Nose/Throat: no oral lesions, 	  Respiratory: clear BL  Cardiovascular: pansystolic murmur best heard at apex  Gastrointestinal:soft, some RLQ tenderness  Extremities:no e/e/c cellulitis resolved  No Lymphadenopathy  IV sites not inflammed.          Labs:                        11.4   9.86  )-----------( 245      ( 25 Aug 2020 05:19 )             34.4     08-25    135  |  96  |  37<H>  ----------------------------<  101<H>  3.5   |  27  |  1.60<H>    Ca    9.5      25 Aug 2020 05:19  Phos  4.2     08-25  Mg     2.2     08-25    TPro  7.1  /  Alb  3.3  /  TBili  0.2  /  DBili  x   /  AST  51<H>  /  ALT  37  /  AlkPhos  123<H>  08-25      WBC Trend:  WBC Count: 9.86 (08-25-20 @ 05:19)  WBC Count: 9.78 (08-24-20 @ 06:50)  WBC Count: 9.66 (08-23-20 @ 06:26)  WBC Count: 9.88 (08-22-20 @ 06:32)      Creatine Trend:  Creatinine, Serum: 1.60 (08-25)  Creatinine, Serum: 1.64 (08-24)  Creatinine, Serum: 1.59 (08-23)  Creatinine, Serum: 1.51 (08-22)      Liver Biochemical Testing Trend:  Alanine Aminotransferase (ALT/SGPT): 37 (08-25)  Alanine Aminotransferase (ALT/SGPT): 29 (08-24)  Alanine Aminotransferase (ALT/SGPT): 31 (08-23)  Aspartate Aminotransferase (AST/SGOT): 51 (08-25-20 @ 05:19)  Aspartate Aminotransferase (AST/SGOT): 36 (08-24-20 @ 06:50)  Aspartate Aminotransferase (AST/SGOT): 37 (08-23-20 @ 06:26)  Bilirubin Total, Serum: 0.2 (08-25)  Bilirubin Total, Serum: 0.2 (08-24)  Bilirubin Total, Serum: 0.3 (08-23)      Trend LDH          MICROBIOLOGY:  Vancomycin Level, Random: 21.9 (08-25 @ 05:21)  Vancomycin Level, Trough: 20.2 (08-24 @ 06:50)  Vancomycin Level, Trough: 18.1 (08-23 @ 06:26)  Vancomycin Level, Trough: 11.2 (08-22 @ 15:33)      Culture - Blood (collected 20 Aug 2020 15:01)  Source: .Blood Blood-Venous  Preliminary Report:    No growth to date.    Culture - Blood (collected 20 Aug 2020 15:01)  Source: .Blood Blood-Peripheral  Preliminary Report:    No growth to date.    Culture - Urine (collected 19 Aug 2020 09:00)  Source: .Urine Clean Catch (Midstream)  Final Report:    <10,000 CFU/mL Normal Urogenital Kami    Culture - Blood (collected 19 Aug 2020 01:39)  Source: .Blood Blood-Peripheral  Final Report:    Growth in aerobic and anaerobic bottles: Staphylococcus epidermidis  Organism: Staphylococcus epidermidis  Organism: Staphylococcus epidermidis    Sensitivities:      -  Ampicillin/Sulbactam: R <=8/4      -  Cefazolin: R <=4      -  Clindamycin: S 0.5      -  Erythromycin: S <=0.25      -  Gentamicin: S <=1 Should not be used as monotherapy      -  Oxacillin: R >2      -  Penicillin: R >8      -  RIF- Rifampin: S <=1 Should not be used as monotherapy      -  Tetra/Doxy: S 2      -  Trimethoprim/Sulfamethoxazole: S <=0.5/9.5      -  Vancomycin: S 2      Method Type: FLORENCIA    Culture - Blood (collected 19 Aug 2020 01:39)  Source: .Blood Blood-Peripheral  Final Report:    Growth in aerobic and anaerobic bottles: Staphylococcus epidermidis    "Due to technical problems, Proteus sp. will Not be reported as part of    the BCID panel until further notice"    ***Blood Panel PCR results on this specimen are available    approximately 3 hours after the Gram stain result.***    Gram stain, PCR, and/or culture results may not always    correspond due to difference in methodologies.    ************************************************************    This PCR assay was performed using [employer].    The following targets are tested for: Enterococcus,    vancomycin resistant enterococci, Listeria monocytogenes,    coagulase negative staphylococci, S. aureus,    methicillin resistant S. aureus, Streptococcus agalactiae    (Group B), S. pneumoniae, S. pyogenes (Group A),    Acinetobacter baumannii, Enterobacter cloacae, E. coli,    Klebsiella oxytoca, K. pneumoniae, Proteus sp.,    Serratia marcescens, Haemophilus influenzae,    Neisseria meningitidis, Pseudomonas aeruginosa, Candida    albicans, C. glabrata, C krusei, C parapsilosis,    C. tropicalis and the KPC resistance gene.  Organism: Blood Culture PCR  Organism: Blood Culture PCR    Sensitivities:      -  Coagulase negative Staphylococcus: Detec      Method Type: PCR      ABS CD4: 324 /uL (08-19-20 @ 03:26)  HIV-1 Viral Load Result: NOT DET. (08-19-20 @ 04:46)        OTHER TESTS:  COVID-19 PCR: NotDetec (08-19-20 @ 02:39)    COVID-19 IgG Antibody Index: 0.92 Index (08-19-20 @ 09:26)      RADIOLOGY & ADDITIONAL STUDIES:    < from: Transesophageal Echocardiogram w/o TTE (08.24.20 @ 10:00) >  Conclusions:  1. Large mobile about 1.8cm x 1.3 cm vegetation seen on the  medial aspect of the anterior leaflet with a partial flail  component of the medial A2/ A3 area. There us also a  vegetation see at the annulus of P3 which measures 0.5cm x  1.3cm  Severe eccentric posterior mitral regurgitation.  2. Normal left ventricular systolic function.  3. Normal right ventricular size and function.  4. Tricuspid valve prolapse. Mildtricuspid regurgitation.  Discussed with Team member  at time of Study (Ivan Palma )    < end of copied text >        < from: Xray Chest 1 View- PORTABLE-Routine (08.20.20 @ 16:55) >  IMPRESSION:    Left lower lung hazy opacity correlates with nodular opacity seen CT chest abdomen and pelvis from 8/19/2020. A follow-up is recommended to confirm resolution in 4-6 weeks.    < end of copied text >    < from: Transthoracic Echocardiogram (08.20.20 @ 09:27) >  Conclusions:  1. Mitral valve not well visualized; however, the anterior  leaflet of the mitral valve appears thickened with a 1.3 cm  x 1.4 cm echodensity on the atrial surface (not well  visualized), possibly a vegetation (the ehcodensity does  not display obvious independent mobility, but study is  technically limited), which may be suggestive of  endocarditis given the clinical history. Findings  communicated to Ольга RICKS. Consider NOHEMI if clinically  indicated. Severe, eccentric posteriorly directed mitral  regurgitation (notably the left atrium is not dilated,  suggestive of acute mitral regurgitation).  2. Normal left ventricular internal dimensions and wall  thicknesses.  3. Normal left ventricular systolic function. No segmental  wall motion abnormalities.  4. Normal right ventricular size and function.  5. In some views, there is thickening of the tricuspid  leaflet, which may be suggestive of vegetation (not well  visualized). Mild tricuspid regurgitation.    < end of copied text > INFECTIOUS DISEASES FOLLOW UP    This is a follow up note for this  79 yo Male with  Rhabdomyolysis  Staph epidermidis bacteremia and MV endocarditis   interval development of multiple episodes of watery stool  Left knee cellulitis (resolved)    Interval event:  - Pt is in cath lab this morning?  - CTS planning to do: Coronary cath scheduled this week depending on creatinine. CT angio brain r/o septic emboli. OR date for MVR with Dr Camacho to be determined.   - Stool rejected for C.diff as not watery    ROS:  CONSTITUTIONAL:  No fever, poor appetite  CARDIOVASCULAR:  No chest pain or palpitations  RESPIRATORY:  No dyspnea  GASTROINTESTINAL:  No nausea, vomiting,  c/o diarrhea, no abdominal pain  GENITOURINARY:  No dysuria    ANTIMICROBIALS:    abacavir 300 every 12 hours  lamiVUDine- daily  doravirine 100mg daily (freetext)    OTHER MEDS:  MEDICATIONS  (STANDING):  ALBUTerol    0.083% 2.5 four times a day  finasteride 5 daily  furosemide    Tablet 40 two times a day  heparin   Injectable 5000 every 8 hours  hydrALAZINE 10 three times a day  tamsulosin 0.4 at bedtime      Allergies  No Known Allergies    Vital Signs Last 24 Hrs  T(F): 98.3 (08-25-20 @ 08:14), Max: 99.9 (08-19-20 @ 20:11)    Vital Signs Last 24 Hrs  HR: 76 (08-25-20 @ 08:14) (76 - 93)  BP: 105/61 (08-25-20 @ 08:14) (97/49 - 120/69)  RR: 18 (08-25-20 @ 08:14)  SpO2: 96% (08-25-20 @ 08:14) (95% - 97%)  Wt(kg): --    PHYSICAL EXAM:  Constitutional:no acute distress, sitting in a chair  Eyes:EMILY, EOMI  Ear/Nose/Throat: no oral lesions, 	  Respiratory: clear BL  Cardiovascular: pansystolic murmur best heard at apex  Gastrointestinal:soft, some RLQ tenderness  Extremities:no e/e/c cellulitis resolved  No Lymphadenopathy  IV sites not inflammed.          Labs:                        11.4   9.86  )-----------( 245      ( 25 Aug 2020 05:19 )             34.4     08-25    135  |  96  |  37<H>  ----------------------------<  101<H>  3.5   |  27  |  1.60<H>    Ca    9.5      25 Aug 2020 05:19  Phos  4.2     08-25  Mg     2.2     08-25    TPro  7.1  /  Alb  3.3  /  TBili  0.2  /  DBili  x   /  AST  51<H>  /  ALT  37  /  AlkPhos  123<H>  08-25      WBC Trend:  WBC Count: 9.86 (08-25-20 @ 05:19)  WBC Count: 9.78 (08-24-20 @ 06:50)  WBC Count: 9.66 (08-23-20 @ 06:26)  WBC Count: 9.88 (08-22-20 @ 06:32)      Creatine Trend:  Creatinine, Serum: 1.60 (08-25)  Creatinine, Serum: 1.64 (08-24)  Creatinine, Serum: 1.59 (08-23)  Creatinine, Serum: 1.51 (08-22)      Liver Biochemical Testing Trend:  Alanine Aminotransferase (ALT/SGPT): 37 (08-25)  Alanine Aminotransferase (ALT/SGPT): 29 (08-24)  Alanine Aminotransferase (ALT/SGPT): 31 (08-23)  Aspartate Aminotransferase (AST/SGOT): 51 (08-25-20 @ 05:19)  Aspartate Aminotransferase (AST/SGOT): 36 (08-24-20 @ 06:50)  Aspartate Aminotransferase (AST/SGOT): 37 (08-23-20 @ 06:26)  Bilirubin Total, Serum: 0.2 (08-25)  Bilirubin Total, Serum: 0.2 (08-24)  Bilirubin Total, Serum: 0.3 (08-23)      Trend LDH          MICROBIOLOGY:  Vancomycin Level, Random: 21.9 (08-25 @ 05:21)  Vancomycin Level, Trough: 20.2 (08-24 @ 06:50)  Vancomycin Level, Trough: 18.1 (08-23 @ 06:26)  Vancomycin Level, Trough: 11.2 (08-22 @ 15:33)      Culture - Blood (collected 20 Aug 2020 15:01)  Source: .Blood Blood-Venous  Preliminary Report:    No growth to date.    Culture - Blood (collected 20 Aug 2020 15:01)  Source: .Blood Blood-Peripheral  Preliminary Report:    No growth to date.    Culture - Urine (collected 19 Aug 2020 09:00)  Source: .Urine Clean Catch (Midstream)  Final Report:    <10,000 CFU/mL Normal Urogenital Kami    Culture - Blood (collected 19 Aug 2020 01:39)  Source: .Blood Blood-Peripheral  Final Report:    Growth in aerobic and anaerobic bottles: Staphylococcus epidermidis  Organism: Staphylococcus epidermidis  Organism: Staphylococcus epidermidis    Sensitivities:      -  Ampicillin/Sulbactam: R <=8/4      -  Cefazolin: R <=4      -  Clindamycin: S 0.5      -  Erythromycin: S <=0.25      -  Gentamicin: S <=1 Should not be used as monotherapy      -  Oxacillin: R >2      -  Penicillin: R >8      -  RIF- Rifampin: S <=1 Should not be used as monotherapy      -  Tetra/Doxy: S 2      -  Trimethoprim/Sulfamethoxazole: S <=0.5/9.5      -  Vancomycin: S 2      Method Type: FLORENCIA    Culture - Blood (collected 19 Aug 2020 01:39)  Source: .Blood Blood-Peripheral  Final Report:    Growth in aerobic and anaerobic bottles: Staphylococcus epidermidis    "Due to technical problems, Proteus sp. will Not be reported as part of    the BCID panel until further notice"    ***Blood Panel PCR results on this specimen are available    approximately 3 hours after the Gram stain result.***    Gram stain, PCR, and/or culture results may not always    correspond due to difference in methodologies.    ************************************************************    This PCR assay was performed using [employer].    The following targets are tested for: Enterococcus,    vancomycin resistant enterococci, Listeria monocytogenes,    coagulase negative staphylococci, S. aureus,    methicillin resistant S. aureus, Streptococcus agalactiae    (Group B), S. pneumoniae, S. pyogenes (Group A),    Acinetobacter baumannii, Enterobacter cloacae, E. coli,    Klebsiella oxytoca, K. pneumoniae, Proteus sp.,    Serratia marcescens, Haemophilus influenzae,    Neisseria meningitidis, Pseudomonas aeruginosa, Candida    albicans, C. glabrata, C krusei, C parapsilosis,    C. tropicalis and the KPC resistance gene.  Organism: Blood Culture PCR  Organism: Blood Culture PCR    Sensitivities:      -  Coagulase negative Staphylococcus: Detec      Method Type: PCR      ABS CD4: 324 /uL (08-19-20 @ 03:26)  HIV-1 Viral Load Result: NOT DET. (08-19-20 @ 04:46)        OTHER TESTS:  COVID-19 PCR: NotDetec (08-19-20 @ 02:39)    COVID-19 IgG Antibody Index: 0.92 Index (08-19-20 @ 09:26)      RADIOLOGY & ADDITIONAL STUDIES:    < from: Transesophageal Echocardiogram w/o TTE (08.24.20 @ 10:00) >  Conclusions:  1. Large mobile about 1.8cm x 1.3 cm vegetation seen on the  medial aspect of the anterior leaflet with a partial flail  component of the medial A2/ A3 area. There us also a  vegetation see at the annulus of P3 which measures 0.5cm x  1.3cm  Severe eccentric posterior mitral regurgitation.  2. Normal left ventricular systolic function.  3. Normal right ventricular size and function.  4. Tricuspid valve prolapse. Mildtricuspid regurgitation.  Discussed with Team member  at time of Study (Ivan Palma )    < end of copied text >        < from: Xray Chest 1 View- PORTABLE-Routine (08.20.20 @ 16:55) >  IMPRESSION:    Left lower lung hazy opacity correlates with nodular opacity seen CT chest abdomen and pelvis from 8/19/2020. A follow-up is recommended to confirm resolution in 4-6 weeks.    < end of copied text >    < from: Transthoracic Echocardiogram (08.20.20 @ 09:27) >  Conclusions:  1. Mitral valve not well visualized; however, the anterior  leaflet of the mitral valve appears thickened with a 1.3 cm  x 1.4 cm echodensity on the atrial surface (not well  visualized), possibly a vegetation (the ehcodensity does  not display obvious independent mobility, but study is  technically limited), which may be suggestive of  endocarditis given the clinical history. Findings  communicated to Ольга RICKS. Consider NOHEMI if clinically  indicated. Severe, eccentric posteriorly directed mitral  regurgitation (notably the left atrium is not dilated,  suggestive of acute mitral regurgitation).  2. Normal left ventricular internal dimensions and wall  thicknesses.  3. Normal left ventricular systolic function. No segmental  wall motion abnormalities.  4. Normal right ventricular size and function.  5. In some views, there is thickening of the tricuspid  leaflet, which may be suggestive of vegetation (not well  visualized). Mild tricuspid regurgitation.    < end of copied text >

## 2020-08-25 NOTE — PROGRESS NOTE ADULT - SUBJECTIVE AND OBJECTIVE BOX
Rosa M Palma PGY1    Patient is a 80y old  Male who presents with a chief complaint of fall (24 Aug 2020 17:29)      SUBJECTIVE / OVERNIGHT EVENTS:  overnight - no events. BP low at 97/49.   am -     MEDICATIONS  (STANDING):  abacavir 300 milliGRAM(s) Oral every 12 hours  ALBUTerol    0.083% 2.5 milliGRAM(s) Nebulizer four times a day  ascorbic acid 500 milliGRAM(s) Oral daily  cyanocobalamin 1000 MICROGram(s) Oral daily  Doravirine 100mg tablets (pifeltro) 100 milliGRAM(s) 100 milliGRAM(s) Oral daily  finasteride 5 milliGRAM(s) Oral daily  furosemide    Tablet 40 milliGRAM(s) Oral two times a day  heparin   Injectable 5000 Unit(s) SubCutaneous every 8 hours  hydrALAZINE 10 milliGRAM(s) Oral three times a day  lactobacillus acidophilus and bulgaricus Chewable 2 Tablet(s) Chew three times a day with meals  lactobacillus acidophilus and bulgaricus Chewable      lamiVUDine- milliGRAM(s) Oral daily  multivitamin 1 Tablet(s) Oral daily  tamsulosin 0.4 milliGRAM(s) Oral at bedtime  vancomycin  IVPB 1000 milliGRAM(s) IV Intermittent daily    MEDICATIONS  (PRN):      CAPILLARY BLOOD GLUCOSE        I&O's Summary    24 Aug 2020 07:01  -  25 Aug 2020 07:00  --------------------------------------------------------  IN: 720 mL / OUT: 3020 mL / NET: -2300 mL        Vital Signs Last 24 Hrs  T(C): 36.8 (25 Aug 2020 04:41), Max: 36.8 (25 Aug 2020 00:16)  T(F): 98.2 (25 Aug 2020 04:41), Max: 98.2 (25 Aug 2020 00:16)  HR: 86 (25 Aug 2020 04:41) (80 - 93)  BP: 106/62 (25 Aug 2020 04:41) (97/49 - 120/69)  BP(mean): --  RR: 18 (25 Aug 2020 04:41) (18 - 18)  SpO2: 96% (25 Aug 2020 04:41) (95% - 97%)    PHYSICAL EXAM:  GENERAL: no distress  PSYCH: A&O x3  HEAD: Atraumatic, Normocephalic  NECK: Supple, No JVD  CHEST/LUNG: clear to auscultation bilaterally  HEART: regular rate and rhythm, no murmurs  ABDOMEN: nontender to palpation, no rebound tenderness/guarding  EXTREMITIES: no edema on bilateral LE  NEUROLOGY: no focal neurologic deficit  SKIN: No rashes or lesions    LABS:                        11.4   9.86  )-----------( 245      ( 25 Aug 2020 05:19 )             34.4      08-25    135  |  96  |  37<H>  ----------------------------<  101<H>  3.5   |  27  |  1.60<H>    Ca    9.5      25 Aug 2020 05:19  Phos  4.2     08-25  Mg     2.2     08-25    TPro  7.1  /  Alb  3.3  /  TBili  0.2  /  DBili  x   /  AST  51<H>  /  ALT  37  /  AlkPhos  123<H>  08-25    PT/INR - ( 25 Aug 2020 05:20 )   PT: 11.7 sec;   INR: 0.98 ratio         PTT - ( 25 Aug 2020 05:20 )  PTT:30.9 sec          RADIOLOGY & ADDITIONAL TESTS:    Imaging Personally Reviewed:    Consultant(s) Notes Reviewed:      Care Discussed with Consultants/Other Providers: Rosa M Louie PGY1    Patient is a 80y old  Male who presents with a chief complaint of fall (24 Aug 2020 17:29)      SUBJECTIVE / OVERNIGHT EVENTS:  overnight - no events. BP low at 97/49.   am - he is feeling well, feels stronger and improved mobility. Understands the plan regarding endocarditis + surgery. Denies chest pain, shortness of breath.     MEDICATIONS  (STANDING):  abacavir 300 milliGRAM(s) Oral every 12 hours  ALBUTerol    0.083% 2.5 milliGRAM(s) Nebulizer four times a day  ascorbic acid 500 milliGRAM(s) Oral daily  cyanocobalamin 1000 MICROGram(s) Oral daily  Doravirine 100mg tablets (pifeltro) 100 milliGRAM(s) 100 milliGRAM(s) Oral daily  finasteride 5 milliGRAM(s) Oral daily  furosemide    Tablet 40 milliGRAM(s) Oral two times a day  heparin   Injectable 5000 Unit(s) SubCutaneous every 8 hours  hydrALAZINE 10 milliGRAM(s) Oral three times a day  lactobacillus acidophilus and bulgaricus Chewable 2 Tablet(s) Chew three times a day with meals  lactobacillus acidophilus and bulgaricus Chewable      lamiVUDine- milliGRAM(s) Oral daily  multivitamin 1 Tablet(s) Oral daily  tamsulosin 0.4 milliGRAM(s) Oral at bedtime  vancomycin  IVPB 1000 milliGRAM(s) IV Intermittent daily    MEDICATIONS  (PRN):      CAPILLARY BLOOD GLUCOSE        I&O's Summary    24 Aug 2020 07:01  -  25 Aug 2020 07:00  --------------------------------------------------------  IN: 720 mL / OUT: 3020 mL / NET: -2300 mL        Vital Signs Last 24 Hrs  T(C): 36.8 (25 Aug 2020 04:41), Max: 36.8 (25 Aug 2020 00:16)  T(F): 98.2 (25 Aug 2020 04:41), Max: 98.2 (25 Aug 2020 00:16)  HR: 86 (25 Aug 2020 04:41) (80 - 93)  BP: 106/62 (25 Aug 2020 04:41) (97/49 - 120/69)  BP(mean): --  RR: 18 (25 Aug 2020 04:41) (18 - 18)  SpO2: 96% (25 Aug 2020 04:41) (95% - 97%)    PHYSICAL EXAM:  GENERAL: thin, looks comfortable, lying down  CHEST/LUNG: clear to auscultation bilaterally  HEART: regular rate and rhythm, 2/6 holosystolic murmur lower sternal borders, most noticeable at apex   ABDOMEN: nontender to palpation, no rebound/guarding  EXTREMITIES: L knee with abrasion, healing, resolved erythema/swelling from cellulitis  PSYCH: A&Ox3  NEUROLOGY: no neuro deficits    LABS:                        11.4   9.86  )-----------( 245      ( 25 Aug 2020 05:19 )             34.4      08-25    135  |  96  |  37<H>  ----------------------------<  101<H>  3.5   |  27  |  1.60<H>    Ca    9.5      25 Aug 2020 05:19  Phos  4.2     08-25  Mg     2.2     08-25    TPro  7.1  /  Alb  3.3  /  TBili  0.2  /  DBili  x   /  AST  51<H>  /  ALT  37  /  AlkPhos  123<H>  08-25    PT/INR - ( 25 Aug 2020 05:20 )   PT: 11.7 sec;   INR: 0.98 ratio         PTT - ( 25 Aug 2020 05:20 )  PTT:30.9 sec          RADIOLOGY & ADDITIONAL TESTS:    Imaging Personally Reviewed:    Consultant(s) Notes Reviewed:      Care Discussed with Consultants/Other Providers:

## 2020-08-25 NOTE — PROGRESS NOTE ADULT - PROBLEM SELECTOR PLAN 1
Afebrile, BCx pos for staph epidermidis, repeat BCx negative (drawn after abx)  - BCx both pos for staph epi, repeat BCx NGTD  - NOHEMI 8/24: large mobile 1.8 x 1.3 cm vegetation on medial aspect of anterior leaflet mitral valve with partial flail component. second vegetaion 0.5 x 1.3cm on posterior leaflet w severe eccentric mitral regurg. Normal LV + RV systolic function. 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   [ ] continue vanc 1g daily with daily vanc lvl (goal 15-20). ID following, appreciate recs.   [ ] appreciate CT surgery recs: cath scheduled depending on Cr; OR date for MVR with Dr. Doug SUERO  [ ] f/u MRA brain to r/o septic emboli  [ ] cardiology following, appreciate recs - NOHEMI 8/24: large mobile 1.8 x 1.3 cm vegetation on medial aspect of anterior leaflet mitral valve with partial flail component. second vegetaion 0.5 x 1.3cm on posterior leaflet w severe eccentric mitral regurg. Normal LV + RV systolic function. 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   - MRA 8/24 w/o septic emboli  [ ] vancomycin 1g daily, vanc lvl daily goal 15-20, ID following - appreciate recs  [ ] cardiac cath to eval for valve surgery  [ ] valve repair with Dr. Camacho  [ ] nephro following, appreciate recs: hold diuretics for cardiac cath to min risk of ASHVIN, no IVF or alkalinizing therapy, will monitor closely post cath and prior to MVR replacement. Monitor UOP and daily weights. Avoid volume depletion, NSAIDs, PPI's, ARB/ACE-I. Dose medications as per eGFR.

## 2020-08-25 NOTE — PROGRESS NOTE ADULT - PROBLEM SELECTOR PLAN 6
- rhabdo 2/2 being down 24 hrs after fall  - resolved; downtrended CK, improved mobility/str Controlled; last CD4 368; undetectable viral load in July  [ ] continue home abacavir, lamivudine, Doravirine

## 2020-08-25 NOTE — PROGRESS NOTE ADULT - ATTENDING COMMENTS
Patient discussed with Dr. Gamble    I agree with his interval history exam and plans as noted above    Sizable Vegetation seen on the mitral valve in the setting of blood cultures + for staph epi in 1 set of cultures  Source is possibly related to his repetitive straight cath urine   Follow up blood cultures have remained negative on Vancomycin  Please dose 1 gram IV QD  Timing of CTS surgery  pending  Tung Almazan MD  [phone]  After 5pm/weekends [phone]

## 2020-08-25 NOTE — DIETITIAN INITIAL EVALUATION ADULT. - PERTINENT MEDS FT
abacavir 300  ALBUTerol    0.083% 2.5  ascorbic acid 500  cyanocobalamin 1000  Doravirine 100mg tablets (pifeltro) 100 milliGRAM(s) 100  finasteride 5  furosemide    Tablet 40  heparin   Injectable 5000  hydrALAZINE 10  lactobacillus acidophilus and bulgaricus Chewable 2  lactobacillus acidophilus and bulgaricus Chewable   lamiVUDine-  multivitamin 1  tamsulosin 0.4

## 2020-08-25 NOTE — PROGRESS NOTE ADULT - PROBLEM SELECTOR PLAN 5
- pt denies palpitations, chest pain, dizziness. Endorses weakness prior to and since fall.   - ECG 8/19 on admission sinus tachy, LA enlargement, no ST elevation/depression or T wave inversion. Repeat ECG 8/20 with RBBB (rsr' on V1), irregular, left atrial enlargement. No ST changes or T wave inversions.   - possibly 2/2 cardiogenic syncope (severe acute MVR)  [ ] PT recommended: CYNTHIA on d/c - possibly 2/2 cardiogenic syncope (severe acute MVR). Tele since admission shows 2 seconds of PAT on first day, no events since. No hx arrythmia.   [ ] PT recommended: CYNTHIA on d/c

## 2020-08-26 LAB
ALBUMIN SERPL ELPH-MCNC: 3.6 G/DL — SIGNIFICANT CHANGE UP (ref 3.3–5)
ALP SERPL-CCNC: 126 U/L — HIGH (ref 40–120)
ALT FLD-CCNC: 45 U/L — SIGNIFICANT CHANGE UP (ref 10–45)
ANION GAP SERPL CALC-SCNC: 16 MMOL/L — SIGNIFICANT CHANGE UP (ref 5–17)
AST SERPL-CCNC: 51 U/L — HIGH (ref 10–40)
BASOPHILS # BLD AUTO: 0.06 K/UL — SIGNIFICANT CHANGE UP (ref 0–0.2)
BASOPHILS NFR BLD AUTO: 0.6 % — SIGNIFICANT CHANGE UP (ref 0–2)
BILIRUB SERPL-MCNC: 0.2 MG/DL — SIGNIFICANT CHANGE UP (ref 0.2–1.2)
BUN SERPL-MCNC: 41 MG/DL — HIGH (ref 7–23)
CALCIUM SERPL-MCNC: 10.7 MG/DL — HIGH (ref 8.4–10.5)
CHLORIDE SERPL-SCNC: 96 MMOL/L — SIGNIFICANT CHANGE UP (ref 96–108)
CO2 SERPL-SCNC: 26 MMOL/L — SIGNIFICANT CHANGE UP (ref 22–31)
CREAT SERPL-MCNC: 1.64 MG/DL — HIGH (ref 0.5–1.3)
EOSINOPHIL # BLD AUTO: 0.29 K/UL — SIGNIFICANT CHANGE UP (ref 0–0.5)
EOSINOPHIL NFR BLD AUTO: 2.8 % — SIGNIFICANT CHANGE UP (ref 0–6)
GLUCOSE SERPL-MCNC: 81 MG/DL — SIGNIFICANT CHANGE UP (ref 70–99)
HCT VFR BLD CALC: 34.8 % — LOW (ref 39–50)
HGB BLD-MCNC: 11.3 G/DL — LOW (ref 13–17)
IMM GRANULOCYTES NFR BLD AUTO: 1.6 % — HIGH (ref 0–1.5)
LYMPHOCYTES # BLD AUTO: 2.39 K/UL — SIGNIFICANT CHANGE UP (ref 1–3.3)
LYMPHOCYTES # BLD AUTO: 23.4 % — SIGNIFICANT CHANGE UP (ref 13–44)
MAGNESIUM SERPL-MCNC: 2.4 MG/DL — SIGNIFICANT CHANGE UP (ref 1.6–2.6)
MCHC RBC-ENTMCNC: 32.5 GM/DL — SIGNIFICANT CHANGE UP (ref 32–36)
MCHC RBC-ENTMCNC: 33.8 PG — SIGNIFICANT CHANGE UP (ref 27–34)
MCV RBC AUTO: 104.2 FL — HIGH (ref 80–100)
MONOCYTES # BLD AUTO: 1.06 K/UL — HIGH (ref 0–0.9)
MONOCYTES NFR BLD AUTO: 10.4 % — SIGNIFICANT CHANGE UP (ref 2–14)
NEUTROPHILS # BLD AUTO: 6.24 K/UL — SIGNIFICANT CHANGE UP (ref 1.8–7.4)
NEUTROPHILS NFR BLD AUTO: 61.2 % — SIGNIFICANT CHANGE UP (ref 43–77)
NRBC # BLD: 0 /100 WBCS — SIGNIFICANT CHANGE UP (ref 0–0)
PHOSPHATE SERPL-MCNC: 4.1 MG/DL — SIGNIFICANT CHANGE UP (ref 2.5–4.5)
PLATELET # BLD AUTO: 280 K/UL — SIGNIFICANT CHANGE UP (ref 150–400)
POTASSIUM SERPL-MCNC: 4.2 MMOL/L — SIGNIFICANT CHANGE UP (ref 3.5–5.3)
POTASSIUM SERPL-SCNC: 4.2 MMOL/L — SIGNIFICANT CHANGE UP (ref 3.5–5.3)
PREALB SERPL-MCNC: 28 MG/DL — SIGNIFICANT CHANGE UP (ref 20–40)
PROT SERPL-MCNC: 7.2 G/DL — SIGNIFICANT CHANGE UP (ref 6–8.3)
RBC # BLD: 3.34 M/UL — LOW (ref 4.2–5.8)
RBC # FLD: 13.8 % — SIGNIFICANT CHANGE UP (ref 10.3–14.5)
SODIUM SERPL-SCNC: 138 MMOL/L — SIGNIFICANT CHANGE UP (ref 135–145)
VANCOMYCIN FLD-MCNC: 14.3 UG/ML — SIGNIFICANT CHANGE UP
WBC # BLD: 10.2 K/UL — SIGNIFICANT CHANGE UP (ref 3.8–10.5)
WBC # FLD AUTO: 10.2 K/UL — SIGNIFICANT CHANGE UP (ref 3.8–10.5)

## 2020-08-26 PROCEDURE — 99233 SBSQ HOSP IP/OBS HIGH 50: CPT | Mod: GC

## 2020-08-26 PROCEDURE — 99232 SBSQ HOSP IP/OBS MODERATE 35: CPT | Mod: GC

## 2020-08-26 RX ORDER — VANCOMYCIN HCL 1 G
1000 VIAL (EA) INTRAVENOUS ONCE
Refills: 0 | Status: COMPLETED | OUTPATIENT
Start: 2020-08-26 | End: 2020-08-26

## 2020-08-26 RX ORDER — POLYETHYLENE GLYCOL 3350 17 G/17G
17 POWDER, FOR SOLUTION ORAL DAILY
Refills: 0 | Status: DISCONTINUED | OUTPATIENT
Start: 2020-08-26 | End: 2020-08-31

## 2020-08-26 RX ADMIN — Medication 250 MILLIGRAM(S): at 08:33

## 2020-08-26 RX ADMIN — HEPARIN SODIUM 5000 UNIT(S): 5000 INJECTION INTRAVENOUS; SUBCUTANEOUS at 22:06

## 2020-08-26 RX ADMIN — Medication 100 MILLIGRAM(S): at 08:38

## 2020-08-26 RX ADMIN — Medication 10 MILLIGRAM(S): at 22:06

## 2020-08-26 RX ADMIN — Medication 10 MILLIGRAM(S): at 06:10

## 2020-08-26 RX ADMIN — LACTOBACILLUS ACIDOPH-L.BULGARICUS 1 MILLION CELL CHEWABLE TABLET 2 TABLET(S): at 17:28

## 2020-08-26 RX ADMIN — MUPIROCIN 1 APPLICATION(S): 20 OINTMENT TOPICAL at 06:11

## 2020-08-26 RX ADMIN — ALBUTEROL 2.5 MILLIGRAM(S): 90 AEROSOL, METERED ORAL at 06:11

## 2020-08-26 RX ADMIN — Medication 10 MILLIGRAM(S): at 13:01

## 2020-08-26 RX ADMIN — TAMSULOSIN HYDROCHLORIDE 0.4 MILLIGRAM(S): 0.4 CAPSULE ORAL at 22:06

## 2020-08-26 RX ADMIN — PREGABALIN 1000 MICROGRAM(S): 225 CAPSULE ORAL at 08:37

## 2020-08-26 RX ADMIN — HEPARIN SODIUM 5000 UNIT(S): 5000 INJECTION INTRAVENOUS; SUBCUTANEOUS at 13:01

## 2020-08-26 RX ADMIN — LACTOBACILLUS ACIDOPH-L.BULGARICUS 1 MILLION CELL CHEWABLE TABLET 2 TABLET(S): at 08:31

## 2020-08-26 RX ADMIN — LACTOBACILLUS ACIDOPH-L.BULGARICUS 1 MILLION CELL CHEWABLE TABLET 2 TABLET(S): at 13:00

## 2020-08-26 RX ADMIN — MUPIROCIN 1 APPLICATION(S): 20 OINTMENT TOPICAL at 17:28

## 2020-08-26 RX ADMIN — Medication 1 TABLET(S): at 08:38

## 2020-08-26 RX ADMIN — Medication 500 MILLIGRAM(S): at 08:38

## 2020-08-26 RX ADMIN — Medication 40 MILLIGRAM(S): at 06:10

## 2020-08-26 RX ADMIN — ABACAVIR 300 MILLIGRAM(S): 20 SOLUTION ORAL at 22:06

## 2020-08-26 RX ADMIN — ABACAVIR 300 MILLIGRAM(S): 20 SOLUTION ORAL at 08:37

## 2020-08-26 RX ADMIN — FINASTERIDE 5 MILLIGRAM(S): 5 TABLET, FILM COATED ORAL at 22:06

## 2020-08-26 RX ADMIN — HEPARIN SODIUM 5000 UNIT(S): 5000 INJECTION INTRAVENOUS; SUBCUTANEOUS at 06:10

## 2020-08-26 NOTE — PROGRESS NOTE ADULT - PROBLEM SELECTOR PLAN 1
- NOHEMI 8/24: large mobile 1.8 x 1.3 cm vegetation on medial aspect of anterior leaflet mitral valve with partial flail component. second vegetaion 0.5 x 1.3cm on posterior leaflet w severe eccentric mitral regurg. Normal LV + RV systolic function. 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   - MRA 8/24 w/o septic emboli  [ ] vancomycin 1g daily, vanc lvl daily goal 15-20, ID following - appreciate recs  [ ] cardiac cath to eval for valve surgery  [ ] valve repair with Dr. Camacho  [ ] nephro following, appreciate recs: hold diuretics for cardiac cath to min risk of ASHVIN, no IVF or alkalinizing therapy, will monitor closely post cath and prior to MVR replacement. Monitor UOP and daily weights. Avoid volume depletion, NSAIDs, PPI's, ARB/ACE-I. Dose medications as per eGFR. - NOHEMI 8/24: large mobile 1.8 x 1.3 cm vegetation anterior leaflet mitral valve with partial flail component. second vegetaion 0.5 x 1.3cm on posterior leaflet w severe eccentric mitral regurg. Normal LV + RV systolic function. 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   - MRA 8/24 w/o septic emboli  - cath 8/25 prior to valve surgery showed clean arteries  [ ] vancomycin 1g daily, vanc lvl daily goal 15-20, ID following - appreciate recs  [ ] valve repair with Dr. Camacho  [ ] nephro following, appreciate recs: no IVF or alkalinizing therapy, will monitor closely post cath and prior to MVR replacement. Monitor UOP and daily weights. Avoid volume depletion, NSAIDs, PPI's, ARB/ACE-I. Dose medications as per eGFR.

## 2020-08-26 NOTE — PROGRESS NOTE ADULT - ASSESSMENT
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    #Severe Posteriorly Directed MR c/b flash pulmonary edema  #1.3cm x 1.8cm MV Anterior Leaflet Vegetation   -Vegetation confirmed per NOHEMI; CTS now following.  -C with clean coronary arteries.  -Source of infection likely urinary, urology following  -ABx tailoring and BCx trending as per primary team and ID  -Continue Lasix 40mg PO BID  -Continue hydralazine 10mg PO TID for afterload reduction (hold for SBP <90)    Case discussed with Dr. Lance.    Ave Wadsworth MD PGY-5  Cardiology Fellow  All Cardiology service information can be found 24/7 on [email or web address], password: [email or web address]

## 2020-08-26 NOTE — PROGRESS NOTE ADULT - PROBLEM SELECTOR PLAN 4
"chocolate pudding" consistency, not dominick watery, likely 2/2 abx  - C diff sent, rejected as not watery  [ ] lactobacillus per ID recs - improved  [ ] lactobacillus per ID recs

## 2020-08-26 NOTE — PROGRESS NOTE ADULT - ATTENDING COMMENTS
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

## 2020-08-26 NOTE — PROGRESS NOTE ADULT - PROBLEM SELECTOR PLAN 3
- pt straight caths daily 3x at home for detrusor muscle weakness and BPH, able to urinate little without straight cath; admitted with severe hydronephrosis  - repeat US kidney/bladder with resolved R hydronephrosis; mild L hydronephrosis, urothelial thickening  [ ] urology following, appreciate recs. continue tovar + CTM Cr  [ ] continue tovar for bladder decompression  [ ] continue home tamsulosin and finasteride - pt straight caths daily 3x at home for detrusor muscle weakness and BPH, able to urinate little without straight cath; admitted with severe hydronephrosis  - repeat US kidney/bladder with resolved R hydronephrosis; mild L hydronephrosis, urothelial thickening  [ ] urology following, appreciate recs.   [ ] tovar removed 8/26, straight cath 3x daily as needed   [ ] continue home tamsulosin and finasteride

## 2020-08-26 NOTE — PROGRESS NOTE ADULT - SUBJECTIVE AND OBJECTIVE BOX
INFECTIOUS DISEASES FOLLOW UP    This is a follow up note for this  79 yo Male with  Rhabdomyolysis  Transient Staph epidermidis bacteremia  MV endocarditis     Interval event:  - Aden removed  - Vanc level=14.3 this morning, given 1g vancomycin IV  - CTS planning to do: OR date for MVR with Dr Camacho on Friday 8/28    ANTIMICROBIALS:    abacavir 300 every 12 hours  lamiVUDine- daily  Doravirine 100mg daily    OTHER MEDS:  MEDICATIONS  (STANDING):  finasteride 5 daily  furosemide    Tablet 40 daily  heparin   Injectable 5000 every 8 hours  hydrALAZINE 10 three times a day  polyethylene glycol 3350 17 daily PRN  tamsulosin 0.4 at bedtime      Allergies  No Known Allergies    Vital Signs Last 24 Hrs  T(F): 98.5 (08-26-20 @ 14:42), Max: 99.9 (08-19-20 @ 20:11)    Vital Signs Last 24 Hrs  HR: 91 (08-26-20 @ 14:42) (57 - 94)  BP: 119/66 (08-26-20 @ 14:42) (97/59 - 119/66)  RR: 19 (08-26-20 @ 14:42)  SpO2: 97% (08-26-20 @ 14:42) (95% - 100%)  Wt(kg): --    PHYSICAL EXAM:  Constitutional:no acute distress, sitting in a chair  Eyes:EMILY, EOMI  Ear/Nose/Throat: no oral lesions, 	  Respiratory: clear BL  Cardiovascular: pansystolic murmur best heard at apex  Gastrointestinal:soft, some RLQ tenderness  Extremities:no e/e/c cellulitis resolved  No Lymphadenopathy  IV sites not inflammed.    Labs:                        11.3   10.20 )-----------( 280      ( 26 Aug 2020 07:21 )             34.8     08-26    138  |  96  |  41<H>  ----------------------------<  81  4.2   |  26  |  1.64<H>    Ca    10.7<H>      26 Aug 2020 07:20  Phos  4.1     08-26  Mg     2.4     08-26    TPro  7.2  /  Alb  3.6  /  TBili  0.2  /  DBili  x   /  AST  51<H>  /  ALT  45  /  AlkPhos  126<H>  08-26      WBC Trend:  WBC Count: 10.20 (08-26-20 @ 07:21)  WBC Count: 9.86 (08-25-20 @ 05:19)  WBC Count: 9.78 (08-24-20 @ 06:50)  WBC Count: 9.66 (08-23-20 @ 06:26)      Creatine Trend:  Creatinine, Serum: 1.64 (08-26)  Creatinine, Serum: 1.60 (08-25)  Creatinine, Serum: 1.64 (08-24)  Creatinine, Serum: 1.59 (08-23)      Liver Biochemical Testing Trend:  Alanine Aminotransferase (ALT/SGPT): 45 (08-26)  Alanine Aminotransferase (ALT/SGPT): 37 (08-25)  Alanine Aminotransferase (ALT/SGPT): 29 (08-24)  Aspartate Aminotransferase (AST/SGOT): 51 (08-26-20 @ 07:20)  Aspartate Aminotransferase (AST/SGOT): 51 (08-25-20 @ 05:19)  Aspartate Aminotransferase (AST/SGOT): 36 (08-24-20 @ 06:50)  Bilirubin Total, Serum: 0.2 (08-26)  Bilirubin Total, Serum: 0.2 (08-25)  Bilirubin Total, Serum: 0.2 (08-24)      Trend LDH          MICROBIOLOGY:  Vancomycin Level, Random: 14.3 (08-26 @ 07:21)  Vancomycin Level, Random: 21.9 (08-25 @ 05:21)  Vancomycin Level, Trough: 20.2 (08-24 @ 06:50)  Vancomycin Level, Trough: 18.1 (08-23 @ 06:26)  Vancomycin Level, Trough: 11.2 (08-22 @ 15:33)      Culture - Blood (collected 20 Aug 2020 15:01)  Source: .Blood Blood-Venous  Final Report:    No Growth Final    Culture - Blood (collected 20 Aug 2020 15:01)  Source: .Blood Blood-Peripheral  Final Report:    No Growth Final    Culture - Urine (collected 19 Aug 2020 09:00)  Source: .Urine Clean Catch (Midstream)  Final Report:    <10,000 CFU/mL Normal Urogenital Kami    Culture - Blood (collected 19 Aug 2020 01:39)  Source: .Blood Blood-Peripheral  Final Report:    Growth in aerobic and anaerobic bottles: Staphylococcus epidermidis  Organism: Staphylococcus epidermidis  Organism: Staphylococcus epidermidis    Sensitivities:      -  Ampicillin/Sulbactam: R <=8/4      -  Cefazolin: R <=4      -  Clindamycin: S 0.5      -  Erythromycin: S <=0.25      -  Gentamicin: S <=1 Should not be used as monotherapy      -  Oxacillin: R >2      -  Penicillin: R >8      -  RIF- Rifampin: S <=1 Should not be used as monotherapy      -  Tetra/Doxy: S 2      -  Trimethoprim/Sulfamethoxazole: S <=0.5/9.5      -  Vancomycin: S 2      Method Type: FLORENCIA    Culture - Blood (collected 19 Aug 2020 01:39)  Source: .Blood Blood-Peripheral  Final Report:    Growth in aerobic and anaerobic bottles: Staphylococcus epidermidis    "Due to technical problems, Proteus sp. will Not be reported as part of    the BCID panel until further notice"    ***Blood Panel PCR results on this specimen are available    approximately 3 hours after the Gram stain result.***    Gram stain, PCR, and/or culture results may not always    correspond due to difference in methodologies.    ************************************************************    This PCR assay was performed using [employer].    The following targets are tested for: Enterococcus,    vancomycin resistant enterococci, Listeria monocytogenes,    coagulase negative staphylococci, S. aureus,    methicillin resistant S. aureus, Streptococcus agalactiae    (Group B), S. pneumoniae, S. pyogenes (Group A),    Acinetobacter baumannii, Enterobacter cloacae, E. coli,    Klebsiella oxytoca, K. pneumoniae, Proteus sp.,    Serratia marcescens, Haemophilus influenzae,    Neisseria meningitidis, Pseudomonas aeruginosa, Candida    albicans, C. glabrata, C krusei, C parapsilosis,    C. tropicalis and the KPC resistance gene.  Organism: Blood Culture PCR  Organism: Blood Culture PCR    Sensitivities:      -  Coagulase negative Staphylococcus: Detec      Method Type: PCR      ABS CD4: 324 /uL (08-19-20 @ 03:26)    HIV-1 RNA Quantitative, Viral Load:   NOT DET. (08-19-20 @ 04:46)  HIV-1 Viral Load Result: NOT DET. (08-19-20 @ 04:46)          OTHER TESTS:  COVID-19 PCR: NotDetec (08-19-20 @ 02:39)    COVID-19 IgG Antibody Index: 0.92 Index (08-19-20 @ 09:26)          RADIOLOGY & ADDITIONAL STUDIES:  < from: MR Angio Head w/wo IV Cont (08.24.20 @ 21:57) >    IMPRESSION:    Dominant anterior circulation, fetal bilateral PCAs with origin infundibula, hypoplastic basilar artery and P1 segments likely anatomic variation, no flow-limitingstenosis or aneurysms, tiny aneurysms may be below resolution of MRA technique.      < end of copied text >        < from: Transesophageal Echocardiogram w/o TTE (08.24.20 @ 10:00) >  Conclusions:  1. Large mobile about 1.8cm x 1.3 cm vegetation seen on the  medial aspect of the anterior leaflet with a partial flail  component of the medial A2/ A3 area. There us also a  vegetation see at the annulus of P3 which measures 0.5cm x  1.3cm  Severe eccentric posterior mitral regurgitation.  2. Normal left ventricular systolic function.  3. Normal right ventricular size and function.  4. Tricuspid valve prolapse. Mildtricuspid regurgitation.  Discussed with Team member  at time of Study (Ivan Palma )    < end of copied text >        < from: Xray Chest 1 View- PORTABLE-Routine (08.20.20 @ 16:55) >  IMPRESSION:    Left lower lung hazy opacity correlates with nodular opacity seen CT chest abdomen and pelvis from 8/19/2020. A follow-up is recommended to confirm resolution in 4-6 weeks.    < end of copied text >    < from: Transthoracic Echocardiogram (08.20.20 @ 09:27) >  Conclusions:  1. Mitral valve not well visualized; however, the anterior  leaflet of the mitral valve appears thickened with a 1.3 cm  x 1.4 cm echodensity on the atrial surface (not well  visualized), possibly a vegetation (the ehcodensity does  not display obvious independent mobility, but study is  technically limited), which may be suggestive of  endocarditis given the clinical history. Findings  communicated to Ольга RICKS. Consider NOHEMI if clinically  indicated. Severe, eccentric posteriorly directed mitral  regurgitation (notably the left atrium is not dilated,  suggestive of acute mitral regurgitation).  2. Normal left ventricular internal dimensions and wall  thicknesses.  3. Normal left ventricular systolic function. No segmental  wall motion abnormalities.  4. Normal right ventricular size and function.  5. In some views, there is thickening of the tricuspid  leaflet, which may be suggestive of vegetation (not well  visualized). Mild tricuspid regurgitation.    < end of copied text >

## 2020-08-26 NOTE — PROGRESS NOTE ADULT - PROBLEM SELECTOR PLAN 5
- possibly 2/2 cardiogenic syncope (severe acute MVR). Tele since admission shows 2 seconds of PAT on first day, no events since. No hx arrythmia.   [ ] PT recommended: CYNTHIA on d/c - unclear cause. c/b rhabdo, resolved. Tele since admission shows 2 seconds of PAT on first day, no events since. No hx arrythmia.   [ ] PT recommended: CYNTHIA on d/c

## 2020-08-26 NOTE — PROGRESS NOTE ADULT - PROBLEM SELECTOR PLAN 6
Controlled; last CD4 368; undetectable viral load in July  [ ] continue home abacavir, lamivudine, Doravirine Controlled; CD4 324 and undected viral load during admission  [ ] continue home abacavir, lamivudine, Doravirine

## 2020-08-26 NOTE — PROGRESS NOTE ADULT - ASSESSMENT
Mr. Monsivais is a 81 y/o man with hx HIV, CAD, detrusor muscle weakness, BPH presenting for fall, down for 24 hrs, admitted with rhabdo, bilateral severe ureterohydronephrosis and thickened bladder on CTAP 2/2 inability to continue his home straight cath 3x daily after fall, tovar in place. Rhabdo improved, hydronephrosis improved with tovar (urology following).   - NOHEMI shows endocarditis (NOHEMI: 1.8x1.3 vegetation on medial aspect of anterior leaflet with partial flail component + 0.5x1.3cm vegetation no posterior leaflet w severe eccentric mitral regurg). BCx staph epidermidis, repeat NGTD, on vancomycin. s/p MRA without septic emboli. s/p cardiac cath to eval prior to valve surgery per CT surgery.

## 2020-08-26 NOTE — PROGRESS NOTE ADULT - SUBJECTIVE AND OBJECTIVE BOX
Rosa M Palma PGY1    Patient is a 80y old  Male who presents with a chief complaint of fall (25 Aug 2020 11:10)      SUBJECTIVE / OVERNIGHT EVENTS:  - overnight - no acute events, had cath yesterday prior to valve surgery  - am -     MEDICATIONS  (STANDING):  abacavir 300 milliGRAM(s) Oral every 12 hours  ALBUTerol    0.083% 2.5 milliGRAM(s) Nebulizer four times a day  ascorbic acid 500 milliGRAM(s) Oral daily  cyanocobalamin 1000 MICROGram(s) Oral daily  Doravirine 100mg tablets (pifeltro) 100 milliGRAM(s) 100 milliGRAM(s) Oral daily  finasteride 5 milliGRAM(s) Oral daily  furosemide    Tablet 40 milliGRAM(s) Oral daily  heparin   Injectable 5000 Unit(s) SubCutaneous every 8 hours  hydrALAZINE 10 milliGRAM(s) Oral three times a day  lactobacillus acidophilus and bulgaricus Chewable 2 Tablet(s) Chew three times a day with meals  lactobacillus acidophilus and bulgaricus Chewable      lamiVUDine- milliGRAM(s) Oral daily  multivitamin 1 Tablet(s) Oral daily  mupirocin 2% Nasal 1 Application(s) Nasal two times a day  tamsulosin 0.4 milliGRAM(s) Oral at bedtime    MEDICATIONS  (PRN):      CAPILLARY BLOOD GLUCOSE        I&O's Summary    25 Aug 2020 07:01  -  26 Aug 2020 07:00  --------------------------------------------------------  IN: 980 mL / OUT: 1450 mL / NET: -470 mL        Vital Signs Last 24 Hrs  T(C): 36.7 (26 Aug 2020 04:19), Max: 37.1 (25 Aug 2020 11:16)  T(F): 98 (26 Aug 2020 04:19), Max: 98.8 (25 Aug 2020 11:16)  HR: 93 (26 Aug 2020 04:19) (75 - 94)  BP: 107/55 (26 Aug 2020 04:19) (97/59 - 177/65)  BP(mean): --  RR: 18 (26 Aug 2020 04:19) (16 - 18)  SpO2: 95% (26 Aug 2020 04:19) (95% - 100%)    PHYSICAL EXAM:  GENERAL: no distress  PSYCH: A&O x3  HEAD: Atraumatic, Normocephalic  NECK: Supple, No JVD  CHEST/LUNG: clear to auscultation bilaterally  HEART: regular rate and rhythm, no murmurs  ABDOMEN: nontender to palpation, no rebound tenderness/guarding  EXTREMITIES: no edema on bilateral LE  NEUROLOGY: no focal neurologic deficit  SKIN: No rashes or lesions    LABS:                        11.4   9.86  )-----------( 245      ( 25 Aug 2020 05:19 )             34.4      08-25    135  |  96  |  37<H>  ----------------------------<  101<H>  3.5   |  27  |  1.60<H>    Ca    9.5      25 Aug 2020 05:19  Phos  4.2     08-25  Mg     2.2     08-25    TPro  7.1  /  Alb  3.3  /  TBili  0.2  /  DBili  x   /  AST  51<H>  /  ALT  37  /  AlkPhos  123<H>  08-25    PT/INR - ( 25 Aug 2020 05:20 )   PT: 11.7 sec;   INR: 0.98 ratio         PTT - ( 25 Aug 2020 05:20 )  PTT:30.9 sec          RADIOLOGY & ADDITIONAL TESTS:    Imaging Personally Reviewed:    Consultant(s) Notes Reviewed:      Care Discussed with Consultants/Other Providers: Rosa M Palma PGY1    Patient is a 80y old  Male who presents with a chief complaint of fall (25 Aug 2020 11:10)      SUBJECTIVE / OVERNIGHT EVENTS:  - overnight - no acute events, had cath yesterday prior to valve surgery  - am - feels well, no shortness of breath, no chest pain. Aware that cath yesterday showed no blockages in arteries. Aware CT surgery will see him to discuss valve surgery.     MEDICATIONS  (STANDING):  abacavir 300 milliGRAM(s) Oral every 12 hours  ALBUTerol    0.083% 2.5 milliGRAM(s) Nebulizer four times a day  ascorbic acid 500 milliGRAM(s) Oral daily  cyanocobalamin 1000 MICROGram(s) Oral daily  Doravirine 100mg tablets (pifeltro) 100 milliGRAM(s) 100 milliGRAM(s) Oral daily  finasteride 5 milliGRAM(s) Oral daily  furosemide    Tablet 40 milliGRAM(s) Oral daily  heparin   Injectable 5000 Unit(s) SubCutaneous every 8 hours  hydrALAZINE 10 milliGRAM(s) Oral three times a day  lactobacillus acidophilus and bulgaricus Chewable 2 Tablet(s) Chew three times a day with meals  lactobacillus acidophilus and bulgaricus Chewable      lamiVUDine- milliGRAM(s) Oral daily  multivitamin 1 Tablet(s) Oral daily  mupirocin 2% Nasal 1 Application(s) Nasal two times a day  tamsulosin 0.4 milliGRAM(s) Oral at bedtime    MEDICATIONS  (PRN):      CAPILLARY BLOOD GLUCOSE        I&O's Summary    25 Aug 2020 07:01  -  26 Aug 2020 07:00  --------------------------------------------------------  IN: 980 mL / OUT: 1450 mL / NET: -470 mL        Vital Signs Last 24 Hrs  T(C): 36.7 (26 Aug 2020 04:19), Max: 37.1 (25 Aug 2020 11:16)  T(F): 98 (26 Aug 2020 04:19), Max: 98.8 (25 Aug 2020 11:16)  HR: 93 (26 Aug 2020 04:19) (75 - 94)  BP: 107/55 (26 Aug 2020 04:19) (97/59 - 177/65)  BP(mean): --  RR: 18 (26 Aug 2020 04:19) (16 - 18)  SpO2: 95% (26 Aug 2020 04:19) (95% - 100%)    PHYSICAL EXAM:  GENERAL: no distress  PSYCH: A&O x3  HEAD: Atraumatic, Normocephalic  NECK: Supple, No JVD  CHEST/LUNG: clear to auscultation bilaterally  HEART: regular rate and rhythm, 2/6 systolic murmur most noticeable at apex  ABDOMEN: nontender to palpation, no rebound tenderness/guarding  EXTREMITIES: no edema on bilateral LE  NEUROLOGY: no focal neurologic deficit, 5/5 str in UE and LE  SKIN: No rashes or lesions    LABS:                        11.4   9.86  )-----------( 245      ( 25 Aug 2020 05:19 )             34.4      08-25    135  |  96  |  37<H>  ----------------------------<  101<H>  3.5   |  27  |  1.60<H>    Ca    9.5      25 Aug 2020 05:19  Phos  4.2     08-25  Mg     2.2     08-25    TPro  7.1  /  Alb  3.3  /  TBili  0.2  /  DBili  x   /  AST  51<H>  /  ALT  37  /  AlkPhos  123<H>  08-25    PT/INR - ( 25 Aug 2020 05:20 )   PT: 11.7 sec;   INR: 0.98 ratio         PTT - ( 25 Aug 2020 05:20 )  PTT:30.9 sec          RADIOLOGY & ADDITIONAL TESTS:    Imaging Personally Reviewed:    Consultant(s) Notes Reviewed:      Care Discussed with Consultants/Other Providers:

## 2020-08-26 NOTE — PROGRESS NOTE ADULT - ASSESSMENT
79y/o M with PMHx of HIV, BPH, CAD, urinary retention self catheterizes TID, admitted after a fall found to have hydronephrosis likely from urinary retention.    SCr stable ~1.5-1.6    US with mild left hydronephrosis and no right hydronephrosis    - Patient's significant bilateral hydronephrosis has resolved since admission  - Aden catheter removed this AM  - Have patient perform his standard CIC regimen (three times per day)  - Trend SCr  - D/w Dr. Silver      [facility]  [address] [place] [address], [address] [address] [phone]

## 2020-08-26 NOTE — PROGRESS NOTE ADULT - PROBLEM SELECTOR PLAN 2
2/2 severe acute mitral valve regurg on echo with partial flail leaflet component 2/2 vegetation visualized on NOHEMI  - on RA spo2 wnl  [ ] cardiology following: lasix 40 daily + hydralazine 10 TID, hold for SBP <100  (lasix reduced from BID to QD as good UO and will be getting contrast during cath/surgery) 2/2 severe acute mitral valve regurg on echo with partial flail leaflet component 2/2 vegetation visualized on NOHEMI  - on RA spo2 wnl  [ ] cardiology following: lasix 40 daily + hydralazine 10 TID, hold for SBP <100  (lasix reduced from BID to QD as good UO and reduce risk of ASHVIN)

## 2020-08-26 NOTE — PROGRESS NOTE ADULT - ATTENDING COMMENTS
Agree with above with following addendum;    #Endocarditis with resultant severe valvular reguritation of mitral valve  -continue vanc.  -for OR on Friday  -appreciate ID, ordered brucella, bartonella and q-fever Abx for AM    #severe MR  -continue with reduced dose of lasix as patient appears euvolemic.    #HIV  -cont ART    #Urinary retention s/p straight cath  -patient uncomfortable with hospital cath system. Team to speak to urology to see if we have his cath sets in hospital.  RN to assist in straight cath until able to get other kit.     rest as above

## 2020-08-26 NOTE — PROGRESS NOTE ADULT - ASSESSMENT
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  Labs noted for WBC 16.5, Cr 2.2, CPK 3857, lactate 3.1, positive UA on admission  cellulitis on left knee- resolved  bacteremia with 2/2 sets on 8/19 growing coag. negative staph in the setting of valvular heart disease    #Infective endocarditis:  - NOHEMI:  1. Large mobile about 1.8cm x 1.3 cm vegetation seen on the medial aspect of the anterior leaflet with a partial flail component of the medial A2/ A3 area. There us also a vegetation see at the annulus of P3 which measures 0.5cm x 1.3cm  Severe eccentric posterior mitral regurgitation.  - On exam, pansystolic murmur at apex consistent with MR  - BCx(8/19): Growth in aerobic and anaerobic bottles: Staphylococcus epidermidis  - BCx(8/20): no growth  - Vancomycin 1g daily for now  - Check daily labs for vancomycin random level, goal is 15-20  - CTS planning to do: OR date for MVR with Dr Camacho on Friday 8/28  - f pt goes to OR, vegatation tissue has to send for culture and PCR (probably can send for 16S genome sequencing). We will contact micro lab for that.  - it's unclear if S.epi is the true pathogen given it's transient and still could be contamination. We would like to send common pathogens f culture negative endocarditis: Brucella ab, Q fever ab, Bartonella ab    #Diarrhea  - multiple loose BMs  - antibiotic related  - c/w lactobacillus  - Stool rejected for C.diff as not watery    #HIV:  - RA9=369, VL undet  - c/w ABC/3TC/HUEY    #Left knee cellulitis:  - resolved  - s/p Zosyn and Cefazolin, no need to continue    #Pyuria:  - UCx<1000 normal  oswaldo  - Likely colonization  - Per , Patient's significant bilateral hydronephrosis has resolved since admission. Recommend removing tovar catheter and having patient return back to his [facility] routine    Vannessa Gamble MD, PGY4   ID fellow  Pager: [phone]  After 5pm/weekends call [phone]    d/w Dr. Almazan  Recs conveyed to primary team T1

## 2020-08-26 NOTE — PROGRESS NOTE ADULT - SUBJECTIVE AND OBJECTIVE BOX
Daily In-House Cardiology Progress Note  -------------------------------------------------------    24-Hour Events/Subjective:      -No chest pain, SOB, palpitations or lightheadedness.    Telemetry:  -NSR .    ROS:   Constitutional: No Fatigue, weakness, fever, chills  HEENT: No sore throat, dryness, hoarseness, congestion  Cardiovascular: No chest pain, SOB, palpitations, PRESCOTT, lightheadedness, dizziness  Respiratory: No wheezing, cough, phlegm  GI: No nausea, vomiting, diarrhea, poor PO tolerance, dyspepsia, dysphagia  Musculoskeletal: No myalgias, arthralgias, joint swelling, back pain  Extremities: No swelling, coldness  Skin: No rashes, lesions, pruritis   Neuro: No weakness, confusion, blurry vision  Psych: No anxiety, depression    Current Meds:  abacavir 300 milliGRAM(s) Oral every 12 hours  ascorbic acid 500 milliGRAM(s) Oral daily  cyanocobalamin 1000 MICROGram(s) Oral daily  Doravirine 100mg tablets (pifeltro) 100 milliGRAM(s) 100 milliGRAM(s) Oral daily  finasteride 5 milliGRAM(s) Oral daily  furosemide    Tablet 40 milliGRAM(s) Oral daily  heparin   Injectable 5000 Unit(s) SubCutaneous every 8 hours  hydrALAZINE 10 milliGRAM(s) Oral three times a day  lactobacillus acidophilus and bulgaricus Chewable 2 Tablet(s) Chew three times a day with meals  lactobacillus acidophilus and bulgaricus Chewable      lamiVUDine- milliGRAM(s) Oral daily  multivitamin 1 Tablet(s) Oral daily  mupirocin 2% Nasal 1 Application(s) Nasal two times a day  tamsulosin 0.4 milliGRAM(s) Oral at bedtime    Vitals:  T(F): 98 (08-26), Max: 98.8 (08-25)  HR: 93 (08-26) (75 - 94)  BP: 107/55 (08-26) (97/59 - 177/65)  RR: 18 (08-26)  SpO2: 95% (08-26)    I&O's Summary  25 Aug 2020 07:01  -  26 Aug 2020 07:00  --------------------------------------------------------  IN: 980 mL / OUT: 1450 mL / NET: -470 mL    26 Aug 2020 07:01  -  26 Aug 2020 10:16  --------------------------------------------------------  IN: 0 mL / OUT: 400 mL / NET: -400 mL    Physical Exam:  Appearance: No acute distress; well appearing  Eyes: PERRL, EOMI, pink conjunctiva  HEENT: Normal oral mucosa  Cardiovascular: RRR, S1, S2, grade 3/6 holosystolic murmur at apex and radiating to axilla; no edema; no JVD  Respiratory: Clear to auscultation bilaterally; decreased breath sounds at bases  Gastrointestinal: soft, non-tender, non-distended with normal bowel sounds  Musculoskeletal: No clubbing; no joint deformity   Neurologic: Non-focal  Lymphatic: No lymphadenopathy  Psychiatry: AAOx3, mood & affect appropriate  Skin: No rashes, ecchymoses, or cyanosis               11.3   10.20 )-----------( 280      ( 26 Aug 2020 07:21 )             34.8     08-26    138  |  96  |  41<H>  ----------------------------<  81  4.2   |  26  |  1.64<H>    Ca    10.7<H>      26 Aug 2020 07:20  Phos  4.1     08-26  Mg     2.4     08-26    TPro  7.2  /  Alb  3.6  /  TBili  0.2  /  DBili  x   /  AST  51<H>  /  ALT  45  /  AlkPhos  126<H>  08-26    PT/INR - ( 25 Aug 2020 05:20 )   PT: 11.7 sec;   INR: 0.98 ratio       PTT - ( 25 Aug 2020 05:20 )  PTT:30.9 sec  CARDIAC MARKERS ( 22 Aug 2020 06:32 )  x     / x     / x     / 525 U/L / x     / x      CARDIAC MARKERS ( 21 Aug 2020 06:15 )  x     / x     / x     / 929 U/L / x     / x      CARDIAC MARKERS ( 20 Aug 2020 11:43 )  86 ng/L / x     / x     / x     / x     / x      CARDIAC MARKERS ( 20 Aug 2020 06:10 )  102 ng/L / x     / x     / 1248 U/L / x     / x      CARDIAC MARKERS ( 19 Aug 2020 22:35 )  72 ng/L / x     / x     / x     / x     / x        Serum Pro-Brain Natriuretic Peptide: 1987 pg/mL (08-22 @ 06:32)  Serum Pro-Brain Natriuretic Peptide: 4864 pg/mL (08-19 @ 13:43)

## 2020-08-27 DIAGNOSIS — E03.9 HYPOTHYROIDISM, UNSPECIFIED: ICD-10-CM

## 2020-08-27 LAB
BLD GP AB SCN SERPL QL: NEGATIVE — SIGNIFICANT CHANGE UP
HCT VFR BLD CALC: 35.5 % — LOW (ref 39–50)
HGB BLD-MCNC: 11.7 G/DL — LOW (ref 13–17)
MCHC RBC-ENTMCNC: 33 GM/DL — SIGNIFICANT CHANGE UP (ref 32–36)
MCHC RBC-ENTMCNC: 34.4 PG — HIGH (ref 27–34)
MCV RBC AUTO: 104.4 FL — HIGH (ref 80–100)
NRBC # BLD: 0 /100 WBCS — SIGNIFICANT CHANGE UP (ref 0–0)
PLATELET # BLD AUTO: 295 K/UL — SIGNIFICANT CHANGE UP (ref 150–400)
RBC # BLD: 3.4 M/UL — LOW (ref 4.2–5.8)
RBC # FLD: 13.9 % — SIGNIFICANT CHANGE UP (ref 10.3–14.5)
RH IG SCN BLD-IMP: POSITIVE — SIGNIFICANT CHANGE UP
SARS-COV-2 RNA SPEC QL NAA+PROBE: SIGNIFICANT CHANGE UP
VANCOMYCIN FLD-MCNC: 13.4 UG/ML — SIGNIFICANT CHANGE UP
WBC # BLD: 10.01 K/UL — SIGNIFICANT CHANGE UP (ref 3.8–10.5)
WBC # FLD AUTO: 10.01 K/UL — SIGNIFICANT CHANGE UP (ref 3.8–10.5)

## 2020-08-27 PROCEDURE — 99233 SBSQ HOSP IP/OBS HIGH 50: CPT | Mod: GC

## 2020-08-27 PROCEDURE — 93010 ELECTROCARDIOGRAM REPORT: CPT

## 2020-08-27 PROCEDURE — 99232 SBSQ HOSP IP/OBS MODERATE 35: CPT | Mod: GC

## 2020-08-27 PROCEDURE — 99232 SBSQ HOSP IP/OBS MODERATE 35: CPT

## 2020-08-27 RX ORDER — VANCOMYCIN HCL 1 G
1000 VIAL (EA) INTRAVENOUS ONCE
Refills: 0 | Status: COMPLETED | OUTPATIENT
Start: 2020-08-27 | End: 2020-08-27

## 2020-08-27 RX ORDER — CHLORHEXIDINE GLUCONATE 213 G/1000ML
30 SOLUTION TOPICAL ONCE
Refills: 0 | Status: COMPLETED | OUTPATIENT
Start: 2020-08-27 | End: 2020-08-31

## 2020-08-27 RX ORDER — CHLORHEXIDINE GLUCONATE 213 G/1000ML
1 SOLUTION TOPICAL ONCE
Refills: 0 | Status: COMPLETED | OUTPATIENT
Start: 2020-08-27 | End: 2020-08-27

## 2020-08-27 RX ORDER — CEFUROXIME AXETIL 250 MG
1500 TABLET ORAL ONCE
Refills: 0 | Status: DISCONTINUED | OUTPATIENT
Start: 2020-08-27 | End: 2020-08-28

## 2020-08-27 RX ORDER — MUPIROCIN 20 MG/G
1 OINTMENT TOPICAL
Refills: 0 | Status: DISCONTINUED | OUTPATIENT
Start: 2020-08-27 | End: 2020-08-31

## 2020-08-27 RX ADMIN — FINASTERIDE 5 MILLIGRAM(S): 5 TABLET, FILM COATED ORAL at 21:05

## 2020-08-27 RX ADMIN — ABACAVIR 300 MILLIGRAM(S): 20 SOLUTION ORAL at 21:05

## 2020-08-27 RX ADMIN — LACTOBACILLUS ACIDOPH-L.BULGARICUS 1 MILLION CELL CHEWABLE TABLET 2 TABLET(S): at 13:03

## 2020-08-27 RX ADMIN — CHLORHEXIDINE GLUCONATE 1 APPLICATION(S): 213 SOLUTION TOPICAL at 22:23

## 2020-08-27 RX ADMIN — HEPARIN SODIUM 5000 UNIT(S): 5000 INJECTION INTRAVENOUS; SUBCUTANEOUS at 13:03

## 2020-08-27 RX ADMIN — Medication 40 MILLIGRAM(S): at 06:27

## 2020-08-27 RX ADMIN — Medication 10 MILLIGRAM(S): at 21:05

## 2020-08-27 RX ADMIN — PREGABALIN 1000 MICROGRAM(S): 225 CAPSULE ORAL at 08:52

## 2020-08-27 RX ADMIN — LACTOBACILLUS ACIDOPH-L.BULGARICUS 1 MILLION CELL CHEWABLE TABLET 2 TABLET(S): at 18:10

## 2020-08-27 RX ADMIN — Medication 1 TABLET(S): at 08:52

## 2020-08-27 RX ADMIN — Medication 10 MILLIGRAM(S): at 06:27

## 2020-08-27 RX ADMIN — ABACAVIR 300 MILLIGRAM(S): 20 SOLUTION ORAL at 08:52

## 2020-08-27 RX ADMIN — Medication 10 MILLIGRAM(S): at 13:03

## 2020-08-27 RX ADMIN — Medication 250 MILLIGRAM(S): at 13:03

## 2020-08-27 RX ADMIN — MUPIROCIN 1 APPLICATION(S): 20 OINTMENT TOPICAL at 06:27

## 2020-08-27 RX ADMIN — Medication 100 MILLIGRAM(S): at 08:52

## 2020-08-27 RX ADMIN — MUPIROCIN 1 APPLICATION(S): 20 OINTMENT TOPICAL at 21:08

## 2020-08-27 RX ADMIN — HEPARIN SODIUM 5000 UNIT(S): 5000 INJECTION INTRAVENOUS; SUBCUTANEOUS at 06:27

## 2020-08-27 RX ADMIN — Medication 500 MILLIGRAM(S): at 08:52

## 2020-08-27 RX ADMIN — TAMSULOSIN HYDROCHLORIDE 0.4 MILLIGRAM(S): 0.4 CAPSULE ORAL at 21:06

## 2020-08-27 RX ADMIN — HEPARIN SODIUM 5000 UNIT(S): 5000 INJECTION INTRAVENOUS; SUBCUTANEOUS at 21:06

## 2020-08-27 NOTE — PROGRESS NOTE ADULT - SUBJECTIVE AND OBJECTIVE BOX
Cardiac Surgery Pre-op Note:    CC: Patient is a 80y old  Male who presents with a chief complaint of fall  underwent  ECHO found to have MR suspected  endocarditis now for MVR  with Dr Camacho in am      Referring Physician: DR Wood                                                                                                           Surgeon: Dr Camacho    Procedure: 8/28 MVR    Allergies    No Known Allergies    Intolerances        HPI:  80M hx of HIV+, BPH, CAD presented after a fall at home and found in own urine/feces. Reportedly per EMS, patient had been using a scrotal device to clamp the testes but was unable to remove it and then slid to the ground while trying to maneuvering the device. Patient reports that he was too weak to get himself up. Denies trauma to his head and was laying on his L side. Per EMS, the heat in the home was significantly elevated. Denies anticoagulants or aspirin, denies any LOC and states he recalls all events. Denies recent illness, fever, chills, dysuria, dizziness, cp, sob, abd pain. Denies cough, sore throat. Of note, patient reports that he self straight caths 3 times a day due to BPH. He also had a bladder mass s/p resection and was told it was benign.    In the ED, vitals: T 99.2, , /60, RR 24 satting 97 ORA. got 2L NS, started on maintenance fluid. Urology placed tovar, draining blood tinged urine. Labs noted for WBC 16.5, Cr 2.2, CPK 3857, lactate 3.1, positive UA. (19 Aug 2020 04:49)      PAST MEDICAL & SURGICAL HISTORY:  Orchitis and epididymitis  Vitamin D deficiency  Bladder mass  Edema of both legs  Osteoporosis  Seborrheic keratosis  Constipation, unspecified constipation type  Chronic kidney disease, unspecified stage  HIV (human immunodeficiency virus infection)  Unsteady gait  S/P coronary artery stent placement  No Past Surgical History      MEDICATIONS  (STANDING):  abacavir 300 milliGRAM(s) Oral every 12 hours  ascorbic acid 500 milliGRAM(s) Oral daily  cefuroxime  IVPB 1500 milliGRAM(s) IV Intermittent once  chlorhexidine 0.12% Liquid 30 milliLiter(s) Swish and Spit once  chlorhexidine 4% Liquid 1 Application(s) Topical once  cyanocobalamin 1000 MICROGram(s) Oral daily  Doravirine 100mg tablets (pifeltro) 100 milliGRAM(s) 100 milliGRAM(s) Oral daily  finasteride 5 milliGRAM(s) Oral daily  heparin   Injectable 5000 Unit(s) SubCutaneous every 8 hours  hydrALAZINE 10 milliGRAM(s) Oral three times a day  lactobacillus acidophilus and bulgaricus Chewable 2 Tablet(s) Chew three times a day with meals  lactobacillus acidophilus and bulgaricus Chewable      lamiVUDine- milliGRAM(s) Oral daily  multivitamin 1 Tablet(s) Oral daily  mupirocin 2% Nasal 1 Application(s) Nasal two times a day  tamsulosin 0.4 milliGRAM(s) Oral at bedtime    MEDICATIONS  (PRN):  polyethylene glycol 3350 17 Gram(s) Oral daily PRN Constipation    T(C): 36.4 (08-27-20 @ 08:24), Max: 37.3 (08-26-20 @ 18:00)  T(F): 97.6 (08-27-20 @ 08:24), Max: 99.1 (08-26-20 @ 18:00)  HR: 71 (08-27-20 @ 08:24) (69 - 91)  BP: 96/59 (08-27-20 @ 08:24) (96/59 - 129/66)  RR: 18 (08-27-20 @ 08:24) (18 - 19)  SpO2: 94% (08-27-20 @ 08:24) (94% - 99%)  height 190   weight 70.9      Labs:                        11.7   10.01 )-----------( 295      ( 27 Aug 2020 07:10 )             35.5     08-27    136  |  95<L>  |  54<H>  ----------------------------<  105<H>  4.3   |  27  |  1.83<H>    Ca    10.5      27 Aug 2020 07:09  Phos  4.2     08-27  Mg     2.6     08-27    TPro  7.4  /  Alb  3.6  /  TBili  0.3  /  DBili  x   /  AST  41<H>  /  ALT  45  /  AlkPhos  130<H>  08-27        Blood Type: ABO Interpretation: A (08-24 @ 06:23)    HGB A1C:   Prealbumin: Prealbumin, Serum: 28 mg/dL (08-26 @ 14:18)    Pro-BNP:   Thyroid Panel: 08-23 @ 08:16/--  1.2/--/--  08-22 @ 12:42/4.24  --/5.3/78    MRSA:  / MSSA:       CXR: < from: Xray Chest 1 View- PORTABLE-Routine (08.20.20 @ 16:55) >  Left lower lung hazy opacity correlates with nodular opacity seen CT chest abdomen and pelvis from 8/19/2020. A follow-up is recommended to confirm resolution in 4-6 weeks.       < from: CT Chest No Cont (08.19.20 @ 00:49) >  Lack of intravenous contrast limits evaluation of the solid abdominal organs and vasculature. No evidence of acute traumatic injury to the chest, abdomen, or pelvis on this unenhanced exam.    Severe bilateral hydroureteronephrosis to the level of the bladder. Circumferential thickening of the bladder walls with mild distention of the bladder which may be due to chronic outlet obstruction in the setting of an enlarged prostate gland or cystitis. Recommend clinical correlation with urinalysis.    Clusters of nodular and tree-in-bud opacities in the left upper lobe and bilateral lower lobes may represent infection versus mucoid impaction of the distal airways.                EKG:< from: 12 Lead ECG (08.20.20 @ 07:43) >  Diagnosis Line NORMAL SINUS RHYTHM WITH SINUS ARRHYTHMIA  LEFT ATRIAL ENLARGEMENT  LOW VOLTAGE QRS  RSR' OR QR PATTERN IN V1 SUGGESTS RIGHT VENTRICULAR CONDUCTION DELAY  ABNORMAL ECG  WHEN COMPARED WITH ECG OF 08/19/2020  RATE SLOWER    < end of copied text >      Carotid Duplex:      PFT's:    Echocardiogram:< from: Transesophageal Echocardiogram w/o TTE (08.24.20 @ 10:00) >  1. Large mobile about 1.8cm x 1.3 cm vegetation seen on the  medial aspect of the anterior leaflet with a partial flail  component of the medial A2/ A3 area. There us also a  vegetation see at the annulus of P3 which measures 0.5cm x  1.3cm  Severe eccentric posterior mitral regurgitation.  2. Normal left ventricular systolic function.  3. Normal right ventricular size and function.  4. Tricuspid valve prolapse. Mildtricuspid regurgitation.  Discussed with Team member  at time of Study (Ivan Palma )    < end of copied text >      Cardiac catheterization: < from: Cardiac Cath Lab - Adult (08.25.20 @ 14:56) >  CORONARY VESSELS: The coronary circulation is right dominant.  LM:   --  LM: Normal.  LAD:   --  LAD: Normal.  CX:   --  Circumflex: Normal.  RCA:   --  RCA: Normal.  COMPLICATIONS: There were no complications.    < end of copied text >      Vein Mapping:    Gen: WN/WD NAD  Neuro: AAOx3, nonfocal  Pulm: CTA B/L  CV: RRR, S1S2  Abd: Soft, NT, ND +BS  Ext: No edema, + peripheral pulses      Pt has AICD/PPM [ ] Yes  [x ] No             Brand Name:  Pre-op Beta Blocker ordered within 24 hrs of surgery (CABG ONLY)?  [x ] Yes  [ ] No  If not, Why?  Type & Cross  [x ] Yes  [ ] No  NPO after Midnight [x] Yes  [ ] No  Pre-op ABX ordered, to be taped on chart:  [x ] Yes  [ ] No     Hibiclens/Peridex ordered x[ ] Yes  [ ] No  Intraop on Hold: PRBCs, CXR, NOHEMI x[ ]   Consent obtained  [x ] Yes  [ ] No

## 2020-08-27 NOTE — PROGRESS NOTE ADULT - ASSESSMENT
80y M with CKD, hydroureteronephrosis, endocarditis    #CKD3  - Patient with baseline CKD since 2015, Scr baseline 1.6-1.8mg/dl  - Pt currently still at his baseline Scr, slight increase in Scr may be 2/2 contrast/ diuretics or removal of tovar catheter  - Would perform serial bladder scans to assess for urinary retention post intermittent catherization  - Agree with holding diuretics for now  - Tentatively planned for MVR tmrw if Scr remains stable, discussed that given his CKD history and recent contrast and planned surgery that he is at risk for ATN and possible renal replacement therapy post surgery. He verbalized understanding.  -Monitor UOP and daily weights. Avoid volume depletion, NSAIDs, PPI's, ARB/ACE-I. Dose medications as per eGFR.

## 2020-08-27 NOTE — PROGRESS NOTE ADULT - SUBJECTIVE AND OBJECTIVE BOX
INFECTIOUS DISEASES FOLLOW UP    This is a follow up note for this  79 yo Male with  Rhabdomyolysis  Transient Staph epidermidis bacteremia  MV endocarditis     Interval event:  - CTS planning to do: OR date for MVR with Dr Camacho on Friday 8/28  - Vanc level=13.4, received 1g IV this afternoon  - WBC=10.01, Cr=1.83, COVID PCR neg prior to surgery  - Per , pt does not like the hospital supply, he can stay with [facility] in the hospital and d/c home with self-cath    ANTIMICROBIALS:    abacavir 300 every 12 hours  cefuroxime  IVPB 1500 once  lamiVUDine- daily  Doravirine 100mg daily    OTHER MEDS:  MEDICATIONS  (STANDING):  finasteride 5 daily  heparin   Injectable 5000 every 8 hours  hydrALAZINE 10 three times a day  polyethylene glycol 3350 17 daily PRN  tamsulosin 0.4 at bedtime      Allergies  No Known Allergies    Vital Signs Last 24 Hrs  T(F): 97.6 (08-27-20 @ 12:44), Max: 99.5 (08-20-20 @ 20:40)    Vital Signs Last 24 Hrs  HR: 82 (08-27-20 @ 12:44) (69 - 91)  BP: 106/59 (08-27-20 @ 12:44) (96/59 - 129/66)  RR: 18 (08-27-20 @ 12:44)  SpO2: 95% (08-27-20 @ 12:44) (94% - 99%)  Wt(kg): --    PHYSICAL EXAM:  Constitutional:no acute distress, sitting in a chair  Eyes:EMILY, EOMI  Ear/Nose/Throat: no oral lesions, 	  Respiratory: clear BL  Cardiovascular: pansystolic murmur best heard at apex  Gastrointestinal:soft, some RLQ tenderness  Extremities:no e/e/c cellulitis resolved  No Lymphadenopathy  IV sites not inflammed.    Labs:                        11.7   10.01 )-----------( 295      ( 27 Aug 2020 07:10 )             35.5     08-27    136  |  95<L>  |  54<H>  ----------------------------<  105<H>  4.3   |  27  |  1.83<H>    Ca    10.5      27 Aug 2020 07:09  Phos  4.2     08-27  Mg     2.6     08-27    TPro  7.4  /  Alb  3.6  /  TBili  0.3  /  DBili  x   /  AST  41<H>  /  ALT  45  /  AlkPhos  130<H>  08-27      WBC Trend:  WBC Count: 10.01 (08-27-20 @ 07:10)  WBC Count: 10.20 (08-26-20 @ 07:21)  WBC Count: 9.86 (08-25-20 @ 05:19)  WBC Count: 9.78 (08-24-20 @ 06:50)      Creatine Trend:  Creatinine, Serum: 1.83 (08-27)  Creatinine, Serum: 1.64 (08-26)  Creatinine, Serum: 1.60 (08-25)  Creatinine, Serum: 1.64 (08-24)      Liver Biochemical Testing Trend:  Alanine Aminotransferase (ALT/SGPT): 45 (08-27)  Alanine Aminotransferase (ALT/SGPT): 45 (08-26)  Alanine Aminotransferase (ALT/SGPT): 37 (08-25)  Aspartate Aminotransferase (AST/SGOT): 41 (08-27-20 @ 07:09)  Aspartate Aminotransferase (AST/SGOT): 51 (08-26-20 @ 07:20)  Aspartate Aminotransferase (AST/SGOT): 51 (08-25-20 @ 05:19)  Bilirubin Total, Serum: 0.3 (08-27)  Bilirubin Total, Serum: 0.2 (08-26)  Bilirubin Total, Serum: 0.2 (08-25)      Trend LDH          MICROBIOLOGY:  Vancomycin Level, Random: 13.4 (08-27 @ 11:28)  Vancomycin Level, Random: 14.3 (08-26 @ 07:21)  Vancomycin Level, Random: 21.9 (08-25 @ 05:21)  Vancomycin Level, Trough: 20.2 (08-24 @ 06:50)  Vancomycin Level, Trough: 18.1 (08-23 @ 06:26)  Vancomycin Level, Trough: 11.2 (08-22 @ 15:33)      Culture - Blood (collected 20 Aug 2020 15:01)  Source: .Blood Blood-Venous  Final Report:    No Growth Final    Culture - Blood (collected 20 Aug 2020 15:01)  Source: .Blood Blood-Peripheral  Final Report:    No Growth Final    Culture - Urine (collected 19 Aug 2020 09:00)  Source: .Urine Clean Catch (Midstream)  Final Report:    <10,000 CFU/mL Normal Urogenital Kami    Culture - Blood (collected 19 Aug 2020 01:39)  Source: .Blood Blood-Peripheral  Final Report:    Growth in aerobic and anaerobic bottles: Staphylococcus epidermidis  Organism: Staphylococcus epidermidis  Organism: Staphylococcus epidermidis    Sensitivities:      -  Ampicillin/Sulbactam: R <=8/4      -  Cefazolin: R <=4      -  Clindamycin: S 0.5      -  Erythromycin: S <=0.25      -  Gentamicin: S <=1 Should not be used as monotherapy      -  Oxacillin: R >2      -  Penicillin: R >8      -  RIF- Rifampin: S <=1 Should not be used as monotherapy      -  Tetra/Doxy: S 2      -  Trimethoprim/Sulfamethoxazole: S <=0.5/9.5      -  Vancomycin: S 2      Method Type: FLORENCIA    Culture - Blood (collected 19 Aug 2020 01:39)  Source: .Blood Blood-Peripheral  Final Report:    Growth in aerobic and anaerobic bottles: Staphylococcus epidermidis    "Due to technical problems, Proteus sp. will Not be reported as part of    the BCID panel until further notice"    ***Blood Panel PCR results on this specimen are available    approximately 3 hours after the Gram stain result.***    Gram stain, PCR, and/or culture results may not always    correspond due to difference in methodologies.    ************************************************************    This PCR assay was performed using [employer].    The following targets are tested for: Enterococcus,    vancomycin resistant enterococci, Listeria monocytogenes,    coagulase negative staphylococci, S. aureus,    methicillin resistant S. aureus, Streptococcus agalactiae    (Group B), S. pneumoniae, S. pyogenes (Group A),    Acinetobacter baumannii, Enterobacter cloacae, E. coli,    Klebsiella oxytoca, K. pneumoniae, Proteus sp.,    Serratia marcescens, Haemophilus influenzae,    Neisseria meningitidis, Pseudomonas aeruginosa, Candida    albicans, C. glabrata, C krusei, C parapsilosis,    C. tropicalis and the KPC resistance gene.  Organism: Blood Culture PCR  Organism: Blood Culture PCR    Sensitivities:      -  Coagulase negative Staphylococcus: Detec      Method Type: PCR    ABS CD4: 324 /uL (08.19.20 @ 03:26)  HIV-1 Viral Load Result: NOT DET. (08.19.20 @ 04:46)      OTHER TESTS:  COVID-19 PCR . (08.27.20 @ 10:25) COVID-19 PCR: NotDetec  COVID-19 PCR: NotDetec (08-19-20 @ 02:39)    COVID-19 IgG Antibody Index: 0.92 Index (08-19-20 @ 09:26)          RADIOLOGY & ADDITIONAL STUDIES:  < from: MR Angio Head w/wo IV Cont (08.24.20 @ 21:57) >    IMPRESSION:    Dominant anterior circulation, fetal bilateral PCAs with origin infundibula, hypoplastic basilar artery and P1 segments likely anatomic variation, no flow-limitingstenosis or aneurysms, tiny aneurysms may be below resolution of MRA technique.      < end of copied text >        < from: Transesophageal Echocardiogram w/o TTE (08.24.20 @ 10:00) >  Conclusions:  1. Large mobile about 1.8cm x 1.3 cm vegetation seen on the  medial aspect of the anterior leaflet with a partial flail  component of the medial A2/ A3 area. There us also a  vegetation see at the annulus of P3 which measures 0.5cm x  1.3cm  Severe eccentric posterior mitral regurgitation.  2. Normal left ventricular systolic function.  3. Normal right ventricular size and function.  4. Tricuspid valve prolapse. Mildtricuspid regurgitation.  Discussed with Team member  at time of Study (Ivan Palma )    < end of copied text >        < from: Xray Chest 1 View- PORTABLE-Routine (08.20.20 @ 16:55) >  IMPRESSION:    Left lower lung hazy opacity correlates with nodular opacity seen CT chest abdomen and pelvis from 8/19/2020. A follow-up is recommended to confirm resolution in 4-6 weeks.    < end of copied text >    < from: Transthoracic Echocardiogram (08.20.20 @ 09:27) >  Conclusions:  1. Mitral valve not well visualized; however, the anterior  leaflet of the mitral valve appears thickened with a 1.3 cm  x 1.4 cm echodensity on the atrial surface (not well  visualized), possibly a vegetation (the ehcodensity does  not display obvious independent mobility, but study is  technically limited), which may be suggestive of  endocarditis given the clinical history. Findings  communicated to Ольга RICKS. Consider NOHEMI if clinically  indicated. Severe, eccentric posteriorly directed mitral  regurgitation (notably the left atrium is not dilated,  suggestive of acute mitral regurgitation).  2. Normal left ventricular internal dimensions and wall  thicknesses.  3. Normal left ventricular systolic function. No segmental  wall motion abnormalities.  4. Normal right ventricular size and function.  5. In some views, there is thickening of the tricuspid  leaflet, which may be suggestive of vegetation (not well  visualized). Mild tricuspid regurgitation.    < end of copied text >

## 2020-08-27 NOTE — PROGRESS NOTE ADULT - ASSESSMENT
81 yo man PMHx of HIV+ (undetectable VL), detrusor muscle weakness requiring daily straight cath, BPH, found with Staph epi bacteremia 8/19, c/b flash pulmonary edema, TTE with findings concerning for mitral valve endocarditis.    #Severe Posteriorly Directed MR c/b flash pulmonary edema  #1.3cm x 1.8cm MV Anterior Leaflet Vegetation   -Vegetation confirmed per NOHEMI; CTS now following.  -C with clean coronary arteries.  -Source of infection likely urinary, urology following  -ABx tailoring and BCx trending as per primary team and ID  -Continue Lasix 40mg PO BID  -Continue hydralazine 10mg PO TID for afterload reduction (hold for SBP <90)    Case discussed with Dr. Lance.    Ave Wadsworth MD PGY-5  Cardiology Fellow  All Cardiology service information can be found 24/7 on [email or web address], password: [email or web address]

## 2020-08-27 NOTE — PROGRESS NOTE ADULT - PROBLEM SELECTOR PLAN 2
2/2 severe acute mitral valve regurg on echo with partial flail leaflet component 2/2 vegetation visualized on NOHEMI  - on RA spo2 wnl  [ ] cardiology following: lasix 40 daily + hydralazine 10 TID, hold for SBP <100  (lasix reduced from BID to QD as good UO and reduce risk of ASHVIN)

## 2020-08-27 NOTE — PROGRESS NOTE ADULT - PROBLEM SELECTOR PLAN 5
- unclear cause. c/b rhabdo, resolved. Tele since admission shows 2 seconds of PAT on first day, no events since. No hx arrythmia.   [ ] PT recommended: CYNTHIA on d/c

## 2020-08-27 NOTE — PROGRESS NOTE ADULT - PROBLEM SELECTOR PLAN 3
- pt straight caths daily 3x at home for detrusor muscle weakness and BPH, able to urinate little without straight cath; admitted with severe hydronephrosis  - repeat US kidney/bladder with resolved R hydronephrosis; mild L hydronephrosis, urothelial thickening  [ ] urology following, appreciate recs.   [ ] tovar removed 8/26, straight cath 3x daily as needed   [ ] continue home tamsulosin and finasteride - pt straight caths daily 3x at home for detrusor muscle weakness and BPH, able to urinate little without straight cath; admitted with severe hydronephrosis  - repeat US kidney/bladder with resolved R hydronephrosis; mild L hydronephrosis, urothelial thickening  [ ] urology following, appreciate recs.   [ ] urology rec tovar reinserted but pt is alright with straight cath with nursing assistance TID; continue straight cath for now  [ ] continue home tamsulosin and finasteride

## 2020-08-27 NOTE — PROGRESS NOTE ADULT - SUBJECTIVE AND OBJECTIVE BOX
[facility] DIVISION OF KIDNEY DISEASES AND HYPERTENSION -- FOLLOW UP NOTE  --------------------------------------------------------------------------------  Chester Trujillo   Nephrology Fellow  Pager NS: [phone]/ [facility]: [phone]  (After 5 pm or on weekends please page the on-call fellow)    24 hour events/subjective: Patient seen and examined at the bedside. Aden catheter removed, has been attempting intermittent cath but is having difficulty with the catherization kits. Scr slightly up to 1.8mg/dl. He endorses good PO intake, no CP/SOB/LE swelling. Lasix 40mg QD was stopped        PAST HISTORY  --------------------------------------------------------------------------------  No significant changes to PMH, PSH, FHx, SHx, unless otherwise noted    ALLERGIES & MEDICATIONS  --------------------------------------------------------------------------------  Allergies    No Known Allergies    Intolerances      Standing Inpatient Medications  abacavir 300 milliGRAM(s) Oral every 12 hours  ascorbic acid 500 milliGRAM(s) Oral daily  cefuroxime  IVPB 1500 milliGRAM(s) IV Intermittent once  chlorhexidine 0.12% Liquid 30 milliLiter(s) Swish and Spit once  chlorhexidine 4% Liquid 1 Application(s) Topical once  cyanocobalamin 1000 MICROGram(s) Oral daily  Doravirine 100mg tablets (pifeltro) 100 milliGRAM(s) 100 milliGRAM(s) Oral daily  finasteride 5 milliGRAM(s) Oral daily  heparin   Injectable 5000 Unit(s) SubCutaneous every 8 hours  hydrALAZINE 10 milliGRAM(s) Oral three times a day  lactobacillus acidophilus and bulgaricus Chewable 2 Tablet(s) Chew three times a day with meals  lactobacillus acidophilus and bulgaricus Chewable      lamiVUDine- milliGRAM(s) Oral daily  multivitamin 1 Tablet(s) Oral daily  mupirocin 2% Nasal 1 Application(s) Nasal two times a day  tamsulosin 0.4 milliGRAM(s) Oral at bedtime    PRN Inpatient Medications  polyethylene glycol 3350 17 Gram(s) Oral daily PRN      REVIEW OF SYSTEMS  --------------------------------------------------------------------------------  Gen: No fevers/chills  Head/Eyes/Ears/Mouth: No headache; Normal hearing; Normal vision    Respiratory: No dyspnea, cough  CV: No chest pain  GI: No abdominal pain, diarrhea, constipation, nausea, vomiting  : No increased frequency, dysuria  MSK: No joint pain/swelling; no edema    All other systems were reviewed and are negative, except as noted.    >>> <<<    VITALS/PHYSICAL EXAM  --------------------------------------------------------------------------------  T(C): 36.4 (08-27-20 @ 12:44), Max: 37.3 (08-26-20 @ 18:00)  HR: 82 (08-27-20 @ 12:44) (69 - 91)  BP: 106/59 (08-27-20 @ 12:44) (96/59 - 129/66)  RR: 18 (08-27-20 @ 12:44) (18 - 19)  SpO2: 95% (08-27-20 @ 12:44) (94% - 99%)  Wt(kg): --        08-26-20 @ 07:01  -  08-27-20 @ 07:00  --------------------------------------------------------  IN: 400 mL / OUT: 1325 mL / NET: -925 mL    08-27-20 @ 07:01  -  08-27-20 @ 14:30  --------------------------------------------------------  IN: 360 mL / OUT: 500 mL / NET: -140 mL      Physical Exam:    	Gen: NAD, well-appearing  	HEENT: Anicteric   	Pulm: CTA B/L  	CV: RRR, S1S2; no rub  	Abd: +BS, soft, nontender/nondistended       	: No suprapubic tenderness  	MSK: Warm, no clubbing, intact strength; no edema  	Neuro: No focal deficits, AOX3, no asterixis      	Vascular Access:      LABS/STUDIES  --------------------------------------------------------------------------------              11.7   10.01 >-----------<  295      [08-27-20 @ 07:10]              35.5     136  |  95  |  54  ----------------------------<  105      [08-27-20 @ 07:09]  4.3   |  27  |  1.83        Ca     10.5     [08-27-20 @ 07:09]      Mg     2.6     [08-27-20 @ 07:09]      Phos  4.2     [08-27-20 @ 07:09]    TPro  7.4  /  Alb  3.6  /  TBili  0.3  /  DBili  x   /  AST  41  /  ALT  45  /  AlkPhos  130  [08-27-20 @ 07:09]          Creatinine Trend:  SCr 1.83 [08-27 @ 07:09]  SCr 1.64 [08-26 @ 07:20]  SCr 1.60 [08-25 @ 05:19]  SCr 1.64 [08-24 @ 06:50]  SCr 1.59 [08-23 @ 06:26]    Urinalysis - [08-19-20 @ 03:23]      Color Yellow / Appearance Turbid / SG 1.015 / pH 7.0      Gluc Negative / Ketone Trace  / Bili Negative / Urobili Negative       Blood Moderate / Protein 100 mg/dL / Leuk Est Large / Nitrite Negative      RBC 10 / WBC >50 / Hyaline 0 / Gran  / Sq Epi  / Non Sq Epi 3 / Bacteria Many      TSH 4.24      [08-22-20 @ 12:42]

## 2020-08-27 NOTE — PROGRESS NOTE ADULT - SUBJECTIVE AND OBJECTIVE BOX
Daily In-House Cardiology Progress Note  -------------------------------------------------------    24-Hour Events/Subjective:      -Feeling less fatigued than previous days; ambulated with help of walker, and did not have PRESCOTT.    Telemetry:  -NSR 70-100s, PVCs.    ROS:   Constitutional: No Fatigue, weakness, fever, chills  HEENT: No sore throat, dryness, hoarseness, congestion  Cardiovascular: No chest pain, SOB, palpitations, PRESCOTT, lightheadedness, dizziness  Respiratory: No wheezing, cough, phlegm  GI: No nausea, vomiting, diarrhea, poor PO tolerance, dyspepsia, dysphagia  Musculoskeletal: No myalgias, arthralgias, joint swelling, back pain  Extremities: No swelling, coldness  Skin: No rashes, lesions, pruritis   Neuro: No weakness, confusion, blurry vision  Psych: No anxiety, depression    Current Meds:  abacavir 300 milliGRAM(s) Oral every 12 hours  ascorbic acid 500 milliGRAM(s) Oral daily  cefuroxime  IVPB 1500 milliGRAM(s) IV Intermittent once  chlorhexidine 0.12% Liquid 30 milliLiter(s) Swish and Spit once  chlorhexidine 4% Liquid 1 Application(s) Topical once  cyanocobalamin 1000 MICROGram(s) Oral daily  Doravirine 100mg tablets (pifeltro) 100 milliGRAM(s) 100 milliGRAM(s) Oral daily  finasteride 5 milliGRAM(s) Oral daily  heparin   Injectable 5000 Unit(s) SubCutaneous every 8 hours  hydrALAZINE 10 milliGRAM(s) Oral three times a day  lactobacillus acidophilus and bulgaricus Chewable 2 Tablet(s) Chew three times a day with meals  lactobacillus acidophilus and bulgaricus Chewable      lamiVUDine- milliGRAM(s) Oral daily  multivitamin 1 Tablet(s) Oral daily  mupirocin 2% Nasal 1 Application(s) Nasal two times a day  polyethylene glycol 3350 17 Gram(s) Oral daily PRN  tamsulosin 0.4 milliGRAM(s) Oral at bedtime    Vitals:  T(F): 97.6 (08-27), Max: 99.1 (08-26)  HR: 71 (08-27) (57 - 91)  BP: 96/59 (08-27) (96/59 - 129/66)  RR: 18 (08-27)  SpO2: 94% (08-27)    I&O's Summary  26 Aug 2020 07:01  -  27 Aug 2020 07:00  --------------------------------------------------------  IN: 400 mL / OUT: 1325 mL / NET: -925 mL    Physical Exam:  Appearance: No acute distress; well appearing  Eyes: PERRL, EOMI, pink conjunctiva  HEENT: Normal oral mucosa  Cardiovascular: RRR, S1, S2, grade 3/6 holosystolic murmur at apex and radiating to axilla; no edema; no JVD  Respiratory: Clear to auscultation bilaterally; decreased breath sounds at bases  Gastrointestinal: soft, non-tender, non-distended with normal bowel sounds  Musculoskeletal: No clubbing; no joint deformity   Neurologic: Non-focal  Lymphatic: No lymphadenopathy  Psychiatry: AAOx3, mood & affect appropriate  Skin: No rashes, ecchymoses, or cyanosis                        11.7   10.01 )-----------( 295      ( 27 Aug 2020 07:10 )             35.5     08-27    136  |  95<L>  |  54<H>  ----------------------------<  105<H>  4.3   |  27  |  1.83<H>    Ca    10.5      27 Aug 2020 07:09  Phos  4.2     08-27  Mg     2.6     08-27    TPro  7.4  /  Alb  3.6  /  TBili  0.3  /  DBili  x   /  AST  41<H>  /  ALT  45  /  AlkPhos  130<H>  08-27      CARDIAC MARKERS ( 22 Aug 2020 06:32 )  x     / x     / x     / 525 U/L / x     / x      CARDIAC MARKERS ( 21 Aug 2020 06:15 )  x     / x     / x     / 929 U/L / x     / x      CARDIAC MARKERS ( 20 Aug 2020 11:43 )  86 ng/L / x     / x     / x     / x     / x          Serum Pro-Brain Natriuretic Peptide: 1987 pg/mL (08-22 @ 06:32)

## 2020-08-27 NOTE — PROGRESS NOTE ADULT - ASSESSMENT
81y/o M with PMHx of HIV, BPH, CAD, urinary retention self catheterizes TID, admitted after a fall found to have hydronephrosis likely from urinary retention.    US with mild left hydronephrosis and no right hydronephrosis    - Patient does not like the hospital supplies for straight catheterizing and is not doing it as often as he does at home  - Given this, would reinsert indwelling tovar catheter while patient is in the hospital  - Can dc tovar prior to discharge for patient to resume CIC at home with his own supplies  - Trend SCr  - Plan discussed with patient      [facility]  [address] [place] [address], [address]  [place] [address] [phone]

## 2020-08-27 NOTE — CONSULT NOTE ADULT - PROBLEM SELECTOR RECOMMENDATION 9
HIV meds may contribute to elevated TSH. Patient is subclinical, Free T4 in acceptable range. Likely no need for management at this time.   Will order TPO ab and FU.

## 2020-08-27 NOTE — PROGRESS NOTE ADULT - PROBLEM SELECTOR PLAN 1
NPo at midnight   hibiclens shower tonight and am  peridex rinse adriel   zinacef on callt o OR taped to chart   afternoon creatinine   f/u COVID  MVR in am withDr Camacho

## 2020-08-27 NOTE — PROGRESS NOTE ADULT - ASSESSMENT
80M hx of HIV(EL6=050, VL undet), detrusor muscle weakness with self straight cath, CAD presented after a fall at home.  Labs noted for WBC 16.5, Cr 2.2, CPK 3857, lactate 3.1, positive UA on admission  cellulitis on left knee- resolved  bacteremia with 2/2 sets on 8/19 growing coag. negative staph in the setting of valvular heart disease    #Infective endocarditis:  - NOHEMI:  1. Large mobile about 1.8cm x 1.3 cm vegetation seen on the medial aspect of the anterior leaflet with a partial flail component of the medial A2/ A3 area. There us also a vegetation see at the annulus of P3 which measures 0.5cm x 1.3cm  Severe eccentric posterior mitral regurgitation.  - On exam, pansystolic murmur at apex consistent with MR  - BCx(8/19): Growth in aerobic and anaerobic bottles: Staphylococcus epidermidis  - BCx(8/20): no growth  - Vancomycin 1g daily for now  - Check daily labs for vancomycin random level, goal is 15-20  - CTS planning to do: OR date for MVR with Dr Camacho on Friday 8/28  - Ok to do kimberlyn-op abx: cefuroxime  - f pt goes to OR, vegatation tissue has to send for culture and PCR (probably can send for 16S genome sequencing). We will contact micro lab for that.  - it's unclear if S.epi is the true pathogen given it's transient and still could be contamination. Follow up culture negative endocarditis workup: Brucella ab, Q fever ab, Bartonella ab, Legionella Ab    #Diarrhea  - multiple loose BMs  - antibiotic related  - c/w lactobacillus  - Stool rejected for C.diff as not watery    #HIV:  - EO9=514, VL undet  - c/w ABC/3TC/HUEY    #Left knee cellulitis:  - resolved  - s/p Zosyn and Cefazolin, no need to continue    #Pyuria:  - UCx<1000 normal  oswaldo  - Likely colonization  - Per , pt does not like the hospital supply, he can stay with Aden in the hospital and d/c home with self-cath      Vannessa Gamble MD, PGY4   ID fellow  Pager: [phone]  After 5pm/weekends call [phone]    d/w Dr. Almazan

## 2020-08-27 NOTE — PROGRESS NOTE ADULT - SUBJECTIVE AND OBJECTIVE BOX
UROLOGY DAILY PROGRESS NOTE:     Subjective:    Patient feels well. No straight cathing often due to not liking hospital supplies.     Objective:    NAD, awake and alert  Respirations nonlabored  Abdomen soft, nontender, nondistended    MEDICATIONS  (STANDING):  abacavir 300 milliGRAM(s) Oral every 12 hours  ascorbic acid 500 milliGRAM(s) Oral daily  cefuroxime  IVPB 1500 milliGRAM(s) IV Intermittent once  chlorhexidine 0.12% Liquid 30 milliLiter(s) Swish and Spit once  chlorhexidine 4% Liquid 1 Application(s) Topical once  cyanocobalamin 1000 MICROGram(s) Oral daily  Doravirine 100mg tablets (pifeltro) 100 milliGRAM(s) 100 milliGRAM(s) Oral daily  finasteride 5 milliGRAM(s) Oral daily  heparin   Injectable 5000 Unit(s) SubCutaneous every 8 hours  hydrALAZINE 10 milliGRAM(s) Oral three times a day  lactobacillus acidophilus and bulgaricus Chewable 2 Tablet(s) Chew three times a day with meals  lactobacillus acidophilus and bulgaricus Chewable      lamiVUDine- milliGRAM(s) Oral daily  multivitamin 1 Tablet(s) Oral daily  mupirocin 2% Nasal 1 Application(s) Nasal two times a day  tamsulosin 0.4 milliGRAM(s) Oral at bedtime    MEDICATIONS  (PRN):  polyethylene glycol 3350 17 Gram(s) Oral daily PRN Constipation      Vital Signs Last 24 Hrs  T(C): 36.4 (27 Aug 2020 12:44), Max: 37.3 (26 Aug 2020 18:00)  T(F): 97.6 (27 Aug 2020 12:44), Max: 99.1 (26 Aug 2020 18:00)  HR: 82 (27 Aug 2020 12:44) (69 - 91)  BP: 106/59 (27 Aug 2020 12:44) (96/59 - 129/66)  BP(mean): --  RR: 18 (27 Aug 2020 12:44) (18 - 19)  SpO2: 95% (27 Aug 2020 12:44) (94% - 99%)    I&O's Detail    26 Aug 2020 07:01  -  27 Aug 2020 07:00  --------------------------------------------------------  IN:    Oral Fluid: 400 mL  Total IN: 400 mL    OUT:    Indwelling Catheter - Urethral: 400 mL    Intermittent Catheterization - Urethral: 800 mL    Voided: 125 mL  Total OUT: 1325 mL    Total NET: -925 mL      27 Aug 2020 07:01  -  27 Aug 2020 13:28  --------------------------------------------------------  IN:    Oral Fluid: 360 mL  Total IN: 360 mL    OUT:  Total OUT: 0 mL    Total NET: 360 mL          Daily     Daily Weight in k.7 (27 Aug 2020 04:39)    LABS:                        11.7   10.01 )-----------( 295      ( 27 Aug 2020 07:10 )             35.5         136  |  95<L>  |  54<H>  ----------------------------<  105<H>  4.3   |  27  |  1.83<H>    Ca    10.5      27 Aug 2020 07:09  Phos  4.2       Mg     2.6         TPro  7.4  /  Alb  3.6  /  TBili  0.3  /  DBili  x   /  AST  41<H>  /  ALT  45  /  AlkPhos  130<H>

## 2020-08-27 NOTE — PROGRESS NOTE ADULT - PROBLEM SELECTOR PLAN 1
- NOHEMI 8/24: large mobile 1.8 x 1.3 cm vegetation anterior leaflet mitral valve with partial flail component. second vegetaion 0.5 x 1.3cm on posterior leaflet w severe eccentric mitral regurg. Normal LV + RV systolic function. 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   - MRA 8/24 w/o septic emboli  - cath 8/25 prior to valve surgery showed clean arteries  [ ] vancomycin 1g daily, vanc lvl daily goal 15-20, ID following - appreciate recs  [ ] valve repair with Dr. Camacho  [ ] nephro following, appreciate recs: no IVF or alkalinizing therapy, will monitor closely post cath and prior to MVR replacement. Monitor UOP and daily weights. Avoid volume depletion, NSAIDs, PPI's, ARB/ACE-I. Dose medications as per eGFR. - NOHEMI 8/24: large mobile 1.8 x 1.3 cm vegetation anterior leaflet mitral valve with partial flail component. second vegetaion 0.5 x 1.3cm on posterior leaflet w severe eccentric mitral regurg. Normal LV + RV systolic function. 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   - MRA 8/24 w/o septic emboli  - cath 8/25 prior to valve surgery showed clean arteries  [ ] vancomycin 1g daily, vanc lvl daily goal 15-20, cefuroxime perisurgery abx, ID following - appreciate recs  [ ] valve repair with Dr. Camacho tomorrow if passes contrast challenge renal function test  [ ] nephro following, appreciate recs: Cr elevation can be from mild volume depletion or contrast-induced nephropathy. Agree with holding further diuretics. Continue intermittent catheterization.  If serum creatinine reaches a plateau by tomorrow, no objection in proceeding with MVR surgery tomorrow.  [ ] f/u Brucella ab, Q fever ab, Bartonella ab, Legionella Ab, staph epi thought to be possible contamination - NOHEMI 8/24: large mobile 1.8 x 1.3 cm vegetation anterior leaflet mitral valve with partial flail component. second vegetaion 0.5 x 1.3cm on posterior leaflet w severe eccentric mitral regurg. Normal LV + RV systolic function. 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   - MRA 8/24 w/o septic emboli  - cath 8/25 prior to valve surgery showed clean arteries  [ ] vancomycin 1g daily, vanc lvl daily goal 15-20, cefuroxime perisurgery abx, ID following - appreciate recs  [ ] valve repair with Dr. Camacho   [ ] nephro following, appreciate recs: Cr elevation can be from mild volume depletion or contrast-induced nephropathy. Agree with holding further diuretics. Continue intermittent catheterization.  If serum creatinine reaches a plateau by tomorrow, no objection in proceeding with MVR surgery tomorrow.  [ ] f/u Brucella ab, Q fever ab, Bartonella ab, Legionella Ab, staph epi thought to be possible contamination

## 2020-08-27 NOTE — PROGRESS NOTE ADULT - ATTENDING COMMENTS
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 TE echo confirms large vegetation on mitral valve with torrential MR. Heart failure due to valvular disease maintained compensated on hospital regimen. Seeking control of apparent  source of infection and CT Surgery involved for anticipated valve replacement, hopefully tomorrow if renal function remains stable following contrast challenge.

## 2020-08-27 NOTE — CONSULT NOTE ADULT - SUBJECTIVE AND OBJECTIVE BOX
HPI:  80M hx of HIV+, BPH, CAD presented after a fall at home and found in own urine/feces. Reportedly per EMS, patient had been using a scrotal device to clamp the testes but was unable to remove it and then slid to the ground while trying to maneuvering the device. Patient reports that he was too weak to get himself up. Denies trauma to his head and was laying on his L side. Per EMS, the heat in the home was significantly elevated. Denies anticoagulants or aspirin, denies any LOC and states he recalls all events. Denies recent illness, fever, chills, dysuria, dizziness, cp, sob, abd pain. Denies cough, sore throat. Of note, patient reports that he self straight caths 3 times a day due to BPH. He also had a bladder mass s/p resection and was told it was benign.    In the ED, vitals: T 99.2, , /60, RR 24 satting 97 ORA. got 2L NS, started on maintenance fluid. Urology placed tovar, draining blood tinged urine. Labs noted for WBC 16.5, Cr 2.2, CPK 3857, lactate 3.1, positive UA. (19 Aug 2020 04:49)  Patient has no history thyroid disease, was not on any thyroid supplements, TSH slightly elevated, on HIV meds/triple therapy.  Endo was consulted for thyroid management.      PAST MEDICAL & SURGICAL HISTORY:  Orchitis and epididymitis  Vitamin D deficiency  Bladder mass  Edema of both legs  Osteoporosis  Seborrheic keratosis  Constipation, unspecified constipation type  Chronic kidney disease, unspecified stage  HIV (human immunodeficiency virus infection)  Unsteady gait  S/P coronary artery stent placement  No Past Surgical History      FAMILY HISTORY:  No pertinent family history in first degree relatives      Social History:    Outpatient Medications:    MEDICATIONS  (STANDING):  abacavir 300 milliGRAM(s) Oral every 12 hours  ascorbic acid 500 milliGRAM(s) Oral daily  cefuroxime  IVPB 1500 milliGRAM(s) IV Intermittent once  chlorhexidine 0.12% Liquid 30 milliLiter(s) Swish and Spit once  chlorhexidine 4% Liquid 1 Application(s) Topical once  cyanocobalamin 1000 MICROGram(s) Oral daily  Doravirine 100mg tablets (pifeltro) 100 milliGRAM(s) 100 milliGRAM(s) Oral daily  finasteride 5 milliGRAM(s) Oral daily  heparin   Injectable 5000 Unit(s) SubCutaneous every 8 hours  hydrALAZINE 10 milliGRAM(s) Oral three times a day  lactobacillus acidophilus and bulgaricus Chewable 2 Tablet(s) Chew three times a day with meals  lactobacillus acidophilus and bulgaricus Chewable      lamiVUDine- milliGRAM(s) Oral daily  multivitamin 1 Tablet(s) Oral daily  mupirocin 2% Nasal 1 Application(s) Nasal two times a day  tamsulosin 0.4 milliGRAM(s) Oral at bedtime    MEDICATIONS  (PRN):  polyethylene glycol 3350 17 Gram(s) Oral daily PRN Constipation      Allergies    No Known Allergies    Intolerances      Review of Systems:  Constitutional: No fever, no chills  Eyes: No blurry vision  Neuro: No tremors  HEENT: No pain, no neck swelling  Cardiovascular: No chest pain, no palpitations  Respiratory: Has SOB, no cough  GI: No nausea, vomiting, abdominal pain  : No dysuria  Skin: no rash  MSK: Has leg swelling.  Psych: no depression  Endocrine: no polyuria, polydipsia    ALL OTHER SYSTEMS REVIEWED AND NEGATIVE    UNABLE TO OBTAIN    PHYSICAL EXAM:  VITALS: T(C): 36.4 (08-27-20 @ 12:44)  T(F): 97.6 (08-27-20 @ 12:44), Max: 99.1 (08-26-20 @ 18:00)  HR: 82 (08-27-20 @ 12:44) (69 - 91)  BP: 106/59 (08-27-20 @ 12:44) (96/59 - 129/66)  RR:  (18 - 19)  SpO2:  (94% - 99%)  Wt(kg): --  GENERAL: NAD, well-groomed, well-developed  EYES: No proptosis, no lid lag  HEENT:  Atraumatic, Normocephalic  THYROID: Normal size, no palpable nodules  RESPIRATORY: Clear to auscultation bilaterally; No rales, rhonchi, wheezing  CARDIOVASCULAR: Si S2, No murmurs;  GI: Soft, non distended, normal bowel sounds  SKIN: Dry, intact, No rashes or lesions  MUSCULOSKELETAL: Has BL lower extremity edema.  NEURO:  no tremor, sensation decreased in feet BL,                              11.7   10.01 )-----------( 295      ( 27 Aug 2020 07:10 )             35.5       08-27    136  |  95<L>  |  54<H>  ----------------------------<  105<H>  4.3   |  27  |  1.83<H>    EGFR if : 40<L>  EGFR if non : 35<L>    Ca    10.5      08-27  Mg     2.6     08-27  Phos  4.2     08-27    TPro  7.4  /  Alb  3.6  /  TBili  0.3  /  DBili  x   /  AST  41<H>  /  ALT  45  /  AlkPhos  130<H>  08-27      Thyroid Function Tests:  08-23 @ 08:16 TSH -- FreeT4 1.2 T3 -- Anti TPO -- Anti Thyroglobulin Ab -- TSI --  08-22 @ 12:42 TSH 4.24 FreeT4 -- T3 78 Anti TPO -- Anti Thyroglobulin Ab -- TSI --              Radiology:

## 2020-08-27 NOTE — PROGRESS NOTE ADULT - PROBLEM SELECTOR PLAN 6
Controlled; CD4 324 and undected viral load during admission  [ ] continue home abacavir, lamivudine, Doravirine

## 2020-08-27 NOTE — PROGRESS NOTE ADULT - ATTENDING COMMENTS
Patient seen and discussed with Dr Gamble    I agree with his interval history exam and plans as noted above    Plan for OR for MVR in AM  Discussed with CT surgery and micro lab- please send a piece of vvle tissue for gram stain and culture  If cultures are negative will also send for PCR testing for culture negative endocarditis diagnosis  Serologies drawn for Q fever, Bartonella and Brucella    Tung Almazan MD  [phone]  After 5pm/weekends [phone]

## 2020-08-27 NOTE — CONSULT NOTE ADULT - ASSESSMENT
Assessment  Hypothyroidism: 80y male with no history thyroid disease, was not on any thyroid supplements, TSH borderline elevated, subclinical hypothyroid, he is on HIV meds/triple therapy. Patient appears alert and comfortable, denies acute complaints, planning MVR tomorrow with Dr. Camacho.  Endocarditis: Planning MVR, on meds, stable, monitored.          Nesha Izaguirre MD  Cell: [phone]  Office: [phone] Assessment  Hypothyroidism: 80y male with no history thyroid disease, was not on any thyroid supplements, TSH borderline elevated, subclinical hypothyroid, he is on HIV meds/triple therapy.  Patient appears alert and comfortable, denies acute complaints, planning MVR tomorrow with Dr. Camacho.  Endocarditis: Planning MVR, on meds, stable, monitored.          Nesha Izaguirre MD  Cell: [phone]  Office: [phone]

## 2020-08-27 NOTE — PROGRESS NOTE ADULT - ATTENDING COMMENTS
I have seen this patient with the fellow and agree with their assessment and plan. In addition,    Patient has underlying Stage 3 CKD/ urinary retention requiring self-catheterization. He was admitted for altered mental status and found to have Staph epi bacteremia and MV vegetation. Renal was consulted for CKD management prior to MVR.     #SUDHEER on CKD. His serum creatinine edged up to 1.8 today. This can be from mild volume depletion or contrast-induced nephropathy. Agree with holding further diuretics. Continue intermittent catheterization. We will monitor closely. If serum creatinine reaches a plateau by tomorrow, no objection in proceeding with MVR surgery tomorrow.    We discussed with patient regarding risk of kidney injury during surgery (ATN) and potential need for renal replacement therapy. He understood.     # CKD stage 3 (follows Dr Presley Ferguson). His baseline serum creatinine 1.6.     # HTN - blood pressure acceptable. Continue hydralazine.     # Medication toxicity Monitoring: medication dose reviewed. Please dose medications to CrCl~30    The rest of the recommendations as per fellow's note.    Beth Stanford MD  Attending Nephrologist  [phone]

## 2020-08-27 NOTE — PROGRESS NOTE ADULT - ASSESSMENT
This   is a retired   79 y/o man with hx HIV, CAD, detrusor muscle weakness, BPH presenting for fall, down for 24 hrs, admitted with rhabdo, bilateral severe ureterohydronephrosis and thickened bladder on CTAP 2/2 inability to continue his home straight cath 3x daily after fall, tovar in place. Rhabdo improved, hydronephrosis improved with tovar (urology following).   - NOHEMI shows endocarditis (NOHEMI: 1.8x1.3 vegetation on medial aspect of anterior leaflet with partial flail component + 0.5x1.3cm vegetation no posterior leaflet w severe eccentric mitral regurg). BCx staph epidermidis, repeat NGTD, on vancomycin. s/p MRA without septic emboli. s/p cardiac cath  normal cors. Folled by ID Dr Almazan  cleared for OR. elevated creatine d folloed by Nephrology monitoring creatine thi afternoon.  Sceduled for MVR in am with Dr Camacho This   is a retired   81 y/o man with hx HIV, CAD, detrusor muscle weakness, BPH presenting for fall, down for 24 hrs, admitted with rhabdo, bilateral severe ureterohydronephrosis and thickened bladder on CTAP 2/2 inability to continue his home straight cath 3x daily after fall, tovar in place. Rhabdo improved, hydronephrosis improved with tovar (urology following).   - NOHEMI shows endocarditis (NOHEMI: 1.8x1.3 vegetation on medial aspect of anterior leaflet with partial flail component + 0.5x1.3cm vegetation no posterior leaflet w severe eccentric mitral regurg). BCx staph epidermidis, repeat NGTD, on vancomycin. s/p MRA without septic emboli. s/p cardiac cath  normal cors. Folled by ID Dr Almazan  cleared for OR. elevated creatine d folloed by Nephrology monitoring creatine thi afternoon. Endocrine consulted Dr Izaguirre.  Sceduled for MVR in am with Dr Camacho

## 2020-08-27 NOTE — PROGRESS NOTE ADULT - SUBJECTIVE AND OBJECTIVE BOX
Rosa M Palma PGY1    Patient is a 80y old  Male who presents with a chief complaint of fall (26 Aug 2020 16:32)      SUBJECTIVE / OVERNIGHT EVENTS:    MEDICATIONS  (STANDING):  abacavir 300 milliGRAM(s) Oral every 12 hours  ascorbic acid 500 milliGRAM(s) Oral daily  cyanocobalamin 1000 MICROGram(s) Oral daily  Doravirine 100mg tablets (pifeltro) 100 milliGRAM(s) 100 milliGRAM(s) Oral daily  finasteride 5 milliGRAM(s) Oral daily  furosemide    Tablet 40 milliGRAM(s) Oral daily  heparin   Injectable 5000 Unit(s) SubCutaneous every 8 hours  hydrALAZINE 10 milliGRAM(s) Oral three times a day  lactobacillus acidophilus and bulgaricus Chewable 2 Tablet(s) Chew three times a day with meals  lactobacillus acidophilus and bulgaricus Chewable      lamiVUDine- milliGRAM(s) Oral daily  multivitamin 1 Tablet(s) Oral daily  mupirocin 2% Nasal 1 Application(s) Nasal two times a day  tamsulosin 0.4 milliGRAM(s) Oral at bedtime    MEDICATIONS  (PRN):  polyethylene glycol 3350 17 Gram(s) Oral daily PRN Constipation      CAPILLARY BLOOD GLUCOSE        I&O's Summary    26 Aug 2020 07:01  -  27 Aug 2020 07:00  --------------------------------------------------------  IN: 400 mL / OUT: 1325 mL / NET: -925 mL        Vital Signs Last 24 Hrs  T(C): 36.7 (27 Aug 2020 04:39), Max: 37.3 (26 Aug 2020 18:00)  T(F): 98.1 (27 Aug 2020 04:39), Max: 99.1 (26 Aug 2020 18:00)  HR: 69 (27 Aug 2020 04:39) (57 - 91)  BP: 112/61 (27 Aug 2020 04:39) (112/61 - 129/66)  BP(mean): --  RR: 18 (27 Aug 2020 04:39) (18 - 19)  SpO2: 99% (27 Aug 2020 04:39) (95% - 99%)    PHYSICAL EXAM:  GENERAL: no distress  PSYCH: A&O x3  HEAD: Atraumatic, Normocephalic  NECK: Supple, No JVD  CHEST/LUNG: clear to auscultation bilaterally  HEART: regular rate and rhythm, no murmurs  ABDOMEN: nontender to palpation, no rebound tenderness/guarding  EXTREMITIES: no edema on bilateral LE  NEUROLOGY: no focal neurologic deficit  SKIN: No rashes or lesions    LABS:                        11.3   10.20 )-----------( 280      ( 26 Aug 2020 07:21 )             34.8      08-26    138  |  96  |  41<H>  ----------------------------<  81  4.2   |  26  |  1.64<H>    Ca    10.7<H>      26 Aug 2020 07:20  Phos  4.1     08-26  Mg     2.4     08-26    TPro  7.2  /  Alb  3.6  /  TBili  0.2  /  DBili  x   /  AST  51<H>  /  ALT  45  /  AlkPhos  126<H>  08-26              RADIOLOGY & ADDITIONAL TESTS:    Imaging Personally Reviewed:    Consultant(s) Notes Reviewed:      Care Discussed with Consultants/Other Providers: Rosa M Louie PGY1    Patient is a 80y old  Male who presents with a chief complaint of fall (26 Aug 2020 16:32)      SUBJECTIVE / OVERNIGHT EVENTS:  - overnight - no events  - am - tele with sinus 70-90 with occasional pvc. Denies chest pain, shortness of breath. Straight caths with nursing assistance. Aware about valve surgery tomorrow.     MEDICATIONS  (STANDING):  abacavir 300 milliGRAM(s) Oral every 12 hours  ascorbic acid 500 milliGRAM(s) Oral daily  cyanocobalamin 1000 MICROGram(s) Oral daily  Doravirine 100mg tablets (pifeltro) 100 milliGRAM(s) 100 milliGRAM(s) Oral daily  finasteride 5 milliGRAM(s) Oral daily  furosemide    Tablet 40 milliGRAM(s) Oral daily  heparin   Injectable 5000 Unit(s) SubCutaneous every 8 hours  hydrALAZINE 10 milliGRAM(s) Oral three times a day  lactobacillus acidophilus and bulgaricus Chewable 2 Tablet(s) Chew three times a day with meals  lactobacillus acidophilus and bulgaricus Chewable      lamiVUDine- milliGRAM(s) Oral daily  multivitamin 1 Tablet(s) Oral daily  mupirocin 2% Nasal 1 Application(s) Nasal two times a day  tamsulosin 0.4 milliGRAM(s) Oral at bedtime    MEDICATIONS  (PRN):  polyethylene glycol 3350 17 Gram(s) Oral daily PRN Constipation      CAPILLARY BLOOD GLUCOSE        I&O's Summary    26 Aug 2020 07:01  -  27 Aug 2020 07:00  --------------------------------------------------------  IN: 400 mL / OUT: 1325 mL / NET: -925 mL        Vital Signs Last 24 Hrs  T(C): 36.7 (27 Aug 2020 04:39), Max: 37.3 (26 Aug 2020 18:00)  T(F): 98.1 (27 Aug 2020 04:39), Max: 99.1 (26 Aug 2020 18:00)  HR: 69 (27 Aug 2020 04:39) (57 - 91)  BP: 112/61 (27 Aug 2020 04:39) (112/61 - 129/66)  BP(mean): --  RR: 18 (27 Aug 2020 04:39) (18 - 19)  SpO2: 99% (27 Aug 2020 04:39) (95% - 99%)    PHYSICAL EXAM:  GENERAL: no distress  PSYCH: A&O x3  HEAD: Atraumatic, Normocephalic  NECK: Supple, No JVD  CHEST/LUNG: clear to auscultation bilaterally  HEART: regular rate and rhythm, 2/6 systolic murmur most noticeable at apex  ABDOMEN: nontender to palpation, no rebound tenderness/guarding  EXTREMITIES: no edema on bilateral LE  NEUROLOGY: no focal neurologic deficit, 5/5 str in UE and LE  SKIN: No rashes or lesions    LABS:                        11.3   10.20 )-----------( 280      ( 26 Aug 2020 07:21 )             34.8      08-26    138  |  96  |  41<H>  ----------------------------<  81  4.2   |  26  |  1.64<H>    Ca    10.7<H>      26 Aug 2020 07:20  Phos  4.1     08-26  Mg     2.4     08-26    TPro  7.2  /  Alb  3.6  /  TBili  0.2  /  DBili  x   /  AST  51<H>  /  ALT  45  /  AlkPhos  126<H>  08-26              RADIOLOGY & ADDITIONAL TESTS:    Imaging Personally Reviewed:    Consultant(s) Notes Reviewed:      Care Discussed with Consultants/Other Providers:

## 2020-08-28 DIAGNOSIS — N17.9 ACUTE KIDNEY FAILURE, UNSPECIFIED: ICD-10-CM

## 2020-08-28 LAB
ALBUMIN SERPL ELPH-MCNC: 3.3 G/DL — SIGNIFICANT CHANGE UP (ref 3.3–5)
ALP SERPL-CCNC: 114 U/L — SIGNIFICANT CHANGE UP (ref 40–120)
ALT FLD-CCNC: 38 U/L — SIGNIFICANT CHANGE UP (ref 10–45)
ANION GAP SERPL CALC-SCNC: 13 MMOL/L — SIGNIFICANT CHANGE UP (ref 5–17)
APTT BLD: 32.8 SEC — SIGNIFICANT CHANGE UP (ref 27.5–35.5)
AST SERPL-CCNC: 33 U/L — SIGNIFICANT CHANGE UP (ref 10–40)
BILIRUB SERPL-MCNC: 0.2 MG/DL — SIGNIFICANT CHANGE UP (ref 0.2–1.2)
BUN SERPL-MCNC: 60 MG/DL — HIGH (ref 7–23)
CALCIUM SERPL-MCNC: 10.4 MG/DL — SIGNIFICANT CHANGE UP (ref 8.4–10.5)
CHLORIDE SERPL-SCNC: 96 MMOL/L — SIGNIFICANT CHANGE UP (ref 96–108)
CO2 SERPL-SCNC: 28 MMOL/L — SIGNIFICANT CHANGE UP (ref 22–31)
CREAT SERPL-MCNC: 1.98 MG/DL — HIGH (ref 0.5–1.3)
GLUCOSE SERPL-MCNC: 103 MG/DL — HIGH (ref 70–99)
HCT VFR BLD CALC: 31.6 % — LOW (ref 39–50)
HGB BLD-MCNC: 10.6 G/DL — LOW (ref 13–17)
INR BLD: 0.9 RATIO — SIGNIFICANT CHANGE UP (ref 0.88–1.16)
MAGNESIUM SERPL-MCNC: 2.6 MG/DL — SIGNIFICANT CHANGE UP (ref 1.6–2.6)
MCHC RBC-ENTMCNC: 33.5 GM/DL — SIGNIFICANT CHANGE UP (ref 32–36)
MCHC RBC-ENTMCNC: 34.8 PG — HIGH (ref 27–34)
MCV RBC AUTO: 103.6 FL — HIGH (ref 80–100)
NRBC # BLD: 0 /100 WBCS — SIGNIFICANT CHANGE UP (ref 0–0)
PHOSPHATE SERPL-MCNC: 4.5 MG/DL — SIGNIFICANT CHANGE UP (ref 2.5–4.5)
PLATELET # BLD AUTO: 258 K/UL — SIGNIFICANT CHANGE UP (ref 150–400)
POTASSIUM SERPL-MCNC: 4 MMOL/L — SIGNIFICANT CHANGE UP (ref 3.5–5.3)
POTASSIUM SERPL-SCNC: 4 MMOL/L — SIGNIFICANT CHANGE UP (ref 3.5–5.3)
PROT SERPL-MCNC: 6.6 G/DL — SIGNIFICANT CHANGE UP (ref 6–8.3)
PROTHROM AB SERPL-ACNC: 10.8 SEC — SIGNIFICANT CHANGE UP (ref 10.6–13.6)
RBC # BLD: 3.05 M/UL — LOW (ref 4.2–5.8)
RBC # FLD: 13.8 % — SIGNIFICANT CHANGE UP (ref 10.3–14.5)
SODIUM SERPL-SCNC: 137 MMOL/L — SIGNIFICANT CHANGE UP (ref 135–145)
THYROPEROXIDASE AB SERPL-ACNC: <10 IU/ML — SIGNIFICANT CHANGE UP
VANCOMYCIN FLD-MCNC: 19.7 UG/ML — SIGNIFICANT CHANGE UP
WBC # BLD: 9.51 K/UL — SIGNIFICANT CHANGE UP (ref 3.8–10.5)
WBC # FLD AUTO: 9.51 K/UL — SIGNIFICANT CHANGE UP (ref 3.8–10.5)

## 2020-08-28 PROCEDURE — 99232 SBSQ HOSP IP/OBS MODERATE 35: CPT

## 2020-08-28 PROCEDURE — 99233 SBSQ HOSP IP/OBS HIGH 50: CPT | Mod: GC

## 2020-08-28 RX ORDER — HEPARIN SODIUM 5000 [USP'U]/ML
5000 INJECTION INTRAVENOUS; SUBCUTANEOUS EVERY 8 HOURS
Refills: 0 | Status: DISCONTINUED | OUTPATIENT
Start: 2020-08-28 | End: 2020-08-29

## 2020-08-28 RX ORDER — VANCOMYCIN HCL 1 G
1000 VIAL (EA) INTRAVENOUS ONCE
Refills: 0 | Status: DISCONTINUED | OUTPATIENT
Start: 2020-08-28 | End: 2020-08-28

## 2020-08-28 RX ADMIN — Medication 10 MILLIGRAM(S): at 13:28

## 2020-08-28 RX ADMIN — LACTOBACILLUS ACIDOPH-L.BULGARICUS 1 MILLION CELL CHEWABLE TABLET 2 TABLET(S): at 13:28

## 2020-08-28 RX ADMIN — PREGABALIN 1000 MICROGRAM(S): 225 CAPSULE ORAL at 08:55

## 2020-08-28 RX ADMIN — ABACAVIR 300 MILLIGRAM(S): 20 SOLUTION ORAL at 20:54

## 2020-08-28 RX ADMIN — Medication 100 MILLIGRAM(S): at 08:56

## 2020-08-28 RX ADMIN — Medication 10 MILLIGRAM(S): at 05:27

## 2020-08-28 RX ADMIN — HEPARIN SODIUM 5000 UNIT(S): 5000 INJECTION INTRAVENOUS; SUBCUTANEOUS at 21:09

## 2020-08-28 RX ADMIN — MUPIROCIN 1 APPLICATION(S): 20 OINTMENT TOPICAL at 05:32

## 2020-08-28 RX ADMIN — ABACAVIR 300 MILLIGRAM(S): 20 SOLUTION ORAL at 08:55

## 2020-08-28 RX ADMIN — Medication 500 MILLIGRAM(S): at 08:55

## 2020-08-28 RX ADMIN — FINASTERIDE 5 MILLIGRAM(S): 5 TABLET, FILM COATED ORAL at 20:54

## 2020-08-28 RX ADMIN — HEPARIN SODIUM 5000 UNIT(S): 5000 INJECTION INTRAVENOUS; SUBCUTANEOUS at 13:28

## 2020-08-28 RX ADMIN — LACTOBACILLUS ACIDOPH-L.BULGARICUS 1 MILLION CELL CHEWABLE TABLET 2 TABLET(S): at 08:55

## 2020-08-28 RX ADMIN — Medication 10 MILLIGRAM(S): at 21:08

## 2020-08-28 RX ADMIN — Medication 1 TABLET(S): at 08:56

## 2020-08-28 RX ADMIN — MUPIROCIN 1 APPLICATION(S): 20 OINTMENT TOPICAL at 17:27

## 2020-08-28 RX ADMIN — LACTOBACILLUS ACIDOPH-L.BULGARICUS 1 MILLION CELL CHEWABLE TABLET 2 TABLET(S): at 17:26

## 2020-08-28 RX ADMIN — TAMSULOSIN HYDROCHLORIDE 0.4 MILLIGRAM(S): 0.4 CAPSULE ORAL at 20:54

## 2020-08-28 NOTE — PROGRESS NOTE ADULT - PROBLEM SELECTOR PLAN 1
- NOHEMI 8/24: large mobile 1.8 x 1.3 cm vegetation anterior leaflet mitral valve with partial flail component. second vegetaion 0.5 x 1.3cm on posterior leaflet w severe eccentric mitral regurg. Normal LV + RV systolic function. 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   - MRA 8/24 w/o septic emboli  - cath 8/25 prior to valve surgery showed clean arteries  [ ] vancomycin 1g daily, vanc lvl daily goal 15-20, cefuroxime perisurgery abx, ID following - appreciate recs  [ ] valve repair with Dr. Camacho   [ ] nephro following, appreciate recs: Cr elevation can be from mild volume depletion or contrast-induced nephropathy. Agree with holding further diuretics. Continue intermittent catheterization.  If serum creatinine reaches a plateau by tomorrow, no objection in proceeding with MVR surgery tomorrow.  [ ] f/u Brucella ab, Q fever ab, Bartonella ab, Legionella Ab, staph epi thought to be possible contamination - NOHEMI 8/24: large mobile 1.8 x 1.3 cm vegetation anterior leaflet mitral valve with partial flail component. second vegetaion 0.5 x 1.3cm on posterior leaflet w severe eccentric mitral regurg. Normal LV + RV systolic function. 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   - MRA 8/24 w/o septic emboli  - cath 8/25 prior to valve surgery showed clean arteries  [ ] vancomycin 1g daily, vanc lvl daily goal 15-20, cefuroxime perisurgery abx, ID following - appreciate recs.   [ ] valve repair with Dr. Camacho pending Cr improvement to reduce post surgery dialysis need  [ ] nephro following, appreciate recs: Cr elevation can be from mild volume depletion or contrast-induced nephropathy or tovar removal. Agree with holding further diuretics. Continue tovar.   [ ] f/u Brucella ab, Q fever ab, Bartonella ab, Legionella Ab, staph epi thought to be possible contamination

## 2020-08-28 NOTE — PROGRESS NOTE ADULT - SUBJECTIVE AND OBJECTIVE BOX
INFECTIOUS DISEASES FOLLOW UP    This is a follow up note for this  81 yo Male with  Rhabdomyolysis  Transient Staph epidermidis bacteremia  MV endocarditis     Interval event:  - Surgery cancelled 2/2 SUDHEER Cr=1.98, scheduling next week  - WBC=9.51  - Vanc=19.7    ANTIMICROBIALS:    abacavir 300 every 12 hours  lamiVUDine- daily  Doravirine 100mg daily    OTHER MEDS:  MEDICATIONS  (STANDING):  finasteride 5 daily  heparin   Injectable 5000 every 8 hours  hydrALAZINE 10 three times a day  polyethylene glycol 3350 17 daily PRN  tamsulosin 0.4 at bedtime      Allergies  No Known Allergies    Vital Signs Last 24 Hrs  T(F): 98.4 (08-28-20 @ 04:49), Max: 99.1 (08-26-20 @ 18:00)    Vital Signs Last 24 Hrs  HR: 82 (08-28-20 @ 05:26) (82 - 86)  BP: 108/57 (08-28-20 @ 05:26) (98/54 - 116/64)  RR: 18 (08-28-20 @ 04:49)  SpO2: 96% (08-28-20 @ 04:49) (95% - 96%)  Wt(kg): --    PHYSICAL EXAM:  Constitutional:no acute distress, sitting in a chair  Eyes:EMILY, EOMI  Ear/Nose/Throat: no oral lesions, 	  Respiratory: clear BL  Cardiovascular: pansystolic murmur best heard at apex  Gastrointestinal:soft, some RLQ tenderness  Extremities:no e/e/c cellulitis resolved  No Lymphadenopathy  IV sites not inflammed.  Aden in place    Labs:                        10.6   9.51  )-----------( 258      ( 28 Aug 2020 04:22 )             31.6     08-28    137  |  96  |  60<H>  ----------------------------<  103<H>  4.0   |  28  |  1.98<H>    Ca    10.4      28 Aug 2020 04:21  Phos  4.5     08-28  Mg     2.6     08-28    TPro  6.6  /  Alb  3.3  /  TBili  0.2  /  DBili  x   /  AST  33  /  ALT  38  /  AlkPhos  114  08-28      WBC Trend:  WBC Count: 9.51 (08-28-20 @ 04:22)  WBC Count: 10.01 (08-27-20 @ 07:10)  WBC Count: 10.20 (08-26-20 @ 07:21)  WBC Count: 9.86 (08-25-20 @ 05:19)      Auto Neutrophil #: 6.24 K/uL (08-26-20 @ 07:21)  Auto Neutrophil #: 5.89 K/uL (08-25-20 @ 05:19)  Auto Neutrophil #: 5.76 K/uL (08-24-20 @ 06:50)  Auto Neutrophil #: 14.89 K/uL (08-19-20 @ 03:25)  Auto Neutrophil #: 14.65 K/uL (08-18-20 @ 22:39)      Creatine Trend:  Creatinine, Serum: 1.98 (08-28)  Creatinine, Serum: 1.83 (08-27)  Creatinine, Serum: 1.64 (08-26)  Creatinine, Serum: 1.60 (08-25)      Liver Biochemical Testing Trend:  Alanine Aminotransferase (ALT/SGPT): 38 (08-28)  Alanine Aminotransferase (ALT/SGPT): 45 (08-27)  Alanine Aminotransferase (ALT/SGPT): 45 (08-26)  Aspartate Aminotransferase (AST/SGOT): 33 (08-28-20 @ 04:21)  Aspartate Aminotransferase (AST/SGOT): 41 (08-27-20 @ 07:09)  Aspartate Aminotransferase (AST/SGOT): 51 (08-26-20 @ 07:20)  Bilirubin Total, Serum: 0.2 (08-28)  Bilirubin Total, Serum: 0.3 (08-27)  Bilirubin Total, Serum: 0.2 (08-26)      Trend LDH          MICROBIOLOGY:  Vancomycin Level, Random: 19.7 (08-28 @ 04:21)  Vancomycin Level, Random: 13.4 (08-27 @ 11:28)  Vancomycin Level, Random: 14.3 (08-26 @ 07:21)  Vancomycin Level, Random: 21.9 (08-25 @ 05:21)  Vancomycin Level, Trough: 20.2 (08-24 @ 06:50)      Culture - Blood (collected 20 Aug 2020 15:01)  Source: .Blood Blood-Venous  Final Report:    No Growth Final    Culture - Blood (collected 20 Aug 2020 15:01)  Source: .Blood Blood-Peripheral  Final Report:    No Growth Final    Culture - Urine (collected 19 Aug 2020 09:00)  Source: .Urine Clean Catch (Midstream)  Final Report:    <10,000 CFU/mL Normal Urogenital Kami    Culture - Blood (collected 19 Aug 2020 01:39)  Source: .Blood Blood-Peripheral  Final Report:    Growth in aerobic and anaerobic bottles: Staphylococcus epidermidis  Organism: Staphylococcus epidermidis  Organism: Staphylococcus epidermidis    Sensitivities:      -  Ampicillin/Sulbactam: R <=8/4      -  Cefazolin: R <=4      -  Clindamycin: S 0.5      -  Erythromycin: S <=0.25      -  Gentamicin: S <=1 Should not be used as monotherapy      -  Oxacillin: R >2      -  Penicillin: R >8      -  RIF- Rifampin: S <=1 Should not be used as monotherapy      -  Tetra/Doxy: S 2      -  Trimethoprim/Sulfamethoxazole: S <=0.5/9.5      -  Vancomycin: S 2      Method Type: FLORENCIA    Culture - Blood (collected 19 Aug 2020 01:39)  Source: .Blood Blood-Peripheral  Final Report:    Growth in aerobic and anaerobic bottles: Staphylococcus epidermidis    "Due to technical problems, Proteus sp. will Not be reported as part of    the BCID panel until further notice"    ***Blood Panel PCR results on this specimen are available    approximately 3 hours after the Gram stain result.***    Gram stain, PCR, and/or culture results may not always    correspond due to difference in methodologies.    ************************************************************    This PCR assay was performed using [employer].    The following targets are tested for: Enterococcus,    vancomycin resistant enterococci, Listeria monocytogenes,    coagulase negative staphylococci, S. aureus,    methicillin resistant S. aureus, Streptococcus agalactiae    (Group B), S. pneumoniae, S. pyogenes (Group A),    Acinetobacter baumannii, Enterobacter cloacae, E. coli,    Klebsiella oxytoca, K. pneumoniae, Proteus sp.,    Serratia marcescens, Haemophilus influenzae,    Neisseria meningitidis, Pseudomonas aeruginosa, Candida    albicans, C. glabrata, C krusei, C parapsilosis,    C. tropicalis and the KPC resistance gene.  Organism: Blood Culture PCR  Organism: Blood Culture PCR    Sensitivities:      -  Coagulase negative Staphylococcus: Detec      Method Type: PCR        ABS CD4: 324 /uL (08-19-20 @ 03:26)  HIV-1 Viral Load Result: NOT DET. (08-19-20 @ 04:46)          OTHER TESTS:  COVID-19 PCR: NotDetec (08-27-20 @ 10:25)  COVID-19 PCR: NotDetec (08-19-20 @ 02:39)    COVID-19 IgG Antibody Index: 0.92 Index (08-19-20 @ 09:26)            RADIOLOGY & ADDITIONAL STUDIES:  < from: MR Angio Head w/wo IV Cont (08.24.20 @ 21:57) >    IMPRESSION:    Dominant anterior circulation, fetal bilateral PCAs with origin infundibula, hypoplastic basilar artery and P1 segments likely anatomic variation, no flow-limitingstenosis or aneurysms, tiny aneurysms may be below resolution of MRA technique.      < end of copied text >        < from: Transesophageal Echocardiogram w/o TTE (08.24.20 @ 10:00) >  Conclusions:  1. Large mobile about 1.8cm x 1.3 cm vegetation seen on the  medial aspect of the anterior leaflet with a partial flail  component of the medial A2/ A3 area. There us also a  vegetation see at the annulus of P3 which measures 0.5cm x  1.3cm  Severe eccentric posterior mitral regurgitation.  2. Normal left ventricular systolic function.  3. Normal right ventricular size and function.  4. Tricuspid valve prolapse. Mildtricuspid regurgitation.  Discussed with Team member  at time of Study (Ivan Palma )    < end of copied text >        < from: Xray Chest 1 View- PORTABLE-Routine (08.20.20 @ 16:55) >  IMPRESSION:    Left lower lung hazy opacity correlates with nodular opacity seen CT chest abdomen and pelvis from 8/19/2020. A follow-up is recommended to confirm resolution in 4-6 weeks.    < end of copied text >    < from: Transthoracic Echocardiogram (08.20.20 @ 09:27) >  Conclusions:  1. Mitral valve not well visualized; however, the anterior  leaflet of the mitral valve appears thickened with a 1.3 cm  x 1.4 cm echodensity on the atrial surface (not well  visualized), possibly a vegetation (the ehcodensity does  not display obvious independent mobility, but study is  technically limited), which may be suggestive of  endocarditis given the clinical history. Findings  communicated to Ольга RICKS. Consider NOHEMI if clinically  indicated. Severe, eccentric posteriorly directed mitral  regurgitation (notably the left atrium is not dilated,  suggestive of acute mitral regurgitation).  2. Normal left ventricular internal dimensions and wall  thicknesses.  3. Normal left ventricular systolic function. No segmental  wall motion abnormalities.  4. Normal right ventricular size and function.  5. In some views, there is thickening of the tricuspid  leaflet, which may be suggestive of vegetation (not well  visualized). Mild tricuspid regurgitation.    < end of copied text >

## 2020-08-28 NOTE — PROGRESS NOTE ADULT - PROBLEM SELECTOR PLAN 2
2/2 severe acute mitral valve regurg on echo with partial flail leaflet component 2/2 vegetation visualized on NOHEMI  - on RA spo2 wnl  [ ] cardiology following: lasix 40 daily + hydralazine 10 TID, hold for SBP <100  (lasix reduced from BID to QD as good UO and reduce risk of ASHVIN) 2/2 severe acute mitral valve regurg on echo with partial flail leaflet component 2/2 vegetation visualized on NOHEMI  - on RA spo2 wnl  [ ] cardiology following: holding lasix 40 daily per nephrology recs given elevated cr, mild volume depletion concern. Continue hydralazine 10 TID for afterload reduction, hold for SBP <100

## 2020-08-28 NOTE — PROGRESS NOTE ADULT - ASSESSMENT
81 yo man PMHx of HIV+ (undetectable VL), detrusor muscle weakness requiring daily straight cath, BPH, found with Staph epi bacteremia 8/19, c/b flash pulmonary edema, TTE with findings concerning for mitral valve endocarditis.    #Severe Posteriorly Directed MR c/b flash pulmonary edema  #1.3cm x 1.8cm MV Anterior Leaflet Vegetation   -Vegetation confirmed per NOHEMI; CTS now following. Planning for MVR when GFR is more permissive.  -C with clean coronary arteries.  -Source of infection likely urinary, urology following  -ABx tailoring and BCx trending as per primary team and ID  -Continue Lasix 40mg PO BID  -Continue hydralazine 10mg PO TID for afterload reduction (hold for SBP <90)    Case discussed with Dr. Lance.    Ave Wadsworth MD PGY-5  Cardiology Fellow  All Cardiology service information can be found 24/7 on [email or web address], password: [facility]

## 2020-08-28 NOTE — CDI QUERY NOTE - NSCDIOTHERTXTBX2_GEN_ALL_CORE_FT
Internal Medicine Note 8/20 - SUBJECTIVE / OVERNIGHT EVENTS:    "- overnight: RRT called for acute hypoxia with spo2 77% on RA. Pt was getting IVF LR for rhabdo. Placed on 100%NRB, tachy to 130s, BP elevated to 180/110, febrile to 100.7. On exam, diffuse wheeze and crackles bilaterally. CXR with mild pulm edema, more vascular congestion compared to prior. ABG showed resp acidosis and pCO2 ~60. Pt given lasix 60 mg IV once, IVF discontinued. labetalol 10 mg IV for elevated BP. Placed on BiPAP. Transferred to [facility] for further monitoring.     4. Elevated Trop from demand ischemia in the setting of pulmonary edema and SUDHEER - Khan, Mohsinuzzaman (TIM)  (Signed 20-Aug-2020 12:50)".    Troponin on 8/19 75 up to 102 on 8/20.  Patient documented with RBBB.  LHC done with normal coronaries.    Based on your judgment and above clinical indicators, would you please clarify the significance of the above, after study?    (1) Elevated Troponin 2/2 Type II MI from Demand Ischemia  (2) Elevated Troponin 2/2 Demand Ischemia only  (3) Other (please specify)  (4) Clinically Undetermined    Thank you for your help,    Muriel  [phone]

## 2020-08-28 NOTE — PROGRESS NOTE ADULT - ASSESSMENT
80y M with CKD, hydroureteronephrosis, endocarditis    #SUDHEER on CKD3  - Patient with baseline CKD since 2015, Scr baseline 1.6-1.8mg/dl  - Pt Scr up to 1.98mg/dl, BUN 60. This maybe 2/2 contrast/ diuretics or removal of tovar catheter  - Would replace tovar catheter and repeat renal US to assess for hydro  - Agree with holding diuretics for now  - BP soft, would be cautious with hydralazine   - MVR procedure deferred in light of rising SCR, will re-eval for early next week.  We have discussed with the patient that given his CKD history and recent contrast and planned surgery that he is at risk for ATN and possible renal replacement therapy post surgery. He verbalized understanding.  -Monitor UOP and daily weights. Avoid volume depletion, NSAIDs, PPI's, ARB/ACE-I. Dose medications as per eGFR.

## 2020-08-28 NOTE — PROGRESS NOTE ADULT - ASSESSMENT
Mr. Monsivais is a 79 y/o man with hx HIV, CAD, detrusor muscle weakness, BPH presenting for fall, down for 24 hrs, admitted with rhabdo, bilateral severe ureterohydronephrosis and thickened bladder on CTAP 2/2 inability to continue his home straight cath 3x daily after fall, tovar in place. Rhabdo improved, hydronephrosis improved with tovar (urology following).   - NOHEMI shows endocarditis (NOHEMI: 1.8x1.3 vegetation on medial aspect of anterior leaflet with partial flail component + 0.5x1.3cm vegetation no posterior leaflet w severe eccentric mitral regurg). BCx staph epidermidis, repeat NGTD, on vancomycin. s/p MRA without septic emboli. s/p cardiac cath to eval prior to valve surgery per CT surgery.

## 2020-08-28 NOTE — PROGRESS NOTE ADULT - SUBJECTIVE AND OBJECTIVE BOX
Chief complaint  Patient is a 80y old  Male who presents with a chief complaint of fall (28 Aug 2020 13:58)   Review of systems  Patient in bed, looks comfortable.    Labs and Fingersticks  CAPILLARY BLOOD GLUCOSE          Anion Gap, Serum: 13 (08-28 @ 04:21)  Anion Gap, Serum: 14 (08-27 @ 07:09)      Calcium, Total Serum: 10.4 (08-28 @ 04:21)  Calcium, Total Serum: 10.5 (08-27 @ 07:09)  Albumin, Serum: 3.3 (08-28 @ 04:21)  Albumin, Serum: 3.6 (08-27 @ 07:09)    Alanine Aminotransferase (ALT/SGPT): 38 (08-28 @ 04:21)  Alanine Aminotransferase (ALT/SGPT): 45 (08-27 @ 07:09)  Alkaline Phosphatase, Serum: 114 (08-28 @ 04:21)  Alkaline Phosphatase, Serum: 130 <H> (08-27 @ 07:09)  Aspartate Aminotransferase (AST/SGOT): 33 (08-28 @ 04:21)  Aspartate Aminotransferase (AST/SGOT): 41 <H> (08-27 @ 07:09)        08-28    137  |  96  |  60<H>  ----------------------------<  103<H>  4.0   |  28  |  1.98<H>    Ca    10.4      28 Aug 2020 04:21  Phos  4.5     08-28  Mg     2.6     08-28    TPro  6.6  /  Alb  3.3  /  TBili  0.2  /  DBili  x   /  AST  33  /  ALT  38  /  AlkPhos  114  08-28                        10.6   9.51  )-----------( 258      ( 28 Aug 2020 04:22 )             31.6     Medications  MEDICATIONS  (STANDING):  abacavir 300 milliGRAM(s) Oral every 12 hours  ascorbic acid 500 milliGRAM(s) Oral daily  chlorhexidine 0.12% Liquid 30 milliLiter(s) Swish and Spit once  cyanocobalamin 1000 MICROGram(s) Oral daily  Doravirine 100mg tablets (pifeltro) 100 milliGRAM(s) 100 milliGRAM(s) Oral daily  finasteride 5 milliGRAM(s) Oral daily  heparin   Injectable 5000 Unit(s) SubCutaneous every 8 hours  hydrALAZINE 10 milliGRAM(s) Oral three times a day  lactobacillus acidophilus and bulgaricus Chewable 2 Tablet(s) Chew three times a day with meals  lactobacillus acidophilus and bulgaricus Chewable      lamiVUDine- milliGRAM(s) Oral daily  multivitamin 1 Tablet(s) Oral daily  mupirocin 2% Ointment 1 Application(s) Topical two times a day  tamsulosin 0.4 milliGRAM(s) Oral at bedtime      Physical Exam  General: Patient comfortable in bed  Vital Signs Last 12 Hrs  T(F): 98.4 (08-28-20 @ 04:49), Max: 98.4 (08-28-20 @ 04:49)  HR: 82 (08-28-20 @ 05:26) (82 - 86)  BP: 108/57 (08-28-20 @ 05:26) (98/54 - 108/57)  BP(mean): --  RR: 18 (08-28-20 @ 04:49) (18 - 18)  SpO2: 96% (08-28-20 @ 04:49) (96% - 96%)  Neck: No palpable thyroid nodules.  CVS: S1S2, No murmurs  Respiratory: No wheezing, no crepitations  GI: Abdomen soft, bowel sounds positive  Musculoskeletal:  edema lower extremities.   Skin: No skin rashes, no ecchymosis    Diagnostics    Thyroperoxidase Antibody: AM (08-27 @ 13:42) Chief complaint  Patient is a 80y old  Male who presents with a chief complaint of fall (28 Aug 2020 13:58)   Review of systems  Patient in bed, looks comfortable.    Labs and Fingersticks  CAPILLARY BLOOD GLUCOSE      Anion Gap, Serum: 13 (08-28 @ 04:21)  Anion Gap, Serum: 14 (08-27 @ 07:09)      Calcium, Total Serum: 10.4 (08-28 @ 04:21)  Calcium, Total Serum: 10.5 (08-27 @ 07:09)  Albumin, Serum: 3.3 (08-28 @ 04:21)  Albumin, Serum: 3.6 (08-27 @ 07:09)    Alanine Aminotransferase (ALT/SGPT): 38 (08-28 @ 04:21)  Alanine Aminotransferase (ALT/SGPT): 45 (08-27 @ 07:09)  Alkaline Phosphatase, Serum: 114 (08-28 @ 04:21)  Alkaline Phosphatase, Serum: 130 <H> (08-27 @ 07:09)  Aspartate Aminotransferase (AST/SGOT): 33 (08-28 @ 04:21)  Aspartate Aminotransferase (AST/SGOT): 41 <H> (08-27 @ 07:09)        08-28    137  |  96  |  60<H>  ----------------------------<  103<H>  4.0   |  28  |  1.98<H>    Ca    10.4      28 Aug 2020 04:21  Phos  4.5     08-28  Mg     2.6     08-28    TPro  6.6  /  Alb  3.3  /  TBili  0.2  /  DBili  x   /  AST  33  /  ALT  38  /  AlkPhos  114  08-28                        10.6   9.51  )-----------( 258      ( 28 Aug 2020 04:22 )             31.6     Medications  MEDICATIONS  (STANDING):  abacavir 300 milliGRAM(s) Oral every 12 hours  ascorbic acid 500 milliGRAM(s) Oral daily  chlorhexidine 0.12% Liquid 30 milliLiter(s) Swish and Spit once  cyanocobalamin 1000 MICROGram(s) Oral daily  Doravirine 100mg tablets (pifeltro) 100 milliGRAM(s) 100 milliGRAM(s) Oral daily  finasteride 5 milliGRAM(s) Oral daily  heparin   Injectable 5000 Unit(s) SubCutaneous every 8 hours  hydrALAZINE 10 milliGRAM(s) Oral three times a day  lactobacillus acidophilus and bulgaricus Chewable 2 Tablet(s) Chew three times a day with meals  lactobacillus acidophilus and bulgaricus Chewable      lamiVUDine- milliGRAM(s) Oral daily  multivitamin 1 Tablet(s) Oral daily  mupirocin 2% Ointment 1 Application(s) Topical two times a day  tamsulosin 0.4 milliGRAM(s) Oral at bedtime      Physical Exam  General: Patient comfortable in bed  Vital Signs Last 12 Hrs  T(F): 98.4 (08-28-20 @ 04:49), Max: 98.4 (08-28-20 @ 04:49)  HR: 82 (08-28-20 @ 05:26) (82 - 86)  BP: 108/57 (08-28-20 @ 05:26) (98/54 - 108/57)  BP(mean): --  RR: 18 (08-28-20 @ 04:49) (18 - 18)  SpO2: 96% (08-28-20 @ 04:49) (96% - 96%)  Neck: No palpable thyroid nodules.  CVS: S1S2, No murmurs  Respiratory: No wheezing, no crepitations  GI: Abdomen soft, bowel sounds positive  Musculoskeletal:  edema lower extremities.   Skin: No skin rashes, no ecchymosis    Diagnostics    Thyroperoxidase Antibody: AM (08-27 @ 13:42)

## 2020-08-28 NOTE — PROGRESS NOTE ADULT - ATTENDING COMMENTS
Patient seen and examined with Dr. Gamble    I agree with his itnerval history exam and plans as noted above    Patient clinically unchanged  Surgery cancelled for today secondary to bump in creatinine after IV contrast  Hydration over the weekend  tentative plans for valve repair for endocarditis- possibly culture negative endocarditis on Monday 8/31    Lab aware to expect specimens for culture/stains and PCR testing    Tung Almazan MD  [phone]  After 5pm/weekends [phone]

## 2020-08-28 NOTE — PROGRESS NOTE ADULT - PROBLEM SELECTOR PLAN 3
- pt straight caths daily 3x at home for detrusor muscle weakness and BPH, able to urinate little without straight cath; admitted with severe hydronephrosis  - repeat US kidney/bladder with resolved R hydronephrosis; mild L hydronephrosis, urothelial thickening  [ ] urology following, appreciate recs.   [ ] urology rec tovar reinserted but pt is alright with straight cath with nursing assistance TID; continue straight cath for now  [ ] continue home tamsulosin and finasteride

## 2020-08-28 NOTE — PROGRESS NOTE ADULT - ATTENDING COMMENTS
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  -continue vanc by level    #SUDHEER on CKD III  -multifactorial in setting of arterial contrast, diuresis and obstruction.  -tovar replaced.   -trend creatinine.    rest as above

## 2020-08-28 NOTE — PROGRESS NOTE ADULT - SUBJECTIVE AND OBJECTIVE BOX
[facility] DIVISION OF KIDNEY DISEASES AND HYPERTENSION -- FOLLOW UP NOTE  --------------------------------------------------------------------------------  Chester Trujillo   Nephrology Fellow  Pager NS: [phone]/ [facility]: [phone]  (After 5 pm or on weekends please page the on-call fellow)    24 hour events/subjective: Patient seen and examined at the bedside. MVR procedure canceled this AM, Scr up to 1.98mg/dl. Pt has been performing intermittent straight catherizations.        PAST HISTORY  --------------------------------------------------------------------------------  No significant changes to PMH, PSH, FHx, SHx, unless otherwise noted    ALLERGIES & MEDICATIONS  --------------------------------------------------------------------------------  Allergies    No Known Allergies    Intolerances      Standing Inpatient Medications  abacavir 300 milliGRAM(s) Oral every 12 hours  ascorbic acid 500 milliGRAM(s) Oral daily  chlorhexidine 0.12% Liquid 30 milliLiter(s) Swish and Spit once  cyanocobalamin 1000 MICROGram(s) Oral daily  Doravirine 100mg tablets (pifeltro) 100 milliGRAM(s) 100 milliGRAM(s) Oral daily  finasteride 5 milliGRAM(s) Oral daily  heparin   Injectable 5000 Unit(s) SubCutaneous every 8 hours  hydrALAZINE 10 milliGRAM(s) Oral three times a day  lactobacillus acidophilus and bulgaricus Chewable 2 Tablet(s) Chew three times a day with meals  lactobacillus acidophilus and bulgaricus Chewable      lamiVUDine- milliGRAM(s) Oral daily  multivitamin 1 Tablet(s) Oral daily  mupirocin 2% Ointment 1 Application(s) Topical two times a day  tamsulosin 0.4 milliGRAM(s) Oral at bedtime    PRN Inpatient Medications  polyethylene glycol 3350 17 Gram(s) Oral daily PRN      REVIEW OF SYSTEMS  --------------------------------------------------------------------------------  Gen: No fevers/chills  Head/Eyes/Ears/Mouth: No headache; Normal hearing; Normal vision    Respiratory: No dyspnea, cough  CV: No chest pain  GI: No abdominal pain, diarrhea, constipation, nausea, vomiting  : No increased frequency, dysuria  MSK: No joint pain/swelling; no edema    All other systems were reviewed and are negative, except as noted.    >>> <<<    VITALS/PHYSICAL EXAM  --------------------------------------------------------------------------------  T(C): 36.9 (08-28-20 @ 04:49), Max: 37 (08-27-20 @ 20:50)  HR: 82 (08-28-20 @ 05:26) (82 - 86)  BP: 108/57 (08-28-20 @ 05:26) (98/54 - 116/64)  RR: 18 (08-28-20 @ 04:49) (18 - 18)  SpO2: 96% (08-28-20 @ 04:49) (95% - 96%)  Wt(kg): --        08-27-20 @ 07:01  -  08-28-20 @ 07:00  --------------------------------------------------------  IN: 360 mL / OUT: 1200 mL / NET: -840 mL      Physical Exam:    	Gen: NAD, well-appearing  	HEENT: Anicteric   	Pulm: CTA B/L  	CV: RRR, S1S2; no rub  	Abd: +BS, soft, nontender/nondistended       	: No suprapubic tenderness  	MSK: Warm, no clubbing, intact strength; no edema  	Neuro: No focal deficits, AOX3, no asterixis      	Vascular Access:      LABS/STUDIES  --------------------------------------------------------------------------------              10.6   9.51  >-----------<  258      [08-28-20 @ 04:22]              31.6     137  |  96  |  60  ----------------------------<  103      [08-28-20 @ 04:21]  4.0   |  28  |  1.98        Ca     10.4     [08-28-20 @ 04:21]      Mg     2.6     [08-28-20 @ 04:21]      Phos  4.5     [08-28-20 @ 04:21]    TPro  6.6  /  Alb  3.3  /  TBili  0.2  /  DBili  x   /  AST  33  /  ALT  38  /  AlkPhos  114  [08-28-20 @ 04:21]    PT/INR: PT 10.8 , INR 0.90       [08-28-20 @ 07:08]  PTT: 32.8       [08-28-20 @ 07:08]      Creatinine Trend:  SCr 1.98 [08-28 @ 04:21]  SCr 1.83 [08-27 @ 07:09]  SCr 1.64 [08-26 @ 07:20]  SCr 1.60 [08-25 @ 05:19]  SCr 1.64 [08-24 @ 06:50]    Urinalysis - [08-19-20 @ 03:23]      Color Yellow / Appearance Turbid / SG 1.015 / pH 7.0      Gluc Negative / Ketone Trace  / Bili Negative / Urobili Negative       Blood Moderate / Protein 100 mg/dL / Leuk Est Large / Nitrite Negative      RBC 10 / WBC >50 / Hyaline 0 / Gran  / Sq Epi  / Non Sq Epi 3 / Bacteria Many      TSH 4.24      [08-22-20 @ 12:42]

## 2020-08-28 NOTE — PROGRESS NOTE ADULT - ATTENDING COMMENTS
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 TE echo confirms large vegetation on mitral valve with torrential MR. Heart failure due to valvular disease maintained compensated on hospital regimen. Seeking control of apparent  source of infection and CT Surgery involved for anticipated valve replacement, postponed because creatinine has risen again today. Anticipate will be ready on Monday.

## 2020-08-28 NOTE — PROGRESS NOTE ADULT - PROBLEM SELECTOR PLAN 1
Subclinical; No need for management at this time.  TPO antibodies pending, will continue monitoring and FU,

## 2020-08-28 NOTE — PROGRESS NOTE ADULT - ASSESSMENT
Assessment  Hypothyroidism: 80y male with no history thyroid disease, was not on any thyroid supplements, TSH borderline elevated, subclinical hypothyroid, he is on HIV meds/triple therapy. Patient resting in bed, appears comfortable, OR cancelled today due to elevated Cr.  Endocarditis: Planning MVR, on meds, stable, monitored.          Nesha Izaguirre MD  Cell: [phone]  Office: [phone] Assessment  Hypothyroidism: 80y male with no history thyroid disease, was not on any thyroid supplements, TSH borderline elevated, subclinical hypothyroid, he is on HIV meds/triple therapy.  Patient resting in bed, appears comfortable, OR cancelled today due to elevated Cr.  Endocarditis: Planning MVR, on meds, stable, monitored.          Nesha Izaguirre MD  Cell: [phone]  Office: [phone]

## 2020-08-28 NOTE — PROGRESS NOTE ADULT - ASSESSMENT
80M hx of HIV(XT1=672, VL undet), detrusor muscle weakness with self straight cath, CAD presented after a fall at home.  Labs noted for WBC 16.5, Cr 2.2, CPK 3857, lactate 3.1, positive UA on admission  cellulitis on left knee- resolved  bacteremia with 2/2 sets on 8/19 growing coag. negative staph in the setting of valvular heart disease    #Infective endocarditis:  - NOHEMI:  1. Large mobile about 1.8cm x 1.3 cm vegetation seen on the medial aspect of the anterior leaflet with a partial flail component of the medial A2/ A3 area. There us also a vegetation see at the annulus of P3 which measures 0.5cm x 1.3cm  Severe eccentric posterior mitral regurgitation.  - On exam, pansystolic murmur at apex consistent with MR  - BCx(8/19): Growth in aerobic and anaerobic bottles: Staphylococcus epidermidis  - BCx(8/20): no growth  - Vancomycin 1g daily for now  - Check daily labs for vancomycin random level, goal is 15-20  - OR date for MVR with Dr Camacho next week  - Ok to do kimberlyn-op abx: cefuroxime  - If pt goes to OR, vegatation tissue has to send for culture and PCR (probably can send for 16S genome sequencing). We contacted micro lab.  - it's unclear if S.epi is the true pathogen given it's transient and still could be contamination. Follow up culture negative endocarditis workup: Brucella ab, Q fever ab, Bartonella ab, Legionella Ab    #SUDHEER:  - Nephro following  - Patient with baseline CKD since 2015, Scr baseline 1.6-1.8mg/dl  - Pt Scr up to 1.98mg/dl, BUN 60. This maybe 2/2 contrast/ diuretics or removal of tovar catheter  - Would replace tovar catheter and repeat renal US to assess for hydro      #Diarrhea  - multiple loose BMs  - antibiotic related  - c/w lactobacillus  - Stool rejected for C.diff as not watery    #HIV:  - PK9=635, VL undet  - c/w ABC/3TC/HUEY    #Left knee cellulitis:  - resolved  - s/p Zosyn and Cefazolin, no need to continue    #Pyuria:  - UCx<1000 normal  oswaldo  - Likely colonization  - Per , pt does not like the hospital supply, he can stay with Tovar in the hospital and d/c home with self-cath      Vannessa Gamble MD, PGY4   ID fellow  Pager: [phone]  After 5pm/weekends call [phone]    d/w Dr. Almazan

## 2020-08-28 NOTE — PROGRESS NOTE ADULT - ATTENDING COMMENTS
I have seen this patient with the fellow and agree with their assessment and plan. In addition,    Patient has underlying Stage 3 CKD/ urinary retention requiring self-catheterization. He was admitted for altered mental status and found to have Staph epi bacteremia and MV vegetation. Renal was consulted for CKD management prior to MVR.     Aden catheter placed.     #SUDHEER on CKD. His serum creatinine bulmaro to 1.98 today. This can be contrast-induced nephropathy. Agree with holding further diuretics. We will monitor closely.     We discussed with patient regarding risk of kidney injury during surgery (ATN) and potential need for renal replacement therapy. He understood.     # CKD stage 3 (follows Dr Presley Ferguson). His baseline serum creatinine 1.6.     # HTN - blood pressure acceptable. Continue hydralazine.     # Medication toxicity Monitoring: medication dose reviewed. Please dose medications to CrCl~15    The rest of the recommendations as per fellow's note.    Beth Stanford MD  Attending Nephrologist  [phone]

## 2020-08-28 NOTE — CDI QUERY NOTE - NSCDIOTHERTXTBX_GEN_ALL_CORE_HH
Patient presented s/p being down for about 24 hours, denies fall or loss of consciousness.  On admission, , /79, RR 24, Temp 99.8, WBC 16.47K, Lactate 3.1, Neutrophils 88.8%, serum creatinine 2.02 with documented baseline of 1.7, BNP 3857, blood cultures done with Staphylococcus epidermidis, UA with < 10,000 normal urogenital oswaldo.  Patient with history of BPH with urinary retention self catheterizes at home.    Patient admitted with Sepsis 2/2 UTI 2/2 self cath.  Patient documented by ID (8/21) with bacteremia on admission 2/2 staph MV Endocarditis.  Patient started on Vancomycin for Endocarditis on 8/19.  Patient documented with UTI vs Pyuria, likely contamination.    Based on your judgment and above clinical criteria, would you please clarify the etiology of Sepsis, present on admission, after study?    (1) Sepsis 2/2 UTI 2/2 self catheterization and/or Staph MV Endocarditis Bacteremia  (2) Sepsis 2/2 UTI 2/2 self catheterization   (3) Sepsis 2/2 Staph MV Endocarditis bacteremia   (4) Other (please specify)  (5) Clinically Undetermined

## 2020-08-28 NOTE — PROGRESS NOTE ADULT - SUBJECTIVE AND OBJECTIVE BOX
JAMAL Graham Rosa M Louie PGY1    Patient is a 80y old  Male who presents with a chief complaint of fall (28 Aug 2020 08:32)      SUBJECTIVE / OVERNIGHT EVENTS:  - overnight - no events  - am - tovar placed, draining yellow urine, pt feels tired, no chest pain/sob. denies weakness. denies abdominal pain.     MEDICATIONS  (STANDING):  abacavir 300 milliGRAM(s) Oral every 12 hours  ascorbic acid 500 milliGRAM(s) Oral daily  chlorhexidine 0.12% Liquid 30 milliLiter(s) Swish and Spit once  cyanocobalamin 1000 MICROGram(s) Oral daily  Doravirine 100mg tablets (pifeltro) 100 milliGRAM(s) 100 milliGRAM(s) Oral daily  finasteride 5 milliGRAM(s) Oral daily  heparin   Injectable 5000 Unit(s) SubCutaneous every 8 hours  hydrALAZINE 10 milliGRAM(s) Oral three times a day  lactobacillus acidophilus and bulgaricus Chewable 2 Tablet(s) Chew three times a day with meals  lactobacillus acidophilus and bulgaricus Chewable      lamiVUDine- milliGRAM(s) Oral daily  multivitamin 1 Tablet(s) Oral daily  mupirocin 2% Ointment 1 Application(s) Topical two times a day  tamsulosin 0.4 milliGRAM(s) Oral at bedtime    MEDICATIONS  (PRN):  polyethylene glycol 3350 17 Gram(s) Oral daily PRN Constipation      CAPILLARY BLOOD GLUCOSE        I&O's Summary    27 Aug 2020 07:01  -  28 Aug 2020 07:00  --------------------------------------------------------  IN: 360 mL / OUT: 1200 mL / NET: -840 mL        Vital Signs Last 24 Hrs  T(C): 36.9 (28 Aug 2020 04:49), Max: 37 (27 Aug 2020 20:50)  T(F): 98.4 (28 Aug 2020 04:49), Max: 98.6 (27 Aug 2020 20:50)  HR: 82 (28 Aug 2020 05:26) (82 - 86)  BP: 108/57 (28 Aug 2020 05:26) (98/54 - 116/64)  BP(mean): --  RR: 18 (28 Aug 2020 04:49) (18 - 18)  SpO2: 96% (28 Aug 2020 04:49) (95% - 96%)    PHYSICAL EXAM:  GENERAL: no distress  PSYCH: A&O x3  HEAD: Atraumatic, Normocephalic  NECK: Supple, No JVD  CHEST/LUNG: clear to auscultation bilaterally  HEART: regular rate and rhythm, no murmurs  ABDOMEN: nontender to palpation, no rebound tenderness/guarding  EXTREMITIES: no edema on bilateral LE  NEUROLOGY: no focal neurologic deficit  SKIN: No rashes or lesions    LABS:                        10.6   9.51  )-----------( 258      ( 28 Aug 2020 04:22 )             31.6      08-28    137  |  96  |  60<H>  ----------------------------<  103<H>  4.0   |  28  |  1.98<H>    Ca    10.4      28 Aug 2020 04:21  Phos  4.5     08-28  Mg     2.6     08-28    TPro  6.6  /  Alb  3.3  /  TBili  0.2  /  DBili  x   /  AST  33  /  ALT  38  /  AlkPhos  114  08-28    PT/INR - ( 28 Aug 2020 07:08 )   PT: 10.8 sec;   INR: 0.90 ratio         PTT - ( 28 Aug 2020 07:08 )  PTT:32.8 sec          RADIOLOGY & ADDITIONAL TESTS:    Imaging Personally Reviewed:    Consultant(s) Notes Reviewed:      Care Discussed with Consultants/Other Providers: Rosa M Louie PGY1    Patient is a 80y old  Male who presents with a chief complaint of fall (28 Aug 2020 08:32)      SUBJECTIVE / OVERNIGHT EVENTS:  - overnight - no events  - am - tovar placed, draining yellow urine, pt feels tired, no chest pain/sob. denies weakness. denies abdominal pain.     MEDICATIONS  (STANDING):  abacavir 300 milliGRAM(s) Oral every 12 hours  ascorbic acid 500 milliGRAM(s) Oral daily  chlorhexidine 0.12% Liquid 30 milliLiter(s) Swish and Spit once  cyanocobalamin 1000 MICROGram(s) Oral daily  Doravirine 100mg tablets (pifeltro) 100 milliGRAM(s) 100 milliGRAM(s) Oral daily  finasteride 5 milliGRAM(s) Oral daily  heparin   Injectable 5000 Unit(s) SubCutaneous every 8 hours  hydrALAZINE 10 milliGRAM(s) Oral three times a day  lactobacillus acidophilus and bulgaricus Chewable 2 Tablet(s) Chew three times a day with meals  lactobacillus acidophilus and bulgaricus Chewable      lamiVUDine- milliGRAM(s) Oral daily  multivitamin 1 Tablet(s) Oral daily  mupirocin 2% Ointment 1 Application(s) Topical two times a day  tamsulosin 0.4 milliGRAM(s) Oral at bedtime    MEDICATIONS  (PRN):  polyethylene glycol 3350 17 Gram(s) Oral daily PRN Constipation      CAPILLARY BLOOD GLUCOSE        I&O's Summary    27 Aug 2020 07:01  -  28 Aug 2020 07:00  --------------------------------------------------------  IN: 360 mL / OUT: 1200 mL / NET: -840 mL        Vital Signs Last 24 Hrs  T(C): 36.9 (28 Aug 2020 04:49), Max: 37 (27 Aug 2020 20:50)  T(F): 98.4 (28 Aug 2020 04:49), Max: 98.6 (27 Aug 2020 20:50)  HR: 82 (28 Aug 2020 05:26) (82 - 86)  BP: 108/57 (28 Aug 2020 05:26) (98/54 - 116/64)  BP(mean): --  RR: 18 (28 Aug 2020 04:49) (18 - 18)  SpO2: 96% (28 Aug 2020 04:49) (95% - 96%)    PHYSICAL EXAM:  GENERAL: lying down with eyes closed, feels tired, no acute distress  PSYCH: A&O x3  CHEST/LUNG: clear to auscultation bilaterally  HEART: regular rate and rhythm, no murmurs  ABDOMEN: nontender to palpation, no rebound tenderness/guarding  EXTREMITIES: no edema on bilateral LE  NEUROLOGY: no focal neurologic deficit  SKIN: No rashes or lesions  Tovar in place draining clear yellow urine.     LABS:                        10.6   9.51  )-----------( 258      ( 28 Aug 2020 04:22 )             31.6      08-28    137  |  96  |  60<H>  ----------------------------<  103<H>  4.0   |  28  |  1.98<H>    Ca    10.4      28 Aug 2020 04:21  Phos  4.5     08-28  Mg     2.6     08-28    TPro  6.6  /  Alb  3.3  /  TBili  0.2  /  DBili  x   /  AST  33  /  ALT  38  /  AlkPhos  114  08-28    PT/INR - ( 28 Aug 2020 07:08 )   PT: 10.8 sec;   INR: 0.90 ratio         PTT - ( 28 Aug 2020 07:08 )  PTT:32.8 sec          RADIOLOGY & ADDITIONAL TESTS:    Imaging Personally Reviewed:    Consultant(s) Notes Reviewed:      Care Discussed with Consultants/Other Providers:

## 2020-08-28 NOTE — CHART NOTE - NSCHARTNOTEFT_GEN_A_CORE
80y Male presents with Rhabdomyolysis echo revealed mitral regurgitation  and suspected endocarditis. NOHEMI confirms mitral valve vegetation.   Planned for OR this am however creat 1.9 and surgery was cancelled  Will; continue to follow>OR date to be determined  Renal following

## 2020-08-29 LAB
ALBUMIN SERPL ELPH-MCNC: 3.6 G/DL — SIGNIFICANT CHANGE UP (ref 3.3–5)
ALP SERPL-CCNC: 114 U/L — SIGNIFICANT CHANGE UP (ref 40–120)
ALT FLD-CCNC: 40 U/L — SIGNIFICANT CHANGE UP (ref 10–45)
ANION GAP SERPL CALC-SCNC: 12 MMOL/L — SIGNIFICANT CHANGE UP (ref 5–17)
AST SERPL-CCNC: 36 U/L — SIGNIFICANT CHANGE UP (ref 10–40)
BILIRUB SERPL-MCNC: 0.2 MG/DL — SIGNIFICANT CHANGE UP (ref 0.2–1.2)
BUN SERPL-MCNC: 55 MG/DL — HIGH (ref 7–23)
CALCIUM SERPL-MCNC: 10.5 MG/DL — SIGNIFICANT CHANGE UP (ref 8.4–10.5)
CHLORIDE SERPL-SCNC: 98 MMOL/L — SIGNIFICANT CHANGE UP (ref 96–108)
CO2 SERPL-SCNC: 28 MMOL/L — SIGNIFICANT CHANGE UP (ref 22–31)
CREAT SERPL-MCNC: 1.78 MG/DL — HIGH (ref 0.5–1.3)
GLUCOSE SERPL-MCNC: 108 MG/DL — HIGH (ref 70–99)
HCT VFR BLD CALC: 32.3 % — LOW (ref 39–50)
HGB BLD-MCNC: 10.9 G/DL — LOW (ref 13–17)
LEGIONELLA AG UR QL: NEGATIVE — SIGNIFICANT CHANGE UP
MAGNESIUM SERPL-MCNC: 2.6 MG/DL — SIGNIFICANT CHANGE UP (ref 1.6–2.6)
MCHC RBC-ENTMCNC: 33.7 GM/DL — SIGNIFICANT CHANGE UP (ref 32–36)
MCHC RBC-ENTMCNC: 35.4 PG — HIGH (ref 27–34)
MCV RBC AUTO: 104.9 FL — HIGH (ref 80–100)
NRBC # BLD: 0 /100 WBCS — SIGNIFICANT CHANGE UP (ref 0–0)
PHOSPHATE SERPL-MCNC: 4.8 MG/DL — HIGH (ref 2.5–4.5)
PLATELET # BLD AUTO: 274 K/UL — SIGNIFICANT CHANGE UP (ref 150–400)
POTASSIUM SERPL-MCNC: 4 MMOL/L — SIGNIFICANT CHANGE UP (ref 3.5–5.3)
POTASSIUM SERPL-SCNC: 4 MMOL/L — SIGNIFICANT CHANGE UP (ref 3.5–5.3)
PROT SERPL-MCNC: 7 G/DL — SIGNIFICANT CHANGE UP (ref 6–8.3)
RBC # BLD: 3.08 M/UL — LOW (ref 4.2–5.8)
RBC # FLD: 14 % — SIGNIFICANT CHANGE UP (ref 10.3–14.5)
SODIUM SERPL-SCNC: 138 MMOL/L — SIGNIFICANT CHANGE UP (ref 135–145)
VANCOMYCIN FLD-MCNC: 12 UG/ML — SIGNIFICANT CHANGE UP
WBC # BLD: 9.3 K/UL — SIGNIFICANT CHANGE UP (ref 3.8–10.5)
WBC # FLD AUTO: 9.3 K/UL — SIGNIFICANT CHANGE UP (ref 3.8–10.5)

## 2020-08-29 PROCEDURE — 99233 SBSQ HOSP IP/OBS HIGH 50: CPT | Mod: GC

## 2020-08-29 RX ORDER — HEPARIN SODIUM 5000 [USP'U]/ML
5000 INJECTION INTRAVENOUS; SUBCUTANEOUS EVERY 8 HOURS
Refills: 0 | Status: COMPLETED | OUTPATIENT
Start: 2020-08-29 | End: 2020-08-30

## 2020-08-29 RX ORDER — VANCOMYCIN HCL 1 G
1000 VIAL (EA) INTRAVENOUS ONCE
Refills: 0 | Status: COMPLETED | OUTPATIENT
Start: 2020-08-29 | End: 2020-08-29

## 2020-08-29 RX ADMIN — ABACAVIR 300 MILLIGRAM(S): 20 SOLUTION ORAL at 09:12

## 2020-08-29 RX ADMIN — Medication 500 MILLIGRAM(S): at 12:15

## 2020-08-29 RX ADMIN — MUPIROCIN 1 APPLICATION(S): 20 OINTMENT TOPICAL at 17:20

## 2020-08-29 RX ADMIN — Medication 100 MILLIGRAM(S): at 12:16

## 2020-08-29 RX ADMIN — TAMSULOSIN HYDROCHLORIDE 0.4 MILLIGRAM(S): 0.4 CAPSULE ORAL at 21:58

## 2020-08-29 RX ADMIN — Medication 1 TABLET(S): at 12:15

## 2020-08-29 RX ADMIN — PREGABALIN 1000 MICROGRAM(S): 225 CAPSULE ORAL at 12:15

## 2020-08-29 RX ADMIN — Medication 10 MILLIGRAM(S): at 05:57

## 2020-08-29 RX ADMIN — FINASTERIDE 5 MILLIGRAM(S): 5 TABLET, FILM COATED ORAL at 21:58

## 2020-08-29 RX ADMIN — ABACAVIR 300 MILLIGRAM(S): 20 SOLUTION ORAL at 21:58

## 2020-08-29 RX ADMIN — HEPARIN SODIUM 5000 UNIT(S): 5000 INJECTION INTRAVENOUS; SUBCUTANEOUS at 21:58

## 2020-08-29 RX ADMIN — LACTOBACILLUS ACIDOPH-L.BULGARICUS 1 MILLION CELL CHEWABLE TABLET 2 TABLET(S): at 18:24

## 2020-08-29 RX ADMIN — MUPIROCIN 1 APPLICATION(S): 20 OINTMENT TOPICAL at 06:04

## 2020-08-29 RX ADMIN — LACTOBACILLUS ACIDOPH-L.BULGARICUS 1 MILLION CELL CHEWABLE TABLET 2 TABLET(S): at 10:15

## 2020-08-29 RX ADMIN — HEPARIN SODIUM 5000 UNIT(S): 5000 INJECTION INTRAVENOUS; SUBCUTANEOUS at 05:58

## 2020-08-29 RX ADMIN — Medication 10 MILLIGRAM(S): at 14:18

## 2020-08-29 RX ADMIN — Medication 250 MILLIGRAM(S): at 09:14

## 2020-08-29 RX ADMIN — HEPARIN SODIUM 5000 UNIT(S): 5000 INJECTION INTRAVENOUS; SUBCUTANEOUS at 14:18

## 2020-08-29 NOTE — PROGRESS NOTE ADULT - ATTENDING COMMENTS
agree with above    #sepsis 2/2 MV endocarditis  -vanc given this AM for subtherapeutic vanc troph.   -for MV replacement hopefully monday    #SUDHEER  -multifactorial to retention, contrast, now resolved    #haed pain  -since fall. STill some pain on left side of head. MR without any bleed.  -maxillofacial on admission without fx, still likely sequelae of fall.    #HIV  -continue ART

## 2020-08-29 NOTE — PROGRESS NOTE ADULT - SUBJECTIVE AND OBJECTIVE BOX
Chief complaint  Patient is a 80y old  Male who presents with a chief complaint of fall (29 Aug 2020 10:22)   Review of systems  Patient in bed, appears comfortable.    Labs and Fingersticks  CAPILLARY BLOOD GLUCOSE    Anion Gap, Serum: 12 (08-29 @ 06:34)  Anion Gap, Serum: 13 (08-28 @ 04:21)      Calcium, Total Serum: 10.5 (08-29 @ 06:34)  Calcium, Total Serum: 10.4 (08-28 @ 04:21)  Albumin, Serum: 3.6 (08-29 @ 06:34)  Albumin, Serum: 3.3 (08-28 @ 04:21)    Alanine Aminotransferase (ALT/SGPT): 40 (08-29 @ 06:34)  Alanine Aminotransferase (ALT/SGPT): 38 (08-28 @ 04:21)  Alkaline Phosphatase, Serum: 114 (08-29 @ 06:34)  Alkaline Phosphatase, Serum: 114 (08-28 @ 04:21)  Aspartate Aminotransferase (AST/SGOT): 36 (08-29 @ 06:34)  Aspartate Aminotransferase (AST/SGOT): 33 (08-28 @ 04:21)        08-29    138  |  98  |  55<H>  ----------------------------<  108<H>  4.0   |  28  |  1.78<H>    Ca    10.5      29 Aug 2020 06:34  Phos  4.8     08-29  Mg     2.6     08-29    TPro  7.0  /  Alb  3.6  /  TBili  0.2  /  DBili  x   /  AST  36  /  ALT  40  /  AlkPhos  114  08-29                        10.9   9.30  )-----------( 274      ( 29 Aug 2020 06:36 )             32.3     Medications  MEDICATIONS  (STANDING):  abacavir 300 milliGRAM(s) Oral every 12 hours  ascorbic acid 500 milliGRAM(s) Oral daily  chlorhexidine 0.12% Liquid 30 milliLiter(s) Swish and Spit once  cyanocobalamin 1000 MICROGram(s) Oral daily  Doravirine 100mg tablets (pifeltro) 100 milliGRAM(s) 100 milliGRAM(s) Oral daily  finasteride 5 milliGRAM(s) Oral daily  heparin   Injectable 5000 Unit(s) SubCutaneous every 8 hours  hydrALAZINE 10 milliGRAM(s) Oral three times a day  lactobacillus acidophilus and bulgaricus Chewable 2 Tablet(s) Chew three times a day with meals  lactobacillus acidophilus and bulgaricus Chewable      lamiVUDine- milliGRAM(s) Oral daily  multivitamin 1 Tablet(s) Oral daily  mupirocin 2% Ointment 1 Application(s) Topical two times a day  tamsulosin 0.4 milliGRAM(s) Oral at bedtime      Physical Exam  General: Patient comfortable in bed  Vital Signs Last 12 Hrs  T(F): 97.8 (08-29-20 @ 12:14), Max: 97.8 (08-29-20 @ 12:14)  HR: 72 (08-29-20 @ 12:14) (72 - 72)  BP: 107/62 (08-29-20 @ 12:14) (107/62 - 107/62)  BP(mean): --  RR: 18 (08-29-20 @ 12:14) (18 - 18)  SpO2: 97% (08-29-20 @ 12:14) (97% - 97%)  Neck: No palpable thyroid nodules.  CVS: S1S2, No murmurs  Respiratory: No wheezing, no crepitations  GI: Abdomen soft, bowel sounds positive  Musculoskeletal:  edema lower extremities.     Diagnostics

## 2020-08-29 NOTE — PROGRESS NOTE ADULT - SUBJECTIVE AND OBJECTIVE BOX
Jose Chan MD, PGY-2  [facility] Contact Information  [place] Pager: [phone]   After 7 PM on weekdays and 12 PM on weekends, for URGENT issues, low teams page #[phone], high teams #[phone]   --------------------------------------------------------------------------------------------  [facility] Contact Information  [place] Pager: [phone]  After 7 PM on weekdays and 12 PM on weekends, for URGENT issues, low teams #[phone], high teams #[phone]  ---------------------------------------------------------------------------------------------  Patient is a 80y old  Male who presents with a chief complaint of fall (28 Aug 2020 15:16)    SUBJECTIVE / OVERNIGHT EVENTS: No acute events overnight. Pt seen and examined at bedside. Complaining of lack of eggs during breakfast, otherwise without acute complaints. Denies any headache, fever, chills, nausea, vomiting, diarrhea, chest pain, sob.      MEDICATIONS  (STANDING):  abacavir 300 milliGRAM(s) Oral every 12 hours  ascorbic acid 500 milliGRAM(s) Oral daily  chlorhexidine 0.12% Liquid 30 milliLiter(s) Swish and Spit once  cyanocobalamin 1000 MICROGram(s) Oral daily  Doravirine 100mg tablets (pifeltro) 100 milliGRAM(s) 100 milliGRAM(s) Oral daily  finasteride 5 milliGRAM(s) Oral daily  heparin   Injectable 5000 Unit(s) SubCutaneous every 8 hours  hydrALAZINE 10 milliGRAM(s) Oral three times a day  lactobacillus acidophilus and bulgaricus Chewable 2 Tablet(s) Chew three times a day with meals  lactobacillus acidophilus and bulgaricus Chewable      lamiVUDine- milliGRAM(s) Oral daily  multivitamin 1 Tablet(s) Oral daily  mupirocin 2% Ointment 1 Application(s) Topical two times a day  tamsulosin 0.4 milliGRAM(s) Oral at bedtime    MEDICATIONS  (PRN):  polyethylene glycol 3350 17 Gram(s) Oral daily PRN Constipation      CAPILLARY BLOOD GLUCOSE    I&O's Summary    28 Aug 2020 07:01  -  29 Aug 2020 07:00  --------------------------------------------------------  IN: 560 mL / OUT: 3000 mL / NET: -2440 mL    PHYSICAL EXAM:  Vital Signs Last 24 Hrs  T(C): 36.7 (29 Aug 2020 04:34), Max: 36.8 (28 Aug 2020 11:34)  T(F): 98 (29 Aug 2020 04:34), Max: 98.2 (28 Aug 2020 11:34)  HR: 77 (29 Aug 2020 04:34) (73 - 78)  BP: 111/57 (29 Aug 2020 04:34) (104/57 - 114/52)  RR: 18 (29 Aug 2020 04:34) (18 - 18)  SpO2: 97% (29 Aug 2020 04:34) (94% - 98%)    GENERAL: NAD, pleasant elderly male sitting up in bed eating breakfast  EYES: EOMI, conjunctiva clear   NECK: Supple, No JVD  CHEST/LUNG: CTABL  HEART: RRR, 3/6 systolic murmur at mitral area  ABDOMEN: soft, nt, nd  EXTREMITIES:  no peripheral edema, dunlap, b/l chronic, patchy hyperpigmented LEs  PSYCH: AAOx3  NEUROLOGY: non-focal  SKIN: No rashes or lesions    LABS:             10.9   9.30  )-----------( 274      ( 29 Aug 2020 06:36 )             32.3     08-29    138  |  98  |  55<H>  ----------------------------<  108<H>  4.0   |  28  |  1.78<H>    Ca    10.5      29 Aug 2020 06:34  Phos  4.8     08-29  Mg     2.6     08-29    TPro  7.0  /  Alb  3.6  /  TBili  0.2  /  DBili  x   /  AST  36  /  ALT  40  /  AlkPhos  114  08-29    PT/INR - ( 28 Aug 2020 07:08 )   PT: 10.8 sec;   INR: 0.90 ratio         PTT - ( 28 Aug 2020 07:08 )  PTT:32.8 sec    RADIOLOGY & ADDITIONAL TESTS:  Results Reviewed:   Imaging Personally Reviewed:  Electrocardiogram Personally Reviewed:    Tele: NSR 60s-80s. AK interval measured on tele .21   COORDINATION OF CARE:  Care Discussed with Consultants/Other Providers [Y/N]:  Prior or Outpatient Records Reviewed [Y/N]:

## 2020-08-29 NOTE — PROGRESS NOTE ADULT - ASSESSMENT
Assessment  Hypothyroidism: 80y male with no history thyroid disease, was not on any thyroid supplements, TSH borderline elevated, subclinical hypothyroid, he is on HIV meds/triple therapy.  Patient resting in bed, appears comfortable, surgery was cancelled due to elevated Cr.  Endocarditis: Planning MVR, on meds, stable, monitored.          Nesha Izaguirre MD  Cell: [phone]  Office: [phone]

## 2020-08-29 NOTE — PROGRESS NOTE ADULT - PROBLEM SELECTOR PLAN 2
2/2 severe acute mitral valve regurg on echo with partial flail leaflet component 2/2 vegetation visualized on NOHEMI  - on RA spo2 wnl  [ ] cardiology following: holding lasix 40 daily per nephrology recs given elevated cr, mild volume depletion concern. Continue hydralazine 10 TID for afterload reduction, hold for SBP <100

## 2020-08-29 NOTE — PROGRESS NOTE ADULT - PROBLEM SELECTOR PLAN 1
- NOHEMI 8/24: large mobile 1.8 x 1.3 cm vegetation anterior leaflet mitral valve with partial flail component. second vegetaion 0.5 x 1.3cm on posterior leaflet w severe eccentric mitral regurg. Normal LV + RV systolic function. 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   - MRA 8/24 w/o septic emboli  - cath 8/25 prior to valve surgery showed clean arteries  [ ] vanc by level, goal 15-20. received vanc 1g 8/29   - cefuroxime perisurgery abx, ID following - appreciate recs.   [ ] valve repair with Dr. Camacho pending Cr improvement to reduce post surgery dialysis need  [ ] nephro following, appreciate recs: Cr elevation can be from mild volume depletion or contrast-induced nephropathy or tovar removal. Agree with holding further diuretics. Continue tovar.   [ ] f/u Brucella ab, Q fever ab, Bartonella ab, Legionella Ab, staph epi thought to be possible contamination  - plan for OR Monday 8/31. NPO after MN on 8/30, preop labs

## 2020-08-30 LAB
ALBUMIN SERPL ELPH-MCNC: 3.4 G/DL — SIGNIFICANT CHANGE UP (ref 3.3–5)
ALP SERPL-CCNC: 108 U/L — SIGNIFICANT CHANGE UP (ref 40–120)
ALT FLD-CCNC: 34 U/L — SIGNIFICANT CHANGE UP (ref 10–45)
ANION GAP SERPL CALC-SCNC: 14 MMOL/L — SIGNIFICANT CHANGE UP (ref 5–17)
AST SERPL-CCNC: 28 U/L — SIGNIFICANT CHANGE UP (ref 10–40)
BILIRUB SERPL-MCNC: 0.2 MG/DL — SIGNIFICANT CHANGE UP (ref 0.2–1.2)
BLD GP AB SCN SERPL QL: NEGATIVE — SIGNIFICANT CHANGE UP
BUN SERPL-MCNC: 54 MG/DL — HIGH (ref 7–23)
CALCIUM SERPL-MCNC: 10 MG/DL — SIGNIFICANT CHANGE UP (ref 8.4–10.5)
CHLORIDE SERPL-SCNC: 99 MMOL/L — SIGNIFICANT CHANGE UP (ref 96–108)
CO2 SERPL-SCNC: 26 MMOL/L — SIGNIFICANT CHANGE UP (ref 22–31)
CREAT SERPL-MCNC: 1.8 MG/DL — HIGH (ref 0.5–1.3)
GLUCOSE SERPL-MCNC: 101 MG/DL — HIGH (ref 70–99)
HCT VFR BLD CALC: 32.4 % — LOW (ref 39–50)
HGB BLD-MCNC: 10.8 G/DL — LOW (ref 13–17)
MAGNESIUM SERPL-MCNC: 2.6 MG/DL — SIGNIFICANT CHANGE UP (ref 1.6–2.6)
MCHC RBC-ENTMCNC: 33.3 GM/DL — SIGNIFICANT CHANGE UP (ref 32–36)
MCHC RBC-ENTMCNC: 35 PG — HIGH (ref 27–34)
MCV RBC AUTO: 104.9 FL — HIGH (ref 80–100)
NRBC # BLD: 0 /100 WBCS — SIGNIFICANT CHANGE UP (ref 0–0)
PHOSPHATE SERPL-MCNC: 4.4 MG/DL — SIGNIFICANT CHANGE UP (ref 2.5–4.5)
PLATELET # BLD AUTO: 251 K/UL — SIGNIFICANT CHANGE UP (ref 150–400)
POTASSIUM SERPL-MCNC: 3.9 MMOL/L — SIGNIFICANT CHANGE UP (ref 3.5–5.3)
POTASSIUM SERPL-SCNC: 3.9 MMOL/L — SIGNIFICANT CHANGE UP (ref 3.5–5.3)
PROT SERPL-MCNC: 6.8 G/DL — SIGNIFICANT CHANGE UP (ref 6–8.3)
RBC # BLD: 3.09 M/UL — LOW (ref 4.2–5.8)
RBC # FLD: 14 % — SIGNIFICANT CHANGE UP (ref 10.3–14.5)
RH IG SCN BLD-IMP: POSITIVE — SIGNIFICANT CHANGE UP
SARS-COV-2 RNA SPEC QL NAA+PROBE: SIGNIFICANT CHANGE UP
SODIUM SERPL-SCNC: 139 MMOL/L — SIGNIFICANT CHANGE UP (ref 135–145)
VANCOMYCIN FLD-MCNC: 15.7 UG/ML — SIGNIFICANT CHANGE UP
WBC # BLD: 8.96 K/UL — SIGNIFICANT CHANGE UP (ref 3.8–10.5)
WBC # FLD AUTO: 8.96 K/UL — SIGNIFICANT CHANGE UP (ref 3.8–10.5)

## 2020-08-30 PROCEDURE — 99232 SBSQ HOSP IP/OBS MODERATE 35: CPT

## 2020-08-30 PROCEDURE — 93880 EXTRACRANIAL BILAT STUDY: CPT | Mod: 26

## 2020-08-30 PROCEDURE — 99233 SBSQ HOSP IP/OBS HIGH 50: CPT | Mod: GC

## 2020-08-30 RX ORDER — VANCOMYCIN HCL 1 G
1000 VIAL (EA) INTRAVENOUS ONCE
Refills: 0 | Status: COMPLETED | OUTPATIENT
Start: 2020-08-30 | End: 2020-08-30

## 2020-08-30 RX ORDER — CEFUROXIME AXETIL 250 MG
1500 TABLET ORAL ONCE
Refills: 0 | Status: DISCONTINUED | OUTPATIENT
Start: 2020-08-30 | End: 2020-08-31

## 2020-08-30 RX ORDER — CHLORHEXIDINE GLUCONATE 213 G/1000ML
30 SOLUTION TOPICAL ONCE
Refills: 0 | Status: COMPLETED | OUTPATIENT
Start: 2020-08-30 | End: 2020-08-31

## 2020-08-30 RX ORDER — CHLORHEXIDINE GLUCONATE 213 G/1000ML
1 SOLUTION TOPICAL ONCE
Refills: 0 | Status: COMPLETED | OUTPATIENT
Start: 2020-08-30 | End: 2020-08-30

## 2020-08-30 RX ORDER — CHLORHEXIDINE GLUCONATE 213 G/1000ML
1 SOLUTION TOPICAL ONCE
Refills: 0 | Status: DISCONTINUED | OUTPATIENT
Start: 2020-08-31 | End: 2020-08-31

## 2020-08-30 RX ADMIN — Medication 10 MILLIGRAM(S): at 21:31

## 2020-08-30 RX ADMIN — FINASTERIDE 5 MILLIGRAM(S): 5 TABLET, FILM COATED ORAL at 21:31

## 2020-08-30 RX ADMIN — HEPARIN SODIUM 5000 UNIT(S): 5000 INJECTION INTRAVENOUS; SUBCUTANEOUS at 05:43

## 2020-08-30 RX ADMIN — MUPIROCIN 1 APPLICATION(S): 20 OINTMENT TOPICAL at 18:03

## 2020-08-30 RX ADMIN — Medication 1 TABLET(S): at 11:52

## 2020-08-30 RX ADMIN — HEPARIN SODIUM 5000 UNIT(S): 5000 INJECTION INTRAVENOUS; SUBCUTANEOUS at 21:31

## 2020-08-30 RX ADMIN — Medication 10 MILLIGRAM(S): at 13:34

## 2020-08-30 RX ADMIN — Medication 500 MILLIGRAM(S): at 11:52

## 2020-08-30 RX ADMIN — Medication 250 MILLIGRAM(S): at 11:51

## 2020-08-30 RX ADMIN — PREGABALIN 1000 MICROGRAM(S): 225 CAPSULE ORAL at 11:53

## 2020-08-30 RX ADMIN — LACTOBACILLUS ACIDOPH-L.BULGARICUS 1 MILLION CELL CHEWABLE TABLET 2 TABLET(S): at 18:00

## 2020-08-30 RX ADMIN — CHLORHEXIDINE GLUCONATE 1 APPLICATION(S): 213 SOLUTION TOPICAL at 21:29

## 2020-08-30 RX ADMIN — MUPIROCIN 1 APPLICATION(S): 20 OINTMENT TOPICAL at 05:47

## 2020-08-30 RX ADMIN — LACTOBACILLUS ACIDOPH-L.BULGARICUS 1 MILLION CELL CHEWABLE TABLET 2 TABLET(S): at 11:54

## 2020-08-30 RX ADMIN — ABACAVIR 300 MILLIGRAM(S): 20 SOLUTION ORAL at 22:01

## 2020-08-30 RX ADMIN — HEPARIN SODIUM 5000 UNIT(S): 5000 INJECTION INTRAVENOUS; SUBCUTANEOUS at 13:33

## 2020-08-30 RX ADMIN — Medication 100 MILLIGRAM(S): at 11:53

## 2020-08-30 RX ADMIN — TAMSULOSIN HYDROCHLORIDE 0.4 MILLIGRAM(S): 0.4 CAPSULE ORAL at 21:31

## 2020-08-30 RX ADMIN — LACTOBACILLUS ACIDOPH-L.BULGARICUS 1 MILLION CELL CHEWABLE TABLET 2 TABLET(S): at 09:04

## 2020-08-30 RX ADMIN — ABACAVIR 300 MILLIGRAM(S): 20 SOLUTION ORAL at 09:05

## 2020-08-30 RX ADMIN — Medication 10 MILLIGRAM(S): at 05:42

## 2020-08-30 NOTE — PROGRESS NOTE ADULT - PROBLEM SELECTOR PLAN 1
- NOHEMI 8/24: large mobile 1.8 x 1.3 cm vegetation anterior leaflet mitral valve with partial flail component. second vegetaion 0.5 x 1.3cm on posterior leaflet w severe eccentric mitral regurg. Normal LV + RV systolic function. 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   - MRA 8/24 w/o septic emboli  - cath 8/25 prior to valve surgery showed clean arteries  [ ] vanc by level, goal 15-20. received vanc 1g 8/29   - cefuroxime perisurgery abx, ID following - appreciate recs.   [ ] valve repair with Dr. Camacho pending Cr improvement to reduce post surgery dialysis need  [ ] nephro following, appreciate recs: Cr elevation can be from mild volume depletion or contrast-induced nephropathy or tovar removal. Agree with holding further diuretics. Continue tovar.   [ ] f/u Brucella ab, Q fever ab, Bartonella ab, Legionella Ab, staph epi thought to be possible contamination  - plan for OR Monday 8/31. NPO after MN on 8/30, preop labs - NOHEMI 8/24: large mobile 1.8 x 1.3 cm vegetation anterior leaflet mitral valve with partial flail component. second vegetaion 0.5 x 1.3cm on posterior leaflet w severe eccentric mitral regurg. Normal LV + RV systolic function. 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   - MRA 8/24 w/o septic emboli  - cath 8/25 prior to valve surgery showed clean arteries  [ ] vanc by level, goal 15-20. received vanc 1g 8/30  - cefuroxime perisurgery abx, ID following - appreciate recs.   [ ] valve repair with Dr. Camacho pending Cr improvement to reduce post surgery dialysis need  [ ] nephro following, appreciate recs: Cr elevation can be from mild volume depletion or contrast-induced nephropathy or tovar removal. Agree with holding further diuretics. Continue tovar.   [ ] f/u Brucella ab, Q fever ab, Bartonella ab, Legionella Ab, staph epi thought to be possible contamination  - plan for OR Monday 8/31. NPO after MN on 8/30, preop labs - NOHEMI 8/24: large mobile 1.8 x 1.3 cm vegetation anterior leaflet mitral valve with partial flail component. second vegetation 0.5 x 1.3cm on posterior leaflet w severe eccentric mitral regurg. Normal LV + RV systolic function. 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   - MRA 8/24 w/o septic emboli  - cath 8/25 prior to valve surgery showed clean arteries  [ ] vanc by level, goal 15-20. received vanc 1g 8/30  [ ] cefuroxime perisurgery abx, ID following - appreciate recs.   [ ] valve repair with Dr. Camacho: plan for OR Monday 8/31. NPO after MN on 8/30, preop labs  [ ] nephro following, appreciate recs: Cr elevation can be from mild volume depletion or contrast-induced nephropathy or tovar removal. hold lasix. Continue tovar.   [ ] f/u Brucella ab, Q fever ab, Bartonella ab, Legionella Ab, staph epi thought to be possible contamination

## 2020-08-30 NOTE — PROGRESS NOTE ADULT - SUBJECTIVE AND OBJECTIVE BOX
Rosa M Palma PGY1    Patient is a 80y old  Male who presents with a chief complaint of fall (29 Aug 2020 16:39)      SUBJECTIVE / OVERNIGHT EVENTS:  - overnight - no events  - am -     MEDICATIONS  (STANDING):  abacavir 300 milliGRAM(s) Oral every 12 hours  ascorbic acid 500 milliGRAM(s) Oral daily  chlorhexidine 0.12% Liquid 30 milliLiter(s) Swish and Spit once  cyanocobalamin 1000 MICROGram(s) Oral daily  Doravirine 100mg tablets (pifeltro) 100 milliGRAM(s) 100 milliGRAM(s) Oral daily  finasteride 5 milliGRAM(s) Oral daily  heparin   Injectable 5000 Unit(s) SubCutaneous every 8 hours  hydrALAZINE 10 milliGRAM(s) Oral three times a day  lactobacillus acidophilus and bulgaricus Chewable 2 Tablet(s) Chew three times a day with meals  lactobacillus acidophilus and bulgaricus Chewable      lamiVUDine- milliGRAM(s) Oral daily  multivitamin 1 Tablet(s) Oral daily  mupirocin 2% Ointment 1 Application(s) Topical two times a day  tamsulosin 0.4 milliGRAM(s) Oral at bedtime    MEDICATIONS  (PRN):  polyethylene glycol 3350 17 Gram(s) Oral daily PRN Constipation      CAPILLARY BLOOD GLUCOSE        I&O's Summary    29 Aug 2020 07:01  -  30 Aug 2020 07:00  --------------------------------------------------------  IN: 1370 mL / OUT: 3050 mL / NET: -1680 mL        Vital Signs Last 24 Hrs  T(C): 36.6 (30 Aug 2020 04:09), Max: 37 (29 Aug 2020 23:37)  T(F): 97.8 (30 Aug 2020 04:09), Max: 98.6 (29 Aug 2020 23:37)  HR: 80 (30 Aug 2020 04:09) (55 - 86)  BP: 103/50 (30 Aug 2020 04:09) (96/60 - 118/58)  BP(mean): --  RR: 18 (30 Aug 2020 04:09) (18 - 18)  SpO2: 95% (30 Aug 2020 04:09) (95% - 97%)    PHYSICAL EXAM:  GENERAL: no distress  PSYCH: A&O x3  HEAD: Atraumatic, Normocephalic  NECK: Supple, No JVD  CHEST/LUNG: clear to auscultation bilaterally  HEART: regular rate and rhythm, no murmurs  ABDOMEN: nontender to palpation, no rebound tenderness/guarding  EXTREMITIES: no edema on bilateral LE  NEUROLOGY: no focal neurologic deficit  SKIN: No rashes or lesions    LABS:                        10.8   8.96  )-----------( 251      ( 30 Aug 2020 05:16 )             32.4      08-30    139  |  99  |  54<H>  ----------------------------<  101<H>  3.9   |  26  |  1.80<H>    Ca    10.0      30 Aug 2020 05:15  Phos  4.4     08-30  Mg     2.6     08-30    TPro  6.8  /  Alb  3.4  /  TBili  0.2  /  DBili  x   /  AST  28  /  ALT  34  /  AlkPhos  108  08-30              RADIOLOGY & ADDITIONAL TESTS:    Imaging Personally Reviewed:    Consultant(s) Notes Reviewed:      Care Discussed with Consultants/Other Providers: Rosa M Palma PGY1    Patient is a 80y old  Male who presents with a chief complaint of fall (29 Aug 2020 16:39)      SUBJECTIVE / OVERNIGHT EVENTS:  - overnight - no events  - am - no chest pain, no shortness of breath. had bm this am. tovar in place draining yellow urine. pt is wondering about when he will get surgery, discussed Cr with him.     MEDICATIONS  (STANDING):  abacavir 300 milliGRAM(s) Oral every 12 hours  ascorbic acid 500 milliGRAM(s) Oral daily  chlorhexidine 0.12% Liquid 30 milliLiter(s) Swish and Spit once  cyanocobalamin 1000 MICROGram(s) Oral daily  Doravirine 100mg tablets (pifeltro) 100 milliGRAM(s) 100 milliGRAM(s) Oral daily  finasteride 5 milliGRAM(s) Oral daily  heparin   Injectable 5000 Unit(s) SubCutaneous every 8 hours  hydrALAZINE 10 milliGRAM(s) Oral three times a day  lactobacillus acidophilus and bulgaricus Chewable 2 Tablet(s) Chew three times a day with meals  lactobacillus acidophilus and bulgaricus Chewable      lamiVUDine- milliGRAM(s) Oral daily  multivitamin 1 Tablet(s) Oral daily  mupirocin 2% Ointment 1 Application(s) Topical two times a day  tamsulosin 0.4 milliGRAM(s) Oral at bedtime    MEDICATIONS  (PRN):  polyethylene glycol 3350 17 Gram(s) Oral daily PRN Constipation      CAPILLARY BLOOD GLUCOSE        I&O's Summary    29 Aug 2020 07:01  -  30 Aug 2020 07:00  --------------------------------------------------------  IN: 1370 mL / OUT: 3050 mL / NET: -1680 mL        Vital Signs Last 24 Hrs  T(C): 36.6 (30 Aug 2020 04:09), Max: 37 (29 Aug 2020 23:37)  T(F): 97.8 (30 Aug 2020 04:09), Max: 98.6 (29 Aug 2020 23:37)  HR: 80 (30 Aug 2020 04:09) (55 - 86)  BP: 103/50 (30 Aug 2020 04:09) (96/60 - 118/58)  BP(mean): --  RR: 18 (30 Aug 2020 04:09) (18 - 18)  SpO2: 95% (30 Aug 2020 04:09) (95% - 97%)    PHYSICAL EXAM:  GENERAL: sitting up, waiting for food, no distress   PSYCH: A&O x3  HEAD: Atraumatic, Normocephalic  CHEST/LUNG: clear to auscultation bilaterally  HEART: regular rate and rhythm, no murmurs  ABDOMEN: nontender to palpation, no rebound tenderness/guarding  EXTREMITIES: no edema on bilateral LE  NEUROLOGY: no focal neurologic deficit  SKIN: No rashes or lesions  tovar in place draining yellow urine    LABS:                        10.8   8.96  )-----------( 251      ( 30 Aug 2020 05:16 )             32.4      08-30    139  |  99  |  54<H>  ----------------------------<  101<H>  3.9   |  26  |  1.80<H>    Ca    10.0      30 Aug 2020 05:15  Phos  4.4     08-30  Mg     2.6     08-30    TPro  6.8  /  Alb  3.4  /  TBili  0.2  /  DBili  x   /  AST  28  /  ALT  34  /  AlkPhos  108  08-30              RADIOLOGY & ADDITIONAL TESTS:    Imaging Personally Reviewed:    Consultant(s) Notes Reviewed:      Care Discussed with Consultants/Other Providers:

## 2020-08-30 NOTE — PROGRESS NOTE ADULT - ATTENDING COMMENTS
creatinine is grossly stable. Per patient, is on for tomorrow OR at 7:15. Vanc 1 g given today for vanc level.

## 2020-08-30 NOTE — PROGRESS NOTE ADULT - ASSESSMENT
This   is a retired   79 y/o man with hx HIV, CAD, detrusor muscle weakness, BPH presenting for fall, down for 24 hrs, admitted with rhabdo, bilateral severe ureterohydronephrosis and thickened bladder on CTAP 2/2 inability to continue his home straight cath 3x daily after fall, tovar in place. Rhabdo improved, hydronephrosis improved with tovar (urology following).   - NOHEMI shows endocarditis (NOHEMI: 1.8x1.3 vegetation on medial aspect of anterior leaflet with partial flail component + 0.5x1.3cm vegetation no posterior leaflet w severe eccentric mitral regurg). BCx staph epidermidis, repeat NGTD, on vancomycin. s/p MRA without septic emboli. s/p cardiac cath  normal cors. Folled by ID Dr Almazan  cleared for OR. elevated creatine d folloed by Nephrology monitoring creatine thi afternoon. Endocrine consulted Dr Izaguirre.

## 2020-08-30 NOTE — PROGRESS NOTE ADULT - ASSESSMENT
Assessment  Hypothyroidism: 80y male with no history thyroid disease, was not on any thyroid supplements, on HIV meds, TSH borderline elevated, subclinical hypothyroid, TPO ab WNL, patient seen resting comfortably, no acute complaints.  Endocarditis: MVR cancelled, on meds, stable, monitored.          Nesha Izaguirre MD  Cell: [phone]  Office: [phone] Assessment  Hypothyroidism: 80y male with no history thyroid disease, was not on any thyroid supplements, on HIV meds, TSH borderline elevated, subclinical hypothyroid, TPO ab WNL,  patient seen resting comfortably, no acute complaints.  Endocarditis: MVR cancelled, on meds, stable, monitored.          Nesha Izaguirre MD  Cell: [phone]  Office: [phone]

## 2020-08-30 NOTE — PROGRESS NOTE ADULT - SUBJECTIVE AND OBJECTIVE BOX
Cardiac Surgery Pre-op Note:     Surgeon: Dr. Camacho    Procedure: 8/31/2020 MVR    HPI:  80M hx of HIV+, BPH, CAD presented after a fall at home and found in own urine/feces. Reportedly per EMS, patient had been using a scrotal device to clamp the testes but was unable to remove it and then slid to the ground while trying to maneuvering the device. Patient reports that he was too weak to get himself up. Denies trauma to his head and was laying on his L side. Per EMS, the heat in the home was significantly elevated. Denies anticoagulants or aspirin, denies any LOC and states he recalls all events. Denies recent illness, fever, chills, dysuria, dizziness, cp, sob, abd pain. Denies cough, sore throat. Of note, patient reports that he self straight caths 3 times a day due to BPH. He also had a bladder mass s/p resection and was told it was benign.    In the ED, vitals: T 99.2, , /60, RR 24 satting 97 ORA. got 2L NS, started on maintenance fluid. Urology placed tovar, draining blood tinged urine. Labs noted for WBC 16.5, Cr 2.2, CPK 3857, lactate 3.1, positive UA. (19 Aug 2020 04:49)      PAST MEDICAL & SURGICAL HISTORY:  Orchitis and epididymitis  Vitamin D deficiency  Bladder mass  Edema of both legs  Osteoporosis  Seborrheic keratosis  Constipation, unspecified constipation type  Chronic kidney disease, unspecified stage  HIV (human immunodeficiency virus infection)  Unsteady gait  S/P coronary artery stent placement  No Past Surgical History      MEDICATIONS  (STANDING):  abacavir 300 milliGRAM(s) Oral every 12 hours  ascorbic acid 500 milliGRAM(s) Oral daily  cefuroxime  IVPB 1500 milliGRAM(s) IV Intermittent once  chlorhexidine 0.12% Liquid 30 milliLiter(s) Swish and Spit once  chlorhexidine 0.12% Liquid 30 milliLiter(s) Swish and Spit once  chlorhexidine 4% Liquid 1 Application(s) Topical once  cyanocobalamin 1000 MICROGram(s) Oral daily  Doravirine 100mg tablets (pifeltro) 100 milliGRAM(s) 100 milliGRAM(s) Oral daily  finasteride 5 milliGRAM(s) Oral daily  heparin   Injectable 5000 Unit(s) SubCutaneous every 8 hours  hydrALAZINE 10 milliGRAM(s) Oral three times a day  lactobacillus acidophilus and bulgaricus Chewable 2 Tablet(s) Chew three times a day with meals  lactobacillus acidophilus and bulgaricus Chewable      lamiVUDine- milliGRAM(s) Oral daily  multivitamin 1 Tablet(s) Oral daily  mupirocin 2% Ointment 1 Application(s) Topical two times a day  tamsulosin 0.4 milliGRAM(s) Oral at bedtime    MEDICATIONS  (PRN):  polyethylene glycol 3350 17 Gram(s) Oral daily PRN Constipation      Vital Signs Last 24 Hrs  T(C): 36.5 (08-30-20 @ 11:54), Max: 37 (08-29-20 @ 23:37)  T(F): 97.7 (08-30-20 @ 11:54), Max: 98.6 (08-29-20 @ 23:37)  HR: 85 (08-30-20 @ 11:54) (55 - 86)  BP: 102/56 (08-30-20 @ 11:54) (96/60 - 118/58)  RR: 18 (08-30-20 @ 11:54) (18 - 18)  SpO2: 96% (08-30-20 @ 11:54) (95% - 96%)           Height (cm): 190 (25 Aug 2020 11:16)  Weight (kg): 70.9 (25 Aug 2020 11:16)  BMI (kg/m2): 19.6 (25 Aug 2020 11:16)      Labs:                        10.8   8.96  )-----------( 251      ( 30 Aug 2020 05:16 )             32.4     139  |  99  |  54<H>  ----------------------------<  101<H>  3.9   |  26  |  1.80<H>    AST  28  /  ALT  34  /  AlkPhos  108  08-30    ABO Interpretation: A (08-30 @ 09:43)    Thyroid Panel:   MRSA:  / MSSA:       CXR:     Carotid Duplex:      PFT's:    Echocardiogram:    Cardiac catheterization:    PHYSICAL EXAM:  Neuro: A&Ox3, NAD  Pulm: B/L BS CTA  CV: RRR,+S1S2  Abd: Soft, NT/ND +BSX4Q  Ext: B/L LE no edema, +PP, no calf tenderness    Pt has AICD/PPM [ ] Yes  [x ] No             Brand Name:  Pre-op Beta Blocker ordered within 24 hrs of surgery (CABG ONLY)?  [x ] Yes  [ ] No  If not, Why?  Type & Cross  [ ] Yes  [ ] No  NPO after Midnight [x ] Yes  [ ] No  Pre-op ABX ordered, to be taped on chart:  [ x] Yes  [ ] No     Hibiclens/Peridex ordered [x ] Yes  [ ] No  Intraop on Hold: PRBCs, CXR, NOHEMI [x ]   Consent obtained  [x ] Yes  [ ] No Cardiac Surgery Pre-op Note:     Surgeon: Dr. Camacho    Procedure: 8/31/2020 MVR    HPI:  80M hx of HIV+, BPH, CAD presented after a fall at home and found in own urine/feces. Reportedly per EMS, patient had been using a scrotal device to clamp the testes but was unable to remove it and then slid to the ground while trying to maneuvering the device. Patient reports that he was too weak to get himself up. Denies trauma to his head and was laying on his L side. Per EMS, the heat in the home was significantly elevated. Denies anticoagulants or aspirin, denies any LOC and states he recalls all events. Denies recent illness, fever, chills, dysuria, dizziness, cp, sob, abd pain. Denies cough, sore throat. Of note, patient reports that he self straight caths 3 times a day due to BPH. He also had a bladder mass s/p resection and was told it was benign.    In the ED, vitals: T 99.2, , /60, RR 24 satting 97 ORA. got 2L NS, started on maintenance fluid. Urology placed tovar, draining blood tinged urine. Labs noted for WBC 16.5, Cr 2.2, CPK 3857, lactate 3.1, positive UA. (19 Aug 2020 04:49)      PAST MEDICAL & SURGICAL HISTORY:  Orchitis and epididymitis  Vitamin D deficiency  Bladder mass  Edema of both legs  Osteoporosis  Seborrheic keratosis  Constipation, unspecified constipation type  Chronic kidney disease, unspecified stage  HIV (human immunodeficiency virus infection)  Unsteady gait  S/P coronary artery stent placement  No Past Surgical History      MEDICATIONS  (STANDING):  abacavir 300 milliGRAM(s) Oral every 12 hours  ascorbic acid 500 milliGRAM(s) Oral daily  cefuroxime  IVPB 1500 milliGRAM(s) IV Intermittent once  chlorhexidine 0.12% Liquid 30 milliLiter(s) Swish and Spit once  chlorhexidine 0.12% Liquid 30 milliLiter(s) Swish and Spit once  chlorhexidine 4% Liquid 1 Application(s) Topical once  cyanocobalamin 1000 MICROGram(s) Oral daily  Doravirine 100mg tablets (pifeltro) 100 milliGRAM(s) 100 milliGRAM(s) Oral daily  finasteride 5 milliGRAM(s) Oral daily  heparin   Injectable 5000 Unit(s) SubCutaneous every 8 hours  hydrALAZINE 10 milliGRAM(s) Oral three times a day  lactobacillus acidophilus and bulgaricus Chewable 2 Tablet(s) Chew three times a day with meals  lactobacillus acidophilus and bulgaricus Chewable      lamiVUDine- milliGRAM(s) Oral daily  multivitamin 1 Tablet(s) Oral daily  mupirocin 2% Ointment 1 Application(s) Topical two times a day  tamsulosin 0.4 milliGRAM(s) Oral at bedtime        Vital Signs Last 24 Hrs  T(C): 36.5 (08-30-20 @ 11:54), Max: 37 (08-29-20 @ 23:37)  T(F): 97.7 (08-30-20 @ 11:54), Max: 98.6 (08-29-20 @ 23:37)  HR: 85 (08-30-20 @ 11:54) (55 - 86)  BP: 102/56 (08-30-20 @ 11:54) (96/60 - 118/58)  RR: 18 (08-30-20 @ 11:54) (18 - 18)  SpO2: 96% (08-30-20 @ 11:54) (95% - 96%)             Height (cm): 190   Weight (kg): 70.9   BMI (kg/m2): 19.6   (25 Aug 2020 11:16)      Labs:                        10.8   8.96  )-----------( 251      ( 30 Aug 2020 05:16 )             32.4     139  |  99  |  54<H>  ----------------------------<  101<H>  3.9   |  26  |  1.80<H>    AST  28  /  ALT  34  /  AlkPhos  108  08-30    ABO Interpretation: A (08-30 @ 09:43)    Thyroid Stimulating Hormone, Serum: 4.24 uIU/mL (08.22.20 @ 12:42)      A1C with Estimated Average Glucose Result: 5.6     < from: Xray Chest 1 View- PORTABLE-Routine (08.20.20 @ 16:55) >  Left lower lung hazy opacity. No pleural effusion or pneumothorax. Heart size is normal. Bones and soft tissues are unremarkable.      PFT's: completed 8/28    < from: Transthoracic Echocardiogram (08.20.20 @ 09:27) >  Observations:  Mitral Valve: Mitral valve not well visualized; however,  the anterior leaflet of the mitral valve appears thickened  with a 1.3 cm x 1.4 cm echodensity on the atrial surface  (not well visualized), possibly a vegetation (the  ehcodensity does not display obvious independent mobility,  but study is technically limited), which may be suggestive  of endocarditis given the clinical history. Findings  communicated to Ольга RICKS. Consider NOHEMI if clinically  indicated. Severe, eccentric posteriorly directed mitral  regurgitation (notably the left atrium is not dilated,  suggestive of acute mitral regurgitation).  Aortic Valve/Aorta: Normal trileaflet aortic valve. Peak  transaortic valve gradient equals4 mm Hg, mean transaortic  valve gradient equals 2 mm Hg, aortic valve velocity time  integral equals 20 cm. Peak left ventricular outflow tract  gradient equals 3 mm Hg, mean gradient is equal to 1 mm Hg,  LVOT velocity time integral equals 18 cm.  Aortic Root: 3.5 cm.  Left Atrium: Normal left atrium.  Left Ventricle: Normal left ventricular systolic function.  No segmental wall motion abnormalities. Normal left  ventricular internal dimensions and wall thicknesses. 8  Right Heart: Normal rightatrium. Normal right ventricular  size and function. In some views, there is thickening of  the tricuspid leaflet, which may be suggestive of  vegetation (not well visualized). Mild tricuspid  regurgitation. Normal pulmonic valve. Mild pulmonic  regurgitation.  Pericardium/Pleura: Normal pericardium with no pericardial  effusion.  Hemodynamic: Estimated right atrial pressure is 8 mm Hg.  Estimated right ventricular systolic pressure equals 39 mm  Hg, assuming right atrial pressure equals 8 mm Hg,  consistent with borderline pulmonary hypertension.      < from: Cardiac Cath Lab - Adult (08.25.20 @ 14:56) >  CORONARY VESSELS: The coronary circulation is right dominant.  LM:   --  LM: Normal.  LAD:   --  LAD: Normal.  CX:   --  Circumflex: Normal.  RCA:   --  RCA: Normal.  COMPLICATIONS: There were no complications.  DIAGNOSTIC RECOMMENDATIONS: The patient should continue with the present  medications.        PHYSICAL EXAM:  Neuro: A&Ox3, NAD  Pulm: B/L BS CTA  CV: RRR,+S1S2  Abd: Soft, NT/ND +BSX4Q  : +Tovar catheter with clear, yellow urine  Ext: B/L LE no edema, +PP, no calf tenderness      Pt has AICD/PPM [ ] Yes  [x ] No              Pre-op Beta Blocker ordered within 24 hrs of surgery (CABG ONLY)?  [x ] Yes  [ ] No  If not, Why?  Type & Cross  [X] Yes  [ ] No  NPO after Midnight [x ] Yes  [ ] No  Pre-op ABX ordered, to be taped on chart:  [ x] Yes  [ ] No     Hibiclens/Peridex ordered [x ] Yes  [ ] No  Intraop on Hold: PRBCs, CXR, NOHEMI [x ]   Consent obtained  [x ] Yes  [ ] No

## 2020-08-30 NOTE — PROGRESS NOTE ADULT - SUBJECTIVE AND OBJECTIVE BOX
Chief complaint  Patient is a 80y old  Male who presents with a chief complaint of fall (30 Aug 2020 07:42)   Review of systems  Patient in bed, looks comfortable.    Medications  MEDICATIONS  (STANDING):  abacavir 300 milliGRAM(s) Oral every 12 hours  ascorbic acid 500 milliGRAM(s) Oral daily  cefuroxime  IVPB 1500 milliGRAM(s) IV Intermittent once  chlorhexidine 0.12% Liquid 30 milliLiter(s) Swish and Spit once  chlorhexidine 0.12% Liquid 30 milliLiter(s) Swish and Spit once  chlorhexidine 4% Liquid 1 Application(s) Topical once  cyanocobalamin 1000 MICROGram(s) Oral daily  Doravirine 100mg tablets (pifeltro) 100 milliGRAM(s) 100 milliGRAM(s) Oral daily  finasteride 5 milliGRAM(s) Oral daily  heparin   Injectable 5000 Unit(s) SubCutaneous every 8 hours  hydrALAZINE 10 milliGRAM(s) Oral three times a day  lactobacillus acidophilus and bulgaricus Chewable 2 Tablet(s) Chew three times a day with meals  lactobacillus acidophilus and bulgaricus Chewable      lamiVUDine- milliGRAM(s) Oral daily  multivitamin 1 Tablet(s) Oral daily  mupirocin 2% Ointment 1 Application(s) Topical two times a day  tamsulosin 0.4 milliGRAM(s) Oral at bedtime      Physical Exam  General: Patient comfortable in bed  Vital Signs Last 12 Hrs  T(F): 97.7 (08-30-20 @ 11:54), Max: 97.8 (08-30-20 @ 04:09)  HR: 85 (08-30-20 @ 11:54) (80 - 85)  BP: 102/56 (08-30-20 @ 11:54) (102/56 - 103/50)  BP(mean): --  RR: 18 (08-30-20 @ 11:54) (18 - 18)  SpO2: 96% (08-30-20 @ 11:54) (95% - 96%)    Diagnostics    Thyroperoxidase Antibody: AM (08-27 @ 13:42)

## 2020-08-30 NOTE — PROGRESS NOTE ADULT - PROBLEM SELECTOR PLAN 3
- pt straight caths daily 3x at home for detrusor muscle weakness and BPH, able to urinate little without straight cath; admitted with severe hydronephrosis  - repeat US kidney/bladder with resolved R hydronephrosis; mild L hydronephrosis, urothelial thickening  [ ] urology following, appreciate recs.   [ ] urology rec tovar reinserted but pt is alright with straight cath with nursing assistance TID; continue straight cath for now  [ ] continue home tamsulosin and finasteride - pt straight caths daily 3x at home for detrusor muscle weakness and BPH, able to urinate little without straight cath; admitted with severe hydronephrosis  - repeat US kidney/bladder with resolved R hydronephrosis; mild L hydronephrosis, urothelial thickening  [ ] urology following, appreciate recs.   [ ] continue tovar (trial of straight cath may have contributed to elevated Cr)  [ ] continue home tamsulosin and finasteride

## 2020-08-30 NOTE — PRE-OP CHECKLIST - SELECT TESTS ORDERED
CBC/Spirometry/Type and Screen/INR/BMP/CMP/PT/PTT/Type and Cross BMP/INR/Type and Cross/EKG/Urinalysis/CMP/Results in MD note/CBC/PT/PTT/Spirometry/Type and Screen

## 2020-08-30 NOTE — PROGRESS NOTE ADULT - PROBLEM SELECTOR PLAN 1
Keep NPO after midnight   hibiclens shower tonight and am  peridex rinse in am   zinacef on call to OR taped to chart  COVID negative 8/30  Plan for MVR 2nd case Monday 8/31 with Dr Camacho

## 2020-08-30 NOTE — PROGRESS NOTE ADULT - PROBLEM SELECTOR PLAN 1
Subclinical; No need for management at this time.  HIV meds may be contributing.  will continue monitoring and FU.

## 2020-08-31 ENCOUNTER — RESULT REVIEW (OUTPATIENT)
Age: 80
End: 2020-08-31

## 2020-08-31 ENCOUNTER — APPOINTMENT (OUTPATIENT)
Dept: CARDIOTHORACIC SURGERY | Facility: HOSPITAL | Age: 80
End: 2020-08-31

## 2020-08-31 LAB
ALBUMIN SERPL ELPH-MCNC: 2.8 G/DL — LOW (ref 3.3–5)
ALBUMIN SERPL ELPH-MCNC: 3.3 G/DL — SIGNIFICANT CHANGE UP (ref 3.3–5)
ALP SERPL-CCNC: 111 U/L — SIGNIFICANT CHANGE UP (ref 40–120)
ALP SERPL-CCNC: 83 U/L — SIGNIFICANT CHANGE UP (ref 40–120)
ALT FLD-CCNC: 27 U/L — SIGNIFICANT CHANGE UP (ref 10–45)
ALT FLD-CCNC: 33 U/L — SIGNIFICANT CHANGE UP (ref 10–45)
ANION GAP SERPL CALC-SCNC: 12 MMOL/L — SIGNIFICANT CHANGE UP (ref 5–17)
ANION GAP SERPL CALC-SCNC: 14 MMOL/L — SIGNIFICANT CHANGE UP (ref 5–17)
APTT BLD: 31.1 SEC — SIGNIFICANT CHANGE UP (ref 27.5–35.5)
APTT BLD: 37.7 SEC — HIGH (ref 27.5–35.5)
AST SERPL-CCNC: 27 U/L — SIGNIFICANT CHANGE UP (ref 10–40)
AST SERPL-CCNC: 70 U/L — HIGH (ref 10–40)
BASE EXCESS BLDV CALC-SCNC: 0.8 MMOL/L — SIGNIFICANT CHANGE UP (ref -2–2)
BASE EXCESS BLDV CALC-SCNC: 1 MMOL/L — SIGNIFICANT CHANGE UP (ref -2–2)
BASE EXCESS BLDV CALC-SCNC: 1.4 MMOL/L — SIGNIFICANT CHANGE UP (ref -2–2)
BASE EXCESS BLDV CALC-SCNC: 1.8 MMOL/L — SIGNIFICANT CHANGE UP (ref -2–2)
BASE EXCESS BLDV CALC-SCNC: 3.1 MMOL/L — HIGH (ref -2–2)
BASOPHILS # BLD AUTO: 0 K/UL — SIGNIFICANT CHANGE UP (ref 0–0.2)
BASOPHILS # BLD AUTO: 0.05 K/UL — SIGNIFICANT CHANGE UP (ref 0–0.2)
BASOPHILS NFR BLD AUTO: 0 % — SIGNIFICANT CHANGE UP (ref 0–2)
BASOPHILS NFR BLD AUTO: 0.6 % — SIGNIFICANT CHANGE UP (ref 0–2)
BILIRUB SERPL-MCNC: 0.2 MG/DL — SIGNIFICANT CHANGE UP (ref 0.2–1.2)
BILIRUB SERPL-MCNC: 0.9 MG/DL — SIGNIFICANT CHANGE UP (ref 0.2–1.2)
BLOOD GAS VENOUS - CREATININE: SIGNIFICANT CHANGE UP MG/DL (ref 0.5–1.3)
BUN SERPL-MCNC: 40 MG/DL — HIGH (ref 7–23)
BUN SERPL-MCNC: 50 MG/DL — HIGH (ref 7–23)
CA-I SERPL-SCNC: 1.03 MMOL/L — LOW (ref 1.12–1.3)
CA-I SERPL-SCNC: 1.04 MMOL/L — LOW (ref 1.12–1.3)
CA-I SERPL-SCNC: 1.04 MMOL/L — LOW (ref 1.12–1.3)
CA-I SERPL-SCNC: 1.05 MMOL/L — LOW (ref 1.12–1.3)
CA-I SERPL-SCNC: 1.16 MMOL/L — SIGNIFICANT CHANGE UP (ref 1.12–1.3)
CALCIUM SERPL-MCNC: 8.3 MG/DL — LOW (ref 8.4–10.5)
CALCIUM SERPL-MCNC: 9.7 MG/DL — SIGNIFICANT CHANGE UP (ref 8.4–10.5)
CHLORIDE BLDV-SCNC: SIGNIFICANT CHANGE UP MMOL/L (ref 96–108)
CHLORIDE SERPL-SCNC: 101 MMOL/L — SIGNIFICANT CHANGE UP (ref 96–108)
CHLORIDE SERPL-SCNC: 99 MMOL/L — SIGNIFICANT CHANGE UP (ref 96–108)
CK MB BLD-MCNC: 16.5 % — HIGH (ref 0–3.5)
CK MB CFR SERPL CALC: 83.4 NG/ML — HIGH (ref 0–6.7)
CK SERPL-CCNC: 506 U/L — HIGH (ref 30–200)
CO2 SERPL-SCNC: 23 MMOL/L — SIGNIFICANT CHANGE UP (ref 22–31)
CO2 SERPL-SCNC: 25 MMOL/L — SIGNIFICANT CHANGE UP (ref 22–31)
CREAT SERPL-MCNC: 1.56 MG/DL — HIGH (ref 0.5–1.3)
CREAT SERPL-MCNC: 1.83 MG/DL — HIGH (ref 0.5–1.3)
EOSINOPHIL # BLD AUTO: 0 K/UL — SIGNIFICANT CHANGE UP (ref 0–0.5)
EOSINOPHIL # BLD AUTO: 0.16 K/UL — SIGNIFICANT CHANGE UP (ref 0–0.5)
EOSINOPHIL NFR BLD AUTO: 0 % — SIGNIFICANT CHANGE UP (ref 0–6)
EOSINOPHIL NFR BLD AUTO: 1.8 % — SIGNIFICANT CHANGE UP (ref 0–6)
FIBRINOGEN PPP-MCNC: 362 MG/DL — SIGNIFICANT CHANGE UP (ref 350–510)
GAS PNL BLDA: SIGNIFICANT CHANGE UP
GAS PNL BLDV: 135 MMOL/L — SIGNIFICANT CHANGE UP (ref 135–145)
GAS PNL BLDV: 135 MMOL/L — SIGNIFICANT CHANGE UP (ref 135–145)
GAS PNL BLDV: 136 MMOL/L — SIGNIFICANT CHANGE UP (ref 135–145)
GAS PNL BLDV: 136 MMOL/L — SIGNIFICANT CHANGE UP (ref 135–145)
GAS PNL BLDV: 137 MMOL/L — SIGNIFICANT CHANGE UP (ref 135–145)
GAS PNL BLDV: SIGNIFICANT CHANGE UP
GLUCOSE BLDV-MCNC: 113 MG/DL — HIGH (ref 70–99)
GLUCOSE BLDV-MCNC: 130 MG/DL — HIGH (ref 70–99)
GLUCOSE BLDV-MCNC: 154 MG/DL — HIGH (ref 70–99)
GLUCOSE BLDV-MCNC: 156 MG/DL — HIGH (ref 70–99)
GLUCOSE BLDV-MCNC: 181 MG/DL — HIGH (ref 70–99)
GLUCOSE SERPL-MCNC: 107 MG/DL — HIGH (ref 70–99)
GLUCOSE SERPL-MCNC: 98 MG/DL — SIGNIFICANT CHANGE UP (ref 70–99)
HCO3 BLDV-SCNC: 26 MMOL/L — SIGNIFICANT CHANGE UP (ref 21–29)
HCO3 BLDV-SCNC: 27 MMOL/L — SIGNIFICANT CHANGE UP (ref 21–29)
HCO3 BLDV-SCNC: 27 MMOL/L — SIGNIFICANT CHANGE UP (ref 21–29)
HCT VFR BLD CALC: 29.5 % — LOW (ref 39–50)
HCT VFR BLD CALC: 33.4 % — LOW (ref 39–50)
HCT VFR BLDA CALC: 24 % — LOW (ref 39–50)
HCT VFR BLDA CALC: 25 % — LOW (ref 39–50)
HCT VFR BLDA CALC: 27 % — LOW (ref 39–50)
HCT VFR BLDA CALC: 28 % — LOW (ref 39–50)
HCT VFR BLDA CALC: 29 % — LOW (ref 39–50)
HEPARINASE TEG R TIME: 6.4 MIN — SIGNIFICANT CHANGE UP (ref 4.3–8.3)
HGB BLD CALC-MCNC: 7.5 G/DL — LOW (ref 13–17)
HGB BLD CALC-MCNC: 8 G/DL — LOW (ref 13–17)
HGB BLD CALC-MCNC: 8.6 G/DL — LOW (ref 13–17)
HGB BLD CALC-MCNC: 9.1 G/DL — LOW (ref 13–17)
HGB BLD CALC-MCNC: 9.4 G/DL — LOW (ref 13–17)
HGB BLD-MCNC: 10.6 G/DL — LOW (ref 13–17)
HGB BLD-MCNC: 9.8 G/DL — LOW (ref 13–17)
HOROWITZ INDEX BLDV+IHG-RTO: 0 — SIGNIFICANT CHANGE UP
IMM GRANULOCYTES NFR BLD AUTO: 1.4 % — SIGNIFICANT CHANGE UP (ref 0–1.5)
INR BLD: 0.92 RATIO — SIGNIFICANT CHANGE UP (ref 0.88–1.16)
INR BLD: 1.12 RATIO — SIGNIFICANT CHANGE UP (ref 0.88–1.16)
LACTATE BLDV-MCNC: 0.4 MMOL/L — LOW (ref 0.7–2)
LACTATE BLDV-MCNC: 0.8 MMOL/L — SIGNIFICANT CHANGE UP (ref 0.7–2)
LACTATE BLDV-MCNC: 0.8 MMOL/L — SIGNIFICANT CHANGE UP (ref 0.7–2)
LACTATE BLDV-MCNC: 0.9 MMOL/L — SIGNIFICANT CHANGE UP (ref 0.7–2)
LACTATE BLDV-MCNC: 1.9 MMOL/L — SIGNIFICANT CHANGE UP (ref 0.7–2)
LYMPHOCYTES # BLD AUTO: 1.52 K/UL — SIGNIFICANT CHANGE UP (ref 1–3.3)
LYMPHOCYTES # BLD AUTO: 1.7 K/UL — SIGNIFICANT CHANGE UP (ref 1–3.3)
LYMPHOCYTES # BLD AUTO: 16.7 % — SIGNIFICANT CHANGE UP (ref 13–44)
LYMPHOCYTES # BLD AUTO: 5 % — LOW (ref 13–44)
MAGNESIUM SERPL-MCNC: 2.5 MG/DL — SIGNIFICANT CHANGE UP (ref 1.6–2.6)
MAGNESIUM SERPL-MCNC: 3.8 MG/DL — HIGH (ref 1.6–2.6)
MANUAL SMEAR VERIFICATION: SIGNIFICANT CHANGE UP
MCHC RBC-ENTMCNC: 31.7 GM/DL — LOW (ref 32–36)
MCHC RBC-ENTMCNC: 33.2 GM/DL — SIGNIFICANT CHANGE UP (ref 32–36)
MCHC RBC-ENTMCNC: 33.9 PG — SIGNIFICANT CHANGE UP (ref 27–34)
MCHC RBC-ENTMCNC: 35.1 PG — HIGH (ref 27–34)
MCV RBC AUTO: 105.7 FL — HIGH (ref 80–100)
MCV RBC AUTO: 106.7 FL — HIGH (ref 80–100)
MONOCYTES # BLD AUTO: 0.75 K/UL — SIGNIFICANT CHANGE UP (ref 0–0.9)
MONOCYTES # BLD AUTO: 2.38 K/UL — HIGH (ref 0–0.9)
MONOCYTES NFR BLD AUTO: 7 % — SIGNIFICANT CHANGE UP (ref 2–14)
MONOCYTES NFR BLD AUTO: 8.3 % — SIGNIFICANT CHANGE UP (ref 2–14)
NEUTROPHILS # BLD AUTO: 29.94 K/UL — HIGH (ref 1.8–7.4)
NEUTROPHILS # BLD AUTO: 6.47 K/UL — SIGNIFICANT CHANGE UP (ref 1.8–7.4)
NEUTROPHILS NFR BLD AUTO: 71.2 % — SIGNIFICANT CHANGE UP (ref 43–77)
NEUTROPHILS NFR BLD AUTO: 82 % — HIGH (ref 43–77)
NEUTS BAND # BLD: 6 % — SIGNIFICANT CHANGE UP (ref 0–8)
NRBC # BLD: 0 /100 WBCS — SIGNIFICANT CHANGE UP (ref 0–0)
NRBC # BLD: 0 /100 — SIGNIFICANT CHANGE UP (ref 0–0)
PCO2 BLDV: 37 MMHG — SIGNIFICANT CHANGE UP (ref 35–50)
PCO2 BLDV: 44 MMHG — SIGNIFICANT CHANGE UP (ref 35–50)
PCO2 BLDV: 47 MMHG — SIGNIFICANT CHANGE UP (ref 35–50)
PCO2 BLDV: 48 MMHG — SIGNIFICANT CHANGE UP (ref 35–50)
PCO2 BLDV: 48 MMHG — SIGNIFICANT CHANGE UP (ref 35–50)
PH BLDV: 7.36 — SIGNIFICANT CHANGE UP (ref 7.35–7.45)
PH BLDV: 7.36 — SIGNIFICANT CHANGE UP (ref 7.35–7.45)
PH BLDV: 7.37 — SIGNIFICANT CHANGE UP (ref 7.35–7.45)
PH BLDV: 7.39 — SIGNIFICANT CHANGE UP (ref 7.35–7.45)
PH BLDV: 7.47 — HIGH (ref 7.35–7.45)
PHOSPHATE SERPL-MCNC: 2.5 MG/DL — SIGNIFICANT CHANGE UP (ref 2.5–4.5)
PHOSPHATE SERPL-MCNC: 3.9 MG/DL — SIGNIFICANT CHANGE UP (ref 2.5–4.5)
PLAT MORPH BLD: NORMAL — SIGNIFICANT CHANGE UP
PLATELET # BLD AUTO: 151 K/UL — SIGNIFICANT CHANGE UP (ref 150–400)
PLATELET # BLD AUTO: 257 K/UL — SIGNIFICANT CHANGE UP (ref 150–400)
PO2 BLDV: 46 MMHG — HIGH (ref 25–45)
PO2 BLDV: 54 MMHG — HIGH (ref 25–45)
PO2 BLDV: 66 MMHG — HIGH (ref 25–45)
PO2 BLDV: 74 MMHG — HIGH (ref 25–45)
PO2 BLDV: 77 MMHG — HIGH (ref 25–45)
POTASSIUM BLDV-SCNC: 4 MMOL/L — SIGNIFICANT CHANGE UP (ref 3.5–5)
POTASSIUM BLDV-SCNC: 4.4 MMOL/L — SIGNIFICANT CHANGE UP (ref 3.5–5)
POTASSIUM BLDV-SCNC: 4.7 MMOL/L — SIGNIFICANT CHANGE UP (ref 3.5–5)
POTASSIUM BLDV-SCNC: 4.9 MMOL/L — SIGNIFICANT CHANGE UP (ref 3.5–5)
POTASSIUM BLDV-SCNC: 5.2 MMOL/L — HIGH (ref 3.5–5)
POTASSIUM SERPL-MCNC: 4.1 MMOL/L — SIGNIFICANT CHANGE UP (ref 3.5–5.3)
POTASSIUM SERPL-MCNC: 4.6 MMOL/L — SIGNIFICANT CHANGE UP (ref 3.5–5.3)
POTASSIUM SERPL-SCNC: 4.1 MMOL/L — SIGNIFICANT CHANGE UP (ref 3.5–5.3)
POTASSIUM SERPL-SCNC: 4.6 MMOL/L — SIGNIFICANT CHANGE UP (ref 3.5–5.3)
PROT SERPL-MCNC: 5.2 G/DL — LOW (ref 6–8.3)
PROT SERPL-MCNC: 6.7 G/DL — SIGNIFICANT CHANGE UP (ref 6–8.3)
PROTHROM AB SERPL-ACNC: 10.9 SEC — SIGNIFICANT CHANGE UP (ref 10.6–13.6)
PROTHROM AB SERPL-ACNC: 13.3 SEC — SIGNIFICANT CHANGE UP (ref 10.6–13.6)
RAPIDTEG MAXIMUM AMPLITUDE: 64.7 MM — SIGNIFICANT CHANGE UP (ref 52–70)
RBC # BLD: 2.79 M/UL — LOW (ref 4.2–5.8)
RBC # BLD: 3.13 M/UL — LOW (ref 4.2–5.8)
RBC # FLD: 14.1 % — SIGNIFICANT CHANGE UP (ref 10.3–14.5)
RBC # FLD: 14.2 % — SIGNIFICANT CHANGE UP (ref 10.3–14.5)
RBC BLD AUTO: SIGNIFICANT CHANGE UP
SAO2 % BLDV: 77 % — SIGNIFICANT CHANGE UP (ref 67–88)
SAO2 % BLDV: 85 % — SIGNIFICANT CHANGE UP (ref 67–88)
SAO2 % BLDV: 93 % — HIGH (ref 67–88)
SAO2 % BLDV: 95 % — HIGH (ref 67–88)
SAO2 % BLDV: 95 % — HIGH (ref 67–88)
SODIUM SERPL-SCNC: 136 MMOL/L — SIGNIFICANT CHANGE UP (ref 135–145)
SODIUM SERPL-SCNC: 138 MMOL/L — SIGNIFICANT CHANGE UP (ref 135–145)
TEG FUNCTIONAL FIBRINOGEN: 29.4 MM — SIGNIFICANT CHANGE UP (ref 15–32)
TEG MAXIMUM AMPLITUDE: 66.9 MM — SIGNIFICANT CHANGE UP (ref 52–69)
TEG REACTION TIME: 6.2 MIN — SIGNIFICANT CHANGE UP (ref 4.6–9.1)
TROPONIN T, HIGH SENSITIVITY RESULT: 1434 NG/L — HIGH (ref 0–51)
VANCOMYCIN FLD-MCNC: 18.5 UG/ML — SIGNIFICANT CHANGE UP
VANCOMYCIN TROUGH SERPL-MCNC: 30.4 UG/ML — CRITICAL HIGH (ref 10–20)
WBC # BLD: 34.02 K/UL — HIGH (ref 3.8–10.5)
WBC # BLD: 9.08 K/UL — SIGNIFICANT CHANGE UP (ref 3.8–10.5)
WBC # FLD AUTO: 34.02 K/UL — HIGH (ref 3.8–10.5)
WBC # FLD AUTO: 9.08 K/UL — SIGNIFICANT CHANGE UP (ref 3.8–10.5)

## 2020-08-31 PROCEDURE — 33430 REPLACEMENT OF MITRAL VALVE: CPT

## 2020-08-31 PROCEDURE — 88305 TISSUE EXAM BY PATHOLOGIST: CPT | Mod: 26

## 2020-08-31 PROCEDURE — 71045 X-RAY EXAM CHEST 1 VIEW: CPT | Mod: 26

## 2020-08-31 PROCEDURE — 99291 CRITICAL CARE FIRST HOUR: CPT

## 2020-08-31 PROCEDURE — 93010 ELECTROCARDIOGRAM REPORT: CPT

## 2020-08-31 PROCEDURE — 99232 SBSQ HOSP IP/OBS MODERATE 35: CPT

## 2020-08-31 PROCEDURE — 99233 SBSQ HOSP IP/OBS HIGH 50: CPT | Mod: GC

## 2020-08-31 PROCEDURE — 99232 SBSQ HOSP IP/OBS MODERATE 35: CPT | Mod: GC

## 2020-08-31 RX ORDER — DEXTROSE 50 % IN WATER 50 %
25 SYRINGE (ML) INTRAVENOUS
Refills: 0 | Status: DISCONTINUED | OUTPATIENT
Start: 2020-08-31 | End: 2020-09-11

## 2020-08-31 RX ORDER — SODIUM CHLORIDE 9 MG/ML
1000 INJECTION, SOLUTION INTRAVENOUS
Refills: 0 | Status: DISCONTINUED | OUTPATIENT
Start: 2020-08-31 | End: 2020-09-03

## 2020-08-31 RX ORDER — CHLORHEXIDINE GLUCONATE 213 G/1000ML
1 SOLUTION TOPICAL
Refills: 0 | Status: DISCONTINUED | OUTPATIENT
Start: 2020-08-31 | End: 2020-09-03

## 2020-08-31 RX ORDER — MUPIROCIN 20 MG/G
1 OINTMENT TOPICAL
Refills: 0 | Status: COMPLETED | OUTPATIENT
Start: 2020-08-31 | End: 2020-09-05

## 2020-08-31 RX ORDER — INSULIN HUMAN 100 [IU]/ML
2 INJECTION, SOLUTION SUBCUTANEOUS
Qty: 100 | Refills: 0 | Status: DISCONTINUED | OUTPATIENT
Start: 2020-08-31 | End: 2020-08-31

## 2020-08-31 RX ORDER — PANTOPRAZOLE SODIUM 20 MG/1
40 TABLET, DELAYED RELEASE ORAL DAILY
Refills: 0 | Status: DISCONTINUED | OUTPATIENT
Start: 2020-08-31 | End: 2020-09-01

## 2020-08-31 RX ORDER — CEFUROXIME AXETIL 250 MG
1500 TABLET ORAL EVERY 8 HOURS
Refills: 0 | Status: COMPLETED | OUTPATIENT
Start: 2020-08-31 | End: 2020-09-02

## 2020-08-31 RX ORDER — DEXTROSE 50 % IN WATER 50 %
50 SYRINGE (ML) INTRAVENOUS
Refills: 0 | Status: DISCONTINUED | OUTPATIENT
Start: 2020-08-31 | End: 2020-09-11

## 2020-08-31 RX ORDER — DOBUTAMINE HCL 250MG/20ML
2 VIAL (ML) INTRAVENOUS
Qty: 500 | Refills: 0 | Status: DISCONTINUED | OUTPATIENT
Start: 2020-08-31 | End: 2020-09-03

## 2020-08-31 RX ORDER — POLYETHYLENE GLYCOL 3350 17 G/17G
17 POWDER, FOR SOLUTION ORAL DAILY
Refills: 0 | Status: DISCONTINUED | OUTPATIENT
Start: 2020-08-31 | End: 2020-09-11

## 2020-08-31 RX ORDER — CHLORHEXIDINE GLUCONATE 213 G/1000ML
15 SOLUTION TOPICAL EVERY 12 HOURS
Refills: 0 | Status: DISCONTINUED | OUTPATIENT
Start: 2020-08-31 | End: 2020-08-31

## 2020-08-31 RX ORDER — ASPIRIN/CALCIUM CARB/MAGNESIUM 324 MG
81 TABLET ORAL DAILY
Refills: 0 | Status: DISCONTINUED | OUTPATIENT
Start: 2020-08-31 | End: 2020-09-03

## 2020-08-31 RX ORDER — INSULIN HUMAN 100 [IU]/ML
3 INJECTION, SOLUTION SUBCUTANEOUS
Qty: 100 | Refills: 0 | Status: DISCONTINUED | OUTPATIENT
Start: 2020-08-31 | End: 2020-09-01

## 2020-08-31 RX ORDER — VANCOMYCIN HCL 1 G
1000 VIAL (EA) INTRAVENOUS ONCE
Refills: 0 | Status: COMPLETED | OUTPATIENT
Start: 2020-08-31 | End: 2020-08-31

## 2020-08-31 RX ORDER — MEPERIDINE HYDROCHLORIDE 50 MG/ML
25 INJECTION INTRAMUSCULAR; INTRAVENOUS; SUBCUTANEOUS ONCE
Refills: 0 | Status: DISCONTINUED | OUTPATIENT
Start: 2020-08-31 | End: 2020-09-01

## 2020-08-31 RX ORDER — SODIUM CHLORIDE 9 MG/ML
1000 INJECTION INTRAMUSCULAR; INTRAVENOUS; SUBCUTANEOUS
Refills: 0 | Status: DISCONTINUED | OUTPATIENT
Start: 2020-08-31 | End: 2020-09-03

## 2020-08-31 RX ADMIN — PREGABALIN 1000 MICROGRAM(S): 225 CAPSULE ORAL at 11:14

## 2020-08-31 RX ADMIN — Medication 100 MILLIGRAM(S): at 22:00

## 2020-08-31 RX ADMIN — Medication 500 MILLIGRAM(S): at 11:14

## 2020-08-31 RX ADMIN — Medication 100 MILLIGRAM(S): at 11:15

## 2020-08-31 RX ADMIN — ABACAVIR 300 MILLIGRAM(S): 20 SOLUTION ORAL at 08:16

## 2020-08-31 RX ADMIN — Medication 1 TABLET(S): at 11:15

## 2020-08-31 RX ADMIN — Medication 250 MILLIGRAM(S): at 07:50

## 2020-08-31 RX ADMIN — CHLORHEXIDINE GLUCONATE 30 MILLILITER(S): 213 SOLUTION TOPICAL at 11:13

## 2020-08-31 RX ADMIN — Medication 10 MILLIGRAM(S): at 05:18

## 2020-08-31 RX ADMIN — HYDROMORPHONE HYDROCHLORIDE 0.5 MILLIGRAM(S): 2 INJECTION INTRAMUSCULAR; INTRAVENOUS; SUBCUTANEOUS at 22:00

## 2020-08-31 RX ADMIN — CHLORHEXIDINE GLUCONATE 30 MILLILITER(S): 213 SOLUTION TOPICAL at 05:17

## 2020-08-31 RX ADMIN — HYDROMORPHONE HYDROCHLORIDE 0.5 MILLIGRAM(S): 2 INJECTION INTRAMUSCULAR; INTRAVENOUS; SUBCUTANEOUS at 22:30

## 2020-08-31 RX ADMIN — MUPIROCIN 1 APPLICATION(S): 20 OINTMENT TOPICAL at 05:18

## 2020-08-31 NOTE — PROGRESS NOTE ADULT - ASSESSMENT
80M hx of HIV(BR4=252, VL undet), detrusor muscle weakness with self straight cath, CAD presented after a fall at home.  Labs noted for WBC 16.5, Cr 2.2, CPK 3857, lactate 3.1, positive UA on admission  cellulitis on left knee- resolved  bacteremia with 2/2 sets on 8/19 growing coag. negative staph in the setting of valvular heart disease    #Infective endocarditis:  - NOHEMI:  1. Large mobile about 1.8cm x 1.3 cm vegetation seen on the medial aspect of the anterior leaflet with a partial flail component of the medial A2/ A3 area. There us also a vegetation see at the annulus of P3 which measures 0.5cm x 1.3cm  Severe eccentric posterior mitral regurgitation.  - On exam, pansystolic murmur at apex consistent with MR  - BCx(8/19): Growth in aerobic and anaerobic bottles: Staphylococcus epidermidis  - BCx(8/20): no growth  - Vancomycin 1g daily for now  - Check daily labs for vancomycin random level, goal is 15-20  - OR date for MVR with Dr Camacho next week  - Ok to do kimberlyn-op abx: cefuroxime  - If pt goes to OR, vegatation tissue has to send for culture and PCR (probably can send for 16S genome sequencing). We contacted micro lab.  - it's unclear if S.epi is the true pathogen given it's transient and still could be contamination. Follow up culture negative endocarditis workup: Brucella ab, Q fever ab, Bartonella ab, Legionella Ab    #SUDHEER:  - Nephro following  - Patient with baseline CKD since 2015, Scr baseline 1.6-1.8mg/dl  - Pt undergoing surgical repair of mitral valve today    A portion of the vegetation to be sent to Microbiology for gram stain and culture, fungal stain and culture, AFB stain and culture    Micro lab is aware    Tung Almazan MD  [phone]  After 5pm/weekends [phone]

## 2020-08-31 NOTE — PROGRESS NOTE ADULT - SUBJECTIVE AND OBJECTIVE BOX
CARLINE GARBER  MRN-[id number]  Patient is a 80y old  Male who presents with a chief complaint of fall (31 Aug 2020 18:55)    HPI:  80M hx of HIV+, BPH, CAD presented after a fall at home and found in own urine/feces. Reportedly per EMS, patient had been using a scrotal device to clamp the testes but was unable to remove it and then slid to the ground while trying to maneuvering the device. Patient reports that he was too weak to get himself up. Denies trauma to his head and was laying on his L side. Per EMS, the heat in the home was significantly elevated. Denies anticoagulants or aspirin, denies any LOC and states he recalls all events. Denies recent illness, fever, chills, dysuria, dizziness, cp, sob, abd pain. Denies cough, sore throat. Of note, patient reports that he self straight caths 3 times a day due to BPH. He also had a bladder mass s/p resection and was told it was benign.    In the ED, vitals: T 99.2, , /60, RR 24 satting 97 ORA. got 2L NS, started on maintenance fluid. Urology placed tovar, draining blood tinged urine. Labs noted for WBC 16.5, Cr 2.2, CPK 3857, lactate 3.1, positive UA. (19 Aug 2020 04:49)      Surgery/Hospital course:  8/31 MVR procedure deferred in light of rising SCR    Today/Overnight:  MVR deferred    Vital Signs Last 24 Hrs  T(C): 36.8 (31 Aug 2020 13:58), Max: 37.2 (31 Aug 2020 04:38)  T(F): 98.2 (31 Aug 2020 11:56), Max: 98.9 (31 Aug 2020 04:38)  HR: 47 (31 Aug 2020 20:20) (47 - 82)  BP: 110/65 (31 Aug 2020 13:58) (103/58 - 147/65)  BP(mean): 73 (31 Aug 2020 04:38) (73 - 92)  RR: 13 (31 Aug 2020 20:15) (13 - 18)  SpO2: 100% (31 Aug 2020 20:20) (96% - 100%)  ============================I/O===========================  I&O's Summary    30 Aug 2020 07:01  -  31 Aug 2020 07:00  --------------------------------------------------------  Total In: 1110 mL // Total Out: 3350 mL // Total Net: -2240 mL     31 Aug 2020 07:01  -  31 Aug 2020 21:17  --------------------------------------------------------  Total In: 281.6 mL // Total Out: 990 mL // Total Net: -708.4 mL       ============================ LABS =========================                        9.8    34.02 )-----------( 151      ( 31 Aug 2020 20:38 )             29.5     08-31    138  |  101  |  40<H>  ----------------------------<  107<H>  4.6   |  23  |  1.56<H>    Ca    8.3<L>      31 Aug 2020 20:38  Phos  2.5     08-31  Mg     3.8     08-31    TPro  5.2<L>  /  Alb  2.8<L>  /  TBili  0.9  /  DBili  x   /  AST  70<H>  /  ALT  27  /  AlkPhos  83  08-31    LIVER FUNCTIONS - ( 31 Aug 2020 20:38 )  Alb: 2.8 g/dL / Pro: 5.2 g/dL / ALK PHOS: 83 U/L / ALT: 27 U/L / AST: 70 U/L / GGT: x           PT/INR - ( 31 Aug 2020 20:38 )   PT: 13.3 sec;   INR: 1.12 ratio         PTT - ( 31 Aug 2020 20:38 )  PTT:37.7 sec  ABG - ( 31 Aug 2020 20:34 )  pH, Arterial: 7.40  pH, Blood: x     /  pCO2: 41    /  pO2: 315   / HCO3: 25    / Base Excess: .6    /  SaO2: 100       ======================Micro/Rad/Cardio=================  Culture: Reviewed   CXR: Reviewed  Echo: Reviewed  ======================================================  PAST MEDICAL & SURGICAL HISTORY:  Orchitis and epididymitis  Vitamin D deficiency  Bladder mass  Edema of both legs  Osteoporosis  Seborrheic keratosis  Constipation, unspecified constipation type  Chronic kidney disease, unspecified stage  HIV (human immunodeficiency virus infection)  Unsteady gait  S/P coronary artery stent placement  No Past Surgical History    ========================ASSESSMENT ================  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 Continue to monitor neuro status as per ICU protocol.    meperidine     Injectable 25 milliGRAM(s) IV Push once    ==================== RESPIRATORY======================  Respiratory status required full ventilatory support, close monitoring of respiratory rate and breathing pattern, the following of ABG’s with A-line monitoring, continuous pulse oximetry monitoring    Mechanical Ventilation:  Mode: AC/ CMV (Assist Control/ Continuous Mandatory Ventilation)  RR (machine): 12  TV (machine): 600  FiO2: 40  PEEP: 5  ITime: 1  MAP: 8  PIP: 21      ====================CARDIOVASCULAR==================  Patient with endocarditis of mitral valve. Continue ASA for CAD and Dobutamine infusion for inotropic support.     aspirin enteric coated 81 milliGRAM(s) Oral daily  DOBUTamine Infusion 1.25 MICROgram(s)/kG/Min (2.57 mL/Hr) IV Continuous <Continuous>    ===================HEMATOLOGIC/ONC ===================  Monitor hemoglobin and hematocrit levels.    ===================== RENAL =========================  Patient with acute kidney injury on chronic kidney disease. Optimize renal perfusion with adequate volume resuscitation and continued monitoring of urine output, fluid balance, BUN/Creatinine.     ==================== GASTROINTESTINAL===================  NPO after recent procedure, advance diet as tolerated.  Protonix for stress ulcer prophylaxis.  Continue bowel regimen with Miralax.    dextrose 5%. 1000 milliLiter(s) (15 mL/Hr) IV Continuous <Continuous>  pantoprazole  Injectable 40 milliGRAM(s) IV Push daily  polyethylene glycol 3350 17 Gram(s) Oral daily  sodium chloride 0.9%. 1000 milliLiter(s) (10 mL/Hr) IV Continuous <Continuous>    =======================    ENDOCRINE  =====================  Stress hyperglycemia required an IV regular Insulin drip while following serial glucose levels to help achieve and maintain euglycemia.      dextrose 50% Injectable 50 milliLiter(s) IV Push every 15 minutes  dextrose 50% Injectable 25 milliLiter(s) IV Push every 15 minutes  insulin regular Infusion 3 Unit(s)/Hr (3 mL/Hr) IV Continuous <Continuous>    ========================INFECTIOUS DISEASE================  Continue Cefuroxime for perioperative antibiotic coverage.  Patient is afebrile, WBC 34.02.     cefuroxime  IVPB 1500 milliGRAM(s) IV Intermittent every 8 hours      Patient requires continuous monitoring with bedside rhythm monitoring, pulse oximetry monitoring, and continuous central venous and arterial pressure monitoring; and intermittent blood gas analysis.  Care plan discussed with ICU care team.    Patient remained critical, at risk for life threatening decompensation.   I have spent _____ minutes providing acute care with multiple re-evaluations throughout the evening.     By signing my name below, I, Jordan Power, attest that this documentation has been prepared under the direction and in the presence of Myranda Swift MD.  Electronically signed: Bogdan Watts, 08-31-20 @ 21:17    I, Myranda Swift MD, personally performed the services described in this documentation. All medical record entries made by the scribe were at my direction and in my presence. I have reviewed the chart and agree that the record reflects my personal performance and is accurate and complete  Electronically signed: Myranda Swift MD, 08-31-20 @ 21:17 CARLINE GARBER  MRN-[id number]  Patient is a 80y old  Male who presents with a chief complaint of fall (31 Aug 2020 18:55)    HPI:  80M hx of HIV+, BPH, CAD presented after a fall at home and found in own urine/feces. Reportedly per EMS, patient had been using a scrotal device to clamp the testes but was unable to remove it and then slid to the ground while trying to maneuvering the device. Patient reports that he was too weak to get himself up. Denies trauma to his head and was laying on his L side. Per EMS, the heat in the home was significantly elevated. Denies anticoagulants or aspirin, denies any LOC and states he recalls all events. Denies recent illness, fever, chills, dysuria, dizziness, cp, sob, abd pain. Denies cough, sore throat. Of note, patient reports that he self straight caths 3 times a day due to BPH. He also had a bladder mass s/p resection and was told it was benign.    In the ED, vitals: T 99.2, , /60, RR 24 satting 97 ORA. got 2L NS, started on maintenance fluid. Urology placed tovar, draining blood tinged urine. Labs noted for WBC 16.5, Cr 2.2, CPK 3857, lactate 3.1, positive UA. (19 Aug 2020 04:49)      Surgery/Hospital course:  8/31 MVR     Today/Overnight:  MVR     Vital Signs Last 24 Hrs  T(C): 36.8 (31 Aug 2020 13:58), Max: 37.2 (31 Aug 2020 04:38)  T(F): 98.2 (31 Aug 2020 11:56), Max: 98.9 (31 Aug 2020 04:38)  HR: 47 (31 Aug 2020 20:20) (47 - 82)  BP: 110/65 (31 Aug 2020 13:58) (103/58 - 147/65)  BP(mean): 73 (31 Aug 2020 04:38) (73 - 92)  RR: 13 (31 Aug 2020 20:15) (13 - 18)  SpO2: 100% (31 Aug 2020 20:20) (96% - 100%)  ============================I/O===========================  I&O's Summary    30 Aug 2020 07:01  -  31 Aug 2020 07:00  --------------------------------------------------------  Total In: 1110 mL // Total Out: 3350 mL // Total Net: -2240 mL     31 Aug 2020 07:01  -  31 Aug 2020 21:17  --------------------------------------------------------  Total In: 281.6 mL // Total Out: 990 mL // Total Net: -708.4 mL       ============================ LABS =========================                        9.8    34.02 )-----------( 151      ( 31 Aug 2020 20:38 )             29.5     08-31    138  |  101  |  40<H>  ----------------------------<  107<H>  4.6   |  23  |  1.56<H>    Ca    8.3<L>      31 Aug 2020 20:38  Phos  2.5     08-31  Mg     3.8     08-31    TPro  5.2<L>  /  Alb  2.8<L>  /  TBili  0.9  /  DBili  x   /  AST  70<H>  /  ALT  27  /  AlkPhos  83  08-31    LIVER FUNCTIONS - ( 31 Aug 2020 20:38 )  Alb: 2.8 g/dL / Pro: 5.2 g/dL / ALK PHOS: 83 U/L / ALT: 27 U/L / AST: 70 U/L / GGT: x           PT/INR - ( 31 Aug 2020 20:38 )   PT: 13.3 sec;   INR: 1.12 ratio         PTT - ( 31 Aug 2020 20:38 )  PTT:37.7 sec  ABG - ( 31 Aug 2020 20:34 )  pH, Arterial: 7.40  pH, Blood: x     /  pCO2: 41    /  pO2: 315   / HCO3: 25    / Base Excess: .6    /  SaO2: 100       ======================Micro/Rad/Cardio=================  Culture: Reviewed   CXR: Reviewed  Echo: Reviewed  ======================================================  PAST MEDICAL & SURGICAL HISTORY:  Orchitis and epididymitis  Vitamin D deficiency  Bladder mass  Edema of both legs  Osteoporosis  Seborrheic keratosis  Constipation, unspecified constipation type  Chronic kidney disease, unspecified stage  HIV (human immunodeficiency virus infection)  Unsteady gait  S/P coronary artery stent placement  No Past Surgical History    ========================ASSESSMENT ================  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 Continue to monitor neuro status as per ICU protocol.    ==================== RESPIRATORY======================  Respiratory status required full ventilatory support, close monitoring of respiratory rate and breathing pattern, the following of ABG’s with A-line monitoring, continuous pulse oximetry monitoring    Mechanical Ventilation:  Mode: AC/ CMV (Assist Control/ Continuous Mandatory Ventilation)  RR (machine): 12  TV (machine): 600  FiO2: 40  PEEP: 5  ITime: 1  MAP: 8  PIP: 21    ====================CARDIOVASCULAR==================  Patient with endocarditis of mitral valve s/p mitral valve repair 8/31. Continue ASA for CAD and Dobutamine infusion for inotropic support.     ===================HEMATOLOGIC/ONC ===================  Monitor hemoglobin and hematocrit levels.    ===================== RENAL =========================  Patient with acute kidney injury on chronic kidney disease since 2015, with serum creatinine baseline of 1.6-1.8 mg/dl. Optimize renal perfusion with adequate volume resuscitation and continued monitoring of urine output, fluid balance, BUN/Creatinine.     ==================== GASTROINTESTINAL===================  NPO after recent procedure, advance diet as tolerated.  Protonix for stress ulcer prophylaxis.  Continue bowel regimen with Miralax.    =======================    ENDOCRINE  =====================  Stress hyperglycemia required an IV regular Insulin drip while following serial glucose levels to help achieve and maintain euglycemia.      ========================INFECTIOUS DISEASE================  Continue Cefuroxime for perioperative antibiotic coverage.  Patient is afebrile, WBC 34.02.       Patient requires continuous monitoring with bedside rhythm monitoring, pulse oximetry monitoring, and continuous central venous and arterial pressure monitoring; and intermittent blood gas analysis.  Care plan discussed with ICU care team.    Patient remained critical, at risk for life threatening decompensation.   I have spent _____ minutes providing acute care with multiple re-evaluations throughout the evening.     By signing my name below, I, Jordan Power, attest that this documentation has been prepared under the direction and in the presence of Myranda Swift MD.  Electronically signed: Bogdan Watts, 08-31-20 @ 21:17    I, Myranda Swift MD, personally performed the services described in this documentation. All medical record entries made by the katyaibe were at my direction and in my presence. I have reviewed the chart and agree that the record reflects my personal performance and is accurate and complete  Electronically signed: Myranda Swift MD, 08-31-20 @ 21:17 CARLINE GARBER  MRN-[id number]  Patient is a 80y old  Male who presents with a chief complaint of fall (31 Aug 2020 18:55)    HPI:  80M hx of HIV+, BPH, CAD presented after a fall at home and found in own urine/feces. Reportedly per EMS, patient had been using a scrotal device to clamp the testes but was unable to remove it and then slid to the ground while trying to maneuvering the device. Patient reports that he was too weak to get himself up. Denies trauma to his head and was laying on his L side. Per EMS, the heat in the home was significantly elevated. Denies anticoagulants or aspirin, denies any LOC and states he recalls all events. Denies recent illness, fever, chills, dysuria, dizziness, cp, sob, abd pain. Denies cough, sore throat. Of note, patient reports that he self straight caths 3 times a day due to BPH. He also had a bladder mass s/p resection and was told it was benign.    In the ED, vitals: T 99.2, , /60, RR 24 satting 97 ORA. got 2L NS, started on maintenance fluid. Urology placed tovar, draining blood tinged urine. Labs noted for WBC 16.5, Cr 2.2, CPK 3857, lactate 3.1, positive UA. (19 Aug 2020 04:49)      Surgery/Hospital course:  8/31 MVR     Today/Overnight:  MVR     Vital Signs Last 24 Hrs  T(C): 36.8 (31 Aug 2020 13:58), Max: 37.2 (31 Aug 2020 04:38)  T(F): 98.2 (31 Aug 2020 11:56), Max: 98.9 (31 Aug 2020 04:38)  HR: 47 (31 Aug 2020 20:20) (47 - 82)  BP: 110/65 (31 Aug 2020 13:58) (103/58 - 147/65)  BP(mean): 73 (31 Aug 2020 04:38) (73 - 92)  RR: 13 (31 Aug 2020 20:15) (13 - 18)  SpO2: 100% (31 Aug 2020 20:20) (96% - 100%)  ============================I/O===========================  I&O's Summary    30 Aug 2020 07:01  -  31 Aug 2020 07:00  --------------------------------------------------------  Total In: 1110 mL // Total Out: 3350 mL // Total Net: -2240 mL     31 Aug 2020 07:01  -  31 Aug 2020 21:17  --------------------------------------------------------  Total In: 281.6 mL // Total Out: 990 mL // Total Net: -708.4 mL       ============================ LABS =========================                        9.8    34.02 )-----------( 151      ( 31 Aug 2020 20:38 )             29.5     08-31    138  |  101  |  40<H>  ----------------------------<  107<H>  4.6   |  23  |  1.56<H>    Ca    8.3<L>      31 Aug 2020 20:38  Phos  2.5     08-31  Mg     3.8     08-31    TPro  5.2<L>  /  Alb  2.8<L>  /  TBili  0.9  /  DBili  x   /  AST  70<H>  /  ALT  27  /  AlkPhos  83  08-31    LIVER FUNCTIONS - ( 31 Aug 2020 20:38 )  Alb: 2.8 g/dL / Pro: 5.2 g/dL / ALK PHOS: 83 U/L / ALT: 27 U/L / AST: 70 U/L / GGT: x           PT/INR - ( 31 Aug 2020 20:38 )   PT: 13.3 sec;   INR: 1.12 ratio         PTT - ( 31 Aug 2020 20:38 )  PTT:37.7 sec  ABG - ( 31 Aug 2020 20:34 )  pH, Arterial: 7.40  pH, Blood: x     /  pCO2: 41    /  pO2: 315   / HCO3: 25    / Base Excess: .6    /  SaO2: 100       ======================Micro/Rad/Cardio=================  Culture: Reviewed   CXR: Reviewed  Echo: Reviewed  ======================================================  PAST MEDICAL & SURGICAL HISTORY:  Orchitis and epididymitis  Vitamin D deficiency  Bladder mass  Edema of both legs  Osteoporosis  Seborrheic keratosis  Constipation, unspecified constipation type  Chronic kidney disease, unspecified stage  HIV (human immunodeficiency virus infection)  Unsteady gait  S/P coronary artery stent placement  No Past Surgical History    ========================ASSESSMENT ================  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 Sedated with Diprivan, weaned now to off in order to assess neurologic status. Patient developed agitation and now transitioning to an IV Dexmedetomidine infusion.     ==================== RESPIRATORY======================  Respiratory status required full ventilatory support, close monitoring of respiratory rate and breathing pattern, the following of ABG’s with A-line monitoring, and continuous pulse oximetry monitoring. Fi02 weaned to 40%.    Mode: AC/ CMV (Assist Control/ Continuous Mandatory Ventilation)  RR (machine): 12  TV (machine): 600  FiO2: 40  PEEP: 5  ITime: 1  MAP: 8  PIP: 21    ====================CARDIOVASCULAR==================  Patient with endocarditis of mitral valve s/p mitral valve repair 8/31. Continue ASA for CAD and Dobutamine infusion for chronotropy as well as inotropic support for mild systolic heart dysfunction/failure post MVR.     ===================HEMATOLOGIC/ONC ===================  Anemia due to acute blood loss as well as chronic anemia. Nor current indication for transfusion. Monitor hemoglobin and hematocrit levels.    ===================== RENAL =========================  Patient with acute kidney injury on chronic kidney disease since 2015, with serum creatinine baseline of 1.6-1.8 mg/dl, CKD stage 3. Optimize renal perfusion with adequate volume resuscitation and continued monitoring of urine output, fluid balance, BUN/Creatinine.     ==================== GASTROINTESTINAL===================  NPO after recent procedure, advance diet as tolerated.  Protonix for stress ulcer prophylaxis.  Continue bowel regimen with Miralax.    =======================    ENDOCRINE  =====================  Stress hyperglycemia required an IV regular Insulin drip while following serial glucose levels to help achieve and maintain euglycemia.      ========================INFECTIOUS DISEASE================  Continue Cefuroxime for perioperative antibiotic coverage.  Patient is afebrile, WBC 34.02.       Patient requires continuous monitoring with bedside rhythm monitoring, pulse oximetry monitoring, and continuous central venous and arterial pressure monitoring; and intermittent blood gas analysis.  Care plan discussed with ICU care team.    Patient remained critical, at risk for life threatening decompensation.   I have spent 35 minutes providing acute care with multiple re-evaluations throughout the evening.     By signing my name below, I, Jordan Power, attest that this documentation has been prepared under the direction and in the presence of Myranda Swift MD.  Electronically signed: Bogdan Watts, 08-31-20 @ 21:17    I, Myranda Swift MD, personally performed the services described in this documentation. All medical record entries made by the scribe were at my direction and in my presence. I have reviewed the chart and agree that the record reflects my personal performance and is accurate and complete  Electronically signed: Myranda Swift MD, 08-31-20 @ 21:17 CARLINE GARBER  MRN-[id number]  Patient is a 80y old  Male who presents with a chief complaint of fall (31 Aug 2020 18:55)    HPI:  80M hx of HIV+, BPH, CAD presented after a fall at home and found in own urine/feces. Reportedly per EMS, patient had been using a scrotal device to clamp the testes but was unable to remove it and then slid to the ground while trying to maneuvering the device. Patient reports that he was too weak to get himself up. Denies trauma to his head and was laying on his L side. Per EMS, the heat in the home was significantly elevated. Denies anticoagulants or aspirin, denies any LOC and states he recalls all events. Denies recent illness, fever, chills, dysuria, dizziness, cp, sob, abd pain. Denies cough, sore throat. Of note, patient reports that he self straight caths 3 times a day due to BPH. He also had a bladder mass s/p resection and was told it was benign.    In the ED, vitals: T 99.2, , /60, RR 24 satting 97 ORA. got 2L NS, started on maintenance fluid. Urology placed tovar, draining blood tinged urine. Labs noted for WBC 16.5, Cr 2.2, CPK 3857, lactate 3.1, positive UA. (19 Aug 2020 04:49)      Surgery/Hospital course:  8/31 MVR     Today/Overnight:  MVR     Vital Signs Last 24 Hrs  T(C): 36.8 (31 Aug 2020 13:58), Max: 37.2 (31 Aug 2020 04:38)  T(F): 98.2 (31 Aug 2020 11:56), Max: 98.9 (31 Aug 2020 04:38)  HR: 47 (31 Aug 2020 20:20) (47 - 82)  BP: 110/65 (31 Aug 2020 13:58) (103/58 - 147/65)  BP(mean): 73 (31 Aug 2020 04:38) (73 - 92)  RR: 13 (31 Aug 2020 20:15) (13 - 18)  SpO2: 100% (31 Aug 2020 20:20) (96% - 100%)  ============================I/O===========================  I&O's Summary    30 Aug 2020 07:01  -  31 Aug 2020 07:00  --------------------------------------------------------  Total In: 1110 mL // Total Out: 3350 mL // Total Net: -2240 mL     31 Aug 2020 07:01  -  31 Aug 2020 21:17  --------------------------------------------------------  Total In: 281.6 mL // Total Out: 990 mL // Total Net: -708.4 mL       ============================ LABS =========================                        9.8    34.02 )-----------( 151      ( 31 Aug 2020 20:38 )             29.5     08-31    138  |  101  |  40<H>  ----------------------------<  107<H>  4.6   |  23  |  1.56<H>    Ca    8.3<L>      31 Aug 2020 20:38  Phos  2.5     08-31  Mg     3.8     08-31    TPro  5.2<L>  /  Alb  2.8<L>  /  TBili  0.9  /  DBili  x   /  AST  70<H>  /  ALT  27  /  AlkPhos  83  08-31    LIVER FUNCTIONS - ( 31 Aug 2020 20:38 )  Alb: 2.8 g/dL / Pro: 5.2 g/dL / ALK PHOS: 83 U/L / ALT: 27 U/L / AST: 70 U/L / GGT: x           PT/INR - ( 31 Aug 2020 20:38 )   PT: 13.3 sec;   INR: 1.12 ratio         PTT - ( 31 Aug 2020 20:38 )  PTT:37.7 sec  ABG - ( 31 Aug 2020 20:34 )  pH, Arterial: 7.40  pH, Blood: x     /  pCO2: 41    /  pO2: 315   / HCO3: 25    / Base Excess: .6    /  SaO2: 100       ======================Micro/Rad/Cardio=================  Culture: Reviewed   CXR: Reviewed  Echo: Reviewed  ======================================================  PAST MEDICAL & SURGICAL HISTORY:  Orchitis and epididymitis  Vitamin D deficiency  Bladder mass  Edema of both legs  Osteoporosis  Seborrheic keratosis  Constipation, unspecified constipation type  Chronic kidney disease, unspecified stage  HIV (human immunodeficiency virus infection)  Unsteady gait  S/P coronary artery stent placement  No Past Surgical History    ========================ASSESSMENT ================  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 Sedated with Diprivan, weaned now to off in order to assess neurologic status. Patient developed agitation and now transitioning to an IV Dexmedetomidine infusion.     ==================== RESPIRATORY======================  Respiratory status initially required full ventilatory support, close monitoring of respiratory rate and breathing pattern, the following of ABG’s with A-line monitoring, and continuous pulse oximetry monitoring. Fi02 weaned to 40% and later pressure support and advanced to extubation.    ====================CARDIOVASCULAR==================  Patient with endocarditis of mitral valve s/p mitral valve repair 8/31. Continue ASA for CAD and Dobutamine infusion for chronotropy as well as inotropic support for mild systolic heart dysfunction/failure post MVR.     ===================HEMATOLOGIC/ONC ===================  Anemia due to acute blood loss as well as chronic anemia. No current indication for transfusion. Monitor hemoglobin and hematocrit levels.    ===================== RENAL =========================  Patient with acute kidney injury on chronic kidney disease since 2015, with serum creatinine baseline of 1.6-1.8 mg/dl, CKD stage 3. Optimize renal perfusion with adequate volume resuscitation and continued monitoring of urine output, fluid balance, BUN/Creatinine.     ==================== GASTROINTESTINAL===================  NPO after recent procedure, advance diet as tolerated.  Protonix for stress ulcer prophylaxis.  Continue bowel regimen with Miralax.    =======================    ENDOCRINE  =====================  Stress hyperglycemia required an IV regular Insulin infusion while following serial glucose levels to help achieve and maintain euglycemia.      ========================INFECTIOUS DISEASE================  Continue Cefuroxime and Vancomycin while following levels, empirically for endocarditis. BC positive for Staph epi, but only one set. Further work up sent for culture negative endocarditis.  Patient is afebrile, WBC 34.02.       Patient requires continuous monitoring with bedside rhythm monitoring, pulse oximetry monitoring, and continuous central venous and arterial pressure monitoring; and intermittent blood gas analysis.  Care plan discussed with ICU care team.    Patient remained critical, at risk for life threatening decompensation.   I have spent 35 minutes providing acute care with multiple re-evaluations throughout the evening.     By signing my name below, I, Jordan Power, attest that this documentation has been prepared under the direction and in the presence of Myranda Swift MD.  Electronically signed: Bogdan Watts, 08-31-20 @ 21:17    I, Myranda Swift MD, personally performed the services described in this documentation. All medical record entries made by the scribe were at my direction and in my presence. I have reviewed the chart and agree that the record reflects my personal performance and is accurate and complete  Electronically signed: Myranda Swift MD, 08-31-20 @ 21:17

## 2020-08-31 NOTE — BRIEF OPERATIVE NOTE - BRIEF OP NOTE DRAINS
32fr right angle mediastinal chest tube x1  straight mediastinal flat chest drain x1  19Fr mediastinal marques elizabeth x1

## 2020-08-31 NOTE — PROGRESS NOTE ADULT - PROBLEM SELECTOR PROBLEM 6
Addended by: VERNON DUFFY on: 8/18/2020 01:46 PM     Modules accepted: Orders     HIV (human immunodeficiency virus infection)

## 2020-08-31 NOTE — PROGRESS NOTE ADULT - PROBLEM SELECTOR PLAN 3
- pt straight caths daily 3x at home for detrusor muscle weakness and BPH, able to urinate little without straight cath; admitted with severe hydronephrosis  - repeat US kidney/bladder with resolved R hydronephrosis; mild L hydronephrosis, urothelial thickening  [ ] urology following, appreciate recs.   [ ] continue tovar (trial of straight cath may have contributed to elevated Cr)  [ ] continue home tamsulosin and finasteride

## 2020-08-31 NOTE — BRIEF OPERATIVE NOTE - OPERATION/FINDINGS
h/o endocarditis now s/p mitral valve debridement, mitral valve replacement (#33 [facility] Epic bovine valve) and left atrial appendage clip w/#45 atricure clip

## 2020-08-31 NOTE — PROGRESS NOTE ADULT - ASSESSMENT
80y M with CKD, hydroureteronephrosis, endocarditis    #SUDHEER on CKD3  - Patient with baseline CKD since 2015, Scr baseline 1.6-1.8mg/dl  - Pt Scr up to 1.98mg/dl, BUN 60. This maybe 2/2 contrast/ diuretics or removal of tovar catheter  - sCr has remained stable at 1.8 today.   - repeat renal US to assess for hydro  - cotninue holding diuretics for now  - avoid hypotension   - MVR procedure deferred in light of rising SCR, will re-eval for early next week.  We have discussed with the patient that given his CKD history and recent contrast and planned surgery that he is at risk for ATN and possible renal replacement therapy post surgery. He verbalized understanding.  -Monitor UOP and daily weights. Avoid volume depletion, NSAIDs, PPI's, ARB/ACE-I. Dose medications as per eGFR.

## 2020-08-31 NOTE — PROGRESS NOTE ADULT - ASSESSMENT
Mr. Monsivais is a 81 y/o man with hx HIV, CAD, detrusor muscle weakness, BPH presenting for fall, down for 24 hrs, admitted with rhabdo, bilateral severe ureterohydronephrosis and thickened bladder on CTAP 2/2 inability to continue his home straight cath 3x daily after fall, tovar in place. Rhabdo improved, hydronephrosis improved with tovar (urology following).   - NOHEMI shows endocarditis (NOHEMI: 1.8x1.3 vegetation on medial aspect of anterior leaflet with partial flail component + 0.5x1.3cm vegetation no posterior leaflet w severe eccentric mitral regurg). BCx staph epidermidis, repeat NGTD, on vancomycin. s/p MRA without septic emboli. s/p cardiac cath to eval prior to valve surgery per CT surgery. Mr. Monsivais is a 79 y/o man with hx HIV, CAD, detrusor muscle weakness, BPH presenting for fall, down for 24 hrs, admitted with rhabdo, bilateral severe ureterohydronephrosis and thickened bladder on CTAP 2/2 inability to continue his home straight cath 3x daily after fall, tovar in place. Rhabdo improved, hydronephrosis improved with tovar (urology following).   - NOHEMI shows endocarditis (NOHEMI: 1.8x1.3 vegetation on medial aspect of anterior leaflet with partial flail component + 0.5x1.3cm vegetation no posterior leaflet w severe eccentric mitral regurg). BCx staph epidermidis, repeat NGTD, on vancomycin. s/p MRA without septic emboli. Cardiac cath clean arteries. Valve surgery today per CT surgery.

## 2020-08-31 NOTE — PROGRESS NOTE ADULT - SUBJECTIVE AND OBJECTIVE BOX
Rosa M Palma PGY1    Patient is a 80y old  Male who presents with a chief complaint of fall (30 Aug 2020 16:09)      SUBJECTIVE / OVERNIGHT EVENTS:  - overnight - no events, preop labs ordered for today am  - am -     MEDICATIONS  (STANDING):  abacavir 300 milliGRAM(s) Oral every 12 hours  ascorbic acid 500 milliGRAM(s) Oral daily  cefuroxime  IVPB 1500 milliGRAM(s) IV Intermittent once  chlorhexidine 0.12% Liquid 30 milliLiter(s) Swish and Spit once  chlorhexidine 4% Liquid 1 Application(s) Topical once  cyanocobalamin 1000 MICROGram(s) Oral daily  Doravirine 100mg tablets (pifeltro) 100 milliGRAM(s) 100 milliGRAM(s) Oral daily  finasteride 5 milliGRAM(s) Oral daily  hydrALAZINE 10 milliGRAM(s) Oral three times a day  lactobacillus acidophilus and bulgaricus Chewable 2 Tablet(s) Chew three times a day with meals  lactobacillus acidophilus and bulgaricus Chewable      lamiVUDine- milliGRAM(s) Oral daily  multivitamin 1 Tablet(s) Oral daily  mupirocin 2% Ointment 1 Application(s) Topical two times a day  tamsulosin 0.4 milliGRAM(s) Oral at bedtime    MEDICATIONS  (PRN):  polyethylene glycol 3350 17 Gram(s) Oral daily PRN Constipation      CAPILLARY BLOOD GLUCOSE        I&O's Summary    30 Aug 2020 07:01  -  31 Aug 2020 07:00  --------------------------------------------------------  IN: 1110 mL / OUT: 3350 mL / NET: -2240 mL        Vital Signs Last 24 Hrs  T(C): 37.2 (31 Aug 2020 04:38), Max: 37.2 (31 Aug 2020 04:38)  T(F): 98.9 (31 Aug 2020 04:38), Max: 98.9 (31 Aug 2020 04:38)  HR: 82 (31 Aug 2020 04:38) (60 - 85)  BP: 103/58 (31 Aug 2020 04:38) (102/56 - 147/65)  BP(mean): 73 (31 Aug 2020 04:38) (73 - 92)  RR: 18 (31 Aug 2020 04:38) (18 - 18)  SpO2: 96% (31 Aug 2020 04:38) (96% - 99%)    PHYSICAL EXAM:  GENERAL: no distress  PSYCH: A&O x3  HEAD: Atraumatic, Normocephalic  NECK: Supple, No JVD  CHEST/LUNG: clear to auscultation bilaterally  HEART: regular rate and rhythm, no murmurs  ABDOMEN: nontender to palpation, no rebound tenderness/guarding  EXTREMITIES: no edema on bilateral LE  NEUROLOGY: no focal neurologic deficit  SKIN: No rashes or lesions    LABS:                        10.6   9.08  )-----------( 257      ( 31 Aug 2020 05:45 )             33.4      08-31    136  |  99  |  50<H>  ----------------------------<  98  4.1   |  25  |  1.83<H>    Ca    9.7      31 Aug 2020 05:44  Phos  3.9     08-31  Mg     2.5     08-31    TPro  6.7  /  Alb  3.3  /  TBili  0.2  /  DBili  x   /  AST  27  /  ALT  33  /  AlkPhos  111  08-31              RADIOLOGY & ADDITIONAL TESTS:    Imaging Personally Reviewed:    Consultant(s) Notes Reviewed:      Care Discussed with Consultants/Other Providers: Rosa M Louie PGY1    Patient is a 80y old  Male who presents with a chief complaint of fall (30 Aug 2020 16:09)      SUBJECTIVE / OVERNIGHT EVENTS:  - overnight - no events, preop labs ordered for today am  - am - surgery today at 1pm. patient aware. feels mildly anxious but okay overall. no chest pain/palpitations/sob.     MEDICATIONS  (STANDING):  abacavir 300 milliGRAM(s) Oral every 12 hours  ascorbic acid 500 milliGRAM(s) Oral daily  cefuroxime  IVPB 1500 milliGRAM(s) IV Intermittent once  chlorhexidine 0.12% Liquid 30 milliLiter(s) Swish and Spit once  chlorhexidine 4% Liquid 1 Application(s) Topical once  cyanocobalamin 1000 MICROGram(s) Oral daily  Doravirine 100mg tablets (pifeltro) 100 milliGRAM(s) 100 milliGRAM(s) Oral daily  finasteride 5 milliGRAM(s) Oral daily  hydrALAZINE 10 milliGRAM(s) Oral three times a day  lactobacillus acidophilus and bulgaricus Chewable 2 Tablet(s) Chew three times a day with meals  lactobacillus acidophilus and bulgaricus Chewable      lamiVUDine- milliGRAM(s) Oral daily  multivitamin 1 Tablet(s) Oral daily  mupirocin 2% Ointment 1 Application(s) Topical two times a day  tamsulosin 0.4 milliGRAM(s) Oral at bedtime    MEDICATIONS  (PRN):  polyethylene glycol 3350 17 Gram(s) Oral daily PRN Constipation      CAPILLARY BLOOD GLUCOSE        I&O's Summary    30 Aug 2020 07:01  -  31 Aug 2020 07:00  --------------------------------------------------------  IN: 1110 mL / OUT: 3350 mL / NET: -2240 mL        Vital Signs Last 24 Hrs  T(C): 37.2 (31 Aug 2020 04:38), Max: 37.2 (31 Aug 2020 04:38)  T(F): 98.9 (31 Aug 2020 04:38), Max: 98.9 (31 Aug 2020 04:38)  HR: 82 (31 Aug 2020 04:38) (60 - 85)  BP: 103/58 (31 Aug 2020 04:38) (102/56 - 147/65)  BP(mean): 73 (31 Aug 2020 04:38) (73 - 92)  RR: 18 (31 Aug 2020 04:38) (18 - 18)  SpO2: 96% (31 Aug 2020 04:38) (96% - 99%)    PHYSICAL EXAM:  GENERAL: no distress  PSYCH: A&O x3  HEAD: Atraumatic, Normocephalic  CHEST/LUNG: clear to auscultation bilaterally  HEART: regular rate and rhythm, 2/6 systolic murmur most noticeable at apex  ABDOMEN: nontender to palpation, no rebound tenderness/guarding  EXTREMITIES: no edema on bilateral LE  NEUROLOGY: no focal neurologic deficit    LABS:                        10.6   9.08  )-----------( 257      ( 31 Aug 2020 05:45 )             33.4      08-31    136  |  99  |  50<H>  ----------------------------<  98  4.1   |  25  |  1.83<H>    Ca    9.7      31 Aug 2020 05:44  Phos  3.9     08-31  Mg     2.5     08-31    TPro  6.7  /  Alb  3.3  /  TBili  0.2  /  DBili  x   /  AST  27  /  ALT  33  /  AlkPhos  111  08-31              RADIOLOGY & ADDITIONAL TESTS:    Imaging Personally Reviewed:    Consultant(s) Notes Reviewed:      Care Discussed with Consultants/Other Providers:

## 2020-08-31 NOTE — PROGRESS NOTE ADULT - PROBLEM SELECTOR PLAN 1
Subclinical; No need for management at this time. HIV meds may be contributing to suppressed TSH.  will continue monitoring TFTs and FU.

## 2020-08-31 NOTE — BRIEF OPERATIVE NOTE - NSICDXBRIEFPROCEDURE_GEN_ALL_CORE_FT
PROCEDURES:  Clipping, left atrial appendage 01-Sep-2020 06:19:16  Sylvester Rizzo  Mitral valve replacement 01-Sep-2020 06:19:08  Sylvester Rizzo

## 2020-08-31 NOTE — PROGRESS NOTE ADULT - ASSESSMENT
Assessment  Hypothyroidism: 80y male with no history thyroid disease, was not on any thyroid supplements, on HIV meds, TSH borderline elevated, subclinical hypothyroid, TPO ab WNL. Patient was NPO overnight, planning MVR today with Dr Camacho.  Endocarditis: Planning MVR, on meds, stable, monitored.          Nesha Izaguirre MD  Cell: [phone]  Office: [phone] Assessment  Hypothyroidism: 80y male with no history thyroid disease, was not on any thyroid supplements,  on HIV meds, TSH borderline elevated, subclinical hypothyroid, TPO ab WNL. Patient was NPO overnight, planning MVR today with Dr Camacho.  Endocarditis: Planning MVR, on meds, stable, monitored.        Nesha Izaguirre MD  Cell: [phone]  Office: [phone]

## 2020-08-31 NOTE — PROGRESS NOTE ADULT - SUBJECTIVE AND OBJECTIVE BOX
[facility] DIVISION OF KIDNEY DISEASES AND HYPERTENSION -- FOLLOW UP NOTE  --------------------------------------------------------------------------------  If any questions, please feel free to contact me  NS pager: [phone], [facility]: [phone]  Parker Harding M.D.  Nephrology Fellow    (After 5 pm or on weekends please page the on-call fellow)  --------------------------------------------------------------------------------    Chief Complaint:  Patient is a 80y old  Male who presents with a chief complaint of fall (31 Aug 2020 13:07)    24 hour events/subjective:  Patient seen and examined at bedside, in NAD  no acute events overnight. sCr has remained stable today at 1.8  UOP: 1.7L - BP has remained stable.       PAST HISTORY  --------------------------------------------------------------------------------  No significant changes to PMH, PSH, FHx, SHx, unless otherwise noted    ALLERGIES & MEDICATIONS  --------------------------------------------------------------------------------  Allergies    No Known Allergies    Intolerances      Standing Inpatient Medications  abacavir 300 milliGRAM(s) Oral every 12 hours  ascorbic acid 500 milliGRAM(s) Oral daily  cefuroxime  IVPB 1500 milliGRAM(s) IV Intermittent once  chlorhexidine 4% Liquid 1 Application(s) Topical once  cyanocobalamin 1000 MICROGram(s) Oral daily  Doravirine 100mg tablets (pifeltro) 100 milliGRAM(s) 100 milliGRAM(s) Oral daily  finasteride 5 milliGRAM(s) Oral daily  hydrALAZINE 10 milliGRAM(s) Oral three times a day  lactobacillus acidophilus and bulgaricus Chewable 2 Tablet(s) Chew three times a day with meals  lactobacillus acidophilus and bulgaricus Chewable      lamiVUDine- milliGRAM(s) Oral daily  multivitamin 1 Tablet(s) Oral daily  mupirocin 2% Ointment 1 Application(s) Topical two times a day  tamsulosin 0.4 milliGRAM(s) Oral at bedtime    PRN Inpatient Medications  polyethylene glycol 3350 17 Gram(s) Oral daily PRN      REVIEW OF SYSTEMS  --------------------------------------------------------------------------------  Gen: No fevers/chills  Skin: No rashes  Head/Eyes/Ears: Normal hearing,   Respiratory: No dyspnea, cough  CV: No chest pain  GI: No abdominal pain, diarrhea  : No dysuria, hematuria  MSK: No  edema  Heme: No easy bruising or bleeding  Psych: No significant depression      All other systems were reviewed and are negative, except as noted.    VITALS/PHYSICAL EXAM  --------------------------------------------------------------------------------  T(C): 36.8 (08-31-20 @ 13:58), Max: 37.2 (08-31-20 @ 04:38)  HR: 79 (08-31-20 @ 13:58) (60 - 82)  BP: 110/65 (08-31-20 @ 13:58) (103/58 - 147/65)  RR: 18 (08-31-20 @ 13:58) (18 - 18)  SpO2: 99% (08-31-20 @ 13:58) (96% - 99%)  Wt(kg): --  Height (cm): 190.5 (08-31-20 @ 13:58)  Weight (kg): 68.4 (08-31-20 @ 13:58)  BMI (kg/m2): 18.8 (08-31-20 @ 13:58)  BSA (m2): 1.95 (08-31-20 @ 13:58)      08-30-20 @ 07:01  -  08-31-20 @ 07:00  --------------------------------------------------------  IN: 1110 mL / OUT: 3350 mL / NET: -2240 mL    08-31-20 @ 07:01  -  08-31-20 @ 14:20  --------------------------------------------------------  IN: 250 mL / OUT: 920 mL / NET: -670 mL        Physical Exam:  	Gen: NAD, well-appearing  	HEENT: Anicteric   	Pulm: CTA B/L  	CV: RRR, S1S2; no rub  	Abd: +BS, soft, nontender/nondistended       	: No suprapubic tenderness  	MSK: Warm, no clubbing, intact strength; no edema  	Neuro: No focal deficits, AOX3, no asterixis      LABS/STUDIES  --------------------------------------------------------------------------------              10.6   9.08  >-----------<  257      [08-31-20 @ 05:45]              33.4     136  |  99  |  50  ----------------------------<  98      [08-31-20 @ 05:44]  4.1   |  25  |  1.83        Ca     9.7     [08-31-20 @ 05:44]      Mg     2.5     [08-31-20 @ 05:44]      Phos  3.9     [08-31-20 @ 05:44]    TPro  6.7  /  Alb  3.3  /  TBili  0.2  /  DBili  x   /  AST  27  /  ALT  33  /  AlkPhos  111  [08-31-20 @ 05:44]    PT/INR: PT 10.9 , INR 0.92       [08-31-20 @ 08:53]  PTT: 31.1       [08-31-20 @ 08:53]      Creatinine Trend:  SCr 1.83 [08-31 @ 05:44]  SCr 1.80 [08-30 @ 05:15]  SCr 1.78 [08-29 @ 06:34]  SCr 1.98 [08-28 @ 04:21]  SCr 1.83 [08-27 @ 07:09]    Urinalysis - [08-19-20 @ 03:23]      Color Yellow / Appearance Turbid / SG 1.015 / pH 7.0      Gluc Negative / Ketone Trace  / Bili Negative / Urobili Negative       Blood Moderate / Protein 100 mg/dL / Leuk Est Large / Nitrite Negative      RBC 10 / WBC >50 / Hyaline 0 / Gran  / Sq Epi  / Non Sq Epi 3 / Bacteria Many      TSH 4.24      [08-22-20 @ 12:42]

## 2020-08-31 NOTE — PROGRESS NOTE ADULT - PROBLEM SELECTOR PLAN 1
- NOHEMI 8/24: large mobile 1.8 x 1.3 cm vegetation anterior leaflet mitral valve with partial flail component. second vegetation 0.5 x 1.3cm on posterior leaflet w severe eccentric mitral regurg. Normal LV + RV systolic function. 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   - MRA 8/24 w/o septic emboli  - cath 8/25 prior to valve surgery showed clean arteries  [ ] valve repair with Dr. Camacho: today 2nd case. stayed npo after mn, covid neg.   [ ] vanc by level, goal 15-20. received vanc 1g 8/31  [ ] cefuroxime perisurgery abx, ID following - appreciate recs.   [ ] nephro following, appreciate recs: Cr elevation can be from mild volume depletion or contrast-induced nephropathy or tovar removal. hold lasix. Continue tovar, no ivf.   [ ] f/u Brucella ab, Q fever ab, Bartonella ab, Legionella Ab, staph epi thought to be possible contamination

## 2020-08-31 NOTE — AIRWAY REMOVAL NOTE  ADULT & PEDS - ARTIFICAL AIRWAY REMOVAL COMMENTS
Written order for extubation verified. The patient was identified by full name and birth date compared to the identification band. Present during the procedure was MILL[place]

## 2020-08-31 NOTE — PROGRESS NOTE ADULT - SUBJECTIVE AND OBJECTIVE BOX
Chief complaint  Patient is a 80y old  Male who presents with a chief complaint of fall (31 Aug 2020 07:18)   Review of systems  Patient in bed, looks comfortable, planning MVR today.    Labs and Fingersticks  CAPILLARY BLOOD GLUCOSE          Anion Gap, Serum: 12 (08-31 @ 05:44)  Anion Gap, Serum: 14 (08-30 @ 05:15)      Calcium, Total Serum: 9.7 (08-31 @ 05:44)  Calcium, Total Serum: 10.0 (08-30 @ 05:15)  Albumin, Serum: 3.3 (08-31 @ 05:44)  Albumin, Serum: 3.4 (08-30 @ 05:15)    Alanine Aminotransferase (ALT/SGPT): 33 (08-31 @ 05:44)  Alanine Aminotransferase (ALT/SGPT): 34 (08-30 @ 05:15)  Alkaline Phosphatase, Serum: 111 (08-31 @ 05:44)  Alkaline Phosphatase, Serum: 108 (08-30 @ 05:15)  Aspartate Aminotransferase (AST/SGOT): 27 (08-31 @ 05:44)  Aspartate Aminotransferase (AST/SGOT): 28 (08-30 @ 05:15)        08-31    136  |  99  |  50<H>  ----------------------------<  98  4.1   |  25  |  1.83<H>    Ca    9.7      31 Aug 2020 05:44  Phos  3.9     08-31  Mg     2.5     08-31    TPro  6.7  /  Alb  3.3  /  TBili  0.2  /  DBili  x   /  AST  27  /  ALT  33  /  AlkPhos  111  08-31                        10.6   9.08  )-----------( 257      ( 31 Aug 2020 05:45 )             33.4     Medications  MEDICATIONS  (STANDING):  abacavir 300 milliGRAM(s) Oral every 12 hours  ascorbic acid 500 milliGRAM(s) Oral daily  cefuroxime  IVPB 1500 milliGRAM(s) IV Intermittent once  chlorhexidine 4% Liquid 1 Application(s) Topical once  cyanocobalamin 1000 MICROGram(s) Oral daily  Doravirine 100mg tablets (pifeltro) 100 milliGRAM(s) 100 milliGRAM(s) Oral daily  finasteride 5 milliGRAM(s) Oral daily  hydrALAZINE 10 milliGRAM(s) Oral three times a day  lactobacillus acidophilus and bulgaricus Chewable 2 Tablet(s) Chew three times a day with meals  lactobacillus acidophilus and bulgaricus Chewable      lamiVUDine- milliGRAM(s) Oral daily  multivitamin 1 Tablet(s) Oral daily  mupirocin 2% Ointment 1 Application(s) Topical two times a day  tamsulosin 0.4 milliGRAM(s) Oral at bedtime      Physical Exam  General: Patient comfortable in bed  Vital Signs Last 12 Hrs  T(F): 98.2 (08-31-20 @ 11:56), Max: 98.9 (08-31-20 @ 04:38)  HR: 79 (08-31-20 @ 11:56) (79 - 82)  BP: 110/65 (08-31-20 @ 11:56) (103/58 - 110/65)  BP(mean): 73 (08-31-20 @ 04:38) (73 - 73)  RR: 18 (08-31-20 @ 11:56) (18 - 18)  SpO2: 99% (08-31-20 @ 11:56) (96% - 99%)  Neck: No palpable thyroid nodules.  CVS: S1S2, No murmurs  Respiratory: No wheezing, no crepitations  GI: Abdomen soft, bowel sounds positive  Musculoskeletal:  edema lower extremities.   Skin: No skin rashes, no ecchymosis    Diagnostics    Thyroperoxidase Antibody: AM (08-27 @ 13:42) Chief complaint  Patient is a 80y old  Male who presents with a chief complaint of fall (31 Aug 2020 07:18)   Review of systems  Patient in bed, looks comfortable, planning MVR today.    Labs and Fingersticks  CAPILLARY BLOOD GLUCOSE  Anion Gap, Serum: 12 (08-31 @ 05:44)  Anion Gap, Serum: 14 (08-30 @ 05:15)      Calcium, Total Serum: 9.7 (08-31 @ 05:44)  Calcium, Total Serum: 10.0 (08-30 @ 05:15)  Albumin, Serum: 3.3 (08-31 @ 05:44)  Albumin, Serum: 3.4 (08-30 @ 05:15)    Alanine Aminotransferase (ALT/SGPT): 33 (08-31 @ 05:44)  Alanine Aminotransferase (ALT/SGPT): 34 (08-30 @ 05:15)  Alkaline Phosphatase, Serum: 111 (08-31 @ 05:44)  Alkaline Phosphatase, Serum: 108 (08-30 @ 05:15)  Aspartate Aminotransferase (AST/SGOT): 27 (08-31 @ 05:44)  Aspartate Aminotransferase (AST/SGOT): 28 (08-30 @ 05:15)        08-31    136  |  99  |  50<H>  ----------------------------<  98  4.1   |  25  |  1.83<H>    Ca    9.7      31 Aug 2020 05:44  Phos  3.9     08-31  Mg     2.5     08-31    TPro  6.7  /  Alb  3.3  /  TBili  0.2  /  DBili  x   /  AST  27  /  ALT  33  /  AlkPhos  111  08-31                        10.6   9.08  )-----------( 257      ( 31 Aug 2020 05:45 )             33.4     Medications  MEDICATIONS  (STANDING):  abacavir 300 milliGRAM(s) Oral every 12 hours  ascorbic acid 500 milliGRAM(s) Oral daily  cefuroxime  IVPB 1500 milliGRAM(s) IV Intermittent once  chlorhexidine 4% Liquid 1 Application(s) Topical once  cyanocobalamin 1000 MICROGram(s) Oral daily  Doravirine 100mg tablets (pifeltro) 100 milliGRAM(s) 100 milliGRAM(s) Oral daily  finasteride 5 milliGRAM(s) Oral daily  hydrALAZINE 10 milliGRAM(s) Oral three times a day  lactobacillus acidophilus and bulgaricus Chewable 2 Tablet(s) Chew three times a day with meals  lactobacillus acidophilus and bulgaricus Chewable      lamiVUDine- milliGRAM(s) Oral daily  multivitamin 1 Tablet(s) Oral daily  mupirocin 2% Ointment 1 Application(s) Topical two times a day  tamsulosin 0.4 milliGRAM(s) Oral at bedtime      Physical Exam  General: Patient comfortable in bed  Vital Signs Last 12 Hrs  T(F): 98.2 (08-31-20 @ 11:56), Max: 98.9 (08-31-20 @ 04:38)  HR: 79 (08-31-20 @ 11:56) (79 - 82)  BP: 110/65 (08-31-20 @ 11:56) (103/58 - 110/65)  BP(mean): 73 (08-31-20 @ 04:38) (73 - 73)  RR: 18 (08-31-20 @ 11:56) (18 - 18)  SpO2: 99% (08-31-20 @ 11:56) (96% - 99%)  Neck: No palpable thyroid nodules.  CVS: S1S2, No murmurs  Respiratory: No wheezing, no crepitations  GI: Abdomen soft, bowel sounds positive  Musculoskeletal:  edema lower extremities.   Skin: No skin rashes, no ecchymosis    Diagnostics    Thyroperoxidase Antibody: AM (08-27 @ 13:42)

## 2020-08-31 NOTE — PROGRESS NOTE ADULT - SUBJECTIVE AND OBJECTIVE BOX
INFECTIOUS DISEASES FOLLOW UP-- Dorita Almazan  [phone]    This is a follow up note for this  80yMale with  Rhabdomyolysis    for MVR today       ROS:  CONSTITUTIONAL:  No fever, good appetite  CARDIOVASCULAR:  No chest pain or palpitations  RESPIRATORY:  No dyspnea  GASTROINTESTINAL:  No nausea, vomiting, diarrhea, or abdominal pain  GENITOURINARY:  No dysuria  NEUROLOGIC:  No headache,     Allergies    No Known Allergies    Intolerances        ANTIBIOTICS/RELEVANT:  antimicrobials  abacavir 300 milliGRAM(s) Oral every 12 hours  cefuroxime  IVPB 1500 milliGRAM(s) IV Intermittent once  lamiVUDine- milliGRAM(s) Oral daily    immunologic:    OTHER:  ascorbic acid 500 milliGRAM(s) Oral daily  chlorhexidine 4% Liquid 1 Application(s) Topical once  cyanocobalamin 1000 MICROGram(s) Oral daily  Doravirine 100mg tablets (pifeltro) 100 milliGRAM(s) 100 milliGRAM(s) Oral daily  finasteride 5 milliGRAM(s) Oral daily  hydrALAZINE 10 milliGRAM(s) Oral three times a day  lactobacillus acidophilus and bulgaricus Chewable 2 Tablet(s) Chew three times a day with meals  lactobacillus acidophilus and bulgaricus Chewable      multivitamin 1 Tablet(s) Oral daily  mupirocin 2% Ointment 1 Application(s) Topical two times a day  polyethylene glycol 3350 17 Gram(s) Oral daily PRN  tamsulosin 0.4 milliGRAM(s) Oral at bedtime      Objective:  Vital Signs Last 24 Hrs  T(C): 36.8 (31 Aug 2020 13:58), Max: 37.2 (31 Aug 2020 04:38)  T(F): 98.2 (31 Aug 2020 11:56), Max: 98.9 (31 Aug 2020 04:38)  HR: 79 (31 Aug 2020 13:58) (60 - 82)  BP: 110/65 (31 Aug 2020 13:58) (103/58 - 147/65)  BP(mean): 73 (31 Aug 2020 04:38) (73 - 92)  RR: 18 (31 Aug 2020 13:58) (18 - 18)  SpO2: 99% (31 Aug 2020 13:58) (96% - 99%)        LABS:                        10.6   9.08  )-----------( 257      ( 31 Aug 2020 05:45 )             33.4     08-31    136  |  99  |  50<H>  ----------------------------<  98  4.1   |  25  |  1.83<H>    Ca    9.7      31 Aug 2020 05:44  Phos  3.9     08-31  Mg     2.5     08-31    TPro  6.7  /  Alb  3.3  /  TBili  0.2  /  DBili  x   /  AST  27  /  ALT  33  /  AlkPhos  111  08-31    PT/INR - ( 31 Aug 2020 08:53 )   PT: 10.9 sec;   INR: 0.92 ratio         PTT - ( 31 Aug 2020 08:53 )  PTT:31.1 sec      MICROBIOLOGY:    COVID-19 PCR: NotDetec: Testing is performed using polymerase chain reaction (PCR) or  transcription mediated amplification (TMA). This COVID-19 (SARS-CoV-2)  nucleic acid amplification test was validated by [employer] and is  in use under the FDA Emergency Use Authorization (EUA) for clinical labs  CLIA-certified to perform high complexity testing. Test results should be  correlated with clinical presentation, patient history, and epidemiology. (08.30.20 @ 09:08)            RECENT CULTURES:      RADIOLOGY & ADDITIONAL STUDIES:    < from: VA Duplex Carotid, [place] (08.30.20 @ 17:57) >  IMPRESSION: No significant hemodynamic stenosis of either carotid artery.    Measurement of carotid stenosis is based on velocity parameters that correlate the residual internal carotid diameter with that of the more distal vessel in accordance with a method such as the [employer] Symptomatic Carotid Endarterectomy Trial (NASCET).    < end of copied text >

## 2020-09-01 LAB
APTT BLD: 28.9 SEC — SIGNIFICANT CHANGE UP (ref 27.5–35.5)
BASE EXCESS BLDV CALC-SCNC: -0.6 MMOL/L — SIGNIFICANT CHANGE UP (ref -2–2)
BASE EXCESS BLDV CALC-SCNC: -1.4 MMOL/L — SIGNIFICANT CHANGE UP (ref -2–2)
BASE EXCESS BLDV CALC-SCNC: -2.1 MMOL/L — LOW (ref -2–2)
CA-I SERPL-SCNC: 1.06 MMOL/L — LOW (ref 1.12–1.3)
CA-I SERPL-SCNC: 1.13 MMOL/L — SIGNIFICANT CHANGE UP (ref 1.12–1.3)
CA-I SERPL-SCNC: 1.14 MMOL/L — SIGNIFICANT CHANGE UP (ref 1.12–1.3)
CHLORIDE BLDV-SCNC: 102 MMOL/L — SIGNIFICANT CHANGE UP (ref 96–108)
CHLORIDE BLDV-SCNC: 104 MMOL/L — SIGNIFICANT CHANGE UP (ref 96–108)
CHLORIDE BLDV-SCNC: 98 MMOL/L — SIGNIFICANT CHANGE UP (ref 96–108)
CO2 BLDV-SCNC: 25 MMOL/L — SIGNIFICANT CHANGE UP (ref 22–30)
CO2 BLDV-SCNC: 25 MMOL/L — SIGNIFICANT CHANGE UP (ref 22–30)
CO2 BLDV-SCNC: 26 MMOL/L — SIGNIFICANT CHANGE UP (ref 22–30)
GAS PNL BLDA: SIGNIFICANT CHANGE UP
GAS PNL BLDV: 131 MMOL/L — LOW (ref 135–145)
GAS PNL BLDV: 131 MMOL/L — LOW (ref 135–145)
GAS PNL BLDV: 133 MMOL/L — LOW (ref 135–145)
GAS PNL BLDV: SIGNIFICANT CHANGE UP
GLUCOSE BLDV-MCNC: 161 MG/DL — HIGH (ref 70–99)
GLUCOSE BLDV-MCNC: 192 MG/DL — HIGH (ref 70–99)
GLUCOSE BLDV-MCNC: 86 MG/DL — SIGNIFICANT CHANGE UP (ref 70–99)
GRAM STN FLD: SIGNIFICANT CHANGE UP
HCO3 BLDV-SCNC: 23 MMOL/L — SIGNIFICANT CHANGE UP (ref 21–29)
HCO3 BLDV-SCNC: 24 MMOL/L — SIGNIFICANT CHANGE UP (ref 21–29)
HCO3 BLDV-SCNC: 25 MMOL/L — SIGNIFICANT CHANGE UP (ref 21–29)
HCT VFR BLDA CALC: 27 % — LOW (ref 39–50)
HCT VFR BLDA CALC: 30 % — LOW (ref 39–50)
HCT VFR BLDA CALC: 30 % — LOW (ref 39–50)
HGB BLD CALC-MCNC: 8.8 G/DL — LOW (ref 13–17)
HGB BLD CALC-MCNC: 9.6 G/DL — LOW (ref 13–17)
HGB BLD CALC-MCNC: 9.7 G/DL — LOW (ref 13–17)
HOROWITZ INDEX BLDV+IHG-RTO: 28 — SIGNIFICANT CHANGE UP
INR BLD: 1.03 RATIO — SIGNIFICANT CHANGE UP (ref 0.88–1.16)
LACTATE BLDV-MCNC: 1.1 MMOL/L — SIGNIFICANT CHANGE UP (ref 0.7–2)
LACTATE BLDV-MCNC: 1.4 MMOL/L — SIGNIFICANT CHANGE UP (ref 0.7–2)
LACTATE BLDV-MCNC: 1.7 MMOL/L — SIGNIFICANT CHANGE UP (ref 0.7–2)
NIGHT BLUE STAIN TISS: SIGNIFICANT CHANGE UP
OTHER CELLS CSF MANUAL: 7 ML/DL — LOW (ref 18–23)
OTHER CELLS CSF MANUAL: 8 ML/DL — LOW (ref 18–23)
OTHER CELLS CSF MANUAL: 8 ML/DL — LOW (ref 18–23)
PCO2 BLDV: 45 MMHG — SIGNIFICANT CHANGE UP (ref 35–50)
PCO2 BLDV: 46 MMHG — SIGNIFICANT CHANGE UP (ref 35–50)
PCO2 BLDV: 46 MMHG — SIGNIFICANT CHANGE UP (ref 35–50)
PH BLDV: 7.33 — LOW (ref 7.35–7.45)
PH BLDV: 7.34 — LOW (ref 7.35–7.45)
PH BLDV: 7.35 — SIGNIFICANT CHANGE UP (ref 7.35–7.45)
PO2 BLDV: 29 MMHG — SIGNIFICANT CHANGE UP (ref 25–45)
PO2 BLDV: 32 MMHG — SIGNIFICANT CHANGE UP (ref 25–45)
PO2 BLDV: 34 MMHG — SIGNIFICANT CHANGE UP (ref 25–45)
POTASSIUM BLDV-SCNC: 4 MMOL/L — SIGNIFICANT CHANGE UP (ref 3.5–5.3)
POTASSIUM BLDV-SCNC: 4.5 MMOL/L — SIGNIFICANT CHANGE UP (ref 3.5–5.3)
POTASSIUM BLDV-SCNC: 4.7 MMOL/L — SIGNIFICANT CHANGE UP (ref 3.5–5.3)
PROTHROM AB SERPL-ACNC: 12.2 SEC — SIGNIFICANT CHANGE UP (ref 10.6–13.6)
SAO2 % BLDV: 51 % — LOW (ref 67–88)
SAO2 % BLDV: 58 % — LOW (ref 67–88)
SAO2 % BLDV: 62 % — LOW (ref 67–88)
SPECIMEN SOURCE: SIGNIFICANT CHANGE UP
SPECIMEN SOURCE: SIGNIFICANT CHANGE UP
VANCOMYCIN TROUGH SERPL-MCNC: 18.6 UG/ML — SIGNIFICANT CHANGE UP (ref 10–20)

## 2020-09-01 PROCEDURE — 99291 CRITICAL CARE FIRST HOUR: CPT

## 2020-09-01 PROCEDURE — 99232 SBSQ HOSP IP/OBS MODERATE 35: CPT | Mod: GC

## 2020-09-01 PROCEDURE — 71045 X-RAY EXAM CHEST 1 VIEW: CPT | Mod: 26

## 2020-09-01 PROCEDURE — 99232 SBSQ HOSP IP/OBS MODERATE 35: CPT

## 2020-09-01 RX ORDER — HYDROMORPHONE HYDROCHLORIDE 2 MG/ML
0.5 INJECTION INTRAMUSCULAR; INTRAVENOUS; SUBCUTANEOUS ONCE
Refills: 0 | Status: DISCONTINUED | OUTPATIENT
Start: 2020-09-01 | End: 2020-09-01

## 2020-09-01 RX ORDER — ACETAMINOPHEN 500 MG
1000 TABLET ORAL ONCE
Refills: 0 | Status: COMPLETED | OUTPATIENT
Start: 2020-09-01 | End: 2020-09-01

## 2020-09-01 RX ORDER — CALCIUM GLUCONATE 100 MG/ML
1 VIAL (ML) INTRAVENOUS ONCE
Refills: 0 | Status: COMPLETED | OUTPATIENT
Start: 2020-09-01 | End: 2020-09-01

## 2020-09-01 RX ORDER — VASOPRESSIN 20 [USP'U]/ML
0.05 INJECTION INTRAVENOUS
Qty: 50 | Refills: 0 | Status: DISCONTINUED | OUTPATIENT
Start: 2020-09-01 | End: 2020-09-01

## 2020-09-01 RX ORDER — FENTANYL CITRATE 50 UG/ML
50 INJECTION INTRAVENOUS ONCE
Refills: 0 | Status: DISCONTINUED | OUTPATIENT
Start: 2020-09-01 | End: 2020-09-01

## 2020-09-01 RX ORDER — NOREPINEPHRINE BITARTRATE/D5W 8 MG/250ML
0.05 PLASTIC BAG, INJECTION (ML) INTRAVENOUS
Qty: 8 | Refills: 0 | Status: DISCONTINUED | OUTPATIENT
Start: 2020-09-01 | End: 2020-09-01

## 2020-09-01 RX ORDER — OXYCODONE AND ACETAMINOPHEN 5; 325 MG/1; MG/1
1 TABLET ORAL EVERY 6 HOURS
Refills: 0 | Status: DISCONTINUED | OUTPATIENT
Start: 2020-09-01 | End: 2020-09-06

## 2020-09-01 RX ORDER — INSULIN LISPRO 100/ML
VIAL (ML) SUBCUTANEOUS
Refills: 0 | Status: DISCONTINUED | OUTPATIENT
Start: 2020-09-01 | End: 2020-09-09

## 2020-09-01 RX ORDER — ENOXAPARIN SODIUM 100 MG/ML
40 INJECTION SUBCUTANEOUS DAILY
Refills: 0 | Status: DISCONTINUED | OUTPATIENT
Start: 2020-09-01 | End: 2020-09-03

## 2020-09-01 RX ORDER — HYDROMORPHONE HYDROCHLORIDE 2 MG/ML
0.5 INJECTION INTRAMUSCULAR; INTRAVENOUS; SUBCUTANEOUS ONCE
Refills: 0 | Status: DISCONTINUED | OUTPATIENT
Start: 2020-09-01 | End: 2020-08-31

## 2020-09-01 RX ORDER — OXYCODONE AND ACETAMINOPHEN 5; 325 MG/1; MG/1
2 TABLET ORAL EVERY 6 HOURS
Refills: 0 | Status: DISCONTINUED | OUTPATIENT
Start: 2020-09-01 | End: 2020-09-06

## 2020-09-01 RX ORDER — LAMIVUDINE 150 MG
100 TABLET ORAL DAILY
Refills: 0 | Status: DISCONTINUED | OUTPATIENT
Start: 2020-09-01 | End: 2020-09-11

## 2020-09-01 RX ORDER — ALBUMIN HUMAN 25 %
250 VIAL (ML) INTRAVENOUS ONCE
Refills: 0 | Status: COMPLETED | OUTPATIENT
Start: 2020-09-01 | End: 2020-09-01

## 2020-09-01 RX ORDER — FINASTERIDE 5 MG/1
5 TABLET, FILM COATED ORAL DAILY
Refills: 0 | Status: DISCONTINUED | OUTPATIENT
Start: 2020-09-01 | End: 2020-09-11

## 2020-09-01 RX ORDER — INSULIN LISPRO 100/ML
VIAL (ML) SUBCUTANEOUS
Refills: 0 | Status: DISCONTINUED | OUTPATIENT
Start: 2020-09-01 | End: 2020-09-01

## 2020-09-01 RX ORDER — TAMSULOSIN HYDROCHLORIDE 0.4 MG/1
0.4 CAPSULE ORAL AT BEDTIME
Refills: 0 | Status: DISCONTINUED | OUTPATIENT
Start: 2020-09-01 | End: 2020-09-11

## 2020-09-01 RX ORDER — INSULIN LISPRO 100/ML
VIAL (ML) SUBCUTANEOUS AT BEDTIME
Refills: 0 | Status: DISCONTINUED | OUTPATIENT
Start: 2020-09-01 | End: 2020-09-09

## 2020-09-01 RX ORDER — ABACAVIR 20 MG/ML
600 SOLUTION ORAL DAILY
Refills: 0 | Status: DISCONTINUED | OUTPATIENT
Start: 2020-09-01 | End: 2020-09-11

## 2020-09-01 RX ORDER — PANTOPRAZOLE SODIUM 20 MG/1
40 TABLET, DELAYED RELEASE ORAL
Refills: 0 | Status: DISCONTINUED | OUTPATIENT
Start: 2020-09-01 | End: 2020-09-11

## 2020-09-01 RX ORDER — WARFARIN SODIUM 2.5 MG/1
5 TABLET ORAL ONCE
Refills: 0 | Status: COMPLETED | OUTPATIENT
Start: 2020-09-01 | End: 2020-09-01

## 2020-09-01 RX ADMIN — Medication 8: at 11:53

## 2020-09-01 RX ADMIN — Medication 1000 MILLIGRAM(S): at 04:15

## 2020-09-01 RX ADMIN — FENTANYL CITRATE 50 MICROGRAM(S): 50 INJECTION INTRAVENOUS at 13:21

## 2020-09-01 RX ADMIN — Medication 125 MILLILITER(S): at 03:46

## 2020-09-01 RX ADMIN — Medication 125 MILLILITER(S): at 03:45

## 2020-09-01 RX ADMIN — OXYCODONE AND ACETAMINOPHEN 1 TABLET(S): 5; 325 TABLET ORAL at 13:38

## 2020-09-01 RX ADMIN — HYDROMORPHONE HYDROCHLORIDE 0.5 MILLIGRAM(S): 2 INJECTION INTRAMUSCULAR; INTRAVENOUS; SUBCUTANEOUS at 08:40

## 2020-09-01 RX ADMIN — Medication 400 MILLIGRAM(S): at 03:47

## 2020-09-01 RX ADMIN — FINASTERIDE 5 MILLIGRAM(S): 5 TABLET, FILM COATED ORAL at 11:00

## 2020-09-01 RX ADMIN — ABACAVIR 600 MILLIGRAM(S): 20 SOLUTION ORAL at 09:27

## 2020-09-01 RX ADMIN — ENOXAPARIN SODIUM 40 MILLIGRAM(S): 100 INJECTION SUBCUTANEOUS at 11:00

## 2020-09-01 RX ADMIN — OXYCODONE AND ACETAMINOPHEN 1 TABLET(S): 5; 325 TABLET ORAL at 13:08

## 2020-09-01 RX ADMIN — WARFARIN SODIUM 5 MILLIGRAM(S): 2.5 TABLET ORAL at 21:33

## 2020-09-01 RX ADMIN — Medication 81 MILLIGRAM(S): at 11:00

## 2020-09-01 RX ADMIN — HYDROMORPHONE HYDROCHLORIDE 0.5 MILLIGRAM(S): 2 INJECTION INTRAMUSCULAR; INTRAVENOUS; SUBCUTANEOUS at 21:00

## 2020-09-01 RX ADMIN — Medication 100 MILLIGRAM(S): at 21:12

## 2020-09-01 RX ADMIN — HYDROMORPHONE HYDROCHLORIDE 0.5 MILLIGRAM(S): 2 INJECTION INTRAMUSCULAR; INTRAVENOUS; SUBCUTANEOUS at 08:55

## 2020-09-01 RX ADMIN — FENTANYL CITRATE 50 MICROGRAM(S): 50 INJECTION INTRAVENOUS at 11:00

## 2020-09-01 RX ADMIN — POLYETHYLENE GLYCOL 3350 17 GRAM(S): 17 POWDER, FOR SOLUTION ORAL at 11:00

## 2020-09-01 RX ADMIN — TAMSULOSIN HYDROCHLORIDE 0.4 MILLIGRAM(S): 0.4 CAPSULE ORAL at 21:12

## 2020-09-01 RX ADMIN — MUPIROCIN 1 APPLICATION(S): 20 OINTMENT TOPICAL at 06:36

## 2020-09-01 RX ADMIN — Medication 100 MILLIGRAM(S): at 13:49

## 2020-09-01 RX ADMIN — MUPIROCIN 1 APPLICATION(S): 20 OINTMENT TOPICAL at 17:01

## 2020-09-01 RX ADMIN — HYDROMORPHONE HYDROCHLORIDE 0.5 MILLIGRAM(S): 2 INJECTION INTRAMUSCULAR; INTRAVENOUS; SUBCUTANEOUS at 18:55

## 2020-09-01 RX ADMIN — PANTOPRAZOLE SODIUM 40 MILLIGRAM(S): 20 TABLET, DELAYED RELEASE ORAL at 08:40

## 2020-09-01 RX ADMIN — CHLORHEXIDINE GLUCONATE 1 APPLICATION(S): 213 SOLUTION TOPICAL at 01:19

## 2020-09-01 RX ADMIN — HYDROMORPHONE HYDROCHLORIDE 0.5 MILLIGRAM(S): 2 INJECTION INTRAMUSCULAR; INTRAVENOUS; SUBCUTANEOUS at 21:30

## 2020-09-01 RX ADMIN — FENTANYL CITRATE 50 MICROGRAM(S): 50 INJECTION INTRAVENOUS at 17:29

## 2020-09-01 RX ADMIN — Medication 100 MILLIGRAM(S): at 09:27

## 2020-09-01 RX ADMIN — FENTANYL CITRATE 50 MICROGRAM(S): 50 INJECTION INTRAVENOUS at 17:59

## 2020-09-01 RX ADMIN — FENTANYL CITRATE 50 MICROGRAM(S): 50 INJECTION INTRAVENOUS at 11:15

## 2020-09-01 RX ADMIN — FENTANYL CITRATE 50 MICROGRAM(S): 50 INJECTION INTRAVENOUS at 13:06

## 2020-09-01 RX ADMIN — HYDROMORPHONE HYDROCHLORIDE 0.5 MILLIGRAM(S): 2 INJECTION INTRAMUSCULAR; INTRAVENOUS; SUBCUTANEOUS at 19:00

## 2020-09-01 RX ADMIN — Medication 100 MILLIGRAM(S): at 06:37

## 2020-09-01 RX ADMIN — Medication 100 GRAM(S): at 07:19

## 2020-09-01 NOTE — PHYSICAL THERAPY INITIAL EVALUATION ADULT - PLANNED THERAPY INTERVENTIONS, PT EVAL
bed mobility training/gait training/transfer training/strengthening/balance training
bed mobility training/gait training/transfer training

## 2020-09-01 NOTE — PROGRESS NOTE ADULT - SUBJECTIVE AND OBJECTIVE BOX
[facility] DIVISION OF KIDNEY DISEASES AND HYPERTENSION -- FOLLOW UP NOTE  --------------------------------------------------------------------------------  If any questions, please feel free to contact me  NS pager: [phone], [facility]: [phone]  Parker Harding M.D.  Nephrology Fellow    (After 5 pm or on weekends please page the on-call fellow)  --------------------------------------------------------------------------------    Chief Complaint:  Patient is a 80y old  Male who presents with a chief complaint of fall (31 Aug 2020 21:17)    24 hour events/subjective:  Patient seen and examined at bedside, in NAD, s/p mitral valve replacement on 8/31  no acute events overnight. sCr has remained stable ~ 1.8  UOP: ~1.7L - BP has remained stable.      PAST HISTORY  --------------------------------------------------------------------------------  No significant changes to PMH, PSH, FHx, SHx, unless otherwise noted    ALLERGIES & MEDICATIONS  --------------------------------------------------------------------------------  Allergies    No Known Allergies    Intolerances      Standing Inpatient Medications  abacavir 600 milliGRAM(s) Oral daily  aspirin enteric coated 81 milliGRAM(s) Oral daily  cefuroxime  IVPB 1500 milliGRAM(s) IV Intermittent every 8 hours  chlorhexidine 2% Cloths 1 Application(s) Topical <User Schedule>  dextrose 5%. 1000 milliLiter(s) IV Continuous <Continuous>  dextrose 50% Injectable 50 milliLiter(s) IV Push every 15 minutes  dextrose 50% Injectable 25 milliLiter(s) IV Push every 15 minutes  DOBUTamine Infusion 2.5 MICROgram(s)/kG/Min IV Continuous <Continuous>  enoxaparin Injectable 40 milliGRAM(s) SubCutaneous daily  finasteride 5 milliGRAM(s) Oral daily  insulin lispro (HumaLOG) corrective regimen sliding scale   SubCutaneous three times a day before meals  insulin regular Infusion 3 Unit(s)/Hr IV Continuous <Continuous>  lamiVUDine- milliGRAM(s) Oral daily  mupirocin 2% Ointment 1 Application(s) Both Nostrils two times a day  pantoprazole    Tablet 40 milliGRAM(s) Oral before breakfast  polyethylene glycol 3350 17 Gram(s) Oral daily  sodium chloride 0.9%. 1000 milliLiter(s) IV Continuous <Continuous>  tamsulosin 0.4 milliGRAM(s) Oral at bedtime  warfarin 5 milliGRAM(s) Oral once    PRN Inpatient Medications      REVIEW OF SYSTEMS  --------------------------------------------------------------------------------  Gen: No fevers/chills  Skin: No rashes  Head/Eyes/Ears: Normal hearing,   Respiratory: No dyspnea, cough  CV: chest pain at surgery site   GI: No abdominal pain, diarrhea  : No dysuria, hematuria  MSK: No  edema  Heme: No easy bruising or bleeding  Psych: No significant depression      All other systems were reviewed and are negative, except as noted.    VITALS/PHYSICAL EXAM  --------------------------------------------------------------------------------  T(C): 37 (09-01-20 @ 00:00), Max: 37 (09-01-20 @ 00:00)  HR: 55 (09-01-20 @ 08:00) (47 - 98)  BP: 125/57 (09-01-20 @ 08:00) (90/50 - 125/57)  RR: 31 (09-01-20 @ 08:00) (6 - 42)  SpO2: 100% (09-01-20 @ 08:00) (99% - 100%)  Wt(kg): --  Height (cm): 190.5 (08-31-20 @ 13:58)  Weight (kg): 68.4 (08-31-20 @ 13:58)  BMI (kg/m2): 18.8 (08-31-20 @ 13:58)  BSA (m2): 1.95 (08-31-20 @ 13:58)      08-31-20 @ 07:01  -  09-01-20 @ 07:00  --------------------------------------------------------  IN: 3094.4 mL / OUT: 1925 mL / NET: 1169.4 mL    09-01-20 @ 07:01  -  09-01-20 @ 08:45  --------------------------------------------------------  IN: 256.1 mL / OUT: 115 mL / NET: 141.1 mL        Physical Exam:  	Gen: NAD, well-appearing  	HEENT: Anicteric   	Pulm: CTA B/L, no wheezing   	CV: RRR, S1S2; no rub   	Abd: +BS, soft, nontender/nondistended       	: No suprapubic tenderness  	MSK: Warm, no clubbing, intact strength; no edema  	Neuro: No focal deficits, AOX3, no asterixis      LABS/STUDIES  --------------------------------------------------------------------------------              8.3    19.47 >-----------<  107      [09-01-20 @ 02:11]              25.4     138  |  100  |  41  ----------------------------<  117      [09-01-20 @ 02:11]  4.9   |  23  |  1.83        Ca     8.3     [09-01-20 @ 02:11]      Mg     3.3     [09-01-20 @ 02:11]      Phos  3.5     [09-01-20 @ 02:11]    TPro  5.4  /  Alb  3.5  /  TBili  1.0  /  DBili  x   /  AST  68  /  ALT  24  /  AlkPhos  65  [09-01-20 @ 02:11]    PT/INR: PT 13.2 , INR 1.12       [09-01-20 @ 02:11]  PTT: 40.4       [09-01-20 @ 02:11]          [08-31-20 @ 20:38]    Creatinine Trend:  SCr 1.83 [09-01 @ 02:11]  SCr 1.56 [08-31 @ 20:38]  SCr 1.83 [08-31 @ 05:44]  SCr 1.80 [08-30 @ 05:15]  SCr 1.78 [08-29 @ 06:34]    Urinalysis - [08-19-20 @ 03:23]      Color Yellow / Appearance Turbid / SG 1.015 / pH 7.0      Gluc Negative / Ketone Trace  / Bili Negative / Urobili Negative       Blood Moderate / Protein 100 mg/dL / Leuk Est Large / Nitrite Negative      RBC 10 / WBC >50 / Hyaline 0 / Gran  / Sq Epi  / Non Sq Epi 3 / Bacteria Many      TSH 4.24      [08-22-20 @ 12:42]

## 2020-09-01 NOTE — PROGRESS NOTE ADULT - ASSESSMENT
80y M with CKD, hydroureteronephrosis, endocarditis, now s/p mitral valve replacement on 8/31/20   nephrology consulted for SUDHEER      #SUDHEER on CKD3  - patient is s/p mitral valve replacement on 8/31/2020  - Patient with baseline CKD since 2015, Scr baseline 1.6-1.8mg/dl  - Pt Scr up to 1.98mg/dl, BUN 60.  2/2 contrast/ diuretics or removal of tovar catheter  - sCr has remained stable at 1.8 today.   - Consider repeat renal US to assess for hydro  - avoid hypotension, and overdiuresis   - will monitor sCr after MV procedure,  We have discussed with the patient that given his CKD history and recent contrast and planned surgery that he is at risk for ATN and possible renal replacement therapy post surgery. He verbalized understanding.  - Monitor UOP and daily weights. Avoid volume depletion, NSAIDs, PPI's, ARB/ACE-I.   - Dose medications as per eGFR.

## 2020-09-01 NOTE — PROGRESS NOTE ADULT - PROBLEM SELECTOR PLAN 1
Will adjust Humalog correction scale coverage to be ac/hs.  Will continue monitoring FS, log, and FU.

## 2020-09-01 NOTE — PHYSICAL THERAPY INITIAL EVALUATION ADULT - PERTINENT HX OF CURRENT PROBLEM, REHAB EVAL
81 y/o M with PMH of HIV, BPH, CAD presents s/p fall at home, found in own urine/feces. Pt a/w rhabdomyolysis, UTI, and severe b/l hydroureteronephrosis s/p tovar placement. CT HEAD/NECK: no significant findings; possible old infarct or hemorrhage in L basal ganglia. CT ABDOMEN/PELVIS/CHEST: no acute injuries, mild compression fx of L3, unchanged.
81 y/o M with PMH of HIV, BPH, CAD presents s/p fall at home, found in own urine/feces. Pt a/w rhabdomyolysis, UTI, and severe b/l hydroureteronephrosis s/p tovar placement. CT HEAD/NECK: no significant findings; possible old infarct or hemorrhage in L basal ganglia. CT ABDOMEN/PELVIS/CHEST: no acute injuries, mild compression fx of L3, unchanged.; NOHEMI + Endocarditis and now s/p MVR

## 2020-09-01 NOTE — PROGRESS NOTE ADULT - SUBJECTIVE AND OBJECTIVE BOX
Chief complaint  Patient is a 80y old  Male who presents with a chief complaint of fall (01 Sep 2020 08:55)   Review of systems  Patient in bed, looks comfortable.    Labs and Fingersticks  CAPILLARY BLOOD GLUCOSE      POCT Blood Glucose.: 119 mg/dL (01 Sep 2020 09:19)  POCT Blood Glucose.: 82 mg/dL (01 Sep 2020 08:03)  POCT Blood Glucose.: 102 mg/dL (01 Sep 2020 06:51)  POCT Blood Glucose.: 130 mg/dL (01 Sep 2020 05:04)  POCT Blood Glucose.: 127 mg/dL (01 Sep 2020 04:37)  POCT Blood Glucose.: 113 mg/dL (01 Sep 2020 03:09)  POCT Blood Glucose.: 127 mg/dL (01 Sep 2020 01:09)  POCT Blood Glucose.: 139 mg/dL (31 Aug 2020 22:14)      Anion Gap, Serum: 15 (09-01 @ 02:11)  Anion Gap, Serum: 14 (08-31 @ 20:38)  Anion Gap, Serum: 12 (08-31 @ 05:44)      Calcium, Total Serum: 8.3 <L> (09-01 @ 02:11)  Calcium, Total Serum: 8.3 <L> (08-31 @ 20:38)  Calcium, Total Serum: 9.7 (08-31 @ 05:44)  Albumin, Serum: 3.5 (09-01 @ 02:11)  Albumin, Serum: 2.8 <L> (08-31 @ 20:38)  Albumin, Serum: 3.3 (08-31 @ 05:44)    Alanine Aminotransferase (ALT/SGPT): 24 (09-01 @ 02:11)  Alanine Aminotransferase (ALT/SGPT): 27 (08-31 @ 20:38)  Alanine Aminotransferase (ALT/SGPT): 33 (08-31 @ 05:44)  Alkaline Phosphatase, Serum: 65 (09-01 @ 02:11)  Alkaline Phosphatase, Serum: 83 (08-31 @ 20:38)  Alkaline Phosphatase, Serum: 111 (08-31 @ 05:44)  Aspartate Aminotransferase (AST/SGOT): 68 <H> (09-01 @ 02:11)  Aspartate Aminotransferase (AST/SGOT): 70 <H> (08-31 @ 20:38)  Aspartate Aminotransferase (AST/SGOT): 27 (08-31 @ 05:44)        09-01    138  |  100  |  41<H>  ----------------------------<  117<H>  4.9   |  23  |  1.83<H>    Ca    8.3<L>      01 Sep 2020 02:11  Phos  3.5     09-01  Mg     3.3     09-01    TPro  5.4<L>  /  Alb  3.5  /  TBili  1.0  /  DBili  x   /  AST  68<H>  /  ALT  24  /  AlkPhos  65  09-01                        8.3    19.47 )-----------( 107      ( 01 Sep 2020 02:11 )             25.4     Medications  MEDICATIONS  (STANDING):  abacavir 600 milliGRAM(s) Oral daily  aspirin enteric coated 81 milliGRAM(s) Oral daily  cefuroxime  IVPB 1500 milliGRAM(s) IV Intermittent every 8 hours  chlorhexidine 2% Cloths 1 Application(s) Topical <User Schedule>  dextrose 5%. 1000 milliLiter(s) (15 mL/Hr) IV Continuous <Continuous>  dextrose 50% Injectable 50 milliLiter(s) IV Push every 15 minutes  dextrose 50% Injectable 25 milliLiter(s) IV Push every 15 minutes  DOBUTamine Infusion 2.5 MICROgram(s)/kG/Min (5.13 mL/Hr) IV Continuous <Continuous>  Doravirine (Pefeltro), 100 mG 1 Tablet(s) 1 Tablet(s) Oral daily  enoxaparin Injectable 40 milliGRAM(s) SubCutaneous daily  finasteride 5 milliGRAM(s) Oral daily  insulin lispro (HumaLOG) corrective regimen sliding scale   SubCutaneous three times a day before meals  insulin regular Infusion 3 Unit(s)/Hr (3 mL/Hr) IV Continuous <Continuous>  lamiVUDine- milliGRAM(s) Oral daily  mupirocin 2% Ointment 1 Application(s) Both Nostrils two times a day  pantoprazole    Tablet 40 milliGRAM(s) Oral before breakfast  polyethylene glycol 3350 17 Gram(s) Oral daily  sodium chloride 0.9%. 1000 milliLiter(s) (10 mL/Hr) IV Continuous <Continuous>  tamsulosin 0.4 milliGRAM(s) Oral at bedtime  warfarin 5 milliGRAM(s) Oral once      Physical Exam  Culture - Tissue with Gram Stain (collected 08-31-20 @ 21:15)  Source: .Tissue Other  Gram Stain (09-01-20 @ 00:41):    No polymorphonuclear cells seen per low power field    No organisms seen per oil power field    Culture - Fungal, Tissue (collected 08-31-20 @ 21:15)  Source: .Tissue mitral vegetation  Preliminary Report (09-01-20 @ 08:33):    Testing in progress    Culture - Acid Fast - Tissue w/Smear (collected 08-31-20 @ 21:15)  Source: .Tissue Other    Culture - Fungal, Other (collected 08-31-20 @ 21:12)  Source: .Other Other  Preliminary Report (09-01-20 @ 08:33):    Testing in progress      General: Patient comfortable in bed  Vital Signs Last 12 Hrs  T(F): 97.6 (09-01-20 @ 12:00), Max: 97.6 (09-01-20 @ 12:00)  HR: 56 (09-01-20 @ 13:00) (50 - 63)  BP: 127/60 (09-01-20 @ 13:00) (103/54 - 129/62)  BP(mean): 87 (09-01-20 @ 13:00) (76 - 105)  RR: 31 (09-01-20 @ 13:00) (6 - 42)  SpO2: 97% (09-01-20 @ 13:00) (97% - 100%)  Neck: No palpable thyroid nodules.  CVS: S1S2, No murmurs  Respiratory: No wheezing, no crepitations  GI: Abdomen soft, bowel sounds positive  Musculoskeletal:  edema lower extremities.   Skin: No skin rashes, no ecchymosis    Diagnostics    Thyroperoxidase Antibody: AM (08-27 @ 13:42)

## 2020-09-01 NOTE — PHYSICAL THERAPY INITIAL EVALUATION ADULT - BED MOBILITY TRAINING, PT EVAL
GOAL: Pt will perform all bed mobility independently in 2 weeks
1. LTG Pt will be indep w/ bed mobs w/in 2 wks

## 2020-09-01 NOTE — PROGRESS NOTE ADULT - ASSESSMENT
80M hx of HIV(XF7=285, VL undet), detrusor muscle weakness with self straight cath, CAD presented after a fall at home.  Labs noted for WBC 16.5, Cr 2.2, CPK 3857, lactate 3.1, positive UA on admission  cellulitis on left knee- resolved  bacteremia with 2/2 sets on 8/19 growing coag. negative staph in the setting of valvular heart disease    #Infective endocarditis:  - NOHEMI:  1. Large mobile about 1.8cm x 1.3 cm vegetation seen on the medial aspect of the anterior leaflet with a partial flail component of the medial A2/ A3 area. There us also a vegetation see at the annulus of P3 which measures 0.5cm x 1.3cm  Severe eccentric posterior mitral regurgitation.  - On exam, pansystolic murmur at apex consistent with MR  - BCx(8/19): Growth in aerobic and anaerobic bottles: Staphylococcus epidermidis  - BCx(8/20): no growth  - Vancomycin 1g daily for now  - Check daily labs for vancomycin random level, goal is 15-20  - OR date for MVR with Dr Camacho next week  - Ok to do kimberlyn-op abx: cefuroxime  - If pt goes to OR, vegatation tissue has to send for culture and PCR (probably can send for 16S genome sequencing). We contacted micro lab.  - it's unclear if S.epi is the true pathogen given it's transient and still could be contamination. Follow up culture negative endocarditis workup: Brucella ab, Q fever ab, Bartonella ab, Legionella Ab    #SUDHEER:  - Nephro following  - Patient with baseline CKD since 2015, Scr baseline 1.6-1.8mg/dl  - Pt undergoing surgical repair of mitral valve today    A portion of the vegetation was sent to the Microbiology lab for testing and stains are negative for AFB, fungus, bacteria and cultures are pending        Tung Almazan MD  [phone]  After 5pm/weekends [phone]

## 2020-09-01 NOTE — PHYSICAL THERAPY INITIAL EVALUATION ADULT - MANUAL MUSCLE TESTING RESULTS, REHAB EVAL
grossly 3/5 x 4 ext
BUE and BLE grossly at least 3/5 minus L shoulder flexion grossly 3-/5/grossly assessed due to

## 2020-09-01 NOTE — PHYSICAL THERAPY INITIAL EVALUATION ADULT - GAIT TRAINING, PT EVAL
GOAL: Pt will ambulate 150 feet with least restrictive device as appropriate independently in 2 weeks
3. LTG Pt will be indep to amb approx 150ft w/ rolling walker w/in 2 wks

## 2020-09-01 NOTE — PHYSICAL THERAPY INITIAL EVALUATION ADULT - TRANSFER TRAINING, PT EVAL
GOAL: Pt will transfer sit to/from stand with least restrictive device as appropriate independently in 2 weeks
2. LTG Pt will be indep w/ transfers w/in 2 wks

## 2020-09-01 NOTE — PHYSICAL THERAPY INITIAL EVALUATION ADULT - GENERAL OBSERVATIONS, REHAB EVAL
Pt moise 40 min eval well. Pt a/w rhabdomylosis and UTI. Pt rec'd in bed, peripheral IV line, tovar, NAD. Pt A&Ox4. Pt agreed to session. Pt reported feeling generally stiff. Pt moise therex in bed.
Sitting in chair, + A-line, Ext pacer, NCO2 2L, RIJ, Chest tube x 2, [place]

## 2020-09-01 NOTE — PROGRESS NOTE ADULT - SUBJECTIVE AND OBJECTIVE BOX
INFECTIOUS DISEASES FOLLOW UP-- Dorita Almazan  [phone]    This is a follow up note for this  80yMale with  s/p MVR for endocarditis  stains and cultures are negative to date on the vegetation      ROS:  CONSTITUTIONAL:  No fever, good appetite  CARDIOVASCULAR:  No chest pain or palpitations, pain at the chest tube sites  RESPIRATORY:  No dyspnea  GASTROINTESTINAL:  No nausea, vomiting, diarrhea, or abdominal pain  GENITOURINARY:  No dysuria  NEUROLOGIC:  No headache,     Allergies    No Known Allergies    Intolerances        ANTIBIOTICS/RELEVANT:  antimicrobials  abacavir 600 milliGRAM(s) Oral daily  cefuroxime  IVPB 1500 milliGRAM(s) IV Intermittent every 8 hours  lamiVUDine- milliGRAM(s) Oral daily    immunologic:    OTHER:  aspirin enteric coated 81 milliGRAM(s) Oral daily  chlorhexidine 2% Cloths 1 Application(s) Topical <User Schedule>  dextrose 40% Gel 15 Gram(s) Oral once PRN  dextrose 5%. 1000 milliLiter(s) IV Continuous <Continuous>  dextrose 5%. 1000 milliLiter(s) IV Continuous <Continuous>  dextrose 50% Injectable 50 milliLiter(s) IV Push every 15 minutes  dextrose 50% Injectable 25 milliLiter(s) IV Push every 15 minutes  DOBUTamine Infusion 2 MICROgram(s)/kG/Min IV Continuous <Continuous>  Doravirine (Pefeltro), 100 mG 1 Tablet(s) 1 Tablet(s) Oral daily  enoxaparin Injectable 40 milliGRAM(s) SubCutaneous daily  finasteride 5 milliGRAM(s) Oral daily  glucagon  Injectable 1 milliGRAM(s) IntraMuscular once PRN  insulin lispro (HumaLOG) corrective regimen sliding scale   SubCutaneous three times a day before meals  insulin lispro (HumaLOG) corrective regimen sliding scale   SubCutaneous at bedtime  mupirocin 2% Ointment 1 Application(s) Both Nostrils two times a day  oxycodone    5 mG/acetaminophen 325 mG 1 Tablet(s) Oral every 6 hours PRN  oxycodone    5 mG/acetaminophen 325 mG 2 Tablet(s) Oral every 6 hours PRN  pantoprazole    Tablet 40 milliGRAM(s) Oral before breakfast  polyethylene glycol 3350 17 Gram(s) Oral daily  sodium chloride 0.9%. 1000 milliLiter(s) IV Continuous <Continuous>  tamsulosin 0.4 milliGRAM(s) Oral at bedtime      Objective:  Vital Signs Last 24 Hrs  T(C): 37.3 (01 Sep 2020 16:00), Max: 37.3 (01 Sep 2020 16:00)  T(F): 99.2 (01 Sep 2020 16:00), Max: 99.2 (01 Sep 2020 16:00)  HR: 54 (01 Sep 2020 21:00) (50 - 98)  BP: 111/56 (01 Sep 2020 21:00) (90/50 - 143/65)  BP(mean): 80 (01 Sep 2020 21:00) (63 - 105)  RR: 24 (01 Sep 2020 21:00) (6 - 42)  SpO2: 96% (01 Sep 2020 21:00) (96% - 100%)    PHYSICAL EXAM:  Constitutional:no acute distress c/o pain at chest tube sites  Eyes:EMILY, EOMI  Ear/Nose/Throat: no oral lesions, 	  Respiratory: clear BL sternal dressing intact  Cardiovascular: S1S2  Gastrointestinal:soft, (+) BS, no tenderness  Extremities:no e/e/c  No Lymphadenopathy  IV sites not inflammed.    LABS:                        8.3    19.47 )-----------( 107      ( 01 Sep 2020 02:11 )             25.4     09-01    138  |  100  |  41<H>  ----------------------------<  117<H>  4.9   |  23  |  1.83<H>    Ca    8.3<L>      01 Sep 2020 02:11  Phos  3.5     09-01  Mg     3.3     09-01    TPro  5.4<L>  /  Alb  3.5  /  TBili  1.0  /  DBili  x   /  AST  68<H>  /  ALT  24  /  AlkPhos  65  09-01    PT/INR - ( 01 Sep 2020 09:18 )   PT: 12.2 sec;   INR: 1.03 ratio         PTT - ( 01 Sep 2020 09:18 )  PTT:28.9 sec      MICROBIOLOGY:            RECENT CULTURES:  08-31 @ 21:15  .Tissue Other  --  --  --    Testing in progress  --  08-31 @ 21:12  .Surgical Swab mitral vegetation  --  --  --    No growth  --      RADIOLOGY & ADDITIONAL STUDIES:    < from: Xray Chest 1 View- PORTABLE-Routine (09.01.20 @ 03:22) >    IMPRESSION:    Interval removal of endotracheal and enteric tubes. Clear lungs.    < end of copied text >

## 2020-09-01 NOTE — PROGRESS NOTE ADULT - SUBJECTIVE AND OBJECTIVE BOX
CARLINE GARBER  MRN-[id number]  Patient is a 80y old  Male who presents with a chief complaint of fall (01 Sep 2020 08:44)    HPI:  80M hx of HIV+, BPH, CAD presented after a fall at home and found in own urine/feces. Reportedly per EMS, patient had been using a scrotal device to clamp the testes but was unable to remove it and then slid to the ground while trying to maneuvering the device. Patient reports that he was too weak to get himself up. Denies trauma to his head and was laying on his L side. Per EMS, the heat in the home was significantly elevated. Denies anticoagulants or aspirin, denies any LOC and states he recalls all events. Denies recent illness, fever, chills, dysuria, dizziness, cp, sob, abd pain. Denies cough, sore throat. Of note, patient reports that he self straight caths 3 times a day due to BPH. He also had a bladder mass s/p resection and was told it was benign.    In the ED, vitals: T 99.2, , /60, RR 24 satting 97 ORA. got 2L NS, started on maintenance fluid. Urology placed tovar, draining blood tinged urine. Labs noted for WBC 16.5, Cr 2.2, CPK 3857, lactate 3.1, positive UA. (19 Aug 2020 04:49)      Surgery/Hospital Course:  8/31 MVR     Today:  Extubated overnight     ICU Vital Signs Last 24 Hrs  T(C): 37 (01 Sep 2020 00:00), Max: 37 (01 Sep 2020 00:00)  T(F): 98.6 (01 Sep 2020 00:00), Max: 98.6 (01 Sep 2020 00:00)  HR: 55 (01 Sep 2020 08:00) (47 - 98)  BP: 125/57 (01 Sep 2020 08:00) (90/50 - 125/57)  BP(mean): 82 (01 Sep 2020 08:00) (63 - 90)  ABP: 159/40 (01 Sep 2020 08:00) (95/32 - 168/57)  ABP(mean): 66 (01 Sep 2020 08:00) (46 - 79)  RR: 31 (01 Sep 2020 08:00) (6 - 42)  SpO2: 100% (01 Sep 2020 08:00) (99% - 100%)      Physical Exam:  Gen:  Awake, alert   CNS: non focal 	  Neck: no JVD  RES : clear , no wheezing              CVS: Regular  rhythm. Normal S1/S2  Abd: Soft, non-distended. Bowel sounds present.  Skin: No rash.  Ext:  no edema    ============================I/O===========================   I&O's Detail    31 Aug 2020 07:01  -  01 Sep 2020 07:00  --------------------------------------------------------  IN:    Albumin 5%  - 250 mL: 1250 mL    DOBUTamine Infusion: 38.7 mL    DOBUTamine Infusion: 17.6 mL    insulin regular Infusion: 25 mL    IV PiggyBack: 550 mL    norepinephrine Infusion: 56.1 mL    Oral Fluid: 320 mL    Packed Red Blood Cells: 700 mL    sodium chloride 0.9%.: 120 mL    vasopressin Infusion: 17 mL  Total IN: 3094.4 mL    OUT:    Chest Tube: 140 mL    Chest Tube: 160 mL    Indwelling Catheter - Urethral: 705 mL    Intermittent Catheterization - Urethral: 500 mL    Voided: 420 mL  Total OUT: 1925 mL    Total NET: 1169.4 mL      01 Sep 2020 07:01  -  01 Sep 2020 08:56  --------------------------------------------------------  IN:    DOBUTamine Infusion: 6.1 mL    Oral Fluid: 240 mL    sodium chloride 0.9%.: 10 mL  Total IN: 256.1 mL    OUT:    Chest Tube: 15 mL    Chest Tube: 15 mL    Indwelling Catheter - Urethral: 85 mL  Total OUT: 115 mL    Total NET: 141.1 mL        ============================ LABS =========================                        8.3    19.47 )-----------( 107      ( 01 Sep 2020 02:11 )             25.4     09-01    138  |  100  |  41<H>  ----------------------------<  117<H>  4.9   |  23  |  1.83<H>    Ca    8.3<L>      01 Sep 2020 02:11  Phos  3.5     09-01  Mg     3.3     09-01    TPro  5.4<L>  /  Alb  3.5  /  TBili  1.0  /  DBili  x   /  AST  68<H>  /  ALT  24  /  AlkPhos  65  09-01    LIVER FUNCTIONS - ( 01 Sep 2020 02:11 )  Alb: 3.5 g/dL / Pro: 5.4 g/dL / ALK PHOS: 65 U/L / ALT: 24 U/L / AST: 68 U/L / GGT: x           PT/INR - ( 01 Sep 2020 02:11 )   PT: 13.2 sec;   INR: 1.12 ratio         PTT - ( 01 Sep 2020 02:11 )  PTT:40.4 sec  ABG - ( 01 Sep 2020 08:45 )  pH, Arterial: 7.40  pH, Blood: x     /  pCO2: 40    /  pO2: 110   / HCO3: 24    / Base Excess: -.2   /  SaO2: 99                  ======================Micro/Rad/Cardio=================  Culture: Reviewed   CXR: Reviewed  Echo:Reviewed  ======================================================  PAST MEDICAL & SURGICAL HISTORY:  Orchitis and epididymitis  Vitamin D deficiency  Bladder mass  Edema of both legs  Osteoporosis  Seborrheic keratosis  Constipation, unspecified constipation type  Chronic kidney disease, unspecified stage  HIV (human immunodeficiency virus infection)  Unsteady gait  S/P coronary artery stent placement  No Past Surgical History    ====================ASSESSMENT ==============  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  SUDHEER on CKD  HIV     Plan:  ====================== NEUROLOGY=====================  Off sedation, monitor neuro status     ==================== RESPIRATORY======================  Extubated overnight, comfortable on RA   Encourage incentive spirometry, continue pulse ox monitoring, follow ABGs      ====================CARDIOVASCULAR==================  Endocarditis of mitral valve s/p MVR   Mild systolic heart dysfunction/failure post MVR  Inotropic support with Dobutamine drip for chronotropy   ASA for CAD     aspirin enteric coated 81 milliGRAM(s) Oral daily  DOBUTamine Infusion 2.5 MICROgram(s)/kG/Min (5.13 mL/Hr) IV Continuous <Continuous>    ===================HEMATOLOGIC/ONC ===================  Monitor H&H   Coumadin dosed for today for INR of 1.12, continue monitoring INR daily     VTE prophylaxis, enoxaparin Injectable 40 milliGRAM(s) SubCutaneous daily  warfarin 5 milliGRAM(s) Oral once    ===================== RENAL =========================  SUDHEER on CKD   Continue monitoring urine output, I&OS, BUN/Cr   Flomax and Finasteride for hx of BPH     tamsulosin 0.4 milliGRAM(s) Oral at bedtime  finasteride 5 milliGRAM(s) Oral daily    ==================== GASTROINTESTINAL===================  Tolerating pureed consistent carb diet   Bowel regimen with Miralax     dextrose 5%. 1000 milliLiter(s) (15 mL/Hr) IV Continuous <Continuous>  GI prophylaxis, pantoprazole    Tablet 40 milliGRAM(s) Oral before breakfast  polyethylene glycol 3350 17 Gram(s) Oral daily  sodium chloride 0.9%. 1000 milliLiter(s) (10 mL/Hr) IV Continuous <Continuous>    =======================    ENDOCRINE  =====================  Glucose control with insulin drip and humalog sliding scale for stress hyperglycemia     dextrose 50% Injectable 50 milliLiter(s) IV Push every 15 minutes  dextrose 50% Injectable 25 milliLiter(s) IV Push every 15 minutes  insulin lispro (HumaLOG) corrective regimen sliding scale   SubCutaneous three times a day before meals  insulin regular Infusion 3 Unit(s)/Hr (3 mL/Hr) IV Continuous <Continuous>    ========================INFECTIOUS DISEASE================  Continue cefuroxime for perioperative coverage   Hx of HIV, c/w abacavir and lamiVUDine-HBV    abacavir 600 milliGRAM(s) Oral daily  cefuroxime  IVPB 1500 milliGRAM(s) IV Intermittent every 8 hours  lamiVUDine- milliGRAM(s) Oral daily      Patient requires continuous monitoring with bedside rhythm monitoring, pulse ox monitoring, and intermittent blood gas analysis. Care plan discussed with ICU care team. Patient remained critical and at risk for life threatening decompensation.     By signing my name below, I, Debi Sandoval, attest that this documentation has been prepared under the direction and in the presence of Marbin Ríos MD   Electronically signed: Bogdan Dorman, 09-01-20 @ 08:56    I, Marbin Ríos, personally performed the services described in this documentation. all medical record entries made by the katyaibcali were at my direction and in my presence. I have reviewed the chart and agree that the record reflects my personal performance and is accurate and complete  Electronically signed: Marbin Ríos MD CARLINE GARBER  MRN-[id number]  Patient is a 80y old  Male who presents with a chief complaint of fall (01 Sep 2020 08:44)    HPI:  80M hx of HIV+, BPH, CAD presented after a fall at home and found in own urine/feces. Reportedly per EMS, patient had been using a scrotal device to clamp the testes but was unable to remove it and then slid to the ground while trying to maneuvering the device. Patient reports that he was too weak to get himself up. Denies trauma to his head and was laying on his L side. Per EMS, the heat in the home was significantly elevated. Denies anticoagulants or aspirin, denies any LOC and states he recalls all events. Denies recent illness, fever, chills, dysuria, dizziness, cp, sob, abd pain. Denies cough, sore throat. Of note, patient reports that he self straight caths 3 times a day due to BPH. He also had a bladder mass s/p resection and was told it was benign.    In the ED, vitals: T 99.2, , /60, RR 24 satting 97 ORA. got 2L NS, started on maintenance fluid. Urology placed tovar, draining blood tinged urine. Labs noted for WBC 16.5, Cr 2.2, CPK 3857, lactate 3.1, positive UA. (19 Aug 2020 04:49)      Surgery/Hospital Course:  8/31 MVR     Today:  S/p MVR for staphylococcus epidermidis endocarditis, awaiting OR cultures. Continue IV Vanco and Zinacef, pending ID evaluation. Maintain IV dobutamine drip secondary to ongoing cardiogenic shock and chronotropy support.     ICU Vital Signs Last 24 Hrs  T(C): 37 (01 Sep 2020 00:00), Max: 37 (01 Sep 2020 00:00)  T(F): 98.6 (01 Sep 2020 00:00), Max: 98.6 (01 Sep 2020 00:00)  HR: 55 (01 Sep 2020 08:00) (47 - 98)  BP: 125/57 (01 Sep 2020 08:00) (90/50 - 125/57)  BP(mean): 82 (01 Sep 2020 08:00) (63 - 90)  ABP: 159/40 (01 Sep 2020 08:00) (95/32 - 168/57)  ABP(mean): 66 (01 Sep 2020 08:00) (46 - 79)  RR: 31 (01 Sep 2020 08:00) (6 - 42)  SpO2: 100% (01 Sep 2020 08:00) (99% - 100%)      Physical Exam:  Gen:  Awake, alert   CNS: non focal 	  Neck: no JVD  RES : clear , no wheezing              CVS: Regular  rhythm. Normal S1/S2  Abd: Soft, non-distended. Bowel sounds present.  Skin: No rash.  Ext:  no edema    ============================I/O===========================   I&O's Detail    31 Aug 2020 07:01  -  01 Sep 2020 07:00  --------------------------------------------------------  IN:    Albumin 5%  - 250 mL: 1250 mL    DOBUTamine Infusion: 38.7 mL    DOBUTamine Infusion: 17.6 mL    insulin regular Infusion: 25 mL    IV PiggyBack: 550 mL    norepinephrine Infusion: 56.1 mL    Oral Fluid: 320 mL    Packed Red Blood Cells: 700 mL    sodium chloride 0.9%.: 120 mL    vasopressin Infusion: 17 mL  Total IN: 3094.4 mL    OUT:    Chest Tube: 140 mL    Chest Tube: 160 mL    Indwelling Catheter - Urethral: 705 mL    Intermittent Catheterization - Urethral: 500 mL    Voided: 420 mL  Total OUT: 1925 mL    Total NET: 1169.4 mL      01 Sep 2020 07:01  -  01 Sep 2020 08:56  --------------------------------------------------------  IN:    DOBUTamine Infusion: 6.1 mL    Oral Fluid: 240 mL    sodium chloride 0.9%.: 10 mL  Total IN: 256.1 mL    OUT:    Chest Tube: 15 mL    Chest Tube: 15 mL    Indwelling Catheter - Urethral: 85 mL  Total OUT: 115 mL    Total NET: 141.1 mL        ============================ LABS =========================                        8.3    19.47 )-----------( 107      ( 01 Sep 2020 02:11 )             25.4     09-01    138  |  100  |  41<H>  ----------------------------<  117<H>  4.9   |  23  |  1.83<H>    Ca    8.3<L>      01 Sep 2020 02:11  Phos  3.5     09-01  Mg     3.3     09-01    TPro  5.4<L>  /  Alb  3.5  /  TBili  1.0  /  DBili  x   /  AST  68<H>  /  ALT  24  /  AlkPhos  65  09-01    LIVER FUNCTIONS - ( 01 Sep 2020 02:11 )  Alb: 3.5 g/dL / Pro: 5.4 g/dL / ALK PHOS: 65 U/L / ALT: 24 U/L / AST: 68 U/L / GGT: x           PT/INR - ( 01 Sep 2020 02:11 )   PT: 13.2 sec;   INR: 1.12 ratio         PTT - ( 01 Sep 2020 02:11 )  PTT:40.4 sec  ABG - ( 01 Sep 2020 08:45 )  pH, Arterial: 7.40  pH, Blood: x     /  pCO2: 40    /  pO2: 110   / HCO3: 24    / Base Excess: -.2   /  SaO2: 99                  ======================Micro/Rad/Cardio=================  Culture: Reviewed   CXR: Reviewed  Echo:Reviewed  ======================================================  PAST MEDICAL & SURGICAL HISTORY:  Orchitis and epididymitis  Vitamin D deficiency  Bladder mass  Edema of both legs  Osteoporosis  Seborrheic keratosis  Constipation, unspecified constipation type  Chronic kidney disease, unspecified stage  HIV (human immunodeficiency virus infection)  Unsteady gait  S/P coronary artery stent placement  No Past Surgical History    ====================ASSESSMENT ==============  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  Cardiogenic shock   SUDHEER on CKD  HIV     Plan:  ====================== NEUROLOGY=====================  Off sedation, monitor neuro status     ==================== RESPIRATORY======================  Extubated overnight, comfortable on RA   Encourage incentive spirometry, continue pulse ox monitoring, follow ABGs      ====================CARDIOVASCULAR==================  Endocarditis of mitral valve s/p MVR   Mild systolic heart dysfunction/failure post MVR  Inotropic support with Dobutamine drip for chronotropy and cardiogenic shock    ASA for CAD     aspirin enteric coated 81 milliGRAM(s) Oral daily  DOBUTamine Infusion 2.5 MICROgram(s)/kG/Min (5.13 mL/Hr) IV Continuous <Continuous>    ===================HEMATOLOGIC/ONC ===================  Monitor H&H   Coumadin dosed for today for INR of 1.12, continue monitoring INR daily     VTE prophylaxis, enoxaparin Injectable 40 milliGRAM(s) SubCutaneous daily  warfarin 5 milliGRAM(s) Oral once    ===================== RENAL =========================  SUDHEER on CKD   Continue monitoring urine output, I&OS, BUN/Cr   Flomax and Finasteride for hx of BPH     tamsulosin 0.4 milliGRAM(s) Oral at bedtime  finasteride 5 milliGRAM(s) Oral daily    ==================== GASTROINTESTINAL===================  Tolerating pureed consistent carb diet   Bowel regimen with Miralax     dextrose 5%. 1000 milliLiter(s) (15 mL/Hr) IV Continuous <Continuous>  GI prophylaxis, pantoprazole    Tablet 40 milliGRAM(s) Oral before breakfast  polyethylene glycol 3350 17 Gram(s) Oral daily  sodium chloride 0.9%. 1000 milliLiter(s) (10 mL/Hr) IV Continuous <Continuous>    =======================    ENDOCRINE  =====================  Glucose control with insulin drip and humalog sliding scale for stress hyperglycemia     dextrose 50% Injectable 50 milliLiter(s) IV Push every 15 minutes  dextrose 50% Injectable 25 milliLiter(s) IV Push every 15 minutes  insulin lispro (HumaLOG) corrective regimen sliding scale   SubCutaneous three times a day before meals  insulin regular Infusion 3 Unit(s)/Hr (3 mL/Hr) IV Continuous <Continuous>    ========================INFECTIOUS DISEASE================  Continue cefuroxime for perioperative coverage; awaiting OR cultures.   Continue IV Vanco and Zinacef, pending ID evaluation   Hx of HIV, c/w abacavir and lamiVUDine-HBV    abacavir 600 milliGRAM(s) Oral daily  cefuroxime  IVPB 1500 milliGRAM(s) IV Intermittent every 8 hours  lamiVUDine- milliGRAM(s) Oral daily      Patient requires continuous monitoring with bedside rhythm monitoring, pulse ox monitoring, and intermittent blood gas analysis. Care plan discussed with ICU care team. Patient remained critical and at risk for life threatening decompensation.     By signing my name below, Debi MAJOR, attest that this documentation has been prepared under the direction and in the presence of Marbin Ríos MD   Electronically signed: Bogdan Dorman, 09-01-20 @ 08:56    Marbin MAJOR, personally performed the services described in this documentation. all medical record entries made by the scribe were at my direction and in my presence. I have reviewed the chart and agree that the record reflects my personal performance and is accurate and complete  Electronically signed: Marbin Ríos MD

## 2020-09-01 NOTE — PROGRESS NOTE ADULT - ASSESSMENT
Assessment  Hyperglycemia: No hx DM, A1C 5.7%, postop with stress-induced hyperglycemia requiring IV insulin, now on moderate-scale insulin coverage, blood sugars trending up, no hypoglycemic episodes, diet advanced.  Hypothyroidism: 80y male with no history thyroid disease, was not on any thyroid supplements, on HIV meds, TSH borderline elevated, subclinical hypothyroid, TPO ab WNL.  Endocarditis: S/P MVR, on meds, stable, monitored.        Nesha Izaguirre MD  Cell: [phone]  Office: [phone] Assessment  Hyperglycemia: No hx DM, A1C 5.7%, postop with stress-induced hyperglycemia requiring IV insulin, now on moderate-scale insulin coverage, blood sugars trending up,  no hypoglycemic episodes, diet advanced.  Hypothyroidism: 80y male with no history thyroid disease, was not on any thyroid supplements, on HIV meds, TSH borderline elevated, subclinical hypothyroid, TPO ab WNL.  Endocarditis: S/P MVR, on meds, stable, monitored.        Nesha Izaguirre MD  Cell: [phone]  Office: [phone]

## 2020-09-02 LAB
B HENSELAE IGG SER-ACNC: SIGNIFICANT CHANGE UP TITER
B HENSELAE IGM SER-ACNC: SIGNIFICANT CHANGE UP TITER
B QUINTANA IGG SERPL-ACNC: SIGNIFICANT CHANGE UP TITER
B QUINTANA IGM SER-ACNC: SIGNIFICANT CHANGE UP TITER
BASE EXCESS BLDV CALC-SCNC: -0.2 MMOL/L — SIGNIFICANT CHANGE UP (ref -2–2)
BRUCELLA IGG+IGM SER-IMP: SIGNIFICANT CHANGE UP
C BURNET PH1 + PH2 IGG+IGM SER-IMP: SIGNIFICANT CHANGE UP
C BURNET PH1 IGG TITR FLD: SIGNIFICANT CHANGE UP
C BURNET PH1 IGG TITR FLD: SIGNIFICANT CHANGE UP
C BURNET PH1 IGM TITR FLD: SIGNIFICANT CHANGE UP
C BURNET PH1 IGM TITR FLD: SIGNIFICANT CHANGE UP
CO2 BLDV-SCNC: 25 MMOL/L — SIGNIFICANT CHANGE UP (ref 22–30)
GAS PNL BLDV: SIGNIFICANT CHANGE UP
HCO3 BLDV-SCNC: 24 MMOL/L — SIGNIFICANT CHANGE UP (ref 21–29)
INR BLD: 1.36 RATIO — HIGH (ref 0.88–1.16)
PCO2 BLDV: 39 MMHG — SIGNIFICANT CHANGE UP (ref 35–50)
PH BLDV: 7.4 — SIGNIFICANT CHANGE UP (ref 7.35–7.45)
PO2 BLDV: 30 MMHG — SIGNIFICANT CHANGE UP (ref 25–45)
PROTHROM AB SERPL-ACNC: 15.9 SEC — HIGH (ref 10.6–13.6)
SAO2 % BLDV: 54 % — LOW (ref 67–88)
VANCOMYCIN FLD-MCNC: 15 UG/ML — SIGNIFICANT CHANGE UP

## 2020-09-02 PROCEDURE — 99232 SBSQ HOSP IP/OBS MODERATE 35: CPT | Mod: GC

## 2020-09-02 PROCEDURE — 71045 X-RAY EXAM CHEST 1 VIEW: CPT | Mod: 26

## 2020-09-02 PROCEDURE — 99232 SBSQ HOSP IP/OBS MODERATE 35: CPT

## 2020-09-02 PROCEDURE — 93010 ELECTROCARDIOGRAM REPORT: CPT

## 2020-09-02 PROCEDURE — 99291 CRITICAL CARE FIRST HOUR: CPT

## 2020-09-02 RX ORDER — DOLUTEGRAVIR SODIUM 25 MG/1
50 TABLET, FILM COATED ORAL
Refills: 0 | Status: DISCONTINUED | OUTPATIENT
Start: 2020-09-02 | End: 2020-09-03

## 2020-09-02 RX ORDER — SODIUM CHLORIDE 9 MG/ML
3 INJECTION INTRAMUSCULAR; INTRAVENOUS; SUBCUTANEOUS EVERY 8 HOURS
Refills: 0 | Status: DISCONTINUED | OUTPATIENT
Start: 2020-09-03 | End: 2020-09-11

## 2020-09-02 RX ORDER — FENTANYL CITRATE 50 UG/ML
50 INJECTION INTRAVENOUS ONCE
Refills: 0 | Status: DISCONTINUED | OUTPATIENT
Start: 2020-09-02 | End: 2020-09-02

## 2020-09-02 RX ORDER — HYDROMORPHONE HYDROCHLORIDE 2 MG/ML
0.5 INJECTION INTRAMUSCULAR; INTRAVENOUS; SUBCUTANEOUS ONCE
Refills: 0 | Status: DISCONTINUED | OUTPATIENT
Start: 2020-09-02 | End: 2020-09-01

## 2020-09-02 RX ORDER — WARFARIN SODIUM 2.5 MG/1
3 TABLET ORAL ONCE
Refills: 0 | Status: COMPLETED | OUTPATIENT
Start: 2020-09-02 | End: 2020-09-02

## 2020-09-02 RX ORDER — VANCOMYCIN HCL 1 G
1000 VIAL (EA) INTRAVENOUS ONCE
Refills: 0 | Status: COMPLETED | OUTPATIENT
Start: 2020-09-02 | End: 2020-09-02

## 2020-09-02 RX ADMIN — MUPIROCIN 1 APPLICATION(S): 20 OINTMENT TOPICAL at 06:47

## 2020-09-02 RX ADMIN — ABACAVIR 600 MILLIGRAM(S): 20 SOLUTION ORAL at 11:39

## 2020-09-02 RX ADMIN — Medication 100 MILLIGRAM(S): at 05:27

## 2020-09-02 RX ADMIN — CHLORHEXIDINE GLUCONATE 1 APPLICATION(S): 213 SOLUTION TOPICAL at 06:48

## 2020-09-02 RX ADMIN — WARFARIN SODIUM 3 MILLIGRAM(S): 2.5 TABLET ORAL at 21:47

## 2020-09-02 RX ADMIN — DOLUTEGRAVIR SODIUM 50 MILLIGRAM(S): 25 TABLET, FILM COATED ORAL at 18:01

## 2020-09-02 RX ADMIN — PANTOPRAZOLE SODIUM 40 MILLIGRAM(S): 20 TABLET, DELAYED RELEASE ORAL at 05:27

## 2020-09-02 RX ADMIN — MUPIROCIN 1 APPLICATION(S): 20 OINTMENT TOPICAL at 17:26

## 2020-09-02 RX ADMIN — FINASTERIDE 5 MILLIGRAM(S): 5 TABLET, FILM COATED ORAL at 11:40

## 2020-09-02 RX ADMIN — Medication 81 MILLIGRAM(S): at 11:40

## 2020-09-02 RX ADMIN — FENTANYL CITRATE 50 MICROGRAM(S): 50 INJECTION INTRAVENOUS at 06:48

## 2020-09-02 RX ADMIN — ENOXAPARIN SODIUM 40 MILLIGRAM(S): 100 INJECTION SUBCUTANEOUS at 11:38

## 2020-09-02 RX ADMIN — POLYETHYLENE GLYCOL 3350 17 GRAM(S): 17 POWDER, FOR SOLUTION ORAL at 11:46

## 2020-09-02 RX ADMIN — Medication 100 MILLIGRAM(S): at 11:39

## 2020-09-02 RX ADMIN — TAMSULOSIN HYDROCHLORIDE 0.4 MILLIGRAM(S): 0.4 CAPSULE ORAL at 21:47

## 2020-09-02 RX ADMIN — Medication 250 MILLIGRAM(S): at 13:14

## 2020-09-02 RX ADMIN — FENTANYL CITRATE 50 MICROGRAM(S): 50 INJECTION INTRAVENOUS at 06:30

## 2020-09-02 NOTE — PROGRESS NOTE ADULT - ASSESSMENT
80M hx of HIV(NZ4=278, VL undet), detrusor muscle weakness with self straight cath, CAD presented after a fall at home.  Labs noted for WBC 16.5, Cr 2.2, CPK 3857, lactate 3.1, positive UA on admission  cellulitis on left knee- resolved  bacteremia with 2/2 sets on 8/19 growing coag. negative staph in the setting of valvular heart disease but no prior prosthetic valve  All other cultures were negative      A portion of the vegetation was sent to the Microbiology lab for testing and stains are negative for AFB, fungus, bacteria and cultures are no growth to date    Q fever, Bartonella, Brucella and Legionella titers/serology were negative    Unclear if the vegetation was related to the coag negative staph bacteremia, is culture negative endocarditis or is non infectious  will continue the Vancomycin for now 9/2 level therapeutic  Will ask the lab tos end out the vegetation for PCR testing    Tung Almazan MD  [phone]  After 5pm/weekends [phone]

## 2020-09-02 NOTE — PROGRESS NOTE ADULT - PROBLEM SELECTOR PLAN 1
Will continue Humalog correction scale coverage for now.  Will continue monitoring FS, log, and FU. Patient in prediabetic range, A1C 5.7%. Will continue monitoring and make recommendations for outpatient management prior to DC.

## 2020-09-02 NOTE — PROGRESS NOTE ADULT - ASSESSMENT
Assessment  Hyperglycemia: No hx DM, A1C 5.7%, had stress induced-hyperglycemia, now on moderate-scale insulin coverage, blood sugars improving and in acceptable range, no hypoglycemic episodes, eating meals, RD eval noted.  Hypothyroidism: 80y male with no history thyroid disease, was not on any thyroid supplements, on HIV meds, TSH borderline elevated, subclinical hypothyroid, TPO ab WNL.  Endocarditis: S/P MVR, on meds, stable, monitored.        Nesha Izaguirre MD  Cell: [phone]  Office: [phone] Assessment  Hyperglycemia: No hx DM, A1C 5.7%, had stress induced-hyperglycemia, now on moderate-scale insulin coverage, blood sugars improving and in acceptable range,  no hypoglycemic episodes, eating meals, RD eval noted.  Hypothyroidism: 80y male with no history thyroid disease, was not on any thyroid supplements, on HIV meds, TSH borderline elevated, subclinical hypothyroid, TPO ab WNL.  Endocarditis: S/P MVR, on meds, stable, monitored.        Nesha Izaguirre MD  Cell: [phone]  Office: [phone]

## 2020-09-02 NOTE — PROGRESS NOTE ADULT - SUBJECTIVE AND OBJECTIVE BOX
[facility] DIVISION OF KIDNEY DISEASES AND HYPERTENSION -- FOLLOW UP NOTE  --------------------------------------------------------------------------------  If any questions, please feel free to contact me  NS pager: [phone], [facility]: [phone]  Parker Harding M.D.  Nephrology Fellow    (After 5 pm or on weekends please page the on-call fellow)  --------------------------------------------------------------------------------    Chief Complaint:  Patient is a 80y old  Male who presents with a chief complaint of fall (02 Sep 2020 08:38)    24 hour events/subjective:  Patient seen and examined at bedside, in NAD, s/p mitral valve replacement on 8/31  no acute events overnight. sCr has remained stable 1.8> 1.9  UOP: 1.7L - BP has remained stable.          PAST HISTORY  --------------------------------------------------------------------------------  No significant changes to PMH, PSH, FHx, SHx, unless otherwise noted    ALLERGIES & MEDICATIONS  --------------------------------------------------------------------------------  Allergies    No Known Allergies    Intolerances      Standing Inpatient Medications  abacavir 600 milliGRAM(s) Oral daily  aspirin enteric coated 81 milliGRAM(s) Oral daily  chlorhexidine 2% Cloths 1 Application(s) Topical <User Schedule>  dextrose 5%. 1000 milliLiter(s) IV Continuous <Continuous>  dextrose 5%. 1000 milliLiter(s) IV Continuous <Continuous>  dextrose 50% Injectable 50 milliLiter(s) IV Push every 15 minutes  dextrose 50% Injectable 25 milliLiter(s) IV Push every 15 minutes  DOBUTamine Infusion 2 MICROgram(s)/kG/Min IV Continuous <Continuous>  Doravirine (Pefeltro), 100 mG 1 Tablet(s) 1 Tablet(s) Oral daily  enoxaparin Injectable 40 milliGRAM(s) SubCutaneous daily  finasteride 5 milliGRAM(s) Oral daily  insulin lispro (HumaLOG) corrective regimen sliding scale   SubCutaneous three times a day before meals  insulin lispro (HumaLOG) corrective regimen sliding scale   SubCutaneous at bedtime  lamiVUDine- milliGRAM(s) Oral daily  mupirocin 2% Ointment 1 Application(s) Both Nostrils two times a day  pantoprazole    Tablet 40 milliGRAM(s) Oral before breakfast  polyethylene glycol 3350 17 Gram(s) Oral daily  sodium chloride 0.9%. 1000 milliLiter(s) IV Continuous <Continuous>  tamsulosin 0.4 milliGRAM(s) Oral at bedtime    PRN Inpatient Medications  dextrose 40% Gel 15 Gram(s) Oral once PRN  glucagon  Injectable 1 milliGRAM(s) IntraMuscular once PRN  oxycodone    5 mG/acetaminophen 325 mG 1 Tablet(s) Oral every 6 hours PRN  oxycodone    5 mG/acetaminophen 325 mG 2 Tablet(s) Oral every 6 hours PRN      REVIEW OF SYSTEMS  --------------------------------------------------------------------------------  Gen: No fevers/chills  Skin: No rashes  Head/Eyes/Ears: Normal hearing,   Respiratory: No dyspnea, cough  CV: No chest pain  GI: No abdominal pain, diarrhea  : No dysuria, hematuria  MSK: No  edema  Heme: No easy bruising or bleeding  Psych: No significant depression      All other systems were reviewed and are negative, except as noted.    VITALS/PHYSICAL EXAM  --------------------------------------------------------------------------------  T(C): 37.5 (09-02-20 @ 09:00), Max: 37.6 (09-02-20 @ 08:00)  HR: 54 (09-02-20 @ 11:00) (50 - 84)  BP: 131/63 (09-02-20 @ 11:00) (110/55 - 145/65)  RR: 28 (09-02-20 @ 11:00) (12 - 48)  SpO2: 100% (09-02-20 @ 11:00) (95% - 100%)  Wt(kg): --  Height (cm): 190.5 (08-31-20 @ 13:58)  Weight (kg): 68.4 (08-31-20 @ 13:58)  BMI (kg/m2): 18.8 (08-31-20 @ 13:58)  BSA (m2): 1.95 (08-31-20 @ 13:58)      09-01-20 @ 07:01  -  09-02-20 @ 07:00  --------------------------------------------------------  IN: 1476.3 mL / OUT: 1972 mL / NET: -495.7 mL    09-02-20 @ 07:01  -  09-02-20 @ 11:27  --------------------------------------------------------  IN: 492.3 mL / OUT: 545 mL / NET: -52.7 mL        Physical Exam:  	Gen: NAD, well-appearing  	HEENT: Anicteric   	Pulm: CTA B/L, no wheezing   	CV: RRR, S1S2; no rub   	Abd: +BS, soft, nontender/nondistended       	: No suprapubic tenderness  	MSK: Warm, no clubbing, intact strength; no edema  	Neuro: No focal deficits, AOX3, no asterixis      LABS/STUDIES  --------------------------------------------------------------------------------              8.9    14.50 >-----------<  77       [09-02-20 @ 00:46]              26.4     134  |  98  |  43  ----------------------------<  164      [09-02-20 @ 00:46]  4.3   |  23  |  1.96        Ca     8.3     [09-02-20 @ 00:46]      Mg     2.8     [09-02-20 @ 00:46]      Phos  4.7     [09-02-20 @ 00:46]    TPro  5.9  /  Alb  3.6  /  TBili  0.4  /  DBili  x   /  AST  76  /  ALT  26  /  AlkPhos  83  [09-02-20 @ 00:46]    PT/INR: PT 12.2 , INR 1.03       [09-01-20 @ 09:18]  PTT: 28.9       [09-01-20 @ 09:18]          [08-31-20 @ 20:38]    Creatinine Trend:  SCr 1.96 [09-02 @ 00:46]  SCr 1.83 [09-01 @ 02:11]  SCr 1.56 [08-31 @ 20:38]  SCr 1.83 [08-31 @ 05:44]  SCr 1.80 [08-30 @ 05:15]    Urinalysis - [08-19-20 @ 03:23]      Color Yellow / Appearance Turbid / SG 1.015 / pH 7.0      Gluc Negative / Ketone Trace  / Bili Negative / Urobili Negative       Blood Moderate / Protein 100 mg/dL / Leuk Est Large / Nitrite Negative      RBC 10 / WBC >50 / Hyaline 0 / Gran  / Sq Epi  / Non Sq Epi 3 / Bacteria Many      TSH 4.24      [08-22-20 @ 12:42]

## 2020-09-02 NOTE — PROGRESS NOTE ADULT - SUBJECTIVE AND OBJECTIVE BOX
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  single chest tube remians  feeling well      ROS:  CONSTITUTIONAL:  No fever, good appetite  CARDIOVASCULAR:  No chest pain or palpitations  RESPIRATORY:  No dyspnea  GASTROINTESTINAL:  No nausea, vomiting, diarrhea, or abdominal pain  GENITOURINARY:  No dysuria  NEUROLOGIC:  No headache,     Allergies    No Known Allergies    Intolerances        ANTIBIOTICS/RELEVANT:  antimicrobials  abacavir 600 milliGRAM(s) Oral daily  dolutegravir 50 milliGRAM(s) Oral two times a day  lamiVUDine- milliGRAM(s) Oral daily    immunologic:    OTHER:  aspirin enteric coated 81 milliGRAM(s) Oral daily  chlorhexidine 2% Cloths 1 Application(s) Topical <User Schedule>  dextrose 40% Gel 15 Gram(s) Oral once PRN  dextrose 5%. 1000 milliLiter(s) IV Continuous <Continuous>  dextrose 5%. 1000 milliLiter(s) IV Continuous <Continuous>  dextrose 50% Injectable 50 milliLiter(s) IV Push every 15 minutes  dextrose 50% Injectable 25 milliLiter(s) IV Push every 15 minutes  DOBUTamine Infusion 2 MICROgram(s)/kG/Min IV Continuous <Continuous>  Doravirine (Pefeltro), 100 mG 1 Tablet(s) 1 Tablet(s) Oral daily  enoxaparin Injectable 40 milliGRAM(s) SubCutaneous daily  finasteride 5 milliGRAM(s) Oral daily  glucagon  Injectable 1 milliGRAM(s) IntraMuscular once PRN  insulin lispro (HumaLOG) corrective regimen sliding scale   SubCutaneous three times a day before meals  insulin lispro (HumaLOG) corrective regimen sliding scale   SubCutaneous at bedtime  mupirocin 2% Ointment 1 Application(s) Both Nostrils two times a day  oxycodone    5 mG/acetaminophen 325 mG 1 Tablet(s) Oral every 6 hours PRN  oxycodone    5 mG/acetaminophen 325 mG 2 Tablet(s) Oral every 6 hours PRN  pantoprazole    Tablet 40 milliGRAM(s) Oral before breakfast  polyethylene glycol 3350 17 Gram(s) Oral daily  sodium chloride 0.9%. 1000 milliLiter(s) IV Continuous <Continuous>  tamsulosin 0.4 milliGRAM(s) Oral at bedtime  warfarin 3 milliGRAM(s) Oral once      Objective:  Vital Signs Last 24 Hrs  T(C): 37.3 (02 Sep 2020 16:00), Max: 37.6 (02 Sep 2020 08:00)  T(F): 99.1 (02 Sep 2020 16:00), Max: 99.7 (02 Sep 2020 08:00)  HR: 68 (02 Sep 2020 16:00) (52 - 84)  BP: 127/61 (02 Sep 2020 15:00) (111/56 - 145/65)  BP(mean): 88 (02 Sep 2020 15:00) (76 - 94)  RR: 24 (02 Sep 2020 16:00) (12 - 48)  SpO2: 91% (02 Sep 2020 16:00) (91% - 100%)    PHYSICAL EXAM:  Constitutional:no acute distress  Eyes:EMILY, EOMI  Ear/Nose/Throat: no oral lesions, 	  Respiratory: clear BL  Cardiovascular: S1S2  Gastrointestinal:soft, (+) BS, no tenderness  Extremities:no e/e/c  No Lymphadenopathy  IV sites not inflammed.    LABS:                        8.9    14.50 )-----------( 77       ( 02 Sep 2020 00:46 )             26.4     09-02    134<L>  |  98  |  43<H>  ----------------------------<  164<H>  4.3   |  23  |  1.96<H>    Ca    8.3<L>      02 Sep 2020 00:46  Phos  4.7     09-02  Mg     2.8     09-02    TPro  5.9<L>  /  Alb  3.6  /  TBili  0.4  /  DBili  x   /  AST  76<H>  /  ALT  26  /  AlkPhos  83  09-02    PT/INR - ( 02 Sep 2020 14:03 )   PT: 15.9 sec;   INR: 1.36 ratio         PTT - ( 01 Sep 2020 09:18 )  PTT:28.9 sec      MICROBIOLOGY:            RECENT CULTURES:  08-31 @ 21:15  .Tissue Other  --  --  --    Culture is being performed.  --  08-31 @ 21:12  .Surgical Swab mitral vegetation  --  --  --    No growth  --      RADIOLOGY & ADDITIONAL STUDIES:    < from: Xray Chest 1 View- PORTABLE-Routine (09.02.20 @ 03:00) >  IMPRESSION:    Clear lungs.    < end of copied text >

## 2020-09-02 NOTE — PROGRESS NOTE ADULT - SUBJECTIVE AND OBJECTIVE BOX
CARLINE GARBER  MRN-[id number]  Patient is a 80y old  Male who presents with a chief complaint of fall (01 Sep 2020 21:34)    HPI:  80M hx of HIV+, BPH, CAD presented after a fall at home and found in own urine/feces. Reportedly per EMS, patient had been using a scrotal device to clamp the testes but was unable to remove it and then slid to the ground while trying to maneuvering the device. Patient reports that he was too weak to get himself up. Denies trauma to his head and was laying on his L side. Per EMS, the heat in the home was significantly elevated. Denies anticoagulants or aspirin, denies any LOC and states he recalls all events. Denies recent illness, fever, chills, dysuria, dizziness, cp, sob, abd pain. Denies cough, sore throat. Of note, patient reports that he self straight caths 3 times a day due to BPH. He also had a bladder mass s/p resection and was told it was benign.    In the ED, vitals: T 99.2, , /60, RR 24 satting 97 ORA. got 2L NS, started on maintenance fluid. Urology placed tovar, draining blood tinged urine. Labs noted for WBC 16.5, Cr 2.2, CPK 3857, lactate 3.1, positive UA. (19 Aug 2020 04:49)      Surgery/Hospital Course:  8/31 MVR     Today:  No acute events     ICU Vital Signs Last 24 Hrs  T(C): 37.1 (02 Sep 2020 04:00), Max: 37.3 (01 Sep 2020 16:00)  T(F): 98.8 (02 Sep 2020 04:00), Max: 99.2 (01 Sep 2020 16:00)  HR: 57 (02 Sep 2020 07:00) (50 - 84)  BP: 119/58 (02 Sep 2020 07:00) (110/55 - 145/65)  BP(mean): 84 (02 Sep 2020 07:00) (77 - 105)  ABP: 144/109 (01 Sep 2020 23:00) (103/47 - 345/333)  ABP(mean): 117 (01 Sep 2020 23:00) (63 - 345)  RR: 31 (02 Sep 2020 07:00) (12 - 48)  SpO2: 96% (02 Sep 2020 07:00) (95% - 100%)      Physical Exam:  Gen:  Awake, alert   CNS: non focal 	  Neck: no JVD  RES : clear , no wheezing              CVS: Regular  rhythm. Normal S1/S2  Abd: Soft, non-distended. Bowel sounds present.  Skin: No rash.  Ext:  no edema    ============================I/O===========================   I&O's Detail    01 Sep 2020 07:01  -  02 Sep 2020 07:00  --------------------------------------------------------  IN:    DOBUTamine Infusion: 53.3 mL    DOBUTamine Infusion: 6.1 mL    DOBUTamine Infusion: 36.9 mL    IV PiggyBack: 100 mL    Oral Fluid: 1040 mL    sodium chloride 0.9%.: 240 mL  Total IN: 1476.3 mL    OUT:    Bulb: 22 mL    Chest Tube: 80 mL    Chest Tube: 165 mL    Indwelling Catheter - Urethral: 1705 mL  Total OUT: 1972 mL    Total NET: -495.7 mL        ============================ LABS =========================                        8.9    14.50 )-----------( 77       ( 02 Sep 2020 00:46 )             26.4     09-02    134<L>  |  98  |  43<H>  ----------------------------<  164<H>  4.3   |  23  |  1.96<H>    Ca    8.3<L>      02 Sep 2020 00:46  Phos  4.7     09-02  Mg     2.8     09-02    TPro  5.9<L>  /  Alb  3.6  /  TBili  0.4  /  DBili  x   /  AST  76<H>  /  ALT  26  /  AlkPhos  83  09-02    LIVER FUNCTIONS - ( 02 Sep 2020 00:46 )  Alb: 3.6 g/dL / Pro: 5.9 g/dL / ALK PHOS: 83 U/L / ALT: 26 U/L / AST: 76 U/L / GGT: x           PT/INR - ( 01 Sep 2020 09:18 )   PT: 12.2 sec;   INR: 1.03 ratio         PTT - ( 01 Sep 2020 09:18 )  PTT:28.9 sec  ABG - ( 01 Sep 2020 11:27 )  pH, Arterial: 7.41  pH, Blood: x     /  pCO2: 36    /  pO2: 117   / HCO3: 23    / Base Excess: -1.0  /  SaO2: 99                  ======================Micro/Rad/Cardio=================  Culture: Reviewed   CXR: Reviewed  Echo:Reviewed  ======================================================  PAST MEDICAL & SURGICAL HISTORY:  Orchitis and epididymitis  Vitamin D deficiency  Bladder mass  Edema of both legs  Osteoporosis  Seborrheic keratosis  Constipation, unspecified constipation type  Chronic kidney disease, unspecified stage  HIV (human immunodeficiency virus infection)  Unsteady gait  S/P coronary artery stent placement  No Past Surgical History    ====================ASSESSMENT ==============  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  Cardiogenic shock   SUDHEER on CKD  HIV     Plan:  ====================== NEUROLOGY=====================  Continue to monitor neuro status   Oxycodone prn for analgesia     oxycodone    5 mG/acetaminophen 325 mG 1 Tablet(s) Oral every 6 hours PRN Mild Pain (1 - 3)  oxycodone    5 mG/acetaminophen 325 mG 2 Tablet(s) Oral every 6 hours PRN Severe Pain (7 - 10)    ==================== RESPIRATORY======================  Comfortable on RA   Encourage incentive spirometry, continue pulse ox monitoring, follow ABGs     ====================CARDIOVASCULAR==================  Endocarditis of mitral valve s/p MVR   Mild systolic heart dysfunction/failure post MVR  Inotropic support with Dobutamine drip for chronotropy and cardiogenic shock    ASA for CAD     aspirin enteric coated 81 milliGRAM(s) Oral daily  DOBUTamine Infusion 2 MICROgram(s)/kG/Min (4.1 mL/Hr) IV Continuous <Continuous>    ===================HEMATOLOGIC/ONC ===================  Monitor H&H     VTE prophylaxis, enoxaparin Injectable 40 milliGRAM(s) SubCutaneous daily    ===================== RENAL =========================  SUDHEER on CKD   Continue monitoring urine output, I&OS, BUN/Cr   Flomax and Finasteride for hx of BPH     tamsulosin 0.4 milliGRAM(s) Oral at bedtime  finasteride 5 milliGRAM(s) Oral daily    ==================== GASTROINTESTINAL===================  Tolerating pureed consistent carb diet   Bowel regimen with Miralax     dextrose 5%. 1000 milliLiter(s) (50 mL/Hr) IV Continuous <Continuous>  dextrose 5%. 1000 milliLiter(s) (15 mL/Hr) IV Continuous <Continuous>  GI prophylaxis, pantoprazole    Tablet 40 milliGRAM(s) Oral before breakfast  polyethylene glycol 3350 17 Gram(s) Oral daily  sodium chloride 0.9%. 1000 milliLiter(s) (10 mL/Hr) IV Continuous <Continuous>    =======================    ENDOCRINE  =====================  Glucose control with humalog sliding scale and glucagon prn for stress hyperglycemia     dextrose 40% Gel 15 Gram(s) Oral once PRN Blood Glucose LESS THAN 70 milliGRAM(s)/deciLiter  dextrose 50% Injectable 50 milliLiter(s) IV Push every 15 minutes  dextrose 50% Injectable 25 milliLiter(s) IV Push every 15 minutes  glucagon  Injectable 1 milliGRAM(s) IntraMuscular once PRN Glucose <70 milliGRAM(s)/deciLiter  insulin lispro (HumaLOG) corrective regimen sliding scale   SubCutaneous three times a day before meals  insulin lispro (HumaLOG) corrective regimen sliding scale   SubCutaneous at bedtime    ========================INFECTIOUS DISEASE================  Continue IV Vanco by level for endocarditis   Hx of HIV, c/w abacavir and lamiVUDine-HBV    abacavir 600 milliGRAM(s) Oral daily  lamiVUDine- milliGRAM(s) Oral daily      Patient requires continuous monitoring with bedside rhythm monitoring, pulse ox monitoring, and intermittent blood gas analysis. Care plan discussed with ICU care team. Patient remained critical and at risk for life threatening decompensation.     By signing my name below, I, Debi Sandoval, attest that this documentation has been prepared under the direction and in the presence of Marbin Ríos MD   Electronically signed: Bogdan Dorman, 09-02-20 @ 08:38    I, Marbin Ríos, personally performed the services described in this documentation. all medical record entries made by the katyaibcali were at my direction and in my presence. I have reviewed the chart and agree that the record reflects my personal performance and is accurate and complete  Electronically signed: Marbin Ríos MD CARLINE GARBER  MRN-[id number]  Patient is a 80y old  Male who presents with a chief complaint of fall (01 Sep 2020 21:34)    HPI:  80M hx of HIV+, BPH, CAD presented after a fall at home and found in own urine/feces. Reportedly per EMS, patient had been using a scrotal device to clamp the testes but was unable to remove it and then slid to the ground while trying to maneuvering the device. Patient reports that he was too weak to get himself up. Denies trauma to his head and was laying on his L side. Per EMS, the heat in the home was significantly elevated. Denies anticoagulants or aspirin, denies any LOC and states he recalls all events. Denies recent illness, fever, chills, dysuria, dizziness, cp, sob, abd pain. Denies cough, sore throat. Of note, patient reports that he self straight caths 3 times a day due to BPH. He also had a bladder mass s/p resection and was told it was benign.    In the ED, vitals: T 99.2, , /60, RR 24 satting 97 ORA. got 2L NS, started on maintenance fluid. Urology placed tovar, draining blood tinged urine. Labs noted for WBC 16.5, Cr 2.2, CPK 3857, lactate 3.1, positive UA. (19 Aug 2020 04:49)      Surgery/Hospital Course:  8/31 MVR     Today:  Resolving cardiogenic shock on low dose dobutamine combined with hemodynamically significant hypovolemia and anemia s/p 1 unit pRBCs. Following blood transfusion will d/c IV dobutamine and check venous saturation. Pt is a candidate for step down unit    ICU Vital Signs Last 24 Hrs  T(C): 37.1 (02 Sep 2020 04:00), Max: 37.3 (01 Sep 2020 16:00)  T(F): 98.8 (02 Sep 2020 04:00), Max: 99.2 (01 Sep 2020 16:00)  HR: 57 (02 Sep 2020 07:00) (50 - 84)  BP: 119/58 (02 Sep 2020 07:00) (110/55 - 145/65)  BP(mean): 84 (02 Sep 2020 07:00) (77 - 105)  ABP: 144/109 (01 Sep 2020 23:00) (103/47 - 345/333)  ABP(mean): 117 (01 Sep 2020 23:00) (63 - 345)  RR: 31 (02 Sep 2020 07:00) (12 - 48)  SpO2: 96% (02 Sep 2020 07:00) (95% - 100%)      Physical Exam:  Gen:  Awake, alert   CNS: non focal 	  Neck: no JVD  RES : clear , no wheezing              CVS: Regular  rhythm. Normal S1/S2  Abd: Soft, non-distended. Bowel sounds present.  Skin: No rash.  Ext:  no edema    ============================I/O===========================   I&O's Detail    01 Sep 2020 07:01  -  02 Sep 2020 07:00  --------------------------------------------------------  IN:    DOBUTamine Infusion: 53.3 mL    DOBUTamine Infusion: 6.1 mL    DOBUTamine Infusion: 36.9 mL    IV PiggyBack: 100 mL    Oral Fluid: 1040 mL    sodium chloride 0.9%.: 240 mL  Total IN: 1476.3 mL    OUT:    Bulb: 22 mL    Chest Tube: 80 mL    Chest Tube: 165 mL    Indwelling Catheter - Urethral: 1705 mL  Total OUT: 1972 mL    Total NET: -495.7 mL        ============================ LABS =========================                        8.9    14.50 )-----------( 77       ( 02 Sep 2020 00:46 )             26.4     09-02    134<L>  |  98  |  43<H>  ----------------------------<  164<H>  4.3   |  23  |  1.96<H>    Ca    8.3<L>      02 Sep 2020 00:46  Phos  4.7     09-02  Mg     2.8     09-02    TPro  5.9<L>  /  Alb  3.6  /  TBili  0.4  /  DBili  x   /  AST  76<H>  /  ALT  26  /  AlkPhos  83  09-02    LIVER FUNCTIONS - ( 02 Sep 2020 00:46 )  Alb: 3.6 g/dL / Pro: 5.9 g/dL / ALK PHOS: 83 U/L / ALT: 26 U/L / AST: 76 U/L / GGT: x           PT/INR - ( 01 Sep 2020 09:18 )   PT: 12.2 sec;   INR: 1.03 ratio         PTT - ( 01 Sep 2020 09:18 )  PTT:28.9 sec  ABG - ( 01 Sep 2020 11:27 )  pH, Arterial: 7.41  pH, Blood: x     /  pCO2: 36    /  pO2: 117   / HCO3: 23    / Base Excess: -1.0  /  SaO2: 99                  ======================Micro/Rad/Cardio=================  Culture: Reviewed   CXR: Reviewed  Echo:Reviewed  ======================================================  PAST MEDICAL & SURGICAL HISTORY:  Orchitis and epididymitis  Vitamin D deficiency  Bladder mass  Edema of both legs  Osteoporosis  Seborrheic keratosis  Constipation, unspecified constipation type  Chronic kidney disease, unspecified stage  HIV (human immunodeficiency virus infection)  Unsteady gait  S/P coronary artery stent placement  No Past Surgical History    ====================ASSESSMENT ==============  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  Cardiogenic shock   SUDHEER on CKD  HIV       Plan:  ====================== NEUROLOGY=====================  Continue to monitor neuro status   Oxycodone prn for analgesia     oxycodone    5 mG/acetaminophen 325 mG 1 Tablet(s) Oral every 6 hours PRN Mild Pain (1 - 3)  oxycodone    5 mG/acetaminophen 325 mG 2 Tablet(s) Oral every 6 hours PRN Severe Pain (7 - 10)    ==================== RESPIRATORY======================  Comfortable on RA   Encourage incentive spirometry, continue pulse ox monitoring, follow ABGs     ====================CARDIOVASCULAR==================  Endocarditis of mitral valve s/p MVR   Mild systolic heart dysfunction/failure post MVR  Inotropic support with Dobutamine drip for chronotropy and resolving cardiogenic shock    Following blood transfusion will d/c IV dobutamine and check venous saturation  ASA for CAD     aspirin enteric coated 81 milliGRAM(s) Oral daily  DOBUTamine Infusion 2 MICROgram(s)/kG/Min (4.1 mL/Hr) IV Continuous <Continuous>    ===================HEMATOLOGIC/ONC ===================  Monitor H&H   Hemodynamically significant hypovolemia and anemia s/p 1 unit pRBCs    VTE prophylaxis, enoxaparin Injectable 40 milliGRAM(s) SubCutaneous daily    ===================== RENAL =========================  SUDHEER on CKD   Continue monitoring urine output, I&OS, BUN/Cr   Flomax and Finasteride for hx of BPH     tamsulosin 0.4 milliGRAM(s) Oral at bedtime  finasteride 5 milliGRAM(s) Oral daily    ==================== GASTROINTESTINAL===================  Tolerating pureed consistent carb diet   Bowel regimen with Miralax     dextrose 5%. 1000 milliLiter(s) (50 mL/Hr) IV Continuous <Continuous>  dextrose 5%. 1000 milliLiter(s) (15 mL/Hr) IV Continuous <Continuous>  GI prophylaxis, pantoprazole    Tablet 40 milliGRAM(s) Oral before breakfast  polyethylene glycol 3350 17 Gram(s) Oral daily  sodium chloride 0.9%. 1000 milliLiter(s) (10 mL/Hr) IV Continuous <Continuous>    =======================    ENDOCRINE  =====================  Glucose control with humalog sliding scale and glucagon prn for stress hyperglycemia     dextrose 40% Gel 15 Gram(s) Oral once PRN Blood Glucose LESS THAN 70 milliGRAM(s)/deciLiter  dextrose 50% Injectable 50 milliLiter(s) IV Push every 15 minutes  dextrose 50% Injectable 25 milliLiter(s) IV Push every 15 minutes  glucagon  Injectable 1 milliGRAM(s) IntraMuscular once PRN Glucose <70 milliGRAM(s)/deciLiter  insulin lispro (HumaLOG) corrective regimen sliding scale   SubCutaneous three times a day before meals  insulin lispro (HumaLOG) corrective regimen sliding scale   SubCutaneous at bedtime    ========================INFECTIOUS DISEASE================  Continue IV Vanco by level for endocarditis   Hx of HIV, c/w abacavir and lamiVUDine-HBV    abacavir 600 milliGRAM(s) Oral daily  lamiVUDine- milliGRAM(s) Oral daily      Pt is a candidate for step down unit      Patient requires continuous monitoring with bedside rhythm monitoring, pulse ox monitoring, and intermittent blood gas analysis. Care plan discussed with ICU care team. Patient remained critical and at risk for life threatening decompensation.     By signing my name below, I, Debi Sandoval, attest that this documentation has been prepared under the direction and in the presence of Marbin Ríos MD   Electronically signed: Bogdan Dorman, 09-02-20 @ 08:38    I, Marbin Ríos, personally performed the services described in this documentation. all medical record entries made by the scribe were at my direction and in my presence. I have reviewed the chart and agree that the record reflects my personal performance and is accurate and complete  Electronically signed: Marbin Ríos MD

## 2020-09-02 NOTE — PROGRESS NOTE ADULT - ASSESSMENT
80y M with CKD, hydroureteronephrosis, endocarditis, now s/p mitral valve replacement on 8/31/20   nephrology consulted for SUDHEER      #SUDHEER on CKD3  - patient is s/p mitral valve replacement on 8/31/2020  - Patient with baseline CKD since 2015, Scr baseline 1.6-1.8mg/dl  - Pt Scr up to 1.98mg/dl, BUN 60.  2/2 contrast/ diuretics or removal of tovar catheter  - sCr has remained stable at 1.8>> 1.9    - Consider repeat renal US to assess for hydro  - avoid hypotension, and overdiuresis   - will monitor sCr after MV procedure,  We have discussed with the patient that given his CKD history and recent contrast and recent surgery that he is at risk for ATN and possible renal replacement therapy post surgery. He verbalized understanding.  - Monitor UOP and daily weights. Avoid volume depletion, NSAIDs, PPI's, ARB/ACE-I.   - Dose medications as per eGFR.

## 2020-09-02 NOTE — CHART NOTE - NSCHARTNOTEFT_GEN_A_CORE
Nutrition Follow Up Note    Source: EMR, patient    Chart reviewed, events noted. S/p MVR for endocarditis on . Inotropic support with Dobutamine drip for chronotropy and resolving cardiogenic shock. Noted with SUDHEER on CKD. On IV Vanco by level for endocarditis, Hx of HIV, on abacavir and lamiVUDine-HBV. Endocrine following for subclinical hypothyroidism.    Diet : Consistent Carbohydrate, Puree    Patient reports fair to good appetite and intake. Notes mild swallowing difficulty described as 'tightness' following extubation. Tolerating current diet order of Puree. Requests to receive juice and Ensure supplements as was previously receiving. RD reviewed stress hyperglycemia and components of current diet order, pt amenable to trial Glucerna shakes to promote BG maintenance. Denies GI distress. Reports last BM 2 days ago.      PO intake : %     Source for PO intake: patient, flowsheets     Enteral/Parenteral Nutrition: none    Daily Weight in k.5 (), Weight in k.2 (), Weight in k.5 (), Weight in k.9 (), Weight in k.7 () - Noted current BMI <19; however, must question accuracy of dosing weight given weight fluctuations    Pertinent Medications: MEDICATIONS  (STANDING):  abacavir 600 milliGRAM(s) Oral daily  aspirin enteric coated 81 milliGRAM(s) Oral daily  chlorhexidine 2% Cloths 1 Application(s) Topical <User Schedule>  dextrose 5%. 1000 milliLiter(s) (50 mL/Hr) IV Continuous <Continuous>  dextrose 5%. 1000 milliLiter(s) (15 mL/Hr) IV Continuous <Continuous>  dextrose 50% Injectable 50 milliLiter(s) IV Push every 15 minutes  dextrose 50% Injectable 25 milliLiter(s) IV Push every 15 minutes  DOBUTamine Infusion 2 MICROgram(s)/kG/Min (4.1 mL/Hr) IV Continuous <Continuous>  Doravirine (Pefeltro), 100 mG 1 Tablet(s) 1 Tablet(s) Oral daily  enoxaparin Injectable 40 milliGRAM(s) SubCutaneous daily  finasteride 5 milliGRAM(s) Oral daily  insulin lispro (HumaLOG) corrective regimen sliding scale   SubCutaneous three times a day before meals  insulin lispro (HumaLOG) corrective regimen sliding scale   SubCutaneous at bedtime  lamiVUDine- milliGRAM(s) Oral daily  mupirocin 2% Ointment 1 Application(s) Both Nostrils two times a day  pantoprazole    Tablet 40 milliGRAM(s) Oral before breakfast  polyethylene glycol 3350 17 Gram(s) Oral daily  sodium chloride 0.9%. 1000 milliLiter(s) (10 mL/Hr) IV Continuous <Continuous>  tamsulosin 0.4 milliGRAM(s) Oral at bedtime    MEDICATIONS  (PRN):  dextrose 40% Gel 15 Gram(s) Oral once PRN Blood Glucose LESS THAN 70 milliGRAM(s)/deciLiter  glucagon  Injectable 1 milliGRAM(s) IntraMuscular once PRN Glucose <70 milliGRAM(s)/deciLiter  oxycodone    5 mG/acetaminophen 325 mG 1 Tablet(s) Oral every 6 hours PRN Mild Pain (1 - 3)  oxycodone    5 mG/acetaminophen 325 mG 2 Tablet(s) Oral every 6 hours PRN Severe Pain (7 - 10)    Pertinent Labs:  @ 00:46: Na 134<L>, BUN 43<H>, Cr 1.96<H>, <H>, K+ 4.3, Phos 4.7<H>, Mg 2.8<H>, Alk Phos 83, ALT/SGPT 26, AST/SGOT 76<H>, HbA1c --    Finger Sticks:  POCT Blood Glucose.: 148 mg/dL ( @ 08:52)  POCT Blood Glucose.: 122 mg/dL ( @ 16:08)    Skin per nursing documentation: midsternal surgical incision  Edema per nursing documentation: none noted    Estimated Needs:   [ ] no change since previous assessment  [ x] recalculated:   - Using dosing weight 68.4 kg ()  - Energy needs (25-30cal/kg): 3059-6535  - Protein needs (1.2-1.4 g/kg): 82-96    Previous Nutrition Diagnosis: none    New Nutrition Diagnosis: increased nutrient needs  Related to: increased physiological demand to promote wound healing   As evidenced by: s/p CABG via sternotomy     Interventions: Encouraged PO intake, reviewed stress-induced hyperglycemia and limiting sugar-sweetened foods/beverages to promote better outcomes, pt amenable to oral nutrition supplements to meet increased nutrient needs as discussed. Food preferences obtained and honored.    Recommend  1) Add Glucerna x3 daily - continue Puree, Consistent Carbohydrate diet, defer texture/consistency to team   2) Encourage PO intake and provide feeding assistance PRN   3) Ongoing education on heart healthy nutrition, increased nutrient needs    Continue monitoring and evaluating:   - Labs: blood glucose, electrolytes, renal indices  - GI function and BMs   - Nutritional intake, weight trends, skin integrity    RD remains available PRN and will follow up per protocol.  Nevaeh Mcnulty RD CDN    Pager# [phone]

## 2020-09-02 NOTE — PROGRESS NOTE ADULT - SUBJECTIVE AND OBJECTIVE BOX
Chief complaint  Patient is a 80y old  Male who presents with a chief complaint of fall (02 Sep 2020 11:27)   Review of systems  Patient in bed, looks comfortable, no hypoglycemic episodes.    Labs and Fingersticks  CAPILLARY BLOOD GLUCOSE      POCT Blood Glucose.: 133 mg/dL (02 Sep 2020 12:47)  POCT Blood Glucose.: 148 mg/dL (02 Sep 2020 08:52)  POCT Blood Glucose.: 122 mg/dL (01 Sep 2020 16:08)    Medications  MEDICATIONS  (STANDING):  abacavir 600 milliGRAM(s) Oral daily  aspirin enteric coated 81 milliGRAM(s) Oral daily  chlorhexidine 2% Cloths 1 Application(s) Topical <User Schedule>  dextrose 5%. 1000 milliLiter(s) (50 mL/Hr) IV Continuous <Continuous>  dextrose 5%. 1000 milliLiter(s) (15 mL/Hr) IV Continuous <Continuous>  dextrose 50% Injectable 50 milliLiter(s) IV Push every 15 minutes  dextrose 50% Injectable 25 milliLiter(s) IV Push every 15 minutes  DOBUTamine Infusion 2 MICROgram(s)/kG/Min (4.1 mL/Hr) IV Continuous <Continuous>  Doravirine (Pefeltro), 100 mG 1 Tablet(s) 1 Tablet(s) Oral daily  enoxaparin Injectable 40 milliGRAM(s) SubCutaneous daily  finasteride 5 milliGRAM(s) Oral daily  insulin lispro (HumaLOG) corrective regimen sliding scale   SubCutaneous three times a day before meals  insulin lispro (HumaLOG) corrective regimen sliding scale   SubCutaneous at bedtime  lamiVUDine- milliGRAM(s) Oral daily  mupirocin 2% Ointment 1 Application(s) Both Nostrils two times a day  pantoprazole    Tablet 40 milliGRAM(s) Oral before breakfast  polyethylene glycol 3350 17 Gram(s) Oral daily  sodium chloride 0.9%. 1000 milliLiter(s) (10 mL/Hr) IV Continuous <Continuous>  tamsulosin 0.4 milliGRAM(s) Oral at bedtime      Physical Exam  General: Patient comfortable in bed  Vital Signs Last 12 Hrs  T(F): 98.4 (09-02-20 @ 12:00), Max: 99.7 (09-02-20 @ 08:00)  HR: 55 (09-02-20 @ 14:00) (52 - 84)  BP: 124/73 (09-02-20 @ 14:00) (115/54 - 145/65)  BP(mean): 93 (09-02-20 @ 14:00) (76 - 94)  RR: 32 (09-02-20 @ 14:00) (12 - 48)  SpO2: 98% (09-02-20 @ 14:00) (96% - 100%)

## 2020-09-02 NOTE — PROGRESS NOTE ADULT - ATTENDING COMMENTS
80y M with CKD, hydroureteronephrosis, endocarditis, now s/p mitral valve replacement on 8/31/20  SUDHEER / CKD  SCr 1.96  IN: 492.3 mL / OUT: 545 mL / NET: -52.7 mL

## 2020-09-03 LAB
APPEARANCE UR: CLEAR — SIGNIFICANT CHANGE UP
APTT BLD: 33 SEC — SIGNIFICANT CHANGE UP (ref 27.5–35.5)
BACTERIA # UR AUTO: NEGATIVE — SIGNIFICANT CHANGE UP
BILIRUB UR-MCNC: NEGATIVE — SIGNIFICANT CHANGE UP
COLOR SPEC: YELLOW — SIGNIFICANT CHANGE UP
DIFF PNL FLD: ABNORMAL
EPI CELLS # UR: 1 — SIGNIFICANT CHANGE UP
GLUCOSE BLDC GLUCOMTR-MCNC: 104 MG/DL — HIGH (ref 70–99)
GLUCOSE UR QL: NEGATIVE — SIGNIFICANT CHANGE UP
GRAN CASTS # UR COMP ASSIST: 2 /LPF — SIGNIFICANT CHANGE UP
HEPARIN-PF4 AB RESULT: <0.6 U/ML — SIGNIFICANT CHANGE UP (ref 0–0.9)
HYALINE CASTS # UR AUTO: 6 /LPF — HIGH (ref 0–7)
INR BLD: 1.23 RATIO — HIGH (ref 0.88–1.16)
KETONES UR-MCNC: NEGATIVE — SIGNIFICANT CHANGE UP
LEUKOCYTE ESTERASE UR-ACNC: ABNORMAL
NITRITE UR-MCNC: NEGATIVE — SIGNIFICANT CHANGE UP
PF4 HEPARIN CMPLX AB SER-ACNC: NEGATIVE — SIGNIFICANT CHANGE UP
PH UR: 6 — SIGNIFICANT CHANGE UP (ref 5–8)
PROT UR-MCNC: ABNORMAL
PROTHROM AB SERPL-ACNC: 14.5 SEC — HIGH (ref 10.6–13.6)
RBC CASTS # UR COMP ASSIST: 68 /HPF — HIGH (ref 0–4)
SP GR SPEC: 1.02 — SIGNIFICANT CHANGE UP (ref 1.01–1.02)
UROBILINOGEN FLD QL: NEGATIVE — SIGNIFICANT CHANGE UP
VANCOMYCIN TROUGH SERPL-MCNC: 14.3 UG/ML — SIGNIFICANT CHANGE UP (ref 10–20)
WBC UR QL: 29 /HPF — HIGH (ref 0–5)

## 2020-09-03 PROCEDURE — 99232 SBSQ HOSP IP/OBS MODERATE 35: CPT | Mod: GC

## 2020-09-03 PROCEDURE — 71045 X-RAY EXAM CHEST 1 VIEW: CPT | Mod: 26

## 2020-09-03 PROCEDURE — 93010 ELECTROCARDIOGRAM REPORT: CPT | Mod: 77

## 2020-09-03 PROCEDURE — 99232 SBSQ HOSP IP/OBS MODERATE 35: CPT

## 2020-09-03 PROCEDURE — 93010 ELECTROCARDIOGRAM REPORT: CPT

## 2020-09-03 PROCEDURE — 99291 CRITICAL CARE FIRST HOUR: CPT

## 2020-09-03 RX ORDER — METOPROLOL TARTRATE 50 MG
25 TABLET ORAL EVERY 12 HOURS
Refills: 0 | Status: DISCONTINUED | OUTPATIENT
Start: 2020-09-03 | End: 2020-09-05

## 2020-09-03 RX ORDER — WARFARIN SODIUM 2.5 MG/1
3 TABLET ORAL ONCE
Refills: 0 | Status: COMPLETED | OUTPATIENT
Start: 2020-09-03 | End: 2020-09-03

## 2020-09-03 RX ORDER — ASPIRIN/CALCIUM CARB/MAGNESIUM 324 MG
81 TABLET ORAL DAILY
Refills: 0 | Status: DISCONTINUED | OUTPATIENT
Start: 2020-09-03 | End: 2020-09-11

## 2020-09-03 RX ADMIN — Medication 2: at 08:14

## 2020-09-03 RX ADMIN — SODIUM CHLORIDE 3 MILLILITER(S): 9 INJECTION INTRAMUSCULAR; INTRAVENOUS; SUBCUTANEOUS at 21:15

## 2020-09-03 RX ADMIN — Medication 81 MILLIGRAM(S): at 09:36

## 2020-09-03 RX ADMIN — Medication 100 MILLIGRAM(S): at 12:11

## 2020-09-03 RX ADMIN — FINASTERIDE 5 MILLIGRAM(S): 5 TABLET, FILM COATED ORAL at 11:54

## 2020-09-03 RX ADMIN — ABACAVIR 600 MILLIGRAM(S): 20 SOLUTION ORAL at 12:11

## 2020-09-03 RX ADMIN — Medication 2: at 17:22

## 2020-09-03 RX ADMIN — TAMSULOSIN HYDROCHLORIDE 0.4 MILLIGRAM(S): 0.4 CAPSULE ORAL at 21:13

## 2020-09-03 RX ADMIN — PANTOPRAZOLE SODIUM 40 MILLIGRAM(S): 20 TABLET, DELAYED RELEASE ORAL at 04:51

## 2020-09-03 RX ADMIN — MUPIROCIN 1 APPLICATION(S): 20 OINTMENT TOPICAL at 17:23

## 2020-09-03 RX ADMIN — MUPIROCIN 1 APPLICATION(S): 20 OINTMENT TOPICAL at 06:37

## 2020-09-03 RX ADMIN — CHLORHEXIDINE GLUCONATE 1 APPLICATION(S): 213 SOLUTION TOPICAL at 00:42

## 2020-09-03 RX ADMIN — Medication 25 MILLIGRAM(S): at 17:23

## 2020-09-03 RX ADMIN — Medication 25 MILLIGRAM(S): at 02:40

## 2020-09-03 RX ADMIN — DOLUTEGRAVIR SODIUM 50 MILLIGRAM(S): 25 TABLET, FILM COATED ORAL at 04:51

## 2020-09-03 RX ADMIN — WARFARIN SODIUM 3 MILLIGRAM(S): 2.5 TABLET ORAL at 21:13

## 2020-09-03 RX ADMIN — SODIUM CHLORIDE 3 MILLILITER(S): 9 INJECTION INTRAMUSCULAR; INTRAVENOUS; SUBCUTANEOUS at 13:24

## 2020-09-03 NOTE — PROGRESS NOTE ADULT - ATTENDING COMMENTS
80y M with CKD, hydroureteronephrosis, endocarditis, now s/p mitral valve replacement on 8/31/20  SUDHEER / CKD  SCr 1.96  Stable SCr  recovering from VR

## 2020-09-03 NOTE — PHARMACOTHERAPY INTERVENTION NOTE - COMMENTS
Patient has been on abacavir, lamivudine, and doravirine as his antiretroviral regimen as an outpatient.  This regimen was continued inpatient once doravirine was obtained on 8/20.  Because doravirine is non-formulary, the order was entered as a freetext.  The order was missed and patient ordered for dolutegravir in addition to doravirine.  Orders clarified with ID provider and dolutegravir d/c'd.     Claudia Vance, PharmD  Clinical Pharmacist, Infectious Diseases  [phone]

## 2020-09-03 NOTE — PROGRESS NOTE ADULT - SUBJECTIVE AND OBJECTIVE BOX
INFECTIOUS DISEASES FOLLOW UP-- Dorita Almazan  [phone]    This is a follow up note for this  80yMale with  s/p MVR   vegetation seen on valve- stains and cultures of vegetation are NGTD  transferred to the floor and feeling well      ROS:  CONSTITUTIONAL:  No fever, good appetite  CARDIOVASCULAR:  No chest pain or palpitations  RESPIRATORY:  No dyspnea  GASTROINTESTINAL:  No nausea, vomiting, diarrhea, or abdominal pain  GENITOURINARY:  No dysuria  NEUROLOGIC:  No headache,     Allergies    No Known Allergies    Intolerances        ANTIBIOTICS/RELEVANT:  antimicrobials  abacavir 600 milliGRAM(s) Oral daily  lamiVUDine- milliGRAM(s) Oral daily    immunologic:    OTHER:  aspirin enteric coated 81 milliGRAM(s) Oral daily  dextrose 40% Gel 15 Gram(s) Oral once PRN  dextrose 50% Injectable 50 milliLiter(s) IV Push every 15 minutes  dextrose 50% Injectable 25 milliLiter(s) IV Push every 15 minutes  Doravirine (Pefeltro), 100 mG 1 Tablet(s) 1 Tablet(s) Oral daily  finasteride 5 milliGRAM(s) Oral daily  glucagon  Injectable 1 milliGRAM(s) IntraMuscular once PRN  insulin lispro (HumaLOG) corrective regimen sliding scale   SubCutaneous three times a day before meals  insulin lispro (HumaLOG) corrective regimen sliding scale   SubCutaneous at bedtime  metoprolol tartrate 25 milliGRAM(s) Oral every 12 hours  mupirocin 2% Ointment 1 Application(s) Both Nostrils two times a day  oxycodone    5 mG/acetaminophen 325 mG 1 Tablet(s) Oral every 6 hours PRN  oxycodone    5 mG/acetaminophen 325 mG 2 Tablet(s) Oral every 6 hours PRN  pantoprazole    Tablet 40 milliGRAM(s) Oral before breakfast  polyethylene glycol 3350 17 Gram(s) Oral daily  sodium chloride 0.9% lock flush 3 milliLiter(s) IV Push every 8 hours  tamsulosin 0.4 milliGRAM(s) Oral at bedtime  warfarin 3 milliGRAM(s) Oral once      Objective:  Vital Signs Last 24 Hrs  T(C): 36.6 (03 Sep 2020 13:24), Max: 37.9 (02 Sep 2020 20:00)  T(F): 97.9 (03 Sep 2020 13:24), Max: 100.2 (02 Sep 2020 20:00)  HR: 54 (03 Sep 2020 13:24) (48 - 67)  BP: 128/74 (03 Sep 2020 13:24) (107/53 - 141/72)  BP(mean): 81 (03 Sep 2020 09:00) (76 - 85)  RR: 20 (03 Sep 2020 13:24) (20 - 40)  SpO2: 96% (03 Sep 2020 13:24) (90% - 98%)    PHYSICAL EXAM:  Constitutional:no acute distress  Eyes:EMILY, EOMI  Ear/Nose/Throat: no oral lesions, 	  Respiratory: sternotomy dressing CDI  Cardiovascular: S1S2  Gastrointestinal:soft, (+) BS, no tenderness  tovar catheter in place  Extremities:no e/e/c  No Lymphadenopathy  IV sites not inflammed.    LABS:                        9.8    16.26 )-----------( 68       ( 03 Sep 2020 00:29 )             28.7     09-03    133<L>  |  100  |  43<H>  ----------------------------<  131<H>  4.0   |  22  |  1.75<H>    Ca    8.1<L>      03 Sep 2020 00:29  Phos  2.8     09-03  Mg     2.4     09-03    TPro  5.9<L>  /  Alb  3.3  /  TBili  0.4  /  DBili  x   /  AST  50<H>  /  ALT  32  /  AlkPhos  140<H>  09-03    PT/INR - ( 03 Sep 2020 15:10 )   PT: 14.5 sec;   INR: 1.23 ratio         PTT - ( 03 Sep 2020 15:10 )  PTT:33.0 sec  Urinalysis Basic - ( 03 Sep 2020 08:24 )    Color: Yellow / Appearance: Clear / S.021 / pH: x  Gluc: x / Ketone: Negative  / Bili: Negative / Urobili: Negative   Blood: x / Protein: 30 mg/dL / Nitrite: Negative   Leuk Esterase: Large / RBC: 68 /hpf / WBC 29 /HPF   Sq Epi: x / Non Sq Epi: 1 / Bacteria: Negative        MICROBIOLOGY:            RECENT CULTURES:   @ 21:15  .Tissue Other  --  --  --    Culture is being performed.  --   @ 21:12  .Surgical Swab mitral vegetation  --  --  --    No growth  --      RADIOLOGY & ADDITIONAL STUDIES:    < from: Xray Chest 1 View- PORTABLE-Routine (20 @ 03:11) >  IMPRESSION:    The mediastinal cardiac silhouette is unremarkable. Sternotomy and atrial clip. Mediastinal drains.    Trace bilateral effusions and bibasilar atelectasis. No pneumothorax.    No acute osseous finding.    < end of copied text >

## 2020-09-03 NOTE — PROGRESS NOTE ADULT - SUBJECTIVE AND OBJECTIVE BOX
[facility] DIVISION OF KIDNEY DISEASES AND HYPERTENSION -- FOLLOW UP NOTE  --------------------------------------------------------------------------------  If any questions, please feel free to contact me  NS pager: [phone], [facility]: [phone]  Parker Harding M.D.  Nephrology Fellow    (After 5 pm or on weekends please page the on-call fellow)  --------------------------------------------------------------------------------  Chief Complaint:  Patient is a 80y old  Male who presents with a chief complaint of fall (02 Sep 2020 16:59)    24 hour events/subjective:  Patient seen and examined at bedside, in NAD, s/p mitral valve replacement on 8/31  no acute events overnight. sCr has remained stable 1.8> 1.9 > 1.7  UOP: 1.9L - BP has remained stable.        PAST HISTORY  --------------------------------------------------------------------------------  No significant changes to PMH, PSH, FHx, SHx, unless otherwise noted    ALLERGIES & MEDICATIONS  --------------------------------------------------------------------------------  Allergies    No Known Allergies    Intolerances      Standing Inpatient Medications  abacavir 600 milliGRAM(s) Oral daily  chlorhexidine 2% Cloths 1 Application(s) Topical <User Schedule>  dextrose 5%. 1000 milliLiter(s) IV Continuous <Continuous>  dextrose 5%. 1000 milliLiter(s) IV Continuous <Continuous>  dextrose 50% Injectable 50 milliLiter(s) IV Push every 15 minutes  dextrose 50% Injectable 25 milliLiter(s) IV Push every 15 minutes  dolutegravir 50 milliGRAM(s) Oral two times a day  Doravirine (Pefeltro), 100 mG 1 Tablet(s) 1 Tablet(s) Oral daily  finasteride 5 milliGRAM(s) Oral daily  insulin lispro (HumaLOG) corrective regimen sliding scale   SubCutaneous three times a day before meals  insulin lispro (HumaLOG) corrective regimen sliding scale   SubCutaneous at bedtime  lamiVUDine- milliGRAM(s) Oral daily  metoprolol tartrate 25 milliGRAM(s) Oral every 12 hours  mupirocin 2% Ointment 1 Application(s) Both Nostrils two times a day  pantoprazole    Tablet 40 milliGRAM(s) Oral before breakfast  polyethylene glycol 3350 17 Gram(s) Oral daily  sodium chloride 0.9%. 1000 milliLiter(s) IV Continuous <Continuous>  tamsulosin 0.4 milliGRAM(s) Oral at bedtime    PRN Inpatient Medications  dextrose 40% Gel 15 Gram(s) Oral once PRN  glucagon  Injectable 1 milliGRAM(s) IntraMuscular once PRN  oxycodone    5 mG/acetaminophen 325 mG 1 Tablet(s) Oral every 6 hours PRN  oxycodone    5 mG/acetaminophen 325 mG 2 Tablet(s) Oral every 6 hours PRN      REVIEW OF SYSTEMS  --------------------------------------------------------------------------------  Gen: No fevers/chills  Skin: No rashes  Head/Eyes/Ears: Normal hearing,   Respiratory: No dyspnea, cough  CV: No chest pain  GI: No abdominal pain, diarrhea  : No dysuria, hematuria  MSK: No  edema  Heme: No easy bruising or bleeding  Psych: No significant depression      All other systems were reviewed and are negative, except as noted.    VITALS/PHYSICAL EXAM  --------------------------------------------------------------------------------  T(C): 36.6 (09-03-20 @ 08:00), Max: 37.9 (09-02-20 @ 20:00)  HR: 56 (09-03-20 @ 08:00) (48 - 68)  BP: 112/63 (09-03-20 @ 08:00) (107/53 - 150/71)  RR: 38 (09-03-20 @ 08:00) (21 - 40)  SpO2: 90% (09-03-20 @ 08:00) (90% - 100%)  Wt(kg): --        09-02-20 @ 07:01  -  09-03-20 @ 07:00  --------------------------------------------------------  IN: 1142.3 mL / OUT: 2105 mL / NET: -962.7 mL    09-03-20 @ 07:01  -  09-03-20 @ 08:40  --------------------------------------------------------  IN: 240 mL / OUT: 125 mL / NET: 115 mL        Physical Exam:  	Gen: NAD, well-appearing  	HEENT: Anicteric   	Pulm: CTA B/L, no wheezing   	CV: RRR, S1S2; no rub   	Abd: +BS, soft, nontender/nondistended       	: No suprapubic tenderness  	MSK: Warm, no clubbing, intact strength; no edema  	Neuro: No focal deficits, AOX3, no asterixis      LABS/STUDIES  --------------------------------------------------------------------------------              9.8    16.26 >-----------<  68       [09-03-20 @ 00:29]              28.7     133  |  100  |  43  ----------------------------<  131      [09-03-20 @ 00:29]  4.0   |  22  |  1.75        Ca     8.1     [09-03-20 @ 00:29]      Mg     2.4     [09-03-20 @ 00:29]      Phos  2.8     [09-03-20 @ 00:29]    TPro  5.9  /  Alb  3.3  /  TBili  0.4  /  DBili  x   /  AST  50  /  ALT  32  /  AlkPhos  140  [09-03-20 @ 00:29]    PT/INR: PT 15.9 , INR 1.36       [09-02-20 @ 14:03]  PTT: 28.9       [09-01-20 @ 09:18]      Creatinine Trend:  SCr 1.75 [09-03 @ 00:29]  SCr 1.96 [09-02 @ 00:46]  SCr 1.83 [09-01 @ 02:11]  SCr 1.56 [08-31 @ 20:38]  SCr 1.83 [08-31 @ 05:44]    Urinalysis - [08-19-20 @ 03:23]      Color Yellow / Appearance Turbid / SG 1.015 / pH 7.0      Gluc Negative / Ketone Trace  / Bili Negative / Urobili Negative       Blood Moderate / Protein 100 mg/dL / Leuk Est Large / Nitrite Negative      RBC 10 / WBC >50 / Hyaline 0 / Gran  / Sq Epi  / Non Sq Epi 3 / Bacteria Many      TSH 4.24      [08-22-20 @ 12:42]

## 2020-09-03 NOTE — PROGRESS NOTE ADULT - SUBJECTIVE AND OBJECTIVE BOX
CARLINE GARBER  MRN-[id number]  Patient is a 80y old  Male who presents with a chief complaint of fall (03 Sep 2020 08:39)    HPI:  80M hx of HIV+, BPH, CAD presented after a fall at home and found in own urine/feces. Reportedly per EMS, patient had been using a scrotal device to clamp the testes but was unable to remove it and then slid to the ground while trying to maneuvering the device. Patient reports that he was too weak to get himself up. Denies trauma to his head and was laying on his L side. Per EMS, the heat in the home was significantly elevated. Denies anticoagulants or aspirin, denies any LOC and states he recalls all events. Denies recent illness, fever, chills, dysuria, dizziness, cp, sob, abd pain. Denies cough, sore throat. Of note, patient reports that he self straight caths 3 times a day due to BPH. He also had a bladder mass s/p resection and was told it was benign.    In the ED, vitals: T 99.2, , /60, RR 24 satting 97 ORA. got 2L NS, started on maintenance fluid. Urology placed tovar, draining blood tinged urine. Labs noted for WBC 16.5, Cr 2.2, CPK 3857, lactate 3.1, positive UA. (19 Aug 2020 04:49)      Surgery/Hospital Course:  8/31 MVR     Today:  No acute events     ICU Vital Signs Last 24 Hrs  T(C): 36.6 (03 Sep 2020 08:00), Max: 37.9 (02 Sep 2020 20:00)  T(F): 97.9 (03 Sep 2020 08:00), Max: 100.2 (02 Sep 2020 20:00)  HR: 56 (03 Sep 2020 08:00) (48 - 68)  BP: 112/63 (03 Sep 2020 08:00) (107/53 - 150/71)  BP(mean): 82 (03 Sep 2020 08:00) (76 - 100)  ABP: --  ABP(mean): --  RR: 38 (03 Sep 2020 08:00) (21 - 40)  SpO2: 90% (03 Sep 2020 08:00) (90% - 100%)      Physical Exam:  Gen:  Awake, alert   CNS: non focal 	  Neck: no JVD  RES : clear , no wheezing              CVS: Regular  rhythm. Normal S1/S2  Abd: Soft, non-distended. Bowel sounds present.  Skin: No rash.  Ext:  no edema    ============================I/O===========================   I&O's Detail    02 Sep 2020 07:01  -  03 Sep 2020 07:00  --------------------------------------------------------  IN:    DOBUTamine Infusion: 12.3 mL    IV PiggyBack: 250 mL    Oral Fluid: 500 mL    Packed Red Blood Cells: 350 mL    sodium chloride 0.9%.: 30 mL  Total IN: 1142.3 mL    OUT:    Bulb: 45 mL    Chest Tube: 90 mL    Indwelling Catheter - Urethral: 1970 mL  Total OUT: 2105 mL    Total NET: -962.7 mL      03 Sep 2020 07:01  -  03 Sep 2020 08:56  --------------------------------------------------------  IN:    Oral Fluid: 240 mL  Total IN: 240 mL    OUT:    Indwelling Catheter - Urethral: 125 mL  Total OUT: 125 mL    Total NET: 115 mL        ============================ LABS =========================                        9.8    16.26 )-----------( 68       ( 03 Sep 2020 00:29 )             28.7     09-03    133<L>  |  100  |  43<H>  ----------------------------<  131<H>  4.0   |  22  |  1.75<H>    Ca    8.1<L>      03 Sep 2020 00:29  Phos  2.8     09-03  Mg     2.4     09-03    TPro  5.9<L>  /  Alb  3.3  /  TBili  0.4  /  DBili  x   /  AST  50<H>  /  ALT  32  /  AlkPhos  140<H>  09-03    LIVER FUNCTIONS - ( 03 Sep 2020 00:29 )  Alb: 3.3 g/dL / Pro: 5.9 g/dL / ALK PHOS: 140 U/L / ALT: 32 U/L / AST: 50 U/L / GGT: x           PT/INR - ( 02 Sep 2020 14:03 )   PT: 15.9 sec;   INR: 1.36 ratio         PTT - ( 01 Sep 2020 09:18 )  PTT:28.9 sec  ABG - ( 01 Sep 2020 11:27 )  pH, Arterial: 7.41  pH, Blood: x     /  pCO2: 36    /  pO2: 117   / HCO3: 23    / Base Excess: -1.0  /  SaO2: 99                  ======================Micro/Rad/Cardio=================  Culture: Reviewed   CXR: Reviewed  Echo:Reviewed  ======================================================  PAST MEDICAL & SURGICAL HISTORY:  Orchitis and epididymitis  Vitamin D deficiency  Bladder mass  Edema of both legs  Osteoporosis  Seborrheic keratosis  Constipation, unspecified constipation type  Chronic kidney disease, unspecified stage  HIV (human immunodeficiency virus infection)  Unsteady gait  S/P coronary artery stent placement  No Past Surgical History    ====================ASSESSMENT ==============  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  Cardiogenic shock   SUDHEER on CKD  HIV     Plan:  ====================== NEUROLOGY=====================  Continue to monitor neuro status   Oxycodone prn for analgesia     oxycodone    5 mG/acetaminophen 325 mG 1 Tablet(s) Oral every 6 hours PRN Mild Pain (1 - 3)  oxycodone    5 mG/acetaminophen 325 mG 2 Tablet(s) Oral every 6 hours PRN Severe Pain (7 - 10)    ==================== RESPIRATORY======================  Comfortable on RA   Encourage incentive spirometry, continue pulse ox monitoring, follow ABGs     ====================CARDIOVASCULAR==================  Endocarditis of mitral valve s/p MVR   Mild systolic heart dysfunction/failure post MVR  Blood pressure support with Lopressor     metoprolol tartrate 25 milliGRAM(s) Oral every 12 hours    ===================HEMATOLOGIC/ONC ===================  Monitor H&H     ===================== RENAL =========================  SUDHEER on CKD   Continue monitoring urine output, I&OS, BUN/Cr   Flomax and Finasteride for hx of BPH     tamsulosin 0.4 milliGRAM(s) Oral at bedtime  finasteride 5 milliGRAM(s) Oral daily    ==================== GASTROINTESTINAL===================  Tolerating pureed consistent carb diet   Bowel regimen with Miralax     dextrose 5%. 1000 milliLiter(s) (50 mL/Hr) IV Continuous <Continuous>  dextrose 5%. 1000 milliLiter(s) (15 mL/Hr) IV Continuous <Continuous>  GI prophylaxis, pantoprazole    Tablet 40 milliGRAM(s) Oral before breakfast  polyethylene glycol 3350 17 Gram(s) Oral daily  sodium chloride 0.9%. 1000 milliLiter(s) (10 mL/Hr) IV Continuous <Continuous>    =======================    ENDOCRINE  =====================  Glucose control with humalog sliding scale and glucagon prn for stress hyperglycemia     dextrose 40% Gel 15 Gram(s) Oral once PRN Blood Glucose LESS THAN 70 milliGRAM(s)/deciLiter  dextrose 50% Injectable 50 milliLiter(s) IV Push every 15 minutes  dextrose 50% Injectable 25 milliLiter(s) IV Push every 15 minutes  glucagon  Injectable 1 milliGRAM(s) IntraMuscular once PRN Glucose <70 milliGRAM(s)/deciLiter  insulin lispro (HumaLOG) corrective regimen sliding scale   SubCutaneous three times a day before meals  insulin lispro (HumaLOG) corrective regimen sliding scale   SubCutaneous at bedtime    ========================INFECTIOUS DISEASE================  Continue IV Vanco by level for endocarditis   Hx of HIV, c/w abacavir, lamiVUDine-HBV, dolutegravir     abacavir 600 milliGRAM(s) Oral daily  dolutegravir 50 milliGRAM(s) Oral two times a day  lamiVUDine- milliGRAM(s) Oral daily      Patient requires continuous monitoring with bedside rhythm monitoring, pulse ox monitoring, and intermittent blood gas analysis. Care plan discussed with ICU care team. Patient remained critical and at risk for life threatening decompensation.     By signing my name below, I, Debi Sandoval, attest that this documentation has been prepared under the direction and in the presence of Marbin Ríos MD   Electronically signed: Bogdan Dorman, 09-03-20 @ 08:56    I, Marbin Ríos, personally performed the services described in this documentation. all medical record entries made by the katyaibcali were at my direction and in my presence. I have reviewed the chart and agree that the record reflects my personal performance and is accurate and complete  Electronically signed: Marbin Ríos MD

## 2020-09-03 NOTE — PHARMACOTHERAPY INTERVENTION NOTE - NSPHARMCOMMASP
ASP - De-escalation
ASP - Dose optimization/Non-Renal dose adjustment
ASP - Therapy recommended/ Alternative therapy

## 2020-09-03 NOTE — PROGRESS NOTE ADULT - SUBJECTIVE AND OBJECTIVE BOX
Chief complaint  Patient is a 80y old  Male who presents with a chief complaint of fall (03 Sep 2020 08:56)   Review of systems  Patient sitting up in chair, awake, looks comfortable, no hypoglycemic episodes.    Labs and Fingersticks  CAPILLARY BLOOD GLUCOSE      POCT Blood Glucose.: 156 mg/dL (03 Sep 2020 07:54)  POCT Blood Glucose.: 138 mg/dL (02 Sep 2020 21:50)  POCT Blood Glucose.: 138 mg/dL (02 Sep 2020 17:24)  POCT Blood Glucose.: 133 mg/dL (02 Sep 2020 12:47)    Medications  MEDICATIONS  (STANDING):  abacavir 600 milliGRAM(s) Oral daily  aspirin enteric coated 81 milliGRAM(s) Oral daily  dextrose 50% Injectable 50 milliLiter(s) IV Push every 15 minutes  dextrose 50% Injectable 25 milliLiter(s) IV Push every 15 minutes  dolutegravir 50 milliGRAM(s) Oral two times a day  Doravirine (Pefeltro), 100 mG 1 Tablet(s) 1 Tablet(s) Oral daily  finasteride 5 milliGRAM(s) Oral daily  insulin lispro (HumaLOG) corrective regimen sliding scale   SubCutaneous three times a day before meals  insulin lispro (HumaLOG) corrective regimen sliding scale   SubCutaneous at bedtime  lamiVUDine- milliGRAM(s) Oral daily  metoprolol tartrate 25 milliGRAM(s) Oral every 12 hours  mupirocin 2% Ointment 1 Application(s) Both Nostrils two times a day  pantoprazole    Tablet 40 milliGRAM(s) Oral before breakfast  polyethylene glycol 3350 17 Gram(s) Oral daily  sodium chloride 0.9% lock flush 3 milliLiter(s) IV Push every 8 hours  tamsulosin 0.4 milliGRAM(s) Oral at bedtime      Physical Exam  General: Patient comfortable in bed  Vital Signs Last 12 Hrs  T(F): 97.6 (09-03-20 @ 10:00), Max: 100.2 (09-03-20 @ 04:00)  HR: 51 (09-03-20 @ 10:00) (48 - 64)  BP: 141/72 (09-03-20 @ 10:00) (107/53 - 141/72)  BP(mean): 81 (09-03-20 @ 09:00) (76 - 84)  RR: 20 (09-03-20 @ 10:00) (20 - 40)  SpO2: 98% (09-03-20 @ 10:00) (90% - 98%)

## 2020-09-03 NOTE — PROVIDER CONTACT NOTE (OTHER) - ASSESSMENT
Pt a&ox4, VSS. Pt sitting calmly in chair at this time. Pt denies chest pain or discomfort. Pt asymptomatic

## 2020-09-03 NOTE — PROGRESS NOTE ADULT - ASSESSMENT
80y M with CKD, hydroureteronephrosis, endocarditis, now s/p mitral valve replacement on 8/31/20   nephrology consulted for SUDHEER      #SUDHEER on CKD3  - patient is s/p mitral valve replacement on 8/31/2020  - Patient with baseline CKD since 2015, Scr baseline 1.6-1.8mg/dl  - Pt Scr up to 1.98mg/dl, BUN 60.  2/2 contrast/ diuretics or removal of tovar catheter  - sCr has remained stable at 1.8>> 1.9 > 1.7    - avoid hypotension, and overdiuresis   - will monitor sCr after MV procedure,  We have discussed with the patient that given his CKD history and recent contrast and recent surgery that he is at risk for ATN and possible renal replacement therapy post surgery. He verbalized understanding.  - Monitor UOP and daily weights. Avoid volume depletion, NSAIDs, PPI's, ARB/ACE-I.   - Dose medications as per eGFR.

## 2020-09-03 NOTE — PROGRESS NOTE ADULT - PROBLEM SELECTOR PLAN 1
Will continue current insulin regimen for now, continue monitoring FS and FU.  Patient in prediabetic range, A1C 5.7%. Will continue monitoring and make recommendations for outpatient management prior to DC.  Counseled for compliance with consistent low carb diet.

## 2020-09-03 NOTE — PROGRESS NOTE ADULT - ASSESSMENT
Assessment  Hyperglycemia: No hx DM, A1C 5.7%, had postoperative hyperglycemia, now on moderate-scale insulin coverage, blood sugars trending within overall acceptable range, no hypoglycemic episodes. Patient is postop, stepped down to 2cohen this AM, he is eating meals, appears comfortable.  Hypothyroidism: 80y male with no history thyroid disease, was not on any thyroid supplements, on HIV meds, TSH borderline elevated, subclinical hypothyroid, TPO ab WNL.  Endocarditis: S/P MVR, on meds, stable, monitored.        Nesha Izaguirre MD  Cell: [phone]  Office: [phone] Assessment  Hyperglycemia: No hx DM, A1C 5.7%, had postoperative hyperglycemia, now on moderate-scale insulin coverage, blood sugars trending within overall acceptable range, no hypoglycemic episodes. Patient is postop, stepped down to 2cohen this AM, he is eating meals, appears comfortable.  Hypothyroidism: 80y male with no history thyroid disease, was not on any thyroid supplements,  on HIV meds, TSH borderline elevated, subclinical hypothyroid, TPO ab WNL.  Endocarditis: S/P MVR, on meds, stable, monitored.        Nesha Izaguirre MD  Cell: [phone]  Office: [phone]

## 2020-09-03 NOTE — PROGRESS NOTE ADULT - ASSESSMENT
80M hx of HIV(KS9=592, VL undet), detrusor muscle weakness with self straight cath, CAD presented after a fall at home.  Labs noted for WBC 16.5, Cr 2.2, CPK 3857, lactate 3.1, positive UA on admission  cellulitis on left knee- resolved  bacteremia with 2/2 sets on 8/19 growing coag. negative staph in the setting of valvular heart disease but no prior prosthetic valve  All other cultures were negative      A portion of the vegetation was sent to the Microbiology lab for testing and stains are negative for AFB, fungus, bacteria and cultures are no growth to date    Q fever, Bartonella, Brucella and Legionella titers/serology were negative    Unclear if the vegetation was related to the coag negative staph bacteremia, is culture negative endocarditis or is non infectious  will continue the Vancomycin trough therapeutic on 9/3    Vegetation sent out by Microbiology labfor PCR testing    Tung Almazan MD  [phone]  After 5pm/weekends [phone] 80M hx of HIV(XF7=015, VL undet), detrusor muscle weakness with self straight cath, CAD presented after a fall at home.  Labs noted for WBC 16.5, Cr 2.2, CPK 3857, lactate 3.1, positive UA on admission  cellulitis on left knee- resolved  bacteremia with 2/2 sets on 8/19 growing coag. negative staph in the setting of valvular heart disease but no prior prosthetic valve  All other cultures were negative      A portion of the vegetation was sent to the Microbiology lab for testing and stains are negative for AFB, fungus, bacteria and cultures are no growth to date    Q fever, Bartonella, Brucella and Legionella titers/serology were negative    Unclear if the vegetation was related to the coag negative staph bacteremia, is culture negative endocarditis or is non infectious (marantic)  will continue the Vancomycin trough therapeutic on 9/3    Vegetation sent out by Microbiology labfor PCR testing    Tung Almazan MD  [phone]  After 5pm/weekends [phone]

## 2020-09-04 DIAGNOSIS — I45.89 OTHER SPECIFIED CONDUCTION DISORDERS: ICD-10-CM

## 2020-09-04 DIAGNOSIS — Z95.2 PRESENCE OF PROSTHETIC HEART VALVE: ICD-10-CM

## 2020-09-04 DIAGNOSIS — E11.9 TYPE 2 DIABETES MELLITUS WITHOUT COMPLICATIONS: ICD-10-CM

## 2020-09-04 LAB
ANION GAP SERPL CALC-SCNC: 10 MMOL/L — SIGNIFICANT CHANGE UP (ref 5–17)
ANION GAP SERPL CALC-SCNC: 11 MMOL/L — SIGNIFICANT CHANGE UP (ref 5–17)
APTT BLD: 31.3 SEC — SIGNIFICANT CHANGE UP (ref 27.5–35.5)
BRUCELLA IGG SER QL IA: NEGATIVE — SIGNIFICANT CHANGE UP
BRUCELLA IGG SER QL IA: NEGATIVE — SIGNIFICANT CHANGE UP
BRUCELLA IGM SER QL IA: NEGATIVE — SIGNIFICANT CHANGE UP
BRUCELLA IGM SER QL IA: SIGNIFICANT CHANGE UP
BUN SERPL-MCNC: 41 MG/DL — HIGH (ref 7–23)
BUN SERPL-MCNC: 44 MG/DL — HIGH (ref 7–23)
CALCIUM SERPL-MCNC: 8.4 MG/DL — SIGNIFICANT CHANGE UP (ref 8.4–10.5)
CALCIUM SERPL-MCNC: 8.5 MG/DL — SIGNIFICANT CHANGE UP (ref 8.4–10.5)
CHLORIDE SERPL-SCNC: 100 MMOL/L — SIGNIFICANT CHANGE UP (ref 96–108)
CHLORIDE SERPL-SCNC: 99 MMOL/L — SIGNIFICANT CHANGE UP (ref 96–108)
CO2 SERPL-SCNC: 24 MMOL/L — SIGNIFICANT CHANGE UP (ref 22–31)
CO2 SERPL-SCNC: 24 MMOL/L — SIGNIFICANT CHANGE UP (ref 22–31)
CREAT SERPL-MCNC: 1.9 MG/DL — HIGH (ref 0.5–1.3)
CREAT SERPL-MCNC: 2.12 MG/DL — HIGH (ref 0.5–1.3)
GLUCOSE SERPL-MCNC: 101 MG/DL — HIGH (ref 70–99)
GLUCOSE SERPL-MCNC: 112 MG/DL — HIGH (ref 70–99)
HCT VFR BLD CALC: 27.6 % — LOW (ref 39–50)
HGB BLD-MCNC: 9.2 G/DL — LOW (ref 13–17)
INR BLD: 1.31 RATIO — HIGH (ref 0.88–1.16)
MAGNESIUM SERPL-MCNC: 2.5 MG/DL — SIGNIFICANT CHANGE UP (ref 1.6–2.6)
MCHC RBC-ENTMCNC: 33.3 GM/DL — SIGNIFICANT CHANGE UP (ref 32–36)
MCHC RBC-ENTMCNC: 33.8 PG — SIGNIFICANT CHANGE UP (ref 27–34)
MCV RBC AUTO: 101.5 FL — HIGH (ref 80–100)
NRBC # BLD: 0 /100 WBCS — SIGNIFICANT CHANGE UP (ref 0–0)
PHOSPHATE SERPL-MCNC: 2.7 MG/DL — SIGNIFICANT CHANGE UP (ref 2.5–4.5)
PLATELET # BLD AUTO: 78 K/UL — LOW (ref 150–400)
POTASSIUM SERPL-MCNC: 3.8 MMOL/L — SIGNIFICANT CHANGE UP (ref 3.5–5.3)
POTASSIUM SERPL-MCNC: 4.4 MMOL/L — SIGNIFICANT CHANGE UP (ref 3.5–5.3)
POTASSIUM SERPL-SCNC: 3.8 MMOL/L — SIGNIFICANT CHANGE UP (ref 3.5–5.3)
POTASSIUM SERPL-SCNC: 4.4 MMOL/L — SIGNIFICANT CHANGE UP (ref 3.5–5.3)
PROTHROM AB SERPL-ACNC: 15.4 SEC — HIGH (ref 10.6–13.6)
RBC # BLD: 2.72 M/UL — LOW (ref 4.2–5.8)
RBC # FLD: 16.5 % — HIGH (ref 10.3–14.5)
SODIUM SERPL-SCNC: 134 MMOL/L — LOW (ref 135–145)
SODIUM SERPL-SCNC: 134 MMOL/L — LOW (ref 135–145)
SURGICAL PATHOLOGY STUDY: SIGNIFICANT CHANGE UP
WBC # BLD: 12.29 K/UL — HIGH (ref 3.8–10.5)
WBC # FLD AUTO: 12.29 K/UL — HIGH (ref 3.8–10.5)

## 2020-09-04 PROCEDURE — 99232 SBSQ HOSP IP/OBS MODERATE 35: CPT | Mod: GC

## 2020-09-04 PROCEDURE — 99232 SBSQ HOSP IP/OBS MODERATE 35: CPT

## 2020-09-04 PROCEDURE — 93010 ELECTROCARDIOGRAM REPORT: CPT

## 2020-09-04 RX ORDER — POTASSIUM CHLORIDE 20 MEQ
20 PACKET (EA) ORAL ONCE
Refills: 0 | Status: COMPLETED | OUTPATIENT
Start: 2020-09-04 | End: 2020-09-04

## 2020-09-04 RX ORDER — VANCOMYCIN HCL 1 G
1000 VIAL (EA) INTRAVENOUS ONCE
Refills: 0 | Status: COMPLETED | OUTPATIENT
Start: 2020-09-04 | End: 2020-09-04

## 2020-09-04 RX ORDER — WARFARIN SODIUM 2.5 MG/1
5 TABLET ORAL ONCE
Refills: 0 | Status: COMPLETED | OUTPATIENT
Start: 2020-09-04 | End: 2020-09-04

## 2020-09-04 RX ADMIN — Medication 20 MILLIEQUIVALENT(S): at 11:19

## 2020-09-04 RX ADMIN — SODIUM CHLORIDE 3 MILLILITER(S): 9 INJECTION INTRAMUSCULAR; INTRAVENOUS; SUBCUTANEOUS at 14:47

## 2020-09-04 RX ADMIN — FINASTERIDE 5 MILLIGRAM(S): 5 TABLET, FILM COATED ORAL at 11:22

## 2020-09-04 RX ADMIN — WARFARIN SODIUM 5 MILLIGRAM(S): 2.5 TABLET ORAL at 21:21

## 2020-09-04 RX ADMIN — Medication 81 MILLIGRAM(S): at 11:22

## 2020-09-04 RX ADMIN — TAMSULOSIN HYDROCHLORIDE 0.4 MILLIGRAM(S): 0.4 CAPSULE ORAL at 21:21

## 2020-09-04 RX ADMIN — ABACAVIR 600 MILLIGRAM(S): 20 SOLUTION ORAL at 11:21

## 2020-09-04 RX ADMIN — OXYCODONE AND ACETAMINOPHEN 2 TABLET(S): 5; 325 TABLET ORAL at 01:24

## 2020-09-04 RX ADMIN — MUPIROCIN 1 APPLICATION(S): 20 OINTMENT TOPICAL at 21:22

## 2020-09-04 RX ADMIN — Medication 250 MILLIGRAM(S): at 14:48

## 2020-09-04 RX ADMIN — SODIUM CHLORIDE 3 MILLILITER(S): 9 INJECTION INTRAMUSCULAR; INTRAVENOUS; SUBCUTANEOUS at 21:23

## 2020-09-04 RX ADMIN — POLYETHYLENE GLYCOL 3350 17 GRAM(S): 17 POWDER, FOR SOLUTION ORAL at 11:23

## 2020-09-04 RX ADMIN — SODIUM CHLORIDE 3 MILLILITER(S): 9 INJECTION INTRAMUSCULAR; INTRAVENOUS; SUBCUTANEOUS at 05:35

## 2020-09-04 RX ADMIN — MUPIROCIN 1 APPLICATION(S): 20 OINTMENT TOPICAL at 05:34

## 2020-09-04 RX ADMIN — Medication 100 MILLIGRAM(S): at 11:23

## 2020-09-04 RX ADMIN — Medication 25 MILLIGRAM(S): at 21:21

## 2020-09-04 RX ADMIN — PANTOPRAZOLE SODIUM 40 MILLIGRAM(S): 20 TABLET, DELAYED RELEASE ORAL at 05:35

## 2020-09-04 RX ADMIN — OXYCODONE AND ACETAMINOPHEN 2 TABLET(S): 5; 325 TABLET ORAL at 02:25

## 2020-09-04 NOTE — PROGRESS NOTE ADULT - PROBLEM SELECTOR PLAN 5
Renal following   Monitor Renal function   Daily BMP; Repeat this afternoon 4pm   Maintain tovar --> SD   (+) UA / will send off Urine CNS   Avoid Nephrotoxic toxic agent Renal following   Monitor Renal function   Daily BMP; Repeat this afternoon 4pm   Maintain tvoar --> SD   (+) UA likely colonization per ID   Urology following will discuss timing for TOV    Avoid Nephrotoxic toxic agent

## 2020-09-04 NOTE — PROGRESS NOTE ADULT - SUBJECTIVE AND OBJECTIVE BOX
Chief complaint  Patient is a 80y old  Male who presents with a chief complaint of fall (03 Sep 2020 18:53)   Review of systems  Patient in bed, looks comfortable, no hypoglycemic episodes.    Labs and Fingersticks  CAPILLARY BLOOD GLUCOSE      POCT Blood Glucose.: 101 mg/dL (04 Sep 2020 11:24)  POCT Blood Glucose.: 104 mg/dL (04 Sep 2020 07:38)  POCT Blood Glucose.: 135 mg/dL (03 Sep 2020 21:48)  POCT Blood Glucose.: 162 mg/dL (03 Sep 2020 16:49)    Medications  MEDICATIONS  (STANDING):  abacavir 600 milliGRAM(s) Oral daily  aspirin enteric coated 81 milliGRAM(s) Oral daily  dextrose 50% Injectable 50 milliLiter(s) IV Push every 15 minutes  dextrose 50% Injectable 25 milliLiter(s) IV Push every 15 minutes  Doravirine (Pefeltro), 100 mG 1 Tablet(s) 1 Tablet(s) Oral daily  finasteride 5 milliGRAM(s) Oral daily  insulin lispro (HumaLOG) corrective regimen sliding scale   SubCutaneous three times a day before meals  insulin lispro (HumaLOG) corrective regimen sliding scale   SubCutaneous at bedtime  lamiVUDine- milliGRAM(s) Oral daily  metoprolol tartrate 25 milliGRAM(s) Oral every 12 hours  mupirocin 2% Ointment 1 Application(s) Both Nostrils two times a day  pantoprazole    Tablet 40 milliGRAM(s) Oral before breakfast  polyethylene glycol 3350 17 Gram(s) Oral daily  sodium chloride 0.9% lock flush 3 milliLiter(s) IV Push every 8 hours  tamsulosin 0.4 milliGRAM(s) Oral at bedtime      Physical Exam  General: Patient comfortable in bed  Vital Signs Last 12 Hrs  T(F): 99.8 (09-04-20 @ 11:20), Max: 99.8 (09-04-20 @ 11:20)  HR: 79 (09-04-20 @ 11:20) (52 - 79)  BP: 107/62 (09-04-20 @ 11:20) (96/51 - 110/57)  BP(mean): --  RR: 18 (09-04-20 @ 05:30) (18 - 18)  SpO2: 96% (09-04-20 @ 11:20) (95% - 97%)

## 2020-09-04 NOTE — PROGRESS NOTE ADULT - ATTENDING COMMENTS
80y M with CKD, hydroureteronephrosis, endocarditis, now s/p mitral valve replacement on 8/31/20  SUDHEER / CKD  SCr 1.96  Stable SCr  recovering from VR  Looks much better today

## 2020-09-04 NOTE — PROGRESS NOTE ADULT - PROBLEM SELECTOR PLAN 2
ID Following  Vancomycin to be dosed by level  Check daily labs for vancomycin random level, goal is 15-20 ID Following  Vancomycin to be dosed by level  Vanco (T) 14.3 --> Vanco 1 gram IV x 1   Check daily labs for vancomycin random level, goal is 15-20

## 2020-09-04 NOTE — PROGRESS NOTE ADULT - PROBLEM SELECTOR PLAN 1
Continue with ASA 81 mg PO Daily.   Continue with Lopressor 25 mg PO BID.   Continue with Statin   Daily PT/ INR   Coumadin 5 mg PO tonight   Maintain PW --> EPM   Increase activity as tolerated.   Encourage Chest PT / Pulmonary toileting and Incentive spirometry every 1 hour x 10 while awake.   Continue with PUD and DVT prophylaxis.   Shower on POD #5.   Maintain tovar --> SD   D/C plan home once medically cleared   Plan of care discussed with attending

## 2020-09-04 NOTE — PROGRESS NOTE ADULT - ASSESSMENT
80y M with CKD, hydroureteronephrosis, endocarditis, now s/p mitral valve replacement on 8/31/20   nephrology consulted for SUDHEER      #SUDHEER on CKD3  - patient is s/p mitral valve replacement on 8/31/2020  - Patient with baseline CKD since 2015, Scr baseline 1.6-1.8mg/dl  - SUDHEER  2/2 contrast/ diuretics or removal of tovar catheter  - sCr has remained stable at 1.8>> 1.9 > 1.7 > 2.0   - avoid hypotension, and overdiuresis   - Monitor UOP and daily weights. Avoid volume depletion, NSAIDs, PPI's, ARB/ACE-I.   - Dose medications as per eGFR.

## 2020-09-04 NOTE — PROGRESS NOTE ADULT - ASSESSMENT
81 y/o man with PMHx of HIV, CAD, detrusor muscle weakness, BPH presenting for fall, unresponsive for 24 hrs, admitted with rhabdo, bilateral severe ureterohydronephrosis and thickened bladder on CTAP 2/2 inability to continue his home straight cath 3x daily after fall, tovar in place. Rhabdo improved, hydronephrosis improved with tovar (urology following). NOHEMI shows endocarditis (NOHEMI: 1.8x1.3 vegetation on medial aspect of anterior leaflet with partial flail component + 0.5x1.3cm vegetation no posterior leaflet w severe eccentric mitral regurg). BCx staph epidermidis, repeat NGTD, on vancomycin. s/p MRA without septic emboli. s/p cardiac cath  normal cors. Followed by ID Dr. Almazan cleared for OR. Elevated creatinine followed by Nephrology monitoring creatinine this afternoon. Endocrine consulted Dr. Izaguirre.  On 8/31/20 s/p MVR (T) / SHELLIE clip   Post op Course: Extubated POD #0  ID following for endocarditis; and HIV à Vancomycin 1g daily for now; Check daily labs for vancomycin random level, goal is 15-20.  Hyperglycemia à Endocrine following   SUDHEER on CKD à Renal following. Tovar in place.   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  9/4 overnight noted with Accelerated junctional / AV disassociation. Continue on AC therapy Coumadin 5 mg PO tonight.  V Paced à 80.  Monitor renal function.  Creatinine 2.1 will repeat BMP this afternoon.

## 2020-09-04 NOTE — PROGRESS NOTE ADULT - SUBJECTIVE AND OBJECTIVE BOX
[facility] DIVISION OF KIDNEY DISEASES AND HYPERTENSION -- FOLLOW UP NOTE  --------------------------------------------------------------------------------  If any questions, please feel free to contact me  NS pager: [phone], [facility]: [phone]  Parker Harding M.D.  Nephrology Fellow    (After 5 pm or on weekends please page the on-call fellow)  --------------------------------------------------------------------------------    Chief Complaint:  Patient is a 80y old  Male who presents with a chief complaint of fall (04 Sep 2020 13:37)    24 hour events/subjective:  Patient seen and examined at beside, in NAD  no acute events overnight, sCr ~1.7-2   labs/imaging/vitals reviewed       PAST HISTORY  --------------------------------------------------------------------------------  No significant changes to PMH, PSH, FHx, SHx, unless otherwise noted    ALLERGIES & MEDICATIONS  --------------------------------------------------------------------------------  Allergies    No Known Allergies    Intolerances      Standing Inpatient Medications  abacavir 600 milliGRAM(s) Oral daily  aspirin enteric coated 81 milliGRAM(s) Oral daily  dextrose 50% Injectable 50 milliLiter(s) IV Push every 15 minutes  dextrose 50% Injectable 25 milliLiter(s) IV Push every 15 minutes  Doravirine (Pefeltro), 100 mG 1 Tablet(s) 1 Tablet(s) Oral daily  finasteride 5 milliGRAM(s) Oral daily  insulin lispro (HumaLOG) corrective regimen sliding scale   SubCutaneous three times a day before meals  insulin lispro (HumaLOG) corrective regimen sliding scale   SubCutaneous at bedtime  lamiVUDine- milliGRAM(s) Oral daily  metoprolol tartrate 25 milliGRAM(s) Oral every 12 hours  mupirocin 2% Ointment 1 Application(s) Both Nostrils two times a day  pantoprazole    Tablet 40 milliGRAM(s) Oral before breakfast  polyethylene glycol 3350 17 Gram(s) Oral daily  sodium chloride 0.9% lock flush 3 milliLiter(s) IV Push every 8 hours  tamsulosin 0.4 milliGRAM(s) Oral at bedtime    PRN Inpatient Medications  dextrose 40% Gel 15 Gram(s) Oral once PRN  glucagon  Injectable 1 milliGRAM(s) IntraMuscular once PRN  oxycodone    5 mG/acetaminophen 325 mG 1 Tablet(s) Oral every 6 hours PRN  oxycodone    5 mG/acetaminophen 325 mG 2 Tablet(s) Oral every 6 hours PRN      REVIEW OF SYSTEMS  --------------------------------------------------------------------------------  Gen: No fevers/chills  Skin: No rashes  Head/Eyes/Ears: Normal hearing,   Respiratory: No dyspnea, cough  CV: No chest pain  GI: No abdominal pain, diarrhea  : No dysuria, hematuria  MSK: No  edema  Heme: No easy bruising or bleeding  Psych: No significant depression      All other systems were reviewed and are negative, except as noted.    VITALS/PHYSICAL EXAM  --------------------------------------------------------------------------------  T(C): 37.7 (09-04-20 @ 11:20), Max: 37.7 (09-04-20 @ 11:20)  HR: 79 (09-04-20 @ 11:20) (51 - 79)  BP: 107/62 (09-04-20 @ 11:20) (96/51 - 130/72)  RR: 18 (09-04-20 @ 05:30) (18 - 20)  SpO2: 96% (09-04-20 @ 11:20) (95% - 97%)  Wt(kg): --        09-03-20 @ 07:01  -  09-04-20 @ 07:00  --------------------------------------------------------  IN: 780 mL / OUT: 1705 mL / NET: -925 mL    09-04-20 @ 07:01  -  09-04-20 @ 13:44  --------------------------------------------------------  IN: 400 mL / OUT: 50 mL / NET: 350 mL        Physical Exam:  	Gen: NAD, well-appearing  	HEENT: Anicteric   	Pulm: CTA B/L, no wheezing   	CV: RRR, S1S2; no rub   	Abd: +BS, soft, nontender/nondistended       	: No suprapubic tenderness  	MSK: Warm, no clubbing, intact strength; no edema  	Neuro: No focal deficits, AOX3, no asterixis        LABS/STUDIES  --------------------------------------------------------------------------------              9.2    12.29 >-----------<  78       [09-04-20 @ 06:17]              27.6     134  |  99  |  44  ----------------------------<  112      [09-04-20 @ 06:17]  3.8   |  24  |  2.12        Ca     8.4     [09-04-20 @ 06:17]      Mg     2.5     [09-04-20 @ 06:17]      Phos  2.7     [09-04-20 @ 06:17]    TPro  5.9  /  Alb  3.3  /  TBili  0.4  /  DBili  x   /  AST  50  /  ALT  32  /  AlkPhos  140  [09-03-20 @ 00:29]    PT/INR: PT 15.4 , INR 1.31       [09-04-20 @ 06:17]  PTT: 31.3       [09-04-20 @ 06:17]      Creatinine Trend:  SCr 2.12 [09-04 @ 06:17]  SCr 1.75 [09-03 @ 00:29]  SCr 1.96 [09-02 @ 00:46]  SCr 1.83 [09-01 @ 02:11]  SCr 1.56 [08-31 @ 20:38]    Urinalysis - [09-03-20 @ 08:24]      Color Yellow / Appearance Clear / SG 1.021 / pH 6.0      Gluc Negative / Ketone Negative  / Bili Negative / Urobili Negative       Blood Moderate / Protein 30 mg/dL / Leuk Est Large / Nitrite Negative      RBC 68 / WBC 29 / Hyaline 6 / Gran 2 / Sq Epi  / Non Sq Epi 1 / Bacteria Negative      TSH 4.24      [08-22-20 @ 12:42]

## 2020-09-04 NOTE — PROGRESS NOTE ADULT - SUBJECTIVE AND OBJECTIVE BOX
VITAL SIGNS    Subjective: "I'm feeling ok." Denies CP, palpitation, SOB, PRESCOTT, HA, dizziness, N/V/D, fever or chills.  No acute event noted overnight.     Telemetry: Afib / Junctional / AV dissociation 40-60     Vital Signs Last 24 Hrs  T(C): 37.7 (20 @ 11:20), Max: 37.7 (20 @ 11:20)  T(F): 99.8 (20 @ 11:20), Max: 99.8 (20 @ 11:20)  HR: 79 (20 @ 11:20) (51 - 79)  BP: 107/62 (20 @ 11:20) (96/51 - 130/72)  RR: 18 (20 @ 05:30) (18 - 20)  SpO2: 96% (20 @ 11:20) (95% - 97%)              @ 07:01  -   @ 07:00  --------------------------------------------------------  IN: 780 mL / OUT: 1705 mL / NET: -925 mL     @ 07:01  -   @ 13:46  --------------------------------------------------------  IN: 400 mL / OUT: 50 mL / NET: 350 mL    Daily     Daily Weight in k.4 (04 Sep 2020 08:01)    CAPILLARY BLOOD GLUCOSE    POCT Blood Glucose.: 101 mg/dL (04 Sep 2020 11:24)  POCT Blood Glucose.: 104 mg/dL (04 Sep 2020 07:38)  POCT Blood Glucose.: 135 mg/dL (03 Sep 2020 21:48)  POCT Blood Glucose.: 162 mg/dL (03 Sep 2020 16:49)       PHYSICAL EXAM    Neurology: alert and oriented x 3, nonfocal, no gross deficits    CV: (+) S1 and S2, No murmurs, rubs, gallops or clicks     Sternal Wound: MSI -->CDI , sternum stable    Lungs: CTA B/L     Abdomen: soft, nontender, nondistended, positive bowel sounds, (+) Flatus; (+) BM     : Indwelling tovar cath --> SD             Extremities:  B/L LE (+) edema; negative calf tenderness; (+) 2 DP palpable        abacavir 600 milliGRAM(s) Oral daily  aspirin enteric coated 81 milliGRAM(s) Oral daily  Doravirine (Pefeltro), 100 mG 1 Tablet(s) 1 Tablet(s) Oral daily  finasteride 5 milliGRAM(s) Oral daily  glucagon  Injectable 1 milliGRAM(s) IntraMuscular once PRN  insulin lispro (HumaLOG) corrective regimen sliding scale SubCutaneous three times a day before meals  insulin lispro (HumaLOG) corrective regimen sliding scale SubCutaneous at bedtime  lamiVUDine- milliGRAM(s) Oral daily  metoprolol tartrate 25 milliGRAM(s) Oral every 12 hours  mupirocin 2% Ointment 1 Application(s) Both Nostrils two times a day  oxycodone 5 mG/acetaminophen 325 mG 1 Tablet(s) Oral every 6 hours PRN  oxycodone 5 mG/acetaminophen 325 mG 2 Tablet(s) Oral every 6 hours PRN  pantoprazole Tablet 40 milliGRAM(s) Oral before breakfast  polyethylene glycol 3350 17 Gram(s) Oral daily  sodium chloride 0.9% lock flush 3 milliLiter(s) IV Push every 8 hours  tamsulosin 0.4 milliGRAM(s) Oral at bedtime  warfarin 5 milliGRAM(s) Oral once    Physical Therapy Rec:   Home  [  ]   Home w/ PT  [  ]  Rehab  [  ]    Discussed with Cardiothoracic Team at AM rounds. VITAL SIGNS    Subjective: "I'm feeling ok." Denies CP, palpitation, SOB, PRESCOTT, HA, dizziness, N/V/D, fever or chills.  No acute event noted overnight.     Telemetry: Afib / Junctional / AV dissociation 40-60     Vital Signs Last 24 Hrs  T(C): 37.7 (20 @ 11:20), Max: 37.7 (20 @ 11:20)  T(F): 99.8 (20 @ 11:20), Max: 99.8 (20 @ 11:20)  HR: 79 (20 @ 11:20) (51 - 79)  BP: 107/62 (20 @ 11:20) (96/51 - 130/72)  RR: 18 (20 @ 05:30) (18 - 20)  SpO2: 96% (20 @ 11:20) (95% - 97%)              @ 07:01  -   @ 07:00  --------------------------------------------------------  IN: 780 mL / OUT: 1705 mL / NET: -925 mL     @ 07:01  -   @ 13:46  --------------------------------------------------------  IN: 400 mL / OUT: 50 mL / NET: 350 mL    Daily     Daily Weight in k.4 (04 Sep 2020 08:01)    CAPILLARY BLOOD GLUCOSE    POCT Blood Glucose.: 101 mg/dL (04 Sep 2020 11:24)  POCT Blood Glucose.: 104 mg/dL (04 Sep 2020 07:38)  POCT Blood Glucose.: 135 mg/dL (03 Sep 2020 21:48)  POCT Blood Glucose.: 162 mg/dL (03 Sep 2020 16:49)       PHYSICAL EXAM    Neurology: alert and oriented x 3, nonfocal, no gross deficits    CV: (+) S1 and S2, No murmurs, rubs, gallops or clicks     Sternal Wound: MSI -->CDI, sternum stable; MS marques drain --> SS. PW x 2 --> EPM  80      Lungs: CTA B/L     Abdomen: soft, nontender, nondistended, positive bowel sounds, (+) Flatus; (+) BM     : Indwelling tovar cath --> SD             Extremities:  B/L LE (+) edema; negative calf tenderness; (+) 2 DP palpable        abacavir 600 milliGRAM(s) Oral daily  aspirin enteric coated 81 milliGRAM(s) Oral daily  Doravirine (Pefeltro), 100 mG 1 Tablet(s) 1 Tablet(s) Oral daily  finasteride 5 milliGRAM(s) Oral daily  glucagon Injectable 1 milliGRAM(s) IntraMuscular once PRN  insulin lispro (HumaLOG) corrective regimen sliding scale SubCutaneous three times a day before meals  insulin lispro (HumaLOG) corrective regimen sliding scale SubCutaneous at bedtime  lamiVUDine- milliGRAM(s) Oral daily  metoprolol tartrate 25 milliGRAM(s) Oral every 12 hours  mupirocin 2% Ointment 1 Application(s) Both Nostrils two times a day  oxycodone 5 mG/acetaminophen 325 mG 1 Tablet(s) Oral every 6 hours PRN  oxycodone 5 mG/acetaminophen 325 mG 2 Tablet(s) Oral every 6 hours PRN  pantoprazole Tablet 40 milliGRAM(s) Oral before breakfast  polyethylene glycol 3350 17 Gram(s) Oral daily  sodium chloride 0.9% lock flush 3 milliLiter(s) IV Push every 8 hours  tamsulosin 0.4 milliGRAM(s) Oral at bedtime  warfarin 5 milliGRAM(s) Oral once    Physical Therapy Rec:   Home  [  ]   Home w/ PT  [  ]  Rehab  [  ]    Discussed with Cardiothoracic Team at AM rounds.

## 2020-09-04 NOTE — CHART NOTE - NSCHARTNOTEFT_GEN_A_CORE
Urology called to discuss removing tovar catheter in the setting of an uptrending SCr. Pt has baseline urinary retention which he manages at home with self catheterization TID. Patient does not like the hospital supplies for straight catheterizing therefore advised to keep indwelling tovar while he is admitted to hospital. Continue to trend SCr and can have tovar removed once SCr nadirs prior to discharge so he may resume CIC at home.

## 2020-09-04 NOTE — PROGRESS NOTE ADULT - ASSESSMENT
80M hx of HIV(CB5=766, VL undet), detrusor muscle weakness with self straight cath, CAD presented after a fall at home.  Labs noted for WBC 16.5, Cr 2.2, CPK 3857, lactate 3.1, positive UA on admission  cellulitis on left knee- resolved  bacteremia with 2/2 sets on 8/19 growing coag. negative staph in the setting of valvular heart disease but no prior prosthetic valve  All other cultures were negative      A portion of the vegetation was sent to the Microbiology lab for testing and stains are negative for AFB, fungus, bacteria and cultures are no growth to date    Q fever, Bartonella, Brucella and Legionella titers/serology were negative    Unclear if the vegetation was related to the coag negative staph bacteremia which would represent a very unusual cause of endocarditis in a native valve, or is this culture negative endocarditis or is it non infectious (marantic)  will continue the Vancomycin trough therapeutic on 9/4    Vegetation sent out by Microbiology lab for further PCR testing    HIV:  He is receiving his out pt regimen of Doravirine/epivir/abacavir  Would ask the pharmacy to check for interactions with the doravirine and his new cardiac medications  IF needed he can be switched to Dolutegravir in place of Doravirine    Tung Almazan MD  [phone]  After 5pm/weekends [phone]

## 2020-09-04 NOTE — PROGRESS NOTE ADULT - PROBLEM SELECTOR PLAN 1
Will continue current insulin regimen for now, continue monitoring FS and FU.  Patient in pre-diabetic range, A1C 5.7%, blood sugars trending at target with minimal management. No need to d/c on DM meds, patient was counseled for compliance with consistent low carb diet and physical activity as tolerated outpatient. Will FU outpatient endo in 3 months.

## 2020-09-04 NOTE — PROGRESS NOTE ADULT - ASSESSMENT
Assessment  Hyperglycemia: No hx DM, A1C 5.7%, had postoperative hyperglycemia, on moderate-scale insulin coverage, blood sugars now improved and in acceptable range, no hypoglycemic episodes, eating meals, appears comfortable.  Hypothyroidism: 80y male with no history thyroid disease, was not on any thyroid supplements, on HIV meds, TSH borderline elevated, subclinical hypothyroid, TPO ab WNL.  Endocarditis: S/P MVR, on meds, stable, monitored.        Nesha Izaguirre MD  Cell: [phone]  Office: [phone] Assessment  Hyperglycemia: No hx DM, A1C 5.7%, had postoperative hyperglycemia, on moderate-scale insulin coverage, blood sugars now improved and in acceptable range, no hypoglycemic episodes, eating meals, appears comfortable.  Hypothyroidism: 80y male with no history thyroid disease, was not on any thyroid supplements, on HIV meds, TSH borderline elevated, subclinical hypothyroid, TPO ab WNL.  Endocarditis: S/P MVR, on meds, stable, monitored.        Nesha Izaguirre MD  Cell: [phone]  Office: [phone]

## 2020-09-04 NOTE — PROGRESS NOTE ADULT - PROBLEM SELECTOR PLAN 6
Endo following   Continue with Steady Carb diet  Melony MESSINA and Endo following   Continue with Steady Carb diet  Accucheck AC and HS and cover with RISS

## 2020-09-04 NOTE — PROGRESS NOTE ADULT - SUBJECTIVE AND OBJECTIVE BOX
INFECTIOUS DISEASES FOLLOW UP-- Dorita Almazan  [phone]    This is a follow up note for this  80yMale with  subacute/acute endocarditis requiring MVR  developed A-V dissociation earlier today requiring pacing  feeling well this afternoon sitting in a chair      ROS:  CONSTITUTIONAL:  No fever, good appetite  CARDIOVASCULAR:  No chest pain or palpitations  RESPIRATORY:  No dyspnea  GASTROINTESTINAL:  No nausea, vomiting, diarrhea, or abdominal pain  GENITOURINARY:  No dysuria  NEUROLOGIC:  No headache,     Allergies    No Known Allergies    Intolerances        ANTIBIOTICS/RELEVANT:  antimicrobials  abacavir 600 milliGRAM(s) Oral daily  lamiVUDine- milliGRAM(s) Oral daily    immunologic:    OTHER:  aspirin enteric coated 81 milliGRAM(s) Oral daily  dextrose 40% Gel 15 Gram(s) Oral once PRN  dextrose 50% Injectable 50 milliLiter(s) IV Push every 15 minutes  dextrose 50% Injectable 25 milliLiter(s) IV Push every 15 minutes  Doravirine (Pefeltro), 100 mG 1 Tablet(s) 1 Tablet(s) Oral daily  finasteride 5 milliGRAM(s) Oral daily  glucagon  Injectable 1 milliGRAM(s) IntraMuscular once PRN  insulin lispro (HumaLOG) corrective regimen sliding scale   SubCutaneous three times a day before meals  insulin lispro (HumaLOG) corrective regimen sliding scale   SubCutaneous at bedtime  metoprolol tartrate 25 milliGRAM(s) Oral every 12 hours  mupirocin 2% Ointment 1 Application(s) Both Nostrils two times a day  oxycodone    5 mG/acetaminophen 325 mG 1 Tablet(s) Oral every 6 hours PRN  oxycodone    5 mG/acetaminophen 325 mG 2 Tablet(s) Oral every 6 hours PRN  pantoprazole    Tablet 40 milliGRAM(s) Oral before breakfast  polyethylene glycol 3350 17 Gram(s) Oral daily  sodium chloride 0.9% lock flush 3 milliLiter(s) IV Push every 8 hours  tamsulosin 0.4 milliGRAM(s) Oral at bedtime  warfarin 5 milliGRAM(s) Oral once      Objective:  Vital Signs Last 24 Hrs  T(C): 37.7 (04 Sep 2020 11:20), Max: 37.7 (04 Sep 2020 11:20)  T(F): 99.8 (04 Sep 2020 11:20), Max: 99.8 (04 Sep 2020 11:20)  HR: 79 (04 Sep 2020 11:20) (51 - 79)  BP: 107/62 (04 Sep 2020 11:20) (96/51 - 130/72)  BP(mean): --  RR: 18 (04 Sep 2020 05:30) (18 - 20)  SpO2: 96% (04 Sep 2020 11:20) (95% - 97%)    PHYSICAL EXAM:  Constitutional:no acute distress  Eyes:EMILY, EOMI  Ear/Nose/Throat: no oral lesions, 	  Respiratory: clear BL  sternotomy site no erythema or drainage  Cardiovascular: [id number]  Gastrointestinal:soft, (+) BS, no tenderness  Extremities:no e/e/c  No Lymphadenopathy  IV sites not inflammed.    LABS:                        9.2    12.29 )-----------( 78       ( 04 Sep 2020 06:17 )             27.6     09-04    134<L>  |  100  |  41<H>  ----------------------------<  101<H>  4.4   |  24  |  1.90<H>    Ca    8.5      04 Sep 2020 17:29  Phos  2.7     09-04  Mg     2.5     09-04    TPro  5.9<L>  /  Alb  3.3  /  TBili  0.4  /  DBili  x   /  AST  50<H>  /  ALT  32  /  AlkPhos  140<H>  09-03    PT/INR - ( 04 Sep 2020 06:17 )   PT: 15.4 sec;   INR: 1.31 ratio         PTT - ( 04 Sep 2020 06:17 )  PTT:31.3 sec  Urinalysis Basic - ( 03 Sep 2020 08:24 )    Color: Yellow / Appearance: Clear / S.021 / pH: x  Gluc: x / Ketone: Negative  / Bili: Negative / Urobili: Negative   Blood: x / Protein: 30 mg/dL / Nitrite: Negative   Leuk Esterase: Large / RBC: 68 /hpf / WBC 29 /HPF   Sq Epi: x / Non Sq Epi: 1 / Bacteria: Negative        MICROBIOLOGY:            RECENT CULTURES:   @ 21:15  .Tissue Other  --  --  --    Culture is being performed.  --   @ 21:12  .Surgical Swab mitral vegetation  --  --  --    No growth  --      RADIOLOGY & ADDITIONAL STUDIES:    < from: Xray Chest 1 View- PORTABLE-Routine (20 @ 03:11) >  IMPRESSION:    The mediastinal cardiac silhouette is unremarkable. Sternotomy and atrial clip. Mediastinal drains.    Trace bilateral effusions and bibasilar atelectasis. No pneumothorax.    No acute osseous finding.    < end of copied text >

## 2020-09-04 NOTE — PROGRESS NOTE ADULT - PROBLEM SELECTOR PLAN 3
ID Following   Continue with abacavir 600 mg PO daily  Continue with Doravirine 100 mg PO daily  Continue with lamiVUDine- mg PO daily

## 2020-09-05 LAB
ANION GAP SERPL CALC-SCNC: 10 MMOL/L — SIGNIFICANT CHANGE UP (ref 5–17)
BUN SERPL-MCNC: 36 MG/DL — HIGH (ref 7–23)
CALCIUM SERPL-MCNC: 8.3 MG/DL — LOW (ref 8.4–10.5)
CHLORIDE SERPL-SCNC: 101 MMOL/L — SIGNIFICANT CHANGE UP (ref 96–108)
CO2 SERPL-SCNC: 22 MMOL/L — SIGNIFICANT CHANGE UP (ref 22–31)
CREAT SERPL-MCNC: 1.74 MG/DL — HIGH (ref 0.5–1.3)
CULTURE RESULTS: NO GROWTH — SIGNIFICANT CHANGE UP
CULTURE RESULTS: NO GROWTH — SIGNIFICANT CHANGE UP
GLUCOSE SERPL-MCNC: 118 MG/DL — HIGH (ref 70–99)
HCT VFR BLD CALC: 27.8 % — LOW (ref 39–50)
HGB BLD-MCNC: 9 G/DL — LOW (ref 13–17)
INR BLD: 1.63 RATIO — HIGH (ref 0.88–1.16)
MCHC RBC-ENTMCNC: 32.4 GM/DL — SIGNIFICANT CHANGE UP (ref 32–36)
MCHC RBC-ENTMCNC: 33 PG — SIGNIFICANT CHANGE UP (ref 27–34)
MCV RBC AUTO: 101.8 FL — HIGH (ref 80–100)
NRBC # BLD: 0 /100 WBCS — SIGNIFICANT CHANGE UP (ref 0–0)
PLATELET # BLD AUTO: 108 K/UL — LOW (ref 150–400)
POTASSIUM SERPL-MCNC: 4.1 MMOL/L — SIGNIFICANT CHANGE UP (ref 3.5–5.3)
POTASSIUM SERPL-SCNC: 4.1 MMOL/L — SIGNIFICANT CHANGE UP (ref 3.5–5.3)
PROTHROM AB SERPL-ACNC: 18.9 SEC — HIGH (ref 10.6–13.6)
RBC # BLD: 2.73 M/UL — LOW (ref 4.2–5.8)
RBC # FLD: 15.8 % — HIGH (ref 10.3–14.5)
SODIUM SERPL-SCNC: 133 MMOL/L — LOW (ref 135–145)
SPECIMEN SOURCE: SIGNIFICANT CHANGE UP
SPECIMEN SOURCE: SIGNIFICANT CHANGE UP
VANCOMYCIN TROUGH SERPL-MCNC: 11.4 UG/ML — SIGNIFICANT CHANGE UP (ref 10–20)
WBC # BLD: 9.98 K/UL — SIGNIFICANT CHANGE UP (ref 3.8–10.5)
WBC # FLD AUTO: 9.98 K/UL — SIGNIFICANT CHANGE UP (ref 3.8–10.5)

## 2020-09-05 RX ORDER — FOLIC ACID 0.8 MG
1 TABLET ORAL DAILY
Refills: 0 | Status: DISCONTINUED | OUTPATIENT
Start: 2020-09-05 | End: 2020-09-11

## 2020-09-05 RX ORDER — ASCORBIC ACID 60 MG
500 TABLET,CHEWABLE ORAL DAILY
Refills: 0 | Status: DISCONTINUED | OUTPATIENT
Start: 2020-09-05 | End: 2020-09-11

## 2020-09-05 RX ORDER — FERROUS SULFATE 325(65) MG
325 TABLET ORAL DAILY
Refills: 0 | Status: DISCONTINUED | OUTPATIENT
Start: 2020-09-05 | End: 2020-09-11

## 2020-09-05 RX ORDER — WARFARIN SODIUM 2.5 MG/1
3 TABLET ORAL ONCE
Refills: 0 | Status: COMPLETED | OUTPATIENT
Start: 2020-09-05 | End: 2020-09-05

## 2020-09-05 RX ORDER — FUROSEMIDE 40 MG
40 TABLET ORAL DAILY
Refills: 0 | Status: DISCONTINUED | OUTPATIENT
Start: 2020-09-05 | End: 2020-09-11

## 2020-09-05 RX ADMIN — Medication 1 MILLIGRAM(S): at 13:02

## 2020-09-05 RX ADMIN — OXYCODONE AND ACETAMINOPHEN 2 TABLET(S): 5; 325 TABLET ORAL at 01:37

## 2020-09-05 RX ADMIN — SODIUM CHLORIDE 3 MILLILITER(S): 9 INJECTION INTRAMUSCULAR; INTRAVENOUS; SUBCUTANEOUS at 22:05

## 2020-09-05 RX ADMIN — WARFARIN SODIUM 3 MILLIGRAM(S): 2.5 TABLET ORAL at 21:38

## 2020-09-05 RX ADMIN — Medication 40 MILLIGRAM(S): at 13:03

## 2020-09-05 RX ADMIN — TAMSULOSIN HYDROCHLORIDE 0.4 MILLIGRAM(S): 0.4 CAPSULE ORAL at 21:38

## 2020-09-05 RX ADMIN — OXYCODONE AND ACETAMINOPHEN 2 TABLET(S): 5; 325 TABLET ORAL at 01:04

## 2020-09-05 RX ADMIN — FINASTERIDE 5 MILLIGRAM(S): 5 TABLET, FILM COATED ORAL at 12:54

## 2020-09-05 RX ADMIN — Medication 325 MILLIGRAM(S): at 13:02

## 2020-09-05 RX ADMIN — Medication 81 MILLIGRAM(S): at 12:54

## 2020-09-05 RX ADMIN — MUPIROCIN 1 APPLICATION(S): 20 OINTMENT TOPICAL at 05:45

## 2020-09-05 RX ADMIN — SODIUM CHLORIDE 3 MILLILITER(S): 9 INJECTION INTRAMUSCULAR; INTRAVENOUS; SUBCUTANEOUS at 13:05

## 2020-09-05 RX ADMIN — Medication 100 MILLIGRAM(S): at 12:55

## 2020-09-05 RX ADMIN — ABACAVIR 600 MILLIGRAM(S): 20 SOLUTION ORAL at 12:54

## 2020-09-05 RX ADMIN — SODIUM CHLORIDE 3 MILLILITER(S): 9 INJECTION INTRAMUSCULAR; INTRAVENOUS; SUBCUTANEOUS at 05:46

## 2020-09-05 RX ADMIN — Medication 500 MILLIGRAM(S): at 13:02

## 2020-09-05 RX ADMIN — MUPIROCIN 1 APPLICATION(S): 20 OINTMENT TOPICAL at 17:14

## 2020-09-05 RX ADMIN — Medication 25 MILLIGRAM(S): at 05:45

## 2020-09-05 RX ADMIN — PANTOPRAZOLE SODIUM 40 MILLIGRAM(S): 20 TABLET, DELAYED RELEASE ORAL at 05:45

## 2020-09-05 NOTE — PROGRESS NOTE ADULT - SUBJECTIVE AND OBJECTIVE BOX
Chief complaint  Patient is a 80y old  Male who presents with a chief complaint of fall (04 Sep 2020 18:43)   Review of systems  Patient in bed, appears comfortable.    Labs and Fingersticks  CAPILLARY BLOOD GLUCOSE    POCT Blood Glucose.: 95 mg/dL (05 Sep 2020 11:51)  POCT Blood Glucose.: 87 mg/dL (05 Sep 2020 07:53)  POCT Blood Glucose.: 112 mg/dL (04 Sep 2020 21:33)  POCT Blood Glucose.: 122 mg/dL (04 Sep 2020 16:58)      Anion Gap, Serum: 10 (09-05 @ 06:09)  Anion Gap, Serum: 10 (09-04 @ 17:29)  Anion Gap, Serum: 11 (09-04 @ 06:17)      Calcium, Total Serum: 8.3 <L> (09-05 @ 06:09)  Calcium, Total Serum: 8.5 (09-04 @ 17:29)  Calcium, Total Serum: 8.4 (09-04 @ 06:17)          09-05    133<L>  |  101  |  36<H>  ----------------------------<  118<H>  4.1   |  22  |  1.74<H>    Ca    8.3<L>      05 Sep 2020 06:09  Phos  2.7     09-04  Mg     2.5     09-04                          9.0    9.98  )-----------( 108      ( 05 Sep 2020 06:09 )             27.8     Medications  MEDICATIONS  (STANDING):  abacavir 600 milliGRAM(s) Oral daily  ascorbic acid 500 milliGRAM(s) Oral daily  aspirin enteric coated 81 milliGRAM(s) Oral daily  dextrose 50% Injectable 50 milliLiter(s) IV Push every 15 minutes  dextrose 50% Injectable 25 milliLiter(s) IV Push every 15 minutes  Doravirine (Pefeltro), 100 mG 1 Tablet(s) 1 Tablet(s) Oral daily  ferrous    sulfate 325 milliGRAM(s) Oral daily  finasteride 5 milliGRAM(s) Oral daily  folic acid 1 milliGRAM(s) Oral daily  furosemide    Tablet 40 milliGRAM(s) Oral daily  insulin lispro (HumaLOG) corrective regimen sliding scale   SubCutaneous three times a day before meals  insulin lispro (HumaLOG) corrective regimen sliding scale   SubCutaneous at bedtime  lamiVUDine- milliGRAM(s) Oral daily  mupirocin 2% Ointment 1 Application(s) Both Nostrils two times a day  pantoprazole    Tablet 40 milliGRAM(s) Oral before breakfast  polyethylene glycol 3350 17 Gram(s) Oral daily  sodium chloride 0.9% lock flush 3 milliLiter(s) IV Push every 8 hours  tamsulosin 0.4 milliGRAM(s) Oral at bedtime      Physical Exam  General: Patient comfortable in bed  Vital Signs Last 12 Hrs  T(F): 98.6 (09-05-20 @ 05:29), Max: 98.8 (09-05-20 @ 01:00)  HR: 79 (09-05-20 @ 05:29) (79 - 80)  BP: 114/68 (09-05-20 @ 05:29) (113/65 - 114/68)  BP(mean): 84 (09-05-20 @ 05:29) (84 - 84)  RR: 18 (09-05-20 @ 05:29) (18 - 18)  SpO2: 97% (09-05-20 @ 05:29) (95% - 97%)  Neck: No palpable thyroid nodules.  CVS: S1S2, No murmurs  Respiratory: No wheezing, no crepitations  GI: Abdomen soft, bowel sounds positive  Musculoskeletal:  edema lower extremities.     Diagnostics

## 2020-09-05 NOTE — PROGRESS NOTE ADULT - ASSESSMENT
81 y/o man with PMHx of HIV, CAD, detrusor muscle weakness, BPH presenting for fall, unresponsive for 24 hrs, admitted with rhabdo, bilateral severe ureterohydronephrosis and thickened bladder on CTAP 2/2 inability to continue his home straight cath 3x daily after fall, tovar in place. Rhabdo improved, hydronephrosis improved with tovar (urology following). NOHEMI shows endocarditis (NOHEMI: 1.8x1.3 vegetation on medial aspect of anterior leaflet with partial flail component + 0.5x1.3cm vegetation no posterior leaflet w severe eccentric mitral regurg). BCx staph epidermidis, repeat NGTD, on vancomycin. s/p MRA without septic emboli. s/p cardiac cath  normal cors. Followed by ID Dr. Almazan cleared for OR. Elevated creatinine followed by Nephrology monitoring creatinine this afternoon. Endocrine consulted Dr. Izaguirre.  On 8/31/20 s/p MVR (T) / SHELLIE clip   Post op Course: Extubated POD #0  ID following for endocarditis; and HIV à Vancomycin 1g daily for now; Check daily labs for vancomycin random level, goal is 15-20.  Hyperglycemia à Endocrine following   SUDHEER on CKD à Renal following. Tovar in place.   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  9/4 overnight noted with Accelerated junctional / AV disassociation. Continue on AC therapy Coumadin 5 mg PO tonight.  V Paced à 80.  Monitor renal function.  Creatinine 2.1 will repeat BMP this afternoon.   9/5 EPM rate decreased to rate 70. Coumadin dosing tonight for INR 1.6. Tovar maintained. As per urology plan to d/c tovar when creatinine becomes stable, then pt can continue self cath at home for his baseline urinary retention. 81 y/o man with PMHx of HIV, CAD, detrusor muscle weakness, BPH presenting for fall, unresponsive for 24 hrs, admitted with rhabdo, bilateral severe ureterohydronephrosis and thickened bladder on CTAP 2/2 inability to continue his home straight cath 3x daily after fall, tovar in place. Rhabdo improved, hydronephrosis improved with tovar (urology following). NOHEMI shows endocarditis (NOHEMI: 1.8x1.3 vegetation on medial aspect of anterior leaflet with partial flail component + 0.5x1.3cm vegetation no posterior leaflet w severe eccentric mitral regurg). BCx staph epidermidis, repeat NGTD, on vancomycin. s/p MRA without septic emboli. s/p cardiac cath  normal cors. Followed by ID Dr. Almazan cleared for OR. Elevated creatinine followed by Nephrology monitoring creatinine this afternoon. Endocrine consulted Dr. Izaguirre.  On 8/31/20 s/p MVR (T) / SHELLIE clip   Post op Course: Extubated POD #0  ID following for endocarditis; and HIV à Vancomycin 1g daily for now; Check daily labs for vancomycin random level, goal is 15-20.  Hyperglycemia à Endocrine following   SUDHEER on CKD à Renal following. Tovar in place.   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  9/4 overnight noted with Accelerated junctional / AV disassociation. Continue on AC therapy Coumadin 5 mg PO tonight.  V Paced à 80.  Monitor renal function.  Creatinine 2.1 will repeat BMP this afternoon.   9/5 EPM rate decreased to rate 70. Coumadin dosing tonight for INR 1.6. Tovar maintained. As per urology plan to d/c tovar when creatinine becomes stable, then pt can continue self cath at home for his baseline urinary retention. Lasix 40 mg PO added.

## 2020-09-05 NOTE — PROGRESS NOTE ADULT - PROBLEM SELECTOR PLAN 5
Renal following   Monitor Renal function   Maintain tovar --> SD   (+) UA likely colonization per ID   Urology following will discuss timing for TOV    urology recs noted, to d/c tovar when SCr stabilizes  plan then is for pt to continue self cath at home (intermittently)   Avoid Nephrotoxic toxic agent

## 2020-09-05 NOTE — PROGRESS NOTE ADULT - ASSESSMENT
Assessment  Hyperglycemia: No hx DM, A1C 5.7%, had postoperative hyperglycemia, on moderate-scale insulin coverage, blood sugars now improved and in acceptable range, no hypoglycemic episodes, eating meals, appears comfortable.  Hypothyroidism: 80y male with no history thyroid disease, was not on any thyroid supplements, on HIV meds, TSH borderline elevated, subclinical hypothyroid, TPO ab WNL, will repeat TFTs and FU  Endocarditis: S/P MVR, on meds, stable, monitored.        Nesha Izaguirre MD  Cell: [phone]  Office: [phone]

## 2020-09-05 NOTE — PROGRESS NOTE ADULT - SUBJECTIVE AND OBJECTIVE BOX
Interval Hx; Events Overnight:  SUBJECTIVE: " I feel ok "    LABS:                9.0                  133  | 22   | 36           9.98  >-----------< 108     ------------------------< 118                   27.8                 4.1  | 101  | 1.74                                         Ca 8.3   Mg x     Ph x          PT/INR - ( 05 Sep 2020 06:09 )   PT: 18.9 sec;   INR: 1.63 ratio             VITAL SIGNS    Telemetry: V- paced 70     Daily     Daily       Vital Signs Last 24 Hrs  T(C): 37.7 (09-05-20 @ 12:40), Max: 37.7 (09-05-20 @ 12:40)  T(F): 99.9 (09-05-20 @ 12:40), Max: 99.9 (09-05-20 @ 12:40)  HR: 70 (09-05-20 @ 12:40) (70 - 80)  BP: 118/61 (09-05-20 @ 12:40) (102/53 - 136/72)  RR: 18 (09-05-20 @ 12:40) (18 - 18)  SpO2: 98% (09-05-20 @ 12:40) (95% - 98%)             I&O's Detail    04 Sep 2020 07:01  -  05 Sep 2020 07:00  --------------------------------------------------------  IN:    IV PiggyBack: 250 mL    Oral Fluid: 1200 mL  Total IN: 1450 mL    OUT:    Bulb: 125 mL    Voided: 2650 mL  Total OUT: 2775 mL    Total NET: -1325 mL      05 Sep 2020 07:01  -  05 Sep 2020 15:10  --------------------------------------------------------  IN:    Oral Fluid: 160 mL  Total IN: 160 mL    OUT:    Indwelling Catheter - Urethral: 650 mL  Total OUT: 650 mL    Total NET: -490 mL                    GLUCOSE  CAPILLARY BLOOD GLUCOSE      POCT Blood Glucose.: 95 mg/dL (05 Sep 2020 11:51)  POCT Blood Glucose.: 87 mg/dL (05 Sep 2020 07:53)  POCT Blood Glucose.: 112 mg/dL (04 Sep 2020 21:33)  POCT Blood Glucose.: 122 mg/dL (04 Sep 2020 16:58)          Stef: [x ] mediastinal             PACER WIRE: YES [x ] NO [ ]  Setting: VVI 70        PHYSICAL EXAM      General: NAD, well appearing, in no distress  Neurology: A&O x3, non focal, no neuro deficits. Moves all extremities to command.  CV : s1 s2 RRR, no murmurs, gallops, clicks. +PW paced  Sternal Wound :  Midsternal opsite open  Lungs: clear to auscultation  Abdomen: soft, nontender, nondistended, positive bowel sounds, +BM  :    +tovar          Extremities:    no  edema. + pedal pulses      Skin: intact, no lesions        MEDICATIONS  abacavir 600 milliGRAM(s) Oral daily  ascorbic acid 500 milliGRAM(s) Oral daily  aspirin enteric coated 81 milliGRAM(s) Oral daily  dextrose 40% Gel 15 Gram(s) Oral once PRN  dextrose 50% Injectable 50 milliLiter(s) IV Push every 15 minutes  dextrose 50% Injectable 25 milliLiter(s) IV Push every 15 minutes  Doravirine (Pefeltro), 100 mG 1 Tablet(s) 1 Tablet(s) Oral daily  ferrous    sulfate 325 milliGRAM(s) Oral daily  finasteride 5 milliGRAM(s) Oral daily  folic acid 1 milliGRAM(s) Oral daily  furosemide    Tablet 40 milliGRAM(s) Oral daily  glucagon  Injectable 1 milliGRAM(s) IntraMuscular once PRN  insulin lispro (HumaLOG) corrective regimen sliding scale   SubCutaneous three times a day before meals  insulin lispro (HumaLOG) corrective regimen sliding scale   SubCutaneous at bedtime  lamiVUDine- milliGRAM(s) Oral daily  mupirocin 2% Ointment 1 Application(s) Both Nostrils two times a day  oxycodone    5 mG/acetaminophen 325 mG 1 Tablet(s) Oral every 6 hours PRN  oxycodone    5 mG/acetaminophen 325 mG 2 Tablet(s) Oral every 6 hours PRN  pantoprazole    Tablet 40 milliGRAM(s) Oral before breakfast  polyethylene glycol 3350 17 Gram(s) Oral daily  sodium chloride 0.9% lock flush 3 milliLiter(s) IV Push every 8 hours  tamsulosin 0.4 milliGRAM(s) Oral at bedtime

## 2020-09-06 LAB
ALBUMIN SERPL ELPH-MCNC: 2.9 G/DL — LOW (ref 3.3–5)
ALP SERPL-CCNC: 164 U/L — HIGH (ref 40–120)
ALT FLD-CCNC: 24 U/L — SIGNIFICANT CHANGE UP (ref 10–45)
ANION GAP SERPL CALC-SCNC: 10 MMOL/L — SIGNIFICANT CHANGE UP (ref 5–17)
AST SERPL-CCNC: 22 U/L — SIGNIFICANT CHANGE UP (ref 10–40)
BASOPHILS # BLD AUTO: 0.03 K/UL — SIGNIFICANT CHANGE UP (ref 0–0.2)
BASOPHILS NFR BLD AUTO: 0.3 % — SIGNIFICANT CHANGE UP (ref 0–2)
BILIRUB SERPL-MCNC: 0.3 MG/DL — SIGNIFICANT CHANGE UP (ref 0.2–1.2)
BUN SERPL-MCNC: 33 MG/DL — HIGH (ref 7–23)
CALCIUM SERPL-MCNC: 8.6 MG/DL — SIGNIFICANT CHANGE UP (ref 8.4–10.5)
CHLORIDE SERPL-SCNC: 100 MMOL/L — SIGNIFICANT CHANGE UP (ref 96–108)
CO2 SERPL-SCNC: 24 MMOL/L — SIGNIFICANT CHANGE UP (ref 22–31)
CREAT SERPL-MCNC: 1.71 MG/DL — HIGH (ref 0.5–1.3)
EOSINOPHIL # BLD AUTO: 0.16 K/UL — SIGNIFICANT CHANGE UP (ref 0–0.5)
EOSINOPHIL NFR BLD AUTO: 1.7 % — SIGNIFICANT CHANGE UP (ref 0–6)
GLUCOSE SERPL-MCNC: 112 MG/DL — HIGH (ref 70–99)
HCT VFR BLD CALC: 27.9 % — LOW (ref 39–50)
HGB BLD-MCNC: 9.1 G/DL — LOW (ref 13–17)
IMM GRANULOCYTES NFR BLD AUTO: 1.2 % — SIGNIFICANT CHANGE UP (ref 0–1.5)
INR BLD: 2.18 RATIO — HIGH (ref 0.88–1.16)
LYMPHOCYTES # BLD AUTO: 1.76 K/UL — SIGNIFICANT CHANGE UP (ref 1–3.3)
LYMPHOCYTES # BLD AUTO: 18.8 % — SIGNIFICANT CHANGE UP (ref 13–44)
MCHC RBC-ENTMCNC: 32.6 GM/DL — SIGNIFICANT CHANGE UP (ref 32–36)
MCHC RBC-ENTMCNC: 33.5 PG — SIGNIFICANT CHANGE UP (ref 27–34)
MCV RBC AUTO: 102.6 FL — HIGH (ref 80–100)
MONOCYTES # BLD AUTO: 1.16 K/UL — HIGH (ref 0–0.9)
MONOCYTES NFR BLD AUTO: 12.4 % — SIGNIFICANT CHANGE UP (ref 2–14)
NEUTROPHILS # BLD AUTO: 6.16 K/UL — SIGNIFICANT CHANGE UP (ref 1.8–7.4)
NEUTROPHILS NFR BLD AUTO: 65.6 % — SIGNIFICANT CHANGE UP (ref 43–77)
NRBC # BLD: 0 /100 WBCS — SIGNIFICANT CHANGE UP (ref 0–0)
PLATELET # BLD AUTO: 124 K/UL — LOW (ref 150–400)
POTASSIUM SERPL-MCNC: 3.8 MMOL/L — SIGNIFICANT CHANGE UP (ref 3.5–5.3)
POTASSIUM SERPL-SCNC: 3.8 MMOL/L — SIGNIFICANT CHANGE UP (ref 3.5–5.3)
PROT SERPL-MCNC: 5.6 G/DL — LOW (ref 6–8.3)
PROTHROM AB SERPL-ACNC: 25 SEC — HIGH (ref 10.6–13.6)
RBC # BLD: 2.72 M/UL — LOW (ref 4.2–5.8)
RBC # FLD: 15.7 % — HIGH (ref 10.3–14.5)
SODIUM SERPL-SCNC: 134 MMOL/L — LOW (ref 135–145)
T4 FREE SERPL-MCNC: 1.2 NG/DL — SIGNIFICANT CHANGE UP (ref 0.9–1.8)
TSH SERPL-MCNC: 6.9 UIU/ML — HIGH (ref 0.27–4.2)
WBC # BLD: 9.38 K/UL — SIGNIFICANT CHANGE UP (ref 3.8–10.5)
WBC # FLD AUTO: 9.38 K/UL — SIGNIFICANT CHANGE UP (ref 3.8–10.5)

## 2020-09-06 PROCEDURE — 93010 ELECTROCARDIOGRAM REPORT: CPT

## 2020-09-06 PROCEDURE — 99232 SBSQ HOSP IP/OBS MODERATE 35: CPT | Mod: GC

## 2020-09-06 RX ORDER — VANCOMYCIN HCL 1 G
1000 VIAL (EA) INTRAVENOUS ONCE
Refills: 0 | Status: COMPLETED | OUTPATIENT
Start: 2020-09-06 | End: 2020-09-06

## 2020-09-06 RX ORDER — WARFARIN SODIUM 2.5 MG/1
1 TABLET ORAL ONCE
Refills: 0 | Status: COMPLETED | OUTPATIENT
Start: 2020-09-06 | End: 2020-09-06

## 2020-09-06 RX ADMIN — OXYCODONE AND ACETAMINOPHEN 2 TABLET(S): 5; 325 TABLET ORAL at 07:02

## 2020-09-06 RX ADMIN — OXYCODONE AND ACETAMINOPHEN 2 TABLET(S): 5; 325 TABLET ORAL at 07:32

## 2020-09-06 RX ADMIN — OXYCODONE AND ACETAMINOPHEN 1 TABLET(S): 5; 325 TABLET ORAL at 01:47

## 2020-09-06 RX ADMIN — POLYETHYLENE GLYCOL 3350 17 GRAM(S): 17 POWDER, FOR SOLUTION ORAL at 11:48

## 2020-09-06 RX ADMIN — Medication 325 MILLIGRAM(S): at 11:48

## 2020-09-06 RX ADMIN — Medication 500 MILLIGRAM(S): at 11:48

## 2020-09-06 RX ADMIN — ABACAVIR 600 MILLIGRAM(S): 20 SOLUTION ORAL at 11:48

## 2020-09-06 RX ADMIN — Medication 250 MILLIGRAM(S): at 14:46

## 2020-09-06 RX ADMIN — Medication 1 MILLIGRAM(S): at 11:48

## 2020-09-06 RX ADMIN — FINASTERIDE 5 MILLIGRAM(S): 5 TABLET, FILM COATED ORAL at 11:48

## 2020-09-06 RX ADMIN — PANTOPRAZOLE SODIUM 40 MILLIGRAM(S): 20 TABLET, DELAYED RELEASE ORAL at 06:24

## 2020-09-06 RX ADMIN — WARFARIN SODIUM 1 MILLIGRAM(S): 2.5 TABLET ORAL at 21:34

## 2020-09-06 RX ADMIN — TAMSULOSIN HYDROCHLORIDE 0.4 MILLIGRAM(S): 0.4 CAPSULE ORAL at 21:34

## 2020-09-06 RX ADMIN — Medication 81 MILLIGRAM(S): at 11:48

## 2020-09-06 RX ADMIN — SODIUM CHLORIDE 3 MILLILITER(S): 9 INJECTION INTRAMUSCULAR; INTRAVENOUS; SUBCUTANEOUS at 21:26

## 2020-09-06 RX ADMIN — SODIUM CHLORIDE 3 MILLILITER(S): 9 INJECTION INTRAMUSCULAR; INTRAVENOUS; SUBCUTANEOUS at 06:24

## 2020-09-06 RX ADMIN — Medication 100 MILLIGRAM(S): at 11:48

## 2020-09-06 RX ADMIN — SODIUM CHLORIDE 3 MILLILITER(S): 9 INJECTION INTRAMUSCULAR; INTRAVENOUS; SUBCUTANEOUS at 13:54

## 2020-09-06 RX ADMIN — Medication 40 MILLIGRAM(S): at 06:24

## 2020-09-06 RX ADMIN — OXYCODONE AND ACETAMINOPHEN 1 TABLET(S): 5; 325 TABLET ORAL at 01:14

## 2020-09-06 NOTE — PROGRESS NOTE ADULT - SUBJECTIVE AND OBJECTIVE BOX
VITAL SIGNS    Telemetry:      Vital Signs Last 24 Hrs  T(C): 37.1 (09-06-20 @ 04:45), Max: 37.7 (09-05-20 @ 12:40)  T(F): 98.8 (09-06-20 @ 04:45), Max: 99.9 (09-05-20 @ 12:40)  HR: 70 (09-06-20 @ 04:45) (70 - 70)  BP: 105/60 (09-06-20 @ 04:45) (95/51 - 118/63)  RR: 18 (09-06-20 @ 04:45) (18 - 18)  SpO2: 97% (09-06-20 @ 04:45) (96% - 98%)                   Daily     Daily         CAPILLARY BLOOD GLUCOSE      POCT Blood Glucose.: 109 mg/dL (05 Sep 2020 21:22)  POCT Blood Glucose.: 123 mg/dL (05 Sep 2020 16:02)  POCT Blood Glucose.: 95 mg/dL (05 Sep 2020 11:51)  POCT Blood Glucose.: 87 mg/dL (05 Sep 2020 07:53)          Drains:     MS         [  ] Drainage:                 L Pleural  [  ]  Drainage:                R Pleural  [  ]  Drainage:                         PHYSICAL EXAM    Neurology: alert and oriented x 3, moves all extremities with no defecits  CV :  RRR  Sternal Wound :  CDI , Stable  Lungs:   CTA B/L  Abdomen: soft, nontender, nondistended, positive bowel sounds, last bowel movement   Extremities: VITAL SIGNS    Telemetry:    sr   first degree/  paced at times    Vital Signs Last 24 Hrs  T(C): 37.1 (09-06-20 @ 04:45), Max: 37.7 (09-05-20 @ 12:40)  T(F): 98.8 (09-06-20 @ 04:45), Max: 99.9 (09-05-20 @ 12:40)  HR: 70 (09-06-20 @ 04:45) (70 - 70)  BP: 105/60 (09-06-20 @ 04:45) (95/51 - 118/63)  RR: 18 (09-06-20 @ 04:45) (18 - 18)  SpO2: 97% (09-06-20 @ 04:45) (96% - 98%)                   Daily     Daily         CAPILLARY BLOOD GLUCOSE      POCT Blood Glucose.: 109 mg/dL (05 Sep 2020 21:22)  POCT Blood Glucose.: 123 mg/dL (05 Sep 2020 16:02)  POCT Blood Glucose.: 95 mg/dL (05 Sep 2020 11:51)  POCT Blood Glucose.: 87 mg/dL (05 Sep 2020 07:53)          Drains:     MS        bulb  to SS                       PHYSICAL EXAM    Neurology: alert and oriented x 3, moves all extremities with no defecits  CV :  RRR  Sternal Wound :  CDI , Stable  with meds bulb  +   pw/epm  Lungs:   CTA B/L  Abdomen: soft, nontender, nondistended, positive bowel sounds  Extremities:     trace  le edema

## 2020-09-06 NOTE — CONSULT NOTE ADULT - CONSULT REASON
Mitral Regurgitation  Suspected endocarditis
Abnormal TFTs
Concern for complete heart block
Endocarditis
HIV and UTI
CKD
Hydronephrosis

## 2020-09-06 NOTE — PROGRESS NOTE ADULT - PROBLEM SELECTOR PLAN 6
Endo following   Continue with Steady Carb diet  Accucheck AC and HS and cover with RISS Endo following   Continue with  Carb diet  Accucheck AC and HS and cover with RISS

## 2020-09-06 NOTE — CONSULT NOTE ADULT - SUBJECTIVE AND OBJECTIVE BOX
HISTORY OF PRESENT ILLNESS:    81 y/o man with PMHx of HIV, CAD, detrusor muscle weakness, BPH presenting for fall, unresponsive for 24 hrs, admitted with rhabdo, bilateral severe ureterohydronephrosis and thickened bladder on CTAP 2/2 inability to continue his home straight cath 3x daily after fall, tovar in place. Rhabdo improved, hydronephrosis improved with tovar (urology following). Found to have endocarditis (NOHEMI: 1.8x1.3 vegetation on medial aspect of anterior leaflet with partial flail component + 0.5x1.3cm vegetation no posterior leaflet w severe eccentric mitral regurg). BCx staph epidermidis, repeat NGTD, on vancomycin. s/p MRA without septic emboli. s/p cardiac cath  normal cors. On 8/31 had MVR with SHELLIE clip. Had been doing well transitioned to floor. While testing pacemaker pt. found to have dropped beats and concern for Complete heart block. EP consulted.       Allergies    No Known Allergies    Intolerances    	    MEDICATIONS:  aspirin enteric coated 81 milliGRAM(s) Oral daily  furosemide    Tablet 40 milliGRAM(s) Oral daily  tamsulosin 0.4 milliGRAM(s) Oral at bedtime  warfarin 1 milliGRAM(s) Oral once    abacavir 600 milliGRAM(s) Oral daily  lamiVUDine- milliGRAM(s) Oral daily  vancomycin  IVPB 1000 milliGRAM(s) IV Intermittent once      oxycodone    5 mG/acetaminophen 325 mG 1 Tablet(s) Oral every 6 hours PRN  oxycodone    5 mG/acetaminophen 325 mG 2 Tablet(s) Oral every 6 hours PRN    pantoprazole    Tablet 40 milliGRAM(s) Oral before breakfast  polyethylene glycol 3350 17 Gram(s) Oral daily    dextrose 40% Gel 15 Gram(s) Oral once PRN  dextrose 50% Injectable 50 milliLiter(s) IV Push every 15 minutes  dextrose 50% Injectable 25 milliLiter(s) IV Push every 15 minutes  finasteride 5 milliGRAM(s) Oral daily  glucagon  Injectable 1 milliGRAM(s) IntraMuscular once PRN  insulin lispro (HumaLOG) corrective regimen sliding scale   SubCutaneous three times a day before meals  insulin lispro (HumaLOG) corrective regimen sliding scale   SubCutaneous at bedtime    ascorbic acid 500 milliGRAM(s) Oral daily  ferrous    sulfate 325 milliGRAM(s) Oral daily  folic acid 1 milliGRAM(s) Oral daily  sodium chloride 0.9% lock flush 3 milliLiter(s) IV Push every 8 hours      PAST MEDICAL & SURGICAL HISTORY:  Orchitis and epididymitis  Vitamin D deficiency  Bladder mass  Edema of both legs  Osteoporosis  Seborrheic keratosis  Constipation, unspecified constipation type  Chronic kidney disease, unspecified stage  HIV (human immunodeficiency virus infection)  Unsteady gait  S/P coronary artery stent placement  No Past Surgical History      FAMILY HISTORY:  No pertinent family history in first degree relatives      SOCIAL HISTORY:    [ ] Non-smoker  [ ] Smoker  [ ] Alcohol      REVIEW OF SYSTEMS:  General: no fatigue/malaise, weight loss/gain.  Skin: no rashes.  Ophthalmologic: no blurred vision, no loss of vision. 	  ENT: no sore throat, rhinorrhea, sinus congestion.  Respiratory: no SOB, cough or wheeze.  Gastrointestinal:  no N/V/D, no melena/hematemesis/hematochezia.  Genitourinary: no dysuria/hesitancy or hematuria.  Musculoskeletal: no myalgias or arthralgias.  Neurological: no changes in vision or hearing, no lightheadedness/dizziness, no syncope/near syncope	  Psychiatric: no unusual stress/anxiety.   Hematology/Lymphatics: no unusual bleeding, bruising and no lymphadenopathy.  Endocrine: no unusual thirst.   All others negative except as stated above and in HPI.    PHYSICAL EXAM:  T(C): 37.1 (09-06-20 @ 04:45), Max: 37.7 (09-05-20 @ 12:40)  HR: 58 (09-06-20 @ 10:07) (58 - 70)  BP: 100/56 (09-06-20 @ 10:07) (95/51 - 118/63)  RR: 18 (09-06-20 @ 10:07) (18 - 18)  SpO2: 96% (09-06-20 @ 10:07) (96% - 98%)  Wt(kg): --  I&O's Summary    05 Sep 2020 07:01  -  06 Sep 2020 07:00  --------------------------------------------------------  IN: 280 mL / OUT: 2585 mL / NET: -2305 mL    06 Sep 2020 07:01  -  06 Sep 2020 12:30  --------------------------------------------------------  IN: 420 mL / OUT: 15 mL / NET: 405 mL        Appearance: Normal	  HEENT:   Normal oral mucosa, PERRL, EOMI	  Lymphatic: No lymphadenopathy  Cardiovascular: surgical scar, Normal S1 S2, No JVD, No murmurs, No edema  Respiratory: Lungs clear to auscultation	  Psychiatry: A & O x 3, Mood & affect appropriate  Gastrointestinal:  Soft, Non-tender, + BS	  Skin: No rashes, No ecchymoses, No cyanosis	  Neurologic: Non-focal  Extremities: Normal range of motion, No clubbing, cyanosis or edema  Vascular: Peripheral pulses palpable 2+ bilaterally        LABS:	 	    CBC Full  -  ( 06 Sep 2020 07:04 )  WBC Count : 9.38 K/uL  Hemoglobin : 9.1 g/dL  Hematocrit : 27.9 %  Platelet Count - Automated : 124 K/uL  Mean Cell Volume : 102.6 fl  Mean Cell Hemoglobin : 33.5 pg  Mean Cell Hemoglobin Concentration : 32.6 gm/dL  Auto Neutrophil # : 6.16 K/uL  Auto Lymphocyte # : 1.76 K/uL  Auto Monocyte # : 1.16 K/uL  Auto Eosinophil # : 0.16 K/uL  Auto Basophil # : 0.03 K/uL  Auto Neutrophil % : 65.6 %  Auto Lymphocyte % : 18.8 %  Auto Monocyte % : 12.4 %  Auto Eosinophil % : 1.7 %  Auto Basophil % : 0.3 %    09-06    134<L>  |  100  |  33<H>  ----------------------------<  112<H>  3.8   |  24  |  1.71<H>  09-05    133<L>  |  101  |  36<H>  ----------------------------<  118<H>  4.1   |  22  |  1.74<H>    Ca    8.6      06 Sep 2020 07:04  Ca    8.3<L>      05 Sep 2020 06:09    TPro  5.6<L>  /  Alb  2.9<L>  /  TBili  0.3  /  DBili  x   /  AST  22  /  ALT  24  /  AlkPhos  164<H>  09-06      proBNP:   Lipid Profile:   HgA1c:   TSH: Thyroid Stimulating Hormone, Serum: 6.90 uIU/mL (09-06 @ 09:42)        CARDIAC MARKERS:        TELEMETRY: 	 Sinus prolonged NE with mobitz 1  ECG:  	  RADIOLOGY:  OTHER: 	    PREVIOUS DIAGNOSTIC TESTING:    [ ] Echocardiogram: < from: Intra-Operative Transesophageal Echo (08.31.20 @ 17:51) >  Conclusions:  1. 1.3x0.7cm mobile echogenic structure is seen on the  atrial surface of the anterior mitral valve leaflet A3  consistent with vegetation. Eccentric jet. Moderate-severe  mitral regurgitation.  2. Normal trileaflet aortic valve. No aortic valve  regurgitation seen.  3. Ascending Aorta: 3.1 cm. Normal aortic root.  4. Normal left ventricular internal dimensions and wall  thicknesses.  5. Hyperdynamic left ventricular systolic function.No  regional wall motion abnormalities.  6. Normal right atrium.  7. Normal right ventricular size and function.  8. Normal tricuspid valve. Mild tricuspid regurgitation.  9. Normal pericardium with no pericardial effusion.  All findings discussed with Dr. Camacho.    < end of copied text >    [ ]  Catheterization:   < from: Cardiac Cath Lab - Adult (08.25.20 @ 14:56) >  CORONARY VESSELS: The coronary circulation is right dominant.  LM:   --  LM: Normal.  LAD:   --  LAD: Normal.  CX:   --  Circumflex: Normal.  RCA:   --  RCA: Normal.  COMPLICATIONS: There were no complications.    < end of copied text >    [ ] Stress Test:  	  	  ASSESSMENT/PLAN: 	  81 YO M hx HIV, CAD, BPH presented for uinresponsiveness. Found to have rhabdo 2/2 urinary retention. s/p tovar whgich improved. Later found to hav stepah epi endocarditis with flair MV leaflet. Taken to the OR s/p MVR and SHELLIE clip.     Tele  Pt. with sinus mobitz I and prolonged NE  Likely normal in setting of MVR  No need for PPM at this time  Continue to monitor on tele      Alek Alejandra  Cardiology Fellow  [phone]  All Cardiology service information can be found 24/7 on [email or web address], password: [email or web address]

## 2020-09-06 NOTE — PROGRESS NOTE ADULT - ASSESSMENT
79 y/o man with PMHx of HIV, CAD, detrusor muscle weakness, BPH presenting for fall, unresponsive for 24 hrs, admitted with rhabdo, bilateral severe ureterohydronephrosis and thickened bladder on CTAP 2/2 inability to continue his home straight cath 3x daily after fall, tovar in place. Rhabdo improved, hydronephrosis improved with tovar (urology following). NOHEMI shows endocarditis (NOHEMI: 1.8x1.3 vegetation on medial aspect of anterior leaflet with partial flail component + 0.5x1.3cm vegetation no posterior leaflet w severe eccentric mitral regurg). BCx staph epidermidis, repeat NGTD, on vancomycin. s/p MRA without septic emboli. s/p cardiac cath  normal cors. Followed by ID Dr. Almazan cleared for OR. Elevated creatinine followed by Nephrology monitoring creatinine this afternoon. Endocrine consulted Dr. Izaguirre.  On 8/31/20 s/p MVR (T) / SHELLIE clip   Post op Course: Extubated POD #0  ID following for endocarditis; and HIV à Vancomycin 1g daily for now; Check daily labs for vancomycin random level, goal is 15-20.  Hyperglycemia à Endocrine following   SUDHEER on CKD à Renal following. Tovar in place.   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  9/4 overnight noted with Accelerated junctional / AV disassociation. Continue on AC therapy Coumadin 5 mg PO tonight.  V Paced à 80.  Monitor renal function.  Creatinine 2.1 will repeat BMP this afternoon.   9/5 EPM rate decreased to rate 70. Coumadin dosing tonight for INR 1.6. Tovar maintained. As per urology plan to d/c tovar when creatinine becomes stable, then pt can continue self cath at home for his baseline urinary retention. Lasix 40 mg PO added. 79 y/o man with PMHx of HIV, CAD, detrusor muscle weakness, BPH presenting for fall, unresponsive for 24 hrs, admitted with rhabdo, bilateral severe ureterohydronephrosis and thickened bladder on CTAP 2/2 inability to continue his home straight cath 3x daily after fall, tovar in place. Rhabdo improved, hydronephrosis improved with tovar (urology following). NOHEMI shows endocarditis (NOHEMI: 1.8x1.3 vegetation on medial aspect of anterior leaflet with partial flail component + 0.5x1.3cm vegetation no posterior leaflet w severe eccentric mitral regurg). BCx staph epidermidis, repeat NGTD, on vancomycin. s/p MRA without septic emboli. s/p cardiac cath  normal cors. Followed by ID Dr. Almazan cleared for OR. Elevated creatinine followed by Nephrology monitoring creatinine this afternoon. Endocrine consulted Dr. Izaguirre.  On 8/31/20 s/p MVR (T) / SHELLIE clip   Post op Course: Extubated POD #0  ID following for endocarditis; and HIV à Vancomycin 1g daily for now; Check daily labs for vancomycin random level, goal is 15-20.  Hyperglycemia à Endocrine following   SUDHEER on CKD à Renal following. Tovar in place.   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  9/4 overnight noted with Accelerated junctional / AV disassociation. Continue on AC therapy Coumadin 5 mg PO tonight.  V Paced à 80.  Monitor renal function.  Creatinine 2.1 will repeat BMP this afternoon.   9/5 EPM rate decreased to rate 70. Coumadin dosing tonight for INR 1.6. Tovar maintained. As per urology plan to d/c tovar when creatinine becomes stable, then pt can continue self cath at home for his baseline urinary retention. Lasix 40 mg PO added.   9/6      meds bulb  35 cc   24 hrs   ext pacer turned down to 50      ? HB to 50's  and sr with first degree  currently off BB   with ep cslt called today,   creat down 1.71

## 2020-09-06 NOTE — CONSULT NOTE ADULT - CONSULT REQUESTED DATE/TIME
21-Aug-2020
06-Sep-2020 12:30
19-Aug-2020 17:32
21-Aug-2020 10:44
27-Aug-2020 13:42
20-Aug-2020
25-Aug-2020 09:52

## 2020-09-06 NOTE — PROGRESS NOTE ADULT - SUBJECTIVE AND OBJECTIVE BOX
Chief complaint  Patient is a 80y old  Male who presents with a chief complaint of sp   MVR t  SHELLIE (06 Sep 2020 05:52)   Review of systems  Patient in chair, awake, looks comfortable, no hypoglycemic episodes.    Labs and Fingersticks  CAPILLARY BLOOD GLUCOSE      POCT Blood Glucose.: 107 mg/dL (06 Sep 2020 07:38)  POCT Blood Glucose.: 109 mg/dL (05 Sep 2020 21:22)  POCT Blood Glucose.: 123 mg/dL (05 Sep 2020 16:02)      Anion Gap, Serum: 10 (09-06 @ 07:04)  Anion Gap, Serum: 10 (09-05 @ 06:09)  Anion Gap, Serum: 10 (09-04 @ 17:29)      Calcium, Total Serum: 8.6 (09-06 @ 07:04)  Calcium, Total Serum: 8.3 <L> (09-05 @ 06:09)  Calcium, Total Serum: 8.5 (09-04 @ 17:29)  Albumin, Serum: 2.9 <L> (09-06 @ 07:04)    Alanine Aminotransferase (ALT/SGPT): 24 (09-06 @ 07:04)  Alkaline Phosphatase, Serum: 164 <H> (09-06 @ 07:04)  Aspartate Aminotransferase (AST/SGOT): 22 (09-06 @ 07:04)        09-06    134<L>  |  100  |  33<H>  ----------------------------<  112<H>  3.8   |  24  |  1.71<H>    Ca    8.6      06 Sep 2020 07:04    TPro  5.6<L>  /  Alb  2.9<L>  /  TBili  0.3  /  DBili  x   /  AST  22  /  ALT  24  /  AlkPhos  164<H>  09-06                        9.1    9.38  )-----------( 124      ( 06 Sep 2020 07:04 )             27.9     Medications  MEDICATIONS  (STANDING):  abacavir 600 milliGRAM(s) Oral daily  ascorbic acid 500 milliGRAM(s) Oral daily  aspirin enteric coated 81 milliGRAM(s) Oral daily  dextrose 50% Injectable 50 milliLiter(s) IV Push every 15 minutes  dextrose 50% Injectable 25 milliLiter(s) IV Push every 15 minutes  Doravirine (Pefeltro), 100 mG 1 Tablet(s) 1 Tablet(s) Oral daily  ferrous    sulfate 325 milliGRAM(s) Oral daily  finasteride 5 milliGRAM(s) Oral daily  folic acid 1 milliGRAM(s) Oral daily  furosemide    Tablet 40 milliGRAM(s) Oral daily  insulin lispro (HumaLOG) corrective regimen sliding scale   SubCutaneous three times a day before meals  insulin lispro (HumaLOG) corrective regimen sliding scale   SubCutaneous at bedtime  lamiVUDine- milliGRAM(s) Oral daily  pantoprazole    Tablet 40 milliGRAM(s) Oral before breakfast  polyethylene glycol 3350 17 Gram(s) Oral daily  sodium chloride 0.9% lock flush 3 milliLiter(s) IV Push every 8 hours  tamsulosin 0.4 milliGRAM(s) Oral at bedtime  vancomycin  IVPB 1000 milliGRAM(s) IV Intermittent once  warfarin 1 milliGRAM(s) Oral once      Physical Exam  General: Patient comfortable in bed  Vital Signs Last 12 Hrs  T(F): 98.8 (09-06-20 @ 04:45), Max: 98.8 (09-06-20 @ 04:45)  HR: 58 (09-06-20 @ 10:07) (58 - 70)  BP: 100/56 (09-06-20 @ 10:07) (100/56 - 105/60)  BP(mean): 70 (09-06-20 @ 10:07) (70 - 70)  RR: 18 (09-06-20 @ 10:07) (18 - 18)  SpO2: 96% (09-06-20 @ 10:07) (96% - 97%)  Neck: No palpable thyroid nodules.  CVS: S1S2, No murmurs  Respiratory: No wheezing, no crepitations  GI: Abdomen soft, bowel sounds positive  Musculoskeletal:  edema lower extremities.   Skin: No skin rashes, no ecchymosis    Diagnostics    Thyroperoxidase Antibody: AM (08-27 @ 13:42) Chief complaint  Patient is a 80y old  Male who presents with a chief complaint of sp   MVR t  SHELLIE (06 Sep 2020 05:52)   Review of systems  Patient in chair, awake, looks comfortable, no hypoglycemic episodes.    Labs and Fingersticks  CAPILLARY BLOOD GLUCOSE    POCT Blood Glucose.: 107 mg/dL (06 Sep 2020 07:38)  POCT Blood Glucose.: 109 mg/dL (05 Sep 2020 21:22)  POCT Blood Glucose.: 123 mg/dL (05 Sep 2020 16:02)      Anion Gap, Serum: 10 (09-06 @ 07:04)  Anion Gap, Serum: 10 (09-05 @ 06:09)  Anion Gap, Serum: 10 (09-04 @ 17:29)      Calcium, Total Serum: 8.6 (09-06 @ 07:04)  Calcium, Total Serum: 8.3 <L> (09-05 @ 06:09)  Calcium, Total Serum: 8.5 (09-04 @ 17:29)  Albumin, Serum: 2.9 <L> (09-06 @ 07:04)    Alanine Aminotransferase (ALT/SGPT): 24 (09-06 @ 07:04)  Alkaline Phosphatase, Serum: 164 <H> (09-06 @ 07:04)  Aspartate Aminotransferase (AST/SGOT): 22 (09-06 @ 07:04)        09-06    134<L>  |  100  |  33<H>  ----------------------------<  112<H>  3.8   |  24  |  1.71<H>    Ca    8.6      06 Sep 2020 07:04    TPro  5.6<L>  /  Alb  2.9<L>  /  TBili  0.3  /  DBili  x   /  AST  22  /  ALT  24  /  AlkPhos  164<H>  09-06                        9.1    9.38  )-----------( 124      ( 06 Sep 2020 07:04 )             27.9     Medications  MEDICATIONS  (STANDING):  abacavir 600 milliGRAM(s) Oral daily  ascorbic acid 500 milliGRAM(s) Oral daily  aspirin enteric coated 81 milliGRAM(s) Oral daily  dextrose 50% Injectable 50 milliLiter(s) IV Push every 15 minutes  dextrose 50% Injectable 25 milliLiter(s) IV Push every 15 minutes  Doravirine (Pefeltro), 100 mG 1 Tablet(s) 1 Tablet(s) Oral daily  ferrous    sulfate 325 milliGRAM(s) Oral daily  finasteride 5 milliGRAM(s) Oral daily  folic acid 1 milliGRAM(s) Oral daily  furosemide    Tablet 40 milliGRAM(s) Oral daily  insulin lispro (HumaLOG) corrective regimen sliding scale   SubCutaneous three times a day before meals  insulin lispro (HumaLOG) corrective regimen sliding scale   SubCutaneous at bedtime  lamiVUDine- milliGRAM(s) Oral daily  pantoprazole    Tablet 40 milliGRAM(s) Oral before breakfast  polyethylene glycol 3350 17 Gram(s) Oral daily  sodium chloride 0.9% lock flush 3 milliLiter(s) IV Push every 8 hours  tamsulosin 0.4 milliGRAM(s) Oral at bedtime  vancomycin  IVPB 1000 milliGRAM(s) IV Intermittent once  warfarin 1 milliGRAM(s) Oral once      Physical Exam  General: Patient comfortable in bed  Vital Signs Last 12 Hrs  T(F): 98.8 (09-06-20 @ 04:45), Max: 98.8 (09-06-20 @ 04:45)  HR: 58 (09-06-20 @ 10:07) (58 - 70)  BP: 100/56 (09-06-20 @ 10:07) (100/56 - 105/60)  BP(mean): 70 (09-06-20 @ 10:07) (70 - 70)  RR: 18 (09-06-20 @ 10:07) (18 - 18)  SpO2: 96% (09-06-20 @ 10:07) (96% - 97%)  Neck: No palpable thyroid nodules.  CVS: S1S2, No murmurs  Respiratory: No wheezing, no crepitations  GI: Abdomen soft, bowel sounds positive  Musculoskeletal:  edema lower extremities.   Skin: No skin rashes, no ecchymosis    Diagnostics    Thyroperoxidase Antibody: AM (08-27 @ 13:42)

## 2020-09-06 NOTE — CONSULT NOTE ADULT - ATTENDING COMMENTS
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 This would be expected to resolve spontaneously. No immediate indication for permanent cardiac pacing.

## 2020-09-06 NOTE — PROGRESS NOTE ADULT - NSHPATTENDINGPLANDISCUSS_GEN_ALL_CORE
CTS team
CT Surgery and Nephrology. Call Cardiology Fellow or Attending as listed on [email or web address] password: [email or web address].
CT Surgery and Nephrology. Call Cardiology Fellow or Attending as listed on [email or web address] password: [email or web address].
Call Cardiology Fellow or Attending as listed on [email or web address] password: [email or web address].
Call Cardiology Fellow or Attending as listed on [email or web address] password: [email or web address].
Call Cardiology Fellow or Attending as listed on [email or web address] password: [email or web address].
Dr. Camacho
Dr eldridge
house staff

## 2020-09-06 NOTE — PROGRESS NOTE ADULT - PROBLEM SELECTOR PLAN 2
Repeat TFTs still subclinical; No need for management at this time. HIV meds may be contributing to suppressed TSH.  Will continue monitoring TFTs and FU.

## 2020-09-06 NOTE — PROGRESS NOTE ADULT - PROBLEM SELECTOR PLAN 1
Continue with Lopressor 25 mg PO BID.   Daily PT/ INR   Coumadin 5 mg PO tonight   Maintain PW --> EPM   Increase activity as tolerated.   Encourage Chest PT / Pulmonary toileting and Incentive spirometry every 1 hour x 10 while awake.   Continue with PUD and DVT prophylaxis.   Shower on POD #5.   Maintain tovar --> SD   D/C plan home once medically cleared   Plan of care discussed with attending BB on hold   Daily PT/ INR   Coumadin 1 mg PO tonight   Maintain PW --> EPM   coumadin dosing 1 mg tonight  Maintain tovar --> SD   pt self caths at home

## 2020-09-06 NOTE — PROGRESS NOTE ADULT - PROBLEM SELECTOR PLAN 2
ID Following  Vancomycin to be dosed by level  Vanco (T) 14.3 --> Vanco 1 gram IV x 1   Check daily labs for vancomycin random level, goal is 15-20 ID Following  Vancomycin to be dosed by level    Check daily labs for vancomycin random level, goal is 15-20

## 2020-09-06 NOTE — PROGRESS NOTE ADULT - ASSESSMENT
Assessment  Hyperglycemia: No hx DM, A1C 5.7%, had postoperative hyperglycemia, now on insulin coverage, blood sugars improved, no hypoglycemic episodes, comfortable without acute complaints.  Hypothyroidism: 80y male with no history thyroid disease, was not on any thyroid supplements, on HIV meds, TSH borderline elevated, subclinical hypothyroid, TPO ab WNL, repeat TFTs still subclinical.  Endocarditis: S/P MVR, on meds, stable, monitored.        Nesha Izaguirre MD  Cell: [phone]  Office: [phone] Assessment  Hyperglycemia: No hx DM, A1C 5.7%, had postoperative hyperglycemia, now on insulin coverage, blood sugars improved, no hypoglycemic episodes, comfortable without acute complaints.  Hypothyroidism: 80y male with no history thyroid disease, was not on any thyroid supplements, on HIV meds, TSH borderline elevated, subclinical hypothyroid,  TPO ab WNL, repeat TFTs still subclinical.  Endocarditis: S/P MVR, on meds, stable, monitored.        Nesha Izaguirre MD  Cell: [phone]  Office: [phone]

## 2020-09-07 LAB
ANION GAP SERPL CALC-SCNC: 12 MMOL/L — SIGNIFICANT CHANGE UP (ref 5–17)
APTT BLD: 36.5 SEC — HIGH (ref 27.5–35.5)
BUN SERPL-MCNC: 35 MG/DL — HIGH (ref 7–23)
CALCIUM SERPL-MCNC: 9 MG/DL — SIGNIFICANT CHANGE UP (ref 8.4–10.5)
CHLORIDE SERPL-SCNC: 97 MMOL/L — SIGNIFICANT CHANGE UP (ref 96–108)
CO2 SERPL-SCNC: 25 MMOL/L — SIGNIFICANT CHANGE UP (ref 22–31)
CREAT SERPL-MCNC: 1.82 MG/DL — HIGH (ref 0.5–1.3)
GLUCOSE SERPL-MCNC: 115 MG/DL — HIGH (ref 70–99)
HCT VFR BLD CALC: 30.1 % — LOW (ref 39–50)
HGB BLD-MCNC: 9.8 G/DL — LOW (ref 13–17)
INR BLD: 2.09 RATIO — HIGH (ref 0.88–1.16)
MCHC RBC-ENTMCNC: 32.6 GM/DL — SIGNIFICANT CHANGE UP (ref 32–36)
MCHC RBC-ENTMCNC: 33.1 PG — SIGNIFICANT CHANGE UP (ref 27–34)
MCV RBC AUTO: 101.7 FL — HIGH (ref 80–100)
NRBC # BLD: 0 /100 WBCS — SIGNIFICANT CHANGE UP (ref 0–0)
PLATELET # BLD AUTO: 162 K/UL — SIGNIFICANT CHANGE UP (ref 150–400)
POTASSIUM SERPL-MCNC: 4.2 MMOL/L — SIGNIFICANT CHANGE UP (ref 3.5–5.3)
POTASSIUM SERPL-SCNC: 4.2 MMOL/L — SIGNIFICANT CHANGE UP (ref 3.5–5.3)
PROTHROM AB SERPL-ACNC: 24 SEC — HIGH (ref 10.6–13.6)
RBC # BLD: 2.96 M/UL — LOW (ref 4.2–5.8)
RBC # FLD: 15.7 % — HIGH (ref 10.3–14.5)
SODIUM SERPL-SCNC: 134 MMOL/L — LOW (ref 135–145)
VANCOMYCIN TROUGH SERPL-MCNC: 11.9 UG/ML — SIGNIFICANT CHANGE UP (ref 10–20)
WBC # BLD: 10.2 K/UL — SIGNIFICANT CHANGE UP (ref 3.8–10.5)
WBC # FLD AUTO: 10.2 K/UL — SIGNIFICANT CHANGE UP (ref 3.8–10.5)

## 2020-09-07 PROCEDURE — 93010 ELECTROCARDIOGRAM REPORT: CPT

## 2020-09-07 RX ORDER — VANCOMYCIN HCL 1 G
1000 VIAL (EA) INTRAVENOUS ONCE
Refills: 0 | Status: COMPLETED | OUTPATIENT
Start: 2020-09-07 | End: 2020-09-07

## 2020-09-07 RX ORDER — WARFARIN SODIUM 2.5 MG/1
1 TABLET ORAL ONCE
Refills: 0 | Status: COMPLETED | OUTPATIENT
Start: 2020-09-07 | End: 2020-09-07

## 2020-09-07 RX ADMIN — Medication 250 MILLIGRAM(S): at 20:09

## 2020-09-07 RX ADMIN — ABACAVIR 600 MILLIGRAM(S): 20 SOLUTION ORAL at 11:48

## 2020-09-07 RX ADMIN — SODIUM CHLORIDE 3 MILLILITER(S): 9 INJECTION INTRAMUSCULAR; INTRAVENOUS; SUBCUTANEOUS at 21:31

## 2020-09-07 RX ADMIN — TAMSULOSIN HYDROCHLORIDE 0.4 MILLIGRAM(S): 0.4 CAPSULE ORAL at 21:37

## 2020-09-07 RX ADMIN — Medication 40 MILLIGRAM(S): at 06:00

## 2020-09-07 RX ADMIN — Medication 81 MILLIGRAM(S): at 11:47

## 2020-09-07 RX ADMIN — Medication 1 MILLIGRAM(S): at 11:47

## 2020-09-07 RX ADMIN — FINASTERIDE 5 MILLIGRAM(S): 5 TABLET, FILM COATED ORAL at 11:47

## 2020-09-07 RX ADMIN — WARFARIN SODIUM 1 MILLIGRAM(S): 2.5 TABLET ORAL at 21:37

## 2020-09-07 RX ADMIN — SODIUM CHLORIDE 3 MILLILITER(S): 9 INJECTION INTRAMUSCULAR; INTRAVENOUS; SUBCUTANEOUS at 05:57

## 2020-09-07 RX ADMIN — Medication 325 MILLIGRAM(S): at 11:47

## 2020-09-07 RX ADMIN — Medication 100 MILLIGRAM(S): at 11:48

## 2020-09-07 RX ADMIN — PANTOPRAZOLE SODIUM 40 MILLIGRAM(S): 20 TABLET, DELAYED RELEASE ORAL at 06:00

## 2020-09-07 RX ADMIN — SODIUM CHLORIDE 3 MILLILITER(S): 9 INJECTION INTRAMUSCULAR; INTRAVENOUS; SUBCUTANEOUS at 13:24

## 2020-09-07 RX ADMIN — Medication 500 MILLIGRAM(S): at 11:47

## 2020-09-07 NOTE — PROGRESS NOTE ADULT - ASSESSMENT
79 y/o man with PMHx of HIV, CAD, detrusor muscle weakness, BPH presenting for fall, unresponsive for 24 hrs, admitted with rhabdo, bilateral severe ureterohydronephrosis and thickened bladder on CTAP 2/2 inability to continue his home straight cath 3x daily after fall, tovar in place. Rhabdo improved, hydronephrosis improved with tovar (urology following). NOHEMI shows endocarditis (NOHEMI: 1.8x1.3 vegetation on medial aspect of anterior leaflet with partial flail component + 0.5x1.3cm vegetation no posterior leaflet w severe eccentric mitral regurg). BCx staph epidermidis, repeat NGTD, on vancomycin. s/p MRA without septic emboli. s/p cardiac cath  normal cors. Followed by ID Dr. Almazan cleared for OR. Elevated creatinine followed by Nephrology monitoring creatinine this afternoon. Endocrine consulted Dr. Izaguirre.  On 8/31/20 s/p MVR (T) / SHELLIE clip   Post op Course: Extubated POD #0  ID following for endocarditis; and HIV à Vancomycin 1g daily for now; Check daily labs for vancomycin random level, goal is 15-20.  Hyperglycemia à Endocrine following   SUDHEER on CKD à Renal following. Tovar in place.   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  9/4 overnight noted with Accelerated junctional / AV disassociation. Continue on AC therapy Coumadin 5 mg PO tonight.  V Paced à 80.  Monitor renal function.  Creatinine 2.1 will repeat BMP this afternoon.   9/5 EPM rate decreased to rate 70. Coumadin dosing tonight for INR 1.6. Tovar maintained. As per urology plan to d/c tovar when creatinine becomes stable, then pt can continue self cath at home for his baseline urinary retention. Lasix 40 mg PO added.   9/6 meds bulb 35 cc x 24 hrs. ext pacer turned down to 50? HB to 50's and sr with first degree currently off BB with EPS consult called today,   creat down 1.71  9/7 VVS; Continue with current medication regimen. Low dose coumadin for anticipated PICC lime placement.  Vanco dosed by level.  Awaiting this afternoon Vanco (T). ID following. EP following --> Afib w/ occasional V-Pacing @ 50; No indication for PPM at this time.

## 2020-09-07 NOTE — PROGRESS NOTE ADULT - PROBLEM SELECTOR PLAN 2
ID Following  Vancomycin to be dosed by level  Check daily labs for vancomycin random level, goal is 15-20  Plan for PICC line

## 2020-09-07 NOTE — PROGRESS NOTE ADULT - SUBJECTIVE AND OBJECTIVE BOX
Chief complaint  Patient is a 80y old  Male who presents with a chief complaint of fall (07 Sep 2020 12:53)   Review of systems  Patient in bed, appears comfortable.    Labs and Fingersticks  CAPILLARY BLOOD GLUCOSE      POCT Blood Glucose.: 128 mg/dL (07 Sep 2020 11:36)  POCT Blood Glucose.: 118 mg/dL (07 Sep 2020 07:34)  POCT Blood Glucose.: 125 mg/dL (06 Sep 2020 21:30)  POCT Blood Glucose.: 98 mg/dL (06 Sep 2020 17:29)      Anion Gap, Serum: 12 (09-07 @ 06:46)  Anion Gap, Serum: 10 (09-06 @ 07:04)      Calcium, Total Serum: 9.0 (09-07 @ 06:46)  Calcium, Total Serum: 8.6 (09-06 @ 07:04)  Albumin, Serum: 2.9 <L> (09-06 @ 07:04)    Alanine Aminotransferase (ALT/SGPT): 24 (09-06 @ 07:04)  Alkaline Phosphatase, Serum: 164 <H> (09-06 @ 07:04)  Aspartate Aminotransferase (AST/SGOT): 22 (09-06 @ 07:04)        09-07    134<L>  |  97  |  35<H>  ----------------------------<  115<H>  4.2   |  25  |  1.82<H>    Ca    9.0      07 Sep 2020 06:46    TPro  5.6<L>  /  Alb  2.9<L>  /  TBili  0.3  /  DBili  x   /  AST  22  /  ALT  24  /  AlkPhos  164<H>  09-06                        9.8    10.20 )-----------( 162      ( 07 Sep 2020 06:46 )             30.1     Medications  MEDICATIONS  (STANDING):  abacavir 600 milliGRAM(s) Oral daily  ascorbic acid 500 milliGRAM(s) Oral daily  aspirin enteric coated 81 milliGRAM(s) Oral daily  dextrose 50% Injectable 50 milliLiter(s) IV Push every 15 minutes  dextrose 50% Injectable 25 milliLiter(s) IV Push every 15 minutes  Doravirine (Pefeltro), 100 mG 1 Tablet(s) 1 Tablet(s) Oral daily  ferrous    sulfate 325 milliGRAM(s) Oral daily  finasteride 5 milliGRAM(s) Oral daily  folic acid 1 milliGRAM(s) Oral daily  furosemide    Tablet 40 milliGRAM(s) Oral daily  insulin lispro (HumaLOG) corrective regimen sliding scale   SubCutaneous three times a day before meals  insulin lispro (HumaLOG) corrective regimen sliding scale   SubCutaneous at bedtime  lamiVUDine- milliGRAM(s) Oral daily  pantoprazole    Tablet 40 milliGRAM(s) Oral before breakfast  polyethylene glycol 3350 17 Gram(s) Oral daily  sodium chloride 0.9% lock flush 3 milliLiter(s) IV Push every 8 hours  tamsulosin 0.4 milliGRAM(s) Oral at bedtime  warfarin 1 milliGRAM(s) Oral once      Physical Exam  General: Patient comfortable in bed  Vital Signs Last 12 Hrs  T(F): 97.9 (09-07-20 @ 11:46), Max: 98.9 (09-07-20 @ 04:40)  HR: 53 (09-07-20 @ 11:46) (53 - 53)  BP: 129/55 (09-07-20 @ 11:46) (110/53 - 129/55)  BP(mean): --  RR: 16 (09-07-20 @ 11:46) (16 - 18)  SpO2: 98% (09-07-20 @ 11:46) (96% - 98%)  Neck: No palpable thyroid nodules.  CVS: S1S2, No murmurs  Respiratory: No wheezing, no crepitations  GI: Abdomen soft, bowel sounds positive  Musculoskeletal:  edema lower extremities.     Diagnostics    Thyroid Stimulating Hormone, Serum: AM Sched. Collection: 06-Sep-2020 06:00 (09-05 @ 12:57)  Free Thyroxine, Serum: AM Sched. Collection: 06-Sep-2020 06:00 (09-05 @ 12:57)

## 2020-09-07 NOTE — PROGRESS NOTE ADULT - SUBJECTIVE AND OBJECTIVE BOX
VITAL SIGNS    Subjective: "I'm feeling ok. Denies CP, palpitation, SOB, PRESCOTT, HA, dizziness, N/V/D, fever or chills.  No acute event noted overnight.     Telemetry: Afib / V-paved 50-70       Vital Signs Last 24 Hrs  T(C): 36.6 (20 @ 11:46), Max: 37.2 (20 @ 19:37)  T(F): 97.9 (20 @ 11:46), Max: 99 (20 @ 19:37)  HR: 53 (20 @ 11:46) (53 - 59)  BP: 129/55 (20 @ 11:46) (95/48 - 129/55)  RR: 16 (20 @ 11:46) (16 - 18)  SpO2: 98% (20 @ 11:46) (96% - 98%)            07:01  -   @ 07:00  --------------------------------------------------------  IN: 790 mL / OUT: 2615 mL / NET: -1825 mL     07:01  -   @ 12:53  --------------------------------------------------------  IN: 120 mL / OUT: 0 mL / NET: 120 mL    Daily     Daily Weight in k.7 (07 Sep 2020 07:52)    CAPILLARY BLOOD GLUCOSE    POCT Blood Glucose.: 128 mg/dL (07 Sep 2020 11:36)  POCT Blood Glucose.: 118 mg/dL (07 Sep 2020 07:34)  POCT Blood Glucose.: 125 mg/dL (06 Sep 2020 21:30)  POCT Blood Glucose.: 98 mg/dL (06 Sep 2020 17:29)        PHYSICAL EXAM    Neurology: alert and oriented x 3, nonfocal, no gross deficits    CV: (+) S1 and S2, No murmurs, rubs, gallops or clicks     Sternal Wound:  MSI -->CDI , sternum stable; PW --> EPM VVI 50 / 10     Lungs: CTA B/L     Abdomen: soft, nontender, nondistended, positive bowel sounds, (+) Flatus; (+) BM     :  Indwelling tovar cath --> SD     Extremities:  B/L LE (+) edema; negative calf tenderness; (+) 2 DP palpable; B/L ACE wraps applied         abacavir 600 milliGRAM(s) Oral daily  ascorbic acid 500 milliGRAM(s) Oral daily  aspirin enteric coated 81 milliGRAM(s) Oral daily  Doravirine (Pefeltro), 100 mG 1 Tablet(s) 1 Tablet(s) Oral daily  ferrous sulfate 325 milliGRAM(s) Oral daily  finasteride 5 milliGRAM(s) Oral daily  folic acid 1 milliGRAM(s) Oral daily  furosemide Tablet 40 milliGRAM(s) Oral daily  glucagon  Injectable 1 milliGRAM(s) IntraMuscular once PRN  insulin lispro (HumaLOG) corrective regimen sliding scale SubCutaneous three times a day before meals  insulin lispro (HumaLOG) corrective regimen sliding scale SubCutaneous at bedtime  lamiVUDine- milliGRAM(s) Oral daily  oxycodone 5 mG/acetaminophen 325 mG 1 Tablet(s) Oral every 6 hours PRN  oxycodone 5 mG/acetaminophen 325 mG 2 Tablet(s) Oral every 6 hours PRN  pantoprazole Tablet 40 milliGRAM(s) Oral before breakfast  polyethylene glycol 3350 17 Gram(s) Oral daily  sodium chloride 0.9% lock flush 3 milliLiter(s) IV Push every 8 hours  tamsulosin 0.4 milliGRAM(s) Oral at bedtime    Physical Therapy Rec:   Home  [  ]   Home w/ PT  [  ]  Rehab  [ x ]    Discussed with Cardiothoracic Team at AM rounds.

## 2020-09-07 NOTE — PROGRESS NOTE ADULT - PROBLEM SELECTOR PLAN 1
Continue with ASA 81 mg PO Daily.   Hold BB for now 2/2 AVB  --> EP following   Daily PT/ INR   Coumadin 1 mg PO tonight   Maintain PW --> EPM VVI 50/10  Maintain tovar --> SD. Urology following. Plan to D/C once patient is able to Self cath  Increase activity as tolerated.   Encourage Chest PT / Pulmonary toileting and Incentive spirometry every 1 hour x 10 while awake.   Continue with PUD and DVT prophylaxis.   Shower on POD #5.   D/C plan Rehab once medically cleared   Plan of care discussed with attending

## 2020-09-07 NOTE — PROGRESS NOTE ADULT - PROBLEM SELECTOR PLAN 4
Maintain PW --> VVI 50     EP following  Continue to hold BB for 1st degree AVB and periods of Wenckebach AV block with narrow QRS post MVR and SHELLIE ligation; expecting rhythm to recover.  No need of PPM at this time

## 2020-09-07 NOTE — PROGRESS NOTE ADULT - ASSESSMENT
Assessment  Hyperglycemia: No hx DM, A1C 5.7%, had postoperative hyperglycemia, now on insulin coverage, blood sugars in acceptable range, no hypoglycemic episodes, comfortable without acute complaints.  Hypothyroidism: 80y male with no history thyroid disease, was not on any thyroid supplements, on HIV meds, TSH borderline elevated, subclinical hypothyroid,  TPO ab WNL, repeat TFTs still subclinical.  Endocarditis: S/P MVR, on meds, stable, monitored.        Nesha Izaguirre MD  Cell: [phone]  Office: [phone]

## 2020-09-08 LAB
ANION GAP SERPL CALC-SCNC: 10 MMOL/L — SIGNIFICANT CHANGE UP (ref 5–17)
BUN SERPL-MCNC: 35 MG/DL — HIGH (ref 7–23)
CALCIUM SERPL-MCNC: 8.6 MG/DL — SIGNIFICANT CHANGE UP (ref 8.4–10.5)
CHLORIDE SERPL-SCNC: 97 MMOL/L — SIGNIFICANT CHANGE UP (ref 96–108)
CO2 SERPL-SCNC: 25 MMOL/L — SIGNIFICANT CHANGE UP (ref 22–31)
CREAT SERPL-MCNC: 1.86 MG/DL — HIGH (ref 0.5–1.3)
GLUCOSE SERPL-MCNC: 98 MG/DL — SIGNIFICANT CHANGE UP (ref 70–99)
HCT VFR BLD CALC: 28.3 % — LOW (ref 39–50)
HGB BLD-MCNC: 9.3 G/DL — LOW (ref 13–17)
INR BLD: 1.87 RATIO — HIGH (ref 0.88–1.16)
MAGNESIUM SERPL-MCNC: 2 MG/DL — SIGNIFICANT CHANGE UP (ref 1.6–2.6)
MCHC RBC-ENTMCNC: 32.9 GM/DL — SIGNIFICANT CHANGE UP (ref 32–36)
MCHC RBC-ENTMCNC: 33.5 PG — SIGNIFICANT CHANGE UP (ref 27–34)
MCV RBC AUTO: 101.8 FL — HIGH (ref 80–100)
NRBC # BLD: 0 /100 WBCS — SIGNIFICANT CHANGE UP (ref 0–0)
PHOSPHATE SERPL-MCNC: 3.4 MG/DL — SIGNIFICANT CHANGE UP (ref 2.5–4.5)
PLATELET # BLD AUTO: 170 K/UL — SIGNIFICANT CHANGE UP (ref 150–400)
POTASSIUM SERPL-MCNC: 3.8 MMOL/L — SIGNIFICANT CHANGE UP (ref 3.5–5.3)
POTASSIUM SERPL-SCNC: 3.8 MMOL/L — SIGNIFICANT CHANGE UP (ref 3.5–5.3)
PROTHROM AB SERPL-ACNC: 21.6 SEC — HIGH (ref 10.6–13.6)
RBC # BLD: 2.78 M/UL — LOW (ref 4.2–5.8)
RBC # FLD: 15.5 % — HIGH (ref 10.3–14.5)
SODIUM SERPL-SCNC: 132 MMOL/L — LOW (ref 135–145)
VANCOMYCIN TROUGH SERPL-MCNC: 14.1 UG/ML — SIGNIFICANT CHANGE UP (ref 10–20)
WBC # BLD: 10.84 K/UL — HIGH (ref 3.8–10.5)
WBC # FLD AUTO: 10.84 K/UL — HIGH (ref 3.8–10.5)

## 2020-09-08 PROCEDURE — 99232 SBSQ HOSP IP/OBS MODERATE 35: CPT

## 2020-09-08 PROCEDURE — 93010 ELECTROCARDIOGRAM REPORT: CPT

## 2020-09-08 PROCEDURE — 71045 X-RAY EXAM CHEST 1 VIEW: CPT | Mod: 26

## 2020-09-08 PROCEDURE — 93306 TTE W/DOPPLER COMPLETE: CPT | Mod: 26

## 2020-09-08 PROCEDURE — 99223 1ST HOSP IP/OBS HIGH 75: CPT

## 2020-09-08 PROCEDURE — 99232 SBSQ HOSP IP/OBS MODERATE 35: CPT | Mod: GC

## 2020-09-08 RX ORDER — WARFARIN SODIUM 2.5 MG/1
3 TABLET ORAL ONCE
Refills: 0 | Status: COMPLETED | OUTPATIENT
Start: 2020-09-08 | End: 2020-09-08

## 2020-09-08 RX ORDER — VANCOMYCIN HCL 1 G
1000 VIAL (EA) INTRAVENOUS DAILY
Refills: 0 | Status: DISCONTINUED | OUTPATIENT
Start: 2020-09-08 | End: 2020-09-11

## 2020-09-08 RX ORDER — POTASSIUM CHLORIDE 20 MEQ
20 PACKET (EA) ORAL ONCE
Refills: 0 | Status: COMPLETED | OUTPATIENT
Start: 2020-09-08 | End: 2020-09-08

## 2020-09-08 RX ADMIN — SODIUM CHLORIDE 3 MILLILITER(S): 9 INJECTION INTRAMUSCULAR; INTRAVENOUS; SUBCUTANEOUS at 13:18

## 2020-09-08 RX ADMIN — TAMSULOSIN HYDROCHLORIDE 0.4 MILLIGRAM(S): 0.4 CAPSULE ORAL at 21:45

## 2020-09-08 RX ADMIN — Medication 40 MILLIGRAM(S): at 05:13

## 2020-09-08 RX ADMIN — SODIUM CHLORIDE 3 MILLILITER(S): 9 INJECTION INTRAMUSCULAR; INTRAVENOUS; SUBCUTANEOUS at 05:12

## 2020-09-08 RX ADMIN — PANTOPRAZOLE SODIUM 40 MILLIGRAM(S): 20 TABLET, DELAYED RELEASE ORAL at 05:13

## 2020-09-08 RX ADMIN — Medication 20 MILLIEQUIVALENT(S): at 09:56

## 2020-09-08 RX ADMIN — FINASTERIDE 5 MILLIGRAM(S): 5 TABLET, FILM COATED ORAL at 11:44

## 2020-09-08 RX ADMIN — Medication 81 MILLIGRAM(S): at 11:45

## 2020-09-08 RX ADMIN — WARFARIN SODIUM 3 MILLIGRAM(S): 2.5 TABLET ORAL at 21:45

## 2020-09-08 RX ADMIN — ABACAVIR 600 MILLIGRAM(S): 20 SOLUTION ORAL at 11:44

## 2020-09-08 RX ADMIN — Medication 500 MILLIGRAM(S): at 11:44

## 2020-09-08 RX ADMIN — Medication 325 MILLIGRAM(S): at 11:44

## 2020-09-08 RX ADMIN — Medication 100 MILLIGRAM(S): at 11:44

## 2020-09-08 RX ADMIN — Medication 1 MILLIGRAM(S): at 11:44

## 2020-09-08 RX ADMIN — Medication 250 MILLIGRAM(S): at 17:01

## 2020-09-08 RX ADMIN — SODIUM CHLORIDE 3 MILLILITER(S): 9 INJECTION INTRAMUSCULAR; INTRAVENOUS; SUBCUTANEOUS at 21:42

## 2020-09-08 NOTE — PROGRESS NOTE ADULT - PROBLEM SELECTOR PLAN 4
Maintain PW --> VVI 50     EP following, Dr. dang  Continue to hold BB for 1st degree AVB and periods of Wenckebach AV block with narrow QRS post MVR and SHELLIE ligation; expecting rhythm to recover.  No need of PPM at this time

## 2020-09-08 NOTE — PROGRESS NOTE ADULT - SUBJECTIVE AND OBJECTIVE BOX
Subjective: Pt states "Hello" denies any CP or SOB. No acute events overnight.     Telemetry:  AV dissociation/ 50 - 80  Vital Signs Last 24 Hrs  T(C): 37.1 (20 @ 05:00), Max: 37.1 (20 @ 05:00)  T(F): 98.8 (20 @ 05:00), Max: 98.8 (20 @ 05:00)  HR: 54 (20 @ 05:00) (53 - 72)  BP: 111/63 (20 @ 05:00) (111/63 - 133/68)  RR: 18 (20 @ 05:00) (16 - 18)  SpO2: 96% (20 @ 05:00) (96% - 98%)           @ 07:01  -   @ 07:00  --------------------------------------------------------  IN: 730 mL / OUT: 2700 mL / NET: -1970 mL        Daily Weight in k.6 (08 Sep 2020 09:59)                        9.3    10.84 )-----------( 170      ( 08 Sep 2020 05:24 )             28.3     132<L>  |  97  |  35<H>  ----------------------------<  98  3.8   |  25  |  1.86<H>      CAPILLARY BLOOD GLUCOSE  93 - 138      PHYSICAL EXAM  Neurology: A&Ox3, NAD  CV : RRR+S1S2  Sternal Wound: MSI CDI CAR, Stable   +PW - EPM VVI 50/13  Lungs: Respirations non-labored, B/L BS clear, diminished at bases  Abdomen: Soft, NT/ND, +BSx4Q, last BM  (-)N/V/D  : +Aden with clear yellow urine  Extremities: B/L LE +1 edema, negative calf tenderness, +PP           MEDICATIONS  abacavir 600 milliGRAM(s) Oral daily  ascorbic acid 500 milliGRAM(s) Oral daily  aspirin enteric coated 81 milliGRAM(s) Oral daily  Doravirine (Pefeltro), 100 mG 1 Tablet(s) 1 Tablet(s) Oral daily  ferrous    sulfate 325 milliGRAM(s) Oral daily  finasteride 5 milliGRAM(s) Oral daily  folic acid 1 milliGRAM(s) Oral daily  furosemide    Tablet 40 milliGRAM(s) Oral daily  insulin lispro (HumaLOG) corrective regimen sliding scale   SubCutaneous three times a day before meals  insulin lispro (HumaLOG) corrective regimen sliding scale   SubCutaneous at bedtime  lamiVUDine- milliGRAM(s) Oral daily  oxycodone    5 mG/acetaminophen 325 mG 1 Tablet(s) Oral every 6 hours PRN  oxycodone    5 mG/acetaminophen 325 mG 2 Tablet(s) Oral every 6 hours PRN  pantoprazole    Tablet 40 milliGRAM(s) Oral before breakfast  polyethylene glycol 3350 17 Gram(s) Oral daily  sodium chloride 0.9% lock flush 3 milliLiter(s) IV Push every 8 hours  tamsulosin 0.4 milliGRAM(s) Oral at bedtime  warfarin 3 milliGRAM(s) Oral once      Physical Therapy Rec:   Home  [  ]   Home w/ PT  [  ]  Rehab  [ X ]    Discussed with Cardiothoracic Team at AM rounds.

## 2020-09-08 NOTE — PROGRESS NOTE ADULT - ASSESSMENT
80 year old with hx of HIV, CAD, BPH presented after a fall at home and found to have rhabdo 2/2 urinary retention. Later found to have bacteremia growing staph epidermidis as well as endocarditis with flair MV leaflet. Patient s/p MVR (t) and SHELLIE clip on 8/31/20. EP consulted for AV dissociation noted on telemetry. Telemetry strips and ECG revealed sinus rhythm with prolonged ND and periods of 2nd degree AVB type I (Wenckebach).       1)Bacteremia (staph epidermidis) w/ MV endocarditis s/p MVR (t) and SHELLIE clipping on 8/31  2) Post-op SR w/ 1st degree w/ periods of Wenckebach, narrow QRS complex     Plan:   -Continue to monitor telemetry  -Temp. epicardial pacing changed from VVI 50 bpm to 30 bpm  -No indication for PPM at this time    ALBERTO Preciado, BLANK  [id number]

## 2020-09-08 NOTE — PROGRESS NOTE ADULT - PROBLEM SELECTOR PLAN 2
ID Following, Dr. Almazan  Vancomycin 1g daily to be dosed by level  Check daily labs for vancomycin random level, goal is 15-20  Pending PICC line placement

## 2020-09-08 NOTE — PROGRESS NOTE ADULT - PROBLEM SELECTOR PLAN 5
Renal following, Dr. Aviles   Monitor Renal function daily BMP  (+) UA likely colonization per ID   Urology following, to d/c tovar when SCr stabilizes  plan then is for pt to continue self cath at home (intermittently)   Avoid Nephrotoxic toxic agent

## 2020-09-08 NOTE — PROGRESS NOTE ADULT - SUBJECTIVE AND OBJECTIVE BOX
[facility] DIVISION OF KIDNEY DISEASES AND HYPERTENSION -- FOLLOW UP NOTE  --------------------------------------------------------------------------------  If any questions, please feel free to contact me  NS pager: [phone], [facility]: [phone]  Parker Harding M.D.  Nephrology Fellow    (After 5 pm or on weekends please page the on-call fellow)  --------------------------------------------------------------------------------    Chief Complaint:  Patient is a 80y old  Male who presents with a chief complaint of fall (07 Sep 2020 15:17)    24 hour events/subjective:  Patient seen and examined at bedside, in NAD  sCr has remained stable 1.7-2.0 - today at 1.8  no acute events overnight. --UOP: 2.7L in the past 24hrs.       PAST HISTORY  --------------------------------------------------------------------------------  No significant changes to PMH, PSH, FHx, SHx, unless otherwise noted    ALLERGIES & MEDICATIONS  --------------------------------------------------------------------------------  Allergies    No Known Allergies    Intolerances      Standing Inpatient Medications  abacavir 600 milliGRAM(s) Oral daily  ascorbic acid 500 milliGRAM(s) Oral daily  aspirin enteric coated 81 milliGRAM(s) Oral daily  dextrose 50% Injectable 50 milliLiter(s) IV Push every 15 minutes  dextrose 50% Injectable 25 milliLiter(s) IV Push every 15 minutes  Doravirine (Pefeltro), 100 mG 1 Tablet(s) 1 Tablet(s) Oral daily  ferrous    sulfate 325 milliGRAM(s) Oral daily  finasteride 5 milliGRAM(s) Oral daily  folic acid 1 milliGRAM(s) Oral daily  furosemide    Tablet 40 milliGRAM(s) Oral daily  insulin lispro (HumaLOG) corrective regimen sliding scale   SubCutaneous three times a day before meals  insulin lispro (HumaLOG) corrective regimen sliding scale   SubCutaneous at bedtime  lamiVUDine- milliGRAM(s) Oral daily  pantoprazole    Tablet 40 milliGRAM(s) Oral before breakfast  polyethylene glycol 3350 17 Gram(s) Oral daily  sodium chloride 0.9% lock flush 3 milliLiter(s) IV Push every 8 hours  tamsulosin 0.4 milliGRAM(s) Oral at bedtime  warfarin 3 milliGRAM(s) Oral once    PRN Inpatient Medications  dextrose 40% Gel 15 Gram(s) Oral once PRN  glucagon  Injectable 1 milliGRAM(s) IntraMuscular once PRN  oxycodone    5 mG/acetaminophen 325 mG 1 Tablet(s) Oral every 6 hours PRN  oxycodone    5 mG/acetaminophen 325 mG 2 Tablet(s) Oral every 6 hours PRN      REVIEW OF SYSTEMS  --------------------------------------------------------------------------------  Gen: No fevers/chills  Skin: No rashes  Head/Eyes/Ears: Normal hearing,   Respiratory: No dyspnea, cough  CV: No chest pain  GI: No abdominal pain, diarrhea  : No dysuria, hematuria  MSK: No  edema  Heme: No easy bruising or bleeding  Psych: No significant depression      All other systems were reviewed and are negative, except as noted.    VITALS/PHYSICAL EXAM  --------------------------------------------------------------------------------  T(C): 37.1 (09-08-20 @ 05:00), Max: 37.1 (09-08-20 @ 05:00)  HR: 54 (09-08-20 @ 05:00) (53 - 72)  BP: 111/63 (09-08-20 @ 05:00) (111/63 - 133/68)  RR: 18 (09-08-20 @ 05:00) (16 - 18)  SpO2: 96% (09-08-20 @ 05:00) (96% - 98%)  Wt(kg): --        09-07-20 @ 07:01  -  09-08-20 @ 07:00  --------------------------------------------------------  IN: 730 mL / OUT: 2700 mL / NET: -1970 mL    09-08-20 @ 07:01  -  09-08-20 @ 10:51  --------------------------------------------------------  IN: 0 mL / OUT: 1200 mL / NET: -1200 mL        Physical Exam:  	Gen: NAD, well-appearing  	HEENT: Anicteric   	Pulm: CTA B/L, no wheezing   	CV: RRR, S1S2; no rub   	Abd: +BS, soft, nontender/nondistended       	: No suprapubic tenderness  	MSK: Warm, no clubbing, intact strength; no edema  	Neuro: No focal deficits, AOX3, no asterixis    LABS/STUDIES  --------------------------------------------------------------------------------              9.3    10.84 >-----------<  170      [09-08-20 @ 05:24]              28.3     132  |  97  |  35  ----------------------------<  98      [09-08-20 @ 05:24]  3.8   |  25  |  1.86        Ca     8.6     [09-08-20 @ 05:24]      Mg     2.0     [09-08-20 @ 05:24]      Phos  3.4     [09-08-20 @ 05:24]      PT/INR: PT 21.6 , INR 1.87       [09-08-20 @ 05:24]  PTT: 36.5       [09-07-20 @ 06:46]      Creatinine Trend:  SCr 1.86 [09-08 @ 05:24]  SCr 1.82 [09-07 @ 06:46]  SCr 1.71 [09-06 @ 07:04]  SCr 1.74 [09-05 @ 06:09]  SCr 1.90 [09-04 @ 17:29]    Urinalysis - [09-03-20 @ 08:24]      Color Yellow / Appearance Clear / SG 1.021 / pH 6.0      Gluc Negative / Ketone Negative  / Bili Negative / Urobili Negative       Blood Moderate / Protein 30 mg/dL / Leuk Est Large / Nitrite Negative      RBC 68 / WBC 29 / Hyaline 6 / Gran 2 / Sq Epi  / Non Sq Epi 1 / Bacteria Negative      TSH 6.90      [09-06-20 @ 09:42]

## 2020-09-08 NOTE — PROGRESS NOTE ADULT - ATTENDING COMMENTS
seen and examined with NP. I agree with H & P, A & P.  AV kaylan disease post OHS.  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 Hopefully will improve.  Agree with lowering to VVI 30 bpm.  Will assess conduction with ambulation.  Should he need PPM would favor Micra AV.

## 2020-09-08 NOTE — PROGRESS NOTE ADULT - PROBLEM SELECTOR PLAN 1
Continue with ASA 81 mg PO Daily.   Hold BB for now 2/2 AVB  --> EP following   Continue AC on Coumadin  Daily PT/ INR   Increase activity as tolerated.   Encourage Chest PT / Pulmonary toileting and Incentive spirometry every 1 hour x 10 while awake.   Continue with PUD prophylaxis.   Shower on POD #5.   D/C plan Subacute Rehab once medically cleared   Plan of care discussed with attending

## 2020-09-08 NOTE — PROGRESS NOTE ADULT - SUBJECTIVE AND OBJECTIVE BOX
24H hour events: pt without complaints, no acute events overnight, Tele: SR with 1st degree with periods of Wenckebach, rates 50-60's      MEDICATIONS:  aspirin enteric coated 81 milliGRAM(s) Oral daily  furosemide    Tablet 40 milliGRAM(s) Oral daily  tamsulosin 0.4 milliGRAM(s) Oral at bedtime  warfarin 3 milliGRAM(s) Oral once    abacavir 600 milliGRAM(s) Oral daily  lamiVUDine- milliGRAM(s) Oral daily  vancomycin  IVPB 1000 milliGRAM(s) IV Intermittent daily      oxycodone    5 mG/acetaminophen 325 mG 1 Tablet(s) Oral every 6 hours PRN  oxycodone    5 mG/acetaminophen 325 mG 2 Tablet(s) Oral every 6 hours PRN    pantoprazole    Tablet 40 milliGRAM(s) Oral before breakfast  polyethylene glycol 3350 17 Gram(s) Oral daily    dextrose 40% Gel 15 Gram(s) Oral once PRN  dextrose 50% Injectable 50 milliLiter(s) IV Push every 15 minutes  dextrose 50% Injectable 25 milliLiter(s) IV Push every 15 minutes  finasteride 5 milliGRAM(s) Oral daily  glucagon  Injectable 1 milliGRAM(s) IntraMuscular once PRN  insulin lispro (HumaLOG) corrective regimen sliding scale   SubCutaneous three times a day before meals  insulin lispro (HumaLOG) corrective regimen sliding scale   SubCutaneous at bedtime    ascorbic acid 500 milliGRAM(s) Oral daily  ferrous    sulfate 325 milliGRAM(s) Oral daily  folic acid 1 milliGRAM(s) Oral daily  sodium chloride 0.9% lock flush 3 milliLiter(s) IV Push every 8 hours      REVIEW OF SYSTEMS:  Complete 10point ROS negative.    PHYSICAL EXAM:  T(C): 36.8 (09-08-20 @ 11:45), Max: 37.1 (09-08-20 @ 05:00)  HR: 59 (09-08-20 @ 11:45) (54 - 72)  BP: 94/50 (09-08-20 @ 11:45) (94/50 - 133/68)  RR: 18 (09-08-20 @ 11:45) (16 - 18)  SpO2: 97% (09-08-20 @ 11:45) (96% - 98%)  Wt(kg): --  I&O's Summary    07 Sep 2020 07:01  -  08 Sep 2020 07:00  --------------------------------------------------------  IN: 730 mL / OUT: 2700 mL / NET: -1970 mL    08 Sep 2020 07:01  -  08 Sep 2020 14:26  --------------------------------------------------------  IN: 400 mL / OUT: 1950 mL / NET: -1550 mL        Appearance: Normal	  HEENT:   Normal oral mucosa, PERRL, EOMI	  Lymphatic: No lymphadenopathy  Cardiovascular: Normal S1 S2, No JVD, No murmurs, No edema  Respiratory: Lungs clear to auscultation	  Psychiatry: A & O x 3, Mood & affect appropriate  Gastrointestinal:  Soft, Non-tender, + BS	  Skin: No rashes, No ecchymoses, No cyanosis	  Neurologic: Non-focal  Extremities: Normal range of motion, No clubbing, cyanosis or edema  Vascular: Peripheral pulses palpable 2+ bilaterally        LABS:	 	    CBC Full  -  ( 08 Sep 2020 05:24 )  WBC Count : 10.84 K/uL  Hemoglobin : 9.3 g/dL  Hematocrit : 28.3 %  Platelet Count - Automated : 170 K/uL  Mean Cell Volume : 101.8 fl  Mean Cell Hemoglobin : 33.5 pg  Mean Cell Hemoglobin Concentration : 32.9 gm/dL  Auto Neutrophil # : x  Auto Lymphocyte # : x  Auto Monocyte # : x  Auto Eosinophil # : x  Auto Basophil # : x  Auto Neutrophil % : x  Auto Lymphocyte % : x  Auto Monocyte % : x  Auto Eosinophil % : x  Auto Basophil % : x    09-08    132<L>  |  97  |  35<H>  ----------------------------<  98  3.8   |  25  |  1.86<H>  09-07    134<L>  |  97  |  35<H>  ----------------------------<  115<H>  4.2   |  25  |  1.82<H>    Ca    8.6      08 Sep 2020 05:24  Ca    9.0      07 Sep 2020 06:46  Phos  3.4     09-08  Mg     2.0     09-08    Thyroid Stimulating Hormone, Serum in AM (09.06.20 @ 09:42)    Thyroid Stimulating Hormone, Serum: 6.90 uIU/mL        TELEMETRY: SR with 1st degree with periods of Wenckebach, rates 50-60's   	    ECG: SR with 1st degree with periods of Wenckebach, rates 50-60's, narrow QRS complex (MN interval 214ms, QRSd 78ms)     < from: Transthoracic Echocardiogram (08.20.20 @ 09:27) >  Dimensions:    Normal Values:  LA:     3.4    2.0 - 4.0 cm  Ao:     3.5    2.0 - 3.8 cm  SEPTUM: 0.9    0.6 - 1.2 cm  PWT:    1.1    0.6 - 1.1 cm  LVIDd:  5.1    3.0 - 5.6 cm  LVIDs:  3.4    1.8 - 4.0 cm  Derived variables:  LVMI: 95 g/m2  RWT: 0.43  Fractional short: 33 %  EF (Visual Estimate): 60 %  Doppler Peak Velocity (m/sec): AoV=1.0  ------------------------------------------------------------------------  Observations:  Mitral Valve: Mitral valve not well visualized; however,  the anterior leaflet of the mitral valve appears thickened  with a 1.3 cm x 1.4 cm echodensity on the atrial surface  (not well visualized), possibly a vegetation (the  ehcodensity does not display obvious independent mobility,  but study is technically limited), which may be suggestive  of endocarditis given the clinical history. Findings  communicated to Ольга RICKS. Consider NOHEMI if clinically  indicated. Severe, eccentric posteriorly directed mitral  regurgitation (notably the left atrium is not dilated,  suggestive of acute mitral regurgitation).  Aortic Valve/Aorta: Normal trileaflet aortic valve. Peak  transaortic valve gradient equals4 mm Hg, mean transaortic  valve gradient equals 2 mm Hg, aortic valve velocity time  integral equals 20 cm. Peak left ventricular outflow tract  gradient equals 3 mm Hg, mean gradient is equal to 1 mm Hg,  LVOT velocity time integral equals 18 cm.  Aortic Root: 3.5 cm.  Left Atrium: Normal left atrium.  Left Ventricle: Normal left ventricular systolic function.  No segmental wall motion abnormalities. Normal left  ventricular internal dimensions and wall thicknesses. 8  Right Heart: Normal rightatrium. Normal right ventricular  size and function. In some views, there is thickening of  the tricuspid leaflet, which may be suggestive of  vegetation (not well visualized). Mild tricuspid  regurgitation. Normal pulmonic valve. Mild pulmonic  regurgitation.  Pericardium/Pleura: Normal pericardium with no pericardialeffusion.  Hemodynamic: Estimated right atrial pressure is 8 mm Hg.  Estimated right ventricular systolic pressure equals 39 mm  Hg, assuming right atrial pressure equals 8 mm Hg,  consistent with borderline pulmonary hypertension.  ------------------------------------------------------------------------  Conclusions:  1. Mitral valve not well visualized; however, the anterior  leaflet of the mitral valve appears thickened with a 1.3 cm  x 1.4 cm echodensity on the atrial surface (not well  visualized), possibly a vegetation (the ehcodensity does  not display obvious independent mobility, but study is  technically limited), which may be suggestive of  endocarditis given the clinical history. Findings  communicated to Ольга RICKS. Consider NOHEMI if clinically  indicated. Severe, eccentric posteriorly directed mitral  regurgitation (notably the left atrium is not dilated,  suggestive of acute mitral regurgitation).  2. Normal left ventricular internal dimensions and wall  thicknesses.  3. Normal left ventricular systolic function. No segmental  wall motion abnormalities.  4. Normal right ventricular size and function.  5. In some views, there is thickening of the tricuspid  leaflet, which may be suggestive of vegetation (not wellvisualized). Mild tricuspid regurgitation.    < end of copied text >    < from: Transesophageal Echocardiogram w/o TTE (08.24.20 @ 10:00) >  Observations:  Mitral Valve: Large mobile about 1.8cm x 1.3 cm vegetation  seen on the medial aspect of the anterior leaflet with a  partial flail component of the medial A2/ A3 area. There us  also a vegetation see at the annulus of P3 which measures  0.5cm x 1.3cm  Severe eccentric posterior mitral  regurgitation.  Aortic Valve/Aorta: Sclerotic aortic valve leaflets with  normal opening.  Moderate atheroma noted in aortic arch/descending aorta.  Left Atrium: No left atrium or left atrial appendage  thrombus.  Left Ventricle: Normal left ventricular systolic function.  Right Heart: Normal right atrium. Normal right ventricular  size and function. Tricuspid valve prolapse. Mild tricuspid  regurgitation. Normal pulmonic valve. Minimal pulmonic  regurgitation.  Pericardium/Pleura: Normal pericardium with no pericardial  effusion.  Hemodynamic: Contrast injection demonstrates no evidence of  a patent foramen ovale.  ------------------------------------------------------------------------  Conclusions:  1. Large mobile about 1.8cm x 1.3 cm vegetation seen on the  medial aspect of the anterior leaflet with a partial flail  component of the medial A2/ A3 area. There us also a  vegetation see at the annulus of P3 which measures 0.5cm x  1.3cm  Severe eccentric posterior mitral regurgitation.  2. Normal left ventricular systolic function.  3. Normal right ventricular size and function.  4. Tricuspid valve prolapse. Mildtricuspid regurgitation.  Discussed with Team member  at time of Study (Ivan Palma )    < end of copied text >    < from: Cardiac Cath Lab - Adult (08.25.20 @ 14:56) >  HISTORY: There was no prior cardiac history. The patient has hypertension.  PROCEDURE:  --  Left heart catheterization.  --  Left coronary angiography.  --  Right coronary angiography.  TECHNIQUE: The risks and alternatives of the procedures and conscious  sedation were explained to the patient and informed consent was obtained.  Cardiac catheterization performed electively. Coronary intervention  performed electively.  Local anesthetic given. Right radial artery access. Left heart  catheterization. Left coronary artery angiography. The vessel was injected  utilizinga catheter. Right coronary artery angiography. The vessel was  injected utilizing a catheter. RADIATION EXPOSURE: 3.9 min.  CONTRAST GIVEN: Omnipaque 50 ml.  MEDICATIONS GIVEN: Fentanyl, 25 mcg, IV. Midazolam, 1 mg, IV. Verapamil  (Isoptin, Calan, Covera), 2.5 mg, IA. Heparin, 3000 units, IA.  CORONARY VESSELS: The coronary circulation is right dominant.  LM:   --  LM: Normal.  LAD:   --  LAD: Normal.  CX:   --  Circumflex: Normal.  RCA:   --  RCA: Normal.  COMPLICATIONS: There were no complications.  DIAGNOSTIC RECOMMENDATIONS: The patient should continue with the present  medications.    < end of copied text >

## 2020-09-08 NOTE — PROGRESS NOTE ADULT - ASSESSMENT
81 y/o man with PMHx of HIV, CAD, detrusor muscle weakness, BPH presenting for fall, unresponsive for 24 hrs, admitted with rhabdo, bilateral severe ureterohydronephrosis and thickened bladder on CTAP 2/2 inability to continue his home straight cath 3x daily after fall, tovar in place. Rhabdo improved, hydronephrosis improved with tovar (urology following). NOHEMI shows endocarditis (NOHEMI: 1.8x1.3 vegetation on medial aspect of anterior leaflet with partial flail component + 0.5x1.3cm vegetation no posterior leaflet w severe eccentric mitral regurg). BCx staph epidermidis, repeat NGTD, on vancomycin. s/p MRA without septic emboli. s/p cardiac cath  normal cors. Followed by ID Dr. Almazan cleared for OR. Elevated creatinine followed by Nephrology monitoring creatinine this afternoon. Endocrine consulted Dr. Izaguirre.  On 8/31/20 s/p MVR (T) / SHELLIE clip   Post op Course: Extubated POD #0  ID following for endocarditis; and HIV à Vancomycin 1g daily for now; Check daily labs for vancomycin random level, goal is 15-20.  Hyperglycemia à Endocrine following   SUDHEER on CKD à Renal following. Tovar in place.   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  9/4 overnight noted with Accelerated junctional / AV disassociation. Continue on AC therapy Coumadin 5 mg PO tonight.  V Paced à 80.  Monitor renal function.  Creatinine 2.1 will repeat BMP this afternoon.   9/5 EPM rate decreased to rate 70. Coumadin dosing tonight for INR 1.6. Tovar maintained. As per urology plan to d/c tovar when creatinine becomes stable, then pt can continue self cath at home for his baseline urinary retention. Lasix 40 mg PO added.   9/6 meds bulb 35 cc x 24 hrs. ext pacer turned down to 50? HB to 50's and sr with first degree currently off BB with EPS consult called today,   creat down 1.71  9/7 VVS; Continue with current medication regimen. Low dose coumadin for anticipated PICC lime placement.  Vanco dosed by level.  Awaiting this afternoon Vanco (T). ID following. EP following --> Afib w/ occasional V-Pacing @ 50; No indication for PPM at this time.   9/8 VSS, cleared by IR for bedside PICC line placement today. INR 1.86. D/C tovar and trial self catheterization. Pt still AV dissociation &  on tele - EP re-eval for possible Micra PPM.

## 2020-09-08 NOTE — PROGRESS NOTE ADULT - SUBJECTIVE AND OBJECTIVE BOX
Chief complaint  Patient is a 80y old  Male who presents with a chief complaint of fall (08 Sep 2020 10:50)   Review of systems  Patient in bed, looks comfortable, no hypoglycemic episodes.    Labs and Fingersticks  CAPILLARY BLOOD GLUCOSE      POCT Blood Glucose.: 123 mg/dL (08 Sep 2020 11:24)  POCT Blood Glucose.: 93 mg/dL (08 Sep 2020 07:28)  POCT Blood Glucose.: 138 mg/dL (07 Sep 2020 21:36)  POCT Blood Glucose.: 113 mg/dL (07 Sep 2020 16:52)    Medications  MEDICATIONS  (STANDING):  abacavir 600 milliGRAM(s) Oral daily  ascorbic acid 500 milliGRAM(s) Oral daily  aspirin enteric coated 81 milliGRAM(s) Oral daily  dextrose 50% Injectable 50 milliLiter(s) IV Push every 15 minutes  dextrose 50% Injectable 25 milliLiter(s) IV Push every 15 minutes  Doravirine (Pefeltro), 100 mG 1 Tablet(s) 1 Tablet(s) Oral daily  ferrous    sulfate 325 milliGRAM(s) Oral daily  finasteride 5 milliGRAM(s) Oral daily  folic acid 1 milliGRAM(s) Oral daily  furosemide    Tablet 40 milliGRAM(s) Oral daily  insulin lispro (HumaLOG) corrective regimen sliding scale   SubCutaneous three times a day before meals  insulin lispro (HumaLOG) corrective regimen sliding scale   SubCutaneous at bedtime  lamiVUDine- milliGRAM(s) Oral daily  pantoprazole    Tablet 40 milliGRAM(s) Oral before breakfast  polyethylene glycol 3350 17 Gram(s) Oral daily  sodium chloride 0.9% lock flush 3 milliLiter(s) IV Push every 8 hours  tamsulosin 0.4 milliGRAM(s) Oral at bedtime  vancomycin  IVPB 1000 milliGRAM(s) IV Intermittent daily  warfarin 3 milliGRAM(s) Oral once      Physical Exam  General: Patient comfortable in bed  Vital Signs Last 12 Hrs  T(F): 98.3 (09-08-20 @ 11:45), Max: 98.8 (09-08-20 @ 05:00)  HR: 59 (09-08-20 @ 11:45) (54 - 59)  BP: 94/50 (09-08-20 @ 11:45) (94/50 - 111/63)  BP(mean): 79 (09-08-20 @ 05:00) (79 - 79)  RR: 18 (09-08-20 @ 11:45) (18 - 18)  SpO2: 97% (09-08-20 @ 11:45) (96% - 97%)

## 2020-09-08 NOTE — PROGRESS NOTE ADULT - SUBJECTIVE AND OBJECTIVE BOX
CC: F/U for Endocarditis    Saw/spoke to patient. No fevers, no chills. No new complaints. Unchanged.    Allergies  No Known Allergies    ANTIMICROBIALS:  abacavir 600 daily  lamiVUDine- daily  vancomycin  IVPB 1000 daily    PE:    Vital Signs Last 24 Hrs  T(C): 36.8 (08 Sep 2020 11:45), Max: 37.1 (08 Sep 2020 05:00)  T(F): 98.3 (08 Sep 2020 11:45), Max: 98.8 (08 Sep 2020 05:00)  HR: 60 (08 Sep 2020 15:05) (54 - 72)  BP: 115/56 (08 Sep 2020 15:05) (94/50 - 133/68)  BP(mean): 79 (08 Sep 2020 05:00) (79 - 89)  RR: 18 (08 Sep 2020 11:45) (16 - 18)  SpO2: 97% (08 Sep 2020 11:45) (96% - 98%)    Gen: AOx3, NAD, non-toxic, pleasant  CV: Midsternal wound clean, without erythema or purulence  Resp: Clear bilat, no resp distress, no crackles/wheezes  Abd: Soft, nontender, +BS  Ext: No LE edema, no wounds    LABS:                        9.3    10.84 )-----------( 170      ( 08 Sep 2020 05:24 )             28.3     09-08    132<L>  |  97  |  35<H>  ----------------------------<  98  3.8   |  25  |  1.86<H>    Ca    8.6      08 Sep 2020 05:24  Phos  3.4     09-08  Mg     2.0     09-08    MICROBIOLOGY:  Vancomycin Level, Trough: 11.9 ug/mL (09-07-20 @ 15:40)    .Tissue Other  08-31-20   Culture is being performed.  --    No polymorphonuclear cells seen per low power field  No organisms seen per oil power field    .Surgical Swab mitral vegetation  08-31-20   No growth    .Blood Blood-Peripheral  08-20-20   No Growth Final    .Urine Clean Catch (Midstream)  08-19-20   <10,000 CFU/mL Normal Urogenital Kami    .Blood Blood-Peripheral  08-19-20   Growth in aerobic and anaerobic bottles: Staphylococcus epidermidis    HIV-1 RNA Quantitative, Viral Load Log: NOT DET. lg /mL (08-19-20 @ 04:46)    (otherwise reviewed)    RADIOLOGY:    9/3 CXR:    IMPRESSION:    The mediastinal cardiac silhouette is unremarkable. Sternotomy and atrial clip. Mediastinal drains.    Trace bilateral effusions and bibasilar atelectasis. No pneumothorax.    No acute osseous finding.

## 2020-09-08 NOTE — PROGRESS NOTE ADULT - ASSESSMENT
80M hx of HIV(IS1=558, VL undet), self caths at home, CAD presented after a fall at home.  Bacteremia with 2/2 sets on 8/19 growing coag. 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

## 2020-09-08 NOTE — PROGRESS NOTE ADULT - ASSESSMENT
80y M with CKD, hydroureteronephrosis, endocarditis, now s/p mitral valve replacement on 8/31/20   nephrology consulted for SUDHEER      #SUDHEER on CKD3  - patient is s/p mitral valve replacement on 8/31/2020  - Patient with baseline CKD since 2015, Scr baseline 1.6-1.8mg/dl  - SUDHEER  2/2 contrast/ diuretics or removal of tovar catheter  - sCr has remained stable at 1.7-2.0 - today at 1.8   - avoid hypotension, and overdiuresis   - Monitor UOP and daily weights. Avoid volume depletion, NSAIDs, PPI's, ARB/ACE-I.   - Dose medications as per eGFR. 80y M with CKD, hydroureteronephrosis, endocarditis, now s/p mitral valve replacement on 8/31/20   nephrology consulted for SUDHEER      #SUDHEER on CKD3  - patient is s/p mitral valve replacement on 8/31/2020  - Patient with baseline CKD since 2015, Scr baseline 1.6-1.8mg/dl  - SUDHEER  2/2 contrast/ diuretics or removal of tovar catheter  - sCr has remained stable at 1.7-2.0 - today at 1.8 - at baseline  - avoid hypotension, and overdiuresis   - Monitor UOP and daily weights. Avoid volume depletion, NSAIDs, PPI's, ARB/ACE-I.   - Dose medications as per eGFR.    Will sign off at this time, reconsult as needed

## 2020-09-08 NOTE — PROGRESS NOTE ADULT - PROBLEM SELECTOR PLAN 6
Endocrine following, Dr. Izaguirre   Continue with Consistent Carb diet  Accucheck AC and HS and cover with HISS

## 2020-09-08 NOTE — PROGRESS NOTE ADULT - ASSESSMENT
Assessment  Hyperglycemia: No hx DM, A1C 5.7%, had postoperative hyperglycemia, now on insulin coverage, blood sugars improved and trending within acceptable range, no hypoglycemic episodes, eating meals, well-appearing.  Hypothyroidism: 80y male with no history thyroid disease, was not on any thyroid supplements, on HIV meds, TSH borderline elevated, subclinical hypothyroid,  TPO ab WNL, repeat TFTs still subclinical.  Endocarditis: S/P MVR, on meds, stable, monitored.        Nesha Izaguirre MD  Cell: [phone]  Office: [phone] Assessment  Hyperglycemia: No hx DM, A1C 5.7%, had postoperative hyperglycemia, now on insulin coverage, blood sugars improved and trending within acceptable range, no hypoglycemic episodes, eating meals, well-appearing.  Hypothyroidism: 80y male with no history thyroid disease, was not on any thyroid supplements, on HIV meds, TSH borderline elevated, subclinical hypothyroid,  TPO ab WNL, repeat TFTs still subclinical.  Endocarditis: S/P MVR, on meds,  stable, monitored.        Nesha Izaguirre MD  Cell: [phone]  Office: [phone]

## 2020-09-09 LAB
ANION GAP SERPL CALC-SCNC: 11 MMOL/L — SIGNIFICANT CHANGE UP (ref 5–17)
BUN SERPL-MCNC: 40 MG/DL — HIGH (ref 7–23)
CALCIUM SERPL-MCNC: 9 MG/DL — SIGNIFICANT CHANGE UP (ref 8.4–10.5)
CHLORIDE SERPL-SCNC: 98 MMOL/L — SIGNIFICANT CHANGE UP (ref 96–108)
CO2 SERPL-SCNC: 25 MMOL/L — SIGNIFICANT CHANGE UP (ref 22–31)
CREAT SERPL-MCNC: 1.91 MG/DL — HIGH (ref 0.5–1.3)
GLUCOSE SERPL-MCNC: 104 MG/DL — HIGH (ref 70–99)
HCT VFR BLD CALC: 28.8 % — LOW (ref 39–50)
HGB BLD-MCNC: 9.2 G/DL — LOW (ref 13–17)
INR BLD: 1.82 RATIO — HIGH (ref 0.88–1.16)
MAGNESIUM SERPL-MCNC: 2.2 MG/DL — SIGNIFICANT CHANGE UP (ref 1.6–2.6)
MCHC RBC-ENTMCNC: 31.9 GM/DL — LOW (ref 32–36)
MCHC RBC-ENTMCNC: 32.5 PG — SIGNIFICANT CHANGE UP (ref 27–34)
MCV RBC AUTO: 101.8 FL — HIGH (ref 80–100)
NRBC # BLD: 0 /100 WBCS — SIGNIFICANT CHANGE UP (ref 0–0)
PHOSPHATE SERPL-MCNC: 3.8 MG/DL — SIGNIFICANT CHANGE UP (ref 2.5–4.5)
PLATELET # BLD AUTO: 195 K/UL — SIGNIFICANT CHANGE UP (ref 150–400)
POTASSIUM SERPL-MCNC: 3.9 MMOL/L — SIGNIFICANT CHANGE UP (ref 3.5–5.3)
POTASSIUM SERPL-SCNC: 3.9 MMOL/L — SIGNIFICANT CHANGE UP (ref 3.5–5.3)
PROTHROM AB SERPL-ACNC: 21 SEC — HIGH (ref 10.6–13.6)
RBC # BLD: 2.83 M/UL — LOW (ref 4.2–5.8)
RBC # FLD: 15.4 % — HIGH (ref 10.3–14.5)
SODIUM SERPL-SCNC: 134 MMOL/L — LOW (ref 135–145)
WBC # BLD: 10.08 K/UL — SIGNIFICANT CHANGE UP (ref 3.8–10.5)
WBC # FLD AUTO: 10.08 K/UL — SIGNIFICANT CHANGE UP (ref 3.8–10.5)

## 2020-09-09 PROCEDURE — 99232 SBSQ HOSP IP/OBS MODERATE 35: CPT

## 2020-09-09 PROCEDURE — 99233 SBSQ HOSP IP/OBS HIGH 50: CPT

## 2020-09-09 RX ORDER — WARFARIN SODIUM 2.5 MG/1
1 TABLET ORAL ONCE
Refills: 0 | Status: COMPLETED | OUTPATIENT
Start: 2020-09-09 | End: 2020-09-09

## 2020-09-09 RX ADMIN — Medication 250 MILLIGRAM(S): at 13:12

## 2020-09-09 RX ADMIN — Medication 40 MILLIGRAM(S): at 05:16

## 2020-09-09 RX ADMIN — Medication 1 MILLIGRAM(S): at 12:35

## 2020-09-09 RX ADMIN — SODIUM CHLORIDE 3 MILLILITER(S): 9 INJECTION INTRAMUSCULAR; INTRAVENOUS; SUBCUTANEOUS at 05:16

## 2020-09-09 RX ADMIN — Medication 500 MILLIGRAM(S): at 12:35

## 2020-09-09 RX ADMIN — Medication 325 MILLIGRAM(S): at 12:35

## 2020-09-09 RX ADMIN — Medication 81 MILLIGRAM(S): at 12:35

## 2020-09-09 RX ADMIN — SODIUM CHLORIDE 3 MILLILITER(S): 9 INJECTION INTRAMUSCULAR; INTRAVENOUS; SUBCUTANEOUS at 14:00

## 2020-09-09 RX ADMIN — Medication 100 MILLIGRAM(S): at 12:35

## 2020-09-09 RX ADMIN — WARFARIN SODIUM 1 MILLIGRAM(S): 2.5 TABLET ORAL at 22:20

## 2020-09-09 RX ADMIN — SODIUM CHLORIDE 3 MILLILITER(S): 9 INJECTION INTRAMUSCULAR; INTRAVENOUS; SUBCUTANEOUS at 22:19

## 2020-09-09 RX ADMIN — TAMSULOSIN HYDROCHLORIDE 0.4 MILLIGRAM(S): 0.4 CAPSULE ORAL at 22:20

## 2020-09-09 RX ADMIN — ABACAVIR 600 MILLIGRAM(S): 20 SOLUTION ORAL at 12:35

## 2020-09-09 RX ADMIN — FINASTERIDE 5 MILLIGRAM(S): 5 TABLET, FILM COATED ORAL at 12:34

## 2020-09-09 RX ADMIN — PANTOPRAZOLE SODIUM 40 MILLIGRAM(S): 20 TABLET, DELAYED RELEASE ORAL at 05:16

## 2020-09-09 NOTE — PROGRESS NOTE ADULT - ASSESSMENT
Assessment  Hyperglycemia: No hx DM, A1C 5.7%, had postoperative hyperglycemia requiring insulin, now off insulin, blood sugars trending within acceptable range, no hypoglycemic episodes, eating meals, well-appearing, planning DC CYNTHIA Friday.  Hypothyroidism: 80y male with no history thyroid disease, was not on any thyroid supplements, on HIV meds, TSH borderline elevated, subclinical hypothyroid, TPO ab WNL, repeat TFTs still subclinical.  Endocarditis: S/P MVR, on meds,  stable, monitored.        Nesha Izaguirre MD  Cell: [phone]  Office: [phone] Assessment  Hyperglycemia: No hx DM, A1C 5.7%, had postoperative hyperglycemia requiring insulin, now off insulin,  blood sugars trending within acceptable range, no hypoglycemic episodes, eating meals, well-appearing, planning DC CYNTHIA Friday.  Hypothyroidism: 80y male with no history thyroid disease, was not on any thyroid supplements, on HIV meds, TSH borderline elevated, subclinical hypothyroid, TPO ab WNL, repeat TFTs still subclinical.  Endocarditis: S/P MVR, on meds,  stable, monitored.        Nesha Izaguirre MD  Cell: [phone]  Office: [phone]

## 2020-09-09 NOTE — PROGRESS NOTE ADULT - PROBLEM SELECTOR PLAN 1
Will continue monitoring FS and FU.  Patient in pre-diabetic range, A1C 5.7%, blood sugars trending at target without management. No need to d/c on DM meds, patient was counseled for compliance with consistent low carb diet and physical activity as tolerated outpatient. Will FU outpatient endo in 3 months.

## 2020-09-09 NOTE — PROGRESS NOTE ADULT - ASSESSMENT
81 y/o man with PMHx of HIV, CAD, detrusor muscle weakness, BPH presenting for fall, unresponsive for 24 hrs, admitted with rhabdo, bilateral severe ureterohydronephrosis and thickened bladder on CTAP 2/2 inability to continue his home straight cath 3x daily after fall, tovar in place. Rhabdo improved, hydronephrosis improved with tovar (urology following). NOHEMI shows endocarditis (NOHEMI: 1.8x1.3 vegetation on medial aspect of anterior leaflet with partial flail component + 0.5x1.3cm vegetation no posterior leaflet w severe eccentric mitral regurg). BCx staph epidermidis, repeat NGTD, on vancomycin. s/p MRA without septic emboli. s/p cardiac cath  normal cors. Followed by ID Dr. Almazan cleared for OR. Elevated creatinine followed by Nephrology monitoring creatinine this afternoon. Endocrine consulted Dr. Izaguirre.  On 8/31/20 s/p MVR (T) / SHELLIE clip   Post op Course: Extubated POD #0  ID following for endocarditis; and HIV à Vancomycin 1g daily for now; Check daily labs for vancomycin random level, goal is 15-20.  Hyperglycemia à Endocrine following   SUDHEER on CKD à Renal following. Tovar in place.   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  9/4 overnight noted with Accelerated junctional / AV disassociation. Continue on AC therapy Coumadin 5 mg PO tonight.  V Paced à 80.  Monitor renal function.  Creatinine 2.1 will repeat BMP this afternoon.   9/5 EPM rate decreased to rate 70. Coumadin dosing tonight for INR 1.6. Tovar maintained. As per urology plan to d/c tovar when creatinine becomes stable, then pt can continue self cath at home for his baseline urinary retention. Lasix 40 mg PO added.   9/6 meds bulb 35 cc x 24 hrs. ext pacer turned down to 50? HB to 50's and sr with first degree currently off BB with EPS consult called today,   creat down 1.71  9/7 VVS; Continue with current medication regimen. Low dose coumadin for anticipated PICC lime placement.  Vanco dosed by level.  Awaiting this afternoon Vanco (T). ID following. EP following --> Afib w/ occasional V-Pacing @ 50; No indication for PPM at this time.   9/8 VSS, cleared by IR for bedside PICC line placement today. INR 1.86. D/C tovar and trial self catheterization. Pt still AV dissociation &  on tele - EP re-eval for possible Micra PPM. 79 y/o man with PMHx of HIV, CAD, detrusor muscle weakness, BPH presenting for fall, unresponsive for 24 hrs, admitted with rhabdo, bilateral severe ureterohydronephrosis and thickened bladder on CTAP 2/2 inability to continue his home straight cath 3x daily after fall, tovar in place. Rhabdo improved, hydronephrosis improved with tovar (urology following). NOHEMI shows endocarditis (NOHEMI: 1.8x1.3 vegetation on medial aspect of anterior leaflet with partial flail component + 0.5x1.3cm vegetation no posterior leaflet w severe eccentric mitral regurg). BCx staph epidermidis, repeat NGTD, on vancomycin. s/p MRA without septic emboli. s/p cardiac cath  normal cors. Followed by ID Dr. Almazan cleared for OR. Elevated creatinine followed by Nephrology monitoring creatinine this afternoon. Endocrine consulted Dr. Izaguirre.  On 8/31/20 s/p MVR (T) / SHELLIE clip   Post op Course: Extubated POD #0  ID following for endocarditis; and HIV à Vancomycin 1g daily for now; Check daily labs for vancomycin random level, goal is 15-20.  Hyperglycemia à Endocrine following   SUDHEER on CKD à Renal following. Tovar in place.   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  9/4 overnight noted with Accelerated junctional / AV disassociation. Continue on AC therapy Coumadin 5 mg PO tonight.  V Paced à 80.  Monitor renal function.  Creatinine 2.1 will repeat BMP this afternoon.   9/5 EPM rate decreased to rate 70. Coumadin dosing tonight for INR 1.6. Tovar maintained. As per urology plan to d/c tovar when creatinine becomes stable, then pt can continue self cath at home for his baseline urinary retention. Lasix 40 mg PO added.   9/6 meds bulb 35 cc x 24 hrs. ext pacer turned down to 50? HB to 50's and sr with first degree currently off BB with EPS consult called today,   creat down 1.71  9/7 VVS; Continue with current medication regimen. Low dose coumadin for anticipated PICC lime placement.  Vanco dosed by level.  Awaiting this afternoon Vanco (T). ID following. EP following --> Afib w/ occasional V-Pacing @ 50; No indication for PPM at this time.   9/8 VSS, cleared by IR for bedside PICC line placement today. INR 1.86. D/C tovar and trial self catheterization. Pt still AV dissociation &  on tele - EP re-eval for possible Micra PPM.   9/9 VSS, RUE PICC in place. EPM lowered to VVI 30, no pacing required overnight. Continue to observe, INR 1.82 low dose Coumadin 1mg. Likely discharge to Rehab Friday.

## 2020-09-09 NOTE — PROGRESS NOTE ADULT - PROBLEM SELECTOR PLAN 4
Maintain PW --> VVI 50     EP following, Dr. dang  Continue to hold BB for 1st degree AVB and periods of Wenckebach AV block with narrow QRS post MVR and SHELLIE ligation; expecting rhythm to recover.  No need of PPM at this time Maintain PW --> VVI 30     EP following, Dr. Lester  Continue to hold BB for 1st degree AVB and periods of Wenckebach AV block with narrow QRS post MVR and SHELLIE ligation; expecting rhythm to recover.

## 2020-09-09 NOTE — PROGRESS NOTE ADULT - PROBLEM SELECTOR PLAN 2
ID Following, Dr. Almazan  Vancomycin 1g daily to be dosed by level  Check daily labs for vancomycin random level, goal is 15-20  Pending PICC line placement ID Following, Dr. lAmazan  Vancomycin 1g daily to be dosed by level  Check daily labs for vancomycin random level, goal is 15-20  s/p RUE PICC on 9/8, recommend 6 weeks of IV abx per ID thru 10/12

## 2020-09-09 NOTE — PROGRESS NOTE ADULT - ASSESSMENT
80M hx of HIV(YC1=304, VL undet), self caths at home, CAD presented after a fall at home.  Bacteremia with 2/2 sets on 8/19 growing coag. 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

## 2020-09-09 NOTE — PROGRESS NOTE ADULT - PROBLEM SELECTOR PLAN 2
Repeat TFTs still subclinical; No need for management at this time. HIV meds may be contributing to suppressed TSH.  Suggest to FU, repeat TFTs in 4-6 weeks, Discussed plan with patient.

## 2020-09-09 NOTE — PROGRESS NOTE ADULT - ASSESSMENT
80 year old with hx of HIV, CAD, BPH presented after a fall at home and found to have rhabdo 2/2 urinary retention. Later found to have bacteremia growing staph epidermidis as well as endocarditis with flair MV leaflet. Patient s/p MVR (t) and SHELLIE clip on 8/31/20. EP consulted for AV dissociation noted on telemetry. 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 Patient with PAF/AFL prior to OHS and now back in Afib since 8:25am today.       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 epicardial pacing changed to VVI at 30 bpm  -Back in Afib w/ VHR 80's since 8:25am today  -On coumadin, INR 1.82  -Should he need PPM would favor Micra AV    S. Ozzy, BLANK  [id number]

## 2020-09-09 NOTE — PROGRESS NOTE ADULT - SUBJECTIVE AND OBJECTIVE BOX
24H hour events: Pt without complaints, converted from SR to Afib this morning at 8:25am     MEDICATIONS:  aspirin enteric coated 81 milliGRAM(s) Oral daily  furosemide Tablet 40 milliGRAM(s) Oral daily  tamsulosin 0.4 milliGRAM(s) Oral at bedtime    abacavir 600 milliGRAM(s) Oral daily  lamiVUDine- milliGRAM(s) Oral daily  vancomycin  IVPB 1000 milliGRAM(s) IV Intermittent daily    pantoprazole    Tablet 40 milliGRAM(s) Oral before breakfast  polyethylene glycol 3350 17 Gram(s) Oral daily    dextrose 40% Gel 15 Gram(s) Oral once PRN  dextrose 50% Injectable 50 milliLiter(s) IV Push every 15 minutes  dextrose 50% Injectable 25 milliLiter(s) IV Push every 15 minutes  finasteride 5 milliGRAM(s) Oral daily  glucagon  Injectable 1 milliGRAM(s) IntraMuscular once PRN    ascorbic acid 500 milliGRAM(s) Oral daily  ferrous    sulfate 325 milliGRAM(s) Oral daily  folic acid 1 milliGRAM(s) Oral daily  sodium chloride 0.9% lock flush 3 milliLiter(s) IV Push every 8 hours      REVIEW OF SYSTEMS:  Complete 10point ROS negative.    PHYSICAL EXAM:  T(C): 36.5 (09-09-20 @ 04:30), Max: 36.8 (09-08-20 @ 11:45)  HR: 59 (09-09-20 @ 09:40) (53 - 62)  BP: 110/63 (09-09-20 @ 09:40) (94/50 - 120/70)  RR: 18 (09-09-20 @ 04:30) (18 - 18)  SpO2: 97% (09-09-20 @ 04:30) (97% - 97%)  Wt(kg): --  I&O's Summary    08 Sep 2020 07:01  -  09 Sep 2020 07:00  --------------------------------------------------------  IN: 1130 mL / OUT: 3625 mL / NET: -2495 mL    09 Sep 2020 07:01  -  09 Sep 2020 10:55  --------------------------------------------------------  IN: 240 mL / OUT: 0 mL / NET: 240 mL      Appearance: Normal	  HEENT: Normal oral mucosa, PERRL, EOMI	  Lymphatic: No lymphadenopathy  Cardiovascular: Normal S1 S2, No JVD, No murmurs, No edema  Respiratory: Lungs clear to auscultation	  Psychiatry: A & O x 3, Mood & affect appropriate  Gastrointestinal:  Soft, Non-tender, + BS	  Skin: No rashes, No ecchymoses, No cyanosis	  Neurologic: Non-focal  Extremities: Normal range of motion, No clubbing, cyanosis or edema  Vascular: Peripheral pulses palpable 2+ bilaterally        LABS:	 	    CBC Full  -  ( 09 Sep 2020 04:40 )  WBC Count : 10.08 K/uL  Hemoglobin : 9.2 g/dL  Hematocrit : 28.8 %  Platelet Count - Automated : 195 K/uL  Mean Cell Volume : 101.8 fl  Mean Cell Hemoglobin : 32.5 pg  Mean Cell Hemoglobin Concentration : 31.9 gm/dL  Auto Neutrophil # : x  Auto Lymphocyte # : x  Auto Monocyte # : x  Auto Eosinophil # : x  Auto Basophil # : x  Auto Neutrophil % : x  Auto Lymphocyte % : x  Auto Monocyte % : x  Auto Eosinophil % : x  Auto Basophil % : x    09-09    134<L>  |  98  |  40<H>  ----------------------------<  104<H>  3.9   |  25  |  1.91<H>  09-08    132<L>  |  97  |  35<H>  ----------------------------<  98  3.8   |  25  |  1.86<H>    Ca    9.0      09 Sep 2020 04:40  Ca    8.6      08 Sep 2020 05:24  Phos  3.8     09-09  Phos  3.4     09-08  Mg     2.2     09-09  Mg     2.0     09-08    Free Thyroxine, Serum in AM (09.06.20 @ 09:42)    Free Thyroxine, Serum: 1.2 ng/dL    Thyroid Stimulating Hormone, Serum in AM (09.06.20 @ 09:42)    Thyroid Stimulating Hormone, Serum: 6.90 uIU/mL      TELEMETRY: converted from SR to Afib this morning at 8:25am 24H hour events: Pt without complaints, converted from SR to Afib this morning at 8:25am     MEDICATIONS:  aspirin enteric coated 81 milliGRAM(s) Oral daily  furosemide Tablet 40 milliGRAM(s) Oral daily  tamsulosin 0.4 milliGRAM(s) Oral at bedtime    abacavir 600 milliGRAM(s) Oral daily  lamiVUDine- milliGRAM(s) Oral daily  vancomycin  IVPB 1000 milliGRAM(s) IV Intermittent daily    pantoprazole    Tablet 40 milliGRAM(s) Oral before breakfast  polyethylene glycol 3350 17 Gram(s) Oral daily    dextrose 40% Gel 15 Gram(s) Oral once PRN  dextrose 50% Injectable 50 milliLiter(s) IV Push every 15 minutes  dextrose 50% Injectable 25 milliLiter(s) IV Push every 15 minutes  finasteride 5 milliGRAM(s) Oral daily  glucagon  Injectable 1 milliGRAM(s) IntraMuscular once PRN    ascorbic acid 500 milliGRAM(s) Oral daily  ferrous    sulfate 325 milliGRAM(s) Oral daily  folic acid 1 milliGRAM(s) Oral daily  sodium chloride 0.9% lock flush 3 milliLiter(s) IV Push every 8 hours      REVIEW OF SYSTEMS:  Complete 10point ROS negative.    PHYSICAL EXAM:  T(C): 36.5 (09-09-20 @ 04:30), Max: 36.8 (09-08-20 @ 11:45)  HR: 59 (09-09-20 @ 09:40) (53 - 62)  BP: 110/63 (09-09-20 @ 09:40) (94/50 - 120/70)  RR: 18 (09-09-20 @ 04:30) (18 - 18)  SpO2: 97% (09-09-20 @ 04:30) (97% - 97%)  Wt(kg): --  I&O's Summary    08 Sep 2020 07:01  -  09 Sep 2020 07:00  --------------------------------------------------------  IN: 1130 mL / OUT: 3625 mL / NET: -2495 mL    09 Sep 2020 07:01  -  09 Sep 2020 10:55  --------------------------------------------------------  IN: 240 mL / OUT: 0 mL / NET: 240 mL      Appearance: NAD, OOB sitting up in chair 	  HEENT: Normal oral mucosa, PERRL, EOMI	  Cardiovascular: Normal S1 S2, irregularly irregular   Respiratory: Lungs clear to auscultation	  Psychiatry: A & O x 3, Mood & affect appropriate  Gastrointestinal:  Soft, Non-tender, + BS	  Skin: Midsternal surgical incision wound site C/D/I with normal wound healing 	  Extremities: Normal range of motion, No clubbing, or cyanosis. B/L LE +2 edema. Right arm PICC in place.   Vascular: Peripheral pulses palpable 2+ bilaterally        LABS:	 	    CBC Full  -  ( 09 Sep 2020 04:40 )  WBC Count : 10.08 K/uL  Hemoglobin : 9.2 g/dL  Hematocrit : 28.8 %  Platelet Count - Automated : 195 K/uL  Mean Cell Volume : 101.8 fl  Mean Cell Hemoglobin : 32.5 pg  Mean Cell Hemoglobin Concentration : 31.9 gm/dL  Auto Neutrophil # : x  Auto Lymphocyte # : x  Auto Monocyte # : x  Auto Eosinophil # : x  Auto Basophil # : x  Auto Neutrophil % : x  Auto Lymphocyte % : x  Auto Monocyte % : x  Auto Eosinophil % : x  Auto Basophil % : x    09-09    134<L>  |  98  |  40<H>  ----------------------------<  104<H>  3.9   |  25  |  1.91<H>  09-08    132<L>  |  97  |  35<H>  ----------------------------<  98  3.8   |  25  |  1.86<H>    Ca    9.0      09 Sep 2020 04:40  Ca    8.6      08 Sep 2020 05:24  Phos  3.8     09-09  Phos  3.4     09-08  Mg     2.2     09-09  Mg     2.0     09-08    Free Thyroxine, Serum in AM (09.06.20 @ 09:42)    Free Thyroxine, Serum: 1.2 ng/dL    Thyroid Stimulating Hormone, Serum in AM (09.06.20 @ 09:42)    Thyroid Stimulating Hormone, Serum: 6.90 uIU/mL      TELEMETRY: converted from SR to Afib this morning at 8:25am      < from: Transthoracic Echocardiogram (09.08.20 @ 14:34) >  Dimensions:    Normal Values:  LA:     2.4    2.0 - 4.0 cm  Ao:     3.5    2.0 - 3.8 cm  SEPTUM: 1.0    0.6 - 1.2 cm  PWT:0.9    0.6 - 1.1 cm  LVIDd:  4.2    3.0 - 5.6 cm  LVIDs:  2.9    1.8 - 4.0 cm  Derived variables:  LVMI: 68 g/m2  RWT: 0.42  Fractional short: 29 %  EF (Visual Estimate): 70 %  Doppler Peak Velocity (m/sec): MV=2.0  ------------------------------------------------------------------------  Observations:  Mitral Valve: Bioprosthetic mitral valve replacement. No  mitral regurgitation seen. Mean transmitral valve gradient  equals 4 mm Hg, which is probably normal in the setting of  a bioprosthetic mitral valve replacement.  Aortic Valve/Aorta: Aortic valve not well visualized;  appears trileaflet with normal opening. No aortic valve  regurgitation seen.  Aortic Root: 3.5 cm.  Left Atrium: Normal left atrium.  LA volume index = 31  cc/m2.  Left Ventricle: Irregular heart beat; ejection fraction  varies with R-R interval. Endocardium not well visualized;  grossly normal left ventricular systolic function.  Paradoxical septal motion consistent with prior cardiac  surgery. Normal left ventricularinternal dimensions and  wall thickness. Normal diastolic function  Right Heart: Normal right atrium. The right ventricle is  not well visualized; grossly normal right ventricular size  and systolic function. Normal tricuspid valve. Minimal  tricuspid regurgitation. Normal pulmonic valve. Mild  pulmonic regurgitation.  Pericardium/Pleura: Small pericardial effusion. The  effusion messures up to approximately 1.0 cm posterior to  the LV. No echocardiographic evidence of pericardial  tamponade.Hemodynamic: Estimated right atrial pressure is 8 mm Hg.  Estimated right ventricular systolic pressure equals 26 mm  Hg, assuming right atrial pressure equals 8 mm Hg,  consistent with normal pulmonary pressures.  ------------------------------------------------------------------------  Conclusions:  1. Bioprosthetic mitral valve replacement. No mitral  regurgitation seen. Mean transmitral valve gradient equals  4 mm Hg, which is probably normal in the setting of a  bioprosthetic mitral valve replacement.  2. Aortic valve not well visualized; appears trileaflet  with normal opening. No aortic valve regurgitation seen.  3. Irregular heart beat; ejection fraction varies with R-R  interval. Endocardium not well visualized; grossly normal  left ventricular systolic function. Paradoxical septal  motion consistent with prior cardiac surgery.  4. The right ventricle is not well visualized; grossly  normal right ventricular size and systolic function.  5. Small pericardial effusion. The effusion messures up to  approximately 1.0 cm posterior to the LV. No  echocardiographic evidence of pericardial tamponade.  *** Compared with echocardiogram of 8/31/2020, patient  status-post bioprosthetic MVR.    < end of copied text >

## 2020-09-09 NOTE — PROGRESS NOTE ADULT - SUBJECTIVE AND OBJECTIVE BOX
Chief complaint  Patient is a 80y old  Male who presents with a chief complaint of fall (09 Sep 2020 09:49)   Review of systems  Patient in chair eating lunch, looks comfortable, no hypoglycemic episodes.    Labs and Fingersticks  CAPILLARY BLOOD GLUCOSE      POCT Blood Glucose.: 113 mg/dL (09 Sep 2020 07:33)  POCT Blood Glucose.: 107 mg/dL (08 Sep 2020 21:25)  POCT Blood Glucose.: 103 mg/dL (08 Sep 2020 16:42)      Anion Gap, Serum: 11 (09-09 @ 04:40)  Anion Gap, Serum: 10 (09-08 @ 05:24)      Calcium, Total Serum: 9.0 (09-09 @ 04:40)  Calcium, Total Serum: 8.6 (09-08 @ 05:24)          09-09    134<L>  |  98  |  40<H>  ----------------------------<  104<H>  3.9   |  25  |  1.91<H>    Ca    9.0      09 Sep 2020 04:40  Phos  3.8     09-09  Mg     2.2     09-09                          9.2    10.08 )-----------( 195      ( 09 Sep 2020 04:40 )             28.8     Medications  MEDICATIONS  (STANDING):  abacavir 600 milliGRAM(s) Oral daily  ascorbic acid 500 milliGRAM(s) Oral daily  aspirin enteric coated 81 milliGRAM(s) Oral daily  dextrose 50% Injectable 50 milliLiter(s) IV Push every 15 minutes  dextrose 50% Injectable 25 milliLiter(s) IV Push every 15 minutes  Doravirine (Pefeltro), 100 mG 1 Tablet(s) 1 Tablet(s) Oral daily  ferrous    sulfate 325 milliGRAM(s) Oral daily  finasteride 5 milliGRAM(s) Oral daily  folic acid 1 milliGRAM(s) Oral daily  furosemide    Tablet 40 milliGRAM(s) Oral daily  lamiVUDine- milliGRAM(s) Oral daily  pantoprazole    Tablet 40 milliGRAM(s) Oral before breakfast  polyethylene glycol 3350 17 Gram(s) Oral daily  sodium chloride 0.9% lock flush 3 milliLiter(s) IV Push every 8 hours  tamsulosin 0.4 milliGRAM(s) Oral at bedtime  vancomycin  IVPB 1000 milliGRAM(s) IV Intermittent daily  warfarin 1 milliGRAM(s) Oral once      Physical Exam  General: Patient comfortable in bed  Vital Signs Last 12 Hrs  T(F): 97.8 (09-09-20 @ 11:51), Max: 97.8 (09-09-20 @ 11:51)  HR: 83 (09-09-20 @ 12:00) (53 - 92)  BP: 149/84 (09-09-20 @ 11:51) (110/63 - 149/84)  BP(mean): 78 (09-09-20 @ 09:40) (78 - 78)  RR: 18 (09-09-20 @ 11:51) (18 - 18)  SpO2: 100% (09-09-20 @ 11:51) (97% - 100%)    Diagnostics    Thyroperoxidase Antibody: AM (08-27 @ 13:42)

## 2020-09-09 NOTE — PROGRESS NOTE ADULT - SUBJECTIVE AND OBJECTIVE BOX
Subjective: Pt states "Hello" denies any CP or SOB. No acute events overnight.     Telemetry:  2nd degree type2 HB 40 - 60s  Vital Signs Last 24 Hrs  T(C): 36.5 (20 @ 04:30), Max: 36.8 (20 @ 11:45)  T(F): 97.7 (20 @ 04:30), Max: 98.3 (20 @ 11:45)  HR: 53 (20 @ 04:30) (53 - 62)  BP: 120/70 (20 @ 04:30) (94/50 - 120/70)  RR: 18 (20 @ 04:30) (18 - 18)  SpO2: 97% (20 @ 04:30) (97% - 97%)              @ 07:01  -   @ 07:00  --------------------------------------------------------  IN: 1130 mL / OUT: 3625 mL / NET: -2495 mL         Daily Weight in k.7 (09 Sep 2020 08:10)                        9.2    10.08 )-----------( 195      ( 09 Sep 2020 04:40 )             28.8     134<L>  |  98  |  40<H>  ----------------------------<  104<H>  3.9   |  25  |  1.91<H>    Ca    9.0      09 Sep 2020 04:40  Phos  3.8     09-09  Mg     2.2     09-09          CAPILLARY BLOOD GLUCOSE  107 - 113            PHYSICAL EXAM  Neurology: A&Ox3, NAD  CV : RRR+S1S2  Sternal Wound: MSI CDI CAR, Stable   +PW - EPM VVI 50/13  Lungs: Respirations non-labored, B/L BS clear, diminished at bases  Abdomen: Soft, NT/ND, +BSx4Q, last BM  (-)N/V/D  : +Aden with clear yellow urine  Extremities: B/L LE +1 edema, negative calf tenderness, +PP          MEDICATIONS  abacavir 600 milliGRAM(s) Oral daily  ascorbic acid 500 milliGRAM(s) Oral daily  aspirin enteric coated 81 milliGRAM(s) Oral daily  dextrose 40% Gel 15 Gram(s) Oral once PRN  dextrose 50% Injectable 50 milliLiter(s) IV Push every 15 minutes  dextrose 50% Injectable 25 milliLiter(s) IV Push every 15 minutes  Doravirine (Pefeltro), 100 mG 1 Tablet(s) 1 Tablet(s) Oral daily  ferrous    sulfate 325 milliGRAM(s) Oral daily  finasteride 5 milliGRAM(s) Oral daily  folic acid 1 milliGRAM(s) Oral daily  furosemide    Tablet 40 milliGRAM(s) Oral daily  glucagon  Injectable 1 milliGRAM(s) IntraMuscular once PRN  insulin lispro (HumaLOG) corrective regimen sliding scale   SubCutaneous three times a day before meals  insulin lispro (HumaLOG) corrective regimen sliding scale   SubCutaneous at bedtime  lamiVUDine- milliGRAM(s) Oral daily  pantoprazole    Tablet 40 milliGRAM(s) Oral before breakfast  polyethylene glycol 3350 17 Gram(s) Oral daily  sodium chloride 0.9% lock flush 3 milliLiter(s) IV Push every 8 hours  tamsulosin 0.4 milliGRAM(s) Oral at bedtime  vancomycin  IVPB 1000 milliGRAM(s) IV Intermittent daily      Physical Therapy Rec:   Home  [  ]   Home w/ PT  [  ]  Rehab  [ X ]    Discussed with Cardiothoracic Team at AM rounds. Subjective: Pt states "Hello" denies any CP or SOB. No acute events overnight.     Telemetry:  2nd degree type2 HB 40 - 60s  Vital Signs Last 24 Hrs  T(C): 36.5 (20 @ 04:30), Max: 36.8 (20 @ 11:45)  T(F): 97.7 (20 @ 04:30), Max: 98.3 (20 @ 11:45)  HR: 53 (20 @ 04:30) (53 - 62)  BP: 120/70 (20 @ 04:30) (94/50 - 120/70)  RR: 18 (20 @ 04:30) (18 - 18)  SpO2: 97% (20 @ 04:30) (97% - 97%)              @ 07:01  -   @ 07:00  --------------------------------------------------------  IN: 1130 mL / OUT: 3625 mL / NET: -2495 mL         Daily Weight in k.7 (09 Sep 2020 08:10)                        9.2    10.08 )-----------( 195      ( 09 Sep 2020 04:40 )             28.8     134<L>  |  98  |  40<H>  ----------------------------<  104<H>  3.9   |  25  |  1.91<H>    Ca    9.0      09 Sep 2020 04:40  Phos  3.8     09-  Mg     2.2     09-          CAPILLARY BLOOD GLUCOSE  107 - 113            PHYSICAL EXAM  Neurology: A&Ox3, NAD  CV : RRR+S1S2  Sternal Wound: MSI CDI CAR, Stable   +PW - EPM VVI   Lungs: Respirations non-labored, B/L BS clear, diminished at bases  Abdomen: Soft, NT/ND, +BSx4Q, last BM  (-)N/V/D  : Voiding, pt self-caths  Extremities: B/L LE trace edema, negative calf tenderness, +PP            MEDICATIONS  abacavir 600 milliGRAM(s) Oral daily  ascorbic acid 500 milliGRAM(s) Oral daily  aspirin enteric coated 81 milliGRAM(s) Oral daily  Doravirine (Pefeltro), 100 mG 1 Tablet(s) 1 Tablet(s) Oral daily  ferrous    sulfate 325 milliGRAM(s) Oral daily  finasteride 5 milliGRAM(s) Oral daily  folic acid 1 milliGRAM(s) Oral daily  furosemide    Tablet 40 milliGRAM(s) Oral daily  insulin lispro (HumaLOG) corrective regimen sliding scale   SubCutaneous three times a day before meals  insulin lispro (HumaLOG) corrective regimen sliding scale   SubCutaneous at bedtime  lamiVUDine- milliGRAM(s) Oral daily  pantoprazole    Tablet 40 milliGRAM(s) Oral before breakfast  polyethylene glycol 3350 17 Gram(s) Oral daily  sodium chloride 0.9% lock flush 3 milliLiter(s) IV Push every 8 hours  tamsulosin 0.4 milliGRAM(s) Oral at bedtime  vancomycin  IVPB 1000 milliGRAM(s) IV Intermittent daily      Physical Therapy Rec:   Home  [  ]   Home w/ PT  [  ]  Rehab  [ X ]    Discussed with Cardiothoracic Team at AM rounds.

## 2020-09-09 NOTE — PROGRESS NOTE ADULT - SUBJECTIVE AND OBJECTIVE BOX
CC: F/U for Endocarditis    Saw/spoke to patient. No fevers, no chills. No new complaints. Unchanged.    Allergies  No Known Allergies    ANTIMICROBIALS:  abacavir 600 daily  lamiVUDine- daily  vancomycin  IVPB 1000 daily    PE:    Vital Signs Last 24 Hrs  T(C): 36.6 (09 Sep 2020 11:51), Max: 36.7 (08 Sep 2020 20:46)  T(F): 97.8 (09 Sep 2020 11:51), Max: 98.1 (08 Sep 2020 20:46)  HR: 83 (09 Sep 2020 12:00) (53 - 92)  BP: 149/84 (09 Sep 2020 11:51) (109/52 - 149/84)  BP(mean): 78 (09 Sep 2020 09:40) (78 - 78)  RR: 18 (09 Sep 2020 11:51) (18 - 18)  SpO2: 100% (09 Sep 2020 11:51) (97% - 100%)    Gen: AOx3, NAD, non-toxic  CV: S1+S2 normal, nontachycardic  Resp: Clear bilat, no resp distress, no crackles/wheezes  Abd: Soft, nontender, +BS  Ext: No LE edema, no wounds    LABS:                        9.2    10.08 )-----------( 195      ( 09 Sep 2020 04:40 )             28.8     09-09    134<L>  |  98  |  40<H>  ----------------------------<  104<H>  3.9   |  25  |  1.91<H>    Ca    9.0      09 Sep 2020 04:40  Phos  3.8     09-09  Mg     2.2     09-09    MICROBIOLOGY:  Vancomycin Level, Trough: 14.1 ug/mL (09-08-20 @ 15:54)    .Tissue Other  08-31-20   Culture is being performed.  --    No polymorphonuclear cells seen per low power field  No organisms seen per oil power field    .Surgical Swab mitral vegetation  08-31-20   No growth    .Blood Blood-Peripheral  08-20-20   No Growth Final    .Urine Clean Catch (Midstream)  08-19-20   <10,000 CFU/mL Normal Urogenital Kami    .Blood Blood-Peripheral  08-19-20   Growth in aerobic and anaerobic bottles: Staphylococcus epidermidis    (otherwise reviewed)    RADIOLOGY:    9/8 CXR:    Small bilateral pleural effusions likely with associated bibasilar areas of atelectasis, left greater than right. No pneumothorax.

## 2020-09-10 LAB
ANION GAP SERPL CALC-SCNC: 13 MMOL/L — SIGNIFICANT CHANGE UP (ref 5–17)
BUN SERPL-MCNC: 37 MG/DL — HIGH (ref 7–23)
CALCIUM SERPL-MCNC: 8.9 MG/DL — SIGNIFICANT CHANGE UP (ref 8.4–10.5)
CHLORIDE SERPL-SCNC: 99 MMOL/L — SIGNIFICANT CHANGE UP (ref 96–108)
CO2 SERPL-SCNC: 24 MMOL/L — SIGNIFICANT CHANGE UP (ref 22–31)
CREAT SERPL-MCNC: 1.79 MG/DL — HIGH (ref 0.5–1.3)
GLUCOSE SERPL-MCNC: 133 MG/DL — HIGH (ref 70–99)
HCT VFR BLD CALC: 29.4 % — LOW (ref 39–50)
HGB BLD-MCNC: 9.5 G/DL — LOW (ref 13–17)
INR BLD: 1.74 RATIO — HIGH (ref 0.88–1.16)
MCHC RBC-ENTMCNC: 32.3 GM/DL — SIGNIFICANT CHANGE UP (ref 32–36)
MCHC RBC-ENTMCNC: 33.3 PG — SIGNIFICANT CHANGE UP (ref 27–34)
MCV RBC AUTO: 103.2 FL — HIGH (ref 80–100)
NRBC # BLD: 0 /100 WBCS — SIGNIFICANT CHANGE UP (ref 0–0)
PLATELET # BLD AUTO: 212 K/UL — SIGNIFICANT CHANGE UP (ref 150–400)
POTASSIUM SERPL-MCNC: 3.7 MMOL/L — SIGNIFICANT CHANGE UP (ref 3.5–5.3)
POTASSIUM SERPL-SCNC: 3.7 MMOL/L — SIGNIFICANT CHANGE UP (ref 3.5–5.3)
PROTHROM AB SERPL-ACNC: 20.2 SEC — HIGH (ref 10.6–13.6)
RBC # BLD: 2.85 M/UL — LOW (ref 4.2–5.8)
RBC # FLD: 15.3 % — HIGH (ref 10.3–14.5)
SARS-COV-2 RNA SPEC QL NAA+PROBE: SIGNIFICANT CHANGE UP
SODIUM SERPL-SCNC: 136 MMOL/L — SIGNIFICANT CHANGE UP (ref 135–145)
VANCOMYCIN TROUGH SERPL-MCNC: 15.9 UG/ML — SIGNIFICANT CHANGE UP (ref 10–20)
WBC # BLD: 10.6 K/UL — HIGH (ref 3.8–10.5)
WBC # FLD AUTO: 10.6 K/UL — HIGH (ref 3.8–10.5)

## 2020-09-10 PROCEDURE — 99232 SBSQ HOSP IP/OBS MODERATE 35: CPT

## 2020-09-10 RX ORDER — WARFARIN SODIUM 2.5 MG/1
3 TABLET ORAL ONCE
Refills: 0 | Status: COMPLETED | OUTPATIENT
Start: 2020-09-10 | End: 2020-09-10

## 2020-09-10 RX ORDER — ACETAMINOPHEN 500 MG
650 TABLET ORAL EVERY 6 HOURS
Refills: 0 | Status: DISCONTINUED | OUTPATIENT
Start: 2020-09-10 | End: 2020-09-11

## 2020-09-10 RX ADMIN — Medication 650 MILLIGRAM(S): at 13:30

## 2020-09-10 RX ADMIN — SODIUM CHLORIDE 3 MILLILITER(S): 9 INJECTION INTRAMUSCULAR; INTRAVENOUS; SUBCUTANEOUS at 06:05

## 2020-09-10 RX ADMIN — Medication 1 MILLIGRAM(S): at 09:55

## 2020-09-10 RX ADMIN — Medication 100 MILLIGRAM(S): at 09:55

## 2020-09-10 RX ADMIN — Medication 40 MILLIGRAM(S): at 06:01

## 2020-09-10 RX ADMIN — Medication 325 MILLIGRAM(S): at 09:56

## 2020-09-10 RX ADMIN — SODIUM CHLORIDE 3 MILLILITER(S): 9 INJECTION INTRAMUSCULAR; INTRAVENOUS; SUBCUTANEOUS at 21:18

## 2020-09-10 RX ADMIN — ABACAVIR 600 MILLIGRAM(S): 20 SOLUTION ORAL at 09:56

## 2020-09-10 RX ADMIN — Medication 250 MILLIGRAM(S): at 12:25

## 2020-09-10 RX ADMIN — FINASTERIDE 5 MILLIGRAM(S): 5 TABLET, FILM COATED ORAL at 09:55

## 2020-09-10 RX ADMIN — WARFARIN SODIUM 3 MILLIGRAM(S): 2.5 TABLET ORAL at 21:18

## 2020-09-10 RX ADMIN — PANTOPRAZOLE SODIUM 40 MILLIGRAM(S): 20 TABLET, DELAYED RELEASE ORAL at 06:01

## 2020-09-10 RX ADMIN — Medication 81 MILLIGRAM(S): at 09:55

## 2020-09-10 RX ADMIN — TAMSULOSIN HYDROCHLORIDE 0.4 MILLIGRAM(S): 0.4 CAPSULE ORAL at 21:18

## 2020-09-10 RX ADMIN — Medication 500 MILLIGRAM(S): at 09:56

## 2020-09-10 RX ADMIN — SODIUM CHLORIDE 3 MILLILITER(S): 9 INJECTION INTRAMUSCULAR; INTRAVENOUS; SUBCUTANEOUS at 13:11

## 2020-09-10 RX ADMIN — Medication 650 MILLIGRAM(S): at 14:15

## 2020-09-10 NOTE — PROGRESS NOTE ADULT - SUBJECTIVE AND OBJECTIVE BOX
Chief complaint  Patient is a 80y old  Male who presents with a chief complaint of fall (10 Sep 2020 11:55)   Review of systems  Patient in bed, looks comfortable, no hypoglycemic episodes.    Labs and Fingersticks  CAPILLARY BLOOD GLUCOSE      POCT Blood Glucose.: 139 mg/dL (10 Sep 2020 07:40)  POCT Blood Glucose.: 122 mg/dL (09 Sep 2020 21:45)  POCT Blood Glucose.: 105 mg/dL (09 Sep 2020 16:59)      Anion Gap, Serum: 13 (09-10 @ 06:44)  Anion Gap, Serum: 11 (09-09 @ 04:40)      Calcium, Total Serum: 8.9 (09-10 @ 06:44)  Calcium, Total Serum: 9.0 (09-09 @ 04:40)          09-10    136  |  99  |  37<H>  ----------------------------<  133<H>  3.7   |  24  |  1.79<H>    Ca    8.9      10 Sep 2020 06:44  Phos  3.8     09-09  Mg     2.2     09-09                          9.5    10.60 )-----------( 212      ( 10 Sep 2020 06:44 )             29.4     Medications  MEDICATIONS  (STANDING):  abacavir 600 milliGRAM(s) Oral daily  ascorbic acid 500 milliGRAM(s) Oral daily  aspirin enteric coated 81 milliGRAM(s) Oral daily  dextrose 50% Injectable 50 milliLiter(s) IV Push every 15 minutes  dextrose 50% Injectable 25 milliLiter(s) IV Push every 15 minutes  Doravirine (Pefeltro), 100 mG 1 Tablet(s) 1 Tablet(s) Oral daily  ferrous    sulfate 325 milliGRAM(s) Oral daily  finasteride 5 milliGRAM(s) Oral daily  folic acid 1 milliGRAM(s) Oral daily  furosemide    Tablet 40 milliGRAM(s) Oral daily  lamiVUDine- milliGRAM(s) Oral daily  pantoprazole    Tablet 40 milliGRAM(s) Oral before breakfast  polyethylene glycol 3350 17 Gram(s) Oral daily  sodium chloride 0.9% lock flush 3 milliLiter(s) IV Push every 8 hours  tamsulosin 0.4 milliGRAM(s) Oral at bedtime  vancomycin  IVPB 1000 milliGRAM(s) IV Intermittent daily  warfarin 3 milliGRAM(s) Oral once      Physical Exam  General: Patient comfortable in bed  Vital Signs Last 12 Hrs  T(F): 98 (09-10-20 @ 12:23), Max: 98.6 (09-10-20 @ 04:57)  HR: 78 (09-10-20 @ 12:23) (48 - 78)  BP: 114/61 (09-10-20 @ 12:23) (108/57 - 114/61)  BP(mean): --  RR: 18 (09-10-20 @ 12:23) (18 - 18)  SpO2: 99% (09-10-20 @ 12:23) (97% - 99%)  Neck: No palpable thyroid nodules.  CVS: S1S2, No murmurs  Respiratory: No wheezing, no crepitations  GI: Abdomen soft, bowel sounds positive  Musculoskeletal:  edema lower extremities.   Skin: No skin rashes, no ecchymosis    Diagnostics    Thyroperoxidase Antibody: AM (08-27 @ 13:42)

## 2020-09-10 NOTE — PROGRESS NOTE ADULT - ASSESSMENT
81 y/o man with PMHx of HIV, CAD, detrusor muscle weakness, BPH presenting for fall, unresponsive for 24 hrs, admitted with rhabdo, bilateral severe ureterohydronephrosis and thickened bladder on CTAP 2/2 inability to continue his home straight cath 3x daily after fall, tovar in place. Rhabdo improved, hydronephrosis improved with tovar (urology following). NOHEMI shows endocarditis (NOHEMI: 1.8x1.3 vegetation on medial aspect of anterior leaflet with partial flail component + 0.5x1.3cm vegetation no posterior leaflet w severe eccentric mitral regurg). BCx staph epidermidis, repeat NGTD, on vancomycin. s/p MRA without septic emboli. s/p cardiac cath  normal cors. Followed by ID Dr. Almazan cleared for OR. Elevated creatinine followed by Nephrology monitoring creatinine this afternoon. Endocrine consulted Dr. Izaguirre.  On 8/31/20 s/p MVR (T) / SHELLIE clip   Post op Course: Extubated POD #0  ID following for endocarditis; and HIV à Vancomycin 1g daily for now; Check daily labs for vancomycin random level, goal is 15-20.  Hyperglycemia à Endocrine following   SUDHEER on CKD à Renal following. Tovar in place.   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  9/4 overnight noted with Accelerated junctional / AV disassociation. Continue on AC therapy Coumadin 5 mg PO tonight.  V Paced à 80.  Monitor renal function.  Creatinine 2.1 will repeat BMP this afternoon.   9/5 EPM rate decreased to rate 70. Coumadin dosing tonight for INR 1.6. Tovar maintained. As per urology plan to d/c tovar when creatinine becomes stable, then pt can continue self cath at home for his baseline urinary retention. Lasix 40 mg PO added.   9/6 meds bulb 35 cc x 24 hrs. ext pacer turned down to 50? HB to 50's and sr with first degree currently off BB with EPS consult called today,   creat down 1.71  9/7 VVS; Continue with current medication regimen. Low dose coumadin for anticipated PICC lime placement.  Vanco dosed by level.  Awaiting this afternoon Vanco (T). ID following. EP following --> Afib w/ occasional V-Pacing @ 50; No indication for PPM at this time.   9/8 VSS, cleared by IR for bedside PICC line placement today. INR 1.86. D/C tovar and trial self catheterization. Pt still AV dissociation &  on tele - EP re-eval for possible Micra PPM.   9/9 VSS, RUE PICC in place. EPM lowered to VVI 30, no pacing required overnight. Continue to observe, INR 1.82 low dose Coumadin 1mg. Likely discharge to Rehab Friday.  9/10 VSS, PW cut per Dr. Camacho, no pacing requiring on tele, plan to discharge to Rehab Friday. INR 1.74, Coumadin 3mg.

## 2020-09-10 NOTE — PROGRESS NOTE ADULT - PROBLEM SELECTOR PLAN 2
ID Following, Dr. Almazan  Vancomycin 1g daily to be dosed by level  Check daily labs for vancomycin random level, goal is 15-20  s/p RUE PICC on 9/8, recommend 6 weeks of IV abx per ID thru 10/12

## 2020-09-10 NOTE — PROGRESS NOTE ADULT - SUBJECTIVE AND OBJECTIVE BOX
24H hour events: Pt without complaints, no acute events overnight, temp. epicardial wire was removed this am, Tele: Afib with rates 40-60's, did not require pacing overnight.     MEDICATIONS:  aspirin enteric coated 81 milliGRAM(s) Oral daily  furosemide    Tablet 40 milliGRAM(s) Oral daily  tamsulosin 0.4 milliGRAM(s) Oral at bedtime  warfarin 3 milliGRAM(s) Oral once  abacavir 600 milliGRAM(s) Oral daily  lamiVUDine- milliGRAM(s) Oral daily  vancomycin  IVPB 1000 milliGRAM(s) IV Intermittent daily  pantoprazole    Tablet 40 milliGRAM(s) Oral before breakfast  polyethylene glycol 3350 17 Gram(s) Oral daily    dextrose 40% Gel 15 Gram(s) Oral once PRN  dextrose 50% Injectable 50 milliLiter(s) IV Push every 15 minutes  dextrose 50% Injectable 25 milliLiter(s) IV Push every 15 minutes  finasteride 5 milliGRAM(s) Oral daily  glucagon  Injectable 1 milliGRAM(s) IntraMuscular once PRN    ascorbic acid 500 milliGRAM(s) Oral daily  ferrous    sulfate 325 milliGRAM(s) Oral daily  folic acid 1 milliGRAM(s) Oral daily  sodium chloride 0.9% lock flush 3 milliLiter(s) IV Push every 8 hours      REVIEW OF SYSTEMS:  Complete 10point ROS negative.    PHYSICAL EXAM:  T(C): 36.7 (09-10-20 @ 12:23), Max: 37 (09-10-20 @ 04:57)  HR: 78 (09-10-20 @ 12:23) (48 - 78)  BP: 114/61 (09-10-20 @ 12:23) (99/54 - 114/61)  RR: 18 (09-10-20 @ 12:23) (18 - 18)  SpO2: 99% (09-10-20 @ 12:23) (97% - 99%)  Wt(kg): --  I&O's Summary    09 Sep 2020 07:01  -  10 Sep 2020 07:00  --------------------------------------------------------  IN: 1190 mL / OUT: 1600 mL / NET: -410 mL    10 Sep 2020 07:01  -  10 Sep 2020 12:32  --------------------------------------------------------  IN: 610 mL / OUT: 300 mL / NET: 310 mL      Appearance: NAD, OOB sitting up in chair 	  HEENT: Normal oral mucosa, PERRL, EOMI	  Cardiovascular: Normal S1 S2, irregularly irregular   Respiratory: Lungs clear to auscultation	  Psychiatry: A & O x 3, Mood & affect appropriate  Gastrointestinal:  Soft, Non-tender, + BS	  Skin: No rashes, No ecchymoses, No cyanosis. Right arm PICC line site C/D/I 	  Extremities: Normal range of motion, No clubbing, or cyanosis. B/L LE +2 edema  Vascular: Peripheral pulses palpable 2+ bilaterally        LABS:	 	    CBC Full  -  ( 10 Sep 2020 06:44 )  WBC Count : 10.60 K/uL  Hemoglobin : 9.5 g/dL  Hematocrit : 29.4 %  Platelet Count - Automated : 212 K/uL  Mean Cell Volume : 103.2 fl  Mean Cell Hemoglobin : 33.3 pg  Mean Cell Hemoglobin Concentration : 32.3 gm/dL  Auto Neutrophil # : x  Auto Lymphocyte # : x  Auto Monocyte # : x  Auto Eosinophil # : x  Auto Basophil # : x  Auto Neutrophil % : x  Auto Lymphocyte % : x  Auto Monocyte % : x  Auto Eosinophil % : x  Auto Basophil % : x    09-10    136  |  99  |  37<H>  ----------------------------<  133<H>  3.7   |  24  |  1.79<H>  09-09    134<L>  |  98  |  40<H>  ----------------------------<  104<H>  3.9   |  25  |  1.91<H>    Ca    8.9      10 Sep 2020 06:44  Ca    9.0      09 Sep 2020 04:40  Phos  3.8     09-09  Mg     2.2     09-09    Thyroid Stimulating Hormone, Serum in AM (09.06.20 @ 09:42)    Thyroid Stimulating Hormone, Serum: 6.90 uIU/mL    Free Thyroxine, Serum in AM (09.06.20 @ 09:42)    Free Thyroxine, Serum: 1.2 ng/dL      TELEMETRY: Afib with rates 40-60's	      < from: Transthoracic Echocardiogram (09.08.20 @ 14:34) >  Dimensions:    Normal Values:  LA:     2.4    2.0 - 4.0 cm  Ao:     3.5    2.0 - 3.8 cm  SEPTUM: 1.0    0.6 - 1.2 cm  PWT:0.9    0.6 - 1.1 cm  LVIDd:  4.2    3.0 - 5.6 cm  LVIDs:  2.9    1.8 - 4.0 cm  Derived variables:  LVMI: 68 g/m2  RWT: 0.42  Fractional short: 29 %  EF (Visual Estimate): 70 %  Doppler Peak Velocity (m/sec): MV=2.0  ------------------------------------------------------------------------  Observations:  Mitral Valve: Bioprosthetic mitral valve replacement. No  mitral regurgitation seen. Mean transmitral valve gradient  equals 4 mm Hg, which is probably normal in the setting of  a bioprosthetic mitral valve replacement.  Aortic Valve/Aorta: Aortic valve not well visualized;  appears trileaflet with normal opening. No aortic valve  regurgitation seen.  Aortic Root: 3.5 cm.  Left Atrium: Normal left atrium.  LA volume index = 31  cc/m2.  Left Ventricle: Irregular heart beat; ejection fraction  varies with R-R interval. Endocardium not well visualized;  grossly normal left ventricular systolic function.  Paradoxical septal motion consistent with prior cardiac  surgery. Normal left ventricularinternal dimensions and  wall thickness. Normal diastolic function  Right Heart: Normal right atrium. The right ventricle is  not well visualized; grossly normal right ventricular size  and systolic function. Normal tricuspid valve. Minimal  tricuspid regurgitation. Normal pulmonic valve. Mild  pulmonic regurgitation.  Pericardium/Pleura: Small pericardial effusion. The  effusion measures up to approximately 1.0 cm posterior to  the LV. No echocardiographic evidence of pericardial  tamponade.  Hemodynamic: Estimated right atrial pressure is 8 mm Hg.  Estimated right ventricular systolic pressure equals 26 mm  Hg, assuming right atrial pressure equals 8 mm Hg,  consistent with normal pulmonary pressures.  -----------------------------------------------------------------------  Conclusions:  1. Bioprosthetic mitral valve replacement. No mitral  regurgitation seen. Mean transmitral valve gradient equals  4 mm Hg, which is probably normal in the setting of a  bioprosthetic mitral valve replacement.  2. Aortic valve not well visualized; appears trileaflet  with normal opening. No aortic valve regurgitation seen.  3. Irregular heart beat; ejection fraction varies with R-R  interval. Endocardium not well visualized; grossly normal  left ventricular systolic function. Paradoxical septal  motion consistent with prior cardiac surgery.  4. The right ventricle is not well visualized; grossly  normal right ventricular size and systolic function.  5. Small pericardial effusion. The effusion messures up to  approximately 1.0 cm posterior to the LV. No  echocardiographic evidence of pericardial tamponade.  *** Compared with echocardiogram of 8/31/2020, patient  status-post bioprosthetic MVR.    < end of copied text >

## 2020-09-10 NOTE — PROGRESS NOTE ADULT - ASSESSMENT
80 year old with hx of HIV, CAD, BPH presented after a fall at home and found to have rhabdo 2/2 urinary retention. Later found to have bacteremia growing staph epidermidis as well as endocarditis with flair MV leaflet. Patient s/p MVR (t) and SHELLIE clip on 8/31/20. EP consulted for AV dissociation noted on telemetry. 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 Patient with PAF/AFL prior to OHS and now back in Afib since 9/9/20 at 8:25am.       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 epicardial pacing was removed this morning  -c/w AC   -PPM not indicated at this time     ALBERTO Preciado, NP  [phone] 80 year old with hx of HIV, CAD, BPH presented after a fall at home and found to have rhabdo 2/2 urinary retention. Later found to have bacteremia growing staph epidermidis as well as endocarditis with flair MV leaflet. Patient s/p MVR (t) and SHELLIE clip on 8/31/20. EP consulted for AV dissociation noted on telemetry. 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 Patient with PAF/AFL prior to OHS and now remain in Afib since 9/9/20 at 8:25am.       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 epicardial pacing was removed this morning  -c/w AC   -PPM not indicated at this time   -Patient to follow up with Dr. Cifuentes as scheduled on 10/13/20 at 2:15pm  -Discharge planning per primary team    ALBERTO Preciado NP  [phone]

## 2020-09-10 NOTE — PROGRESS NOTE ADULT - ASSESSMENT
Assessment  Hyperglycemia: No hx DM, A1C 5.7%, had postoperative hyperglycemia requiring insulin, now off insulin, blood sugars trending within acceptable range, no hypoglycemic episodes. Patient is eating meals, comfortable, planning DC to [facility] tomorrow.  Hypothyroidism: 80y male with no history thyroid disease, was not on any thyroid supplements, on HIV meds, TSH borderline elevated, subclinical hypothyroid, TPO ab WNL, repeat TFTs still subclinical.  Endocarditis: S/P MVR, on meds,  stable, monitored.        Nesha Izaguirre MD  Cell: [phone]  Office: [phone] Assessment  Hyperglycemia: No hx DM, A1C 5.7%,  had postoperative hyperglycemia requiring insulin, now off insulin, blood sugars trending within acceptable range, no hypoglycemic episodes. Patient is eating meals, comfortable, planning DC to [facility] tomorrow.  Hypothyroidism: 80y male with no history thyroid disease, was not on any thyroid supplements, on HIV meds, TSH borderline elevated, subclinical hypothyroid, TPO ab WNL, repeat TFTs still subclinical.  Endocarditis: S/P MVR, on meds,  stable, monitored.        Nesha Izaguirre MD  Cell: [phone]  Office: [phone]

## 2020-09-10 NOTE — PROGRESS NOTE ADULT - SUBJECTIVE AND OBJECTIVE BOX
Subjective: Pt states "Hello" denies any CP or SOB. No acute events overnight.     Telemetry:  Afib 45 -70  Vital Signs Last 24 Hrs  T(C): 37 (09-10-20 @ 04:57), Max: 37 (09-10-20 @ 04:57)  T(F): 98.6 (09-10-20 @ 04:57), Max: 98.6 (09-10-20 @ 04:57)  HR: 48 (09-10-20 @ :57) (48 - 83)  BP: 108/57 (09-10-20 @ 04:57) (99/54 - 108/57)  RR: 18 (09-10-20 @ 04:57) (18 - 18)  SpO2: 97% (09-10-20 @ :57) (97% - 97%)              @ 07:01  -  09-10 @ 07:00  --------------------------------------------------------  IN: 1190 mL / OUT: 1600 mL / NET: -410 mL      Daily Weight in k (10 Sep 2020 08:30)                        9.5    10.60 )-----------( 212      ( 10 Sep 2020 06:44 )             29.4     136  |  99  |  37<H>  ----------------------------<  133<H>  3.7   |  24  |  1.79<H>              CAPILLARY BLOOD GLUCOSE  122 - 139            PHYSICAL EXAM  Neurology: A&Ox3, NAD  CV : RRR+S1S2  Sternal Wound: MSI CDI CAR, Stable     Lungs: Respirations non-labored, B/L BS clear, diminished at bases  Abdomen: Soft, NT/ND, +BSx4Q, +BM (-)N/V/D  : Voiding, pt self-caths  Extremities: B/L LE trace edema, negative calf tenderness, +PP        MEDICATIONS  abacavir 600 milliGRAM(s) Oral daily  ascorbic acid 500 milliGRAM(s) Oral daily  aspirin enteric coated 81 milliGRAM(s) Oral daily  Doravirine (Pefeltro), 100 mG 1 Tablet(s) 1 Tablet(s) Oral daily  ferrous    sulfate 325 milliGRAM(s) Oral daily  finasteride 5 milliGRAM(s) Oral daily  folic acid 1 milliGRAM(s) Oral daily  furosemide    Tablet 40 milliGRAM(s) Oral daily  lamiVUDine- milliGRAM(s) Oral daily  pantoprazole    Tablet 40 milliGRAM(s) Oral before breakfast  polyethylene glycol 3350 17 Gram(s) Oral daily  sodium chloride 0.9% lock flush 3 milliLiter(s) IV Push every 8 hours  tamsulosin 0.4 milliGRAM(s) Oral at bedtime  vancomycin  IVPB 1000 milliGRAM(s) IV Intermittent daily  warfarin 3 milliGRAM(s) Oral once      Physical Therapy Rec:   Home  [  ]   Home w/ PT  [  ]  Rehab  [ X ]    Discussed with Cardiothoracic Team at AM rounds.

## 2020-09-10 NOTE — PROGRESS NOTE ADULT - PROBLEM SELECTOR PLAN 4
EP following, Dr. Lester  Continue to hold BB for 1st degree AVB and periods of Wenckebach AV block with narrow QRS post MVR and SHELLIE ligation; expecting rhythm to recover.  No PPM at this time

## 2020-09-11 ENCOUNTER — NON-APPOINTMENT (OUTPATIENT)
Age: 80
End: 2020-09-11

## 2020-09-11 VITALS
SYSTOLIC BLOOD PRESSURE: 108 MMHG | OXYGEN SATURATION: 99 % | RESPIRATION RATE: 18 BRPM | TEMPERATURE: 98 F | HEART RATE: 83 BPM | DIASTOLIC BLOOD PRESSURE: 52 MMHG

## 2020-09-11 LAB
ANION GAP SERPL CALC-SCNC: 12 MMOL/L — SIGNIFICANT CHANGE UP (ref 5–17)
BUN SERPL-MCNC: 36 MG/DL — HIGH (ref 7–23)
CALCIUM SERPL-MCNC: 8.9 MG/DL — SIGNIFICANT CHANGE UP (ref 8.4–10.5)
CHLORIDE SERPL-SCNC: 101 MMOL/L — SIGNIFICANT CHANGE UP (ref 96–108)
CO2 SERPL-SCNC: 24 MMOL/L — SIGNIFICANT CHANGE UP (ref 22–31)
CREAT SERPL-MCNC: 1.88 MG/DL — HIGH (ref 0.5–1.3)
GLUCOSE SERPL-MCNC: 92 MG/DL — SIGNIFICANT CHANGE UP (ref 70–99)
HCT VFR BLD CALC: 30 % — LOW (ref 39–50)
HGB BLD-MCNC: 9.7 G/DL — LOW (ref 13–17)
INR BLD: 1.64 RATIO — HIGH (ref 0.88–1.16)
MCHC RBC-ENTMCNC: 32.3 GM/DL — SIGNIFICANT CHANGE UP (ref 32–36)
MCHC RBC-ENTMCNC: 33.4 PG — SIGNIFICANT CHANGE UP (ref 27–34)
MCV RBC AUTO: 103.4 FL — HIGH (ref 80–100)
NRBC # BLD: 0 /100 WBCS — SIGNIFICANT CHANGE UP (ref 0–0)
PLATELET # BLD AUTO: 232 K/UL — SIGNIFICANT CHANGE UP (ref 150–400)
POTASSIUM SERPL-MCNC: 3.9 MMOL/L — SIGNIFICANT CHANGE UP (ref 3.5–5.3)
POTASSIUM SERPL-SCNC: 3.9 MMOL/L — SIGNIFICANT CHANGE UP (ref 3.5–5.3)
PROTHROM AB SERPL-ACNC: 19.1 SEC — HIGH (ref 10.6–13.6)
RBC # BLD: 2.9 M/UL — LOW (ref 4.2–5.8)
RBC # FLD: 15.3 % — HIGH (ref 10.3–14.5)
SODIUM SERPL-SCNC: 137 MMOL/L — SIGNIFICANT CHANGE UP (ref 135–145)
VANCOMYCIN TROUGH SERPL-MCNC: 14.6 UG/ML — SIGNIFICANT CHANGE UP (ref 10–20)
WBC # BLD: 12.27 K/UL — HIGH (ref 3.8–10.5)
WBC # FLD AUTO: 12.27 K/UL — HIGH (ref 3.8–10.5)

## 2020-09-11 PROCEDURE — 85027 COMPLETE CBC AUTOMATED: CPT

## 2020-09-11 PROCEDURE — 86376 MICROSOMAL ANTIBODY EACH: CPT

## 2020-09-11 PROCEDURE — 97530 THERAPEUTIC ACTIVITIES: CPT

## 2020-09-11 PROCEDURE — 84100 ASSAY OF PHOSPHORUS: CPT

## 2020-09-11 PROCEDURE — 94640 AIRWAY INHALATION TREATMENT: CPT

## 2020-09-11 PROCEDURE — 87040 BLOOD CULTURE FOR BACTERIA: CPT

## 2020-09-11 PROCEDURE — 82330 ASSAY OF CALCIUM: CPT

## 2020-09-11 PROCEDURE — 87116 MYCOBACTERIA CULTURE: CPT

## 2020-09-11 PROCEDURE — 72170 X-RAY EXAM OF PELVIS: CPT

## 2020-09-11 PROCEDURE — 84443 ASSAY THYROID STIM HORMONE: CPT

## 2020-09-11 PROCEDURE — 76770 US EXAM ABDO BACK WALL COMP: CPT

## 2020-09-11 PROCEDURE — 84484 ASSAY OF TROPONIN QUANT: CPT

## 2020-09-11 PROCEDURE — U0003: CPT

## 2020-09-11 PROCEDURE — 87536 HIV-1 QUANT&REVRSE TRNSCRPJ: CPT

## 2020-09-11 PROCEDURE — 71045 X-RAY EXAM CHEST 1 VIEW: CPT

## 2020-09-11 PROCEDURE — 82962 GLUCOSE BLOOD TEST: CPT

## 2020-09-11 PROCEDURE — 87015 SPECIMEN INFECT AGNT CONCNTJ: CPT

## 2020-09-11 PROCEDURE — 80053 COMPREHEN METABOLIC PANEL: CPT

## 2020-09-11 PROCEDURE — 96361 HYDRATE IV INFUSION ADD-ON: CPT

## 2020-09-11 PROCEDURE — 85014 HEMATOCRIT: CPT

## 2020-09-11 PROCEDURE — 97116 GAIT TRAINING THERAPY: CPT

## 2020-09-11 PROCEDURE — 87150 DNA/RNA AMPLIFIED PROBE: CPT

## 2020-09-11 PROCEDURE — 82746 ASSAY OF FOLIC ACID SERUM: CPT

## 2020-09-11 PROCEDURE — 88305 TISSUE EXAM BY PATHOLOGIST: CPT

## 2020-09-11 PROCEDURE — 86713 LEGIONELLA ANTIBODY: CPT

## 2020-09-11 PROCEDURE — 86022 PLATELET ANTIBODIES: CPT

## 2020-09-11 PROCEDURE — 87449 NOS EACH ORGANISM AG IA: CPT

## 2020-09-11 PROCEDURE — 82803 BLOOD GASES ANY COMBINATION: CPT

## 2020-09-11 PROCEDURE — 85610 PROTHROMBIN TIME: CPT

## 2020-09-11 PROCEDURE — 80202 ASSAY OF VANCOMYCIN: CPT

## 2020-09-11 PROCEDURE — 86357 NK CELLS TOTAL COUNT: CPT

## 2020-09-11 PROCEDURE — 90471 IMMUNIZATION ADMIN: CPT

## 2020-09-11 PROCEDURE — 86622 BRUCELLA ANTIBODY: CPT

## 2020-09-11 PROCEDURE — 86923 COMPATIBILITY TEST ELECTRIC: CPT

## 2020-09-11 PROCEDURE — 84436 ASSAY OF TOTAL THYROXINE: CPT

## 2020-09-11 PROCEDURE — 99231 SBSQ HOSP IP/OBS SF/LOW 25: CPT

## 2020-09-11 PROCEDURE — 99285 EMERGENCY DEPT VISIT HI MDM: CPT | Mod: 25

## 2020-09-11 PROCEDURE — 87640 STAPH A DNA AMP PROBE: CPT

## 2020-09-11 PROCEDURE — 87102 FUNGUS ISOLATION CULTURE: CPT

## 2020-09-11 PROCEDURE — 70486 CT MAXILLOFACIAL W/O DYE: CPT

## 2020-09-11 PROCEDURE — 51702 INSERT TEMP BLADDER CATH: CPT

## 2020-09-11 PROCEDURE — 93458 L HRT ARTERY/VENTRICLE ANGIO: CPT

## 2020-09-11 PROCEDURE — 84439 ASSAY OF FREE THYROXINE: CPT

## 2020-09-11 PROCEDURE — 83605 ASSAY OF LACTIC ACID: CPT

## 2020-09-11 PROCEDURE — 83735 ASSAY OF MAGNESIUM: CPT

## 2020-09-11 PROCEDURE — 93312 ECHO TRANSESOPHAGEAL: CPT

## 2020-09-11 PROCEDURE — 97110 THERAPEUTIC EXERCISES: CPT

## 2020-09-11 PROCEDURE — 99232 SBSQ HOSP IP/OBS MODERATE 35: CPT

## 2020-09-11 PROCEDURE — 94660 CPAP INITIATION&MGMT: CPT

## 2020-09-11 PROCEDURE — 84134 ASSAY OF PREALBUMIN: CPT

## 2020-09-11 PROCEDURE — 86355 B CELLS TOTAL COUNT: CPT

## 2020-09-11 PROCEDURE — P9016: CPT

## 2020-09-11 PROCEDURE — 70450 CT HEAD/BRAIN W/O DYE: CPT

## 2020-09-11 PROCEDURE — 72125 CT NECK SPINE W/O DYE: CPT

## 2020-09-11 PROCEDURE — C1889: CPT

## 2020-09-11 PROCEDURE — 87086 URINE CULTURE/COLONY COUNT: CPT

## 2020-09-11 PROCEDURE — 84132 ASSAY OF SERUM POTASSIUM: CPT

## 2020-09-11 PROCEDURE — 97161 PT EVAL LOW COMPLEX 20 MIN: CPT

## 2020-09-11 PROCEDURE — C1769: CPT

## 2020-09-11 PROCEDURE — 36569 INSJ PICC 5 YR+ W/O IMAGING: CPT

## 2020-09-11 PROCEDURE — 93325 DOPPLER ECHO COLOR FLOW MAPG: CPT

## 2020-09-11 PROCEDURE — 82550 ASSAY OF CK (CPK): CPT

## 2020-09-11 PROCEDURE — 80048 BASIC METABOLIC PNL TOTAL CA: CPT

## 2020-09-11 PROCEDURE — 86769 SARS-COV-2 COVID-19 ANTIBODY: CPT

## 2020-09-11 PROCEDURE — 85396 CLOTTING ASSAY WHOLE BLOOD: CPT

## 2020-09-11 PROCEDURE — 82565 ASSAY OF CREATININE: CPT

## 2020-09-11 PROCEDURE — 85018 HEMOGLOBIN: CPT

## 2020-09-11 PROCEDURE — 93320 DOPPLER ECHO COMPLETE: CPT

## 2020-09-11 PROCEDURE — 82553 CREATINE MB FRACTION: CPT

## 2020-09-11 PROCEDURE — 86850 RBC ANTIBODY SCREEN: CPT

## 2020-09-11 PROCEDURE — 94010 BREATHING CAPACITY TEST: CPT

## 2020-09-11 PROCEDURE — 83880 ASSAY OF NATRIURETIC PEPTIDE: CPT

## 2020-09-11 PROCEDURE — 70546 MR ANGIOGRAPH HEAD W/O&W/DYE: CPT

## 2020-09-11 PROCEDURE — 94003 VENT MGMT INPAT SUBQ DAY: CPT

## 2020-09-11 PROCEDURE — 90715 TDAP VACCINE 7 YRS/> IM: CPT

## 2020-09-11 PROCEDURE — 82607 VITAMIN B-12: CPT

## 2020-09-11 PROCEDURE — 87641 MR-STAPH DNA AMP PROBE: CPT

## 2020-09-11 PROCEDURE — 85384 FIBRINOGEN ACTIVITY: CPT

## 2020-09-11 PROCEDURE — 97162 PT EVAL MOD COMPLEX 30 MIN: CPT

## 2020-09-11 PROCEDURE — 86900 BLOOD TYPING SEROLOGIC ABO: CPT

## 2020-09-11 PROCEDURE — 86901 BLOOD TYPING SEROLOGIC RH(D): CPT

## 2020-09-11 PROCEDURE — 96365 THER/PROPH/DIAG IV INF INIT: CPT | Mod: XU

## 2020-09-11 PROCEDURE — 76377 3D RENDER W/INTRP POSTPROCES: CPT

## 2020-09-11 PROCEDURE — 86891 AUTOLOGOUS BLOOD OP SALVAGE: CPT

## 2020-09-11 PROCEDURE — C1887: CPT

## 2020-09-11 PROCEDURE — 86638 Q FEVER ANTIBODY: CPT

## 2020-09-11 PROCEDURE — 74176 CT ABD & PELVIS W/O CONTRAST: CPT

## 2020-09-11 PROCEDURE — C1894: CPT

## 2020-09-11 PROCEDURE — 87070 CULTURE OTHR SPECIMN AEROBIC: CPT

## 2020-09-11 PROCEDURE — 83036 HEMOGLOBIN GLYCOSYLATED A1C: CPT

## 2020-09-11 PROCEDURE — 84295 ASSAY OF SERUM SODIUM: CPT

## 2020-09-11 PROCEDURE — 93005 ELECTROCARDIOGRAM TRACING: CPT

## 2020-09-11 PROCEDURE — 87186 SC STD MICRODIL/AGAR DIL: CPT

## 2020-09-11 PROCEDURE — 87075 CULTR BACTERIA EXCEPT BLOOD: CPT

## 2020-09-11 PROCEDURE — 85730 THROMBOPLASTIN TIME PARTIAL: CPT

## 2020-09-11 PROCEDURE — P9045: CPT

## 2020-09-11 PROCEDURE — 81001 URINALYSIS AUTO W/SCOPE: CPT

## 2020-09-11 PROCEDURE — 93306 TTE W/DOPPLER COMPLETE: CPT

## 2020-09-11 PROCEDURE — C1751: CPT

## 2020-09-11 PROCEDURE — 86611 BARTONELLA ANTIBODY: CPT

## 2020-09-11 PROCEDURE — 87206 SMEAR FLUORESCENT/ACID STAI: CPT

## 2020-09-11 PROCEDURE — 99152 MOD SED SAME PHYS/QHP 5/>YRS: CPT

## 2020-09-11 PROCEDURE — 82435 ASSAY OF BLOOD CHLORIDE: CPT

## 2020-09-11 PROCEDURE — 71250 CT THORAX DX C-: CPT

## 2020-09-11 PROCEDURE — 84480 ASSAY TRIIODOTHYRONINE (T3): CPT

## 2020-09-11 PROCEDURE — 93880 EXTRACRANIAL BILAT STUDY: CPT

## 2020-09-11 PROCEDURE — 82947 ASSAY GLUCOSE BLOOD QUANT: CPT

## 2020-09-11 RX ORDER — VANCOMYCIN HCL 1 G
1 VIAL (EA) INTRAVENOUS
Qty: 0 | Refills: 0 | DISCHARGE
Start: 2020-09-11 | End: 2020-10-12

## 2020-09-11 RX ORDER — FUROSEMIDE 40 MG
1 TABLET ORAL
Qty: 0 | Refills: 0 | DISCHARGE
Start: 2020-09-11

## 2020-09-11 RX ORDER — PANTOPRAZOLE SODIUM 20 MG/1
1 TABLET, DELAYED RELEASE ORAL
Qty: 0 | Refills: 0 | DISCHARGE
Start: 2020-09-11

## 2020-09-11 RX ORDER — ASCORBIC ACID 60 MG
1 TABLET,CHEWABLE ORAL
Qty: 0 | Refills: 0 | DISCHARGE
Start: 2020-09-11

## 2020-09-11 RX ORDER — FERROUS SULFATE 325(65) MG
1 TABLET ORAL
Qty: 0 | Refills: 0 | DISCHARGE
Start: 2020-09-11

## 2020-09-11 RX ORDER — POTASSIUM CHLORIDE 20 MEQ
20 PACKET (EA) ORAL ONCE
Refills: 0 | Status: COMPLETED | OUTPATIENT
Start: 2020-09-11 | End: 2020-09-11

## 2020-09-11 RX ORDER — ASPIRIN/CALCIUM CARB/MAGNESIUM 324 MG
1 TABLET ORAL
Qty: 0 | Refills: 0 | DISCHARGE
Start: 2020-09-11

## 2020-09-11 RX ORDER — FOLIC ACID 0.8 MG
1 TABLET ORAL
Qty: 0 | Refills: 0 | DISCHARGE
Start: 2020-09-11

## 2020-09-11 RX ORDER — ACETAMINOPHEN 500 MG
2 TABLET ORAL
Qty: 0 | Refills: 0 | DISCHARGE
Start: 2020-09-11

## 2020-09-11 RX ADMIN — Medication 250 MILLIGRAM(S): at 13:11

## 2020-09-11 RX ADMIN — Medication 500 MILLIGRAM(S): at 13:11

## 2020-09-11 RX ADMIN — PANTOPRAZOLE SODIUM 40 MILLIGRAM(S): 20 TABLET, DELAYED RELEASE ORAL at 05:22

## 2020-09-11 RX ADMIN — Medication 30 MILLILITER(S): at 05:25

## 2020-09-11 RX ADMIN — SODIUM CHLORIDE 3 MILLILITER(S): 9 INJECTION INTRAMUSCULAR; INTRAVENOUS; SUBCUTANEOUS at 14:09

## 2020-09-11 RX ADMIN — Medication 20 MILLIEQUIVALENT(S): at 10:04

## 2020-09-11 RX ADMIN — FINASTERIDE 5 MILLIGRAM(S): 5 TABLET, FILM COATED ORAL at 13:11

## 2020-09-11 RX ADMIN — SODIUM CHLORIDE 3 MILLILITER(S): 9 INJECTION INTRAMUSCULAR; INTRAVENOUS; SUBCUTANEOUS at 05:25

## 2020-09-11 RX ADMIN — Medication 40 MILLIGRAM(S): at 05:22

## 2020-09-11 RX ADMIN — ABACAVIR 600 MILLIGRAM(S): 20 SOLUTION ORAL at 13:10

## 2020-09-11 RX ADMIN — Medication 81 MILLIGRAM(S): at 13:11

## 2020-09-11 RX ADMIN — Medication 325 MILLIGRAM(S): at 13:11

## 2020-09-11 RX ADMIN — Medication 1 MILLIGRAM(S): at 13:11

## 2020-09-11 RX ADMIN — Medication 100 MILLIGRAM(S): at 13:11

## 2020-09-11 NOTE — PROGRESS NOTE ADULT - PROVIDER SPECIALTY LIST ADULT
CT Surgery
Cardiology
Critical Care
Electrophysiology
Endocrinology
Infectious Disease
Internal Medicine
Nephrology
Urology
Critical Care
Cardiology
Internal Medicine
Infectious Disease
CT Surgery
Endocrinology
CT Surgery

## 2020-09-11 NOTE — PROGRESS NOTE ADULT - PROBLEM SELECTOR PLAN 1
Patient in pre-diabetic range, A1C 5.7%, blood sugars trending at target without management. No need to d/c on DM meds, patient was counseled for compliance with consistent low carb diet and physical activity as tolerated outpatient. Will FU outpatient endo in 3 months.

## 2020-09-11 NOTE — PROGRESS NOTE ADULT - SUBJECTIVE AND OBJECTIVE BOX
24H hour events: preparing for discharge to rehab no over night events     MEDICATIONS:  aspirin enteric coated 81 milliGRAM(s) Oral daily  furosemide    Tablet 40 milliGRAM(s) Oral daily  tamsulosin 0.4 milliGRAM(s) Oral at bedtime    abacavir 600 milliGRAM(s) Oral daily  lamiVUDine- milliGRAM(s) Oral daily  vancomycin  IVPB 1000 milliGRAM(s) IV Intermittent daily      acetaminophen   Tablet .. 650 milliGRAM(s) Oral every 6 hours PRN    pantoprazole    Tablet 40 milliGRAM(s) Oral before breakfast  polyethylene glycol 3350 17 Gram(s) Oral daily    dextrose 40% Gel 15 Gram(s) Oral once PRN  dextrose 50% Injectable 50 milliLiter(s) IV Push every 15 minutes  dextrose 50% Injectable 25 milliLiter(s) IV Push every 15 minutes  finasteride 5 milliGRAM(s) Oral daily  glucagon  Injectable 1 milliGRAM(s) IntraMuscular once PRN    ascorbic acid 500 milliGRAM(s) Oral daily  ferrous    sulfate 325 milliGRAM(s) Oral daily  folic acid 1 milliGRAM(s) Oral daily  sodium chloride 0.9% lock flush 3 milliLiter(s) IV Push every 8 hours      REVIEW OF SYSTEMS:  See HPI, otherwise ROS negative.    PHYSICAL EXAM:  T(C): 36.7 (09-11-20 @ 13:33), Max: 37.1 (09-11-20 @ 04:11)  HR: 83 (09-11-20 @ 13:33) (36 - 83)  BP: 108/52 (09-11-20 @ 13:33) (101/52 - 122/52)  RR: 18 (09-11-20 @ 13:33) (18 - 18)  SpO2: 99% (09-11-20 @ 13:33) (98% - 99%)  Wt(kg): --  I&O's Summary    10 Sep 2020 07:01  -  11 Sep 2020 07:00  --------------------------------------------------------  IN: 1210 mL / OUT: 2150 mL / NET: -940 mL    11 Sep 2020 07:01  -  11 Sep 2020 16:08  --------------------------------------------------------  IN: 240 mL / OUT: 0 mL / NET: 240 mL        Appearance: Alert. NAD	  Cardiovascular: +S1S2 RRR no m/g/r  Respiratory: CTA B/L	  Psychiatry: A & O x 3, Mood & affect appropriate  Gastrointestinal:  Soft, NT. ND. +BS	  Skin: No rashes	  Neurologic: Non-focal  Extremities: No edema BLE  Vascular: Peripheral pulses palpable 2+ bilaterally      LABS:	 	    CBC Full  -  ( 11 Sep 2020 05:45 )  WBC Count : 12.27 K/uL  Hemoglobin : 9.7 g/dL  Hematocrit : 30.0 %  Platelet Count - Automated : 232 K/uL  Mean Cell Volume : 103.4 fl  Mean Cell Hemoglobin : 33.4 pg  Mean Cell Hemoglobin Concentration : 32.3 gm/dL  Auto Neutrophil # : x  Auto Lymphocyte # : x  Auto Monocyte # : x  Auto Eosinophil # : x  Auto Basophil # : x  Auto Neutrophil % : x  Auto Lymphocyte % : x  Auto Monocyte % : x  Auto Eosinophil % : x  Auto Basophil % : x    09-11    137  |  101  |  36<H>  ----------------------------<  92  3.9   |  24  |  1.88<H>  09-10    136  |  99  |  37<H>  ----------------------------<  133<H>  3.7   |  24  |  1.79<H>    Ca    8.9      11 Sep 2020 05:45  Ca    8.9      10 Sep 2020 06:44    TELEMETRY:   aflutter 60's  over night HR to 38 - 40's lasting seconds  	    < from: Transthoracic Echocardiogram (09.08.20 @ 14:34) >  Dimensions:    Normal Values:  LA:     2.4    2.0 - 4.0 cm  Ao:     3.5    2.0 - 3.8 cm  SEPTUM: 1.0    0.6 - 1.2 cm  PWT:0.9    0.6 - 1.1 cm  LVIDd:  4.2    3.0 - 5.6 cm  LVIDs:  2.9    1.8 - 4.0 cm  Derived variables:  LVMI: 68 g/m2  RWT: 0.42  Fractional short: 29 %  EF (Visual Estimate): 70 %  Doppler Peak Velocity (m/sec): MV=2.0  ------------------------------------------------------------------------  Observations:  Mitral Valve: Bioprosthetic mitral valve replacement. No  mitral regurgitation seen. Mean transmitral valve gradient  equals 4 mm Hg, which is probably normal in the setting of  a bioprosthetic mitral valve replacement.  Aortic Valve/Aorta: Aortic valve not well visualized;  appears trileaflet with normal opening. No aortic valve  regurgitation seen.  Aortic Root: 3.5 cm.  Left Atrium: Normal left atrium.  LA volume index = 31  cc/m2.  Left Ventricle: Irregular heart beat; ejection fraction  varies with R-R interval. Endocardium not well visualized;  grossly normal left ventricular systolic function.  Paradoxical septal motion consistent with prior cardiac  surgery. Normal left ventricularinternal dimensions and  wall thickness. Normal diastolic function  Right Heart: Normal right atrium. The right ventricle is  not well visualized; grossly normal right ventricular size  and systolic function. Normal tricuspid valve. Minimal  tricuspid regurgitation. Normal pulmonic valve. Mild  pulmonic regurgitation.  Pericardium/Pleura: Small pericardial effusion. The  effusion measures up to approximately 1.0 cm posterior to  the LV. No echocardiographic evidence of pericardial  tamponade.  Hemodynamic: Estimated right atrial pressure is 8 mm Hg.  Estimated right ventricular systolic pressure equals 26 mm  Hg, assuming right atrial pressure equals 8 mm Hg,  consistent with normal pulmonary pressures.  -----------------------------------------------------------------------  Conclusions:  1. Bioprosthetic mitral valve replacement. No mitral  regurgitation seen. Mean transmitral valve gradient equals  4 mm Hg, which is probably normal in the setting of a  bioprosthetic mitral valve replacement.  2. Aortic valve not well visualized; appears trileaflet  with normal opening. No aortic valve regurgitation seen.  3. Irregular heart beat; ejection fraction varies with R-R  interval. Endocardium not well visualized; grossly normal  left ventricular systolic function. Paradoxical septal  motion consistent with prior cardiac surgery.  4. The right ventricle is not well visualized; grossly  normal right ventricular size and systolic function.  5. Small pericardial effusion. The effusion messures up to  approximately 1.0 cm posterior to the LV. No  echocardiographic evidence of pericardial tamponade.  *** Compared with echocardiogram of 8/31/2020, patient  status-post bioprosthetic MVR.    < end of copied text >

## 2020-09-11 NOTE — PROGRESS NOTE ADULT - ASSESSMENT
80 year old male  with pmx hx of HIV, CAD, Afib/ Flutter BPH s/p fall with rhabdo 2/2 urinary retention, bacteremia staph epidermidis with endocarditis with flair MV leaflet now s/p MVR (t) and SHELLIE clip on 8/31/20. 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 Remains in AF/AFL prior to OHS and now remain in Afib since 9/9/20 at 8:25am.       1)Bacteremia (staph epidermidis) w/ MV endocarditis s/p MVR (t) and SHELLIE clipping on 8/31  2) Post-op SR w/ 1st degree w/ periods of 2nd degree AV block (AV kaylan disease)  3) PAF/AFL    Plan:   - telemetry  monitoring Afib flutter 60's with brief bradycardia during sleep lasting seconds  - c/w AC  - hold BB AVN blocking agents   - PPM not indicated at this time   - Patient to follow up with Dr. Cifuentes as scheduled on 10/13/20 at 2:15pm  - clear from EP perspective for  discharge to [facility] 5 pm

## 2020-09-11 NOTE — PROGRESS NOTE ADULT - ASSESSMENT
Assessment  Hyperglycemia: No hx DM, A1C 5.7%,  had postoperative hyperglycemia requiring insulin, now off insulin, blood sugars trending at target, eating meals, no acute events, planning DC to [facility] today.  Hypothyroidism: 80y male with no history thyroid disease, was not on any thyroid supplements, on HIV meds, TSH borderline elevated, subclinical hypothyroid, TPO ab WNL, repeat TFTs still subclinical.  Endocarditis: S/P MVR, on meds,  stable, monitored.        Nesha Izaguirre MD  Cell: [phone]  Office: [phone] Assessment  Hyperglycemia: No hx DM, A1C 5.7%,  had postoperative hyperglycemia requiring insulin,  now off insulin, blood sugars trending at target, eating meals, no acute events, planning DC to [facility] today.  Hypothyroidism: 80y male with no history thyroid disease, was not on any thyroid supplements, on HIV meds, TSH borderline elevated, subclinical hypothyroid, TPO ab WNL, repeat TFTs still subclinical.  Endocarditis: S/P MVR, on meds,  stable, monitored.        Nesha Izaguirre MD  Cell: [phone]  Office: [phone]

## 2020-09-11 NOTE — PROGRESS NOTE ADULT - ASSESSMENT
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 Almazan will resume care of this patient on 9/14    Memo Ji MD  Pager [phone]  After 5pm and on weekends call [phone]

## 2020-09-11 NOTE — PROGRESS NOTE ADULT - SUBJECTIVE AND OBJECTIVE BOX
CC: F/U for Endocarditis    Saw/spoke to patient. No new complaints. Unchanged.    Allergies  No Known Allergies    ANTIMICROBIALS:  abacavir 600 daily  lamiVUDine- daily  vancomycin  IVPB 1000 daily  Doravirine (Pefeltro), 100 mG 1 Tablet(s) 1 Tablet(s) Oral daily    PE:    Vital Signs Last 24 Hrs  T(C): 36.7 (11 Sep 2020 13:33), Max: 37.1 (11 Sep 2020 04:11)  T(F): 98 (11 Sep 2020 13:33), Max: 98.8 (11 Sep 2020 04:11)  HR: 83 (11 Sep 2020 13:33) (36 - 83)  BP: 108/52 (11 Sep 2020 13:33) (101/52 - 122/52)  RR: 18 (11 Sep 2020 13:33) (18 - 18)  SpO2: 99% (11 Sep 2020 13:33) (98% - 99%)    Gen: AOx3, NAD, non-toxic  CV: S1+S2 normal, nontachycardic  Resp: Clear bilat, no resp distress, no crackles/wheezes  Abd: Soft, nontender, +BS  Ext: No LE edema, no wounds    LABS:                        9.7    12.27 )-----------( 232      ( 11 Sep 2020 05:45 )             30.0     09-11    137  |  101  |  36<H>  ----------------------------<  92  3.9   |  24  |  1.88<H>    Ca    8.9      11 Sep 2020 05:45    MICROBIOLOGY:  Vancomycin Level, Trough: 14.6 ug/mL (09-11-20 @ 12:56)    .Tissue Other  08-31-20   No growth at 1 week.  --    No polymorphonuclear cells seen per low power field  No organisms seen per oil power field    .Surgical Swab mitral vegetation  08-31-20   No growth     .Blood Blood-Peripheral  08-20-20   No Growth Final     .Urine Clean Catch (Midstream)  08-19-20   <10,000 CFU/mL Normal Urogenital Kami    .Blood Blood-Peripheral  08-19-20   Growth in aerobic and anaerobic bottles: Staphylococcus epidermidis    HIV-1 RNA Quantitative, Viral Load Log: NOT DET. lg /mL (08-19-20 @ 04:46)    (otherwise reviewed)    RADIOLOGY:    9/8 XR:    IMPRESSION: Status post sternotomy and left atrial appendage clipping. Right-sided PICC line with the tip in the superior vena cava.    Small bilateral pleural effusions likely with associated bibasilar areas of atelectasis, left greater than right. No pneumothorax.

## 2020-09-11 NOTE — PROGRESS NOTE ADULT - SUBJECTIVE AND OBJECTIVE BOX
Chief complaint  Patient is a 80y old  Male who presents with a chief complaint of fall (10 Sep 2020 14:39)   Review of systems  Patient in bed, looks comfortable, planning DC today.    Labs and Fingersticks  CAPILLARY BLOOD GLUCOSE          Anion Gap, Serum: 12 (09-11 @ 05:45)  Anion Gap, Serum: 13 (09-10 @ 06:44)      Calcium, Total Serum: 8.9 (09-11 @ 05:45)  Calcium, Total Serum: 8.9 (09-10 @ 06:44)          09-11    137  |  101  |  36<H>  ----------------------------<  92  3.9   |  24  |  1.88<H>    Ca    8.9      11 Sep 2020 05:45                          9.7    12.27 )-----------( 232      ( 11 Sep 2020 05:45 )             30.0     Medications  MEDICATIONS  (STANDING):  abacavir 600 milliGRAM(s) Oral daily  ascorbic acid 500 milliGRAM(s) Oral daily  aspirin enteric coated 81 milliGRAM(s) Oral daily  dextrose 50% Injectable 50 milliLiter(s) IV Push every 15 minutes  dextrose 50% Injectable 25 milliLiter(s) IV Push every 15 minutes  Doravirine (Pefeltro), 100 mG 1 Tablet(s) 1 Tablet(s) Oral daily  ferrous    sulfate 325 milliGRAM(s) Oral daily  finasteride 5 milliGRAM(s) Oral daily  folic acid 1 milliGRAM(s) Oral daily  furosemide    Tablet 40 milliGRAM(s) Oral daily  lamiVUDine- milliGRAM(s) Oral daily  pantoprazole    Tablet 40 milliGRAM(s) Oral before breakfast  polyethylene glycol 3350 17 Gram(s) Oral daily  sodium chloride 0.9% lock flush 3 milliLiter(s) IV Push every 8 hours  tamsulosin 0.4 milliGRAM(s) Oral at bedtime  vancomycin  IVPB 1000 milliGRAM(s) IV Intermittent daily      Physical Exam  General: Patient comfortable in bed  Vital Signs Last 12 Hrs  T(F): 98.8 (09-11-20 @ 04:11), Max: 98.8 (09-11-20 @ 04:11)  HR: 36 (09-11-20 @ 04:11) (36 - 36)  BP: 122/52 (09-11-20 @ 04:11) (122/52 - 122/52)  BP(mean): --  RR: 18 (09-11-20 @ 04:11) (18 - 18)  SpO2: 98% (09-11-20 @ 04:11) (98% - 98%)  Neck: No palpable thyroid nodules.  CVS: S1S2, No murmurs  Respiratory: No wheezing, no crepitations  GI: Abdomen soft, bowel sounds positive  Musculoskeletal:  edema lower extremities.   Skin: No skin rashes, no ecchymosis    Diagnostics    Thyroperoxidase Antibody: AM (08-27 @ 13:42) Chief complaint  Patient is a 80y old  Male who presents with a chief complaint of fall (10 Sep 2020 14:39)   Review of systems  Patient in bed, looks comfortable, planning DC today.    Labs and Fingersticks  CAPILLARY BLOOD GLUCOSE  Anion Gap, Serum: 12 (09-11 @ 05:45)  Anion Gap, Serum: 13 (09-10 @ 06:44)      Calcium, Total Serum: 8.9 (09-11 @ 05:45)  Calcium, Total Serum: 8.9 (09-10 @ 06:44)          09-11    137  |  101  |  36<H>  ----------------------------<  92  3.9   |  24  |  1.88<H>    Ca    8.9      11 Sep 2020 05:45                          9.7    12.27 )-----------( 232      ( 11 Sep 2020 05:45 )             30.0     Medications  MEDICATIONS  (STANDING):  abacavir 600 milliGRAM(s) Oral daily  ascorbic acid 500 milliGRAM(s) Oral daily  aspirin enteric coated 81 milliGRAM(s) Oral daily  dextrose 50% Injectable 50 milliLiter(s) IV Push every 15 minutes  dextrose 50% Injectable 25 milliLiter(s) IV Push every 15 minutes  Doravirine (Pefeltro), 100 mG 1 Tablet(s) 1 Tablet(s) Oral daily  ferrous    sulfate 325 milliGRAM(s) Oral daily  finasteride 5 milliGRAM(s) Oral daily  folic acid 1 milliGRAM(s) Oral daily  furosemide    Tablet 40 milliGRAM(s) Oral daily  lamiVUDine- milliGRAM(s) Oral daily  pantoprazole    Tablet 40 milliGRAM(s) Oral before breakfast  polyethylene glycol 3350 17 Gram(s) Oral daily  sodium chloride 0.9% lock flush 3 milliLiter(s) IV Push every 8 hours  tamsulosin 0.4 milliGRAM(s) Oral at bedtime  vancomycin  IVPB 1000 milliGRAM(s) IV Intermittent daily      Physical Exam  General: Patient comfortable in bed  Vital Signs Last 12 Hrs  T(F): 98.8 (09-11-20 @ 04:11), Max: 98.8 (09-11-20 @ 04:11)  HR: 36 (09-11-20 @ 04:11) (36 - 36)  BP: 122/52 (09-11-20 @ 04:11) (122/52 - 122/52)  BP(mean): --  RR: 18 (09-11-20 @ 04:11) (18 - 18)  SpO2: 98% (09-11-20 @ 04:11) (98% - 98%)  Neck: No palpable thyroid nodules.  CVS: S1S2, No murmurs  Respiratory: No wheezing, no crepitations  GI: Abdomen soft, bowel sounds positive  Musculoskeletal:  edema lower extremities.   Skin: No skin rashes, no ecchymosis    Diagnostics    Thyroperoxidase Antibody: AM (08-27 @ 13:42)

## 2020-09-12 ENCOUNTER — TRANSCRIPTION ENCOUNTER (OUTPATIENT)
Age: 80
End: 2020-09-12

## 2020-09-14 ENCOUNTER — TRANSCRIPTION ENCOUNTER (OUTPATIENT)
Age: 80
End: 2020-09-14

## 2020-09-17 ENCOUNTER — TRANSCRIPTION ENCOUNTER (OUTPATIENT)
Age: 80
End: 2020-09-17

## 2020-09-25 ENCOUNTER — TRANSCRIPTION ENCOUNTER (OUTPATIENT)
Age: 80
End: 2020-09-25

## 2020-09-28 ENCOUNTER — TRANSCRIPTION ENCOUNTER (OUTPATIENT)
Age: 80
End: 2020-09-28

## 2020-09-30 LAB
CULTURE RESULTS: SIGNIFICANT CHANGE UP
CULTURE RESULTS: SIGNIFICANT CHANGE UP
SPECIMEN SOURCE: SIGNIFICANT CHANGE UP
SPECIMEN SOURCE: SIGNIFICANT CHANGE UP

## 2020-10-08 ENCOUNTER — TRANSCRIPTION ENCOUNTER (OUTPATIENT)
Age: 80
End: 2020-10-08

## 2020-10-12 ENCOUNTER — TRANSCRIPTION ENCOUNTER (OUTPATIENT)
Age: 80
End: 2020-10-12

## 2020-10-13 ENCOUNTER — APPOINTMENT (OUTPATIENT)
Dept: ELECTROPHYSIOLOGY | Facility: CLINIC | Age: 80
End: 2020-10-13

## 2020-10-21 LAB
CULTURE RESULTS: SIGNIFICANT CHANGE UP
SPECIMEN SOURCE: SIGNIFICANT CHANGE UP

## 2020-10-23 ENCOUNTER — RX RENEWAL (OUTPATIENT)
Age: 80
End: 2020-10-23

## 2020-10-29 ENCOUNTER — APPOINTMENT (OUTPATIENT)
Dept: INFECTIOUS DISEASE | Facility: CLINIC | Age: 80
End: 2020-10-29

## 2020-10-29 ENCOUNTER — OUTPATIENT (OUTPATIENT)
Dept: OUTPATIENT SERVICES | Facility: HOSPITAL | Age: 80
LOS: 1 days | End: 2020-10-29
Payer: COMMERCIAL

## 2020-10-29 VITALS
HEIGHT: 74 IN | SYSTOLIC BLOOD PRESSURE: 150 MMHG | TEMPERATURE: 98.4 F | BODY MASS INDEX: 20.92 KG/M2 | HEART RATE: 97 BPM | DIASTOLIC BLOOD PRESSURE: 71 MMHG | OXYGEN SATURATION: 99 % | WEIGHT: 163 LBS

## 2020-10-29 DIAGNOSIS — Z23 ENCOUNTER FOR IMMUNIZATION: ICD-10-CM

## 2020-10-29 DIAGNOSIS — B20 HUMAN IMMUNODEFICIENCY VIRUS [HIV] DISEASE: ICD-10-CM

## 2020-10-29 LAB
BASOPHILS # BLD AUTO: 0.02 K/UL
BASOPHILS NFR BLD AUTO: 0.2 %
CD3 CELLS # BLD: 881 /UL
CD3 CELLS NFR BLD: 61 %
CD3+CD4+ CELLS # BLD: 363 /UL
CD3+CD4+ CELLS NFR BLD: 25 %
CD3+CD4+ CELLS/CD3+CD8+ CLL SPEC: 0.71 RATIO
CD3+CD8+ CELLS # SPEC: 508 /UL
CD3+CD8+ CELLS NFR BLD: 35 %
EOSINOPHIL # BLD AUTO: 0.17 K/UL
EOSINOPHIL NFR BLD AUTO: 1.9 %
ESTIMATED AVERAGE GLUCOSE: 123 MG/DL
HBA1C MFR BLD HPLC: 5.9 %
HCT VFR BLD CALC: 32.4 %
HGB BLD-MCNC: 10.4 G/DL
IMM GRANULOCYTES NFR BLD AUTO: 1.5 %
LYMPHOCYTES # BLD AUTO: 1.48 K/UL
LYMPHOCYTES NFR BLD AUTO: 16.6 %
MAN DIFF?: NORMAL
MCHC RBC-ENTMCNC: 32.1 GM/DL
MCHC RBC-ENTMCNC: 32.9 PG
MCV RBC AUTO: 102.5 FL
MONOCYTES # BLD AUTO: 0.71 K/UL
MONOCYTES NFR BLD AUTO: 8 %
NEUTROPHILS # BLD AUTO: 6.39 K/UL
NEUTROPHILS NFR BLD AUTO: 71.8 %
PLATELET # BLD AUTO: 182 K/UL
RBC # BLD: 3.16 M/UL
RBC # FLD: 16.4 %
WBC # FLD AUTO: 8.9 K/UL

## 2020-10-29 PROCEDURE — G0463: CPT

## 2020-10-29 RX ORDER — AMOXICILLIN AND CLAVULANATE POTASSIUM 875; 125 MG/1; MG/1
875-125 TABLET, COATED ORAL TWICE DAILY
Qty: 10 | Refills: 0 | Status: COMPLETED | COMMUNITY
Start: 2020-06-15 | End: 2020-10-29

## 2020-10-29 NOTE — HISTORY OF PRESENT ILLNESS
[FreeTextEntry1] : Discussion/Summary\par \par Aug 17th 2020 fell on bathroom floor. on floor 24 hours, Spent 23 days in [facility] and then in Rehab, , Replaced the heart valve with vegitation. PICC line removed. \par Follow up with cardiology next week. \par Kel Monsivais 81 y/o male patient call for a telehealth follow up visit. He has seen Hem/Onc and will follow up again in November 2020. \par Pt to make appt with Nephrology. \par We stopped Biktarvy and started Juluca, then stopped Juluca, since creatinine still rising so I changed to Abacivir 300mg BID daily, lamivudine 100mg daily and doravirine one tablet daily. \par recently seen by Dr. Memo Silver in Urology and pt doing well straight cathing and taking Tamulosin and finesteride daily. \par He is happy to mention he has now been sleeping through the night without waking to urinate. \par [facility] is handling all meds. \par review of systems 7/13/20\par Constitutional, Eyes, ENT, Cardiovascular, Respiratory, Gastrointestinal, Genitourinary, Musculoskeletal, Integumentary, Neurological, Psychiatric and Heme/Lymph are otherwise negative. \par new meds after discharge, Ferrous sulfate 325mg daily, folic acid 1mg daily , 81mg aspirin, protonix 40mg delated release, warfarin 5mg,  finasteride and tamsulosin , pt still using self catherization. Vitamin D . \par \par Plan 10/29/2020: \par 1) All labs today\par 2) continue all current meds. \par 3) follow up with cardiology this week. \par 4) See neuro left side of head feels numb. \par \par 12/06/2019---- Kel Monsivais 80 y/o male patient in for a follow up visit. He is being followed by Nephrology, Urology. Patient reports noticing blood in his urine Thanksgiving but has since cleared up. He denies any pain or discomfort or any other complaints. He continues Biktarvy. Due to his high creatnine and low GFR, we will consider switching his ARV medications for a more renal friendly regimen. Special blood test will be done today amongst routine labs. F/u in 1 month. \par \par

## 2020-10-30 LAB
25(OH)D3 SERPL-MCNC: 55.4 NG/ML
ALBUMIN SERPL ELPH-MCNC: 3.6 G/DL
ALP BLD-CCNC: 199 U/L
ALT SERPL-CCNC: 38 U/L
ANION GAP SERPL CALC-SCNC: 13 MMOL/L
APPEARANCE: ABNORMAL
AST SERPL-CCNC: 32 U/L
BACTERIA: ABNORMAL
BILIRUB SERPL-MCNC: 0.2 MG/DL
BILIRUBIN URINE: NEGATIVE
BLOOD URINE: ABNORMAL
BUN SERPL-MCNC: 33 MG/DL
CALCIUM SERPL-MCNC: 9.2 MG/DL
CHLORIDE SERPL-SCNC: 105 MMOL/L
CHOLEST SERPL-MCNC: 139 MG/DL
CO2 SERPL-SCNC: 22 MMOL/L
COLOR: YELLOW
CREAT SERPL-MCNC: 1.84 MG/DL
CYSTATIN C SERPL-MCNC: 2.18 MG/L
GFR/BSA.PRED SERPLBLD CYS-BASED-ARV: 26 ML/MIN
GLUCOSE QUALITATIVE U: NEGATIVE
GLUCOSE SERPL-MCNC: 111 MG/DL
HDLC SERPL-MCNC: 44 MG/DL
HYALINE CASTS: 2 /LPF
KETONES URINE: NEGATIVE
LDLC SERPL CALC-MCNC: 74 MG/DL
LEUKOCYTE ESTERASE URINE: ABNORMAL
MICROSCOPIC-UA: NORMAL
NITRITE URINE: NEGATIVE
NONHDLC SERPL-MCNC: 95 MG/DL
PH URINE: 6
POTASSIUM SERPL-SCNC: 4.3 MMOL/L
PROT SERPL-MCNC: 6.8 G/DL
PROTEIN URINE: ABNORMAL
PSA SERPL-MCNC: 0.67 NG/ML
RED BLOOD CELLS URINE: 53 /HPF
SODIUM SERPL-SCNC: 141 MMOL/L
SPECIFIC GRAVITY URINE: 1.01
SQUAMOUS EPITHELIAL CELLS: 1 /HPF
TRIGL SERPL-MCNC: 107 MG/DL
UROBILINOGEN URINE: NORMAL
WHITE BLOOD CELLS URINE: 694 /HPF

## 2020-11-02 ENCOUNTER — APPOINTMENT (OUTPATIENT)
Dept: CARDIOTHORACIC SURGERY | Facility: CLINIC | Age: 80
End: 2020-11-02
Payer: MEDICARE

## 2020-11-02 ENCOUNTER — NON-APPOINTMENT (OUTPATIENT)
Age: 80
End: 2020-11-02

## 2020-11-02 VITALS
BODY MASS INDEX: 20.92 KG/M2 | RESPIRATION RATE: 18 BRPM | TEMPERATURE: 98.6 F | HEIGHT: 74 IN | HEART RATE: 95 BPM | OXYGEN SATURATION: 98 % | SYSTOLIC BLOOD PRESSURE: 135 MMHG | DIASTOLIC BLOOD PRESSURE: 70 MMHG | WEIGHT: 163 LBS

## 2020-11-02 LAB
HIV1 RNA # SERPL NAA+PROBE: NORMAL
HIV1 RNA # SERPL NAA+PROBE: NORMAL COPIES/ML
INR PPP: 2.41 RATIO
M TB IFN-G BLD-IMP: NEGATIVE
PT BLD: 27.7 SEC
QUANTIFERON TB PLUS MITOGEN MINUS NIL: 3.9 IU/ML
QUANTIFERON TB PLUS NIL: 0.01 IU/ML
QUANTIFERON TB PLUS TB1 MINUS NIL: 0 IU/ML
QUANTIFERON TB PLUS TB2 MINUS NIL: 0 IU/ML
SARS-COV-2 N GENE NPH QL NAA+PROBE: NOT DETECTED
T PALLIDUM AB SER QL IA: NEGATIVE
VIRAL LOAD INTERP: NORMAL
VIRAL LOAD LOG: NORMAL LG COP/ML

## 2020-11-02 PROCEDURE — 99024 POSTOP FOLLOW-UP VISIT: CPT | Mod: PD

## 2020-11-02 RX ORDER — VANCOMYCIN HYDROCHLORIDE 1 G/20ML
1 INJECTION, POWDER, LYOPHILIZED, FOR SOLUTION INTRAVENOUS
Refills: 0 | Status: COMPLETED | COMMUNITY
End: 2020-11-02

## 2020-11-03 ENCOUNTER — INPATIENT (INPATIENT)
Facility: HOSPITAL | Age: 80
LOS: 4 days | Discharge: HOME | DRG: 292 | End: 2020-11-08
Attending: THORACIC SURGERY (CARDIOTHORACIC VASCULAR SURGERY) | Admitting: THORACIC SURGERY (CARDIOTHORACIC VASCULAR SURGERY)
Payer: MEDICARE

## 2020-11-03 ENCOUNTER — NON-APPOINTMENT (OUTPATIENT)
Age: 80
End: 2020-11-03

## 2020-11-03 VITALS
RESPIRATION RATE: 16 BRPM | HEART RATE: 73 BPM | DIASTOLIC BLOOD PRESSURE: 68 MMHG | TEMPERATURE: 98 F | SYSTOLIC BLOOD PRESSURE: 121 MMHG | OXYGEN SATURATION: 98 %

## 2020-11-03 DIAGNOSIS — N40.0 BENIGN PROSTATIC HYPERPLASIA WITHOUT LOWER URINARY TRACT SYMPTOMS: ICD-10-CM

## 2020-11-03 DIAGNOSIS — N18.9 CHRONIC KIDNEY DISEASE, UNSPECIFIED: ICD-10-CM

## 2020-11-03 DIAGNOSIS — E87.70 FLUID OVERLOAD, UNSPECIFIED: ICD-10-CM

## 2020-11-03 DIAGNOSIS — I48.92 UNSPECIFIED ATRIAL FLUTTER: ICD-10-CM

## 2020-11-03 DIAGNOSIS — B20 HUMAN IMMUNODEFICIENCY VIRUS [HIV] DISEASE: ICD-10-CM

## 2020-11-03 DIAGNOSIS — Z95.2 PRESENCE OF PROSTHETIC HEART VALVE: Chronic | ICD-10-CM

## 2020-11-03 DIAGNOSIS — Z29.9 ENCOUNTER FOR PROPHYLACTIC MEASURES, UNSPECIFIED: ICD-10-CM

## 2020-11-03 DIAGNOSIS — R06.02 SHORTNESS OF BREATH: ICD-10-CM

## 2020-11-03 DIAGNOSIS — Z95.2 PRESENCE OF PROSTHETIC HEART VALVE: ICD-10-CM

## 2020-11-03 LAB
ALBUMIN SERPL ELPH-MCNC: 4.1 G/DL — SIGNIFICANT CHANGE UP (ref 3.3–5)
ALP SERPL-CCNC: 174 U/L — HIGH (ref 40–120)
ALT FLD-CCNC: 29 U/L — SIGNIFICANT CHANGE UP (ref 10–45)
ANION GAP SERPL CALC-SCNC: 10 MMOL/L — SIGNIFICANT CHANGE UP (ref 5–17)
APPEARANCE UR: ABNORMAL
APTT BLD: 139.8 SEC — CRITICAL HIGH (ref 27.5–35.5)
APTT BLD: 41.1 SEC — HIGH (ref 27.5–35.5)
AST SERPL-CCNC: 26 U/L — SIGNIFICANT CHANGE UP (ref 10–40)
BACTERIA # UR AUTO: ABNORMAL
BASOPHILS # BLD AUTO: 0.05 K/UL — SIGNIFICANT CHANGE UP (ref 0–0.2)
BASOPHILS NFR BLD AUTO: 0.4 % — SIGNIFICANT CHANGE UP (ref 0–2)
BILIRUB SERPL-MCNC: 0.3 MG/DL — SIGNIFICANT CHANGE UP (ref 0.2–1.2)
BILIRUB UR-MCNC: NEGATIVE — SIGNIFICANT CHANGE UP
BLD GP AB SCN SERPL QL: NEGATIVE — SIGNIFICANT CHANGE UP
BUN SERPL-MCNC: 40 MG/DL — HIGH (ref 7–23)
CALCIUM SERPL-MCNC: 9.7 MG/DL — SIGNIFICANT CHANGE UP (ref 8.4–10.5)
CHLORIDE SERPL-SCNC: 100 MMOL/L — SIGNIFICANT CHANGE UP (ref 96–108)
CO2 SERPL-SCNC: 26 MMOL/L — SIGNIFICANT CHANGE UP (ref 22–31)
COLOR SPEC: ABNORMAL
CREAT SERPL-MCNC: 2.22 MG/DL — HIGH (ref 0.5–1.3)
DIFF PNL FLD: ABNORMAL
EOSINOPHIL # BLD AUTO: 0.14 K/UL — SIGNIFICANT CHANGE UP (ref 0–0.5)
EOSINOPHIL NFR BLD AUTO: 1.1 % — SIGNIFICANT CHANGE UP (ref 0–6)
EPI CELLS # UR: SIGNIFICANT CHANGE UP
GLUCOSE SERPL-MCNC: 76 MG/DL — SIGNIFICANT CHANGE UP (ref 70–99)
GLUCOSE UR QL: NEGATIVE — SIGNIFICANT CHANGE UP
HCT VFR BLD CALC: 34.1 % — LOW (ref 39–50)
HGB BLD-MCNC: 11 G/DL — LOW (ref 13–17)
IMM GRANULOCYTES NFR BLD AUTO: 1.7 % — HIGH (ref 0–1.5)
INR BLD: 1.78 RATIO — HIGH (ref 0.88–1.16)
KETONES UR-MCNC: NEGATIVE — SIGNIFICANT CHANGE UP
LEUKOCYTE ESTERASE UR-ACNC: ABNORMAL
LYMPHOCYTES # BLD AUTO: 24.5 % — SIGNIFICANT CHANGE UP (ref 13–44)
LYMPHOCYTES # BLD AUTO: 3.18 K/UL — SIGNIFICANT CHANGE UP (ref 1–3.3)
MCHC RBC-ENTMCNC: 32.3 GM/DL — SIGNIFICANT CHANGE UP (ref 32–36)
MCHC RBC-ENTMCNC: 33.1 PG — SIGNIFICANT CHANGE UP (ref 27–34)
MCV RBC AUTO: 102.7 FL — HIGH (ref 80–100)
MONOCYTES # BLD AUTO: 1.18 K/UL — HIGH (ref 0–0.9)
MONOCYTES NFR BLD AUTO: 9.1 % — SIGNIFICANT CHANGE UP (ref 2–14)
NEUTROPHILS # BLD AUTO: 8.23 K/UL — HIGH (ref 1.8–7.4)
NEUTROPHILS NFR BLD AUTO: 63.2 % — SIGNIFICANT CHANGE UP (ref 43–77)
NITRITE UR-MCNC: POSITIVE
NRBC # BLD: 0 /100 WBCS — SIGNIFICANT CHANGE UP (ref 0–0)
NT-PROBNP SERPL-SCNC: 2924 PG/ML — HIGH (ref 0–300)
PH UR: 7.5 — SIGNIFICANT CHANGE UP (ref 5–8)
PLATELET # BLD AUTO: 293 K/UL — SIGNIFICANT CHANGE UP (ref 150–400)
POTASSIUM SERPL-MCNC: 4.1 MMOL/L — SIGNIFICANT CHANGE UP (ref 3.5–5.3)
POTASSIUM SERPL-SCNC: 4.1 MMOL/L — SIGNIFICANT CHANGE UP (ref 3.5–5.3)
PROT SERPL-MCNC: 8.2 G/DL — SIGNIFICANT CHANGE UP (ref 6–8.3)
PROT UR-MCNC: ABNORMAL
PROTHROM AB SERPL-ACNC: 20.8 SEC — HIGH (ref 10.6–13.6)
RBC # BLD: 3.32 M/UL — LOW (ref 4.2–5.8)
RBC # FLD: 17.2 % — HIGH (ref 10.3–14.5)
RBC CASTS # UR COMP ASSIST: SIGNIFICANT CHANGE UP /HPF (ref 0–4)
RH IG SCN BLD-IMP: POSITIVE — SIGNIFICANT CHANGE UP
SODIUM SERPL-SCNC: 136 MMOL/L — SIGNIFICANT CHANGE UP (ref 135–145)
SP GR SPEC: 1.01 — SIGNIFICANT CHANGE UP (ref 1.01–1.02)
UROBILINOGEN FLD QL: NEGATIVE — SIGNIFICANT CHANGE UP
WBC # BLD: 13 K/UL — HIGH (ref 3.8–10.5)
WBC # FLD AUTO: 13 K/UL — HIGH (ref 3.8–10.5)
WBC UR QL: >50

## 2020-11-03 PROCEDURE — 93010 ELECTROCARDIOGRAM REPORT: CPT

## 2020-11-03 PROCEDURE — 71046 X-RAY EXAM CHEST 2 VIEWS: CPT | Mod: 26

## 2020-11-03 PROCEDURE — 99223 1ST HOSP IP/OBS HIGH 75: CPT | Mod: 24

## 2020-11-03 RX ORDER — TAMSULOSIN HYDROCHLORIDE 0.4 MG/1
0.4 CAPSULE ORAL AT BEDTIME
Refills: 0 | Status: DISCONTINUED | OUTPATIENT
Start: 2020-11-03 | End: 2020-11-08

## 2020-11-03 RX ORDER — HEPARIN SODIUM 5000 [USP'U]/ML
1000 INJECTION INTRAVENOUS; SUBCUTANEOUS
Qty: 25000 | Refills: 0 | Status: DISCONTINUED | OUTPATIENT
Start: 2020-11-03 | End: 2020-11-04

## 2020-11-03 RX ORDER — SODIUM CHLORIDE 9 MG/ML
3 INJECTION INTRAMUSCULAR; INTRAVENOUS; SUBCUTANEOUS EVERY 8 HOURS
Refills: 0 | Status: DISCONTINUED | OUTPATIENT
Start: 2020-11-03 | End: 2020-11-08

## 2020-11-03 RX ORDER — CHOLECALCIFEROL (VITAMIN D3) 125 MCG
5000 CAPSULE ORAL DAILY
Refills: 0 | Status: DISCONTINUED | OUTPATIENT
Start: 2020-11-03 | End: 2020-11-08

## 2020-11-03 RX ORDER — HEPARIN SODIUM 5000 [USP'U]/ML
5000 INJECTION INTRAVENOUS; SUBCUTANEOUS EVERY 8 HOURS
Refills: 0 | Status: DISCONTINUED | OUTPATIENT
Start: 2020-11-03 | End: 2020-11-03

## 2020-11-03 RX ORDER — HEPARIN SODIUM 5000 [USP'U]/ML
6500 INJECTION INTRAVENOUS; SUBCUTANEOUS ONCE
Refills: 0 | Status: COMPLETED | OUTPATIENT
Start: 2020-11-03 | End: 2020-11-03

## 2020-11-03 RX ORDER — WARFARIN SODIUM 2.5 MG/1
5 TABLET ORAL ONCE
Refills: 0 | Status: COMPLETED | OUTPATIENT
Start: 2020-11-03 | End: 2020-11-03

## 2020-11-03 RX ORDER — FUROSEMIDE 40 MG
40 TABLET ORAL ONCE
Refills: 0 | Status: COMPLETED | OUTPATIENT
Start: 2020-11-03 | End: 2020-11-03

## 2020-11-03 RX ORDER — ASPIRIN/CALCIUM CARB/MAGNESIUM 324 MG
81 TABLET ORAL DAILY
Refills: 0 | Status: DISCONTINUED | OUTPATIENT
Start: 2020-11-03 | End: 2020-11-08

## 2020-11-03 RX ORDER — LAMIVUDINE 150 MG
100 TABLET ORAL DAILY
Refills: 0 | Status: DISCONTINUED | OUTPATIENT
Start: 2020-11-03 | End: 2020-11-08

## 2020-11-03 RX ORDER — FERROUS SULFATE 325(65) MG
325 TABLET ORAL DAILY
Refills: 0 | Status: DISCONTINUED | OUTPATIENT
Start: 2020-11-03 | End: 2020-11-08

## 2020-11-03 RX ORDER — SOD SULF/SODIUM/NAHCO3/KCL/PEG
1 SOLUTION, RECONSTITUTED, ORAL ORAL
Qty: 0 | Refills: 0 | DISCHARGE

## 2020-11-03 RX ORDER — PANTOPRAZOLE SODIUM 20 MG/1
40 TABLET, DELAYED RELEASE ORAL
Refills: 0 | Status: DISCONTINUED | OUTPATIENT
Start: 2020-11-03 | End: 2020-11-08

## 2020-11-03 RX ORDER — ASCORBIC ACID 60 MG
500 TABLET,CHEWABLE ORAL DAILY
Refills: 0 | Status: DISCONTINUED | OUTPATIENT
Start: 2020-11-03 | End: 2020-11-06

## 2020-11-03 RX ORDER — FINASTERIDE 5 MG/1
5 TABLET, FILM COATED ORAL DAILY
Refills: 0 | Status: DISCONTINUED | OUTPATIENT
Start: 2020-11-03 | End: 2020-11-08

## 2020-11-03 RX ORDER — ABACAVIR 20 MG/ML
600 SOLUTION ORAL DAILY
Refills: 0 | Status: DISCONTINUED | OUTPATIENT
Start: 2020-11-03 | End: 2020-11-08

## 2020-11-03 RX ORDER — WARFARIN SODIUM 2.5 MG/1
5 TABLET ORAL ONCE
Refills: 0 | Status: DISCONTINUED | OUTPATIENT
Start: 2020-11-03 | End: 2020-11-03

## 2020-11-03 RX ADMIN — Medication 40 MILLIGRAM(S): at 14:21

## 2020-11-03 RX ADMIN — WARFARIN SODIUM 5 MILLIGRAM(S): 2.5 TABLET ORAL at 23:02

## 2020-11-03 RX ADMIN — SODIUM CHLORIDE 3 MILLILITER(S): 9 INJECTION INTRAMUSCULAR; INTRAVENOUS; SUBCUTANEOUS at 14:15

## 2020-11-03 RX ADMIN — TAMSULOSIN HYDROCHLORIDE 0.4 MILLIGRAM(S): 0.4 CAPSULE ORAL at 23:02

## 2020-11-03 RX ADMIN — HEPARIN SODIUM 6500 UNIT(S): 5000 INJECTION INTRAVENOUS; SUBCUTANEOUS at 18:18

## 2020-11-03 RX ADMIN — HEPARIN SODIUM 10 UNIT(S)/HR: 5000 INJECTION INTRAVENOUS; SUBCUTANEOUS at 18:18

## 2020-11-03 NOTE — H&P ADULT - NSHPPHYSICALEXAM_GEN_ALL_CORE
General: NAD  HEENT:  NC/AT  Neuro: A&Ox4, speech clear, no focal deficits noted  Respiratory: BS CTA b/l, no wheezes, rales or rhonchi noted  Cardiovascular: RRR, (+) S1/S2, no murmur appreciated  Tele: Aflutter 80s-100s  GI: Abd soft, NT/ND, (+) BSx4Q (+) BM  Peripheral Vascular:  2+ LE edema b/l, 2+ peripheral pulses b/l  Musculoskeletal: B/L UE and LE 5/5 strength   Psychiatric: Normal mood, normal affect observed  Skin: Prior MSI healing well, Normal exam to inspection and palpation

## 2020-11-03 NOTE — H&P ADULT - NSICDXPASTSURGICALHX_GEN_ALL_CORE_FT
PAST SURGICAL HISTORY:  S/P coronary artery stent placement     S/P MVR (mitral valve replacement) & SHELLIE clip, 8/31/2020

## 2020-11-03 NOTE — CONSULT NOTE ADULT - ASSESSMENT
80 year old male  with PMH of HIV (VL undetectable), CAD, detrusor muscle weakness, Afib/ Aflutter (on Coumadin), BPH (pt intermittently self caths at home), recent fall with rhabdo 2/2 urinary retention, bacteremia staph epidermidis with mitral valve endocarditis, s/p MVR (t) and SHELLIE clip on 8/31/20 with post-op SR w/ 1st degree w/ periods of 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 Patient had pAF/AFL prior to OHS and was discharged to Rehab in persistent rate controlled Afib in the 60'a since 9/9/20. Patient presented to the CT surgery office at [facility] on 11/2/20 for post-op visit with c/o worsening PRESCOTT and b/l LE edema, found to be in rate controlled atrial flutter. He is now admitted for diuresis and further management of atrial arrhythmia.        1)Bacteremia (staph epidermidis) w/ MV endocarditis s/p MVR (t) and SHELLIE clipping on 8/31  2) Post-op SR w/ 1st degree w/ periods of 2nd degree AV block (AV kaylan disease)  3) Afib/Aflutter    -Continue coumadin to maintain goal INR between 2.0-3.0  -AV kaylan agent or AAD not needed at this time   -Repeat TTE  -Plan is for NOHEMI/DCCV once optimized from heart failure standpoint    ALBERTO Preciado NP  [id number]   80 year old male  with PMH of HIV (VL undetectable), CAD, detrusor muscle weakness, Afib/ Aflutter (on Coumadin), BPH (pt intermittently self caths at home), recent fall with rhabdo 2/2 urinary retention, bacteremia staph epidermidis with mitral valve endocarditis, s/p MVR (t) and SHELLIE clip on 8/31/20 with post-op SR w/ 1st degree w/ periods of 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 Patient had pAF/AFL prior to OHS and was discharged to Rehab in persistent rate controlled Afib in the 60'a since 9/9/20. Patient presented to the CT surgery office at [facility] on 11/2/20 for post-op visit with c/o worsening PRESCOTT and b/l LE edema, found to be in rate controlled atrial flutter. He is now admitted for diuresis and further management of atrial arrhythmia.        1)Bacteremia (staph epidermidis) w/ MV endocarditis s/p MVR (t) and SHELLIE clipping on 8/31  2) Post-op SR w/ 1st degree w/ periods of 2nd degree AV block (AV kaylan disease)  3) Afib/Aflutter    -Continue coumadin/Heparin bridge to maintain goal INR between 2.0-3.0  -AV kaylan agent or AAD not needed at this time   -Repeat TTE  -Plan is for NOHEMI/DCCV once optimized from heart failure standpoint    ALBERTO Preciado, BLANK  [id number]   80 year old male  with PMH of HIV (VL undetectable), CAD, detrusor muscle weakness, Afib/ Aflutter (on Coumadin), BPH (pt intermittently self caths at home), recent fall with rhabdo 2/2 urinary retention, bacteremia staph epidermidis with mitral valve endocarditis, s/p MVR (t) and SHELLIE clip on 8/31/20 with post-op SR w/ 1st degree w/ periods of 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 Patient had pAF/AFL prior to OHS and was discharged to Rehab in persistent rate controlled Afib in the 60'a since 9/9/20. Patient presented to the CT surgery office at [facility] on 11/2/20 for post-op visit with c/o worsening PRESCOTT and b/l LE edema, found to be in rate controlled atrial flutter. He is now admitted for diuresis and further management of atrial arrhythmia.        1)Bacteremia (staph epidermidis) w/ MV endocarditis s/p MVR (t) and SHELLIE clipping on 8/31  2) Post-op SR w/ 1st degree w/ periods of 2nd degree AV block (AV kaylan disease)  3) Afib/Aflutter    -Continue coumadin/Heparin bridge to maintain goal INR between 2.0-3.0  -AV kaylan agent or AAD not needed at this time   -Repeat TTE  -Plan is for NOHEMI/[place]  [id number]

## 2020-11-03 NOTE — H&P ADULT - PROBLEM SELECTOR PLAN 6
Pt intermittently self caths prn   Continue home doses of Finasteride 5 mg QD & Tamsulosin 0.4 mg QD

## 2020-11-03 NOTE — H&P ADULT - NSHPREVIEWOFSYSTEMS_GEN_ALL_CORE
REVIEW OF SYSTEMS:  CONSTITUTIONAL: Denies fever, weight loss or fatigue  EYES: Denies eye pain, visual disturbances, or discharge  ENMT:  Denies difficulty hearing, tinnitus, vertigo, sinus or throat pain  NECK: Denies pain or stiffness  RESPIRATORY: (+) PRESCOTT, Denies cough, wheezing, chills, hemoptysis.  CARDIOVASCULAR: (+) b/l LE edema, Denies chest pain, palpitations or dizziness.  GASTROINTESTINAL: Denies abdominal or epigastric pain, nausea, vomiting, hematemesis, diarrhea or melena  GENITOURINARY: Denies dysuria, frequency, hematuria, or incontinence  NEUROLOGICAL: Denies headaches, memory loss, loss of strength, numbness or tremors  SKIN: Denies itching, burning, rashes, or lesions   LYMPH NODES: Denies enlarged glands  ENDOCRINE: Denies heat or cold intolerance or hair loss  MUSCULOSKELETAL: Denies joint pain or swelling, muscle, back or extremity pain  PSYCHIATRIC: Denies depression, anxiety, mood swings or difficulty sleeping  HEME/LYMPH: Denies easy bruising or bleeding gums  ALLERGY: Denies hives or eczema

## 2020-11-03 NOTE — H&P ADULT - ASSESSMENT
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 Camacho on 8/31/20 (finished course of IV Vanco on 10/12/20), post-op course significant for accelerated junctional/AV dissociation and later new afib/flutter, presented to the CT surgery office at [facility] on 11/2 for a post-op visit, c/o worsening PRESCOTT and b/l LE edema. Pt was found to be fluid overloaded w/ 2+ b/l LE edema and in rate controlled aflutter. INR drawn in the office 11/2 was 2.41. Decision was subsequently made to admit the pt for diuresis, INR management and further management of aflutter.     Pt seen and evaluated at bedside - VSS, NAD. Breathing comfortably, O2 sat 98% on RA. Aflutter 80s-100s on tele - will consult EP to evaluate for cardioversion per Dr. Camacho. BUN/Cr elevated, 40/2.22. Lasix 40 mg PO x 1 given on arrival - will discuss further diuretic regimen w/ Dr. Camacho given elevated Cr.  INR 1.78 today, down from 2.41 in office yesterday - will give home dose of Coumadin 5 mg tonight and trend INR tomorrow. Will check CXR PA/lateral and TTE per Dr. Camacho.    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 Camacho on 8/31/20 (finished course of IV Vanco on 10/12/20), post-op course significant for accelerated junctional/AV dissociation and later new afib/flutter, presented to the CT surgery office at [facility] on 11/2 for a post-op visit, c/o worsening PRESCOTT and b/l LE edema. Pt was found to be fluid overloaded w/ 2+ b/l LE edema and in rate controlled aflutter. INR drawn in the office 11/2 was 2.41. Decision was subsequently made to admit the pt for diuresis, INR management and further management of aflutter.     Pt seen and evaluated at bedside - VSS, NAD. Breathing comfortably, O2 sat 98% on RA. Aflutter 80s-100s on tele - AV kaylan blockers being held for now given accelerated junctional rhythm/AV dissociation post-op. Will consult EP to evaluate for cardioversion per Dr. Camacho - per EP note from last admission PPM was not indicated at that time.. BUN/Cr elevated, 40/2.22. Lasix 40 mg PO x 1 given on arrival - will discuss further diuretic regimen w/ Dr. Camacho given elevated Cr.  INR 1.78 today, down from 2.41 in office yesterday - pt reports holding Coumadin last night per Dr. Camacho's recommendation given concern for supratherapeutic INR, will give home dose of Coumadin 5 mg tonight. Will check CXR PA/lateral and TTE per Dr. Camacho.    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 Camacho on 8/31/20 (finished course of IV Vanco on 10/12/20), post-op course significant for accelerated junctional/AV dissociation and later new afib/flutter, presented to the CT surgery office at [facility] on 11/2 for a post-op visit, c/o worsening PRESCOTT and b/l LE edema. Pt was found to be fluid overloaded w/ 2+ b/l LE edema and in rate controlled aflutter. INR drawn in the office 11/2 was 2.41. Decision was subsequently made to admit the pt for diuresis, INR management and further management of aflutter.     Pt seen and evaluated at bedside - VSS, NAD. Breathing comfortably, O2 sat 98% on RA. Aflutter 80s-100s on tele - AV kaylan blockers being held for now given accelerated junctional rhythm/AV dissociation post-op. Will consult EP to evaluate for cardioversion per Dr. Camacho - per EP note from last admission PPM was not indicated at that time.. BUN/Cr elevated 40/2.22 (Cr on outpatient labs drawn 10/29 was 1.84). Lasix 40 mg PO x 1 given on arrival - will discuss further diuretic regimen w/ Dr. Camacho given elevated Cr.  INR 1.78 today, down from 2.41 in office yesterday - pt reports holding Coumadin last night per Dr. Camacho's recommendation given concern for supratherapeutic INR, will give home dose of Coumadin 5 mg tonight. Will check CXR PA/lateral and TTE per Dr. Camacho.    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 Camacho on 8/31/20 (finished course of IV Vanco on 10/12/20), post-op course significant for accelerated junctional/AV dissociation and later new afib/flutter, presented to the CT surgery office at [facility] on 11/2 for a post-op visit, c/o worsening PRESCOTT and b/l LE edema. Pt was found to be fluid overloaded w/ 2+ b/l LE edema and in rate controlled aflutter. INR drawn in the office 11/2 was 2.41. Decision was subsequently made to admit the pt for diuresis, INR management and further management of aflutter.     Pt seen and evaluated at bedside - VSS, NAD. Breathing comfortably, O2 sat 98% on RA. Aflutter 80s-100s on tele - AV kaylan blockers being held for now given accelerated junctional rhythm/AV dissociation post-op. Will consult EP to evaluate for cardioversion per Dr. Camacho - per EP note from last admission PPM was not indicated at that time.. BUN/Cr elevated 40/2.22 (Cr on outpatient labs drawn 10/29 was 1.84) - will consult Renal Dr. Carreno per Dr. Camacho. Lasix 40 mg PO x 1 given on arrival - will discuss further diuretic regimen w/ Dr. Camacho given elevated Cr.  INR 1.78 today, down from 2.41 in office yesterday - pt reports holding Coumadin last night per Dr. Camacho's recommendation given concern for supratherapeutic INR, will give home dose of Coumadin 5 mg tonight. Will check CXR PA/lateral and TTE per Dr. Camacho.    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 Camacho on 8/31/20 (finished course of IV Vanco on 10/12/20), post-op course significant for accelerated junctional/AV dissociation and later new afib/flutter, presented to the CT surgery office at [facility] on 11/2 for a post-op visit, c/o worsening PRESCOTT and b/l LE edema. Pt was found to be fluid overloaded w/ 2+ b/l LE edema and in rate controlled aflutter. INR drawn in the office 11/2 was 2.41. Decision was subsequently made to admit the pt for diuresis, INR management and further management of aflutter.     Pt seen and evaluated at bedside - VSS, NAD. Breathing comfortably, O2 sat 98% on RA. Aflutter 80s-100s on tele - AV kaylan blockers being held for now given accelerated junctional rhythm/AV dissociation post-op. Will consult EP to evaluate for cardioversion per Dr. Camacho - per EP note from last admission PPM was not indicated at that time.. BUN/Cr elevated 40/2.22 (Cr on outpatient labs drawn 10/29 was 1.84) - will consult Renal Dr. Aviles per Dr. Camacho. Lasix 40 mg PO x 1 given on arrival - will discuss further diuretic regimen w/ Dr. Camacho given elevated Cr.  INR 1.78 today, down from 2.41 in office yesterday - pt reports holding Coumadin last night per Dr. Camacho's recommendation given concern for supratherapeutic INR, will give home dose of Coumadin 5 mg tonight. Will check CXR PA/lateral and TTE per Dr. Camacho.    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 Camacho on 8/31/20 (finished course of IV Vanco on 10/12/20), post-op course significant for accelerated junctional/AV dissociation and later new afib/flutter, presented to the CT surgery office at [facility] on 11/2 for a post-op visit, c/o worsening PRESCOTT and b/l LE edema. Pt was found to be fluid overloaded w/ 2+ b/l LE edema and in rate controlled aflutter. INR drawn in the office 11/2 was 2.41. Decision was subsequently made to admit the pt for diuresis, INR management and further management of aflutter.     Pt seen and evaluated at bedside - VSS, NAD. Breathing comfortably, O2 sat 98% on RA. Aflutter 80s-100s on tele - AV kaylan blockers being held for now given accelerated junctional rhythm/AV dissociation post-op. Will consult EP to evaluate for cardioversion per Dr. Camacho - per EP note from last admission PPM was not indicated at that time.. BUN/Cr elevated 40/2.22 (Cr on outpatient labs drawn 10/29 was 1.84) - will consult Renal Dr. Aviles per Dr. Camacho. Lasix 40 mg PO x 1 given on arrival - will discuss further diuretic regimen w/ Dr. Camacho given elevated Cr.  INR 1.78 today, down from 2.41 in office yesterday - pt reports holding Coumadin last night per Dr. Camacho's recommendation given concern for supratherapeutic INR, will hold Coumadin for now and start a Heparin gtt per Dr. Camacho. Will check CXR PA/lateral and TTE.

## 2020-11-03 NOTE — CONSULT NOTE ADULT - SUBJECTIVE AND OBJECTIVE BOX
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 Patient had pAF/AFL prior to OHS and was discharged to Rehab in persistent rate controlled Afib. Patient presented to the CT surgery office at [facility] on 11/2/20 for post-op visit with c/o worsening PRESCOTT and b/l LE edema, found to be in rate controlled atrial flutter. He is now admitted for diuresis and further management of atrial arrhythmia.            Allergies    No Known Allergies    Intolerances    	    MEDICATIONS:  aspirin enteric coated 81 milliGRAM(s) Oral daily  tamsulosin 0.4 milliGRAM(s) Oral at bedtime  abacavir 600 milliGRAM(s) Oral daily  lamiVUDine- milliGRAM(s) Oral daily  pantoprazole    Tablet 40 milliGRAM(s) Oral before breakfast  finasteride 5 milliGRAM(s) Oral daily  ascorbic acid 500 milliGRAM(s) Oral daily  cholecalciferol 5000 Unit(s) Oral daily  ferrous    sulfate 325 milliGRAM(s) Oral daily  multivitamin 1 Tablet(s) Oral daily  sodium chloride 0.9% lock flush 3 milliLiter(s) IV Push every 8 hours      PAST MEDICAL & SURGICAL HISTORY:  H/O endocarditis  MV endocarditis    Orchitis and epididymitis    Vitamin D deficiency    Bladder mass    Edema of both legs    Osteoporosis    Seborrheic keratosis    Constipation, unspecified constipation type    Chronic kidney disease, unspecified stage    HIV (human immunodeficiency virus infection)    Unsteady gait    S/P MVR (mitral valve replacement)  &amp; SHELLIE clip, 8/31/2020    S/P coronary artery stent placement        FAMILY HISTORY:  No pertinent family history in first degree relatives      SOCIAL HISTORY:    Lives alone in [facility] living [place], ambulates unassisted in the home but with a walker or cane outside the home  Social EtOH   Denies tobacco use        REVIEW OF SYSTEMS:  See HPI. Otherwise, 10 point ROS done and otherwise negative.    PHYSICAL EXAM:  T(C): 36.9 (11-03-20 @ 13:12), Max: 36.9 (11-03-20 @ 13:12)  HR: 73 (11-03-20 @ 13:12) (73 - 73)  BP: 121/68 (11-03-20 @ 13:12) (121/68 - 121/68)  RR: 16 (11-03-20 @ 13:12) (16 - 16)  SpO2: 98% (11-03-20 @ 13:12) (98% - 98%)  Wt(kg): --  I&O's Summary      Appearance: Normal	  HEENT: Normal oral mucosa, PERRL, EOMI	  Lymphatic: No lymphadenopathy  Cardiovascular: Normal S1 S2, No murmurs  Respiratory: Lungs clear to auscultation	  Psychiatry: A & O x 3, Mood & affect appropriate  Gastrointestinal:  Soft, Non-tender, + BS	  Skin: No rashes, No ecchymoses, No cyanosis	  Neurologic: Non-focal  Extremities: Normal range of motion, No clubbing, or cyanosis. B/L LE edema (R>L)  Vascular: Peripheral pulses palpable 2+ bilaterally      LABS:	 	    CBC Full  -  ( 03 Nov 2020 13:34 )  WBC Count : 13.00 K/uL  Hemoglobin : 11.0 g/dL  Hematocrit : 34.1 %  Platelet Count - Automated : 293 K/uL  Mean Cell Volume : 102.7 fl  Mean Cell Hemoglobin : 33.1 pg  Mean Cell Hemoglobin Concentration : 32.3 gm/dL  Auto Neutrophil # : 8.23 K/uL  Auto Lymphocyte # : 3.18 K/uL  Auto Monocyte # : 1.18 K/uL  Auto Eosinophil # : 0.14 K/uL  Auto Basophil # : 0.05 K/uL  Auto Neutrophil % : 63.2 %  Auto Lymphocyte % : 24.5 %  Auto Monocyte % : 9.1 %  Auto Eosinophil % : 1.1 %  Auto Basophil % : 0.4 %    11-03    136  |  100  |  40<H>  ----------------------------<  76  4.1   |  26  |  2.22<H>    Ca    9.7      03 Nov 2020 13:34    TPro  8.2  /  Alb  4.1  /  TBili  0.3  /  DBili  x   /  AST  26  /  ALT  29  /  AlkPhos  174<H>  11-03      proBNP: Serum Pro-Brain Natriuretic Peptide: 2924 pg/mL (11-03 @ 13:34)    Thyroid Stimulating Hormone, Serum in AM (09.06.20 @ 09:42)    Thyroid Stimulating Hormone, Serum: 6.90 uIU/mL    Free Thyroxine, Serum in AM (09.06.20 @ 09:42)    Free Thyroxine, Serum: 1.2 ng/dL      TELEMETRY: Atrial flutter with VHR 's (up to 140's briefly    	    ECG:  	  RADIOLOGY:  OTHER: 	    PREVIOUS DIAGNOSTIC TESTING:    [ ] Echocardiogram:  [ ]  Catheterization:  [ ] Stress Test:  	  	  ASSESSMENT/PLAN: 	     CHIEF COMPLAINT: Worsening PRESCOTT and b/l LE edema     HISTORY OF PRESENT ILLNESS:  80 year old male  with PMH of HIV (VL undetectable), CAD, detrusor muscle weakness, Afib/ Aflutter (on Coumadin), BPH (pt intermittently self caths at home), recent fall with rhabdo 2/2 urinary retention, bacteremia staph epidermidis with mitral valve endocarditis, s/p MVR (t) and SHELLIE clip on 8/31/20 with post-op SR w/ 1st degree w/ periods of 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 Patient had pAF/AFL prior to OHS and was discharged to Rehab in persistent rate controlled Afib. Patient presented to the CT surgery office at [facility] on 11/2/20 for post-op visit with c/o worsening PRESCOTT and b/l LE edema, found to be in rate controlled atrial flutter. He is now admitted for diuresis and further management of atrial arrhythmia.            Allergies    No Known Allergies    Intolerances    	    MEDICATIONS:  aspirin enteric coated 81 milliGRAM(s) Oral daily  tamsulosin 0.4 milliGRAM(s) Oral at bedtime  abacavir 600 milliGRAM(s) Oral daily  lamiVUDine- milliGRAM(s) Oral daily  pantoprazole    Tablet 40 milliGRAM(s) Oral before breakfast  finasteride 5 milliGRAM(s) Oral daily  ascorbic acid 500 milliGRAM(s) Oral daily  cholecalciferol 5000 Unit(s) Oral daily  ferrous    sulfate 325 milliGRAM(s) Oral daily  multivitamin 1 Tablet(s) Oral daily  sodium chloride 0.9% lock flush 3 milliLiter(s) IV Push every 8 hours      PAST MEDICAL & SURGICAL HISTORY:  H/O endocarditis  MV endocarditis    Orchitis and epididymitis    Vitamin D deficiency    Bladder mass    Edema of both legs    Osteoporosis    Seborrheic keratosis    Constipation, unspecified constipation type    Chronic kidney disease, unspecified stage    HIV (human immunodeficiency virus infection)    Unsteady gait    S/P MVR (mitral valve replacement)  &amp; SHELLIE clip, 8/31/2020    S/P coronary artery stent placement        FAMILY HISTORY:  No pertinent family history in first degree relatives      SOCIAL HISTORY:    Lives alone in [facility] living [place], ambulates unassisted in the home but with a walker or cane outside the home  Social EtOH   Denies tobacco use        REVIEW OF SYSTEMS:  See HPI. Otherwise, 10 point ROS done and otherwise negative.    PHYSICAL EXAM:  T(C): 36.9 (11-03-20 @ 13:12), Max: 36.9 (11-03-20 @ 13:12)  HR: 73 (11-03-20 @ 13:12) (73 - 73)  BP: 121/68 (11-03-20 @ 13:12) (121/68 - 121/68)  RR: 16 (11-03-20 @ 13:12) (16 - 16)  SpO2: 98% (11-03-20 @ 13:12) (98% - 98%)  Wt(kg): --  I&O's Summary      Appearance: Normal	  HEENT: Normal oral mucosa, PERRL, EOMI	  Lymphatic: No lymphadenopathy  Cardiovascular: Normal S1 S2, No murmurs  Respiratory: Coarse BS	  Psychiatry: A & O x 3, Mood & affect appropriate  Gastrointestinal:  Soft, Non-tender, + BS	  Skin: No rashes, No ecchymoses, No cyanosis	  Neurologic: Non-focal  Extremities: Normal range of motion, No clubbing, or cyanosis. B/L LE edema (R>L)  Vascular: Peripheral pulses palpable 2+ bilaterally      LABS:	 	    CBC Full  -  ( 03 Nov 2020 13:34 )  WBC Count : 13.00 K/uL  Hemoglobin : 11.0 g/dL  Hematocrit : 34.1 %  Platelet Count - Automated : 293 K/uL  Mean Cell Volume : 102.7 fl  Mean Cell Hemoglobin : 33.1 pg  Mean Cell Hemoglobin Concentration : 32.3 gm/dL  Auto Neutrophil # : 8.23 K/uL  Auto Lymphocyte # : 3.18 K/uL  Auto Monocyte # : 1.18 K/uL  Auto Eosinophil # : 0.14 K/uL  Auto Basophil # : 0.05 K/uL  Auto Neutrophil % : 63.2 %  Auto Lymphocyte % : 24.5 %  Auto Monocyte % : 9.1 %  Auto Eosinophil % : 1.1 %  Auto Basophil % : 0.4 %    11-03    136  |  100  |  40<H>  ----------------------------<  76  4.1   |  26  |  2.22<H>    Ca    9.7      03 Nov 2020 13:34    TPro  8.2  /  Alb  4.1  /  TBili  0.3  /  DBili  x   /  AST  26  /  ALT  29  /  AlkPhos  174<H>  11-03      proBNP: Serum Pro-Brain Natriuretic Peptide: 2924 pg/mL (11-03 @ 13:34)    Thyroid Stimulating Hormone, Serum in AM (09.06.20 @ 09:42)    Thyroid Stimulating Hormone, Serum: 6.90 uIU/mL    Free Thyroxine, Serum in AM (09.06.20 @ 09:42)    Free Thyroxine, Serum: 1.2 ng/dL      TELEMETRY: Atrial flutter with VHR 's (up to 140's briefly    	    ECG: personally reviewed   	  < from: Transthoracic Echocardiogram (09.08.20 @ 14:34) >  Dimensions:    Normal Values:  LA:     2.4    2.0 - 4.0 cm  Ao:     3.5    2.0 - 3.8 cm  SEPTUM: 1.0    0.6 - 1.2 cm  PWT:0.9    0.6 - 1.1 cm  LVIDd:  4.2    3.0 - 5.6 cm  LVIDs:  2.9    1.8 - 4.0 cm  Derived variables:  LVMI: 68 g/m2  RWT: 0.42  Fractional short: 29 %  EF (Visual Estimate): 70 %  Doppler Peak Velocity (m/sec): MV=2.0  ------------------------------------------------------------------------  Observations:  Mitral Valve: Bioprosthetic mitral valve replacement. No  mitral regurgitation seen. Mean transmitral valve gradient  equals 4 mm Hg, which is probably normal in the setting of  a bioprosthetic mitral valve replacement.  Aortic Valve/Aorta: Aortic valve not well visualized;  appears trileaflet with normal opening. No aortic valve  regurgitation seen.  Aortic Root: 3.5 cm.  Left Atrium: Normal left atrium.  LA volume index = 31  cc/m2.  Left Ventricle: Irregular heart beat; ejection fraction  varies with R-R interval. Endocardium not well visualized;  grossly normal left ventricular systolic function.  Paradoxical septal motion consistent with prior cardiac  surgery. Normal left ventricularinternal dimensions and  wall thickness. Normal diastolic function  Right Heart: Normal right atrium. The right ventricle is  not well visualized; grossly normal right ventricular size  and systolic function. Normal tricuspid valve. Minimal  tricuspid regurgitation. Normal pulmonic valve. Mild  pulmonic regurgitation.  Pericardium/Pleura: Small pericardial effusion. The  effusion messures up to approximately 1.0 cm posterior to  the LV. No echocardiographic evidence of pericardial  tamponade.  Hemodynamic: Estimated right atrial pressure is 8 mm Hg.  Estimated right ventricular systolic pressure equals 26 mm  Hg, assuming right atrial pressure equals 8 mm Hg,  consistent with normal pulmonary pressures.  ------------------------------------------------------------------------  Conclusions:  1. Bioprosthetic mitral valve replacement. No mitral  regurgitation seen. Mean transmitral valve gradient equals  4 mm Hg, which is probably normal in the setting of a  bioprosthetic mitral valve replacement.  2. Aortic valve not well visualized; appears trileaflet  with normal opening. No aortic valve regurgitation seen.  3. Irregular heart beat; ejection fraction varies with R-R  interval. Endocardium not well visualized; grossly normal  left ventricular systolic function. Paradoxical septal  motion consistent with prior cardiac surgery.  4. The right ventricle is not well visualized; grossly  normal right ventricular size and systolic function.  5. Small pericardial effusion. The effusion messures up to  approximately 1.0 cm posterior to the LV. No  echocardiographic evidence of pericardial tamponade.  *** Compared with echocardiogram of 8/31/2020, patient  status-post bioprosthetic MVR.    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 Patient had pAF/AFL prior to OHS and was discharged to Rehab in persistent rate controlled Afib. Patient presented to the CT surgery office at [facility] on 11/2/20 for post-op visit with c/o worsening PRESCOTT and b/l LE edema, found to be in rate controlled atrial flutter. He is now admitted for diuresis and further management of atrial arrhythmia.            Allergies    No Known Allergies    Intolerances    	    MEDICATIONS:  aspirin enteric coated 81 milliGRAM(s) Oral daily  tamsulosin 0.4 milliGRAM(s) Oral at bedtime  abacavir 600 milliGRAM(s) Oral daily  lamiVUDine- milliGRAM(s) Oral daily  pantoprazole    Tablet 40 milliGRAM(s) Oral before breakfast  finasteride 5 milliGRAM(s) Oral daily  ascorbic acid 500 milliGRAM(s) Oral daily  cholecalciferol 5000 Unit(s) Oral daily  ferrous    sulfate 325 milliGRAM(s) Oral daily  multivitamin 1 Tablet(s) Oral daily  sodium chloride 0.9% lock flush 3 milliLiter(s) IV Push every 8 hours      PAST MEDICAL & SURGICAL HISTORY:  H/O endocarditis  MV endocarditis    Orchitis and epididymitis    Vitamin D deficiency    Bladder mass    Edema of both legs    Osteoporosis    Seborrheic keratosis    Constipation, unspecified constipation type    Chronic kidney disease, unspecified stage    HIV (human immunodeficiency virus infection)    Unsteady gait    S/P MVR (mitral valve replacement)  &amp; SHELLIE clip, 8/31/2020    S/P coronary artery stent placement        FAMILY HISTORY:  No pertinent family history in first degree relatives      SOCIAL HISTORY:    Lives alone in [facility] living [place], ambulates unassisted in the home but with a walker or cane outside the home  Social EtOH   Denies tobacco use      REVIEW OF SYSTEMS:  See HPI. Otherwise, 10 point ROS done and otherwise negative.    PHYSICAL EXAM:  T(C): 36.9 (11-03-20 @ 13:12), Max: 36.9 (11-03-20 @ 13:12)  HR: 73 (11-03-20 @ 13:12) (73 - 73)  BP: 121/68 (11-03-20 @ 13:12) (121/68 - 121/68)  RR: 16 (11-03-20 @ 13:12) (16 - 16)  SpO2: 98% (11-03-20 @ 13:12) (98% - 98%)  Wt(kg): --  I&O's Summary      Appearance: Normal	  HEENT: Normal oral mucosa, PERRL, EOMI	  Lymphatic: No lymphadenopathy  Cardiovascular: Normal S1 S2, No murmurs  Respiratory: Clear to ascultation   Psychiatry: A & O x 3, Mood & affect appropriate  Gastrointestinal:  Soft, Non-tender, + BS	  Skin: No rashes, No ecchymoses, No cyanosis	  Neurologic: Non-focal  Extremities: Normal range of motion, No clubbing, or cyanosis. B/L LE +2 edema (R>L)  Vascular: Peripheral pulses palpable 2+ bilaterally      LABS:	 	    CBC Full  -  ( 03 Nov 2020 13:34 )  WBC Count : 13.00 K/uL  Hemoglobin : 11.0 g/dL  Hematocrit : 34.1 %  Platelet Count - Automated : 293 K/uL  Mean Cell Volume : 102.7 fl  Mean Cell Hemoglobin : 33.1 pg  Mean Cell Hemoglobin Concentration : 32.3 gm/dL  Auto Neutrophil # : 8.23 K/uL  Auto Lymphocyte # : 3.18 K/uL  Auto Monocyte # : 1.18 K/uL  Auto Eosinophil # : 0.14 K/uL  Auto Basophil # : 0.05 K/uL  Auto Neutrophil % : 63.2 %  Auto Lymphocyte % : 24.5 %  Auto Monocyte % : 9.1 %  Auto Eosinophil % : 1.1 %  Auto Basophil % : 0.4 %    11-03    136  |  100  |  40<H>  ----------------------------<  76  4.1   |  26  |  2.22<H>    Ca    9.7      03 Nov 2020 13:34    TPro  8.2  /  Alb  4.1  /  TBili  0.3  /  DBili  x   /  AST  26  /  ALT  29  /  AlkPhos  174<H>  11-03      proBNP: Serum Pro-Brain Natriuretic Peptide: 2924 pg/mL (11-03 @ 13:34)    Thyroid Stimulating Hormone, Serum in AM (09.06.20 @ 09:42)    Thyroid Stimulating Hormone, Serum: 6.90 uIU/mL    Free Thyroxine, Serum in AM (09.06.20 @ 09:42)    Free Thyroxine, Serum: 1.2 ng/dL      TELEMETRY: Atrial flutter with VHR 's (up to 140's briefly    	    ECG: personally reviewed   	  < from: Transthoracic Echocardiogram (09.08.20 @ 14:34) >  Dimensions:    Normal Values:  LA:     2.4    2.0 - 4.0 cm  Ao:     3.5    2.0 - 3.8 cm  SEPTUM: 1.0    0.6 - 1.2 cm  PWT:0.9    0.6 - 1.1 cm  LVIDd:  4.2    3.0 - 5.6 cm  LVIDs:  2.9    1.8 - 4.0 cm  Derived variables:  LVMI: 68 g/m2  RWT: 0.42  Fractional short: 29 %  EF (Visual Estimate): 70 %  Doppler Peak Velocity (m/sec): MV=2.0  ------------------------------------------------------------------------  Observations:  Mitral Valve: Bioprosthetic mitral valve replacement. No  mitral regurgitation seen. Mean transmitral valve gradient  equals 4 mm Hg, which is probably normal in the setting of  a bioprosthetic mitral valve replacement.  Aortic Valve/Aorta: Aortic valve not well visualized;  appears trileaflet with normal opening. No aortic valve  regurgitation seen.  Aortic Root: 3.5 cm.  Left Atrium: Normal left atrium.  LA volume index = 31  cc/m2.  Left Ventricle: Irregular heart beat; ejection fraction  varies with R-R interval. Endocardium not well visualized;  grossly normal left ventricular systolic function.  Paradoxical septal motion consistent with prior cardiac  surgery. Normal left ventricularinternal dimensions and  wall thickness. Normal diastolic function  Right Heart: Normal right atrium. The right ventricle is  not well visualized; grossly normal right ventricular size  and systolic function. Normal tricuspid valve. Minimal  tricuspid regurgitation. Normal pulmonic valve. Mild  pulmonic regurgitation.  Pericardium/Pleura: Small pericardial effusion. The  effusion messures up to approximately 1.0 cm posterior to  the LV. No echocardiographic evidence of pericardial  tamponade.  Hemodynamic: Estimated right atrial pressure is 8 mm Hg.  Estimated right ventricular systolic pressure equals 26 mm  Hg, assuming right atrial pressure equals 8 mm Hg,  consistent with normal pulmonary pressures.  ------------------------------------------------------------------------  Conclusions:  1. Bioprosthetic mitral valve replacement. No mitral  regurgitation seen. Mean transmitral valve gradient equals  4 mm Hg, which is probably normal in the setting of a  bioprosthetic mitral valve replacement.  2. Aortic valve not well visualized; appears trileaflet  with normal opening. No aortic valve regurgitation seen.  3. Irregular heart beat; ejection fraction varies with R-R  interval. Endocardium not well visualized; grossly normal  left ventricular systolic function. Paradoxical septal  motion consistent with prior cardiac surgery.  4. The right ventricle is not well visualized; grossly  normal right ventricular size and systolic function.  5. Small pericardial effusion. The effusion messures up to  approximately 1.0 cm posterior to the LV. No  echocardiographic evidence of pericardial tamponade.  *** Compared with echocardiogram of 8/31/2020, patient  status-post bioprosthetic MVR.    < end of copied text >

## 2020-11-03 NOTE — H&P ADULT - HISTORY OF PRESENT ILLNESS
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 Camacho on 8/31/20 (finished course of IV Vanco on 10/12/20), post-op course significant for accelerated junctional/AV dissociation and later new afib/flutter, presented to the CT surgery office at [facility] on 11/2 for a post-op visit. At that time, pt was c/o worsening PRESCOTT and b/l LE edema. Additionally, pt reported not having his INR checked in approximately 2 weeks since he was d/c'd home from rehab. Pt was found to be fluid overloaded w/ 2+ b/l LE edema and in rate controlled aflutter. INR drawn in the office 11/2 was 2.41 and COVID PCR was negative. Decision was subsequently made to admit the pt for diuresis, INR management and further management of aflutter.

## 2020-11-03 NOTE — H&P ADULT - DOES THIS PATIENT HAVE A HISTORY OF OR HAS BEEN DX WITH HEART FAILURE?
[facility]UROS[facility] DISCHARGE SUMMARY      Trae Lew .     MRN:  [id number]   CSN:  [id number]   :  1968     DATE OF ADMISSION:  2019   DATE OF DISCHARGE:  2019     ATTENDING PHYSICIAN:  Arun Valentine   Primary Care Physician:  Reyes Perez on call of difficulties prior to  follow-up.    Patient had post op BM. He is tolerating PO intake and ambulating independently. Pain controlled with PRN Norco. He states he has a prescription for this already with > 3 days worth.     DISCHARGE MEDS:    • amLODIPine  5 mg Oral Daily   • atorvastatin  80 mg Oral Nightly   • fluticasone-vilanterol  1 puff Inhalation Daily Resp   • gabapentin  800 mg Oral TID   • losartan  50 mg Oral Daily   • nicotine  1 patch Transdermal Q24H   • spironolactone  25 mg Oral Daily   • docusate calcium  240 mg Oral BID    Or   • docusate sodium  100 mg Per NG tube BID   • sodium chloride (PF)  2 mL Injection 2 times per day   • polyethylene glycol  17 g Oral Daily   • insulin lispro   Subcutaneous TID AC   • sodium chloride (PF)  10 mL Injection Once       DISCHARGE EXAM:  Visit Vitals  /70 (BP [facility]: [facility] [place], Patient Position: Semi-Stack's)   Pulse 67   Temp 97.9 °F (36.6 °C) (Oral)   Resp 18   Ht 6' 1\" (1.854 m)   Wt (!) 143.7 kg   SpO2 99%   BMI 41.80 kg/m²     General:  Well-developed, well-nourished male.  In no acute distress.  Head:  Incision noted below.   Eyes:  Eyelids and conjunctivae appear normal, no excessive tearing or discharge.  Nose:  No flaring or discharge present.   Respiratory:  Symmetric chest movement, no excessive respiratory effort, no use of accessory muscles.  Neurologic:    Mental Status:  The patient is oriented to person, place, and time.  Recent and remote memory functions are normal.  The person is attentive with normal concentration.  Language is fluent.  Speech is normal.  Cranial Nerves:  Pupils were equal, round, and reactive to light bilaterally and consensually.  EOMI without nystagmus.  Visual fields were full to confrontation.  Face was symmetric.  There was intact light touch to V1, V2, V3.  Hearing intact bilaterally.  Palate elevates symmetrically.  Shoulder shrug was normal.  Tongue protruded midline.  Motor:  Tone is normal in all four extremities without  unknown

## 2020-11-03 NOTE — H&P ADULT - PROBLEM SELECTOR PLAN 1
Pt breathing comfortably at rest, O2 sat stable on RA  Lasix 40 mg PO x 1 given - will discuss further diuresis regimen w/ Dr. Camacho.   F/u CXR PA/lat & TTE  Strict Is/Os Pt breathing comfortably at rest, O2 sat stable on RA  Lasix 40 mg PO x 1 given - will discuss further diuresis regimen w/ Dr. Camacho   F/u CXR PA/lat & TTE  Strict Is/Os Pt breathing comfortably at rest, O2 sat stable on RA  Lasix 40 mg PO x 1 given - will discuss further diuretic regimen w/ Dr. Camacho   F/u CXR PA/lat & TTE  Strict Is/Os

## 2020-11-03 NOTE — H&P ADULT - NSHPLABSRESULTS_GEN_ALL_CORE
11.0   13.00 )-----------( 293      ( 03 Nov 2020 13:34 )             34.1     11-03    136  |  100  |  40<H>  ----------------------------<  76  4.1   |  26  |  2.22<H>    Ca    9.7      03 Nov 2020 13:34    TPro  8.2  /  Alb  4.1  /  TBili  0.3  /  DBili  x   /  AST  26  /  ALT  29  /  AlkPhos  174<H>  11-03    INR: 1.78, Prothrombin Time, Plasma: 20.8 sec (11.03.20 @ 13:34)     Activated Partial Thromboplastin Time: 41.1 sec (11.03.20 @ 13:34)     [facility] Negative

## 2020-11-03 NOTE — H&P ADULT - NSHPSOCIALHISTORY_GEN_ALL_CORE
Lives alone in [facility] assisted living home, ambulates unassisted in the home but with a walker or cane outside the home  Social EtOH   Denies tobacco use

## 2020-11-03 NOTE — H&P ADULT - NSICDXPASTMEDICALHX_GEN_ALL_CORE_FT
PAST MEDICAL HISTORY:  Bladder mass     Chronic kidney disease, unspecified stage     Constipation, unspecified constipation type     Edema of both legs     H/O endocarditis MV endocarditis    HIV (human immunodeficiency virus infection)     Orchitis and epididymitis     Osteoporosis     Seborrheic keratosis     Unsteady gait     Vitamin D deficiency

## 2020-11-03 NOTE — H&P ADULT - PROBLEM SELECTOR PLAN 4
Continue AC w/ Coumadin - will dose 5 mg tonight for INR 1.78  F/u TTE Continue AC w/ Coumadin - will hold Coumadin for now and start heparin gtt per Dr. Camacho  F/u TTE

## 2020-11-03 NOTE — H&P ADULT - PROBLEM SELECTOR PLAN 3
Cr 2.22, up from 1.88 upon last discharge   Daily BMP to trend Cr   Avoid further nephrotoxic agents Cr 2.22, up from 1.88 upon last discharge   Renal consult pending   Daily BMP to trend Cr   Avoid further nephrotoxic agents Cr 2.22, up from 1.88 upon last discharge   Renal consult placed - f/u recs  Daily BMP to trend Cr   Avoid further nephrotoxic agents

## 2020-11-03 NOTE — H&P ADULT - PROBLEM SELECTOR PLAN 2
Currently Aflutter 80s-100s  Continue off AV kaylan blockers for now given accelerated junctional rhythm/AV dissociation post-op EP consulted for possible cardioversion - f/u recs  Currently Aflutter 80s-100s  Continue off AV kaylan blockers for now given accelerated junctional rhythm/AV dissociation post-op  Continue AC w/ Coumadin - will dose 5 mg tonight for INR 1.78  Check lytes daily and repelte prn EP consulted for possible cardioversion - f/u recs  Currently Aflutter 80s-100s  Continue off AV kaylan blockers for now given accelerated junctional rhythm/AV dissociation post-op  Continue AC w/ Coumadin - will hold Coumadin for now and start heparin gtt per Dr. Camacho  Check lytes daily and repelte prn

## 2020-11-04 DIAGNOSIS — N19 UNSPECIFIED KIDNEY FAILURE: ICD-10-CM

## 2020-11-04 LAB
ANION GAP SERPL CALC-SCNC: 13 MMOL/L — SIGNIFICANT CHANGE UP (ref 5–17)
APTT BLD: 50.5 SEC — HIGH (ref 27.5–35.5)
APTT BLD: 71.1 SEC — HIGH (ref 27.5–35.5)
APTT BLD: 76.7 SEC — HIGH (ref 27.5–35.5)
BUN SERPL-MCNC: 44 MG/DL — HIGH (ref 7–23)
CALCIUM SERPL-MCNC: 9.1 MG/DL — SIGNIFICANT CHANGE UP (ref 8.4–10.5)
CHLORIDE SERPL-SCNC: 101 MMOL/L — SIGNIFICANT CHANGE UP (ref 96–108)
CO2 SERPL-SCNC: 25 MMOL/L — SIGNIFICANT CHANGE UP (ref 22–31)
CREAT ?TM UR-MCNC: 26 MG/DL — SIGNIFICANT CHANGE UP
CREAT SERPL-MCNC: 2.28 MG/DL — HIGH (ref 0.5–1.3)
GLUCOSE SERPL-MCNC: 109 MG/DL — HIGH (ref 70–99)
HCT VFR BLD CALC: 30.8 % — LOW (ref 39–50)
HGB BLD-MCNC: 10.2 G/DL — LOW (ref 13–17)
INR BLD: 1.57 RATIO — HIGH (ref 0.88–1.16)
MAGNESIUM SERPL-MCNC: 2.2 MG/DL — SIGNIFICANT CHANGE UP (ref 1.6–2.6)
MCHC RBC-ENTMCNC: 33.1 GM/DL — SIGNIFICANT CHANGE UP (ref 32–36)
MCHC RBC-ENTMCNC: 33.3 PG — SIGNIFICANT CHANGE UP (ref 27–34)
MCV RBC AUTO: 100.7 FL — HIGH (ref 80–100)
NRBC # BLD: 0 /100 WBCS — SIGNIFICANT CHANGE UP (ref 0–0)
PLATELET # BLD AUTO: 277 K/UL — SIGNIFICANT CHANGE UP (ref 150–400)
POTASSIUM SERPL-MCNC: 3.9 MMOL/L — SIGNIFICANT CHANGE UP (ref 3.5–5.3)
POTASSIUM SERPL-SCNC: 3.9 MMOL/L — SIGNIFICANT CHANGE UP (ref 3.5–5.3)
PROT ?TM UR-MCNC: 28 MG/DL — HIGH (ref 0–12)
PROT/CREAT UR-RTO: 1.1 RATIO — HIGH (ref 0–0.2)
PROTHROM AB SERPL-ACNC: 18.4 SEC — HIGH (ref 10.6–13.6)
RBC # BLD: 3.06 M/UL — LOW (ref 4.2–5.8)
RBC # FLD: 17.1 % — HIGH (ref 10.3–14.5)
SARS-COV-2 IGG SERPL QL IA: NEGATIVE — SIGNIFICANT CHANGE UP
SARS-COV-2 IGM SERPL IA-ACNC: 0.85 INDEX — SIGNIFICANT CHANGE UP
SODIUM SERPL-SCNC: 139 MMOL/L — SIGNIFICANT CHANGE UP (ref 135–145)
WBC # BLD: 12.16 K/UL — HIGH (ref 3.8–10.5)
WBC # FLD AUTO: 12.16 K/UL — HIGH (ref 3.8–10.5)

## 2020-11-04 PROCEDURE — 93325 DOPPLER ECHO COLOR FLOW MAPG: CPT | Mod: 26

## 2020-11-04 PROCEDURE — 93010 ELECTROCARDIOGRAM REPORT: CPT

## 2020-11-04 PROCEDURE — 92960 CARDIOVERSION ELECTRIC EXT: CPT

## 2020-11-04 PROCEDURE — 99232 SBSQ HOSP IP/OBS MODERATE 35: CPT | Mod: 24

## 2020-11-04 PROCEDURE — 93320 DOPPLER ECHO COMPLETE: CPT | Mod: 26

## 2020-11-04 PROCEDURE — 99222 1ST HOSP IP/OBS MODERATE 55: CPT | Mod: GC

## 2020-11-04 PROCEDURE — 93312 ECHO TRANSESOPHAGEAL: CPT | Mod: 26

## 2020-11-04 RX ORDER — WARFARIN SODIUM 2.5 MG/1
5 TABLET ORAL ONCE
Refills: 0 | Status: COMPLETED | OUTPATIENT
Start: 2020-11-04 | End: 2020-11-04

## 2020-11-04 RX ORDER — HEPARIN SODIUM 5000 [USP'U]/ML
1100 INJECTION INTRAVENOUS; SUBCUTANEOUS
Qty: 25000 | Refills: 0 | Status: DISCONTINUED | OUTPATIENT
Start: 2020-11-04 | End: 2020-11-06

## 2020-11-04 RX ORDER — FUROSEMIDE 40 MG
40 TABLET ORAL DAILY
Refills: 0 | Status: DISCONTINUED | OUTPATIENT
Start: 2020-11-04 | End: 2020-11-06

## 2020-11-04 RX ORDER — ALENDRONATE SODIUM 70 MG/1
1 TABLET ORAL
Qty: 0 | Refills: 0 | DISCHARGE

## 2020-11-04 RX ADMIN — PANTOPRAZOLE SODIUM 40 MILLIGRAM(S): 20 TABLET, DELAYED RELEASE ORAL at 05:16

## 2020-11-04 RX ADMIN — SODIUM CHLORIDE 3 MILLILITER(S): 9 INJECTION INTRAMUSCULAR; INTRAVENOUS; SUBCUTANEOUS at 21:25

## 2020-11-04 RX ADMIN — Medication 1 TABLET(S): at 11:33

## 2020-11-04 RX ADMIN — Medication 81 MILLIGRAM(S): at 11:32

## 2020-11-04 RX ADMIN — HEPARIN SODIUM 9 UNIT(S)/HR: 5000 INJECTION INTRAVENOUS; SUBCUTANEOUS at 00:35

## 2020-11-04 RX ADMIN — SODIUM CHLORIDE 3 MILLILITER(S): 9 INJECTION INTRAMUSCULAR; INTRAVENOUS; SUBCUTANEOUS at 05:02

## 2020-11-04 RX ADMIN — Medication 325 MILLIGRAM(S): at 11:33

## 2020-11-04 RX ADMIN — HEPARIN SODIUM 11 UNIT(S)/HR: 5000 INJECTION INTRAVENOUS; SUBCUTANEOUS at 19:41

## 2020-11-04 RX ADMIN — Medication 40 MILLIGRAM(S): at 11:32

## 2020-11-04 RX ADMIN — FINASTERIDE 5 MILLIGRAM(S): 5 TABLET, FILM COATED ORAL at 11:32

## 2020-11-04 RX ADMIN — SODIUM CHLORIDE 3 MILLILITER(S): 9 INJECTION INTRAMUSCULAR; INTRAVENOUS; SUBCUTANEOUS at 11:09

## 2020-11-04 RX ADMIN — ABACAVIR 600 MILLIGRAM(S): 20 SOLUTION ORAL at 11:31

## 2020-11-04 RX ADMIN — Medication 100 MILLIGRAM(S): at 11:32

## 2020-11-04 RX ADMIN — HEPARIN SODIUM 10 UNIT(S)/HR: 5000 INJECTION INTRAVENOUS; SUBCUTANEOUS at 06:31

## 2020-11-04 RX ADMIN — HEPARIN SODIUM 11 UNIT(S)/HR: 5000 INJECTION INTRAVENOUS; SUBCUTANEOUS at 11:02

## 2020-11-04 RX ADMIN — Medication 5000 UNIT(S): at 11:32

## 2020-11-04 RX ADMIN — WARFARIN SODIUM 5 MILLIGRAM(S): 2.5 TABLET ORAL at 21:26

## 2020-11-04 RX ADMIN — Medication 500 MILLIGRAM(S): at 11:32

## 2020-11-04 RX ADMIN — SODIUM CHLORIDE 3 MILLILITER(S): 9 INJECTION INTRAMUSCULAR; INTRAVENOUS; SUBCUTANEOUS at 03:35

## 2020-11-04 RX ADMIN — TAMSULOSIN HYDROCHLORIDE 0.4 MILLIGRAM(S): 0.4 CAPSULE ORAL at 21:26

## 2020-11-04 NOTE — PROGRESS NOTE ADULT - PROBLEM SELECTOR PLAN 2
DCCV this afternoon  Currently Aflutter 80s-100s  Continue off AV kaylan blockers for now given accelerated junctional rhythm/AV dissociation post-op  Continue AC w/ Coumadin /heparin gtt

## 2020-11-04 NOTE — CONSULT NOTE ADULT - SUBJECTIVE AND OBJECTIVE BOX
[facility] DIVISION OF KIDNEY DISEASES AND HYPERTENSION -- [phone]  -- INITIAL CONSULT NOTE  --------------------------------------------------------------------------------  HPI: 80-year-old male HIV (VL undetectable), CAD, detrusor muscle weakness (self catheterizes), Afib/ Aflutter (on Coumadin), BPH, s/p MVR 8/31/20 was sent from CT surgery office due to hypervolemia and found to be irregular rhythm on 11/2/20.  Pt. given 1 dose of IV Lasix in the ER. Pt. evaluated by EP and planned for DCCV. Nephrology team consulted for elevated serum creatinine. Pt. receives his outpatient kidney care from Dr. Ferguson. Pt. with multifactorial CKD as per outpatient notes. Upon review of labs on [facility], Scr was 2.05 on 9/6/2019. Scr peaked at 3.10 on 3/3/20. Scr prior to hospitalization was 1.84. on 10/29/20. Scr on arrival was 2.22 and today is 2.28. Pt. currently on PO Lasix with good urine output.     Pt. evaluated at bedside, in no acute distress. Reports improvement in swelling.    PAST HISTORY  --------------------------------------------------------------------------------  PAST MEDICAL & SURGICAL HISTORY:  H/O endocarditis  MV endocarditis    Orchitis and epididymitis    Vitamin D deficiency    Bladder mass    Edema of both legs    Osteoporosis    Seborrheic keratosis    Constipation, unspecified constipation type    Chronic kidney disease, unspecified stage    HIV (human immunodeficiency virus infection)    Unsteady gait    S/P MVR (mitral valve replacement)  &amp; SHELLIE clip, 8/31/2020    S/P coronary artery stent placement      FAMILY HISTORY:  No pertinent family history in first degree relatives      PAST SOCIAL HISTORY: denies any current drugs or EtOH abuse    ALLERGIES & MEDICATIONS  --------------------------------------------------------------------------------  Allergies    No Known Allergies    Intolerances    Standing Inpatient Medications  abacavir 600 milliGRAM(s) Oral daily  ascorbic acid 500 milliGRAM(s) Oral daily  aspirin enteric coated 81 milliGRAM(s) Oral daily  cholecalciferol 5000 Unit(s) Oral daily  ferrous    sulfate 325 milliGRAM(s) Oral daily  finasteride 5 milliGRAM(s) Oral daily  furosemide    Tablet 40 milliGRAM(s) Oral daily  heparin  Infusion 1100 Unit(s)/Hr IV Continuous <Continuous>  lamiVUDine- milliGRAM(s) Oral daily  multivitamin 1 Tablet(s) Oral daily  pantoprazole    Tablet 40 milliGRAM(s) Oral before breakfast  Pifeltro (Doravirine) tablet 100 milliGRAM(s) 100 milliGRAM(s) Oral daily  sodium chloride 0.9% lock flush 3 milliLiter(s) IV Push every 8 hours  tamsulosin 0.4 milliGRAM(s) Oral at bedtime  warfarin 5 milliGRAM(s) Oral once    REVIEW OF SYSTEMS  --------------------------------------------------------------------------------  Gen: no fatigue  Respiratory: No dyspnea  CV: see HPI  GI: No abdominal pain  MSK: + LE edema  Neuro: No dizziness  Heme: No bleeding    All other systems were reviewed and are negative, except as noted.    VITALS/PHYSICAL EXAM  --------------------------------------------------------------------------------  T(C): 36.8 (11-04-20 @ 12:28), Max: 37.2 (11-03-20 @ 20:42)  HR: 79 (11-04-20 @ 12:28) (75 - 82)  BP: 105/65 (11-04-20 @ 12:28) (105/65 - 127/67)  RR: 18 (11-04-20 @ 12:28) (18 - 18)  SpO2: 97% (11-04-20 @ 12:28) (97% - 98%)  Wt(kg): --    Weight (kg): 84.5 (11-03-20 @ 17:10)    11-03-20 @ 07:01  -  11-04-20 @ 07:00  --------------------------------------------------------  IN: 439 mL / OUT: 2025 mL / NET: -1586 mL    11-04-20 @ 07:01  -  11-04-20 @ 13:33  --------------------------------------------------------  IN: 0 mL / OUT: 1450 mL / NET: -1450 mL    Physical Exam:  	Gen: NAD  	HEENT: MMM  	Pulm: good air entry B/L  	CV: S1S2  	Abd: Soft, +BS   	Ext: LE pitting edema B/L  	Neuro: Awake  	Skin: Warm and dry  	  LABS/STUDIES  --------------------------------------------------------------------------------              10.2   12.16 >-----------<  277      [11-04-20 @ 05:21]              30.8     139  |  101  |  44  ----------------------------<  109      [11-04-20 @ 05:21]  3.9   |  25  |  2.28        Ca     9.1     [11-04-20 @ 05:21]      Mg     2.2     [11-04-20 @ 05:21]    Creatinine Trend:  SCr 2.28 [11-04 @ 05:21]  SCr 2.22 [11-03 @ 13:34]    Urinalysis - [11-03-20 @ 14:00]      Color Light Orange / Appearance Turbid / SG 1.013 / pH 7.5      Gluc Negative / Ketone Negative  / Bili Negative / Urobili Negative       Blood Moderate / Protein 30 mg/dL / Leuk Est Large / Nitrite Positive      RBC 4-6 / WBC >50 / Hyaline  / Gran  / Sq Epi  / Non Sq Epi Occasional / Bacteria Moderate

## 2020-11-04 NOTE — PROGRESS NOTE ADULT - SUBJECTIVE AND OBJECTIVE BOX
Subjective:  "Im just waiting on that test, I havent eaten a thing"  OOB chair, watching TV    Tele:      Aflutter 70-90                          T(C): 36.8 (11-04-20 @ 05:13), Max: 37.2 (11-03-20 @ 20:42)  HR: 75 (11-04-20 @ 05:13) (73 - 82)  BP: 117/63 (11-04-20 @ 05:13) (117/63 - 127/67)  RR: 18 (11-04-20 @ 05:13) (16 - 18)  SpO2: 97% (11-04-20 @ 05:13) (97% - 98%)      11-04    139  |  101  |  44<H>  ----------------------------<  109<H>  3.9   |  25  |  2.28<H>    Ca    9.1      04 Nov 2020 05:21  Mg     2.2     11-04    TPro  8.2  /  Alb  4.1  /  TBili  0.3  /  DBili  x   /  AST  26  /  ALT  29  /  AlkPhos  174<H>  11-03                               10.2   12.16 )-----------( 277      ( 04 Nov 2020 05:21 )             30.8        PT/INR - ( 04 Nov 2020 05:21 )   PT: 18.4 sec;   INR: 1.57 ratio         PTT - ( 04 Nov 2020 05:21 )  PTT:50.5 sec    Echo:         CXR:      Physical Exam: General: NAD    Neuro: A&Ox4, speech clear, no focal deficits noted    Respiratory: BS CTA b/l, no wheezes, rales or rhonchi noted    Cardiovascular: RRR, (+) S1/S2, no murmur appreciated    GI: Abd soft,reports BM    Ext:  2+ LE edema b/l, 2+ peripheral pulses b/l    Skin: Prior MSI healing well,           MEDICATIONS  (STANDING):  abacavir 600 milliGRAM(s) Oral daily  ascorbic acid 500 milliGRAM(s) Oral daily  aspirin enteric coated 81 milliGRAM(s) Oral daily  cholecalciferol 5000 Unit(s) Oral daily  ferrous    sulfate 325 milliGRAM(s) Oral daily  finasteride 5 milliGRAM(s) Oral daily  furosemide    Tablet 40 milliGRAM(s) Oral daily  heparin  Infusion 1100 Unit(s)/Hr (11 mL/Hr) IV Continuous <Continuous>  lamiVUDine- milliGRAM(s) Oral daily  multivitamin 1 Tablet(s) Oral daily  pantoprazole    Tablet 40 milliGRAM(s) Oral before breakfast  Pifeltro (Doravirine) tablet 100 milliGRAM(s) 100 milliGRAM(s) Oral daily  sodium chloride 0.9% lock flush 3 milliLiter(s) IV Push every 8 hours  tamsulosin 0.4 milliGRAM(s) Oral at bedtime  warfarin 5 milliGRAM(s) Oral once       PAST MEDICAL & SURGICAL HISTORY:  H/O endocarditis  MV endocarditis    Orchitis and epididymitis    Vitamin D deficiency    Bladder mass    Edema of both legs    Osteoporosis    Seborrheic keratosis    Constipation, unspecified constipation type    Chronic kidney disease, unspecified stage    HIV (human immunodeficiency virus infection)    Unsteady gait    S/P MVR (mitral valve replacement)  &amp; SHELLIE clip, 8/31/2020    S/P coronary artery stent placement

## 2020-11-04 NOTE — PROGRESS NOTE ADULT - ASSESSMENT
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 Patient had pAF/AFL prior to OHS and was discharged to Rehab in persistent rate controlled Afib in the 60's since 9/9/20. Patient presented to the CT surgery office at [facility] on 11/2/20 for post-op visit with c/o worsening PRESCOTT and b/l LE edema, found to be in rate controlled atrial flutter. He is now admitted for diuresis and further management of atrial arrhythmia.        1) Bacteremia (staph epidermidis) w/ MV endocarditis s/p MVR (t) and SHELLIE clipping on 8/31  2) Post-op SR w/ 1st degree w/ periods of 2nd degree AV block (AV kaylan disease)  3) Afib/Aflutter    -Continue coumadin/Heparin bridge to maintain goal INR between 2.0-3.0  -AV kaylan agent or AAD not needed at this time   -Follow up TTE  -NPO for NOHEMI/DCCV once PTT is therapeutic, F/U stat PTT this morning     [id number]

## 2020-11-04 NOTE — CONSULT NOTE ADULT - PROBLEM SELECTOR RECOMMENDATION 9
Pt. with renal failure in the setting of CHF exacerbation. However, exact duration of renal failure unknown. Upon lab review on [facility], Scr was 1.84 on 10/29/20. Scr on arrival was 2.28 and today is 2.24. Pt. with multifactorial CKD since 2013 secondary to numerous urological issues and ? MGRS. UA significant for proteinuria. Pt. with likely hemodynamically mediated SUDHEER on CKD. Check spot urine TP/Cr ratio. Agree with Lasix 40 mg PO for diuresis. Recommend frequent straight catheterization (especially within first 6 hours of Lasix dose). Monitor labs and daily weights. Avoid potential nephrotoxins. Dose medications as per eGFR.    If any questions, please feel free to contact me  Eric Morgan   Nephrology Fellow  [phone]  (After 5 pm or on weekends please page the on-call fellow)

## 2020-11-04 NOTE — PROGRESS NOTE ADULT - SUBJECTIVE AND OBJECTIVE BOX
24H hour events: Patient resting comfortably in bed, denies c/o CP, palpitations or SOB.     MEDICATIONS:  aspirin enteric coated 81 milliGRAM(s) Oral daily  heparin  Infusion 1000 Unit(s)/Hr IV Continuous <Continuous>  tamsulosin 0.4 milliGRAM(s) Oral at bedtime    abacavir 600 milliGRAM(s) Oral daily  lamiVUDine- milliGRAM(s) Oral daily    pantoprazole    Tablet 40 milliGRAM(s) Oral before breakfast    finasteride 5 milliGRAM(s) Oral daily    ascorbic acid 500 milliGRAM(s) Oral daily  cholecalciferol 5000 Unit(s) Oral daily  ferrous    sulfate 325 milliGRAM(s) Oral daily  multivitamin 1 Tablet(s) Oral daily  sodium chloride 0.9% lock flush 3 milliLiter(s) IV Push every 8 hours      REVIEW OF SYSTEMS:  Complete 10point ROS negative.    PHYSICAL EXAM:  T(C): 36.8 (11-04-20 @ 05:13), Max: 37.2 (11-03-20 @ 20:42)  HR: 75 (11-04-20 @ 05:13) (73 - 82)  BP: 117/63 (11-04-20 @ 05:13) (117/63 - 127/67)  RR: 18 (11-04-20 @ 05:13) (16 - 18)  SpO2: 97% (11-04-20 @ 05:13) (97% - 98%)    03 Nov 2020 07:01  -  04 Nov 2020 07:00  --------------------------------------------------------  IN: 439 mL / OUT: 2025 mL / NET: -1586 mL    04 Nov 2020 07:01  -  04 Nov 2020 10:37  --------------------------------------------------------  IN: 0 mL / OUT: 1450 mL / NET: -1450 mL    Appearance: Normal	   Cardiovascular: Normal S1 S2, regular. No JVD, No murmurs, No edema  Respiratory: Lungs clear to auscultation	  Psychiatry: A & O x 3, Mood & affect appropriatel  Extremities: Normal range of motion, trace BL/LE edema, Right > Left   Vascular: Peripheral pulses palpable 2+ bilaterally      LABS:	 	    CBC Full  -  ( 04 Nov 2020 05:21 )  WBC Count : 12.16 K/uL  Hemoglobin : 10.2 g/dL  Hematocrit : 30.8 %  Platelet Count - Automated : 277 K/uL  Mean Cell Volume : 100.7 fl  Mean Cell Hemoglobin : 33.3 pg  Mean Cell Hemoglobin Concentration : 33.1 gm/dL  Auto Neutrophil # : x  Auto Lymphocyte # : x  Auto Monocyte # : x  Auto Eosinophil # : x  Auto Basophil # : x  Auto Neutrophil % : x  Auto Lymphocyte % : x  Auto Monocyte % : x  Auto Eosinophil % : x  Auto Basophil % : x    11-04    139  |  101  |  44<H>  ----------------------------<  109<H>  3.9   |  25  |  2.28<H>  11-03    136  |  100  |  40<H>  ----------------------------<  76  4.1   |  26  |  2.22<H>    Ca    9.1      04 Nov 2020 05:21  Ca    9.7      03 Nov 2020 13:34  Mg     2.2     11-04    TPro  8.2  /  Alb  4.1  /  TBili  0.3  /  DBili  x   /  AST  26  /  ALT  29  /  AlkPhos  174<H>  11-03      proBNP: Serum Pro-Brain Natriuretic Peptide: 2924 pg/mL (11-03 @ 13:34)      TELEMETRY: 	  Atrial flutter 70-80

## 2020-11-04 NOTE — PROGRESS NOTE ADULT - PROBLEM SELECTOR PLAN 1
Started daily lasix as per heart failure  NOHEMI/DCCV this afternoon when PTT therapeutic  Dose coumadin  Strict Is/Os

## 2020-11-04 NOTE — PROGRESS NOTE ADULT - ASSESSMENT
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 Camacho on 8/31/20 (finished course of IV Vanco on 10/12/20), post-op course significant for accelerated junctional/AV dissociation and later new afib/flutter, presented to the CT surgery office at [facility] on 11/2 for a post-op visit, c/o worsening PRESCOTT and b/l LE edema. Pt was found to be fluid overloaded w/ 2+ b/l LE edema and in rate controlled aflutter. INR drawn in the office 11/2 was 2.41. Decision was subsequently made to admit the pt for diuresis, INR management and further management of aflutter.     Pt seen and evaluated at bedside - VSS, NAD. Breathing comfortably, O2 sat 98% on RA. Aflutter 80s-100s on tele - AV kaylan blockers being held for now given accelerated junctional rhythm/AV dissociation post-op. Will consult EP to evaluate for cardioversion per Dr. Camacho - per EP note from last admission PPM was not indicated at that time.. BUN/Cr elevated 40/2.22 (Cr on outpatient labs drawn 10/29 was 1.84) - will consult Renal Dr. Aviles per Dr. Camacho. Lasix 40 mg PO x 1 given on arrival - will discuss further diuretic regimen w/ Dr. Camacho given elevated Cr.  INR 1.78 today, down from 2.41 in office yesterday - pt reports holding Coumadin last night per Dr. Camacho's recommendation given concern for supratherapeutic INR, will hold Coumadin for now and start a Heparin gtt per Dr. Camacho. Will check CXR PA/lateral and TTE.   11/4  NOHEMI / DCCV this afternoon if PTT therapeutic  Dose coumadin Hep gtt 1100/hr

## 2020-11-04 NOTE — PROGRESS NOTE ADULT - REASON FOR ADMISSION
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

## 2020-11-05 LAB
ANION GAP SERPL CALC-SCNC: 13 MMOL/L — SIGNIFICANT CHANGE UP (ref 5–17)
APPEARANCE UR: ABNORMAL
APTT BLD: 81.3 SEC — HIGH (ref 27.5–35.5)
APTT BLD: 84.2 SEC — HIGH (ref 27.5–35.5)
APTT BLD: 86.3 SEC — HIGH (ref 27.5–35.5)
BACTERIA # UR AUTO: NEGATIVE — SIGNIFICANT CHANGE UP
BILIRUB UR-MCNC: NEGATIVE — SIGNIFICANT CHANGE UP
BUN SERPL-MCNC: 43 MG/DL — HIGH (ref 7–23)
CALCIUM SERPL-MCNC: 10 MG/DL — SIGNIFICANT CHANGE UP (ref 8.4–10.5)
CHLORIDE SERPL-SCNC: 96 MMOL/L — SIGNIFICANT CHANGE UP (ref 96–108)
CO2 SERPL-SCNC: 28 MMOL/L — SIGNIFICANT CHANGE UP (ref 22–31)
COLOR SPEC: SIGNIFICANT CHANGE UP
CREAT SERPL-MCNC: 2.63 MG/DL — HIGH (ref 0.5–1.3)
DIFF PNL FLD: ABNORMAL
EPI CELLS # UR: 0 /HPF — SIGNIFICANT CHANGE UP (ref 0–5)
GLUCOSE SERPL-MCNC: 204 MG/DL — HIGH (ref 70–99)
GLUCOSE UR QL: NEGATIVE — SIGNIFICANT CHANGE UP
HCT VFR BLD CALC: 34.8 % — LOW (ref 39–50)
HGB BLD-MCNC: 11.3 G/DL — LOW (ref 13–17)
HYALINE CASTS # UR AUTO: 2 /LPF — SIGNIFICANT CHANGE UP (ref 0–7)
INR BLD: 1.66 RATIO — HIGH (ref 0.88–1.16)
KETONES UR-MCNC: NEGATIVE — SIGNIFICANT CHANGE UP
LEUKOCYTE ESTERASE UR-ACNC: ABNORMAL
MCHC RBC-ENTMCNC: 32.5 GM/DL — SIGNIFICANT CHANGE UP (ref 32–36)
MCHC RBC-ENTMCNC: 32.9 PG — SIGNIFICANT CHANGE UP (ref 27–34)
MCV RBC AUTO: 101.5 FL — HIGH (ref 80–100)
NITRITE UR-MCNC: NEGATIVE — SIGNIFICANT CHANGE UP
NRBC # BLD: 0 /100 WBCS — SIGNIFICANT CHANGE UP (ref 0–0)
PH UR: 7 — SIGNIFICANT CHANGE UP (ref 5–8)
PLATELET # BLD AUTO: 332 K/UL — SIGNIFICANT CHANGE UP (ref 150–400)
POTASSIUM SERPL-MCNC: 3.8 MMOL/L — SIGNIFICANT CHANGE UP (ref 3.5–5.3)
POTASSIUM SERPL-SCNC: 3.8 MMOL/L — SIGNIFICANT CHANGE UP (ref 3.5–5.3)
PROT UR-MCNC: SIGNIFICANT CHANGE UP
PROTHROM AB SERPL-ACNC: 19.5 SEC — HIGH (ref 10.6–13.6)
RBC # BLD: 3.43 M/UL — LOW (ref 4.2–5.8)
RBC # FLD: 16.9 % — HIGH (ref 10.3–14.5)
RBC CASTS # UR COMP ASSIST: 11 /HPF — HIGH (ref 0–4)
SODIUM SERPL-SCNC: 137 MMOL/L — SIGNIFICANT CHANGE UP (ref 135–145)
SP GR SPEC: 1.01 — LOW (ref 1.01–1.02)
UROBILINOGEN FLD QL: SIGNIFICANT CHANGE UP
WBC # BLD: 11.22 K/UL — HIGH (ref 3.8–10.5)
WBC # FLD AUTO: 11.22 K/UL — HIGH (ref 3.8–10.5)
WBC UR QL: 514 /HPF — HIGH (ref 0–5)

## 2020-11-05 PROCEDURE — 99222 1ST HOSP IP/OBS MODERATE 55: CPT | Mod: GC

## 2020-11-05 PROCEDURE — 93306 TTE W/DOPPLER COMPLETE: CPT | Mod: 26

## 2020-11-05 RX ORDER — WARFARIN SODIUM 2.5 MG/1
5 TABLET ORAL ONCE
Refills: 0 | Status: COMPLETED | OUTPATIENT
Start: 2020-11-05 | End: 2020-11-05

## 2020-11-05 RX ADMIN — HEPARIN SODIUM 11 UNIT(S)/HR: 5000 INJECTION INTRAVENOUS; SUBCUTANEOUS at 09:30

## 2020-11-05 RX ADMIN — Medication 100 MILLIGRAM(S): at 12:47

## 2020-11-05 RX ADMIN — Medication 500 MILLIGRAM(S): at 12:46

## 2020-11-05 RX ADMIN — HEPARIN SODIUM 10 UNIT(S)/HR: 5000 INJECTION INTRAVENOUS; SUBCUTANEOUS at 18:10

## 2020-11-05 RX ADMIN — SODIUM CHLORIDE 3 MILLILITER(S): 9 INJECTION INTRAMUSCULAR; INTRAVENOUS; SUBCUTANEOUS at 12:59

## 2020-11-05 RX ADMIN — Medication 1 TABLET(S): at 12:46

## 2020-11-05 RX ADMIN — HEPARIN SODIUM 11 UNIT(S)/HR: 5000 INJECTION INTRAVENOUS; SUBCUTANEOUS at 06:22

## 2020-11-05 RX ADMIN — PANTOPRAZOLE SODIUM 40 MILLIGRAM(S): 20 TABLET, DELAYED RELEASE ORAL at 05:08

## 2020-11-05 RX ADMIN — FINASTERIDE 5 MILLIGRAM(S): 5 TABLET, FILM COATED ORAL at 12:46

## 2020-11-05 RX ADMIN — Medication 325 MILLIGRAM(S): at 12:46

## 2020-11-05 RX ADMIN — Medication 40 MILLIGRAM(S): at 05:08

## 2020-11-05 RX ADMIN — Medication 5000 UNIT(S): at 12:46

## 2020-11-05 RX ADMIN — SODIUM CHLORIDE 3 MILLILITER(S): 9 INJECTION INTRAMUSCULAR; INTRAVENOUS; SUBCUTANEOUS at 05:07

## 2020-11-05 RX ADMIN — ABACAVIR 600 MILLIGRAM(S): 20 SOLUTION ORAL at 12:46

## 2020-11-05 RX ADMIN — TAMSULOSIN HYDROCHLORIDE 0.4 MILLIGRAM(S): 0.4 CAPSULE ORAL at 21:53

## 2020-11-05 RX ADMIN — SODIUM CHLORIDE 3 MILLILITER(S): 9 INJECTION INTRAMUSCULAR; INTRAVENOUS; SUBCUTANEOUS at 20:48

## 2020-11-05 RX ADMIN — Medication 81 MILLIGRAM(S): at 12:46

## 2020-11-05 RX ADMIN — WARFARIN SODIUM 5 MILLIGRAM(S): 2.5 TABLET ORAL at 21:53

## 2020-11-05 NOTE — PROGRESS NOTE ADULT - PROBLEM SELECTOR PLAN 2
DCCV this afternoon  Currently Aflutter 80s-100s  Continue off AV kaylan blockers for now  d/w EP   Continue AC w/ Coumadin /heparin gtt

## 2020-11-05 NOTE — PROGRESS NOTE ADULT - ASSESSMENT
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 Camacho on 8/31/20 (finished course of IV Vanco on 10/12/20), post-op course significant for accelerated junctional/AV dissociation and later new afib/flutter, presented to the CT surgery office at [facility] on 11/2 for a post-op visit, c/o worsening PRESCOTT and b/l LE edema. Pt was found to be fluid overloaded w/ 2+ b/l LE edema and in rate controlled aflutter. INR drawn in the office 11/2 was 2.41. Decision was subsequently made to admit the pt for diuresis, INR management and further management of aflutter.     Pt seen and evaluated at bedside - VSS, NAD. Breathing comfortably, O2 sat 98% on RA. Aflutter 80s-100s on tele - AV kaylan blockers being held for now given accelerated junctional rhythm/AV dissociation post-op. Will consult EP to evaluate for cardioversion per Dr. Camacho - per EP note from last admission PPM was not indicated at that time.. BUN/Cr elevated 40/2.22 (Cr on outpatient labs drawn 10/29 was 1.84) - will consult Renal Dr. Aviles per Dr. Camacho. Lasix 40 mg PO x 1 given on arrival - will discuss further diuretic regimen w/ Dr. Camacho given elevated Cr.  INR 1.78 today, down from 2.41 in office yesterday - pt reports holding Coumadin last night per Dr. Camacho's recommendation given concern for supratherapeutic INR, will hold Coumadin for now and start a Heparin gtt per Dr. Camacho. Will check CXR PA/lateral and TTE.   11/4  NOHEMI / DCCV this afternoon if PTT therapeutic  Dose coumadin Hep gtt 1100/hr  11/5 VSS NSR d/w EP BB - not at this time . 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 Camacho on 8/31/20 (finished course of IV Vanco on 10/12/20), post-op course significant for accelerated junctional/AV dissociation and later new afib/flutter, presented to the CT surgery office at [facility] on 11/2 for a post-op visit, c/o worsening PRESCOTT and b/l LE edema. Pt was found to be fluid overloaded w/ 2+ b/l LE edema and in rate controlled aflutter. INR drawn in the office 11/2 was 2.41. Decision was subsequently made to admit the pt for diuresis, INR management and further management of aflutter.     Pt seen and evaluated at bedside - VSS, NAD. Breathing comfortably, O2 sat 98% on RA. Aflutter 80s-100s on tele - AV kaylan blockers being held for now given accelerated junctional rhythm/AV dissociation post-op. Will consult EP to evaluate for cardioversion per Dr. Camacho - per EP note from last admission PPM was not indicated at that time.. BUN/Cr elevated 40/2.22 (Cr on outpatient labs drawn 10/29 was 1.84) - will consult Renal Dr. Aviles per Dr. Camacho. Lasix 40 mg PO x 1 given on arrival - will discuss further diuretic regimen w/ Dr. Camacho given elevated Cr.  INR 1.78 today, down from 2.41 in office yesterday - pt reports holding Coumadin last night per Dr. Camacho's recommendation given concern for supratherapeutic INR, will hold Coumadin for now and start a Heparin gtt per Dr. Camacho. Will check CXR PA/lateral and TTE.   11/4  NOHEMI / DCCV this afternoon if PTT therapeutic  Dose coumadin Hep gtt 1100/hr  11/5 VSS NSR d/w EP BB - not at this time . INR 1.66 hearing gtt coumadin bridge.Coumadin 5 tonight  ECHO completedHome in am

## 2020-11-05 NOTE — PROGRESS NOTE ADULT - ASSESSMENT
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 Patient had pAF/AFL prior to [facility] and was discharged to Rehab in persistent rate controlled Afib in the 60's since 9/9/20. Patient presented to the CT surgery office at [facility] on 11/2/20 for post-op visit with c/o worsening PRESCOTT and b/l LE edema, found to be in rate controlled atrial flutter. He was admitted for diuresis and further management of atrial arrhythmia.        1) Bacteremia (staph epidermidis) w/ MV endocarditis s/p MVR (t) and SHELLIE clipping on 8/31  2) Post-op SR w/ 1st degree w/ periods of 2nd degree AV block (AV kaylan disease)  3) Afib/Aflutter    -s/p NOHEMI/DCCV 11/4, maintaining NSR   -Continue coumadin/Heparin bridge to maintain goal INR between 2.0-3.0  - Observe off AV kaylan blockers or antiarrythmic for now given accelerated junctional rhythm/AV dissociation post-op  - EP will sign off, reconsult as needed    JAMILAH Perera NP   [phone]

## 2020-11-05 NOTE — PROGRESS NOTE ADULT - SUBJECTIVE AND OBJECTIVE BOX
24H hour events: "I feel better today"     MEDICATIONS:  aspirin enteric coated 81 milliGRAM(s) Oral daily  furosemide    Tablet 40 milliGRAM(s) Oral daily  heparin  Infusion 1100 Unit(s)/Hr IV Continuous <Continuous>  tamsulosin 0.4 milliGRAM(s) Oral at bedtime    abacavir 600 milliGRAM(s) Oral daily  lamiVUDine- milliGRAM(s) Oral daily    pantoprazole    Tablet 40 milliGRAM(s) Oral before breakfast    finasteride 5 milliGRAM(s) Oral daily    ascorbic acid 500 milliGRAM(s) Oral daily  cholecalciferol 5000 Unit(s) Oral daily  ferrous    sulfate 325 milliGRAM(s) Oral daily  multivitamin 1 Tablet(s) Oral daily  sodium chloride 0.9% lock flush 3 milliLiter(s) IV Push every 8 hours      REVIEW OF SYSTEMS:  Complete 10point ROS negative.    PHYSICAL EXAM:  T(C): 36.8 (11-05-20 @ 04:37), Max: 36.9 (11-04-20 @ 20:54)  HR: 91 (11-05-20 @ 04:37) (79 - 95)  BP: 120/68 (11-05-20 @ 04:37) (105/65 - 120/68)  RR: 18 (11-05-20 @ 04:37) (18 - 18)  SpO2: 100% (11-05-20 @ 04:37) (97% - 100%)    04 Nov 2020 07:01 - 05 Nov 2020 07:00  --------------------------------------------------------  IN: 129 mL / OUT: 3950 mL / NET: -3821 mL    05 Nov 2020 07:01  -  05 Nov 2020 11:44  --------------------------------------------------------  IN: 177 mL / OUT: 550 mL / NET: -373 mL      Appearance: Normal	  Cardiovascular: Normal S1 S2, regular.    Respiratory: Lungs clear to auscultation	  Psychiatry: A & O x 3, Mood & affect appropriate  Gastrointestinal:  Soft, Non-tender, + BS	  Extremities: Normal range of motion, No clubbing, cyanosis.  Trace BL/LE edema   Vascular: Peripheral pulses palpable 2+ bilaterally      LABS:	 	    CBC Full  -  ( 05 Nov 2020 09:16 )  WBC Count : 11.22 K/uL  Hemoglobin : 11.3 g/dL  Hematocrit : 34.8 %  Platelet Count - Automated : 332 K/uL  Mean Cell Volume : 101.5 fl  Mean Cell Hemoglobin : 32.9 pg  Mean Cell Hemoglobin Concentration : 32.5 gm/dL  Auto Neutrophil # : x  Auto Lymphocyte # : x  Auto Monocyte # : x  Auto Eosinophil # : x  Auto Basophil # : x  Auto Neutrophil % : x  Auto Lymphocyte % : x  Auto Monocyte % : x  Auto Eosinophil % : x  Auto Basophil % : x    11-05    137  |  96  |  43<H>  ----------------------------<  204<H>  3.8   |  28  |  2.63<H>  11-04    139  |  101  |  44<H>  ----------------------------<  109<H>  3.9   |  25  |  2.28<H>    Ca    10.0      05 Nov 2020 09:16  Ca    9.1      04 Nov 2020 05:21  Mg     2.2     11-04    TPro  8.2  /  Alb  4.1  /  TBili  0.3  /  DBili  x   /  AST  26  /  ALT  29  /  AlkPhos  174<H>  11-03      proBNP: Serum Pro-Brain Natriuretic Peptide: 2924 pg/mL (11-03 @ 13:34)      TELEMETRY: 	  NSR/ Sinus Tach , 1st Degree

## 2020-11-05 NOTE — PROGRESS NOTE ADULT - SUBJECTIVE AND OBJECTIVE BOX
subjective ' hello when can I leave?"    VITAL SIGNS    Telemetry: " S12 rrr     Vital Signs Last 24 Hrs  T(C): 36.4 (20 @ 13:31), Max: 36.9 (20 @ 20:54)  T(F): 97.6 (20 @ 13:31), Max: 98.4 (20 @ 20:54)  HR: 107 (20 13:31) (79 - 107)  BP: 110/69 (20 13:31) (105/65 - 120/68)  RR: 18 (20 13:31) (18 - 18)  SpO2: 98% (20 13:31) (97% - 100%)                    @ 07:01  -   @ 07:00  --------------------------------------------------------  IN: 129 mL / OUT: 3950 mL / NET: -3821 mL     @ 07:01  -   @ 14:59  --------------------------------------------------------  IN: 417 mL / OUT: 950 mL / NET: -533 mL  Daily Weight in k (2020 10:36)        CAPILLARY BLOOD GLUCOSE              Drains:     MS         [  ] Drainage:                 L Pleural  [  ]  Drainage:                R Pleural  [  ]  Drainage:    Pacing Wires        [  ]   Settings:                                  Isolated  [  ]    Coumadin    [ ] YES          [  ]      NO         Reason:                               PHYSICAL EXAM        Neurology: alert and oriented x 3, nonfocal, no gross deficits    CV :    Sternal Wound :  CDI , Stable    Lungs:    Abdomen: soft, nontender, nondistended, positive bowel sounds, last bowel movement     :               Extremities:                                           Physical Therapy Rec:   Home  [  ]   Home w/ PT  [  ]  Rehab  [  ]    Discussed with Cardiothoracic Team at AM rounds. subjective ' hello when can I leave?"    VITAL SIGNS    Telemetry: " S12 rrr     Vital Signs Last 24 Hrs  T(C): 36.4 (20 @ 13:31), Max: 36.9 (20 @ 20:54)  T(F): 97.6 (20 @ 13:31), Max: 98.4 (20 @ 20:54)  HR: 107 (20 13:31) (79 - 107)  BP: 110/69 (20 @ 13:31) (105/65 - 120/68)  RR: 18 (20 @ 13:31) (18 - 18)  SpO2: 98% (20 @ 13:31) (97% - 100%)            @ 07:01  -   @ 07:00  --------------------------------------------------------  IN: 129 mL / OUT: 3950 mL / NET: -3821 mL     @ 07:01  -   @ 14:59  --------------------------------------------------------  IN: 417 mL / OUT: 950 mL / NET: -533 mL  Daily Weight in k (2020 10:36)  MEDICATIONS  (STANDING):  abacavir 600 milliGRAM(s) Oral daily  ascorbic acid 500 milliGRAM(s) Oral daily  aspirin enteric coated 81 milliGRAM(s) Oral daily  cholecalciferol 5000 Unit(s) Oral daily  ferrous    sulfate 325 milliGRAM(s) Oral daily  finasteride 5 milliGRAM(s) Oral daily  furosemide    Tablet 40 milliGRAM(s) Oral daily  heparin  Infusion 1100 Unit(s)/Hr (11 mL/Hr) IV Continuous <Continuous>  lamiVUDine- milliGRAM(s) Oral daily  multivitamin 1 Tablet(s) Oral daily  pantoprazole    Tablet 40 milliGRAM(s) Oral before breakfast  Pifeltro (Doravirine) tablet 100 milliGRAM(s) 100 milliGRAM(s) Oral daily  sodium chloride 0.9% lock flush 3 milliLiter(s) IV Push every 8 hours  tamsulosin 0.4 milliGRAM(s) Oral at bedtime  warfarin 5 milliGRAM(s) Oral once    MEDICATIONS  (PRN):                          PHYSICAL EXAM        Neurology: alert and oriented x 3, nonfocal, no gross deficits    CV :S1 S2RRR    Sternal Wound : CAR sternum stable healing      Lungs: b/l ct aon roomair    Abdomen: soft, nontender, nondistended, positive bowel sounds, last bowel movement     : self cath               Extremities:  equal strength throughout    B/lle warm well perfused + DP                                        Physical Therapy Rec:   Home  [ x ]   Home w/ PT  [  ]  Rehab  [  ]    Discussed with Cardiothoracic Team at AM rounds.

## 2020-11-05 NOTE — PROGRESS NOTE ADULT - PROBLEM SELECTOR PLAN 1
Started daily lasix as per heart failure   s/p NOHEMI/DCCV    Dose coumadin 5 mg tonight  INR 1.66  Strict Is/Os  Dispsoition to home likely Friday Started daily lasix as per heart failure   s/p NOHEMI/DCCV    Dose coumadin 5 mg tonight  INR 1.66  Strict Is/Os  F/u ECHO results  Dispsoition to home likely Friday

## 2020-11-06 LAB
ANION GAP SERPL CALC-SCNC: 13 MMOL/L — SIGNIFICANT CHANGE UP (ref 5–17)
APTT BLD: 71.4 SEC — HIGH (ref 27.5–35.5)
APTT BLD: 72.7 SEC — HIGH (ref 27.5–35.5)
BUN SERPL-MCNC: 41 MG/DL — HIGH (ref 7–23)
CALCIUM SERPL-MCNC: 9.4 MG/DL — SIGNIFICANT CHANGE UP (ref 8.4–10.5)
CHLORIDE SERPL-SCNC: 98 MMOL/L — SIGNIFICANT CHANGE UP (ref 96–108)
CO2 SERPL-SCNC: 25 MMOL/L — SIGNIFICANT CHANGE UP (ref 22–31)
CREAT SERPL-MCNC: 2.9 MG/DL — HIGH (ref 0.5–1.3)
GLUCOSE SERPL-MCNC: 104 MG/DL — HIGH (ref 70–99)
HCT VFR BLD CALC: 32.7 % — LOW (ref 39–50)
HCT VFR BLD CALC: 33.2 % — LOW (ref 39–50)
HGB BLD-MCNC: 10.8 G/DL — LOW (ref 13–17)
HGB BLD-MCNC: 10.8 G/DL — LOW (ref 13–17)
INR BLD: 2.16 RATIO — HIGH (ref 0.88–1.16)
MCHC RBC-ENTMCNC: 32.5 GM/DL — SIGNIFICANT CHANGE UP (ref 32–36)
MCHC RBC-ENTMCNC: 32.6 PG — SIGNIFICANT CHANGE UP (ref 27–34)
MCHC RBC-ENTMCNC: 33 GM/DL — SIGNIFICANT CHANGE UP (ref 32–36)
MCHC RBC-ENTMCNC: 33.1 PG — SIGNIFICANT CHANGE UP (ref 27–34)
MCV RBC AUTO: 100.3 FL — HIGH (ref 80–100)
MCV RBC AUTO: 100.3 FL — HIGH (ref 80–100)
NRBC # BLD: 0 /100 WBCS — SIGNIFICANT CHANGE UP (ref 0–0)
NRBC # BLD: 0 /100 WBCS — SIGNIFICANT CHANGE UP (ref 0–0)
PLATELET # BLD AUTO: 296 K/UL — SIGNIFICANT CHANGE UP (ref 150–400)
PLATELET # BLD AUTO: 306 K/UL — SIGNIFICANT CHANGE UP (ref 150–400)
POTASSIUM SERPL-MCNC: 3.7 MMOL/L — SIGNIFICANT CHANGE UP (ref 3.5–5.3)
POTASSIUM SERPL-SCNC: 3.7 MMOL/L — SIGNIFICANT CHANGE UP (ref 3.5–5.3)
PROTHROM AB SERPL-ACNC: 25 SEC — HIGH (ref 10.6–13.6)
RBC # BLD: 3.26 M/UL — LOW (ref 4.2–5.8)
RBC # BLD: 3.31 M/UL — LOW (ref 4.2–5.8)
RBC # FLD: 16.6 % — HIGH (ref 10.3–14.5)
RBC # FLD: 16.7 % — HIGH (ref 10.3–14.5)
SODIUM SERPL-SCNC: 136 MMOL/L — SIGNIFICANT CHANGE UP (ref 135–145)
WBC # BLD: 11.32 K/UL — HIGH (ref 3.8–10.5)
WBC # BLD: 13.13 K/UL — HIGH (ref 3.8–10.5)
WBC # FLD AUTO: 11.32 K/UL — HIGH (ref 3.8–10.5)
WBC # FLD AUTO: 13.13 K/UL — HIGH (ref 3.8–10.5)

## 2020-11-06 PROCEDURE — 99232 SBSQ HOSP IP/OBS MODERATE 35: CPT | Mod: GC

## 2020-11-06 RX ORDER — WARFARIN SODIUM 2.5 MG/1
5 TABLET ORAL ONCE
Refills: 0 | Status: COMPLETED | OUTPATIENT
Start: 2020-11-06 | End: 2020-11-06

## 2020-11-06 RX ADMIN — TAMSULOSIN HYDROCHLORIDE 0.4 MILLIGRAM(S): 0.4 CAPSULE ORAL at 21:25

## 2020-11-06 RX ADMIN — SODIUM CHLORIDE 3 MILLILITER(S): 9 INJECTION INTRAMUSCULAR; INTRAVENOUS; SUBCUTANEOUS at 12:46

## 2020-11-06 RX ADMIN — Medication 81 MILLIGRAM(S): at 12:46

## 2020-11-06 RX ADMIN — WARFARIN SODIUM 5 MILLIGRAM(S): 2.5 TABLET ORAL at 21:25

## 2020-11-06 RX ADMIN — SODIUM CHLORIDE 3 MILLILITER(S): 9 INJECTION INTRAMUSCULAR; INTRAVENOUS; SUBCUTANEOUS at 05:01

## 2020-11-06 RX ADMIN — Medication 325 MILLIGRAM(S): at 12:45

## 2020-11-06 RX ADMIN — Medication 5000 UNIT(S): at 12:45

## 2020-11-06 RX ADMIN — Medication 40 MILLIGRAM(S): at 05:08

## 2020-11-06 RX ADMIN — HEPARIN SODIUM 10 UNIT(S)/HR: 5000 INJECTION INTRAVENOUS; SUBCUTANEOUS at 01:33

## 2020-11-06 RX ADMIN — PANTOPRAZOLE SODIUM 40 MILLIGRAM(S): 20 TABLET, DELAYED RELEASE ORAL at 05:08

## 2020-11-06 RX ADMIN — SODIUM CHLORIDE 3 MILLILITER(S): 9 INJECTION INTRAMUSCULAR; INTRAVENOUS; SUBCUTANEOUS at 21:22

## 2020-11-06 RX ADMIN — Medication 1 TABLET(S): at 12:46

## 2020-11-06 RX ADMIN — Medication 100 MILLIGRAM(S): at 12:45

## 2020-11-06 RX ADMIN — Medication 500 MILLIGRAM(S): at 12:46

## 2020-11-06 RX ADMIN — ABACAVIR 600 MILLIGRAM(S): 20 SOLUTION ORAL at 12:45

## 2020-11-06 RX ADMIN — Medication 1 TABLET(S): at 21:25

## 2020-11-06 RX ADMIN — FINASTERIDE 5 MILLIGRAM(S): 5 TABLET, FILM COATED ORAL at 12:46

## 2020-11-06 NOTE — CONSULT NOTE ADULT - ATTENDING COMMENTS
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 bacteremia from straight catheterization  Ultimately required MVR and discharged to rehab  Re-admitted for A.flutter conversion- uneventful    Currently with leukocytes but no bacteria in urine UA asymptomatic and afebrile  Feeling well overall  sternotomy site well healed without click or instability    Agree with CTS about possible attempt to suppress a recurrent UTI given risk/benefit ratio  Based upon prior culture results Bactrim is an option  Dosing for his renal function is SS three times a week  Interaction with coumadin to monitor INR for change (increase)    Tung Almazan MD  [phone]  After 5pm/weekends [phone]
I have seen this patient with the fellow and agree with their assessment and plan. In addition, SUDHEER on CKD likely.  As stated above, ok for diuresis but make sure no component of distal obstruction.  Check urinalysis and spot ratio  No contra-indication to proceed with cardiac procedure    Calin Kenyon MD  Cell   Pager   Office

## 2020-11-06 NOTE — CONSULT NOTE ADULT - SUBJECTIVE AND OBJECTIVE BOX
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 Camacho on 20 (finished course of IV Vanco on 10/12/20), post-op course significant for accelerated junctional/AV dissociation and later new afib/flutter, presented to the CT surgery office at [facility] on  for a post-op visit. At that time, pt was c/o worsening PRESCOTT and b/l LE edema. Additionally, pt reported not having his INR checked in approximately 2 weeks since he was d/c'd home from rehab. Pt was found to be fluid overloaded w/ 2+ b/l LE edema and in rate controlled aflutter. INR drawn in the office  was 2.41 and COVID PCR was negative. Decision was subsequently made to admit the pt for diuresis, INR management and further management of aflutter.  (2020 13:16)       prior hospital charts reviewed [  ]  primary team notes reviewed [  ]  other consultant notes reviewed [  ]    PAST MEDICAL & SURGICAL HISTORY:  H/O endocarditis  MV endocarditis    Orchitis and epididymitis    Vitamin D deficiency    Bladder mass    Edema of both legs    Osteoporosis    Seborrheic keratosis    Constipation, unspecified constipation type    Chronic kidney disease, unspecified stage    HIV (human immunodeficiency virus infection)    Unsteady gait    S/P MVR (mitral valve replacement)  &amp; SHELLIE clip, 2020    S/P coronary artery stent placement        Allergies  No Known Allergies    ANTIMICROBIALS (past 90 days)  MEDICATIONS  (STANDING):  abacavir   600 milliGRAM(s) Oral (20 @ 12:46)   600 milliGRAM(s) Oral (20 @ 11:31)    lamiVUDine-HBV   100 milliGRAM(s) Oral (20 @ 12:47)   100 milliGRAM(s) Oral (20 @ 11:32)        abacavir 600 daily  lamiVUDine- daily    OTHER MEDS: MEDICATIONS  (STANDING):  aspirin enteric coated 81 daily  finasteride 5 daily  furosemide    Tablet 40 daily  heparin  Infusion 1100 <Continuous>  pantoprazole    Tablet 40 before breakfast  tamsulosin 0.4 at bedtime    SOCIAL HISTORY:   Lives alone in [facility] assisted living home, ambulates unassisted in the home but with a walker or cane outside the home  Social EtOH   Denies tobacco use (2020 13:16)      FAMILY HISTORY:  No pertinent family history in first degree relatives      REVIEW OF SYSTEMS  [  ] ROS unobtainable because:    [  ] All other systems negative except as noted below:	    Constitutional:  [ ] fever [ ] chills  [ ] weight loss  [ ] weakness  Skin:  [ ] rash [ ] phlebitis	  Eyes: [ ] icterus [ ] pain  [ ] discharge	  ENMT: [ ] sore throat  [ ] thrush [ ] ulcers [ ] exudates  Respiratory: [ ] dyspnea [ ] hemoptysis [ ] cough [ ] sputum	  Cardiovascular:  [ ] chest pain [ ] palpitations [ ] edema	  Gastrointestinal:  [ ] nausea [ ] vomiting [ ] diarrhea [ ] constipation [ ] pain	  Genitourinary:  [ ] dysuria [ ] frequency [ ] hematuria [ ] discharge [ ] flank pain  [ ] incontinence  Musculoskeletal:  [ ] myalgias [ ] arthralgias [ ] arthritis  [ ] back pain  Neurological:  [ ] headache [ ] seizures  [ ] confusion/altered mental status  Psychiatric:  [ ] anxiety [ ] depression	  Hematology/Lymphatics:  [ ] lymphadenopathy  Endocrine:  [ ] adrenal [ ] thyroid  Allergic/Immunologic:	 [ ] transplant [ ] seasonal    Vital Signs Last 24 Hrs  T(F): 98.4 (20 @ 05:07), Max: 98.9 (20 @ 20:42)  Vital Signs Last 24 Hrs  HR: 95 (20 @ 05:07) (95 - 107)  BP: 126/74 (20 @ 05:07) (110/69 - 126/74)  RR: 18 (20 @ 05:07)  SpO2: 99% (20 @ 05:07) (98% - 99%)  Wt(kg): --    PHYSICAL EXAM:  Constitutional: non-toxic, no distress  HEAD/EYES: anicteric, no conjunctival injection  ENT:  supple, no thrush  Cardiovascular:   normal S1, S2, no murmur, no edema  Respiratory:  clear BS bilaterally, no wheezes, no rales  GI:  soft, non-tender, normal bowel sounds  :  no tovar, no CVA tenderness  Musculoskeletal:  no synovitis, normal ROM  Neurologic: awake and alert, normal strength, no focal findings  Skin:  no rash, no erythema, no phlebitis  Heme/Onc: no lymphadenopathy   Psychiatric:  awake, alert, appropriate mood                            10.8   11.32 )-----------( 296      ( 2020 06:33 )             33.2   11-06    136  |  98  |  41<H>  ----------------------------<  104<H>  3.7   |  25  |  2.90<H>    Ca    9.4      2020 06:33      Urinalysis Basic - ( 2020 03:19 )    Color: Light Yellow / Appearance: Slightly Turbid / S.009 / pH: x  Gluc: x / Ketone: Negative  / Bili: Negative / Urobili: <2 mg/dL   Blood: x / Protein: Trace / Nitrite: Negative   Leuk Esterase: Large / RBC: 11 /HPF /  /HPF   Sq Epi: x / Non Sq Epi: 0 /HPF / Bacteria: Negative    MICROBIOLOGY:        HIV-1 RNA Quantitative, Viral Load Log: NOT DET. 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 Camacho on 20 (finished course of IV Vanco on 10/12/20), post-op course significant for accelerated junctional/AV dissociation and later new afib/flutter, presented to the CT surgery office at [facility] on  for a post-op visit. At that time, pt was c/o worsening PRESCOTT and b/l LE edema. Additionally, pt reported not having his INR checked in approximately 2 weeks since he was d/c'd home from rehab. Pt was found to be fluid overloaded w/ 2+ b/l LE edema and in rate controlled aflutter. INR drawn in the office  was 2.41 and COVID PCR was negative. Decision was subsequently made to admit the pt for diuresis, INR management and further management of aflutter.     S/p DCCV on . Has been undergoing diuresis.   ID consulted for r/o UTI. He self catherizes 4x/d.   He denies any abdominal pain, fevers, chills, nausea, vomiting, diarrhea, back pain.     prior hospital charts reviewed [ x ]  primary team notes reviewed [ x ]  other consultant notes reviewed [ x ]    PAST MEDICAL & SURGICAL HISTORY:  H/O endocarditis  MV endocarditis  Orchitis and epididymitis  Vitamin D deficiency  Bladder mass  Edema of both legs  Osteoporosis  Seborrheic keratosis  Constipation, unspecified constipation type  Chronic kidney disease, unspecified stage  HIV (human immunodeficiency virus infection)  Unsteady gait  S/P MVR (mitral valve replacement& SHELLIE clip, 2020  S/P coronary artery stent placement        Allergies  No Known Allergies    ANTIMICROBIALS (past 90 days)  MEDICATIONS  (STANDING):  abacavir   600 milliGRAM(s) Oral (20 @ 12:46)   600 milliGRAM(s) Oral (20 @ 11:31)    lamiVUDine-HBV   100 milliGRAM(s) Oral (20 @ 12:47)   100 milliGRAM(s) Oral (20 @ 11:32)    abacavir 600 daily  lamiVUDine- daily    OTHER MEDS: MEDICATIONS  (STANDING):  aspirin enteric coated 81 daily  finasteride 5 daily  furosemide    Tablet 40 daily  heparin  Infusion 1100 <Continuous>  pantoprazole    Tablet 40 before breakfast  tamsulosin 0.4 at bedtime    SOCIAL HISTORY:   Lives alone in [facility] assisted living home, ambulates unassisted in the home but with a walker or cane outside the home  Social EtOH   Denies tobacco use       FAMILY HISTORY:  No pertinent family history in first degree relatives      REVIEW OF SYSTEMS  [  ] ROS unobtainable because:    [ x ] All other systems negative except as noted below:	    Constitutional:  [ ] fever [ ] chills  [ ] weight loss  [ ] weakness  Skin:  [ ] rash [ ] phlebitis	  Eyes: [ ] icterus [ ] pain  [ ] discharge	  ENMT: [ ] sore throat  [ ] thrush [ ] ulcers [ ] exudates  Respiratory: [ ] dyspnea [ ] hemoptysis [ ] cough [ ] sputum	  Cardiovascular:  [ ] chest pain [ ] palpitations [x] edema	  Gastrointestinal:  [ ] nausea [ ] vomiting [ ] diarrhea [ ] constipation [ ] pain	  Genitourinary:  [ ] dysuria [ ] frequency [ ] hematuria [ ] discharge [ ] flank pain  [ ] incontinence  Musculoskeletal:  [ ] myalgias [ ] arthralgias [ ] arthritis  [ ] back pain  Neurological:  [ ] headache [ ] seizures  [ ] confusion/altered mental status  Psychiatric:  [ ] anxiety [ ] depression	  Hematology/Lymphatics:  [ ] lymphadenopathy  Endocrine:  [ ] adrenal [ ] thyroid  Allergic/Immunologic:	 [ ] transplant [ ] seasonal    Vital Signs Last 24 Hrs  T(F): 98.4 (20 @ 05:07), Max: 98.9 (20 @ 20:42)  Vital Signs Last 24 Hrs  HR: 95 (20 @ 05:07) (95 - 107)  BP: 126/74 (20 @ 05:07) (110/69 - 126/74)  RR: 18 (20 @ 05:07)  SpO2: 99% (20 @ 05:07) (98% - 99%)  Wt(kg): --    PHYSICAL EXAM:  Constitutional: non-toxic, no distress  HEAD/EYES: anicteric, no conjunctival injection  ENT:  supple, no thrush  Cardiovascular:   normal S1, S2, no murmur, no edema  Respiratory:  clear BS bilaterally, no wheezes, no rales  GI:  soft, non-tender, normal bowel sounds  :  no tovar, no CVA tenderness  Musculoskeletal:  no synovitis, normal ROM  Neurologic: awake and alert, normal strength, no focal findings  Skin:  no rash, no erythema, no phlebitis; sternal incision  Heme/Onc: no lymphadenopathy   Psychiatric:  awake, alert, appropriate mood                            10.8   11.32 )-----------( 296      ( 2020 06:33 )             33.2   11    136  |  98  |  41<H>  ----------------------------<  104<H>  3.7   |  25  |  2.90<H>    Ca    9.4      2020 06:33      Urinalysis Basic - ( 2020 03:19 )    Color: Light Yellow / Appearance: Slightly Turbid / S.009 / pH: x  Gluc: x / Ketone: Negative  / Bili: Negative / Urobili: <2 mg/dL   Blood: x / Protein: Trace / Nitrite: Negative   Leuk Esterase: Large / RBC: 11 /HPF /  /HPF   Sq Epi: x / Non Sq Epi: 0 /HPF / Bacteria: Negative    MICROBIOLOGY:  HIV-1 RNA Quantitative, Viral Load Log: NOT DET. lg /mL (20 @ 04:46)      RADIOLOGY:  imaging below personally reviewed and agree with findings    < from: Xray Chest 2 Views PA/Lat (20 @ 16:35) >  IMPRESSION:  Clear lungs.    < from: Transthoracic Echocardiogram (20 @ 10:25) >  Conclusions:  1. Bioprosthetic mitral valve replacement. There is no  mitral regurgitation. Mean transmitral valve gradient  equals 6 mm Hg, which is elevated even in the setting of a  bioprosthetic mitral valve replacement.HR 85-90  2. Increased relative wall thickness with normal left  ventricular mass index, consistent with concentric left  ventricular remodeling.  3. Normal left ventricular systolic function. No segmental  wall motion abnormalities.  4. The right ventricle is not well visualized; grossly  normal right ventricular systolic function.  5. Estimated pulmonary artery systolic pressure equals 33  mm Hg, assuming right atrial pressure equals 8  mm Hg,  consistent with normal pulmonary pressures.    < from: Transesophageal Echocardiogram w/o TTE (20 @ 15:23) >  Conclusions:  Normal left ventricular systolic function. No segmental  wall motion abnormalities.  Bioprosthetic mitral valve with normal function.  s/Latisha appendage ligation. No thrombus present in the left  atrium.

## 2020-11-06 NOTE — PROGRESS NOTE ADULT - PROBLEM SELECTOR PLAN 1
Pt. with renal failure in the setting of CHF exacerbation. However, exact duration of renal failure unknown. Upon lab review on [facility], Scr was 1.84 on 10/29/20. Scr on arrival was 2.28 and today increased to 2.90. Pt. underwent DCCV yesterday (11/5/20). Pt. with multifactorial CKD since 2013 secondary to numerous urological issues and ? MGRS. UA shows many WBCs and leucocyte esterase +.  Pt. with likely hemodynamically mediated SUDHEER on CKD. However, pt. with history of multiple UTIs in the past. Continue Lasix 40 mg PO for diuresis. Consider ID consult for ?UTI. Discontinue PPI (? AIN). Pt. advised frequent straight catheterization (especially within first 6 hours of Lasix dose). Monitor labs and daily weights. Avoid potential nephrotoxins. Dose medications as per eGFR.    If any questions, please feel free to contact me  Eric Morgan   Nephrology Fellow  [phone]  (After 5 pm or on weekends please page the on-call fellow).

## 2020-11-06 NOTE — PROGRESS NOTE ADULT - SUBJECTIVE AND OBJECTIVE BOX
[facility] DIVISION OF KIDNEY DISEASES AND HYPERTENSION -- FOLLOW UP NOTE  --------------------------------------------------------------------------------  HPI: 80-year-old male HIV (VL undetectable), CAD, detrusor muscle weakness (self catheterizes), Afib/ Aflutter (on Coumadin), BPH, s/p MVR 8/31/20 was sent from CT surgery office due to hypervolemia and found to be irregular rhythm on 11/2/20.  Pt. given 1 dose of IV Lasix in the ER. Pt. evaluated by EP underwent DCCV yesterday (11/5/20). Nephrology team consulted for elevated serum creatinine. Pt. receives his outpatient kidney care from Dr. Ferguson. Pt. with multifactorial CKD as per outpatient notes. Upon review of labs on [facility], Scr was 2.05 on 9/6/2019. Scr peaked at 3.10 on 3/3/20. Scr prior to hospitalization was 1.84. on 10/29/20. Scr on arrival was 2.22 and today increased to 2.90. Pt. currently on PO Lasix with good urine output.     Pt. evaluated at bedside, in no acute distress. Offers no complaints.    PAST HISTORY  --------------------------------------------------------------------------------  No significant changes to PMH, PSH, FHx, SHx, unless otherwise noted    ALLERGIES & MEDICATIONS  --------------------------------------------------------------------------------  Allergies    No Known Allergies    Intolerances    Standing Inpatient Medications  abacavir 600 milliGRAM(s) Oral daily  ascorbic acid 500 milliGRAM(s) Oral daily  aspirin enteric coated 81 milliGRAM(s) Oral daily  cholecalciferol 5000 Unit(s) Oral daily  ferrous    sulfate 325 milliGRAM(s) Oral daily  finasteride 5 milliGRAM(s) Oral daily  furosemide    Tablet 40 milliGRAM(s) Oral daily  heparin  Infusion 1100 Unit(s)/Hr IV Continuous <Continuous>  lamiVUDine- milliGRAM(s) Oral daily  multivitamin 1 Tablet(s) Oral daily  pantoprazole    Tablet 40 milliGRAM(s) Oral before breakfast  Pifeltro (Doravirine) tablet 100 milliGRAM(s) 100 milliGRAM(s) Oral daily  sodium chloride 0.9% lock flush 3 milliLiter(s) IV Push every 8 hours  tamsulosin 0.4 milliGRAM(s) Oral at bedtime    REVIEW OF SYSTEMS  --------------------------------------------------------------------------------  Gen: no fatigue  Respiratory: No dyspnea  CV: see HPI  GI: No abdominal pain  MSK: + LE edema  Neuro: No dizziness  Heme: No bleeding    All other systems were reviewed and are negative, except as noted.    VITALS/PHYSICAL EXAM  --------------------------------------------------------------------------------  T(C): 36.9 (11-06-20 @ 05:07), Max: 36.9 (11-06-20 @ 05:07)  HR: 95 (11-06-20 @ 05:07) (95 - 107)  BP: 126/74 (11-06-20 @ 05:07) (110/69 - 126/74)  RR: 18 (11-06-20 @ 05:07) (18 - 18)  SpO2: 99% (11-06-20 @ 05:07) (98% - 99%)  Wt(kg): --    Weight (kg): 84.5 (11-04-20 @ 15:02)    11-05-20 @ 07:01  -  11-06-20 @ 07:00  --------------------------------------------------------  IN: 537 mL / OUT: 3350 mL / NET: -2813 mL    Physical Exam:  	Gen: NAD  	HEENT: MMM  	Pulm: good air entry B/L  	CV: S1S2  	Abd: Soft, +BS   	Ext: improving LE pitting edema B/L  	Neuro: Awake  	Skin: Warm and dry    LABS/STUDIES  --------------------------------------------------------------------------------              10.8   11.32 >-----------<  296      [11-06-20 @ 06:33]              33.2     136  |  98  |  41  ----------------------------<  104      [11-06-20 @ 06:33]  3.7   |  25  |  2.90        Ca     9.4     [11-06-20 @ 06:33]    Creatinine Trend:  SCr 2.90 [11-06 @ 06:33]  SCr 2.63 [11-05 @ 09:16]  SCr 2.28 [11-04 @ 05:21]  SCr 2.22 [11-03 @ 13:34]    Urinalysis - [11-05-20 @ 03:19]      Color Light Yellow / Appearance Slightly Turbid / SG 1.009 / pH 7.0      Gluc Negative / Ketone Negative  / Bili Negative / Urobili <2 mg/dL       Blood Small / Protein Trace / Leuk Est Large / Nitrite Negative      RBC 11 /  / Hyaline 2 / Gran  / Sq Epi  / Non Sq Epi 0 / Bacteria Negative    Urine Creatinine 26      [11-04-20 @ 19:34]  Urine Protein 28      [11-04-20 @ 19:34]    TSH 6.90      [09-06-20 @ 09:42]

## 2020-11-06 NOTE — PHARMACOTHERAPY INTERVENTION NOTE - COMMENTS
Confirmed home medications with patient and [facility], updated in Outpatient Medication Review.    Susie Sherwood, PharmD  PGY-1 Pharmacy Resident  x [phone]
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  Per ID, suspected source of bacteremia for his endocarditis was his intermittent urinary catheterizations which are ongoing.  Reviewed micro history and in 2020, also grew MSSA and Klebsiella (only R to ampicillin) in prior urine cultures.  Team and ID want to give urinary prophylaxis and sulfamethoxazole/trimethoprim reasonable option considering prior cultures.      Will start Bactrim 1 SS 3 times weekly but since on warfarin, will have to have INR monitored closely since now starting Bactrim which is very likely to increase INR.  If patient ever stops the Bactrim, warfarin will need to be re-adjusted.    Claudia Vance, PharmD  Clinical Pharmacist, Infectious Diseases  [phone]

## 2020-11-06 NOTE — PROGRESS NOTE ADULT - ATTENDING COMMENTS
I have seen this patient with the fellow and agree with their assessment and plan. In addition, SUDHEER likely in setting of ? infection vs volume shifts.  May have to hold PPI and even lasix if crt continues to rise  Dc vitamin C as if not needed to prevent oxalate nephropathy  Pt has had crt in this range in the past, could be just chronic fluctuations    Calin Kenyon MD  Cell   Pager   Office       For weekend please contact Dr Lamberto Allan( fellow) or Dr Karely Rebolledo( attending) I have seen this patient with the fellow and agree with their assessment and plan. In addition, SUDHEER likely in setting of ? infection vs volume shifts.  May have to hold PPI and even lasix if crt continues to rise  Dc vitamin C as if not needed to prevent oxalate nephropathy  Pt has had crt in this range in the past, could be just chronic fluctuations  If stable and want to dc, can f.u with Dr Presley Ferguson in 1 week( Friday next week or in 2 weeks) at [address] [place], [place] [id number] [phone]    Calin Kenyon MD  Cell   Pager   Office       For weekend please contact Dr Lamberto Allan( fellow) or Dr Karely Rebolledo( attending)

## 2020-11-06 NOTE — PROGRESS NOTE ADULT - PROBLEM SELECTOR PLAN 1
Started daily lasix as per heart failure   s/p NOHEMI/DCCV    Dose coumadin 5 mg tonight  INR 1.66  Strict Is/Os  F/u ECHO results  Dispsoition to home likely Friday Started daily lasix as per heart failure   s/p NOHEMI/DCCV    Dose coumadin by  INR 1.66  Strict Is/Os  F/u ECHO results  Dispsoition to home likely Friday

## 2020-11-06 NOTE — PROGRESS NOTE ADULT - PROBLEM SELECTOR PLAN 2
DCCV 11/4  Continue AC w/ Coumadin /heparin gtt    Now NSR/ST DCCV 11/4  Continue AC w/ Coumadin /heparin gtt    Now NSR/ST  No AV nodals per EP

## 2020-11-06 NOTE — PROGRESS NOTE ADULT - ASSESSMENT
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 Camacho on 8/31/20 (finished course of IV Vanco on 10/12/20), post-op course significant for accelerated junctional/AV dissociation and later new afib/flutter, presented to the CT surgery office at [facility] on 11/2 for a post-op visit, c/o worsening PRESCOTT and b/l LE edema. Pt was found to be fluid overloaded w/ 2+ b/l LE edema and in rate controlled aflutter. INR drawn in the office 11/2 was 2.41. Decision was subsequently made to admit the pt for diuresis, INR management and further management of aflutter.     Pt seen and evaluated at bedside - VSS, NAD. Breathing comfortably, O2 sat 98% on RA. Aflutter 80s-100s on tele - AV kaylan blockers being held for now given accelerated junctional rhythm/AV dissociation post-op. Will consult EP to evaluate for cardioversion per Dr. Camacho - per EP note from last admission PPM was not indicated at that time.. BUN/Cr elevated 40/2.22 (Cr on outpatient labs drawn 10/29 was 1.84) - will consult Renal Dr. Aviles per Dr. Camacho. Lasix 40 mg PO x 1 given on arrival - will discuss further diuretic regimen w/ Dr. Camacho given elevated Cr.  INR 1.78 today, down from 2.41 in office yesterday - pt reports holding Coumadin last night per Dr. Camacho's recommendation given concern for supratherapeutic INR, will hold Coumadin for now and start a Heparin gtt per Dr. Camacho. Will check CXR PA/lateral and TTE.   11/4  NOHEMI / DCCV this afternoon if PTT therapeutic  Dose coumadin Hep gtt 1100/hr  11/5 VSS NSR d/w EP BB - not at this time . INR 1.66 hearing gtt coumadin bridge.Coumadin 5 tonight  ECHO completed Home in am  11/6 VSS Creatinine 2.9 today UA + Leuk - nitrites  - Infectious disease consulted INR pending. Monitor overnight.

## 2020-11-06 NOTE — PROGRESS NOTE ADULT - SUBJECTIVE AND OBJECTIVE BOX
Subjective " hello i am feeling well"    VITAL SIGNS    Telemetry:  NSR ST    Vital Signs Last 24 Hrs  T(C): 36.9 (20 @ 05:07), Max: 36.9 (20 @ 05:07)  T(F): 98.4 (20 @ 05:07), Max: 98.4 (20 @ 05:07)  HR: 95 (20 @ 05:07) (95 - 107)  BP: 126/74 (20 @ 05:07) (110/69 - 126/74)  RR: 18 (20 @ 05:07) (18 - 18)  SpO2: 99% (20 @ 05:07) (98% - 99%)              07:01  -   @ 07:00  --------------------------------------------------------  IN: 537 mL / OUT: 3350 mL / NET: -2813 mL     07:01  -   @ 11:11  --------------------------------------------------------  IN: 297 mL / OUT: 650 mL / NET: -353 mL    Daily Weight in k.5 (2020 07:40)    MEDICATIONS  (STANDING):  abacavir 600 milliGRAM(s) Oral daily  ascorbic acid 500 milliGRAM(s) Oral daily  aspirin enteric coated 81 milliGRAM(s) Oral daily  cholecalciferol 5000 Unit(s) Oral daily  ferrous    sulfate 325 milliGRAM(s) Oral daily  finasteride 5 milliGRAM(s) Oral daily  furosemide    Tablet 40 milliGRAM(s) Oral daily  heparin  Infusion 1100 Unit(s)/Hr (10 mL/Hr) IV Continuous <Continuous>  lamiVUDine- milliGRAM(s) Oral daily  multivitamin 1 Tablet(s) Oral daily  pantoprazole    Tablet 40 milliGRAM(s) Oral before breakfast  Pifeltro (Doravirine) tablet 100 milliGRAM(s) 100 milliGRAM(s) Oral daily  sodium chloride 0.9% lock flush 3 milliLiter(s) IV Push every 8 hours  tamsulosin 0.4 milliGRAM(s) Oral at bedtime                      PHYSICAL EXAM    Neurology: alert and oriented x 3, nonfocal, no gross deficits    CV : S1 S2 RRR    Sternal Wound :  Healed, Stable    Lungs:B/l CTA on room air    Abdomen: soft, nontender, nondistended, positive bowel sounds,     :   voids            Extremities:  equal strength throughout   B/lle warm well perfused + DP trace edema                                      Physical Therapy Rec:   Home  [ x ]   Home w/ PT  [  ]  Rehab  [  ]    Discussed with Cardiothoracic Team at AM rounds.

## 2020-11-06 NOTE — CONSULT NOTE ADULT - ASSESSMENT
79 y/o M w/ PMHx of HIV (VL undetectable), CAD, detrusor muscle weakness, BPH (pt intermittently self caths at home), CKD and MV endocarditis s/p MVR(T) & SHELLIE alivia w/ Dr. Camacho on 8/31/20 (finished course of IV Vanco on 10/12/20), post-op course significant for accelerated junctional/AV dissociation and later new afib/flutter,   Admitted 11/3 for fluid overload. S/p DCCV 11/4  Now wit UA with pyuria, r/o UTI.     R/o UTI - Chronically self catherizes apprx 4x/d. Although there is pyuria on UA, he is asymtomatic, afebrile, and non toxic appearing.   - Recommend Urology Eval to consider if there is option for further management to prevent UTI  - Monitor off abx  - Send bl clx and ur clx if clinically decompensates or spikes fever      Jay Jay Burton MD  Fellow, Infectious Diseases, PGY-4  Pager: [phone]  Before 9am or after 5pm/Weekends: Call [phone]   81 y/o M w/ PMHx of HIV (VL undetectable), CAD, detrusor muscle weakness, BPH (pt intermittently self caths at home), CKD and MV endocarditis s/p MVR(T) & SHELLIE alivia w/ Dr. Camacho on 8/31/20 (finished course of IV Vanco on 10/12/20), post-op course significant for accelerated junctional/AV dissociation and later new afib/flutter,   Admitted 11/3 for fluid overload. S/p DCCV 11/4  Now wit UA with pyuria, r/o UTI.     R/o UTI - Chronically self catherizes apprx 4x/d. Although there is pyuria on UA, he is asymtomatic, afebrile, and non toxic appearing.   - Recommend Urology Eval to consider if there is option for further management to prevent UTI  - Monitor off abx  - Send bl clx and ur clx if clinically decompensates or spikes fever    Will discuss options for antibiotic suppressive therapy to try to prevent UTI in a patient with recent valve replacements and chronic straight cath needs      Jay Jay Burton MD  Fellow, Infectious Diseases, PGY-4  Pager: [phone]  Before 9am or after 5pm/Weekends: Call [phone]

## 2020-11-07 LAB
ANION GAP SERPL CALC-SCNC: 13 MMOL/L — SIGNIFICANT CHANGE UP (ref 5–17)
APTT BLD: 40.7 SEC — HIGH (ref 27.5–35.5)
BUN SERPL-MCNC: 47 MG/DL — HIGH (ref 7–23)
CALCIUM SERPL-MCNC: 9.6 MG/DL — SIGNIFICANT CHANGE UP (ref 8.4–10.5)
CHLORIDE SERPL-SCNC: 97 MMOL/L — SIGNIFICANT CHANGE UP (ref 96–108)
CO2 SERPL-SCNC: 25 MMOL/L — SIGNIFICANT CHANGE UP (ref 22–31)
CREAT SERPL-MCNC: 3.12 MG/DL — HIGH (ref 0.5–1.3)
GLUCOSE SERPL-MCNC: 86 MG/DL — SIGNIFICANT CHANGE UP (ref 70–99)
HCT VFR BLD CALC: 33.6 % — LOW (ref 39–50)
HGB BLD-MCNC: 11.1 G/DL — LOW (ref 13–17)
INR BLD: 2.47 RATIO — HIGH (ref 0.88–1.16)
MCHC RBC-ENTMCNC: 33 GM/DL — SIGNIFICANT CHANGE UP (ref 32–36)
MCHC RBC-ENTMCNC: 33.1 PG — SIGNIFICANT CHANGE UP (ref 27–34)
MCV RBC AUTO: 100.3 FL — HIGH (ref 80–100)
NRBC # BLD: 0 /100 WBCS — SIGNIFICANT CHANGE UP (ref 0–0)
PLATELET # BLD AUTO: 325 K/UL — SIGNIFICANT CHANGE UP (ref 150–400)
POTASSIUM SERPL-MCNC: 4 MMOL/L — SIGNIFICANT CHANGE UP (ref 3.5–5.3)
POTASSIUM SERPL-SCNC: 4 MMOL/L — SIGNIFICANT CHANGE UP (ref 3.5–5.3)
PROTHROM AB SERPL-ACNC: 28.2 SEC — HIGH (ref 10.6–13.6)
RBC # BLD: 3.35 M/UL — LOW (ref 4.2–5.8)
RBC # FLD: 16.7 % — HIGH (ref 10.3–14.5)
SODIUM SERPL-SCNC: 135 MMOL/L — SIGNIFICANT CHANGE UP (ref 135–145)
WBC # BLD: 11.49 K/UL — HIGH (ref 3.8–10.5)
WBC # FLD AUTO: 11.49 K/UL — HIGH (ref 3.8–10.5)

## 2020-11-07 PROCEDURE — 99233 SBSQ HOSP IP/OBS HIGH 50: CPT | Mod: 24

## 2020-11-07 PROCEDURE — 99232 SBSQ HOSP IP/OBS MODERATE 35: CPT

## 2020-11-07 RX ORDER — WARFARIN SODIUM 2.5 MG/1
2.5 TABLET ORAL ONCE
Refills: 0 | Status: COMPLETED | OUTPATIENT
Start: 2020-11-07 | End: 2020-11-07

## 2020-11-07 RX ORDER — POLYETHYLENE GLYCOL 3350 17 G/17G
17 POWDER, FOR SOLUTION ORAL ONCE
Refills: 0 | Status: DISCONTINUED | OUTPATIENT
Start: 2020-11-07 | End: 2020-11-07

## 2020-11-07 RX ORDER — POLYETHYLENE GLYCOL 3350 17 G/17G
17 POWDER, FOR SOLUTION ORAL DAILY
Refills: 0 | Status: DISCONTINUED | OUTPATIENT
Start: 2020-11-07 | End: 2020-11-08

## 2020-11-07 RX ADMIN — Medication 1 TABLET(S): at 11:08

## 2020-11-07 RX ADMIN — SODIUM CHLORIDE 3 MILLILITER(S): 9 INJECTION INTRAMUSCULAR; INTRAVENOUS; SUBCUTANEOUS at 22:39

## 2020-11-07 RX ADMIN — SODIUM CHLORIDE 3 MILLILITER(S): 9 INJECTION INTRAMUSCULAR; INTRAVENOUS; SUBCUTANEOUS at 10:51

## 2020-11-07 RX ADMIN — TAMSULOSIN HYDROCHLORIDE 0.4 MILLIGRAM(S): 0.4 CAPSULE ORAL at 22:39

## 2020-11-07 RX ADMIN — Medication 81 MILLIGRAM(S): at 11:08

## 2020-11-07 RX ADMIN — FINASTERIDE 5 MILLIGRAM(S): 5 TABLET, FILM COATED ORAL at 11:08

## 2020-11-07 RX ADMIN — Medication 325 MILLIGRAM(S): at 11:08

## 2020-11-07 RX ADMIN — PANTOPRAZOLE SODIUM 40 MILLIGRAM(S): 20 TABLET, DELAYED RELEASE ORAL at 05:27

## 2020-11-07 RX ADMIN — Medication 5000 UNIT(S): at 11:08

## 2020-11-07 RX ADMIN — ABACAVIR 600 MILLIGRAM(S): 20 SOLUTION ORAL at 11:08

## 2020-11-07 RX ADMIN — Medication 100 MILLIGRAM(S): at 11:08

## 2020-11-07 RX ADMIN — SODIUM CHLORIDE 3 MILLILITER(S): 9 INJECTION INTRAMUSCULAR; INTRAVENOUS; SUBCUTANEOUS at 05:26

## 2020-11-07 RX ADMIN — POLYETHYLENE GLYCOL 3350 17 GRAM(S): 17 POWDER, FOR SOLUTION ORAL at 15:58

## 2020-11-07 RX ADMIN — WARFARIN SODIUM 2.5 MILLIGRAM(S): 2.5 TABLET ORAL at 22:38

## 2020-11-07 NOTE — PROGRESS NOTE ADULT - ASSESSMENT
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 Camacho on 8/31/20 (finished course of IV Vanco on 10/12/20), post-op course significant for accelerated junctional/AV dissociation and later new afib/flutter, presented to the CT surgery office at [facility] on 11/2 for a post-op visit, c/o worsening PRESCOTT and b/l LE edema. Pt was found to be fluid overloaded w/ 2+ b/l LE edema and in rate controlled aflutter. INR drawn in the office 11/2 was 2.41. Decision was subsequently made to admit the pt for diuresis, INR management and further management of aflutter.     Pt seen and evaluated at bedside - VSS, NAD. Breathing comfortably, O2 sat 98% on RA. Aflutter 80s-100s on tele - AV kaylan blockers being held for now given accelerated junctional rhythm/AV dissociation post-op. Will consult EP to evaluate for cardioversion per Dr. Camacho - per EP note from last admission PPM was not indicated at that time.. BUN/Cr elevated 40/2.22 (Cr on outpatient labs drawn 10/29 was 1.84) - will consult Renal Dr. Aviles per Dr. Camacho. Lasix 40 mg PO x 1 given on arrival - will discuss further diuretic regimen w/ Dr. Camacho given elevated Cr.  INR 1.78 today, down from 2.41 in office yesterday - pt reports holding Coumadin last night per Dr. Camacho's recommendation given concern for supratherapeutic INR, will hold Coumadin for now and start a Heparin gtt per Dr. Camacho. Will check CXR PA/lateral and TTE.   11/4  NOHEMI / DCCV this afternoon if PTT therapeutic  Dose coumadin Hep gtt 1100/hr  11/5 VSS NSR d/w EP BB - not at this time . INR 1.66 hearing gtt coumadin bridge.Coumadin 5 tonight  ECHO completed Home in am  11/6 VSS Creatinine 2.9 today UA + Leuk - nitrites  - Infectious disease consulted INR pending. Monitor overnight.   11/7 Creat 3.12 WIll continue to monitor. Maintain on Bactrim for continuous antibiotic prophylaxis as per ID

## 2020-11-07 NOTE — PROGRESS NOTE ADULT - SUBJECTIVE AND OBJECTIVE BOX
VITAL SIGNS    Telemetry: SR 80-90   Vital Signs Last 24 Hrs  T(C): 37.3 (11-07-20 @ 04:46), Max: 37.3 (11-06-20 @ 20:32)  T(F): 99.1 (11-07-20 @ 04:46), Max: 99.1 (11-06-20 @ 20:32)  HR: 90 (11-07-20 @ 04:46) (90 - 105)  BP: 122/60 (11-07-20 @ 04:46) (103/64 - 122/60)  RR: 18 (11-07-20 @ 04:46) (18 - 18)  SpO2: 99% (11-07-20 @ 04:46) (98% - 99%)            11-06 @ 07:01  -  11-07 @ 07:00  --------------------------------------------------------  IN: 537 mL / OUT: 1380 mL / NET: -843 mL    11-07 @ 07:01  -  11-07 @ 11:34  --------------------------------------------------------  IN: 297 mL / OUT: 100 mL / NET: 197 mL    Daily   Admit Wt: Drug Dosing Weight  Height (cm): 190.5 (31 Aug 2020 13:58)  Weight (kg): 84.5 (04 Nov 2020 15:02)  BMI (kg/m2): 23.3 (04 Nov 2020 15:02)  BSA (m2): 2.13 (04 Nov 2020 15:02)    MEDICATIONS  abacavir 600 milliGRAM(s) Oral daily  aspirin enteric coated 81 milliGRAM(s) Oral daily  cholecalciferol 5000 Unit(s) Oral daily  ferrous    sulfate 325 milliGRAM(s) Oral daily  finasteride 5 milliGRAM(s) Oral daily  lamiVUDine- milliGRAM(s) Oral daily  multivitamin 1 Tablet(s) Oral daily  pantoprazole    Tablet 40 milliGRAM(s) Oral before breakfast  Pifeltro (Doravirine) tablet 100 milliGRAM(s) 100 milliGRAM(s) Oral daily  sodium chloride 0.9% lock flush 3 milliLiter(s) IV Push every 8 hours  tamsulosin 0.4 milliGRAM(s) Oral at bedtime  trimethoprim   80 mG/sulfamethoxazole 400 mG 1 Tablet(s) Oral <User Schedule>      >>> <<<  PHYSICAL EXAM  Subjective: NAD  Neurology: alert and oriented x 3, nonfocal, no gross deficits  CV : s1s2  Lungs: CTA b/l  Abdomen: soft, NT,ND, (+ )BM  :  voiding  Extremities: -c/c/e      LABS  11-07    135  |  97  |  47<H>  ----------------------------<  86  4.0   |  25  |  3.12<H>    Ca    9.6      07 Nov 2020 07:27                                   11.1   11.49 )-----------( 325      ( 07 Nov 2020 07:27 )             33.6          PT/INR - ( 06 Nov 2020 06:33 )   PT: 25.0 sec;   INR: 2.16 ratio         PTT - ( 06 Nov 2020 06:33 )  PTT:72.7 sec       PAST MEDICAL & SURGICAL HISTORY:  H/O endocarditis  MV endocarditis    Orchitis and epididymitis    Vitamin D deficiency    Bladder mass    Edema of both legs    Osteoporosis    Seborrheic keratosis    Constipation, unspecified constipation type    Chronic kidney disease, unspecified stage    HIV (human immunodeficiency virus infection)    Unsteady gait    S/P MVR (mitral valve replacement)  &amp; SHELLIE clip, 8/31/2020    S/P coronary artery stent placement

## 2020-11-07 NOTE — PROGRESS NOTE ADULT - SUBJECTIVE AND OBJECTIVE BOX
[facility] DIVISION OF KIDNEY DISEASES AND HYPERTENSION -- FOLLOW UP NOTE  --------------------------------------------------------------------------------  Chief Complaint: PRESCOTT/pedal edema    24 hour events/subjective:  None      PAST HISTORY  --------------------------------------------------------------------------------  No significant changes to PMH, PSH, FHx, SHx, unless otherwise noted    ALLERGIES & MEDICATIONS  --------------------------------------------------------------------------------  Allergies    No Known Allergies    Intolerances      Standing Inpatient Medications  abacavir 600 milliGRAM(s) Oral daily  aspirin enteric coated 81 milliGRAM(s) Oral daily  cholecalciferol 5000 Unit(s) Oral daily  ferrous    sulfate 325 milliGRAM(s) Oral daily  finasteride 5 milliGRAM(s) Oral daily  lamiVUDine- milliGRAM(s) Oral daily  multivitamin 1 Tablet(s) Oral daily  pantoprazole    Tablet 40 milliGRAM(s) Oral before breakfast  Pifeltro (Doravirine) tablet 100 milliGRAM(s) 100 milliGRAM(s) Oral daily  sodium chloride 0.9% lock flush 3 milliLiter(s) IV Push every 8 hours  tamsulosin 0.4 milliGRAM(s) Oral at bedtime  trimethoprim   80 mG/sulfamethoxazole 400 mG 1 Tablet(s) Oral <User Schedule>    PRN Inpatient Medications  polyethylene glycol 3350 17 Gram(s) Oral daily PRN      REVIEW OF SYSTEMS  --------------------------------------------------------------------------------    Gen: no fatigue  Respiratory: No dyspnea  CV: see HPI  GI: No abdominal pain  MSK: + LE edema  Neuro: No dizziness  Heme: No bleeding    All other systems were reviewed and are negative, except as noted.    VITALS/PHYSICAL EXAM  --------------------------------------------------------------------------------  T(C): 36.9 (11-07-20 @ 12:21), Max: 37.3 (11-06-20 @ 20:32)  HR: 110 (11-07-20 @ 12:21) (90 - 110)  BP: 137/70 (11-07-20 @ 12:21) (103/64 - 137/70)  RR: 18 (11-07-20 @ 12:21) (18 - 18)  SpO2: 100% (11-07-20 @ 12:21) (98% - 100%)  Wt(kg): --        11-06-20 @ 07:01  -  11-07-20 @ 07:00  --------------------------------------------------------  IN: 537 mL / OUT: 1380 mL / NET: -843 mL    11-07-20 @ 07:01  -  11-07-20 @ 15:31  --------------------------------------------------------  IN: 517 mL / OUT: 100 mL / NET: 417 mL      Physical Exam:  	Gen: NAD  	HEENT: MMM  	Pulm: good air entry B/L  	CV: S1S2  	Abd: Soft, +BS   	Ext: no   pitting edema B/L  	Neuro: Awake  	Skin: Warm and dry      LABS/STUDIES  --------------------------------------------------------------------------------              11.1   11.49 >-----------<  325      [11-07-20 @ 07:27]              33.6     135  |  97  |  47  ----------------------------<  86      [11-07-20 @ 07:27]  4.0   |  25  |  3.12        Ca     9.6     [11-07-20 @ 07:27]      PT/INR: PT 28.2 , INR 2.47       [11-07-20 @ 11:08]  PTT: 40.7       [11-07-20 @ 11:08]      Creatinine Trend:  SCr 3.12 [11-07 @ 07:27]  SCr 2.90 [11-06 @ 06:33]  SCr 2.63 [11-05 @ 09:16]  SCr 2.28 [11-04 @ 05:21]  SCr 2.22 [11-03 @ 13:34]    Urinalysis - [11-05-20 @ 03:19]      Color Light Yellow / Appearance Slightly Turbid / SG 1.009 / pH 7.0      Gluc Negative / Ketone Negative  / Bili Negative / Urobili <2 mg/dL       Blood Small / Protein Trace / Leuk Est Large / Nitrite Negative      RBC 11 /  / Hyaline 2 / Gran  / Sq Epi  / Non Sq Epi 0 / Bacteria Negative    Urine Creatinine 26      [11-04-20 @ 19:34]  Urine Protein 28      [11-04-20 @ 19:34]    TSH 6.90      [09-06-20 @ 09:42]

## 2020-11-07 NOTE — PROGRESS NOTE ADULT - PROBLEM SELECTOR PLAN 1
Started daily lasix as per heart failure   s/p NOHEMI/DCCV    Dose coumadin by  INR 1.66  Strict Is/Os  F/u ECHO results  Dispsoition to home likely Friday

## 2020-11-07 NOTE — PROGRESS NOTE ADULT - PROBLEM SELECTOR PLAN 1
Pt. with renal failure in the setting of CHF exacerbation. However, exact duration of renal failure unknown. Upon lab review on [facility], Scr was 1.84 on 10/29/20. Scr on arrival was 2.28 and today increased to 3.1 . Pt. underwent DCCV on 11/5/20 . Pt. with multifactorial CKD since 2013 secondary to numerous urological issues and ? MGRS. UA shows many WBCs and leucocyte esterase +.  Pt. with likely hemodynamically mediated SUDHEER on CKD. However, pt. with history of multiple UTIs in the past. Proteinuria of 1.1 gm present     creatinine continues to increase  Stop lasix  c/w straight cath. Would check PVR post cath  Consider changing PPI to H2 blockers  Monitor labs and daily weights. Avoid potential nephrotoxins. Dose medications as per eGFR.

## 2020-11-08 ENCOUNTER — TRANSCRIPTION ENCOUNTER (OUTPATIENT)
Age: 80
End: 2020-11-08

## 2020-11-08 VITALS
TEMPERATURE: 99 F | OXYGEN SATURATION: 98 % | HEART RATE: 98 BPM | DIASTOLIC BLOOD PRESSURE: 73 MMHG | RESPIRATION RATE: 18 BRPM | SYSTOLIC BLOOD PRESSURE: 133 MMHG

## 2020-11-08 LAB
ANION GAP SERPL CALC-SCNC: 13 MMOL/L — SIGNIFICANT CHANGE UP (ref 5–17)
BUN SERPL-MCNC: 48 MG/DL — HIGH (ref 7–23)
CALCIUM SERPL-MCNC: 9.6 MG/DL — SIGNIFICANT CHANGE UP (ref 8.4–10.5)
CHLORIDE SERPL-SCNC: 98 MMOL/L — SIGNIFICANT CHANGE UP (ref 96–108)
CO2 SERPL-SCNC: 24 MMOL/L — SIGNIFICANT CHANGE UP (ref 22–31)
CREAT SERPL-MCNC: 3.2 MG/DL — HIGH (ref 0.5–1.3)
GLUCOSE SERPL-MCNC: 96 MG/DL — SIGNIFICANT CHANGE UP (ref 70–99)
HCT VFR BLD CALC: 33.1 % — LOW (ref 39–50)
HGB BLD-MCNC: 11 G/DL — LOW (ref 13–17)
INR BLD: 3.24 RATIO — HIGH (ref 0.88–1.16)
MCHC RBC-ENTMCNC: 33.1 PG — SIGNIFICANT CHANGE UP (ref 27–34)
MCHC RBC-ENTMCNC: 33.2 GM/DL — SIGNIFICANT CHANGE UP (ref 32–36)
MCV RBC AUTO: 99.7 FL — SIGNIFICANT CHANGE UP (ref 80–100)
NRBC # BLD: 0 /100 WBCS — SIGNIFICANT CHANGE UP (ref 0–0)
PLATELET # BLD AUTO: 331 K/UL — SIGNIFICANT CHANGE UP (ref 150–400)
POTASSIUM SERPL-MCNC: 4 MMOL/L — SIGNIFICANT CHANGE UP (ref 3.5–5.3)
POTASSIUM SERPL-SCNC: 4 MMOL/L — SIGNIFICANT CHANGE UP (ref 3.5–5.3)
PROTHROM AB SERPL-ACNC: 36.8 SEC — HIGH (ref 10.6–13.6)
RBC # BLD: 3.32 M/UL — LOW (ref 4.2–5.8)
RBC # FLD: 16.6 % — HIGH (ref 10.3–14.5)
SODIUM SERPL-SCNC: 135 MMOL/L — SIGNIFICANT CHANGE UP (ref 135–145)
WBC # BLD: 11 K/UL — HIGH (ref 3.8–10.5)
WBC # FLD AUTO: 11 K/UL — HIGH (ref 3.8–10.5)

## 2020-11-08 PROCEDURE — 80053 COMPREHEN METABOLIC PANEL: CPT

## 2020-11-08 PROCEDURE — 85025 COMPLETE CBC W/AUTO DIFF WBC: CPT

## 2020-11-08 PROCEDURE — 86850 RBC ANTIBODY SCREEN: CPT

## 2020-11-08 PROCEDURE — 80048 BASIC METABOLIC PNL TOTAL CA: CPT

## 2020-11-08 PROCEDURE — 85730 THROMBOPLASTIN TIME PARTIAL: CPT

## 2020-11-08 PROCEDURE — 86901 BLOOD TYPING SEROLOGIC RH(D): CPT

## 2020-11-08 PROCEDURE — 83735 ASSAY OF MAGNESIUM: CPT

## 2020-11-08 PROCEDURE — 93005 ELECTROCARDIOGRAM TRACING: CPT

## 2020-11-08 PROCEDURE — 83880 ASSAY OF NATRIURETIC PEPTIDE: CPT

## 2020-11-08 PROCEDURE — 85027 COMPLETE CBC AUTOMATED: CPT

## 2020-11-08 PROCEDURE — 99238 HOSP IP/OBS DSCHRG MGMT 30/<: CPT | Mod: 24

## 2020-11-08 PROCEDURE — 82570 ASSAY OF URINE CREATININE: CPT

## 2020-11-08 PROCEDURE — 85610 PROTHROMBIN TIME: CPT

## 2020-11-08 PROCEDURE — 93306 TTE W/DOPPLER COMPLETE: CPT

## 2020-11-08 PROCEDURE — 84156 ASSAY OF PROTEIN URINE: CPT

## 2020-11-08 PROCEDURE — 86769 SARS-COV-2 COVID-19 ANTIBODY: CPT

## 2020-11-08 PROCEDURE — 71046 X-RAY EXAM CHEST 2 VIEWS: CPT

## 2020-11-08 PROCEDURE — 92960 CARDIOVERSION ELECTRIC EXT: CPT

## 2020-11-08 PROCEDURE — 86900 BLOOD TYPING SEROLOGIC ABO: CPT

## 2020-11-08 PROCEDURE — 93320 DOPPLER ECHO COMPLETE: CPT

## 2020-11-08 PROCEDURE — 99232 SBSQ HOSP IP/OBS MODERATE 35: CPT

## 2020-11-08 PROCEDURE — 93312 ECHO TRANSESOPHAGEAL: CPT

## 2020-11-08 PROCEDURE — 93325 DOPPLER ECHO COLOR FLOW MAPG: CPT

## 2020-11-08 PROCEDURE — 81001 URINALYSIS AUTO W/SCOPE: CPT

## 2020-11-08 RX ORDER — POLYETHYLENE GLYCOL 3350 17 G/17G
17 POWDER, FOR SOLUTION ORAL
Qty: 0 | Refills: 0 | DISCHARGE
Start: 2020-11-08

## 2020-11-08 RX ORDER — FAMOTIDINE 10 MG/ML
1 INJECTION INTRAVENOUS
Qty: 30 | Refills: 0
Start: 2020-11-08 | End: 2020-12-07

## 2020-11-08 RX ORDER — ASPIRIN/CALCIUM CARB/MAGNESIUM 324 MG
1 TABLET ORAL
Qty: 30 | Refills: 0
Start: 2020-11-08 | End: 2020-12-07

## 2020-11-08 RX ORDER — WARFARIN SODIUM 2.5 MG/1
1 TABLET ORAL
Qty: 0 | Refills: 0 | DISCHARGE

## 2020-11-08 RX ADMIN — ABACAVIR 600 MILLIGRAM(S): 20 SOLUTION ORAL at 12:16

## 2020-11-08 RX ADMIN — Medication 81 MILLIGRAM(S): at 12:16

## 2020-11-08 RX ADMIN — FINASTERIDE 5 MILLIGRAM(S): 5 TABLET, FILM COATED ORAL at 12:17

## 2020-11-08 RX ADMIN — PANTOPRAZOLE SODIUM 40 MILLIGRAM(S): 20 TABLET, DELAYED RELEASE ORAL at 05:41

## 2020-11-08 RX ADMIN — Medication 325 MILLIGRAM(S): at 12:17

## 2020-11-08 RX ADMIN — Medication 100 MILLIGRAM(S): at 12:16

## 2020-11-08 RX ADMIN — Medication 1 TABLET(S): at 12:16

## 2020-11-08 RX ADMIN — SODIUM CHLORIDE 3 MILLILITER(S): 9 INJECTION INTRAMUSCULAR; INTRAVENOUS; SUBCUTANEOUS at 05:45

## 2020-11-08 RX ADMIN — Medication 5000 UNIT(S): at 12:17

## 2020-11-08 RX ADMIN — SODIUM CHLORIDE 3 MILLILITER(S): 9 INJECTION INTRAMUSCULAR; INTRAVENOUS; SUBCUTANEOUS at 15:01

## 2020-11-08 NOTE — PROGRESS NOTE ADULT - PROBLEM SELECTOR PLAN 1
Pt. with renal failure in the setting of CHF exacerbation. However, exact duration of renal failure unknown. Upon lab review on [facility], Scr was 1.84 on 10/29/20. Scr on arrival was 2.28 and today increased to 3.1 . Pt. underwent DCCV on 11/5/20 . Pt. with multifactorial CKD since 2013 secondary to numerous urological issues and ? MGRS. UA shows many WBCs and leucocyte esterase +.      Pt. with likely hemodynamically mediated SUDHEER on CKD. However, pt. with history of multiple UTIs in the past. Proteinuria of 1.1 gm present     creatinine slightly higher    Continue to hold  lasix  c/w straight cath. Would check PVR post cath as he had a high PVR on bladder scan. If repeat is high also, he would need urology evaluation  Consider changing PPI to H2 blockers  Monitor labs and daily weights. Avoid potential nephrotoxins. Dose medications as per eGFR.

## 2020-11-08 NOTE — PROGRESS NOTE ADULT - PROBLEM SELECTOR PLAN 7
GI ppx w/ Protonix
GI ppx w/ Protonix   DVT ppx w/ Heparin SQ
GI ppx w/ Protonix   DVT ppx w/ Heparin SQ

## 2020-11-08 NOTE — DISCHARGE NOTE PROVIDER - CARE PROVIDERS DIRECT ADDRESSES
[email or web address]

## 2020-11-08 NOTE — PROGRESS NOTE ADULT - SUBJECTIVE AND OBJECTIVE BOX
[facility] DIVISION OF KIDNEY DISEASES AND HYPERTENSION -- FOLLOW UP NOTE  --------------------------------------------------------------------------------  Chief Complaint: miller/PEDAL EDEMA    24 hour events/subjective:  bladder scan with high PVR after straight cath        PAST HISTORY  --------------------------------------------------------------------------------  No significant changes to PMH, PSH, FHx, SHx, unless otherwise noted    ALLERGIES & MEDICATIONS  --------------------------------------------------------------------------------  Allergies    No Known Allergies    Intolerances      Standing Inpatient Medications  abacavir 600 milliGRAM(s) Oral daily  aspirin enteric coated 81 milliGRAM(s) Oral daily  cholecalciferol 5000 Unit(s) Oral daily  ferrous    sulfate 325 milliGRAM(s) Oral daily  finasteride 5 milliGRAM(s) Oral daily  lamiVUDine- milliGRAM(s) Oral daily  multivitamin 1 Tablet(s) Oral daily  pantoprazole    Tablet 40 milliGRAM(s) Oral before breakfast  Pifeltro (Doravirine) tablet 100 milliGRAM(s) 100 milliGRAM(s) Oral daily  sodium chloride 0.9% lock flush 3 milliLiter(s) IV Push every 8 hours  tamsulosin 0.4 milliGRAM(s) Oral at bedtime  trimethoprim   80 mG/sulfamethoxazole 400 mG 1 Tablet(s) Oral <User Schedule>    PRN Inpatient Medications  polyethylene glycol 3350 17 Gram(s) Oral daily PRN      REVIEW OF SYSTEMS  --------------------------------------------------------------------------------  Gen: no fatigue  Respiratory: No dyspnea  CV: see HPI  GI: No abdominal pain  MSK:  NO LE edema  Neuro: No dizziness  Heme: No bleeding    All other systems were reviewed and are negative, except as noted.    VITALS/PHYSICAL EXAM  --------------------------------------------------------------------------------  T(C): 36.9 (11-08-20 @ 04:45), Max: 36.9 (11-07-20 @ 12:21)  HR: 98 (11-08-20 @ 04:45) (95 - 110)  BP: 127/75 (11-08-20 @ 04:45) (108/63 - 137/70)  RR: 18 (11-08-20 @ 04:45) (18 - 18)  SpO2: 97% (11-08-20 @ 04:45) (97% - 100%)  Wt(kg): --        11-07-20 @ 07:01  -  11-08-20 @ 07:00  --------------------------------------------------------  IN: 837 mL / OUT: 1360 mL / NET: -523 mL    11-08-20 @ 07:01  -  11-08-20 @ 12:18  --------------------------------------------------------  IN: 220 mL / OUT: 625 mL / NET: -405 mL      Physical Exam:  	Gen: NAD  	HEENT: MMM  	Pulm: good air entry B/L  	CV: S1S2  	Abd: Soft, +BS, +distended  	Ext: no   pitting edema B/L  	Neuro: Awake  	Skin: Warm and dry    LABS/STUDIES  --------------------------------------------------------------------------------              11.0   11.00 >-----------<  331      [11-08-20 @ 06:26]              33.1     135  |  98  |  48  ----------------------------<  96      [11-08-20 @ 06:26]  4.0   |  24  |  3.20        Ca     9.6     [11-08-20 @ 06:26]      PT/INR: PT 36.8 , INR 3.24       [11-08-20 @ 06:26]  PTT: 40.7       [11-07-20 @ 11:08]      Creatinine Trend:  SCr 3.20 [11-08 @ 06:26]  SCr 3.12 [11-07 @ 07:27]  SCr 2.90 [11-06 @ 06:33]  SCr 2.63 [11-05 @ 09:16]  SCr 2.28 [11-04 @ 05:21]    Urinalysis - [11-05-20 @ 03:19]      Color Light Yellow / Appearance Slightly Turbid / SG 1.009 / pH 7.0      Gluc Negative / Ketone Negative  / Bili Negative / Urobili <2 mg/dL       Blood Small / Protein Trace / Leuk Est Large / Nitrite Negative      RBC 11 /  / Hyaline 2 / Gran  / Sq Epi  / Non Sq Epi 0 / Bacteria Negative    Urine Creatinine 26      [11-04-20 @ 19:34]  Urine Protein 28      [11-04-20 @ 19:34]    TSH 6.90      [09-06-20 @ 09:42]

## 2020-11-08 NOTE — DISCHARGE NOTE PROVIDER - NSDCFUADDINST_GEN_ALL_CORE_FT
Resume activity as tolerated  Resume low cholesterol low sodium diet  Shower daily and wash all incisions with soap and water  Ambulate at least four times daily  Use incentive spirometer every hour during the day  Record daily weight and report changes greater than 3lbs  Please follow up with your cardiologist in 1-3 days  Please follow up with nephrologist Dr Ferguson in 7-10 days   Please follow up with your urologist; Dr Silver

## 2020-11-08 NOTE — PROGRESS NOTE ADULT - PROBLEM SELECTOR PROBLEM 1
Renal failure, unspecified chronicity
Volume overload

## 2020-11-08 NOTE — PROGRESS NOTE ADULT - PROBLEM SELECTOR PLAN 5
Continue home doses of Abacavir 600 mg QD, Iamivudine 100 mg QD & Pifeltro 100 mg QD

## 2020-11-08 NOTE — PROGRESS NOTE ADULT - PROBLEM SELECTOR PLAN 4
Continue AC w/ Coumadin    TTE

## 2020-11-08 NOTE — PROGRESS NOTE ADULT - PROBLEM SELECTOR PROBLEM 4
S/P mitral valve replacement

## 2020-11-08 NOTE — PROGRESS NOTE ADULT - PROBLEM SELECTOR PLAN 3
Cr 2.22, up from 1.88 upon last discharge   Renal following>added daily diuretics   Daily BMP to trend Cr   Avoid further nephrotoxic agents
Cr 2.90, up from 1.88 upon last discharge   Renal following>added daily diuretics   Daily BMP to trend Cr   Avoid further nephrotoxic agents
Cr 2.22, up from 1.88 upon last discharge   Renal following>added daily diuretics   Daily BMP to trend Cr   Avoid further nephrotoxic agents

## 2020-11-08 NOTE — PROGRESS NOTE ADULT - ASSESSMENT
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 Camacho on 8/31/20 (finished course of IV Vanco on 10/12/20), post-op course significant for accelerated junctional/AV dissociation and later new afib/flutter, presented to the CT surgery office at [facility] on 11/2 for a post-op visit, c/o worsening PRESCOTT and b/l LE edema. Pt was found to be fluid overloaded w/ 2+ b/l LE edema and in rate controlled aflutter. INR drawn in the office 11/2 was 2.41. Decision was subsequently made to admit the pt for diuresis, INR management and further management of aflutter.     Pt seen and evaluated at bedside - VSS, NAD. Breathing comfortably, O2 sat 98% on RA. Aflutter 80s-100s on tele - AV kaylan blockers being held for now given accelerated junctional rhythm/AV dissociation post-op. Will consult EP to evaluate for cardioversion per Dr. Camacho - per EP note from last admission PPM was not indicated at that time.. BUN/Cr elevated 40/2.22 (Cr on outpatient labs drawn 10/29 was 1.84) - will consult Renal Dr. Aviles per Dr. Camacho. Lasix 40 mg PO x 1 given on arrival - will discuss further diuretic regimen w/ Dr. Camacho given elevated Cr.  INR 1.78 today, down from 2.41 in office yesterday - pt reports holding Coumadin last night per Dr. Camacho's recommendation given concern for supratherapeutic INR, will hold Coumadin for now and start a Heparin gtt per Dr. Camacho. Will check CXR PA/lateral and TTE.   11/4  NOHEMI / DCCV this afternoon if PTT therapeutic  Dose coumadin Hep gtt 1100/hr  11/5 VSS NSR d/w EP BB - not at this time . INR 1.66 hearing gtt coumadin bridge. Coumadin 5 tonight  ECHO completed Home in am  11/6 VSS Creatinine 2.9 today UA + Leuk - nitrites  - Infectious disease consulted INR pending. Monitor overnight.   11/7 Creat 3.12 WIll continue to monitor. Maintain on Bactrim for continuous antibiotic prophylaxis as per ID  11/8 Diuretics discontinued. Creatinine 3.24. Coumadin held for INR3.24. ID to comment on benefit of od acidifying urine for infection prevention.

## 2020-11-08 NOTE — PROGRESS NOTE ADULT - SUBJECTIVE AND OBJECTIVE BOX
VITAL SIGNS    Telemetry:  SR 70's  Vital Signs Last 24 Hrs  T(C): 36.9 (11-08-20 @ 04:45), Max: 36.9 (11-07-20 @ 12:21)  T(F): 98.5 (11-08-20 @ 04:45), Max: 98.5 (11-07-20 @ 12:21)  HR: 98 (11-08-20 @ 04:45) (95 - 110)  BP: 127/75 (11-08-20 @ 04:45) (108/63 - 137/70)  RR: 18 (11-08-20 @ 04:45) (18 - 18)  SpO2: 97% (11-08-20 @ 04:45) (97% - 100%)            11-07 @ 07:01  -  11-08 @ 07:00  --------------------------------------------------------  IN: 837 mL / OUT: 1360 mL / NET: -523 mL    11-08 @ 07:01  -  11-08 @ 10:32  --------------------------------------------------------  IN: 0 mL / OUT: 625 mL / NET: -625 mL       Daily     Daily   Admit Wt: Drug Dosing Weight  Height (cm): 190.5 (31 Aug 2020 13:58)  Weight (kg): 84.5 (04 Nov 2020 15:02)  BMI (kg/m2): 23.3 (04 Nov 2020 15:02)  BSA (m2): 2.13 (04 Nov 2020 15:02)      CAPILLARY BLOOD GLUCOSE              MEDICATIONS  abacavir 600 milliGRAM(s) Oral daily  aspirin enteric coated 81 milliGRAM(s) Oral daily  cholecalciferol 5000 Unit(s) Oral daily  ferrous    sulfate 325 milliGRAM(s) Oral daily  finasteride 5 milliGRAM(s) Oral daily  lamiVUDine- milliGRAM(s) Oral daily  multivitamin 1 Tablet(s) Oral daily  pantoprazole    Tablet 40 milliGRAM(s) Oral before breakfast  Pifeltro (Doravirine) tablet 100 milliGRAM(s) 100 milliGRAM(s) Oral daily  polyethylene glycol 3350 17 Gram(s) Oral daily PRN  sodium chloride 0.9% lock flush 3 milliLiter(s) IV Push every 8 hours  tamsulosin 0.4 milliGRAM(s) Oral at bedtime  trimethoprim   80 mG/sulfamethoxazole 400 mG 1 Tablet(s) Oral <User Schedule>      >>> <<<  PHYSICAL EXAM  Subjective: NAD  Neurology: alert and oriented x 3, nonfocal, no gross deficits  CV : s1s2  Sternal Wound :  CDI , Stable  Lungs: CTA b/l  Abdomen: soft, NT,ND, (+ )BM  :  self cath  Extremities: -c/c/e      LABS  11-08    135  |  98  |  48<H>  ----------------------------<  96  4.0   |  24  |  3.20<H>    Ca    9.6      08 Nov 2020 06:26                                   11.0   11.00 )-----------( 331      ( 08 Nov 2020 06:26 )             33.1          PT/INR - ( 08 Nov 2020 06:26 )   PT: 36.8 sec;   INR: 3.24 ratio         PTT - ( 07 Nov 2020 11:08 )  PTT:40.7 sec       PAST MEDICAL & SURGICAL HISTORY:  H/O endocarditis  MV endocarditis    Orchitis and epididymitis    Vitamin D deficiency    Bladder mass    Edema of both legs    Osteoporosis    Seborrheic keratosis    Constipation, unspecified constipation type    Chronic kidney disease, unspecified stage    HIV (human immunodeficiency virus infection)    Unsteady gait    S/P MVR (mitral valve replacement)  &amp; SHELLIE clip, 8/31/2020    S/P coronary artery stent placement

## 2020-11-08 NOTE — DISCHARGE NOTE PROVIDER - CARE PROVIDER_API CALL
Beck Celestin  INTERVENTIONAL CARDIOLOGY  [address] [place] [address]  Phone: [phone]  Fax: [phone]  Follow Up Time: 1-3 days    Presley Ferguson  INTERNAL MEDICINE  [address] [place] [address]  Phone: [phone]  Fax: [phone]  Follow Up Time: 1 week    Memo Silver  UROLOGY  [address] [facility], [address] [address]  Phone: [phone]  Fax: [phone]  Follow Up Time: 1-3 days

## 2020-11-08 NOTE — DISCHARGE NOTE PROVIDER - NSDCMRMEDTOKEN_GEN_ALL_CORE_FT
abacavir 300 mg oral tablet: 2 tab(s) orally once a day  Aspirin Low Dose 81 mg oral delayed release tablet: 1 tab(s) orally once a day  Blood test: PT/INR and Chem 7    11/10  and 11/12    Please send results to Dr Celestin [phone]  and Dr Ferguson  Coumadin 2.5 mg oral tablet: 1 tab(s) orally once a day  ferrous sulfate 325 mg (65 mg elemental iron) oral tablet: 1 tab(s) orally once a day  finasteride: 5 milligram(s) orally once a day  folic acid 1 mg oral tablet: 1 tab(s) orally once a day  freetext medication     -: 100 milligram(s) orally once a day  ketoconazole 2% topical shampoo: Apply topically to scalp 2x per week  lamiVUDine 100 mg oral tablet: 1 tab(s) orally once a day  Multiple Vitamins oral tablet: 1 tab(s) orally once a day  Pepcid 20 mg oral tablet: 1 tab(s) orally once a day   Pifeltro 100 mg oral tablet: 1 tab(s) orally once a day  polyethylene glycol 3350 oral powder for reconstitution: 17 gram(s) orally once a day, As needed, Constipation  sulfamethoxazole-trimethoprim 400 mg-80 mg oral tablet: 1 tab(s) orally once a day   tamsulosin 0.4 mg oral capsule: 1 cap(s) orally once a day  Vitamin C 500 mg oral tablet: 1 tab(s) orally once a day  Vitamin D3 5000 intl units (125 mcg) oral capsule: 1 cap(s) orally once a day   abacavir 300 mg oral tablet: 2 tab(s) orally once a day  Aspirin Low Dose 81 mg oral delayed release tablet: 1 tab(s) orally once a day  Blood test: PT/INR and Chem 7    11/10  and 11/12    Please send results to Dr Celestin [phone]  and Dr Ferguson  Coumadin 2.5 mg oral tablet: 1 tab(s) orally once a day  Please do not take 11/8 and 11/9  ferrous sulfate 325 mg (65 mg elemental iron) oral tablet: 1 tab(s) orally once a day  finasteride: 5 milligram(s) orally once a day  folic acid 1 mg oral tablet: 1 tab(s) orally once a day  freetext medication     -: 100 milligram(s) orally once a day  ketoconazole 2% topical shampoo: Apply topically to scalp 2x per week  lamiVUDine 100 mg oral tablet: 1 tab(s) orally once a day  Multiple Vitamins oral tablet: 1 tab(s) orally once a day  Pepcid 20 mg oral tablet: 1 tab(s) orally once a day   Pifeltro 100 mg oral tablet: 1 tab(s) orally once a day  polyethylene glycol 3350 oral powder for reconstitution: 17 gram(s) orally once a day, As needed, Constipation  sulfamethoxazole-trimethoprim 400 mg-80 mg oral tablet: 1 tab(s) orally once a day   tamsulosin 0.4 mg oral capsule: 1 cap(s) orally once a day  Vitamin C 500 mg oral tablet: 1 tab(s) orally once a day  Vitamin D3 5000 intl units (125 mcg) oral capsule: 1 cap(s) orally once a day

## 2020-11-08 NOTE — DISCHARGE NOTE PROVIDER - HOSPITAL COURSE
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 Camacho on 8/31/20 (finished course of IV Vanco on 10/12/20), post-op course significant for accelerated junctional/AV dissociation and later new afib/flutter, presented to the CT surgery office at [facility] on 11/2 for a post-op visit, c/o worsening PRESCOTT and b/l LE edema. Pt was found to be fluid overloaded w/ 2+ b/l LE edema and in rate controlled aflutter. INR drawn in the office 11/2 was 2.41. Decision was subsequently made to admit the pt for diuresis, INR management and further management of aflutter.     Pt seen and evaluated at bedside - VSS, NAD. Breathing comfortably, O2 sat 98% on RA. Aflutter 80s-100s on tele - AV kaylan blockers being held for now given accelerated junctional rhythm/AV dissociation post-op. Will consult EP to evaluate for cardioversion per Dr. Camacho - per EP note from last admission PPM was not indicated at that time.. BUN/Cr elevated 40/2.22 (Cr on outpatient labs drawn 10/29 was 1.84) - will consult Renal Dr. Aviles per Dr. Camacho. Lasix 40 mg PO x 1 given on arrival - will discuss further diuretic regimen w/ Dr. Camacho given elevated Cr.  INR 1.78 today, down from 2.41 in office yesterday - pt reports holding Coumadin last night per Dr. Camacho's recommendation given concern for supratherapeutic INR, will hold Coumadin for now and start a Heparin gtt per Dr. Camacho. Will check CXR PA/lateral and TTE.   11/4  NOHEMI / DCCV this afternoon if PTT therapeutic  Dose coumadin Hep gtt 1100/hr  11/5 VSS NSR d/w EP BB - not at this time . INR 1.66 hearing gtt coumadin bridge. Coumadin 5 tonight  ECHO completed Home in am  11/6 VSS Creatinine 2.9 today UA + Leuk - nitrites  - Infectious disease consulted INR pending. Monitor overnight.   11/7 Creat 3.12 WIll continue to monitor. Maintain on Bactrim for continuous antibiotic prophylaxis as per ID  11/8 Diuretics discontinued. Creatinine 3.24. Coumadin held for INR3.24. ID to comment on benefit of od acidifying urine for infection prevention.  Pt discharge home with Nephrology, Urology, Cardiology and CTS follow up. Pt to hod coumadin x 2 days and resume at 2.5mg QHS.

## 2020-11-08 NOTE — DISCHARGE NOTE PROVIDER - PROVIDER TOKENS
PROVIDER:[TOKEN:[[id number]],FOLLOWUP:[1-3 days]],PROVIDER:[TOKEN:[[id number]],FOLLOWUP:[1 week]],PROVIDER:[TOKEN:[[id number]],FOLLOWUP:[1-3 days]]

## 2020-11-08 NOTE — DISCHARGE NOTE PROVIDER - NSDCCPCAREPLAN_GEN_ALL_CORE_FT
PRINCIPAL DISCHARGE DIAGNOSIS  Diagnosis: Renal failure, unspecified chronicity  Assessment and Plan of Treatment: Renal failure, unspecified chronicity

## 2020-11-08 NOTE — DISCHARGE NOTE NURSING/CASE MANAGEMENT/SOCIAL WORK - PATIENT PORTAL LINK FT
You can access the FollowMyHealth Patient Portal offered by [facility] by registering at the following website: [email or web address]. By joining [employer]’s FollowMyHealth portal, you will also be able to view your health information using other applications (apps) compatible with our system.

## 2020-11-10 ENCOUNTER — NON-APPOINTMENT (OUTPATIENT)
Age: 80
End: 2020-11-10

## 2020-11-10 ENCOUNTER — TRANSCRIPTION ENCOUNTER (OUTPATIENT)
Age: 80
End: 2020-11-10

## 2020-11-10 RX ORDER — WARFARIN SODIUM 2.5 MG/1
1 TABLET ORAL
Qty: 30 | Refills: 0
Start: 2020-11-10 | End: 2020-12-09

## 2020-11-11 ENCOUNTER — APPOINTMENT (OUTPATIENT)
Dept: HEMATOLOGY ONCOLOGY | Facility: CLINIC | Age: 80
End: 2020-11-11

## 2020-11-12 ENCOUNTER — NON-APPOINTMENT (OUTPATIENT)
Age: 80
End: 2020-11-12

## 2020-11-12 ENCOUNTER — TRANSCRIPTION ENCOUNTER (OUTPATIENT)
Age: 80
End: 2020-11-12

## 2020-11-16 ENCOUNTER — NON-APPOINTMENT (OUTPATIENT)
Age: 80
End: 2020-11-16

## 2020-11-16 PROBLEM — Z86.79 PERSONAL HISTORY OF OTHER DISEASES OF THE CIRCULATORY SYSTEM: Chronic | Status: ACTIVE | Noted: 2020-11-03

## 2020-11-16 RX ORDER — WARFARIN SODIUM 5 MG/1
5 TABLET ORAL DAILY
Refills: 0 | Status: DISCONTINUED | COMMUNITY
End: 2020-11-16

## 2020-11-16 RX ORDER — PANTOPRAZOLE 40 MG/1
40 TABLET, DELAYED RELEASE ORAL DAILY
Qty: 14 | Refills: 0 | Status: DISCONTINUED | COMMUNITY
End: 2020-11-16

## 2020-11-17 ENCOUNTER — NON-APPOINTMENT (OUTPATIENT)
Age: 80
End: 2020-11-17

## 2020-11-23 ENCOUNTER — APPOINTMENT (OUTPATIENT)
Dept: CARDIOLOGY | Facility: CLINIC | Age: 80
End: 2020-11-23
Payer: MEDICARE

## 2020-11-23 ENCOUNTER — NON-APPOINTMENT (OUTPATIENT)
Age: 80
End: 2020-11-23

## 2020-11-23 ENCOUNTER — LABORATORY RESULT (OUTPATIENT)
Age: 80
End: 2020-11-23

## 2020-11-23 VITALS
BODY MASS INDEX: 20.53 KG/M2 | OXYGEN SATURATION: 99 % | HEIGHT: 74 IN | RESPIRATION RATE: 16 BRPM | SYSTOLIC BLOOD PRESSURE: 130 MMHG | HEART RATE: 78 BPM | WEIGHT: 160 LBS | DIASTOLIC BLOOD PRESSURE: 60 MMHG

## 2020-11-23 PROCEDURE — 99215 OFFICE O/P EST HI 40 MIN: CPT

## 2020-11-23 PROCEDURE — 93000 ELECTROCARDIOGRAM COMPLETE: CPT

## 2020-11-25 LAB
ALBUMIN SERPL ELPH-MCNC: 3.9 G/DL
ALP BLD-CCNC: 120 U/L
ALT SERPL-CCNC: 28 U/L
ANION GAP SERPL CALC-SCNC: 13 MMOL/L
AST SERPL-CCNC: 37 U/L
BASOPHILS # BLD AUTO: 0.03 K/UL
BASOPHILS NFR BLD AUTO: 0.3 %
BILIRUB SERPL-MCNC: 0.2 MG/DL
BUN SERPL-MCNC: 33 MG/DL
CALCIUM SERPL-MCNC: 9.8 MG/DL
CHLORIDE SERPL-SCNC: 104 MMOL/L
CO2 SERPL-SCNC: 23 MMOL/L
CREAT SERPL-MCNC: 2.04 MG/DL
EOSINOPHIL # BLD AUTO: 0.07 K/UL
EOSINOPHIL NFR BLD AUTO: 0.8 %
GLUCOSE SERPL-MCNC: 97 MG/DL
HCT VFR BLD CALC: 33.4 %
HGB BLD-MCNC: 10.5 G/DL
IMM GRANULOCYTES NFR BLD AUTO: 0.2 %
LYMPHOCYTES # BLD AUTO: 2.53 K/UL
LYMPHOCYTES NFR BLD AUTO: 28.8 %
MAN DIFF?: NORMAL
MCHC RBC-ENTMCNC: 31.4 GM/DL
MCHC RBC-ENTMCNC: 33.3 PG
MCV RBC AUTO: 106 FL
MONOCYTES # BLD AUTO: 0.65 K/UL
MONOCYTES NFR BLD AUTO: 7.4 %
NEUTROPHILS # BLD AUTO: 5.48 K/UL
NEUTROPHILS NFR BLD AUTO: 62.5 %
NT-PROBNP SERPL-MCNC: 2205 PG/ML
PLATELET # BLD AUTO: 194 K/UL
POTASSIUM SERPL-SCNC: 4.6 MMOL/L
PROT SERPL-MCNC: 7.5 G/DL
RBC # BLD: 3.15 M/UL
RBC # FLD: 18 %
SODIUM SERPL-SCNC: 140 MMOL/L
TSH SERPL-ACNC: 5.04 UIU/ML
WBC # FLD AUTO: 8.78 K/UL

## 2020-12-02 ENCOUNTER — NON-APPOINTMENT (OUTPATIENT)
Age: 80
End: 2020-12-02

## 2020-12-04 ENCOUNTER — NON-APPOINTMENT (OUTPATIENT)
Age: 80
End: 2020-12-04

## 2020-12-04 ENCOUNTER — APPOINTMENT (OUTPATIENT)
Dept: ELECTROPHYSIOLOGY | Facility: CLINIC | Age: 80
End: 2020-12-04
Payer: MEDICARE

## 2020-12-04 ENCOUNTER — APPOINTMENT (OUTPATIENT)
Dept: NEPHROLOGY | Facility: CLINIC | Age: 80
End: 2020-12-04
Payer: MEDICARE

## 2020-12-04 VITALS
TEMPERATURE: 97.6 F | BODY MASS INDEX: 21.51 KG/M2 | SYSTOLIC BLOOD PRESSURE: 130 MMHG | HEART RATE: 72 BPM | WEIGHT: 167.55 LBS | OXYGEN SATURATION: 100 % | DIASTOLIC BLOOD PRESSURE: 61 MMHG

## 2020-12-04 PROCEDURE — 0296T: CPT

## 2020-12-04 PROCEDURE — 99214 OFFICE O/P EST MOD 30 MIN: CPT

## 2020-12-04 NOTE — ASSESSMENT
[FreeTextEntry1] : Mr. Monsivais is a 80 year old man with chronic kidney disease stage III with proteinuria and secondary hyperparathyroidism, a history of calcium-based nephrolithiasis, a host of urologic issues (including urinary incontinence, history of UTIs, a history of nephrogenic adenoma, and a history of hydronephrosis), HIV controlled on Biktarvy, and osteopenia/osteoporosis, here for renal follow up.\par \par Mr. Monsivais has long-standing CKD (at least since 2013) of uncertain etiology. He also has persistent proteinuria (with non-albumin protein out of proportion to albumin component), although interpretation is somewhat challenging with the UTIs. His serologic workup has revealed IgG kappa monoclonal gammopathy. 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 Seems creatinine is back to 2019 baseline, after SUDHEER episode(s). For today, would not make any changes, let things settle and calm a bit. But would favor some optimization for proteinuria and maximal kidney protection.\par \par He should follow up with me in 2-3 months with labs prior.\par \par (Prior to all this, we discussed the possibility of performing a renal biopsy for definitive diagnosis, and I was inclinded to proceed. Perhaps we will re-engage on this next visit, but defer for now given all that has recently transpired.)\par \par \par I have spent a total of 25 minutes in which >50% was spent in discussion with patient regarding chronic kidney disease, proteinuria, nephrolithiasis, urologic disorders, hypertension, and diet.

## 2020-12-04 NOTE — HISTORY OF PRESENT ILLNESS
[FreeTextEntry1] : Contacts:\par 	cell, [phone]\par 	Dr. Arie Grant and NP Di Gaona (infectious disease)\par 	Dr. 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 Monsivais is a 80 year old man with chronic kidney disease stage III with proteinuria and secondary hyperparathyroidism, a history of calcium-based nephrolithiasis, a host of urologic issues (including urinary incontinence, history of UTIs, a history of nephrogenic adenoma, and a history of hydronephrosis), HIV controlled on Biktarvy, and osteopenia/osteoporosis, here for renal follow up.\par \par Last saw Mr. Monsivais Oct. 2019. In interim, had endocarditis, mitral valve repair and left atrial appendage ligatioin (2020), complicated by atrial fibrillation and subsequent heart failure admission. Most recently, during this 2020 hospitalization, his creatinine peaked at 3.4, but has since come back to 2 (which is about baseline from 2019).\par \par Taking furosemide as needed for leg edema. Still gets some mild dyspnea with exertion, but has improved. Says he is now feeling better overall.\par \par Doing self-catheterization.\par \par No lightheadedness, headaches, chest pain, abdominal pain, nausea, vomiting, diarrhea, dysuria, hematuria, joint swelling. 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\par 	Never , no children; has no siblings\par 	Former  at [employer] for 40+ years\par 	No history of tobacco\par \par Family History:\par 	Mother  of leukemia\par 	Father had \par 	No known history of renal disease, hematuria, proteinuria, ESRD, dialysis, transplant\par \par -------------------------------------------------------------------------------\par Allergies: NKDA\par \par Medications: [Tried to reconcile as best as possible, but unable to confirm fully]\par 	Abacavir, lamivudine, PIFELTRO (doravirine)\par 		(previously on Biktarvy (bictegravir, emtricitabine, and tenofovir alafenamide))\par 	Coumadin\par 	Aspirin 81mg daily\par 	\par 	Ferrous sulfate\par 	Foic acid\par 	Finasteride\par 	Tamsulosin\par 	Several vitamins\par 	\par -------------------------------------------------------------------------------\par Physical Exam:\par 	Gen: NAD, well-appearing\par 	HEENT: Supple neck\par 	Pulm: CTA\par 	CV: RRR\par 	Back: No spinal or CVA terndeness\par 	Abd: +BS, soft, nontender/nondistended\par 	UE: Warm, FROM, intact strength\par 	LE: Warm, FROM, intact strength; 1+ edema, R > L (chronic)\par 	Neuro: No focal deficits\par 	Psych: Normal affect\par 	Skin: Warm, without rashes\par 	\par -------------------------------------------------------------------------------\par Labs/Studies:\par \par 	2020\par \par 		140 | 104 | 33\par 		------------------< 97 Ca 9.8 eGFR 30 / 35\par 		4.6 |  23 | 2.04\par 		\par 		Albumin 3.9\par 		\par 	Creatinine Trend\par 		2020 SCr 2.04\par 		2020-11-10 SCr 3.36\par 		2020 SCr 3.2\par 		...\par 		2020-10-29 SCr 1.84\par 		2020 SCr 1.88\par 		...\par 		2019 SCr 2.05\par 		2019-08-15 SCr 2.27\par 		2019 SCr 2.43\par 		2019 SCr 1.85\par 		2019 SCr 1.82\par 		2018 SCr 1.61\par 		2018 SCr 1.47\par 		2018 SCr 1.56\par 		2017-09-15 SCr 1.53\par 		2017-05-15 SCr 1.31\par 		2017 SCr 1.37\par 		2017 SCr 1.27\par 		2016-10-11 SCr 1.27\par 		2016 SCr 1.34\par 		2016 SCr 1.4\par 		2016 SCr 1.24\par 		2016 SCr 1.26\par 		2016 SCr 1.26\par 		2015 SCr 1.32\par 		2015 SCr 1.46\par 		2015 SCr 1.35\par 		2015-08-10 SCr 1.45\par 		2015 SCr 1.37\par 		2015 SCr 1.52\par 		2015 SCr 1.56\par 		2015 SCr 1.2\par 		2015 SCr 1.37\par 		2014-10-08 SCr 1.31\par 		2014 SCr 1.23\par 		2014 SCr 1.32\par 		2014 SCr 1.19\par 		2013 SCr 1.4\par 		2013 SCr 1.35\par 		2013-10-08 SCr 1.12\par 		2013-09-10 SCr 1.24\par 		2013 SCr 1.17\par 		2013 SCr 1\par 		2013 SCr 1.03\par \par 	2020 UPC ratio = 1.1 g/g\par 	2019-08-15 UPC ratio = 0.7 g/g\par 	2019 UPC ratio = 0.9 g/g\par 	2017-09-15 UPC ratio = 0.4 g/g\par 	2017 UPC ratio = 0.4 g/g\par 	2015 UPC ratio = 0.6 g/g\par 	2014 UPC ratio = 0.4 g/g\par 	\par 	2019-08-15 UAC ratio = 186 mg/g\par 	2015-08-10 UAC ratio = 137 mg/g\par 	2014-10-08 UAC ratio = 34 mg/g\par 		\par 	2020 U/A: protein trace, blood small\par \par 	2020 CBC: WBC 8.8 / Hgb 10.5 / plt 194\par \par 	2019 HCV nonreactive\par \par 	2019-08-15 SPEP/BROCK: IgG kappa band\par 	2019-08-15 KL FLC: kappa 9.6, lambda 5.2, ratio = 1.86\par 	2019-08-15 Complement C3 129, C4 47\par 	2019-08-15 Cryoglobulin negative\par 	\par 	2015-08-10 ANCA: negative (cANCA/pANCA)\par \par 	2016 OWEN < 1:80\par 	2015-08-10 OWEN 1:160 homogeneous\par 	2015 dsDNA < 12 \par  \par 	2019-08-15 HBsAg nonreactive\par 	2019-08-15 HBcAb nonreactive\par 	2019-08-15 HBsAb nonreactive

## 2020-12-09 RX ORDER — OMEPRAZOLE 20 MG/1
20 TABLET, DELAYED RELEASE ORAL
Refills: 0 | Status: DISCONTINUED | COMMUNITY
End: 2020-12-09

## 2020-12-21 ENCOUNTER — APPOINTMENT (OUTPATIENT)
Dept: CARDIOLOGY | Facility: CLINIC | Age: 80
End: 2020-12-21
Payer: MEDICARE

## 2020-12-21 VITALS
OXYGEN SATURATION: 98 % | HEIGHT: 74 IN | DIASTOLIC BLOOD PRESSURE: 65 MMHG | BODY MASS INDEX: 20.53 KG/M2 | RESPIRATION RATE: 16 BRPM | WEIGHT: 160 LBS | HEART RATE: 73 BPM | SYSTOLIC BLOOD PRESSURE: 125 MMHG

## 2020-12-21 DIAGNOSIS — R06.02 SHORTNESS OF BREATH: ICD-10-CM

## 2020-12-21 DIAGNOSIS — K59.00 CONSTIPATION, UNSPECIFIED: ICD-10-CM

## 2020-12-21 PROCEDURE — 0298T: CPT

## 2020-12-21 PROCEDURE — 99215 OFFICE O/P EST HI 40 MIN: CPT

## 2020-12-21 PROCEDURE — 93000 ELECTROCARDIOGRAM COMPLETE: CPT

## 2020-12-24 ENCOUNTER — APPOINTMENT (OUTPATIENT)
Dept: CARDIOLOGY | Facility: CLINIC | Age: 80
End: 2020-12-24
Payer: MEDICARE

## 2020-12-24 PROCEDURE — 93306 TTE W/DOPPLER COMPLETE: CPT

## 2020-12-29 ENCOUNTER — APPOINTMENT (OUTPATIENT)
Dept: INFECTIOUS DISEASE | Facility: CLINIC | Age: 80
End: 2020-12-29

## 2020-12-29 ENCOUNTER — OUTPATIENT (OUTPATIENT)
Dept: OUTPATIENT SERVICES | Facility: HOSPITAL | Age: 80
LOS: 1 days | End: 2020-12-29
Payer: COMMERCIAL

## 2020-12-29 ENCOUNTER — TRANSCRIPTION ENCOUNTER (OUTPATIENT)
Age: 80
End: 2020-12-29

## 2020-12-29 ENCOUNTER — NON-APPOINTMENT (OUTPATIENT)
Age: 80
End: 2020-12-29

## 2020-12-29 VITALS
HEIGHT: 74 IN | TEMPERATURE: 97.9 F | OXYGEN SATURATION: 97 % | SYSTOLIC BLOOD PRESSURE: 146 MMHG | BODY MASS INDEX: 21.3 KG/M2 | HEART RATE: 74 BPM | WEIGHT: 166 LBS | DIASTOLIC BLOOD PRESSURE: 70 MMHG

## 2020-12-29 DIAGNOSIS — Z95.2 PRESENCE OF PROSTHETIC HEART VALVE: Chronic | ICD-10-CM

## 2020-12-29 DIAGNOSIS — B20 HUMAN IMMUNODEFICIENCY VIRUS [HIV] DISEASE: ICD-10-CM

## 2020-12-29 LAB
CD3 CELLS # BLD: 1453 /UL
CD3 CELLS NFR BLD: 47 %
CD3+CD4+ CELLS # BLD: 433 /UL
CD3+CD4+ CELLS NFR BLD: 14 %
CD3+CD4+ CELLS/CD3+CD8+ CLL SPEC: 0.43 RATIO
CD3+CD8+ CELLS # SPEC: 1001 /UL
CD3+CD8+ CELLS NFR BLD: 32 %
CYSTATIN C SERPL-MCNC: 2 MG/L
GFR/BSA.PRED SERPLBLD CYS-BASED-ARV: 29 ML/MIN
PSA SERPL-MCNC: 0.8 NG/ML

## 2020-12-29 PROCEDURE — G0463: CPT

## 2020-12-29 NOTE — HISTORY OF PRESENT ILLNESS
[FreeTextEntry1] : follow up visit on 12/29/2020\par Here today doing well. Continue Bactrim 3 times a week as originally originally added in hospital for UTI prophylasis by ID. \par Pt needs a PCP, preferably specializing in gerontology. \par labs today, see in 3 months. \par \par Aug 17th, 2020 fell on bathroom floor. on floor 24 hours, Spent 23 days in [facility] and then in Rehab, ,Replaced the heart valve with vegitation. PICC line removed. \par He is follwing with  cardiology next week. \par Kel Monsivais 79 y/o male patient call for a telehealth follow up visit. He has seen Hem/Onc and will follow up again in November 2020. \par Pt has follow up appointments with both  Nephrology and Kenney Ferguson in Cardiology. \par We stopped Biktarvy and started Juluca, then stopped Juluca, since creatinine still rising so I changed to Abacivir 300mg BID daily, lamivudine 100mg daily and doravirine one tablet daily. \par recently seen by Dr. Memo Silver in Urology and pt doing well straight cathing and taking Tamulosin and finesteride daily. \par He is happy to mention he has now been sleeping through the night without waking to urinate. \par [facility] is handling all meds. \par review of systems 12/29/20\par Constitutional, Eyes, ENT, Cardiovascular, Respiratory, Gastrointestinal, Genitourinary, Musculoskeletal, Integumentary, Neurological, Psychiatric and Heme/Lymph are otherwise negative. \par new meds after discharge, Ferrous sulfate 325mg daily, folic acid 1mg daily , 81mg aspirin, protonix 40mg delated release, warfarin 5mg, finasteride and tamsulosin , pt still using self catherization. Vitamin D . \par \par Current medication list as of 12/29/2020\par HIV- abacavir 300mg BID daily, lamivudine 100mg daily  and pifeltro ( doravirine )  100mg daily\par calcium 1000mg , magnesium 400mg, & Zinc 15mg\par Tamar C  500mg , ferrous sulfide 325mg daily, folic acid 1mg daily, MVI , Vit D3 5000iu daily\par Finasteride 5mg daily, flomax 0.4mg daily, ASA 81mg daily, pepcid 20mg daily, Bactrim one tab M, W, F\par Coumadin 2.5mg for Afib\par Lasix ( furosemide 20mg ) as needed\par \par Plan 12/29/2020: \par 1) All labs today\par 2) continue all current meds. \par 3) Here today doing well. Continue Bactrim 3 times a week as originally originally added in hospital for UTI prophylasis by ID. \par 4) Pt needs a PCP, preferably specializing in gerontology. \par 5) see in 3 months.  \par \par \par \par  \par \par

## 2020-12-30 LAB
ALBUMIN SERPL ELPH-MCNC: 4.3 G/DL
ALP BLD-CCNC: 101 U/L
ALT SERPL-CCNC: 27 U/L
ANION GAP SERPL CALC-SCNC: 12 MMOL/L
AST SERPL-CCNC: 32 U/L
BASOPHILS # BLD AUTO: 0.05 K/UL
BASOPHILS NFR BLD AUTO: 0.6 %
BILIRUB SERPL-MCNC: 0.2 MG/DL
BUN SERPL-MCNC: 34 MG/DL
CALCIUM SERPL-MCNC: 9.3 MG/DL
CHLORIDE SERPL-SCNC: 105 MMOL/L
CO2 SERPL-SCNC: 25 MMOL/L
CREAT SERPL-MCNC: 2.15 MG/DL
EOSINOPHIL # BLD AUTO: 0.13 K/UL
EOSINOPHIL NFR BLD AUTO: 1.5 %
ESTIMATED AVERAGE GLUCOSE: 108 MG/DL
GLUCOSE SERPL-MCNC: 83 MG/DL
HBA1C MFR BLD HPLC: 5.4 %
HBV SURFACE AG SER QL: NONREACTIVE
HCT VFR BLD CALC: 35.8 %
HCV AB SER QL: NONREACTIVE
HCV S/CO RATIO: 0.16 S/CO
HGB BLD-MCNC: 11.5 G/DL
HIV1 RNA # SERPL NAA+PROBE: NORMAL
HIV1 RNA # SERPL NAA+PROBE: NORMAL COPIES/ML
IMM GRANULOCYTES NFR BLD AUTO: 0.5 %
LYMPHOCYTES # BLD AUTO: 3.29 K/UL
LYMPHOCYTES NFR BLD AUTO: 38 %
MAN DIFF?: NORMAL
MCHC RBC-ENTMCNC: 32.1 GM/DL
MCHC RBC-ENTMCNC: 34.6 PG
MCV RBC AUTO: 107.8 FL
MONOCYTES # BLD AUTO: 0.7 K/UL
MONOCYTES NFR BLD AUTO: 8.1 %
NEUTROPHILS # BLD AUTO: 4.45 K/UL
NEUTROPHILS NFR BLD AUTO: 51.3 %
PLATELET # BLD AUTO: 167 K/UL
POTASSIUM SERPL-SCNC: 4.4 MMOL/L
PROT SERPL-MCNC: 7.1 G/DL
RBC # BLD: 3.32 M/UL
RBC # FLD: 17.2 %
SARS-COV-2 IGG SERPL IA-ACNC: 0.36 INDEX
SARS-COV-2 IGG SERPL QL IA: NEGATIVE
SODIUM SERPL-SCNC: 141 MMOL/L
TSH SERPL-ACNC: 4.98 UIU/ML
VIRAL LOAD INTERP: NORMAL
VIRAL LOAD LOG: NORMAL LG COP/ML
WBC # FLD AUTO: 8.66 K/UL

## 2020-12-31 ENCOUNTER — RX RENEWAL (OUTPATIENT)
Age: 80
End: 2020-12-31

## 2020-12-31 ENCOUNTER — NON-APPOINTMENT (OUTPATIENT)
Age: 80
End: 2020-12-31

## 2020-12-31 ENCOUNTER — TRANSCRIPTION ENCOUNTER (OUTPATIENT)
Age: 80
End: 2020-12-31

## 2021-01-01 ENCOUNTER — RX RENEWAL (OUTPATIENT)
Age: 81
End: 2021-01-01

## 2021-01-01 ENCOUNTER — NON-APPOINTMENT (OUTPATIENT)
Age: 81
End: 2021-01-01

## 2021-01-01 LAB
INR PPP: 0.96 RATIO
PT BLD: 11.4 SEC

## 2021-01-01 RX ORDER — ASPIRIN ENTERIC COATED TABLETS 81 MG 81 MG/1
81 TABLET, DELAYED RELEASE ORAL
Qty: 90 | Refills: 3 | Status: ACTIVE | COMMUNITY
Start: 2021-01-01 | End: 1900-01-01

## 2021-01-11 ENCOUNTER — APPOINTMENT (OUTPATIENT)
Dept: ELECTROPHYSIOLOGY | Facility: CLINIC | Age: 81
End: 2021-01-11
Payer: MEDICARE

## 2021-01-11 VITALS
RESPIRATION RATE: 14 BRPM | WEIGHT: 160 LBS | BODY MASS INDEX: 20.53 KG/M2 | OXYGEN SATURATION: 100 % | DIASTOLIC BLOOD PRESSURE: 76 MMHG | HEIGHT: 74 IN | HEART RATE: 71 BPM | SYSTOLIC BLOOD PRESSURE: 160 MMHG

## 2021-01-11 VITALS — DIASTOLIC BLOOD PRESSURE: 70 MMHG | SYSTOLIC BLOOD PRESSURE: 144 MMHG

## 2021-01-11 PROCEDURE — 93000 ELECTROCARDIOGRAM COMPLETE: CPT

## 2021-01-11 PROCEDURE — 99214 OFFICE O/P EST MOD 30 MIN: CPT

## 2021-01-12 LAB
INR PPP: 1.2 RATIO
PT BLD: 14 SEC

## 2021-01-12 NOTE — HISTORY OF PRESENT ILLNESS
[FreeTextEntry1] : Dear Dr. Mack,\par \par Mr. Monsivais was seen in the [facility] today. For our records, please allow me to summarize the history and my findings.\par \par This is a pleasant 81 yo male, hx of vipin valve endocarditis s/p Bioprosthetic MVR in 8/2020, SHELLIE atriclip, and post operative atrial flutter. He underwent NOHEMI/DCCV in October 2020 and here for initial evaluation. He has felt exertional dyspnea and fatigue since before surgery. He has no prior hx of arrhythmias before surgery. His Event monitor by Dr. Mack revealed 100% AFlutter. He has no other symptoms.\par \par He denies any recent history of chest pain, shortness of breath, palpitations, dizziness, or syncope.

## 2021-01-12 NOTE — PHYSICAL EXAM
[General Appearance - In No Acute Distress] : no acute distress [Heart Rate And Rhythm] : heart rate and rhythm were normal [Heart Sounds] : normal S1 and S2 [Murmurs] : no murmurs present [Edema] : no peripheral edema present [Respiration, Rhythm And Depth] : normal respiratory rhythm and effort [Auscultation Breath Sounds / Voice Sounds] : lungs were clear to auscultation bilaterally [Abdomen Soft] : soft [Normal Conjunctiva] : the conjunctiva exhibited no abnormalities [Eyelids - No Xanthelasma] : the eyelids demonstrated no xanthelasmas [Normal Oral Mucosa] : normal oral mucosa [No Oral Pallor] : no oral pallor [No Oral Cyanosis] : no oral cyanosis [Normal Jugular Venous A Waves Present] : normal jugular venous A waves present [Normal Jugular Venous V Waves Present] : normal jugular venous V waves present [No Jugular Venous Barkley A Waves] : no jugular venous barkley A waves [Abnormal Walk] : normal gait [Gait - Sufficient For Exercise Testing] : the gait was sufficient for exercise testing [Nail Clubbing] : no clubbing of the fingernails [Cyanosis, Localized] : no localized cyanosis [Petechial Hemorrhages (___cm)] : no petechial hemorrhages [Skin Color & Pigmentation] : normal skin color and pigmentation [] : no rash [No Venous Stasis] : no venous stasis [Skin Lesions] : no skin lesions [No Skin Ulcers] : no skin ulcer [No Xanthoma] : no  xanthoma was observed [Oriented To Time, Place, And Person] : oriented to person, place, and time [Affect] : the affect was normal [Mood] : the mood was normal [No Anxiety] : not feeling anxious

## 2021-01-12 NOTE — DISCUSSION/SUMMARY
[FreeTextEntry1] : In summary, this is a 80 year old man with hx of Bioprosthetic MVR in 8/2020 and recurrent post operative atrial flutter, and underwent DCCV in Oct. 2020 but he is back in Flutter. Dr. Mack put a monitor on him which showed him to be in AFlutter 100% of the time. This seems to be typical atrial flutter but given MVR can not entirely exclude left sided flutter. We discussed options of watch/wait with AV control strategy which he already failed vs anti-arrhythmics vs EPS/Ablation. All options reviewed in details and he opts for EPS/Ablation and he understands this could be right sided or left sided ablation, and this is the best option to maintain sinus rhythm in his case. his SHELLIE was closed surgically and the NOHEMI in Oct. 2020 showed no leak, we will consider stopping his anti-coagulation 1 month after the ablation. \par \par  The rationale for the procedure as well as its risks--including but not limited to bleeding, vascular injury, pericardial effusion/tamponade, heart block requiring pacemaker, stroke, and death--were reviewed in detail. After consideration of this information, the decision was made to proceed with the procedure.\par \par Mr. Monsivais appeared to understand the whole discussion and verbalized that all of his questions were answered to his satisfaction.\par \par  \par Thank you for allowing me to be involved in the care of this pleasant man. Please feel free to contact me with any questions.\par \par \par Nick Agarwal MD\par  of Cardiology\par Electrophysiology Section\[address], [address] [place] [address]\[place] [address]\par Office: [phone]\par Fax: [phone]

## 2021-01-12 NOTE — END OF VISIT
[FreeTextEntry3] : Agree with above fellow's note. Likely recurrent typical atrial flutter s/p mitral valve repair with symptoms of worsened fatigue. Options regarding management, including a rate control strategy with AV kaylan blocking agents, or rhythm control strategies employing anti-arrhythmic drugs and/or catheter ablation were reviewed. He has opted to return for flutter ablation with the understanding that there is a high likelihood of subsequent atrial fibrillation. The rationale for the procedure as well as its risks--including but not limited to bleeding, vascular injury, pericardial effusion/tamponade, heart block requiring pacemaker, stroke, and death--were reviewed in detail. After consideration of this information, the decision was made to proceed with the procedure.\par \par

## 2021-01-20 ENCOUNTER — NON-APPOINTMENT (OUTPATIENT)
Age: 81
End: 2021-01-20

## 2021-01-21 ENCOUNTER — NON-APPOINTMENT (OUTPATIENT)
Age: 81
End: 2021-01-21

## 2021-01-21 ENCOUNTER — TRANSCRIPTION ENCOUNTER (OUTPATIENT)
Age: 81
End: 2021-01-21

## 2021-01-22 ENCOUNTER — APPOINTMENT (OUTPATIENT)
Dept: INTERNAL MEDICINE | Facility: CLINIC | Age: 81
End: 2021-01-22
Payer: MEDICARE

## 2021-01-22 VITALS
DIASTOLIC BLOOD PRESSURE: 80 MMHG | WEIGHT: 165 LBS | HEART RATE: 89 BPM | TEMPERATURE: 97.6 F | SYSTOLIC BLOOD PRESSURE: 150 MMHG | BODY MASS INDEX: 21.17 KG/M2 | OXYGEN SATURATION: 98 % | HEIGHT: 74 IN

## 2021-01-22 DIAGNOSIS — Z01.818 ENCOUNTER FOR OTHER PREPROCEDURAL EXAMINATION: ICD-10-CM

## 2021-01-22 DIAGNOSIS — Z86.39 PERSONAL HISTORY OF OTHER ENDOCRINE, NUTRITIONAL AND METABOLIC DISEASE: ICD-10-CM

## 2021-01-22 DIAGNOSIS — B20 HUMAN IMMUNODEFICIENCY VIRUS [HIV] DISEASE: ICD-10-CM

## 2021-01-22 PROCEDURE — G0444 DEPRESSION SCREEN ANNUAL: CPT | Mod: 59

## 2021-01-22 PROCEDURE — G0439: CPT

## 2021-01-22 NOTE — HISTORY OF PRESENT ILLNESS
[de-identified] : CARLINE GARBER is a 79 yo man with hypertension, BPH requiring self catheterization, CRI, HIV, mitral valve replacement 8/2020, a fib/a flutter on coumadin, MGUS here for a first visit.  Medical problems are stable on his current medications.  He is feeling well and doing well overall.  Some mild bilateral ankle edema, R > L for months, since heart surgery.\par \par The patient fell and was taken to [facility] in 8/2020.  He was found to have endocarditis.  He underwent a mitral valve replacement and left atrial appendage ligation by Dr Camacho.  He has had a fib/a flutter and is on coumadin.  He is seeing Dr Agarwal and has a planned ablation next week.  He sees cardiologist Dr ROSEMARIE Ferguson regularly.  He is waiting for a phone call from the nurse at Dr Ferguson's office for coumadin instructions.\par \par The patient collapsed outside of a pharmacy 7 years ago and was taken to [facility].  After an extensive workup, he was diagnosed with HIV and started on therapy.  He follows up every 3 months with BLANK Vazquez.  He has been stable on his current medications.  He recently had labs.\par \par The patient sees urologist Dr JUANA Silver and is due for a visit.  The patient has seen hem onc for MGUS and is due for a visit. The patient is seeing nephrology for CRI.  \par \par The patient is single with no children.  He is a retired .  He walks short distances with a cane.  He lives in an assisted living facility called [facility] for the past 6 years. He is an only child and has no remaining family.  He helped care for his elderly father and 2 aunts and they have passed away.  He is going to think about who he would designate as a health care proxy.\par \par He usually sees an opthalm, dentist. He is due for derm\par \par He refuses the flu and PNA immunizations. [FreeTextEntry1] : Annual Physical

## 2021-01-22 NOTE — ASSESSMENT
[FreeTextEntry1] : HCM\par Will need labs soon and repeat TFTs\par Continue current meds\par Cardio, ID, nephrology scheduled\par Advised Hem, urology and derm\par FUV in 4- 6 weeks\par Pt will consider flu shot

## 2021-01-22 NOTE — PHYSICAL EXAM
[No Acute Distress] : no acute distress [Normal Sclera/Conjunctiva] : normal sclera/conjunctiva [EOMI] : extraocular movements intact [Normal Outer Ear/Nose] : the outer ears and nose were normal in appearance [Normal Oropharynx] : the oropharynx was normal [Normal TMs] : both tympanic membranes were normal [Normal Nasal Mucosa] : the nasal mucosa was normal [No Lymphadenopathy] : no lymphadenopathy [Supple] : supple [No Respiratory Distress] : no respiratory distress  [No Accessory Muscle Use] : no accessory muscle use [Clear to Auscultation] : lungs were clear to auscultation bilaterally [Soft] : abdomen soft [Non Tender] : non-tender [Non-distended] : non-distended [Normal Bowel Sounds] : normal bowel sounds [No CVA Tenderness] : no CVA  tenderness [Alert and Oriented x3] : oriented to person, place, and time [de-identified] : Slender [de-identified] : irregular [de-identified] : mild ankle edema elke [de-identified] : walks with pronged cane

## 2021-01-22 NOTE — HEALTH RISK ASSESSMENT
[Fair] : ~his/her~ current health as fair  [Good] : ~his/her~  mood as  good [] : No [Yes] : Yes [Monthly or less (1 pt)] : Monthly or less (1 point) [No] : In the past 12 months have you used drugs other than those required for medical reasons? No [de-identified] : as tolerated [de-identified] : regular [None] : None [Retired] : retired [Feels Safe at Home] : Feels safe at home [Fully functional (bathing, dressing, toileting, transferring, walking, feeding)] : Fully functional (bathing, dressing, toileting, transferring, walking, feeding) [Fully functional (using the telephone, shopping, preparing meals, housekeeping, doing laundry, using] : Fully functional and needs no help or supervision to perform IADLs (using the telephone, shopping, preparing meals, housekeeping, doing laundry, using transportation, managing medications and managing finances) [Reports changes in hearing] : Reports no changes in hearing [Reports changes in vision] : Reports no changes in vision [Reports changes in dental health] : Reports no changes in dental health [Smoke Detector] : smoke detector [Carbon Monoxide Detector] : carbon monoxide detector [Seat Belt] :  uses seat belt

## 2021-01-23 ENCOUNTER — APPOINTMENT (OUTPATIENT)
Dept: DISASTER EMERGENCY | Facility: CLINIC | Age: 81
End: 2021-01-23

## 2021-01-25 LAB — SARS-COV-2 N GENE NPH QL NAA+PROBE: NOT DETECTED

## 2021-01-26 ENCOUNTER — TRANSCRIPTION ENCOUNTER (OUTPATIENT)
Age: 81
End: 2021-01-26

## 2021-01-26 ENCOUNTER — INPATIENT (INPATIENT)
Facility: HOSPITAL | Age: 81
LOS: 0 days | Discharge: NURSING, REHAB OR LONG-TERM CARE | DRG: 274 | End: 2021-01-27
Attending: INTERNAL MEDICINE | Admitting: INTERNAL MEDICINE
Payer: COMMERCIAL

## 2021-01-26 ENCOUNTER — NON-APPOINTMENT (OUTPATIENT)
Age: 81
End: 2021-01-26

## 2021-01-26 VITALS
HEIGHT: 74 IN | OXYGEN SATURATION: 99 % | HEART RATE: 87 BPM | DIASTOLIC BLOOD PRESSURE: 73 MMHG | RESPIRATION RATE: 18 BRPM | WEIGHT: 164.91 LBS | TEMPERATURE: 99 F | SYSTOLIC BLOOD PRESSURE: 134 MMHG

## 2021-01-26 DIAGNOSIS — Z95.2 PRESENCE OF PROSTHETIC HEART VALVE: Chronic | ICD-10-CM

## 2021-01-26 DIAGNOSIS — I48.92 UNSPECIFIED ATRIAL FLUTTER: ICD-10-CM

## 2021-01-26 LAB
ANION GAP SERPL CALC-SCNC: 14 MMOL/L — SIGNIFICANT CHANGE UP (ref 5–17)
APTT BLD: 43.1 SEC — HIGH (ref 27.5–35.5)
BLD GP AB SCN SERPL QL: NEGATIVE — SIGNIFICANT CHANGE UP
BUN SERPL-MCNC: 43 MG/DL — HIGH (ref 7–23)
CALCIUM SERPL-MCNC: 9.7 MG/DL — SIGNIFICANT CHANGE UP (ref 8.4–10.5)
CHLORIDE SERPL-SCNC: 103 MMOL/L — SIGNIFICANT CHANGE UP (ref 96–108)
CO2 SERPL-SCNC: 24 MMOL/L — SIGNIFICANT CHANGE UP (ref 22–31)
CREAT SERPL-MCNC: 2.29 MG/DL — HIGH (ref 0.5–1.3)
GLUCOSE SERPL-MCNC: 100 MG/DL — HIGH (ref 70–99)
HCT VFR BLD CALC: 36.9 % — LOW (ref 39–50)
HGB BLD-MCNC: 12.1 G/DL — LOW (ref 13–17)
INR BLD: 2.12 RATIO — HIGH (ref 0.88–1.16)
MCHC RBC-ENTMCNC: 32.8 GM/DL — SIGNIFICANT CHANGE UP (ref 32–36)
MCHC RBC-ENTMCNC: 35.2 PG — HIGH (ref 27–34)
MCV RBC AUTO: 107.3 FL — HIGH (ref 80–100)
NRBC # BLD: 0 /100 WBCS — SIGNIFICANT CHANGE UP (ref 0–0)
PLATELET # BLD AUTO: 202 K/UL — SIGNIFICANT CHANGE UP (ref 150–400)
POTASSIUM SERPL-MCNC: 4.3 MMOL/L — SIGNIFICANT CHANGE UP (ref 3.5–5.3)
POTASSIUM SERPL-SCNC: 4.3 MMOL/L — SIGNIFICANT CHANGE UP (ref 3.5–5.3)
PROTHROM AB SERPL-ACNC: 24.5 SEC — HIGH (ref 10.6–13.6)
RBC # BLD: 3.44 M/UL — LOW (ref 4.2–5.8)
RBC # FLD: 15 % — HIGH (ref 10.3–14.5)
RH IG SCN BLD-IMP: POSITIVE — SIGNIFICANT CHANGE UP
SODIUM SERPL-SCNC: 141 MMOL/L — SIGNIFICANT CHANGE UP (ref 135–145)
WBC # BLD: 8.2 K/UL — SIGNIFICANT CHANGE UP (ref 3.8–10.5)
WBC # FLD AUTO: 8.2 K/UL — SIGNIFICANT CHANGE UP (ref 3.8–10.5)

## 2021-01-26 PROCEDURE — 93662 INTRACARDIAC ECG (ICE): CPT | Mod: 26

## 2021-01-26 PROCEDURE — 93010 ELECTROCARDIOGRAM REPORT: CPT

## 2021-01-26 PROCEDURE — 93613 INTRACARDIAC EPHYS 3D MAPG: CPT

## 2021-01-26 PROCEDURE — 93653 COMPRE EP EVAL TX SVT: CPT

## 2021-01-26 RX ORDER — ASPIRIN/CALCIUM CARB/MAGNESIUM 324 MG
81 TABLET ORAL DAILY
Refills: 0 | Status: DISCONTINUED | OUTPATIENT
Start: 2021-01-26 | End: 2021-01-27

## 2021-01-26 RX ORDER — ABACAVIR 20 MG/ML
600 SOLUTION ORAL DAILY
Refills: 0 | Status: DISCONTINUED | OUTPATIENT
Start: 2021-01-26 | End: 2021-01-27

## 2021-01-26 RX ORDER — SIMETHICONE 80 MG/1
80 TABLET, CHEWABLE ORAL EVERY 4 HOURS
Refills: 0 | Status: DISCONTINUED | OUTPATIENT
Start: 2021-01-26 | End: 2021-01-27

## 2021-01-26 RX ORDER — ASCORBIC ACID 60 MG
1 TABLET,CHEWABLE ORAL
Qty: 0 | Refills: 0 | DISCHARGE

## 2021-01-26 RX ORDER — FAMOTIDINE 10 MG/ML
20 INJECTION INTRAVENOUS DAILY
Refills: 0 | Status: DISCONTINUED | OUTPATIENT
Start: 2021-01-26 | End: 2021-01-27

## 2021-01-26 RX ORDER — FINASTERIDE 5 MG/1
5 TABLET, FILM COATED ORAL DAILY
Refills: 0 | Status: DISCONTINUED | OUTPATIENT
Start: 2021-01-26 | End: 2021-01-27

## 2021-01-26 RX ORDER — LAMIVUDINE 150 MG
100 TABLET ORAL DAILY
Refills: 0 | Status: DISCONTINUED | OUTPATIENT
Start: 2021-01-26 | End: 2021-01-27

## 2021-01-26 RX ORDER — WARFARIN SODIUM 2.5 MG/1
1 TABLET ORAL
Qty: 0 | Refills: 0 | DISCHARGE
Start: 2021-01-26

## 2021-01-26 RX ORDER — FERROUS SULFATE 325(65) MG
325 TABLET ORAL DAILY
Refills: 0 | Status: DISCONTINUED | OUTPATIENT
Start: 2021-01-26 | End: 2021-01-27

## 2021-01-26 RX ORDER — MAGNESIUM OXIDE 400 MG ORAL TABLET 241.3 MG
400 TABLET ORAL DAILY
Refills: 0 | Status: DISCONTINUED | OUTPATIENT
Start: 2021-01-26 | End: 2021-01-27

## 2021-01-26 RX ORDER — FOLIC ACID 0.8 MG
1 TABLET ORAL DAILY
Refills: 0 | Status: DISCONTINUED | OUTPATIENT
Start: 2021-01-26 | End: 2021-01-27

## 2021-01-26 RX ORDER — METOPROLOL TARTRATE 50 MG
12.5 TABLET ORAL DAILY
Refills: 0 | Status: DISCONTINUED | OUTPATIENT
Start: 2021-01-26 | End: 2021-01-27

## 2021-01-26 RX ORDER — TAMSULOSIN HYDROCHLORIDE 0.4 MG/1
0.4 CAPSULE ORAL AT BEDTIME
Refills: 0 | Status: DISCONTINUED | OUTPATIENT
Start: 2021-01-26 | End: 2021-01-27

## 2021-01-26 RX ORDER — WARFARIN SODIUM 2.5 MG/1
2.5 TABLET ORAL ONCE
Refills: 0 | Status: DISCONTINUED | OUTPATIENT
Start: 2021-01-26 | End: 2021-01-26

## 2021-01-26 RX ORDER — WARFARIN SODIUM 2.5 MG/1
7.5 TABLET ORAL ONCE
Refills: 0 | Status: COMPLETED | OUTPATIENT
Start: 2021-01-26 | End: 2021-01-26

## 2021-01-26 RX ADMIN — FINASTERIDE 5 MILLIGRAM(S): 5 TABLET, FILM COATED ORAL at 21:41

## 2021-01-26 RX ADMIN — Medication 12.5 MILLIGRAM(S): at 21:40

## 2021-01-26 RX ADMIN — WARFARIN SODIUM 7.5 MILLIGRAM(S): 2.5 TABLET ORAL at 21:41

## 2021-01-26 RX ADMIN — FAMOTIDINE 20 MILLIGRAM(S): 10 INJECTION INTRAVENOUS at 21:40

## 2021-01-26 RX ADMIN — TAMSULOSIN HYDROCHLORIDE 0.4 MILLIGRAM(S): 0.4 CAPSULE ORAL at 21:40

## 2021-01-26 RX ADMIN — Medication 1 TABLET(S): at 22:23

## 2021-01-26 RX ADMIN — Medication 81 MILLIGRAM(S): at 21:40

## 2021-01-26 NOTE — CHART NOTE - NSCHARTNOTEFT_GEN_A_CORE
CARLINE GARBER  [id number]    PROCEDURE:  AFlutter ablation      INDICATION:  persistent atrial flutter after MVR      ELECTROPHYSIOLOGIST(S):  Dr. Agarwal  Fellow Jose Hall      ANESTHESIOLOGY:  GA      FINDINGS:  - The patient arrived to the electrophysiology laboratory in a fasting state.  Informed consent was obtained. Continuous electrocardiographic and hemodynamic monitoring was initiated.  The patient was intubated and anesthesia was provided by the Department of Anesthesiology.  An esophageal temperature probe was placed.  The patient was prepped and draped in a standard fashion.   - With US guidance and using modified Seldinger technique, 8, 9, and 7 F sheaths were placed in the right femoral vein. A deflectable decapolar catheter was positioned in the coronary sinus.    - The patient was in sinus rhythm at baseline, atrial decremental pacing induced atrial flutter with -280 ms. The CS catheter showed proximal to distal atrial activation. Entrainment of the tachycardia was performed from the proximal and distal CS, with post pacing intervals consistent with a right-sided flutter most likely CTI-dependent CCW atrial flutter. The tachycardia was terminated with manipulating the ablation catheter in the CTI region.   - Using the ablation catheter (Biosense Decker ST/SF irrigated catheter-4 mm tip) through an Agilis sheath, CTI ablation was performed using fluoroscopic and electroanatomical mapping system guidance. This was a very challenging CTI ablation with difficulty achieving bidirectional block. A SoundStar intracardiac echo (ICE) probe was then advanced into the right atrium and showed a very short CTI. Lesions there failed to achieve bidirectional block, then more lesions "a lateral line" was performed and that achieved bidirectional block. We delivered many lesions in the CTI with finally achieving bidirectional block. After a waiting period, atrial incremental pacing and extra-stimuli induced the tachycardia again.  - The [employer]Share Practiceter CARTO electroanatomical mapping system was then used to create right atrial geometry with a Pentarray mapping catheter. Voltage showed extensive circumferential scar in the RA just superior to the tricuspid annulus. TCL was 240-260 ms. Activation mapping and entrainment of the this new tachycardia showed it to be possibly reentrant around a scar extending from the SVC down to the scar area superior to the tricuspid annulus. After confirming no phrenic nerve capture with high output pacing, we performed linear ablation connecting the SVC down to that scar. The tachycardia did not terminate, but we isolated the posterior RA from the rest of the atrium (posterior RA was in sinus but rest of RA was in flutter). Further mapping and entrainment showed this flutter to be spanning around the tricuspid annulus (all PPI-TCL from around the tricuspid valve were <30 ms). This was most likely due to a gap in the CTI line. Further lesions with 8 mm tip irrigated catheter were applied in the CTI and bidirectional block was achieved. Further lesions were made to connect the CTI line to the linear ablation performed up to the SVC to prevent future reentrant arrhythmias through this isthmus.   - CTI line showed consistent bidirectional block.   - Extensive atrial pacing and extra-stimuli failed to induced any further arrhythmias.    - ICE demonstrated no pericardial effusion.  A figure-of-8 suture using 0 Ethibond was used to obtain hemostasis.      COMPLICATIONS:  none      RECOMMENDATIONS:  - monitor Rt. groin for bleeding/hematoma  - can STOP Coumadin after 3 months as he had a surgically closed SHELLIE   - call EP with any Qs.

## 2021-01-26 NOTE — ASU DISCHARGE PLAN (ADULT/PEDIATRIC) - ASU DC SPECIAL INSTRUCTIONSFT
No heavy lifting, strenuous activity, bending, straining, or unnecessary stair climbing for 2 weeks. No driving for 2 days. You may shower 24 hours following the procedure but avoid baths/swimming for 1 week. Check your groin site for bleeding and/or swelling daily following procedure and call your doctor immediately if it occurs or if you experience increased pain at the site. Follow up with your cardiologist in 1-2 weeks. You may call [facility] if you have any questions/concerns regarding your procedure [phone].

## 2021-01-26 NOTE — H&P CARDIOLOGY - PSH
S/P coronary artery stent placement    S/P MVR (mitral valve replacement)  & SHELLIE clip, 8/31/2020

## 2021-01-26 NOTE — PRE-ANESTHESIA EVALUATION ADULT - BP NONINVASIVE DIASTOLIC (MM HG)
CHIEF COMPLAINT:  Annual exam.     HISTORY OF PRESENT ILLNESS:  Kayla is a 74 year old   The patient has not been asked about pregnancy. female, seen today for annual gynecologic exam.  She is having no problems. Her blood pressure is high here today but she brings in her readings from home and actually most of the time it is quite low. She is having no side effects from her lisinopril. Also wishes to continue on her simvastatin and both of these were refilled for her today. Complete metabolic panel and lipid panel were ordered and she will have those drawn today. She is scheduled for mammogram today. She has had a normal DEXA scan twice in the past.    No LMP recorded. Patient is postmenopausal.  Contraception:  N/A  I have reviewed the patient's past medical and surgical history, family history, medications, allergies, social history, and health maintenance recommendations, updating these as appropriate.  See the EMR (electronic medical record) for details of this information.    Pertinent gynecologic history includes:   No obstetric history on file.       SOCIAL HISTORY:    Tobacco Use: Never              Alcohol Use: No                 Drug Use:    No               . Very involved and training and showing.    REVIEW OF SYSTEMS:    Negative for any significant problems with the following:    General:  Fevers or chills.  Cardiovascular:  Chest pains.  Respiratory:  Shortness of breath.  Breasts:  Dominant masses or nipple discharge.  Gastrointestinal:  Nausea/vomiting, diarrhea/constipation.  Urinary:  Dysuria/hematuria.  Reproductive:  Vaginal discharge, burning, odor or itching.  Skin:  New or changing nevi.  Neurologic:  Headaches.  Musculoskeletal:  New aches or pains.    Preventative Medicine:  Mammogram:  Done today  Pap smear:  Up to date  Colonoscopy:  Up to date    Lipid panel/diabetes screening:  Up to date  DEXA scan:  Up to date    PHYSICAL EXAM:  Visit Vitals  /90 (BP [facility]: [facility]  75 Patient Position: Sitting, Cuff Size: Regular)   Pulse 66   Resp 16   Ht 5' 2.5\" (1.588 m)   Wt 76.8 kg   BMI 30.47 kg/m²   , Body mass index is 30.47 kg/m².  General:  Well developed, well nourished, in no acute distress.  Psychiatric:  Alert and cooperative; normal mood and affect.  HEENT:  Atraumatic, normocephalic.  Cardiovascular:  Regular rate and rhythm.  Respiratory:  Clear to auscultation bilaterally.  Unlabored respiratory effort.  Breasts:  Symmetric.  There are no skin changes, dominant masses, nipple discharge, or axillary lymphadenopathy bilaterally.  Abdomen:  Abdomen soft and non-tender without masses, hepatosplenomegaly or hernias noted.  Neurologic:  No focal deficits.   Skin:  Warm and dry without lesions.  Extremities:  Without edema or cyanosis.    PELVIC EXAM:  External Genitalia:  Without lesions.  Urethral Meatus:  Appears normal.  Vagina:  Within normal limits.  Cervix/Uterus:  Vaginal walls and cervix are atrophic but without lesions. On bimanual exam uterus is anteverted small mobile nontender with no adnexal masses or tenderness.  Adnexa/Parametria:  No adnexal masses or tenderness appreciated.  Anus and Perineum:  Sphincter tone normal and no mass noted.    GYNECOLOGIC EXAM:  Normal annual exam. Refill on lisinopril and simvastatin. Labs ordered.    ASSESSMENT AND PLAN:  Return p.r.n. or yearly.

## 2021-01-26 NOTE — H&P CARDIOLOGY - PMH
Bladder mass    Chronic kidney disease, unspecified stage    Constipation, unspecified constipation type    Edema of both legs    H/O endocarditis  MV endocarditis  HIV (human immunodeficiency virus infection)    Orchitis and epididymitis    Osteoporosis    Seborrheic keratosis    Unsteady gait    Vitamin D deficiency     Benign prostatic hyperplasia without lower urinary tract symptoms    Bladder mass    Chronic kidney disease, unspecified stage    Constipation, unspecified constipation type    Edema of both legs    H/O endocarditis  MV endocarditis  HIV (human immunodeficiency virus infection)    Orchitis and epididymitis    Osteoporosis    Seborrheic keratosis    Unsteady gait    Vitamin D deficiency

## 2021-01-26 NOTE — ASU DISCHARGE PLAN (ADULT/PEDIATRIC) - CARE PROVIDER_API CALL
Nick Agarwal)  Cardiac Electrophysiology; Cardiology  [address] [place] [address]  Phone: [phone]  Fax: ([phone]  Scheduled Appointment: 03/15/2021 08:20 AM

## 2021-01-26 NOTE — H&P CARDIOLOGY - FAMILY HISTORY
Father  Still living? No  Family history of congestive heart failure, Age at diagnosis: Age Unknown     Mother  Still living? No  Family history of cancer, Age at diagnosis: Age Unknown

## 2021-01-26 NOTE — H&P CARDIOLOGY - HISTORY OF PRESENT ILLNESS
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 Camacho on 8/31/20 (finished course of IV Vanco on 10/12/20), post-op course significant for accelerated junctional/AV dissociation and later new afib/flutter, presented to the CT surgery office at [facility] on 11/2 for a post-op visit. At that time, pt was c/o worsening PRESCOTT and b/l LE edema. Additionally, pt reported not having his INR checked in approximately 2 weeks since he was d/c'd home from rehab. Pt was found to be fluid overloaded w/ 2+ b/l LE edema and in rate controlled aflutter. INR drawn in the office 11/2 was 2.41 and COVID PCR was negative. Decision was subsequently made to admit the pt for diuresis, INR management and further management of aflutter.  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 Camacho on 8/31/20, post-op course significant for accelerated junctional/AV dissociation and later new afib/flutter, which he underwent DCCV in 10/2020, presented to Dr. Agarwal's office after event monitor by Dr. Mack revealed he was in aflutter 100% of the time. He now presents for an aflutter ablation. Patient denies SOB, chest pain, palpitations, dizziness, diaphoresis, syncope, N/V, chills, fever, sick contacts, recent travel. 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 Camacho on 8/31/20, post-op course significant for accelerated junctional/AV dissociation and later new afib/flutter (on Coumadin, last dose taken 1/24/21 pm), which he underwent DCCV in 10/2020, presented to Dr. Agarwal's office after event monitor by Dr. Mack revealed he was in aflutter 100% of the time. He now presents for an aflutter ablation. Patient denies SOB, chest pain, palpitations, dizziness, diaphoresis, syncope, N/V, chills, fever, sick contacts, recent travel.

## 2021-01-27 ENCOUNTER — TRANSCRIPTION ENCOUNTER (OUTPATIENT)
Age: 81
End: 2021-01-27

## 2021-01-27 VITALS
RESPIRATION RATE: 17 BRPM | TEMPERATURE: 100 F | OXYGEN SATURATION: 97 % | SYSTOLIC BLOOD PRESSURE: 116 MMHG | HEART RATE: 78 BPM | DIASTOLIC BLOOD PRESSURE: 65 MMHG

## 2021-01-27 LAB
ANION GAP SERPL CALC-SCNC: 10 MMOL/L — SIGNIFICANT CHANGE UP (ref 5–17)
APTT BLD: 38.4 SEC — HIGH (ref 27.5–35.5)
BUN SERPL-MCNC: 34 MG/DL — HIGH (ref 7–23)
CALCIUM SERPL-MCNC: 8.2 MG/DL — LOW (ref 8.4–10.5)
CHLORIDE SERPL-SCNC: 105 MMOL/L — SIGNIFICANT CHANGE UP (ref 96–108)
CO2 SERPL-SCNC: 24 MMOL/L — SIGNIFICANT CHANGE UP (ref 22–31)
CREAT SERPL-MCNC: 2.08 MG/DL — HIGH (ref 0.5–1.3)
GLUCOSE SERPL-MCNC: 85 MG/DL — SIGNIFICANT CHANGE UP (ref 70–99)
HCT VFR BLD CALC: 33.4 % — LOW (ref 39–50)
HGB BLD-MCNC: 10.9 G/DL — LOW (ref 13–17)
INR BLD: 1.9 RATIO — HIGH (ref 0.88–1.16)
MCHC RBC-ENTMCNC: 32.6 GM/DL — SIGNIFICANT CHANGE UP (ref 32–36)
MCHC RBC-ENTMCNC: 35.4 PG — HIGH (ref 27–34)
MCV RBC AUTO: 108.4 FL — HIGH (ref 80–100)
NRBC # BLD: 0 /100 WBCS — SIGNIFICANT CHANGE UP (ref 0–0)
PLATELET # BLD AUTO: 153 K/UL — SIGNIFICANT CHANGE UP (ref 150–400)
POTASSIUM SERPL-MCNC: 3.7 MMOL/L — SIGNIFICANT CHANGE UP (ref 3.5–5.3)
POTASSIUM SERPL-SCNC: 3.7 MMOL/L — SIGNIFICANT CHANGE UP (ref 3.5–5.3)
PROTHROM AB SERPL-ACNC: 22.1 SEC — HIGH (ref 10.6–13.6)
RBC # BLD: 3.08 M/UL — LOW (ref 4.2–5.8)
RBC # FLD: 15 % — HIGH (ref 10.3–14.5)
SARS-COV-2 RNA SPEC QL NAA+PROBE: SIGNIFICANT CHANGE UP
SODIUM SERPL-SCNC: 139 MMOL/L — SIGNIFICANT CHANGE UP (ref 135–145)
WBC # BLD: 8.72 K/UL — SIGNIFICANT CHANGE UP (ref 3.8–10.5)
WBC # FLD AUTO: 8.72 K/UL — SIGNIFICANT CHANGE UP (ref 3.8–10.5)

## 2021-01-27 PROCEDURE — C1730: CPT

## 2021-01-27 PROCEDURE — 86901 BLOOD TYPING SEROLOGIC RH(D): CPT

## 2021-01-27 PROCEDURE — 85610 PROTHROMBIN TIME: CPT

## 2021-01-27 PROCEDURE — 97161 PT EVAL LOW COMPLEX 20 MIN: CPT

## 2021-01-27 PROCEDURE — 85730 THROMBOPLASTIN TIME PARTIAL: CPT

## 2021-01-27 PROCEDURE — 93622 COMP EP EVAL L VENTR PAC&REC: CPT

## 2021-01-27 PROCEDURE — 93010 ELECTROCARDIOGRAM REPORT: CPT

## 2021-01-27 PROCEDURE — U0003: CPT

## 2021-01-27 PROCEDURE — 86850 RBC ANTIBODY SCREEN: CPT

## 2021-01-27 PROCEDURE — 93005 ELECTROCARDIOGRAM TRACING: CPT

## 2021-01-27 PROCEDURE — 86900 BLOOD TYPING SEROLOGIC ABO: CPT

## 2021-01-27 PROCEDURE — U0005: CPT

## 2021-01-27 PROCEDURE — 93662 INTRACARDIAC ECG (ICE): CPT

## 2021-01-27 PROCEDURE — 85027 COMPLETE CBC AUTOMATED: CPT

## 2021-01-27 PROCEDURE — 99238 HOSP IP/OBS DSCHRG MGMT 30/<: CPT

## 2021-01-27 PROCEDURE — C1766: CPT

## 2021-01-27 PROCEDURE — 80048 BASIC METABOLIC PNL TOTAL CA: CPT

## 2021-01-27 PROCEDURE — 93623 PRGRMD STIMJ&PACG IV RX NFS: CPT

## 2021-01-27 PROCEDURE — C1732: CPT

## 2021-01-27 PROCEDURE — 93613 INTRACARDIAC EPHYS 3D MAPG: CPT

## 2021-01-27 PROCEDURE — C1759: CPT

## 2021-01-27 PROCEDURE — 93653 COMPRE EP EVAL TX SVT: CPT

## 2021-01-27 PROCEDURE — C1894: CPT

## 2021-01-27 RX ORDER — SIMETHICONE 80 MG/1
1 TABLET, CHEWABLE ORAL
Qty: 0 | Refills: 0 | DISCHARGE
Start: 2021-01-27

## 2021-01-27 RX ADMIN — MAGNESIUM OXIDE 400 MG ORAL TABLET 400 MILLIGRAM(S): 241.3 TABLET ORAL at 11:30

## 2021-01-27 RX ADMIN — Medication 100 MILLIGRAM(S): at 07:50

## 2021-01-27 RX ADMIN — Medication 1 MILLIGRAM(S): at 11:30

## 2021-01-27 RX ADMIN — Medication 325 MILLIGRAM(S): at 11:30

## 2021-01-27 RX ADMIN — ABACAVIR 600 MILLIGRAM(S): 20 SOLUTION ORAL at 07:51

## 2021-01-27 RX ADMIN — Medication 1 TABLET(S): at 11:31

## 2021-01-27 NOTE — DISCHARGE NOTE PROVIDER - NSDCCPCAREPLAN_GEN_ALL_CORE_FT
PRINCIPAL DISCHARGE DIAGNOSIS  Diagnosis: Atrial flutter  Assessment and Plan of Treatment: Continue with your cardiologist and primary care MD. Continue your current medications. Call your physician for palpitations, feelings of rapid heart beat, lightheadedness, or dizziness. If you are on warfarin (Coumadin), have your blood work drawn (prescription given) on ________________. Ask your nurse for written material about Coumadin and to provide patient education before discharge.      SECONDARY DISCHARGE DIAGNOSES  Diagnosis: HTN (hypertension)  Assessment and Plan of Treatment: Continue with your blood pressure medications; eat a heart healthy diet with low salt diet; exercise regularly (consult with your physician or cardiologist first); maintain a heart healthy weight; if you smoke - quit (A resource to help you stop smoking is the [facility] – phone number [phone].); include healthy ways to manage stress. Continue to follow with your primary care physician or cardiologist.     PRINCIPAL DISCHARGE DIAGNOSIS  Diagnosis: Atrial flutter  Assessment and Plan of Treatment: Continue with your cardiologist and primary care MD. Continue your current medications. Call your physician for palpitations, feelings of rapid heart beat, lightheadedness, or dizziness. If you are on warfarin (Coumadin), have your blood work drawn (prescription given) on Friday. Ask your nurse for written material about Coumadin and to provide patient education before discharge.      SECONDARY DISCHARGE DIAGNOSES  Diagnosis: HTN (hypertension)  Assessment and Plan of Treatment: Continue with your blood pressure medications; eat a heart healthy diet with low salt diet; exercise regularly (consult with your physician or cardiologist first); maintain a heart healthy weight; if you smoke - quit (A resource to help you stop smoking is the [facility] – phone number [phone].); include healthy ways to manage stress. Continue to follow with your primary care physician or cardiologist.

## 2021-01-27 NOTE — PHYSICAL THERAPY INITIAL EVALUATION ADULT - PERTINENT HX OF CURRENT PROBLEM, REHAB EVAL
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 He now presents s/p  aflutter ablation 1/26. Patient denies SOB, chest pain, palpitations, dizziness, diaphoresis, syncope, N/V, chills, fever, sick contacts, recent travel.

## 2021-01-27 NOTE — DISCHARGE NOTE PROVIDER - PROVIDER TOKENS
PROVIDER:[TOKEN:[[id number]],SCHEDULEDAPPT:[03/15/2021],SCHEDULEDAPPTTIME:[08:20 AM]],PROVIDER:[TOKEN:[[id number]],FOLLOWUP:[2 weeks]]

## 2021-01-27 NOTE — DISCHARGE NOTE PROVIDER - NSDCFUSCHEDAPPT_GEN_ALL_CORE_FT
SHIRLENE [facility] ; 02/12/2021 ; [facility] Med Nephro [address] [place]  ; 03/05/2021 ; [facility] Med Int [address] [facility] ; 03/15/2021 ; [facility] Cardio Electro [address] [place]  ; 03/22/2021 ; [facility] Cardio [address] [facility] ; 03/29/2021 ; NPP Med  Comm

## 2021-01-27 NOTE — DISCHARGE NOTE PROVIDER - NSDCMRMEDTOKEN_GEN_ALL_CORE_FT
abacavir 300 mg oral tablet: 2 tab(s) orally once a day  aluminum hydroxide-magnesium hydroxide 200 mg-200 mg/5 mL oral suspension: 30 milliliter(s) orally every 4 hours, As needed, Dyspepsia  Aspirin Low Dose 81 mg oral delayed release tablet: 1 tab(s) orally once a day  Tamar-C 500 mg oral tablet: 1 tab(s) orally once a day  ferrous sulfate 325 mg (65 mg elemental iron) oral tablet: 1 tab(s) orally once a day  finasteride: 5 milligram(s) orally once a day  folic acid 1 mg oral tablet: 1 tab(s) orally once a day  ketoconazole 2% topical shampoo: Apply topically to scalp 2x per week  lamiVUDine 100 mg oral tablet: 1 tab(s) orally once a day  Lasix 20 mg oral tablet: 1 tab(s) orally once a day, As Needed  magnesium oxide 400 mg (240 mg elemental magnesium) oral tablet: 1 tab(s) orally once a day  metoprolol succinate 25 mg oral tablet, extended release: 0.5 tab(s) orally once a day  Multiple Vitamins oral tablet: 1 tab(s) orally once a day  Pepcid 20 mg oral tablet: 1 tab(s) orally once a day   Pifeltro 100 mg oral tablet: 1 tab(s) orally once a day  simethicone 80 mg oral tablet, chewable: 1 tab(s) orally every 4 hours, As needed, Gas  sulfamethoxazole-trimethoprim 400 mg-80 mg oral tablet: 1 tab(s) orally Monday, Wednesday, and Friday  tamsulosin 0.4 mg oral capsule: 1 cap(s) orally once a day  Vitamin D3 5000 intl units (125 mcg) oral capsule: 1 cap(s) orally once a day  warfarin 7.5 mg oral tablet: 1 tab(s) orally once a day   abacavir 300 mg oral tablet: 2 tab(s) orally once a day  aluminum hydroxide-magnesium hydroxide 200 mg-200 mg/5 mL oral suspension: 30 milliliter(s) orally every 4 hours, As needed, Dyspepsia  Aspirin Low Dose 81 mg oral delayed release tablet: 1 tab(s) orally once a day  Tamar-C 500 mg oral tablet: 1 tab(s) orally once a day  ferrous sulfate 325 mg (65 mg elemental iron) oral tablet: 1 tab(s) orally once a day  finasteride: 5 milligram(s) orally once a day  folic acid 1 mg oral tablet: 1 tab(s) orally once a day  ketoconazole 2% topical shampoo: Apply topically to scalp 2x per week  lamiVUDine 100 mg oral tablet: 1 tab(s) orally once a day  Lasix 20 mg oral tablet: 1 tab(s) orally once a day, As Needed  magnesium oxide 400 mg (240 mg elemental magnesium) oral tablet: 1 tab(s) orally once a day  metoprolol succinate 25 mg oral tablet, extended release: 0.5 tab(s) orally once a day  Multiple Vitamins oral tablet: 1 tab(s) orally once a day  Pepcid 20 mg oral tablet: 1 tab(s) orally once a day   Pifeltro 100 mg oral tablet: 1 tab(s) orally once a day  simethicone 80 mg oral tablet, chewable: 1 tab(s) orally every 4 hours, As needed, Gas  sulfamethoxazole-trimethoprim 400 mg-80 mg oral tablet: 1 tab(s) orally Monday, Wednesday, and Friday  tamsulosin 0.4 mg oral capsule: 1 cap(s) orally once a day  Vitamin D3 5000 intl units (125 mcg) oral capsule: 1 cap(s) orally once a day  warfarin 7.5 mg oral tablet: 1 tab(s) orally once a day  For 90 days

## 2021-01-27 NOTE — DISCHARGE NOTE PROVIDER - CARE PROVIDER_API CALL
Nick Agarwal)  Cardiac Electrophysiology; Cardiology  [address] [place] [address]  Phone: [phone]  Fax: [phone]  Scheduled Appointment: 03/15/2021 08:20 AM    Kenney Mack)  Cardiovascular Disease; Internal Medicine  [address] [place] [address], [address] [place] [address]  Phone: [phone]  Fax: [phone]  Follow Up Time: 2 weeks

## 2021-01-27 NOTE — DISCHARGE NOTE PROVIDER - NSDCCPTREATMENT_GEN_ALL_CORE_FT
PRINCIPAL PROCEDURE  Procedure: Radiofrequency ablation, arrhythmogenic focus, for atrial flutter  Findings and Treatment: Atrial flutter ablation

## 2021-01-27 NOTE — DISCHARGE NOTE PROVIDER - CARE PROVIDERS DIRECT ADDRESSES
Patient was previously on lisinopril for hypertension  She reports a dry persistent cough  Will change to losartan 50 milligrams daily  Additionally of note that her blood pressure has been fluctuating dramatically but she has also been experiencing some withdrawal symptoms    Continue to monitor need her withdrawal and anxiety controlled prior to further adjustment of her medications at this time ,[email or web address]

## 2021-01-27 NOTE — PHYSICAL THERAPY INITIAL EVALUATION ADULT - ADDITIONAL COMMENTS
Pt reports he lives in [place] in independent section & was independent with rollator & quad cane prior to admit.

## 2021-01-27 NOTE — DISCHARGE NOTE NURSING/CASE MANAGEMENT/SOCIAL WORK - PATIENT PORTAL LINK FT
You can access the FollowMyHealth Patient Portal offered by [facility] by registering at the following website: [email or web address]. By joining [facility]’s FollowMyHealth portal, you will also be able to view your health information using other applications (apps) compatible with our system.

## 2021-01-28 ENCOUNTER — NON-APPOINTMENT (OUTPATIENT)
Age: 81
End: 2021-01-28

## 2021-02-09 LAB
INR PPP: 2.22 RATIO
PT BLD: 25.4 SEC

## 2021-02-10 ENCOUNTER — LABORATORY RESULT (OUTPATIENT)
Age: 81
End: 2021-02-10

## 2021-02-12 ENCOUNTER — APPOINTMENT (OUTPATIENT)
Dept: NEPHROLOGY | Facility: CLINIC | Age: 81
End: 2021-02-12
Payer: MEDICARE

## 2021-02-12 VITALS
OXYGEN SATURATION: 97 % | DIASTOLIC BLOOD PRESSURE: 80 MMHG | TEMPERATURE: 97.52 F | HEART RATE: 105 BPM | WEIGHT: 168.65 LBS | SYSTOLIC BLOOD PRESSURE: 154 MMHG | BODY MASS INDEX: 21.65 KG/M2

## 2021-02-12 PROBLEM — N40.0 BENIGN PROSTATIC HYPERPLASIA WITHOUT LOWER URINARY TRACT SYMPTOMS: Chronic | Status: ACTIVE | Noted: 2021-01-26

## 2021-02-12 PROCEDURE — 99213 OFFICE O/P EST LOW 20 MIN: CPT

## 2021-02-12 NOTE — ASSESSMENT
[FreeTextEntry1] : Mr. Monsivais is a 80 year old man with chronic kidney disease stage III with proteinuria and secondary hyperparathyroidism, a history of calcium-based nephrolithiasis, a host of urologic issues (including urinary incontinence, history of UTIs, a history of nephrogenic adenoma, and a history of hydronephrosis), HIV controlled on Biktarvy, and osteopenia/osteoporosis, here for renal follow up.\par \par Mr. Monsivais has long-standing CKD (at least since 2013) of uncertain etiology. He also has persistent proteinuria (with non-albumin protein out of proportion to albumin component), although interpretation is somewhat challenging with the UTIs. 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 \par

## 2021-02-12 NOTE — HISTORY OF PRESENT ILLNESS
[FreeTextEntry1] : Contacts:\par 	cell, [phone]\par 	Dr. Tana Najera (PCP)\par 	Dr. Arie Grant and NP Di Gaona (infectious disease)\par 	Dr. 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 Monsivais is a 80 year old man with chronic kidney disease stage III with proteinuria and secondary hyperparathyroidism, a history of calcium-based nephrolithiasis, a host of urologic issues (including urinary incontinence, history of UTIs, a history of nephrogenic adenoma, and a history of hydronephrosis), HIV controlled on Biktarvy, and osteopenia/osteoporosis, here for renal follow up.\par \par Last saw in 2020. In interim, followed closely with his other clinicians, and in late-2021, had atrial flutter ablation. Scheduled for first dose of Covid vaccine next week.\par \par Otherwise doing alright, without specific complaints. Gait is challenging, uses cane.\par \par Taking furosemide as needed for leg edema. Notes he doesn't take it often, but admits that perhaps he should. Doing self-catheterization. No lightheadedness, headaches, chest pain, abdominal pain, nausea, vomiting, diarrhea, dysuria, hematuria, joint swelling. \par \par ROS: All other systems were reviewed and are negative, except as per HPI.\par \par -------------------------------------------------------------------------------\par NOTES FROM VISIT OF 2020\par \par Last saw Mr. Monsivais Oct. 2019. In interim, had endocarditis, mitral valve repair and left atrial appendage ligatioin (2020), complicated by atrial fibrillation and subsequent heart failure admission. 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\par 	Never , no children; has no siblings\par 	Former  at [employer] for 40+ years\par 	No history of tobacco\par \par Family History:\par 	Mother  of leukemia\par 	Father had \par 	No known history of renal disease, hematuria, proteinuria, ESRD, dialysis, transplant\par \par -------------------------------------------------------------------------------\par Allergies: NKDA\par \par Medications: [Tried to reconcile as best as possible, but unable to confirm fully]\par 	Abacavir, lamivudine, PIFELTRO (doravirine)\par 		(previously on Biktarvy (bictegravir, emtricitabine, and tenofovir alafenamide))\par 	\par 	Furosemide 20mg as needed\par 	Metoprolol succinate 12.5mg daily\par 	Coumadin\par 	Aspirin 81mg daily\par 	Famotidine 20mg daily\par 	Bactrim //\par 	\par 	Calcium, magnesium, zinc, erin C, ferrous sulfate, folic acid, multiple vitamin\par 	Vitamin D3 5000 units\par 	PreserVision eye vitamin and mineral\par 	Ketoconazole shampoo as needed\par 	\par 	Finasteride 5mg daily\par 	Tamsulosin 0.4mg daily\par 	\par -------------------------------------------------------------------------------\par Physical Exam:\par 	Gen: NAD, well-appearing\par 	HEENT: Supple neck\par 	Pulm: CTA\par 	CV: RRR\par 	Back: No spinal or CVA terndeness\par 	Abd: +BS, soft, nontender/nondistended\par 	UE: Warm, FROM, intact strength\par 	LE: Warm, FROM, intact strength; 1+ edema, R > L (chronic)\par 	Neuro: No focal deficits\par 	Psych: Normal affect\par 	Skin: Warm, without rashes\par 	\par -------------------------------------------------------------------------------\par Labs/Studies:\par \par 	2021\par \par 		139 | 105 | 34\par 		------------------< 85 Ca 8.2 eGFR 30 / 34\par 		3.7 |  24 | 2.08\par 		\par 		Albumin 3.9\par 		\par 	Creatinine Trend\par 		2021 SCr 2.08\par 		2021 SCr 2.29\par 		2020 SCr 2.04\par 		2020-11-10 SCr 3.36\par 		2020 SCr 3.2\par 		...\par 		2020-10-29 SCr 1.84\par 		2020 SCr 1.88\par 		...\par 		2019 SCr 2.05\par 		2019-08-15 SCr 2.27\par 		2019 SCr 2.43\par 		2019 SCr 1.85\par 		2019 SCr 1.82\par 		2018 SCr 1.61\par 		2018 SCr 1.47\par 		2018 SCr 1.56\par 		2017-09-15 SCr 1.53\par 		2017-05-15 SCr 1.31\par 		2017 SCr 1.37\par 		2017 SCr 1.27\par 		2016-10-11 SCr 1.27\par 		2016 SCr 1.34\par 		2016 SCr 1.4\par 		2016 SCr 1.24\par 		2016 SCr 1.26\par 		2016 SCr 1.26\par 		2015 SCr 1.32\par 		2015 SCr 1.46\par 		2015 SCr 1.35\par 		2015-08-10 SCr 1.45\par 		2015 SCr 1.37\par 		2015 SCr 1.52\par 		2015 SCr 1.56\par 		2015 SCr 1.2\par 		2015 SCr 1.37\par 		2014-10-08 SCr 1.31\par 		2014 SCr 1.23\par 		2014 SCr 1.32\par 		2014 SCr 1.19\par 		2013 SCr 1.4\par 		2013 SCr 1.35\par 		2013-10-08 SCr 1.12\par 		2013-09-10 SCr 1.24\par 		2013 SCr 1.17\par 		2013 SCr 1\par 		2013 SCr 1.03\par \par 	2021-02-10 UPC ratio = 1.6 g/g\par 	2020 UPC ratio = 1.1 g/g\par 	2019-08-15 UPC ratio = 0.7 g/g\par 	2019 UPC ratio = 0.9 g/g\par 	2017-09-15 UPC ratio = 0.4 g/g\par 	2017 UPC ratio = 0.4 g/g\par 	2015 UPC ratio = 0.6 g/g\par 	2014 UPC ratio = 0.4 g/g\par 	\par 	2021-02-10 UAC ratio = 507 mg/g\par 	2019-08-15 UAC ratio = 186 mg/g\par 	2015-08-10 UAC ratio = 137 mg/g\par 	2014-10-08 UAC ratio = 34 mg/g\par 		\par 	2021-02-10 U/A: protein 30, blood large, LE large\par \par 	2021-02-10 CBC: WBC 11.1 / Hgb 12.1 / plt 213\par \par 	2019 HCV nonreactive\par \par 	2019-08-15 SPEP/BROCK: IgG kappa band\par 	2019-08-15 KL FLC: kappa 9.6, lambda 5.2, ratio = 1.86\par 	2019-08-15 Complement C3 129, C4 47\par 	2019-08-15 Cryoglobulin negative\par 	\par 	2015-08-10 ANCA: negative (cANCA/pANCA)\par \par 	2016 OWEN < 1:80\par 	2015-08-10 OWEN 1:160 homogeneous\par 	2015 dsDNA < 12 \par  \par 	2019-08-15 HBsAg nonreactive\par 	2019-08-15 HBcAb nonreactive\par 	2019-08-15 HBsAb nonreactive

## 2021-03-05 ENCOUNTER — TRANSCRIPTION ENCOUNTER (OUTPATIENT)
Age: 81
End: 2021-03-05

## 2021-03-05 ENCOUNTER — APPOINTMENT (OUTPATIENT)
Dept: INTERNAL MEDICINE | Facility: CLINIC | Age: 81
End: 2021-03-05
Payer: MEDICARE

## 2021-03-05 ENCOUNTER — NON-APPOINTMENT (OUTPATIENT)
Age: 81
End: 2021-03-05

## 2021-03-05 VITALS
TEMPERATURE: 97.7 F | WEIGHT: 168 LBS | HEIGHT: 74 IN | SYSTOLIC BLOOD PRESSURE: 145 MMHG | DIASTOLIC BLOOD PRESSURE: 80 MMHG | HEART RATE: 117 BPM | OXYGEN SATURATION: 97 % | BODY MASS INDEX: 21.56 KG/M2

## 2021-03-05 VITALS — DIASTOLIC BLOOD PRESSURE: 80 MMHG | SYSTOLIC BLOOD PRESSURE: 138 MMHG

## 2021-03-05 DIAGNOSIS — Z85.51 PERSONAL HISTORY OF MALIGNANT NEOPLASM OF BLADDER: ICD-10-CM

## 2021-03-05 PROCEDURE — 99214 OFFICE O/P EST MOD 30 MIN: CPT

## 2021-03-05 NOTE — PHYSICAL EXAM
[No Acute Distress] : no acute distress [Normal Sclera/Conjunctiva] : normal sclera/conjunctiva [EOMI] : extraocular movements intact [Normal Outer Ear/Nose] : the outer ears and nose were normal in appearance [Normal Oropharynx] : the oropharynx was normal [Normal TMs] : both tympanic membranes were normal [Normal Nasal Mucosa] : the nasal mucosa was normal [No Lymphadenopathy] : no lymphadenopathy [Supple] : supple [No Respiratory Distress] : no respiratory distress  [No Accessory Muscle Use] : no accessory muscle use [Clear to Auscultation] : lungs were clear to auscultation bilaterally [Normal Rate] : normal rate  [Regular Rhythm] : with a regular rhythm [Normal S1, S2] : normal S1 and S2 [Non-distended] : non-distended [No CVA Tenderness] : no CVA  tenderness [Alert and Oriented x3] : oriented to person, place, and time [de-identified] : Slender [de-identified] : mild ankle edema elke [de-identified] : walks with pronged cane

## 2021-03-05 NOTE — HISTORY OF PRESENT ILLNESS
[FreeTextEntry1] : Bp check [de-identified] : CARLINE GARBER is a 79 yo man with hypertension, BPH requiring self catheterization, CRI, HIV, mitral valve replacement 8/2020, a fib/a flutter on coumadin, MGUS here for a bp check.  Medical problems are stable on his current medications.  He is feeling well and doing well overall.  Some mild bilateral ankle edema but overall improved.  Recently started on telmisartan for BP. BP is improved\par \par The patient fell and was taken to [facility] in 8/2020.  He was found to have endocarditis.  He underwent a mitral valve replacement and left atrial appendage ligation by Dr Camacho.  He has had a fib/a flutter and is on coumadin.  He is seeing Dr Agarwal and underwent ablation.  He sees cardiologist Dr ROSEMARIE Ferguson regularly.  INR followed by Dr Ferguson\par \par The patient collapsed outside of a pharmacy 7 years ago and was taken to [facility].  After an extensive workup, he was diagnosed with HIV and started on therapy.  He follows up every 3 months with BLANK Vazquez.  He has been stable on his current medications.  He recently had labs.\par \par The patient sees urologist Dr JUANA Silver and is due for a visit.  The patient has seen hem onc for MGUS and is due for a visit. The patient is seeing nephrology for CRI.  \par \par The patient is single with no children.  He is a retired .  He walks short distances with a cane.  He lives in an assisted living facility called [facility] for the past 6 years. He is an only child and has no remaining family.  He helped care for his elderly father and 2 aunts and they have passed away.  He is going to think about who he would designate as a health care proxy.\par \par He usually sees an opthalm, dentist. He is due for derm\par \par He refuses the flu and PNA immunizations.

## 2021-03-06 LAB
INR PPP: 3.91 RATIO
PT BLD: 43.2 SEC

## 2021-03-12 ENCOUNTER — RX RENEWAL (OUTPATIENT)
Age: 81
End: 2021-03-12

## 2021-03-15 ENCOUNTER — APPOINTMENT (OUTPATIENT)
Dept: ELECTROPHYSIOLOGY | Facility: CLINIC | Age: 81
End: 2021-03-15

## 2021-03-22 ENCOUNTER — NON-APPOINTMENT (OUTPATIENT)
Age: 81
End: 2021-03-22

## 2021-03-22 ENCOUNTER — APPOINTMENT (OUTPATIENT)
Dept: CARDIOLOGY | Facility: CLINIC | Age: 81
End: 2021-03-22
Payer: MEDICARE

## 2021-03-22 VITALS
OXYGEN SATURATION: 98 % | HEIGHT: 74 IN | RESPIRATION RATE: 14 BRPM | DIASTOLIC BLOOD PRESSURE: 65 MMHG | BODY MASS INDEX: 20.53 KG/M2 | WEIGHT: 160 LBS | HEART RATE: 78 BPM | SYSTOLIC BLOOD PRESSURE: 145 MMHG

## 2021-03-22 PROCEDURE — 99215 OFFICE O/P EST HI 40 MIN: CPT

## 2021-03-28 ENCOUNTER — FORM ENCOUNTER (OUTPATIENT)
Age: 81
End: 2021-03-28

## 2021-03-29 ENCOUNTER — OUTPATIENT (OUTPATIENT)
Dept: OUTPATIENT SERVICES | Facility: HOSPITAL | Age: 81
LOS: 1 days | End: 2021-03-29
Payer: COMMERCIAL

## 2021-03-29 ENCOUNTER — OUTPATIENT (OUTPATIENT)
Dept: OUTPATIENT SERVICES | Facility: HOSPITAL | Age: 81
LOS: 1 days | Discharge: HOME | End: 2021-03-29

## 2021-03-29 ENCOUNTER — APPOINTMENT (OUTPATIENT)
Dept: INFECTIOUS DISEASE | Facility: CLINIC | Age: 81
End: 2021-03-29

## 2021-03-29 VITALS
OXYGEN SATURATION: 98 % | DIASTOLIC BLOOD PRESSURE: 69 MMHG | BODY MASS INDEX: 21.82 KG/M2 | WEIGHT: 170 LBS | TEMPERATURE: 99.5 F | HEART RATE: 89 BPM | HEIGHT: 74 IN | SYSTOLIC BLOOD PRESSURE: 153 MMHG

## 2021-03-29 DIAGNOSIS — Z95.2 PRESENCE OF PROSTHETIC HEART VALVE: Chronic | ICD-10-CM

## 2021-03-29 DIAGNOSIS — D47.2 MONOCLONAL GAMMOPATHY: ICD-10-CM

## 2021-03-29 DIAGNOSIS — B20 HUMAN IMMUNODEFICIENCY VIRUS [HIV] DISEASE: ICD-10-CM

## 2021-03-29 LAB
25(OH)D3 SERPL-MCNC: 64.2 NG/ML
ALBUMIN SERPL ELPH-MCNC: 3.9 G/DL
ALP BLD-CCNC: 130 U/L
ALT SERPL-CCNC: 17 U/L
ANION GAP SERPL CALC-SCNC: 12 MMOL/L
AST SERPL-CCNC: 25 U/L
BILIRUB SERPL-MCNC: 0.2 MG/DL
BUN SERPL-MCNC: 41 MG/DL
CALCIUM SERPL-MCNC: 9.4 MG/DL
CD3 CELLS # BLD: 1220 /UL
CD3 CELLS NFR BLD: 49 %
CD3+CD4+ CELLS # BLD: 394 /UL
CD3+CD4+ CELLS NFR BLD: 16 %
CD3+CD4+ CELLS/CD3+CD8+ CLL SPEC: 0.49 RATIO
CD3+CD8+ CELLS # SPEC: 808 /UL
CD3+CD8+ CELLS NFR BLD: 32 %
CHLORIDE SERPL-SCNC: 106 MMOL/L
CO2 SERPL-SCNC: 25 MMOL/L
COVID-19 NUCLEOCAPSID  GAM ANTIBODY INTERPRETATION: NEGATIVE
CREAT SERPL-MCNC: 2.27 MG/DL
CYSTATIN C SERPL-MCNC: 2.07 MG/L
GFR/BSA.PRED SERPLBLD CYS-BASED-ARV: 28 ML/MIN
GLUCOSE SERPL-MCNC: 90 MG/DL
POTASSIUM SERPL-SCNC: 4.6 MMOL/L
PROT SERPL-MCNC: 7.4 G/DL
SARS-COV-2 AB SERPL QL IA: 0.08 INDEX
SODIUM SERPL-SCNC: 142 MMOL/L

## 2021-03-29 PROCEDURE — G0463: CPT

## 2021-03-29 PROCEDURE — 90834 PSYTX W PT 45 MINUTES: CPT

## 2021-03-29 NOTE — HISTORY OF PRESENT ILLNESS
[FreeTextEntry1] : Reason For Visit\par KEL MONSIVAIS is a 80 year old male being seen for a follow-up visit. \par  Here today doing well. Continue Bactrim 3 times a week as originally originally added in hospital for UTI prophylasis by ID. has been discontinued\par Pt now has  a PCP, preferably specializing in gerontology. Dr. Tana Najera\par HIV- continue  Abacivir 300mg BID daily, lamivudine 100mg daily and doravirine one tablet daily. \par Pt had both COVID 19 vaccinations. \par 3/29/2021\par HIV- continue  Abacivir 300mg BID daily, lamivudine 100mg daily and doravirine one tablet daily. \par labs today, see in 3 months. \par \par Aug 17th, 2020 fell on bathroom floor. on floor 24 hours, Spent 23 days in [facility] and then in Rehab, ,Replaced the heart valve with vegitation. PICC line removed. \par He is follwing with cardiology next week. \par Kel Monsivais 81 y/o male patient call for a telehealth follow up visit. He has seen Hem/Onc and will follow up again in November 2020. \par Pt has follow up appointments with both Nephrology and Kenney Ferguson in Cardiology. \par We stopped Biktarvy and started Juluca, then stopped Juluca, since creatinine still rising so I changed to Abacivir 300mg BID daily, lamivudine 100mg daily and doravirine one tablet daily. \par recently seen by Dr. Memo Silver in Urology and pt doing well straight cathing and taking Tamulosin and finesteride daily. \par He is happy to mention he has now been sleeping through the night without waking to urinate. \par [facility] is handling all meds. \par review of systems 12/29/20\par Constitutional, Eyes, ENT, Cardiovascular, Respiratory, Gastrointestinal, Genitourinary, Musculoskeletal, Integumentary, Neurological, Psychiatric and Heme/Lymph are otherwise negative. \par new meds after discharge, Ferrous sulfate 325mg daily, folic acid 1mg daily , 81mg aspirin, protonix 40mg delated release, warfarin 5mg, finasteride and tamsulosin , pt still using self catherization. Vitamin D . \par \par Current medication list as of 12/29/2020\par HIV- abacavir 300mg BID daily, lamivudine 100mg daily and pifeltro ( doravirine ) 100mg daily\par calcium 1000mg , magnesium 400mg, & Zinc 15mg\par Tamar C 500mg , ferrous sulfide 325mg daily, folic acid 1mg daily, MVI , Vit D3 5000iu daily\par Finasteride 5mg daily, flomax 0.4mg daily, ASA 81mg daily, pepcid 20mg daily, Bactrim one tab M, W, F\par Coumadin 2.5mg for Afib\par Lasix ( furosemide 20mg ) as needed\par \par Plan 12/29/2020: \par 1) All labs today\par 2) continue all current meds. \par 3) Here today doing well. Continue Bactrim 3 times a week as originally originally added in hospital for UTI prophylasis by ID. \par 4) Pt needs a PCP, preferably specializing in gerontology. \par 5) see in 3 months. \par \par

## 2021-03-30 ENCOUNTER — APPOINTMENT (OUTPATIENT)
Dept: UROLOGY | Facility: CLINIC | Age: 81
End: 2021-03-30
Payer: MEDICARE

## 2021-03-30 DIAGNOSIS — B20 HUMAN IMMUNODEFICIENCY VIRUS [HIV] DISEASE: ICD-10-CM

## 2021-03-30 DIAGNOSIS — N39.0 URINARY TRACT INFECTION, SITE NOT SPECIFIED: ICD-10-CM

## 2021-03-30 LAB
APPEARANCE: ABNORMAL
BACTERIA: NEGATIVE
BASOPHILS # BLD AUTO: 0.03 K/UL
BASOPHILS NFR BLD AUTO: 0.3 %
BILIRUBIN URINE: NEGATIVE
BLOOD URINE: ABNORMAL
COLOR: ABNORMAL
EOSINOPHIL # BLD AUTO: 0.07 K/UL
EOSINOPHIL NFR BLD AUTO: 0.6 %
GLUCOSE QUALITATIVE U: NEGATIVE
HCT VFR BLD CALC: 34.2 %
HGB BLD-MCNC: 11 G/DL
HIV1 RNA # SERPL NAA+PROBE: NORMAL
HIV1 RNA # SERPL NAA+PROBE: NORMAL COPIES/ML
HYALINE CASTS: 2 /LPF
IMM GRANULOCYTES NFR BLD AUTO: 0.7 %
KETONES URINE: NEGATIVE
LEUKOCYTE ESTERASE URINE: ABNORMAL
LYMPHOCYTES # BLD AUTO: 2.58 K/UL
LYMPHOCYTES NFR BLD AUTO: 21.7 %
MAN DIFF?: NORMAL
MCHC RBC-ENTMCNC: 32.2 GM/DL
MCHC RBC-ENTMCNC: 35.3 PG
MCV RBC AUTO: 109.6 FL
MICROSCOPIC-UA: NORMAL
MONOCYTES # BLD AUTO: 1.16 K/UL
MONOCYTES NFR BLD AUTO: 9.8 %
NEUTROPHILS # BLD AUTO: 7.97 K/UL
NEUTROPHILS NFR BLD AUTO: 66.9 %
NITRITE URINE: NEGATIVE
PH URINE: 6.5
PLATELET # BLD AUTO: 212 K/UL
PROTEIN URINE: ABNORMAL
RBC # BLD: 3.12 M/UL
RBC # FLD: 13.7 %
RED BLOOD CELLS URINE: 74 /HPF
SPECIFIC GRAVITY URINE: 1.01
SQUAMOUS EPITHELIAL CELLS: 0 /HPF
UROBILINOGEN URINE: NORMAL
VIRAL LOAD INTERP: NORMAL
VIRAL LOAD LOG: NORMAL LG COP/ML
WBC # FLD AUTO: 11.89 K/UL
WHITE BLOOD CELLS URINE: >720 /HPF

## 2021-03-30 PROCEDURE — 99213 OFFICE O/P EST LOW 20 MIN: CPT

## 2021-04-02 ENCOUNTER — RESULT REVIEW (OUTPATIENT)
Age: 81
End: 2021-04-02

## 2021-04-02 ENCOUNTER — APPOINTMENT (OUTPATIENT)
Dept: HEMATOLOGY ONCOLOGY | Facility: CLINIC | Age: 81
End: 2021-04-02
Payer: MEDICARE

## 2021-04-02 VITALS
TEMPERATURE: 97.5 F | DIASTOLIC BLOOD PRESSURE: 62 MMHG | BODY MASS INDEX: 21.9 KG/M2 | OXYGEN SATURATION: 100 % | RESPIRATION RATE: 15 BRPM | WEIGHT: 170.62 LBS | HEIGHT: 74 IN | HEART RATE: 85 BPM | SYSTOLIC BLOOD PRESSURE: 122 MMHG

## 2021-04-02 LAB
BASOPHILS # BLD AUTO: 0.05 K/UL — SIGNIFICANT CHANGE UP (ref 0–0.2)
BASOPHILS NFR BLD AUTO: 0.4 % — SIGNIFICANT CHANGE UP (ref 0–2)
EOSINOPHIL # BLD AUTO: 0.1 K/UL — SIGNIFICANT CHANGE UP (ref 0–0.5)
EOSINOPHIL NFR BLD AUTO: 0.9 % — SIGNIFICANT CHANGE UP (ref 0–6)
HCT VFR BLD CALC: 32.5 % — LOW (ref 39–50)
HGB BLD-MCNC: 11 G/DL — LOW (ref 13–17)
IMM GRANULOCYTES NFR BLD AUTO: 0.9 % — SIGNIFICANT CHANGE UP (ref 0–1.5)
LYMPHOCYTES # BLD AUTO: 19.3 % — SIGNIFICANT CHANGE UP (ref 13–44)
LYMPHOCYTES # BLD AUTO: 2.19 K/UL — SIGNIFICANT CHANGE UP (ref 1–3.3)
MCHC RBC-ENTMCNC: 33.8 G/DL — SIGNIFICANT CHANGE UP (ref 32–36)
MCHC RBC-ENTMCNC: 36.1 PG — HIGH (ref 27–34)
MCV RBC AUTO: 106.6 FL — HIGH (ref 80–100)
MONOCYTES # BLD AUTO: 1.02 K/UL — HIGH (ref 0–0.9)
MONOCYTES NFR BLD AUTO: 9 % — SIGNIFICANT CHANGE UP (ref 2–14)
NEUTROPHILS # BLD AUTO: 7.88 K/UL — HIGH (ref 1.8–7.4)
NEUTROPHILS NFR BLD AUTO: 69.5 % — SIGNIFICANT CHANGE UP (ref 43–77)
NRBC # BLD: 0 /100 WBCS — SIGNIFICANT CHANGE UP (ref 0–0)
PLATELET # BLD AUTO: 202 K/UL — SIGNIFICANT CHANGE UP (ref 150–400)
RBC # BLD: 3.05 M/UL — LOW (ref 4.2–5.8)
RBC # FLD: 13.7 % — SIGNIFICANT CHANGE UP (ref 10.3–14.5)
RETICS #: 30.1 K/UL — SIGNIFICANT CHANGE UP (ref 25–125)
RETICS/RBC NFR: 1 % — SIGNIFICANT CHANGE UP (ref 0.5–2.5)
WBC # BLD: 11.34 K/UL — HIGH (ref 3.8–10.5)
WBC # FLD AUTO: 11.34 K/UL — HIGH (ref 3.8–10.5)

## 2021-04-02 PROCEDURE — 99214 OFFICE O/P EST MOD 30 MIN: CPT

## 2021-04-03 NOTE — HISTORY OF PRESENT ILLNESS
[FreeTextEntry1] : Kel Monsivais returns to the office today.  He is a 80-year-old man who I previously seen for urinary retention.  We had studies his bladder with urodynamics and found it was not all that functional in terms of its contractility.  We moved forward with recommending self intermittent catheterization which she has been doing twice daily in order to help empty the bladder and minimize the risk of recurring infections that were bothering him in the past.  He has not had any recent issues of infections but in August of last year had fallen on the floor and was hospitalized and found to be in heart failure.  Work-up concluded that he had endocarditis and he required heart valve replacement which he underwent at that time.  He has had no issues with falling or passing out since the time of that surgery.  He denies gross hematuria or dysuria.  He is not having any challenges with catheterization of the bladder.\par

## 2021-04-03 NOTE — ASSESSMENT
[FreeTextEntry1] : For now he will continue with a regimen of self intermittent catheterization as it seems to be keeping him comfortable.  He is voiding in between catheterizations as well of relatively small volumes to keep himself comfortable through the day.  I do not think he needs to change the frequency of his catheterizations at this point since he has not had problems of recurring infections recently.  If he does get into more challenges with infections, the need for increased frequency of catheterization may be there.\par \par For now I will continue to see him on an annual basis.  If he has any new onset urologic symptoms I would see him back at any time.

## 2021-04-03 NOTE — HISTORY OF PRESENT ILLNESS
[de-identified] : Patient lives in independent living at the [facility]. Patient had COVID vaccines already.  \par Patient was in hospital for 23 days for a bovine heart valve replacement.  continuing with the IV drip antibiotics for infection.  \par Surgery was at the end of August.    \par \bartolome Has pain on the back and right flank.  Does not state its severe, but its annoying, after 10 minutes of work in the kitchen has to sit down for a while.  \par Also following the surgery has some pains in the neck. Lasted for a month or so, has not recurred since then.   Mild shortness of breath in associate with fatigue and PRESCOTT.  has been walking with a shuffle for the past 8 years.   Still drives.  \par He presents today for further follow up of his MGUS.  Mspike remained 0.2 as of May 12 2020.  Free light chain ratio 1.2 \par \par INR was 4 last week, patient had stopped coumadin, and will follow up with his EP physician next week.  \par \par Mr. Monsivais was referred to my office for evaluation of IgG kappa monoclonal gammopathy. The patient has been followed by his nephrologist who sent blood work in August 2019 for SPEP/BROCK and noted an M-spike of 0.2g/dl, IgG kappa. He has a history of HIV since 2013 on HAART, well controlled. He reports that he developed renal insufficiency since taking his HIV medications. He has some swelling in his feet chronically, and his renal function has been stable. He has no back pains. \par  [de-identified] : \par

## 2021-04-03 NOTE — ASSESSMENT
[FreeTextEntry1] : 80 year old woman with a history of MGUS. Will repeat SPEP, BROCK and free light chains.  Given patient's back pain recommend he go for skeletal survey rule out lytic lesions.

## 2021-04-05 ENCOUNTER — NON-APPOINTMENT (OUTPATIENT)
Age: 81
End: 2021-04-05

## 2021-04-05 ENCOUNTER — APPOINTMENT (OUTPATIENT)
Dept: ELECTROPHYSIOLOGY | Facility: CLINIC | Age: 81
End: 2021-04-05
Payer: MEDICARE

## 2021-04-05 VITALS
BODY MASS INDEX: 21.9 KG/M2 | OXYGEN SATURATION: 98 % | HEART RATE: 93 BPM | WEIGHT: 170.61 LBS | HEIGHT: 74 IN | DIASTOLIC BLOOD PRESSURE: 59 MMHG | SYSTOLIC BLOOD PRESSURE: 138 MMHG

## 2021-04-05 PROCEDURE — 99214 OFFICE O/P EST MOD 30 MIN: CPT

## 2021-04-05 PROCEDURE — 93000 ELECTROCARDIOGRAM COMPLETE: CPT

## 2021-04-05 RX ORDER — WARFARIN 5 MG/1
5 TABLET ORAL DAILY
Qty: 90 | Refills: 3 | Status: DISCONTINUED | COMMUNITY
Start: 2021-01-04 | End: 2021-04-05

## 2021-04-05 RX ORDER — WARFARIN SODIUM 2.5 MG/1
2.5 TABLET ORAL DAILY
Refills: 0 | Status: DISCONTINUED | COMMUNITY
End: 2021-04-05

## 2021-04-05 RX ORDER — SULFAMETHOXAZOLE AND TRIMETHOPRIM 400; 80 MG/1; MG/1
400-80 TABLET ORAL
Qty: 15 | Refills: 0 | Status: DISCONTINUED | COMMUNITY
Start: 2020-11-09 | End: 2021-04-05

## 2021-04-05 NOTE — PHYSICAL EXAM
[General Appearance - In No Acute Distress] : no acute distress [Normal Conjunctiva] : the conjunctiva exhibited no abnormalities [Eyelids - No Xanthelasma] : the eyelids demonstrated no xanthelasmas [Normal Oral Mucosa] : normal oral mucosa [No Oral Pallor] : no oral pallor [No Oral Cyanosis] : no oral cyanosis [Normal Jugular Venous A Waves Present] : normal jugular venous A waves present [Normal Jugular Venous V Waves Present] : normal jugular venous V waves present [No Jugular Venous Barkley A Waves] : no jugular venous barkley A waves [Respiration, Rhythm And Depth] : normal respiratory rhythm and effort [Auscultation Breath Sounds / Voice Sounds] : lungs were clear to auscultation bilaterally [Heart Rate And Rhythm] : heart rate and rhythm were normal [Heart Sounds] : normal S1 and S2 [Murmurs] : no murmurs present [Edema] : no peripheral edema present [Abdomen Soft] : soft [Abnormal Walk] : normal gait [Gait - Sufficient For Exercise Testing] : the gait was sufficient for exercise testing [Nail Clubbing] : no clubbing of the fingernails [Cyanosis, Localized] : no localized cyanosis [Petechial Hemorrhages (___cm)] : no petechial hemorrhages [Skin Color & Pigmentation] : normal skin color and pigmentation [] : no rash [No Venous Stasis] : no venous stasis [Skin Lesions] : no skin lesions [No Skin Ulcers] : no skin ulcer [No Xanthoma] : no  xanthoma was observed [Oriented To Time, Place, And Person] : oriented to person, place, and time [Affect] : the affect was normal [Mood] : the mood was normal [No Anxiety] : not feeling anxious

## 2021-04-09 LAB
ALBUMIN MFR SERPL ELPH: 50.4 %
ALBUMIN SERPL ELPH-MCNC: 4 G/DL
ALBUMIN SERPL-MCNC: 3.7 G/DL
ALBUMIN/GLOB SERPL: 1 RATIO
ALP BLD-CCNC: 140 U/L
ALPHA1 GLOB MFR SERPL ELPH: 4.6 %
ALPHA1 GLOB SERPL ELPH-MCNC: 0.3 G/DL
ALPHA2 GLOB MFR SERPL ELPH: 12.8 %
ALPHA2 GLOB SERPL ELPH-MCNC: 0.9 G/DL
ALT SERPL-CCNC: 17 U/L
ANION GAP SERPL CALC-SCNC: 11 MMOL/L
AST SERPL-CCNC: 23 U/L
B-GLOBULIN MFR SERPL ELPH: 13.7 %
B-GLOBULIN SERPL ELPH-MCNC: 1 G/DL
B2 MICROGLOB SERPL-MCNC: 5.8 MG/L
BILIRUB SERPL-MCNC: 0.2 MG/DL
BUN SERPL-MCNC: 43 MG/DL
CALCIUM SERPL-MCNC: 9.3 MG/DL
CHLORIDE SERPL-SCNC: 102 MMOL/L
CO2 SERPL-SCNC: 25 MMOL/L
CREAT SERPL-MCNC: 2.46 MG/DL
DEPRECATED KAPPA LC FREE/LAMBDA SER: 1.56 RATIO
DEPRECATED KAPPA LC FREE/LAMBDA SER: 1.56 RATIO
FERRITIN SERPL-MCNC: 167 NG/ML
GAMMA GLOB FLD ELPH-MCNC: 1.4 G/DL
GAMMA GLOB MFR SERPL ELPH: 18.5 %
GLUCOSE SERPL-MCNC: 78 MG/DL
IGA SER QL IEP: 575 MG/DL
IGG SER QL IEP: 1373 MG/DL
IGM SER QL IEP: 44 MG/DL
INTERPRETATION SERPL IEP-IMP: NORMAL
IRON SATN MFR SERPL: 46 %
IRON SERPL-MCNC: 105 UG/DL
KAPPA LC CSF-MCNC: 6.47 MG/DL
KAPPA LC CSF-MCNC: 6.47 MG/DL
KAPPA LC SERPL-MCNC: 10.09 MG/DL
KAPPA LC SERPL-MCNC: 10.09 MG/DL
M PROTEIN MFR SERPL ELPH: NORMAL
M PROTEIN SPEC IFE-MCNC: NORMAL
MONOCLON BAND OBS SERPL: NORMAL
POTASSIUM SERPL-SCNC: 4.4 MMOL/L
PROT SERPL-MCNC: 7.4 G/DL
SODIUM SERPL-SCNC: 138 MMOL/L
TIBC SERPL-MCNC: 225 UG/DL
UIBC SERPL-MCNC: 121 UG/DL

## 2021-04-10 ENCOUNTER — RESULT REVIEW (OUTPATIENT)
Age: 81
End: 2021-04-10

## 2021-04-10 ENCOUNTER — APPOINTMENT (OUTPATIENT)
Dept: RADIOLOGY | Facility: IMAGING CENTER | Age: 81
End: 2021-04-10
Payer: MEDICARE

## 2021-04-10 ENCOUNTER — OUTPATIENT (OUTPATIENT)
Dept: OUTPATIENT SERVICES | Facility: HOSPITAL | Age: 81
LOS: 1 days | End: 2021-04-10
Payer: MEDICARE

## 2021-04-10 DIAGNOSIS — Z95.2 PRESENCE OF PROSTHETIC HEART VALVE: Chronic | ICD-10-CM

## 2021-04-10 DIAGNOSIS — D47.2 MONOCLONAL GAMMOPATHY: ICD-10-CM

## 2021-04-10 PROCEDURE — 77074 RADEX OSSEOUS SURVEY LMTD: CPT | Mod: 26

## 2021-04-10 PROCEDURE — 77074 RADEX OSSEOUS SURVEY LMTD: CPT

## 2021-04-12 LAB
APPEARANCE: ABNORMAL
BACTERIA: ABNORMAL
BASOPHILS # BLD AUTO: 0.04 K/UL
BASOPHILS NFR BLD AUTO: 0.4 %
BILIRUBIN URINE: NEGATIVE
BLOOD URINE: ABNORMAL
COLOR: YELLOW
CREAT SPEC-SCNC: 55 MG/DL
CREAT SPEC-SCNC: 55 MG/DL
CREAT/PROT UR: 1.6 RATIO
EOSINOPHIL # BLD AUTO: 0.09 K/UL
EOSINOPHIL NFR BLD AUTO: 0.8 %
GLUCOSE QUALITATIVE U: NEGATIVE
HCT VFR BLD CALC: 38.2 %
HGB BLD-MCNC: 12.1 G/DL
HYALINE CASTS: 2 /LPF
IMM GRANULOCYTES NFR BLD AUTO: 0.6 %
KETONES URINE: NEGATIVE
LEUKOCYTE ESTERASE URINE: ABNORMAL
LYMPHOCYTES # BLD AUTO: 3.23 K/UL
LYMPHOCYTES NFR BLD AUTO: 29.2 %
MAN DIFF?: NORMAL
MCHC RBC-ENTMCNC: 31.7 GM/DL
MCHC RBC-ENTMCNC: 35.1 PG
MCV RBC AUTO: 110.7 FL
MICROALBUMIN 24H UR DL<=1MG/L-MCNC: 28 MG/DL
MICROALBUMIN/CREAT 24H UR-RTO: 507 MG/G
MICROSCOPIC-UA: NORMAL
MONOCYTES # BLD AUTO: 1.09 K/UL
MONOCYTES NFR BLD AUTO: 9.9 %
NEUTROPHILS # BLD AUTO: 6.54 K/UL
NEUTROPHILS NFR BLD AUTO: 59.1 %
NITRITE URINE: NEGATIVE
PH URINE: 6
PLATELET # BLD AUTO: 213 K/UL
PROT UR-MCNC: 90 MG/DL
PROTEIN URINE: ABNORMAL
RBC # BLD: 3.45 M/UL
RBC # FLD: 14.3 %
RED BLOOD CELLS URINE: 95 /HPF
SPECIFIC GRAVITY URINE: 1.01
SQUAMOUS EPITHELIAL CELLS: 0 /HPF
UROBILINOGEN URINE: NORMAL
WBC # FLD AUTO: 11.06 K/UL
WHITE BLOOD CELLS URINE: 430 /HPF

## 2021-04-12 NOTE — HISTORY OF PRESENT ILLNESS
[FreeTextEntry1] : Dear Dr. Mack,\par \par Mr. Monsivais was seen in the [facility] today. For our records, please allow me to summarize the history and my findings.\par \par This is a pleasant 79 yo male, hx of mitral valve endocarditis s/p Bioprosthetic MVR in 8/2020, SHELLIE atriclip, and post operative symptomatic atrial flutter. He underwent NOHEMI/DCCV in October 2020 and CTI ablation on 1/26/2021. He presents today for follow up.\par \par Today, he reports feeling generally well. No fever, groin pain, or trouble swallowing since procedure. He self discontinued his Coumadin after he received a supratherapeutic INR of ~4.0 in addition to associated complaints of mild nosebleeds and easy bruising. \par \par ECHO from 11/2020 revealed EF 70-75%, normal LV systolic function. Bioprosthetic mitral valve replacement, no MR. LA size 3.6cm and NOHEMI revealed LA appendage ligation. \par \par He denies any recent history of chest pain, shortness of breath, palpitations, dizziness, or syncope.

## 2021-04-12 NOTE — DISCUSSION/SUMMARY
[FreeTextEntry1] : In summary, this is a 80 year old man with hx of Bioprosthetic MVR in 8/2020 and recurrent post operative atrial flutter s/p DCCV in Oct. 2020 and AFlutter ablation in 1/2021. We are pleased to see he is doing well maintaining sinus rhythm today. He has self discontinued his Coumadin last week after he received a supratherapetuic INR and reported associated mild nose bleeds and easy bruising. Given evidence of SHELLIE ligation on NOHEMI in addition to the fact that he is greater than 3 months since bioMVR, we recommended he continue to remain off of Coumadin moving forward. \par \par He will follow up in 6 months for holter monitoring to evaluate for ambient arrythmia. \par \par Mr. Monsivais appeared to understand the whole discussion and verbalized that all of his questions were answered to his satisfaction. \par \par Thank you for allowing me to be involved in the care of this pleasant man. Please feel free to contact me with any questions. \par \par Nick Agarwal MD\par  of Cardiology\par Electrophysiology Section\[address], [address] [place] [address]\par Office: [phone]\par Fax: [phone]

## 2021-04-22 ENCOUNTER — NON-APPOINTMENT (OUTPATIENT)
Age: 81
End: 2021-04-22

## 2021-05-07 ENCOUNTER — APPOINTMENT (OUTPATIENT)
Dept: NEPHROLOGY | Facility: CLINIC | Age: 81
End: 2021-05-07
Payer: MEDICARE

## 2021-05-07 DIAGNOSIS — R80.9 PROTEINURIA, UNSPECIFIED: ICD-10-CM

## 2021-05-07 PROCEDURE — 99442: CPT | Mod: 95

## 2021-05-07 NOTE — ASSESSMENT
[FreeTextEntry1] : Mr. Monsivais is a 80 year old man with chronic kidney disease stage III with proteinuria and secondary hyperparathyroidism, a history of calcium-based nephrolithiasis, a host of urologic issues (including urinary incontinence, history of UTIs, a history of nephrogenic adenoma, and a history of hydronephrosis), HIV controlled on Biktarvy, and osteopenia/osteoporosis, here for renal follow up.\par \par Mr. Monsivais has long-standing CKD (at least since 2013) of uncertain etiology. He also has persistent proteinuria (with non-albumin protein out of proportion to albumin component), although interpretation is somewhat challenging with the UTIs. His serologic workup has revealed IgG kappa monoclonal gammopathy. Saw hematology, thought to be MGUS.\par \par CKD borderline stage III/IV with proteinuria and hypertension. BP somewhat improved with telimsartan.\par \par - Cont. telmisartan 40mg daily, tolerate slight drop in GFR\par - Cont. other medications\par - Would check spot urine protein/creatinine and albumin/creatinine with next set of labs\par - Otherwise should follow up with me in the Fall\par \par I have spent a total of 20 minutes in discussion with patient regarding chronic kidney disease, proteinuria, nephrolithiasis, urologic disorders, hypertension, and diet.

## 2021-05-07 NOTE — REASON FOR VISIT
[Home] : at home, [unfilled] , at the time of the visit. [Medical Office: ([facility])___] : at the medical office located in  [Verbal consent obtained from patient] : the patient, [unfilled] [Follow-Up] : a follow-up visit

## 2021-05-07 NOTE — HISTORY OF PRESENT ILLNESS
[FreeTextEntry1] : Contacts:\par 	cell, [phone]\par 	Dr. Tana Najera (PCP)\par 	Dr. Arie Grant and NP Di Gaona (infectious disease)\par 	Dr. 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 Monsivais is a 80 year old man with chronic kidney disease stage III with proteinuria and secondary hyperparathyroidism, a history of calcium-based nephrolithiasis, a host of urologic issues (including urinary incontinence, history of UTIs, a history of nephrogenic adenoma, and a history of hydronephrosis), HIV controlled on Biktarvy, and osteopenia/osteoporosis, here for renal follow up.\par \par Last saw in 2021; started telmisartan. \par \par Received Covid vaccine.\par \par Feeling well, occasional tiredness but nothing out of the ordinary.\par \par In interim, following closely with his other clinicians.\par \par Taking furosemide as needed for leg edema. Notes he doesn't take it often, but admits that perhaps he should. Doing self-catheterization. No lightheadedness, headaches, chest pain, abdominal pain, nausea, vomiting, diarrhea, dysuria, hematuria, joint swelling. \par \par ROS: All other systems were reviewed and are negative, except as per HPI.\par \par -------------------------------------------------------------------------------\par NOTES FROM VISIT OF 2020\par \par Last saw Mr. Monsivais Oct. 2019. In interim, had endocarditis, mitral valve repair and left atrial appendage ligatioin (2020), complicated by atrial fibrillation and subsequent heart failure admission. 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\par 	Never , no children; has no siblings\par 	Former  at [employer] for 40+ years\par 	No history of tobacco\par \par Family History:\par 	Mother  of leukemia\par 	Father had \par 	No known history of renal disease, hematuria, proteinuria, ESRD, dialysis, transplant\par \par -------------------------------------------------------------------------------\par Allergies: NKDA\par \par Medications: [Tried to reconcile as best as possible, but unable to confirm fully]\par 	Abacavir, lamivudine, PIFELTRO (doravirine)\par 		(previously on Biktarvy (bictegravir, emtricitabine, and tenofovir alafenamide))\par 	\par 	Telmisartan 40mg daily\par 	Furosemide 20mg as needed\par 	Metoprolol succinate 12.5mg daily\par 	Aspirin 81mg daily\par 	Famotidine 20mg daily\par 	Bactrim //\par 	\par 	Calcium, magnesium, zinc, erin C, ferrous sulfate, folic acid, multiple vitamin\par 	Vitamin D3 5000 units\par 	PreserVision eye vitamin and mineral\par 	Ketoconazole shampoo as needed\par 	\par 	Finasteride 5mg daily\par 	Tamsulosin 0.4mg daily\par 	\par -------------------------------------------------------------------------------\par Physical Exam: N/A\par 	\par -------------------------------------------------------------------------------\par Labs/Studies:\par \par 	2021\par \par 		138 | 102 | 43\par 		------------------< 78 Ca 9.3 eGFR \par 		4.4 |  25 | 2.46\par 		\par 		Albumin 4\par 		\par 	Creatinine Trend\par 		2021 SCr 2.46\par 		2021 SCr 2.27 (eGFR 27; Cystatin C eGFR 28)\par 		2021 SCr 2.08\par 		2021 SCr 2.29\par 		2020 SCr 2.04\par 		2020-11-10 SCr 3.36\par 		2020 SCr 3.2\par 		...\par 		2020-10-29 SCr 1.84\par 		2020 SCr 1.88\par 		...\par 		2019 SCr 2.05\par 		2019-08-15 SCr 2.27\par 		2019 SCr 2.43\par 		2019 SCr 1.85\par 		2019 SCr 1.82\par 		2018 SCr 1.61\par 		2018 SCr 1.47\par 		2018 SCr 1.56\par 		2017-09-15 SCr 1.53\par 		2017-05-15 SCr 1.31\par 		2017 SCr 1.37\par 		2017 SCr 1.27\par 		2016-10-11 SCr 1.27\par 		2016 SCr 1.34\par 		2016 SCr 1.4\par 		2016 SCr 1.24\par 		2016 SCr 1.26\par 		2016 SCr 1.26\par 		2015 SCr 1.32\par 		2015 SCr 1.46\par 		2015 SCr 1.35\par 		2015-08-10 SCr 1.45\par 		2015 SCr 1.37\par 		2015 SCr 1.52\par 		2015 SCr 1.56\par 		2015 SCr 1.2\par 		2015 SCr 1.37\par 		2014-10-08 SCr 1.31\par 		2014 SCr 1.23\par 		2014 SCr 1.32\par 		2014 SCr 1.19\par 		2013 SCr 1.4\par 		2013 SCr 1.35\par 		2013-10-08 SCr 1.12\par 		2013-09-10 SCr 1.24\par 		2013 SCr 1.17\par 		2013 SCr 1\par 		2013 SCr 1.03\par \par 	2021-02-10 UPC ratio = 1.6 g/g\par 	2020 UPC ratio = 1.1 g/g\par 	2019-08-15 UPC ratio = 0.7 g/g\par 	2019 UPC ratio = 0.9 g/g\par 	2017-09-15 UPC ratio = 0.4 g/g\par 	2017 UPC ratio = 0.4 g/g\par 	2015 UPC ratio = 0.6 g/g\par 	2014 UPC ratio = 0.4 g/g\par 	\par 	2021-02-10 UAC ratio = 507 mg/g\par 	2019-08-15 UAC ratio = 186 mg/g\par 	2015-08-10 UAC ratio = 137 mg/g\par 	2014-10-08 UAC ratio = 34 mg/g\par 		\par 	2021 U/A: protein 30\par \par 	2021 CBC: WBC 11.3 / Hgb 11 / plt 202\par \par 	2019 HCV nonreactive\par \par 	2019-08-15 SPEP/BROCK: IgG kappa band\par 	2019-08-15 KL FLC: kappa 9.6, lambda 5.2, ratio = 1.86\par 	2019-08-15 Complement C3 129, C4 47\par 	2019-08-15 Cryoglobulin negative\par 	\par 	2015-08-10 ANCA: negative (cANCA/pANCA)\par \par 	2016 OWEN < 1:80\par 	2015-08-10 OWEN 1:160 homogeneous\par 	2015 dsDNA < 12 \par  \par 	2019-08-15 HBsAg nonreactive\par 	2019-08-15 HBcAb nonreactive\par 	2019-08-15 HBsAb nonreactive

## 2021-05-11 ENCOUNTER — NON-APPOINTMENT (OUTPATIENT)
Age: 81
End: 2021-05-11

## 2021-05-11 RX ORDER — VIT C/E/ZN/COPPR/LUTEIN/ZEAXAN 250MG-90MG
CAPSULE ORAL
Refills: 0 | Status: ACTIVE | COMMUNITY
Start: 2021-05-11

## 2021-06-17 ENCOUNTER — RX RENEWAL (OUTPATIENT)
Age: 81
End: 2021-06-17

## 2021-06-28 ENCOUNTER — OUTPATIENT (OUTPATIENT)
Dept: OUTPATIENT SERVICES | Facility: HOSPITAL | Age: 81
LOS: 1 days | End: 2021-06-28
Payer: COMMERCIAL

## 2021-06-28 ENCOUNTER — APPOINTMENT (OUTPATIENT)
Dept: INFECTIOUS DISEASE | Facility: CLINIC | Age: 81
End: 2021-06-28

## 2021-06-28 ENCOUNTER — LABORATORY RESULT (OUTPATIENT)
Age: 81
End: 2021-06-28

## 2021-06-28 VITALS
TEMPERATURE: 99.3 F | DIASTOLIC BLOOD PRESSURE: 72 MMHG | HEART RATE: 90 BPM | WEIGHT: 170 LBS | HEIGHT: 74 IN | SYSTOLIC BLOOD PRESSURE: 147 MMHG | OXYGEN SATURATION: 98 % | BODY MASS INDEX: 21.82 KG/M2

## 2021-06-28 DIAGNOSIS — Z95.2 PRESENCE OF PROSTHETIC HEART VALVE: Chronic | ICD-10-CM

## 2021-06-28 DIAGNOSIS — B20 HUMAN IMMUNODEFICIENCY VIRUS [HIV] DISEASE: ICD-10-CM

## 2021-06-28 LAB
25(OH)D3 SERPL-MCNC: 62.3 NG/ML
ALBUMIN SERPL ELPH-MCNC: 4.1 G/DL
ALP BLD-CCNC: 117 U/L
ALT SERPL-CCNC: 16 U/L
ANION GAP SERPL CALC-SCNC: 12 MMOL/L
AST SERPL-CCNC: 19 U/L
BILIRUB SERPL-MCNC: 0.2 MG/DL
BUN SERPL-MCNC: 44 MG/DL
CALCIUM SERPL-MCNC: 9.9 MG/DL
CD3 CELLS # BLD: 1079 /UL
CD3 CELLS NFR BLD: 51 %
CD3+CD4+ CELLS # BLD: 368 /UL
CD3+CD4+ CELLS NFR BLD: 17 %
CD3+CD4+ CELLS/CD3+CD8+ CLL SPEC: 0.53 RATIO
CD3+CD8+ CELLS # SPEC: 699 /UL
CD3+CD8+ CELLS NFR BLD: 33 %
CHLORIDE SERPL-SCNC: 105 MMOL/L
CO2 SERPL-SCNC: 23 MMOL/L
CREAT SERPL-MCNC: 2.66 MG/DL
GLUCOSE SERPL-MCNC: 87 MG/DL
POTASSIUM SERPL-SCNC: 4.5 MMOL/L
PROT SERPL-MCNC: 7.3 G/DL
PSA SERPL-MCNC: 0.62 NG/ML
SODIUM SERPL-SCNC: 140 MMOL/L
TSH SERPL-ACNC: 4.81 UIU/ML

## 2021-06-28 PROCEDURE — G0463: CPT

## 2021-07-02 ENCOUNTER — NON-APPOINTMENT (OUTPATIENT)
Age: 81
End: 2021-07-02

## 2021-07-02 LAB
APPEARANCE: ABNORMAL
BACTERIA: ABNORMAL
BASOPHILS # BLD AUTO: 0.04 K/UL
BASOPHILS NFR BLD AUTO: 0.4 %
BILIRUBIN URINE: NEGATIVE
BLOOD URINE: ABNORMAL
COLOR: YELLOW
EOSINOPHIL # BLD AUTO: 0.09 K/UL
EOSINOPHIL NFR BLD AUTO: 0.9 %
ESTIMATED AVERAGE GLUCOSE: 126 MG/DL
GLUCOSE QUALITATIVE U: NEGATIVE
HBA1C MFR BLD HPLC: 6 %
HCT VFR BLD CALC: 33.4 %
HGB BLD-MCNC: 11.2 G/DL
HIV1 RNA # SERPL NAA+PROBE: NORMAL
HIV1 RNA # SERPL NAA+PROBE: NORMAL COPIES/ML
HYALINE CASTS: 3 /LPF
IMM GRANULOCYTES NFR BLD AUTO: 0.7 %
KETONES URINE: NEGATIVE
LEUKOCYTE ESTERASE URINE: ABNORMAL
LYMPHOCYTES # BLD AUTO: 2.19 K/UL
LYMPHOCYTES NFR BLD AUTO: 23 %
MAN DIFF?: NORMAL
MCHC RBC-ENTMCNC: 33.5 GM/DL
MCHC RBC-ENTMCNC: 36.4 PG
MCV RBC AUTO: 108.4 FL
MICROSCOPIC-UA: NORMAL
MONOCYTES # BLD AUTO: 0.81 K/UL
MONOCYTES NFR BLD AUTO: 8.5 %
NEUTROPHILS # BLD AUTO: 6.34 K/UL
NEUTROPHILS NFR BLD AUTO: 66.5 %
NITRITE URINE: NEGATIVE
PH URINE: 6.5
PLATELET # BLD AUTO: 197 K/UL
PROTEIN URINE: ABNORMAL
RBC # BLD: 3.08 M/UL
RBC # FLD: 13.9 %
RED BLOOD CELLS URINE: 177 /HPF
SPECIFIC GRAVITY URINE: 1.01
SQUAMOUS EPITHELIAL CELLS: 0 /HPF
UROBILINOGEN URINE: NORMAL
VIRAL LOAD INTERP: NORMAL
VIRAL LOAD LOG: NORMAL LG COP/ML
WBC # FLD AUTO: 9.54 K/UL
WHITE BLOOD CELLS URINE: >720 /HPF

## 2021-07-06 ENCOUNTER — RX RENEWAL (OUTPATIENT)
Age: 81
End: 2021-07-06

## 2021-07-09 ENCOUNTER — APPOINTMENT (OUTPATIENT)
Dept: INTERNAL MEDICINE | Facility: CLINIC | Age: 81
End: 2021-07-09
Payer: MEDICARE

## 2021-07-09 VITALS
HEIGHT: 74 IN | HEART RATE: 101 BPM | BODY MASS INDEX: 21.82 KG/M2 | SYSTOLIC BLOOD PRESSURE: 140 MMHG | OXYGEN SATURATION: 98 % | TEMPERATURE: 98.06 F | WEIGHT: 170 LBS | DIASTOLIC BLOOD PRESSURE: 70 MMHG

## 2021-07-09 PROCEDURE — 99214 OFFICE O/P EST MOD 30 MIN: CPT

## 2021-07-09 NOTE — HISTORY OF PRESENT ILLNESS
[FreeTextEntry1] : 6/28/2021\par KEL MONSIVAIS is a 81 year old male being seen for a follow-up visit. \par Here today doing well. Continue Bactrim 3 times a week as originally originally added in hospital for UTI prophylasis by ID. has been discontinued.    Adendum/clarification  on 7/9/21: pt is no longer taking Bactrim, it was discontinued. \par Pt now has a PCP, preferably specializing in gerontology. Dr. Tana Najera\par HIV- continue Abacivir 300mg BID daily, lamivudine 100mg daily and doravirine one tablet daily. \par Pt had both COVID 19 vaccinations. Pfizer in Feb and March 2021\par Note: Pt self discontinued his Coumadin and as per Cardiology note it can be discontinued. \par 6/29/2021\par HIV- continue Abacivir 300mg BID daily, lamivudine 100mg daily and doravirine one tablet daily. \par labs today, see in 4 months. \par \par \par Aug 17th, 2020 fell on bathroom floor. on floor 24 hours, Spent 23 days in [facility] and then in Rehab, ,Replaced the heart valve with vegitation. PICC line removed. \par He is follwing with cardiology next week. \par Kel Monsivais 79 y/o male patient call for a telehealth follow up visit. He has seen Hem/Onc and will follow up again in November 2020. \par Pt has follow up appointments with both Nephrology and Kenney Ferguson in Cardiology. \par We stopped Biktarvy and started Juluca, then stopped Juluca, since creatinine still rising so I changed to Abacivir 300mg BID daily, lamivudine 100mg daily and doravirine one tablet daily. \par recently seen by Dr. Memo Silver in Urology and pt doing well straight cathing and taking Tamulosin and finesteride daily. \par He is happy to mention he has now been sleeping through the night without waking to urinate. \par [facility] is handling all meds. \par review of systems 12/29/20\par Constitutional, Eyes, ENT, Cardiovascular, Respiratory, Gastrointestinal, Genitourinary, Musculoskeletal, Integumentary, Neurological, Psychiatric and Heme/Lymph are otherwise negative. \par new meds after discharge, Ferrous sulfate 325mg daily, folic acid 1mg daily , 81mg aspirin, protonix 40mg delated release, warfarin 5mg, finasteride and tamsulosin , pt still using self catherization. Vitamin D . 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)\par Atrial flutter (427.32) (I48.92)\par Benign essential hypertension (401.1) (I10)\par Bilateral hydronephrosis (591) (N13.30)\par BPH with obstruction/lower urinary tract symptoms (600.01,599.69) (N40.1,N13.8)\par Chronic kidney disease, stage 3 (585.3) (N18.30)\par Constipation (564.00) (K59.00)\par Dandruff (690.18) (L21.0)\par Dyspnea on exertion (786.09) (R06.00)\par Edema, lower extremity (782.3) (R60.0)\par Elevated serum creatinine (790.99) (R79.89)\par Encounter for immunization (V03.89) (Z23)\par H/O CD4 count (V15.89) (Z92.89)\par Hematuria, microscopic (599.72) (R31.29)\par History of bladder cancer (V10.51) (Z85.51)\par HIV disease (042) (B20)\par IgG monoclonal gammopathy (273.1) (D47.2)\par 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9) (L91.8)\par Urinary incontinence, unspecified type (788.30) (R32)\par Urinary retention (788.20) (R33.9)\par Vitamin D deficiency (268.9) (E55.9)\par \par Past Medical History\par History of Antinuclear antibody (OWEN) titer greater than 1:80 (795.79) (R76.0)\par History of Creatinine elevation (790.99) (R79.89)\par History of Enlarged prostate (600.00) (N40.0)\par History of Hemorrhagic cyst\par History of anemia (V12.3) (Z86.2)\par History of athlete's foot (V12.09) (Z86.19)\par History of chronic kidney disease (V13.09) (Z87.448)\par History of degenerative disc disease (V13.59) (Z87.39)\par History of fever (V13.89) (Z87.898)\par History of hydronephrosis (V13.09) (Z87.448)\par History of hyperparathyroidism (V12.29) (Z86.39)\par History of lymphadenopathy (V13.89) (Z87.898)\par History of osteopenia (V13.59) (Z87.39)\par History of Parkinson's disease (V12.49) (Z86.69)\par History of renal calculi (V13.01) (Z87.442)\par History of renal insufficiency syndrome (V13.09) (Z87.448)\par History of HIV disease (042) (B20)\par History of Left knee pain (719.46) (M25.562)\par Personal history of multiple pulmonary nodules (V12.69) (Z87.898)\par Personal history of scoliosis (V13.59) (Z87.39)\par Personal history of urinary tract infection (V13.02) (Z87.440)\par History of Tinea unguium (110.1) (B35.1)\par History of Tooth pain (525.9) (K08.89)\par History of Vaccine refused by parent (V64.05) (Z28.82)\par History of Visit for dental examination (V72.2) (Z01.20)\par History of Visit for eye and vision exam (V72.0) (Z01.00)\par History of Xerosis of skin (706.8) (L85.3)\par \par \par \par \par Allergies\par No Known Drug Allergies\par

## 2021-07-09 NOTE — PHYSICAL EXAM
[No Acute Distress] : no acute distress [Normal Sclera/Conjunctiva] : normal sclera/conjunctiva [EOMI] : extraocular movements intact [Normal Outer Ear/Nose] : the outer ears and nose were normal in appearance [Normal Oropharynx] : the oropharynx was normal [Normal TMs] : both tympanic membranes were normal [Normal Nasal Mucosa] : the nasal mucosa was normal [No Lymphadenopathy] : no lymphadenopathy [Supple] : supple [No Respiratory Distress] : no respiratory distress  [No Accessory Muscle Use] : no accessory muscle use [Clear to Auscultation] : lungs were clear to auscultation bilaterally [Normal Rate] : normal rate  [Regular Rhythm] : with a regular rhythm [Normal S1, S2] : normal S1 and S2 [Non-distended] : non-distended [No CVA Tenderness] : no CVA  tenderness [Alert and Oriented x3] : oriented to person, place, and time [de-identified] : Slender [de-identified] : mild ankle edema elke [de-identified] : walks with pronged cane

## 2021-07-09 NOTE — HISTORY OF PRESENT ILLNESS
[FreeTextEntry1] : Bp check [de-identified] : CARLINE GARBER is a 80 yo man with hypertension, BPH requiring self catheterization, CRI, HIV, mitral valve replacement 8/2020, a fib/a flutter now off coumadin, MGUS here for a bp check.  Medical problems are stable on his current medications.  He is feeling well and doing well overall.  Some mild bilateral ankle edema but overall improved.  Recently has been having some low ringing in the ears.\par \par The patient fell and was taken to [facility] in 8/2020.  He was found to have endocarditis.  He underwent a mitral valve replacement and left atrial appendage ligation by Dr Camacho.  He has had a fib/a flutter and was on coumadin.  He is seeing Dr Agarwal and underwent ablation.  He sees cardiologist Dr ROSEMARIE Ferguson regularly.  I\par \par The patient collapsed outside of a pharmacy 7+ years ago and was taken to [facility].  After an extensive workup, he was diagnosed with HIV and started on therapy.  He follows up every 3 months with BLANK Vazquez.  He has been stable on his current medications.  He recently had labs.\par \par The patient sees urologist Dr JUANA Silver and had a visit.  The patient has seen hem onc for MGUS and had a visit. The patient is seeing nephrology for CRI.  \par \par The patient is single with no children.  He is a retired .  He walks short distances with a cane.  He lives in an assisted living facility called [facility] for the past 6 years. He is an only child and has no remaining family.  He helped care for his elderly father and 2 aunts and they have passed away.  He is going to think about who he would designate as a health care proxy.\par \par He usually sees an opthalm, dentist.\par \par

## 2021-09-17 ENCOUNTER — TRANSCRIPTION ENCOUNTER (OUTPATIENT)
Age: 81
End: 2021-09-17

## 2021-09-18 ENCOUNTER — RX RENEWAL (OUTPATIENT)
Age: 81
End: 2021-09-18

## 2021-10-18 ENCOUNTER — RX RENEWAL (OUTPATIENT)
Age: 81
End: 2021-10-18

## 2021-10-24 ENCOUNTER — FORM ENCOUNTER (OUTPATIENT)
Age: 81
End: 2021-10-24

## 2021-10-25 ENCOUNTER — APPOINTMENT (OUTPATIENT)
Dept: INFECTIOUS DISEASE | Facility: CLINIC | Age: 81
End: 2021-10-25
Payer: COMMERCIAL

## 2021-10-25 ENCOUNTER — OUTPATIENT (OUTPATIENT)
Dept: OUTPATIENT SERVICES | Facility: HOSPITAL | Age: 81
LOS: 1 days | End: 2021-10-25
Payer: COMMERCIAL

## 2021-10-25 ENCOUNTER — LABORATORY RESULT (OUTPATIENT)
Age: 81
End: 2021-10-25

## 2021-10-25 VITALS
TEMPERATURE: 98.7 F | DIASTOLIC BLOOD PRESSURE: 63 MMHG | SYSTOLIC BLOOD PRESSURE: 120 MMHG | BODY MASS INDEX: 21.82 KG/M2 | HEART RATE: 74 BPM | OXYGEN SATURATION: 99 % | HEIGHT: 74 IN | WEIGHT: 170 LBS

## 2021-10-25 DIAGNOSIS — Z95.2 PRESENCE OF PROSTHETIC HEART VALVE: Chronic | ICD-10-CM

## 2021-10-25 DIAGNOSIS — H93.13 TINNITUS, BILATERAL: ICD-10-CM

## 2021-10-25 LAB
25(OH)D3 SERPL-MCNC: 66.8 NG/ML
CD3 CELLS # BLD: 1000 /UL
CD3 CELLS NFR BLD: 46 %
CD3+CD4+ CELLS # BLD: 342 /UL
CD3+CD4+ CELLS NFR BLD: 16 %
CD3+CD4+ CELLS/CD3+CD8+ CLL SPEC: 0.53 RATIO
CD3+CD8+ CELLS # SPEC: 642 /UL
CD3+CD8+ CELLS NFR BLD: 29 %
CYSTATIN C SERPL-MCNC: 2.24 MG/L
GFR/BSA.PRED SERPLBLD CYS-BASED-ARV: 25 ML/MIN
PSA SERPL-MCNC: 0.64 NG/ML
TSH SERPL-ACNC: 3.32 UIU/ML

## 2021-10-25 PROCEDURE — 99214 OFFICE O/P EST MOD 30 MIN: CPT

## 2021-10-25 PROCEDURE — G0463: CPT

## 2021-10-25 NOTE — HISTORY OF PRESENT ILLNESS
[FreeTextEntry1] : 10/25/21--\par KEL MONSIVAIS is a 81 year old male being seen for a follow-up visit. \par Here today doing well.  Bactrim 3 times a week as  originally added in hospital for UTI prophylasis by Infectious Disease has been discontinued. Adendum/clarification on 7/9/21: pt is no longer taking Bactrim, it was discontinued. \par Pt now has a PCP, preferably specializing in gerontology. Dr. Tana Najera\par HIV- continue Abacivir 300mg BID daily, lamivudine 100mg daily and doravirine one tablet daily. \par Pt had both COVID 19 vaccinations. Pfizer in Feb and March 2021 and had booster as well\par Note: Pt self discontinued his Coumadin and as per Cardiology note it can be discontinued. \par 10/25/2021 plan\par 1) HIV- continue Abacivir 300mg BID daily, lamivudine 100mg daily and doravirine one tablet daily.\par 2) needs follow up with nephrology, ENT for tinnitis  and move up  internal med appt  \par 3) labs today, see in 4 months. \par \par \par Aug 17th, 2020 fell on bathroom floor. on floor 24 hours, Spent 23 days in [facility] and then in Rehab, ,Replaced the heart valve with vegitation. PICC line removed. \par He is follwing with cardiology next week. \par Kel Monsivais 79 y/o male patient call for a telehealth follow up visit. He has seen Hem/Onc and will follow up again in November 2020. \par Pt has follow up appointments with both Nephrology and Kenney Ferguson in Cardiology. \par We stopped Biktarvy and started Juluca, then stopped Juluca, since creatinine still rising so I changed to Abacivir 300mg BID daily, lamivudine 100mg daily and doravirine one tablet daily. \par recently seen by Dr. Memo Silver in Urology and pt doing well straight cathing and taking Tamulosin and finesteride daily. \par He is happy to mention he has now been sleeping through the night without waking to urinate. \par [facility] is handling all meds. \par review of systems 12/29/20\par Constitutional, Eyes, ENT, Cardiovascular, Respiratory, Gastrointestinal, Genitourinary, Musculoskeletal, Integumentary, Neurological, Psychiatric and Heme/Lymph are otherwise negative. \par new meds after discharge, Ferrous sulfate 325mg daily, folic acid 1mg daily , 81mg aspirin, protonix 40mg delated release, warfarin 5mg, finasteride and tamsulosin , pt still using self catherization. Vitamin D . 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)\par Atrial flutter (427.32) (I48.92)\par Benign essential hypertension (401.1) (I10)\par Bilateral hydronephrosis (591) (N13.30)\par BPH with obstruction/lower urinary tract symptoms (600.01,599.69) (N40.1,N13.8)\par Chronic kidney disease, stage 3 (585.3) (N18.30)\par Constipation (564.00) (K59.00)\par Dandruff (690.18) (L21.0)\par Dyspnea on exertion (786.09) (R06.00)\par Edema, lower extremity (782.3) (R60.0)\par Elevated serum creatinine (790.99) (R79.89)\par Encounter for immunization (V03.89) (Z23)\par H/O CD4 count (V15.89) (Z92.89)\par Hematuria, microscopic (599.72) (R31.29)\par History of bladder cancer (V10.51) (Z85.51)\par HIV disease (042) (B20)\par IgG monoclonal gammopathy (273.1) (D47.2)\par 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9) (L91.8)\par Urinary incontinence, unspecified type (788.30) (R32)\par Urinary retention (788.20) (R33.9)\par Vitamin D deficiency (268.9) (E55.9)\par \par Past Medical History\par History of Antinuclear antibody (OWEN) titer greater than 1:80 (795.79) (R76.0)\par History of Creatinine elevation (790.99) (R79.89)\par History of Enlarged prostate (600.00) (N40.0)\par History of Hemorrhagic cyst\par History of anemia (V12.3) (Z86.2)\par History of athlete's foot (V12.09) (Z86.19)\par History of chronic kidney disease (V13.09) (Z87.448)\par History of degenerative disc disease (V13.59) (Z87.39)\par History of fever (V13.89) (Z87.898)\par History of hydronephrosis (V13.09) (Z87.448)\par History of hyperparathyroidism (V12.29) (Z86.39)\par History of lymphadenopathy (V13.89) (Z87.898)\par History of osteopenia (V13.59) (Z87.39)\par History of Parkinson's disease (V12.49) (Z86.69)\par History of renal calculi (V13.01) (Z87.442)\par History of renal insufficiency syndrome (V13.09) (Z87.448)\par History of HIV disease (042) (B20)\par History of Left knee pain (719.46) (M25.562)\par Personal history of multiple pulmonary nodules (V12.69) (Z87.898)\par Personal history of scoliosis (V13.59) (Z87.39)\par Personal history of urinary tract infection (V13.02) (Z87.440)\par History of Tinea unguium (110.1) (B35.1)\par History of Tooth pain (525.9) (K08.89)\par History of Vaccine refused by parent (V64.05) (Z28.82)\par History of Visit for dental examination (V72.2) (Z01.20)\par History of Visit for eye and vision exam (V72.0) (Z01.00)\par History of Xerosis of skin (706.8) (L85.3)\par \par \par \par \par Allergies\par No Known Drug Allergies\par \par

## 2021-10-26 LAB
ALBUMIN SERPL ELPH-MCNC: 4.3 G/DL
ALP BLD-CCNC: 94 U/L
ALT SERPL-CCNC: 14 U/L
ANION GAP SERPL CALC-SCNC: 14 MMOL/L
APPEARANCE: ABNORMAL
AST SERPL-CCNC: 19 U/L
BACTERIA: ABNORMAL
BASOPHILS # BLD AUTO: 0 K/UL
BASOPHILS NFR BLD AUTO: 0 %
BILIRUB SERPL-MCNC: 0.2 MG/DL
BILIRUBIN URINE: NEGATIVE
BLOOD URINE: ABNORMAL
BUN SERPL-MCNC: 56 MG/DL
C TRACH RRNA SPEC QL NAA+PROBE: NOT DETECTED
CALCIUM SERPL-MCNC: 9.5 MG/DL
CHLORIDE SERPL-SCNC: 105 MMOL/L
CHOLEST SERPL-MCNC: 187 MG/DL
CO2 SERPL-SCNC: 20 MMOL/L
COLOR: YELLOW
CREAT SERPL-MCNC: 2.39 MG/DL
EOSINOPHIL # BLD AUTO: 0 K/UL
EOSINOPHIL NFR BLD AUTO: 0 %
ESTIMATED AVERAGE GLUCOSE: 123 MG/DL
GLUCOSE QUALITATIVE U: NEGATIVE
GLUCOSE SERPL-MCNC: 88 MG/DL
HBA1C MFR BLD HPLC: 5.9 %
HCT VFR BLD CALC: 35.2 %
HDLC SERPL-MCNC: 73 MG/DL
HGB BLD-MCNC: 11.7 G/DL
HIV1 RNA # SERPL NAA+PROBE: ABNORMAL
HIV1 RNA # SERPL NAA+PROBE: ABNORMAL COPIES/ML
HYALINE CASTS: 0 /LPF
KETONES URINE: NEGATIVE
LDLC SERPL CALC-MCNC: 97 MG/DL
LEUKOCYTE ESTERASE URINE: ABNORMAL
LYMPHOCYTES # BLD AUTO: 2.12 K/UL
LYMPHOCYTES NFR BLD AUTO: 20 %
MAN DIFF?: NORMAL
MCHC RBC-ENTMCNC: 33.2 GM/DL
MCHC RBC-ENTMCNC: 36.4 PG
MCV RBC AUTO: 109.7 FL
MICROSCOPIC-UA: NORMAL
MONOCYTES # BLD AUTO: 0.73 K/UL
MONOCYTES NFR BLD AUTO: 6.9 %
N GONORRHOEA RRNA SPEC QL NAA+PROBE: NOT DETECTED
NEUTROPHILS # BLD AUTO: 7.39 K/UL
NEUTROPHILS NFR BLD AUTO: 69.6 %
NITRITE URINE: POSITIVE
NONHDLC SERPL-MCNC: 114 MG/DL
PH URINE: 6
PLATELET # BLD AUTO: 181 K/UL
POTASSIUM SERPL-SCNC: 4.5 MMOL/L
PROT SERPL-MCNC: 7.1 G/DL
PROTEIN URINE: ABNORMAL
RBC # BLD: 3.21 M/UL
RBC # FLD: 13.6 %
RED BLOOD CELLS URINE: 79 /HPF
SODIUM SERPL-SCNC: 138 MMOL/L
SOURCE AMPLIFICATION: NORMAL
SPECIFIC GRAVITY URINE: 1.01
SQUAMOUS EPITHELIAL CELLS: 1 /HPF
T PALLIDUM AB SER QL IA: NEGATIVE
TRIGL SERPL-MCNC: 84 MG/DL
UROBILINOGEN URINE: NORMAL
VIRAL LOAD INTERP: NORMAL
VIRAL LOAD LOG: ABNORMAL LG COP/ML
WBC # FLD AUTO: 10.62 K/UL
WHITE BLOOD CELLS URINE: >720 /HPF

## 2021-11-02 DIAGNOSIS — B20 HUMAN IMMUNODEFICIENCY VIRUS [HIV] DISEASE: ICD-10-CM

## 2021-11-03 ENCOUNTER — APPOINTMENT (OUTPATIENT)
Dept: ELECTROPHYSIOLOGY | Facility: CLINIC | Age: 81
End: 2021-11-03
Payer: MEDICARE

## 2021-11-03 ENCOUNTER — NON-APPOINTMENT (OUTPATIENT)
Age: 81
End: 2021-11-03

## 2021-11-03 VITALS
BODY MASS INDEX: 21.82 KG/M2 | HEIGHT: 74 IN | WEIGHT: 170 LBS | SYSTOLIC BLOOD PRESSURE: 150 MMHG | OXYGEN SATURATION: 98 % | DIASTOLIC BLOOD PRESSURE: 77 MMHG | HEART RATE: 81 BPM | RESPIRATION RATE: 14 BRPM

## 2021-11-03 DIAGNOSIS — H93.13 TINNITUS, BILATERAL: ICD-10-CM

## 2021-11-03 DIAGNOSIS — R79.89 OTHER SPECIFIED ABNORMAL FINDINGS OF BLOOD CHEMISTRY: ICD-10-CM

## 2021-11-03 PROCEDURE — 99214 OFFICE O/P EST MOD 30 MIN: CPT

## 2021-11-03 PROCEDURE — 93000 ELECTROCARDIOGRAM COMPLETE: CPT

## 2021-11-03 RX ORDER — FUROSEMIDE 20 MG/1
20 TABLET ORAL
Qty: 90 | Refills: 3 | Status: DISCONTINUED | COMMUNITY
Start: 2020-11-23 | End: 2021-11-03

## 2021-11-03 NOTE — HISTORY OF PRESENT ILLNESS
[FreeTextEntry1] : Dear Dr. Mack,\par \par Mr. Monsivais was seen in the [facility] today. For our records, please allow me to summarize the history and my findings.\par \par This is a pleasant 82 yo male, hx of mitral valve endocarditis s/p Bioprosthetic MVR in 8/2020, SHELLIE atriclip, and post operative symptomatic atrial flutter. He underwent NOHEMI/DCCV in October 2020 and CTI ablation on 1/26/2021. He presents today for follow up.\par \par Today, he reports no further palpitations. He has been having worsening exertional shortness of breath, now just when walking across his apartment. Of note a prosthetic valve gradient was seen on last TTE.\par \par ECHO from 11/2020 revealed EF 70-75%, normal LV systolic function. Bioprosthetic mitral valve replacement, no MR. LA size 3.6cm and NOHEMI revealed LA appendage ligation. \par \par He denies any recent history of chest pain, shortness of breath, palpitations, dizziness, or syncope.

## 2021-11-03 NOTE — DISCUSSION/SUMMARY
[FreeTextEntry1] : In summary, this is a 80 year old man with hx of Bioprosthetic MVR in 8/2020 and recurrent post operative atrial flutter s/p DCCV in Oct. 2020 and AFlutter ablation in 1/2021. He is now off of anticoagulation. I advised routine event monitoring to screen for AF. He will repeat a TTE to evaluate for further prosthetic stenosis in the setting of worsening exertion SOB.\par \par He will follow up in 6 months for holter monitoring to evaluate for ambient arrythmia. \par \par Mr. Monsivais appeared to understand the whole discussion and verbalized that all of his questions were answered to his satisfaction. \par \par Thank you for allowing me to be involved in the care of this pleasant man. Please feel free to contact me with any questions. \par \par Nick Agarwal MD\[facility]  of Cardiology\par Electrophysiology Section\[address], [address] [place] [address]\[place] [address]\[facility] Office: [phone]\par Fax: [phone]

## 2021-11-04 NOTE — PROGRESS NOTE ADULT - ATTENDING COMMENTS
Detail Level: Detailed Patient seen and exam today at bedside. Chart, labs, vitals, radiology reviewed. Above H&P reviewed and edited where appropriate.  Agree with history and physical exam. Agree with assessment and plan.

## 2021-11-05 ENCOUNTER — NON-APPOINTMENT (OUTPATIENT)
Age: 81
End: 2021-11-05

## 2021-11-29 PROCEDURE — 93248 EXT ECG>7D<15D REV&INTERPJ: CPT

## 2021-12-08 ENCOUNTER — OUTPATIENT (OUTPATIENT)
Dept: OUTPATIENT SERVICES | Facility: HOSPITAL | Age: 81
LOS: 1 days | End: 2021-12-08
Payer: MEDICARE

## 2021-12-08 ENCOUNTER — APPOINTMENT (OUTPATIENT)
Dept: CV DIAGNOSITCS | Facility: HOSPITAL | Age: 81
End: 2021-12-08

## 2021-12-08 DIAGNOSIS — Z95.2 PRESENCE OF PROSTHETIC HEART VALVE: Chronic | ICD-10-CM

## 2021-12-08 DIAGNOSIS — Z95.2 PRESENCE OF PROSTHETIC HEART VALVE: ICD-10-CM

## 2021-12-08 PROCEDURE — 93306 TTE W/DOPPLER COMPLETE: CPT | Mod: 26

## 2021-12-08 PROCEDURE — 93306 TTE W/DOPPLER COMPLETE: CPT

## 2022-01-01 ENCOUNTER — LABORATORY RESULT (OUTPATIENT)
Age: 82
End: 2022-01-01

## 2022-01-01 ENCOUNTER — TRANSCRIPTION ENCOUNTER (OUTPATIENT)
Age: 82
End: 2022-01-01

## 2022-01-01 ENCOUNTER — RX RENEWAL (OUTPATIENT)
Age: 82
End: 2022-01-01

## 2022-01-01 ENCOUNTER — NON-APPOINTMENT (OUTPATIENT)
Age: 82
End: 2022-01-01

## 2022-01-01 ENCOUNTER — APPOINTMENT (OUTPATIENT)
Dept: INFECTIOUS DISEASE | Facility: CLINIC | Age: 82
End: 2022-01-01

## 2022-01-01 ENCOUNTER — INPATIENT (INPATIENT)
Facility: HOSPITAL | Age: 82
LOS: 5 days | Discharge: HOME HEALTH | DRG: 699 | End: 2022-08-12
Attending: HOSPITALIST | Admitting: HOSPITALIST
Payer: MEDICARE

## 2022-01-01 ENCOUNTER — APPOINTMENT (OUTPATIENT)
Dept: INTERNAL MEDICINE | Facility: CLINIC | Age: 82
End: 2022-01-01
Payer: MEDICARE

## 2022-01-01 ENCOUNTER — APPOINTMENT (OUTPATIENT)
Dept: INTERNAL MEDICINE | Facility: CLINIC | Age: 82
End: 2022-01-01

## 2022-01-01 ENCOUNTER — OUTPATIENT (OUTPATIENT)
Dept: OUTPATIENT SERVICES | Facility: HOSPITAL | Age: 82
LOS: 1 days | End: 2022-01-01
Payer: MEDICARE

## 2022-01-01 ENCOUNTER — FORM ENCOUNTER (OUTPATIENT)
Age: 82
End: 2022-01-01

## 2022-01-01 ENCOUNTER — APPOINTMENT (OUTPATIENT)
Dept: UROLOGY | Facility: CLINIC | Age: 82
End: 2022-01-01

## 2022-01-01 ENCOUNTER — OUTPATIENT (OUTPATIENT)
Dept: OUTPATIENT SERVICES | Facility: HOSPITAL | Age: 82
LOS: 1 days | End: 2022-01-01
Payer: COMMERCIAL

## 2022-01-01 ENCOUNTER — OUTPATIENT (OUTPATIENT)
Dept: OUTPATIENT SERVICES | Facility: HOSPITAL | Age: 82
LOS: 1 days | End: 2022-01-01

## 2022-01-01 ENCOUNTER — APPOINTMENT (OUTPATIENT)
Dept: MRI IMAGING | Facility: IMAGING CENTER | Age: 82
End: 2022-01-01
Payer: MEDICARE

## 2022-01-01 ENCOUNTER — INPATIENT (INPATIENT)
Facility: HOSPITAL | Age: 82
LOS: 14 days | DRG: 969 | End: 2022-12-14
Attending: SPECIALIST | Admitting: HOSPITALIST
Payer: MEDICARE

## 2022-01-01 ENCOUNTER — APPOINTMENT (OUTPATIENT)
Dept: RADIOLOGY | Facility: IMAGING CENTER | Age: 82
End: 2022-01-01
Payer: MEDICARE

## 2022-01-01 ENCOUNTER — APPOINTMENT (OUTPATIENT)
Dept: CARDIOTHORACIC SURGERY | Facility: HOSPITAL | Age: 82
End: 2022-01-01

## 2022-01-01 VITALS
OXYGEN SATURATION: 99 % | SYSTOLIC BLOOD PRESSURE: 113 MMHG | HEART RATE: 53 BPM | DIASTOLIC BLOOD PRESSURE: 70 MMHG | RESPIRATION RATE: 17 BRPM | TEMPERATURE: 99 F

## 2022-01-01 VITALS — OXYGEN SATURATION: 94 % | HEART RATE: 49 BPM | RESPIRATION RATE: 30 BRPM

## 2022-01-01 VITALS
WEIGHT: 80 LBS | HEIGHT: 72 IN | OXYGEN SATURATION: 98 % | SYSTOLIC BLOOD PRESSURE: 160 MMHG | DIASTOLIC BLOOD PRESSURE: 60 MMHG | BODY MASS INDEX: 10.84 KG/M2 | TEMPERATURE: 97.6 F | HEART RATE: 84 BPM | RESPIRATION RATE: 14 BRPM

## 2022-01-01 VITALS
TEMPERATURE: 97.8 F | OXYGEN SATURATION: 98 % | WEIGHT: 168 LBS | DIASTOLIC BLOOD PRESSURE: 80 MMHG | HEART RATE: 88 BPM | SYSTOLIC BLOOD PRESSURE: 153 MMHG | BODY MASS INDEX: 22.75 KG/M2 | HEIGHT: 72 IN

## 2022-01-01 VITALS
WEIGHT: 173 LBS | TEMPERATURE: 98.6 F | BODY MASS INDEX: 23.43 KG/M2 | DIASTOLIC BLOOD PRESSURE: 74 MMHG | HEIGHT: 72 IN | HEART RATE: 105 BPM | SYSTOLIC BLOOD PRESSURE: 155 MMHG | OXYGEN SATURATION: 97 %

## 2022-01-01 VITALS
BODY MASS INDEX: 23.98 KG/M2 | HEART RATE: 84 BPM | SYSTOLIC BLOOD PRESSURE: 136 MMHG | HEIGHT: 72 IN | WEIGHT: 177 LBS | DIASTOLIC BLOOD PRESSURE: 62 MMHG | OXYGEN SATURATION: 97 % | TEMPERATURE: 97.8 F | RESPIRATION RATE: 14 BRPM

## 2022-01-01 VITALS
OXYGEN SATURATION: 98 % | DIASTOLIC BLOOD PRESSURE: 61 MMHG | RESPIRATION RATE: 20 BRPM | TEMPERATURE: 102 F | HEART RATE: 70 BPM | SYSTOLIC BLOOD PRESSURE: 131 MMHG

## 2022-01-01 VITALS
WEIGHT: 173 LBS | DIASTOLIC BLOOD PRESSURE: 60 MMHG | BODY MASS INDEX: 23.43 KG/M2 | OXYGEN SATURATION: 97 % | HEIGHT: 72 IN | HEART RATE: 85 BPM | SYSTOLIC BLOOD PRESSURE: 160 MMHG | TEMPERATURE: 97.9 F

## 2022-01-01 VITALS
DIASTOLIC BLOOD PRESSURE: 68 MMHG | OXYGEN SATURATION: 96 % | HEART RATE: 85 BPM | HEIGHT: 74 IN | RESPIRATION RATE: 20 BRPM | SYSTOLIC BLOOD PRESSURE: 127 MMHG | TEMPERATURE: 99 F | WEIGHT: 182.1 LBS

## 2022-01-01 VITALS
DIASTOLIC BLOOD PRESSURE: 63 MMHG | HEIGHT: 72 IN | TEMPERATURE: 99.6 F | BODY MASS INDEX: 24.65 KG/M2 | WEIGHT: 182 LBS | OXYGEN SATURATION: 98 % | HEART RATE: 62 BPM | SYSTOLIC BLOOD PRESSURE: 118 MMHG

## 2022-01-01 VITALS — SYSTOLIC BLOOD PRESSURE: 128 MMHG | DIASTOLIC BLOOD PRESSURE: 74 MMHG

## 2022-01-01 DIAGNOSIS — I48.92 UNSPECIFIED ATRIAL FLUTTER: ICD-10-CM

## 2022-01-01 DIAGNOSIS — N18.9 CHRONIC KIDNEY DISEASE, UNSPECIFIED: ICD-10-CM

## 2022-01-01 DIAGNOSIS — Z95.2 PRESENCE OF PROSTHETIC HEART VALVE: Chronic | ICD-10-CM

## 2022-01-01 DIAGNOSIS — N13.8 BENIGN PROSTATIC HYPERPLASIA WITH LOWER URINARY TRACT SYMPMS: ICD-10-CM

## 2022-01-01 DIAGNOSIS — N17.9 ACUTE KIDNEY FAILURE, UNSPECIFIED: ICD-10-CM

## 2022-01-01 DIAGNOSIS — N40.1 BENIGN PROSTATIC HYPERPLASIA WITH LOWER URINARY TRACT SYMPTOMS: ICD-10-CM

## 2022-01-01 DIAGNOSIS — D72.829 ELEVATED WHITE BLOOD CELL COUNT, UNSPECIFIED: ICD-10-CM

## 2022-01-01 DIAGNOSIS — Z95.2 PRESENCE OF PROSTHETIC HEART VALVE: ICD-10-CM

## 2022-01-01 DIAGNOSIS — Z00.00 ENCOUNTER FOR GENERAL ADULT MEDICAL EXAMINATION W/OUT ABNORMAL FINDINGS: ICD-10-CM

## 2022-01-01 DIAGNOSIS — R06.02 SHORTNESS OF BREATH: ICD-10-CM

## 2022-01-01 DIAGNOSIS — D47.2 MONOCLONAL GAMMOPATHY: ICD-10-CM

## 2022-01-01 DIAGNOSIS — R53.81 OTHER MALAISE: ICD-10-CM

## 2022-01-01 DIAGNOSIS — B20 HUMAN IMMUNODEFICIENCY VIRUS [HIV] DISEASE: ICD-10-CM

## 2022-01-01 DIAGNOSIS — I21.4 NON-ST ELEVATION (NSTEMI) MYOCARDIAL INFARCTION: ICD-10-CM

## 2022-01-01 DIAGNOSIS — Z01.20 ENCOUNTER FOR DENTAL EXAMINATION AND CLEANING W/OUT ABNORMAL FINDINGS: ICD-10-CM

## 2022-01-01 DIAGNOSIS — N18.30 CHRONIC KIDNEY DISEASE, STAGE 3 UNSPECIFIED: ICD-10-CM

## 2022-01-01 DIAGNOSIS — I10 ESSENTIAL (PRIMARY) HYPERTENSION: ICD-10-CM

## 2022-01-01 DIAGNOSIS — N39.0 URINARY TRACT INFECTION, SITE NOT SPECIFIED: ICD-10-CM

## 2022-01-01 DIAGNOSIS — I44.1 ATRIOVENTRICULAR BLOCK, SECOND DEGREE: ICD-10-CM

## 2022-01-01 DIAGNOSIS — Z71.89 OTHER SPECIFIED COUNSELING: ICD-10-CM

## 2022-01-01 DIAGNOSIS — I48.91 UNSPECIFIED ATRIAL FIBRILLATION: ICD-10-CM

## 2022-01-01 DIAGNOSIS — I50.9 HEART FAILURE, UNSPECIFIED: ICD-10-CM

## 2022-01-01 DIAGNOSIS — Z29.9 ENCOUNTER FOR PROPHYLACTIC MEASURES, UNSPECIFIED: ICD-10-CM

## 2022-01-01 DIAGNOSIS — N40.1 BENIGN PROSTATIC HYPERPLASIA WITH LOWER URINARY TRACT SYMPMS: ICD-10-CM

## 2022-01-01 DIAGNOSIS — A41.9 SEPSIS, UNSPECIFIED ORGANISM: ICD-10-CM

## 2022-01-01 DIAGNOSIS — E87.5 HYPERKALEMIA: ICD-10-CM

## 2022-01-01 DIAGNOSIS — Z00.00 ENCOUNTER FOR GENERAL ADULT MEDICAL EXAMINATION WITHOUT ABNORMAL FINDINGS: ICD-10-CM

## 2022-01-01 DIAGNOSIS — Z21 ASYMPTOMATIC HUMAN IMMUNODEFICIENCY VIRUS [HIV] INFECTION STATUS: ICD-10-CM

## 2022-01-01 DIAGNOSIS — I33.0 ACUTE AND SUBACUTE INFECTIVE ENDOCARDITIS: ICD-10-CM

## 2022-01-01 DIAGNOSIS — I25.10 ATHEROSCLEROTIC HEART DISEASE OF NATIVE CORONARY ARTERY WITHOUT ANGINA PECTORIS: ICD-10-CM

## 2022-01-01 DIAGNOSIS — N30.00 ACUTE CYSTITIS WITHOUT HEMATURIA: ICD-10-CM

## 2022-01-01 DIAGNOSIS — R33.9 RETENTION OF URINE, UNSPECIFIED: ICD-10-CM

## 2022-01-01 DIAGNOSIS — Z51.5 ENCOUNTER FOR PALLIATIVE CARE: ICD-10-CM

## 2022-01-01 DIAGNOSIS — Z01.00 ENCOUNTER FOR EXAMINATION OF EYES AND VISION W/OUT ABNORMAL FINDINGS: ICD-10-CM

## 2022-01-01 DIAGNOSIS — Z98.890 OTHER SPECIFIED POSTPROCEDURAL STATES: ICD-10-CM

## 2022-01-01 DIAGNOSIS — Z01.818 ENCOUNTER FOR OTHER PREPROCEDURAL EXAMINATION: ICD-10-CM

## 2022-01-01 LAB
-  AMIKACIN: SIGNIFICANT CHANGE UP
-  AMOXICILLIN/CLAVULANIC ACID: SIGNIFICANT CHANGE UP
-  AMPICILLIN/SULBACTAM: SIGNIFICANT CHANGE UP
-  AMPICILLIN/SULBACTAM: SIGNIFICANT CHANGE UP
-  AMPICILLIN: SIGNIFICANT CHANGE UP
-  AZTREONAM: SIGNIFICANT CHANGE UP
-  CEFAZOLIN: SIGNIFICANT CHANGE UP
-  CEFAZOLIN: SIGNIFICANT CHANGE UP
-  CEFEPIME: SIGNIFICANT CHANGE UP
-  CEFOXITIN: SIGNIFICANT CHANGE UP
-  CEFTRIAXONE: SIGNIFICANT CHANGE UP
-  CIPROFLOXACIN: SIGNIFICANT CHANGE UP
-  CLINDAMYCIN: SIGNIFICANT CHANGE UP
-  COAGULASE NEGATIVE STAPHYLOCOCCUS: SIGNIFICANT CHANGE UP
-  ERTAPENEM: SIGNIFICANT CHANGE UP
-  ERYTHROMYCIN: SIGNIFICANT CHANGE UP
-  GENTAMICIN: SIGNIFICANT CHANGE UP
-  GENTAMICIN: SIGNIFICANT CHANGE UP
-  IMIPENEM: SIGNIFICANT CHANGE UP
-  LEVOFLOXACIN: SIGNIFICANT CHANGE UP
-  MEROPENEM: SIGNIFICANT CHANGE UP
-  NITROFURANTOIN: SIGNIFICANT CHANGE UP
-  OXACILLIN: SIGNIFICANT CHANGE UP
-  PIPERACILLIN/TAZOBACTAM: SIGNIFICANT CHANGE UP
-  RIFAMPIN: SIGNIFICANT CHANGE UP
-  TETRACYCLINE: SIGNIFICANT CHANGE UP
-  TIGECYCLINE: SIGNIFICANT CHANGE UP
-  TOBRAMYCIN: SIGNIFICANT CHANGE UP
-  TRIMETHOPRIM/SULFAMETHOXAZOLE: SIGNIFICANT CHANGE UP
-  TRIMETHOPRIM/SULFAMETHOXAZOLE: SIGNIFICANT CHANGE UP
-  VANCOMYCIN: SIGNIFICANT CHANGE UP
25(OH)D3 SERPL-MCNC: 75 NG/ML
25(OH)D3 SERPL-MCNC: 75.1 NG/ML
25(OH)D3 SERPL-MCNC: 78.2 NG/ML
4/8 RATIO: 0.58 RATIO — LOW (ref 0.86–4.14)
4/8 RATIO: 0.76 RATIO — LOW (ref 0.86–4.14)
ABS CD8: 379 CELLS/UL — SIGNIFICANT CHANGE UP (ref 90–775)
ABS CD8: 688 /UL — SIGNIFICANT CHANGE UP (ref 90–775)
ACANTHOCYTES BLD QL SMEAR: SLIGHT — SIGNIFICANT CHANGE UP
ALBUMIN MFR SERPL ELPH: 55.5 %
ALBUMIN SERPL ELPH-MCNC: 2.3 G/DL — LOW (ref 3.3–5)
ALBUMIN SERPL ELPH-MCNC: 2.4 G/DL — LOW (ref 3.3–5)
ALBUMIN SERPL ELPH-MCNC: 2.5 G/DL — LOW (ref 3.3–5)
ALBUMIN SERPL ELPH-MCNC: 2.5 G/DL — LOW (ref 3.3–5)
ALBUMIN SERPL ELPH-MCNC: 2.6 G/DL — LOW (ref 3.3–5)
ALBUMIN SERPL ELPH-MCNC: 2.6 G/DL — LOW (ref 3.3–5)
ALBUMIN SERPL ELPH-MCNC: 2.7 G/DL — LOW (ref 3.3–5)
ALBUMIN SERPL ELPH-MCNC: 2.7 G/DL — LOW (ref 3.3–5)
ALBUMIN SERPL ELPH-MCNC: 2.8 G/DL — LOW (ref 3.3–5)
ALBUMIN SERPL ELPH-MCNC: 2.8 G/DL — LOW (ref 3.3–5)
ALBUMIN SERPL ELPH-MCNC: 2.9 G/DL — LOW (ref 3.3–5)
ALBUMIN SERPL ELPH-MCNC: 3 G/DL — LOW (ref 3.3–5)
ALBUMIN SERPL ELPH-MCNC: 3 G/DL — LOW (ref 3.3–5)
ALBUMIN SERPL ELPH-MCNC: 3.1 G/DL — LOW (ref 3.3–5)
ALBUMIN SERPL ELPH-MCNC: 3.2 G/DL — LOW (ref 3.3–5)
ALBUMIN SERPL ELPH-MCNC: 3.3 G/DL — SIGNIFICANT CHANGE UP (ref 3.3–5)
ALBUMIN SERPL ELPH-MCNC: 3.7 G/DL
ALBUMIN SERPL ELPH-MCNC: 3.7 G/DL — SIGNIFICANT CHANGE UP (ref 3.3–5)
ALBUMIN SERPL ELPH-MCNC: 4.6 G/DL
ALBUMIN SERPL ELPH-MCNC: 4.7 G/DL
ALBUMIN SERPL-MCNC: 4.1 G/DL
ALBUMIN/GLOB SERPL: 1.3 RATIO
ALP BLD-CCNC: 112 U/L
ALP BLD-CCNC: 166 U/L
ALP BLD-CCNC: 89 U/L
ALP SERPL-CCNC: 133 U/L — HIGH (ref 40–120)
ALP SERPL-CCNC: 133 U/L — HIGH (ref 40–120)
ALP SERPL-CCNC: 139 U/L — HIGH (ref 40–120)
ALP SERPL-CCNC: 148 U/L — HIGH (ref 40–120)
ALP SERPL-CCNC: 188 U/L — HIGH (ref 40–120)
ALP SERPL-CCNC: 190 U/L — HIGH (ref 40–120)
ALP SERPL-CCNC: 209 U/L — HIGH (ref 40–120)
ALP SERPL-CCNC: 209 U/L — HIGH (ref 40–120)
ALP SERPL-CCNC: 214 U/L — HIGH (ref 40–120)
ALP SERPL-CCNC: 219 U/L — HIGH (ref 40–120)
ALP SERPL-CCNC: 224 U/L — HIGH (ref 40–120)
ALP SERPL-CCNC: 237 U/L — HIGH (ref 40–120)
ALP SERPL-CCNC: 246 U/L — HIGH (ref 40–120)
ALP SERPL-CCNC: 247 U/L — HIGH (ref 40–120)
ALP SERPL-CCNC: 251 U/L — HIGH (ref 40–120)
ALP SERPL-CCNC: 252 U/L — HIGH (ref 40–120)
ALP SERPL-CCNC: 264 U/L — HIGH (ref 40–120)
ALP SERPL-CCNC: 270 U/L — HIGH (ref 40–120)
ALP SERPL-CCNC: 276 U/L — HIGH (ref 40–120)
ALP SERPL-CCNC: 277 U/L — HIGH (ref 40–120)
ALPHA1 GLOB MFR SERPL ELPH: 3.2 %
ALPHA1 GLOB SERPL ELPH-MCNC: 0.2 G/DL
ALPHA2 GLOB MFR SERPL ELPH: 10 %
ALPHA2 GLOB SERPL ELPH-MCNC: 0.7 G/DL
ALT FLD-CCNC: 17 U/L — SIGNIFICANT CHANGE UP (ref 10–45)
ALT FLD-CCNC: 22 U/L — SIGNIFICANT CHANGE UP (ref 10–45)
ALT FLD-CCNC: 22 U/L — SIGNIFICANT CHANGE UP (ref 10–45)
ALT FLD-CCNC: 23 U/L — SIGNIFICANT CHANGE UP (ref 10–45)
ALT FLD-CCNC: 23 U/L — SIGNIFICANT CHANGE UP (ref 10–45)
ALT FLD-CCNC: 24 U/L — SIGNIFICANT CHANGE UP (ref 10–45)
ALT FLD-CCNC: 27 U/L — SIGNIFICANT CHANGE UP (ref 10–45)
ALT FLD-CCNC: 27 U/L — SIGNIFICANT CHANGE UP (ref 10–45)
ALT FLD-CCNC: 33 U/L — SIGNIFICANT CHANGE UP (ref 10–45)
ALT FLD-CCNC: 42 U/L — SIGNIFICANT CHANGE UP (ref 10–45)
ALT FLD-CCNC: 6 U/L — LOW (ref 10–45)
ALT FLD-CCNC: 619 U/L — HIGH (ref 10–45)
ALT FLD-CCNC: 65 U/L — HIGH (ref 10–45)
ALT FLD-CCNC: 7 U/L — LOW (ref 10–45)
ALT FLD-CCNC: <5 U/L — LOW (ref 10–45)
ALT SERPL-CCNC: 14 U/L
ALT SERPL-CCNC: 18 U/L
ALT SERPL-CCNC: 24 U/L
ANION GAP SERPL CALC-SCNC: 10 MMOL/L — SIGNIFICANT CHANGE UP (ref 5–17)
ANION GAP SERPL CALC-SCNC: 11 MMOL/L — SIGNIFICANT CHANGE UP (ref 5–17)
ANION GAP SERPL CALC-SCNC: 12 MMOL/L — SIGNIFICANT CHANGE UP (ref 5–17)
ANION GAP SERPL CALC-SCNC: 12 MMOL/L — SIGNIFICANT CHANGE UP (ref 5–17)
ANION GAP SERPL CALC-SCNC: 13 MMOL/L — SIGNIFICANT CHANGE UP (ref 5–17)
ANION GAP SERPL CALC-SCNC: 14 MMOL/L
ANION GAP SERPL CALC-SCNC: 14 MMOL/L — SIGNIFICANT CHANGE UP (ref 5–17)
ANION GAP SERPL CALC-SCNC: 15 MMOL/L
ANION GAP SERPL CALC-SCNC: 15 MMOL/L
ANION GAP SERPL CALC-SCNC: 15 MMOL/L — SIGNIFICANT CHANGE UP (ref 5–17)
ANION GAP SERPL CALC-SCNC: 15 MMOL/L — SIGNIFICANT CHANGE UP (ref 5–17)
ANION GAP SERPL CALC-SCNC: 16 MMOL/L — SIGNIFICANT CHANGE UP (ref 5–17)
ANION GAP SERPL CALC-SCNC: 16 MMOL/L — SIGNIFICANT CHANGE UP (ref 5–17)
ANION GAP SERPL CALC-SCNC: 18 MMOL/L — HIGH (ref 5–17)
ANION GAP SERPL CALC-SCNC: 18 MMOL/L — HIGH (ref 5–17)
ANION GAP SERPL CALC-SCNC: 19 MMOL/L — HIGH (ref 5–17)
ANION GAP SERPL CALC-SCNC: 20 MMOL/L — HIGH (ref 5–17)
ANION GAP SERPL CALC-SCNC: 21 MMOL/L — HIGH (ref 5–17)
ANION GAP SERPL CALC-SCNC: 23 MMOL/L — HIGH (ref 5–17)
ANION GAP SERPL CALC-SCNC: 35 MMOL/L — HIGH (ref 5–17)
ANION GAP SERPL CALC-SCNC: 45 MMOL/L — HIGH (ref 5–17)
ANISOCYTOSIS BLD QL: SIGNIFICANT CHANGE UP
ANISOCYTOSIS BLD QL: SLIGHT — SIGNIFICANT CHANGE UP
ANISOCYTOSIS BLD QL: SLIGHT — SIGNIFICANT CHANGE UP
APPEARANCE UR: ABNORMAL
APPEARANCE UR: ABNORMAL
APPEARANCE: ABNORMAL
APTT BLD: 29.1 SEC — SIGNIFICANT CHANGE UP (ref 27.5–35.5)
APTT BLD: 31.5 SEC — SIGNIFICANT CHANGE UP (ref 27.5–35.5)
APTT BLD: 32.7 SEC — SIGNIFICANT CHANGE UP (ref 27.5–35.5)
APTT BLD: 39.3 SEC — HIGH (ref 27.5–35.5)
APTT BLD: 50 SEC — HIGH (ref 27.5–35.5)
APTT BLD: 74.7 SEC — HIGH (ref 27.5–35.5)
APTT BLD: 76.8 SEC — HIGH (ref 27.5–35.5)
APTT BLD: 78.8 SEC — HIGH (ref 27.5–35.5)
APTT BLD: 84.8 SEC — HIGH (ref 27.5–35.5)
APTT BLD: 85.8 SEC — HIGH (ref 27.5–35.5)
APTT BLD: 88.4 SEC — HIGH (ref 27.5–35.5)
AST SERPL-CCNC: 18 U/L — SIGNIFICANT CHANGE UP (ref 10–40)
AST SERPL-CCNC: 19 U/L — SIGNIFICANT CHANGE UP (ref 10–40)
AST SERPL-CCNC: 20 U/L — SIGNIFICANT CHANGE UP (ref 10–40)
AST SERPL-CCNC: 218 U/L — HIGH (ref 10–40)
AST SERPL-CCNC: 22 U/L
AST SERPL-CCNC: 22 U/L
AST SERPL-CCNC: 228 U/L — HIGH (ref 10–40)
AST SERPL-CCNC: 26 U/L — SIGNIFICANT CHANGE UP (ref 10–40)
AST SERPL-CCNC: 27 U/L
AST SERPL-CCNC: 280 U/L — HIGH (ref 10–40)
AST SERPL-CCNC: 297 U/L — HIGH (ref 10–40)
AST SERPL-CCNC: 31 U/L — SIGNIFICANT CHANGE UP (ref 10–40)
AST SERPL-CCNC: 317 U/L — HIGH (ref 10–40)
AST SERPL-CCNC: 32 U/L — SIGNIFICANT CHANGE UP (ref 10–40)
AST SERPL-CCNC: 4204 U/L — HIGH (ref 10–40)
AST SERPL-CCNC: 60 U/L — HIGH (ref 10–40)
AST SERPL-CCNC: 62 U/L — HIGH (ref 10–40)
AST SERPL-CCNC: 65 U/L — HIGH (ref 10–40)
AST SERPL-CCNC: 68 U/L — HIGH (ref 10–40)
AST SERPL-CCNC: 74 U/L — HIGH (ref 10–40)
AST SERPL-CCNC: 81 U/L — HIGH (ref 10–40)
AST SERPL-CCNC: 91 U/L — HIGH (ref 10–40)
AST SERPL-CCNC: 91 U/L — HIGH (ref 10–40)
B-GLOBULIN MFR SERPL ELPH: 14.1 %
B-GLOBULIN SERPL ELPH-MCNC: 1 G/DL
BACTERIA # UR AUTO: ABNORMAL
BACTERIA # UR AUTO: NEGATIVE — SIGNIFICANT CHANGE UP
BACTERIA: NEGATIVE
BASE EXCESS BLDV CALC-SCNC: -0.2 MMOL/L — SIGNIFICANT CHANGE UP (ref -2–2)
BASE EXCESS BLDV CALC-SCNC: -10.6 MMOL/L — LOW (ref -2–3)
BASE EXCESS BLDV CALC-SCNC: -3.7 MMOL/L — LOW (ref -2–3)
BASE EXCESS BLDV CALC-SCNC: -4.6 MMOL/L — LOW (ref -2–3)
BASE EXCESS BLDV CALC-SCNC: -5.2 MMOL/L — LOW (ref -2–3)
BASE EXCESS BLDV CALC-SCNC: -5.3 MMOL/L — LOW (ref -2–3)
BASE EXCESS BLDV CALC-SCNC: -6.2 MMOL/L — LOW (ref -2–3)
BASE EXCESS BLDV CALC-SCNC: -7 MMOL/L — LOW (ref -2–3)
BASE EXCESS BLDV CALC-SCNC: -7.3 MMOL/L — LOW (ref -2–3)
BASE EXCESS BLDV CALC-SCNC: -7.4 MMOL/L — LOW (ref -2–3)
BASE EXCESS BLDV CALC-SCNC: -8.3 MMOL/L — LOW (ref -2–3)
BASOPHILS # BLD AUTO: 0 K/UL — SIGNIFICANT CHANGE UP (ref 0–0.2)
BASOPHILS # BLD AUTO: 0.05 K/UL
BASOPHILS # BLD AUTO: 0.05 K/UL — SIGNIFICANT CHANGE UP (ref 0–0.2)
BASOPHILS # BLD AUTO: 0.06 K/UL
BASOPHILS # BLD AUTO: 0.08 K/UL — SIGNIFICANT CHANGE UP (ref 0–0.2)
BASOPHILS # BLD AUTO: 0.09 K/UL
BASOPHILS # BLD AUTO: 0.09 K/UL — SIGNIFICANT CHANGE UP (ref 0–0.2)
BASOPHILS NFR BLD AUTO: 0 % — SIGNIFICANT CHANGE UP (ref 0–2)
BASOPHILS NFR BLD AUTO: 0.2 % — SIGNIFICANT CHANGE UP (ref 0–2)
BASOPHILS NFR BLD AUTO: 0.5 %
BASOPHILS NFR BLD AUTO: 0.5 % — SIGNIFICANT CHANGE UP (ref 0–2)
BASOPHILS NFR BLD AUTO: 0.6 % — SIGNIFICANT CHANGE UP (ref 0–2)
BILIRUB SERPL-MCNC: 0.1 MG/DL — LOW (ref 0.2–1.2)
BILIRUB SERPL-MCNC: 0.2 MG/DL
BILIRUB SERPL-MCNC: 0.2 MG/DL — SIGNIFICANT CHANGE UP (ref 0.2–1.2)
BILIRUB SERPL-MCNC: 0.3 MG/DL — SIGNIFICANT CHANGE UP (ref 0.2–1.2)
BILIRUB SERPL-MCNC: 0.4 MG/DL — SIGNIFICANT CHANGE UP (ref 0.2–1.2)
BILIRUB SERPL-MCNC: 0.5 MG/DL — SIGNIFICANT CHANGE UP (ref 0.2–1.2)
BILIRUB SERPL-MCNC: 2.4 MG/DL — HIGH (ref 0.2–1.2)
BILIRUB UR-MCNC: NEGATIVE — SIGNIFICANT CHANGE UP
BILIRUB UR-MCNC: NEGATIVE — SIGNIFICANT CHANGE UP
BILIRUBIN URINE: NEGATIVE
BLD GP AB SCN SERPL QL: NEGATIVE — SIGNIFICANT CHANGE UP
BLOOD GAS VENOUS - CREATININE: SIGNIFICANT CHANGE UP MG/DL (ref 0.5–1.3)
BLOOD URINE: ABNORMAL
BUN SERPL-MCNC: 100 MG/DL — HIGH (ref 7–23)
BUN SERPL-MCNC: 100 MG/DL — HIGH (ref 7–23)
BUN SERPL-MCNC: 102 MG/DL — HIGH (ref 7–23)
BUN SERPL-MCNC: 103 MG/DL — HIGH (ref 7–23)
BUN SERPL-MCNC: 106 MG/DL — HIGH (ref 7–23)
BUN SERPL-MCNC: 120 MG/DL — HIGH (ref 7–23)
BUN SERPL-MCNC: 121 MG/DL — HIGH (ref 7–23)
BUN SERPL-MCNC: 123 MG/DL — HIGH (ref 7–23)
BUN SERPL-MCNC: 132 MG/DL — HIGH (ref 7–23)
BUN SERPL-MCNC: 133 MG/DL — HIGH (ref 7–23)
BUN SERPL-MCNC: 140 MG/DL — HIGH (ref 7–23)
BUN SERPL-MCNC: 153 MG/DL — HIGH (ref 7–23)
BUN SERPL-MCNC: 161 MG/DL — HIGH (ref 7–23)
BUN SERPL-MCNC: 167 MG/DL — HIGH (ref 7–23)
BUN SERPL-MCNC: 34 MG/DL — HIGH (ref 7–23)
BUN SERPL-MCNC: 39 MG/DL — HIGH (ref 7–23)
BUN SERPL-MCNC: 40 MG/DL — HIGH (ref 7–23)
BUN SERPL-MCNC: 42 MG/DL
BUN SERPL-MCNC: 42 MG/DL — HIGH (ref 7–23)
BUN SERPL-MCNC: 43 MG/DL — HIGH (ref 7–23)
BUN SERPL-MCNC: 46 MG/DL
BUN SERPL-MCNC: 48 MG/DL — HIGH (ref 7–23)
BUN SERPL-MCNC: 53 MG/DL — HIGH (ref 7–23)
BUN SERPL-MCNC: 54 MG/DL — HIGH (ref 7–23)
BUN SERPL-MCNC: 58 MG/DL
BUN SERPL-MCNC: 58 MG/DL — HIGH (ref 7–23)
BUN SERPL-MCNC: 62 MG/DL — HIGH (ref 7–23)
BUN SERPL-MCNC: 63 MG/DL — HIGH (ref 7–23)
BUN SERPL-MCNC: 72 MG/DL — HIGH (ref 7–23)
BUN SERPL-MCNC: 75 MG/DL — HIGH (ref 7–23)
BUN SERPL-MCNC: 80 MG/DL — HIGH (ref 7–23)
BUN SERPL-MCNC: 80 MG/DL — HIGH (ref 7–23)
BUN SERPL-MCNC: 81 MG/DL — HIGH (ref 7–23)
BUN SERPL-MCNC: 83 MG/DL — HIGH (ref 7–23)
BUN SERPL-MCNC: 85 MG/DL — HIGH (ref 7–23)
BUN SERPL-MCNC: 90 MG/DL — HIGH (ref 7–23)
BUN SERPL-MCNC: 96 MG/DL — HIGH (ref 7–23)
BURR CELLS BLD QL SMEAR: PRESENT — SIGNIFICANT CHANGE UP
CA-I BLD-SCNC: 1.07 MMOL/L — LOW (ref 1.15–1.33)
CA-I BLD-SCNC: 1.1 MMOL/L — LOW (ref 1.15–1.33)
CA-I BLD-SCNC: 1.21 MMOL/L — SIGNIFICANT CHANGE UP (ref 1.15–1.33)
CA-I SERPL-SCNC: 1.06 MMOL/L — LOW (ref 1.15–1.33)
CA-I SERPL-SCNC: 1.09 MMOL/L — LOW (ref 1.15–1.33)
CA-I SERPL-SCNC: 1.11 MMOL/L — LOW (ref 1.15–1.33)
CA-I SERPL-SCNC: 1.15 MMOL/L — SIGNIFICANT CHANGE UP (ref 1.15–1.33)
CA-I SERPL-SCNC: 1.16 MMOL/L — SIGNIFICANT CHANGE UP (ref 1.15–1.33)
CA-I SERPL-SCNC: 1.17 MMOL/L — SIGNIFICANT CHANGE UP (ref 1.15–1.33)
CA-I SERPL-SCNC: 1.18 MMOL/L — SIGNIFICANT CHANGE UP (ref 1.15–1.33)
CA-I SERPL-SCNC: 1.2 MMOL/L — SIGNIFICANT CHANGE UP (ref 1.15–1.33)
CA-I SERPL-SCNC: 1.24 MMOL/L — SIGNIFICANT CHANGE UP (ref 1.15–1.33)
CA-I SERPL-SCNC: 1.25 MMOL/L — SIGNIFICANT CHANGE UP (ref 1.15–1.33)
CA-I SERPL-SCNC: 1.32 MMOL/L — SIGNIFICANT CHANGE UP (ref 1.15–1.33)
CALCIUM SERPL-MCNC: 10.4 MG/DL
CALCIUM SERPL-MCNC: 11.7 MG/DL — HIGH (ref 8.4–10.5)
CALCIUM SERPL-MCNC: 8 MG/DL — LOW (ref 8.4–10.5)
CALCIUM SERPL-MCNC: 8 MG/DL — LOW (ref 8.4–10.5)
CALCIUM SERPL-MCNC: 8.1 MG/DL — LOW (ref 8.4–10.5)
CALCIUM SERPL-MCNC: 8.2 MG/DL — LOW (ref 8.4–10.5)
CALCIUM SERPL-MCNC: 8.3 MG/DL — LOW (ref 8.4–10.5)
CALCIUM SERPL-MCNC: 8.4 MG/DL — SIGNIFICANT CHANGE UP (ref 8.4–10.5)
CALCIUM SERPL-MCNC: 8.5 MG/DL — SIGNIFICANT CHANGE UP (ref 8.4–10.5)
CALCIUM SERPL-MCNC: 8.6 MG/DL — SIGNIFICANT CHANGE UP (ref 8.4–10.5)
CALCIUM SERPL-MCNC: 8.7 MG/DL — SIGNIFICANT CHANGE UP (ref 8.4–10.5)
CALCIUM SERPL-MCNC: 8.8 MG/DL — SIGNIFICANT CHANGE UP (ref 8.4–10.5)
CALCIUM SERPL-MCNC: 8.9 MG/DL — SIGNIFICANT CHANGE UP (ref 8.4–10.5)
CALCIUM SERPL-MCNC: 9 MG/DL
CALCIUM SERPL-MCNC: 9 MG/DL — SIGNIFICANT CHANGE UP (ref 8.4–10.5)
CALCIUM SERPL-MCNC: 9.2 MG/DL — SIGNIFICANT CHANGE UP (ref 8.4–10.5)
CALCIUM SERPL-MCNC: 9.3 MG/DL — SIGNIFICANT CHANGE UP (ref 8.4–10.5)
CALCIUM SERPL-MCNC: 9.5 MG/DL — SIGNIFICANT CHANGE UP (ref 8.4–10.5)
CALCIUM SERPL-MCNC: 9.5 MG/DL — SIGNIFICANT CHANGE UP (ref 8.4–10.5)
CALCIUM SERPL-MCNC: 9.8 MG/DL
CALCIUM SERPL-MCNC: 9.8 MG/DL — SIGNIFICANT CHANGE UP (ref 8.4–10.5)
CALCIUM SERPL-MCNC: 9.9 MG/DL — SIGNIFICANT CHANGE UP (ref 8.4–10.5)
CD16+CD56+ CELLS NFR BLD: 22 % — SIGNIFICANT CHANGE UP (ref 7–27)
CD16+CD56+ CELLS NFR SPEC: 441 /UL — HIGH (ref 80–426)
CD19 BLASTS SPEC-ACNC: 10 % — SIGNIFICANT CHANGE UP (ref 4–18)
CD19 BLASTS SPEC-ACNC: 202 /UL — SIGNIFICANT CHANGE UP (ref 32–326)
CD3 BLASTS SPEC-ACNC: 1237 /UL — SIGNIFICANT CHANGE UP (ref 396–2024)
CD3 BLASTS SPEC-ACNC: 50 % — LOW (ref 58–84)
CD3 BLASTS SPEC-ACNC: 607 CELLS/UL — SIGNIFICANT CHANGE UP (ref 396–2024)
CD3 BLASTS SPEC-ACNC: 64 % — SIGNIFICANT CHANGE UP (ref 58–84)
CD3 CELLS # BLD: 1238 /UL
CD3 CELLS # BLD: 884 /UL
CD3 CELLS NFR BLD: 52 %
CD3 CELLS NFR BLD: 53 %
CD3+CD4+ CELLS # BLD: 383 /UL
CD3+CD4+ CELLS # BLD: 468 /UL
CD3+CD4+ CELLS NFR BLD: 20 %
CD3+CD4+ CELLS NFR BLD: 23 %
CD3+CD4+ CELLS/CD3+CD8+ CLL SPEC: 0.63 RATIO
CD3+CD4+ CELLS/CD3+CD8+ CLL SPEC: 0.77 RATIO
CD3+CD8+ CELLS # SPEC: 500 /UL
CD3+CD8+ CELLS # SPEC: 749 /UL
CD3+CD8+ CELLS NFR BLD: 30 %
CD3+CD8+ CELLS NFR BLD: 32 %
CD4 %: 18 % — LOW (ref 30–56)
CD4 %: 28 % — LOW (ref 30–56)
CD8 %: 31 % — SIGNIFICANT CHANGE UP (ref 11–43)
CD8 %: 37 % — SIGNIFICANT CHANGE UP (ref 11–43)
CHLORIDE BLDV-SCNC: 101 MMOL/L — SIGNIFICANT CHANGE UP (ref 96–108)
CHLORIDE BLDV-SCNC: 102 MMOL/L — SIGNIFICANT CHANGE UP (ref 96–108)
CHLORIDE BLDV-SCNC: 102 MMOL/L — SIGNIFICANT CHANGE UP (ref 96–108)
CHLORIDE BLDV-SCNC: 103 MMOL/L — SIGNIFICANT CHANGE UP (ref 96–108)
CHLORIDE BLDV-SCNC: 104 MMOL/L — SIGNIFICANT CHANGE UP (ref 96–108)
CHLORIDE BLDV-SCNC: 105 MMOL/L — SIGNIFICANT CHANGE UP (ref 96–108)
CHLORIDE BLDV-SCNC: 107 MMOL/L — SIGNIFICANT CHANGE UP (ref 96–108)
CHLORIDE BLDV-SCNC: 96 MMOL/L — SIGNIFICANT CHANGE UP (ref 96–108)
CHLORIDE BLDV-SCNC: 99 MMOL/L — SIGNIFICANT CHANGE UP (ref 96–108)
CHLORIDE SERPL-SCNC: 100 MMOL/L — SIGNIFICANT CHANGE UP (ref 96–108)
CHLORIDE SERPL-SCNC: 101 MMOL/L
CHLORIDE SERPL-SCNC: 101 MMOL/L — SIGNIFICANT CHANGE UP (ref 96–108)
CHLORIDE SERPL-SCNC: 102 MMOL/L
CHLORIDE SERPL-SCNC: 102 MMOL/L — SIGNIFICANT CHANGE UP (ref 96–108)
CHLORIDE SERPL-SCNC: 102 MMOL/L — SIGNIFICANT CHANGE UP (ref 96–108)
CHLORIDE SERPL-SCNC: 103 MMOL/L
CHLORIDE SERPL-SCNC: 103 MMOL/L — SIGNIFICANT CHANGE UP (ref 96–108)
CHLORIDE SERPL-SCNC: 104 MMOL/L — SIGNIFICANT CHANGE UP (ref 96–108)
CHLORIDE SERPL-SCNC: 105 MMOL/L — SIGNIFICANT CHANGE UP (ref 96–108)
CHLORIDE SERPL-SCNC: 106 MMOL/L — SIGNIFICANT CHANGE UP (ref 96–108)
CHLORIDE SERPL-SCNC: 107 MMOL/L — SIGNIFICANT CHANGE UP (ref 96–108)
CHLORIDE SERPL-SCNC: 107 MMOL/L — SIGNIFICANT CHANGE UP (ref 96–108)
CHLORIDE SERPL-SCNC: 109 MMOL/L — HIGH (ref 96–108)
CHLORIDE SERPL-SCNC: 109 MMOL/L — HIGH (ref 96–108)
CHLORIDE SERPL-SCNC: 110 MMOL/L — HIGH (ref 96–108)
CHLORIDE SERPL-SCNC: 92 MMOL/L — LOW (ref 96–108)
CHLORIDE SERPL-SCNC: 93 MMOL/L — LOW (ref 96–108)
CHLORIDE SERPL-SCNC: 95 MMOL/L — LOW (ref 96–108)
CHLORIDE SERPL-SCNC: 95 MMOL/L — LOW (ref 96–108)
CHLORIDE SERPL-SCNC: 97 MMOL/L — SIGNIFICANT CHANGE UP (ref 96–108)
CHLORIDE SERPL-SCNC: 98 MMOL/L — SIGNIFICANT CHANGE UP (ref 96–108)
CHLORIDE SERPL-SCNC: 98 MMOL/L — SIGNIFICANT CHANGE UP (ref 96–108)
CHLORIDE SERPL-SCNC: 99 MMOL/L — SIGNIFICANT CHANGE UP (ref 96–108)
CHOLEST SERPL-MCNC: 136 MG/DL
CK MB CFR SERPL CALC: 3.5 NG/ML — SIGNIFICANT CHANGE UP (ref 0–6.7)
CK MB CFR SERPL CALC: 3.5 NG/ML — SIGNIFICANT CHANGE UP (ref 0–6.7)
CK MB CFR SERPL CALC: 4.9 NG/ML — SIGNIFICANT CHANGE UP (ref 0–6.7)
CK MB CFR SERPL CALC: 6.8 NG/ML — HIGH (ref 0–6.7)
CK SERPL-CCNC: 47 U/L — SIGNIFICANT CHANGE UP (ref 30–200)
CK SERPL-CCNC: 68 U/L — SIGNIFICANT CHANGE UP (ref 30–200)
CK SERPL-CCNC: 73 U/L — SIGNIFICANT CHANGE UP (ref 30–200)
CO2 BLDV-SCNC: 19 MMOL/L — LOW (ref 22–26)
CO2 BLDV-SCNC: 20 MMOL/L — LOW (ref 22–26)
CO2 BLDV-SCNC: 21 MMOL/L — LOW (ref 22–26)
CO2 BLDV-SCNC: 23 MMOL/L — SIGNIFICANT CHANGE UP (ref 22–26)
CO2 BLDV-SCNC: 24 MMOL/L — SIGNIFICANT CHANGE UP (ref 22–26)
CO2 BLDV-SCNC: 26 MMOL/L — SIGNIFICANT CHANGE UP (ref 22–26)
CO2 BLDV-SCNC: 27 MMOL/L — HIGH (ref 22–26)
CO2 BLDV-SCNC: 28 MMOL/L — HIGH (ref 22–26)
CO2 BLDV-SCNC: 28 MMOL/L — HIGH (ref 22–26)
CO2 SERPL-SCNC: 16 MMOL/L — LOW (ref 22–31)
CO2 SERPL-SCNC: 17 MMOL/L — LOW (ref 22–31)
CO2 SERPL-SCNC: 18 MMOL/L — LOW (ref 22–31)
CO2 SERPL-SCNC: 19 MMOL/L — LOW (ref 22–31)
CO2 SERPL-SCNC: 20 MMOL/L — LOW (ref 22–31)
CO2 SERPL-SCNC: 21 MMOL/L — LOW (ref 22–31)
CO2 SERPL-SCNC: 22 MMOL/L
CO2 SERPL-SCNC: 22 MMOL/L — SIGNIFICANT CHANGE UP (ref 22–31)
CO2 SERPL-SCNC: 23 MMOL/L
CO2 SERPL-SCNC: 23 MMOL/L — SIGNIFICANT CHANGE UP (ref 22–31)
CO2 SERPL-SCNC: 24 MMOL/L
CO2 SERPL-SCNC: 24 MMOL/L — SIGNIFICANT CHANGE UP (ref 22–31)
CO2 SERPL-SCNC: 25 MMOL/L — SIGNIFICANT CHANGE UP (ref 22–31)
CO2 SERPL-SCNC: 31 MMOL/L — SIGNIFICANT CHANGE UP (ref 22–31)
CO2 SERPL-SCNC: 31 MMOL/L — SIGNIFICANT CHANGE UP (ref 22–31)
CO2 SERPL-SCNC: 32 MMOL/L — HIGH (ref 22–31)
CO2 SERPL-SCNC: 33 MMOL/L — HIGH (ref 22–31)
CO2 SERPL-SCNC: <10 MMOL/L — CRITICAL LOW (ref 22–31)
COLOR SPEC: ABNORMAL
COLOR SPEC: YELLOW — SIGNIFICANT CHANGE UP
COLOR: YELLOW
CREAT ?TM UR-MCNC: 82 MG/DL — SIGNIFICANT CHANGE UP
CREAT SERPL-MCNC: 2.42 MG/DL
CREAT SERPL-MCNC: 2.49 MG/DL
CREAT SERPL-MCNC: 2.65 MG/DL — HIGH (ref 0.5–1.3)
CREAT SERPL-MCNC: 2.66 MG/DL — HIGH (ref 0.5–1.3)
CREAT SERPL-MCNC: 2.74 MG/DL — HIGH (ref 0.5–1.3)
CREAT SERPL-MCNC: 2.78 MG/DL — HIGH (ref 0.5–1.3)
CREAT SERPL-MCNC: 2.79 MG/DL — HIGH (ref 0.5–1.3)
CREAT SERPL-MCNC: 2.79 MG/DL — HIGH (ref 0.5–1.3)
CREAT SERPL-MCNC: 2.84 MG/DL — HIGH (ref 0.5–1.3)
CREAT SERPL-MCNC: 2.85 MG/DL — HIGH (ref 0.5–1.3)
CREAT SERPL-MCNC: 3.01 MG/DL — HIGH (ref 0.5–1.3)
CREAT SERPL-MCNC: 3.18 MG/DL — HIGH (ref 0.5–1.3)
CREAT SERPL-MCNC: 3.28 MG/DL — HIGH (ref 0.5–1.3)
CREAT SERPL-MCNC: 3.41 MG/DL — HIGH (ref 0.5–1.3)
CREAT SERPL-MCNC: 3.46 MG/DL — HIGH (ref 0.5–1.3)
CREAT SERPL-MCNC: 3.49 MG/DL
CREAT SERPL-MCNC: 3.62 MG/DL — HIGH (ref 0.5–1.3)
CREAT SERPL-MCNC: 3.85 MG/DL — HIGH (ref 0.5–1.3)
CREAT SERPL-MCNC: 4.15 MG/DL — HIGH (ref 0.5–1.3)
CREAT SERPL-MCNC: 4.35 MG/DL — HIGH (ref 0.5–1.3)
CREAT SERPL-MCNC: 4.44 MG/DL — HIGH (ref 0.5–1.3)
CREAT SERPL-MCNC: 4.45 MG/DL — HIGH (ref 0.5–1.3)
CREAT SERPL-MCNC: 4.46 MG/DL — HIGH (ref 0.5–1.3)
CREAT SERPL-MCNC: 4.63 MG/DL — HIGH (ref 0.5–1.3)
CREAT SERPL-MCNC: 4.72 MG/DL — HIGH (ref 0.5–1.3)
CREAT SERPL-MCNC: 4.74 MG/DL — HIGH (ref 0.5–1.3)
CREAT SERPL-MCNC: 4.88 MG/DL — HIGH (ref 0.5–1.3)
CREAT SERPL-MCNC: 4.92 MG/DL — HIGH (ref 0.5–1.3)
CREAT SERPL-MCNC: 4.94 MG/DL — HIGH (ref 0.5–1.3)
CREAT SERPL-MCNC: 5.06 MG/DL — HIGH (ref 0.5–1.3)
CREAT SERPL-MCNC: 5.08 MG/DL — HIGH (ref 0.5–1.3)
CREAT SERPL-MCNC: 5.18 MG/DL — HIGH (ref 0.5–1.3)
CREAT SERPL-MCNC: 5.23 MG/DL — HIGH (ref 0.5–1.3)
CREAT SERPL-MCNC: 5.35 MG/DL — HIGH (ref 0.5–1.3)
CREAT SERPL-MCNC: 5.6 MG/DL — HIGH (ref 0.5–1.3)
CREAT SERPL-MCNC: 5.65 MG/DL — HIGH (ref 0.5–1.3)
CREAT SERPL-MCNC: 6.05 MG/DL — HIGH (ref 0.5–1.3)
CULTURE RESULTS: SIGNIFICANT CHANGE UP
CYSTATIN C SERPL-MCNC: 2.16 MG/L
CYSTATIN C SERPL-MCNC: 2.72 MG/L
DIFF PNL FLD: ABNORMAL
DIFF PNL FLD: ABNORMAL
EGFR: 10 ML/MIN/1.73M2 — LOW
EGFR: 11 ML/MIN/1.73M2 — LOW
EGFR: 12 ML/MIN/1.73M2 — LOW
EGFR: 13 ML/MIN/1.73M2 — LOW
EGFR: 14 ML/MIN/1.73M2 — LOW
EGFR: 15 ML/MIN/1.73M2 — LOW
EGFR: 16 ML/MIN/1.73M2 — LOW
EGFR: 17 ML/MIN/1.73M2
EGFR: 17 ML/MIN/1.73M2 — LOW
EGFR: 17 ML/MIN/1.73M2 — LOW
EGFR: 18 ML/MIN/1.73M2 — LOW
EGFR: 19 ML/MIN/1.73M2 — LOW
EGFR: 20 ML/MIN/1.73M2 — LOW
EGFR: 22 ML/MIN/1.73M2 — LOW
EGFR: 23 ML/MIN/1.73M2 — LOW
EGFR: 24 ML/MIN/1.73M2 — LOW
EGFR: 24 ML/MIN/1.73M2 — LOW
EGFR: 9 ML/MIN/1.73M2 — LOW
ELLIPTOCYTES BLD QL SMEAR: SLIGHT — SIGNIFICANT CHANGE UP
EOSINOPHIL # BLD AUTO: 0 K/UL — SIGNIFICANT CHANGE UP (ref 0–0.5)
EOSINOPHIL # BLD AUTO: 0.04 K/UL — SIGNIFICANT CHANGE UP (ref 0–0.5)
EOSINOPHIL # BLD AUTO: 0.1 K/UL
EOSINOPHIL # BLD AUTO: 0.21 K/UL
EOSINOPHIL # BLD AUTO: 0.21 K/UL — SIGNIFICANT CHANGE UP (ref 0–0.5)
EOSINOPHIL # BLD AUTO: 0.23 K/UL
EOSINOPHIL # BLD AUTO: 0.3 K/UL — SIGNIFICANT CHANGE UP (ref 0–0.5)
EOSINOPHIL NFR BLD AUTO: 0 % — SIGNIFICANT CHANGE UP (ref 0–6)
EOSINOPHIL NFR BLD AUTO: 0.1 % — SIGNIFICANT CHANGE UP (ref 0–6)
EOSINOPHIL NFR BLD AUTO: 0.9 %
EOSINOPHIL NFR BLD AUTO: 1.2 %
EOSINOPHIL NFR BLD AUTO: 1.5 % — SIGNIFICANT CHANGE UP (ref 0–6)
EOSINOPHIL NFR BLD AUTO: 2.2 %
EOSINOPHIL NFR BLD AUTO: 2.9 % — SIGNIFICANT CHANGE UP (ref 0–6)
EPI CELLS # UR: 0 /HPF — SIGNIFICANT CHANGE UP
EPI CELLS # UR: 1 /HPF — SIGNIFICANT CHANGE UP
ESTIMATED AVERAGE GLUCOSE: 120 MG/DL
ESTIMATED AVERAGE GLUCOSE: 123 MG/DL
FIBRINOGEN PPP-MCNC: 224 MG/DL — SIGNIFICANT CHANGE UP (ref 200–445)
GAMMA GLOB FLD ELPH-MCNC: 1.3 G/DL
GAMMA GLOB MFR SERPL ELPH: 17.2 %
GAS PNL BLDA: SIGNIFICANT CHANGE UP
GAS PNL BLDV: 133 MMOL/L — LOW (ref 136–145)
GAS PNL BLDV: 134 MMOL/L — LOW (ref 136–145)
GAS PNL BLDV: 135 MMOL/L — LOW (ref 136–145)
GAS PNL BLDV: 136 MMOL/L — SIGNIFICANT CHANGE UP (ref 136–145)
GAS PNL BLDV: 137 MMOL/L — SIGNIFICANT CHANGE UP (ref 136–145)
GAS PNL BLDV: 142 MMOL/L — SIGNIFICANT CHANGE UP (ref 136–145)
GAS PNL BLDV: SIGNIFICANT CHANGE UP
GFR/BSA.PRED SERPLBLD CYS-BASED-ARV: 19 ML/MIN/1.73M2
GFR/BSA.PRED SERPLBLD CYS-BASED-ARV: 26 ML/MIN/1.73M2
GI PCR PANEL: SIGNIFICANT CHANGE UP
GLUCOSE BLDC GLUCOMTR-MCNC: 165 MG/DL — HIGH (ref 70–99)
GLUCOSE BLDC GLUCOMTR-MCNC: 212 MG/DL — HIGH (ref 70–99)
GLUCOSE BLDC GLUCOMTR-MCNC: 216 MG/DL — HIGH (ref 70–99)
GLUCOSE BLDV-MCNC: 100 MG/DL — HIGH (ref 70–99)
GLUCOSE BLDV-MCNC: 113 MG/DL — HIGH (ref 70–99)
GLUCOSE BLDV-MCNC: 114 MG/DL — HIGH (ref 70–99)
GLUCOSE BLDV-MCNC: 135 MG/DL — HIGH (ref 70–99)
GLUCOSE BLDV-MCNC: 147 MG/DL — HIGH (ref 70–99)
GLUCOSE BLDV-MCNC: 158 MG/DL — HIGH (ref 70–99)
GLUCOSE BLDV-MCNC: 169 MG/DL — HIGH (ref 70–99)
GLUCOSE BLDV-MCNC: 217 MG/DL — HIGH (ref 70–99)
GLUCOSE BLDV-MCNC: 281 MG/DL — HIGH (ref 70–99)
GLUCOSE BLDV-MCNC: 327 MG/DL — HIGH (ref 70–99)
GLUCOSE BLDV-MCNC: 94 MG/DL — SIGNIFICANT CHANGE UP (ref 70–99)
GLUCOSE QUALITATIVE U: NEGATIVE
GLUCOSE SERPL-MCNC: 101 MG/DL — HIGH (ref 70–99)
GLUCOSE SERPL-MCNC: 109 MG/DL
GLUCOSE SERPL-MCNC: 112 MG/DL — HIGH (ref 70–99)
GLUCOSE SERPL-MCNC: 114 MG/DL — HIGH (ref 70–99)
GLUCOSE SERPL-MCNC: 116 MG/DL — HIGH (ref 70–99)
GLUCOSE SERPL-MCNC: 117 MG/DL — HIGH (ref 70–99)
GLUCOSE SERPL-MCNC: 118 MG/DL — HIGH (ref 70–99)
GLUCOSE SERPL-MCNC: 121 MG/DL — HIGH (ref 70–99)
GLUCOSE SERPL-MCNC: 124 MG/DL — HIGH (ref 70–99)
GLUCOSE SERPL-MCNC: 135 MG/DL — HIGH (ref 70–99)
GLUCOSE SERPL-MCNC: 137 MG/DL — HIGH (ref 70–99)
GLUCOSE SERPL-MCNC: 144 MG/DL — HIGH (ref 70–99)
GLUCOSE SERPL-MCNC: 156 MG/DL — HIGH (ref 70–99)
GLUCOSE SERPL-MCNC: 167 MG/DL — HIGH (ref 70–99)
GLUCOSE SERPL-MCNC: 169 MG/DL — HIGH (ref 70–99)
GLUCOSE SERPL-MCNC: 171 MG/DL — HIGH (ref 70–99)
GLUCOSE SERPL-MCNC: 177 MG/DL — HIGH (ref 70–99)
GLUCOSE SERPL-MCNC: 179 MG/DL — HIGH (ref 70–99)
GLUCOSE SERPL-MCNC: 186 MG/DL — HIGH (ref 70–99)
GLUCOSE SERPL-MCNC: 189 MG/DL — HIGH (ref 70–99)
GLUCOSE SERPL-MCNC: 211 MG/DL — HIGH (ref 70–99)
GLUCOSE SERPL-MCNC: 212 MG/DL — HIGH (ref 70–99)
GLUCOSE SERPL-MCNC: 213 MG/DL — HIGH (ref 70–99)
GLUCOSE SERPL-MCNC: 215 MG/DL — HIGH (ref 70–99)
GLUCOSE SERPL-MCNC: 226 MG/DL — HIGH (ref 70–99)
GLUCOSE SERPL-MCNC: 309 MG/DL — HIGH (ref 70–99)
GLUCOSE SERPL-MCNC: 340 MG/DL — HIGH (ref 70–99)
GLUCOSE SERPL-MCNC: 63 MG/DL — LOW (ref 70–99)
GLUCOSE SERPL-MCNC: 73 MG/DL
GLUCOSE SERPL-MCNC: 74 MG/DL — SIGNIFICANT CHANGE UP (ref 70–99)
GLUCOSE SERPL-MCNC: 83 MG/DL — SIGNIFICANT CHANGE UP (ref 70–99)
GLUCOSE SERPL-MCNC: 86 MG/DL — SIGNIFICANT CHANGE UP (ref 70–99)
GLUCOSE SERPL-MCNC: 89 MG/DL — SIGNIFICANT CHANGE UP (ref 70–99)
GLUCOSE SERPL-MCNC: 90 MG/DL — SIGNIFICANT CHANGE UP (ref 70–99)
GLUCOSE SERPL-MCNC: 92 MG/DL — SIGNIFICANT CHANGE UP (ref 70–99)
GLUCOSE SERPL-MCNC: 93 MG/DL — SIGNIFICANT CHANGE UP (ref 70–99)
GLUCOSE SERPL-MCNC: 96 MG/DL
GLUCOSE UR QL: NEGATIVE — SIGNIFICANT CHANGE UP
GLUCOSE UR QL: NEGATIVE — SIGNIFICANT CHANGE UP
GRAM STN FLD: SIGNIFICANT CHANGE UP
GRAN CASTS # UR COMP ASSIST: 1 /LPF — SIGNIFICANT CHANGE UP
HBA1C MFR BLD HPLC: 5.8 %
HBA1C MFR BLD HPLC: 5.9 %
HBV SURFACE AB SER-ACNC: <3 MIU/ML — LOW
HBV SURFACE AG SER-ACNC: SIGNIFICANT CHANGE UP
HCO3 BLDV-SCNC: 18 MMOL/L — LOW (ref 22–29)
HCO3 BLDV-SCNC: 19 MMOL/L — LOW (ref 22–29)
HCO3 BLDV-SCNC: 19 MMOL/L — LOW (ref 22–29)
HCO3 BLDV-SCNC: 20 MMOL/L — LOW (ref 22–29)
HCO3 BLDV-SCNC: 20 MMOL/L — LOW (ref 22–29)
HCO3 BLDV-SCNC: 22 MMOL/L — SIGNIFICANT CHANGE UP (ref 22–29)
HCO3 BLDV-SCNC: 23 MMOL/L — SIGNIFICANT CHANGE UP (ref 22–29)
HCO3 BLDV-SCNC: 24 MMOL/L — SIGNIFICANT CHANGE UP (ref 22–29)
HCO3 BLDV-SCNC: 25 MMOL/L — SIGNIFICANT CHANGE UP (ref 22–29)
HCO3 BLDV-SCNC: 26 MMOL/L — SIGNIFICANT CHANGE UP (ref 22–29)
HCO3 BLDV-SCNC: 26 MMOL/L — SIGNIFICANT CHANGE UP (ref 22–29)
HCT VFR BLD CALC: 24.2 % — LOW (ref 39–50)
HCT VFR BLD CALC: 25.7 % — LOW (ref 39–50)
HCT VFR BLD CALC: 26.4 % — LOW (ref 39–50)
HCT VFR BLD CALC: 27.2 % — LOW (ref 39–50)
HCT VFR BLD CALC: 27.2 % — LOW (ref 39–50)
HCT VFR BLD CALC: 27.5 % — LOW (ref 39–50)
HCT VFR BLD CALC: 27.6 % — LOW (ref 39–50)
HCT VFR BLD CALC: 28.2 % — LOW (ref 39–50)
HCT VFR BLD CALC: 28.4 % — LOW (ref 39–50)
HCT VFR BLD CALC: 28.8 % — LOW (ref 39–50)
HCT VFR BLD CALC: 29.4 % — LOW (ref 39–50)
HCT VFR BLD CALC: 29.7 % — LOW (ref 39–50)
HCT VFR BLD CALC: 29.8 % — LOW (ref 39–50)
HCT VFR BLD CALC: 29.9 % — LOW (ref 39–50)
HCT VFR BLD CALC: 30.2 % — LOW (ref 39–50)
HCT VFR BLD CALC: 30.5 % — LOW (ref 39–50)
HCT VFR BLD CALC: 31 % — LOW (ref 39–50)
HCT VFR BLD CALC: 31 % — LOW (ref 39–50)
HCT VFR BLD CALC: 31.1 % — LOW (ref 39–50)
HCT VFR BLD CALC: 31.2 %
HCT VFR BLD CALC: 31.5 % — LOW (ref 39–50)
HCT VFR BLD CALC: 31.6 % — LOW (ref 39–50)
HCT VFR BLD CALC: 31.7 % — LOW (ref 39–50)
HCT VFR BLD CALC: 32 % — LOW (ref 39–50)
HCT VFR BLD CALC: 32.2 % — LOW (ref 39–50)
HCT VFR BLD CALC: 32.3 % — LOW (ref 39–50)
HCT VFR BLD CALC: 32.4 % — LOW (ref 39–50)
HCT VFR BLD CALC: 36.9 %
HCT VFR BLD CALC: 36.9 %
HCT VFR BLDA CALC: 26 % — LOW (ref 39–51)
HCT VFR BLDA CALC: 27 % — LOW (ref 39–51)
HCT VFR BLDA CALC: 28 % — LOW (ref 39–51)
HCT VFR BLDA CALC: 28 % — LOW (ref 39–51)
HCT VFR BLDA CALC: 29 % — LOW (ref 39–51)
HCT VFR BLDA CALC: 31 % — LOW (ref 39–51)
HCT VFR BLDA CALC: 31 % — LOW (ref 39–51)
HCT VFR BLDA CALC: 32 % — LOW (ref 39–51)
HCT VFR BLDA CALC: 32 % — LOW (ref 39–51)
HCV AB S/CO SERPL IA: 0.12 S/CO — SIGNIFICANT CHANGE UP (ref 0–0.99)
HCV AB SERPL-IMP: SIGNIFICANT CHANGE UP
HDLC SERPL-MCNC: 42 MG/DL
HGB BLD CALC-MCNC: 10.2 G/DL — LOW (ref 12.6–17.4)
HGB BLD CALC-MCNC: 10.4 G/DL — LOW (ref 12.6–17.4)
HGB BLD CALC-MCNC: 10.6 G/DL — LOW (ref 12.6–17.4)
HGB BLD CALC-MCNC: 10.8 G/DL — LOW (ref 12.6–17.4)
HGB BLD CALC-MCNC: 8.6 G/DL — LOW (ref 12.6–17.4)
HGB BLD CALC-MCNC: 9.1 G/DL — LOW (ref 12.6–17.4)
HGB BLD CALC-MCNC: 9.2 G/DL — LOW (ref 12.6–17.4)
HGB BLD CALC-MCNC: 9.4 G/DL — LOW (ref 12.6–17.4)
HGB BLD CALC-MCNC: 9.5 G/DL — LOW (ref 12.6–17.4)
HGB BLD-MCNC: 10 G/DL — LOW (ref 13–17)
HGB BLD-MCNC: 10.1 G/DL — LOW (ref 13–17)
HGB BLD-MCNC: 10.2 G/DL
HGB BLD-MCNC: 10.2 G/DL — LOW (ref 13–17)
HGB BLD-MCNC: 10.4 G/DL — LOW (ref 13–17)
HGB BLD-MCNC: 10.5 G/DL — LOW (ref 13–17)
HGB BLD-MCNC: 10.6 G/DL — LOW (ref 13–17)
HGB BLD-MCNC: 10.7 G/DL — LOW (ref 13–17)
HGB BLD-MCNC: 10.7 G/DL — LOW (ref 13–17)
HGB BLD-MCNC: 11.6 G/DL
HGB BLD-MCNC: 12.1 G/DL
HGB BLD-MCNC: 8.3 G/DL — LOW (ref 13–17)
HGB BLD-MCNC: 8.3 G/DL — LOW (ref 13–17)
HGB BLD-MCNC: 8.4 G/DL — LOW (ref 13–17)
HGB BLD-MCNC: 8.7 G/DL — LOW (ref 13–17)
HGB BLD-MCNC: 8.8 G/DL — LOW (ref 13–17)
HGB BLD-MCNC: 9.1 G/DL — LOW (ref 13–17)
HGB BLD-MCNC: 9.3 G/DL — LOW (ref 13–17)
HGB BLD-MCNC: 9.4 G/DL — LOW (ref 13–17)
HGB BLD-MCNC: 9.6 G/DL — LOW (ref 13–17)
HGB BLD-MCNC: 9.7 G/DL — LOW (ref 13–17)
HGB BLD-MCNC: 9.7 G/DL — LOW (ref 13–17)
HGB BLD-MCNC: 9.8 G/DL — LOW (ref 13–17)
HGB BLD-MCNC: 9.8 G/DL — LOW (ref 13–17)
HIV-1 VIRAL LOAD RESULT: SIGNIFICANT CHANGE UP
HIV-1 VIRAL LOAD RESULT: SIGNIFICANT CHANGE UP
HIV1 RNA # SERPL NAA+PROBE: ABNORMAL
HIV1 RNA # SERPL NAA+PROBE: ABNORMAL COPIES/ML
HIV1 RNA # SERPL NAA+PROBE: NORMAL
HIV1 RNA # SERPL NAA+PROBE: NORMAL COPIES/ML
HIV1 RNA # SERPL NAA+PROBE: SIGNIFICANT CHANGE UP COPIES/ML
HIV1 RNA # SERPL NAA+PROBE: SIGNIFICANT CHANGE UP COPIES/ML
HIV1 RNA SER-IMP: SIGNIFICANT CHANGE UP
HIV1 RNA SER-IMP: SIGNIFICANT CHANGE UP
HIV1 RNA SERPL NAA+PROBE-ACNC: SIGNIFICANT CHANGE UP
HIV1 RNA SERPL NAA+PROBE-ACNC: SIGNIFICANT CHANGE UP
HIV1 RNA SERPL NAA+PROBE-LOG#: SIGNIFICANT CHANGE UP LG COP/ML
HIV1 RNA SERPL NAA+PROBE-LOG#: SIGNIFICANT CHANGE UP LG COP/ML
HOROWITZ INDEX BLDV+IHG-RTO: 100 — SIGNIFICANT CHANGE UP
HOROWITZ INDEX BLDV+IHG-RTO: 70 — SIGNIFICANT CHANGE UP
HOROWITZ INDEX BLDV+IHG-RTO: 90 — SIGNIFICANT CHANGE UP
HYALINE CASTS # UR AUTO: 1 /LPF — SIGNIFICANT CHANGE UP (ref 0–2)
HYALINE CASTS # UR AUTO: 2 /LPF — SIGNIFICANT CHANGE UP (ref 0–7)
HYALINE CASTS: 0 /LPF
IMM GRANULOCYTES NFR BLD AUTO: 0.5 %
IMM GRANULOCYTES NFR BLD AUTO: 0.8 %
IMM GRANULOCYTES NFR BLD AUTO: 1.1 %
IMM GRANULOCYTES NFR BLD AUTO: 11.1 % — HIGH (ref 0–0.9)
IMM GRANULOCYTES NFR BLD AUTO: 4.1 % — HIGH (ref 0–1.5)
IMM GRANULOCYTES NFR BLD AUTO: 4.5 % — HIGH (ref 0–1.5)
INR BLD: 1.02 RATIO — SIGNIFICANT CHANGE UP (ref 0.88–1.16)
INR BLD: 1.33 RATIO — HIGH (ref 0.88–1.16)
INR BLD: 1.49 RATIO — HIGH (ref 0.88–1.16)
INTERPRETATION SERPL IEP-IMP: NORMAL
KETONES UR-MCNC: NEGATIVE — SIGNIFICANT CHANGE UP
KETONES UR-MCNC: NEGATIVE — SIGNIFICANT CHANGE UP
KETONES URINE: NEGATIVE
LACTATE BLDV-MCNC: 1 MMOL/L — SIGNIFICANT CHANGE UP (ref 0.7–2)
LACTATE BLDV-MCNC: 1.3 MMOL/L — SIGNIFICANT CHANGE UP (ref 0.5–2)
LACTATE BLDV-MCNC: 1.3 MMOL/L — SIGNIFICANT CHANGE UP (ref 0.5–2)
LACTATE BLDV-MCNC: 1.4 MMOL/L — SIGNIFICANT CHANGE UP (ref 0.5–2)
LACTATE BLDV-MCNC: 1.5 MMOL/L — SIGNIFICANT CHANGE UP (ref 0.5–2)
LACTATE BLDV-MCNC: 1.7 MMOL/L — SIGNIFICANT CHANGE UP (ref 0.5–2)
LACTATE BLDV-MCNC: 1.9 MMOL/L — SIGNIFICANT CHANGE UP (ref 0.5–2)
LACTATE BLDV-MCNC: 11.6 MMOL/L — CRITICAL HIGH (ref 0.5–2)
LACTATE BLDV-MCNC: 2.5 MMOL/L — HIGH (ref 0.5–2)
LACTATE BLDV-MCNC: 3.5 MMOL/L — HIGH (ref 0.5–2)
LACTATE BLDV-MCNC: 3.6 MMOL/L — HIGH (ref 0.5–2)
LACTATE SERPL-SCNC: 1.3 MMOL/L — SIGNIFICANT CHANGE UP (ref 0.5–2)
LACTATE SERPL-SCNC: 1.7 MMOL/L — SIGNIFICANT CHANGE UP (ref 0.5–2)
LACTATE SERPL-SCNC: 2 MMOL/L — SIGNIFICANT CHANGE UP (ref 0.5–2)
LACTATE SERPL-SCNC: 2.3 MMOL/L — HIGH (ref 0.5–2)
LDLC SERPL CALC-MCNC: 69 MG/DL
LEUKOCYTE ESTERASE UR-ACNC: ABNORMAL
LEUKOCYTE ESTERASE UR-ACNC: ABNORMAL
LEUKOCYTE ESTERASE URINE: ABNORMAL
LYMPHOCYTES # BLD AUTO: 0.71 K/UL — LOW (ref 1–3.3)
LYMPHOCYTES # BLD AUTO: 1.1 K/UL — SIGNIFICANT CHANGE UP (ref 1–3.3)
LYMPHOCYTES # BLD AUTO: 1.36 K/UL — SIGNIFICANT CHANGE UP (ref 1–3.3)
LYMPHOCYTES # BLD AUTO: 1.45 K/UL — SIGNIFICANT CHANGE UP (ref 1–3.3)
LYMPHOCYTES # BLD AUTO: 1.6 % — LOW (ref 13–44)
LYMPHOCYTES # BLD AUTO: 10.3 % — LOW (ref 13–44)
LYMPHOCYTES # BLD AUTO: 12 % — LOW (ref 13–44)
LYMPHOCYTES # BLD AUTO: 16.6 % — SIGNIFICANT CHANGE UP (ref 13–44)
LYMPHOCYTES # BLD AUTO: 2 K/UL
LYMPHOCYTES # BLD AUTO: 2.11 K/UL — SIGNIFICANT CHANGE UP (ref 1–3.3)
LYMPHOCYTES # BLD AUTO: 2.38 K/UL — SIGNIFICANT CHANGE UP (ref 1–3.3)
LYMPHOCYTES # BLD AUTO: 2.4 K/UL — SIGNIFICANT CHANGE UP (ref 1–3.3)
LYMPHOCYTES # BLD AUTO: 2.47 K/UL
LYMPHOCYTES # BLD AUTO: 2.5 % — LOW (ref 13–44)
LYMPHOCYTES # BLD AUTO: 2.6 % — LOW (ref 13–44)
LYMPHOCYTES # BLD AUTO: 2.84 K/UL
LYMPHOCYTES # BLD AUTO: 20.1 % — SIGNIFICANT CHANGE UP (ref 13–44)
LYMPHOCYTES # BLD AUTO: 3.83 K/UL — HIGH (ref 1–3.3)
LYMPHOCYTES # BLD AUTO: 5 % — LOW (ref 13–44)
LYMPHOCYTES NFR BLD AUTO: 10.2 %
LYMPHOCYTES NFR BLD AUTO: 22.3 %
LYMPHOCYTES NFR BLD AUTO: 30.1 %
M PROTEIN MFR SERPL ELPH: NORMAL
MACROCYTES BLD QL: SIGNIFICANT CHANGE UP
MAGNESIUM SERPL-MCNC: 2.3 MG/DL — SIGNIFICANT CHANGE UP (ref 1.6–2.6)
MAGNESIUM SERPL-MCNC: 2.6 MG/DL — SIGNIFICANT CHANGE UP (ref 1.6–2.6)
MAGNESIUM SERPL-MCNC: 2.7 MG/DL — HIGH (ref 1.6–2.6)
MAGNESIUM SERPL-MCNC: 2.7 MG/DL — HIGH (ref 1.6–2.6)
MAGNESIUM SERPL-MCNC: 3 MG/DL — HIGH (ref 1.6–2.6)
MAGNESIUM SERPL-MCNC: 3.2 MG/DL — HIGH (ref 1.6–2.6)
MAGNESIUM SERPL-MCNC: 3.4 MG/DL — HIGH (ref 1.6–2.6)
MAGNESIUM SERPL-MCNC: 3.5 MG/DL — HIGH (ref 1.6–2.6)
MAGNESIUM SERPL-MCNC: 3.6 MG/DL — HIGH (ref 1.6–2.6)
MAGNESIUM SERPL-MCNC: 3.6 MG/DL — HIGH (ref 1.6–2.6)
MAGNESIUM SERPL-MCNC: 3.8 MG/DL — HIGH (ref 1.6–2.6)
MAGNESIUM SERPL-MCNC: 3.9 MG/DL — HIGH (ref 1.6–2.6)
MAGNESIUM SERPL-MCNC: 4 MG/DL — HIGH (ref 1.6–2.6)
MAGNESIUM SERPL-MCNC: 4.3 MG/DL — HIGH (ref 1.6–2.6)
MAGNESIUM SERPL-MCNC: 5 MG/DL — HIGH (ref 1.6–2.6)
MAGNESIUM SERPL-MCNC: 5.5 MG/DL — HIGH (ref 1.6–2.6)
MAN DIFF?: NORMAL
MANUAL SMEAR VERIFICATION: SIGNIFICANT CHANGE UP
MCHC RBC-ENTMCNC: 29.8 GM/DL — LOW (ref 32–36)
MCHC RBC-ENTMCNC: 30.5 GM/DL — LOW (ref 32–36)
MCHC RBC-ENTMCNC: 31.4 GM/DL
MCHC RBC-ENTMCNC: 31.4 GM/DL — LOW (ref 32–36)
MCHC RBC-ENTMCNC: 32.3 GM/DL — SIGNIFICANT CHANGE UP (ref 32–36)
MCHC RBC-ENTMCNC: 32.5 GM/DL — SIGNIFICANT CHANGE UP (ref 32–36)
MCHC RBC-ENTMCNC: 32.6 GM/DL — SIGNIFICANT CHANGE UP (ref 32–36)
MCHC RBC-ENTMCNC: 32.6 GM/DL — SIGNIFICANT CHANGE UP (ref 32–36)
MCHC RBC-ENTMCNC: 32.7 GM/DL
MCHC RBC-ENTMCNC: 32.7 GM/DL — SIGNIFICANT CHANGE UP (ref 32–36)
MCHC RBC-ENTMCNC: 32.8 GM/DL
MCHC RBC-ENTMCNC: 32.8 GM/DL — SIGNIFICANT CHANGE UP (ref 32–36)
MCHC RBC-ENTMCNC: 32.9 GM/DL — SIGNIFICANT CHANGE UP (ref 32–36)
MCHC RBC-ENTMCNC: 33 GM/DL — SIGNIFICANT CHANGE UP (ref 32–36)
MCHC RBC-ENTMCNC: 33.1 GM/DL — SIGNIFICANT CHANGE UP (ref 32–36)
MCHC RBC-ENTMCNC: 33.1 GM/DL — SIGNIFICANT CHANGE UP (ref 32–36)
MCHC RBC-ENTMCNC: 33.2 GM/DL — SIGNIFICANT CHANGE UP (ref 32–36)
MCHC RBC-ENTMCNC: 33.3 GM/DL — SIGNIFICANT CHANGE UP (ref 32–36)
MCHC RBC-ENTMCNC: 33.4 GM/DL — SIGNIFICANT CHANGE UP (ref 32–36)
MCHC RBC-ENTMCNC: 33.5 GM/DL — SIGNIFICANT CHANGE UP (ref 32–36)
MCHC RBC-ENTMCNC: 33.7 GM/DL — SIGNIFICANT CHANGE UP (ref 32–36)
MCHC RBC-ENTMCNC: 33.8 GM/DL — SIGNIFICANT CHANGE UP (ref 32–36)
MCHC RBC-ENTMCNC: 34.2 GM/DL — SIGNIFICANT CHANGE UP (ref 32–36)
MCHC RBC-ENTMCNC: 34.3 GM/DL — SIGNIFICANT CHANGE UP (ref 32–36)
MCHC RBC-ENTMCNC: 34.4 PG — HIGH (ref 27–34)
MCHC RBC-ENTMCNC: 34.5 PG — HIGH (ref 27–34)
MCHC RBC-ENTMCNC: 34.8 PG — HIGH (ref 27–34)
MCHC RBC-ENTMCNC: 35 PG — HIGH (ref 27–34)
MCHC RBC-ENTMCNC: 35.1 PG — HIGH (ref 27–34)
MCHC RBC-ENTMCNC: 35.2 PG — HIGH (ref 27–34)
MCHC RBC-ENTMCNC: 35.2 PG — HIGH (ref 27–34)
MCHC RBC-ENTMCNC: 35.5 PG — HIGH (ref 27–34)
MCHC RBC-ENTMCNC: 35.8 PG — HIGH (ref 27–34)
MCHC RBC-ENTMCNC: 35.9 PG
MCHC RBC-ENTMCNC: 36 PG — HIGH (ref 27–34)
MCHC RBC-ENTMCNC: 36.1 PG — HIGH (ref 27–34)
MCHC RBC-ENTMCNC: 36.1 PG — HIGH (ref 27–34)
MCHC RBC-ENTMCNC: 36.2 PG — HIGH (ref 27–34)
MCHC RBC-ENTMCNC: 36.2 PG — HIGH (ref 27–34)
MCHC RBC-ENTMCNC: 36.3 PG
MCHC RBC-ENTMCNC: 36.3 PG — HIGH (ref 27–34)
MCHC RBC-ENTMCNC: 36.3 PG — HIGH (ref 27–34)
MCHC RBC-ENTMCNC: 36.4 PG — HIGH (ref 27–34)
MCHC RBC-ENTMCNC: 36.5 PG — HIGH (ref 27–34)
MCHC RBC-ENTMCNC: 36.7 PG
MCHC RBC-ENTMCNC: 36.7 PG — HIGH (ref 27–34)
MCHC RBC-ENTMCNC: 36.9 PG — HIGH (ref 27–34)
MCHC RBC-ENTMCNC: 37.2 PG — HIGH (ref 27–34)
MCV RBC AUTO: 104.2 FL — HIGH (ref 80–100)
MCV RBC AUTO: 104.3 FL — HIGH (ref 80–100)
MCV RBC AUTO: 105.3 FL — HIGH (ref 80–100)
MCV RBC AUTO: 105.7 FL — HIGH (ref 80–100)
MCV RBC AUTO: 105.9 FL — HIGH (ref 80–100)
MCV RBC AUTO: 106.4 FL — HIGH (ref 80–100)
MCV RBC AUTO: 106.8 FL — HIGH (ref 80–100)
MCV RBC AUTO: 107.1 FL — HIGH (ref 80–100)
MCV RBC AUTO: 107.2 FL — HIGH (ref 80–100)
MCV RBC AUTO: 107.3 FL — HIGH (ref 80–100)
MCV RBC AUTO: 108.2 FL — HIGH (ref 80–100)
MCV RBC AUTO: 108.2 FL — HIGH (ref 80–100)
MCV RBC AUTO: 108.4 FL — HIGH (ref 80–100)
MCV RBC AUTO: 108.4 FL — HIGH (ref 80–100)
MCV RBC AUTO: 108.5 FL — HIGH (ref 80–100)
MCV RBC AUTO: 109.1 FL — HIGH (ref 80–100)
MCV RBC AUTO: 109.1 FL — HIGH (ref 80–100)
MCV RBC AUTO: 109.2 FL — HIGH (ref 80–100)
MCV RBC AUTO: 109.3 FL — HIGH (ref 80–100)
MCV RBC AUTO: 109.9 FL
MCV RBC AUTO: 110.7 FL — HIGH (ref 80–100)
MCV RBC AUTO: 111.3 FL — HIGH (ref 80–100)
MCV RBC AUTO: 111.4 FL — HIGH (ref 80–100)
MCV RBC AUTO: 111.6 FL — HIGH (ref 80–100)
MCV RBC AUTO: 111.7 FL — HIGH (ref 80–100)
MCV RBC AUTO: 111.8 FL
MCV RBC AUTO: 115.3 FL
MCV RBC AUTO: 119.3 FL — HIGH (ref 80–100)
MCV RBC AUTO: 124.8 FL — HIGH (ref 80–100)
METAMYELOCYTES # FLD: 1 % — HIGH (ref 0–0)
METAMYELOCYTES # FLD: 2 % — HIGH (ref 0–0)
METAMYELOCYTES # FLD: 3.3 % — HIGH (ref 0–0)
METAMYELOCYTES # FLD: 4.8 % — HIGH (ref 0–0)
METHOD TYPE: SIGNIFICANT CHANGE UP
MICROSCOPIC-UA: NORMAL
MONOCLON BAND OBS SERPL: NORMAL
MONOCYTES # BLD AUTO: 0.75 K/UL
MONOCYTES # BLD AUTO: 0.81 K/UL
MONOCYTES # BLD AUTO: 0.95 K/UL — HIGH (ref 0–0.9)
MONOCYTES # BLD AUTO: 1.05 K/UL — HIGH (ref 0–0.9)
MONOCYTES # BLD AUTO: 1.21 K/UL — HIGH (ref 0–0.9)
MONOCYTES # BLD AUTO: 1.27 K/UL — HIGH (ref 0–0.9)
MONOCYTES # BLD AUTO: 1.66 K/UL — HIGH (ref 0–0.9)
MONOCYTES # BLD AUTO: 2.11 K/UL
MONOCYTES # BLD AUTO: 2.28 K/UL — HIGH (ref 0–0.9)
MONOCYTES # BLD AUTO: 2.88 K/UL — HIGH (ref 0–0.9)
MONOCYTES # BLD AUTO: 3.86 K/UL — HIGH (ref 0–0.9)
MONOCYTES NFR BLD AUTO: 10 % — SIGNIFICANT CHANGE UP (ref 2–14)
MONOCYTES NFR BLD AUTO: 10 % — SIGNIFICANT CHANGE UP (ref 2–14)
MONOCYTES NFR BLD AUTO: 10.8 %
MONOCYTES NFR BLD AUTO: 2.5 % — SIGNIFICANT CHANGE UP (ref 2–14)
MONOCYTES NFR BLD AUTO: 4.2 % — SIGNIFICANT CHANGE UP (ref 2–14)
MONOCYTES NFR BLD AUTO: 5.6 % — SIGNIFICANT CHANGE UP (ref 2–14)
MONOCYTES NFR BLD AUTO: 6 % — SIGNIFICANT CHANGE UP (ref 2–14)
MONOCYTES NFR BLD AUTO: 6.1 % — SIGNIFICANT CHANGE UP (ref 2–14)
MONOCYTES NFR BLD AUTO: 7.3 %
MONOCYTES NFR BLD AUTO: 8 %
MONOCYTES NFR BLD AUTO: 8.9 % — SIGNIFICANT CHANGE UP (ref 2–14)
MRSA PCR RESULT.: SIGNIFICANT CHANGE UP
MYELOCYTES NFR BLD: 2 % — HIGH (ref 0–0)
MYELOCYTES NFR BLD: 2.5 % — HIGH (ref 0–0)
MYELOCYTES NFR BLD: 4 % — HIGH (ref 0–0)
MYELOCYTES NFR BLD: 6 % — HIGH (ref 0–0)
NEUTROPHILS # BLD AUTO: 14.92 K/UL
NEUTROPHILS # BLD AUTO: 24.86 K/UL — HIGH (ref 1.8–7.4)
NEUTROPHILS # BLD AUTO: 28.28 K/UL — HIGH (ref 1.8–7.4)
NEUTROPHILS # BLD AUTO: 41.3 K/UL — HIGH (ref 1.8–7.4)
NEUTROPHILS # BLD AUTO: 47.45 K/UL — HIGH (ref 1.8–7.4)
NEUTROPHILS # BLD AUTO: 5.52 K/UL
NEUTROPHILS # BLD AUTO: 57.95 K/UL — HIGH (ref 1.8–7.4)
NEUTROPHILS # BLD AUTO: 6.53 K/UL — SIGNIFICANT CHANGE UP (ref 1.8–7.4)
NEUTROPHILS # BLD AUTO: 7.53 K/UL
NEUTROPHILS # BLD AUTO: 9.3 K/UL — HIGH (ref 1.8–7.4)
NEUTROPHILS # BLD AUTO: 9.78 K/UL — HIGH (ref 1.8–7.4)
NEUTROPHILS NFR BLD AUTO: 58.7 %
NEUTROPHILS NFR BLD AUTO: 62 % — SIGNIFICANT CHANGE UP (ref 43–77)
NEUTROPHILS NFR BLD AUTO: 68.2 %
NEUTROPHILS NFR BLD AUTO: 68.3 % — SIGNIFICANT CHANGE UP (ref 43–77)
NEUTROPHILS NFR BLD AUTO: 75.8 % — SIGNIFICANT CHANGE UP (ref 43–77)
NEUTROPHILS NFR BLD AUTO: 76.2 %
NEUTROPHILS NFR BLD AUTO: 77 % — SIGNIFICANT CHANGE UP (ref 43–77)
NEUTROPHILS NFR BLD AUTO: 82.4 % — HIGH (ref 43–77)
NEUTROPHILS NFR BLD AUTO: 85 % — HIGH (ref 43–77)
NEUTROPHILS NFR BLD AUTO: 87.5 % — HIGH (ref 43–77)
NEUTROPHILS NFR BLD AUTO: 91.3 % — HIGH (ref 43–77)
NEUTS BAND # BLD: 1 % — SIGNIFICANT CHANGE UP (ref 0–8)
NEUTS BAND # BLD: 1.6 % — SIGNIFICANT CHANGE UP (ref 0–8)
NEUTS BAND # BLD: 4 % — SIGNIFICANT CHANGE UP (ref 0–8)
NITRITE UR-MCNC: NEGATIVE — SIGNIFICANT CHANGE UP
NITRITE UR-MCNC: NEGATIVE — SIGNIFICANT CHANGE UP
NITRITE URINE: NEGATIVE
NONHDLC SERPL-MCNC: 94 MG/DL
NRBC # BLD: 0 /100 WBCS — SIGNIFICANT CHANGE UP (ref 0–0)
NRBC # BLD: 0 /100 — SIGNIFICANT CHANGE UP (ref 0–0)
NRBC # BLD: 1 /100 — HIGH (ref 0–0)
NRBC # BLD: 11 /100 WBCS — HIGH (ref 0–0)
NRBC # BLD: 3 /100 WBCS — HIGH (ref 0–0)
NRBC # BLD: 3 /100 WBCS — HIGH (ref 0–0)
NRBC # BLD: 3 /100 — HIGH (ref 0–0)
NRBC # BLD: 7 /100 WBCS — HIGH (ref 0–0)
NRBC # BLD: 7 /100 WBCS — HIGH (ref 0–0)
NT-PROBNP SERPL-SCNC: HIGH PG/ML (ref 0–300)
NT-PROBNP SERPL-SCNC: HIGH PG/ML (ref 0–300)
ORGANISM # SPEC MICROSCOPIC CNT: SIGNIFICANT CHANGE UP
OVALOCYTES BLD QL SMEAR: SLIGHT — SIGNIFICANT CHANGE UP
OVALOCYTES BLD QL SMEAR: SLIGHT — SIGNIFICANT CHANGE UP
PCO2 BLDV: 102 MMHG — HIGH (ref 42–55)
PCO2 BLDV: 35 MMHG — LOW (ref 42–55)
PCO2 BLDV: 37 MMHG — LOW (ref 42–55)
PCO2 BLDV: 43 MMHG — SIGNIFICANT CHANGE UP (ref 42–55)
PCO2 BLDV: 46 MMHG — SIGNIFICANT CHANGE UP (ref 42–55)
PCO2 BLDV: 48 MMHG — SIGNIFICANT CHANGE UP (ref 42–55)
PCO2 BLDV: 50 MMHG — SIGNIFICANT CHANGE UP (ref 42–55)
PCO2 BLDV: 50 MMHG — SIGNIFICANT CHANGE UP (ref 42–55)
PCO2 BLDV: 59 MMHG — HIGH (ref 42–55)
PCO2 BLDV: 61 MMHG — HIGH (ref 42–55)
PCO2 BLDV: 88 MMHG — HIGH (ref 42–55)
PH BLDV: 7 — CRITICAL LOW (ref 7.32–7.43)
PH BLDV: 7.07 — CRITICAL LOW (ref 7.32–7.43)
PH BLDV: 7.16 — CRITICAL LOW (ref 7.32–7.43)
PH BLDV: 7.17 — CRITICAL LOW (ref 7.32–7.43)
PH BLDV: 7.2 — LOW (ref 7.32–7.43)
PH BLDV: 7.2 — LOW (ref 7.32–7.43)
PH BLDV: 7.26 — LOW (ref 7.32–7.43)
PH BLDV: 7.3 — LOW (ref 7.32–7.43)
PH BLDV: 7.34 — SIGNIFICANT CHANGE UP (ref 7.32–7.43)
PH UR: 6 — SIGNIFICANT CHANGE UP (ref 5–8)
PH UR: 7 — SIGNIFICANT CHANGE UP (ref 5–8)
PH URINE: 7
PHOSPHATE SERPL-MCNC: 12.1 MG/DL — HIGH (ref 2.5–4.5)
PHOSPHATE SERPL-MCNC: 12.4 MG/DL — HIGH (ref 2.5–4.5)
PHOSPHATE SERPL-MCNC: 13.5 MG/DL — HIGH (ref 2.5–4.5)
PHOSPHATE SERPL-MCNC: 16.8 MG/DL — HIGH (ref 2.5–4.5)
PHOSPHATE SERPL-MCNC: 22.3 MG/DL — HIGH (ref 2.5–4.5)
PHOSPHATE SERPL-MCNC: 3.7 MG/DL — SIGNIFICANT CHANGE UP (ref 2.5–4.5)
PHOSPHATE SERPL-MCNC: 4.6 MG/DL — HIGH (ref 2.5–4.5)
PHOSPHATE SERPL-MCNC: 7.8 MG/DL — HIGH (ref 2.5–4.5)
PHOSPHATE SERPL-MCNC: 8.4 MG/DL — HIGH (ref 2.5–4.5)
PHOSPHATE SERPL-MCNC: 8.4 MG/DL — HIGH (ref 2.5–4.5)
PHOSPHATE SERPL-MCNC: 8.6 MG/DL — HIGH (ref 2.5–4.5)
PHOSPHATE SERPL-MCNC: 8.8 MG/DL — HIGH (ref 2.5–4.5)
PHOSPHATE SERPL-MCNC: 9.1 MG/DL — HIGH (ref 2.5–4.5)
PHOSPHATE SERPL-MCNC: 9.5 MG/DL — HIGH (ref 2.5–4.5)
PHOSPHATE SERPL-MCNC: 9.6 MG/DL — HIGH (ref 2.5–4.5)
PHOSPHATE SERPL-MCNC: 9.9 MG/DL — HIGH (ref 2.5–4.5)
PLAT MORPH BLD: NORMAL — SIGNIFICANT CHANGE UP
PLATELET # BLD AUTO: 102 K/UL — LOW (ref 150–400)
PLATELET # BLD AUTO: 103 K/UL — LOW (ref 150–400)
PLATELET # BLD AUTO: 128 K/UL — LOW (ref 150–400)
PLATELET # BLD AUTO: 134 K/UL — LOW (ref 150–400)
PLATELET # BLD AUTO: 140 K/UL — LOW (ref 150–400)
PLATELET # BLD AUTO: 140 K/UL — LOW (ref 150–400)
PLATELET # BLD AUTO: 153 K/UL
PLATELET # BLD AUTO: 153 K/UL — SIGNIFICANT CHANGE UP (ref 150–400)
PLATELET # BLD AUTO: 160 K/UL — SIGNIFICANT CHANGE UP (ref 150–400)
PLATELET # BLD AUTO: 165 K/UL — SIGNIFICANT CHANGE UP (ref 150–400)
PLATELET # BLD AUTO: 167 K/UL — SIGNIFICANT CHANGE UP (ref 150–400)
PLATELET # BLD AUTO: 170 K/UL
PLATELET # BLD AUTO: 173 K/UL — SIGNIFICANT CHANGE UP (ref 150–400)
PLATELET # BLD AUTO: 176 K/UL
PLATELET # BLD AUTO: 181 K/UL — SIGNIFICANT CHANGE UP (ref 150–400)
PLATELET # BLD AUTO: 200 K/UL — SIGNIFICANT CHANGE UP (ref 150–400)
PLATELET # BLD AUTO: 207 K/UL — SIGNIFICANT CHANGE UP (ref 150–400)
PLATELET # BLD AUTO: 243 K/UL — SIGNIFICANT CHANGE UP (ref 150–400)
PLATELET # BLD AUTO: 245 K/UL — SIGNIFICANT CHANGE UP (ref 150–400)
PLATELET # BLD AUTO: 270 K/UL — SIGNIFICANT CHANGE UP (ref 150–400)
PLATELET # BLD AUTO: 39 K/UL — LOW (ref 150–400)
PLATELET # BLD AUTO: 44 K/UL — LOW (ref 150–400)
PLATELET # BLD AUTO: 64 K/UL — LOW (ref 150–400)
PLATELET # BLD AUTO: 64 K/UL — LOW (ref 150–400)
PLATELET # BLD AUTO: 67 K/UL — LOW (ref 150–400)
PLATELET # BLD AUTO: 77 K/UL — LOW (ref 150–400)
PLATELET # BLD AUTO: 78 K/UL — LOW (ref 150–400)
PLATELET # BLD AUTO: 80 K/UL — LOW (ref 150–400)
PLATELET # BLD AUTO: 82 K/UL — LOW (ref 150–400)
PLATELET # BLD AUTO: 92 K/UL — LOW (ref 150–400)
PLATELET # BLD AUTO: 98 K/UL — LOW (ref 150–400)
PO2 BLDV: 19 MMHG — LOW (ref 25–45)
PO2 BLDV: 20 MMHG — LOW (ref 25–45)
PO2 BLDV: 24 MMHG — LOW (ref 25–45)
PO2 BLDV: 26 MMHG — SIGNIFICANT CHANGE UP (ref 25–45)
PO2 BLDV: 36 MMHG — SIGNIFICANT CHANGE UP (ref 25–45)
PO2 BLDV: 37 MMHG — SIGNIFICANT CHANGE UP (ref 25–45)
PO2 BLDV: 42 MMHG — SIGNIFICANT CHANGE UP (ref 25–45)
PO2 BLDV: 45 MMHG — SIGNIFICANT CHANGE UP (ref 25–45)
PO2 BLDV: 45 MMHG — SIGNIFICANT CHANGE UP (ref 25–45)
PO2 BLDV: 52 MMHG — HIGH (ref 25–45)
PO2 BLDV: 55 MMHG — HIGH (ref 25–45)
POIKILOCYTOSIS BLD QL AUTO: SIGNIFICANT CHANGE UP
POIKILOCYTOSIS BLD QL AUTO: SLIGHT — SIGNIFICANT CHANGE UP
POLYCHROMASIA BLD QL SMEAR: SLIGHT — SIGNIFICANT CHANGE UP
POLYCHROMASIA BLD QL SMEAR: SLIGHT — SIGNIFICANT CHANGE UP
POTASSIUM BLDV-SCNC: 4.1 MMOL/L — SIGNIFICANT CHANGE UP (ref 3.5–5.1)
POTASSIUM BLDV-SCNC: 4.5 MMOL/L — SIGNIFICANT CHANGE UP (ref 3.5–5.1)
POTASSIUM BLDV-SCNC: 4.6 MMOL/L — SIGNIFICANT CHANGE UP (ref 3.5–5.1)
POTASSIUM BLDV-SCNC: 5.2 MMOL/L — HIGH (ref 3.5–5.1)
POTASSIUM BLDV-SCNC: 5.2 MMOL/L — HIGH (ref 3.5–5.1)
POTASSIUM BLDV-SCNC: 5.3 MMOL/L — HIGH (ref 3.5–5.1)
POTASSIUM BLDV-SCNC: 5.6 MMOL/L — HIGH (ref 3.5–5.1)
POTASSIUM BLDV-SCNC: 5.6 MMOL/L — HIGH (ref 3.5–5.1)
POTASSIUM BLDV-SCNC: 5.7 MMOL/L — HIGH (ref 3.5–5.1)
POTASSIUM BLDV-SCNC: 6.1 MMOL/L — HIGH (ref 3.5–5.1)
POTASSIUM BLDV-SCNC: 6.3 MMOL/L — CRITICAL HIGH (ref 3.5–5.1)
POTASSIUM SERPL-MCNC: 3.3 MMOL/L — LOW (ref 3.5–5.3)
POTASSIUM SERPL-MCNC: 3.4 MMOL/L — LOW (ref 3.5–5.3)
POTASSIUM SERPL-MCNC: 3.4 MMOL/L — LOW (ref 3.5–5.3)
POTASSIUM SERPL-MCNC: 4 MMOL/L — SIGNIFICANT CHANGE UP (ref 3.5–5.3)
POTASSIUM SERPL-MCNC: 4 MMOL/L — SIGNIFICANT CHANGE UP (ref 3.5–5.3)
POTASSIUM SERPL-MCNC: 4.1 MMOL/L — SIGNIFICANT CHANGE UP (ref 3.5–5.3)
POTASSIUM SERPL-MCNC: 4.2 MMOL/L — SIGNIFICANT CHANGE UP (ref 3.5–5.3)
POTASSIUM SERPL-MCNC: 4.2 MMOL/L — SIGNIFICANT CHANGE UP (ref 3.5–5.3)
POTASSIUM SERPL-MCNC: 4.4 MMOL/L — SIGNIFICANT CHANGE UP (ref 3.5–5.3)
POTASSIUM SERPL-MCNC: 4.4 MMOL/L — SIGNIFICANT CHANGE UP (ref 3.5–5.3)
POTASSIUM SERPL-MCNC: 4.5 MMOL/L — SIGNIFICANT CHANGE UP (ref 3.5–5.3)
POTASSIUM SERPL-MCNC: 4.7 MMOL/L — SIGNIFICANT CHANGE UP (ref 3.5–5.3)
POTASSIUM SERPL-MCNC: 4.7 MMOL/L — SIGNIFICANT CHANGE UP (ref 3.5–5.3)
POTASSIUM SERPL-MCNC: 4.8 MMOL/L — SIGNIFICANT CHANGE UP (ref 3.5–5.3)
POTASSIUM SERPL-MCNC: 4.9 MMOL/L — SIGNIFICANT CHANGE UP (ref 3.5–5.3)
POTASSIUM SERPL-MCNC: 5 MMOL/L — SIGNIFICANT CHANGE UP (ref 3.5–5.3)
POTASSIUM SERPL-MCNC: 5 MMOL/L — SIGNIFICANT CHANGE UP (ref 3.5–5.3)
POTASSIUM SERPL-MCNC: 5.1 MMOL/L — SIGNIFICANT CHANGE UP (ref 3.5–5.3)
POTASSIUM SERPL-MCNC: 5.2 MMOL/L — SIGNIFICANT CHANGE UP (ref 3.5–5.3)
POTASSIUM SERPL-MCNC: 5.5 MMOL/L — HIGH (ref 3.5–5.3)
POTASSIUM SERPL-MCNC: 5.5 MMOL/L — HIGH (ref 3.5–5.3)
POTASSIUM SERPL-MCNC: 5.6 MMOL/L — HIGH (ref 3.5–5.3)
POTASSIUM SERPL-MCNC: 5.8 MMOL/L — HIGH (ref 3.5–5.3)
POTASSIUM SERPL-MCNC: 6 MMOL/L — HIGH (ref 3.5–5.3)
POTASSIUM SERPL-MCNC: 6.3 MMOL/L — CRITICAL HIGH (ref 3.5–5.3)
POTASSIUM SERPL-MCNC: 7.3 MMOL/L — CRITICAL HIGH (ref 3.5–5.3)
POTASSIUM SERPL-SCNC: 3.3 MMOL/L — LOW (ref 3.5–5.3)
POTASSIUM SERPL-SCNC: 3.4 MMOL/L — LOW (ref 3.5–5.3)
POTASSIUM SERPL-SCNC: 3.4 MMOL/L — LOW (ref 3.5–5.3)
POTASSIUM SERPL-SCNC: 4 MMOL/L — SIGNIFICANT CHANGE UP (ref 3.5–5.3)
POTASSIUM SERPL-SCNC: 4 MMOL/L — SIGNIFICANT CHANGE UP (ref 3.5–5.3)
POTASSIUM SERPL-SCNC: 4.1 MMOL/L — SIGNIFICANT CHANGE UP (ref 3.5–5.3)
POTASSIUM SERPL-SCNC: 4.2 MMOL/L
POTASSIUM SERPL-SCNC: 4.2 MMOL/L — SIGNIFICANT CHANGE UP (ref 3.5–5.3)
POTASSIUM SERPL-SCNC: 4.2 MMOL/L — SIGNIFICANT CHANGE UP (ref 3.5–5.3)
POTASSIUM SERPL-SCNC: 4.4 MMOL/L — SIGNIFICANT CHANGE UP (ref 3.5–5.3)
POTASSIUM SERPL-SCNC: 4.4 MMOL/L — SIGNIFICANT CHANGE UP (ref 3.5–5.3)
POTASSIUM SERPL-SCNC: 4.5 MMOL/L — SIGNIFICANT CHANGE UP (ref 3.5–5.3)
POTASSIUM SERPL-SCNC: 4.6 MMOL/L
POTASSIUM SERPL-SCNC: 4.7 MMOL/L — SIGNIFICANT CHANGE UP (ref 3.5–5.3)
POTASSIUM SERPL-SCNC: 4.7 MMOL/L — SIGNIFICANT CHANGE UP (ref 3.5–5.3)
POTASSIUM SERPL-SCNC: 4.8 MMOL/L
POTASSIUM SERPL-SCNC: 4.8 MMOL/L — SIGNIFICANT CHANGE UP (ref 3.5–5.3)
POTASSIUM SERPL-SCNC: 4.9 MMOL/L — SIGNIFICANT CHANGE UP (ref 3.5–5.3)
POTASSIUM SERPL-SCNC: 5 MMOL/L — SIGNIFICANT CHANGE UP (ref 3.5–5.3)
POTASSIUM SERPL-SCNC: 5 MMOL/L — SIGNIFICANT CHANGE UP (ref 3.5–5.3)
POTASSIUM SERPL-SCNC: 5.1 MMOL/L — SIGNIFICANT CHANGE UP (ref 3.5–5.3)
POTASSIUM SERPL-SCNC: 5.2 MMOL/L — SIGNIFICANT CHANGE UP (ref 3.5–5.3)
POTASSIUM SERPL-SCNC: 5.5 MMOL/L — HIGH (ref 3.5–5.3)
POTASSIUM SERPL-SCNC: 5.5 MMOL/L — HIGH (ref 3.5–5.3)
POTASSIUM SERPL-SCNC: 5.6 MMOL/L — HIGH (ref 3.5–5.3)
POTASSIUM SERPL-SCNC: 5.8 MMOL/L — HIGH (ref 3.5–5.3)
POTASSIUM SERPL-SCNC: 6 MMOL/L — HIGH (ref 3.5–5.3)
POTASSIUM SERPL-SCNC: 6.3 MMOL/L — CRITICAL HIGH (ref 3.5–5.3)
POTASSIUM SERPL-SCNC: 7.3 MMOL/L — CRITICAL HIGH (ref 3.5–5.3)
POTASSIUM UR-SCNC: 36 MMOL/L — SIGNIFICANT CHANGE UP
PREALB SERPL-MCNC: 5 MG/DL — LOW (ref 20–40)
PROMYELOCYTES # FLD: 3 % — HIGH (ref 0–0)
PROT ?TM UR-MCNC: 70 MG/DL — HIGH (ref 0–12)
PROT SERPL-MCNC: 5.6 G/DL — LOW (ref 6–8.3)
PROT SERPL-MCNC: 5.7 G/DL — LOW (ref 6–8.3)
PROT SERPL-MCNC: 6 G/DL — SIGNIFICANT CHANGE UP (ref 6–8.3)
PROT SERPL-MCNC: 6 G/DL — SIGNIFICANT CHANGE UP (ref 6–8.3)
PROT SERPL-MCNC: 6.1 G/DL — SIGNIFICANT CHANGE UP (ref 6–8.3)
PROT SERPL-MCNC: 6.2 G/DL — SIGNIFICANT CHANGE UP (ref 6–8.3)
PROT SERPL-MCNC: 6.3 G/DL — SIGNIFICANT CHANGE UP (ref 6–8.3)
PROT SERPL-MCNC: 6.3 G/DL — SIGNIFICANT CHANGE UP (ref 6–8.3)
PROT SERPL-MCNC: 6.6 G/DL — SIGNIFICANT CHANGE UP (ref 6–8.3)
PROT SERPL-MCNC: 6.6 G/DL — SIGNIFICANT CHANGE UP (ref 6–8.3)
PROT SERPL-MCNC: 6.7 G/DL — SIGNIFICANT CHANGE UP (ref 6–8.3)
PROT SERPL-MCNC: 6.9 G/DL — SIGNIFICANT CHANGE UP (ref 6–8.3)
PROT SERPL-MCNC: 7 G/DL
PROT SERPL-MCNC: 7 G/DL — SIGNIFICANT CHANGE UP (ref 6–8.3)
PROT SERPL-MCNC: 7.2 G/DL — SIGNIFICANT CHANGE UP (ref 6–8.3)
PROT SERPL-MCNC: 7.3 G/DL
PROT SERPL-MCNC: 7.3 G/DL
PROT SERPL-MCNC: 7.4 G/DL
PROT SERPL-MCNC: 7.6 G/DL — SIGNIFICANT CHANGE UP (ref 6–8.3)
PROT SERPL-MCNC: 7.7 G/DL
PROT UR-MCNC: 100 — SIGNIFICANT CHANGE UP
PROT UR-MCNC: 100 — SIGNIFICANT CHANGE UP
PROT/CREAT UR-RTO: 0.9 RATIO — HIGH (ref 0–0.2)
PROTEIN URINE: ABNORMAL
PROTHROM AB SERPL-ACNC: 11.7 SEC — SIGNIFICANT CHANGE UP (ref 10.5–13.4)
PROTHROM AB SERPL-ACNC: 15.5 SEC — HIGH (ref 10.5–13.4)
PROTHROM AB SERPL-ACNC: 17.3 SEC — HIGH (ref 10.5–13.4)
PSA SERPL-MCNC: 0.94 NG/ML
PSA SERPL-MCNC: 2.85 NG/ML
RAPID RVP RESULT: SIGNIFICANT CHANGE UP
RAPID RVP RESULT: SIGNIFICANT CHANGE UP
RBC # BLD: 2.26 M/UL — LOW (ref 4.2–5.8)
RBC # BLD: 2.28 M/UL — LOW (ref 4.2–5.8)
RBC # BLD: 2.32 M/UL — LOW (ref 4.2–5.8)
RBC # BLD: 2.4 M/UL — LOW (ref 4.2–5.8)
RBC # BLD: 2.42 M/UL — LOW (ref 4.2–5.8)
RBC # BLD: 2.51 M/UL — LOW (ref 4.2–5.8)
RBC # BLD: 2.62 M/UL — LOW (ref 4.2–5.8)
RBC # BLD: 2.64 M/UL — LOW (ref 4.2–5.8)
RBC # BLD: 2.66 M/UL — LOW (ref 4.2–5.8)
RBC # BLD: 2.68 M/UL — LOW (ref 4.2–5.8)
RBC # BLD: 2.71 M/UL — LOW (ref 4.2–5.8)
RBC # BLD: 2.74 M/UL — LOW (ref 4.2–5.8)
RBC # BLD: 2.76 M/UL — LOW (ref 4.2–5.8)
RBC # BLD: 2.77 M/UL — LOW (ref 4.2–5.8)
RBC # BLD: 2.8 M/UL — LOW (ref 4.2–5.8)
RBC # BLD: 2.8 M/UL — LOW (ref 4.2–5.8)
RBC # BLD: 2.82 M/UL — LOW (ref 4.2–5.8)
RBC # BLD: 2.84 M/UL
RBC # BLD: 2.84 M/UL — LOW (ref 4.2–5.8)
RBC # BLD: 2.87 M/UL — LOW (ref 4.2–5.8)
RBC # BLD: 2.9 M/UL — LOW (ref 4.2–5.8)
RBC # BLD: 2.95 M/UL — LOW (ref 4.2–5.8)
RBC # BLD: 2.96 M/UL — LOW (ref 4.2–5.8)
RBC # BLD: 2.99 M/UL — LOW (ref 4.2–5.8)
RBC # BLD: 3.02 M/UL — LOW (ref 4.2–5.8)
RBC # BLD: 3.04 M/UL — LOW (ref 4.2–5.8)
RBC # BLD: 3.2 M/UL
RBC # BLD: 3.3 M/UL
RBC # FLD: 12.8 % — SIGNIFICANT CHANGE UP (ref 10.3–14.5)
RBC # FLD: 12.9 % — SIGNIFICANT CHANGE UP (ref 10.3–14.5)
RBC # FLD: 12.9 % — SIGNIFICANT CHANGE UP (ref 10.3–14.5)
RBC # FLD: 13 % — SIGNIFICANT CHANGE UP (ref 10.3–14.5)
RBC # FLD: 13.1 % — SIGNIFICANT CHANGE UP (ref 10.3–14.5)
RBC # FLD: 13.2 %
RBC # FLD: 13.2 %
RBC # FLD: 13.2 % — SIGNIFICANT CHANGE UP (ref 10.3–14.5)
RBC # FLD: 13.2 % — SIGNIFICANT CHANGE UP (ref 10.3–14.5)
RBC # FLD: 13.4 % — SIGNIFICANT CHANGE UP (ref 10.3–14.5)
RBC # FLD: 13.5 % — SIGNIFICANT CHANGE UP (ref 10.3–14.5)
RBC # FLD: 13.6 % — SIGNIFICANT CHANGE UP (ref 10.3–14.5)
RBC # FLD: 13.6 % — SIGNIFICANT CHANGE UP (ref 10.3–14.5)
RBC # FLD: 13.7 % — SIGNIFICANT CHANGE UP (ref 10.3–14.5)
RBC # FLD: 14 % — SIGNIFICANT CHANGE UP (ref 10.3–14.5)
RBC # FLD: 14.2 %
RBC # FLD: 14.2 % — SIGNIFICANT CHANGE UP (ref 10.3–14.5)
RBC # FLD: 14.5 % — SIGNIFICANT CHANGE UP (ref 10.3–14.5)
RBC # FLD: 14.6 % — HIGH (ref 10.3–14.5)
RBC # FLD: 14.9 % — HIGH (ref 10.3–14.5)
RBC # FLD: 15 % — HIGH (ref 10.3–14.5)
RBC # FLD: 15 % — HIGH (ref 10.3–14.5)
RBC # FLD: 15.5 % — HIGH (ref 10.3–14.5)
RBC # FLD: 15.7 % — HIGH (ref 10.3–14.5)
RBC # FLD: 18.5 % — HIGH (ref 10.3–14.5)
RBC # FLD: 20.4 % — HIGH (ref 10.3–14.5)
RBC # FLD: 21.2 % — HIGH (ref 10.3–14.5)
RBC BLD AUTO: ABNORMAL
RBC BLD AUTO: SIGNIFICANT CHANGE UP
RBC CASTS # UR COMP ASSIST: 48 /HPF — HIGH (ref 0–4)
RBC CASTS # UR COMP ASSIST: 6 /HPF — HIGH (ref 0–4)
RED BLOOD CELLS URINE: 54 /HPF
RH IG SCN BLD-IMP: POSITIVE — SIGNIFICANT CHANGE UP
S AUREUS DNA NOSE QL NAA+PROBE: DETECTED
S LUGDUNENSIS DNA SNV QL NAA+NON-PROBE: SIGNIFICANT CHANGE UP
SAO2 % BLDV: 23.7 % — LOW (ref 67–88)
SAO2 % BLDV: 25.6 % — LOW (ref 67–88)
SAO2 % BLDV: 37.9 % — LOW (ref 67–88)
SAO2 % BLDV: 38.3 % — LOW (ref 67–88)
SAO2 % BLDV: 40.3 % — LOW (ref 67–88)
SAO2 % BLDV: 57.9 % — LOW (ref 67–88)
SAO2 % BLDV: 66.5 % — LOW (ref 67–88)
SAO2 % BLDV: 73.3 % — SIGNIFICANT CHANGE UP (ref 67–88)
SAO2 % BLDV: 75.6 % — SIGNIFICANT CHANGE UP (ref 67–88)
SAO2 % BLDV: 75.8 % — SIGNIFICANT CHANGE UP (ref 67–88)
SAO2 % BLDV: 85.7 % — SIGNIFICANT CHANGE UP (ref 67–88)
SARS-COV-2 RNA SPEC QL NAA+PROBE: SIGNIFICANT CHANGE UP
SCHISTOCYTES BLD QL AUTO: SLIGHT — SIGNIFICANT CHANGE UP
SCHISTOCYTES BLD QL AUTO: SLIGHT — SIGNIFICANT CHANGE UP
SMUDGE CELLS # BLD: PRESENT — SIGNIFICANT CHANGE UP
SODIUM SERPL-SCNC: 135 MMOL/L — SIGNIFICANT CHANGE UP (ref 135–145)
SODIUM SERPL-SCNC: 136 MMOL/L — SIGNIFICANT CHANGE UP (ref 135–145)
SODIUM SERPL-SCNC: 137 MMOL/L — SIGNIFICANT CHANGE UP (ref 135–145)
SODIUM SERPL-SCNC: 138 MMOL/L — SIGNIFICANT CHANGE UP (ref 135–145)
SODIUM SERPL-SCNC: 139 MMOL/L
SODIUM SERPL-SCNC: 139 MMOL/L
SODIUM SERPL-SCNC: 139 MMOL/L — SIGNIFICANT CHANGE UP (ref 135–145)
SODIUM SERPL-SCNC: 140 MMOL/L — SIGNIFICANT CHANGE UP (ref 135–145)
SODIUM SERPL-SCNC: 141 MMOL/L
SODIUM SERPL-SCNC: 141 MMOL/L — SIGNIFICANT CHANGE UP (ref 135–145)
SODIUM SERPL-SCNC: 142 MMOL/L — SIGNIFICANT CHANGE UP (ref 135–145)
SODIUM SERPL-SCNC: 145 MMOL/L — SIGNIFICANT CHANGE UP (ref 135–145)
SODIUM SERPL-SCNC: 146 MMOL/L — HIGH (ref 135–145)
SODIUM SERPL-SCNC: 148 MMOL/L — HIGH (ref 135–145)
SODIUM UR-SCNC: 93 MMOL/L — SIGNIFICANT CHANGE UP
SP GR SPEC: 1.01 — SIGNIFICANT CHANGE UP (ref 1.01–1.02)
SP GR SPEC: 1.01 — SIGNIFICANT CHANGE UP (ref 1.01–1.02)
SPECIFIC GRAVITY URINE: 1.01
SPECIMEN SOURCE: SIGNIFICANT CHANGE UP
SQUAMOUS EPITHELIAL CELLS: 4 /HPF
T PALLIDUM AB SER QL IA: NEGATIVE
T-CELL CD4 SUBSET PNL BLD: 220 CELLS/UL — LOW (ref 325–1251)
T-CELL CD4 SUBSET PNL BLD: 526 /UL — SIGNIFICANT CHANGE UP (ref 325–1251)
T3 SERPL-MCNC: 47 NG/DL — LOW (ref 80–200)
T4 AB SER-ACNC: 4.5 UG/DL — LOW (ref 4.6–12)
T4 FREE SERPL-MCNC: 1.1 NG/DL
TRIGL SERPL-MCNC: 128 MG/DL
TROPONIN T, HIGH SENSITIVITY RESULT: 1223 NG/L — HIGH (ref 0–51)
TROPONIN T, HIGH SENSITIVITY RESULT: 154 NG/L — HIGH (ref 0–51)
TROPONIN T, HIGH SENSITIVITY RESULT: 194 NG/L — HIGH (ref 0–51)
TSH SERPL-ACNC: 4.13 UIU/ML
TSH SERPL-MCNC: 2.52 UIU/ML — SIGNIFICANT CHANGE UP (ref 0.27–4.2)
UROBILINOGEN FLD QL: NEGATIVE — SIGNIFICANT CHANGE UP
UROBILINOGEN FLD QL: NEGATIVE — SIGNIFICANT CHANGE UP
UROBILINOGEN URINE: NORMAL
UUN UR-MCNC: <3 MG/DL — SIGNIFICANT CHANGE UP
VANCOMYCIN TROUGH SERPL-MCNC: 8.2 UG/ML — LOW (ref 10–20)
VARIANT LYMPHS # BLD: 1 % — SIGNIFICANT CHANGE UP (ref 0–6)
VIRAL LOAD INTERP: NORMAL
VIRAL LOAD INTERP: NORMAL
VIRAL LOAD LOG: ABNORMAL LG COP/ML
VIRAL LOAD LOG: NORMAL LG COP/ML
VIT B12 SERPL-MCNC: 710 PG/ML
WBC # BLD: 10.51 K/UL — HIGH (ref 3.8–10.5)
WBC # BLD: 11.33 K/UL — HIGH (ref 3.8–10.5)
WBC # BLD: 11.52 K/UL — HIGH (ref 3.8–10.5)
WBC # BLD: 12.08 K/UL — HIGH (ref 3.8–10.5)
WBC # BLD: 12.65 K/UL — HIGH (ref 3.8–10.5)
WBC # BLD: 14.31 K/UL — HIGH (ref 3.8–10.5)
WBC # BLD: 14.97 K/UL — HIGH (ref 3.8–10.5)
WBC # BLD: 25.46 K/UL — HIGH (ref 3.8–10.5)
WBC # BLD: 26.57 K/UL — HIGH (ref 3.8–10.5)
WBC # BLD: 27.23 K/UL — HIGH (ref 3.8–10.5)
WBC # BLD: 27.7 K/UL — HIGH (ref 3.8–10.5)
WBC # BLD: 30.91 K/UL — HIGH (ref 3.8–10.5)
WBC # BLD: 32.24 K/UL — HIGH (ref 3.8–10.5)
WBC # BLD: 33.57 K/UL — HIGH (ref 3.8–10.5)
WBC # BLD: 35.74 K/UL — HIGH (ref 3.8–10.5)
WBC # BLD: 36.92 K/UL — HIGH (ref 3.8–10.5)
WBC # BLD: 37.33 K/UL — HIGH (ref 3.8–10.5)
WBC # BLD: 39.5 K/UL — HIGH (ref 3.8–10.5)
WBC # BLD: 42.93 K/UL — CRITICAL HIGH (ref 3.8–10.5)
WBC # BLD: 45.43 K/UL — CRITICAL HIGH (ref 3.8–10.5)
WBC # BLD: 47.59 K/UL — CRITICAL HIGH (ref 3.8–10.5)
WBC # BLD: 48.02 K/UL — CRITICAL HIGH (ref 3.8–10.5)
WBC # BLD: 50.53 K/UL — CRITICAL HIGH (ref 3.8–10.5)
WBC # BLD: 51.19 K/UL — CRITICAL HIGH (ref 3.8–10.5)
WBC # BLD: 51.83 K/UL — CRITICAL HIGH (ref 3.8–10.5)
WBC # BLD: 54.23 K/UL — CRITICAL HIGH (ref 3.8–10.5)
WBC # BLD: 56.83 K/UL — CRITICAL HIGH (ref 3.8–10.5)
WBC # BLD: 68.99 K/UL — CRITICAL HIGH (ref 3.8–10.5)
WBC # FLD AUTO: 10.51 K/UL — HIGH (ref 3.8–10.5)
WBC # FLD AUTO: 11.06 K/UL
WBC # FLD AUTO: 11.33 K/UL — HIGH (ref 3.8–10.5)
WBC # FLD AUTO: 11.52 K/UL — HIGH (ref 3.8–10.5)
WBC # FLD AUTO: 12.08 K/UL — HIGH (ref 3.8–10.5)
WBC # FLD AUTO: 12.65 K/UL — HIGH (ref 3.8–10.5)
WBC # FLD AUTO: 14.31 K/UL — HIGH (ref 3.8–10.5)
WBC # FLD AUTO: 14.97 K/UL — HIGH (ref 3.8–10.5)
WBC # FLD AUTO: 19.57 K/UL
WBC # FLD AUTO: 25.46 K/UL — HIGH (ref 3.8–10.5)
WBC # FLD AUTO: 26.57 K/UL — HIGH (ref 3.8–10.5)
WBC # FLD AUTO: 27.23 K/UL — HIGH (ref 3.8–10.5)
WBC # FLD AUTO: 27.7 K/UL — HIGH (ref 3.8–10.5)
WBC # FLD AUTO: 30.91 K/UL — HIGH (ref 3.8–10.5)
WBC # FLD AUTO: 32.24 K/UL — HIGH (ref 3.8–10.5)
WBC # FLD AUTO: 33.57 K/UL — HIGH (ref 3.8–10.5)
WBC # FLD AUTO: 35.74 K/UL — HIGH (ref 3.8–10.5)
WBC # FLD AUTO: 36.92 K/UL — HIGH (ref 3.8–10.5)
WBC # FLD AUTO: 37.33 K/UL — HIGH (ref 3.8–10.5)
WBC # FLD AUTO: 39.5 K/UL — HIGH (ref 3.8–10.5)
WBC # FLD AUTO: 42.93 K/UL — CRITICAL HIGH (ref 3.8–10.5)
WBC # FLD AUTO: 45.43 K/UL — CRITICAL HIGH (ref 3.8–10.5)
WBC # FLD AUTO: 47.59 K/UL — CRITICAL HIGH (ref 3.8–10.5)
WBC # FLD AUTO: 48.02 K/UL — CRITICAL HIGH (ref 3.8–10.5)
WBC # FLD AUTO: 50.53 K/UL — CRITICAL HIGH (ref 3.8–10.5)
WBC # FLD AUTO: 51.19 K/UL — CRITICAL HIGH (ref 3.8–10.5)
WBC # FLD AUTO: 51.83 K/UL — CRITICAL HIGH (ref 3.8–10.5)
WBC # FLD AUTO: 54.23 K/UL — CRITICAL HIGH (ref 3.8–10.5)
WBC # FLD AUTO: 56.83 K/UL — CRITICAL HIGH (ref 3.8–10.5)
WBC # FLD AUTO: 68.99 K/UL — CRITICAL HIGH (ref 3.8–10.5)
WBC # FLD AUTO: 9.42 K/UL
WBC UR QL: 1237 /HPF — HIGH (ref 0–5)
WBC UR QL: 181 /HPF — HIGH (ref 0–5)
WHITE BLOOD CELLS URINE: >720 /HPF

## 2022-01-01 PROCEDURE — 81001 URINALYSIS AUTO W/SCOPE: CPT

## 2022-01-01 PROCEDURE — U0003: CPT

## 2022-01-01 PROCEDURE — G1004: CPT

## 2022-01-01 PROCEDURE — 0225U NFCT DS DNA&RNA 21 SARSCOV2: CPT

## 2022-01-01 PROCEDURE — 32551 INSERTION OF CHEST TUBE: CPT | Mod: GC

## 2022-01-01 PROCEDURE — 85018 HEMOGLOBIN: CPT

## 2022-01-01 PROCEDURE — 99232 SBSQ HOSP IP/OBS MODERATE 35: CPT

## 2022-01-01 PROCEDURE — 99291 CRITICAL CARE FIRST HOUR: CPT | Mod: 25

## 2022-01-01 PROCEDURE — 99233 SBSQ HOSP IP/OBS HIGH 50: CPT

## 2022-01-01 PROCEDURE — 99233 SBSQ HOSP IP/OBS HIGH 50: CPT | Mod: GC

## 2022-01-01 PROCEDURE — 93325 DOPPLER ECHO COLOR FLOW MAPG: CPT | Mod: 26

## 2022-01-01 PROCEDURE — 85610 PROTHROMBIN TIME: CPT

## 2022-01-01 PROCEDURE — 84300 ASSAY OF URINE SODIUM: CPT

## 2022-01-01 PROCEDURE — 70551 MRI BRAIN STEM W/O DYE: CPT | Mod: MF

## 2022-01-01 PROCEDURE — 93312 ECHO TRANSESOPHAGEAL: CPT | Mod: 26

## 2022-01-01 PROCEDURE — 87040 BLOOD CULTURE FOR BACTERIA: CPT

## 2022-01-01 PROCEDURE — 99223 1ST HOSP IP/OBS HIGH 75: CPT | Mod: GC

## 2022-01-01 PROCEDURE — 82962 GLUCOSE BLOOD TEST: CPT

## 2022-01-01 PROCEDURE — 71046 X-RAY EXAM CHEST 2 VIEWS: CPT

## 2022-01-01 PROCEDURE — 80048 BASIC METABOLIC PNL TOTAL CA: CPT

## 2022-01-01 PROCEDURE — 87150 DNA/RNA AMPLIFIED PROBE: CPT

## 2022-01-01 PROCEDURE — 82565 ASSAY OF CREATININE: CPT

## 2022-01-01 PROCEDURE — 82570 ASSAY OF URINE CREATININE: CPT

## 2022-01-01 PROCEDURE — 82553 CREATINE MB FRACTION: CPT

## 2022-01-01 PROCEDURE — 86359 T CELLS TOTAL COUNT: CPT

## 2022-01-01 PROCEDURE — 82947 ASSAY GLUCOSE BLOOD QUANT: CPT

## 2022-01-01 PROCEDURE — 83735 ASSAY OF MAGNESIUM: CPT

## 2022-01-01 PROCEDURE — 94002 VENT MGMT INPAT INIT DAY: CPT

## 2022-01-01 PROCEDURE — U0005: CPT

## 2022-01-01 PROCEDURE — 82330 ASSAY OF CALCIUM: CPT

## 2022-01-01 PROCEDURE — 85730 THROMBOPLASTIN TIME PARTIAL: CPT

## 2022-01-01 PROCEDURE — 87086 URINE CULTURE/COLONY COUNT: CPT

## 2022-01-01 PROCEDURE — 93312 ECHO TRANSESOPHAGEAL: CPT

## 2022-01-01 PROCEDURE — 99291 CRITICAL CARE FIRST HOUR: CPT

## 2022-01-01 PROCEDURE — 99232 SBSQ HOSP IP/OBS MODERATE 35: CPT | Mod: GC

## 2022-01-01 PROCEDURE — 99285 EMERGENCY DEPT VISIT HI MDM: CPT

## 2022-01-01 PROCEDURE — 86901 BLOOD TYPING SEROLOGIC RH(D): CPT

## 2022-01-01 PROCEDURE — 93010 ELECTROCARDIOGRAM REPORT: CPT

## 2022-01-01 PROCEDURE — C1786: CPT

## 2022-01-01 PROCEDURE — 85025 COMPLETE CBC W/AUTO DIFF WBC: CPT

## 2022-01-01 PROCEDURE — 99221 1ST HOSP IP/OBS SF/LOW 40: CPT | Mod: 57

## 2022-01-01 PROCEDURE — 80053 COMPREHEN METABOLIC PANEL: CPT

## 2022-01-01 PROCEDURE — 84484 ASSAY OF TROPONIN QUANT: CPT

## 2022-01-01 PROCEDURE — 71046 X-RAY EXAM CHEST 2 VIEWS: CPT | Mod: 26

## 2022-01-01 PROCEDURE — 99223 1ST HOSP IP/OBS HIGH 75: CPT

## 2022-01-01 PROCEDURE — 99497 ADVNCD CARE PLAN 30 MIN: CPT | Mod: 25

## 2022-01-01 PROCEDURE — G0444 DEPRESSION SCREEN ANNUAL: CPT

## 2022-01-01 PROCEDURE — 76770 US EXAM ABDO BACK WALL COMP: CPT

## 2022-01-01 PROCEDURE — 33274 TCAT INSJ/RPL PERM LDLS PM: CPT | Mod: Q0

## 2022-01-01 PROCEDURE — 86850 RBC ANTIBODY SCREEN: CPT

## 2022-01-01 PROCEDURE — 99214 OFFICE O/P EST MOD 30 MIN: CPT | Mod: 25

## 2022-01-01 PROCEDURE — 74176 CT ABD & PELVIS W/O CONTRAST: CPT

## 2022-01-01 PROCEDURE — G0463: CPT

## 2022-01-01 PROCEDURE — ZZZZZ: CPT

## 2022-01-01 PROCEDURE — C1769: CPT

## 2022-01-01 PROCEDURE — 97110 THERAPEUTIC EXERCISES: CPT

## 2022-01-01 PROCEDURE — 97162 PT EVAL MOD COMPLEX 30 MIN: CPT

## 2022-01-01 PROCEDURE — 93325 DOPPLER ECHO COLOR FLOW MAPG: CPT

## 2022-01-01 PROCEDURE — 92950 HEART/LUNG RESUSCITATION CPR: CPT

## 2022-01-01 PROCEDURE — 71045 X-RAY EXAM CHEST 1 VIEW: CPT

## 2022-01-01 PROCEDURE — 93005 ELECTROCARDIOGRAM TRACING: CPT

## 2022-01-01 PROCEDURE — 82435 ASSAY OF BLOOD CHLORIDE: CPT

## 2022-01-01 PROCEDURE — 83880 ASSAY OF NATRIURETIC PEPTIDE: CPT

## 2022-01-01 PROCEDURE — 93000 ELECTROCARDIOGRAM COMPLETE: CPT

## 2022-01-01 PROCEDURE — 84134 ASSAY OF PREALBUMIN: CPT

## 2022-01-01 PROCEDURE — 90834 PSYTX W PT 45 MINUTES: CPT

## 2022-01-01 PROCEDURE — 86355 B CELLS TOTAL COUNT: CPT

## 2022-01-01 PROCEDURE — 76770 US EXAM ABDO BACK WALL COMP: CPT | Mod: 26

## 2022-01-01 PROCEDURE — 33274 TCAT INSJ/RPL PERM LDLS PM: CPT

## 2022-01-01 PROCEDURE — 71045 X-RAY EXAM CHEST 1 VIEW: CPT | Mod: 26

## 2022-01-01 PROCEDURE — 71045 X-RAY EXAM CHEST 1 VIEW: CPT | Mod: 26,76

## 2022-01-01 PROCEDURE — 93320 DOPPLER ECHO COMPLETE: CPT

## 2022-01-01 PROCEDURE — 82803 BLOOD GASES ANY COMBINATION: CPT

## 2022-01-01 PROCEDURE — 87340 HEPATITIS B SURFACE AG IA: CPT

## 2022-01-01 PROCEDURE — 36415 COLL VENOUS BLD VENIPUNCTURE: CPT

## 2022-01-01 PROCEDURE — 36800 INSERTION OF CANNULA: CPT | Mod: GC,59

## 2022-01-01 PROCEDURE — 70551 MRI BRAIN STEM W/O DYE: CPT | Mod: 26,MF

## 2022-01-01 PROCEDURE — 36620 INSERTION CATHETER ARTERY: CPT | Mod: GC

## 2022-01-01 PROCEDURE — 74176 CT ABD & PELVIS W/O CONTRAST: CPT | Mod: 26

## 2022-01-01 PROCEDURE — 31500 INSERT EMERGENCY AIRWAY: CPT

## 2022-01-01 PROCEDURE — 96374 THER/PROPH/DIAG INJ IV PUSH: CPT

## 2022-01-01 PROCEDURE — 86900 BLOOD TYPING SEROLOGIC ABO: CPT

## 2022-01-01 PROCEDURE — 74018 RADEX ABDOMEN 1 VIEW: CPT | Mod: 26

## 2022-01-01 PROCEDURE — 87186 SC STD MICRODIL/AGAR DIL: CPT

## 2022-01-01 PROCEDURE — 84295 ASSAY OF SERUM SODIUM: CPT

## 2022-01-01 PROCEDURE — 84145 PROCALCITONIN (PCT): CPT

## 2022-01-01 PROCEDURE — 83605 ASSAY OF LACTIC ACID: CPT

## 2022-01-01 PROCEDURE — 87641 MR-STAPH DNA AMP PROBE: CPT

## 2022-01-01 PROCEDURE — 87536 HIV-1 QUANT&REVRSE TRNSCRPJ: CPT

## 2022-01-01 PROCEDURE — 99261: CPT

## 2022-01-01 PROCEDURE — 94003 VENT MGMT INPAT SUBQ DAY: CPT

## 2022-01-01 PROCEDURE — 87640 STAPH A DNA AMP PROBE: CPT

## 2022-01-01 PROCEDURE — 85014 HEMATOCRIT: CPT

## 2022-01-01 PROCEDURE — 99285 EMERGENCY DEPT VISIT HI MDM: CPT | Mod: 25,GC

## 2022-01-01 PROCEDURE — 99213 OFFICE O/P EST LOW 20 MIN: CPT

## 2022-01-01 PROCEDURE — 99239 HOSP IP/OBS DSCHRG MGMT >30: CPT

## 2022-01-01 PROCEDURE — 85384 FIBRINOGEN ACTIVITY: CPT

## 2022-01-01 PROCEDURE — 97530 THERAPEUTIC ACTIVITIES: CPT

## 2022-01-01 PROCEDURE — 94640 AIRWAY INHALATION TREATMENT: CPT

## 2022-01-01 PROCEDURE — 84540 ASSAY OF URINE/UREA-N: CPT

## 2022-01-01 PROCEDURE — 84436 ASSAY OF TOTAL THYROXINE: CPT

## 2022-01-01 PROCEDURE — G0439: CPT

## 2022-01-01 PROCEDURE — 93308 TTE F-UP OR LMTD: CPT | Mod: 26,GC

## 2022-01-01 PROCEDURE — 87077 CULTURE AEROBIC IDENTIFY: CPT

## 2022-01-01 PROCEDURE — 86803 HEPATITIS C AB TEST: CPT

## 2022-01-01 PROCEDURE — 93308 TTE F-UP OR LMTD: CPT

## 2022-01-01 PROCEDURE — 93308 TTE F-UP OR LMTD: CPT | Mod: 26

## 2022-01-01 PROCEDURE — 84132 ASSAY OF SERUM POTASSIUM: CPT

## 2022-01-01 PROCEDURE — 84100 ASSAY OF PHOSPHORUS: CPT

## 2022-01-01 PROCEDURE — 36556 INSERT NON-TUNNEL CV CATH: CPT | Mod: GC

## 2022-01-01 PROCEDURE — 93010 ELECTROCARDIOGRAM REPORT: CPT | Mod: GC

## 2022-01-01 PROCEDURE — 94660 CPAP INITIATION&MGMT: CPT

## 2022-01-01 PROCEDURE — 76604 US EXAM CHEST: CPT | Mod: 26,GC

## 2022-01-01 PROCEDURE — 36569 INSJ PICC 5 YR+ W/O IMAGING: CPT

## 2022-01-01 PROCEDURE — 84133 ASSAY OF URINE POTASSIUM: CPT

## 2022-01-01 PROCEDURE — 93321 DOPPLER ECHO F-UP/LMTD STD: CPT

## 2022-01-01 PROCEDURE — 84443 ASSAY THYROID STIM HORMONE: CPT

## 2022-01-01 PROCEDURE — 80202 ASSAY OF VANCOMYCIN: CPT

## 2022-01-01 PROCEDURE — 82550 ASSAY OF CK (CPK): CPT

## 2022-01-01 PROCEDURE — 43753 TX GASTRO INTUB W/ASP: CPT

## 2022-01-01 PROCEDURE — C1751: CPT

## 2022-01-01 PROCEDURE — 86357 NK CELLS TOTAL COUNT: CPT

## 2022-01-01 PROCEDURE — 93306 TTE W/DOPPLER COMPLETE: CPT | Mod: 26

## 2022-01-01 PROCEDURE — 85027 COMPLETE CBC AUTOMATED: CPT

## 2022-01-01 PROCEDURE — 76700 US EXAM ABDOM COMPLETE: CPT | Mod: 26

## 2022-01-01 PROCEDURE — 99285 EMERGENCY DEPT VISIT HI MDM: CPT | Mod: 25

## 2022-01-01 PROCEDURE — 84480 ASSAY TRIIODOTHYRONINE (T3): CPT

## 2022-01-01 PROCEDURE — 93306 TTE W/DOPPLER COMPLETE: CPT

## 2022-01-01 PROCEDURE — 76604 US EXAM CHEST: CPT | Mod: 26

## 2022-01-01 PROCEDURE — 99285 EMERGENCY DEPT VISIT HI MDM: CPT | Mod: FS,CS

## 2022-01-01 PROCEDURE — 71045 X-RAY EXAM CHEST 1 VIEW: CPT | Mod: 26,77

## 2022-01-01 PROCEDURE — 87507 IADNA-DNA/RNA PROBE TQ 12-25: CPT

## 2022-01-01 PROCEDURE — 74018 RADEX ABDOMEN 1 VIEW: CPT

## 2022-01-01 PROCEDURE — P9047: CPT

## 2022-01-01 PROCEDURE — 86706 HEP B SURFACE ANTIBODY: CPT

## 2022-01-01 PROCEDURE — 93010 ELECTROCARDIOGRAM REPORT: CPT | Mod: 76

## 2022-01-01 PROCEDURE — 12345: CPT | Mod: NC

## 2022-01-01 PROCEDURE — 86360 T CELL ABSOLUTE COUNT/RATIO: CPT

## 2022-01-01 PROCEDURE — 84156 ASSAY OF PROTEIN URINE: CPT

## 2022-01-01 PROCEDURE — C1894: CPT

## 2022-01-01 PROCEDURE — 93321 DOPPLER ECHO F-UP/LMTD STD: CPT | Mod: 26

## 2022-01-01 PROCEDURE — 76700 US EXAM ABDOM COMPLETE: CPT

## 2022-01-01 PROCEDURE — 93320 DOPPLER ECHO COMPLETE: CPT | Mod: 26

## 2022-01-01 RX ORDER — CITRIC ACID/SODIUM CITRATE 300-500 MG
30 SOLUTION, ORAL ORAL EVERY 6 HOURS
Refills: 0 | Status: DISCONTINUED | OUTPATIENT
Start: 2022-01-01 | End: 2022-01-01

## 2022-01-01 RX ORDER — CHLORHEXIDINE GLUCONATE 213 G/1000ML
1 SOLUTION TOPICAL
Refills: 0 | Status: DISCONTINUED | OUTPATIENT
Start: 2022-01-01 | End: 2022-01-01

## 2022-01-01 RX ORDER — NOREPINEPHRINE BITARTRATE/D5W 8 MG/250ML
0.05 PLASTIC BAG, INJECTION (ML) INTRAVENOUS
Qty: 8 | Refills: 0 | Status: DISCONTINUED | OUTPATIENT
Start: 2022-01-01 | End: 2022-01-01

## 2022-01-01 RX ORDER — LAMIVUDINE 150 MG
1 TABLET ORAL
Qty: 0 | Refills: 0 | DISCHARGE

## 2022-01-01 RX ORDER — BUMETANIDE 0.25 MG/ML
2 INJECTION INTRAMUSCULAR; INTRAVENOUS ONCE
Refills: 0 | Status: COMPLETED | OUTPATIENT
Start: 2022-01-01 | End: 2022-01-01

## 2022-01-01 RX ORDER — MEROPENEM 1 G/30ML
INJECTION INTRAVENOUS
Refills: 0 | Status: DISCONTINUED | OUTPATIENT
Start: 2022-01-01 | End: 2022-01-01

## 2022-01-01 RX ORDER — FERROUS SULFATE 325(65) MG
325 TABLET ORAL DAILY
Refills: 0 | Status: DISCONTINUED | OUTPATIENT
Start: 2022-01-01 | End: 2022-01-01

## 2022-01-01 RX ORDER — SIMETHICONE 80 MG/1
80 TABLET, CHEWABLE ORAL
Refills: 0 | Status: DISCONTINUED | OUTPATIENT
Start: 2022-01-01 | End: 2022-01-01

## 2022-01-01 RX ORDER — POLYETHYLENE GLYCOL 3350 17 G/17G
17 POWDER, FOR SOLUTION ORAL DAILY
Refills: 0 | Status: DISCONTINUED | OUTPATIENT
Start: 2022-01-01 | End: 2022-01-01

## 2022-01-01 RX ORDER — LIDOCAINE HCL 20 MG/ML
3 VIAL (ML) INJECTION
Refills: 0 | Status: DISCONTINUED | OUTPATIENT
Start: 2022-01-01 | End: 2022-01-01

## 2022-01-01 RX ORDER — FENTANYL CITRATE 50 UG/ML
100 INJECTION INTRAVENOUS ONCE
Refills: 0 | Status: DISCONTINUED | OUTPATIENT
Start: 2022-01-01 | End: 2022-01-01

## 2022-01-01 RX ORDER — HEPARIN SODIUM 5000 [USP'U]/ML
5000 INJECTION INTRAVENOUS; SUBCUTANEOUS EVERY 12 HOURS
Refills: 0 | Status: DISCONTINUED | OUTPATIENT
Start: 2022-01-01 | End: 2022-01-01

## 2022-01-01 RX ORDER — LOPERAMIDE HCL 2 MG
2 TABLET ORAL DAILY
Refills: 0 | Status: DISCONTINUED | OUTPATIENT
Start: 2022-01-01 | End: 2022-01-01

## 2022-01-01 RX ORDER — CHLORHEXIDINE GLUCONATE 213 G/1000ML
1 SOLUTION TOPICAL DAILY
Refills: 0 | Status: DISCONTINUED | OUTPATIENT
Start: 2022-01-01 | End: 2022-01-01

## 2022-01-01 RX ORDER — FENTANYL CITRATE 50 UG/ML
50 INJECTION INTRAVENOUS ONCE
Refills: 0 | Status: DISCONTINUED | OUTPATIENT
Start: 2022-01-01 | End: 2022-01-01

## 2022-01-01 RX ORDER — CEFTRIAXONE 500 MG/1
1000 INJECTION, POWDER, FOR SOLUTION INTRAMUSCULAR; INTRAVENOUS EVERY 24 HOURS
Refills: 0 | Status: DISCONTINUED | OUTPATIENT
Start: 2022-01-01 | End: 2022-01-01

## 2022-01-01 RX ORDER — BUMETANIDE 0.25 MG/ML
2 INJECTION INTRAMUSCULAR; INTRAVENOUS
Qty: 20 | Refills: 0 | Status: DISCONTINUED | OUTPATIENT
Start: 2022-01-01 | End: 2022-01-01

## 2022-01-01 RX ORDER — SODIUM BICARBONATE 1 MEQ/ML
25 SYRINGE (ML) INTRAVENOUS ONCE
Refills: 0 | Status: COMPLETED | OUTPATIENT
Start: 2022-01-01 | End: 2022-01-01

## 2022-01-01 RX ORDER — POTASSIUM CHLORIDE 20 MEQ
10 PACKET (EA) ORAL
Refills: 0 | Status: COMPLETED | OUTPATIENT
Start: 2022-01-01 | End: 2022-01-01

## 2022-01-01 RX ORDER — SODIUM BICARBONATE 1 MEQ/ML
0.09 SYRINGE (ML) INTRAVENOUS
Qty: 150 | Refills: 0 | Status: COMPLETED | OUTPATIENT
Start: 2022-01-01 | End: 2022-01-01

## 2022-01-01 RX ORDER — HEPARIN SODIUM 5000 [USP'U]/ML
INJECTION INTRAVENOUS; SUBCUTANEOUS
Qty: 25000 | Refills: 0 | Status: DISCONTINUED | OUTPATIENT
Start: 2022-01-01 | End: 2022-01-01

## 2022-01-01 RX ORDER — SODIUM ZIRCONIUM CYCLOSILICATE 10 G/10G
10 POWDER, FOR SUSPENSION ORAL EVERY 8 HOURS
Refills: 0 | Status: COMPLETED | OUTPATIENT
Start: 2022-01-01 | End: 2022-01-01

## 2022-01-01 RX ORDER — HEPARIN SODIUM 5000 [USP'U]/ML
3000 INJECTION INTRAVENOUS; SUBCUTANEOUS EVERY 6 HOURS
Refills: 0 | Status: DISCONTINUED | OUTPATIENT
Start: 2022-01-01 | End: 2022-01-01

## 2022-01-01 RX ORDER — INSULIN HUMAN 100 [IU]/ML
5 INJECTION, SOLUTION SUBCUTANEOUS ONCE
Refills: 0 | Status: COMPLETED | OUTPATIENT
Start: 2022-01-01 | End: 2022-01-01

## 2022-01-01 RX ORDER — LANOLIN ALCOHOL/MO/W.PET/CERES
3 CREAM (GRAM) TOPICAL ONCE
Refills: 0 | Status: COMPLETED | OUTPATIENT
Start: 2022-01-01 | End: 2022-01-01

## 2022-01-01 RX ORDER — MEROPENEM 1 G/30ML
500 INJECTION INTRAVENOUS EVERY 12 HOURS
Refills: 0 | Status: DISCONTINUED | OUTPATIENT
Start: 2022-01-01 | End: 2022-01-01

## 2022-01-01 RX ORDER — BUMETANIDE 0.25 MG/ML
3 INJECTION INTRAMUSCULAR; INTRAVENOUS ONCE
Refills: 0 | Status: COMPLETED | OUTPATIENT
Start: 2022-01-01 | End: 2022-01-01

## 2022-01-01 RX ORDER — ASPIRIN/CALCIUM CARB/MAGNESIUM 324 MG
81 TABLET ORAL DAILY
Refills: 0 | Status: DISCONTINUED | OUTPATIENT
Start: 2022-01-01 | End: 2022-01-01

## 2022-01-01 RX ORDER — ABACAVIR 20 MG/ML
2 SOLUTION ORAL
Qty: 0 | Refills: 0 | DISCHARGE

## 2022-01-01 RX ORDER — PIPERACILLIN AND TAZOBACTAM 4; .5 G/20ML; G/20ML
3.38 INJECTION, POWDER, LYOPHILIZED, FOR SOLUTION INTRAVENOUS EVERY 12 HOURS
Refills: 0 | Status: DISCONTINUED | OUTPATIENT
Start: 2022-01-01 | End: 2022-01-01

## 2022-01-01 RX ORDER — ACETAMINOPHEN 500 MG
1000 TABLET ORAL ONCE
Refills: 0 | Status: COMPLETED | OUTPATIENT
Start: 2022-01-01 | End: 2022-01-01

## 2022-01-01 RX ORDER — SODIUM CHLORIDE 9 MG/ML
1000 INJECTION INTRAMUSCULAR; INTRAVENOUS; SUBCUTANEOUS ONCE
Refills: 0 | Status: COMPLETED | OUTPATIENT
Start: 2022-01-01 | End: 2022-01-01

## 2022-01-01 RX ORDER — ONDANSETRON 8 MG/1
4 TABLET, FILM COATED ORAL EVERY 8 HOURS
Refills: 0 | Status: DISCONTINUED | OUTPATIENT
Start: 2022-01-01 | End: 2022-01-01

## 2022-01-01 RX ORDER — INSULIN HUMAN 100 [IU]/ML
10 INJECTION, SOLUTION SUBCUTANEOUS ONCE
Refills: 0 | Status: COMPLETED | OUTPATIENT
Start: 2022-01-01 | End: 2022-01-01

## 2022-01-01 RX ORDER — ONDANSETRON 8 MG/1
4 TABLET, FILM COATED ORAL ONCE
Refills: 0 | Status: COMPLETED | OUTPATIENT
Start: 2022-01-01 | End: 2022-01-01

## 2022-01-01 RX ORDER — CEFAZOLIN SODIUM 1 G
1000 VIAL (EA) INJECTION EVERY 12 HOURS
Refills: 0 | Status: DISCONTINUED | OUTPATIENT
Start: 2022-01-01 | End: 2022-01-01

## 2022-01-01 RX ORDER — LACTULOSE 10 G/15ML
10 SOLUTION ORAL ONCE
Refills: 0 | Status: COMPLETED | OUTPATIENT
Start: 2022-01-01 | End: 2022-01-01

## 2022-01-01 RX ORDER — NOREPINEPHRINE BITARTRATE/D5W 8 MG/250ML
0.05 PLASTIC BAG, INJECTION (ML) INTRAVENOUS
Qty: 32 | Refills: 0 | Status: DISCONTINUED | OUTPATIENT
Start: 2022-01-01 | End: 2022-01-01

## 2022-01-01 RX ORDER — SODIUM ZIRCONIUM CYCLOSILICATE 10 G/10G
10 POWDER, FOR SUSPENSION ORAL ONCE
Refills: 0 | Status: COMPLETED | OUTPATIENT
Start: 2022-01-01 | End: 2022-01-01

## 2022-01-01 RX ORDER — FAMOTIDINE 10 MG/ML
20 INJECTION INTRAVENOUS DAILY
Refills: 0 | Status: DISCONTINUED | OUTPATIENT
Start: 2022-01-01 | End: 2022-01-01

## 2022-01-01 RX ORDER — HEPARIN SODIUM 5000 [USP'U]/ML
5000 INJECTION INTRAVENOUS; SUBCUTANEOUS EVERY 8 HOURS
Refills: 0 | Status: DISCONTINUED | OUTPATIENT
Start: 2022-01-01 | End: 2022-01-01

## 2022-01-01 RX ORDER — LAMIVUDINE 150 MG
100 TABLET ORAL DAILY
Refills: 0 | Status: DISCONTINUED | OUTPATIENT
Start: 2022-01-01 | End: 2022-01-01

## 2022-01-01 RX ORDER — FUROSEMIDE 40 MG
40 TABLET ORAL ONCE
Refills: 0 | Status: COMPLETED | OUTPATIENT
Start: 2022-01-01 | End: 2022-01-01

## 2022-01-01 RX ORDER — SENNA PLUS 8.6 MG/1
2 TABLET ORAL
Qty: 0 | Refills: 0 | DISCHARGE
Start: 2022-01-01

## 2022-01-01 RX ORDER — SODIUM CHLORIDE 9 MG/ML
10 INJECTION INTRAMUSCULAR; INTRAVENOUS; SUBCUTANEOUS
Refills: 0 | Status: DISCONTINUED | OUTPATIENT
Start: 2022-01-01 | End: 2022-01-01

## 2022-01-01 RX ORDER — VANCOMYCIN HCL 1 G
750 VIAL (EA) INTRAVENOUS ONCE
Refills: 0 | Status: DISCONTINUED | OUTPATIENT
Start: 2022-01-01 | End: 2022-01-01

## 2022-01-01 RX ORDER — FUROSEMIDE 40 MG
80 TABLET ORAL ONCE
Refills: 0 | Status: COMPLETED | OUTPATIENT
Start: 2022-01-01 | End: 2022-01-01

## 2022-01-01 RX ORDER — PROPOFOL 10 MG/ML
30 INJECTION, EMULSION INTRAVENOUS
Qty: 1000 | Refills: 0 | Status: DISCONTINUED | OUTPATIENT
Start: 2022-01-01 | End: 2022-01-01

## 2022-01-01 RX ORDER — ASCORBIC ACID 60 MG
500 TABLET,CHEWABLE ORAL DAILY
Refills: 0 | Status: DISCONTINUED | OUTPATIENT
Start: 2022-01-01 | End: 2022-01-01

## 2022-01-01 RX ORDER — ALBUTEROL 90 UG/1
2.5 AEROSOL, METERED ORAL ONCE
Refills: 0 | Status: COMPLETED | OUTPATIENT
Start: 2022-01-01 | End: 2022-01-01

## 2022-01-01 RX ORDER — CHLORHEXIDINE GLUCONATE 213 G/1000ML
15 SOLUTION TOPICAL EVERY 12 HOURS
Refills: 0 | Status: DISCONTINUED | OUTPATIENT
Start: 2022-01-01 | End: 2022-01-01

## 2022-01-01 RX ORDER — ACETAMINOPHEN 500 MG
650 TABLET ORAL ONCE
Refills: 0 | Status: COMPLETED | OUTPATIENT
Start: 2022-01-01 | End: 2022-01-01

## 2022-01-01 RX ORDER — ABACAVIR 20 MG/ML
600 SOLUTION ORAL DAILY
Refills: 0 | Status: DISCONTINUED | OUTPATIENT
Start: 2022-01-01 | End: 2022-01-01

## 2022-01-01 RX ORDER — DOCUSATE SODIUM 100 MG/1
100 CAPSULE, LIQUID FILLED ORAL
Refills: 0 | Status: DISCONTINUED | COMMUNITY
Start: 2020-12-09 | End: 2022-01-01

## 2022-01-01 RX ORDER — FAMOTIDINE 10 MG/ML
1 INJECTION INTRAVENOUS
Qty: 0 | Refills: 0 | DISCHARGE

## 2022-01-01 RX ORDER — SODIUM CHLORIDE 9 MG/ML
1000 INJECTION INTRAMUSCULAR; INTRAVENOUS; SUBCUTANEOUS
Refills: 0 | Status: DISCONTINUED | OUTPATIENT
Start: 2022-01-01 | End: 2022-01-01

## 2022-01-01 RX ORDER — IPRATROPIUM/ALBUTEROL SULFATE 18-103MCG
3 AEROSOL WITH ADAPTER (GRAM) INHALATION ONCE
Refills: 0 | Status: COMPLETED | OUTPATIENT
Start: 2022-01-01 | End: 2022-01-01

## 2022-01-01 RX ORDER — SENNA PLUS 8.6 MG/1
2 TABLET ORAL AT BEDTIME
Refills: 0 | Status: DISCONTINUED | OUTPATIENT
Start: 2022-01-01 | End: 2022-01-01

## 2022-01-01 RX ORDER — MULTIVIT WITH MIN/MFOLATE/K2 340-15/3 G
1 POWDER (GRAM) ORAL ONCE
Refills: 0 | Status: DISCONTINUED | OUTPATIENT
Start: 2022-01-01 | End: 2022-01-01

## 2022-01-01 RX ORDER — FAMOTIDINE 20 MG/1
20 TABLET, FILM COATED ORAL
Qty: 90 | Refills: 8 | Status: ACTIVE | COMMUNITY
Start: 2020-12-09 | End: 1900-01-01

## 2022-01-01 RX ORDER — CYANOCOBALAMIN (VITAMIN B-12) 500 MCG
0.8 TABLET ORAL
Refills: 0 | Status: DISCONTINUED | COMMUNITY
End: 2022-01-01

## 2022-01-01 RX ORDER — HEPARIN SODIUM 5000 [USP'U]/ML
6500 INJECTION INTRAVENOUS; SUBCUTANEOUS EVERY 6 HOURS
Refills: 0 | Status: DISCONTINUED | OUTPATIENT
Start: 2022-01-01 | End: 2022-01-01

## 2022-01-01 RX ORDER — ABACAVIR 20 MG/ML
300 SOLUTION ORAL
Refills: 0 | Status: DISCONTINUED | OUTPATIENT
Start: 2022-01-01 | End: 2022-01-01

## 2022-01-01 RX ORDER — DORAVIRINE 100 MG/1
100 TABLET, FILM COATED ORAL
Qty: 30 | Refills: 4 | Status: ACTIVE | COMMUNITY
Start: 2020-03-31 | End: 1900-01-01

## 2022-01-01 RX ORDER — OLANZAPINE 15 MG/1
5 TABLET, FILM COATED ORAL ONCE
Refills: 0 | Status: COMPLETED | OUTPATIENT
Start: 2022-01-01 | End: 2022-01-01

## 2022-01-01 RX ORDER — ALBUMIN HUMAN 25 %
50 VIAL (ML) INTRAVENOUS ONCE
Refills: 0 | Status: COMPLETED | OUTPATIENT
Start: 2022-01-01 | End: 2022-01-01

## 2022-01-01 RX ORDER — BUMETANIDE 0.25 MG/ML
3 INJECTION INTRAMUSCULAR; INTRAVENOUS
Qty: 20 | Refills: 0 | Status: DISCONTINUED | OUTPATIENT
Start: 2022-01-01 | End: 2022-01-01

## 2022-01-01 RX ORDER — TELMISARTAN 40 MG/1
40 TABLET ORAL DAILY
Qty: 90 | Refills: 3 | Status: ACTIVE | COMMUNITY
Start: 2021-02-12 | End: 1900-01-01

## 2022-01-01 RX ORDER — SODIUM BICARBONATE 1 MEQ/ML
650 SYRINGE (ML) INTRAVENOUS THREE TIMES A DAY
Refills: 0 | Status: DISCONTINUED | OUTPATIENT
Start: 2022-01-01 | End: 2022-01-01

## 2022-01-01 RX ORDER — CHOLECALCIFEROL (VITAMIN D3) 125 MCG
1 CAPSULE ORAL
Qty: 0 | Refills: 0 | DISCHARGE

## 2022-01-01 RX ORDER — DORAVIRINE 100 MG/1
1 TABLET, FILM COATED ORAL
Qty: 0 | Refills: 0 | DISCHARGE

## 2022-01-01 RX ORDER — ACETAZOLAMIDE 250 MG/1
500 TABLET ORAL ONCE
Refills: 0 | Status: COMPLETED | OUTPATIENT
Start: 2022-01-01 | End: 2022-01-01

## 2022-01-01 RX ORDER — ONDANSETRON 8 MG/1
4 TABLET, FILM COATED ORAL EVERY 6 HOURS
Refills: 0 | Status: DISCONTINUED | OUTPATIENT
Start: 2022-01-01 | End: 2022-01-01

## 2022-01-01 RX ORDER — CALCIUM GLUCONATE 100 MG/ML
2 VIAL (ML) INTRAVENOUS ONCE
Refills: 0 | Status: COMPLETED | OUTPATIENT
Start: 2022-01-01 | End: 2022-01-01

## 2022-01-01 RX ORDER — METOPROLOL SUCCINATE 25 MG/1
25 TABLET, EXTENDED RELEASE ORAL
Qty: 45 | Refills: 7 | Status: ACTIVE | COMMUNITY
Start: 2020-11-23 | End: 1900-01-01

## 2022-01-01 RX ORDER — CEFEPIME 1 G/1
1000 INJECTION, POWDER, FOR SOLUTION INTRAMUSCULAR; INTRAVENOUS EVERY 24 HOURS
Refills: 0 | Status: DISCONTINUED | OUTPATIENT
Start: 2022-01-01 | End: 2022-01-01

## 2022-01-01 RX ORDER — PIPERACILLIN AND TAZOBACTAM 4; .5 G/20ML; G/20ML
3.38 INJECTION, POWDER, LYOPHILIZED, FOR SOLUTION INTRAVENOUS ONCE
Refills: 0 | Status: COMPLETED | OUTPATIENT
Start: 2022-01-01 | End: 2022-01-01

## 2022-01-01 RX ORDER — LANOLIN ALCOHOL/MO/W.PET/CERES
3 CREAM (GRAM) TOPICAL ONCE
Refills: 0 | Status: DISCONTINUED | OUTPATIENT
Start: 2022-01-01 | End: 2022-01-01

## 2022-01-01 RX ORDER — PROPOFOL 10 MG/ML
30.02 INJECTION, EMULSION INTRAVENOUS
Qty: 1000 | Refills: 0 | Status: DISCONTINUED | OUTPATIENT
Start: 2022-01-01 | End: 2022-01-01

## 2022-01-01 RX ORDER — MAGNESIUM OXIDE 400 MG ORAL TABLET 241.3 MG
1 TABLET ORAL
Qty: 0 | Refills: 0 | DISCHARGE

## 2022-01-01 RX ORDER — VANCOMYCIN HCL 1 G
1000 VIAL (EA) INTRAVENOUS ONCE
Refills: 0 | Status: COMPLETED | OUTPATIENT
Start: 2022-01-01 | End: 2022-01-01

## 2022-01-01 RX ORDER — APIXABAN 2.5 MG/1
2.5 TABLET, FILM COATED ORAL
Refills: 0 | Status: DISCONTINUED | OUTPATIENT
Start: 2022-01-01 | End: 2022-01-01

## 2022-01-01 RX ORDER — ACETAMINOPHEN 500 MG
650 TABLET ORAL EVERY 6 HOURS
Refills: 0 | Status: DISCONTINUED | OUTPATIENT
Start: 2022-01-01 | End: 2022-01-01

## 2022-01-01 RX ORDER — FOLIC ACID 0.8 MG
1 TABLET ORAL
Qty: 0 | Refills: 0 | DISCHARGE

## 2022-01-01 RX ORDER — METOPROLOL TARTRATE 50 MG
12.5 TABLET ORAL DAILY
Refills: 0 | Status: DISCONTINUED | OUTPATIENT
Start: 2022-01-01 | End: 2022-01-01

## 2022-01-01 RX ORDER — PHENYLEPHRINE HYDROCHLORIDE 10 MG/ML
0.1 INJECTION INTRAVENOUS
Qty: 40 | Refills: 0 | Status: DISCONTINUED | OUTPATIENT
Start: 2022-01-01 | End: 2022-01-01

## 2022-01-01 RX ORDER — BUMETANIDE 0.25 MG/ML
3 INJECTION INTRAMUSCULAR; INTRAVENOUS ONCE
Refills: 0 | Status: DISCONTINUED | OUTPATIENT
Start: 2022-01-01 | End: 2022-01-01

## 2022-01-01 RX ORDER — FERROUS SULFATE TAB EC 325 MG (65 MG FE EQUIVALENT) 325 (65 FE) MG
325 (65 FE) TABLET DELAYED RESPONSE ORAL DAILY
Refills: 0 | Status: DISCONTINUED | COMMUNITY
End: 2022-01-01

## 2022-01-01 RX ORDER — ACETAMINOPHEN 500 MG
975 TABLET ORAL ONCE
Refills: 0 | Status: COMPLETED | OUTPATIENT
Start: 2022-01-01 | End: 2022-01-01

## 2022-01-01 RX ORDER — DESMOPRESSIN ACETATE 0.1 MG/1
24 TABLET ORAL ONCE
Refills: 0 | Status: COMPLETED | OUTPATIENT
Start: 2022-01-01 | End: 2022-01-01

## 2022-01-01 RX ORDER — FINASTERIDE 5 MG/1
5 TABLET, FILM COATED ORAL
Qty: 0 | Refills: 0 | DISCHARGE

## 2022-01-01 RX ORDER — SODIUM CHLORIDE 9 MG/ML
500 INJECTION, SOLUTION INTRAVENOUS
Refills: 0 | Status: DISCONTINUED | OUTPATIENT
Start: 2022-01-01 | End: 2022-01-01

## 2022-01-01 RX ORDER — ACETAZOLAMIDE 250 MG/1
500 TABLET ORAL ONCE
Refills: 0 | Status: DISCONTINUED | OUTPATIENT
Start: 2022-01-01 | End: 2022-01-01

## 2022-01-01 RX ORDER — BUMETANIDE 0.25 MG/ML
1 INJECTION INTRAMUSCULAR; INTRAVENOUS
Qty: 20 | Refills: 0 | Status: DISCONTINUED | OUTPATIENT
Start: 2022-01-01 | End: 2022-01-01

## 2022-01-01 RX ORDER — DEXTROSE 50 % IN WATER 50 %
50 SYRINGE (ML) INTRAVENOUS ONCE
Refills: 0 | Status: COMPLETED | OUTPATIENT
Start: 2022-01-01 | End: 2022-01-01

## 2022-01-01 RX ORDER — VASOPRESSIN 20 [USP'U]/ML
0.04 INJECTION INTRAVENOUS
Qty: 40 | Refills: 0 | Status: DISCONTINUED | OUTPATIENT
Start: 2022-01-01 | End: 2022-01-01

## 2022-01-01 RX ORDER — FOLIC ACID 0.8 MG
1 TABLET ORAL DAILY
Refills: 0 | Status: DISCONTINUED | OUTPATIENT
Start: 2022-01-01 | End: 2022-01-01

## 2022-01-01 RX ORDER — SUCRALFATE 1 G
1 TABLET ORAL ONCE
Refills: 0 | Status: COMPLETED | OUTPATIENT
Start: 2022-01-01 | End: 2022-01-01

## 2022-01-01 RX ORDER — FUROSEMIDE 40 MG
1 TABLET ORAL
Qty: 0 | Refills: 0 | DISCHARGE

## 2022-01-01 RX ORDER — VANCOMYCIN HCL 1 G
1000 VIAL (EA) INTRAVENOUS ONCE
Refills: 0 | Status: DISCONTINUED | OUTPATIENT
Start: 2022-01-01 | End: 2022-01-01

## 2022-01-01 RX ORDER — FUROSEMIDE 40 MG
20 TABLET ORAL ONCE
Refills: 0 | Status: COMPLETED | OUTPATIENT
Start: 2022-01-01 | End: 2022-01-01

## 2022-01-01 RX ORDER — METOPROLOL TARTRATE 50 MG
0.5 TABLET ORAL
Qty: 0 | Refills: 0 | DISCHARGE

## 2022-01-01 RX ORDER — LANOLIN ALCOHOL/MO/W.PET/CERES
3 CREAM (GRAM) TOPICAL AT BEDTIME
Refills: 0 | Status: DISCONTINUED | OUTPATIENT
Start: 2022-01-01 | End: 2022-01-01

## 2022-01-01 RX ORDER — DEXMEDETOMIDINE HYDROCHLORIDE IN 0.9% SODIUM CHLORIDE 4 UG/ML
0.2 INJECTION INTRAVENOUS
Qty: 200 | Refills: 0 | Status: DISCONTINUED | OUTPATIENT
Start: 2022-01-01 | End: 2022-01-01

## 2022-01-01 RX ORDER — ABACAVIR 300 MG/1
300 TABLET ORAL
Qty: 60 | Refills: 2 | Status: ACTIVE | COMMUNITY
Start: 2020-03-31 | End: 1900-01-01

## 2022-01-01 RX ORDER — CEFAZOLIN SODIUM 1 G
2 VIAL (EA) INJECTION
Qty: 140 | Refills: 0
Start: 2022-01-01 | End: 2023-01-09

## 2022-01-01 RX ORDER — SODIUM BICARBONATE 1 MEQ/ML
50 SYRINGE (ML) INTRAVENOUS ONCE
Refills: 0 | Status: COMPLETED | OUTPATIENT
Start: 2022-01-01 | End: 2022-01-01

## 2022-01-01 RX ORDER — KETOCONAZOLE 20 MG/G
1 AEROSOL, FOAM TOPICAL
Qty: 0 | Refills: 0 | DISCHARGE

## 2022-01-01 RX ORDER — BUMETANIDE 0.25 MG/ML
2 INJECTION INTRAMUSCULAR; INTRAVENOUS ONCE
Refills: 0 | Status: DISCONTINUED | OUTPATIENT
Start: 2022-01-01 | End: 2022-01-01

## 2022-01-01 RX ORDER — BUMETANIDE 0.25 MG/ML
2 INJECTION INTRAMUSCULAR; INTRAVENOUS
Refills: 0 | Status: DISCONTINUED | OUTPATIENT
Start: 2022-01-01 | End: 2022-01-01

## 2022-01-01 RX ORDER — CEFEPIME 1 G/1
1000 INJECTION, POWDER, FOR SOLUTION INTRAMUSCULAR; INTRAVENOUS ONCE
Refills: 0 | Status: COMPLETED | OUTPATIENT
Start: 2022-01-01 | End: 2022-01-01

## 2022-01-01 RX ORDER — TELMISARTAN 20 MG/1
1 TABLET ORAL
Qty: 0 | Refills: 0 | DISCHARGE

## 2022-01-01 RX ORDER — MEROPENEM 1 G/30ML
500 INJECTION INTRAVENOUS ONCE
Refills: 0 | Status: COMPLETED | OUTPATIENT
Start: 2022-01-01 | End: 2022-01-01

## 2022-01-01 RX ORDER — LORATADINE 10 MG
17 TABLET,DISINTEGRATING ORAL DAILY
Qty: 1 | Refills: 1 | Status: DISCONTINUED | COMMUNITY
End: 2022-01-01

## 2022-01-01 RX ORDER — TAMSULOSIN HYDROCHLORIDE 0.4 MG/1
1 CAPSULE ORAL
Qty: 0 | Refills: 0 | DISCHARGE

## 2022-01-01 RX ORDER — DORAVIRINE 100 MG/1
100 TABLET, FILM COATED ORAL DAILY
Refills: 0 | Status: DISCONTINUED | OUTPATIENT
Start: 2022-01-01 | End: 2022-01-01

## 2022-01-01 RX ORDER — CEFAZOLIN SODIUM 1 G
2000 VIAL (EA) INJECTION EVERY 12 HOURS
Refills: 0 | Status: DISCONTINUED | OUTPATIENT
Start: 2022-01-01 | End: 2022-01-01

## 2022-01-01 RX ORDER — LAMIVUDINE 100 MG/1
100 TABLET, FILM COATED ORAL
Qty: 30 | Refills: 4 | Status: ACTIVE | COMMUNITY
Start: 2020-03-31 | End: 1900-01-01

## 2022-01-01 RX ORDER — ASCORBIC ACID 60 MG
1 TABLET,CHEWABLE ORAL
Qty: 0 | Refills: 0 | DISCHARGE

## 2022-01-01 RX ORDER — POTASSIUM CHLORIDE 20 MEQ
40 PACKET (EA) ORAL ONCE
Refills: 0 | Status: COMPLETED | OUTPATIENT
Start: 2022-01-01 | End: 2022-01-01

## 2022-01-01 RX ORDER — INFLUENZA VIRUS VACCINE 15; 15; 15; 15 UG/.5ML; UG/.5ML; UG/.5ML; UG/.5ML
0.7 SUSPENSION INTRAMUSCULAR ONCE
Refills: 0 | Status: DISCONTINUED | OUTPATIENT
Start: 2022-01-01 | End: 2022-01-01

## 2022-01-01 RX ORDER — PROPOFOL 10 MG/ML
10 INJECTION, EMULSION INTRAVENOUS
Qty: 1000 | Refills: 0 | Status: DISCONTINUED | OUTPATIENT
Start: 2022-01-01 | End: 2022-01-01

## 2022-01-01 RX ADMIN — FAMOTIDINE 20 MILLIGRAM(S): 10 INJECTION INTRAVENOUS at 13:32

## 2022-01-01 RX ADMIN — Medication 40 MILLIEQUIVALENT(S): at 03:12

## 2022-01-01 RX ADMIN — HEPARIN SODIUM 1700 UNIT(S)/HR: 5000 INJECTION INTRAVENOUS; SUBCUTANEOUS at 07:15

## 2022-01-01 RX ADMIN — DEXMEDETOMIDINE HYDROCHLORIDE IN 0.9% SODIUM CHLORIDE 4.08 MICROGRAM(S)/KG/HR: 4 INJECTION INTRAVENOUS at 17:40

## 2022-01-01 RX ADMIN — Medication 1 TABLET(S): at 11:23

## 2022-01-01 RX ADMIN — Medication 50 MILLILITER(S): at 04:36

## 2022-01-01 RX ADMIN — Medication 81 MILLIGRAM(S): at 11:41

## 2022-01-01 RX ADMIN — CHLORHEXIDINE GLUCONATE 1 APPLICATION(S): 213 SOLUTION TOPICAL at 13:15

## 2022-01-01 RX ADMIN — Medication 100 MILLIGRAM(S): at 11:53

## 2022-01-01 RX ADMIN — Medication 30 MILLILITER(S): at 05:46

## 2022-01-01 RX ADMIN — Medication 500 MILLIGRAM(S): at 13:15

## 2022-01-01 RX ADMIN — Medication 1 TABLET(S): at 11:10

## 2022-01-01 RX ADMIN — VASOPRESSIN 6 UNIT(S)/MIN: 20 INJECTION INTRAVENOUS at 11:30

## 2022-01-01 RX ADMIN — Medication 30 MILLILITER(S): at 23:08

## 2022-01-01 RX ADMIN — Medication 5 MILLIGRAM(S): at 12:12

## 2022-01-01 RX ADMIN — Medication 81 MILLIGRAM(S): at 11:10

## 2022-01-01 RX ADMIN — FENTANYL CITRATE 100 MICROGRAM(S): 50 INJECTION INTRAVENOUS at 10:30

## 2022-01-01 RX ADMIN — CHLORHEXIDINE GLUCONATE 1 APPLICATION(S): 213 SOLUTION TOPICAL at 05:47

## 2022-01-01 RX ADMIN — ABACAVIR 300 MILLIGRAM(S): 20 SOLUTION ORAL at 18:40

## 2022-01-01 RX ADMIN — Medication 325 MILLIGRAM(S): at 11:03

## 2022-01-01 RX ADMIN — Medication 7.65 MICROGRAM(S)/KG/MIN: at 13:23

## 2022-01-01 RX ADMIN — ABACAVIR 300 MILLIGRAM(S): 20 SOLUTION ORAL at 20:21

## 2022-01-01 RX ADMIN — Medication 100 MILLIGRAM(S): at 09:12

## 2022-01-01 RX ADMIN — Medication 81 MILLIGRAM(S): at 11:09

## 2022-01-01 RX ADMIN — Medication 100 MILLIGRAM(S): at 13:16

## 2022-01-01 RX ADMIN — APIXABAN 2.5 MILLIGRAM(S): 2.5 TABLET, FILM COATED ORAL at 06:31

## 2022-01-01 RX ADMIN — FAMOTIDINE 20 MILLIGRAM(S): 10 INJECTION INTRAVENOUS at 13:37

## 2022-01-01 RX ADMIN — Medication 100 MILLIEQUIVALENT(S): at 12:43

## 2022-01-01 RX ADMIN — SODIUM ZIRCONIUM CYCLOSILICATE 10 GRAM(S): 10 POWDER, FOR SUSPENSION ORAL at 05:46

## 2022-01-01 RX ADMIN — Medication 100 MILLIGRAM(S): at 12:10

## 2022-01-01 RX ADMIN — BUMETANIDE 15 MG/HR: 0.25 INJECTION INTRAMUSCULAR; INTRAVENOUS at 09:49

## 2022-01-01 RX ADMIN — HEPARIN SODIUM 5000 UNIT(S): 5000 INJECTION INTRAVENOUS; SUBCUTANEOUS at 21:26

## 2022-01-01 RX ADMIN — HEPARIN SODIUM 1700 UNIT(S)/HR: 5000 INJECTION INTRAVENOUS; SUBCUTANEOUS at 14:37

## 2022-01-01 RX ADMIN — Medication 1 TABLET(S): at 11:12

## 2022-01-01 RX ADMIN — APIXABAN 2.5 MILLIGRAM(S): 2.5 TABLET, FILM COATED ORAL at 05:10

## 2022-01-01 RX ADMIN — Medication 100 MILLIGRAM(S): at 12:11

## 2022-01-01 RX ADMIN — DEXMEDETOMIDINE HYDROCHLORIDE IN 0.9% SODIUM CHLORIDE 4.08 MICROGRAM(S)/KG/HR: 4 INJECTION INTRAVENOUS at 20:00

## 2022-01-01 RX ADMIN — BUMETANIDE 124 MILLIGRAM(S): 0.25 INJECTION INTRAMUSCULAR; INTRAVENOUS at 00:21

## 2022-01-01 RX ADMIN — DEXMEDETOMIDINE HYDROCHLORIDE IN 0.9% SODIUM CHLORIDE 4.08 MICROGRAM(S)/KG/HR: 4 INJECTION INTRAVENOUS at 14:46

## 2022-01-01 RX ADMIN — Medication 325 MILLIGRAM(S): at 11:11

## 2022-01-01 RX ADMIN — HEPARIN SODIUM 5000 UNIT(S): 5000 INJECTION INTRAVENOUS; SUBCUTANEOUS at 05:13

## 2022-01-01 RX ADMIN — Medication 650 MILLIGRAM(S): at 07:04

## 2022-01-01 RX ADMIN — Medication 1 MILLIGRAM(S): at 11:05

## 2022-01-01 RX ADMIN — PROPOFOL 14.7 MICROGRAM(S)/KG/MIN: 10 INJECTION, EMULSION INTRAVENOUS at 17:15

## 2022-01-01 RX ADMIN — FAMOTIDINE 20 MILLIGRAM(S): 10 INJECTION INTRAVENOUS at 11:41

## 2022-01-01 RX ADMIN — HEPARIN SODIUM 5000 UNIT(S): 5000 INJECTION INTRAVENOUS; SUBCUTANEOUS at 05:30

## 2022-01-01 RX ADMIN — HEPARIN SODIUM 5000 UNIT(S): 5000 INJECTION INTRAVENOUS; SUBCUTANEOUS at 05:21

## 2022-01-01 RX ADMIN — FAMOTIDINE 20 MILLIGRAM(S): 10 INJECTION INTRAVENOUS at 11:11

## 2022-01-01 RX ADMIN — ABACAVIR 300 MILLIGRAM(S): 20 SOLUTION ORAL at 05:47

## 2022-01-01 RX ADMIN — ABACAVIR 600 MILLIGRAM(S): 20 SOLUTION ORAL at 12:09

## 2022-01-01 RX ADMIN — Medication 1 TABLET(S): at 12:02

## 2022-01-01 RX ADMIN — Medication 650 MILLIGRAM(S): at 20:30

## 2022-01-01 RX ADMIN — HEPARIN SODIUM 6500 UNIT(S): 5000 INJECTION INTRAVENOUS; SUBCUTANEOUS at 19:35

## 2022-01-01 RX ADMIN — ALBUTEROL 2.5 MILLIGRAM(S): 90 AEROSOL, METERED ORAL at 13:05

## 2022-01-01 RX ADMIN — Medication 200 GRAM(S): at 02:09

## 2022-01-01 RX ADMIN — PIPERACILLIN AND TAZOBACTAM 25 GRAM(S): 4; .5 INJECTION, POWDER, LYOPHILIZED, FOR SOLUTION INTRAVENOUS at 23:30

## 2022-01-01 RX ADMIN — DORAVIRINE 100 MILLIGRAM(S): 100 TABLET, FILM COATED ORAL at 11:03

## 2022-01-01 RX ADMIN — LACTULOSE 10 GRAM(S): 10 SOLUTION ORAL at 13:32

## 2022-01-01 RX ADMIN — Medication 200 GRAM(S): at 00:52

## 2022-01-01 RX ADMIN — Medication 100 MILLIGRAM(S): at 11:03

## 2022-01-01 RX ADMIN — Medication 81 MILLIGRAM(S): at 13:37

## 2022-01-01 RX ADMIN — Medication 100 MILLIGRAM(S): at 05:29

## 2022-01-01 RX ADMIN — Medication 650 MILLIGRAM(S): at 16:41

## 2022-01-01 RX ADMIN — Medication 12.5 MILLIGRAM(S): at 05:20

## 2022-01-01 RX ADMIN — FENTANYL CITRATE 100 MICROGRAM(S): 50 INJECTION INTRAVENOUS at 10:45

## 2022-01-01 RX ADMIN — ABACAVIR 300 MILLIGRAM(S): 20 SOLUTION ORAL at 17:39

## 2022-01-01 RX ADMIN — Medication 650 MILLIGRAM(S): at 05:14

## 2022-01-01 RX ADMIN — HEPARIN SODIUM 5000 UNIT(S): 5000 INJECTION INTRAVENOUS; SUBCUTANEOUS at 14:16

## 2022-01-01 RX ADMIN — Medication 325 MILLIGRAM(S): at 14:09

## 2022-01-01 RX ADMIN — Medication 1 MILLIGRAM(S): at 13:30

## 2022-01-01 RX ADMIN — HEPARIN SODIUM 5000 UNIT(S): 5000 INJECTION INTRAVENOUS; SUBCUTANEOUS at 05:47

## 2022-01-01 RX ADMIN — HEPARIN SODIUM 1700 UNIT(S)/HR: 5000 INJECTION INTRAVENOUS; SUBCUTANEOUS at 01:14

## 2022-01-01 RX ADMIN — Medication 500 MILLIGRAM(S): at 11:11

## 2022-01-01 RX ADMIN — Medication 250 MILLIGRAM(S): at 13:09

## 2022-01-01 RX ADMIN — Medication 100 MILLIEQUIVALENT(S): at 12:41

## 2022-01-01 RX ADMIN — Medication 100 MILLIGRAM(S): at 05:16

## 2022-01-01 RX ADMIN — Medication 1 TABLET(S): at 11:07

## 2022-01-01 RX ADMIN — Medication 7.65 MICROGRAM(S)/KG/MIN: at 09:49

## 2022-01-01 RX ADMIN — HEPARIN SODIUM 5000 UNIT(S): 5000 INJECTION INTRAVENOUS; SUBCUTANEOUS at 21:35

## 2022-01-01 RX ADMIN — Medication 12.5 MILLIGRAM(S): at 05:16

## 2022-01-01 RX ADMIN — CHLORHEXIDINE GLUCONATE 15 MILLILITER(S): 213 SOLUTION TOPICAL at 17:09

## 2022-01-01 RX ADMIN — Medication 325 MILLIGRAM(S): at 12:09

## 2022-01-01 RX ADMIN — DORAVIRINE 100 MILLIGRAM(S): 100 TABLET, FILM COATED ORAL at 11:12

## 2022-01-01 RX ADMIN — Medication 30 MILLILITER(S): at 05:13

## 2022-01-01 RX ADMIN — BUMETANIDE 15 MG/HR: 0.25 INJECTION INTRAMUSCULAR; INTRAVENOUS at 11:10

## 2022-01-01 RX ADMIN — Medication 100 MILLIGRAM(S): at 12:02

## 2022-01-01 RX ADMIN — HEPARIN SODIUM 1700 UNIT(S)/HR: 5000 INJECTION INTRAVENOUS; SUBCUTANEOUS at 07:50

## 2022-01-01 RX ADMIN — ABACAVIR 300 MILLIGRAM(S): 20 SOLUTION ORAL at 17:43

## 2022-01-01 RX ADMIN — Medication 7.65 MICROGRAM(S)/KG/MIN: at 21:05

## 2022-01-01 RX ADMIN — DESMOPRESSIN ACETATE 224 MICROGRAM(S): 0.1 TABLET ORAL at 19:00

## 2022-01-01 RX ADMIN — CHLORHEXIDINE GLUCONATE 15 MILLILITER(S): 213 SOLUTION TOPICAL at 05:47

## 2022-01-01 RX ADMIN — Medication 1 MILLIGRAM(S): at 14:10

## 2022-01-01 RX ADMIN — Medication 81 MILLIGRAM(S): at 12:09

## 2022-01-01 RX ADMIN — Medication 650 MILLIGRAM(S): at 00:20

## 2022-01-01 RX ADMIN — Medication 81 MILLIGRAM(S): at 13:32

## 2022-01-01 RX ADMIN — Medication 50 MILLILITER(S): at 16:59

## 2022-01-01 RX ADMIN — Medication 30 MILLILITER(S): at 19:38

## 2022-01-01 RX ADMIN — DEXMEDETOMIDINE HYDROCHLORIDE IN 0.9% SODIUM CHLORIDE 4.08 MICROGRAM(S)/KG/HR: 4 INJECTION INTRAVENOUS at 07:59

## 2022-01-01 RX ADMIN — Medication 12.5 MILLIGRAM(S): at 05:17

## 2022-01-01 RX ADMIN — Medication 500 MILLIGRAM(S): at 11:04

## 2022-01-01 RX ADMIN — Medication 100 MILLIGRAM(S): at 11:10

## 2022-01-01 RX ADMIN — Medication 100 MILLIGRAM(S): at 17:17

## 2022-01-01 RX ADMIN — HEPARIN SODIUM 1500 UNIT(S)/HR: 5000 INJECTION INTRAVENOUS; SUBCUTANEOUS at 04:10

## 2022-01-01 RX ADMIN — ABACAVIR 300 MILLIGRAM(S): 20 SOLUTION ORAL at 05:28

## 2022-01-01 RX ADMIN — Medication 500 MILLIGRAM(S): at 13:29

## 2022-01-01 RX ADMIN — CHLORHEXIDINE GLUCONATE 1 APPLICATION(S): 213 SOLUTION TOPICAL at 21:51

## 2022-01-01 RX ADMIN — HEPARIN SODIUM 5000 UNIT(S): 5000 INJECTION INTRAVENOUS; SUBCUTANEOUS at 21:51

## 2022-01-01 RX ADMIN — HEPARIN SODIUM 5000 UNIT(S): 5000 INJECTION INTRAVENOUS; SUBCUTANEOUS at 14:09

## 2022-01-01 RX ADMIN — ABACAVIR 600 MILLIGRAM(S): 20 SOLUTION ORAL at 13:32

## 2022-01-01 RX ADMIN — BUMETANIDE 15 MG/HR: 0.25 INJECTION INTRAMUSCULAR; INTRAVENOUS at 02:43

## 2022-01-01 RX ADMIN — Medication 30 MILLILITER(S): at 17:08

## 2022-01-01 RX ADMIN — Medication 1 TABLET(S): at 11:03

## 2022-01-01 RX ADMIN — ABACAVIR 600 MILLIGRAM(S): 20 SOLUTION ORAL at 12:08

## 2022-01-01 RX ADMIN — Medication 100 MILLIGRAM(S): at 06:31

## 2022-01-01 RX ADMIN — Medication 650 MILLIGRAM(S): at 13:53

## 2022-01-01 RX ADMIN — PROPOFOL 14.7 MICROGRAM(S)/KG/MIN: 10 INJECTION, EMULSION INTRAVENOUS at 09:39

## 2022-01-01 RX ADMIN — Medication 100 MILLIGRAM(S): at 17:12

## 2022-01-01 RX ADMIN — Medication 325 MILLIGRAM(S): at 11:23

## 2022-01-01 RX ADMIN — Medication 325 MILLIGRAM(S): at 11:04

## 2022-01-01 RX ADMIN — ABACAVIR 300 MILLIGRAM(S): 20 SOLUTION ORAL at 06:30

## 2022-01-01 RX ADMIN — CEFTRIAXONE 100 MILLIGRAM(S): 500 INJECTION, POWDER, FOR SOLUTION INTRAMUSCULAR; INTRAVENOUS at 16:15

## 2022-01-01 RX ADMIN — ACETAZOLAMIDE 500 MILLIGRAM(S): 250 TABLET ORAL at 12:33

## 2022-01-01 RX ADMIN — CHLORHEXIDINE GLUCONATE 1 APPLICATION(S): 213 SOLUTION TOPICAL at 05:09

## 2022-01-01 RX ADMIN — Medication 650 MILLIGRAM(S): at 14:10

## 2022-01-01 RX ADMIN — Medication 81 MILLIGRAM(S): at 13:15

## 2022-01-01 RX ADMIN — CHLORHEXIDINE GLUCONATE 15 MILLILITER(S): 213 SOLUTION TOPICAL at 05:14

## 2022-01-01 RX ADMIN — SENNA PLUS 2 TABLET(S): 8.6 TABLET ORAL at 21:41

## 2022-01-01 RX ADMIN — CHLORHEXIDINE GLUCONATE 1 APPLICATION(S): 213 SOLUTION TOPICAL at 05:14

## 2022-01-01 RX ADMIN — FAMOTIDINE 20 MILLIGRAM(S): 10 INJECTION INTRAVENOUS at 11:24

## 2022-01-01 RX ADMIN — Medication 100 MILLIGRAM(S): at 05:02

## 2022-01-01 RX ADMIN — Medication 7.65 MICROGRAM(S)/KG/MIN: at 00:01

## 2022-01-01 RX ADMIN — Medication 650 MILLIGRAM(S): at 06:10

## 2022-01-01 RX ADMIN — Medication 325 MILLIGRAM(S): at 13:30

## 2022-01-01 RX ADMIN — Medication 100 MILLIGRAM(S): at 17:43

## 2022-01-01 RX ADMIN — Medication 7.65 MICROGRAM(S)/KG/MIN: at 03:10

## 2022-01-01 RX ADMIN — HEPARIN SODIUM 1500 UNIT(S)/HR: 5000 INJECTION INTRAVENOUS; SUBCUTANEOUS at 19:37

## 2022-01-01 RX ADMIN — INSULIN HUMAN 5 UNIT(S): 100 INJECTION, SOLUTION SUBCUTANEOUS at 00:52

## 2022-01-01 RX ADMIN — SODIUM ZIRCONIUM CYCLOSILICATE 10 GRAM(S): 10 POWDER, FOR SUSPENSION ORAL at 22:48

## 2022-01-01 RX ADMIN — APIXABAN 2.5 MILLIGRAM(S): 2.5 TABLET, FILM COATED ORAL at 17:39

## 2022-01-01 RX ADMIN — BUMETANIDE 15 MG/HR: 0.25 INJECTION INTRAMUSCULAR; INTRAVENOUS at 03:18

## 2022-01-01 RX ADMIN — Medication 81 MILLIGRAM(S): at 14:14

## 2022-01-01 RX ADMIN — HEPARIN SODIUM 1500 UNIT(S)/HR: 5000 INJECTION INTRAVENOUS; SUBCUTANEOUS at 07:25

## 2022-01-01 RX ADMIN — CEFTRIAXONE 100 MILLIGRAM(S): 500 INJECTION, POWDER, FOR SOLUTION INTRAMUSCULAR; INTRAVENOUS at 15:13

## 2022-01-01 RX ADMIN — ABACAVIR 300 MILLIGRAM(S): 20 SOLUTION ORAL at 17:01

## 2022-01-01 RX ADMIN — Medication 400 MILLIGRAM(S): at 00:37

## 2022-01-01 RX ADMIN — Medication 100 MILLIEQUIVALENT(S): at 12:20

## 2022-01-01 RX ADMIN — BUMETANIDE 15 MG/HR: 0.25 INJECTION INTRAMUSCULAR; INTRAVENOUS at 05:17

## 2022-01-01 RX ADMIN — Medication 50 MILLILITER(S): at 00:52

## 2022-01-01 RX ADMIN — FENTANYL CITRATE 50 MICROGRAM(S): 50 INJECTION INTRAVENOUS at 04:30

## 2022-01-01 RX ADMIN — Medication 325 MILLIGRAM(S): at 11:40

## 2022-01-01 RX ADMIN — Medication 100 MILLIGRAM(S): at 05:14

## 2022-01-01 RX ADMIN — Medication 30 MILLILITER(S): at 23:14

## 2022-01-01 RX ADMIN — BUMETANIDE 15 MG/HR: 0.25 INJECTION INTRAMUSCULAR; INTRAVENOUS at 17:15

## 2022-01-01 RX ADMIN — Medication 1 MILLIGRAM(S): at 12:01

## 2022-01-01 RX ADMIN — Medication 30 MILLILITER(S): at 12:51

## 2022-01-01 RX ADMIN — ONDANSETRON 4 MILLIGRAM(S): 8 TABLET, FILM COATED ORAL at 14:26

## 2022-01-01 RX ADMIN — INSULIN HUMAN 5 UNIT(S): 100 INJECTION, SOLUTION SUBCUTANEOUS at 12:01

## 2022-01-01 RX ADMIN — Medication 1 MILLIGRAM(S): at 11:40

## 2022-01-01 RX ADMIN — CEFEPIME 100 MILLIGRAM(S): 1 INJECTION, POWDER, FOR SOLUTION INTRAMUSCULAR; INTRAVENOUS at 17:11

## 2022-01-01 RX ADMIN — Medication 100 MILLIGRAM(S): at 05:10

## 2022-01-01 RX ADMIN — INSULIN HUMAN 10 UNIT(S): 100 INJECTION, SOLUTION SUBCUTANEOUS at 22:47

## 2022-01-01 RX ADMIN — DORAVIRINE 100 MILLIGRAM(S): 100 TABLET, FILM COATED ORAL at 12:01

## 2022-01-01 RX ADMIN — ABACAVIR 300 MILLIGRAM(S): 20 SOLUTION ORAL at 17:41

## 2022-01-01 RX ADMIN — Medication 325 MILLIGRAM(S): at 13:32

## 2022-01-01 RX ADMIN — Medication 7.65 MICROGRAM(S)/KG/MIN: at 09:13

## 2022-01-01 RX ADMIN — Medication 325 MILLIGRAM(S): at 12:12

## 2022-01-01 RX ADMIN — HEPARIN SODIUM 5000 UNIT(S): 5000 INJECTION INTRAVENOUS; SUBCUTANEOUS at 21:19

## 2022-01-01 RX ADMIN — CEFTRIAXONE 100 MILLIGRAM(S): 500 INJECTION, POWDER, FOR SOLUTION INTRAMUSCULAR; INTRAVENOUS at 15:24

## 2022-01-01 RX ADMIN — ABACAVIR 300 MILLIGRAM(S): 20 SOLUTION ORAL at 17:09

## 2022-01-01 RX ADMIN — HEPARIN SODIUM 1500 UNIT(S)/HR: 5000 INJECTION INTRAVENOUS; SUBCUTANEOUS at 19:24

## 2022-01-01 RX ADMIN — Medication 325 MILLIGRAM(S): at 13:15

## 2022-01-01 RX ADMIN — Medication 100 MILLIGRAM(S): at 12:09

## 2022-01-01 RX ADMIN — ABACAVIR 300 MILLIGRAM(S): 20 SOLUTION ORAL at 05:03

## 2022-01-01 RX ADMIN — Medication 7.65 MICROGRAM(S)/KG/MIN: at 05:27

## 2022-01-01 RX ADMIN — Medication 500 MILLIGRAM(S): at 14:10

## 2022-01-01 RX ADMIN — Medication 325 MILLIGRAM(S): at 21:26

## 2022-01-01 RX ADMIN — HEPARIN SODIUM 5000 UNIT(S): 5000 INJECTION INTRAVENOUS; SUBCUTANEOUS at 05:16

## 2022-01-01 RX ADMIN — Medication 3 MILLILITER(S): at 13:20

## 2022-01-01 RX ADMIN — HEPARIN SODIUM 5000 UNIT(S): 5000 INJECTION INTRAVENOUS; SUBCUTANEOUS at 21:46

## 2022-01-01 RX ADMIN — Medication 100 MILLIGRAM(S): at 05:09

## 2022-01-01 RX ADMIN — FENTANYL CITRATE 50 MICROGRAM(S): 50 INJECTION INTRAVENOUS at 04:40

## 2022-01-01 RX ADMIN — SODIUM ZIRCONIUM CYCLOSILICATE 10 GRAM(S): 10 POWDER, FOR SUSPENSION ORAL at 00:53

## 2022-01-01 RX ADMIN — Medication 975 MILLIGRAM(S): at 21:00

## 2022-01-01 RX ADMIN — PROPOFOL 14.7 MICROGRAM(S)/KG/MIN: 10 INJECTION, EMULSION INTRAVENOUS at 09:12

## 2022-01-01 RX ADMIN — HEPARIN SODIUM 5000 UNIT(S): 5000 INJECTION INTRAVENOUS; SUBCUTANEOUS at 13:31

## 2022-01-01 RX ADMIN — HEPARIN SODIUM 5000 UNIT(S): 5000 INJECTION INTRAVENOUS; SUBCUTANEOUS at 12:10

## 2022-01-01 RX ADMIN — HEPARIN SODIUM 5000 UNIT(S): 5000 INJECTION INTRAVENOUS; SUBCUTANEOUS at 13:05

## 2022-01-01 RX ADMIN — HEPARIN SODIUM 5000 UNIT(S): 5000 INJECTION INTRAVENOUS; SUBCUTANEOUS at 21:05

## 2022-01-01 RX ADMIN — Medication 100 MILLIGRAM(S): at 20:17

## 2022-01-01 RX ADMIN — DEXMEDETOMIDINE HYDROCHLORIDE IN 0.9% SODIUM CHLORIDE 4.08 MICROGRAM(S)/KG/HR: 4 INJECTION INTRAVENOUS at 18:49

## 2022-01-01 RX ADMIN — FAMOTIDINE 20 MILLIGRAM(S): 10 INJECTION INTRAVENOUS at 14:09

## 2022-01-01 RX ADMIN — Medication 1 MILLIGRAM(S): at 13:38

## 2022-01-01 RX ADMIN — Medication 81 MILLIGRAM(S): at 11:08

## 2022-01-01 RX ADMIN — ABACAVIR 300 MILLIGRAM(S): 20 SOLUTION ORAL at 05:24

## 2022-01-01 RX ADMIN — BUMETANIDE 15 MG/HR: 0.25 INJECTION INTRAMUSCULAR; INTRAVENOUS at 02:50

## 2022-01-01 RX ADMIN — Medication 0.5 MILLIGRAM(S): at 14:08

## 2022-01-01 RX ADMIN — Medication 81 MILLIGRAM(S): at 21:26

## 2022-01-01 RX ADMIN — BUMETANIDE 15 MG/HR: 0.25 INJECTION INTRAMUSCULAR; INTRAVENOUS at 21:31

## 2022-01-01 RX ADMIN — Medication 100 MILLIGRAM(S): at 18:13

## 2022-01-01 RX ADMIN — ONDANSETRON 4 MILLIGRAM(S): 8 TABLET, FILM COATED ORAL at 09:38

## 2022-01-01 RX ADMIN — ABACAVIR 300 MILLIGRAM(S): 20 SOLUTION ORAL at 17:23

## 2022-01-01 RX ADMIN — Medication 81 MILLIGRAM(S): at 11:04

## 2022-01-01 RX ADMIN — CHLORHEXIDINE GLUCONATE 15 MILLILITER(S): 213 SOLUTION TOPICAL at 17:14

## 2022-01-01 RX ADMIN — Medication 100 MILLIEQUIVALENT(S): at 15:30

## 2022-01-01 RX ADMIN — PIPERACILLIN AND TAZOBACTAM 200 GRAM(S): 4; .5 INJECTION, POWDER, LYOPHILIZED, FOR SOLUTION INTRAVENOUS at 15:30

## 2022-01-01 RX ADMIN — Medication 1 MILLIGRAM(S): at 11:08

## 2022-01-01 RX ADMIN — Medication 500 MILLIGRAM(S): at 13:39

## 2022-01-01 RX ADMIN — BUMETANIDE 10 MG/HR: 0.25 INJECTION INTRAMUSCULAR; INTRAVENOUS at 17:20

## 2022-01-01 RX ADMIN — Medication 100 MILLIGRAM(S): at 13:31

## 2022-01-01 RX ADMIN — HEPARIN SODIUM 1500 UNIT(S)/HR: 5000 INJECTION INTRAVENOUS; SUBCUTANEOUS at 11:23

## 2022-01-01 RX ADMIN — Medication 7.65 MICROGRAM(S)/KG/MIN: at 07:59

## 2022-01-01 RX ADMIN — ONDANSETRON 4 MILLIGRAM(S): 8 TABLET, FILM COATED ORAL at 06:00

## 2022-01-01 RX ADMIN — CHLORHEXIDINE GLUCONATE 1 APPLICATION(S): 213 SOLUTION TOPICAL at 14:10

## 2022-01-01 RX ADMIN — DORAVIRINE 100 MILLIGRAM(S): 100 TABLET, FILM COATED ORAL at 13:17

## 2022-01-01 RX ADMIN — HEPARIN SODIUM 5000 UNIT(S): 5000 INJECTION INTRAVENOUS; SUBCUTANEOUS at 13:33

## 2022-01-01 RX ADMIN — SENNA PLUS 2 TABLET(S): 8.6 TABLET ORAL at 21:13

## 2022-01-01 RX ADMIN — Medication 1 TABLET(S): at 11:09

## 2022-01-01 RX ADMIN — Medication 7.65 MICROGRAM(S)/KG/MIN: at 06:37

## 2022-01-01 RX ADMIN — ABACAVIR 300 MILLIGRAM(S): 20 SOLUTION ORAL at 05:09

## 2022-01-01 RX ADMIN — Medication 3 MILLIGRAM(S): at 23:42

## 2022-01-01 RX ADMIN — FAMOTIDINE 20 MILLIGRAM(S): 10 INJECTION INTRAVENOUS at 11:04

## 2022-01-01 RX ADMIN — BUMETANIDE 15 MG/HR: 0.25 INJECTION INTRAMUSCULAR; INTRAVENOUS at 08:08

## 2022-01-01 RX ADMIN — CHLORHEXIDINE GLUCONATE 15 MILLILITER(S): 213 SOLUTION TOPICAL at 17:02

## 2022-01-01 RX ADMIN — Medication 325 MILLIGRAM(S): at 11:14

## 2022-01-01 RX ADMIN — HEPARIN SODIUM 1700 UNIT(S)/HR: 5000 INJECTION INTRAVENOUS; SUBCUTANEOUS at 07:12

## 2022-01-01 RX ADMIN — Medication 12.5 MILLIGRAM(S): at 05:21

## 2022-01-01 RX ADMIN — Medication 1 MILLIGRAM(S): at 11:09

## 2022-01-01 RX ADMIN — Medication 100 MILLIGRAM(S): at 14:09

## 2022-01-01 RX ADMIN — ABACAVIR 600 MILLIGRAM(S): 20 SOLUTION ORAL at 11:53

## 2022-01-01 RX ADMIN — Medication 650 MILLIGRAM(S): at 21:13

## 2022-01-01 RX ADMIN — HEPARIN SODIUM 5000 UNIT(S): 5000 INJECTION INTRAVENOUS; SUBCUTANEOUS at 05:14

## 2022-01-01 RX ADMIN — PROPOFOL 14.7 MICROGRAM(S)/KG/MIN: 10 INJECTION, EMULSION INTRAVENOUS at 21:04

## 2022-01-01 RX ADMIN — HEPARIN SODIUM 5000 UNIT(S): 5000 INJECTION INTRAVENOUS; SUBCUTANEOUS at 13:14

## 2022-01-01 RX ADMIN — Medication 500 MILLIGRAM(S): at 12:01

## 2022-01-01 RX ADMIN — PROPOFOL 14.7 MICROGRAM(S)/KG/MIN: 10 INJECTION, EMULSION INTRAVENOUS at 06:21

## 2022-01-01 RX ADMIN — Medication 7.65 MICROGRAM(S)/KG/MIN: at 08:06

## 2022-01-01 RX ADMIN — Medication 1 MILLIGRAM(S): at 11:03

## 2022-01-01 RX ADMIN — Medication 30 MILLILITER(S): at 21:37

## 2022-01-01 RX ADMIN — ABACAVIR 600 MILLIGRAM(S): 20 SOLUTION ORAL at 21:27

## 2022-01-01 RX ADMIN — ABACAVIR 300 MILLIGRAM(S): 20 SOLUTION ORAL at 17:11

## 2022-01-01 RX ADMIN — Medication 30 MILLILITER(S): at 11:40

## 2022-01-01 RX ADMIN — Medication 100 MILLIGRAM(S): at 17:38

## 2022-01-01 RX ADMIN — Medication 81 MILLIGRAM(S): at 11:53

## 2022-01-01 RX ADMIN — SODIUM ZIRCONIUM CYCLOSILICATE 10 GRAM(S): 10 POWDER, FOR SUSPENSION ORAL at 11:24

## 2022-01-01 RX ADMIN — Medication 325 MILLIGRAM(S): at 11:53

## 2022-01-01 RX ADMIN — DORAVIRINE 100 MILLIGRAM(S): 100 TABLET, FILM COATED ORAL at 11:40

## 2022-01-01 RX ADMIN — Medication 1 MILLIGRAM(S): at 11:11

## 2022-01-01 RX ADMIN — Medication 3 MILLILITER(S): at 23:54

## 2022-01-01 RX ADMIN — Medication 7.65 MICROGRAM(S)/KG/MIN: at 09:38

## 2022-01-01 RX ADMIN — Medication 100 MILLIGRAM(S): at 17:23

## 2022-01-01 RX ADMIN — PROPOFOL 14.7 MICROGRAM(S)/KG/MIN: 10 INJECTION, EMULSION INTRAVENOUS at 03:18

## 2022-01-01 RX ADMIN — Medication 650 MILLIGRAM(S): at 14:11

## 2022-01-01 RX ADMIN — CHLORHEXIDINE GLUCONATE 1 APPLICATION(S): 213 SOLUTION TOPICAL at 13:31

## 2022-01-01 RX ADMIN — Medication 40 MILLIGRAM(S): at 21:30

## 2022-01-01 RX ADMIN — Medication 30 MILLILITER(S): at 17:03

## 2022-01-01 RX ADMIN — ABACAVIR 300 MILLIGRAM(S): 20 SOLUTION ORAL at 05:14

## 2022-01-01 RX ADMIN — BUMETANIDE 15 MG/HR: 0.25 INJECTION INTRAMUSCULAR; INTRAVENOUS at 18:19

## 2022-01-01 RX ADMIN — Medication 7.65 MICROGRAM(S)/KG/MIN: at 18:36

## 2022-01-01 RX ADMIN — Medication 50 MILLILITER(S): at 01:56

## 2022-01-01 RX ADMIN — Medication 650 MILLIGRAM(S): at 00:37

## 2022-01-01 RX ADMIN — Medication 30 MILLILITER(S): at 10:58

## 2022-01-01 RX ADMIN — SODIUM CHLORIDE 1000 MILLILITER(S): 9 INJECTION INTRAMUSCULAR; INTRAVENOUS; SUBCUTANEOUS at 21:01

## 2022-01-01 RX ADMIN — ABACAVIR 300 MILLIGRAM(S): 20 SOLUTION ORAL at 05:25

## 2022-01-01 RX ADMIN — Medication 325 MILLIGRAM(S): at 13:00

## 2022-01-01 RX ADMIN — HEPARIN SODIUM 5000 UNIT(S): 5000 INJECTION INTRAVENOUS; SUBCUTANEOUS at 05:20

## 2022-01-01 RX ADMIN — Medication 50 MEQ/KG/HR: at 17:00

## 2022-01-01 RX ADMIN — CHLORHEXIDINE GLUCONATE 1 APPLICATION(S): 213 SOLUTION TOPICAL at 05:28

## 2022-01-01 RX ADMIN — Medication 81 MILLIGRAM(S): at 12:01

## 2022-01-01 RX ADMIN — APIXABAN 2.5 MILLIGRAM(S): 2.5 TABLET, FILM COATED ORAL at 05:24

## 2022-01-01 RX ADMIN — CHLORHEXIDINE GLUCONATE 1 APPLICATION(S): 213 SOLUTION TOPICAL at 11:13

## 2022-01-01 RX ADMIN — Medication 30 MILLILITER(S): at 17:42

## 2022-01-01 RX ADMIN — DORAVIRINE 100 MILLIGRAM(S): 100 TABLET, FILM COATED ORAL at 11:10

## 2022-01-01 RX ADMIN — DEXMEDETOMIDINE HYDROCHLORIDE IN 0.9% SODIUM CHLORIDE 4.08 MICROGRAM(S)/KG/HR: 4 INJECTION INTRAVENOUS at 23:30

## 2022-01-01 RX ADMIN — PROPOFOL 14.7 MICROGRAM(S)/KG/MIN: 10 INJECTION, EMULSION INTRAVENOUS at 02:32

## 2022-01-01 RX ADMIN — SENNA PLUS 2 TABLET(S): 8.6 TABLET ORAL at 21:05

## 2022-01-01 RX ADMIN — DORAVIRINE 100 MILLIGRAM(S): 100 TABLET, FILM COATED ORAL at 13:31

## 2022-01-01 RX ADMIN — CHLORHEXIDINE GLUCONATE 15 MILLILITER(S): 213 SOLUTION TOPICAL at 05:09

## 2022-01-01 RX ADMIN — CHLORHEXIDINE GLUCONATE 15 MILLILITER(S): 213 SOLUTION TOPICAL at 05:28

## 2022-01-01 RX ADMIN — Medication 1 TABLET(S): at 14:09

## 2022-01-01 RX ADMIN — Medication 81 MILLIGRAM(S): at 11:11

## 2022-01-01 RX ADMIN — HEPARIN SODIUM 5000 UNIT(S): 5000 INJECTION INTRAVENOUS; SUBCUTANEOUS at 05:09

## 2022-01-01 RX ADMIN — Medication 2 MILLIGRAM(S): at 10:57

## 2022-01-01 RX ADMIN — CEFTRIAXONE 100 MILLIGRAM(S): 500 INJECTION, POWDER, FOR SOLUTION INTRAMUSCULAR; INTRAVENOUS at 16:37

## 2022-01-01 RX ADMIN — Medication 100 MILLIGRAM(S): at 17:00

## 2022-01-01 RX ADMIN — ALBUTEROL 2.5 MILLIGRAM(S): 90 AEROSOL, METERED ORAL at 13:06

## 2022-01-01 RX ADMIN — INSULIN HUMAN 5 UNIT(S): 100 INJECTION, SOLUTION SUBCUTANEOUS at 16:59

## 2022-01-01 RX ADMIN — CHLORHEXIDINE GLUCONATE 15 MILLILITER(S): 213 SOLUTION TOPICAL at 17:23

## 2022-01-01 RX ADMIN — SENNA PLUS 2 TABLET(S): 8.6 TABLET ORAL at 21:04

## 2022-01-01 RX ADMIN — Medication 100 MILLIGRAM(S): at 17:09

## 2022-01-01 RX ADMIN — Medication 100 MILLIEQUIVALENT(S): at 13:54

## 2022-01-01 RX ADMIN — Medication 500 MILLIGRAM(S): at 11:34

## 2022-01-01 RX ADMIN — Medication 12.5 MILLIGRAM(S): at 05:08

## 2022-01-01 RX ADMIN — INSULIN HUMAN 10 UNIT(S): 100 INJECTION, SOLUTION SUBCUTANEOUS at 04:36

## 2022-01-01 RX ADMIN — Medication 100 MILLIGRAM(S): at 11:12

## 2022-01-01 RX ADMIN — Medication 30 MILLILITER(S): at 05:10

## 2022-01-01 RX ADMIN — CEFEPIME 100 MILLIGRAM(S): 1 INJECTION, POWDER, FOR SOLUTION INTRAMUSCULAR; INTRAVENOUS at 21:41

## 2022-01-01 RX ADMIN — ABACAVIR 300 MILLIGRAM(S): 20 SOLUTION ORAL at 17:14

## 2022-01-01 RX ADMIN — BUMETANIDE 10 MG/HR: 0.25 INJECTION INTRAMUSCULAR; INTRAVENOUS at 09:46

## 2022-01-01 RX ADMIN — Medication 400 MILLIGRAM(S): at 23:43

## 2022-01-01 RX ADMIN — HEPARIN SODIUM 5000 UNIT(S): 5000 INJECTION INTRAVENOUS; SUBCUTANEOUS at 21:13

## 2022-01-01 RX ADMIN — Medication 1000 MILLIGRAM(S): at 01:07

## 2022-01-01 RX ADMIN — SODIUM CHLORIDE 50 MILLILITER(S): 9 INJECTION, SOLUTION INTRAVENOUS at 11:25

## 2022-01-01 RX ADMIN — Medication 81 MILLIGRAM(S): at 12:10

## 2022-01-01 RX ADMIN — ABACAVIR 300 MILLIGRAM(S): 20 SOLUTION ORAL at 05:16

## 2022-01-01 RX ADMIN — DORAVIRINE 100 MILLIGRAM(S): 100 TABLET, FILM COATED ORAL at 13:38

## 2022-01-01 RX ADMIN — ONDANSETRON 4 MILLIGRAM(S): 8 TABLET, FILM COATED ORAL at 20:39

## 2022-01-01 RX ADMIN — Medication 325 MILLIGRAM(S): at 12:01

## 2022-01-01 RX ADMIN — Medication 20 MILLIGRAM(S): at 17:43

## 2022-01-01 RX ADMIN — Medication 40 MILLIGRAM(S): at 05:31

## 2022-01-01 RX ADMIN — Medication 12.5 MILLIGRAM(S): at 05:23

## 2022-01-01 RX ADMIN — Medication 100 MILLIGRAM(S): at 05:25

## 2022-01-01 RX ADMIN — Medication 650 MILLIGRAM(S): at 19:38

## 2022-01-01 RX ADMIN — Medication 1 MILLIGRAM(S): at 11:23

## 2022-01-01 RX ADMIN — CHLORHEXIDINE GLUCONATE 1 APPLICATION(S): 213 SOLUTION TOPICAL at 06:38

## 2022-01-01 RX ADMIN — PROPOFOL 14.7 MICROGRAM(S)/KG/MIN: 10 INJECTION, EMULSION INTRAVENOUS at 09:49

## 2022-01-01 RX ADMIN — CHLORHEXIDINE GLUCONATE 1 APPLICATION(S): 213 SOLUTION TOPICAL at 11:11

## 2022-01-01 RX ADMIN — APIXABAN 2.5 MILLIGRAM(S): 2.5 TABLET, FILM COATED ORAL at 17:01

## 2022-01-01 RX ADMIN — Medication 7.65 MICROGRAM(S)/KG/MIN: at 22:34

## 2022-01-01 RX ADMIN — Medication 81 MILLIGRAM(S): at 11:34

## 2022-01-01 RX ADMIN — CHLORHEXIDINE GLUCONATE 1 APPLICATION(S): 213 SOLUTION TOPICAL at 12:01

## 2022-01-01 RX ADMIN — Medication 400 MILLIGRAM(S): at 13:38

## 2022-01-01 RX ADMIN — Medication 25 MILLIEQUIVALENT(S): at 00:51

## 2022-01-01 RX ADMIN — ABACAVIR 300 MILLIGRAM(S): 20 SOLUTION ORAL at 18:16

## 2022-01-01 RX ADMIN — SODIUM CHLORIDE 75 MILLILITER(S): 9 INJECTION INTRAMUSCULAR; INTRAVENOUS; SUBCUTANEOUS at 06:12

## 2022-01-01 RX ADMIN — Medication 1 TABLET(S): at 13:38

## 2022-01-01 RX ADMIN — BUMETANIDE 10 MG/HR: 0.25 INJECTION INTRAMUSCULAR; INTRAVENOUS at 16:58

## 2022-01-01 RX ADMIN — HEPARIN SODIUM 1500 UNIT(S)/HR: 5000 INJECTION INTRAVENOUS; SUBCUTANEOUS at 17:44

## 2022-01-01 RX ADMIN — Medication 100 MILLIGRAM(S): at 17:01

## 2022-01-01 RX ADMIN — Medication 50 MILLIEQUIVALENT(S): at 06:09

## 2022-01-01 RX ADMIN — Medication 325 MILLIGRAM(S): at 12:10

## 2022-01-01 RX ADMIN — Medication 100 MILLIGRAM(S): at 11:23

## 2022-01-01 RX ADMIN — BUMETANIDE 15 MG/HR: 0.25 INJECTION INTRAMUSCULAR; INTRAVENOUS at 21:04

## 2022-01-01 RX ADMIN — HEPARIN SODIUM 1700 UNIT(S)/HR: 5000 INJECTION INTRAVENOUS; SUBCUTANEOUS at 21:25

## 2022-01-01 RX ADMIN — HEPARIN SODIUM 5000 UNIT(S): 5000 INJECTION INTRAVENOUS; SUBCUTANEOUS at 05:08

## 2022-01-01 RX ADMIN — Medication 100 MILLIGRAM(S): at 13:30

## 2022-01-01 RX ADMIN — CHLORHEXIDINE GLUCONATE 1 APPLICATION(S): 213 SOLUTION TOPICAL at 11:34

## 2022-01-01 RX ADMIN — Medication 7.65 MICROGRAM(S)/KG/MIN: at 06:21

## 2022-01-01 RX ADMIN — BUMETANIDE 2 MILLIGRAM(S): 0.25 INJECTION INTRAMUSCULAR; INTRAVENOUS at 14:36

## 2022-01-01 RX ADMIN — Medication 80 MILLIGRAM(S): at 16:49

## 2022-01-01 RX ADMIN — FAMOTIDINE 20 MILLIGRAM(S): 10 INJECTION INTRAVENOUS at 13:14

## 2022-01-01 RX ADMIN — DEXMEDETOMIDINE HYDROCHLORIDE IN 0.9% SODIUM CHLORIDE 4.08 MICROGRAM(S)/KG/HR: 4 INJECTION INTRAVENOUS at 13:23

## 2022-01-01 RX ADMIN — HEPARIN SODIUM 1700 UNIT(S)/HR: 5000 INJECTION INTRAVENOUS; SUBCUTANEOUS at 00:38

## 2022-01-01 RX ADMIN — CHLORHEXIDINE GLUCONATE 1 APPLICATION(S): 213 SOLUTION TOPICAL at 05:11

## 2022-01-01 RX ADMIN — Medication 1 TABLET(S): at 11:11

## 2022-01-01 RX ADMIN — Medication 81 MILLIGRAM(S): at 13:29

## 2022-01-01 RX ADMIN — FAMOTIDINE 20 MILLIGRAM(S): 10 INJECTION INTRAVENOUS at 11:08

## 2022-01-01 RX ADMIN — HEPARIN SODIUM 5000 UNIT(S): 5000 INJECTION INTRAVENOUS; SUBCUTANEOUS at 14:11

## 2022-01-01 RX ADMIN — Medication 650 MILLIGRAM(S): at 05:25

## 2022-01-01 RX ADMIN — INSULIN HUMAN 5 UNIT(S): 100 INJECTION, SOLUTION SUBCUTANEOUS at 02:04

## 2022-01-01 RX ADMIN — Medication 650 MILLIGRAM(S): at 10:57

## 2022-01-01 RX ADMIN — Medication 650 MILLIGRAM(S): at 01:33

## 2022-01-01 RX ADMIN — ABACAVIR 300 MILLIGRAM(S): 20 SOLUTION ORAL at 17:02

## 2022-01-01 RX ADMIN — DEXMEDETOMIDINE HYDROCHLORIDE IN 0.9% SODIUM CHLORIDE 4.08 MICROGRAM(S)/KG/HR: 4 INJECTION INTRAVENOUS at 09:45

## 2022-01-01 RX ADMIN — LACTULOSE 10 GRAM(S): 10 SOLUTION ORAL at 12:10

## 2022-01-01 RX ADMIN — Medication 81 MILLIGRAM(S): at 11:03

## 2022-01-01 RX ADMIN — Medication 50 MILLILITER(S): at 22:45

## 2022-01-01 RX ADMIN — DORAVIRINE 100 MILLIGRAM(S): 100 TABLET, FILM COATED ORAL at 14:10

## 2022-01-01 RX ADMIN — Medication 1 TABLET(S): at 13:15

## 2022-01-01 RX ADMIN — HEPARIN SODIUM 5000 UNIT(S): 5000 INJECTION INTRAVENOUS; SUBCUTANEOUS at 05:17

## 2022-01-01 RX ADMIN — CHLORHEXIDINE GLUCONATE 1 APPLICATION(S): 213 SOLUTION TOPICAL at 11:06

## 2022-01-01 RX ADMIN — HEPARIN SODIUM 1700 UNIT(S)/HR: 5000 INJECTION INTRAVENOUS; SUBCUTANEOUS at 19:24

## 2022-01-01 RX ADMIN — Medication 650 MILLIGRAM(S): at 23:50

## 2022-01-01 RX ADMIN — DEXMEDETOMIDINE HYDROCHLORIDE IN 0.9% SODIUM CHLORIDE 4.08 MICROGRAM(S)/KG/HR: 4 INJECTION INTRAVENOUS at 16:59

## 2022-01-01 RX ADMIN — FAMOTIDINE 20 MILLIGRAM(S): 10 INJECTION INTRAVENOUS at 12:01

## 2022-01-01 RX ADMIN — HEPARIN SODIUM 5000 UNIT(S): 5000 INJECTION INTRAVENOUS; SUBCUTANEOUS at 13:24

## 2022-01-01 RX ADMIN — CEFTRIAXONE 100 MILLIGRAM(S): 500 INJECTION, POWDER, FOR SOLUTION INTRAMUSCULAR; INTRAVENOUS at 16:11

## 2022-01-01 RX ADMIN — Medication 1 GRAM(S): at 13:38

## 2022-01-01 RX ADMIN — PROPOFOL 14.7 MICROGRAM(S)/KG/MIN: 10 INJECTION, EMULSION INTRAVENOUS at 21:31

## 2022-01-01 RX ADMIN — Medication 30 MILLILITER(S): at 06:13

## 2022-01-01 RX ADMIN — Medication 650 MILLIGRAM(S): at 02:42

## 2022-01-01 RX ADMIN — APIXABAN 2.5 MILLIGRAM(S): 2.5 TABLET, FILM COATED ORAL at 17:10

## 2022-01-01 RX ADMIN — Medication 30 MILLILITER(S): at 11:09

## 2022-01-01 RX ADMIN — CEFTRIAXONE 100 MILLIGRAM(S): 500 INJECTION, POWDER, FOR SOLUTION INTRAMUSCULAR; INTRAVENOUS at 16:07

## 2022-01-01 RX ADMIN — HEPARIN SODIUM 5000 UNIT(S): 5000 INJECTION INTRAVENOUS; SUBCUTANEOUS at 21:04

## 2022-01-01 RX ADMIN — Medication 100 MILLIEQUIVALENT(S): at 10:56

## 2022-01-01 RX ADMIN — Medication 1 TABLET(S): at 13:30

## 2022-01-01 RX ADMIN — BUMETANIDE 2 MILLIGRAM(S): 0.25 INJECTION INTRAMUSCULAR; INTRAVENOUS at 09:51

## 2022-01-01 RX ADMIN — Medication 1000 MILLIGRAM(S): at 00:15

## 2022-01-01 RX ADMIN — CEFEPIME 100 MILLIGRAM(S): 1 INJECTION, POWDER, FOR SOLUTION INTRAMUSCULAR; INTRAVENOUS at 21:51

## 2022-01-01 RX ADMIN — ABACAVIR 300 MILLIGRAM(S): 20 SOLUTION ORAL at 05:11

## 2022-01-01 RX ADMIN — Medication 1 TABLET(S): at 11:42

## 2022-01-01 RX ADMIN — Medication 50 MILLILITER(S): at 12:01

## 2022-01-01 RX ADMIN — Medication 975 MILLIGRAM(S): at 23:23

## 2022-01-01 RX ADMIN — SODIUM CHLORIDE 75 MILLILITER(S): 9 INJECTION INTRAMUSCULAR; INTRAVENOUS; SUBCUTANEOUS at 21:35

## 2022-01-01 RX ADMIN — FAMOTIDINE 20 MILLIGRAM(S): 10 INJECTION INTRAVENOUS at 11:14

## 2022-01-01 RX ADMIN — HEPARIN SODIUM 5000 UNIT(S): 5000 INJECTION INTRAVENOUS; SUBCUTANEOUS at 23:33

## 2022-01-01 RX ADMIN — BUMETANIDE 15 MG/HR: 0.25 INJECTION INTRAMUSCULAR; INTRAVENOUS at 09:12

## 2022-01-01 RX ADMIN — PROPOFOL 14.7 MICROGRAM(S)/KG/MIN: 10 INJECTION, EMULSION INTRAVENOUS at 05:28

## 2022-01-01 RX ADMIN — Medication 81 MILLIGRAM(S): at 12:12

## 2022-01-01 RX ADMIN — Medication 650 MILLIGRAM(S): at 07:30

## 2022-01-01 RX ADMIN — CHLORHEXIDINE GLUCONATE 1 APPLICATION(S): 213 SOLUTION TOPICAL at 07:29

## 2022-01-01 RX ADMIN — BUMETANIDE 2 MILLIGRAM(S): 0.25 INJECTION INTRAMUSCULAR; INTRAVENOUS at 11:16

## 2022-01-01 RX ADMIN — ABACAVIR 600 MILLIGRAM(S): 20 SOLUTION ORAL at 12:12

## 2022-01-01 RX ADMIN — Medication 100 MILLIGRAM(S): at 05:11

## 2022-01-01 RX ADMIN — PROPOFOL 14.7 MICROGRAM(S)/KG/MIN: 10 INJECTION, EMULSION INTRAVENOUS at 08:08

## 2022-01-01 RX ADMIN — HEPARIN SODIUM 5000 UNIT(S): 5000 INJECTION INTRAVENOUS; SUBCUTANEOUS at 21:09

## 2022-01-01 RX ADMIN — Medication 100 MILLIGRAM(S): at 11:40

## 2022-01-01 RX ADMIN — ABACAVIR 300 MILLIGRAM(S): 20 SOLUTION ORAL at 05:30

## 2022-01-01 RX ADMIN — SODIUM CHLORIDE 75 MILLILITER(S): 9 INJECTION INTRAMUSCULAR; INTRAVENOUS; SUBCUTANEOUS at 12:09

## 2022-01-01 RX ADMIN — Medication 650 MILLIGRAM(S): at 05:47

## 2022-01-01 RX ADMIN — HEPARIN SODIUM 3000 UNIT(S): 5000 INJECTION INTRAVENOUS; SUBCUTANEOUS at 00:57

## 2022-01-01 RX ADMIN — SODIUM ZIRCONIUM CYCLOSILICATE 10 GRAM(S): 10 POWDER, FOR SUSPENSION ORAL at 04:36

## 2022-01-01 RX ADMIN — MEROPENEM 100 MILLIGRAM(S): 1 INJECTION INTRAVENOUS at 09:23

## 2022-01-01 RX ADMIN — Medication 325 MILLIGRAM(S): at 11:08

## 2022-01-01 RX ADMIN — Medication 100 MILLIGRAM(S): at 21:26

## 2022-01-01 RX ADMIN — DORAVIRINE 100 MILLIGRAM(S): 100 TABLET, FILM COATED ORAL at 11:23

## 2022-01-01 RX ADMIN — Medication 1 MILLIGRAM(S): at 11:10

## 2022-01-01 RX ADMIN — Medication 650 MILLIGRAM(S): at 21:05

## 2022-01-01 RX ADMIN — SENNA PLUS 2 TABLET(S): 8.6 TABLET ORAL at 22:13

## 2022-01-01 RX ADMIN — SODIUM ZIRCONIUM CYCLOSILICATE 10 GRAM(S): 10 POWDER, FOR SUSPENSION ORAL at 14:10

## 2022-01-01 RX ADMIN — Medication 1 MILLIGRAM(S): at 13:15

## 2022-01-01 RX ADMIN — HEPARIN SODIUM 1700 UNIT(S)/HR: 5000 INJECTION INTRAVENOUS; SUBCUTANEOUS at 19:21

## 2022-01-01 RX ADMIN — Medication 100 MILLIGRAM(S): at 17:02

## 2022-01-01 RX ADMIN — OLANZAPINE 5 MILLIGRAM(S): 15 TABLET, FILM COATED ORAL at 05:20

## 2022-01-13 PROBLEM — I48.92 ATRIAL FLUTTER: Status: ACTIVE | Noted: 2021-01-11

## 2022-01-13 NOTE — HEALTH RISK ASSESSMENT
[Fair] : ~his/her~ current health as fair  [Good] : ~his/her~  mood as  good [Yes] : Yes [Monthly or less (1 pt)] : Monthly or less (1 point) [No] : In the past 12 months have you used drugs other than those required for medical reasons? No [de-identified] : as tolerated [de-identified] : regular [None] : None [Retired] : retired [Feels Safe at Home] : Feels safe at home [Fully functional (bathing, dressing, toileting, transferring, walking, feeding)] : Fully functional (bathing, dressing, toileting, transferring, walking, feeding) [Fully functional (using the telephone, shopping, preparing meals, housekeeping, doing laundry, using] : Fully functional and needs no help or supervision to perform IADLs (using the telephone, shopping, preparing meals, housekeeping, doing laundry, using transportation, managing medications and managing finances) [Reports changes in hearing] : Reports no changes in hearing [Reports changes in vision] : Reports no changes in vision [Reports changes in dental health] : Reports no changes in dental health [Smoke Detector] : smoke detector [Carbon Monoxide Detector] : carbon monoxide detector [Seat Belt] :  uses seat belt

## 2022-01-13 NOTE — ASSESSMENT
[FreeTextEntry1] : HCM\par Labs today\par Continue current meds\par Cardio, ID utd\par Advised Hem, urology, nephrology and derm\par MRI brain to evaluate head pressure and eye symptoms\par Chest xray to evaluate sob\par FUV in 6 months or sooner if necessary\par

## 2022-01-13 NOTE — HISTORY OF PRESENT ILLNESS
[FreeTextEntry1] : Annual Physical [de-identified] : CARLINE GARBER is a 82 yo man with hypertension, BPH requiring self catheterization, CRI, HIV, mitral valve replacement 8/2020, MGUS here for a physical.  Medical problems are stable on his current medications.  He is feeling well and doing well overall.  Some mild bilateral ankle edema, R > L for months, since heart surgery.  Walks 50 feet but feels some sob.\par \par Notices some head pressure. Notices some shadowing in the left eye.  Notices straight lines seem distorted when looking with the right eye.  Denies severe headache or weakness\par \par The patient fell and was taken to [facility] in 8/2020.  He was found to have endocarditis.  He underwent a mitral valve replacement and left atrial appendage ligation by Dr Camacho.  He had a fib/a flutter and was on coumadin.  He is seeing Dr Agarwal \par \par The patient collapsed outside of a pharmacy 8+ years ago and was taken to [facility].  After an extensive workup, he was diagnosed with HIV and started on therapy.  He follows up every 3 months with BLANK Vazquez.  He has been stable on his current medications.  He recently had labs.\par \par The patient sees urologist Dr JUANA Silver and is due for a visit.  The patient has seen hem onc for MGUS and is due for a visit. The patient is seeing nephrology for CRI.  \par \par The patient is single with no children.  He is a retired .  He walks short distances with a cane.  He lives in an assisted living facility called [facility] for the past 7+ years. He is an only child and has no remaining family.  He helped care for his elderly father and 2 aunts and they have passed away.  He is going to think about who he would designate as a health care proxy.\par \par He usually sees an opthalm, dentist. He is due for derm\par \par He refuses the flu and PNA immunizations.  He did receive the pfizer booster

## 2022-01-13 NOTE — PHYSICAL EXAM
[No Acute Distress] : no acute distress [Normal Sclera/Conjunctiva] : normal sclera/conjunctiva [EOMI] : extraocular movements intact [Normal Outer Ear/Nose] : the outer ears and nose were normal in appearance [Normal Oropharynx] : the oropharynx was normal [Normal TMs] : both tympanic membranes were normal [Normal Nasal Mucosa] : the nasal mucosa was normal [No Lymphadenopathy] : no lymphadenopathy [Supple] : supple [No Respiratory Distress] : no respiratory distress  [No Accessory Muscle Use] : no accessory muscle use [Clear to Auscultation] : lungs were clear to auscultation bilaterally [Soft] : abdomen soft [Non Tender] : non-tender [Non-distended] : non-distended [Normal Bowel Sounds] : normal bowel sounds [No CVA Tenderness] : no CVA  tenderness [Alert and Oriented x3] : oriented to person, place, and time [de-identified] : Slender [de-identified] : irregular [de-identified] : mild ankle edema elke [de-identified] : walks with pronged cane

## 2022-02-08 NOTE — HISTORY OF PRESENT ILLNESS
[FreeTextEntry1] : History of Present Illness\par 2/8/22--\par KEL MONSIVAIS is a 81 year old male being seen for a follow-up visit. \par Here today doing well. Bactrim 3 times a week as originally added in hospital for UTI prophylasis by Infectious Disease has been discontinued. Adendum/clarification on 7/9/21: pt is no longer taking Bactrim, it was discontinued. \par Pt now has a PCP, preferably specializing in gerontology. Dr. Tana Najera\par HIV- continue Abacivir 300mg BID daily, lamivudine 100mg daily and doravirine one tablet daily. \par Pt had both COVID 19 vaccinations. [employer] in Feb and March 2021 and had booster as well\par Note: Pt self discontinued his Coumadin and as per Cardiology note it can be discontinued. \par 2/8/2022 plan\par 1) HIV- continue Abacivir 300mg BID daily, lamivudine 100mg daily and doravirine one tablet daily.\par 2) needs follow up with nephrology,\par 3) labs today, see in 6 months. \par \par \par Aug 17th, 2020 fell on bathroom floor. on floor 24 hours, Spent 23 days in [facility] and then in Rehab, ,Replaced the heart valve with vegitation. PICC line removed. \par He is follwing with cardiology next week. \par Kel Monsivais 79 y/o male patient call for a telehealth follow up visit. He has seen Hem/Onc and will follow up again in November 2020. \par Pt has follow up appointments with both Nephrology and Kenney Ferguson in Cardiology. \par We stopped Biktarvy and started Juluca, then stopped Juluca, since creatinine still rising so I changed to Abacivir 300mg BID daily, lamivudine 100mg daily and doravirine one tablet daily. \par recently seen by Dr. Memo Silver in Urology and pt doing well straight cathing and taking Tamulosin and finesteride daily. \par He is happy to mention he has now been sleeping through the night without waking to urinate. \par [facility] is handling all meds. \par review of systems 12/29/20\par Constitutional, Eyes, ENT, Cardiovascular, Respiratory, Gastrointestinal, Genitourinary, Musculoskeletal, Integumentary, Neurological, Psychiatric and Heme/Lymph are otherwise negative. \par new meds after discharge, Ferrous sulfate 325mg daily, folic acid 1mg daily , 81mg aspirin, protonix 40mg delated release, warfarin 5mg, finasteride and tamsulosin , pt still using self catherization. Vitamin D . 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)\par Atrial flutter (427.32) (I48.92)\par Benign essential hypertension (401.1) (I10)\par Bilateral hydronephrosis (591) (N13.30)\par BPH with obstruction/lower urinary tract symptoms (600.01,599.69) (N40.1,N13.8)\par Chronic kidney disease, stage 3 (585.3) (N18.30)\par Constipation (564.00) (K59.00)\par Dandruff (690.18) (L21.0)\par Dyspnea on exertion (786.09) (R06.00)\par Edema, lower extremity (782.3) (R60.0)\par Elevated serum creatinine (790.99) (R79.89)\par Encounter for immunization (V03.89) (Z23)\par H/O CD4 count (V15.89) (Z92.89)\par Hematuria, microscopic (599.72) (R31.29)\par History of bladder cancer (V10.51) (Z85.51)\par HIV disease (042) (B20)\par IgG monoclonal gammopathy (273.1) (D47.2)\par 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9) (L91.8)\par Urinary incontinence, unspecified type (788.30) (R32)\par Urinary retention (788.20) (R33.9)\par Vitamin D deficiency (268.9) (E55.9)\par \par Past Medical History\par History of Antinuclear antibody (OWEN) titer greater than 1:80 (795.79) (R76.0)\par History of Creatinine elevation (790.99) (R79.89)\par History of Enlarged prostate (600.00) (N40.0)\par History of Hemorrhagic cyst\par History of anemia (V12.3) (Z86.2)\par History of athlete's foot (V12.09) (Z86.19)\par History of chronic kidney disease (V13.09) (Z87.448)\par History of degenerative disc disease (V13.59) (Z87.39)\par History of fever (V13.89) (Z87.898)\par History of hydronephrosis (V13.09) (Z87.448)\par History of hyperparathyroidism (V12.29) (Z86.39)\par History of lymphadenopathy (V13.89) (Z87.898)\par History of osteopenia (V13.59) (Z87.39)\par History of Parkinson's disease (V12.49) (Z86.69)\par History of renal calculi (V13.01) (Z87.442)\par History of renal insufficiency syndrome (V13.09) (Z87.448)\par History of HIV disease (042) (B20)\par History of Left knee pain (719.46) (M25.562)\par Personal history of multiple pulmonary nodules (V12.69) (Z87.898)\par Personal history of scoliosis (V13.59) (Z87.39)\par Personal history of urinary tract infection (V13.02) (Z87.440)\par History of Tinea unguium (110.1) (B35.1)\par History of Tooth pain (525.9) (K08.89)\par History of Vaccine refused by parent (V64.05) (Z28.82)\par History of Visit for dental examination (V72.2) (Z01.20)\par History of Visit for eye and vision exam (V72.0) (Z01.00)\par History of Xerosis of skin (706.8) (L85.3)\par \par \par \par \par Allergies\par No Known Drug Allergies\par

## 2022-07-11 NOTE — ASSESSMENT
[FreeTextEntry1] : Continue current meds\par Cardio advised\par ID scheduled\par Advised derm\par CPX in 6 months

## 2022-07-11 NOTE — HISTORY OF PRESENT ILLNESS
[FreeTextEntry1] : fuv [de-identified] : CARLINE GARBER is an 81 yo man with hypertension, BPH requiring self catheterization, CRI, HIV, mitral valve replacement 8/2020, MGUS here for a bp check and fuv.  Medical problems are stable on his current medications.  He is feeling well and doing well overall.  Some mild bilateral ankle edema, R > L for months, since heart surgery.  Walks 50 feet but feels some sob.  Advised cardio fuv\par \par Seeing cardio dr cali carroll and thinking about cataract surgery\par \par The patient fell and was taken to [facility] in 8/2020.  He was found to have endocarditis.  He underwent a mitral valve replacement and left atrial appendage ligation by Dr Camacho.  He had a fib/a flutter and was on coumadin.  He is seeing Dr Agarwal and dr kenyetta bourgeois\par \par The patient collapsed outside of a pharmacy 8+ years ago and was taken to [facility].  After an extensive workup, he was diagnosed with HIV and started on therapy.  He follows up every 3 months with BLANK Vazquez.  He has been stable on his current medications.  He has an appt and labs planned for 8/22.\par \par The patient sees urologist Dr JUANA Silver and is due for a visit.  The patient has seen hem onc for MGUS and is due for a visit. The patient has seen nephrology for CRI.  \par \par The patient is single with no children.  He is a retired .  He walks short distances with a cane.  He lives in an assisted living facility called [facility] for the past 7+ years. He is an only child and has no remaining family.  He helped care for his elderly father and 2 aunts and they have passed away.  He is going to think about who he would designate as a health care proxy.\par \par  He is due for derm\par \par He refuses the flu and PNA immunizations.  He did receive the 4 [employer] vaccines

## 2022-07-11 NOTE — PHYSICAL EXAM
[No Acute Distress] : no acute distress [No Lymphadenopathy] : no lymphadenopathy [Supple] : supple [No Respiratory Distress] : no respiratory distress  [No Accessory Muscle Use] : no accessory muscle use [Clear to Auscultation] : lungs were clear to auscultation bilaterally [Normal Rate] : normal rate  [Regular Rhythm] : with a regular rhythm [Normal S1, S2] : normal S1 and S2 [Soft] : abdomen soft [Non Tender] : non-tender [Non-distended] : non-distended [Normal Bowel Sounds] : normal bowel sounds [No CVA Tenderness] : no CVA  tenderness [Alert and Oriented x3] : oriented to person, place, and time [de-identified] : Slender [de-identified] : mild ankle edema elke [de-identified] : walks with pronged cane

## 2022-08-02 PROBLEM — Z98.890 S/P LEFT ATRIAL APPENDAGE LIGATION: Status: ACTIVE | Noted: 2020-10-29

## 2022-08-02 PROBLEM — D47.2 IGG MONOCLONAL GAMMOPATHY: Status: ACTIVE | Noted: 2019-10-04

## 2022-08-02 PROBLEM — Z95.2 S/P MVR (MITRAL VALVE REPLACEMENT): Status: ACTIVE | Noted: 2020-10-29

## 2022-08-02 NOTE — HISTORY OF PRESENT ILLNESS
[No Adverse Anesthesia Reaction] : no adverse anesthesia reaction in self or family member [Chronic Kidney Disease] : chronic kidney disease [(Patient denies any chest pain, claudication, dyspnea on exertion, orthopnea, palpitations or syncope)] : Patient denies any chest pain, claudication, dyspnea on exertion, orthopnea, palpitations or syncope [FreeTextEntry1] : Left cataract [FreeTextEntry2] : 8/17/22 [FreeTextEntry3] : Dr Tara Bustos, fax  [FreeTextEntry4] : CARLINE GARBER is an 83 yo man with hypertension, BPH requiring self catheterization, CRI, HIV, mitral valve replacement 8/2020, MGUS here for a POC prior to left cataract removal. Medical problems are stable on his current medications. He is feeling well and doing well overall. Some mild bilateral ankle edema, R > L for months, since heart surgery. Walks around apartment and short distances.\par \par The patient fell and was taken to [facility] in 8/2020. He was found to have endocarditis. He underwent a mitral valve replacement and left atrial appendage ligation by Dr Camacho. He had a fib/a flutter and was on coumadin. He is seeing Dr Agarwal and dr kenyetta bourgeois\par \par The patient collapsed outside of a pharmacy 8+ years ago and was taken to [facility]. After an extensive workup, he was diagnosed with HIV and started on therapy. He follows up every 3 months with BLANK Vazquez. He has been stable on his current medications. He has an appt and labs planned for 8/22.\par \par The patient sees urologist Dr JUANA Silver and is due for a visit. The patient has seen hem onc for MGUS and is due for a visit. The patient has seen nephrology for CRI. \par \par The patient is single with no children. He is a retired . He walks short distances with a cane. He lives in an assisted living facility called [facility] for the past 7+ years. He is an only child and has no remaining family. He helped care for his elderly father and 2 aunts and they have passed away. He is going to think about who he would designate as a health care proxy.\par \par  He did receive the 4 [employer] vaccines \par  \par

## 2022-08-02 NOTE — PHYSICAL EXAM
[No Acute Distress] : no acute distress [No Lymphadenopathy] : no lymphadenopathy [Supple] : supple [No Respiratory Distress] : no respiratory distress  [No Accessory Muscle Use] : no accessory muscle use [Clear to Auscultation] : lungs were clear to auscultation bilaterally [Normal Rate] : normal rate  [Regular Rhythm] : with a regular rhythm [Normal S1, S2] : normal S1 and S2 [Soft] : abdomen soft [Non Tender] : non-tender [Non-distended] : non-distended [Normal Bowel Sounds] : normal bowel sounds [No CVA Tenderness] : no CVA  tenderness [Alert and Oriented x3] : oriented to person, place, and time [de-identified] : Slender [de-identified] : mild ankle edema elke [de-identified] : walks with pronged cane

## 2022-08-02 NOTE — ASSESSMENT
[Patient Optimized for Surgery] : Patient optimized for surgery [As per surgery] : as per surgery [FreeTextEntry4] : CARLINE GARBER is an 81 yo man with hypertension, BPH requiring self catheterization, CRI, HIV, mitral valve replacement 8/2020, MGUS here for a POC prior to left cataract removal.  The patient has a stable EKG.  There are no contraindications to proceeding with the planned procedure.  The patient is medically optimized. \par

## 2022-08-05 PROBLEM — B20 HIV DISEASE: Status: ACTIVE | Noted: 2021-03-29

## 2022-08-05 NOTE — HISTORY OF PRESENT ILLNESS
[FreeTextEntry1] : 8/5/22\par KEL MONSIVAIS is a 82 year old male being seen for a follow-up visit. \par Here today doing well.  Adendum/clarification on 7/9/21: pt is no longer taking Bactrim, it was discontinued. \par Pt now has a PCP, preferably specializing in gerontology. Dr. Tana Najera\par HIV- continue Abacivir 300mg BID daily, lamivudine 100mg daily and doravirine one tablet daily. \par Pt had both COVID 19 vaccinations. [employer] in Feb and March 2021 and had booster as well\par Note: Pt self discontinued his Coumadin and as per Cardiology note it can be discontinued. \par 8/5/22 plan\par 1) HIV- continue Abacivir 300mg BID daily, lamivudine 100mg daily and doravirine one tablet daily.\par 2) needs follow up with nephrology,\par 3) labs today, see in 6 months. \par \par \par Aug 17th, 2020 fell on bathroom floor. on floor 24 hours, Spent 23 days in [facility] and then in Rehab, ,Replaced the heart valve with vegitation. PICC line removed. \par He is follwing with cardiology next week. \par Kel Monsivais 79 y/o male patient call for a telehealth follow up visit. He has seen Hem/Onc and will follow up again in November 2020. \par Pt has follow up appointments with both Nephrology and Kenney Ferguson in Cardiology. \par We stopped Biktarvy and started Juluca, then stopped Juluca, since creatinine still rising so I changed to Abacivir 300mg BID daily, lamivudine 100mg daily and doravirine one tablet daily. \par recently seen by Dr. Memo Silver in Urology and pt doing well straight cathing and taking Tamulosin and finesteride daily. \par He is happy to mention he has now been sleeping through the night without waking to urinate. \par [facility] is handling all meds. \par review of systems 12/29/20\par Constitutional, Eyes, ENT, Cardiovascular, Respiratory, Gastrointestinal, Genitourinary, Musculoskeletal, Integumentary, Neurological, Psychiatric and Heme/Lymph are otherwise negative. \par new meds after discharge, Ferrous sulfate 325mg daily, folic acid 1mg daily , 81mg aspirin, protonix 40mg delated release, warfarin 5mg, finasteride and tamsulosin , pt still using self catherization. Vitamin D . 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)\par Atrial flutter (427.32) (I48.92)\par Benign essential hypertension (401.1) (I10)\par Bilateral hydronephrosis (591) (N13.30)\par BPH with obstruction/lower urinary tract symptoms (600.01,599.69) (N40.1,N13.8)\par Chronic kidney disease, stage 3 (585.3) (N18.30)\par Constipation (564.00) (K59.00)\par Dandruff (690.18) (L21.0)\par Dyspnea on exertion (786.09) (R06.00)\par Edema, lower extremity (782.3) (R60.0)\par Elevated serum creatinine (790.99) (R79.89)\par Encounter for immunization (V03.89) (Z23)\par H/O CD4 count (V15.89) (Z92.89)\par Hematuria, microscopic (599.72) (R31.29)\par History of bladder cancer (V10.51) (Z85.51)\par HIV disease (042) (B20)\par IgG monoclonal gammopathy (273.1) (D47.2)\par 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9) (L91.8)\par Urinary incontinence, unspecified type (788.30) (R32)\par Urinary retention (788.20) (R33.9)\par Vitamin D deficiency (268.9) (E55.9)\par \par Past Medical History\par History of Antinuclear antibody (OWEN) titer greater than 1:80 (795.79) (R76.0)\par History of Creatinine elevation (790.99) (R79.89)\par History of Enlarged prostate (600.00) (N40.0)\par History of Hemorrhagic cyst\par History of anemia (V12.3) (Z86.2)\par History of athlete's foot (V12.09) (Z86.19)\par History of chronic kidney disease (V13.09) (Z87.448)\par History of degenerative disc disease (V13.59) (Z87.39)\par History of fever (V13.89) (Z87.898)\par History of hydronephrosis (V13.09) (Z87.448)\par History of hyperparathyroidism (V12.29) (Z86.39)\par History of lymphadenopathy (V13.89) (Z87.898)\par History of osteopenia (V13.59) (Z87.39)\par History of Parkinson's disease (V12.49) (Z86.69)\par History of renal calculi (V13.01) (Z87.442)\par History of renal insufficiency syndrome (V13.09) (Z87.448)\par History of HIV disease (042) (B20)\par History of Left knee pain (719.46) (M25.562)\par Personal history of multiple pulmonary nodules (V12.69) (Z87.898)\par Personal history of scoliosis (V13.59) (Z87.39)\par Personal history of urinary tract infection (V13.02) (Z87.440)\par History of Tinea unguium (110.1) (B35.1)\par History of Tooth pain (525.9) (K08.89)\par History of Vaccine refused by parent (V64.05) (Z28.82)\par History of Visit for dental examination (V72.2) (Z01.20)\par History of Visit for eye and vision exam (V72.0) (Z01.00)\par History of Xerosis of skin (706.8) (L85.3)\par \par \par \par \par Allergies\par No Known Drug Allergies\par \par

## 2022-08-06 NOTE — ED PROVIDER NOTE - NSICDXFAMILYHX_GEN_ALL_CORE_FT
FAMILY HISTORY:  Father  Still living? No  Family history of congestive heart failure, Age at diagnosis: Age Unknown    Mother  Still living? No  Family history of cancer, Age at diagnosis: Age Unknown

## 2022-08-06 NOTE — H&P ADULT - PROBLEM SELECTOR PLAN 4
Patient is with a history of BPH and previously was on finasteride and tamsulosin, however medications were discontinued and patient now intermittently self catheterizes.   - While inpatient straight cath q6hrs or as needed

## 2022-08-06 NOTE — H&P ADULT - PROBLEM SELECTOR PLAN 1
Patient with leukocytosis to 20K yesterday on routine blood work, found to have positive UA on admission to hospital. Patient intermittently self catheterizes due to BPH. No CVAT  - BCx and UCx obtained in ED, will follow up with sensitivities  - Received dose of CTX in ED, will continue with this antibiotic for now as 1g q24hrs  - Infectious disease consulted by ED, will follow up results

## 2022-08-06 NOTE — ED ADULT NURSE REASSESSMENT NOTE - NS ED NURSE REASSESS COMMENT FT1
Patient straight catheterized for sterile urine sample. Aseptic technique maintained. Two RNs at bedside during procedure. Patient straight caths three times a day. NP Leticia aware. About 900cc of urine out.

## 2022-08-06 NOTE — CONSULT NOTE ADULT - SUBJECTIVE AND OBJECTIVE BOX
Patient is a 82y old  Male who presents with Leukocytosis  HPI:   82y male pmh HIV DX age 72, Endocarditis w subsequent MVR, BPH w straight cath 3/day,Previous UTI ,Osteoporosis, PT comes to ed referred to [facility] for c/o elevated wbc and worsening ckd, Pt Denies fever and chills, No change in  chronic PRESCOTT past two years. No abd pain, no change in bph/pain with cath.    REVIEW OF SYSTEMS  [  ] ROS unobtainable because:    [ x ] All other systems negative except as noted below    Constitutional:  [ ] fever [ ] chills  [ ] weight loss  [ ]night sweat  [ ]poor appetite/PO intake [ ]fatigue   Skin:  [ ] rash [ ] phlebitis	  Eyes: [ ] icterus [ ] pain  [ ] discharge	  ENMT: [ ] sore throat  [ ] thrush [ ] ulcers [ ] exudates [ ]anosmia  Respiratory: [ ] dyspnea [ ] hemoptysis [ ] cough [ ] sputum	  Cardiovascular:  [ ] chest pain [ ] palpitations [ ] edema	  Gastrointestinal:  [ ] nausea [ ] vomiting [ ] diarrhea [ ] constipation [ ] pain	  Genitourinary:  [ ] dysuria [ ] frequency [ ] hematuria [ ] discharge [ ] flank pain  [ ] incontinence  Musculoskeletal:  [ ] myalgias [ ] arthralgias [ ] arthritis  [ ] back pain  Neurological:  [ ] headache [ ] weakness [ ] seizures  [ ] confusion/altered mental status    prior hospital charts reviewed [V]  primary team notes reviewed [V]  other consultant notes reviewed [V]    PAST MEDICAL & SURGICAL HISTORY:  Unsteady gait      HIV (human immunodeficiency virus infection)      Chronic kidney disease, unspecified stage      Constipation, unspecified constipation type      Seborrheic keratosis      Osteoporosis      Edema of both legs      Bladder mass      Vitamin D deficiency      Orchitis and epididymitis      H/O endocarditis  MV endocarditis      Benign prostatic hyperplasia without lower urinary tract symptoms      COVID-19 vaccine series completed  W #2 boosters      S/P coronary artery stent placement      S/P MVR (mitral valve replacement)  &amp; SHELLIE clip, 8/31/2020          SOCIAL HISTORY:  - Denied smoking/vaping/alcohol/recreational drug use    FAMILY HISTORY:  Family history of congestive heart failure (Father)    Family history of cancer (Mother)        Allergies  No Known Allergies        ANTIMICROBIALS:      ANTIMICROBIALS (past 90 days):  MEDICATIONS  (STANDING):        OTHER MEDS:   MEDICATIONS  (STANDING):      VITALS:  Vital Signs Last 24 Hrs  T(F): 99.1 (08-06-22 @ 11:40), Max: 99.2 (08-06-22 @ 10:46)    Vital Signs Last 24 Hrs  HR: 76 (08-06-22 @ 11:35) (76 - 85)  BP: 102/64 (08-06-22 @ 11:35) (102/64 - 127/68)  RR: 18 (08-06-22 @ 11:35)  SpO2: 100% (08-06-22 @ 11:35) (96% - 100%)  Wt(kg): --    EXAM:    GA: NAD, AOx3  HEENT: oral cavity no lesion  CV: nl S1/S2, no RMG  Lungs: CTAB, No distress  Abd: BS+, soft, nontender, no rebounding pain  Ext: no edema  Neuro: No focal deficits  Skin: Intact  IV: no phlebitis    Labs:                        10.4   12.08 )-----------( 173      ( 06 Aug 2022 12:29 )             31.5     08-06    139  |  102  |  58<H>  ----------------------------<  101<H>  4.9   |  24  |  3.28<H>    Ca    9.5      06 Aug 2022 12:29    TPro  7.6  /  Alb  3.7  /  TBili  0.2  /  DBili  x   /  AST  20  /  ALT  22  /  AlkPhos  148<H>  08-06      WBC Trend:  WBC Count: 12.08 (08-06-22 @ 12:29)      Auto Neutrophil #: 9.30 K/uL (08-06-22 @ 12:29)      Creatine Trend:  Creatinine, Serum: 3.28 (08-06)      Liver Biochemical Testing Trend:  Alanine Aminotransferase (ALT/SGPT): 22 (08-06)  Aspartate Aminotransferase (AST/SGOT): 20 (08-06-22 @ 12:29)  Bilirubin Total, Serum: 0.2 (08-06)      Trend LDH      Auto Eosinophil %: 0.0 % (08-06-22 @ 12:29)          MICROBIOLOGY:        Culture - Tissue with Gram Stain (collected 31 Aug 2020 21:15)  Source: .Tissue Other  Final Report:    No growth    Culture - Fungal, Tissue (collected 31 Aug 2020 21:15)  Source: .Tissue mitral vegetation  Final Report:    No fungus isolated after 4 weeks.    Culture - Acid Fast - Tissue w/Smear (collected 31 Aug 2020 21:15)  Source: .Tissue Other  Final Report:    No acid fast bacilli isolated after 6 weeks.    Culture - Fungal, Other (collected 31 Aug 2020 21:12)  Source: .Other Other  Final Report:    No fungus isolated after 4 weeks.    Culture - Surgical Swab (collected 31 Aug 2020 21:12)  Source: .Surgical Swab mitral vegetation  Final Report:    No growth    Culture - Blood (collected 20 Aug 2020 15:01)  Source: .Blood Blood-Venous  Final Report:    No Growth Final    Culture - Blood (collected 20 Aug 2020 15:01)  Source: .Blood Blood-Peripheral  Final Report:    No Growth Final    Culture - Urine (collected 19 Aug 2020 09:00)  Source: .Urine Clean Catch (Midstream)  Final Report:    <10,000 CFU/mL Normal Urogenital Kami    Culture - Blood (collected 19 Aug 2020 01:39)  Source: .Blood Blood-Peripheral  Final Report:    Growth in aerobic and anaerobic bottles: Staphylococcus epidermidis  Organism: Staphylococcus epidermidis  Organism: Staphylococcus epidermidis    Sensitivities:      -  Ampicillin/Sulbactam: R <=8/4      -  Cefazolin: R <=4      -  Clindamycin: S 0.5      -  Erythromycin: S <=0.25      -  Gentamicin: S <=1 Should not be used as monotherapy      -  Oxacillin: R >2      -  Penicillin: R >8      -  RIF- Rifampin: S <=1 Should not be used as monotherapy      -  Tetra/Doxy: S 2      -  Trimethoprim/Sulfamethoxazole: S <=0.5/9.5      -  Vancomycin: S 2      Method Type: FLORENCIA    Culture - Blood (collected 19 Aug 2020 01:39)  Source: .Blood Blood-Peripheral  Final Report:    Growth in aerobic and anaerobic bottles: Staphylococcus epidermidis    "Due to technical problems, Proteus sp. will Not be reported as part of    the BCID panel until further notice"    ***Blood Panel PCR results on this specimen are available    approximately 3 hours after the Gram stain result.***    Gram stain, PCR, and/or culture results may not always    correspond due to difference in methodologies.    ************************************************************    This PCR assay was performed using [employer].    The following targets are tested for: Enterococcus,    vancomycin resistant enterococci, Listeria monocytogenes,    coagulase negative staphylococci, S. aureus,    methicillin resistant S. aureus, Streptococcus agalactiae    (Group B), S. pneumoniae, S. pyogenes (Group A),    Acinetobacter baumannii, Enterobacter cloacae, E. coli,    Klebsiella oxytoca, K. pneumoniae, Proteus sp.,    Serratia marcescens, Haemophilus influenzae,    Neisseria meningitidis, Pseudomonas aeruginosa, Candida    albicans, C. glabrata, C krusei, C parapsilosis,    C. tropicalis and the KPC resistance gene.  Organism: Blood Culture PCR  Organism: Blood Culture PCR    Sensitivities:      -  Coagulase negative Staphylococcus: Detec      Method Type: PCR        ABS CD4: 324 /uL (08-19-20 @ 03:26)    HIV-1 RNA Quantitative, Viral Load:   NOT DET. (08-19-20 @ 04:46)  HIV-1 Viral Load Result: NOT DET. (08-19-20 @ 04:46)                                      Blood Gas Venous - Lactate: 1.0 (08-06 @ 11:45)        CSF:                  RADIOLOGY:  imaging below personally reviewed    Xray Chest 2 Views PA/Lat:  (06 Aug 2022 12:40)                   Patient is a 82y old  Male who presents with Leukocytosis  HPI:   82y male pmh HIV DX age 72, Endocarditis w subsequent MVR, BPH w straight cath 3/day,Previous UTI ,Osteoporosis, PT comes to ed referred to [facility] for c/o elevated wbc and worsening ckd, Pt Denies fever and chills, No change in  chronic PRESCOTT past two years. No abd pain, no change in bph/pain with cath.    REVIEW OF SYSTEMS  [  ] ROS unobtainable because:    [ x ] All other systems negative except as noted below    Constitutional:  [ ] fever [ ] chills  [ ] weight loss  [ ]night sweat  [ ]poor appetite/PO intake [ ]fatigue   Skin:  [ ] rash [ ] phlebitis	  Eyes: [ ] icterus [ ] pain  [ ] discharge	  ENMT: [ ] sore throat  [ ] thrush [ ] ulcers [ ] exudates [ ]anosmia  Respiratory: [ ] dyspnea [ ] hemoptysis [ ] cough [ ] sputum	  Cardiovascular:  [ ] chest pain [ ] palpitations [ ] edema	  Gastrointestinal:  [ ] nausea [ ] vomiting [ ] diarrhea [ ] constipation [ ] pain	  Genitourinary:  [ ] dysuria [ ] frequency [ ] hematuria [ ] discharge [ ] flank pain  [ ] incontinence  Musculoskeletal:  [ ] myalgias [ ] arthralgias [ ] arthritis  [ ] back pain  Neurological:  [ ] headache [ ] weakness [ ] seizures  [ ] confusion/altered mental status    prior hospital charts reviewed [V]  primary team notes reviewed [V]  other consultant notes reviewed [V]    PAST MEDICAL & SURGICAL HISTORY:  Unsteady gait      HIV (human immunodeficiency virus infection)      Chronic kidney disease, unspecified stage      Constipation, unspecified constipation type      Seborrheic keratosis      Osteoporosis      Edema of both legs      Bladder mass      Vitamin D deficiency      Orchitis and epididymitis      H/O endocarditis  MV endocarditis      Benign prostatic hyperplasia without lower urinary tract symptoms      COVID-19 vaccine series completed  W #2 boosters      S/P coronary artery stent placement      S/P MVR (mitral valve replacement)  &amp; SHELLIE clip, 8/31/2020          SOCIAL HISTORY:  - Denied smoking/vaping/alcohol/recreational drug use  previous worked in information science, human information processing    FAMILY HISTORY:  Family history of congestive heart failure (Father)    Family history of cancer (Mother)        Allergies  No Known Allergies        ANTIMICROBIALS:      ANTIMICROBIALS (past 90 days):  MEDICATIONS  (STANDING):        OTHER MEDS:   MEDICATIONS  (STANDING):      VITALS:  Vital Signs Last 24 Hrs  T(F): 99.1 (08-06-22 @ 11:40), Max: 99.2 (08-06-22 @ 10:46)    Vital Signs Last 24 Hrs  HR: 76 (08-06-22 @ 11:35) (76 - 85)  BP: 102/64 (08-06-22 @ 11:35) (102/64 - 127/68)  RR: 18 (08-06-22 @ 11:35)  SpO2: 100% (08-06-22 @ 11:35) (96% - 100%)  Wt(kg): --    EXAM:    GA: NAD, AOx3  HEENT: oral cavity no lesion  CV: nl S1/S2, no RMG  Lungs: CTAB, No distress  Abd: BS+, soft, nontender, no rebounding pain  Ext: no edema  Neuro: No focal deficits  Skin: Intact  IV: no phlebitis    Labs:                        10.4   12.08 )-----------( 173      ( 06 Aug 2022 12:29 )             31.5     08-06    139  |  102  |  58<H>  ----------------------------<  101<H>  4.9   |  24  |  3.28<H>    Ca    9.5      06 Aug 2022 12:29    TPro  7.6  /  Alb  3.7  /  TBili  0.2  /  DBili  x   /  AST  20  /  ALT  22  /  AlkPhos  148<H>  08-06      WBC Trend:  WBC Count: 12.08 (08-06-22 @ 12:29)      Auto Neutrophil #: 9.30 K/uL (08-06-22 @ 12:29)      Creatine Trend:  Creatinine, Serum: 3.28 (08-06)      Liver Biochemical Testing Trend:  Alanine Aminotransferase (ALT/SGPT): 22 (08-06)  Aspartate Aminotransferase (AST/SGOT): 20 (08-06-22 @ 12:29)  Bilirubin Total, Serum: 0.2 (08-06)      Trend LDH      Auto Eosinophil %: 0.0 % (08-06-22 @ 12:29)          MICROBIOLOGY:        Culture - Tissue with Gram Stain (collected 31 Aug 2020 21:15)  Source: .Tissue Other  Final Report:    No growth    Culture - Fungal, Tissue (collected 31 Aug 2020 21:15)  Source: .Tissue mitral vegetation  Final Report:    No fungus isolated after 4 weeks.    Culture - Acid Fast - Tissue w/Smear (collected 31 Aug 2020 21:15)  Source: .Tissue Other  Final Report:    No acid fast bacilli isolated after 6 weeks.    Culture - Fungal, Other (collected 31 Aug 2020 21:12)  Source: .Other Other  Final Report:    No fungus isolated after 4 weeks.    Culture - Surgical Swab (collected 31 Aug 2020 21:12)  Source: .Surgical Swab mitral vegetation  Final Report:    No growth    Culture - Blood (collected 20 Aug 2020 15:01)  Source: .Blood Blood-Venous  Final Report:    No Growth Final    Culture - Blood (collected 20 Aug 2020 15:01)  Source: .Blood Blood-Peripheral  Final Report:    No Growth Final    Culture - Urine (collected 19 Aug 2020 09:00)  Source: .Urine Clean Catch (Midstream)  Final Report:    <10,000 CFU/mL Normal Urogenital Kami    Culture - Blood (collected 19 Aug 2020 01:39)  Source: .Blood Blood-Peripheral  Final Report:    Growth in aerobic and anaerobic bottles: Staphylococcus epidermidis  Organism: Staphylococcus epidermidis  Organism: Staphylococcus epidermidis    Sensitivities:      -  Ampicillin/Sulbactam: R <=8/4      -  Cefazolin: R <=4      -  Clindamycin: S 0.5      -  Erythromycin: S <=0.25      -  Gentamicin: S <=1 Should not be used as monotherapy      -  Oxacillin: R >2      -  Penicillin: R >8      -  RIF- Rifampin: S <=1 Should not be used as monotherapy      -  Tetra/Doxy: S 2      -  Trimethoprim/Sulfamethoxazole: S <=0.5/9.5      -  Vancomycin: S 2      Method Type: FLORENCIA    Culture - Blood (collected 19 Aug 2020 01:39)  Source: .Blood Blood-Peripheral  Final Report:    Growth in aerobic and anaerobic bottles: Staphylococcus epidermidis    "Due to technical problems, Proteus sp. will Not be reported as part of    the BCID panel until further notice"    ***Blood Panel PCR results on this specimen are available    approximately 3 hours after the Gram stain result.***    Gram stain, PCR, and/or culture results may not always    correspond due to difference in methodologies.    ************************************************************    This PCR assay was performed using [employer].    The following targets are tested for: Enterococcus,    vancomycin resistant enterococci, Listeria monocytogenes,    coagulase negative staphylococci, S. aureus,    methicillin resistant S. aureus, Streptococcus agalactiae    (Group B), S. pneumoniae, S. pyogenes (Group A),    Acinetobacter baumannii, Enterobacter cloacae, E. coli,    Klebsiella oxytoca, K. pneumoniae, Proteus sp.,    Serratia marcescens, Haemophilus influenzae,    Neisseria meningitidis, Pseudomonas aeruginosa, Candida    albicans, C. glabrata, C krusei, C parapsilosis,    C. tropicalis and the KPC resistance gene.  Organism: Blood Culture PCR  Organism: Blood Culture PCR    Sensitivities:      -  Coagulase negative Staphylococcus: Detec      Method Type: PCR        ABS CD4: 324 /uL (08-19-20 @ 03:26)    HIV-1 RNA Quantitative, Viral Load:   NOT DET. (08-19-20 @ 04:46)  HIV-1 Viral Load Result: NOT DET. (08-19-20 @ 04:46)                                      Blood Gas Venous - Lactate: 1.0 (08-06 @ 11:45)        CSF:                  RADIOLOGY:  imaging below personally reviewed    Xray Chest 2 Views PA/Lat:  (06 Aug 2022 12:40)

## 2022-08-06 NOTE — ED PROVIDER NOTE - CLINICAL SUMMARY MEDICAL DECISION MAKING FREE TEXT BOX
83 y/o male w/ PMH CKD, BPH, constipation sent in for labwork drawn yesterday revealing a WBC greater than 20. Pt is asymptomatic at time of presentation. Concerning for infection. Will screen for infection and likely admit for antibiotics. 83 y/o male w/ PMH CKD, BPH, constipation sent in for labwork drawn yesterday revealing a WBC greater than 20. Pt is asymptomatic at time of presentation. Concerning for infection. Will screen for infection and likely admit for antibiotics. WBC 20 and creatinine went from 2.5 to 3.5. Likely admission.

## 2022-08-06 NOTE — H&P ADULT - PROBLEM SELECTOR PLAN 2
Patient with Cr of 3.2 on admission labs. Previous admissions with Cr in low 2 range.   - Avoid nephrotoxins  - Renally dose all medications when appropriate  - Will obtain urine lytes Patient with Cr of 3.2 on admission labs. Previous admissions with Cr in low 2 range.   - Avoid nephrotoxins  - Renally dose all medications when appropriate  - Will obtain urine lytes  - Will obtain u/s kidney and bladder

## 2022-08-06 NOTE — ED PROVIDER NOTE - ATTENDING CONTRIBUTION TO CARE
Private Physician Arie Grant ID  82y male pmh HIV DX age 72, Endocarditis w subsequent MVR, BPH w straight cath 3/day,Previous UTI ,Osteoporosis, PT comes to ed referred to [facility] for c/o elevated wbc and worsening ckd, Pt Denies fever and chills, No change in  chronic PRESCOTT past two years. No abd pain, no change in bph/pain with cath,. Private Physician Arie Grant ID  82y male pmh HIV DX age 72, Endocarditis w subsequent MVR, BPH w straight cath 3/day,Previous UTI ,Osteoporosis, PT comes to ed referred to [facility] for c/o elevated wbc and worsening ckd, Pt Denies fever and chills, No change in  chronic PRESCOTT past two years. No abd pain, no change in bph/pain with cath, PE WDWN male awake alert Heent normocephalic atraumatic neck supple chest clear anterior & posterior cv no rubs, gallops or murmurs neuro gcs 15 speech fluent power 5/5 all extr   Joseluis Madera MD, Facep

## 2022-08-06 NOTE — H&P ADULT - MUSCULOSKELETAL
normal/ROM intact/no joint swelling/no joint erythema/normal gait/strength 5/5 bilateral upper extremities/strength 5/5 bilateral lower extremities details…

## 2022-08-06 NOTE — H&P ADULT - NEGATIVE GASTROINTESTINAL SYMPTOMS
mild suprapubic tenderness/no nausea/no vomiting/no diarrhea/no constipation/no change in bowel habits/no flatulence

## 2022-08-06 NOTE — ED PROVIDER NOTE - PHYSICAL EXAMINATION
PHYSICAL EXAM:    GENERAL: NAD  HEENT:  Atraumatic  CHEST/LUNG: Chest rise equal bilaterally  HEART: Regular rate and rhythm  ABDOMEN: Soft, Nontender, Nondistended  EXTREMITIES:  Extremities warm. +2 bilateral pitting edema.   PSYCH: A&Ox3  SKIN: No obvious rashes or lesions  NEUROLOGY: strength and sensation intact in all extremities.

## 2022-08-06 NOTE — H&P ADULT - PROBLEM SELECTOR PLAN 5
Patient is with a history of CAD. Will continue current home GDMT  - c/w home ASA 81 mg PO Qd, metoprolol succinate 12.5 mg PO Qd

## 2022-08-06 NOTE — H&P ADULT - NSHPSOCIALHISTORY_GEN_ALL_CORE
Denies current use of alcohol, tobacco or drugs  Unmarried, has no children, lives in senior living facility

## 2022-08-06 NOTE — H&P ADULT - ASSESSMENT
82M PMH HIV VLUD, CKD, BPH, CAD, atrial fibrillation s/p DVVC and ablation and MV endocarditis s/p bioprosthetic valve replacement, presents to the hospital after being found with leukocytosis to 20K on routine lab work, found to have a UTI and admitted to medicine for further evaluation and treatment.

## 2022-08-06 NOTE — CONSULT NOTE ADULT - ASSESSMENT
81 y/o male w/ PMH CKD, BPH, constipation sent in for labwork drawn recently as outpatient revealing a WBC greater than 20 and is sent in to ED for concern of infection. Pt is asymptomatic in ED and ID is consulted for leukocytosis. 83 y/o male w/ PMH CKD, BPH, constipation sent in for labwork drawn recently as outpatient revealing a WBC greater than 20 and is sent in to ED for concern of infection. Pt is asymptomatic in ED and ID is consulted for leukocytosis.    Plan  Pt c/o dysuria with frequency also in the setting of worsening creatinine would favor admitting pt  -start ceftriaxone 1gm q 24  Nephrology consult-worsening creatitine  urology consult for ongoing retention issues requiring catherization which has contributed to multiple recurrent UTI  F/u blood/urine cx  ID to follow  D/w Dr. Ferguson.

## 2022-08-06 NOTE — ED PROVIDER NOTE - OBJECTIVE STATEMENT
83 y/o male w/ PMH CKD, BPH, constipation sent in for labwork drawn yesterday revealing a WBC greater than 20. Pt is asymptomatic at time of presentation.    PCP: Tana Najera MD

## 2022-08-06 NOTE — H&P ADULT - HISTORY OF PRESENT ILLNESS
82M PMH HIV, BPH requiring self catheterization, CKD w. baseline Cr in low 2's presents to hospital after being found with leukocytosis on outpatient labs. Patient was seen yesterday by PCP who performed CBC as part of asymptomatic routine labs, and was found to have a WBC count of 20K for which the patient was sent in to the hospital today. Patient reports some suprapubic tenderness and states that he may have some pain with catheterization similar to previous UTIs. Denies subjective fever or chills as an outpatient. Patient denies recent sick contacts. Denies cough, sore throat or rhinorrhea suggestive of URI. ROS otherwise noncontributory.    ED course: CBC significant for leukocytosis to 12k. CMP with Cr elevated to 3.2. Procalcitonin also ordered and elevated to 14.2. CXR performed with no pleural effusions or consolidations suggestive of pneumonia. UA performed and positive for WBCs and LE. Patient admitted to medicine for further evaluation and treatment.

## 2022-08-06 NOTE — H&P ADULT - NEUROLOGICAL
normal/cranial nerves II-XII intact/sensation intact/responds to pain/responds to verbal commands/deep reflexes intact details…

## 2022-08-06 NOTE — ED PROVIDER NOTE - NSICDXPASTMEDICALHX_GEN_ALL_CORE_FT
PAST MEDICAL HISTORY:  Benign prostatic hyperplasia without lower urinary tract symptoms     Bladder mass     Chronic kidney disease, unspecified stage     Constipation, unspecified constipation type     COVID-19 vaccine series completed W #2 boosters    Edema of both legs     H/O endocarditis MV endocarditis    HIV (human immunodeficiency virus infection)     Orchitis and epididymitis     Osteoporosis     Seborrheic keratosis     Unsteady gait     Vitamin D deficiency

## 2022-08-06 NOTE — H&P ADULT - HIV CLINIC
Patient scheduled MW/FU on 7/30/2019 and would like to have labs done before the appointment. Patient will come in fasting 3 to 7 days before appointment to have labs done. Please check on or place orders.    
Yes

## 2022-08-06 NOTE — H&P ADULT - NSHPLABSRESULTS_GEN_ALL_CORE
I have personally reviewed the CXR and found lungs to be clear with no pulmonary consolidations or pleural effusions

## 2022-08-06 NOTE — ED ADULT NURSE NOTE - OBJECTIVE STATEMENT
1135 82 yr old WM sent to ER by PMD for further eval and tx of elevated WBC's (20,000) and elevated BUN/Creat. Denies fever, chills, pain. States he had a routine check up. PMH HIV + 10 yrs ago. On meds currently for HIV. Denies cough, dysuria, chest pain, palp or dizziness. Abd looks distended. Pt states he is constipated but his abd is usually a little distended. No N/V. Appetite good. Fall risk precautions maintained

## 2022-08-06 NOTE — H&P ADULT - PROBLEM SELECTOR PLAN 3
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

## 2022-08-06 NOTE — ED ADULT NURSE REASSESSMENT NOTE - NS ED NURSE REASSESS COMMENT FT1
Patient provided with food. NP Called, patient states he self caths for urine three times a day. NP will talk to attending about possibly placing a tovar catheter. NP to order straight cath or tovar.

## 2022-08-07 NOTE — PROGRESS NOTE ADULT - ASSESSMENT
82M with controlled HIV (*/5/22: HIV vIral Load UNDETECTABLE <212 copies; CD4= 383-23%)  CKD, BPH, constipation sent in for labwork drawn recently as outpatient revealing a WBC greater than 20 and is sent in to ED for concern of infection. Pt is asymptomatic in ED and ID is consulted for leukocytosis.  Patient straight caths himself 2-3 times daily as well as voids occasionally.  His Creatinine is considerably increased from recent baseline: 2/8 Creat= 2.49 --> 8/6 Creat = 3.28  He denies fever but notes recent rigors  Elevated ProCalcitonin level= 14.22 related to renal insufficiency in combination with infection  Pyruria, dysuria  complicated UTI  Sonogram of kidney and bladder demonstrated severe hydronephrosis and calculi in ureter    Suggest  Await  Blood and Urine cultures  Continue Ceftriaxone 1 gm daily 8/6-->  UROLOGY evaluation  has been requested   - may require right PCN

## 2022-08-07 NOTE — PHYSICAL THERAPY INITIAL EVALUATION ADULT - ADDITIONAL COMMENTS
Pt lives alone at a senior apartment at [facility]. Pt uses cane or RW  to ambulate. Pt lives alone at a senior apartment at [facility]. Pt uses cane or RW  to ambulate. DME: rollator, RW and [facility].

## 2022-08-07 NOTE — PROGRESS NOTE ADULT - PROBLEM SELECTOR PLAN 1
Patient with leukocytosis to 20K yesterday on routine blood work, found to have positive UA on admission to hospital. Patient intermittently self catheterizes due to BPH. No CVAT  - BCx and UCx obtained in ED, will follow up with sensitivities  - Received dose of CTX in ED, will continue with this antibiotic for now as 1g q24hrs  - Infectious disease consulted by ED, will continue with current plan

## 2022-08-07 NOTE — PROGRESS NOTE ADULT - SUBJECTIVE AND OBJECTIVE BOX
Follow Up:  daysi    Interval History/ROS:  comfortable,  notes constipation and abdominal bloating    Allergies  No Known Allergies    ANTIMICROBIALS:  abacavir 600 daily  cefTRIAXone   IVPB 1000 every 24 hours  lamiVUDine- daily      OTHER MEDS:  MEDICATIONS  (STANDING):  acetaminophen     Tablet .. 650 every 6 hours PRN  aluminum hydroxide/magnesium hydroxide/simethicone Suspension 30 every 4 hours PRN  aspirin enteric coated 81 daily  heparin   Injectable 5000 every 8 hours  melatonin 3 at bedtime PRN  metoprolol succinate ER 12.5 daily  ondansetron Injectable 4 every 8 hours PRN      Vital Signs Last 24 Hrs  T(C): 37 (07 Aug 2022 11:16), Max: 37.6 (06 Aug 2022 18:59)  T(F): 98.6 (07 Aug 2022 11:16), Max: 99.6 (06 Aug 2022 18:59)  HR: 73 (07 Aug 2022 11:16) (61 - 85)  BP: 122/68 (07 Aug 2022 11:16) (112/65 - 127/68)  BP(mean): 87 (06 Aug 2022 16:34) (87 - 87)  RR: 18 (07 Aug 2022 11:16) (18 - 18)  SpO2: 97% (07 Aug 2022 11:16) (96% - 100%)    Parameters below as of 07 Aug 2022 11:16  Patient On (Oxygen Delivery Method): room air        PHYSICAL EXAM:  General: WN/WD NAD, Non-toxic  Neurology: A&Ox3, nonfocal  Respiratory: Clear to auscultation bilaterally  CV: RRR, S1S2, no murmurs, rubs or gallops  Abdominal: Soft, Non-tender, distended, normal bowel sounds  Extremities: No edema,  Line Sites: Clear  Skin: No rash                        10.1   11.33 )-----------( 181      ( 07 Aug 2022 07:43 )             31.0   WBC Count: 11.33 ( @ 07:43)  WBC Count: 12.08 ( @ 12:29)        140  |  105  |  48<H>  ----------------------------<  90  4.0   |  21<L>  |  3.01<H>  Creatinine: 3.01 ( @ 07:43)    Creatinine: 3.28 ( @ 12:29)      Ca    8.7      07 Aug 2022 07:43    TPro  6.9  /  Alb  3.0<L>  /  TBili  0.2  /  DBili  x   /  AST  19  /  ALT  17  /  AlkPhos  139<H>      Urinalysis Basic - ( 06 Aug 2022 14:39 )    Color: Light Orange / Appearance: Turbid / S.012 / pH: x  Gluc: x / Ketone: Negative  / Bili: Negative / Urobili: Negative   Blood: x / Protein: 100 / Nitrite: Negative   Leuk Esterase: Large / RBC: 48 /hpf / WBC 1237 /HPF   Sq Epi: x / Non Sq Epi: 0 /hpf / Bacteria: Moderate      MICROBIOLOGY:  Rapid RVP Result: NotDetec ( @ 12:58)        RADIOLOGY:  < from: Xray Chest 2 Views PA/Lat (22 @ 12:40) >  IMPRESSION:  Clear lungs.    < end of copied text >  < from: US Kidney and Bladder (22 @ 12:34) >  FINDINGS: Severe bilateral hydronephrosis again noted. Renal cysts are   noted.    Questionable calculus in the distal right ureter.    Right kidney: 11.0 cm.    Left kidney: 13.6 cm.    Urinary bladder: Within normal limits.    IMPRESSION:  Severe bilateral hydronephrosis again noted.    A questionable calculus in the distal right ureter which can be further   evaluated with CT.    < end of copied text >    Beck Ferguson MD; Division of Infectious Disease; Pager: [phone]; nights and weekends: [phone]

## 2022-08-07 NOTE — CONSULT NOTE ADULT - SUBJECTIVE AND OBJECTIVE BOX
HPI: 83 yo M PMH of HIV VLUD, CKD, BPH c/b urinary retention requiring straight catheterizations, CAD, atrial fibrillation s/p DVVC and ablation and MV endocarditis s/p bioprosthetic valve replacement admitted for leukocytosis to 20K on routine lab work, found to have a possible UTI and admitted to medicine for further evaluation and treatment. Pt noted to have SUDHEER on CKD and Renal US performed demonstrating severe BL hydro.  consulted. Pt with similar BL hydronephrosis in  requiring indwelling tovar. Pt seen and examined. Pt reports he has been feeling otherwise well recently. He straight caths 3 times a day. Has has not noticed any changes to the urine recently. No fever/chills, n/v, abd pain, flank pain, dysuria, hematuria or other complaints. Has seen Dr. Silver in the past. history of stone in past that he has passed.    PAST MEDICAL & SURGICAL HISTORY:  Unsteady gait  HIV (human immunodeficiency virus infection)  Chronic kidney disease, unspecified stage  Constipation, unspecified constipation type  Seborrheic keratosis  Osteoporosis  Edema of both legs  Bladder mass  Vitamin D deficiency  Orchitis and epididymitis  H/O endocarditis  MV endocarditis  Benign prostatic hyperplasia without lower urinary tract symptoms  COVID-19 vaccine series completed  W #2 boosters  S/P coronary artery stent placement  S/P MVR (mitral valve replacement)  &amp; SHELLIE clip, 2020    MEDICATIONS  (STANDING):  abacavir 600 milliGRAM(s) Oral daily  aspirin enteric coated 81 milliGRAM(s) Oral daily  cefTRIAXone   IVPB 1000 milliGRAM(s) IV Intermittent every 24 hours  ferrous    sulfate 325 milliGRAM(s) Oral daily  heparin   Injectable 5000 Unit(s) SubCutaneous every 8 hours  lamiVUDine- milliGRAM(s) Oral daily  metoprolol succinate ER 12.5 milliGRAM(s) Oral daily    MEDICATIONS  (PRN):  acetaminophen     Tablet .. 650 milliGRAM(s) Oral every 6 hours PRN Temp greater or equal to 38C (100.4F), Mild Pain (1 - 3)  aluminum hydroxide/magnesium hydroxide/simethicone Suspension 30 milliLiter(s) Oral every 4 hours PRN Dyspepsia  melatonin 3 milliGRAM(s) Oral at bedtime PRN Insomnia  ondansetron Injectable 4 milliGRAM(s) IV Push every 8 hours PRN Nausea and/or Vomiting      FAMILY HISTORY:  Family history of congestive heart failure (Father)    Family history of cancer (Mother)        Allergies    No Known Allergies    Intolerances        SOCIAL HISTORY:    REVIEW OF SYSTEMS: Otherwise negative as stated in HPI    Physical Exam  Vital signs  T(C): 37 (22 @ 11:16), Max: 37.6 (22 @ 18:59)  HR: 73 (22 @ 11:16)  BP: 122/68 (22 @ 11:16)  SpO2: 97% (22 @ 11:16)  Wt(kg): --    Output    OUT:    Intermittent Catheterization - Urethral (mL): 1380 mL    Voided (mL): 100 mL  Total OUT: 1480 mL    Total NET: -1480 mL      OUT:    Intermittent Catheterization - Urethral (mL): 600 mL    Voided (mL): 300 mL  Total OUT: 900 mL    Total NET: -900 mL      Gen: NAD  Pulm: No respiratory distress  Abd: S/distended/NT  Back: no CVAT bl  : no suprapubic tenderness. unable to palpate bladder       LABS:                        10.1   11.33 )-----------( 181      ( 07 Aug 2022 07:43 )             31.0       08-07    140  |  105  |  48<H>  ----------------------------<  90  4.0   |  21<L>  |  3.01<H>    Ca    8.7      07 Aug 2022 07:43    TPro  6.9  /  Alb  3.0<L>  /  TBili  0.2  /  DBili  x   /  AST  19  /  ALT  17  /  AlkPhos  139<H>  08-07    PT/INR - ( 06 Aug 2022 12:29 )   PT: 11.7 sec;   INR: 1.02 ratio         PTT - ( 06 Aug 2022 12:29 )  PTT:29.1 sec  Urinalysis Basic - ( 06 Aug 2022 14:39 )    Color: Light Orange / Appearance: Turbid / S.012 / pH: x  Gluc: x / Ketone: Negative  / Bili: Negative / Urobili: Negative   Blood: x / Protein: 100 / Nitrite: Negative   Leuk Esterase: Large / RBC: 48 /hpf / WBC 1237 /HPF   Sq Epi: x / Non Sq Epi: 0 /hpf / Bacteria: Moderate      Urine Cx: pending  Blood Cx: Culture - Blood (22 @ 12:40)    Specimen Source: .Blood Blood-Peripheral    Culture Results:   No growth to date.    Culture - Blood (22 @ 11:45)    -  Coagulase negative Staphylococcus: Detec    Gram Stain:   Growth in aerobic bottle: Gram Positive Cocci in Clusters    Specimen Source: .Blood Blood-Peripheral    Organism: Blood Culture PCR    Culture Results:   Growth in aerobic bottle: Gram Positive Cocci in Clusters  ***Blood Panel PCR results on this specimen are available  approximately 3 hours after the Gram stain result.***  Gram stain, PCR, and/or culture results may not always  correspond due to difference in methodologies.  ************************************************************  This PCR assay was performed by multiplex PCR. This  Assay tests for 66 bacterial and resistance gene targets.  Please refer to the [facility] test directory  at [email or web address] for details.    Organism Identification: Blood Culture PCR    Method Type: PCR    RADIOLOGY:    < from: US Kidney and Bladder (22 @ 12:34) >    ACC: [id number] EXAM:  US KIDNEYS AND BLADDER                          PROCEDURE DATE:  2022          INTERPRETATION:  CLINICAL INFORMATION: Renal Failure Admitting Dxs:   D72.829 ABNORMAL LABS, HIGH WBC MUS    COMPARISON: 8.19.20.    TECHNIQUE: Sonography of the kidneys and bladder.    FINDINGS: Severe bilateral hydronephrosis again noted. Renal cysts are   noted.    Questionable calculus in the distal right ureter.    Right kidney: 11.0 cm.    Left kidney: 13.6 cm.    Urinary bladder: Within normal limits.    IMPRESSION:  Severe bilateral hydronephrosis again noted.    A questionable calculus in the distal right ureter which can be further   evaluated with CT.    --- End of Report ---    SHAMAR ZABALA MD; Attending Radiologist  This document has been electronically signed. Aug  7 2022 12:39PM    < end of copied text >

## 2022-08-07 NOTE — PHYSICAL THERAPY INITIAL EVALUATION ADULT - GAIT DEVIATIONS NOTED, PT EVAL
+ shuffling but increases step length with cues/decreased lorena/decreased step length/decreased stride length

## 2022-08-07 NOTE — PROGRESS NOTE ADULT - SUBJECTIVE AND OBJECTIVE BOX
[facility] Division of Hospital Medicine  Pool Borrero MD  Available via MS Teams  Pager [phone]    SUBJECTIVE / OVERNIGHT EVENTS: No acute events reported overnight    ADDITIONAL REVIEW OF SYSTEMS: Patient seen and examined at bedside. Reports he feels better and has had no subjective fevers or chills overnight. Patient reports no chest pain or shortness of breath. Denies abdominal pain. Reports that he has not had a bowel movement for about a week and was on Miralax and senna prior to his admission. Otherwise patient is without complaint at time of exam.     MEDICATIONS  (STANDING):  abacavir 600 milliGRAM(s) Oral daily  aspirin enteric coated 81 milliGRAM(s) Oral daily  cefTRIAXone   IVPB 1000 milliGRAM(s) IV Intermittent every 24 hours  ferrous    sulfate 325 milliGRAM(s) Oral daily  heparin   Injectable 5000 Unit(s) SubCutaneous every 8 hours  lactulose Syrup 10 Gram(s) Oral once  lamiVUDine- milliGRAM(s) Oral daily  metoprolol succinate ER 12.5 milliGRAM(s) Oral daily    MEDICATIONS  (PRN):  acetaminophen     Tablet .. 650 milliGRAM(s) Oral every 6 hours PRN Temp greater or equal to 38C (100.4F), Mild Pain (1 - 3)  aluminum hydroxide/magnesium hydroxide/simethicone Suspension 30 milliLiter(s) Oral every 4 hours PRN Dyspepsia  melatonin 3 milliGRAM(s) Oral at bedtime PRN Insomnia  ondansetron Injectable 4 milliGRAM(s) IV Push every 8 hours PRN Nausea and/or Vomiting      I&O's Summary    06 Aug 2022 07:  -  07 Aug 2022 07:00  --------------------------------------------------------  IN: 240 mL / OUT: 1480 mL / NET: -1240 mL    07 Aug 2022 07:  -  07 Aug 2022 13:22  --------------------------------------------------------  IN: 240 mL / OUT: 300 mL / NET: -60 mL        PHYSICAL EXAM:  Vital Signs Last 24 Hrs  T(C): 37 (07 Aug 2022 11:16), Max: 37.6 (06 Aug 2022 18:59)  T(F): 98.6 (07 Aug 2022 11:16), Max: 99.6 (06 Aug 2022 18:59)  HR: 73 (07 Aug 2022 11:16) (61 - 85)  BP: 122/68 (07 Aug 2022 11:16) (112/65 - 127/68)  BP(mean): 87 (06 Aug 2022 16:34) (87 - 87)  RR: 18 (07 Aug 2022 11:16) (18 - 18)  SpO2: 97% (07 Aug 2022 11:16) (96% - 100%)    Parameters below as of 07 Aug 2022 11:16  Patient On (Oxygen Delivery Method): room air      CONSTITUTIONAL: NAD, well-groomed  EYES: PERRLA; conjunctiva and sclera clear  ENMT: Moist oral mucosa, no pharyngeal injection or exudates; normal dentition  NECK: Supple, no palpable masses; no thyromegaly  RESPIRATORY: Normal respiratory effort; lungs are clear to auscultation bilaterally  CARDIOVASCULAR: normal S1 and S2, no murmur/rub/gallop; No lower extremity edema  ABDOMEN: Nontender to palpation, normoactive bowel sounds, no rebound/guarding; No hepatosplenomegaly  MUSCULOSKELETAL:  no clubbing or cyanosis of digits; no joint swelling or tenderness to palpation  PSYCH: A+O to person, place, and time; affect appropriate  NEUROLOGY: CN 2-12 are intact and symmetric; no gross sensory deficits   SKIN: No rashes; no palpable lesions    LABS:                        10.1   11.33 )-----------( 181      ( 07 Aug 2022 07:43 )             31.0     08-07    140  |  105  |  48<H>  ----------------------------<  90  4.0   |  21<L>  |  3.01<H>    Ca    8.7      07 Aug 2022 07:43    TPro  6.9  /  Alb  3.0<L>  /  TBili  0.2  /  DBili  x   /  AST  19  /  ALT  17  /  AlkPhos  139<H>  08-07    PT/INR - ( 06 Aug 2022 12:29 )   PT: 11.7 sec;   INR: 1.02 ratio         PTT - ( 06 Aug 2022 12:29 )  PTT:29.1 sec      Urinalysis Basic - ( 06 Aug 2022 14:39 )    Color: Light Orange / Appearance: Turbid / S.012 / pH: x  Gluc: x / Ketone: Negative  / Bili: Negative / Urobili: Negative   Blood: x / Protein: 100 / Nitrite: Negative   Leuk Esterase: Large / RBC: 48 /hpf / WBC 1237 /HPF   Sq Epi: x / Non Sq Epi: 0 /hpf / Bacteria: Moderate        SARS-CoV-2: NotDetec (06 Aug 2022 12:58)    COMMUNICATION:  Care Discussed with Consultants/Other Providers and Details of Discussion: Case discussed with LAUREN Banks  Discussions with Patient/Family: Case discussed with patient, will obtain renal u/s today

## 2022-08-07 NOTE — CONSULT NOTE ADULT - ASSESSMENT
83 yo M PMH of HIV VLUD, CKD, BPH c/b urinary retention requiring straight catheterizations, CAD, atrial fibrillation s/p DVVC and ablation and MV endocarditis s/p bioprosthetic valve replacement admitted for leukocytosis to 20K on routine lab work, found to have a possible UTI and admitted to medicine for further evaluation and treatment. Pt noted to have SUDHEER on CKD and Renal US performed demonstrating severe BL hydro.  consulted. WBC 12.1 on admission, Cr initially 3.3 downtrending to 3.0. UA with mod blood and mod bacteria. Renal US with severe BL hydro and ? stone in distal R ureter.     Recs:  - would place indwelling catheter for maximum drainage of  tract  - obtain CTAP for further evaluation of stone  - trend Cr   - f/u cultures  - if pt becomes febrile or decompensates with evidence of obstructing stone would need to intervene.     d/w Dr. Lucio 81 yo M PMH of HIV VLUD, CKD, BPH c/b urinary retention requiring straight catheterizations, CAD, atrial fibrillation s/p DVVC and ablation and MV endocarditis s/p bioprosthetic valve replacement admitted for leukocytosis to 20K on routine lab work, found to have a possible UTI and admitted to medicine for further evaluation and treatment. Pt noted to have SUDHEER on CKD and Renal US performed demonstrating severe BL hydro.  consulted. WBC 12.1 on admission, Cr initially 3.3 downtrending to 3.0. UA with mod blood and mod bacteria. Renal US with severe BL hydro and ? stone in distal R ureter.     Recs:  - would place indwelling catheter for maximum drainage of  tract. discussed with pt at length the benefits of having an indwelling catheter for maximum decompression including improving overall kidney function and treatment of UTI.   - obtain CTAP for further evaluation of stone  - trend Cr   - f/u cultures  - if pt becomes febrile or decompensates with evidence of obstructing stone would need to intervene.     d/w Dr. Lucio

## 2022-08-08 NOTE — PROGRESS NOTE ADULT - SUBJECTIVE AND OBJECTIVE BOX
Interval events:   CT pending   Patient CICing       OBJECTIVE:  Vital Signs Last 24 Hrs  T(C): 37.3 (08 Aug 2022 04:48), Max: 37.3 (08 Aug 2022 04:48)  T(F): 99.2 (08 Aug 2022 04:48), Max: 99.2 (08 Aug 2022 04:48)  HR: 60 (08 Aug 2022 04:48) (60 - 69)  BP: 115/63 (08 Aug 2022 04:48) (115/63 - 120/64)  BP(mean): --  RR: 18 (08 Aug 2022 04:48) (18 - 18)  SpO2: 95% (08 Aug 2022 04:48) (95% - 98%)    Parameters below as of 08 Aug 2022 04:48  Patient On (Oxygen Delivery Method): room air        Physical Examination:  GEN: NAD, resting quietly  ABD: soft        LABS:                        10.7   11.52 )-----------( 200      ( 08 Aug 2022 04:57 )             32.0       08-08    140  |  106  |  40<H>  ----------------------------<  93  4.1   |  21<L>  |  2.79<H>    Ca    8.9      08 Aug 2022 04:57    TPro  6.9  /  Alb  3.0<L>  /  TBili  0.2  /  DBili  x   /  AST  19  /  ALT  17  /  AlkPhos  139<H>  08-07

## 2022-08-08 NOTE — PROGRESS NOTE ADULT - PROBLEM SELECTOR PLAN 1
- leukocytosis with positive UA on admission to hospital.  - Patient intermittently self catheterizes due to BPH  - BCx w/GPC in clusters, UCx with Klebsiella  - ID following ; Continue Ceftriaxone 1 gm daily 8/6--> ;  5-7 day course antibiotics anticipated  - Sonogram of kidney and bladder demonstrated severe hydronephrosis and calculi in ureter  - Urology  evaluation appreciated  ; stone does not appear to be obstructing by CT scan  - f/up repeat bld cx

## 2022-08-08 NOTE — PROGRESS NOTE ADULT - SUBJECTIVE AND OBJECTIVE BOX
Patient is a 82y old  Male who presents with a chief complaint of UTI (08 Aug 2022 14:48)      SUBJECTIVE / OVERNIGHT EVENTS:  Pt seen and examined. No acute events overnight. Pt denies abd pain, n/v, dysuria, fever/chills. He does c/o constipation.    MEDICATIONS  (STANDING):  abacavir 600 milliGRAM(s) Oral daily  aspirin enteric coated 81 milliGRAM(s) Oral daily  cefTRIAXone   IVPB 1000 milliGRAM(s) IV Intermittent every 24 hours  ferrous    sulfate 325 milliGRAM(s) Oral daily  heparin   Injectable 5000 Unit(s) SubCutaneous every 8 hours  lamiVUDine- milliGRAM(s) Oral daily  metoprolol succinate ER 12.5 milliGRAM(s) Oral daily    MEDICATIONS  (PRN):  acetaminophen     Tablet .. 650 milliGRAM(s) Oral every 6 hours PRN Temp greater or equal to 38C (100.4F), Mild Pain (1 - 3)  aluminum hydroxide/magnesium hydroxide/simethicone Suspension 30 milliLiter(s) Oral every 4 hours PRN Dyspepsia  melatonin 3 milliGRAM(s) Oral at bedtime PRN Insomnia  ondansetron Injectable 4 milliGRAM(s) IV Push every 8 hours PRN Nausea and/or Vomiting      Vital Signs Last 24 Hrs  T(C): 37.2 (08 Aug 2022 11:58), Max: 37.3 (08 Aug 2022 04:48)  T(F): 98.9 (08 Aug 2022 11:58), Max: 99.2 (08 Aug 2022 04:48)  HR: 61 (08 Aug 2022 13:36) (54 - 69)  BP: 119/62 (08 Aug 2022 13:36) (115/63 - 120/69)  BP(mean): --  RR: 18 (08 Aug 2022 11:58) (18 - 18)  SpO2: 99% (08 Aug 2022 11:58) (95% - 99%)    Parameters below as of 08 Aug 2022 11:58  Patient On (Oxygen Delivery Method): room air      CAPILLARY BLOOD GLUCOSE        I&O's Summary    07 Aug 2022 07:01  -  08 Aug 2022 07:00  --------------------------------------------------------  IN: 1070 mL / OUT: 2050 mL / NET: -980 mL    08 Aug 2022 07:01  -  08 Aug 2022 15:00  --------------------------------------------------------  IN: 560 mL / OUT: 1600 mL / NET: -1040 mL        PHYSICAL EXAM:  GENERAL: NAD, well-groomed  HEAD:  Atraumatic, Normocephalic  EYES:  conjunctiva and sclera clear  NECK: Supple, No JVD  CHEST/LUNG: Clear to auscultation bilaterally; No wheeze  HEART: Regular rate and rhythm; No murmurs, rubs, or gallops  ABDOMEN: Soft, Nontender, +distended; Bowel sounds present  EXTREMITIES:  2+ Peripheral Pulses, No clubbing, cyanosis, or edema  PSYCH: AAOx3  NEUROLOGY: non-focal  SKIN: No rashes or lesions    LABS:                        10.7   11.52 )-----------( 200      ( 08 Aug 2022 04:57 )             32.0     08-08    140  |  106  |  40<H>  ----------------------------<  93  4.1   |  21<L>  |  2.79<H>    Ca    8.9      08 Aug 2022 04:57    TPro  6.9  /  Alb  3.0<L>  /  TBili  0.2  /  DBili  x   /  AST  19  /  ALT  17  /  AlkPhos  139<H>  08-07              RADIOLOGY & ADDITIONAL TESTS:    Imaging Personally Reviewed:  CT ABD/PELVIS  Redemonstration of moderate bilateral hydroureteronephrosis to the level   of the bladder. No evidence of obstructing urinary tract calculus.    Circumferential bladder wall thickening with perivesicular fat stranding   and left greater than right perinephric fat stranding and urothelial   thickening, raising concern for cystitis with ascending urinary tract   infection. Correlate with urinalysis.    Prominent retroperitoneal lymph nodes, nonspecific.      Consultant(s) Notes Reviewed:  ID    Care Discussed with Consultants/Other Providers: Urology

## 2022-08-08 NOTE — PROGRESS NOTE ADULT - ASSESSMENT
82M with controlled HIV (*/5/22: HIV vIral Load UNDETECTABLE <212 copies; CD4= 383-23%)  CKD, BPH, constipation sent in for labwork drawn recently as outpatient revealing a WBC greater than 20 and is sent in to ED for concern of infection. Pt is asymptomatic in ED and ID is consulted for leukocytosis.  Patient straight caths himself 2-3 times daily as well as voids occasionally.  His Creatinine is considerably increased from recent baseline:    2/8 Creat= 2.49 --> 8/6 Creat = 3.28  declining since admission 8/8: Creat = 2.79  ?post obstructive diuresis - he likely requires much more frequent straight caths than what he has been doing at home    He denies fever but notes recent rigors  Elevated ProCalcitonin level= 14.22 related to renal insufficiency in combination with infection  Pyruria, dysuria  complicated UTI, Klebsiella pneumoniae bacteruria  +BC CNS: most likely procurement contaminant  Sonogram of kidney and bladder demonstrated severe hydronephrosis and calculi in ureter  UROLOGY evaluation appreciated - stone does not appear to be obstructing by CT scan    Suggest  Await susceptibilties on Klebs urinary isolate  Continue Ceftriaxone 1 gm daily 8/6-->    5-7 day course antibiotics anticipated    I will be out tomorrow  ID service to see

## 2022-08-08 NOTE — PROGRESS NOTE ADULT - SUBJECTIVE AND OBJECTIVE BOX
Follow Up:  uti, daysi    Interval History/ROS:  feels well, -he declined Aden but has been straight cath ing himself more frequently -- each time with large volume  constipated,  no bowel movments    Allergies  No Known Allergies    ANTIMICROBIALS:  abacavir 600 daily  cefTRIAXone   IVPB 1000 every 24 hours  lamiVUDine- daily    OTHER MEDS:  MEDICATIONS  (STANDING):  acetaminophen     Tablet .. 650 every 6 hours PRN  aluminum hydroxide/magnesium hydroxide/simethicone Suspension 30 every 4 hours PRN  aspirin enteric coated 81 daily  heparin   Injectable 5000 every 8 hours  melatonin 3 at bedtime PRN  metoprolol succinate ER 12.5 daily  ondansetron Injectable 4 every 8 hours PRN      Vital Signs Last 24 Hrs  T(C): 37.2 (08 Aug 2022 11:58), Max: 37.3 (08 Aug 2022 04:48)  T(F): 98.9 (08 Aug 2022 11:58), Max: 99.2 (08 Aug 2022 04:48)  HR: 61 (08 Aug 2022 13:36) (54 - 69)  BP: 119/62 (08 Aug 2022 13:36) (115/63 - 120/69)  BP(mean): --  RR: 18 (08 Aug 2022 11:58) (18 - 18)  SpO2: 99% (08 Aug 2022 11:58) (95% - 99%)    Parameters below as of 08 Aug 2022 11:58  Patient On (Oxygen Delivery Method): room air        PHYSICAL EXAM:  General: WN/WD NAD, Non-toxic  Neurology: A&Ox3, nonfocal  Respiratory: Clear to auscultation bilaterally  CV: RRR, S1S2, no murmurs, rubs or gallops  Abdominal: Soft, Non-tender, non-distended, normal bowel sounds  Extremities: No edema,  Line Sites: Clear  Skin: No rash                          10.7   11.52 )-----------( 200      ( 08 Aug 2022 04:57 )             32.0       08-08    140  |  106  |  40<H>  ----------------------------<  93  4.1   |  21<L>  |  2.79<H>  Creatinine: 2.79 (08-08 @ 04:57)    Creatinine: 3.01 (08-07 @ 07:43)    Creatinine: 3.28 (08-06 @ 12:29)        Ca    8.9      08 Aug 2022 04:57    TPro  6.9  /  Alb  3.0<L>  /  TBili  0.2  /  DBili  x   /  AST  19  /  ALT  17  /  AlkPhos  139<H>  08-07      MICROBIOLOGY:  Clean Catch Clean Catch (Midstream)  08-06-22   >100,000 CFU/ml Klebsiella pneumoniae  --  --      .Blood Blood-Peripheral  08-06-22   No growth to date.  --  --      .Blood Blood-Peripheral  08-06-22   Growth in aerobic bottle: Gram Positive Cocci in Clusters  Coag neg staphy      Rapid RVP Result: NotDetec (08-06 @ 12:58)      RADIOLOGY:  images independently viewed  < from: CT Abdomen and Pelvis No Cont (08.08.22 @ 12:03) >  FINDINGS:  Evaluation of the solid organs and vasculature is limited without   intravenous contrast.    LOWER CHEST: Within normal limits.    LIVER: Within normal limits.  BILE DUCTS: Normal caliber.  GALLBLADDER: Within normal limits.  SPLEEN: Within normal limits.  PANCREAS: Within normal limits.  ADRENALS: Mild bilateral adrenal gland thickening, unchanged.  KIDNEYS/URETERS: Moderate bilateral hydroureteronephrosis to level of the   bladder without obstructing stone. Bilateral perinephric fat stranding   and urothelial thickening, left greater than right. Bilateral renal   hypodensities too small to characterize.    BLADDER: Circumferential bladder wall thickening and perivesicular fat   stranding.  REPRODUCTIVEORGANS: Prostate is enlarged.    BOWEL: No bowel obstruction. Appendix is normal.  PERITONEUM: No ascites.  VESSELS: Atherosclerotic changes.  RETROPERITONEUM/LYMPH NODES: Para-aortic lymph nodes measuring up to 1.2   cm in short axis.  ABDOMINAL WALL: Small fat-containing left inguinal hernia. Tiny   fat-containing umbilical hernia.  BONES: Degenerative changes.    IMPRESSION:  Redemonstration of moderate bilateral hydroureteronephrosis to the level   of the bladder. No evidence of obstructing urinary tract calculus.    Circumferential bladder wall thickening with perivesicular fat stranding   and left greater than right perinephric fat stranding and urothelial   thickening, raising concern for cystitis with ascending urinary tract   infection. Correlate with urinalysis.    Prominent retroperitoneal lymph nodes, nonspecific.    < end of copied text >      Beck Ferguson MD; Division of Infectious Disease; Pager: [phone]; nights and weekends: [phone]

## 2022-08-08 NOTE — PROGRESS NOTE ADULT - ASSESSMENT
81 yo M PMH of HIV VLUD, CKD, BPH c/b urinary retention requiring straight catheterizations, CAD, atrial fibrillation s/p DVVC and ablation and MV endocarditis s/p bioprosthetic valve replacement admitted for leukocytosis to 20K on routine lab work, found to have a possible UTI and admitted to medicine for further evaluation and treatment. Pt noted to have SUDHEER on CKD and Renal US performed demonstrating severe BL hydro.  consulted. WBC 12.1 on admission, Cr initially 3.3 downtrending to 3.0. UA with mod blood and mod bacteria. Renal US with severe BL hydro and ? stone in distal R ureter.     Recs:  - discussed with patient need for increased CIC   - obtain CTAP for further evaluation of stone  - trend Cr   - f/u cultures  - if pt becomes febrile or decompensates with evidence of obstructing stone would need to intervene.     Janna Kiser   Available on Teams

## 2022-08-09 NOTE — PROGRESS NOTE ADULT - ASSESSMENT
82M with controlled HIV (*/5/22: HIV vIral Load UNDETECTABLE <212 copies; CD4= 383-23%)  CKD, BPH, constipation sent in for labwork drawn recently as outpatient revealing a WBC greater than 20 and is sent in to ED for concern of infection. Pt is asymptomatic in ED and ID is consulted for leukocytosis.  Patient straight caths himself 2-3 times daily as well as voids occasionally.  His Creatinine is considerably increased from recent baseline:    2/8 Creat= 2.49 --> 8/6 Creat = 3.28  declining since admission 8/8: Creat = 2.79  ?post obstructive diuresis - he likely requires much more frequent straight caths than what he has been doing at home    He denies fever but notes recent rigors  Elevated ProCalcitonin level= 14.22 related to renal insufficiency in combination with infection  Pyruria, dysuria  complicated UTI, Klebsiella pneumoniae bacteruria  +BC CNS: most likely procurement contaminant  Sonogram of kidney and bladder demonstrated severe hydronephrosis and calculi in ureter  UROLOGY evaluation appreciated - stone does not appear to be obstructing by CT scan    Suggest  Await susceptibilties on Klebs urinary isolate  Continue Ceftriaxone 1 gm daily 8/6-->    5-7 day course antibiotics anticipated    I will be out tomorrow  ID service to see     82M with controlled HIV (*/5/22: HIV vIral Load UNDETECTABLE <212 copies; CD4= 383-23%)  CKD, BPH, constipation sent in for labwork drawn recently as outpatient revealing a WBC greater than 20 and is sent in to ED for concern of infection. Pt is asymptomatic in ED and ID is consulted for leukocytosis.  Patient straight caths himself 2-3 times daily as well as voids occasionally.  His Creatinine is considerably increased from recent baseline:    2/8 Creat= 2.49 --> 8/6 Creat = 3.28  declining since admission 8/8: Creat = 2.79  ?post obstructive diuresis - he likely requires much more frequent straight caths than what he has been doing at home    He denies fever but notes recent rigors  Elevated ProCalcitonin level= 14.22 related to renal insufficiency in combination with infection  Pyruria, dysuria  complicated UTI, Klebsiella pneumoniae bacteruria  +BC CNS: most likely procurement contaminant  Sonogram of kidney and bladder demonstrated severe hydronephrosis and calculi in ureter  UROLOGY evaluation appreciated - stone does not appear to be obstructing by CT scan    Suggest  Susceptibilties on Klebs urinary isolate show susceptibility to Ceftriaxone  Continue Ceftriaxone 1 gm daily 8/6-->  Total duration of antibiotics to be determined    Tung Almazan MD  Can be called via Teams  After 5pm/weekends [phone]

## 2022-08-09 NOTE — PROGRESS NOTE ADULT - PROBLEM SELECTOR PLAN 1
- leukocytosis with positive UA on admission to hospital.  - Patient intermittently self catheterizes due to BPH  - BCx w/GPC in clusters, UCx with Klebsiella ; sensitivities reviewed  - ID following ; Continue Ceftriaxone 1 gm daily 8/6--> ;  5-7 day course antibiotics anticipated ( till 8/13)  - Sonogram of kidney and bladder demonstrated severe hydronephrosis and calculi in ureter  - Urology  evaluation appreciated  ; stone does not appear to be obstructing by CT scan  - f/up repeat bld cx

## 2022-08-09 NOTE — PROGRESS NOTE ADULT - SUBJECTIVE AND OBJECTIVE BOX
INFECTIOUS DISEASES FOLLOW UP-- Dorita Jamesach  [phone]    This is a follow up note for this  82yMale with  Leukocytosis        ROS:  CONSTITUTIONAL:  No fever, good appetite  CARDIOVASCULAR:  No chest pain or palpitations  RESPIRATORY:  No dyspnea  GASTROINTESTINAL:  No nausea, vomiting, diarrhea, or abdominal pain  GENITOURINARY:  No dysuria  NEUROLOGIC:  No headache,     Allergies    No Known Allergies    Intolerances        ANTIBIOTICS/RELEVANT:  antimicrobials  abacavir 600 milliGRAM(s) Oral daily  cefTRIAXone   IVPB 1000 milliGRAM(s) IV Intermittent every 24 hours  lamiVUDine- milliGRAM(s) Oral daily    immunologic:    OTHER:  acetaminophen     Tablet .. 650 milliGRAM(s) Oral every 6 hours PRN  aluminum hydroxide/magnesium hydroxide/simethicone Suspension 30 milliLiter(s) Oral every 4 hours PRN  aspirin enteric coated 81 milliGRAM(s) Oral daily  ferrous    sulfate 325 milliGRAM(s) Oral daily  heparin   Injectable 5000 Unit(s) SubCutaneous every 8 hours  melatonin 3 milliGRAM(s) Oral at bedtime PRN  metoprolol succinate ER 12.5 milliGRAM(s) Oral daily  ondansetron Injectable 4 milliGRAM(s) IV Push every 8 hours PRN      Objective:  Vital Signs Last 24 Hrs  T(C): 37.2 (09 Aug 2022 19:04), Max: 37.4 (09 Aug 2022 06:00)  T(F): 99 (09 Aug 2022 19:04), Max: 99.4 (09 Aug 2022 06:00)  HR: 73 (09 Aug 2022 19:04) (71 - 73)  BP: 110/67 (09 Aug 2022 19:04) (110/67 - 138/61)  BP(mean): --  RR: 18 (09 Aug 2022 19:04) (18 - 18)  SpO2: 97% (09 Aug 2022 19:04) (97% - 97%)    Parameters below as of 09 Aug 2022 19:04  Patient On (Oxygen Delivery Method): room air        PHYSICAL EXAM:  Constitutional:no acute distress  Eyes:EMILY, EOMI  Ear/Nose/Throat: no oral lesions, 	  Respiratory: clear BL  Cardiovascular: S1S2  Gastrointestinal:soft, (+) BS, no tenderness  Extremities:no e/e/c  No Lymphadenopathy  IV sites not inflammed.    LABS:                        10.7   12.65 )-----------( 207      ( 09 Aug 2022 06:16 )             32.3     08-09    138  |  105  |  43<H>  ----------------------------<  92  4.1   |  21<L>  |  2.84<H>    Ca    9.2      09 Aug 2022 06:17            MICROBIOLOGY:            RECENT CULTURES:  08-08 @ 14:46  .Blood Blood  --  --  --    No growth to date.  --  08-06 @ 14:39  Clean Catch Clean Catch (Midstream)  Klebsiella pneumoniae  Klebsiella pneumoniae  FLORENCIA    >100,000 CFU/ml Klebsiella pneumoniae  --  08-06 @ 12:40  .Blood Blood-Peripheral  --  --  --    No growth to date.  --  08-06 @ 11:45  .Blood Blood-Peripheral  Blood Culture PCR  Blood Culture PCR  PCR    Growth in aerobic bottle: Staphylococcus hominis  Coag Negative Staphylococcus  Single set isolate, possible contaminant. Contact  Microbiology if susceptibility testing clinically  indicated.  ***Blood Panel PCR results on this specimen are available  approximately 3 hours after the Gram stain result.***  Gram stain, PCR, and/or culture results may not always  correspond due to difference in methodologies.  ************************************************************  This PCR assay was performed by multiplex PCR. This  Assay tests for 66 bacterial and resistance gene targets.  Please refer to the [facility] test directory  at [email or web address] for details.  --      RADIOLOGY & ADDITIONAL STUDIES:  < from: CT Abdomen and Pelvis No Cont (08.08.22 @ 12:03) >  IMPRESSION:  Redemonstration of moderate bilateral hydroureteronephrosis to the level   of the bladder. No evidence of obstructing urinary tract calculus.    Circumferential bladder wall thickening with perivesicular fat stranding   and left greater than right perinephric fat stranding and urothelial   thickening, raising concern for cystitis with ascending urinary tract   infection. Correlate with urinalysis.    Prominent retroperitoneal lymph nodes, nonspecific.    < end of copied text >  < from: CT Abdomen and Pelvis No Cont (08.08.22 @ 12:03) >  IMPRESSION:  Redemonstration of moderate bilateral hydroureteronephrosis to the level   of the bladder. No evidence of obstructing urinary tract calculus.    Circumferential bladder wall thickening with perivesicular fat stranding   and left greater than right perinephric fat stranding and urothelial   thickening, raising concern for cystitis with ascending urinary tract   infection. Correlate with urinalysis.    Prominent retroperitoneal lymph nodes, nonspecific.    < end of copied text >   INFECTIOUS DISEASES FOLLOW UP-- Dorita Almazan  [phone]    This is a follow up note for this  82yMale with  Leukocytosis  UTI in the setting of enlarged prostate        ROS:  CONSTITUTIONAL:  low grade fever,   CARDIOVASCULAR:  No chest pain or palpitations  RESPIRATORY:  No dyspnea  GASTROINTESTINAL:  No nausea, vomiting, diarrhea, or abdominal pain  GENITOURINARY:  No dysuria  NEUROLOGIC:  No headache,     Allergies    No Known Allergies    Intolerances        ANTIBIOTICS/RELEVANT:  antimicrobials  abacavir 600 milliGRAM(s) Oral daily  cefTRIAXone   IVPB 1000 milliGRAM(s) IV Intermittent every 24 hours  lamiVUDine- milliGRAM(s) Oral daily    immunologic:    OTHER:  acetaminophen     Tablet .. 650 milliGRAM(s) Oral every 6 hours PRN  aluminum hydroxide/magnesium hydroxide/simethicone Suspension 30 milliLiter(s) Oral every 4 hours PRN  aspirin enteric coated 81 milliGRAM(s) Oral daily  ferrous    sulfate 325 milliGRAM(s) Oral daily  heparin   Injectable 5000 Unit(s) SubCutaneous every 8 hours  melatonin 3 milliGRAM(s) Oral at bedtime PRN  metoprolol succinate ER 12.5 milliGRAM(s) Oral daily  ondansetron Injectable 4 milliGRAM(s) IV Push every 8 hours PRN      Objective:  Vital Signs Last 24 Hrs  T(C): 37.2 (09 Aug 2022 19:04), Max: 37.4 (09 Aug 2022 06:00)  T(F): 99 (09 Aug 2022 19:04), Max: 99.4 (09 Aug 2022 06:00)  HR: 73 (09 Aug 2022 19:04) (71 - 73)  BP: 110/67 (09 Aug 2022 19:04) (110/67 - 138/61)  BP(mean): --  RR: 18 (09 Aug 2022 19:04) (18 - 18)  SpO2: 97% (09 Aug 2022 19:04) (97% - 97%)    Parameters below as of 09 Aug 2022 19:04  Patient On (Oxygen Delivery Method): room air        PHYSICAL EXAM:  Constitutional:no acute distress  Eyes:EMILY, EOMI  Ear/Nose/Throat: no oral lesions, 	  Respiratory: clear BL  Cardiovascular: S1S2  Gastrointestinal:soft, (+) BS, no tenderness  Extremities:no e/e/c  No Lymphadenopathy  IV sites not inflammed.    LABS:                        10.7   12.65 )-----------( 207      ( 09 Aug 2022 06:16 )             32.3     08-09    138  |  105  |  43<H>  ----------------------------<  92  4.1   |  21<L>  |  2.84<H>    Ca    9.2      09 Aug 2022 06:17            MICROBIOLOGY:            RECENT CULTURES:  08-08 @ 14:46  .Blood Blood  --  --  --    No growth to date.  --  08-06 @ 14:39  Clean Catch Clean Catch (Midstream)  Klebsiella pneumoniae  Klebsiella pneumoniae  FLORENCIA    >100,000 CFU/ml Klebsiella pneumoniae  --  08-06 @ 12:40  .Blood Blood-Peripheral  --  --  --    No growth to date.  --  08-06 @ 11:45  .Blood Blood-Peripheral  Blood Culture PCR  Blood Culture PCR  PCR    Growth in aerobic bottle: Staphylococcus hominis  Coag Negative Staphylococcus  Single set isolate, possible contaminant. Contact  Microbiology if susceptibility testing clinically  indicated.  ***Blood Panel PCR results on this specimen are available  approximately 3 hours after the Gram stain result.***  Gram stain, PCR, and/or culture results may not always  correspond due to difference in methodologies.  ************************************************************  This PCR assay was performed by multiplex PCR. This  Assay tests for 66 bacterial and resistance gene targets.  Please refer to the [facility] test directory  at [email or web address] for details.  --      RADIOLOGY & ADDITIONAL STUDIES:  < from: CT Abdomen and Pelvis No Cont (08.08.22 @ 12:03) >  IMPRESSION:  Redemonstration of moderate bilateral hydroureteronephrosis to the level   of the bladder. No evidence of obstructing urinary tract calculus.    Circumferential bladder wall thickening with perivesicular fat stranding   and left greater than right perinephric fat stranding and urothelial   thickening, raising concern for cystitis with ascending urinary tract   infection. Correlate with urinalysis.    Prominent retroperitoneal lymph nodes, nonspecific.    < end of copied text >  < from: CT Abdomen and Pelvis No Cont (08.08.22 @ 12:03) >  IMPRESSION:  Redemonstration of moderate bilateral hydroureteronephrosis to the level   of the bladder. No evidence of obstructing urinary tract calculus.    Circumferential bladder wall thickening with perivesicular fat stranding   and left greater than right perinephric fat stranding and urothelial   thickening, raising concern for cystitis with ascending urinary tract   infection. Correlate with urinalysis.    Prominent retroperitoneal lymph nodes, nonspecific.    < end of copied text >

## 2022-08-09 NOTE — GOALS OF CARE CONVERSATION - ADVANCED CARE PLANNING - CONVERSATION DETAILS
This alert and oriented x 4 patient was seen today for goals of care planning and conversation. Introduced self and role of PCE. Patient states understanding of health status  and prognosis. Patient states he  lives at the [facility] and has been independent with all aspects of ADLs prior to admission. Patient states he has no spouse, children or families. States he has friends and associates at the [facility] however he's not sure if they would be comfortable  being his health care agent. Patient states he will need to speak to the administrative team at the [facility] to discuss same. HCP form given to patient at his request.    CPR , intubation, artificial nutrition  procedures and possible complications explained. Patient watched informational video on GOC.    Patient expressed wish to be DNR, states he does not want any heroic measures to save his life. Patient also states he does not want artificial nutrition to sustain his life. Patient educated on MOLST form however he states he would not like to complete same until he speak with the administrative staff at the [facility]. Patient states he took care of his elderly aunts who lived to be 103.5 years and 93 years. Patient states they had a good quality of life because he cared for them at home however he does not see himself in the same situation as he does not have anyone to care for him. Patient aware he will  remain FULL CODE and will discuss with HCP, son/daughter      Contact information for further needs provided.  No Yazidi exemptions noted.      Alexandra HURD -ED RN [facility]   [phone]

## 2022-08-09 NOTE — PROGRESS NOTE ADULT - SUBJECTIVE AND OBJECTIVE BOX
Patient is a 82y old  Male who presents with a chief complaint of UTI (09 Aug 2022 12:00)      SUBJECTIVE / OVERNIGHT EVENTS: Pt seen and examined. No acute events overnight. He denies cp, sob. Had a bm this morning and reports feeling less bloated.    MEDICATIONS  (STANDING):  abacavir 600 milliGRAM(s) Oral daily  aspirin enteric coated 81 milliGRAM(s) Oral daily  cefTRIAXone   IVPB 1000 milliGRAM(s) IV Intermittent every 24 hours  ferrous    sulfate 325 milliGRAM(s) Oral daily  heparin   Injectable 5000 Unit(s) SubCutaneous every 8 hours  lamiVUDine- milliGRAM(s) Oral daily  metoprolol succinate ER 12.5 milliGRAM(s) Oral daily    MEDICATIONS  (PRN):  acetaminophen     Tablet .. 650 milliGRAM(s) Oral every 6 hours PRN Temp greater or equal to 38C (100.4F), Mild Pain (1 - 3)  aluminum hydroxide/magnesium hydroxide/simethicone Suspension 30 milliLiter(s) Oral every 4 hours PRN Dyspepsia  melatonin 3 milliGRAM(s) Oral at bedtime PRN Insomnia  ondansetron Injectable 4 milliGRAM(s) IV Push every 8 hours PRN Nausea and/or Vomiting      Vital Signs Last 24 Hrs  T(C): 37.4 (09 Aug 2022 06:00), Max: 37.4 (08 Aug 2022 19:28)  T(F): 99.4 (09 Aug 2022 06:00), Max: 99.4 (08 Aug 2022 19:28)  HR: 71 (09 Aug 2022 06:00) (61 - 87)  BP: 138/61 (09 Aug 2022 06:00) (119/62 - 145/67)  BP(mean): --  RR: 18 (09 Aug 2022 06:00) (18 - 18)  SpO2: 97% (09 Aug 2022 06:00) (96% - 99%)    Parameters below as of 09 Aug 2022 06:00  Patient On (Oxygen Delivery Method): room air      CAPILLARY BLOOD GLUCOSE        I&O's Summary    08 Aug 2022 07:01  -  09 Aug 2022 07:00  --------------------------------------------------------  IN: 1410 mL / OUT: 3152 mL / NET: -1742 mL        PHYSICAL EXAM:  GENERAL: NAD, well-groomed  HEAD:  Atraumatic, Normocephalic  EYES:  conjunctiva and sclera clear  NECK: Supple, No JVD  CHEST/LUNG: Clear to auscultation bilaterally; No wheeze  HEART: Regular rate and rhythm; No murmurs, rubs, or gallops  ABDOMEN: Soft, Nontender, +distended; Bowel sounds present  EXTREMITIES:  2+ Peripheral Pulses, No clubbing, cyanosis, or edema  PSYCH: AAOx3  NEUROLOGY: non-focal  SKIN: No rashes or lesions    LABS:                        10.7   12.65 )-----------( 207      ( 09 Aug 2022 06:16 )             32.3     08-09    138  |  105  |  43<H>  ----------------------------<  92  4.1   |  21<L>  |  2.84<H>    Ca    9.2      09 Aug 2022 06:17                Consultants notes reviewed : Urology

## 2022-08-09 NOTE — CHART NOTE - NSCHARTNOTEFT_GEN_A_CORE
CT reviewed: bilateral hydroureteronephrosis to the level of the bladder. No obstructing stones.     Patient with chronic outlet obstruction currently managing with CIC.   Had discussed with patient previously that tovar catheter in the short term while being treated for infection could be beneficial, patient refuses tovar. He is performing clean intermittent catheterization and we discussed the need to increase frequency of CIC.     Follow up urine culture: Klebsiella (sensitivities pending)   Continue antibiotics per ID     Routine outpatient follow up with Dr. Silver     Please call with future questions or concerns   Janna Kiser   Available on Teams

## 2022-08-10 NOTE — PROGRESS NOTE ADULT - SUBJECTIVE AND OBJECTIVE BOX
Patient is a 82y old  Male who presents with a chief complaint of UTI (10 Aug 2022 11:47)      SUBJECTIVE / OVERNIGHT EVENTS:  Pt seen and examined. No acute events overnight. Pt c/o constipation and abd discomfort. Denies urinary symptoms, fever/chills, n/v.    MEDICATIONS  (STANDING):  abacavir 600 milliGRAM(s) Oral daily  aspirin enteric coated 81 milliGRAM(s) Oral daily  cefTRIAXone   IVPB 1000 milliGRAM(s) IV Intermittent every 24 hours  ferrous    sulfate 325 milliGRAM(s) Oral daily  heparin   Injectable 5000 Unit(s) SubCutaneous every 8 hours  lamiVUDine- milliGRAM(s) Oral daily  metoprolol succinate ER 12.5 milliGRAM(s) Oral daily  senna 2 Tablet(s) Oral at bedtime  sodium chloride 0.9%. 1000 milliLiter(s) (75 mL/Hr) IV Continuous <Continuous>    MEDICATIONS  (PRN):  acetaminophen     Tablet .. 650 milliGRAM(s) Oral every 6 hours PRN Temp greater or equal to 38C (100.4F), Mild Pain (1 - 3)  aluminum hydroxide/magnesium hydroxide/simethicone Suspension 30 milliLiter(s) Oral every 4 hours PRN Dyspepsia  melatonin 3 milliGRAM(s) Oral at bedtime PRN Insomnia  ondansetron Injectable 4 milliGRAM(s) IV Push every 8 hours PRN Nausea and/or Vomiting  polyethylene glycol 3350 17 Gram(s) Oral daily PRN Constipation      Vital Signs Last 24 Hrs  T(C): 37.6 (10 Aug 2022 11:40), Max: 37.6 (10 Aug 2022 11:40)  T(F): 99.7 (10 Aug 2022 11:40), Max: 99.7 (10 Aug 2022 11:40)  HR: 77 (10 Aug 2022 11:40) (64 - 77)  BP: 102/55 (10 Aug 2022 11:40) (102/55 - 115/60)  BP(mean): --  RR: 17 (10 Aug 2022 11:40) (17 - 18)  SpO2: 99% (10 Aug 2022 11:40) (97% - 100%)    Parameters below as of 10 Aug 2022 04:38  Patient On (Oxygen Delivery Method): room air      CAPILLARY BLOOD GLUCOSE        I&O's Summary    09 Aug 2022 07:01  -  10 Aug 2022 07:00  --------------------------------------------------------  IN: 1210 mL / OUT: 2151 mL / NET: -941 mL        PHYSICAL EXAM:  GENERAL: NAD, well-groomed  HEAD:  Atraumatic, Normocephalic  EYES:  conjunctiva and sclera clear  NECK: Supple, No JVD  CHEST/LUNG: Clear to auscultation bilaterally; No wheeze  HEART: Regular rate and rhythm; No murmurs, rubs, or gallops  ABDOMEN: Soft, Nontender, +distended; Bowel sounds present  EXTREMITIES:  2+ Peripheral Pulses, No clubbing, cyanosis, or edema  PSYCH: AAOx3  NEUROLOGY: non-focal  SKIN: No rashes or lesions    LABS:                        10.5   14.97 )-----------( 245      ( 10 Aug 2022 07:52 )             31.7     08-10    138  |  104  |  42<H>  ----------------------------<  86  4.0   |  21<L>  |  2.79<H>    Ca    8.8      10 Aug 2022 07:53      BLD CX : NGTD          RADIOLOGY & ADDITIONAL TESTS:    Imaging Personally Reviewed:  CAT ABD/PELVIS 8/9    Redemonstration of moderate bilateral hydroureteronephrosis to the level   of the bladder. No evidence of obstructing urinary tract calculus.    Circumferential bladder wall thickening with perivesicular fat stranding   and left greater than right perinephric fat stranding and urothelial   thickening, raising concern for cystitis with ascending urinary tract   infection. Correlate with urinalysis.    Prominent retroperitoneal lymph nodes, nonspecific.            Care Discussed with Consultants/Other Providers: ID

## 2022-08-10 NOTE — PROGRESS NOTE ADULT - PROBLEM SELECTOR PLAN 1
- leukocytosis with positive UA on admission to hospital.  - Patient intermittently self catheterizes due to BPH  - BCx w/GPC in clusters, UCx with Klebsiella ; sensitivities reviewed  - Repeat bld cx NGTD  - d/w ID following ; Complete Ceftriaxone x 5 days today   - Sonogram of kidney and bladder demonstrated severe hydronephrosis and calculi in ureter  - Urology  evaluation appreciated  ; stone does not appear to be obstructing by CT scan

## 2022-08-10 NOTE — PROGRESS NOTE ADULT - SUBJECTIVE AND OBJECTIVE BOX
Follow Up:  renal impairment, uti    Interval History/ROS:  complains of consitipation    Allergies  No Known Allergies    ANTIMICROBIALS:  abacavir 600 daily  cefTRIAXone   IVPB 1000 every 24 hours  lamiVUDine- daily    OTHER MEDS:  MEDICATIONS  (STANDING):  acetaminophen     Tablet .. 650 every 6 hours PRN  aluminum hydroxide/magnesium hydroxide/simethicone Suspension 30 every 4 hours PRN  aspirin enteric coated 81 daily  heparin   Injectable 5000 every 8 hours  melatonin 3 at bedtime PRN  metoprolol succinate ER 12.5 daily  ondansetron Injectable 4 every 8 hours PRN  polyethylene glycol 3350 17 daily PRN  senna 2 at bedtime      Vital Signs Last 24 Hrs  T(C): 37.6 (10 Aug 2022 11:40), Max: 37.6 (10 Aug 2022 11:40)  T(F): 99.7 (10 Aug 2022 11:40), Max: 99.7 (10 Aug 2022 11:40)  HR: 77 (10 Aug 2022 11:40) (64 - 77)  BP: 102/55 (10 Aug 2022 11:40) (102/55 - 115/60)  BP(mean): --  RR: 17 (10 Aug 2022 11:40) (17 - 18)  SpO2: 99% (10 Aug 2022 11:40) (97% - 100%)    Parameters below as of 10 Aug 2022 04:38  Patient On (Oxygen Delivery Method): room air        PHYSICAL EXAM:  General: WN/WD NAD, Non-toxic  Neurology: A&Ox3, nonfocal  Respiratory: Clear to auscultation bilaterally  CV: RRR, S1S2, no murmurs, rubs or gallops  Abdominal: Soft, Non-tender, distended, normal bowel sounds  Extremities: No edema  Line Sites: Clear  Skin: No rash                        10.5   14.97 )-----------( 245      ( 10 Aug 2022 07:52 )             31.7   WBC Count: 14.97 (08-10 @ 07:52)  WBC Count: 12.65 (08-09 @ 06:16)  WBC Count: 11.52 (08-08 @ 04:57)  WBC Count: 11.33 (08-07 @ 07:43)  WBC Count: 12.08 (08-06 @ 12:29)    08-10    138  |  104  |  42<H>  ----------------------------<  86  4.0   |  21<L>  |  2.79<H>  Creatinine: 2.79 (08-10 @ 07:53)    Creatinine: 2.84 (08-09 @ 06:17)    Creatinine: 2.79 (08-08 @ 04:57)    Creatinine: 3.01 (08-07 @ 07:43)    Creatinine: 3.28 (08-06 @ 12:29)        Ca    8.8      10 Aug 2022 07:53        MICROBIOLOGY:  .Blood Blood  08-08-22   No growth to date.  --  --      Clean Catch Clean Catch (Midstream)  08-06-22   >100,000 CFU/ml Klebsiella pneumoniae  --  Klebsiella pneumoniae      .Blood Blood-Peripheral  08-06-22   No growth to date.  --  --      .Blood Blood-Peripheral  08-06-22   Growth in aerobic bottle: Staphylococcus hominis  Coag Negative Staphylococcus  Single set isolate, possible contaminant.     HIV-1 RNA Quantitative, Viral Load Log: NOT DET. lg /mL (08-08-22 @ 07:49)    Rapid RVP Result: NotDetec (08-06 @ 12:58)        RADIOLOGY:    Beck Fegruson MD; Division of Infectious Disease; Pager: [phone]; nights and weekends: [phone]

## 2022-08-10 NOTE — PROGRESS NOTE ADULT - ASSESSMENT
· Assessment	  82M with controlled HIV (*/5/22: HIV vIral Load UNDETECTABLE <212 copies; CD4= 383-23%)  CKD, BPH, constipation sent in for labwork drawn recently as outpatient revealing a WBC greater than 20 and is sent in to ED for concern of infection. Pt is asymptomatic in ED and ID is consulted for leukocytosis.  Patient straight caths himself 2-3 times daily as well as voids occasionally.  His Creatinine is considerably increased from recent baseline:    2/8 Creat= 2.49 --> 8/6 Creat = 3.28  declining since admission 8/8: Creat = 2.79  ?post obstructive diuresis - he likely requires much more frequent straight caths than what he has been doing at home    He denies fever but notes recent rigors  Elevated ProCalcitonin level= 14.22 related to renal insufficiency in combination with infection  Pyruria, dysuria  Klebsiella pneumoniae  complicated UTI,   +BC Staph hominis = procurement contaminant  Sonogram of kidney and bladder demonstrated severe hydronephrosis and calculi in ureter  UROLOGY evaluation appreciated - stone does not appear to be obstructing by CT scan    Suggest  ontinued frequent straight cath is urged  Complete Ceftriaxone 1 gm daily 8/6--> 8/10  complete 5 day course of antibiotics today    discussed with primary attending

## 2022-08-11 NOTE — PROGRESS NOTE ADULT - ASSESSMENT
82M with controlled HIV (*/5/22: HIV vIral Load UNDETECTABLE <212 copies; CD4= 383-23%)  CKD, BPH, constipation sent in for labwork drawn recently as outpatient revealing a WBC greater than 20 and is sent in to ED for concern of infection. Pt is asymptomatic in ED and ID is consulted for leukocytosis.  Patient straight caths himself 2-3 times daily as well as voids occasionally.  His Creatinine is considerably increased from recent baseline:    2/8 Creat= 2.49 --> 8/6 Creat = 3.28  declining since admission 8/8: Creat = 2.79  ?post obstructive diuresis - he likely requires much more frequent straight caths than what he has been doing at home    He denies fever but notes recent rigors  Elevated ProCalcitonin level= 14.22 related to renal insufficiency in combination with infection  Pyruria, dysuria  Klebsiella pneumoniae  complicated UTI,   +BC Staph hominis = procurement contaminant  Sonogram of kidney and bladder demonstrated severe hydronephrosis and calculi in ureter  UROLOGY evaluation appreciated - stone does not appear to be obstructing by CT scan    NEPHROLGY Evaluation appreciated:  IVF NS for now. Hold ARB & lasix for now.  Monitor labs and urine output. Avoid NSAIDs, ACEI/ARBS, RCA and nephrotoxins. Dose medications as per eGFR.    Suggest  continued frequent straight cath is urged  STOP Ceftriaxone 1 gm daily 8/6-->     discussed with primary attending - she has requested nephrology follow up

## 2022-08-11 NOTE — CONSULT NOTE ADULT - SUBJECTIVE AND OBJECTIVE BOX
[facility] DIVISION OF KIDNEY DISEASES AND HYPERTENSION -- [phone]  -- INITIAL CONSULT NOTE  --------------------------------------------------------------------------------  HPI: 82M PMH HIV VLUD, CKD, BPH, CAD, atrial fibrillation s/p DCCV and ablation and MV endocarditis s/p bioprosthetic valve replacement, presents to the hospital after being found with leukocytosis to 20K on routine lab work, found to have a UTI and admitted to medicine for further evaluation and treatment. Nephrology called for SUDHEER on CKD. Pt sees Dr. Ferguson from nephrology for CKD stage 3 & last seen by him on 5/7/21. Pt has a hx of UTIs, history of nephrogenic adenoma, hydronephrosis, secondary hyperparathyroidism, a history of calcium-based nephrolithiasis & MGUS as per clinic notes. Baseline SCr in 202 was 1.7-1.9 which increased to a new baseline of 2-2.6 in 2021 after 2 episodes of SUDHEER on CKD  (one due to CRS & one from ASHVIN/ obstructive uropathy). On admission SCr is 3.4 on 8/5/22. Since then has been of telmisartan & lasix. IVF given for 24 hrs. SCr now trending down to 2.7 today.             Patient seen & examined. Denies chest pain, fever, chills, increased frequency, dysuria, hematuria, pus in urine, frothy urine, SOB, leg edema,  N/V.      PAST HISTORY  --------------------------------------------------------------------------------  PAST MEDICAL & SURGICAL HISTORY:  Unsteady gait      HIV (human immunodeficiency virus infection)      Chronic kidney disease, unspecified stage      Constipation, unspecified constipation type      Seborrheic keratosis      Osteoporosis      Edema of both legs      Bladder mass      Vitamin D deficiency      Orchitis and epididymitis      H/O endocarditis  MV endocarditis      Benign prostatic hyperplasia without lower urinary tract symptoms      COVID-19 vaccine series completed  W #2 boosters      S/P coronary artery stent placement      S/P MVR (mitral valve replacement)  &amp; SHELLIE clip, 8/31/2020        FAMILY HISTORY:  Family history of congestive heart failure (Father)    Family history of cancer (Mother)      PAST SOCIAL HISTORY:    ALLERGIES & MEDICATIONS  --------------------------------------------------------------------------------  Allergies    No Known Allergies    Intolerances      Standing Inpatient Medications  abacavir 600 milliGRAM(s) Oral daily  aspirin enteric coated 81 milliGRAM(s) Oral daily  cefTRIAXone   IVPB 1000 milliGRAM(s) IV Intermittent every 24 hours  ferrous    sulfate 325 milliGRAM(s) Oral daily  heparin   Injectable 5000 Unit(s) SubCutaneous every 8 hours  lamiVUDine- milliGRAM(s) Oral daily  metoprolol succinate ER 12.5 milliGRAM(s) Oral daily  senna 2 Tablet(s) Oral at bedtime  sodium chloride 0.9%. 1000 milliLiter(s) IV Continuous <Continuous>    PRN Inpatient Medications  acetaminophen     Tablet .. 650 milliGRAM(s) Oral every 6 hours PRN  aluminum hydroxide/magnesium hydroxide/simethicone Suspension 30 milliLiter(s) Oral every 4 hours PRN  melatonin 3 milliGRAM(s) Oral at bedtime PRN  ondansetron Injectable 4 milliGRAM(s) IV Push every 8 hours PRN  polyethylene glycol 3350 17 Gram(s) Oral daily PRN      REVIEW OF SYSTEMS  --------------------------------------------------------------------------------  Gen: No chills  Respiratory: No dyspnea, cough  CV: No chest pain  GI: No abdominal pain, diarrhea,  nausea, vomiting  : No increased frequency, dysuria, hematuria  MSK:  no edema  Neuro: No dizziness/lightheadedness    All other systems were reviewed and are negative, except as noted.    VITALS/PHYSICAL EXAM  --------------------------------------------------------------------------------  T(C): 37.3 (08-11-22 @ 11:41), Max: 37.3 (08-10-22 @ 19:05)  HR: 72 (08-11-22 @ 11:41) (54 - 72)  BP: 110/58 (08-11-22 @ 11:41) (110/58 - 132/64)  RR: 18 (08-11-22 @ 11:41) (17 - 18)  SpO2: 99% (08-11-22 @ 11:41) (99% - 100%)  Wt(kg): --        08-10-22 @ 07:01  -  08-11-22 @ 07:00  --------------------------------------------------------  IN: 1355 mL / OUT: 1850 mL / NET: -495 mL    08-11-22 @ 07:01  -  08-11-22 @ 13:49  --------------------------------------------------------  IN: 360 mL / OUT: 0 mL / NET: 360 mL      Physical Exam:  	Gen: elderly, NAD  	HEENT: Anicteric, MMM, no JVD  	Pulm: CTA B/L  	CV: S1S2, no rub  	Abd: Soft, +BS, NT, ND           No presacral edema  	Ext: No LE edema B/L, no asterixis  	Neuro: Awake, alert  	Skin: Warm and dry  	Vascular access:    LABS/STUDIES  --------------------------------------------------------------------------------              9.8    14.31 >-----------<  243      [08-11-22 @ 07:22]              30.2     139  |  107  |  39  ----------------------------<  74      [08-11-22 @ 07:25]  4.4   |  19  |  2.74        Ca     8.6     [08-11-22 @ 07:25]    TPro  6.9  /  Alb  2.9  /  TBili  0.2  /  DBili  x   /  AST  26  /  ALT  27  /  AlkPhos  133  [08-11-22 @ 07:25]          Creatinine Trend:  SCr 2.74 [08-11 @ 07:25]  SCr 2.79 [08-10 @ 07:53]  SCr 2.84 [08-09 @ 06:17]  SCr 2.79 [08-08 @ 04:57]  SCr 3.01 [08-07 @ 07:43]    Urinalysis - [08-06-22 @ 14:39]      Color Light Orange / Appearance Turbid / SG 1.012 / pH 7.0      Gluc Negative / Ketone Negative  / Bili Negative / Urobili Negative       Blood Moderate / Protein 100 / Leuk Est Large / Nitrite Negative      RBC 48 / WBC 1237 / Hyaline 1 / Gran  / Sq Epi  / Non Sq Epi 0 / Bacteria Moderate           [facility] DIVISION OF KIDNEY DISEASES AND HYPERTENSION -- [phone]  -- INITIAL CONSULT NOTE  --------------------------------------------------------------------------------  HPI: 82M PMH HIV VLUD, CKD, BPH, CAD, atrial fibrillation s/p DCCV and ablation and MV endocarditis s/p bioprosthetic valve replacement, presents to the hospital after being found with leukocytosis to 20K on routine lab work, found to have a UTI and admitted to medicine for further evaluation and treatment. Nephrology called for SUDHEER on CKD. Pt sees Dr. Ferguson from nephrology for CKD stage 3 & last seen by him on 5/7/21. Pt has a hx of UTIs, history of nephrogenic adenoma, hydronephrosis, secondary hyperparathyroidism, a history of calcium-based nephrolithiasis & MGUS as per clinic notes. Baseline SCr in 202 was 1.7-1.9 which increased to a new baseline of 2-2.6 in 2021 after 2 episodes of SUDHEER on CKD  (one due to CRS & one from ASHVIN/ obstructive uropathy). On admission SCr is 3.4 on 8/5/22. Since then has been of telmisartan & lasix. IVF given for 24 hrs. SCr now trending down to 2.7 today.             Patient seen & examined. Denies chest pain, fever, chills, dysuria, hematuria, SOB, leg edema,  N/V/D. Pt performing straight cath intermittently with 600cc drained out.       PAST HISTORY  --------------------------------------------------------------------------------  PAST MEDICAL & SURGICAL HISTORY:  Unsteady gait      HIV (human immunodeficiency virus infection)      Chronic kidney disease, unspecified stage      Constipation, unspecified constipation type      Seborrheic keratosis      Osteoporosis      Edema of both legs      Bladder mass      Vitamin D deficiency      Orchitis and epididymitis      H/O endocarditis  MV endocarditis      Benign prostatic hyperplasia without lower urinary tract symptoms      COVID-19 vaccine series completed  W #2 boosters      S/P coronary artery stent placement      S/P MVR (mitral valve replacement)  &amp; SHELLIE clip, 8/31/2020        FAMILY HISTORY:  Family history of congestive heart failure (Father)    Family history of cancer (Mother)      PAST SOCIAL HISTORY:    ALLERGIES & MEDICATIONS  --------------------------------------------------------------------------------  Allergies    No Known Allergies    Intolerances      Standing Inpatient Medications  abacavir 600 milliGRAM(s) Oral daily  aspirin enteric coated 81 milliGRAM(s) Oral daily  cefTRIAXone   IVPB 1000 milliGRAM(s) IV Intermittent every 24 hours  ferrous    sulfate 325 milliGRAM(s) Oral daily  heparin   Injectable 5000 Unit(s) SubCutaneous every 8 hours  lamiVUDine- milliGRAM(s) Oral daily  metoprolol succinate ER 12.5 milliGRAM(s) Oral daily  senna 2 Tablet(s) Oral at bedtime  sodium chloride 0.9%. 1000 milliLiter(s) IV Continuous <Continuous>    PRN Inpatient Medications  acetaminophen     Tablet .. 650 milliGRAM(s) Oral every 6 hours PRN  aluminum hydroxide/magnesium hydroxide/simethicone Suspension 30 milliLiter(s) Oral every 4 hours PRN  melatonin 3 milliGRAM(s) Oral at bedtime PRN  ondansetron Injectable 4 milliGRAM(s) IV Push every 8 hours PRN  polyethylene glycol 3350 17 Gram(s) Oral daily PRN      REVIEW OF SYSTEMS  --------------------------------------------------------------------------------  Gen: No chills  Respiratory: No dyspnea, cough  CV: No chest pain  GI: No abdominal pain, diarrhea,  nausea, vomiting  : No increased frequency, dysuria, hematuria  MSK:  no edema  Neuro: No dizziness/lightheadedness    All other systems were reviewed and are negative, except as noted.    VITALS/PHYSICAL EXAM  --------------------------------------------------------------------------------  T(C): 37.3 (08-11-22 @ 11:41), Max: 37.3 (08-10-22 @ 19:05)  HR: 72 (08-11-22 @ 11:41) (54 - 72)  BP: 110/58 (08-11-22 @ 11:41) (110/58 - 132/64)  RR: 18 (08-11-22 @ 11:41) (17 - 18)  SpO2: 99% (08-11-22 @ 11:41) (99% - 100%)  Wt(kg): --        08-10-22 @ 07:01  -  08-11-22 @ 07:00  --------------------------------------------------------  IN: 1355 mL / OUT: 1850 mL / NET: -495 mL    08-11-22 @ 07:01  -  08-11-22 @ 13:49  --------------------------------------------------------  IN: 360 mL / OUT: 0 mL / NET: 360 mL      Physical Exam:  	Gen: elderly, NAD  	HEENT: Anicteric, MMM, no JVD  	Pulm: CTA B/L  	CV: S1S2, no rub  	Abd: Soft, +BS, NT, +distended           No presacral edema             +bladder fullness  	Ext: No LE edema B/L  	Neuro: Awake, alert  	Skin: Warm and dry  	Vascular access:    LABS/STUDIES  --------------------------------------------------------------------------------              9.8    14.31 >-----------<  243      [08-11-22 @ 07:22]              30.2     139  |  107  |  39  ----------------------------<  74      [08-11-22 @ 07:25]  4.4   |  19  |  2.74        Ca     8.6     [08-11-22 @ 07:25]    TPro  6.9  /  Alb  2.9  /  TBili  0.2  /  DBili  x   /  AST  26  /  ALT  27  /  AlkPhos  133  [08-11-22 @ 07:25]          Creatinine Trend:  SCr 2.74 [08-11 @ 07:25]  SCr 2.79 [08-10 @ 07:53]  SCr 2.84 [08-09 @ 06:17]  SCr 2.79 [08-08 @ 04:57]  SCr 3.01 [08-07 @ 07:43]    Urinalysis - [08-06-22 @ 14:39]      Color Light Orange / Appearance Turbid / SG 1.012 / pH 7.0      Gluc Negative / Ketone Negative  / Bili Negative / Urobili Negative       Blood Moderate / Protein 100 / Leuk Est Large / Nitrite Negative      RBC 48 / WBC 1237 / Hyaline 1 / Gran  / Sq Epi  / Non Sq Epi 0 / Bacteria Moderate           [facility] DIVISION OF KIDNEY DISEASES AND HYPERTENSION -- [phone]  -- INITIAL CONSULT NOTE  --------------------------------------------------------------------------------  HPI: 82M PMH HIV VLUD, CKD, BPH, CAD, atrial fibrillation s/p DCCV and ablation and MV endocarditis s/p bioprosthetic valve replacement, presents to the hospital after being found with leukocytosis to 20K on routine lab work, found to have a UTI and admitted to medicine for further evaluation and treatment. Nephrology called for SUDHEER on CKD. Pt sees Dr. Ferguson from nephrology for CKD stage 3 & last seen by him on 5/7/21. Pt has a hx of UTIs, history of nephrogenic adenoma, hydronephrosis, secondary hyperparathyroidism, a history of calcium-based nephrolithiasis & MGUS as per clinic notes. Baseline SCr in 202 was 1.7-1.9 which increased to a new baseline of 2-2.6 in 2021 after 2 episodes of SUDHEER on CKD  (one due to CRS & one from ASHVIN/ obstructive uropathy). On admission SCr is 3.4 on 8/5/22. Since then has been off telmisartan & lasix. IVF given for 24 hrs. SCr now trending down to 2.7 today.             Patient seen & examined. Denies chest pain, fever, chills, dysuria, hematuria, SOB, leg edema,  N/V/D. Pt performing straight cath intermittently with 600cc drained out.       PAST HISTORY  --------------------------------------------------------------------------------  PAST MEDICAL & SURGICAL HISTORY:  Unsteady gait      HIV (human immunodeficiency virus infection)      Chronic kidney disease, unspecified stage      Constipation, unspecified constipation type      Seborrheic keratosis      Osteoporosis      Edema of both legs      Bladder mass      Vitamin D deficiency      Orchitis and epididymitis      H/O endocarditis  MV endocarditis      Benign prostatic hyperplasia without lower urinary tract symptoms      COVID-19 vaccine series completed  W #2 boosters      S/P coronary artery stent placement      S/P MVR (mitral valve replacement)  &amp; SHELLIE clip, 8/31/2020        FAMILY HISTORY:  Family history of congestive heart failure (Father)    Family history of cancer (Mother)      PAST SOCIAL HISTORY:    ALLERGIES & MEDICATIONS  --------------------------------------------------------------------------------  Allergies    No Known Allergies    Intolerances      Standing Inpatient Medications  abacavir 600 milliGRAM(s) Oral daily  aspirin enteric coated 81 milliGRAM(s) Oral daily  cefTRIAXone   IVPB 1000 milliGRAM(s) IV Intermittent every 24 hours  ferrous    sulfate 325 milliGRAM(s) Oral daily  heparin   Injectable 5000 Unit(s) SubCutaneous every 8 hours  lamiVUDine- milliGRAM(s) Oral daily  metoprolol succinate ER 12.5 milliGRAM(s) Oral daily  senna 2 Tablet(s) Oral at bedtime  sodium chloride 0.9%. 1000 milliLiter(s) IV Continuous <Continuous>    PRN Inpatient Medications  acetaminophen     Tablet .. 650 milliGRAM(s) Oral every 6 hours PRN  aluminum hydroxide/magnesium hydroxide/simethicone Suspension 30 milliLiter(s) Oral every 4 hours PRN  melatonin 3 milliGRAM(s) Oral at bedtime PRN  ondansetron Injectable 4 milliGRAM(s) IV Push every 8 hours PRN  polyethylene glycol 3350 17 Gram(s) Oral daily PRN      REVIEW OF SYSTEMS  --------------------------------------------------------------------------------  Gen: No chills  Respiratory: No dyspnea, cough  CV: No chest pain  GI: No abdominal pain, diarrhea,  nausea, vomiting  : No increased frequency, dysuria, hematuria  MSK:  no edema  Neuro: No dizziness/lightheadedness    All other systems were reviewed and are negative, except as noted.    VITALS/PHYSICAL EXAM  --------------------------------------------------------------------------------  T(C): 37.3 (08-11-22 @ 11:41), Max: 37.3 (08-10-22 @ 19:05)  HR: 72 (08-11-22 @ 11:41) (54 - 72)  BP: 110/58 (08-11-22 @ 11:41) (110/58 - 132/64)  RR: 18 (08-11-22 @ 11:41) (17 - 18)  SpO2: 99% (08-11-22 @ 11:41) (99% - 100%)  Wt(kg): --        08-10-22 @ 07:01  -  08-11-22 @ 07:00  --------------------------------------------------------  IN: 1355 mL / OUT: 1850 mL / NET: -495 mL    08-11-22 @ 07:01  -  08-11-22 @ 13:49  --------------------------------------------------------  IN: 360 mL / OUT: 0 mL / NET: 360 mL      Physical Exam:  	Gen: elderly, NAD  	HEENT: Anicteric, MMM, no JVD  	Pulm: CTA B/L  	CV: S1S2, no rub  	Abd: Soft, +BS, NT, +distended           No presacral edema             +bladder fullness  	Ext: No LE edema B/L  	Neuro: Awake, alert  	Skin: Warm and dry  	Vascular access:    LABS/STUDIES  --------------------------------------------------------------------------------              9.8    14.31 >-----------<  243      [08-11-22 @ 07:22]              30.2     139  |  107  |  39  ----------------------------<  74      [08-11-22 @ 07:25]  4.4   |  19  |  2.74        Ca     8.6     [08-11-22 @ 07:25]    TPro  6.9  /  Alb  2.9  /  TBili  0.2  /  DBili  x   /  AST  26  /  ALT  27  /  AlkPhos  133  [08-11-22 @ 07:25]          Creatinine Trend:  SCr 2.74 [08-11 @ 07:25]  SCr 2.79 [08-10 @ 07:53]  SCr 2.84 [08-09 @ 06:17]  SCr 2.79 [08-08 @ 04:57]  SCr 3.01 [08-07 @ 07:43]    Urinalysis - [08-06-22 @ 14:39]      Color Light Orange / Appearance Turbid / SG 1.012 / pH 7.0      Gluc Negative / Ketone Negative  / Bili Negative / Urobili Negative       Blood Moderate / Protein 100 / Leuk Est Large / Nitrite Negative      RBC 48 / WBC 1237 / Hyaline 1 / Gran  / Sq Epi  / Non Sq Epi 0 / Bacteria Moderate           normal rate and rhythm, no chest pain and no edema.

## 2022-08-11 NOTE — DISCHARGE NOTE NURSING/CASE MANAGEMENT/SOCIAL WORK - NSDCPEFALRISK_GEN_ALL_CORE
For information on Fall & Injury Prevention, visit: [email or web address] OR  [email or web address] OR  https://www.cdc.gov/steadi/patient.html

## 2022-08-11 NOTE — DISCHARGE NOTE NURSING/CASE MANAGEMENT/SOCIAL WORK - NSDCPNINST_GEN_ALL_CORE
call for  follow up appointment  with primary care  md  and  follow up with nephrology md    diet   meds  activity  as per md   any severe pain nausea vomiting fevers  and  problems  with urination / self cath  call md call 911 [facility]  EMERGENCY ROOM

## 2022-08-11 NOTE — CONSULT NOTE ADULT - ATTENDING COMMENTS
82M with controlled HIV (*/5/22: HIV vIral Load UNDETECTABLE <212 copies; CD4= 383-23%)  CKD, BPH, constipation sent in for labwork drawn recently as outpatient revealing a WBC greater than 20 and is sent in to ED for concern of infection. Pt is asymptomatic in ED and ID is consulted for leukocytosis.  Patient straight caths himself 2-3 times daily as well as voids occasionally.  His Creatinine is considerably increased from recent baseline: 2/8 Creat= 2.49 --> 8/6 Creat = 3.28  He denies fever but notes recent rigors  Elevated ProCalcitonin level= 14.22 related to renal insufficiency in combination with infection  Pyruria, dysuria  complicated UTI    Suggest  Check Blood and Urine cultures  Ceftriaxone 1 gm daily  Sonogram of kidney and bladder  Nephrology evaluation  Continue Antiretrovirals: Abacavir 300 mg twice daily; Lamivudine 100 mg once daily; Doravarine 100 mg once daily
Alert, conversant.  Presenting with ARF, UTI in setting of CKD, chronic hydronephrosis (prostate, self cath).  Minimal empty from spontaneous void  1.  ARF on CKD--volume supplementation, d/c ARB, diuretic.  Declines indwelling catheter.  NOn oliguric, no HD warranted  2.  Obstructive uropathy--prostate, check post self cath residual volume.  Yrology input.  Should have prostate surgery to avoid worsening 1  3.  UTI--not overtyly septic at present and on ceftriaxone.  Dosing basd on 1 is acceptable  4.  Hypertension--off ARF.  CCB +/- labetolol if warranted    discussed with med team  Daljit Wing MD  contact me on TEAMS

## 2022-08-11 NOTE — PROGRESS NOTE ADULT - SUBJECTIVE AND OBJECTIVE BOX
Patient is a 82y old  Male who presents with a chief complaint of UTI (11 Aug 2022 12:23)      SUBJECTIVE / OVERNIGHT EVENTS:  Pt seen and examined. No acute events overnight. Pt denies cp, sob, abd pain. Had a large bm yesterday s/p lactulose.    MEDICATIONS  (STANDING):  abacavir 600 milliGRAM(s) Oral daily  aspirin enteric coated 81 milliGRAM(s) Oral daily  cefTRIAXone   IVPB 1000 milliGRAM(s) IV Intermittent every 24 hours  ferrous    sulfate 325 milliGRAM(s) Oral daily  heparin   Injectable 5000 Unit(s) SubCutaneous every 8 hours  lamiVUDine- milliGRAM(s) Oral daily  metoprolol succinate ER 12.5 milliGRAM(s) Oral daily  senna 2 Tablet(s) Oral at bedtime  sodium chloride 0.9%. 1000 milliLiter(s) (75 mL/Hr) IV Continuous <Continuous>    MEDICATIONS  (PRN):  acetaminophen     Tablet .. 650 milliGRAM(s) Oral every 6 hours PRN Temp greater or equal to 38C (100.4F), Mild Pain (1 - 3)  aluminum hydroxide/magnesium hydroxide/simethicone Suspension 30 milliLiter(s) Oral every 4 hours PRN Dyspepsia  melatonin 3 milliGRAM(s) Oral at bedtime PRN Insomnia  ondansetron Injectable 4 milliGRAM(s) IV Push every 8 hours PRN Nausea and/or Vomiting  polyethylene glycol 3350 17 Gram(s) Oral daily PRN Constipation      Vital Signs Last 24 Hrs  T(C): 37.3 (11 Aug 2022 11:41), Max: 37.3 (10 Aug 2022 19:05)  T(F): 99.2 (11 Aug 2022 11:41), Max: 99.2 (11 Aug 2022 11:41)  HR: 72 (11 Aug 2022 11:41) (54 - 72)  BP: 110/58 (11 Aug 2022 11:41) (110/58 - 132/64)  BP(mean): --  RR: 18 (11 Aug 2022 11:41) (17 - 18)  SpO2: 99% (11 Aug 2022 11:41) (99% - 100%)    Parameters below as of 11 Aug 2022 11:41  Patient On (Oxygen Delivery Method): room air      CAPILLARY BLOOD GLUCOSE        I&O's Summary    10 Aug 2022 07:01  -  11 Aug 2022 07:00  --------------------------------------------------------  IN: 1355 mL / OUT: 1850 mL / NET: -495 mL        PHYSICAL EXAM:  GENERAL: NAD, well-groomed  HEAD:  Atraumatic, Normocephalic  EYES:  conjunctiva and sclera clear  NECK: Supple, No JVD  CHEST/LUNG: Clear to auscultation bilaterally; No wheeze  HEART: Regular rate and rhythm; No murmurs, rubs, or gallops  ABDOMEN: Soft, Nontender, mildly distended; Bowel sounds present  EXTREMITIES:  2+ Peripheral Pulses, No clubbing, cyanosis, or edema  PSYCH: AAOx3  NEUROLOGY: non-focal  SKIN: No rashes or lesions      LABS:                        9.8    14.31 )-----------( 243      ( 11 Aug 2022 07:22 )             30.2     08-11    139  |  107  |  39<H>  ----------------------------<  74  4.4   |  19<L>  |  2.74<H>    Ca    8.6      11 Aug 2022 07:25    TPro  6.9  /  Alb  2.9<L>  /  TBili  0.2  /  DBili  x   /  AST  26  /  ALT  27  /  AlkPhos  133<H>  08-11

## 2022-08-11 NOTE — DISCHARGE NOTE NURSING/CASE MANAGEMENT/SOCIAL WORK - NSDCVIVACCINE_GEN_ALL_CORE_FT
Tdap; 18-Aug-2020 22:54; Calli Song (RN); [employer]; [id number] (Exp. Date: 09-Oct-2021); IntraMuscular; Deltoid Right.; 0.5 milliLiter(s); VIS (VIS Published: 09-May-2013, VIS Presented: 18-Aug-2020);

## 2022-08-11 NOTE — DISCHARGE NOTE NURSING/CASE MANAGEMENT/SOCIAL WORK - PATIENT PORTAL LINK FT
You can access the FollowMyHealth Patient Portal offered by [facility] by registering at the following website: [email or web address]. By joining [facility]’s FollowMyHealth portal, you will also be able to view your health information using other applications (apps) compatible with our system.

## 2022-08-11 NOTE — PROGRESS NOTE ADULT - SUBJECTIVE AND OBJECTIVE BOX
Follow Up:  renal impairment    Interval History/ROS:  comfortable, he is gratifed that he passed stool last night    Allergies  No Known Allergies    ANTIMICROBIALS:  abacavir 600 daily  cefTRIAXone   IVPB 1000 every 24 hours  lamiVUDine- daily      OTHER MEDS:  MEDICATIONS  (STANDING):  acetaminophen     Tablet .. 650 every 6 hours PRN  aluminum hydroxide/magnesium hydroxide/simethicone Suspension 30 every 4 hours PRN  aspirin enteric coated 81 daily  heparin   Injectable 5000 every 8 hours  melatonin 3 at bedtime PRN  metoprolol succinate ER 12.5 daily  ondansetron Injectable 4 every 8 hours PRN  polyethylene glycol 3350 17 daily PRN  senna 2 at bedtime      Vital Signs Last 24 Hrs  T(C): 37.3 (11 Aug 2022 11:41), Max: 37.3 (10 Aug 2022 19:05)  T(F): 99.2 (11 Aug 2022 11:41), Max: 99.2 (11 Aug 2022 11:41)  HR: 72 (11 Aug 2022 11:41) (54 - 72)  BP: 110/58 (11 Aug 2022 11:41) (110/58 - 132/64)  BP(mean): --  RR: 18 (11 Aug 2022 11:41) (17 - 18)  SpO2: 99% (11 Aug 2022 11:41) (99% - 100%)    Parameters below as of 11 Aug 2022 11:41  Patient On (Oxygen Delivery Method): room air        PHYSICAL EXAM:  General: WN/WD NAD, Non-toxic  Neurology: A&Ox3, nonfocal  Respiratory: Clear to auscultation bilaterally  CV: RRR, S1S2, no murmurs, rubs or gallops  Abdominal: Soft, Non-tender, decreased protuberance, normal bowel sounds  Extremities: No edema,  Line Sites: Clear  Skin: No rash                        9.8    14.31 )-----------( 243      ( 11 Aug 2022 07:22 )             30.2   WBC Count: 14.31 (08-11 @ 07:22)  WBC Count: 14.97 (08-10 @ 07:52)  WBC Count: 12.65 (08-09 @ 06:16)  WBC Count: 11.52 (08-08 @ 04:57)  WBC Count: 11.33 (08-07 @ 07:43)    08-11    139  |  107  |  39<H>  ----------------------------<  74  4.4   |  19<L>  |  2.74<H>  Creatinine: 2.74 (08-11 @ 07:25)    Creatinine: 2.79 (08-10 @ 07:53)    Creatinine: 2.84 (08-09 @ 06:17)    Creatinine: 2.79 (08-08 @ 04:57)    Creatinine: 3.01 (08-07 @ 07:43)        Ca    8.6      11 Aug 2022 07:25    TPro  6.9  /  Alb  2.9<L>  /  TBili  0.2  /  DBili  x   /  AST  26  /  ALT  27  /  AlkPhos  133<H>  08-11    MICROBIOLOGY:  .Blood Blood  08-08-22   No growth to date.  --  --      Clean Catch Clean Catch (Midstream)  08-06-22   >100,000 CFU/ml Klebsiella pneumoniae  --  Klebsiella pneumoniae    .Blood Blood-Peripheral  08-06-22   No Growth Final  --  --      .Blood Blood-Peripheral  08-06-22   Growth in aerobic bottle: Staphylococcus hominis  Coag Negative Staphylococcus  Single set isolate, possible contaminant.     HIV-1 RNA Quantitative, Viral Load Log: NOT DET. lg /mL (08-08-22 @ 07:49)    Rapid RVP Result: NotDetec (08-06 @ 12:58)    RADIOLOGY:  < from: CT Abdomen and Pelvis No Cont (08.08.22 @ 12:03) >  FINDINGS:  Evaluation of the solid organs and vasculature is limited without   intravenous contrast.    LOWER CHEST: Within normal limits.    LIVER: Within normal limits.  BILE DUCTS: Normal caliber.  GALLBLADDER: Within normal limits.  SPLEEN: Within normal limits.  PANCREAS: Within normal limits.  ADRENALS: Mild bilateral adrenal gland thickening, unchanged.  KIDNEYS/URETERS: Moderate bilateral hydroureteronephrosis to level of the   bladder without obstructing stone. Bilateral perinephric fat stranding   and urothelial thickening, left greater than right. Bilateral renal   hypodensities too small to characterize.    BLADDER: Circumferential bladder wall thickening and perivesicular fat   stranding.  REPRODUCTIVEORGANS: Prostate is enlarged.    BOWEL: No bowel obstruction. Appendix is normal.  PERITONEUM: No ascites.  VESSELS: Atherosclerotic changes.  RETROPERITONEUM/LYMPH NODES: Para-aortic lymph nodes measuring up to 1.2   cm in short axis.  ABDOMINAL WALL: Small fat-containing left inguinal hernia. Tiny   fat-containing umbilical hernia.  BONES: Degenerative changes.    IMPRESSION:  Redemonstration of moderate bilateral hydroureteronephrosis to the level   of the bladder. No evidence of obstructing urinary tract calculus.    Circumferential bladder wall thickening with perivesicular fat stranding   and left greater than right perinephric fat stranding and urothelial   thickening, raising concern for cystitis with ascending urinary tract   infection. Correlate with urinalysis.    Prominent retroperitoneal lymph nodes, nonspecific.    < end of copied text >      Beck Ferguson MD; Division of Infectious Disease; Pager: [phone]; nights and weekends: [phone]

## 2022-08-11 NOTE — PROGRESS NOTE ADULT - NSPROGADDITIONALINFOA_GEN_ALL_CORE
d/w ACP Pasquale ; Nephrology consult     Likely discharge back to [facility] 8/12
d/w ACP Pasquale ; IVF x 24 hours, Lactulose 10g po x 1 for constipation, encourage ambulation
d/w LAUREN Sky
d/w LAUREN Sky

## 2022-08-11 NOTE — CONSULT NOTE ADULT - PROBLEM SELECTOR RECOMMENDATION 9
Ashley on CKD likely from UTI vs obstructive uropathy vs volume depletion    Pt sees Dr. Ferguson from nephrology for CKD stage 3 & last seen by him on 5/7/21. Pt has a hx of UTIs, history of nephrogenic adenoma, hydronephrosis, secondary hyperparathyroidism, a history of calcium-based nephrolithiasis & MGUS as per clinic notes. Baseline SCr in 202 was 1.7-1.9 which increased to a new baseline of 2-2.6 in 2021 after 2 episodes of ASHLEY on CKD  (one due to CRS & one from ASHVIN/ obstructive uropathy). On admission SCr is 3.4 on 8/5/22. Since then has been of telmisartan & lasix. IVF given for 24 hrs. SCr now trending down to 2.7 today.     UA with 100 mg /dl protein, hematuria, pyuria, large LE & bacturia. Ucx with klebsiella. Check urine electrolytes (Zack, UK, UCl, UUrea, UCr, Uprotein) & spot urine TP/CR. CT A/P: Moderate bilateral hydroureteronephrosis to the level of the bladder. Circumferential bladder wall thickening with perivesicular fat stranding and left greater than right perinephric fat stranding and urothelial thickening ?cystitis with ascending urinary tract infection. Prominent retroperitoneal lymph nodes. Recommend Aden catheter placement. Agree with IVF NS for now. Hold ARB & lasix for now.  Monitor labs and urine output. Avoid NSAIDs, ACEI/ARBS, RCA and nephrotoxins. Dose medications as per eGFR. Ashley on CKD likely from UTI vs obstructive uropathy vs volume depletion    Pt sees Dr. Ferguson from nephrology for CKD stage 3 & last seen by him on 5/7/21. Pt has a hx of UTIs, history of nephrogenic adenoma, hydronephrosis, secondary hyperparathyroidism, a history of calcium-based nephrolithiasis & MGUS as per clinic notes. Baseline SCr in 202 was 1.7-1.9 which increased to a new baseline of 2-2.6 in 2021 after 2 episodes of ASHLEY on CKD  (one due to CRS & one from ASHVIN/ obstructive uropathy). On admission SCr is 3.4 on 8/5/22. Since then has been of telmisartan & lasix. IVF given for 24 hrs. SCr now trending down to 2.7 today.     UA with 100 mg /dl protein, hematuria, pyuria, large LE & bacturia. Ucx with klebsiella. Check urine electrolytes (Zack, UK, UCl, UUrea, UCr, Uprotein) & spot urine TP/CR. CT A/P: Moderate bilateral hydroureteronephrosis to the level of the bladder. Circumferential bladder wall thickening with perivesicular fat stranding and left greater than right perinephric fat stranding and urothelial thickening ?cystitis with ascending urinary tract infection. Prominent retroperitoneal lymph nodes. Pt declines tovar & is performing clean intermittent catheterization. Need  input. Agree with IVF NS for now. Hold ARB & lasix for now.  Monitor labs and urine output. Avoid NSAIDs, ACEI/ARBS, RCA and nephrotoxins. Dose medications as per eGFR. SUDHEER on CKD likely from UTI vs obstructive uropathy vs volume depletion     Pt sees Dr. Ferguson from nephrology for CKD stage 3 & last seen by him on 5/7/21. Pt has a hx of UTIs, history of nephrogenic adenoma, hydronephrosis, secondary hyperparathyroidism, a history of calcium-based nephrolithiasis & MGUS as per clinic notes. Baseline SCr in 202 was 1.7-1.9 which increased to a new baseline of 2-2.6 in 2021 after 2 episodes of SUDHEER on CKD  (one due to CRS & one from ASHVIN/ obstructive uropathy). On admission SCr is 3.4 on 8/5/22. Since then has been of telmisartan & lasix. IVF given for 24 hrs. SCr now trending down to 2.7 today.     UA with 100 mg /dl protein, hematuria, pyuria, large LE & bacturia. Ucx with klebsiella. Check urine electrolytes (Zack, UK, UCl, UUrea, UCr, Uprotein) & spot urine TP/CR. CT A/P: Moderate bilateral hydroureteronephrosis to the level of the bladder. Circumferential bladder wall thickening with perivesicular fat stranding and left greater than right perinephric fat stranding and urothelial thickening ?cystitis with ascending urinary tract infection. Prominent retroperitoneal lymph nodes. Pt declines tovar & is performing clean intermittent catheterization. Check bladder scan to check PVR. Need  input. Appears euvolemic. Agree with IVF NS for now. Hold ARB & lasix for now.  Monitor labs and urine output. Avoid NSAIDs, ACEI/ARBS, RCA and nephrotoxins. Dose medications as per eGFR.

## 2022-08-12 NOTE — PROGRESS NOTE ADULT - SUBJECTIVE AND OBJECTIVE BOX
Patient is a 82y old  Male who presents with a chief complaint of UTI (12 Aug 2022 12:02)      SUBJECTIVE / OVERNIGHT EVENTS:  Pt seen and examined. No acute events overnight. He denies cp, sob, dysuria, abd pain.    MEDICATIONS  (STANDING):  abacavir 600 milliGRAM(s) Oral daily  aspirin enteric coated 81 milliGRAM(s) Oral daily  cefTRIAXone   IVPB 1000 milliGRAM(s) IV Intermittent every 24 hours  ferrous    sulfate 325 milliGRAM(s) Oral daily  heparin   Injectable 5000 Unit(s) SubCutaneous every 8 hours  lamiVUDine- milliGRAM(s) Oral daily  metoprolol succinate ER 12.5 milliGRAM(s) Oral daily  senna 2 Tablet(s) Oral at bedtime  sodium chloride 0.9%. 1000 milliLiter(s) (75 mL/Hr) IV Continuous <Continuous>    MEDICATIONS  (PRN):  acetaminophen     Tablet .. 650 milliGRAM(s) Oral every 6 hours PRN Temp greater or equal to 38C (100.4F), Mild Pain (1 - 3)  aluminum hydroxide/magnesium hydroxide/simethicone Suspension 30 milliLiter(s) Oral every 4 hours PRN Dyspepsia  melatonin 3 milliGRAM(s) Oral at bedtime PRN Insomnia  ondansetron Injectable 4 milliGRAM(s) IV Push every 8 hours PRN Nausea and/or Vomiting  polyethylene glycol 3350 17 Gram(s) Oral daily PRN Constipation      Vital Signs Last 24 Hrs  T(C): 37 (12 Aug 2022 12:37), Max: 37.4 (11 Aug 2022 19:10)  T(F): 98.6 (12 Aug 2022 12:37), Max: 99.4 (11 Aug 2022 19:10)  HR: 53 (12 Aug 2022 12:37) (53 - 66)  BP: 113/70 (12 Aug 2022 12:37) (113/70 - 128/67)  BP(mean): --  RR: 17 (12 Aug 2022 12:37) (17 - 18)  SpO2: 99% (12 Aug 2022 12:37) (99% - 99%)    Parameters below as of 12 Aug 2022 12:37  Patient On (Oxygen Delivery Method): room air      CAPILLARY BLOOD GLUCOSE        I&O's Summary    11 Aug 2022 07:01  -  12 Aug 2022 07:00  --------------------------------------------------------  IN: 1425 mL / OUT: 2300 mL / NET: -875 mL    12 Aug 2022 07:01  -  12 Aug 2022 13:04  --------------------------------------------------------  IN: 240 mL / OUT: 600 mL / NET: -360 mL        PHYSICAL EXAM:  GENERAL: NAD, well-groomed  HEAD:  Atraumatic, Normocephalic  EYES:  conjunctiva and sclera clear  NECK: Supple, No JVD  CHEST/LUNG: Clear to auscultation bilaterally; No wheeze  HEART: Regular rate and rhythm; No murmurs, rubs, or gallops  ABDOMEN: Soft, Nontender, mildly distended; Bowel sounds present  EXTREMITIES:  2+ Peripheral Pulses, No clubbing, cyanosis, or edema  PSYCH: AAOx3  NEUROLOGY: non-focal  SKIN: No rashes or lesions    LABS:                        10.0   10.51 )-----------( 270      ( 12 Aug 2022 07:18 )             30.5     08-12    140  |  109<H>  |  34<H>  ----------------------------<  83  4.2   |  21<L>  |  2.66<H>    Ca    8.6      12 Aug 2022 07:19    TPro  7.0  /  Alb  3.0<L>  /  TBili  0.1<L>  /  DBili  x   /  AST  18  /  ALT  23  /  AlkPhos  133<H>  08-12                Consultant(s) Notes Reviewed:  Nephrology

## 2022-08-12 NOTE — PROGRESS NOTE ADULT - PROBLEM SELECTOR PLAN 1
- leukocytosis with positive UA on admission to hospital.  - Patient intermittently self catheterizes due to BPH  - BCx w/GPC in clusters, UCx with Klebsiella ; sensitivities reviewed  - Repeat bld cx NGTD  - d/w ID following ; s/p Ceftriaxone x 6 days  - Sonogram of kidney and bladder demonstrated severe hydronephrosis and calculi in ureter  - Urology  evaluation appreciated  ; stone does not appear to be obstructing by CT scan - leukocytosis with positive UA on admission to hospital.  - Patient intermittently self catheterizes due to BPH  - BCx w/GPC in clusters, UCx with Klebsiella ; sensitivities reviewed  - Repeat bld cx NGTD  - ID following ; s/p Ceftriaxone x 6 days  - Sonogram of kidney and bladder demonstrated severe hydronephrosis and calculi in ureter  - Urology  evaluation appreciated  ; stone does not appear to be obstructing by CT scan

## 2022-08-12 NOTE — PROGRESS NOTE ADULT - PROBLEM SELECTOR PLAN 4
Patient is with a history of BPH and previously was on finasteride and tamsulosin, however medications were discontinued and patient now intermittently self catheterizes.   - While inpatient straight cath q6hrs or as needed
Patient is with a history of BPH and previously was on finasteride and tamsulosin, however medications were discontinued and patient now intermittently self catheterizes.   - declines tovar  - He is performing clean intermittent catheterization   - per Urology pt is to increase frequency of CIC.
Patient is with a history of BPH and previously was on finasteride and tamsulosin, however medications were discontinued and patient now intermittently self catheterizes.   - declines tovar  - He is performing clean intermittent catheterization   - Urology reccs appreciated
Patient is with a history of BPH and previously was on finasteride and tamsulosin, however medications were discontinued and patient now intermittently self catheterizes.   - While inpatient straight cath q6hrs or as needed
Patient is with a history of BPH and previously was on finasteride and tamsulosin, however medications were discontinued and patient now intermittently self catheterizes.   - declines tovar  - He is performing clean intermittent catheterization   - per Urology pt is to increase frequency of CIC.
Patient is with a history of BPH and previously was on finasteride and tamsulosin, however medications were discontinued and patient now intermittently self catheterizes.   - declines tovar  - He is performing clean intermittent catheterization   - Urology reccs appreciated

## 2022-08-12 NOTE — DISCHARGE NOTE PROVIDER - NSDCMRMEDTOKEN_GEN_ALL_CORE_FT
abacavir 300 mg oral tablet: 2 tab(s) orally once a day  Aspirin Low Dose 81 mg oral delayed release tablet: 1 tab(s) orally once a day  Tamar-C 500 mg oral tablet: 1 tab(s) orally once a day  ferrous sulfate 325 mg (65 mg elemental iron) oral tablet: 1 tab(s) orally once a day  folic acid 1 mg oral tablet: 1 tab(s) orally once a day  lamiVUDine 100 mg oral tablet: 1 tab(s) orally once a day  metoprolol succinate 25 mg oral tablet, extended release: 0.5 tab(s) orally once a day  Multiple Vitamins oral tablet: 1 tab(s) orally once a day  Pepcid 20 mg oral tablet: 1 tab(s) orally once a day   senna leaf extract oral tablet: 2 tab(s) orally once a day (at bedtime)  simethicone 80 mg oral tablet, chewable: 1 tab(s) orally every 4 hours, As needed, Gas  sulfamethoxazole-trimethoprim 400 mg-80 mg oral tablet: 1 tab(s) orally Monday, Wednesday, and Friday  Vitamin D3 5000 intl units (125 mcg) oral capsule: 1 cap(s) orally once a day   abacavir 300 mg oral tablet: 2 tab(s) orally once a day  Aspirin Low Dose 81 mg oral delayed release tablet: 1 tab(s) orally once a day  Tamar-C 500 mg oral tablet: 1 tab(s) orally once a day  ferrous sulfate 325 mg (65 mg elemental iron) oral tablet: 1 tab(s) orally once a day  folic acid 1 mg oral tablet: 1 tab(s) orally once a day  lamiVUDine 100 mg oral tablet: 1 tab(s) orally once a day  metoprolol succinate 25 mg oral tablet, extended release: 0.5 tab(s) orally once a day  Multiple Vitamins oral tablet: 1 tab(s) orally once a day  senna leaf extract oral tablet: 2 tab(s) orally once a day (at bedtime)  sulfamethoxazole-trimethoprim 400 mg-80 mg oral tablet: 1 tab(s) orally Monday, Wednesday, and Friday  Vitamin D3 5000 intl units (125 mcg) oral capsule: 1 cap(s) orally once a day   abacavir 300 mg oral tablet: 2 tab(s) orally once a day  Aspirin Low Dose 81 mg oral delayed release tablet: 1 tab(s) orally once a day  Tamar-C 500 mg oral tablet: 1 tab(s) orally once a day  ferrous sulfate 325 mg (65 mg elemental iron) oral tablet: 1 tab(s) orally once a day  folic acid 1 mg oral tablet: 1 tab(s) orally once a day  lamiVUDine 100 mg oral tablet: 1 tab(s) orally once a day  metoprolol succinate 25 mg oral tablet, extended release: 0.5 tab(s) orally once a day  Multiple Vitamins oral tablet: 1 tab(s) orally once a day  senna leaf extract oral tablet: 2 tab(s) orally once a day (at bedtime)  Vitamin D3 5000 intl units (125 mcg) oral capsule: 1 cap(s) orally once a day

## 2022-08-12 NOTE — DISCHARGE NOTE PROVIDER - NSDCCPCAREPLAN_GEN_ALL_CORE_FT
PRINCIPAL DISCHARGE DIAGNOSIS  Diagnosis: Leukocytosis  Assessment and Plan of Treatment:        PRINCIPAL DISCHARGE DIAGNOSIS  Diagnosis: Acute cystitis  Assessment and Plan of Treatment: s/p completed ABs course, f/up with PCP      SECONDARY DISCHARGE DIAGNOSES  Diagnosis: CAD (coronary artery disease)  Assessment and Plan of Treatment: stable, cont aspirin    Diagnosis: BPH with urinary obstruction  Assessment and Plan of Treatment: stable, f/up with Urologist    Diagnosis: HIV (human immunodeficiency virus infection)  Assessment and Plan of Treatment: stable, cont current home meds, f/up with ID    Diagnosis: Acute kidney injury superimposed on CKD  Assessment and Plan of Treatment: stable, f/up with op Nephrologist     PRINCIPAL DISCHARGE DIAGNOSIS  Diagnosis: Acute cystitis  Assessment and Plan of Treatment: s/p completed ABs course, f/up with PCP      SECONDARY DISCHARGE DIAGNOSES  Diagnosis: CAD (coronary artery disease)  Assessment and Plan of Treatment: stable, cont aspirin    Diagnosis: BPH with urinary obstruction  Assessment and Plan of Treatment: stable, f/up with Urologist    Diagnosis: HIV (human immunodeficiency virus infection)  Assessment and Plan of Treatment: stable, cont current home meds, f/up with op  ID (Arie Goins)    Diagnosis: Acute kidney injury superimposed on CKD  Assessment and Plan of Treatment: stable, f/up with op Nephrologist (Presley Salter)

## 2022-08-12 NOTE — DISCHARGE NOTE PROVIDER - HOSPITAL COURSE
82M PMH HIV, BPH requiring self catheterization, CKD w. baseline Cr in low 2's presents to hospital after being found with leukocytosis on outpatient labs. Patient was seen yesterday by PCP who performed CBC as part of asymptomatic routine labs, and was found to have a WBC count of 20K for which the patient was sent in to the hospital today. Patient reports some suprapubic tenderness and states that he may have some pain with catheterization similar to previous UTIs. Denies subjective fever or chills as an outpatient. Patient denies recent sick contacts.   He was treated with iv ABs and he was seen by ID, Renal, Urology and PT. His creatinine is almost at baseline and improved. He is hemodynamically stable to be discharged home today, spoke to Attending, JAYESH. 82M PMH HIV, BPH requiring self catheterization, CKD w. baseline Cr in low 2's presents to hospital after being found with leukocytosis on outpatient labs. Patient was seen yesterday by PCP who performed CBC as part of asymptomatic routine labs, and was found to have a WBC count of 20K for which the patient was sent in to the hospital today. Patient reports some suprapubic tenderness and states that he may have some pain with catheterization similar to previous UTIs. Denies subjective fever or chills as an outpatient. Patient denies recent sick contacts.   He was treated with iv ABs - Patient intermittently self catheterizes due to BPH  - BCx w/GPC in clusters, UCx with Klebsiella ; sensitivities reviewed  - Repeat bld cx NGTD  - d/w ID following ; Complete Ceftriaxone x 5 days today   - Sonogram of kidney and bladder demonstrated severe hydronephrosis and calculi in ureter  - Urology  evaluation appreciated  ; stone does not appear to be obstructing by CT scan.  and he was seen by ID, Renal, Urology and PT. His creatinine is almost at baseline and improved. He is hemodynamically stable to be discharged home today, spoke to Attending, JAYESH.

## 2022-08-12 NOTE — DISCHARGE NOTE PROVIDER - CARE PROVIDER_API CALL
Presley Ferguson)  Internal Medicine; Nephrology; Preventive Medicine  [address] [place] [address]  Phone: [phone]  Fax: [phone]  Follow Up Time:     Tana Najera  INTERNAL MEDICINE  [address], [address]  [place] [address]  Phone: [phone]  Fax: [phone]  Follow Up Time:

## 2022-08-12 NOTE — PROGRESS NOTE ADULT - TIME BILLING
case complexity and discharge planning. Pt is clinically stable for discharge home. To f/up with Nephrology, PCP, Urology within 1-2 weeks    d/w LAUREN Giordano

## 2022-08-12 NOTE — PROGRESS NOTE ADULT - PROBLEM SELECTOR PLAN 2
Patient with Cr of 3.2 on admission labs. Previous admissions with Cr in low 2 range.   U/s kidney and bladder performed and with bilateral hydronephrosis.   Cr holding at 2.8  - Urology reccs appreciated  - Continue to avoid nephrotoxins and renally dose all medications
Patient with Cr of 3.2 on admission labs. Previous admissions with Cr in low 2 range. U/s kidney and bladder performed and with bilateral hydronephrosis. Cr improved on AM labs.  - Urology consult given SUDHEER on CKD with u/s findings in setting of known BPH requiring routine self catheterization  - Continue to avoid nephrotoxins and renally dose all medications
Patient with Cr of 3.2 on admission labs. Previous admissions with Cr in low 2 range.   U/s kidney and bladder performed and with bilateral hydronephrosis.   Cr holding at 2.7  - ? component of post-obstructive diuresis ; will give IVF x 24 hrs  - Urology reccs appreciated  - Continue to avoid nephrotoxins and renally dose all medications
Patient with Cr of 3.2 on admission labs. Previous admissions with Cr in low 2 range.   U/s kidney and bladder performed and with bilateral hydronephrosis.   Cr down to 2.66  - ? component of post-obstructive diuresis ; s/p IVF x 24 hrs  - Urology reccs appreciated  - Continue to avoid nephrotoxins and renally dose all medications  - Nephrology consult reccs appreciated ; pt is non oligurisc, no indication for HD  - urine studies reviewed  - clinically stable for discharge to f/up with nephrology outpt  Presley Ferguson
Patient with Cr of 3.2 on admission labs. Previous admissions with Cr in low 2 range.   U/s kidney and bladder performed and with bilateral hydronephrosis.   Cr holding at 2.7  - ? component of post-obstructive diuresis ; s/p IVF x 24 hrs  - Urology reccs appreciated  - Continue to avoid nephrotoxins and renally dose all medications  - obtain Nephrology consult
Patient with Cr of 3.2 on admission labs. Previous admissions with Cr in low 2 range.   U/s kidney and bladder performed and with bilateral hydronephrosis.   Cr improving ; 2.79 today  - Urology reccs appreciated  - Continue to avoid nephrotoxins and renally dose all medications

## 2022-08-12 NOTE — DISCHARGE NOTE PROVIDER - CARE PROVIDERS DIRECT ADDRESSES
[email or web address]

## 2022-08-12 NOTE — PROGRESS NOTE ADULT - PROBLEM SELECTOR PLAN 3
Patient with a longstanding history of HIV, reports he is VLUD and compliant with pifeltro, lamivudine and abacavir  - While inpatient will continue with lamivudine and abacavir  - Pifeltro is not on [facility] formulary   - VL and CD4 count ordered with AM labs on 8/7, no results yet
Patient with a longstanding history of HIV, reports he is VLUD and compliant with pifeltro, lamivudine and abacavir  - While inpatient will continue with lamivudine and abacavir  - Pifeltro is not on [facility] formulary   - VL and CD4 count ordered with AM labs on 8/7, no results yet
Patient with a longstanding history of HIV, reports he is VLUD and compliant with pifeltro, lamivudine and abacavir  - While inpatient will continue with lamivudine and abacavir  - Pifeltro is not on [facility] formulary   - VL and CD4 count ordered with AM labs on 8/7, no results yet
Patient with a longstanding history of HIV, reports he is VLUD and compliant with pifeltro, lamivudine and abacavir  - While inpatient will continue with lamivudine and abacavir  - Pifeltro is not on [facility] formulary   - VL and CD4 count noted
Patient with a longstanding history of HIV, reports he is VLUD and compliant with pifeltro, lamivudine and abacavir  - While inpatient will continue with lamivudine and abacavir  - Pifeltro is not on [facility] formulary   - VL and CD4 count ordered with AM labs on 8/7, no results yet
Patient with a longstanding history of HIV, reports he is VLUD and compliant with pifeltro, lamivudine and abacavir  - While inpatient will continue with lamivudine and abacavir  - Pifeltro is not on [facility] formulary   - VL and CD4 count noted

## 2022-08-12 NOTE — PROGRESS NOTE ADULT - PROVIDER SPECIALTY LIST ADULT
Hospitalist
Infectious Disease
Nephrology
Urology
Hospitalist
Infectious Disease
Hospitalist
Hospitalist

## 2022-08-12 NOTE — PROGRESS NOTE ADULT - ATTENDING COMMENTS
Alert, conversant.  Presenting with ARF, UTI in setting of CKD, chronic hydronephrosis (prostate, self cath).  Minimal empty from spontaneous void  1.  ARF on CKD--volume supplementation, d/c ARB, diuretic.  Declines indwelling catheter.  NOn oliguric, no HD warranted  2.  Obstructive uropathy--prostate, check post self cath residual volume.  Urology input.  Should have prostate surgery to avoid worsening 1.  Renal US reviewed and c/w SEVERE OBSTRUCTION  3.  UTI--not overtly septic at present and on ceftriaxone.  Dosing basd on 1 is acceptable  4.  Hypertension--off ARF.  CCB +/- labetolol if warranted    discussed with med team  Daljit Wing MD  contact me on TEAMS

## 2022-10-11 PROBLEM — Z92.29 PERSONAL HISTORY OF OTHER DRUG THERAPY: Chronic | Status: ACTIVE | Noted: 2022-01-01

## 2022-10-18 PROBLEM — N40.1 BPH WITH OBSTRUCTION/LOWER URINARY TRACT SYMPTOMS: Status: ACTIVE | Noted: 2019-09-12

## 2022-10-18 PROBLEM — N18.30 CHRONIC KIDNEY DISEASE, STAGE 3: Status: ACTIVE | Noted: 2019-08-16

## 2022-10-18 PROBLEM — Z01.818 PRE-OP EVALUATION: Status: ACTIVE | Noted: 2022-01-01

## 2022-10-18 PROBLEM — I10 BENIGN ESSENTIAL HYPERTENSION: Status: ACTIVE | Noted: 2019-08-16

## 2022-10-18 NOTE — ASSESSMENT
[Patient Optimized for Surgery] : Patient optimized for surgery [As per surgery] : as per surgery [FreeTextEntry4] : CARLINE GARBER is an 81 yo man with hypertension, BPH requiring self catheterization, CRI, HIV, mitral valve replacement 8/2020, MGUS here for a POC prior to right cataract removal.  The patient has a stable EKG.  There are no contraindications to proceeding with the planned procedure.  The patient is medically optimized. \par

## 2022-10-18 NOTE — PHYSICAL EXAM
[No Acute Distress] : no acute distress [No Lymphadenopathy] : no lymphadenopathy [Supple] : supple [No Respiratory Distress] : no respiratory distress  [No Accessory Muscle Use] : no accessory muscle use [Clear to Auscultation] : lungs were clear to auscultation bilaterally [Normal Rate] : normal rate  [Regular Rhythm] : with a regular rhythm [Normal S1, S2] : normal S1 and S2 [Soft] : abdomen soft [Non Tender] : non-tender [Non-distended] : non-distended [Normal Bowel Sounds] : normal bowel sounds [No CVA Tenderness] : no CVA  tenderness [Alert and Oriented x3] : oriented to person, place, and time [de-identified] : Slender [de-identified] : walks with rolling walker [de-identified] : mild ankle edema elke

## 2022-10-18 NOTE — HISTORY OF PRESENT ILLNESS
[No Adverse Anesthesia Reaction] : no adverse anesthesia reaction in self or family member [Chronic Kidney Disease] : chronic kidney disease [(Patient denies any chest pain, claudication, dyspnea on exertion, orthopnea, palpitations or syncope)] : Patient denies any chest pain, claudication, dyspnea on exertion, orthopnea, palpitations or syncope [FreeTextEntry1] : Right cataract [FreeTextEntry2] : 10/25/22 [FreeTextEntry3] : Dr Tara Bustos, fax  [FreeTextEntry4] : CARLINE GARBER is an 81 yo man with hypertension, BPH requiring self catheterization, CRI, HIV, mitral valve replacement 8/2020, MGUS here for a POC prior to right cataract removal. Medical problems are stable on his current medications. He is feeling well and doing well overall. Some mild bilateral ankle edema, R > L for months, since heart surgery. Walks around apartment and short distances with rolling walker.\par \par Was hospitalized in 8/22 for elevated WBC and UTI.  Responded well to IV antibiotics.  Flu shot refused today.\par \par The patient fell and was taken to [facility] in 8/2020. He was found to have endocarditis. He underwent a mitral valve replacement and left atrial appendage ligation by Dr Camacho. He had a fib/a flutter and was on coumadin. He is seeing Dr Agarwal and dr kenyetta bourgeois\par \par The patient collapsed outside of a pharmacy 8+ years ago and was taken to [facility]. After an extensive workup, he was diagnosed with HIV and started on therapy. He follows up every 3 months with BLANK Vazquez. He has been stable on his current medications. He has an appt and labs planned for 8/22.\par \par The patient sees urologist Dr JUANA Silver and is due for a visit. The patient has seen hem onc for MGUS and is due for a visit. The patient has seen nephrology for CRI. \par \par The patient is single with no children. He is a retired . He walks short distances with a rolling walker.  He lives in an assisted living facility now called [facility] for the past 7+ years. He is an only child and has no remaining family. He helped care for his elderly father and 2 aunts and they have passed away. He is going to think about who he would designate as a health care proxy.\par \par  He did receive the 4 pfizer vaccines \par  \par

## 2022-11-10 NOTE — PHYSICAL EXAM
[General Appearance - Well Developed] : well developed [Abdomen Soft] : soft [Abdomen Tenderness] : non-tender [Heart Rate And Rhythm] : Heart rate and rhythm were normal [] : no respiratory distress [Oriented To Time, Place, And Person] : oriented to person, place, and time [No Focal Deficits] : no focal deficits

## 2022-11-12 NOTE — HISTORY OF PRESENT ILLNESS
[FreeTextEntry1] : 82M presenting for follow-up. He has a hx of urinary retention and patient now manages his bladder with intermittent self catheterizations. He reports no issues with his catheterizations and is overall happy with his current management. Reports that he has been diagnosed with many urinary tract infections by his infectious disease doctor. He is asymptomatic during these evaluations. In august of this year, he was noted to have a leukocytosis on standard blood work. He was subsequently admitted to the hospital a due to klebsiella in his urine and +blood culture of staph hominis. He was given IV antibiotics and discharged home after 1 week. Patient reports that since then he was been doing well. No gross hematuria. No changes in appetite or weight loss or pelvic or abdominal pain. \par \par

## 2022-11-12 NOTE — ASSESSMENT
[FreeTextEntry1] : Patient currently doing well with self catheterizations. Discussed with him that he was always have bacterial growth in his urine cultures due to his catheterizations.  Antibiotic treatment would only be recommended should he develop symptoms to suggest a true urinary tract infection as opposed to bacterial colonization of the bladder.  Plan to continue the frequency of his catheterizations. Discussed systemic symptoms of a urinary tract infection and  to return to clinic if he feels he is having these symptoms. \par \par The patient reports that he has a good supply of catheters and lubrication at home.  He will let us know if he has any issues.  Otherwise we will see him back on an annual basis.  I did ask him to contact me should he suspect that he has developed a urinary tract infection.\par \par \par

## 2022-11-29 NOTE — ED PROVIDER NOTE - CONSTITUTIONAL, MLM
Well appearing elderly male, awake, alert, oriented to person, place, time/situation and in no apparent distress. normal...

## 2022-11-29 NOTE — H&P ADULT - PROBLEM SELECTOR PLAN 1
-acute on chronic renal failure and complicated UTI likely secondary to obstruction, now improved s/p tovar  -no history of resistant organism but given high risk features will continue empiric cefepime -acute on chronic renal failure and complicated UTI likely secondary to obstruction now improved s/p tovar  -no history of resistant organism but given high risk features will continue empiric cefepime  -c/w cefepime 1g q24h (renally dosed)  -f/u blood and urine cultures   -monitor Tovar output

## 2022-11-29 NOTE — H&P ADULT - PROBLEM SELECTOR PLAN 5
dvt ppx: heparin SQ  diet: regular  ambulate: with assistance  gi ppx: home famotidine     fall precautions  aspiration precautions

## 2022-11-29 NOTE — ED ADULT NURSE NOTE - OBJECTIVE STATEMENT
Pt is a 82 yr old male with pmh of HIV (undetectable), BPH with self catheterization every 6 hours, and chronic persistent UTI's coming from home (assisted living) via ems for a UTI. Pt states he started having UTI symptoms a few weeks ago- saw his urologist two weeks ago who did not order abx. Pt was told to watch the symptoms and if it got worse to call him. Pt started having fevers a few days ago and called his urologist who sent him to the ED. Pt is a/ox 3- no complaints of chest pain, sob, dizziness, decreased PO intake. PT has had fevers up to 102. Pt has chronic lower leg edema but it is at its baseline as of now- pt sees a cardiologist. Pt ambulates with rolator or walker.

## 2022-11-29 NOTE — ED PROVIDER NOTE - OBJECTIVE STATEMENT
83 yo male pmhx HIV (viral load not detectable 08/22), afib s/p DVVC and ablation, MV endocarditis s/p bioprosthetic valve replacement, BPH (self catheterizes), frequent UTIs presents to the ED c/o difficulty catheterizing x 1 week and generalized weakness today. Pt reports has been speaking with his urologist the past week because noted catheterizing himself was harder than usual, though was still able to do so. Today noted increasing fatigue while walking with reported overall weakness, called urologist Dr. Memo Silver's office and was advised to come to ED so pt called EMS. Denies unilateral weakness, fall, head trauma, n/v/d, uri sxs, cp, sob, abdominal pain, change in urine color. 83 yo male pmhx HIV (viral load not detectable 08/22), afib s/p DVVC and ablation, MV endocarditis s/p bioprosthetic valve replacement, BPH (self catheterizes), frequent UTIs presents to the ED c/o difficulty catheterizing x 1 week and generalized weakness today. Pt reports has been speaking with his urologist the past week because noted catheterizing himself was harder than usual, though was still able to do so. Today noted increasing fatigue while walking with reported overall weakness, called urologist Dr. Memo Silver's office and was advised to come to ED so pt called EMS. Denies unilateral weakness, fall, head trauma, n/v/d, uri sxs, cp, sob, abdominal pain, change in urine color, fever/chills.

## 2022-11-29 NOTE — H&P ADULT - NSHPPHYSICALEXAM_GEN_ALL_CORE
Vital Signs Last 24 Hrs  T(C): 36.7 (29 Nov 2022 23:15), Max: 38.9 (29 Nov 2022 19:20)  T(F): 98 (29 Nov 2022 23:15), Max: 102.1 (29 Nov 2022 19:20)  HR: 49 (29 Nov 2022 23:30) (40 - 70)  BP: 91/38 (29 Nov 2022 23:30) (91/38 - 131/61)  BP(mean): 55 (29 Nov 2022 23:30) (55 - 80)  RR: 16 (29 Nov 2022 23:30) (16 - 20)  SpO2: 98% (29 Nov 2022 23:30) (94% - 98%)    Parameters below as of 29 Nov 2022 23:30  Patient On (Oxygen Delivery Method): room air

## 2022-11-29 NOTE — ED ADULT NURSE REASSESSMENT NOTE - NS ED NURSE REASSESS COMMENT FT1
Aden catheter placed with MD order. Aseptic technique maintained. Two RNs at bedside during procedure. Catheter draining clear yellow urine.

## 2022-11-29 NOTE — H&P ADULT - PROBLEM SELECTOR PLAN 2
-now with symptomatic bradycardia  -monitor on telemetry   -personally discussed case with on-call cardiology consult, f/u recommendations  -keeps pad on, atropine at bedside   -obtain troponin  -trend troponin and EKG for repeat episodes of chest pain  -hold home telmisartan due to soft blood pressures  -hold home metoprolol -now with symptomatic bradycardia, converted to afib with slow ventricular response  -monitor on telemetry   -personally discussed case with on-call cardiology consultant, discussed possibility for ACS protocol, pt had previous troponin elevation ~1400 in 8/2020 in setting of renal failure and can hold on ACS protocol for now, will trend troponin to peak, if continues to have symptoms or troponin rises above prior baseline can consider initiating ACS protocol  -keeps pad on, atropine at bedside   -trend troponin and EKG for repeat episodes of chest pain  -hold home telmisartan due to soft blood pressures  -hold home metoprolol

## 2022-11-29 NOTE — H&P ADULT - HISTORY OF PRESENT ILLNESS
83yo M w/ PMHx of HIV (viral load undetectable 08/22), Afib s/p DVVC and ablation (not on AC), MV endocarditis s/p bioprosthetic valve replacement, BPH (self catheterizes), frequent UTIs, presents with weakness, he reports that over the last 2 weeks he has had weakness, symptoms were associated with occasional body shakes that came every other day, he notes that he also has had recent difficulty performing straight cath, saying he required more force and was at times unable to complete procedure and therefore performed it less effectively, he called his urologist who then instructed him to head to ED, in the ED, pt was bradycardic, febrile but hemodynamically stable, labs showed leukocytosis, elevated Cr above baseline, elevated procal, grossly positive U/A, EKG showed new Mobitz Type 1, pt was given cefepime, 1L NS bolus, Tylenol x1, admitted to general medicine for further management.  81yo M w/ PMHx of HIV (viral load undetectable 08/22), Afib s/p DVVC and ablation (not on AC), MV endocarditis s/p bioprosthetic valve replacement, BPH (self catheterizes), frequent UTIs, presents with weakness, he reports that over the last 2 weeks he has had weakness, symptoms were associated with occasional body shakes that came every other day, he notes that he also has had recent difficulty performing straight cath, saying he required more force and was at times unable to complete procedure, he called his urologist who then instructed him to head to ED, in the ED, pt was bradycardic, febrile but hemodynamically stable, labs showed leukocytosis, elevated Cr above baseline, elevated procal, grossly positive U/A, EKG showed new Mobitz Type 1, pt was given cefepime, 1L NS bolus, Tylenol x1, admitted to general medicine for further management. While in ED, pt developed acute onset non-radiating chest pain associated with SOB but denies n/v, f/c, diaphoresis, jaw pain, 81yo M w/ PMHx of HIV (viral load undetectable 08/22), Afib s/p DVVC and ablation (not on AC), MV endocarditis s/p bioprosthetic valve replacement, BPH (self catheterizes), frequent UTIs, presents with weakness, he reports that over the last 2 weeks he has had weakness, symptoms were associated with occasional body shakes that came every other day, he notes that he also has had recent difficulty performing straight cath, saying he required more force and was at times unable to complete procedure, he called his urologist who then instructed him to head to ED, in the ED, pt was bradycardic, febrile but hemodynamically stable, labs showed leukocytosis, elevated Cr above baseline, elevated procal, grossly positive U/A, EKG showed new Mobitz Type 1, then converted to afib with slow ventricular response, pt was given cefepime, 1L NS bolus, Tylenol x1, admitted to general medicine for further management. While in ED, pt developed acute onset non-radiating chest pain associated with SOB but denies n/v, f/c, diaphoresis, jaw pain,

## 2022-11-29 NOTE — ED PROVIDER NOTE - NS ED ATTENDING STATEMENT MOD
I have seen and examined this patient and fully participated in the care of this patient as the teaching attending.  The service was shared with the SANTANA.  I reviewed and verified the documentation and independently performed the documented:

## 2022-11-29 NOTE — ED PROVIDER NOTE - ATTENDING CONTRIBUTION TO CARE
Patient is an 83 yo M with history of HIV (viral load not detectable 08/22), afib s/p DVVC and ablation, MV endocarditis s/p bioprosthetic valve replacement, BPH (self catheterizes), frequent UTIs here for evaluation of weakness, chills, and increasing difficulty with self-catheterization. Patient states he has had intermittent chills over the past 1 week. He follows with Dr. Silver and called him today to discuss his weakness and difficulty catheterizing. Patient was advised to come to the ED. No fevers, nausea, vomiting, diarrhea. Denies chest pain, shortness of breath. Denies abdominal pain.     VS noted  Gen. no acute distress, Non toxic   HEENT: EOMI, mmm  Lungs: CTAB/L no C/ W /R   CVS: RRR   Abd; Soft non tender, non distended, no CVA tenderness  Ext: no edema  Skin: no rash  Neuro AAOx3 non focal clear speech  a/p: chills, weakness - concern for infection. Will send sepsis labs, check ekg, CXR, u/a. Suspect etiology is UTI. Patient will need admission.   - Elise ESCOBAR

## 2022-11-29 NOTE — H&P ADULT - ASSESSMENT
83yo M w/ PMHx of HIV (viral load undetectable 08/22), Afib s/p DVVC and ablation (not on AC), MV endocarditis s/p bioprosthetic valve replacement, BPH (self catheterizes), frequent UTIs, presents with UTI and new Mobitz Type 1

## 2022-11-30 NOTE — CHART NOTE - NSCHARTNOTEFT_GEN_A_CORE
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 Agarwal, BPH w/ frequent UTI's (self catheterizes) presenting to the ED with a UTI found to be bradycardic, telemetry revealing Mobitz I 50's.     He reports that over the last 2 weeks he has had weakness, subjective fevers, +chills, + night sweats. He notes that he also has had recent difficulty performing straight cath, saying he required more force and was at times unable to complete procedure, he called his urologist who then instructed him to head to ED. In the ED, febrile 102.1F, labs revealing leukocytosis, elevated Cr above baseline, elevated procal, grossly positive U/A.     Initial ECG appeared to have a Mobitz I, converted to course slow AF 40's, now back in Mobitz I w/o conversion pause. He is currently HDS, asymptomatic at this time. He has been maintained off AAD and A/C, his rates in SR were previously 60-70's. He has been on Metoprolol 12.5qd.     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 Will need ID clearance prior to implant. F/u blood + urine cultures, on IV Cefepime presently  To d/w attending re: leadless PPM vs. transvenous system, given patient is high risk for infection (straight catheterizes, prior history of endocarditis)   Hold AVNB for now   Start NOAC for new onset AF/ history of AFL  Continue to monitor on telemetry, correct lytes for K>4 and Mg >2     [phone]

## 2022-11-30 NOTE — CONSULT NOTE ADULT - SUBJECTIVE AND OBJECTIVE BOX
Patient seen and evaluated at bedside    Reason for consult: troponemia     HPI:    Mr. Monsivais is a 82yoM presenting to the ED with a UTI found to be bradycardic for which we are consulted.   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       He reports that over the last 2 weeks he has had weakness, symptoms were associated with occasional body shakes that came every other day, he notes that he also has had recent difficulty performing straight cath, saying he required more force and was at times unable to complete procedure, he called his urologist who then instructed him to head to ED, in the ED, pt was bradycardic, febrile but hemodynamically stable, labs showed leukocytosis, elevated Cr above baseline, elevated procal, grossly positive U/A.     Initial ECG appeared to have a Mobitz I morphology but this converted to course afib with a slow ventricular response (30-40's) later in the night. Assoc with the slow afib the patient noted mild chest discomfort and SOB. He states that he did not feel that these were present previously. BP at the time was 100's over 50's. Patient's HR did not increase when the patient exercised to fatigue in bed.       PMHx:   Unsteady gait    HIV (human immunodeficiency virus infection)    Chronic kidney disease, unspecified stage    Constipation, unspecified constipation type    Seborrheic keratosis    Osteoporosis    Edema of both legs    Bladder mass    Vitamin D deficiency    Orchitis and epididymitis    H/O endocarditis    Benign prostatic hyperplasia without lower urinary tract symptoms    COVID-19 vaccine series completed        PSHx:   No Past Surgical History    S/P coronary artery stent placement    S/P MVR (mitral valve replacement)        Allergies:  No Known Allergies      Home Meds:    Current Medications:   abacavir 300 milliGRAM(s) Oral two times a day  acetaminophen     Tablet .. 650 milliGRAM(s) Oral every 6 hours PRN  albumin human 25% IVPB 50 milliLiter(s) IV Intermittent once  ascorbic acid 500 milliGRAM(s) Oral daily  aspirin enteric coated 81 milliGRAM(s) Oral daily  cefepime   IVPB 1000 milliGRAM(s) IV Intermittent every 24 hours  doravirine 100 milliGRAM(s) Oral daily  famotidine    Tablet 20 milliGRAM(s) Oral daily  ferrous    sulfate 325 milliGRAM(s) Oral daily  folic acid 1 milliGRAM(s) Oral daily  heparin   Injectable 5000 Unit(s) SubCutaneous every 12 hours  lamiVUDine- milliGRAM(s) Oral daily  multivitamin 1 Tablet(s) Oral daily  senna 2 Tablet(s) Oral at bedtime      FAMILY HISTORY:  Family history of congestive heart failure (Father)    Family history of cancer (Mother)      REVIEW OF SYSTEMS:  CONSTITUTIONAL: + weakness, fevers   EYES/ENT: No visual changes;  No dysphagia  NECK: No pain or stiffness  RESPIRATORY: Mild shortness of breath  CARDIOVASCULAR: mild central pressure like chest pain  GASTROINTESTINAL: No abdominal or epigastric pain. No nausea, vomiting  BACK: No back pain  GENITOURINARY: + dysuria  SKIN: No itching, burning, rashes, or lesions   All other review of systems is negative unless indicated above.        Physical Exam:  T(F): 98 (11-29), Max: 102.1 (11-29)  HR: 37 (11-30) (37 - 70)  BP: 103/40 (11-30) (91/38 - 131/61)  RR: 18 (11-30)  SpO2: 98% (11-30)  GENERAL: No acute distress, well-developed  HEAD:  Atraumatic, Normocephalic  ENT: EOMI, PERRLA, conjunctiva and sclera clear, Neck supple, No JVD, moist mucosa  CHEST/LUNG: Clear to auscultation bilaterally  BACK: No spinal tenderness  HEART: Gwen IRRegular rate and rhythm  ABDOMEN: Soft, Nontender, Nondistended; Bowel sounds present  EXTREMITIES: + 1+ bilateral LE edema  PSYCH: Nl behavior, nl affect  NEUROLOGY: AAOx3, non-focal, cranial nerves intact  SKIN: Normal color, No rashes or lesions      CXR: Personally reviewed    Labs: Personally reviewed                        9.3    27.23 )-----------( 165      ( 29 Nov 2022 20:47 )             28.4     11-30    136  |  106  |  81<H>  ----------------------------<  116<H>  5.1   |  19<L>  |  4.44<H>    Ca    8.1<L>      30 Nov 2022 00:18  Mg     2.7     11-30    TPro  6.9  /  Alb  3.3  /  TBili  0.3  /  DBili  x   /  AST  31  /  ALT  23  /  AlkPhos  190<H>  11-29    PT/INR - ( 29 Nov 2022 20:47 )   PT: 15.5 sec;   INR: 1.33 ratio         PTT - ( 29 Nov 2022 20:47 )  PTT:31.5 sec         Patient seen and evaluated at bedside    Reason for consult: troponemia     HPI:    Mr. Monsivais is a 82yoM presenting to the ED with a UTI found to be bradycardic for which we are consulted.   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       He reports that over the last 2 weeks he has had weakness, symptoms were associated with occasional body shakes that came every other day, he notes that he also has had recent difficulty performing straight cath, saying he required more force and was at times unable to complete procedure, he called his urologist who then instructed him to head to ED, in the ED, pt was bradycardic, febrile but hemodynamically stable, labs showed leukocytosis, elevated Cr above baseline, elevated procal, grossly positive U/A.     Initial ECG appeared to have a Mobitz I morphology but this converted to course afib with a slow ventricular response (30-40's) later in the night. Assoc with the slow afib the patient noted mild chest discomfort and SOB. He states that he did not feel that these were present previously. BP at the time was 100's over 50's. Patient's HR did not increase when the patient exercised to fatigue in bed.       PMHx:   Unsteady gait    HIV (human immunodeficiency virus infection)    Chronic kidney disease, unspecified stage    Constipation, unspecified constipation type    Seborrheic keratosis    Osteoporosis    Edema of both legs    Bladder mass    Vitamin D deficiency    Orchitis and epididymitis    H/O endocarditis    Benign prostatic hyperplasia without lower urinary tract symptoms    COVID-19 vaccine series completed        PSHx:   No Past Surgical History    S/P coronary artery stent placement    S/P MVR (mitral valve replacement)        Allergies:  No Known Allergies    MEDICATIONS  (STANDING):  abacavir 300 milliGRAM(s) Oral two times a day  ascorbic acid 500 milliGRAM(s) Oral daily  aspirin enteric coated 81 milliGRAM(s) Oral daily  cefepime   IVPB 1000 milliGRAM(s) IV Intermittent every 24 hours  chlorhexidine 2% Cloths 1 Application(s) Topical daily  doravirine 100 milliGRAM(s) Oral daily  famotidine    Tablet 20 milliGRAM(s) Oral daily  ferrous    sulfate 325 milliGRAM(s) Oral daily  folic acid 1 milliGRAM(s) Oral daily  heparin   Injectable 5000 Unit(s) SubCutaneous every 12 hours  lamiVUDine- milliGRAM(s) Oral daily  multivitamin 1 Tablet(s) Oral daily  senna 2 Tablet(s) Oral at bedtime    FAMILY HISTORY:  Family history of congestive heart failure (Father)    Family history of cancer (Mother)      REVIEW OF SYSTEMS:  CONSTITUTIONAL: + weakness, fevers   EYES/ENT: No visual changes;  No dysphagia  NECK: No pain or stiffness  RESPIRATORY: Mild shortness of breath  CARDIOVASCULAR: mild central pressure like chest pain  GASTROINTESTINAL: No abdominal or epigastric pain. No nausea, vomiting  BACK: No back pain  GENITOURINARY: + dysuria  SKIN: No itching, burning, rashes, or lesions   All other review of systems is negative unless indicated above.        Physical Exam:  T(F): 98 (11-29), Max: 102.1 (11-29)  HR: 37 (11-30) (37 - 70)  BP: 103/40 (11-30) (91/38 - 131/61)  RR: 18 (11-30)  SpO2: 98% (11-30)  GENERAL: No acute distress, well-developed  HEAD:  Atraumatic, Normocephalic  ENT: EOMI, PERRLA, conjunctiva and sclera clear, Neck supple, No JVD, moist mucosa  CHEST/LUNG: Clear to auscultation bilaterally  BACK: No spinal tenderness  HEART: Gwen IRRegular rate and rhythm  ABDOMEN: Soft, Nontender, Nondistended; Bowel sounds present  EXTREMITIES: + 1+ bilateral LE edema  PSYCH: Nl behavior, nl affect  NEUROLOGY: AAOx3, non-focal, cranial nerves intact  SKIN: Normal color, No rashes or lesions      CXR: Personally reviewed    Labs: Personally reviewed                        9.3    27.23 )-----------( 165      ( 29 Nov 2022 20:47 )             28.4     11-30    136  |  106  |  81<H>  ----------------------------<  116<H>  5.1   |  19<L>  |  4.44<H>    Ca    8.1<L>      30 Nov 2022 00:18  Mg     2.7     11-30    TPro  6.9  /  Alb  3.3  /  TBili  0.3  /  DBili  x   /  AST  31  /  ALT  23  /  AlkPhos  190<H>  11-29    PT/INR - ( 29 Nov 2022 20:47 )   PT: 15.5 sec;   INR: 1.33 ratio         PTT - ( 29 Nov 2022 20:47 )  PTT:31.5 sec

## 2022-11-30 NOTE — CONSULT NOTE ADULT - SUBJECTIVE AND OBJECTIVE BOX
INFECTIOUS DISEASE CONSULT NOTE    Patient is a 82y old  Male who presents with a chief complaint of UTI (2022 02:02)    HPI:  83yo M w/ PMHx of HIV (viral load undetectable ), Afib s/p DVVC and ablation (not on AC), MV endocarditis s/p bioprosthetic valve replacement, BPH (self catheterizes), frequent UTIs, presents with weakness, he reports that over the last 2 weeks he has had weakness, symptoms were associated with occasional body shakes that came every other day, he notes that he also has had recent difficulty performing straight cath, saying he required more force and was at times unable to complete procedure, he called his urologist who then instructed him to head to ED, in the ED, pt was bradycardic, febrile but hemodynamically stable, labs showed leukocytosis, elevated Cr above baseline, elevated procal, grossly positive U/A, EKG showed new Mobitz Type 1, then converted to afib with slow ventricular response, pt was given cefepime, 1L NS bolus, Tylenol x1, admitted to general medicine for further management. While in ED, pt developed acute onset non-radiating chest pain associated with SOB but denies n/v, f/c, diaphoresis, jaw pain, (2022 23:23)       REVIEW OF SYSTEMS:  CONSTITUTIONAL: No fever or chills  HEENT: No sore throat  RESPIRATORY: No cough, no shortness of breath  CARDIOVASCULAR: No chest pain or palpitations  GASTROINTESTINAL: No abdominal or epigastric pain  GENITOURINARY: No dysuria  NEUROLOGICAL: No headache/dizziness  MSK: No joint pain, erythema, or swelling; no back pain  SKIN: No itching, rashes  All other ROS negative except noted above    Prior hospital charts reviewed [Yes]  Primary team notes reviewed [Yes]  Other consultant notes reviewed [Yes]    PAST MEDICAL & SURGICAL HISTORY:  Unsteady gait      HIV (human immunodeficiency virus infection)      Chronic kidney disease, unspecified stage      Constipation, unspecified constipation type      Seborrheic keratosis      Osteoporosis      Edema of both legs      Bladder mass      Vitamin D deficiency      Orchitis and epididymitis      H/O endocarditis  MV endocarditis      Benign prostatic hyperplasia without lower urinary tract symptoms      COVID-19 vaccine series completed  W #2 boosters      S/P coronary artery stent placement      S/P MVR (mitral valve replacement)  &amp; SHELLIE clip, 2020          SOCIAL HISTORY:  - Born in [place], [place] in 19XX  - Currently working as / Retired  - Lives with _____; no pets  - No recent travel  - Denies tobacco use  - Denies alcohol use  - Denies illicit drug use  - Currently sexually active, has one male/female sexual partner    FAMILY HISTORY:  Family history of congestive heart failure (Father)    Family history of cancer (Mother)        Allergies:  No Known Allergies      ANTIMICROBIALS:  abacavir 300 two times a day  cefepime   IVPB 1000 every 24 hours  doravirine 100 daily  lamiVUDine- daily      ANTIMICROBIALS (past 90 days):  MEDICATIONS  (STANDING):  abacavir   300 milliGRAM(s) Oral (22 @ 05:30)    cefepime   IVPB   100 mL/Hr IV Intermittent (22 @ 21:51)    doravirine   100 milliGRAM(s) Oral (22 @ 13:38)        OTHER MEDS:   MEDICATIONS  (STANDING):  acetaminophen     Tablet .. 650 every 6 hours PRN  aspirin enteric coated 81 daily  famotidine    Tablet 20 daily  heparin   Injectable 5000 every 12 hours  senna 2 at bedtime      VITALS:  Vital Signs Last 24 Hrs  T(F): 98.1 (22 @ 13:45), Max: 102.1 (22 @ 19:20)    Vital Signs Last 24 Hrs  HR: 58 (22 @ 13:45) (37 - 70)  BP: 143/50 (22 @ 13:45) (91/38 - 143/50)  RR: 18 (22 @ 13:45)  SpO2: 97% (22 @ 13:45) (94% - 99%)  Wt(kg): --    EXAM:  GENERAL: NAD, lying in bed comfortably  HEAD: No head lesions  EYES: Conjunctiva pink and cornea white  ENT: Normal external ears and nose, no discharges; moist mucous membranes  NECK: Supple, nontender to palpation; no JVD  CHEST/LUNG: Clear to auscultation bilaterally  HEART: S1 S2  ABDOMEN: Soft, nontender, nondistended; normoactive bowel sounds  EXTREMITIES: No clubbing, cyanosis, or petal edema  NERVOUS SYSTEM: Alert and oriented to person, time, place and situation, speech clear. No focal deficits   MSK: No joint erythema, swelling or pain  SKIN: No rashes or lesions, no superficial thrombophlebitis    Labs:                        10.0   30.91 )-----------( 160      ( 2022 03:49 )             31.0         137  |  104  |  80<H>  ----------------------------<  117<H>  4.8   |  17<L>  |  4.35<H>    Ca    8.3<L>      2022 03:49  Phos  4.6       Mg     2.6         TPro  6.9  /  Alb  3.2<L>  /  TBili  0.4  /  DBili  x   /  AST  32  /  ALT  24  /  AlkPhos  214<H>        WBC Trend:  WBC Count: 30.91 (22 @ 03:49)  WBC Count: 27.23 (22 @ 20:47)      Auto Neutrophil #: 24.86 K/uL (22 @ 20:47)  Auto Neutrophil #: 6.53 K/uL (22 @ 07:18)  Auto Neutrophil #: 9.78 K/uL (22 @ 07:22)  Auto Neutrophil #: 9.30 K/uL (22 @ 12:29)      Creatine Trend:  Creatinine, Serum: 4.35 ()  Creatinine, Serum: 4.44 ()  Creatinine, Serum: 4.46 ()      Liver Biochemical Testing Trend:  Alanine Aminotransferase (ALT/SGPT): 24 ()  Alanine Aminotransferase (ALT/SGPT): 23 ()  Alanine Aminotransferase (ALT/SGPT): 23 ()  Alanine Aminotransferase (ALT/SGPT): 27 ()  Alanine Aminotransferase (ALT/SGPT): 17 ()  Aspartate Aminotransferase (AST/SGOT): 32 (22 @ 03:49)  Aspartate Aminotransferase (AST/SGOT): 31 (22 @ 20:47)  Aspartate Aminotransferase (AST/SGOT): 18 (22 @ 07:19)  Aspartate Aminotransferase (AST/SGOT): 26 (22 @ 07:25)  Aspartate Aminotransferase (AST/SGOT): 19 (22 @ 07:43)  Bilirubin Total, Serum: 0.4 (11-30)  Bilirubin Total, Serum: 0.3 (11-29)  Bilirubin Total, Serum: 0.1 (08-12)  Bilirubin Total, Serum: 0.2 (08-11)  Bilirubin Total, Serum: 0.2 (08-07)      Trend LDH      Auto Eosinophil %: 0.0 % (22 @ 20:47)      Urinalysis Basic - ( 2022 20:45 )    Color: Yellow / Appearance: Slightly Turbid / S.015 / pH: x  Gluc: x / Ketone: Negative  / Bili: Negative / Urobili: Negative   Blood: x / Protein: 100 / Nitrite: Negative   Leuk Esterase: Large / RBC: 6 /hpf /  /HPF   Sq Epi: x / Non Sq Epi: 1 /hpf / Bacteria: Negative        MICROBIOLOGY:        Culture - Blood (collected 08 Aug 2022 14:46)  Source: .Blood Blood  Final Report:    No Growth Final    Culture - Blood (collected 08 Aug 2022 14:46)  Source: .Blood Blood  Final Report:    No Growth Final    Culture - Urine (collected 06 Aug 2022 14:39)  Source: Clean Catch Clean Catch (Midstream)  Final Report:    >100,000 CFU/ml Klebsiella pneumoniae  Organism: Klebsiella pneumoniae  Organism: Klebsiella pneumoniae    Sensitivities:      -  Amikacin: S <=16      -  Amoxicillin/Clavulanic Acid: S <=8/4      -  Ampicillin: R 16 These ampicillin results predict results for amoxicillin      -  Ampicillin/Sulbactam: S <=4/2 Enterobacter, Klebsiella aerogenes, Citrobacter, and Serratia may develop resistance during prolonged therapy (3-4 days)      -  Aztreonam: S <=4      -  Cefazolin: S <=2 (MIC_CL_[id number]) For uncomplicated UTI with K. pneumoniae, E. coli, or P. mirablis: FLORENCIA <=16 is sensitive and FLORENCIA >=32 is resistant. This also predicts results for oral agents cefaclor, cefdinir, cefpodoxime, cefprozil, cefuroxime axetil, cephalexin and locarbef for uncomplicated UTI. Note that some isolates may be susceptible to these agents while testing resistant to cefazolin.      -  Cefepime: S <=2      -  Cefoxitin: S <=8      -  Ceftriaxone: S <=1 Enterobacter, Klebsiella aerogenes, Citrobacter, and Serratia may develop resistance during prolonged therapy      -  Ciprofloxacin: S <=0.25      -  Ertapenem: S <=0.5      -  Gentamicin: S <=2      -  Imipenem: S <=1      -  Levofloxacin: S <=0.5      -  Meropenem: S <=1      -  Nitrofurantoin: S <=32 Should not be used to treat pyelonephritis      -  Piperacillin/Tazobactam: S <=8      -  Tigecycline: S <=2      -  Tobramycin: S <=2      -  Trimethoprim/Sulfamethoxazole: S <=0.5/9.5      Method Type: FLORENCIA    Culture - Blood (collected 06 Aug 2022 12:40)  Source: .Blood Blood-Peripheral  Final Report:    No Growth Final    Culture - Blood (collected 06 Aug 2022 11:45)  Source: .Blood Blood-Peripheral  Final Report:    Growth in aerobic bottle: Staphylococcus hominis    Coag Negative Staphylococcus    Single set isolate, possible contaminant. Contact    Microbiology if susceptibility testing clinically    indicated.    ***Blood Panel PCR results on this specimen are available    approximately 3 hours after the Gram stain result.***    Gram stain, PCR, and/or culture results may not always    correspond due to difference in methodologies.    ************************************************************    This PCR assay was performed by multiplex PCR. This    Assay tests for 66 bacterial and resistance gene targets.    Please refer to the [facility] test directory    at [email or web address] for details.  Organism: Blood Culture PCR  Organism: Blood Culture PCR    Sensitivities:      -  Coagulase negative Staphylococcus: Detec      Method Type: PCR        ABS CD4: 526 /uL (22 @ 04:57)  ABS CD4: 324 /uL (20 @ 03:26)    HIV-1 RNA Quantitative, Viral Load: NOT DET. copies/mL (22 @ 07:49)  HIV-1 Viral Load Result: NOT DET. (22 @ 07:49)  HIV-1 RNA Quantitative, Viral Load:   NOT DET. (20 @ 04:46)  HIV-1 Viral Load Result: NOT DET. (20 @ 04:46)      Rapid RVP Result: NotDetec ( @ 20:46)    Procalcitonin, Serum: 2.36 (-)    Troponin T, High Sensitivity Result: 1223 (-)  Troponin T, High Sensitivity Result: 1150 (-)  Troponin T, High Sensitivity Result: 1137 (-30)  Troponin T, High Sensitivity Result: 1115 (-30)    Lactate, Blood: 1.3 ( @ 03:49)  Blood Gas Venous - Lactate: 1.3 ( @ 03:44)  Blood Gas Venous - Lactate: 1.3 ( @ 00:00)  Blood Gas Venous - Lactate: 1.9 ( @ 20:19)        RADIOLOGY:  imaging below personally reviewed   INFECTIOUS DISEASE CONSULT NOTE    Patient is a 82y old  Male who presents with a chief complaint of UTI (2022 02:02)    HPI:  83yo M w/ PMHx of HIV (viral load undetectable ), Afib s/p DVVC and ablation (not on AC), MV endocarditis s/p bioprosthetic valve replacement, BPH (self catheterizes), frequent UTIs, presents with weakness, he reports that over the last 2 weeks he has had weakness, symptoms were associated with occasional body shakes that came every other day, he notes that he also has had recent difficulty performing straight cath, saying he required more force and was at times unable to complete procedure, he called his urologist who then instructed him to head to ED, in the ED, pt was bradycardic, febrile but hemodynamically stable, labs showed leukocytosis, elevated Cr above baseline, elevated procal, grossly positive U/A, EKG showed new Mobitz Type 1, then converted to afib with slow ventricular response, pt was given cefepime, 1L NS bolus, Tylenol x1, admitted to general medicine for further management. While in ED, pt developed acute onset non-radiating chest pain associated with SOB but denies n/v, f/c, diaphoresis, jaw pain, (2022 23:23)       REVIEW OF SYSTEMS:  CONSTITUTIONAL: + rigor; + weakness  HEENT: No sore throat  RESPIRATORY: No cough, no shortness of breath  CARDIOVASCULAR: No chest pain or palpitations  GASTROINTESTINAL: No abdominal or epigastric pain  GENITOURINARY: + needs self cath  NEUROLOGICAL: No headache/dizziness  MSK: No joint pain, erythema, or swelling; no back pain  SKIN: No itching, rashes  All other ROS negative except noted above    Prior hospital charts reviewed [Yes]  Primary team notes reviewed [Yes]  Other consultant notes reviewed [Yes]    PAST MEDICAL & SURGICAL HISTORY:  Unsteady gait      HIV (human immunodeficiency virus infection)      Chronic kidney disease, unspecified stage      Constipation, unspecified constipation type      Seborrheic keratosis      Osteoporosis      Edema of both legs      Bladder mass      Vitamin D deficiency      Orchitis and epididymitis      H/O endocarditis  MV endocarditis      Benign prostatic hyperplasia without lower urinary tract symptoms      COVID-19 vaccine series completed  W #2 boosters      S/P coronary artery stent placement      S/P MVR (mitral valve replacement)  &amp; SHELLIE clip, 2020          SOCIAL HISTORY:  - Currently retired  - Lives alone; no pets  - No recent travel  - Denies tobacco use  - Denies alcohol use  - Denies illicit drug use    FAMILY HISTORY:  Family history of congestive heart failure (Father)    Family history of cancer (Mother)        Allergies:  No Known Allergies      ANTIMICROBIALS:  abacavir 300 two times a day  cefepime   IVPB 1000 every 24 hours  doravirine 100 daily  lamiVUDine- daily      ANTIMICROBIALS (past 90 days):  MEDICATIONS  (STANDING):  abacavir   300 milliGRAM(s) Oral (22 @ 05:30)    cefepime   IVPB   100 mL/Hr IV Intermittent (22 @ 21:51)    doravirine   100 milliGRAM(s) Oral (22 @ 13:38)        OTHER MEDS:   MEDICATIONS  (STANDING):  acetaminophen     Tablet .. 650 every 6 hours PRN  aspirin enteric coated 81 daily  famotidine    Tablet 20 daily  heparin   Injectable 5000 every 12 hours  senna 2 at bedtime      VITALS:  Vital Signs Last 24 Hrs  T(F): 98.1 (22 @ 13:45), Max: 102.1 (22 @ 19:20)    Vital Signs Last 24 Hrs  HR: 58 (22 @ 13:45) (37 - 70)  BP: 143/50 (22 @ 13:45) (91/38 - 143/50)  RR: 18 (22 @ 13:45)  SpO2: 97% (22 @ 13:45) (94% - 99%)  Wt(kg): --    EXAM:  GENERAL: NAD, lying in bed comfortably  HEAD: No head lesions  EYES: Conjunctiva pink and cornea white  ENT: Normal external ears and nose, no discharges; moist mucous membranes; no oral lesions  NECK: Supple, nontender to palpation; no JVD  CHEST/LUNG: Clear to auscultation bilaterally  HEART: S1 S2  ABDOMEN: Soft, nontender, nondistended; normoactive bowel sounds  EXTREMITIES: No clubbing, cyanosis, or petal edema  NERVOUS SYSTEM: Alert and oriented to person, time, place and situation, speech clear. No focal deficits   MSK: No joint erythema, swelling or pain  SKIN: No rashes or lesions, no superficial thrombophlebitis    Labs:                        10.0   30.91 )-----------( 160      ( 2022 03:49 )             31.0         137  |  104  |  80<H>  ----------------------------<  117<H>  4.8   |  17<L>  |  4.35<H>    Ca    8.3<L>      2022 03:49  Phos  4.6       Mg     2.6         TPro  6.9  /  Alb  3.2<L>  /  TBili  0.4  /  DBili  x   /  AST  32  /  ALT  24  /  AlkPhos  214<H>        WBC Trend:  WBC Count: 30.91 (22 @ 03:49)  WBC Count: 27.23 (22 @ 20:47)      Auto Neutrophil #: 24.86 K/uL (22 @ 20:47)  Auto Neutrophil #: 6.53 K/uL (22 @ 07:18)  Auto Neutrophil #: 9.78 K/uL (22 @ 07:22)  Auto Neutrophil #: 9.30 K/uL (22 @ 12:29)      Creatine Trend:  Creatinine, Serum: 4.35 ()  Creatinine, Serum: 4.44 ()  Creatinine, Serum: 4.46 ()      Liver Biochemical Testing Trend:  Alanine Aminotransferase (ALT/SGPT): 24 ()  Alanine Aminotransferase (ALT/SGPT): 23 ()  Alanine Aminotransferase (ALT/SGPT): 23 ()  Alanine Aminotransferase (ALT/SGPT): 27 ()  Alanine Aminotransferase (ALT/SGPT): 17 ()  Aspartate Aminotransferase (AST/SGOT): 32 (22 @ 03:49)  Aspartate Aminotransferase (AST/SGOT): 31 (22 @ 20:47)  Aspartate Aminotransferase (AST/SGOT): 18 (22 @ 07:19)  Aspartate Aminotransferase (AST/SGOT): 26 (22 @ 07:25)  Aspartate Aminotransferase (AST/SGOT): 19 (22 @ 07:43)  Bilirubin Total, Serum: 0.4 (11-30)  Bilirubin Total, Serum: 0.3 (11-29)  Bilirubin Total, Serum: 0.1 (08-12)  Bilirubin Total, Serum: 0.2 (08-11)  Bilirubin Total, Serum: 0.2 (08-07)      Trend LDH      Auto Eosinophil %: 0.0 % (- @ 20:47)      Urinalysis Basic - ( 2022 20:45 )    Color: Yellow / Appearance: Slightly Turbid / S.015 / pH: x  Gluc: x / Ketone: Negative  / Bili: Negative / Urobili: Negative   Blood: x / Protein: 100 / Nitrite: Negative   Leuk Esterase: Large / RBC: 6 /hpf /  /HPF   Sq Epi: x / Non Sq Epi: 1 /hpf / Bacteria: Negative        MICROBIOLOGY:        Culture - Blood (collected 08 Aug 2022 14:46)  Source: .Blood Blood  Final Report:    No Growth Final    Culture - Blood (collected 08 Aug 2022 14:46)  Source: .Blood Blood  Final Report:    No Growth Final    Culture - Urine (collected 06 Aug 2022 14:39)  Source: Clean Catch Clean Catch (Midstream)  Final Report:    >100,000 CFU/ml Klebsiella pneumoniae  Organism: Klebsiella pneumoniae  Organism: Klebsiella pneumoniae    Sensitivities:      -  Amikacin: S <=16      -  Amoxicillin/Clavulanic Acid: S <=8/4      -  Ampicillin: R 16 These ampicillin results predict results for amoxicillin      -  Ampicillin/Sulbactam: S <=4/2 Enterobacter, Klebsiella aerogenes, Citrobacter, and Serratia may develop resistance during prolonged therapy (3-4 days)      -  Aztreonam: S <=4      -  Cefazolin: S <=2 (MIC_CL_COM_ENTERIC_CEFAZU) For uncomplicated UTI with K. pneumoniae, E. coli, or P. mirablis: FLORENCIA <=16 is sensitive and FLORENCIA >=32 is resistant. This also predicts results for oral agents cefaclor, cefdinir, cefpodoxime, cefprozil, cefuroxime axetil, cephalexin and locarbef for uncomplicated UTI. Note that some isolates may be susceptible to these agents while testing resistant to cefazolin.      -  Cefepime: S <=2      -  Cefoxitin: S <=8      -  Ceftriaxone: S <=1 Enterobacter, Klebsiella aerogenes, Citrobacter, and Serratia may develop resistance during prolonged therapy      -  Ciprofloxacin: S <=0.25      -  Ertapenem: S <=0.5      -  Gentamicin: S <=2      -  Imipenem: S <=1      -  Levofloxacin: S <=0.5      -  Meropenem: S <=1      -  Nitrofurantoin: S <=32 Should not be used to treat pyelonephritis      -  Piperacillin/Tazobactam: S <=8      -  Tigecycline: S <=2      -  Tobramycin: S <=2      -  Trimethoprim/Sulfamethoxazole: S <=0.5/9.5      Method Type: FLORENCIA    Culture - Blood (collected 06 Aug 2022 12:40)  Source: .Blood Blood-Peripheral  Final Report:    No Growth Final    Culture - Blood (collected 06 Aug 2022 11:45)  Source: .Blood Blood-Peripheral  Final Report:    Growth in aerobic bottle: Staphylococcus hominis    Coag Negative Staphylococcus    Single set isolate, possible contaminant. Contact    Microbiology if susceptibility testing clinically    indicated.    ***Blood Panel PCR results on this specimen are available    approximately 3 hours after the Gram stain result.***    Gram stain, PCR, and/or culture results may not always    correspond due to difference in methodologies.    ************************************************************    This PCR assay was performed by multiplex PCR. This    Assay tests for 66 bacterial and resistance gene targets.    Please refer to the [facility] test directory    at [email or web address] for details.  Organism: Blood Culture PCR  Organism: Blood Culture PCR    Sensitivities:      -  Coagulase negative Staphylococcus: Detec      Method Type: PCR        ABS CD4: 526 /uL (22 @ 04:57)  ABS CD4: 324 /uL (20 @ 03:26)    HIV-1 RNA Quantitative, Viral Load: NOT DET. copies/mL (22 @ 07:49)  HIV-1 Viral Load Result: NOT DET. (22 @ 07:49)  HIV-1 RNA Quantitative, Viral Load:   NOT DET. (20 @ 04:46)  HIV-1 Viral Load Result: NOT DET. (20 @ 04:46)      Rapid RVP Result: NotDetec ( @ 20:46)    Procalcitonin, Serum: 2.36 ()    Troponin T, High Sensitivity Result: 1223 (-)  Troponin T, High Sensitivity Result: 1150 (-)  Troponin T, High Sensitivity Result: 1137 (-30)  Troponin T, High Sensitivity Result: 1115 (-30)    Lactate, Blood: 1.3 ( @ 03:49)  Blood Gas Venous - Lactate: 1.3 ( @ 03:44)  Blood Gas Venous - Lactate: 1.3 ( @ 00:00)  Blood Gas Venous - Lactate: 1.9 ( @ 20:19)        RADIOLOGY:  imaging below personally reviewed    < from: Xray Chest 1 View- PORTABLE-Urgent (Xray Chest 1 View- PORTABLE-Urgent .) (22 @ 04:38) >  FINDINGS:  No focal consolidations.  There is no pleural effusion or pneumothorax.  The heart is normal in size. Redemonstrated left atrial appendage clip.  Sternotomy wires.    IMPRESSION:  No focal consolidations.    < end of copied text >

## 2022-11-30 NOTE — ED ADULT NURSE REASSESSMENT NOTE - NS ED NURSE REASSESS COMMENT FT1
0735 Pt in ER red/bulmaro Rm 36R. TBA. no bed yet. No c/o pain at present. IVL intact L wrist, R ACF. Aden intact draining yellowish urine. Fall risk precautions maintained. A&Ox4

## 2022-11-30 NOTE — PROGRESS NOTE ADULT - PROBLEM SELECTOR PLAN 2
-now with symptomatic bradycardia, converted to afib with slow ventricular response  -monitor on telemetry    -no plan for AICD/PPM at this time given acute infectious process. will revisit once stable as per EP  -Will start on hep gtt for now. Hold off NOAC given SUDHEER on CKD  -keeps pad on, atropine at bedside   -trend troponin and EKG for repeat episodes of chest pain  -hold home telmisartan due to soft blood pressures and SUDHEER  -hold home metoprolol

## 2022-11-30 NOTE — CONSULT NOTE ADULT - SUBJECTIVE AND OBJECTIVE BOX
Patient seen and evaluated at bedside    Reason for consult: Bradycardia     HPI:    Mr. Monsivais is a 82yoM presenting to the ED with a UTI found to be bradycardic for which we are consulted.   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       He reports that over the last 2 weeks he has had weakness, symptoms were associated with occasional body shakes that came every other day, he notes that he also has had recent difficulty performing straight cath, saying he required more force and was at times unable to complete procedure, he called his urologist who then instructed him to head to ED, in the ED, pt was bradycardic, febrile but hemodynamically stable, labs showed leukocytosis, elevated Cr above baseline, elevated procal, grossly positive U/A.     Initial ECG appeared to have a Mobitz I morphology but this converted to course afib with a slow ventricular response (30-40's) later in the night. Assoc with the slow afib the patient noted mild chest discomfort and SOB. He states that he did not feel that these were present previously. BP at the time was 100's over 50's. Patient's HR did not increase when the patient exercised to fatigue in bed.       PMHx:   Unsteady gait    HIV (human immunodeficiency virus infection)    Chronic kidney disease, unspecified stage    Constipation, unspecified constipation type    Seborrheic keratosis    Osteoporosis    Edema of both legs    Bladder mass    Vitamin D deficiency    Orchitis and epididymitis    H/O endocarditis    Benign prostatic hyperplasia without lower urinary tract symptoms    COVID-19 vaccine series completed        PSHx:   No Past Surgical History    S/P coronary artery stent placement    S/P MVR (mitral valve replacement)        Allergies:  No Known Allergies      Home Meds:    Current Medications:   abacavir 300 milliGRAM(s) Oral two times a day  acetaminophen     Tablet .. 650 milliGRAM(s) Oral every 6 hours PRN  albumin human 25% IVPB 50 milliLiter(s) IV Intermittent once  ascorbic acid 500 milliGRAM(s) Oral daily  aspirin enteric coated 81 milliGRAM(s) Oral daily  cefepime   IVPB 1000 milliGRAM(s) IV Intermittent every 24 hours  doravirine 100 milliGRAM(s) Oral daily  famotidine    Tablet 20 milliGRAM(s) Oral daily  ferrous    sulfate 325 milliGRAM(s) Oral daily  folic acid 1 milliGRAM(s) Oral daily  heparin   Injectable 5000 Unit(s) SubCutaneous every 12 hours  lamiVUDine- milliGRAM(s) Oral daily  multivitamin 1 Tablet(s) Oral daily  senna 2 Tablet(s) Oral at bedtime      FAMILY HISTORY:  Family history of congestive heart failure (Father)    Family history of cancer (Mother)      REVIEW OF SYSTEMS:  CONSTITUTIONAL: + weakness, fevers   EYES/ENT: No visual changes;  No dysphagia  NECK: No pain or stiffness  RESPIRATORY: Mild shortness of breath  CARDIOVASCULAR: mild central pressure like chest pain  GASTROINTESTINAL: No abdominal or epigastric pain. No nausea, vomiting  BACK: No back pain  GENITOURINARY: + dysuria  SKIN: No itching, burning, rashes, or lesions   All other review of systems is negative unless indicated above.        Physical Exam:  T(F): 98 (11-29), Max: 102.1 (11-29)  HR: 37 (11-30) (37 - 70)  BP: 103/40 (11-30) (91/38 - 131/61)  RR: 18 (11-30)  SpO2: 98% (11-30)  GENERAL: No acute distress, well-developed  HEAD:  Atraumatic, Normocephalic  ENT: EOMI, PERRLA, conjunctiva and sclera clear, Neck supple, No JVD, moist mucosa  CHEST/LUNG: Clear to auscultation bilaterally  BACK: No spinal tenderness  HEART: Gwen IRRegular rate and rhythm  ABDOMEN: Soft, Nontender, Nondistended; Bowel sounds present  EXTREMITIES: + 1+ bilateral LE edema  PSYCH: Nl behavior, nl affect  NEUROLOGY: AAOx3, non-focal, cranial nerves intact  SKIN: Normal color, No rashes or lesions      CXR: Personally reviewed    Labs: Personally reviewed                        9.3    27.23 )-----------( 165      ( 29 Nov 2022 20:47 )             28.4     11-30    136  |  106  |  81<H>  ----------------------------<  116<H>  5.1   |  19<L>  |  4.44<H>    Ca    8.1<L>      30 Nov 2022 00:18  Mg     2.7     11-30    TPro  6.9  /  Alb  3.3  /  TBili  0.3  /  DBili  x   /  AST  31  /  ALT  23  /  AlkPhos  190<H>  11-29    PT/INR - ( 29 Nov 2022 20:47 )   PT: 15.5 sec;   INR: 1.33 ratio         PTT - ( 29 Nov 2022 20:47 )  PTT:31.5 sec

## 2022-11-30 NOTE — PROGRESS NOTE ADULT - SUBJECTIVE AND OBJECTIVE BOX
[facility] Division of Hospital Medicine  Arlen Benitez MD  Pager (M-F, 3J-5P): [phone]  Other Times:  [phone]    Patient is a 82y old  Male who presents with a chief complaint of UTI (2022 02:02)      SUBJECTIVE / OVERNIGHT EVENTS:  c/o discomfort from tovar. afebrile overnight. asking for food. some dyspnea still.       ADDITIONAL REVIEW OF SYSTEMS: otherwise negative    MEDICATIONS  (STANDING):  abacavir 300 milliGRAM(s) Oral two times a day  ascorbic acid 500 milliGRAM(s) Oral daily  aspirin enteric coated 81 milliGRAM(s) Oral daily  cefepime   IVPB 1000 milliGRAM(s) IV Intermittent every 24 hours  chlorhexidine 2% Cloths 1 Application(s) Topical daily  doravirine 100 milliGRAM(s) Oral daily  famotidine    Tablet 20 milliGRAM(s) Oral daily  ferrous    sulfate 325 milliGRAM(s) Oral daily  folic acid 1 milliGRAM(s) Oral daily  heparin   Injectable 5000 Unit(s) SubCutaneous every 12 hours  lamiVUDine- milliGRAM(s) Oral daily  multivitamin 1 Tablet(s) Oral daily  senna 2 Tablet(s) Oral at bedtime    MEDICATIONS  (PRN):  acetaminophen     Tablet .. 650 milliGRAM(s) Oral every 6 hours PRN Temp greater or equal to 38C (100.4F), Mild Pain (1 - 3)      CAPILLARY BLOOD GLUCOSE        I&O's Summary      PHYSICAL EXAM:  Vital Signs Last 24 Hrs  T(C): 36.7 (2022 13:45), Max: 38.9 (2022 19:20)  T(F): 98.1 (2022 13:45), Max: 102.1 (2022 19:20)  HR: 58 (2022 13:45) (37 - 70)  BP: 143/50 (2022 13:45) (91/38 - 143/50)  BP(mean): 62 (2022 05:30) (55 - 80)  RR: 18 (2022 13:45) (16 - 20)  SpO2: 97% (2022 13:45) (94% - 99%)    Parameters below as of 2022 13:45  Patient On (Oxygen Delivery Method): nasal cannula  O2 Flow (L/min): 2      CONSTITUTIONAL: NAD, disshevelled   EYES:  conjunctiva and sclera clear  ENMT: Moist oral mucosa  NECK: Supple, no palpable masses; no JVD  RESPIRATORY: Normal respiratory effort; lungs are clear to auscultation bilaterally  CARDIOVASCULAR: Regular rate and rhythm, normal S1 and S2, no murmur/rub/gallop;  +lower extremity edema  ABDOMEN: Nontender to palpation, normoactive bowel sounds, no rebound/guarding  MUSCULOSKELETAL:  no clubbing or cyanosis of digits; no joint swelling or tenderness to palpation  PSYCH: A+O to person, place, and time; affect appropriate  SKIN: No rashes; no palpable lesions    LABS:                        10.0   30.91 )-----------( 160      ( 2022 03:49 )             31.0     11    137  |  104  |  80<H>  ----------------------------<  117<H>  4.8   |  17<L>  |  4.35<H>    Ca    8.3<L>      2022 03:49  Phos  4.6       Mg     2.6     30    TPro  6.9  /  Alb  3.2<L>  /  TBili  0.4  /  DBili  x   /  AST  32  /  ALT  24  /  AlkPhos  214<H>  11-30    PT/INR - ( 2022 20:47 )   PT: 15.5 sec;   INR: 1.33 ratio         PTT - ( 2022 20:47 )  PTT:31.5 sec  CARDIAC MARKERS ( 2022 08:27 )  x     / x     / 68 U/L / x     / 3.5 ng/mL  CARDIAC MARKERS ( 2022 04:21 )  x     / x     / 73 U/L / x     / 3.5 ng/mL  CARDIAC MARKERS ( 2022 03:49 )  x     / x     / 79 U/L / x     / 3.6 ng/mL      Urinalysis Basic - ( 2022 20:45 )    Color: Yellow / Appearance: Slightly Turbid / S.015 / pH: x  Gluc: x / Ketone: Negative  / Bili: Negative / Urobili: Negative   Blood: x / Protein: 100 / Nitrite: Negative   Leuk Esterase: Large / RBC: 6 /hpf /  /HPF   Sq Epi: x / Non Sq Epi: 1 /hpf / Bacteria: Negative          RADIOLOGY & ADDITIONAL TESTS:  Results Reviewed:   Imaging Personally Reviewed:  Electrocardiogram Personally Reviewed:    COORDINATION OF CARE:  Care Discussed with Consultants/Other Providers [Y/N]:  Prior or Outpatient Records Reviewed [Y/N]:

## 2022-11-30 NOTE — PROGRESS NOTE ADULT - PROBLEM SELECTOR PLAN 3
Likely demand ischemia in setting of sepsis/UTI/hypotension/ SUDHEER  Trop elevated still though CK, MB low.   currently without CP   Will monitor   card following

## 2022-11-30 NOTE — ED ADULT NURSE REASSESSMENT NOTE - NS ED NURSE REASSESS COMMENT FT1
Pt heart rhythm changed from second degree heart block to aflutter- pt now complaining of small amounts of midsternal chest pain. Cardiology already paged for bradycardia. Inpatient team MD Vazquez at bedside after RN called with symptoms. Pt still a/ox 3- vitals showing hypotension and bradycardia already noted- but rhythm changed. No interventions at this time- waiting cardiology recs.

## 2022-11-30 NOTE — CONSULT NOTE ADULT - ASSESSMENT
Mr. Monsivais is a 82yoM presenting with UTI c/b slow Afib and elevated troponin.     #Slow Afib  #elevated troponin    HR 30-40's on tele with mild symptoms of SOB and chest pressure occasionally with 's over 50s'.   LLA clip in place on CXR.   Patient is on Metop 25 as outpt but did not have a dose today. No other AV kaylan block meds.   Dose not improve with exertion.   Trop elevated to 1115 iso significant renal dysfxn GFR 13.   Active infection (WBC 16, fevers, UTI source, elevated procal)  Recommendations:  -Tele  -Keep pacer pads on chest  -okay to keep atropine at bedside to trial for worsening bradycardia/symptoms  -if unstable follow ACS protocol and attempt cardioversion  -TTE  -NPO at MN   -EP to eval for PPM vs cardioversion in am, c/b active infection.   -Given chest discomfort and elevated troponin despite significant renal dysfxn reasonable to start DAPT and a Heparin gtt.   -Would repeat Trop and add a CK/CKMB  -Repeat ECG in am.   -Strict Lytes  -Hold all av kaylan blocking medications including eyedrops.        Note incomplete until attending cosigns.

## 2022-11-30 NOTE — CONSULT NOTE ADULT - ASSESSMENT
81yo M w/ PMHx of HIV (viral load undetectable 08/22), Afib s/p DVVC and ablation (not on AC), MV endocarditis s/p bioprosthetic valve replacement, BPH (self catheterizes), frequent UTIs, presents with weakness and rigor at home.    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  Presented with weakness and rigor  Febrile, with significant leukocytosis on admission  UA showed pyuria, UCx pending  Previous UCx: Klebsiella pneumoniae with associated bacteremia (pan-sensitive)  BCx pending  CXR no PNA    Antibiotics:  Cefepime 11/29 ->    Overall this is likely acute pyelonephritis, possibly with underlying bacteremia  Will need US to rule out any new obstruction/stones  Urology input would be helpful  Continue ARTs and dose adjust lamivudine if CrCl continues to get worse      IMPRESSION:  Acute pyelonephritis  HIV on ARTs  Hx CoNS endocarditis  Fever  Leukocytosis    RECOMMENDATIONS:  - Increase IV cefepime to 1gm q12hrs for now, monitor patient's mental status  - Continue PO abacavir 200mg q12hrs, lamivudine 100mg q24hrs and doravirine 1tab q24hrs  - Check HIV VL and CD4  - Obtain US kidney and bladder  - Follow up urine culture and blood culture  - Urology eval  - Trend WBC, fever curve, transaminases, creatinine daily  - Will continue to follow      Patient is seen and examined with attending and case is discussed with primary team      Kiana Gerber D.O.  PGY-5 Infectious Diseases Fellow  Please contact me via page or text through [employer] Teams  If after 5PM or on weekends, please call [phone]

## 2022-11-30 NOTE — PROGRESS NOTE ADULT - PROBLEM SELECTOR PLAN 4
SUDHEER on CKD 4 (baseline Cr 2.6-2.8)   Suspect likely in setting urinary retention - has not been able to adequate self cath at home  Will monitor urine outpt closely, strict I+Os, Cr  Aden in place,  holding ARB, avoid other nephrotoxic agents.

## 2022-11-30 NOTE — PATIENT PROFILE ADULT - HAS THE PATIENT RECEIVED THE INFLUENZA VACCINE THIS SEASON?
Spoke with patient's wife and explained Dr. Sheffield recommends 24-48 hrs, per manufacturers directions below. Dr. Sheffield generally defers to MD performing procedure to tell patient when to hold Eliquis, as well as, when to resume taking. Explained patient is at a higher risk for stroke anytime off Eliquis. Patient instructed to discuss below recommendations with MD performing procedure for verification/recommendation. Patient accepting and verbalized understanding.      Reviewed below with Dr. Sheffield. VORB Dr. Dwyer; recommends 24-48 hrs, per manufacturers directions below. Dr. Sheffield generally defers to MD performing procedure to tell patient which length to hold Eliquis, as well as, when to resume taking. Please explain to patient they are at a higher risk for stroke anytime off Eliquis.     Per  directions Eliquis should be discontinued at least:  48 hrs prior to elective surgery or invasive procedures with a moderate or high risk of unacceptable or clinically significant bleeding or where the bleeding would be noncritical in location and easily controlled.   24 hrs prior to elective surgery or invasive procedures with a low risk of bleeding.    Eliquis should be restarted after procedures, as soon as, adequate hemostasis has been established.            yes... no...

## 2022-11-30 NOTE — CONSULT NOTE ADULT - ASSESSMENT
Mr. Monsivais is a 82yoM presenting with UTI c/b slow Afib and elevated troponin.     #Slow Afib  #elevated troponin    HR 30-40's on tele with mild symptoms of SOB and chest pressure occasionally with 's over 50s'.   LLA clip in place on CXR.   Patient is on Metop 25 as outpt but did not have a dose today. No other AV kaylan block meds.   Dose not improve with exertion.   Trop elevated to 1115 iso significant renal dysfxn GFR 13.   Active infection (WBC 16, fevers, UTI source, elevated procal)  Recommendations:  -Tele  -Keep pacer pads on chest  -okay to keep atropine at bedside to trial for worsening bradycardia/symptoms  -if unstable follow ACS protocol and attempt cardioversion  -NPO at MN   -Hold all av kaylan blocking medications including eyedrops.  -Strict Lytes  -EP to eval for PPM vs cardioversion in am, c/b active infection.   -Given chest discomfort and elevated troponin despite significant renal dysfxn reasonable to start DAPT and a Heparin gtt.   -Would repeat Trop and add a CK/CKMB. If Stable/decreasing likely related to renal dysfxn and can DC.   -Repeat ECG in am.   -TTE    Note incomplete until attending cosigns.  Mr. Monsivais is a 82yoM presenting with UTI c/b slow Afib and elevated troponin.     #Slow Afib  #elevated troponin    HR 30-40's on tele with mild symptoms of SOB and chest pressure occasionally with 's over 50s'.   LLA clip in place on CXR.   Patient is on Metop 25 as outpt but did not have a dose today. No other AV kaylan block meds.   Dose not improve with exertion.   Trop elevated to 1115 iso significant renal dysfxn GFR 13.   Active infection (WBC 16, fevers, UTI source, elevated procal)  Recommendations:  -Tele  -Keep pacer pads on chest  -okay to keep atropine at bedside to trial for worsening bradycardia/symptoms  -if unstable follow ACS protocol and attempt cardioversion  -NPO at MN   -Hold all av kaylan blocking medications including eyedrops.  -Strict Lytes  -EP to eval for PPM vs cardioversion in am, c/b active infection.    -Would repeat Trop and add a CK/CKMB. If Stable/decreasing likely related to renal dysfxn and can DC.   -Repeat ECG in am.   -TTE    Note incomplete until attending cosigns.

## 2022-11-30 NOTE — PATIENT PROFILE ADULT - FALL HARM RISK - HARM RISK INTERVENTIONS
Assistance with ambulation/Assistance OOB with selected safe patient handling equipment/Communicate Risk of Fall with Harm to all staff/Discuss with provider need for PT consult/Monitor gait and stability/Reinforce activity limits and safety measures with patient and family/Tailored Fall Risk Interventions/Visual Cue: Yellow wristband and red socks/Bed in lowest position, wheels locked, appropriate side rails in place/Call bell, personal items and telephone in reach/Instruct patient to call for assistance before getting out of bed or chair/Non-slip footwear when patient is out of bed/[place] to call system/Physically safe environment - no spills, clutter or unnecessary equipment/Purposeful Proactive Rounding/Room/bathroom lighting operational, light cord in reach

## 2022-11-30 NOTE — PROGRESS NOTE ADULT - PROBLEM SELECTOR PLAN 1
Sepsis in setting of UTI   -acute on chronic renal failure and complicated UTI likely secondary to obstruction now s/p tovar  -c/w cefepime 1g q24h (renally dosed) - ID eval   -f/u blood and urine cultures    -monitor Tovar output

## 2022-11-30 NOTE — ED ADULT NURSE REASSESSMENT NOTE - NS ED NURSE REASSESS COMMENT FT1
Patient transport here to take patient to echo.  Spoke with SANGEETHA Rebolledo @ [phone] who states pt OK to go to test off tele, will place order now.

## 2022-11-30 NOTE — PROGRESS NOTE ADULT - PROBLEM SELECTOR PLAN 6
-hold home metoprolol due to bradycardia  -f/u cardiology/EP recommendations  will start on hep gtt.

## 2022-12-01 NOTE — PROGRESS NOTE ADULT - PROBLEM SELECTOR PLAN 1
Sepsis in setting of UTI   -acute on chronic renal failure and complicated UTI likely secondary to obstruction now s/p tovar  -c/w cefepime 1g q24h (renally dosed) - ID eval   -f/u blood and urine cultures    -monitor Tovar output Sepsis in setting of UTI, bacteremia  ID consult appreciated  Cefepime and Vanco IV per ID recs  NOHEMI ordered r/o IE  CT surgery will see patient pending NOHEMI (high risk IE given h/o MV endocarditis s/p bioprosthetic valve replacement)  Acute on chronic renal failure and complicated UTI likely secondary to obstruction now s/p tovar  f/u blood and urine cultures    monitor Tovar output -  evaluation appreciated

## 2022-12-01 NOTE — PROVIDER CONTACT NOTE (OTHER) - ASSESSMENT
Pt SOB while lying in bed, pts O2 saturation 86-88 on Rair, O2 increased to 97% with 4LNC, temp 99.3 oral, pt refusing rectal temp.

## 2022-12-01 NOTE — PROGRESS NOTE ADULT - ASSESSMENT
81yo M w/ PMHx of HIV (viral load undetectable 08/22), Afib s/p DVVC and ablation (not on AC), MV endocarditis s/p bioprosthetic valve replacement, BPH (self catheterizes), frequent UTIs, presents with weakness and rigor at home.    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  Presented with weakness and rigor  Febrile, with significant leukocytosis on admission  UA showed pyuria, UCx pending  Previous UCx: Klebsiella pneumoniae with associated bacteremia (pan-sensitive)  BCx pending  CXR no PNA  TTE 11/29 thicken bioprosthetic MV leaflets, no vegetation    Antibiotics:  Cefepime 11/29 -> 11/30  Meropenem 11/30 ->  Vancomycin 11/30 ->    Overall this is high grade GPC bacteremia, concern for MRSA vs CoNS bacteremia  TTE is reassuring but MV leaflets are thickened, if positive for staph will need NOHEMI  Can continue meropenem and vancomycin pending culture result  Urology input would be helpful  Continue ARTs and dose adjust lamivudine if CrCl continues to get worse      IMPRESSION:  GPC bacteremia  Acute pyelonephritis  HIV on ARTs  Hx CoNS endocarditis  Fever  Leukocytosis    RECOMMENDATIONS:  - Repeat 2 sets of blood culture, then give one dose of vancomycin 1000mg x 1  - Agree with IV meropenem 500mg q12hrs for now pending UCx  - Continue PO abacavir 200mg q12hrs, lamivudine 100mg q24hrs and doravirine 1tab q24hrs  - Check HIV VL and CD4  - Follow up CT A/P and US  - Follow up urine culture and blood culture  - Urology eval  - Trend WBC, fever curve, transaminases, creatinine daily  - Will continue to follow      * THIS IS AN INCOMPLETE NOTE. FINAL RECOMMENDATION WILL BE UPDATED AFTER DISCUSSING WITH ATTENDING *       83yo M w/ PMHx of HIV (viral load undetectable 08/22), Afib s/p DVVC and ablation (not on AC), MV endocarditis s/p bioprosthetic valve replacement, BPH (self catheterizes), frequent UTIs, presents with weakness and rigor at home.    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  TTE is reassuring but MV leaflets are thickened, will need NOHEMI to rule out prosthetic valve endocarditis  Can continue meropenem and vancomycin pending culture result  Continue ARTs and dose adjust lamivudine if CrCl continues to get worse      IMPRESSION:  Staph lugdunensis bacteremia  HIV on ARTs  Hx CoNS endocarditis  Fever  Leukocytosis    RECOMMENDATIONS:  - Repeat 2 sets of blood culture, then give one dose of vancomycin 1000mg x 1  - De-escalate meropenem to cefepime 1gm q24hrs since there is low suspicion for ESBL infection  - Continue PO abacavir 200mg q12hrs, lamivudine 100mg q24hrs and doravirine 1tab q24hrs  - Follow up CT A/P and US  - Follow up urine culture and blood culture  - Urology eval  - Trend WBC, fever curve, transaminases, creatinine daily  - Will continue to follow      Patient is seen and examined with attending and case is discussed with primary team      Kiana Gerber D.O.  PGY-5 Infectious Diseases Fellow  Please contact me via page or text through Microsoft Teams  If after 5PM or on weekends, please call [phone]         83yo M w/ PMHx of HIV (viral load undetectable 08/22), Afib s/p DVVC and ablation (not on AC), MV endocarditis s/p bioprosthetic valve replacement, BPH (self catheterizes), frequent UTIs, presents with weakness and rigor at home.    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  TTE is reassuring but MV leaflets are thickened, will need NOHEMI to rule out prosthetic valve endocarditis  Can continue meropenem and vancomycin pending culture result  Continue ARTs and dose adjust lamivudine if CrCl continues to get worse      IMPRESSION:  Staph lugdunensis bacteremia  HIV on ARTs  Hx CoNS endocarditis  Fever  Leukocytosis    RECOMMENDATIONS:  - Repeat 2 sets of blood culture, then give one dose of vancomycin 1000mg x 1  - De-escalate meropenem to cefepime 1gm q24hrs since there is low suspicion for ESBL infection  - Continue PO abacavir 200mg q12hrs, lamivudine 100mg q24hrs and doravirine 1tab q24hrs  - Follow up CT A/P and US  - Follow up urine culture and blood culture  - CTS and cardiology eval for NOHEMI  - Trend WBC, fever curve, transaminases, creatinine daily  - Will continue to follow      Patient is seen and examined with attending and case is discussed with primary team      Kiana Gerber D.O.  PGY-5 Infectious Diseases Fellow  Please contact me via page or text through Microsoft Teams  If after 5PM or on weekends, please call [phone]

## 2022-12-01 NOTE — PHYSICAL THERAPY INITIAL EVALUATION ADULT - STRENGTHENING, PT EVAL
GOAL: Patient will demonstrate a 1/3 increase in strength where deficient within 2 weeks to assist with performing functional mobility and ADLs.
[id number]

## 2022-12-01 NOTE — CHART NOTE - NSCHARTNOTEFT_GEN_A_CORE
82 year old male w/hx HIV, afib, r/o endocarditis complains of shortness of breath w/ tremors. States this has happened prior however does not remember a diagnosis or the treatment he received. States frequently has tremors when he is short of breath. Male states it started on awakening after eating a tuna sandwich. He denies chest pain, no dizziness, no cough or sputum production. Nurse found oxygen saturation to be 87%. On 2 l/min increased to 94%. Pt still having tremors and appears short of breath. NPOpMN for NOHEMI tomorrow to r/o endocarditis. Oral temp of 99.3; pt refuses rectal temp    Vital Signs Last 24 Hrs  T(C): 37.1 (01 Dec 2022 20:03), Max: 38.4 (01 Dec 2022 01:33)  T(F): 98.8 (01 Dec 2022 20:03), Max: 101.1 (01 Dec 2022 01:33)  HR: 62 (01 Dec 2022 20:03) (50 - 72)  BP: 138/56 (01 Dec 2022 20:03) (112/48 - 171/68)  BP(mean): --  RR: 18 (01 Dec 2022 20:03) (18 - 20)  SpO2: 94% (01 Dec 2022 20:03) (93% - 99%)    Parameters below as of 01 Dec 2022 20:03  Patient On (Oxygen Delivery Method): room air    GENERAL: trembling w/hyperventilation   HEAD:  Atraumatic, Normocephalic  EYES: EOMI, PERRLA, conjunctiva and sclera clear  ENMT:  Moist mucous membranes   NECK: Supple, No JVD   NERVOUS SYSTEM:  Alert & Oriented X 3,moves all ext.  CHEST/LUNG: CTA bilaterally; No rales, rhonchi, wheezing   HEART: RRR  ABDOMEN: Soft, Nontender, Nondistended; Bowel sounds present  EXTREMITIES:  2+ Peripheral Pulses, No cyanosis, or edema  SKIN: No rashes or lesions, skin warm to touch w/warm sweat    A/P: Trembling, short of breath, temp 99.3 oral  Tylenol 650mg Po STAT  Oxygen increased to 4 l/min; pt oxygen saturation increased to 97%  PT states he is feeling much better. Will continue to follow

## 2022-12-01 NOTE — CONSULT NOTE ADULT - NSCONSULTADDITIONALINFOA_GEN_ALL_CORE
Cori Johnston, AGACNP-BC  Cardiovascular & Thoracic Surgery  [address] [place] [address]  Office: [phone]    Available on Microsoft Teams  Spectra: [id number]  New Consults: [id number]

## 2022-12-01 NOTE — PHYSICAL THERAPY INITIAL EVALUATION ADULT - PERTINENT HX OF CURRENT PROBLEM, REHAB EVAL
83yo M w/ PMHx of HIV (viral load undetectable 08/22), Afib s/p DVVC and ablation (not on AC), MV endocarditis s/p bioprosthetic valve replacement, BPH (self catheterizes), frequent UTIs, presents with UTI and new Mobitz Type 1. Hosp course: 11/29 CXR no focal considerations.

## 2022-12-01 NOTE — CONSULT NOTE ADULT - SUBJECTIVE AND OBJECTIVE BOX
HPI "81yo M w/ PMHx of HIV (viral load undetectable ), Afib s/p DVVC and ablation (not on AC), MV endocarditis s/p bioprosthetic valve replacement, BPH (self catheterizes), frequent UTIs, presents with weakness, he reports that over the last 2 weeks he has had weakness, symptoms were associated with occasional body shakes that came every other day, he notes that he also has had recent difficulty performing straight cath, saying he required more force and was at times unable to complete procedure, he called his urologist who then instructed him to head to ED, in the ED, pt was bradycardic, febrile but hemodynamically stable, labs showed leukocytosis, elevated Cr above baseline, elevated procal, grossly positive U/A, EKG showed new Mobitz Type 1, then converted to afib with slow ventricular response, pt was given cefepime, 1L NS bolus, Tylenol x1, admitted to general medicine for further management. While in ED, pt developed acute onset non-radiating chest pain associated with SOB but denies n/v, f/c, diaphoresis, jaw pain"  Urology consulted to see patient.  Pt well known to urologist Memo Silver MD. Last seen in the office 11/10/2022. Pt with hx of urinary retention - managed with self catheterizations for ~ 2years now. Pt self caths ~3-4x/day.  PT admitted few days ago with new cardiac arrhythmia found to have Klebsiella UTI and bacteremia.  Pt denies any changes to urine clarity or consistency recently. Denies any issue with self catheterizations.  Was experiencing SOB and rigors prior to presentation      PAST MEDICAL & SURGICAL HISTORY:  Unsteady gait      HIV (human immunodeficiency virus infection)      Chronic kidney disease, unspecified stage      Constipation, unspecified constipation type      Seborrheic keratosis      Osteoporosis      Edema of both legs      Bladder mass      Vitamin D deficiency      Orchitis and epididymitis      H/O endocarditis  MV endocarditis      Benign prostatic hyperplasia without lower urinary tract symptoms      COVID-19 vaccine series completed  W #2 boosters      S/P coronary artery stent placement      S/P MVR (mitral valve replacement)  &amp; SHELLIE clip, 2020        FAMILY HISTORY:  Family history of congestive heart failure (Father)    Family history of cancer (Mother)      SOCIAL HISTORY:   Tobacco hx:   MEDICATIONS  (STANDING):  abacavir 300 milliGRAM(s) Oral two times a day  ascorbic acid 500 milliGRAM(s) Oral daily  aspirin enteric coated 81 milliGRAM(s) Oral daily  chlorhexidine 2% Cloths 1 Application(s) Topical daily  doravirine 100 milliGRAM(s) Oral daily  famotidine    Tablet 20 milliGRAM(s) Oral daily  ferrous    sulfate 325 milliGRAM(s) Oral daily  folic acid 1 milliGRAM(s) Oral daily  heparin  Infusion.  Unit(s)/Hr (15 mL/Hr) IV Continuous <Continuous>  influenza  Vaccine (HIGH DOSE) 0.7 milliLiter(s) IntraMuscular once  lamiVUDine- milliGRAM(s) Oral daily  meropenem  IVPB      meropenem  IVPB 500 milliGRAM(s) IV Intermittent every 12 hours  multivitamin 1 Tablet(s) Oral daily  senna 2 Tablet(s) Oral at bedtime  vancomycin  IVPB 750 milliGRAM(s) IV Intermittent once    MEDICATIONS  (PRN):  acetaminophen     Tablet .. 650 milliGRAM(s) Oral every 6 hours PRN Temp greater or equal to 38C (100.4F), Mild Pain (1 - 3)  heparin   Injectable 6500 Unit(s) IV Push every 6 hours PRN For aPTT less than 40  heparin   Injectable 3000 Unit(s) IV Push every 6 hours PRN For aPTT between 40 - 57    Allergies    No Known Allergies    Intolerances        REVIEW OF SYSTEMS: Pertinent positives and negatives as stated in HPI, otherwise negative    Vital signs  T(C): 36.5 (22 @ 06:44), Max: 38.7 (22 @ 23:35)  HR: 53 (22 @ 04:51)  BP: 112/48 (22 @ 04:51)  SpO2: 97% (22 @ 04:51)  Wt(kg): --    Physical Exam  Gen: NAD  Abd: Soft, NT, ND, no rebound tenderness or guarding  : tovar in place, urine clear yellow  Back: No CVAT       LABS:     @ 03:49    WBC 30.91 / Hct 31.0  / SCr 4.35      @ 00:18    WBC --    / Hct --    / SCr 4.44         137  |  104  |  80<H>  ----------------------------<  117<H>  4.8   |  17<L>  |  4.35<H>    Ca    8.3<L>      2022 03:49  Phos  4.6       Mg     2.6         TPro  6.9  /  Alb  3.2<L>  /  TBili  0.4  /  DBili  x   /  AST  32  /  ALT  24  /  AlkPhos  214<H>      PT/INR - ( 2022 20:47 )   PT: 15.5 sec;   INR: 1.33 ratio         PTT - ( 01 Dec 2022 02:02 )  PTT:76.8 sec  Urinalysis Basic - ( 2022 20:45 )    Color: Yellow / Appearance: Slightly Turbid / S.015 / pH: x  Gluc: x / Ketone: Negative  / Bili: Negative / Urobili: Negative   Blood: x / Protein: 100 / Nitrite: Negative   Leuk Esterase: Large / RBC: 6 /hpf /  /HPF   Sq Epi: x / Non Sq Epi: 1 /hpf / Bacteria: Negative        Urine Cx: 2022 [place]  Blood Cx:  gram positive cocci in clusters

## 2022-12-01 NOTE — PHYSICAL THERAPY INITIAL EVALUATION ADULT - ADDITIONAL COMMENTS
Pt lives in an independent living facility alone. Pt performed ADL/IADLs independently. Ambulates with rollator. Owns DME: rollator

## 2022-12-01 NOTE — CONSULT NOTE ADULT - ASSESSMENT
81 y/o male admitted with arrythmia found to have bacteremia and UTI and SUDHEER    -keep tovar in place  -trend Cr  -can check renal sonogram in few days  -follow up urine culture  -abx as per ID  -can return to self catheretization when ready for discharge and follow up with his urologist Memo Silver MD outpatient ([phone])  -will follow  discussed with urology attending JUANA Bland MD 83 y/o male admitted with arrythmia found to have bacteremia and UTI and SUDHEER    -keep tovar in place  -trend Cr  -CT scan reviewed, showing findings consistent with cystitis, no notable obstruction in the upper tracts, hydro appears to be improved from prior imaging.   -follow up urine culture  -abx as per ID  -can return to self catheretization when ready for discharge and follow up with his urologist Memo Silver MD outpatient ([phone])  -will follow  discussed with urology attending JUANA Bland MD

## 2022-12-01 NOTE — PROGRESS NOTE ADULT - PROBLEM SELECTOR PLAN 2
-now with symptomatic bradycardia, converted to afib with slow ventricular response  -monitor on telemetry    -no plan for AICD/PPM at this time given acute infectious process. will revisit once stable as per EP  -Will start on hep gtt for now. Hold off NOAC given SDUHEER on CKD  -keeps pad on, atropine at bedside   -trend troponin and EKG for repeat episodes of chest pain  -hold home telmisartan due to soft blood pressures and SUDHEER  -hold home metoprolol -now with symptomatic bradycardia, converted to afib with slow ventricular response  -monitor on telemetry    -no plan for AICD/PPM at this time given acute infectious process. will revisit once stable as per EP  -AC changed to Eliquis  -keeps pad on, atropine at bedside   -trend troponin and EKG for repeat episodes of chest pain  -hold home telmisartan due to soft blood pressures and SUDHEER  -hold home metoprolol

## 2022-12-01 NOTE — PROGRESS NOTE ADULT - PROBLEM SELECTOR PLAN 7
dvt ppx: heparin SQ  diet: regular  ambulate: with assistance  gi ppx: home famotidine     fall precautions  aspiration precautions dvt ppx: Eliquis  diet: regular  ambulate: with assistance  gi ppx: home famotidine     fall precautions  aspiration precautions    Requested that the patient designates a health care proxy ASAP. CM assisting in these efforts.

## 2022-12-01 NOTE — CONSULT NOTE ADULT - PROBLEM SELECTOR RECOMMENDATION 9
s/p MVR-T 2020 with Dr. Camacho  TTE on 11/30 thickened MV leaflets with elevated peak gradient of 38 and mean gradient of 7  BCx 11/29 gram+ cocci clusters  UCx PND  Prealbumin in AM  PND NOHEMI- will review when available  All labs and imaging reviewed with Dr. Camacho s/p MVR-T 2020 with Dr. Camacho  TTE on 11/30 thickened MV leaflets with elevated peak gradient of 38 and mean gradient of 7  BCx 11/29 gram+ cocci clusters  UCx PND  Prealbumin in AM  PND NOHEMI- will review when available  All labs and imaging reviewed with Dr. Camacho    Pt seen. Chart reviewed.  See my Chart Note.

## 2022-12-01 NOTE — PROVIDER CONTACT NOTE (OTHER) - ASSESSMENT
patient at rest in bed complaining of shortness of breath and shaking  BP: 181/76  HR: 86  Temp; 101.2 rectal  O2: 90%

## 2022-12-01 NOTE — PROGRESS NOTE ADULT - PROBLEM SELECTOR PLAN 4
SUDHEER on CKD 4 (baseline Cr 2.6-2.8)   Suspect likely in setting urinary retention - has not been able to adequate self cath at home  Will monitor urine outpt closely, strict I+Os, Cr  Aden in place,  holding ARB, avoid other nephrotoxic agents. SUDHEER on CKD 4 (baseline Cr 2.6-2.8) - Cr seems to be improving today  Suspect likely in setting urinary retention - has not been able to adequate self cath at home  Will monitor urine outpt closely, strict I+Os, Cr  Aden in place  holding ARB, avoid other nephrotoxic agents.

## 2022-12-01 NOTE — CONSULT NOTE ADULT - SUBJECTIVE AND OBJECTIVE BOX
History of Present Illness:  81yo M w/ PMHx of HIV (viral load undetectable ), Afib s/p DVVC and ablation (not on AC), MV endocarditis s/p bioprosthetic valve replacement, BPH (self catheterizes), frequent UTIs, presents with weakness, he reports that over the last 2 weeks he has had weakness, symptoms were associated with occasional body shakes that came every other day, he notes that he also has had recent difficulty performing straight cath, saying he required more force and was at times unable to complete procedure, he called his urologist who then instructed him to head to ED, in the ED, pt was bradycardic, febrile but hemodynamically stable, labs showed leukocytosis, elevated Cr above baseline, elevated procal, grossly positive U/A, EKG showed new Mobitz Type 1, then converted to afib with slow ventricular response, pt was given cefepime, 1L NS bolus, Tylenol x1, admitted to general medicine for further management. While in ED, pt developed acute onset non-radiating chest pain associated with SOB but denies n/v, f/c, diaphoresis, jaw pain,    CT Surgery consulted due to TTE on  showing thickened mitral valve leaflets with elevated peak gradient of 38 and mean gradient of 7. All labs and imaging reviewed with Dr. Camacho.     Past Medical History  Unsteady gait    HIV (human immunodeficiency virus infection)    Chronic kidney disease, unspecified stage    Constipation, unspecified constipation type    Seborrheic keratosis    Osteoporosis    Edema of both legs    Bladder mass    Vitamin D deficiency    Orchitis and epididymitis    H/O endocarditis  MV endocarditis    Benign prostatic hyperplasia without lower urinary tract symptoms    COVID-19 vaccine series completed  W #2 boosters        Past Surgical History  No Past Surgical History    S/P coronary artery stent placement    S/P MVR (mitral valve replacement)  &amp; SHELLIE clip, 2020        MEDICATIONS  (STANDING):  abacavir 300 milliGRAM(s) Oral two times a day  apixaban 2.5 milliGRAM(s) Oral two times a day  ascorbic acid 500 milliGRAM(s) Oral daily  aspirin enteric coated 81 milliGRAM(s) Oral daily  cefepime   IVPB 1000 milliGRAM(s) IV Intermittent every 24 hours  chlorhexidine 2% Cloths 1 Application(s) Topical daily  doravirine 100 milliGRAM(s) Oral daily  famotidine    Tablet 20 milliGRAM(s) Oral daily  ferrous    sulfate 325 milliGRAM(s) Oral daily  folic acid 1 milliGRAM(s) Oral daily  influenza  Vaccine (HIGH DOSE) 0.7 milliLiter(s) IntraMuscular once  lamiVUDine- milliGRAM(s) Oral daily  multivitamin 1 Tablet(s) Oral daily  senna 2 Tablet(s) Oral at bedtime    MEDICATIONS  (PRN):  acetaminophen     Tablet .. 650 milliGRAM(s) Oral every 6 hours PRN Temp greater or equal to 38C (100.4F), Mild Pain (1 - 3)    Antiplatelet therapy:                           Last dose/amt:    Allergies: No Known Allergies      SOCIAL HISTORY:  Smoker: [ ] Yes  [ x] No        PACK YEARS:                         WHEN QUIT?  ETOH use: [ ] Yes  [x ] No              FREQUENCY / QUANTITY:  Ilicit Drug use:  [ ] Yes  [x ] No  Occupation: retired  Live with: assisted living facility  Assist device use:    Relevant Family History  FAMILY HISTORY:  Family history of congestive heart failure (Father)    Family history of cancer (Mother)        Review of Systems  GENERAL:  Fevers[] chills[] sweats[] fatigue[x] weight loss[] weight gain []                                        NEURO:  parathesias[] seizures []  syncope []  confusion []                                                                                  EYES: glasses[]  blurry vision[]  discharge[] pain[] glaucoma []                                                                            ENMT:  difficulty hearing []  vertigo[]  dysphagia[] epistaxis[] recent dental work []                                      CV:  chest pain[] palpitations[] PRESCOTT [] diaphoresis [] edema[]                                                                                             RESPIRATORY:  wheezing[] SOB[] cough [] sputum[] hemoptysis[]                                                                    GI:  nausea[]  vomiting []  diarrhea[] constipation [] melena []                                                                        : hematuria[ ]  dysuria[ ] urgency[] incontinence[]                                                                                              MUSKULOSKELETAL:  arthritis[ ]  joint swelling [ ] muscle weakness [ ]                                                                  SKIN/BREAST:  rash[ ] itching [ ]  hair loss[ ] masses[ ]                                                                                                PSYCH:  dementia [ ] depresion [ ] anxiety[ ]                                                                                                                  HEME/LYMPH:  bruises easily[ ] enlarged lymph nodes[ ] tender lymph nodes[ ]                                                 ENDOCRINE:  cold intolerance[ ] heat intolerance[ ] polydipsia[ ]                                                                              PHYSICAL EXAM  Vital Signs Last 24 Hrs  T(C): 36.5 (01 Dec 2022 11:16), Max: 38.7 (2022 23:35)  T(F): 97.7 (01 Dec 2022 11:16), Max: 101.6 (2022 23:35)  HR: 72 (01 Dec 2022 16:02) (50 - 86)  BP: 171/68 (01 Dec 2022 16:02) (112/48 - 181/76)  BP(mean): --  RR: 19 (01 Dec 2022 16:02) (18 - 20)  SpO2: 93% (01 Dec 2022 16:02) (90% - 99%)    Parameters below as of 01 Dec 2022 16:02  Patient On (Oxygen Delivery Method): room air        General: Well nourished, well developed, no acute distress.                                                         Neuro: Normal exam oriented to person/place & time with no focal motor or sensory  deficits.                    Eyes: Normal exam of conjunctiva & lids, pupils equally reactive.   ENT: Normal exam of nasal/oral mucosa with absence of cyanosis.   Neck: Normal exam of jugular veins, trachea & thyroid.   Chest: Normal lung exam with good air movement absence of wheezes, rales, or rhonchi:                                                                          CV:  Auscultation: normal [ x] S3[ ] S4[ ] Irregular [ ] Rub[ ] Clicks[ ]  Murmurs none:[ x]systolic [ ]  diastolic [ ] holosystolic [ ]  Carotids: No Bruits[ ] Other____________ Abdominal Aorta: normal [x ] nonpalpable[ ]                                                                         GI: Normal exam of abdomen, liver & spleen with no noted masses or tenderness.                                                                                              Extremities: Normal no evidence of cyanosis or deformity Edema: none[x ]trace[ ]1+[ ]2+[ ]3+[ ]4+[ ]  Lower Extremity Pulses: Right[x ] Left[x ]Varicosities[ ]  SKIN : Normal exam to inspection & palation.                                                           LABS:                        9.3    32.24 )-----------( 134      ( 01 Dec 2022 10:48 )             27.6     12-    139  |  106  |  80<H>  ----------------------------<  186<H>  4.5   |  19<L>  |  3.62<H>    Ca    8.2<L>      01 Dec 2022 10:48  Phos  4.6     11-30  Mg     2.6     -30    TPro  6.9  /  Alb  3.2<L>  /  TBili  0.4  /  DBili  x   /  AST  32  /  ALT  24  /  AlkPhos  214<H>  11-30    PT/INR - ( 2022 20:47 )   PT: 15.5 sec;   INR: 1.33 ratio         PTT - ( 01 Dec 2022 10:48 )  PTT:85.8 sec  Urinalysis Basic - ( 2022 20:45 )    Color: Yellow / Appearance: Slightly Turbid / S.015 / pH: x  Gluc: x / Ketone: Negative  / Bili: Negative / Urobili: Negative   Blood: x / Protein: 100 / Nitrite: Negative   Leuk Esterase: Large / RBC: 6 /hpf /  /HPF   Sq Epi: x / Non Sq Epi: 1 /hpf / Bacteria: Negative      CARDIAC MARKERS ( 2022 08:27 )  x     / x     / 68 U/L / x     / 3.5 ng/mL  CARDIAC MARKERS ( 2022 04:21 )  x     / x     / 73 U/L / x     / 3.5 ng/mL  CARDIAC MARKERS ( 2022 03:49 )  x     / x     / 79 U/L / x     / 3.6 ng/mL           < from: CT Abdomen and Pelvis No Cont (22 @ 14:23) >  IMPRESSION:  Limited noncontrast exam.    Circumferential bladder wall thickening and perivesicular fat stranding,   concerning for cystitis.    Bilateral perinephric fat stranding, nonspecific although can be seen   with urinary tract infection.    Small  bilateral pleural effusions.        TTE / NOHEMI:  < from: Transthoracic Echocardiogram (22 @ 13:37) >  Conclusions:  1. Bioprosthetic mitral valve replacement, thickened  leaflets. Peak mitral valve gradient equals 38 mm Hg, mean  transmitral valve gradient equals 7 mm Hg, which is  elevated even in the setting of a bioprosthetic mitral  valve replacement (heart rate 60).  2. Normal left ventricular systolic function. Septal motion  consistent with cardiac surgery.  3. Right ventricular enlargement with decreased right  ventricular systolic function.  4. Estimated pulmonary artery systolic pressure equals 57  mm Hg, assuming right atrial pressure equals 8 mm Hg,  consistent with moderate pulmonary pressures.

## 2022-12-01 NOTE — PROVIDER CONTACT NOTE (CRITICAL VALUE NOTIFICATION) - ACTION/TREATMENT ORDERED:
PA made aware. Changes of orders have been placed by pt's attending: Cefepime d/c'd, Vanco & Meropenem ordered.

## 2022-12-01 NOTE — PROGRESS NOTE ADULT - SUBJECTIVE AND OBJECTIVE BOX
[facility] Division of Hospital Medicine  Roman Long MD, BENEDICT  I'm reachable on [employer] Teams   Off hours:  [phone] ([facility] pager)    Patient is a 82y old  Male who presents with a chief complaint of UTI (01 Dec 2022 11:08)      SUBJECTIVE / OVERNIGHT EVENTS:  No events overnight.     MEDICATIONS  (STANDING):  abacavir 300 milliGRAM(s) Oral two times a day  apixaban 2.5 milliGRAM(s) Oral two times a day  ascorbic acid 500 milliGRAM(s) Oral daily  aspirin enteric coated 81 milliGRAM(s) Oral daily  chlorhexidine 2% Cloths 1 Application(s) Topical daily  doravirine 100 milliGRAM(s) Oral daily  famotidine    Tablet 20 milliGRAM(s) Oral daily  ferrous    sulfate 325 milliGRAM(s) Oral daily  folic acid 1 milliGRAM(s) Oral daily  influenza  Vaccine (HIGH DOSE) 0.7 milliLiter(s) IntraMuscular once  lamiVUDine- milliGRAM(s) Oral daily  meropenem  IVPB      meropenem  IVPB 500 milliGRAM(s) IV Intermittent every 12 hours  multivitamin 1 Tablet(s) Oral daily  senna 2 Tablet(s) Oral at bedtime    MEDICATIONS  (PRN):  acetaminophen     Tablet .. 650 milliGRAM(s) Oral every 6 hours PRN Temp greater or equal to 38C (100.4F), Mild Pain (1 - 3)      I&O's Summary    2022 07:  -  01 Dec 2022 07:00  --------------------------------------------------------  IN: 0 mL / OUT: 600 mL / NET: -600 mL    01 Dec 2022 07:01  -  01 Dec 2022 16:12  --------------------------------------------------------  IN: 240 mL / OUT: 800 mL / NET: -560 mL        PHYSICAL EXAM:  Vital Signs Last 24 Hrs  T(C): 36.5 (01 Dec 2022 11:16), Max: 38.7 (2022 23:35)  T(F): 97.7 (01 Dec 2022 11:16), Max: 101.6 (2022 23:35)  HR: 72 (01 Dec 2022 16:02) (50 - 86)  BP: 171/68 (01 Dec 2022 16:02) (112/48 - 181/76)  BP(mean): --  RR: 19 (01 Dec 2022 16:02) (18 - 20)  SpO2: 93% (01 Dec 2022 16:02) (90% - 99%)    Parameters below as of 01 Dec 2022 16:02  Patient On (Oxygen Delivery Method): room air    CONSTITUTIONAL: elderly man, NAD  EYES:  conjunctiva and sclera clear  ENMT: Moist oral mucosa  NECK: Supple, no palpable masses; no JVD  RESPIRATORY: Normal respiratory effort; lungs are clear to auscultation bilaterally  CARDIOVASCULAR: Regular rate and rhythm, normal S1 and S2, no murmur/rub/gallop;  +lower extremity edema  ABDOMEN: Nontender to palpation, normoactive bowel sounds, no rebound/guarding  MUSCULOSKELETAL:  no clubbing or cyanosis of digits; no joint swelling or tenderness to palpation  PSYCH: A+O to person, place, and time; affect appropriate        LABS:                        9.3    32.24 )-----------( 134      ( 01 Dec 2022 10:48 )             27.6     12-    139  |  106  |  80<H>  ----------------------------<  186<H>  4.5   |  19<L>  |  3.62<H>    Ca    8.2<L>      01 Dec 2022 10:48  Phos  4.6     11-30  Mg     2.6     11-30    TPro  6.9  /  Alb  3.2<L>  /  TBili  0.4  /  DBili  x   /  AST  32  /  ALT  24  /  AlkPhos  214<H>  11-30    PT/INR - ( 2022 20:47 )   PT: 15.5 sec;   INR: 1.33 ratio         PTT - ( 01 Dec 2022 10:48 )  PTT:85.8 sec  CARDIAC MARKERS ( 2022 08:27 )  x     / x     / 68 U/L / x     / 3.5 ng/mL  CARDIAC MARKERS ( 2022 04:21 )  x     / x     / 73 U/L / x     / 3.5 ng/mL  CARDIAC MARKERS ( 2022 03:49 )  x     / x     / 79 U/L / x     / 3.6 ng/mL      Urinalysis Basic - ( 2022 20:45 )    Color: Yellow / Appearance: Slightly Turbid / S.015 / pH: x  Gluc: x / Ketone: Negative  / Bili: Negative / Urobili: Negative   Blood: x / Protein: 100 / Nitrite: Negative   Leuk Esterase: Large / RBC: 6 /hpf /  /HPF   Sq Epi: x / Non Sq Epi: 1 /hpf / Bacteria: Negative        Culture - Blood (collected 2022 20:30)  Source: .Blood Blood-Peripheral  Gram Stain (01 Dec 2022 07:02):    Growth in aerobic and anaerobic bottles: Gram Positive Cocci in Clusters  Preliminary Report (01 Dec 2022 07:03):    Growth in aerobic and anaerobic bottles: Gram Positive Cocci in Clusters    Culture - Blood (collected 2022 20:19)  Source: .Blood Blood-Peripheral  Gram Stain (01 Dec 2022 07:02):    Growth in aerobic and anaerobic bottles: Gram Positive Cocci in Clusters  Preliminary Report (01 Dec 2022 07:02):    Growth in aerobic and anaerobic bottles: Gram Positive Cocci in Clusters    ***Blood Panel PCR results on this specimen are available    approximately 3 hours after the Gram stain result.***    Gram stain, PCR, and/or culture results may not always    correspond due to difference in methodologies.    ************************************************************    This PCR assay was performed by multiplex PCR. This    Assay tests for 66 bacterial and resistance gene targets.    Please refer to the [facility] Labs test directory    at [email or web address] for details.  Organism: Blood Culture PCR (01 Dec 2022 10:10)  Organism: Blood Culture PCR (01 Dec 2022 10:10)        RADIOLOGY & ADDITIONAL TESTS:    Lab Results Reviewed: worsening leukocytosis    Imaging Personally Reviewed:   CT Abd - cystitis, no abscess seen    ECG: second decree type 2 to my read    COORDINATION OF CARE:  Care Discussed with Consultants/Other Providers:  ID re: [facility] Division of Hospital Medicine  Roman Long MD, BENEDICT  I'm reachable on [employer] Teams   Off hours:  [phone] ([facility] pager)    Patient is a 82y old  Male who presents with a chief complaint of UTI (01 Dec 2022 11:08)      SUBJECTIVE / OVERNIGHT EVENTS:  No events overnight. No acute complaints.     MEDICATIONS  (STANDING):  abacavir 300 milliGRAM(s) Oral two times a day  apixaban 2.5 milliGRAM(s) Oral two times a day  ascorbic acid 500 milliGRAM(s) Oral daily  aspirin enteric coated 81 milliGRAM(s) Oral daily  chlorhexidine 2% Cloths 1 Application(s) Topical daily  doravirine 100 milliGRAM(s) Oral daily  famotidine    Tablet 20 milliGRAM(s) Oral daily  ferrous    sulfate 325 milliGRAM(s) Oral daily  folic acid 1 milliGRAM(s) Oral daily  influenza  Vaccine (HIGH DOSE) 0.7 milliLiter(s) IntraMuscular once  lamiVUDine- milliGRAM(s) Oral daily  meropenem  IVPB      meropenem  IVPB 500 milliGRAM(s) IV Intermittent every 12 hours  multivitamin 1 Tablet(s) Oral daily  senna 2 Tablet(s) Oral at bedtime    MEDICATIONS  (PRN):  acetaminophen     Tablet .. 650 milliGRAM(s) Oral every 6 hours PRN Temp greater or equal to 38C (100.4F), Mild Pain (1 - 3)      I&O's Summary    2022 07:  -  01 Dec 2022 07:00  --------------------------------------------------------  IN: 0 mL / OUT: 600 mL / NET: -600 mL    01 Dec 2022 07:01  -  01 Dec 2022 16:12  --------------------------------------------------------  IN: 240 mL / OUT: 800 mL / NET: -560 mL        PHYSICAL EXAM:  Vital Signs Last 24 Hrs  T(C): 36.5 (01 Dec 2022 11:16), Max: 38.7 (2022 23:35)  T(F): 97.7 (01 Dec 2022 11:16), Max: 101.6 (2022 23:35)  HR: 72 (01 Dec 2022 16:02) (50 - 86)  BP: 171/68 (01 Dec 2022 16:02) (112/48 - 181/76)  BP(mean): --  RR: 19 (01 Dec 2022 16:02) (18 - 20)  SpO2: 93% (01 Dec 2022 16:02) (90% - 99%)    Parameters below as of 01 Dec 2022 16:02  Patient On (Oxygen Delivery Method): room air    CONSTITUTIONAL: elderly man, NAD  EYES:  conjunctiva and sclera clear  ENMT: Moist oral mucosa  NECK: Supple, no palpable masses; no JVD  RESPIRATORY: Normal respiratory effort; lungs are clear to auscultation bilaterally  CARDIOVASCULAR: Regular rate and rhythm, normal S1 and S2, no murmur/rub/gallop;  +lower extremity edema  ABDOMEN: Nontender to palpation, normoactive bowel sounds, no rebound/guarding  MUSCULOSKELETAL:  no clubbing or cyanosis of digits; no joint swelling or tenderness to palpation  PSYCH: A+O to person, place, and time; affect appropriate        LABS:                        9.3    32.24 )-----------( 134      ( 01 Dec 2022 10:48 )             27.6     12-    139  |  106  |  80<H>  ----------------------------<  186<H>  4.5   |  19<L>  |  3.62<H>    Ca    8.2<L>      01 Dec 2022 10:48  Phos  4.6     11-30  Mg     2.6     11-30    TPro  6.9  /  Alb  3.2<L>  /  TBili  0.4  /  DBili  x   /  AST  32  /  ALT  24  /  AlkPhos  214<H>  11-30    PT/INR - ( 2022 20:47 )   PT: 15.5 sec;   INR: 1.33 ratio         PTT - ( 01 Dec 2022 10:48 )  PTT:85.8 sec  CARDIAC MARKERS ( 2022 08:27 )  x     / x     / 68 U/L / x     / 3.5 ng/mL  CARDIAC MARKERS ( 2022 04:21 )  x     / x     / 73 U/L / x     / 3.5 ng/mL  CARDIAC MARKERS ( 2022 03:49 )  x     / x     / 79 U/L / x     / 3.6 ng/mL      Urinalysis Basic - ( 2022 20:45 )    Color: Yellow / Appearance: Slightly Turbid / S.015 / pH: x  Gluc: x / Ketone: Negative  / Bili: Negative / Urobili: Negative   Blood: x / Protein: 100 / Nitrite: Negative   Leuk Esterase: Large / RBC: 6 /hpf /  /HPF   Sq Epi: x / Non Sq Epi: 1 /hpf / Bacteria: Negative        Culture - Blood (collected 2022 20:30)  Source: .Blood Blood-Peripheral  Gram Stain (01 Dec 2022 07:02):    Growth in aerobic and anaerobic bottles: Gram Positive Cocci in Clusters  Preliminary Report (01 Dec 2022 07:03):    Growth in aerobic and anaerobic bottles: Gram Positive Cocci in Clusters    Culture - Blood (collected 2022 20:19)  Source: .Blood Blood-Peripheral  Gram Stain (01 Dec 2022 07:02):    Growth in aerobic and anaerobic bottles: Gram Positive Cocci in Clusters  Preliminary Report (01 Dec 2022 07:02):    Growth in aerobic and anaerobic bottles: Gram Positive Cocci in Clusters    ***Blood Panel PCR results on this specimen are available    approximately 3 hours after the Gram stain result.***    Gram stain, PCR, and/or culture results may not always    correspond due to difference in methodologies.    ************************************************************    This PCR assay was performed by multiplex PCR. This    Assay tests for 66 bacterial and resistance gene targets.    Please refer to the [facility] Labs test directory    at [email or web address] for details.  Organism: Blood Culture PCR (01 Dec 2022 10:10)  Organism: Blood Culture PCR (01 Dec 2022 10:10)        RADIOLOGY & ADDITIONAL TESTS:    Lab Results Reviewed: worsening leukocytosis    Imaging Personally Reviewed:   CT Abd - cystitis, no abscess seen    ECG: second degree type 2 to my read    COORDINATION OF CARE:  Care Discussed with Consultants/Other Providers:  ID re:

## 2022-12-01 NOTE — PROGRESS NOTE ADULT - PROBLEM SELECTOR PLAN 6
-hold home metoprolol due to bradycardia  -f/u cardiology/EP recommendations  will start on hep gtt. -hold home metoprolol due to bradycardia  -f/u cardiology/EP recommendations  Eliquis

## 2022-12-01 NOTE — PROGRESS NOTE ADULT - SUBJECTIVE AND OBJECTIVE BOX
CC:  Bradycardia    Mr. Monsivais reports intermittent chills and dyspnea.  He denies chest pain.    Vital Signs Last 24 Hrs  T(C): 36.5 (01 Dec 2022 11:16), Max: 38.7 (30 Nov 2022 23:35)  T(F): 97.7 (01 Dec 2022 11:16), Max: 101.6 (30 Nov 2022 23:35)  HR: 72 (01 Dec 2022 16:02) (50 - 86)  BP: 171/68 (01 Dec 2022 16:02) (112/48 - 181/76)  RR: 19 (01 Dec 2022 16:02) (18 - 20)  SpO2: 93% (01 Dec 2022 16:02) (90% - 99%)    Physical Exam:  Awake, alert, appears comfortable  No JVD  CTAB  Irregular heart rhythm, no murmurs  Trace JENNIFER JON    Labs:  12-01    139  |  106  |  80<H>  ----------------------------<  186<H>  4.5   |  19<L>  |  3.62<H>    Ca    8.2<L>      01 Dec 2022 10:48  Phos  4.6     11-30  Mg     2.6     11-30                       9.3    32.24 )-----------( 134      ( 01 Dec 2022 10:48 )             27.6     MEDICATIONS  (STANDING):  abacavir 300 milliGRAM(s) Oral two times a day  apixaban 2.5 milliGRAM(s) Oral two times a day  ascorbic acid 500 milliGRAM(s) Oral daily  aspirin enteric coated 81 milliGRAM(s) Oral daily  cefepime   IVPB 1000 milliGRAM(s) IV Intermittent every 24 hours  chlorhexidine 2% Cloths 1 Application(s) Topical daily  doravirine 100 milliGRAM(s) Oral daily  famotidine    Tablet 20 milliGRAM(s) Oral daily  ferrous    sulfate 325 milliGRAM(s) Oral daily  folic acid 1 milliGRAM(s) Oral daily  influenza  Vaccine (HIGH DOSE) 0.7 milliLiter(s) IntraMuscular once  lamiVUDine- milliGRAM(s) Oral daily  multivitamin 1 Tablet(s) Oral daily  senna 2 Tablet(s) Oral at bedtime    TTE 11.30.2022  Normal LVF  RVE with decreased RVSF  Mod PH  Bioprosthetic MV with thickened leaflets and mean transmitral gradient of 7 mm Hg

## 2022-12-01 NOTE — PROGRESS NOTE ADULT - ASSESSMENT
82M  HIV  SUDHEER on CKD  Endocarditis s/p bioprosthetic MVR 2020  Atrial flutter s/p ablation and SHELLIE clip    Admitted with fever, chills, and elevated WBC  Staph lugdunensis bacteremia    Rhythm showed sinus madonna with Mobitz I and pAF SVR    TTE as above concerning for bioprosthetic MV dysfunction    Recommend:  NOHEMI to evaluate bioprosthetic MV for endocarditis    Appreciate EP input    Obstructive CAD unlikely however will consider NST during hospital admission    35 minutes spent on total patient encounter. More than 50% of the encounter was spent counseling and/or coordination care. The necessity of the time spent on this encounter was due to: time spent evaluating the patient as well as time spent reviewing labs, imaging, and discussing the case with a multidisciplinary team.

## 2022-12-01 NOTE — CONSULT NOTE ADULT - ASSESSMENT
83yo M w/ PMHx of HIV (viral load undetectable 08/22), Afib s/p DVVC and ablation (not on AC), MV endocarditis s/p bioprosthetic valve replacement, BPH (self catheterizes), frequent UTIs, presents with weakness, he reports that over the last 2 weeks he has had weakness, symptoms were associated with occasional body shakes that came every other day, he notes that he also has had recent difficulty performing straight cath, saying he required more force and was at times unable to complete procedure, he called his urologist who then instructed him to head to ED, in the ED, pt was bradycardic, febrile but hemodynamically stable, labs showed leukocytosis, elevated Cr above baseline, elevated procal, grossly positive U/A, EKG showed new Mobitz Type 1, then converted to afib with slow ventricular response, pt was given cefepime, 1L NS bolus, Tylenol x1, admitted to general medicine for further management. While in ED, pt developed acute onset non-radiating chest pain associated with SOB but denies n/v, f/c, diaphoresis, jaw pain,    CT Surgery consulted due to TTE on 11/30 showing thickened mitral valve leaflets with elevated peak gradient of 38 and mean gradient of 7. All labs and imaging reviewed with Dr. Camacho.

## 2022-12-01 NOTE — PROGRESS NOTE ADULT - ASSESSMENT
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 Agarwal, BPH w/ frequent UTI's (self catheterizes) presenting to the ED with a UTI, + Staph lugdunensis bacteremia, UA+, urine cultures pending.    He reports that over the last 2 weeks he has had weakness, subjective fevers, +chills, + night sweats.   Initial ECG in ED appeared to have a Mobitz I, converted to course slow AF 40's, now back in Mobitz I w/o conversion pause. He is currently HDS, asymptomatic at this time, remains in Mobitz I 50's. He has been maintained off AAD and A/C, his rates in SR were previously 60-70's. He has been on Metoprolol 12.5qd.     Overnight with rigors, febrile, c/o PRESCOTT this AM.     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 Remains in Mobitz I off AVNB  Continue to monitor on tele. Ambulate patient to assess rates with ambulation and symptoms.   To d/w attending re: leadless PPM vs. transvenous system, with initiation of AAD thereafter. 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 Agarwal, BPH w/ frequent UTI's (self catheterizes) presenting to the ED with a UTI, + Staph lugdunensis bacteremia, UA+, urine cultures pending.    He reports that over the last 2 weeks he has had weakness, subjective fevers, +chills, + night sweats.   Initial ECG in ED appeared to have a Mobitz I, converted to course slow AF 40's, now back in Mobitz I w/o conversion pause. He is currently HDS, asymptomatic at this time, remains in Mobitz I 50's. He has been maintained off AAD and A/C, his rates in SR were previously 60-70's. He has been on Metoprolol 12.5qd.   TTE on this admission revealing LVEF 60%, mod pHTN, RVE w/ decr RV systolic function, elevated MV gradients s/ p MVR     Overnight with rigors, febrile, c/o PRESCOTT this AM.     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 Remains in Mobitz I off AVNB  Continue to monitor on tele. Ambulate patient to assess rates with ambulation and symptoms.   To d/w attending re: leadless PPM vs. transvenous system, with initiation of AAD thereafter. Given patient is high risk for infection (straight catheterizes, prior history of endocarditis), favor leadless    Hold AVNB for now   Start NOAC for new onset AF/ history of AFL, Eliquis 2.5mg bid   Continue to monitor on telemetry, correct lytes for K>4 and Mg >2

## 2022-12-01 NOTE — PHYSICAL THERAPY INITIAL EVALUATION ADULT - NSPTDISCHREC_GEN_A_CORE
TBD pending further functional assessment Subacute Rehab to improve functional mobility and independence. If pt goes home, recommend Home PT, caregiver assist with all OOB mobility/ADLs, RW, 3:1 commode, and transport w/c for safe house entry to encourage energy conservation and reduce fall risk./Sub-acute Rehab

## 2022-12-01 NOTE — PROGRESS NOTE ADULT - PROBLEM SELECTOR PLAN 5
-c/w home Abacavir  -c/w home Doravirine  -c/w home lamivudine    ID walteral -c/w home Abacavir  -c/w home Doravirine  -c/w home lamivudine    QUYEN galarza

## 2022-12-01 NOTE — PROGRESS NOTE ADULT - ASSESSMENT
83yo M w/ PMHx of HIV (viral load undetectable 08/22), Afib s/p DVVC and ablation (not on AC), MV endocarditis s/p bioprosthetic valve replacement, BPH (self catheterizes), frequent UTIs, presents with UTI and new Mobitz Type 1  81yo M w/ PMHx of HIV (viral load undetectable 08/22), Afib s/p DVVC and ablation (not on AC), MV endocarditis s/p bioprosthetic valve replacement, BPH (self catheterizes), frequent UTIs, presents with UTI and new heart block

## 2022-12-01 NOTE — PROGRESS NOTE ADULT - SUBJECTIVE AND OBJECTIVE BOX
24H hour events:     MEDICATIONS:  aspirin enteric coated 81 milliGRAM(s) Oral daily  heparin   Injectable 6500 Unit(s) IV Push every 6 hours PRN  heparin   Injectable 3000 Unit(s) IV Push every 6 hours PRN  heparin  Infusion.  Unit(s)/Hr IV Continuous <Continuous>    abacavir 300 milliGRAM(s) Oral two times a day  doravirine 100 milliGRAM(s) Oral daily  lamiVUDine- milliGRAM(s) Oral daily  meropenem  IVPB      meropenem  IVPB 500 milliGRAM(s) IV Intermittent every 12 hours  vancomycin  IVPB 1000 milliGRAM(s) IV Intermittent once      acetaminophen     Tablet .. 650 milliGRAM(s) Oral every 6 hours PRN    famotidine    Tablet 20 milliGRAM(s) Oral daily  senna 2 Tablet(s) Oral at bedtime      ascorbic acid 500 milliGRAM(s) Oral daily  chlorhexidine 2% Cloths 1 Application(s) Topical daily  ferrous    sulfate 325 milliGRAM(s) Oral daily  folic acid 1 milliGRAM(s) Oral daily  influenza  Vaccine (HIGH DOSE) 0.7 milliLiter(s) IntraMuscular once  multivitamin 1 Tablet(s) Oral daily      REVIEW OF SYSTEMS:  See HPI, otherwise ROS negative.    PHYSICAL EXAM:  T(C): 36.5 (12-01-22 @ 06:44), Max: 38.7 (11-30-22 @ 23:35)  HR: 53 (12-01-22 @ 04:51) (53 - 86)  BP: 112/48 (12-01-22 @ 04:51) (112/48 - 181/76)  RR: 19 (12-01-22 @ 04:51) (18 - 20)  SpO2: 97% (12-01-22 @ 04:51) (90% - 97%)  Wt(kg): --  I&O's Summary    30 Nov 2022 07:01  -  01 Dec 2022 07:00  --------------------------------------------------------  IN: 0 mL / OUT: 600 mL / NET: -600 mL    01 Dec 2022 07:01  -  01 Dec 2022 11:09  --------------------------------------------------------  IN: 240 mL / OUT: 0 mL / NET: 240 mL        Appearance: Alert. NAD	  Cardiovascular: +S1S2 RRR no m/g/r  Respiratory: CTA B/L	  Psychiatry: A & O x 3, Mood & affect appropriate  Gastrointestinal:  Soft, NT. ND. +BS	  Skin: No rashes	  Neurologic: Non-focal  Extremities: No edema BLE  Vascular: Peripheral pulses palpable 2+ bilaterally      LABS:	 	    CBC Full  -  ( 01 Dec 2022 10:48 )  WBC Count : 32.24 K/uL  Hemoglobin : 9.3 g/dL  Hematocrit : 27.6 %  Platelet Count - Automated : 134 K/uL  Mean Cell Volume : 105.3 fl  Mean Cell Hemoglobin : 35.5 pg  Mean Cell Hemoglobin Concentration : 33.7 gm/dL  Auto Neutrophil # : x  Auto Lymphocyte # : x  Auto Monocyte # : x  Auto Eosinophil # : x  Auto Basophil # : x  Auto Neutrophil % : x  Auto Lymphocyte % : x  Auto Monocyte % : x  Auto Eosinophil % : x  Auto Basophil % : x    11-30    137  |  104  |  80<H>  ----------------------------<  117<H>  4.8   |  17<L>  |  4.35<H>  11-30    136  |  106  |  81<H>  ----------------------------<  116<H>  5.1   |  19<L>  |  4.44<H>    Ca    8.3<L>      30 Nov 2022 03:49  Ca    8.1<L>      30 Nov 2022 00:18  Phos  4.6     11-30  Mg     2.6     11-30  Mg     2.7     11-30    TPro  6.9  /  Alb  3.2<L>  /  TBili  0.4  /  DBili  x   /  AST  32  /  ALT  24  /  AlkPhos  214<H>  11-30  TPro  6.9  /  Alb  3.3  /  TBili  0.3  /  DBili  x   /  AST  31  /  ALT  23  /  AlkPhos  190<H>  11-29      proBNP:   Lipid Profile:   HgA1c:   TSH:       CARDIAC MARKERS:          TELEMETRY:   	    ECG:  	    RADIOLOGY:    	  ASSESSMENT/PLAN: 	     24H hour events: Mobitz I 50's this AM/overnight    MEDICATIONS:  aspirin enteric coated 81 milliGRAM(s) Oral daily  heparin   Injectable 6500 Unit(s) IV Push every 6 hours PRN  heparin   Injectable 3000 Unit(s) IV Push every 6 hours PRN  heparin  Infusion.  Unit(s)/Hr IV Continuous <Continuous>    abacavir 300 milliGRAM(s) Oral two times a day  doravirine 100 milliGRAM(s) Oral daily  lamiVUDine- milliGRAM(s) Oral daily  meropenem  IVPB      meropenem  IVPB 500 milliGRAM(s) IV Intermittent every 12 hours  vancomycin  IVPB 1000 milliGRAM(s) IV Intermittent once      acetaminophen     Tablet .. 650 milliGRAM(s) Oral every 6 hours PRN    famotidine    Tablet 20 milliGRAM(s) Oral daily  senna 2 Tablet(s) Oral at bedtime      ascorbic acid 500 milliGRAM(s) Oral daily  chlorhexidine 2% Cloths 1 Application(s) Topical daily  ferrous    sulfate 325 milliGRAM(s) Oral daily  folic acid 1 milliGRAM(s) Oral daily  influenza  Vaccine (HIGH DOSE) 0.7 milliLiter(s) IntraMuscular once  multivitamin 1 Tablet(s) Oral daily      REVIEW OF SYSTEMS:  See HPI, otherwise ROS negative.    PHYSICAL EXAM:  T(C): 36.5 (12-01-22 @ 06:44), Max: 38.7 (11-30-22 @ 23:35)  HR: 53 (12-01-22 @ 04:51) (53 - 86)  BP: 112/48 (12-01-22 @ 04:51) (112/48 - 181/76)  RR: 19 (12-01-22 @ 04:51) (18 - 20)  SpO2: 97% (12-01-22 @ 04:51) (90% - 97%)  Wt(kg): --  I&O's Summary    30 Nov 2022 07:01  -  01 Dec 2022 07:00  --------------------------------------------------------  IN: 0 mL / OUT: 600 mL / NET: -600 mL    01 Dec 2022 07:01  -  01 Dec 2022 11:09  --------------------------------------------------------  IN: 240 mL / OUT: 0 mL / NET: 240 mL        Appearance: Alert. NAD	  Cardiovascular: +S1S2 RRR no m/g/r  Respiratory: CTA B/L	  Gastrointestinal:  Soft, NT. ND. +BS	  Skin: No rashes, masses, lesions. Warm, well perfused   Neurologic: Non-focal  Extremities: No edema BLE  Vascular: Peripheral pulses palpable 2+ bilaterally      LABS:	 	    CBC Full  -  ( 01 Dec 2022 10:48 )  WBC Count : 32.24 K/uL  Hemoglobin : 9.3 g/dL  Hematocrit : 27.6 %  Platelet Count - Automated : 134 K/uL  Mean Cell Volume : 105.3 fl  Mean Cell Hemoglobin : 35.5 pg  Mean Cell Hemoglobin Concentration : 33.7 gm/dL  Auto Neutrophil # : x  Auto Lymphocyte # : x  Auto Monocyte # : x  Auto Eosinophil # : x  Auto Basophil # : x  Auto Neutrophil % : x  Auto Lymphocyte % : x  Auto Monocyte % : x  Auto Eosinophil % : x  Auto Basophil % : x    11-30    137  |  104  |  80<H>  ----------------------------<  117<H>  4.8   |  17<L>  |  4.35<H>  11-30    136  |  106  |  81<H>  ----------------------------<  116<H>  5.1   |  19<L>  |  4.44<H>    Ca    8.3<L>      30 Nov 2022 03:49  Ca    8.1<L>      30 Nov 2022 00:18  Phos  4.6     11-30  Mg     2.6     11-30  Mg     2.7     11-30    TPro  6.9  /  Alb  3.2<L>  /  TBili  0.4  /  DBili  x   /  AST  32  /  ALT  24  /  AlkPhos  214<H>  11-30  TPro  6.9  /  Alb  3.3  /  TBili  0.3  /  DBili  x   /  AST  31  /  ALT  23  /  AlkPhos  190<H>  11-29    TELEMETRY: Aelx MAJOR 50's          24H hour events: Mobitz I 50's this AM/overnight    MEDICATIONS:  aspirin enteric coated 81 milliGRAM(s) Oral daily  heparin   Injectable 6500 Unit(s) IV Push every 6 hours PRN  heparin   Injectable 3000 Unit(s) IV Push every 6 hours PRN  heparin  Infusion.  Unit(s)/Hr IV Continuous <Continuous>    abacavir 300 milliGRAM(s) Oral two times a day  doravirine 100 milliGRAM(s) Oral daily  lamiVUDine- milliGRAM(s) Oral daily  meropenem  IVPB      meropenem  IVPB 500 milliGRAM(s) IV Intermittent every 12 hours  vancomycin  IVPB 1000 milliGRAM(s) IV Intermittent once      acetaminophen     Tablet .. 650 milliGRAM(s) Oral every 6 hours PRN    famotidine    Tablet 20 milliGRAM(s) Oral daily  senna 2 Tablet(s) Oral at bedtime      ascorbic acid 500 milliGRAM(s) Oral daily  chlorhexidine 2% Cloths 1 Application(s) Topical daily  ferrous    sulfate 325 milliGRAM(s) Oral daily  folic acid 1 milliGRAM(s) Oral daily  influenza  Vaccine (HIGH DOSE) 0.7 milliLiter(s) IntraMuscular once  multivitamin 1 Tablet(s) Oral daily      REVIEW OF SYSTEMS:  See HPI, otherwise ROS negative.    PHYSICAL EXAM:  T(C): 36.5 (12-01-22 @ 06:44), Max: 38.7 (11-30-22 @ 23:35)  HR: 53 (12-01-22 @ 04:51) (53 - 86)  BP: 112/48 (12-01-22 @ 04:51) (112/48 - 181/76)  RR: 19 (12-01-22 @ 04:51) (18 - 20)  SpO2: 97% (12-01-22 @ 04:51) (90% - 97%)  Wt(kg): --  I&O's Summary    30 Nov 2022 07:01  -  01 Dec 2022 07:00  --------------------------------------------------------  IN: 0 mL / OUT: 600 mL / NET: -600 mL    01 Dec 2022 07:01  -  01 Dec 2022 11:09  --------------------------------------------------------  IN: 240 mL / OUT: 0 mL / NET: 240 mL        Appearance: Alert. NAD	  Cardiovascular: +S1S2 RRR no m/g/r  Respiratory: CTA B/L	  Gastrointestinal:  Soft, NT. ND. +BS	  Skin: No rashes, masses, lesions. Warm, well perfused   Neurologic: Non-focal  Extremities: No edema BLE  Vascular: Peripheral pulses palpable 2+ bilaterally      LABS:	 	    CBC Full  -  ( 01 Dec 2022 10:48 )  WBC Count : 32.24 K/uL  Hemoglobin : 9.3 g/dL  Hematocrit : 27.6 %  Platelet Count - Automated : 134 K/uL  Mean Cell Volume : 105.3 fl  Mean Cell Hemoglobin : 35.5 pg  Mean Cell Hemoglobin Concentration : 33.7 gm/dL  Auto Neutrophil # : x  Auto Lymphocyte # : x  Auto Monocyte # : x  Auto Eosinophil # : x  Auto Basophil # : x  Auto Neutrophil % : x  Auto Lymphocyte % : x  Auto Monocyte % : x  Auto Eosinophil % : x  Auto Basophil % : x    11-30    137  |  104  |  80<H>  ----------------------------<  117<H>  4.8   |  17<L>  |  4.35<H>  11-30    136  |  106  |  81<H>  ----------------------------<  116<H>  5.1   |  19<L>  |  4.44<H>    Ca    8.3<L>      30 Nov 2022 03:49  Ca    8.1<L>      30 Nov 2022 00:18  Phos  4.6     11-30  Mg     2.6     11-30  Mg     2.7     11-30    TPro  6.9  /  Alb  3.2<L>  /  TBili  0.4  /  DBili  x   /  AST  32  /  ALT  24  /  AlkPhos  214<H>  11-30  TPro  6.9  /  Alb  3.3  /  TBili  0.3  /  DBili  x   /  AST  31  /  ALT  23  /  AlkPhos  190<H>  11-29    TELEMETRY: Mobitz I 50's   TTE: < from: Transthoracic Echocardiogram (11.30.22 @ 13:37) >  Dimensions:    Normal Values:  LA:     3.9    2.0 - 4.0 cm  Ao:     3.7    2.0 - 3.8 cm  SEPTUM:        0.6 - 1.2 cm  PWT:           0.6 - 1.1 cm  LVIDd:       3.0 - 5.6 cm  LVIDs:         1.8 - 4.0 cm  EF (Visual Estimate): 65 %  Doppler Peak Velocity (m/sec): AoV=1.9  ------------------------------------------------------------------------  Observations:  Mitral Valve: Bioprosthetic mitral valve replacement,  thickened leaflets. No mitral valve regurgitation seen.  Peak mitral valve gradient equals 38 mm Hg, mean  transmitral valve gradient equals 7 mm Hg, which is  elevated even in the setting of a bioprosthetic mitral  valve replacement (heart rate 60).  Aortic Valve/Aorta: Normal trileaflet aortic valve. Peak  transaortic valve gradient equals 14 mm Hg, mean  transaortic valve gradient equals 6 mm Hg, aortic valve  velocity time integral equals 35 cm. Peak left ventricular  outflow tractgradient equals 6 mm Hg, mean gradient is  equal to 3 mm Hg, LVOT velocity time integral equals 23 cm.  Aortic Root: 3.7 cm.  Left Atrium: Normal left atrium.  Left Ventricle: Normal left ventricular systolic function.  Septal motion consistent with cardiac surgery.  Indeterminate diastolic function.  Right Heart: Mild right atrial enlargement. Right  ventricular enlargement with decreased right ventricular  systolic function. Normal tricuspid valve. Mild tricuspid  regurgitation. Normal pulmonicvalve.  Pericardium/Pleura: Normal pericardium with no pericardial  effusion.  Hemodynamic: Estimated right atrial pressure is 8 mm Hg.  Estimated right ventricular systolic pressure equals 57 mm  Hg, assuming right atrial pressure equals 8 mm Hg,  consistent with moderate pulmonary hypertension.  ------------------------------------------------------------------------  Conclusions:  1. Bioprosthetic mitral valve replacement, thickened  leaflets. Peak mitral valve gradient equals 38 mm Hg, mean  transmitral valve gradient equals 7 mm Hg, which is  elevated even in the setting of a bioprosthetic mitral  valve replacement (heart rate 60).  2. Normal left ventricular systolic function. Septal motion  consistent with cardiac surgery.  3. Right ventricular enlargement with decreased right  ventricular systolic function.  4. Estimated pulmonary artery systolic pressure equals 57  mm Hg, assuming right atrial pressure equals 8 mm Hg,  consistent with moderate pulmonary pressures.    < end of copied text >

## 2022-12-02 NOTE — CHART NOTE - NSCHARTNOTEFT_GEN_A_CORE
Pt seen. Chart reviewed.  NOHEMI images viewed.    82 y/0 frail gentleman with HIV and chronic obstructive prostatic issues(unresolved).  Chronic self bladder catheterization.  Has renal insufficiency with Cr>3.  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    Pt is not very ambulatory. Has used a rollator for >5 years now. At best could do about 60 yards at clip about 2 months ago. Not recently. Primarily exists within his own apartment. Has to use support or hold onto furniture to move around in his own apartment. Presently not independently ambulatory.    Malnourished. Prealbumin noted to be 5.    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  No CHF or embolic events.    On this basis the patient is a frail, poorly ambulatory, malnourished, immunocompromised 82 y/0 with recurrent or persistent mitral endocarditis.  This is within a time span of about 2 years, without resolution of his chronically recurrent UTIs.    Reoperative cardiac surgery in these circumstances would be associated with significant risks including need for hemodialysis, ventilator-dependance, and other life support, not to mention that of death.    In the best of circumstances, a successful reoperative mitral valve replacement would likely be associated with the same fate of recurrent/persistent infection.  This would be a futile proposition.    Issues discussed with the patient in great detail. Recommend Palliative Care consultation. Pt seen. Chart reviewed.  NOHEMI images viewed.    82 y/0 frail gentleman with HIV and chronic obstructive prostatic issues(unresolved).  Chronic self bladder catheterization.  Has renal insufficiency with Cr>3.  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    Pt is not very ambulatory. Has used a rollator for >5 years now. At best could do about 60 yards at clip about 2 months ago. Not recently. Primarily exists within his own apartment. Has to use support or hold onto furniture to move around in his own apartment. Presently not independently ambulatory. Poor functional status.    Malnourished. Prealbumin noted to be 5.    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  No CHF or embolic events.    On this basis the patient is a frail, poorly ambulatory, malnourished, immunocompromised 82 y/0 with recurrent or persistent mitral endocarditis.  This is within a time span of about 2 years, without resolution of his chronically recurrent UTIs.    Reoperative cardiac surgery in these circumstances would be associated with significant risks including need for hemodialysis, ventilator-dependance, and other life support, not to mention that of death.    In the best of circumstances, a successful reoperative mitral valve replacement would likely be associated with the same fate of recurrent/persistent infection.  This would be a futile proposition.    Issues discussed with the patient in great detail. Recommend Palliative Care consultation.

## 2022-12-02 NOTE — PROGRESS NOTE ADULT - ASSESSMENT
82M  HIV  SUDHEER on CKD  Endocarditis s/p bioprosthetic MVR 2020  Atrial flutter s/p ablation and SHELLIE clip    Admitted with fever, chills, and elevated WBC  Staph lugdunensis bacteremia    Rhythm showed sinus madonna with Mobitz I and pAF SVR    TTE as above concerning for bioprosthetic MV dysfunction  NOHEMI with evidence of bioprosthetic MV endocarditis    Recommend:      35 minutes spent on total patient encounter. More than 50% of the encounter was spent counseling and/or coordination care. The necessity of the time spent on this encounter was due to: time spent evaluating the patient as well as time spent reviewing labs, imaging, and discussing the case with a multidisciplinary team. 82M  HIV  SUDHEER on CKD  Endocarditis s/p bioprosthetic MVR 2020  Atrial flutter s/p ablation and SHELLIE clip    Admitted with fever, chills, and elevated WBC  Staph lugdunensis bacteremia    Rhythm showed sinus madonna with Mobitz I and pAF SVR    TTE as above concerning for bioprosthetic MV dysfunction  NOHEMI with evidence of bioprosthetic MV endocarditis    Recommend:  Given IE and pAF there are competing risks of thromboembolism and intracerebral hemorrhage. The patient has a CHADS-VASC of 2.    Consider transition from apixaban to heparin gtt (without bolus) at time that next dose of apixaban is due.    Appreciate CTS and EP input.    35 minutes spent on total patient encounter. More than 50% of the encounter was spent counseling and/or coordination care. The necessity of the time spent on this encounter was due to: time spent evaluating the patient as well as time spent reviewing labs, imaging, and discussing the case with a multidisciplinary team.

## 2022-12-02 NOTE — PROGRESS NOTE ADULT - SUBJECTIVE AND OBJECTIVE BOX
CC:  Bradycardia    Interval history:  NOHEMI shows evidence of bioprosthetic MV endocarditis.    Mr. Monsivais reports denies chest pain and dyspnea.    Vital Signs Last 24 Hrs  T(C): 36.4 (02 Dec 2022 11:20), Max: 37.4 (01 Dec 2022 23:45)  T(F): 97.5 (02 Dec 2022 11:20), Max: 99.3 (01 Dec 2022 23:45)  HR: 65 (02 Dec 2022 16:07) (44 - 92)  BP: 118/56 (02 Dec 2022 16:07) (111/46 - 183/67)  BP(mean): 106 (02 Dec 2022 09:28) (106 - 106)  RR: 18 (02 Dec 2022 16:07) (17 - 23)  SpO2: 95% (02 Dec 2022 16:07) (93% - 97%)    Physical Exam:  Awake, alert, appears comfortable  No JVD  CTAB  Irregular, no murmurs  Trace JENNIFER JON    Labs:  12-02    141  |  110<H>  |  72<H>  ----------------------------<  137<H>  4.7   |  18<L>  |  3.46<H>    Ca    8.1<L>      02 Dec 2022 06:21  Mg     2.7     12-02                          9.3    33.57 )-----------( 140      ( 02 Dec 2022 06:21 )             27.5     MEDICATIONS  (STANDING):  abacavir 300 milliGRAM(s) Oral two times a day  apixaban 2.5 milliGRAM(s) Oral two times a day  ascorbic acid 500 milliGRAM(s) Oral daily  aspirin enteric coated 81 milliGRAM(s) Oral daily  ceFAZolin   IVPB 2000 milliGRAM(s) IV Intermittent every 12 hours  chlorhexidine 2% Cloths 1 Application(s) Topical daily  doravirine 100 milliGRAM(s) Oral daily  famotidine    Tablet 20 milliGRAM(s) Oral daily  ferrous    sulfate 325 milliGRAM(s) Oral daily  folic acid 1 milliGRAM(s) Oral daily  influenza  Vaccine (HIGH DOSE) 0.7 milliLiter(s) IntraMuscular once  lamiVUDine- milliGRAM(s) Oral daily  multivitamin 1 Tablet(s) Oral daily  senna 2 Tablet(s) Oral at bedtime      TTE 11.30.2022  Normal LVF  RVE with decreased RVSF  Mod PH  Bioprosthetic MV with thickened leaflets and mean transmitral gradient of 7 mm Hg

## 2022-12-02 NOTE — CHART NOTE - NSCHARTNOTEFT_GEN_A_CORE
Hospitalist Note - Medicine Attending:  ========================================================    The patient was not seen by me today. He was away at the NO[facility]MI.   ID following - blood and urine cultures growing Staph lugdunensis - started on Ancef IV, vancomycin IV.   NOHEMI results pending. CT surgery following  Leukocytosis not improving.   F/up repeat blood culture.   Patient has capacity at this time. States he has not family or friends. CM is assisting him with identifying a health care proxy.     Roman Long MD, [facility]  Available on [employer] Teams

## 2022-12-02 NOTE — PHARMACOTHERAPY INTERVENTION NOTE - COMMENTS
CARLINE GARBER, 82y Male with Staphylococcus lugdunensis bacteremia, source possibly secondary to self-catheterization, empirically started on broad spectrum abx with vancomycin and cefepime. The mecA gene was not detected for the blood so I suggested to change abx to cefazolin monotherapy for targeted beta-lactam therapy of this organism, ID wanted to keep it broad until sensitivities came back and urine culture resulted.    Urine culture with S. lugdunensis  Blood culture showing cefazolin/oxacillin sensitive    Recommendation(s):  1) Discussed with ID, changed the patient to cefazolin. Based on his renal function, I suggested cefazolin 2 grams IV Q12H.  2) Monitor renal function and change dose accordingly    With kind regards,  Catarino Miller, PharmD, BCIDP  Infectious Diseases Clinical Pharmacist  Available on Microsoft Teams  .

## 2022-12-02 NOTE — PROGRESS NOTE ADULT - ASSESSMENT
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 Agarwal, BPH w/ frequent UTI's (self catheterizes) presenting to the ED with a UTI, + Staph lugdunensis bacteremia, UA+, urine cultures pending.    He reports that over the last 2 weeks he has had weakness, subjective fevers, +chills, + night sweats.   Initial ECG in ED appeared to have a Mobitz I, converted to course slow AF 40's, now back in Mobitz I w/o conversion pause. He is currently HDS, asymptomatic at this time, remains in Mobitz I 50's. He has been maintained off AAD and A/C, his rates in SR were previously 60-70's. He has been on Metoprolol 12.5qd which presently are on hold.   TTE on this admission revealing LVEF 60%, mod pHTN, RVE w/ decr RV systolic function, elevated MV gradients s/ p MVR.   CT revealing cystitis.  Overnight with rigors, febrile, c/o PRESCOTT this AM.     1) UTI, complicated. 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 If not, would favor leadless PPM given risk of infection w/ plan for rhythm control strategy thereafter   -Remains in Mobitz I off AVNB. Continue to monitor on tele. Ambulate patient to assess rates with ambulation and symptoms.   -Hold AVNB for now   --NOAC for new onset AF/ history of AFL, Eliquis 2.5mg bid   -Correct lytes for K>4 and Mg >2   -F/u Urine Cx, ID following. 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 Agarwal, BPH w/ frequent UTI's (self catheterizes) presenting to the ED with a UTI, + Staph lugdunensis bacteremia, UA+, urine cultures pending.    He reports that over the last 2 weeks he has had weakness, subjective fevers, +chills, + night sweats.   Initial ECG in ED appeared to have a Mobitz I, converted to course slow AF 40's, now back in Mobitz I w/o conversion pause. He is currently HDS, asymptomatic at this time, remains in Mobitz I 50's. He has been maintained off AAD and A/C, his rates in SR were previously 60-70's. He has been on Metoprolol 12.5qd which presently are on hold.   TTE on this admission revealing LVEF 60%, mod pHTN, RVE w/ decr RV systolic function, elevated MV gradients s/ p MVR.   CT revealing cystitis.  Overnight with rigors, febrile, c/o PRESCOTT this AM.     1) UTI, complicated. 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 If not, would favor leadless PPM given risk of infection w/ plan for rhythm control strategy thereafter   -Remains in Mobitz I off AVNB. Continue to monitor on tele. Ambulate patient to assess rates with ambulation and symptoms.   -Hold AVNB for now   --NOAC for new onset AF/ history of AFL, Eliquis 2.5mg bid   -Correct lytes for K>4 and Mg >2   -F/u Urine Cx, ID following. With persistent leukocytosis, fevers, on IV cefepime     Addendum: s/p NOHEMI revealing bioprosthetic MV endocarditis, c/f early root abscess   F/u CTS recs, if surgical candidate would request that epicardial leads placed at time of surgery   If medical management, will plan for leadless PPM

## 2022-12-02 NOTE — PRE-ANESTHESIA EVALUATION ADULT - NSANTHPMHFT_GEN_ALL_CORE
Rule out endocarditis  Moderate pulmonary HTN Rule out endocarditis  Moderate pulmonary HTN  Atrial fibrillation  First degree heart block Rule out endocarditis  Moderate pulmonary HTN  Atrial fibrillation  First degree heart block  Patient denies coronary stent placement

## 2022-12-02 NOTE — PRE-ANESTHESIA EVALUATION ADULT - NSRADCARDRESULTSFT_GEN_ALL_CORE
EF (Visual Estimate): 65 %  Doppler Peak Velocity (m/sec): AoV=1.9  ------------------------------------------------------------------------  Observations:  Mitral Valve: Bioprosthetic mitral valve replacement,  thickened leaflets. No mitral valve regurgitation seen.  Peak mitral valve gradient equals 38 mm Hg, mean  transmitral valve gradient equals 7 mm Hg, which is  elevated even in the setting of a bioprosthetic mitral  valve replacement (heart rate 60).  Aortic Valve/Aorta: Normal trileaflet aortic valve. Peak  transaortic valve gradient equals 14 mm Hg, mean  transaortic valve gradient equals 6 mm Hg, aortic valve  velocity time integral equals 35 cm. Peak left ventricular  outflow tract gradient equals 6 mm Hg, mean gradient is  equal to 3 mm Hg, LVOT velocity time integral equals 23 cm.  Aortic Root: 3.7 cm.  Left Atrium: Normal left atrium.  Left Ventricle: Normal left ventricular systolic function.  Septal motion consistent with cardiac surgery.  Indeterminate diastolic function.  Right Heart: Mild right atrial enlargement. Right  ventricular enlargement with decreased right ventricular  systolic function. Normal tricuspid valve. Mild tricuspid  regurgitation. Normal pulmonic valve.  Pericardium/Pleura: Normal pericardium with no pericardial  effusion.  Hemodynamic: Estimated right atrial pressure is 8 mm Hg.  Estimated right ventricular systolic pressure equals 57 mm  Hg, assuming right atrial pressure equals 8 mm Hg,  consistent with moderate pulmonary hypertension.  ------------------------------------------------------------------------  Conclusions:  1. Bioprosthetic mitral valve replacement, thickened  leaflets. Peak mitral valve gradient equals 38 mm Hg, mean  transmitral valve gradient equals 7 mm Hg, which is  elevated even in the setting of a bioprosthetic mitral  valve replacement (heart rate 60).  2. Normal left ventricular systolic function. Septal motion  consistent with cardiac surgery.  3. Right ventricular enlargement with decreased right  ventricular systolic function.  4. Estimated pulmonary artery systolic pressure equals 57  mm Hg, assuming right atrial pressure equals 8 mm Hg,  consistent with moderate pulmonary pressures.

## 2022-12-02 NOTE — PROGRESS NOTE ADULT - SUBJECTIVE AND OBJECTIVE BOX
24H hour events: Alex MAJOR 40-50's     MEDICATIONS:  apixaban 2.5 milliGRAM(s) Oral two times a day  aspirin enteric coated 81 milliGRAM(s) Oral daily    abacavir 300 milliGRAM(s) Oral two times a day  cefepime   IVPB 1000 milliGRAM(s) IV Intermittent every 24 hours  doravirine 100 milliGRAM(s) Oral daily  lamiVUDine- milliGRAM(s) Oral daily      acetaminophen     Tablet .. 650 milliGRAM(s) Oral every 6 hours PRN    famotidine    Tablet 20 milliGRAM(s) Oral daily  senna 2 Tablet(s) Oral at bedtime      ascorbic acid 500 milliGRAM(s) Oral daily  chlorhexidine 2% Cloths 1 Application(s) Topical daily  ferrous    sulfate 325 milliGRAM(s) Oral daily  folic acid 1 milliGRAM(s) Oral daily  influenza  Vaccine (HIGH DOSE) 0.7 milliLiter(s) IntraMuscular once  multivitamin 1 Tablet(s) Oral daily      REVIEW OF SYSTEMS:  See HPI, otherwise ROS negative.    PHYSICAL EXAM:  T(C): 36.9 (12-02-22 @ 09:28), Max: 37.4 (12-01-22 @ 23:45)  HR: 55 (12-02-22 @ 09:28) (44 - 92)  BP: 117/58 (12-02-22 @ 09:28) (111/46 - 183/67)  RR: 17 (12-02-22 @ 09:28) (17 - 23)  SpO2: 94% (12-02-22 @ 09:28) (93% - 99%)  Wt(kg): --  I&O's Summary    01 Dec 2022 07:01  -  02 Dec 2022 07:00  --------------------------------------------------------  IN: 400 mL / OUT: 2050 mL / NET: -1650 mL    02 Dec 2022 07:01  -  02 Dec 2022 11:15  --------------------------------------------------------  IN: 0 mL / OUT: 0 mL / NET: 0 mL        Appearance: Alert. NAD	  Cardiovascular: +S1S2 RRR no m/g/r  Respiratory: CTA B/L	  Psychiatry: A & O x 3, Mood & affect appropriate  Gastrointestinal:  Soft, NT. ND. +BS	  Skin: MSI c/d/i well healed   Neurologic: Non-focal  Extremities: No edema BLE  Vascular: Peripheral pulses palpable 2+ bilaterally      LABS:	 	    CBC Full  -  ( 02 Dec 2022 06:21 )  WBC Count : 33.57 K/uL  Hemoglobin : 9.3 g/dL  Hematocrit : 27.5 %  Platelet Count - Automated : 140 K/uL  Mean Cell Volume : 104.2 fl  Mean Cell Hemoglobin : 35.2 pg  Mean Cell Hemoglobin Concentration : 33.8 gm/dL  Auto Neutrophil # : x  Auto Lymphocyte # : x  Auto Monocyte # : x  Auto Eosinophil # : x  Auto Basophil # : x  Auto Neutrophil % : x  Auto Lymphocyte % : x  Auto Monocyte % : x  Auto Eosinophil % : x  Auto Basophil % : x    12-02    141  |  110<H>  |  72<H>  ----------------------------<  137<H>  4.7   |  18<L>  |  3.46<H>  12-01    139  |  106  |  80<H>  ----------------------------<  186<H>  4.5   |  19<L>  |  3.62<H>    Ca    8.1<L>      02 Dec 2022 06:21  Ca    8.2<L>      01 Dec 2022 10:48  Mg     2.7     12-02        proBNP: Serum Pro-Brain Natriuretic Peptide: 56443 pg/mL (12-02 @ 06:21)    Lipid Profile:   HgA1c:   TSH: Thyroid Stimulating Hormone, Serum: 2.52 uIU/mL (12-02 @ 06:24)        TELEMETRY: Alex I 40-50's   	    ECG:  	    TTE: < from: Transthoracic Echocardiogram (11.30.22 @ 13:37) >  Dimensions:    Normal Values:  LA:     3.9    2.0 - 4.0 cm  Ao:     3.7    2.0 - 3.8 cm  SEPTUM:        0.6 - 1.2 cm  PWT:           0.6 - 1.1 cm  LVIDd:       3.0 - 5.6 cm  LVIDs:         1.8 - 4.0 cm  EF (Visual Estimate): 65 %  Doppler Peak Velocity (m/sec): AoV=1.9  ------------------------------------------------------------------------  Observations:  Mitral Valve: Bioprosthetic mitral valve replacement,  thickened leaflets. No mitral valve regurgitation seen.  Peak mitral valve gradient equals 38 mm Hg, mean  transmitral valve gradient equals 7 mm Hg, which is  elevated even in the setting of a bioprosthetic mitral  valve replacement (heart rate 60).  Aortic Valve/Aorta: Normal trileaflet aortic valve. Peak  transaortic valve gradient equals 14 mm Hg, mean  transaortic valve gradient equals 6 mm Hg, aortic valve  velocity time integral equals 35 cm. Peak left ventricular  outflow tractgradient equals 6 mm Hg, mean gradient is  equal to 3 mm Hg, LVOT velocity time integral equals 23 cm.  Aortic Root: 3.7 cm.  Left Atrium: Normal left atrium.  Left Ventricle: Normal left ventricular systolic function.  Septal motion consistent with cardiac surgery.  Indeterminate diastolic function.  Right Heart: Mild right atrial enlargement. Right  ventricular enlargement with decreased right ventricular  systolic function. Normal tricuspid valve. Mild tricuspid  regurgitation. Normal pulmonicvalve.  Pericardium/Pleura: Normal pericardium with no pericardial  effusion.  Hemodynamic: Estimated right atrial pressure is 8 mm Hg.  Estimated right ventricular systolic pressure equals 57 mm  Hg, assuming right atrial pressure equals 8 mm Hg,  consistent with moderate pulmonary hypertension.  ------------------------------------------------------------------------  Conclusions:  1. Bioprosthetic mitral valve replacement, thickened  leaflets. Peak mitral valve gradient equals 38 mm Hg, mean  transmitral valve gradient equals 7 mm Hg, which is  elevated even in the setting of a bioprosthetic mitral  valve replacement (heart rate 60).  2. Normal left ventricular systolic function. Septal motion  consistent with cardiac surgery.  3. Right ventricular enlargement with decreased right  ventricular systolic function.  4. Estimated pulmonary artery systolic pressure equals 57  mm Hg, assuming right atrial pressure equals 8 mm Hg,  consistent with moderate pulmonary pressures.    < end of copied text >      < from: CT Abdomen and Pelvis No Cont (12.01.22 @ 14:23) >  IMPRESSION:  Limited noncontrast exam.    Circumferential bladder wall thickening and perivesicular fat stranding,   concerning for cystitis.    Bilateral perinephric fat stranding, nonspecific although can be seen   with urinary tract infection.    Small  bilateral pleural effusions.    < end of copied text >         24H hour events: Alex MAJOR 40-50's     MEDICATIONS:  apixaban 2.5 milliGRAM(s) Oral two times a day  aspirin enteric coated 81 milliGRAM(s) Oral daily    abacavir 300 milliGRAM(s) Oral two times a day  cefepime   IVPB 1000 milliGRAM(s) IV Intermittent every 24 hours  doravirine 100 milliGRAM(s) Oral daily  lamiVUDine- milliGRAM(s) Oral daily      acetaminophen     Tablet .. 650 milliGRAM(s) Oral every 6 hours PRN    famotidine    Tablet 20 milliGRAM(s) Oral daily  senna 2 Tablet(s) Oral at bedtime      ascorbic acid 500 milliGRAM(s) Oral daily  chlorhexidine 2% Cloths 1 Application(s) Topical daily  ferrous    sulfate 325 milliGRAM(s) Oral daily  folic acid 1 milliGRAM(s) Oral daily  influenza  Vaccine (HIGH DOSE) 0.7 milliLiter(s) IntraMuscular once  multivitamin 1 Tablet(s) Oral daily      REVIEW OF SYSTEMS:  See HPI, otherwise ROS negative.    PHYSICAL EXAM:  T(C): 36.9 (12-02-22 @ 09:28), Max: 37.4 (12-01-22 @ 23:45)  HR: 55 (12-02-22 @ 09:28) (44 - 92)  BP: 117/58 (12-02-22 @ 09:28) (111/46 - 183/67)  RR: 17 (12-02-22 @ 09:28) (17 - 23)  SpO2: 94% (12-02-22 @ 09:28) (93% - 99%)  Wt(kg): --  I&O's Summary    01 Dec 2022 07:01  -  02 Dec 2022 07:00  --------------------------------------------------------  IN: 400 mL / OUT: 2050 mL / NET: -1650 mL    02 Dec 2022 07:01  -  02 Dec 2022 11:15  --------------------------------------------------------  IN: 0 mL / OUT: 0 mL / NET: 0 mL        Appearance: Alert. NAD	  Cardiovascular: +S1S2 RRR no m/g/r  Respiratory: CTA B/L	  Psychiatry: A & O x 3, Mood & affect appropriate  Gastrointestinal:  Soft, NT. ND. +BS	  Skin: MSI c/d/i well healed   Neurologic: Non-focal  Extremities: No edema BLE  Vascular: Peripheral pulses palpable 2+ bilaterally      LABS:	 	    CBC Full  -  ( 02 Dec 2022 06:21 )  WBC Count : 33.57 K/uL  Hemoglobin : 9.3 g/dL  Hematocrit : 27.5 %  Platelet Count - Automated : 140 K/uL  Mean Cell Volume : 104.2 fl  Mean Cell Hemoglobin : 35.2 pg  Mean Cell Hemoglobin Concentration : 33.8 gm/dL  Auto Neutrophil # : x  Auto Lymphocyte # : x  Auto Monocyte # : x  Auto Eosinophil # : x  Auto Basophil # : x  Auto Neutrophil % : x  Auto Lymphocyte % : x  Auto Monocyte % : x  Auto Eosinophil % : x  Auto Basophil % : x    12-02    141  |  110<H>  |  72<H>  ----------------------------<  137<H>  4.7   |  18<L>  |  3.46<H>  12-01    139  |  106  |  80<H>  ----------------------------<  186<H>  4.5   |  19<L>  |  3.62<H>    Ca    8.1<L>      02 Dec 2022 06:21  Ca    8.2<L>      01 Dec 2022 10:48  Mg     2.7     12-02        proBNP: Serum Pro-Brain Natriuretic Peptide: 53615 pg/mL (12-02 @ 06:21)    Lipid Profile:   HgA1c:   TSH: Thyroid Stimulating Hormone, Serum: 2.52 uIU/mL (12-02 @ 06:24)        TELEMETRY: Alex I 40-50's   	    ECG:  	    TTE: < from: Transthoracic Echocardiogram (11.30.22 @ 13:37) >  Dimensions:    Normal Values:  LA:     3.9    2.0 - 4.0 cm  Ao:     3.7    2.0 - 3.8 cm  SEPTUM:        0.6 - 1.2 cm  PWT:           0.6 - 1.1 cm  LVIDd:       3.0 - 5.6 cm  LVIDs:         1.8 - 4.0 cm  EF (Visual Estimate): 65 %  Doppler Peak Velocity (m/sec): AoV=1.9  ------------------------------------------------------------------------  Observations:  Mitral Valve: Bioprosthetic mitral valve replacement,  thickened leaflets. No mitral valve regurgitation seen.  Peak mitral valve gradient equals 38 mm Hg, mean  transmitral valve gradient equals 7 mm Hg, which is  elevated even in the setting of a bioprosthetic mitral  valve replacement (heart rate 60).  Aortic Valve/Aorta: Normal trileaflet aortic valve. Peak  transaortic valve gradient equals 14 mm Hg, mean  transaortic valve gradient equals 6 mm Hg, aortic valve  velocity time integral equals 35 cm. Peak left ventricular  outflow tractgradient equals 6 mm Hg, mean gradient is  equal to 3 mm Hg, LVOT velocity time integral equals 23 cm.  Aortic Root: 3.7 cm.  Left Atrium: Normal left atrium.  Left Ventricle: Normal left ventricular systolic function.  Septal motion consistent with cardiac surgery.  Indeterminate diastolic function.  Right Heart: Mild right atrial enlargement. Right  ventricular enlargement with decreased right ventricular  systolic function. Normal tricuspid valve. Mild tricuspid  regurgitation. Normal pulmonicvalve.  Pericardium/Pleura: Normal pericardium with no pericardial  effusion.  Hemodynamic: Estimated right atrial pressure is 8 mm Hg.  Estimated right ventricular systolic pressure equals 57 mm  Hg, assuming right atrial pressure equals 8 mm Hg,  consistent with moderate pulmonary hypertension.  ------------------------------------------------------------------------  Conclusions:  1. Bioprosthetic mitral valve replacement, thickened  leaflets. Peak mitral valve gradient equals 38 mm Hg, mean  transmitral valve gradient equals 7 mm Hg, which is  elevated even in the setting of a bioprosthetic mitral  valve replacement (heart rate 60).  2. Normal left ventricular systolic function. Septal motion  consistent with cardiac surgery.  3. Right ventricular enlargement with decreased right  ventricular systolic function.  4. Estimated pulmonary artery systolic pressure equals 57  mm Hg, assuming right atrial pressure equals 8 mm Hg,  consistent with moderate pulmonary pressures.    < end of copied text >      < from: CT Abdomen and Pelvis No Cont (12.01.22 @ 14:23) >  IMPRESSION:  Limited noncontrast exam.    Circumferential bladder wall thickening and perivesicular fat stranding,   concerning for cystitis.    Bilateral perinephric fat stranding, nonspecific although can be seen   with urinary tract infection.    Small  bilateral pleural effusions.    < end of copied text >    < from: Transesophageal Echocardiogram w/o TTE (12.02.22 @ 16:18) >  Observations:  Mitral Valve: Bioprosthetic mitral valve replacement.  Echodense material seen coating the prosthetic leaflets  with mobile components measuring up to approximately 1.0 cm  in length seen on the atrial side of the valve. Findings  are consistent with endocarditis. There is thickening seen  along the intervalvular fibrosa extending to the aortic  root suspicious for infection/early abscess. Mild mitral  regurgitation. Mean transmitral valve gradient equals 6 mm  Hg, which is elevated even in the setting of a  bioprosthetic mitral valve replacement. (HRabout 60s bpm)  Aortic Valve/Aorta: Normal trileaflet aortic valve.No  aortic valve regurgitation seen.  Normal aortic root, aortic arch and descending thoracic  aorta. Mild-moderate atheroma.  Left Atrium: Patient status-post ligation of the left  atrial appendage. No left atrial thrombus.  Left Ventricle: Normal left ventricular systolic function.  No segmental wall motion abnormalities. Septal motion  consistent with prior cardiac surgery. Mobile echodensity  seen within the LV cavity consistent with retained mitral  chordal apparatus. Normal left ventricularinternal  dimensions and wall thicknesses.  Right Heart: Normal right atrium. Normal right ventricular  size and function. Normal tricuspid valve. Mild-moderate  tricuspid regurgitation. Normal pulmonic valve. Mild  pulmonic regurgitation.  Pericardium/Pleura: Normal pericardium with no pericardial  effusion.  Left pleural effusion.  Hemodynamic: Estimated right atrial pressure is 8 mm Hg.  Estimated right ventricular systolic pressure equals 49 mm  Hg, assuming right atrial pressure equals 8 mm Hg,  consistent with mild pulmonary hypertension. Patient  status-post surgical repair of interatrial septum. Agitated  saline injection and color flow doppler demonstrates no  evidence of a patent foramen ovale.  ------------------------------------------------------------------------  Conclusions:  1. Bioprosthetic mitral valve replacement. Echodense  material seen coating the prosthetic leaflets with mobile  components measuring up to approximately 1.0 cm in length  seen on the atrial side of the valve. 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 There is thickening seen  along the intervalvular fibrosa extending to the aortic  root suspicious for infection/early abscess. Mild mitral  regurgitation. Mean transmitral valve gradient equals 6 mm  Hg, which is elevated even in the setting of a  bioprosthetic mitral valve replacement. (HRabout 60s bpm)  2. Normal trileaflet aortic valve. No aortic valve  regurgitation seen.  3. Normal left ventricular systolic function. No segmental  wall motion abnormalities. Septal motion consistent with  prior cardiac surgery. Mobile echodensity seen within the  LV cavity consistent with retained mitral chordal  apparatus.  4. Normal right ventricular size and function.  5. Left pleural effusion.    < end of copied text >

## 2022-12-02 NOTE — PRE-ANESTHESIA EVALUATION ADULT - NSANTHAIRWAYFT_ENT_ALL_CORE
FROM  [facility]  Gomez  TM distance >2 finger breadths  Neck circumference <17cm  Interincisor distance >2 finger breadths

## 2022-12-02 NOTE — PROGRESS NOTE ADULT - ASSESSMENT
83yo M w/ PMHx of HIV (viral load undetectable 08/22), Afib s/p DVVC and ablation (not on AC), MV endocarditis s/p bioprosthetic valve replacement, BPH (self catheterizes), frequent UTIs, presents with weakness and rigor at home.    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  Presented with weakness and rigor  Febrile, with significant leukocytosis on admission  UA showed pyuria, UCx + 100,000 staph lugdunensis  Previous UCx: Klebsiella pneumoniae with associated bacteremia (pan-sensitive)  BCx 11/29 4/4 bottles staph lugdunensis  CXR no PNA  TTE 11/29 thicken bioprosthetic MV leaflets, no vegetation  CT Ab/pel showed cystitis and bilateral perinephric stranding  US showed moderate hydro on left kidney    Antibiotics:  Cefepime 11/29 ->   Meropenem 11/30  Vancomycin 11/30 ->    Overall high grade Staph lugdunensis bacteremia, possibly urinary source from self cath?  Imaging suggest pyelonephritis  TTE is reassuring but MV leaflets are thickened, will need NOHEMI to rule out prosthetic valve endocarditis  If can de-escalate to cefazolin if no resistant gene is detected  Continue ARTs and dose adjust lamivudine if CrCl continues to get worse      IMPRESSION:  Staph lugdunensis bacteremia  Acute pyelonephritis  HIV on ARTs  Hx CoNS endocarditis  Fever  Leukocytosis    RECOMMENDATIONS:  - D/C cefepime; start IV cefazolin 1gm q12hrs  - Continue PO abacavir 200mg q12hrs, lamivudine 100mg q24hrs and doravirine 1tab q24hrs  - Repeat 2 sets of blood culture q48hrs until clearance  - CTS and cardiology eval for NOHEMI  - Trend WBC, fever curve, transaminases, creatinine daily  - Will continue to follow      * THIS IS AN INCOMPLETE NOTE. FINAL RECOMMENDATION WILL BE UPDATED AFTER DISCUSSING WITH ATTENDING *   81yo M w/ PMHx of HIV (viral load undetectable 08/22), Afib s/p DVVC and ablation (not on AC), MV endocarditis s/p bioprosthetic valve replacement, BPH (self catheterizes), frequent UTIs, presents with weakness and rigor at home.    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  Presented with weakness and rigor  Febrile, with significant leukocytosis on admission  UA showed pyuria, UCx + 100,000 staph lugdunensis  Previous UCx: Klebsiella pneumoniae with associated bacteremia (pan-sensitive)  BCx 11/29 4/4 bottles staph lugdunensis  CXR no PNA  TTE 11/29 thicken bioprosthetic MV leaflets, no vegetation  CT Ab/pel showed cystitis and bilateral perinephric stranding  US showed moderate hydro on left kidney    Antibiotics:  Cefepime 11/29 ->   Meropenem 11/30  Vancomycin 11/30 ->    Overall high grade Staph lugdunensis bacteremia, possibly urinary source from self cath?  Imaging suggest pyelonephritis  TTE is reassuring but MV leaflets are thickened, will need NOHEMI to rule out prosthetic valve endocarditis  If can de-escalate to cefazolin if no resistant gene is detected  Continue ARTs and dose adjust lamivudine if CrCl continues to get worse      IMPRESSION:  Staph lugdunensis bacteremia  Acute pyelonephritis  HIV on ARTs  Hx CoNS endocarditis  Fever  Leukocytosis    RECOMMENDATIONS:  - Vancomycin level 8.2 today, will give 1 dose of vancomycin 1000mg pending sensitivity  - D/C cefepime; add IV cefazolin 2gm q12hrs  - Continue PO abacavir 200mg q12hrs, lamivudine 100mg q24hrs and doravirine 1tab q24hrs  - Repeat 2 sets of blood culture q48hrs until clearance  - Follow up NOHEMI  - Trend WBC, fever curve, transaminases, creatinine daily  - Will continue to follow      * THIS IS AN INCOMPLETE NOTE. FINAL RECOMMENDATION WILL BE UPDATED AFTER DISCUSSING WITH ATTENDING *   81yo M w/ PMHx of HIV (viral load undetectable 08/22), Afib s/p DVVC and ablation (not on AC), MV endocarditis s/p bioprosthetic valve replacement, BPH (self catheterizes), frequent UTIs, presents with weakness and rigor at home.    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 2020  Presented with weakness and rigor  Febrile, with significant leukocytosis on admission  UA showed pyuria, UCx + 100,000 staph lugdunensis  BCx 11/29 4/4 bottles staph lugdunensis  CXR no PNA  TTE 11/29 thicken bioprosthetic MV leaflets, no vegetation  CT Ab/pel showed cystitis and bilateral perinephric stranding  US showed moderate hydro on left kidney  NOHEMI 12/2 consistent with 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  If can de-escalate to cefazolin now staph lugdunensis is oxacillin sensitive  Continue ARTs and dose adjust lamivudine if CrCl continues to get worse      IMPRESSION:  Prosthetic MV endocarditis  Staph lugdunensis bacteremia  Acute pyelonephritis  HIV on ARTs  Hx CoNS endocarditis  Fever  Leukocytosis    RECOMMENDATIONS:  - D/C cefepime and vancomycin; start IV cefazolin 2gm q12hrs  - Repeat 2 sets of blood culture q48hrs until clearance  - CTS follow up for possible surgical intervention  - Continue PO abacavir 200mg q12hrs, lamivudine 100mg q24hrs and doravirine 1tab q24hrs  - Trend WBC, fever curve, transaminases, creatinine daily  - Will continue to follow      Patient is seen and examined with attending and case is discussed with primary team      Kiana Gerber D.O.  PGY-5 Infectious Diseases Fellow  Please contact me via page or text through [employer] Teams  If after 5PM or on weekends, please call [phone]

## 2022-12-02 NOTE — PROGRESS NOTE ADULT - SUBJECTIVE AND OBJECTIVE BOX
INFECTIOUS DISEASE FOLLOW UP NOTE:    Interval History/ROS: Patient is a 82y old  Male who presents with a chief complaint of UTI (01 Dec 2022 18:55)      Overnight events:    REVIEW OF SYSTEMS:  CONSTITUTIONAL: No fever or chills  HEENT: No sore throat  RESPIRATORY: No cough, no shortness of breath  CARDIOVASCULAR: No chest pain or palpitations  GASTROINTESTINAL: No abdominal or epigastric pain  GENITOURINARY: No dysuria  NEUROLOGICAL: No headache/dizziness  MSK: No joint pain, erythema, or swelling; no back pain  SKIN: No itching, rashes  All other ROS negative except noted above    Prior hospital charts reviewed [Yes]  Primary team notes reviewed [Yes]  Other consultant notes reviewed [Yes]    Allergies:  No Known Allergies      ANTIMICROBIALS:   abacavir 300 two times a day  cefepime   IVPB 1000 every 24 hours  doravirine 100 daily  lamiVUDine- daily      OTHER MEDS: MEDICATIONS  (STANDING):  acetaminophen     Tablet .. 650 every 6 hours PRN  apixaban 2.5 two times a day  aspirin enteric coated 81 daily  famotidine    Tablet 20 daily  influenza  Vaccine (HIGH DOSE) 0.7 once  senna 2 at bedtime      Vital Signs Last 24 Hrs  T(F): 98.4 (12-02-22 @ 09:15), Max: 102.1 (11-29-22 @ 19:20)    Vital Signs Last 24 Hrs  HR: 55 (12-02-22 @ 09:28) (44 - 92)  BP: 117/58 (12-02-22 @ 09:28) (111/46 - 183/67)  RR: 17 (12-02-22 @ 09:28)  SpO2: 94% (12-02-22 @ 09:28) (93% - 99%)  Wt(kg): --    EXAM:  GENERAL: NAD, lying in bed comfortably  HEAD: No head lesions  EYES: Conjunctiva pink and cornea white  ENT: Normal external ears and nose, no discharges; moist mucous membranes  NECK: Supple, nontender to palpation  CHEST/LUNG: Clear to auscultation bilaterally  HEART: S1 S2  ABDOMEN: Soft, nontender, nondistended; normoactive bowel sounds  EXTREMITIES: No clubbing, cyanosis, or petal edema  NERVOUS SYSTEM: Alert and oriented to person, time, place and situation, speech clear. No focal deficits   MSK: No joint erythema, swelling or pain  SKIN: No rashes or lesions, no superficial thrombophlebitis    Labs:                        9.3    33.57 )-----------( 140      ( 02 Dec 2022 06:21 )             27.5     12-02    141  |  110<H>  |  72<H>  ----------------------------<  137<H>  4.7   |  18<L>  |  3.46<H>    Ca    8.1<L>      02 Dec 2022 06:21  Mg     2.7     12-02        WBC Trend:  WBC Count: 33.57 (12-02-22 @ 06:21)  WBC Count: 32.24 (12-01-22 @ 10:48)  WBC Count: 30.91 (11-30-22 @ 03:49)  WBC Count: 27.23 (11-29-22 @ 20:47)      Creatine Trend:  Creatinine, Serum: 3.46 (12-02)  Creatinine, Serum: 3.62 (12-01)  Creatinine, Serum: 4.35 (11-30)  Creatinine, Serum: 4.44 (11-30)      Liver Biochemical Testing Trend:  Alanine Aminotransferase (ALT/SGPT): 24 (11-30)  Alanine Aminotransferase (ALT/SGPT): 23 (11-29)  Alanine Aminotransferase (ALT/SGPT): 23 (08-12)  Alanine Aminotransferase (ALT/SGPT): 27 (08-11)  Alanine Aminotransferase (ALT/SGPT): 17 (08-07)  Aspartate Aminotransferase (AST/SGOT): 32 (11-30-22 @ 03:49)  Aspartate Aminotransferase (AST/SGOT): 31 (11-29-22 @ 20:47)  Aspartate Aminotransferase (AST/SGOT): 18 (08-12-22 @ 07:19)  Aspartate Aminotransferase (AST/SGOT): 26 (08-11-22 @ 07:25)  Aspartate Aminotransferase (AST/SGOT): 19 (08-07-22 @ 07:43)  Bilirubin Total, Serum: 0.4 (11-30)  Bilirubin Total, Serum: 0.3 (11-29)  Bilirubin Total, Serum: 0.1 (08-12)  Bilirubin Total, Serum: 0.2 (08-11)  Bilirubin Total, Serum: 0.2 (08-07)      Trend LDH          MICROBIOLOGY:  Vancomycin Level, Trough: 8.2 (12-02 @ 06:21)    MRSA PCR Result.: NotDetec (12-01-22 @ 06:42)      Culture - Urine (collected 29 Nov 2022 20:45)  Source: Clean Catch Clean Catch (Midstream)  Preliminary Report:    >100,000 CFU/ml Staphylococcus lugdunensis    Culture - Blood (collected 29 Nov 2022 20:30)  Source: .Blood Blood-Peripheral  Preliminary Report:    Growth in aerobic and anaerobic bottles: Staphylococcus lugdunensis    See previous culture [id number]    Culture - Blood (collected 29 Nov 2022 20:19)  Source: .Blood Blood-Peripheral  Preliminary Report:    Growth in aerobic and anaerobic bottles: Staphylococcus lugdunensis    ***Blood Panel PCR results on this specimen are available    approximately 3 hours after the Gram stain result.***    Gram stain, PCR, and/or culture results may not always    correspond due to difference in methodologies.    ************************************************************    This PCR assay was performed by multiplex PCR. This    Assay tests for 66 bacterial and resistance gene targets.    Please refer to the [facility] test directory    at [email or web address] for details.  Organism: Blood Culture PCR  Organism: Blood Culture PCR    Sensitivities:      -  Staphylococcus lugdunensis: Detec      Method Type: PCR    Culture - Blood (collected 08 Aug 2022 14:46)  Source: .Blood Blood  Final Report:    No Growth Final    Culture - Blood (collected 08 Aug 2022 14:46)  Source: .Blood Blood  Final Report:    No Growth Final    Culture - Urine (collected 06 Aug 2022 14:39)  Source: Clean Catch Clean Catch (Midstream)  Final Report:    >100,000 CFU/ml Klebsiella pneumoniae  Organism: Klebsiella pneumoniae  Organism: Klebsiella pneumoniae    Sensitivities:      -  Amikacin: S <=16      -  Amoxicillin/Clavulanic Acid: S <=8/4      -  Ampicillin: R 16 These ampicillin results predict results for amoxicillin      -  Ampicillin/Sulbactam: S <=4/2 Enterobacter, Klebsiella aerogenes, Citrobacter, and Serratia may develop resistance during prolonged therapy (3-4 days)      -  Aztreonam: S <=4      -  Cefazolin: S <=2 (MIC_CL_COM_ENTERIC_CEFAZU) For uncomplicated UTI with K. pneumoniae, E. coli, or P. mirablis: FLORENCIA <=16 is sensitive and FLORENCIA >=32 is resistant. This also predicts results for oral agents cefaclor, cefdinir, cefpodoxime, cefprozil, cefuroxime axetil, cephalexin and locarbef for uncomplicated UTI. Note that some isolates may be susceptible to these agents while testing resistant to cefazolin.      -  Cefepime: S <=2      -  Cefoxitin: S <=8      -  Ceftriaxone: S <=1 Enterobacter, Klebsiella aerogenes, Citrobacter, and Serratia may develop resistance during prolonged therapy      -  Ciprofloxacin: S <=0.25      -  Ertapenem: S <=0.5      -  Gentamicin: S <=2      -  Imipenem: S <=1      -  Levofloxacin: S <=0.5      -  Meropenem: S <=1      -  Nitrofurantoin: S <=32 Should not be used to treat pyelonephritis      -  Piperacillin/Tazobactam: S <=8      -  Tigecycline: S <=2      -  Tobramycin: S <=2      -  Trimethoprim/Sulfamethoxazole: S <=0.5/9.5      Method Type: FLORENCIA    Culture - Blood (collected 06 Aug 2022 12:40)  Source: .Blood Blood-Peripheral  Final Report:    No Growth Final    Culture - Blood (collected 06 Aug 2022 11:45)  Source: .Blood Blood-Peripheral  Final Report:    Growth in aerobic bottle: Staphylococcus hominis    Coag Negative Staphylococcus    Single set isolate, possible contaminant. Contact    Microbiology if susceptibility testing clinically    indicated.    ***Blood Panel PCR results on this specimen are available    approximately 3 hours after the Gram stain result.***    Gram stain, PCR, and/or culture results may not always    correspond due to difference in methodologies.    ************************************************************    This PCR assay was performed by multiplex PCR. This    Assay tests for 66 bacterial and resistance gene targets.    Please refer to the [facility] test directory    at [email or web address] for details.  Organism: Blood Culture PCR  Organism: Blood Culture PCR    Sensitivities:      -  Coagulase negative Staphylococcus: Detec      Method Type: PCR        ABS CD4: 220 cells/uL (12-01-22 @ 05:49)  ABS CD4: 526 /uL (08-08-22 @ 04:57)  ABS CD4: 324 /uL (08-19-20 @ 03:26)    HIV-1 RNA Quantitative, Viral Load: NOT DET. copies/mL (12-01-22 @ 05:49)  HIV-1 Viral Load Result: NOT DET. (12-01-22 @ 05:49)  HIV-1 RNA Quantitative, Viral Load: NOT DET. copies/mL (08-08-22 @ 07:49)  HIV-1 Viral Load Result: NOT DET. (08-08-22 @ 07:49)  HIV-1 RNA Quantitative, Viral Load:   NOT DET. (08-19-20 @ 04:46)                Rapid RVP Result: NotDetec (11-29 @ 20:46)        Procalcitonin, Serum: 2.36 (11-29)            Serum Pro-Brain Natriuretic Peptide: 80362 (12-02)    Troponin T, High Sensitivity Result: 1223 (11-30)  Troponin T, High Sensitivity Result: 1150 (11-30)  Troponin T, High Sensitivity Result: 1137 (11-30)  Troponin T, High Sensitivity Result: 1115 (11-30)    Lactate, Blood: 1.3 (11-30 @ 03:49)  Blood Gas Venous - Lactate: 1.3 (11-30 @ 03:44)  Blood Gas Venous - Lactate: 1.3 (11-30 @ 00:00)  Blood Gas Venous - Lactate: 1.9 (11-29 @ 20:19)      RADIOLOGY:  imaging below personally reviewed   INFECTIOUS DISEASE FOLLOW UP NOTE:    Interval History/ROS: Patient is a 82y old  Male who presents with a chief complaint of UTI (01 Dec 2022 18:55)    Overnight events: Patient remains afebrile and hemodynamically stable overnight. Feels well overall. S/p NOHEMI    REVIEW OF SYSTEMS:  CONSTITUTIONAL: No fever or chills; + tired  HEENT: No sore throat  RESPIRATORY: No cough, no more shortness of breath  CARDIOVASCULAR: No chest pain or palpitations  GASTROINTESTINAL: No abdominal or epigastric pain  GENITOURINARY: No dysuria  NEUROLOGICAL: No headache/dizziness  MSK: No joint pain, erythema, or swelling; no back pain  SKIN: No itching, rashes    Allergies:  No Known Allergies      ANTIMICROBIALS:   abacavir 300 two times a day  cefepime   IVPB 1000 every 24 hours  doravirine 100 daily  lamiVUDine- daily      OTHER MEDS: MEDICATIONS  (STANDING):  acetaminophen     Tablet .. 650 every 6 hours PRN  apixaban 2.5 two times a day  aspirin enteric coated 81 daily  famotidine    Tablet 20 daily  influenza  Vaccine (HIGH DOSE) 0.7 once  senna 2 at bedtime      Vital Signs Last 24 Hrs  T(F): 98.4 (12-02-22 @ 09:15), Max: 102.1 (11-29-22 @ 19:20)    Vital Signs Last 24 Hrs  HR: 55 (12-02-22 @ 09:28) (44 - 92)  BP: 117/58 (12-02-22 @ 09:28) (111/46 - 183/67)  RR: 17 (12-02-22 @ 09:28)  SpO2: 94% (12-02-22 @ 09:28) (93% - 99%)  Wt(kg): --    EXAM:  GENERAL: NAD, lying in bed comfortably  HEAD: No head lesions  EYES: Conjunctiva pink and cornea white  ENT: Normal external ears and nose, no discharges; moist mucous membranes; no oral lesions  NECK: Supple, nontender to palpation; no JVD  CHEST/LUNG: Clear to auscultation bilaterally  HEART: S1 S2  ABDOMEN: Soft, nontender, nondistended; normoactive bowel sounds  EXTREMITIES: No clubbing, cyanosis, or petal edema  NERVOUS SYSTEM: Alert and oriented to person, time, place and situation, speech clear. No focal deficits   MSK: No joint erythema, swelling or pain  SKIN: No rashes or lesions, no superficial thrombophlebitis    Labs:                        9.3    33.57 )-----------( 140      ( 02 Dec 2022 06:21 )             27.5     12-02    141  |  110<H>  |  72<H>  ----------------------------<  137<H>  4.7   |  18<L>  |  3.46<H>    Ca    8.1<L>      02 Dec 2022 06:21  Mg     2.7     12-02        WBC Trend:  WBC Count: 33.57 (12-02-22 @ 06:21)  WBC Count: 32.24 (12-01-22 @ 10:48)  WBC Count: 30.91 (11-30-22 @ 03:49)  WBC Count: 27.23 (11-29-22 @ 20:47)      Creatine Trend:  Creatinine, Serum: 3.46 (12-02)  Creatinine, Serum: 3.62 (12-01)  Creatinine, Serum: 4.35 (11-30)  Creatinine, Serum: 4.44 (11-30)      Liver Biochemical Testing Trend:  Alanine Aminotransferase (ALT/SGPT): 24 (11-30)  Alanine Aminotransferase (ALT/SGPT): 23 (11-29)  Alanine Aminotransferase (ALT/SGPT): 23 (08-12)  Alanine Aminotransferase (ALT/SGPT): 27 (08-11)  Alanine Aminotransferase (ALT/SGPT): 17 (08-07)  Aspartate Aminotransferase (AST/SGOT): 32 (11-30-22 @ 03:49)  Aspartate Aminotransferase (AST/SGOT): 31 (11-29-22 @ 20:47)  Aspartate Aminotransferase (AST/SGOT): 18 (08-12-22 @ 07:19)  Aspartate Aminotransferase (AST/SGOT): 26 (08-11-22 @ 07:25)  Aspartate Aminotransferase (AST/SGOT): 19 (08-07-22 @ 07:43)  Bilirubin Total, Serum: 0.4 (11-30)  Bilirubin Total, Serum: 0.3 (11-29)  Bilirubin Total, Serum: 0.1 (08-12)  Bilirubin Total, Serum: 0.2 (08-11)  Bilirubin Total, Serum: 0.2 (08-07)      Trend LDH          MICROBIOLOGY:  Vancomycin Level, Trough: 8.2 (12-02 @ 06:21)    MRSA PCR Result.: NotDetec (12-01-22 @ 06:42)      Culture - Urine (collected 29 Nov 2022 20:45)  Source: Clean Catch Clean Catch (Midstream)  Preliminary Report:    >100,000 CFU/ml Staphylococcus lugdunensis    Culture - Blood (collected 29 Nov 2022 20:30)  Source: .Blood Blood-Peripheral  Preliminary Report:    Growth in aerobic and anaerobic bottles: Staphylococcus lugdunensis    See previous culture [id number]    Culture - Blood (collected 29 Nov 2022 20:19)  Source: .Blood Blood-Peripheral  Preliminary Report:    Growth in aerobic and anaerobic bottles: Staphylococcus lugdunensis    ***Blood Panel PCR results on this specimen are available    approximately 3 hours after the Gram stain result.***    Gram stain, PCR, and/or culture results may not always    correspond due to difference in methodologies.    ************************************************************    This PCR assay was performed by multiplex PCR. This    Assay tests for 66 bacterial and resistance gene targets.    Please refer to the [facility] test directory    at [email or web address] for details.  Organism: Blood Culture PCR  Organism: Blood Culture PCR    Sensitivities:      -  Staphylococcus lugdunensis: Detec      Method Type: PCR    Culture - Blood (collected 08 Aug 2022 14:46)  Source: .Blood Blood  Final Report:    No Growth Final    Culture - Blood (collected 08 Aug 2022 14:46)  Source: .Blood Blood  Final Report:    No Growth Final    Culture - Urine (collected 06 Aug 2022 14:39)  Source: Clean Catch Clean Catch (Midstream)  Final Report:    >100,000 CFU/ml Klebsiella pneumoniae  Organism: Klebsiella pneumoniae  Organism: Klebsiella pneumoniae    Sensitivities:      -  Amikacin: S <=16      -  Amoxicillin/Clavulanic Acid: S <=8/4      -  Ampicillin: R 16 These ampicillin results predict results for amoxicillin      -  Ampicillin/Sulbactam: S <=4/2 Enterobacter, Klebsiella aerogenes, Citrobacter, and Serratia may develop resistance during prolonged therapy (3-4 days)      -  Aztreonam: S <=4      -  Cefazolin: S <=2 (MIC_CL_COM_ENTERIC_CEFAZU) For uncomplicated UTI with K. pneumoniae, E. coli, or P. mirablis: FLORENCIA <=16 is sensitive and FLORENCIA >=32 is resistant. This also predicts results for oral agents cefaclor, cefdinir, cefpodoxime, cefprozil, cefuroxime axetil, cephalexin and locarbef for uncomplicated UTI. Note that some isolates may be susceptible to these agents while testing resistant to cefazolin.      -  Cefepime: S <=2      -  Cefoxitin: S <=8      -  Ceftriaxone: S <=1 Enterobacter, Klebsiella aerogenes, Citrobacter, and Serratia may develop resistance during prolonged therapy      -  Ciprofloxacin: S <=0.25      -  Ertapenem: S <=0.5      -  Gentamicin: S <=2      -  Imipenem: S <=1      -  Levofloxacin: S <=0.5      -  Meropenem: S <=1      -  Nitrofurantoin: S <=32 Should not be used to treat pyelonephritis      -  Piperacillin/Tazobactam: S <=8      -  Tigecycline: S <=2      -  Tobramycin: S <=2      -  Trimethoprim/Sulfamethoxazole: S <=0.5/9.5      Method Type: FLORENCIA    Culture - Blood (collected 06 Aug 2022 12:40)  Source: .Blood Blood-Peripheral  Final Report:    No Growth Final    Culture - Blood (collected 06 Aug 2022 11:45)  Source: .Blood Blood-Peripheral  Final Report:    Growth in aerobic bottle: Staphylococcus hominis    Coag Negative Staphylococcus    Single set isolate, possible contaminant. Contact    Microbiology if susceptibility testing clinically    indicated.    ***Blood Panel PCR results on this specimen are available    approximately 3 hours after the Gram stain result.***    Gram stain, PCR, and/or culture results may not always    correspond due to difference in methodologies.    ************************************************************    This PCR assay was performed by multiplex PCR. This    Assay tests for 66 bacterial and resistance gene targets.    Please refer to the [facility] test directory    at [email or web address] for details.  Organism: Blood Culture PCR  Organism: Blood Culture PCR    Sensitivities:      -  Coagulase negative Staphylococcus: Detec      Method Type: PCR        ABS CD4: 220 cells/uL (12-01-22 @ 05:49)  ABS CD4: 526 /uL (08-08-22 @ 04:57)  ABS CD4: 324 /uL (08-19-20 @ 03:26)    HIV-1 RNA Quantitative, Viral Load: NOT DET. copies/mL (12-01-22 @ 05:49)  HIV-1 Viral Load Result: NOT DET. (12-01-22 @ 05:49)  HIV-1 RNA Quantitative, Viral Load: NOT DET. copies/mL (08-08-22 @ 07:49)  HIV-1 Viral Load Result: NOT DET. (08-08-22 @ 07:49)  HIV-1 RNA Quantitative, Viral Load:   NOT DET. (08-19-20 @ 04:46)                Rapid RVP Result: NotDetec (11-29 @ 20:46)        Procalcitonin, Serum: 2.36 (11-29)            Serum Pro-Brain Natriuretic Peptide: 58134 (12-02)    Troponin T, High Sensitivity Result: 1223 (11-30)  Troponin T, High Sensitivity Result: 1150 (11-30)  Troponin T, High Sensitivity Result: 1137 (11-30)  Troponin T, High Sensitivity Result: 1115 (11-30)    Lactate, Blood: 1.3 (11-30 @ 03:49)  Blood Gas Venous - Lactate: 1.3 (11-30 @ 03:44)  Blood Gas Venous - Lactate: 1.3 (11-30 @ 00:00)  Blood Gas Venous - Lactate: 1.9 (11-29 @ 20:19)      RADIOLOGY:  imaging below personally reviewed    < from: Transesophageal Echocardiogram w/o TTE (12.02.22 @ 16:18) >  Conclusions:  1. Bioprosthetic mitral valve replacement. Echodense  material seen coating the prosthetic leaflets with mobile  components measuring up to approximately 1.0 cm in length  seen on the atrial side of the valve. 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 There is thickening seen  along the intervalvular fibrosa extending to the aortic  root suspicious for infection/early abscess. Mild mitral  regurgitation. Mean transmitral valve gradient equals 6 mm  Hg, which is elevated even in the setting of a  bioprosthetic mitral valve replacement. (HRabout 60s bpm)  2. Normal trileaflet aortic valve. No aortic valve  regurgitation seen.  3. Normal left ventricular systolic function. No segmental  wall motion abnormalities. Septal motion consistent with  prior cardiac surgery. Mobile echodensity seen within the  LV cavity consistent with retained mitral chordal  apparatus.  4. Normal right ventricular size and function.  5. Left pleural effusion.      < end of copied text >

## 2022-12-03 NOTE — PROGRESS NOTE ADULT - PROBLEM SELECTOR PLAN 3
- Sepsis in setting of UTI, bacteremia (BCx/UCx 11/29 - staph lugdunensis)  - Cefepime and vancomycin --> cefazolin  - Repeat BCx 12/1 NGTD  - Acute on chronic renal failure and complicated UTI likely secondary to obstruction now s/p tovar  - Monitor Tovar output - Urology evaluation appreciated

## 2022-12-03 NOTE — PROGRESS NOTE ADULT - SUBJECTIVE AND OBJECTIVE BOX
Contact Information:  Ela Pruitt II, MD, MPH  Chief Resident, Internal Medicine  Pager: [phone] ([facility]) /// [phone] ([facility])    CARLINE GARBER, MRN-[id number]    Patient is a 82y old  Male who presents with a chief complaint of UTI (03 Dec 2022 09:20)      OVERNIGHT EVENTS/INTERVAL/SUBJECTIVE: Patient evaluated at bedside, states that he feels some indigestion and bloating along with some fatigue; otherwise, denies CP, fever, lightheadedness, dizziness, SOB, abdominal pain, numbness, tingling, N/V.    CONSTITUTIONAL: +Fatigue. No weakness. No fever.  HEAD: No head trauma.   EYES: No vision changes.  ENT: No hearing changes or tinnitus. No ear pain. No changes in smell. No nasal congestion or discharge. No sore throat. No voice hoarseness.   NECK: No neck pain or stiffness. No lumps.  RESPIRATORY: No cough. No SOB. No wheezing. No hemoptysis.   CARDIOVASCULAR: No chest pain. No palpitations.   GASTROINTESTINAL: +Bloating and indigestion. No dysphagia. No ABD pain. No distension. No constipation. No diarrhea. No pain with defecation. No hematemesis. No hematochezia or melena.  BACK: No back pain.  GENITOURINARY: No dysuria. No frequency or urgency. No hesitancy. No incontinence. No urinary retention. No suprapubic pain. No hematuria.  EXTREMITY: No swelling.  MUSCULOSKELETAL: No joint pain or swelling. No fractures. No stiffness.    SKIN: No rashes. No itching. No skin, hair, or nail changes.  NEUROLOGICAL: No weakness or paralysis. No lightheadedness or dizziness. No HA. No numbness or tingling.   PSYCHIATRIC: No depression.       OBJECTIVE:  Vital Signs Last 24 Hrs  T(C): 36.8 (03 Dec 2022 16:08), Max: 37.3 (02 Dec 2022 20:00)  T(F): 98.2 (03 Dec 2022 16:08), Max: 99.2 (02 Dec 2022 20:00)  HR: 58 (03 Dec 2022 16:08) (58 - 60)  BP: 133/76 (03 Dec 2022 16:08) (114/51 - 143/57)  BP(mean): --  RR: 18 (03 Dec 2022 16:08) (18 - 18)  SpO2: 95% (03 Dec 2022 16:08) (94% - 96%)    Parameters below as of 03 Dec 2022 16:08  Patient On (Oxygen Delivery Method): room air      I&O's Summary    02 Dec 2022 07:01  -  03 Dec 2022 07:00  --------------------------------------------------------  IN: 480 mL / OUT: 1700 mL / NET: -1220 mL        MEDICATIONS  (STANDING):  abacavir 300 milliGRAM(s) Oral two times a day  ascorbic acid 500 milliGRAM(s) Oral daily  aspirin enteric coated 81 milliGRAM(s) Oral daily  ceFAZolin   IVPB 2000 milliGRAM(s) IV Intermittent every 12 hours  chlorhexidine 2% Cloths 1 Application(s) Topical daily  doravirine 100 milliGRAM(s) Oral daily  famotidine    Tablet 20 milliGRAM(s) Oral daily  ferrous    sulfate 325 milliGRAM(s) Oral daily  folic acid 1 milliGRAM(s) Oral daily  heparin  Infusion.  Unit(s)/Hr (15 mL/Hr) IV Continuous <Continuous>  influenza  Vaccine (HIGH DOSE) 0.7 milliLiter(s) IntraMuscular once  lamiVUDine- milliGRAM(s) Oral daily  multivitamin 1 Tablet(s) Oral daily  senna 2 Tablet(s) Oral at bedtime    MEDICATIONS  (PRN):  acetaminophen     Tablet .. 650 milliGRAM(s) Oral every 6 hours PRN Temp greater or equal to 38C (100.4F), Mild Pain (1 - 3)  heparin   Injectable 6500 Unit(s) IV Push every 6 hours PRN For aPTT less than 40  heparin   Injectable 3000 Unit(s) IV Push every 6 hours PRN For aPTT between 40 - 57    Allergies    No Known Allergies    Intolerances        CONSTITUTIONAL: No acute distress. Awake and alert.  RESPIRATORY: CTAB. No wheezes, rales, or rhonchi. No accessory muscle use. No apparent respiratory distress.  CARDIOVASCULAR: +S1/S2. No audible S3/S4. Regular rate and rhythm. No murmurs, rubs, or gallops. No LE swelling or edema.  GASTROINTESTINAL: Soft, nontender, nondistended. +BS. No rebound or guarding.   BACK: No spinal or paraspinal tenderness. No CVA tenderness.  MUSCULOSKELETAL: Spontaneous movement in all extremities.  DERMATOLOGICAL: No abnormal rashes or lesions.  NEUROLOGICAL: CN 2-12 grossly intact. No focal deficits. Sensation intact x 4EXT.   PSYCHIATRIC: Appropriate affect. A&Ox3 (oriented to person, place, and time).                              9.7    26.57 )-----------( 167      ( 03 Dec 2022 07:16 )             29.4     PT/INR - ( 02 Dec 2022 06:24 )   PT: 17.3 sec;   INR: 1.49 ratio         PTT - ( 03 Dec 2022 14:18 )  PTT:74.7 sec  12-03    141  |  109<H>  |  63<H>  ----------------------------<  114<H>  4.5   |  19<L>  |  2.85<H>    Ca    8.4      03 Dec 2022 07:16  Mg     2.7     12-02      CAPILLARY BLOOD GLUCOSE                  Culture - Blood (collected 01 Dec 2022 10:48)  Source: .Blood Blood-Peripheral  Preliminary Report (02 Dec 2022 15:01):    No growth to date.    Culture - Blood (collected 01 Dec 2022 10:48)  Source: .Blood Blood-Peripheral  Preliminary Report (02 Dec 2022 15:01):    No growth to date.          RADIOLOGY AND ADDITIONAL TESTS:    CONSULTANT NOTES REVIEWED:    CARE DISCUSSED WITH THE FOLLOWING CONSULTANTS/PROVIDERS:

## 2022-12-03 NOTE — PROGRESS NOTE ADULT - ASSESSMENT
83yo M w/ PMHx of HIV (viral load undetectable 08/22), Afib s/p DVVC and ablation (not on AC), MV endocarditis s/p bioprosthetic valve replacement, BPH (self catheterizes), frequent UTIs, presents with UTI and heart block, found to have persistent endocarditis not amenable to cardiac surgery, pending PPM placement.

## 2022-12-03 NOTE — PROGRESS NOTE ADULT - PROBLEM SELECTOR PLAN 2
- Noted Hx of endocarditis  - TTE 11/30 acquired in setting of UTI/sepsis 2/2 staph lugdunensis - no evidence of vegetations  - NOHEMI 12/1 demonstrating Echodense material seen coating the prosthetic leaflets with mobile components measuring up to approximately 1.0 cm in length seen on the atrial side of the valve. Findings are consistent with endocarditis. There is thickening seen along the intervalvular fibrosa extending to the aortic root suspicious for infection/early abscess  - CTS evaluated, not a candidate for surgery. C/w cefazolin

## 2022-12-03 NOTE — PROGRESS NOTE ADULT - PROBLEM SELECTOR PLAN 7
- Hold all kaylan blockers due to bradycardia  - F/u cardiology/EP recommendations  - Currently on heparin drip

## 2022-12-03 NOTE — PROGRESS NOTE ADULT - SUBJECTIVE AND OBJECTIVE BOX
24H hour events: No over night events, patient resting comfortably in bed without complaints of CP, palpitations, dizziness or lightheadedness.     MEDICATIONS:  aspirin enteric coated 81 milliGRAM(s) Oral daily  heparin   Injectable 6500 Unit(s) IV Push every 6 hours PRN  heparin   Injectable 3000 Unit(s) IV Push every 6 hours PRN  heparin  Infusion.  Unit(s)/Hr IV Continuous <Continuous>    abacavir 300 milliGRAM(s) Oral two times a day  ceFAZolin   IVPB 2000 milliGRAM(s) IV Intermittent every 12 hours  doravirine 100 milliGRAM(s) Oral daily  lamiVUDine- milliGRAM(s) Oral daily    acetaminophen     Tablet .. 650 milliGRAM(s) Oral every 6 hours PRN    famotidine    Tablet 20 milliGRAM(s) Oral daily  senna 2 Tablet(s) Oral at bedtime    ascorbic acid 500 milliGRAM(s) Oral daily  chlorhexidine 2% Cloths 1 Application(s) Topical daily  ferrous    sulfate 325 milliGRAM(s) Oral daily  folic acid 1 milliGRAM(s) Oral daily  influenza  Vaccine (HIGH DOSE) 0.7 milliLiter(s) IntraMuscular once  multivitamin 1 Tablet(s) Oral daily      REVIEW OF SYSTEMS:  Complete 12point ROS negative.    PHYSICAL EXAM:  T(C): 36.7 (12-03-22 @ 11:09), Max: 37.3 (12-02-22 @ 20:00)  HR: 59 (12-03-22 @ 11:09) (59 - 65)  BP: 114/51 (12-03-22 @ 11:09) (114/51 - 143/57)  RR: 18 (12-03-22 @ 11:09) (18 - 18)  SpO2: 94% (12-03-22 @ 11:09) (94% - 96%)    02 Dec 2022 07:01  -  03 Dec 2022 07:00  --------------------------------------------------------  IN: 480 mL / OUT: 1700 mL / NET: -1220 mL    Appearance: Normal	    LABS:	 	    CBC Full  -  ( 03 Dec 2022 07:16 )  WBC Count : 26.57 K/uL  Hemoglobin : 9.7 g/dL  Hematocrit : 29.4 %  Platelet Count - Automated : 167 K/uL  Mean Cell Volume : 104.3 fl  Mean Cell Hemoglobin : 34.4 pg  Mean Cell Hemoglobin Concentration : 33.0 gm/dL  Auto Neutrophil # : x  Auto Lymphocyte # : x  Auto Monocyte # : x  Auto Eosinophil # : x  Auto Basophil # : x  Auto Neutrophil % : x  Auto Lymphocyte % : x  Auto Monocyte % : x  Auto Eosinophil % : x  Auto Basophil % : x    12-03    141  |  109<H>  |  63<H>  ----------------------------<  114<H>  4.5   |  19<L>  |  2.85<H>  12-02    141  |  110<H>  |  72<H>  ----------------------------<  137<H>  4.7   |  18<L>  |  3.46<H>    Ca    8.4      03 Dec 2022 07:16  Ca    8.1<L>      02 Dec 2022 06:21  Mg     2.7     12-02      proBNP: Serum Pro-Brain Natriuretic Peptide: 34498 pg/mL (12-02 @ 06:21)      TELEMETRY: 	Second degree HB type 1 50's-70's and brief sedond degree HB type 2 50's.

## 2022-12-03 NOTE — PROGRESS NOTE ADULT - PROBLEM SELECTOR PLAN 4
- Likely demand ischemia in setting of sepsis/UTI/hypotension/ SUDHEER  - No evidence of CP  - Continue to monitor  - Cards/EP following

## 2022-12-03 NOTE — PROGRESS NOTE ADULT - ASSESSMENT
83 yo M PMH of HIV (viral load undetectable 08/22),  MV endocarditis s/p MVR-T and SHELLIE clip on 8/31/20 with post-op AFL s/p DCCV and AFL ablation (CTI) 1/2021 with Dr. Agarwal, BPH w/ frequent UTI's (self catheterizes) presenting to the ED with a UTI, + Staph lugdunensis bacteremia, UA+.  He reports that over the last 2 weeks he has had weakness, subjective fevers, +chills, + night sweats. Initial ECG in ED appeared to have a Mobitz I, converted to course slow AF 40's, now back in Mobitz I without  conversion pause. He is currently HDS, asymptomatic at this time, remains in Mobitz I 50's. He has been maintained off AAD and A/C, his rates in SR were previously 60-70's. He has been on Metoprolol 12.5qd which presently are on hold.     TTE on this admission revealing LVEF 60%, mod pHTN, RVE w/ decr RV systolic function, elevated MV gradients s/ p MVR.   CT revealing cystitis.    1) UTI, complicated. 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  NPO after midnight Sunday, obtain 2 current type and screens, current Covid PCR.  Plan to hold Heparin drip on Monday 12/5/22 AM  --Maintain K+>4.0, Mg+>2.0  --EP will follow along closely    JAMILAH Perera [facility]  [phone]          81 yo M PMH of HIV (viral load undetectable 08/22),  MV endocarditis s/p MVR-T and SHELLIE clip on 8/31/20 with post-op AFL s/p DCCV and AFL ablation (CTI) 1/2021 with Dr. Agarwal, BPH w/ frequent UTI's (self catheterizes) presenting to the ED with a UTI, + Staph lugdunensis bacteremia, UA+.  He reports that over the last 2 weeks he has had weakness, subjective fevers, +chills, + night sweats. Initial ECG in ED appeared to have a Mobitz I, converted to course slow AF 40's, now back in Mobitz I without  conversion pause. He is currently HDS, asymptomatic at this time, remains in Mobitz I 50's. He has been maintained off AAD and A/C, his rates in SR were previously 60-70's. He has been on Metoprolol 12.5qd which presently are on hold.     TTE on this admission revealing LVEF 60%, mod pHTN, RVE w/ decr RV systolic function, elevated MV gradients s/ p MVR.   CT revealing cystitis.    1) UTI, complicated. 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  NPO after midnight Sunday, obtain 2 current type and screens, current Covid PCR.  Plan to hold Heparin drip on Monday 12/5/22 0600 AM  --Maintain K+>4.0, Mg+>2.0  --EP will follow along closely    JAMILAH Perera AGACNP-BC  [phone]

## 2022-12-03 NOTE — PROGRESS NOTE ADULT - ASSESSMENT
82M with history of HIV, AFIb s/p DCCV, ablation, and SHELLIE clip, MV endocarditis s/p bioprosthetic valve replacement, CKD p/w fevers, chills, elevated WBC found to have evidence of bioprosthetic MV endocarditis on NOHEMI    #Endocarditis  #AFIB   #Staph lugdunensis bacteremia   -Seen by CT surgery plan for medical management, would discuss with EP timing for leadless Pacemaker   -DOAC to heparin in perioperative peroid  82M with history of HIV, AFIb s/p DCCV, ablation, and SHELLIE clip, MV endocarditis s/p bioprosthetic valve replacement, CKD p/w fevers, chills, elevated WBC found to have evidence of bioprosthetic MV endocarditis on NOHEMI    #Endocarditis  #AFIB   #Staph lugdunensis bacteremia   -Seen by CT surgery plan for medical management, would discuss with EP timing for leadless Pacemaker (would keep NPO on sunday)   -DOAC to heparin in perioperative period   -Would diuresis to net negative -500 ccs and assess if patient is able to lay flat prior to procedure.

## 2022-12-03 NOTE — PROGRESS NOTE ADULT - SUBJECTIVE AND OBJECTIVE BOX
Interval History:  NAEON     Medications:  abacavir 300 milliGRAM(s) Oral two times a day  acetaminophen     Tablet .. 650 milliGRAM(s) Oral every 6 hours PRN  ascorbic acid 500 milliGRAM(s) Oral daily  aspirin enteric coated 81 milliGRAM(s) Oral daily  ceFAZolin   IVPB 2000 milliGRAM(s) IV Intermittent every 12 hours  chlorhexidine 2% Cloths 1 Application(s) Topical daily  doravirine 100 milliGRAM(s) Oral daily  famotidine    Tablet 20 milliGRAM(s) Oral daily  ferrous    sulfate 325 milliGRAM(s) Oral daily  folic acid 1 milliGRAM(s) Oral daily  heparin   Injectable 6500 Unit(s) IV Push every 6 hours PRN  heparin   Injectable 3000 Unit(s) IV Push every 6 hours PRN  heparin  Infusion.  Unit(s)/Hr IV Continuous <Continuous>  influenza  Vaccine (HIGH DOSE) 0.7 milliLiter(s) IntraMuscular once  lamiVUDine- milliGRAM(s) Oral daily  multivitamin 1 Tablet(s) Oral daily  senna 2 Tablet(s) Oral at bedtime      Vitals:  T(C): 36.7 (12-03-22 @ 03:56), Max: 37.3 (12-02-22 @ 20:00)  HR: 60 (12-03-22 @ 03:56) (55 - 77)  BP: 143/57 (12-03-22 @ 03:56) (111/53 - 143/57)  BP(mean): 106 (12-02-22 @ 09:28) (106 - 106)  RR: 18 (12-03-22 @ 03:56) (17 - 18)  SpO2: 95% (12-03-22 @ 03:56) (93% - 96%)    Daily Height in cm: 190.5 (02 Dec 2022 09:28)    Daily     Weight (kg): 81.6 (12-02 @ 09:28)    I&O's Summary    02 Dec 2022 07:01  -  03 Dec 2022 07:00  --------------------------------------------------------  IN: 480 mL / OUT: 1700 mL / NET: -1220 mL        Physical Exam:  Appearance: No Acute Distress  HEENT: PERRL  Neck: No JVD  Cardiovascular: irregular, murmur +   Respiratory: Clear to auscultation bilaterally  Gastrointestinal: Soft, Non-tender	  Skin: No cyanosis	  Neurologic: Non-focal  Extremities: trace LE edema        Labs:                        9.6    25.46 )-----------( 153      ( 03 Dec 2022 00:15 )             28.8     12-02    141  |  110<H>  |  72<H>  ----------------------------<  137<H>  4.7   |  18<L>  |  3.46<H>    Ca    8.1<L>      02 Dec 2022 06:21  Mg     2.7     12-02      PT/INR - ( 02 Dec 2022 06:24 )   PT: 17.3 sec;   INR: 1.49 ratio         PTT - ( 03 Dec 2022 00:15 )  PTT:50.0 sec        TELEMETRY:     Interval History:  Had dyspnea in setting of trouble swallowing cranberry juice      Medications:  abacavir 300 milliGRAM(s) Oral two times a day  acetaminophen     Tablet .. 650 milliGRAM(s) Oral every 6 hours PRN  ascorbic acid 500 milliGRAM(s) Oral daily  aspirin enteric coated 81 milliGRAM(s) Oral daily  ceFAZolin   IVPB 2000 milliGRAM(s) IV Intermittent every 12 hours  chlorhexidine 2% Cloths 1 Application(s) Topical daily  doravirine 100 milliGRAM(s) Oral daily  famotidine    Tablet 20 milliGRAM(s) Oral daily  ferrous    sulfate 325 milliGRAM(s) Oral daily  folic acid 1 milliGRAM(s) Oral daily  heparin   Injectable 6500 Unit(s) IV Push every 6 hours PRN  heparin   Injectable 3000 Unit(s) IV Push every 6 hours PRN  heparin  Infusion.  Unit(s)/Hr IV Continuous <Continuous>  influenza  Vaccine (HIGH DOSE) 0.7 milliLiter(s) IntraMuscular once  lamiVUDine- milliGRAM(s) Oral daily  multivitamin 1 Tablet(s) Oral daily  senna 2 Tablet(s) Oral at bedtime      Vitals:  T(C): 36.7 (12-03-22 @ 03:56), Max: 37.3 (12-02-22 @ 20:00)  HR: 60 (12-03-22 @ 03:56) (55 - 77)  BP: 143/57 (12-03-22 @ 03:56) (111/53 - 143/57)  BP(mean): 106 (12-02-22 @ 09:28) (106 - 106)  RR: 18 (12-03-22 @ 03:56) (17 - 18)  SpO2: 95% (12-03-22 @ 03:56) (93% - 96%)    Daily Height in cm: 190.5 (02 Dec 2022 09:28)    Daily     Weight (kg): 81.6 (12-02 @ 09:28)    I&O's Summary    02 Dec 2022 07:01  -  03 Dec 2022 07:00  --------------------------------------------------------  IN: 480 mL / OUT: 1700 mL / NET: -1220 mL        Physical Exam:  Appearance: No Acute Distress  HEENT: PERRL  Neck: elevated JVD   Cardiovascular: irregular, murmur +   Respiratory: Clear to auscultation bilaterally  Gastrointestinal: Soft, Non-tender	  Skin: No cyanosis	  Neurologic: Non-focal  Extremities: b/l LE edema        Labs:                        9.6    25.46 )-----------( 153      ( 03 Dec 2022 00:15 )             28.8     12-02    141  |  110<H>  |  72<H>  ----------------------------<  137<H>  4.7   |  18<L>  |  3.46<H>    Ca    8.1<L>      02 Dec 2022 06:21  Mg     2.7     12-02      PT/INR - ( 02 Dec 2022 06:24 )   PT: 17.3 sec;   INR: 1.49 ratio         PTT - ( 03 Dec 2022 00:15 )  PTT:50.0 sec        TELEMETRY:  Alex 2 overnight with rates in 50s

## 2022-12-03 NOTE — PROGRESS NOTE ADULT - PROBLEM SELECTOR PLAN 5
- SUDHEER on CKD4 (baseline Cr 2.6-2.8) likely in setting urinary retention  - S/p Aden  - Cr 2.85 (around baseline)  - Monitor urine OUP closely, strict I+Os, Cr  - Avoid nephrotoxic agents, renally dose all medications

## 2022-12-04 NOTE — PROGRESS NOTE ADULT - PROBLEM SELECTOR PLAN 1
- Presented with symptomatic bradycardia, converted to AFib with slow ventricular response  - Tentative for PPM placement 12/5  - Eliquis to heparin drip during perioperative period  - Continue to monitor on telemetry  - C/w Cardiology/EP recs

## 2022-12-04 NOTE — PROGRESS NOTE ADULT - ASSESSMENT
83yo M w/ PMHx of HIV (viral load undetectable 08/22), Afib s/p DVVC and ablation (not on AC), MV endocarditis s/p bioprosthetic valve replacement, BPH (self catheterizes), frequent UTIs, presents with UTI and heart block, found to have persistent endocarditis not amenable to cardiac surgery, pending PPM placement 12/5.

## 2022-12-04 NOTE — PROGRESS NOTE ADULT - SUBJECTIVE AND OBJECTIVE BOX
infectious diseases progress note:    Patient is a 82y old  Male who presents with a chief complaint of UTI (03 Dec 2022 17:39)        Sepsis          ROS:  CONSTITUTIONAL:  Negative fever or chills, feels well, good appetite  EYES:  Negative  blurry vision or double vision  CARDIOVASCULAR:  Negative for chest pain or palpitations  RESPIRATORY:  Negative for cough, wheezing, or SOB   GASTROINTESTINAL:  Negative for nausea, vomiting, diarrhea, constipation, or abdominal pain  GENITOURINARY:  Negative frequency, urgency or dysuria  NEUROLOGIC:  No headache, confusion, dizziness, lightheadedness    Allergies    No Known Allergies    Intolerances        ANTIBIOTICS/RELEVANT:  antimicrobials  abacavir 300 milliGRAM(s) Oral two times a day  ceFAZolin   IVPB 2000 milliGRAM(s) IV Intermittent every 12 hours  doravirine 100 milliGRAM(s) Oral daily  lamiVUDine- milliGRAM(s) Oral daily    immunologic:  influenza  Vaccine (HIGH DOSE) 0.7 milliLiter(s) IntraMuscular once    OTHER:  acetaminophen     Tablet .. 650 milliGRAM(s) Oral every 6 hours PRN  aluminum hydroxide/magnesium hydroxide/simethicone Suspension 30 milliLiter(s) Oral every 6 hours PRN  ascorbic acid 500 milliGRAM(s) Oral daily  aspirin enteric coated 81 milliGRAM(s) Oral daily  chlorhexidine 2% Cloths 1 Application(s) Topical daily  famotidine    Tablet 20 milliGRAM(s) Oral daily  ferrous    sulfate 325 milliGRAM(s) Oral daily  folic acid 1 milliGRAM(s) Oral daily  heparin   Injectable 6500 Unit(s) IV Push every 6 hours PRN  heparin   Injectable 3000 Unit(s) IV Push every 6 hours PRN  heparin  Infusion.  Unit(s)/Hr IV Continuous <Continuous>  multivitamin 1 Tablet(s) Oral daily  senna 2 Tablet(s) Oral at bedtime  simethicone 80 milliGRAM(s) Chew four times a day PRN      Objective:  Vital Signs Last 24 Hrs  T(C): 36.7 (04 Dec 2022 04:24), Max: 37.7 (03 Dec 2022 20:00)  T(F): 98.1 (04 Dec 2022 04:24), Max: 99.8 (03 Dec 2022 20:00)  HR: 61 (04 Dec 2022 04:24) (58 - 65)  BP: 125/63 (04 Dec 2022 04:24) (114/51 - 133/76)  BP(mean): --  RR: 18 (04 Dec 2022 04:24) (18 - 18)  SpO2: 97% (04 Dec 2022 04:24) (94% - 97%)    Parameters below as of 04 Dec 2022 04:24  Patient On (Oxygen Delivery Method): room air        PHYSICAL EXAM:  Constitutional:Well-developed, well nourished--no acute distress  Eyes:EMILY, EOMI  Ear/Nose/Throat: no oral lesion, no sinus tenderness on percussion	  Neck:no JVD, no lymphadenopathy, supple  Respiratory: CTA elke  Cardiovascular: S1S2 RRR, no murmurs  Gastrointestinal:soft, (+) BS, no HSM  Extremities:no e/e/c        LABS:                        9.8    27.70 )-----------( 140      ( 04 Dec 2022 06:50 )             29.8     12-03    141  |  109<H>  |  63<H>  ----------------------------<  114<H>  4.5   |  19<L>  |  2.85<H>    Ca    8.4      03 Dec 2022 07:16      PTT - ( 04 Dec 2022 06:51 )  PTT:78.8 sec        MICROBIOLOGY:    RECENT CULTURES:  12-01 @ 10:48 .Blood Blood-Peripheral                No growth to date.    11-29 @ 20:45 Clean Catch Clean Catch (Midstream)                >100,000 CFU/ml Staphylococcus lugdunensis "Susceptibilities not  performed"    11-29 @ 20:30 .Blood Blood-Peripheral       Growth in aerobic and anaerobic bottles: Gram Positive Cocci in Clusters           Growth in aerobic and anaerobic bottles: Staphylococcus lugdunensis  See previous culture [id number]    11-29 @ 20:19 .Blood Blood-Peripheral   PCR    Growth in aerobic and anaerobic bottles: Gram Positive Cocci in Clusters    Blood Culture PCR  Staphylococcus lugdunensis  Blood Culture PCR     Growth in aerobic and anaerobic bottles: Staphylococcus lugdunensis  ***Blood Panel PCR results on this specimen are available  approximately 3 hours after the Gram stain result.***  Gram stain, PCR, and/or culture results may not always  correspond due to difference in methodologies.  ************************************************************  This PCR assay was performed by multiplex PCR. This  Assay tests for 66 bacterial and resistance gene targets.  Please refer to the [facility] test directory  at [email or web address] for details.          RESPIRATORY CULTURES:              RADIOLOGY & ADDITIONAL STUDIES:        Pager [phone]  After 5 pm/weekends or if no response :[phone]

## 2022-12-04 NOTE — PROGRESS NOTE ADULT - PROBLEM SELECTOR PLAN 6
- SUDHEER on CKD4 (baseline Cr 2.6-2.8) likely in setting urinary retention  - S/p Aden  - Cr 2.78 (at baseline)  - Monitor urine OUP closely, strict I+Os, Cr  - Avoid nephrotoxic agents, renally dose all medications

## 2022-12-04 NOTE — PROGRESS NOTE ADULT - ASSESSMENT
81yo M w/ PMHx of HIV (viral load undetectable 08/22), Afib s/p DVVC and ablation (not on AC), MV endocarditis s/p bioprosthetic valve replacement, BPH (self catheterizes), frequent UTIs, presents with weakness and rigor at home.    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 2020  Presented with weakness and rigor  Febrile, with significant leukocytosis on admission  UA showed pyuria, UCx + 100,000 staph lugdunensis  BCx 11/29 4/4 bottles staph lugdunensis  CXR no PNA  TTE 11/29 thicken bioprosthetic MV leaflets, no vegetation  CT Ab/pel showed cystitis and bilateral perinephric stranding  US showed moderate hydro on left kidney  NOHEMI 12/2 consistent with 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  If can de-escalate to cefazolin now staph lugdunensis is oxacillin sensitive  Continue ARTs and dose adjust lamivudine if CrCl continues to get worse      IMPRESSION:  Prosthetic MV endocarditis  Staph lugdunensis bacteremia  Acute pyelonephritis  HIV on ARTs  Hx CoNS endocarditis  Fever  Leukocytosis    RECOMMENDATIONS:   IV cefazolin 2gm q12hrs  - Repeat 2 sets of blood culture q48hrs until clearance  - CTS follow up for possible surgical intervention  - Continue PO abacavir 200mg q12hrs, lamivudine 100mg q24hrs and doravirine 1tab q24hrs       This is an aggressive form on PVE and the elevated WBC and NOHEMI suggest the possibility of a developing ring abscess   He  is a high risk pt, but reevalaution for surgery may be his only chance  CV note seen follow up  BC negative           Kiana Gerber D.O.  PGY-5 Infectious Diseases Fellow  Please contact me via page or text through Microsoft Teams  If after 5PM or on weekends, please call [phone]

## 2022-12-04 NOTE — PROGRESS NOTE ADULT - PROBLEM SELECTOR PLAN 2
- Acute onset SOB 12/4  - CXR 12/4 obtained, demonstrating prominent interstitial pulmonary markings with increase in size of   bilateral small pleural effusions incompletely visualized  - Recent NOHEMI with nml LVSF, no overt evidence of HF, euvolemic on examination; patient able to lay down flat without any SOB  - Lasix 20 IV x 1  - Wean oxygen as tolerated (weaned to RA)  - Monitor respiratory status

## 2022-12-04 NOTE — PROGRESS NOTE ADULT - SUBJECTIVE AND OBJECTIVE BOX
Contact Information:  Ela Pruitt II, MD, MPH  Chief Resident, Internal Medicine  Pager: [phone] ([facility]) /// [phone] ([facility])    CARLINE GARBER, MRN-[id number]    Patient is a 82y old  Male who presents with a chief complaint of UTI (04 Dec 2022 07:52)      OVERNIGHT EVENTS/INTERVAL/SUBJECTIVE: Patient evaluated at bedside, complaining of sudden onset SOB requiring NC. Patient states that his breathing is much better. He also states that he still has residual abdominal discomfort when eating, was made aware that he can use Maalox and simethicone if needed. He denies HA, numbness, tingling, CP, nausea/vomiting, diarrhea, constipation, fever.    CONSTITUTIONAL: No weakness. No fatigue. No fever.  HEAD: No head trauma.   EYES: No vision changes.  ENT: No hearing changes or tinnitus. No ear pain. No changes in smell. No nasal congestion or discharge. No sore throat. No voice hoarseness.   NECK: No neck pain or stiffness. No lumps.  RESPIRATORY: +Slight SOB, improved. No wheezing. No hemoptysis.   CARDIOVASCULAR: No chest pain. No palpitations.   GASTROINTESTINAL: +Gassy ABD discomfort. No dysphagia. No ABD pain. No distension. No constipation. No diarrhea. No pain with defecation. No hematemesis. No hematochezia or melena.  BACK: No back pain.  GENITOURINARY: No dysuria. No frequency or urgency. No hesitancy. No incontinence. No urinary retention. No suprapubic pain. No hematuria.  EXTREMITY: No swelling.  MUSCULOSKELETAL: No joint pain or swelling. No fractures. No stiffness.    SKIN: No rashes. No itching. No skin, hair, or nail changes.  NEUROLOGICAL: No weakness or paralysis. No lightheadedness or dizziness. No HA. No numbness or tingling.   PSYCHIATRIC: No depression.       OBJECTIVE:  Vital Signs Last 24 Hrs  T(C): 37.2 (04 Dec 2022 11:26), Max: 37.7 (03 Dec 2022 20:00)  T(F): 98.9 (04 Dec 2022 11:26), Max: 99.8 (03 Dec 2022 20:00)  HR: 66 (04 Dec 2022 11:26) (58 - 66)  BP: 121/67 (04 Dec 2022 11:26) (121/67 - 133/76)  BP(mean): --  RR: 18 (04 Dec 2022 11:26) (18 - 18)  SpO2: 98% (04 Dec 2022 11:26) (95% - 98%)    Parameters below as of 04 Dec 2022 11:26  Patient On (Oxygen Delivery Method): room air      I&O's Summary    03 Dec 2022 07:01  -  04 Dec 2022 07:00  --------------------------------------------------------  IN: 240 mL / OUT: 1200 mL / NET: -960 mL        MEDICATIONS  (STANDING):  abacavir 300 milliGRAM(s) Oral two times a day  ascorbic acid 500 milliGRAM(s) Oral daily  aspirin enteric coated 81 milliGRAM(s) Oral daily  ceFAZolin   IVPB 2000 milliGRAM(s) IV Intermittent every 12 hours  chlorhexidine 2% Cloths 1 Application(s) Topical daily  doravirine 100 milliGRAM(s) Oral daily  famotidine    Tablet 20 milliGRAM(s) Oral daily  ferrous    sulfate 325 milliGRAM(s) Oral daily  folic acid 1 milliGRAM(s) Oral daily  heparin  Infusion.  Unit(s)/Hr (15 mL/Hr) IV Continuous <Continuous>  influenza  Vaccine (HIGH DOSE) 0.7 milliLiter(s) IntraMuscular once  lamiVUDine- milliGRAM(s) Oral daily  multivitamin 1 Tablet(s) Oral daily  senna 2 Tablet(s) Oral at bedtime    MEDICATIONS  (PRN):  acetaminophen     Tablet .. 650 milliGRAM(s) Oral every 6 hours PRN Temp greater or equal to 38C (100.4F), Mild Pain (1 - 3)  aluminum hydroxide/magnesium hydroxide/simethicone Suspension 30 milliLiter(s) Oral every 6 hours PRN Dyspepsia  heparin   Injectable 6500 Unit(s) IV Push every 6 hours PRN For aPTT less than 40  heparin   Injectable 3000 Unit(s) IV Push every 6 hours PRN For aPTT between 40 - 57  simethicone 80 milliGRAM(s) Chew four times a day PRN Gas    Allergies    No Known Allergies    Intolerances        CONSTITUTIONAL: No acute distress. Awake and alert.  RESPIRATORY: CTAB. No wheezes, rales, or rhonchi; no audible crackles. No accessory muscle use. No apparent respiratory distress.  CARDIOVASCULAR: +S1/S2. No audible S3/S4. Regular rate and rhythm. No murmurs, rubs, or gallops. No LE swelling or edema.  GASTROINTESTINAL: Soft, nontender, nondistended. +BS. No rebound or guarding.   BACK: No spinal or paraspinal tenderness. No CVA tenderness.  MUSCULOSKELETAL: Spontaneous movement in all extremities.  NEUROLOGICAL: CN 2-12 grossly intact. No focal deficits. Sensation intact x 4EXT.   PSYCHIATRIC: Appropriate affect. A&Ox3 (oriented to person, place, and time).                              9.8    27.70 )-----------( 140      ( 04 Dec 2022 06:50 )             29.8     PTT - ( 04 Dec 2022 06:51 )  PTT:78.8 sec  12-04    141  |  107  |  54<H>  ----------------------------<  121<H>  4.5   |  20<L>  |  2.78<H>    Ca    8.7      04 Dec 2022 06:50      CAPILLARY BLOOD GLUCOSE                  Culture - Blood (collected 03 Dec 2022 07:40)  Source: .Blood Blood-Peripheral  Preliminary Report (04 Dec 2022 11:01):    No growth to date.    Culture - Blood (collected 03 Dec 2022 07:40)  Source: .Blood Blood-Peripheral  Preliminary Report (04 Dec 2022 11:01):    No growth to date.          RADIOLOGY AND ADDITIONAL TESTS:    CONSULTANT NOTES REVIEWED:    CARE DISCUSSED WITH THE FOLLOWING CONSULTANTS/PROVIDERS:

## 2022-12-05 NOTE — CONSULT NOTE ADULT - PROBLEM SELECTOR RECOMMENDATION 5
Primary team updated on the above. no further role for palliative care identified.  please reconsult if needed or if clinical status changes.   [phone]

## 2022-12-05 NOTE — CONSULT NOTE ADULT - PROBLEM SELECTOR RECOMMENDATION 9
PPS 50%, requires assistance  lives at [facility] for the past 8 years  PT eval and activity per primary team

## 2022-12-05 NOTE — PRE-ANESTHESIA EVALUATION ADULT - NSANTHOSAYNRD_GEN_A_CORE
No. KATELYN screening performed.  STOP BANG Legend: 0-2 = LOW Risk; 3-4 = INTERMEDIATE Risk; 5-8 = HIGH Risk
No. KATELYN screening performed.  STOP BANG Legend: 0-2 = LOW Risk; 3-4 = INTERMEDIATE Risk; 5-8 = HIGH Risk

## 2022-12-05 NOTE — CONSULT NOTE ADULT - SUBJECTIVE AND OBJECTIVE BOX
HPI:  83yo M w/ PMHx of HIV (viral load undetectable 08/22), Afib s/p DVVC and ablation (not on AC), MV endocarditis s/p bioprosthetic valve replacement, BPH (self catheterizes), frequent UTIs, presents with weakness, he reports that over the last 2 weeks he has had weakness, symptoms were associated with occasional body shakes that came every other day, he notes that he also has had recent difficulty performing straight cath, saying he required more force and was at times unable to complete procedure, he called his urologist who then instructed him to head to ED, in the ED, pt was bradycardic, febrile but hemodynamically stable, labs showed leukocytosis, elevated Cr above baseline, elevated procal, grossly positive U/A, EKG showed new Mobitz Type 1, then converted to afib with slow ventricular response, pt was given cefepime, 1L NS bolus, Tylenol x1, admitted to general medicine for further management. While in ED, pt developed acute onset non-radiating chest pain associated with SOB but denies n/v, f/c, diaphoresis, jaw pain, (29 Nov 2022 23:23)    PERTINENT PM/SXH:   Unsteady gait    HIV (human immunodeficiency virus infection)    Chronic kidney disease, unspecified stage    Constipation, unspecified constipation type    Seborrheic keratosis    Osteoporosis    Edema of both legs    Bladder mass    Vitamin D deficiency    Orchitis and epididymitis    H/O endocarditis    Benign prostatic hyperplasia without lower urinary tract symptoms    COVID-19 vaccine series completed      No Past Surgical History    S/P coronary artery stent placement    S/P MVR (mitral valve replacement)      FAMILY HISTORY:  Family history of congestive heart failure (Father)    Family history of cancer (Mother)      Family Hx substance abuse [ ]yes [ ]no  ITEMS NOT CHECKED ARE NOT PRESENT    SOCIAL HISTORY:   Significant other/partner[ ]  Children[ ]  Mormon/Spirituality: Baptism  Substance hx:  [ ]   Tobacco hx:  [ ]   Alcohol hx: [ ]   Home Opioid hx:  [ ] I-Stop Reference No:  Living Situation: [ ]Home  [ ]Long term care  [ ]Rehab [x ]Other- resides at [facility]    ADVANCE DIRECTIVES:    DNR/MOLST  [ ]  Living Will  [ ]   DECISION MAKER(s):  [ ] Health Care Proxy(s)  [ ] Surrogate(s)  [ ] Guardian           Name(s): Phone Number(s): Patient is unable to name a surrogate or HCP.  Reviewed with patient at length. he does not have any family or close friends to name at this time    BASELINE (I)ADL(s) (prior to admission):  [place]: [x ]Total  [ ] Moderate [ ]Dependent    Allergies    No Known Allergies    Intolerances    MEDICATIONS  (STANDING):  abacavir 300 milliGRAM(s) Oral two times a day  ascorbic acid 500 milliGRAM(s) Oral daily  aspirin enteric coated 81 milliGRAM(s) Oral daily  ceFAZolin   IVPB 2000 milliGRAM(s) IV Intermittent every 12 hours  chlorhexidine 2% Cloths 1 Application(s) Topical daily  doravirine 100 milliGRAM(s) Oral daily  famotidine    Tablet 20 milliGRAM(s) Oral daily  ferrous    sulfate 325 milliGRAM(s) Oral daily  folic acid 1 milliGRAM(s) Oral daily  influenza  Vaccine (HIGH DOSE) 0.7 milliLiter(s) IntraMuscular once  lamiVUDine- milliGRAM(s) Oral daily  multivitamin 1 Tablet(s) Oral daily  senna 2 Tablet(s) Oral at bedtime    MEDICATIONS  (PRN):  acetaminophen     Tablet .. 650 milliGRAM(s) Oral every 6 hours PRN Temp greater or equal to 38C (100.4F), Mild Pain (1 - 3)  aluminum hydroxide/magnesium hydroxide/simethicone Suspension 30 milliLiter(s) Oral every 6 hours PRN Dyspepsia  heparin   Injectable 6500 Unit(s) IV Push every 6 hours PRN For aPTT less than 40  heparin   Injectable 3000 Unit(s) IV Push every 6 hours PRN For aPTT between 40 - 57  simethicone 80 milliGRAM(s) Chew four times a day PRN Gas    PRESENT SYMPTOMS: [ ]Unable to self-report  [ ] CPOT [ ] PAINADs [ ] RDOS  Source if other than patient:  [ ]Family   [ ]Team     Pain: [ ]yes [x ]no  QOL impact -   [facility] -                    Aggravating factors -  Quality -  Radiation -  Timing-  Severity (0-10 scale):  Minimal acceptable level (0-10 scale):     CPOT:    https://www.sccm.org/getattachment/fzg34l16-9b8l-0h0i-7c3f-9863l7062f1x/Critical-Care-Pain-Observation-Tool-(CPOT)    PAIN AD Score:   http://geriatrictoolkit.[email or web address]/painad.pdf (press ctrl +  left click to view)    Dyspnea:                           [ ]Mild [ ]Moderate [ ]Severe      RDOS:  0 to 2  minimal or no respiratory distress   3  mild distress  4 to 6 moderate distress  >7 severe distress  https://homecareinformation.net/handouts/hen/Respiratory_Distress_Observation_Scale.pdf (Ctrl +  left click to view)     Anxiety:                             [ ]Mild [ ]Moderate [ ]Severe  Fatigue:                             [ x]Mild [ ]Moderate [ ]Severe  Nausea:                             [ ]Mild [ ]Moderate [ ]Severe  Loss of appetite:              [ ]Mild [ ]Moderate [ ]Severe  Constipation:                    [ ]Mild [ ]Moderate [ ]Severe    PCSSQ[Palliative Care Spiritual Screening Question]   Severity (0-10):  Score of 4 or > indicate consideration of Chaplaincy referral.  Chaplaincy Referral: [ ] yes [x ] refused [ ] following [ ] Deferred     Caregiver [place]? : [ ] yes [x ] no [ ] Deferred [ ] Declined             Social work referral [ ] Patient & Family Centered Care Referral [ ]     Anticipatory Grief present?:  [ ] yes [x ] no  [ ] Deferred                  Social work referral [ ] Chaplaincy Referral[ ]      Other Symptoms:  [x ]All other review of systems negative     Palliative Performance Status Version 2:    50     %    http://npcrc.org/files/news/palliative_performance_scale_ppsv2.pdf  PHYSICAL EXAM:  Vital Signs Last 24 Hrs  T(C): 36.5 (05 Dec 2022 09:05), Max: 37.2 (04 Dec 2022 20:14)  T(F): 97.7 (05 Dec 2022 09:05), Max: 99 (04 Dec 2022 20:14)  HR: 62 (05 Dec 2022 10:05) (46 - 71)  BP: 104/58 (05 Dec 2022 10:05) (104/58 - 132/62)  BP(mean): 106 (05 Dec 2022 08:09) (106 - 106)  RR: 18 (05 Dec 2022 10:05) (16 - 18)  SpO2: 97% (05 Dec 2022 10:05) (94% - 99%)    Parameters below as of 05 Dec 2022 10:05  Patient On (Oxygen Delivery Method): nasal cannula  O2 Flow (L/min): 2   I&O's Summary    04 Dec 2022 07:01  -  05 Dec 2022 07:00  --------------------------------------------------------  IN: 0 mL / OUT: 2100 mL / NET: -2100 mL      GENERAL: [ ]Cachexia    [x ]Alert  [ x]Oriented x3   [ ]Lethargic  [ ]Unarousable  [ ]Verbal  [ ]Non-Verbal  Behavioral:   [ ] Anxiety  [ ] Delirium [ ] Agitation [ ] Other  HEENT:  [ ]Normal   [ ]Dry mouth   [ ]ET Tube/Trach  [ ]Oral lesions  PULMONARY: even and unlabored  [ ]Clear [ ]Tachypnea  [ ]Audible excessive secretions   [ ]Rhonchi        [ ]Right [ ]Left [ ]Bilateral  [ ]Crackles        [ ]Right [ ]Left [ ]Bilateral  [ ]Wheezing     [ ]Right [ ]Left [ ]Bilateral  [ ]Diminished breath sounds [ ]right [ ]left [ ]bilateral  CARDIOVASCULAR:  s/p MICRO placement  [ ]Regular [ ]Irregular [ ]Tachy  [ ]Jaime [ ]Murmur [x ]Other  GASTROINTESTINAL:  [ ]Soft  [ ]Distended   [ ]+BS  [ ]Non tender [ ]Tender  [ ]Other [ ]PEG [ ]OGT/ NGT  Last BM:  GENITOURINARY:  [x]Normal [ ] Incontinent   [ ]Oliguria/Anuria   [ ]Aden  MUSCULOSKELETAL:   [ ]Normal   [ x]Weakness  [ ]Bed/Wheelchair bound [ ]Edema  NEUROLOGIC:   [x ]No focal deficits  [ ]Cognitive impairment  [ ]Dysphagia [ ]Dysarthria [ ]Paresis [ ]Other   SKIN:   [ ]Normal  [ ]Rash  [ ]Other  [ ]Pressure ulcer(s)       Present on admission [ ]y [ ]n    CRITICAL CARE:  [ ] Shock Present  [ ]Septic [ ]Cardiogenic [ ]Neurologic [ ]Hypovolemic  [ ]  Vasopressors [ ]  Inotropes   [ ]Respiratory failure present [ ]Mechanical ventilation [ ]Non-invasive ventilatory support [ ]High flow    [ ]Acute  [ ]Chronic [ ]Hypoxic  [ ]Hypercarbic [ ]Other  [ ]Other organ failure     LABS:                        10.5   36.92 )-----------( 128      ( 05 Dec 2022 07:08 )             31.6   12-05    142  |  105  |  53<H>  ----------------------------<  118<H>  4.5   |  22  |  2.65<H>    Ca    8.7      05 Dec 2022 07:08  Phos  3.7     12-05  Mg     2.3     12-05    PTT - ( 05 Dec 2022 07:08 )  PTT:88.4 sec      RADIOLOGY & ADDITIONAL STUDIES:  < from: Transesophageal Echocardiogram w/o TTE (12.02.22 @ 16:18) >    Patient name: CARLINE GARBER  YOB: 1940   Age: 82 (M)   MR#: [id number]  Study Date: 12/2/2022  [facility]: [address] [place], [place] [id number]: Aamir Nuñez M.D.  Study quality: Technically good  Referring Physician: Roman Long MD  Blood Pressure: 118/57 mmHg  Height: 191 cm  Weight: 82 kg  BSA: 2.1 m2  ------------------------------------------------------------------------  PROCEDURE: Transesophageal (NOHEMI) was performed.  Informed  consent was first obtained for NOHEMI. The patient was sedated  - see anesthesia record.  The procedure was monitored with  automatic blood pressure monitoring, ECG tracings and pulse  oximetry. The transesophageal probe was placed in the  esophagus posterior to the heart without complications.  INDICATION:Endocarditis, valve unspecified (I38)  ------------------------------------------------------------------------  Observations:  Mitral Valve: Bioprosthetic mitral valve replacement.  Echodense material seen coating the prosthetic leaflets  with mobile components measuring up to approximately 1.0 cm  in length seen on the atrial side of the valve. Findings  are consistent with endocarditis. There is thickening seen  along the intervalvular fibrosa extending to the aortic  root suspicious for infection/early abscess. Mild mitral  regurgitation. Mean transmitral valve gradient equals 6 mm  Hg, which is elevated even in the setting of a  bioprosthetic mitral valve replacement. (HRabout 60s bpm)  Aortic Valve/Aorta: Normal trileaflet aortic valve.No  aortic valve regurgitation seen.  Normal aortic root, aortic arch and descending thoracic  aorta. Mild-moderate atheroma.  Left Atrium: Patient status-post ligation of the left  atrial appendage. No left atrial thrombus.  Left Ventricle: Normal left ventricular systolic function.  No segmental wall motion abnormalities. Septal motion  consistent with prior cardiac surgery. Mobile echodensity  seen within the LV cavity consistent with retained mitral  chordal apparatus. Normal left ventricularinternal  dimensions and wall thicknesses.  Right Heart: Normal right atrium. Normal right ventricular  size and function. Normal tricuspid valve. Mild-moderate  tricuspid regurgitation. Normal pulmonic valve. Mild  pulmonic regurgitation.  Pericardium/Pleura: Normal pericardium with no pericardial  effusion.  Left pleural effusion.  Hemodynamic: Estimated right atrial pressure is 8 mm Hg.  Estimated right ventricular systolic pressure equals 49 mm  Hg, assuming right atrial pressure equals 8 mm Hg,  consistent with mild pulmonary hypertension. Patient  status-post surgical repair of interatrial septum. Agitated  saline injection and color flow doppler demonstrates no  evidence of a patent foramen ovale.  ------------------------------------------------------------------------  Conclusions:  1. Bioprosthetic mitral valve replacement. Echodense  material seen coating the prosthetic leaflets with mobile  components measuring up to approximately 1.0 cm in length  seen on the atrial side of the valve. 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 There is thickening seen  along the intervalvular fibrosa extending to the aortic  root suspicious for infection/early abscess. Mild mitral  regurgitation. Mean transmitral valve gradient equals 6 mm  Hg, which is elevated even in the setting of a  bioprosthetic mitral valve replacement. (HRabout 60s bpm)  2. Normal trileaflet aortic valve. No aortic valve  regurgitation seen.  3. Normal left ventricular systolic function. No segmental  wall motion abnormalities. Septal motion consistent with  prior cardiac surgery. Mobile echodensity seen within the  LV cavity consistent with retained mitral chordal  apparatus.  4. Normal right ventricular size and function.  5. Left pleural effusion.  Results discussed with Dr. Kaur.  ------------------------------------------------------------------------  Confirmed on  12/2/2022 - 16:13:23 by Ronald Hassan M.D.  ------------------------------------------------------------------------    < end of copied text >      PROTEIN CALORIE MALNUTRITION PRESENT: [ ]mild [ ]moderate [ ]severe [ ]underweight [ ]morbid obesity  https://www.andeal.org/vault/2440/web/files/ONC/Table_Clinical%20Characteristics%20to%20Document%20Malnutrition-White%20JV%20et%20al%202012.pdf    Height (cm): 190.5 (12-05-22 @ 08:09), 188 (08-06-22 @ 18:59)  Weight (kg): 81.6 (12-05-22 @ 08:09), 82.554 (08-06-22 @ 18:59)  BMI (kg/m2): 22.5 (12-05-22 @ 08:09), 23.4 (08-06-22 @ 18:59)    [ ]PPSV2 < or = to 30% [ ]significant weight loss  [ ]poor nutritional intake  [ ]anasarca[ ]Artificial Nutrition      Other REFERRALS:  [ ]Hospice  [ ]Child Life  [ ]Social Work  [x ]Case management [ ]Holistic Therapy     Goals of Care Document: SRINIVASAN Bernard (08-09-22 @ 10:00)  Goals of Care Conversation:   Participants:  · Participants  Patient    Advance Directives:  · Does patient have Advance Directive  No  · Do you want to complete the HCP and name a [place] Care Agent  No  · Does Patient Have a Surrogate  No  · Caregiver:  no    Conversation Discussion:  · Conversation  HCP/Code Status  · Conversation Details  This alert and oriented x 4 patient was seen today for goals of care planning and conversation. Introduced self and role of PCE. Patient states understanding of health status  and prognosis. Patient states he  lives at [facility] and has been independent with all aspects of ADLs prior to admission. Patient states he has no spouse, children or families. States he has friends and associates at the [facility] however he's not sure if they would be comfortable  being his health care agent. Patient states he will need to speak to the administrative team at the [facility] to discuss same. HCP form given to patient at his request.    CPR , intubation, artificial nutrition  procedures and possible complications explained. Patient watched informational video on GOC.    Patient expressed wish to be DNR, states he does not want any heroic measures to save his life. Patient also states he does not want artificial nutrition to sustain his life. Patient educated on MOLST form however he states he would not like to complete same until he speak with the administrative staff at the [facility]. Patient states he took care of his elderly aunts who lived to be 103.5 years and 93 years. Patient states they had a good quality of life because he cared for them at home however he does not see himself in the same situation as he does not have anyone to care for him. Patient aware he will  remain FULL CODE and will discuss with HCP, son/daughter      Contact information for further needs provided.  No Restorationism exemptions noted.      Alexandra Bernard  MSN -ED RN OCN   [phone]    Personal Advance Directives Treatment Guidelines:   Treatment Guidelines:  · Treatment Guideline Comments  Full Code    [facility] of Discussion:   Time Spent on Advance Care Planning:  I spent 25 (in minutes) on advance care planning services with the patient.  This time is separate and distinct from any other care management services provided on this date.    [facility] of Discussion:  · [facility] of discussion  Face to face      Electronic Signatures:  Alexandra Bernard (RN)  (Signed 09-Aug-2022 13:26)  	Authored: Goals of Care Conversation, Personal Advance Directives Treatment Guidelines, [facility] of Discussion      Last Updated: 09-Aug-2022 13:26 by Alexandra Bernard (RN)

## 2022-12-05 NOTE — PROGRESS NOTE ADULT - SUBJECTIVE AND OBJECTIVE BOX
Andre Reyes, M.D.  Pager: [phone]  Office: [phone]    Patient is a 82y old  Male who presents with a chief complaint of UTI (04 Dec 2022 15:36)          SUBJECTIVE / OVERNIGHT EVENTS:    No acute overnight events.    ROS: ( - ) Fever, ( - )Chills,  ( - )Nausea/Vomiting, ( - ) Cough, ( - )Shortness of breath, ( - )Chest Pain    MEDICATIONS  (STANDING):  abacavir 300 milliGRAM(s) Oral two times a day  ascorbic acid 500 milliGRAM(s) Oral daily  aspirin enteric coated 81 milliGRAM(s) Oral daily  ceFAZolin   IVPB 2000 milliGRAM(s) IV Intermittent every 12 hours  chlorhexidine 2% Cloths 1 Application(s) Topical daily  doravirine 100 milliGRAM(s) Oral daily  famotidine    Tablet 20 milliGRAM(s) Oral daily  ferrous    sulfate 325 milliGRAM(s) Oral daily  folic acid 1 milliGRAM(s) Oral daily  influenza  Vaccine (HIGH DOSE) 0.7 milliLiter(s) IntraMuscular once  lamiVUDine- milliGRAM(s) Oral daily  multivitamin 1 Tablet(s) Oral daily  senna 2 Tablet(s) Oral at bedtime    MEDICATIONS  (PRN):  acetaminophen     Tablet .. 650 milliGRAM(s) Oral every 6 hours PRN Temp greater or equal to 38C (100.4F), Mild Pain (1 - 3)  aluminum hydroxide/magnesium hydroxide/simethicone Suspension 30 milliLiter(s) Oral every 6 hours PRN Dyspepsia  heparin   Injectable 6500 Unit(s) IV Push every 6 hours PRN For aPTT less than 40  heparin   Injectable 3000 Unit(s) IV Push every 6 hours PRN For aPTT between 40 - 57  simethicone 80 milliGRAM(s) Chew four times a day PRN Gas          T(C): 36.5 (12-05 @ 09:05), Max: 37.2 (12-04 @ 11:26)   HR: 62   BP: 104/58   RR: 18   SpO2: 97%    PHYSICAL EXAM:    CONSTITUTIONAL: NAD, well-developed, well-groomed  EYES: PERRLA; conjunctiva and sclera clear  ENMT: Moist oral mucosa, no pharyngeal injection or exudates; normal dentition  NECK: Supple, no palpable masses; no thyromegaly  RESPIRATORY: Normal respiratory effort; lungs are clear to auscultation bilaterally  CARDIOVASCULAR: Regular rate and rhythm, normal S1 and S2, no murmur/rub/gallop; No lower extremity edema; Peripheral pulses are 2+ bilaterally  ABDOMEN: Nontender to palpation, normoactive bowel sounds, no rebound/guarding; No hepatosplenomegaly  MUSCULOSKELETAL:  no clubbing or cyanosis of digits; no joint swelling or tenderness to palpation  PSYCH: A+O to person, place, and time; affect appropriate  NEUROLOGY: CN 2-12 are intact and symmetric; no gross sensory deficits   SKIN: No rashes; no palpable lesions      LABS:                        10.5   36.92 )-----------( 128      ( 05 Dec 2022 07:08 )             31.6      12-05    142  |  105  |  53<H>  ----------------------------<  118<H>  4.5   |  22  |  2.65<H>    Ca    8.7      05 Dec 2022 07:08  Phos  3.7     12-05  Mg     2.3     12-05         CAPILLARY BLOOD GLUCOSE          RADIOLOGY & ADDITIONAL TESTS:    Imaging Personally Reviewed:  Consultant(s) Notes Reviewed:    Care Discussed with Consultants/Other Providers:   Andre Reyes, M.D.  Pager: [phone]  Office: [phone]    Patient is a 82y old  Male who presents with a chief complaint of UTI (04 Dec 2022 15:36)          SUBJECTIVE / OVERNIGHT EVENTS:    No acute overnight events.  No complaints    ROS: ( - ) Fever, ( - )Chills,  ( - )Nausea/Vomiting, ( - ) Cough, ( - )Shortness of breath, ( - )Chest Pain    MEDICATIONS  (STANDING):  abacavir 300 milliGRAM(s) Oral two times a day  ascorbic acid 500 milliGRAM(s) Oral daily  aspirin enteric coated 81 milliGRAM(s) Oral daily  ceFAZolin   IVPB 2000 milliGRAM(s) IV Intermittent every 12 hours  chlorhexidine 2% Cloths 1 Application(s) Topical daily  doravirine 100 milliGRAM(s) Oral daily  famotidine    Tablet 20 milliGRAM(s) Oral daily  ferrous    sulfate 325 milliGRAM(s) Oral daily  folic acid 1 milliGRAM(s) Oral daily  influenza  Vaccine (HIGH DOSE) 0.7 milliLiter(s) IntraMuscular once  lamiVUDine- milliGRAM(s) Oral daily  multivitamin 1 Tablet(s) Oral daily  senna 2 Tablet(s) Oral at bedtime    MEDICATIONS  (PRN):  acetaminophen     Tablet .. 650 milliGRAM(s) Oral every 6 hours PRN Temp greater or equal to 38C (100.4F), Mild Pain (1 - 3)  aluminum hydroxide/magnesium hydroxide/simethicone Suspension 30 milliLiter(s) Oral every 6 hours PRN Dyspepsia  heparin   Injectable 6500 Unit(s) IV Push every 6 hours PRN For aPTT less than 40  heparin   Injectable 3000 Unit(s) IV Push every 6 hours PRN For aPTT between 40 - 57  simethicone 80 milliGRAM(s) Chew four times a day PRN Gas          T(C): 36.5 (12-05 @ 09:05), Max: 37.2 (12-04 @ 11:26)   HR: 62   BP: 104/58   RR: 18   SpO2: 97%    PHYSICAL EXAM:    CONSTITUTIONAL: NAD, well-developed, well-groomed  EYES: PERRLA; conjunctiva and sclera clear  ENMT: Moist oral mucosa, no pharyngeal injection or exudates; normal dentition  NECK: Supple, no palpable masses; no thyromegaly  RESPIRATORY: Normal respiratory effort; lungs are clear to auscultation bilaterally  CARDIOVASCULAR: Regular rate and rhythm, normal S1 and S2, no murmur/rub/gallop; No lower extremity edema; Peripheral pulses are 2+ bilaterally  ABDOMEN: Nontender to palpation, normoactive bowel sounds, no rebound/guarding; No hepatosplenomegaly  MUSCULOSKELETAL:  no clubbing or cyanosis of digits; no joint swelling or tenderness to palpation  PSYCH: A+O to person, place, and time; affect appropriate  NEUROLOGY: CN 2-12 are intact and symmetric; no gross sensory deficits         LABS:                        10.5   36.92 )-----------( 128      ( 05 Dec 2022 07:08 )             31.6      12-05    142  |  105  |  53<H>  ----------------------------<  118<H>  4.5   |  22  |  2.65<H>    Ca    8.7      05 Dec 2022 07:08  Phos  3.7     12-05  Mg     2.3     12-05         CAPILLARY BLOOD GLUCOSE          RADIOLOGY & ADDITIONAL TESTS:    Imaging Personally Reviewed:  Consultant(s) Notes Reviewed:    Care Discussed with Consultants/Other Providers:

## 2022-12-05 NOTE — PROGRESS NOTE ADULT - ASSESSMENT
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  12/5  s/p Micra MDT PPM via Right Femoral Vein     Antibiotics:  Cefepime 11/29 -> 12/2  Meropenem 11/30  Vancomycin 11/30 ->12/2  Cefazolin 12/2 -->    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  marked leukocytosis  not a surgical candidate - guarded out look      IMPRESSION:  Prosthetic MV endocarditis  Staph lugdunensis bacteremia/bacteruria  HIV well controlled on ARTs  Previous Staph epi endocarditis  Fever  - afebrile since 12/2  Leukocytosis  - marked elevated wbc persists    RECOMMENDATIONS:   IV cefazolin 2gm q12hrs  Continue IV  x 6 weeks through January 10, 2022  Place PICC line  After completion of IV abx, chronic oral antibiotics suppression with Keflex 500 mg po Twice daily indefinitely as tolerated  - Continue PO abacavir 200mg q12hrs, lamivudine 100mg q24hrs and doravirine 1tab q24hrs    discussed with primary attending

## 2022-12-05 NOTE — PROGRESS NOTE ADULT - ASSESSMENT
82M w/ PMHx of HIV (viral load undetectable 08/22), Afib s/p DVVC and ablation (not on AC), MV endocarditis s/p bioprosthetic valve replacement, BPH (self catheterizes), frequent UTIs, presents with UTI and heart block, found to have persistent endocarditis not amenable to cardiac surgery, s/p melina PPM placement on 12/5.

## 2022-12-05 NOTE — PROGRESS NOTE ADULT - PROBLEM SELECTOR PLAN 3
- Noted Hx of endocarditis now with a recurrence  - NOHEMI 12/1 demonstrating Echodense material seen coating the prosthetic leaflets with mobile components measuring up to approximately 1.0 cm in length seen on the atrial side of the valve. Findings are consistent with endocarditis. There is thickening seen along the intervalvular fibrosa extending to the aortic root suspicious for infection/early abscess  - CTS evaluated, not a candidate for surgery. C/w cefazolin  Bld Culture Results: No growth to date. (12.03.22 @ 07:40)  Bld Culture Results: No growth to date. (12.01.22 @ 10:48)    - d/w ID, Dr. Ferguson, pt will need 6 weeks of anceph followed by life-long oral keflex.  - will get PICC line --> will get renal clearance for PICC placement

## 2022-12-05 NOTE — CHART NOTE - NSCHARTNOTEFT_GEN_A_CORE
83 yo M PMH of HIV (viral load undetectable 08/22),  MV endocarditis s/p MVR-T and SHELLIE clip on 8/31/20 with post-op AFL s/p DCCV and AFL ablation (CTI) 1/2021 with Dr. Agarwal, BPH w/ frequent UTI's (self catheterizes) presenting to the ED with a UTI, + Staph lugdunensis bacteremia, UA+.  He reports that over the last 2 weeks he has had weakness, subjective fevers, +chills, + night sweats. Initial ECG in ED appeared to have a Mobitz I, converted to course slow AF 40's, now back in Mobitz I without  conversion pause. He is currently HDS, asymptomatic at this time, remains in Mobitz I 50's. He has been maintained off AAD and A/C, his rates in SR were previously 60-70's. He has been on Metoprolol 12.5qd which presently are on hold.     TTE on this admission revealing LVEF 60%, mod pHTN, RVE w/ decr RV systolic function, elevated MV gradients s/ p MVR.   CT revealing cystitis, also perinephric fat stranding   NOHEMI revealing bioprosthetic MV endocarditis w/ c/f early root abscess. CTS seen and evaluated, deemed not surgical candidate.     1) UTI, complicated.  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 Agarwal, BPH w/ frequent UTI's (self catheterizes) presenting to the ED with a UTI, + Staph lugdunensis bacteremia, UA+.  He reports that over the last 2 weeks he has had weakness, subjective fevers, +chills, + night sweats. Initial ECG in ED appeared to have a Mobitz I, converted to course slow AF 40's, now back in Mobitz I without  conversion pause. He is currently HDS, asymptomatic at this time, remains in Mobitz I 50's. He has been maintained off AAD and A/C, his rates in SR were previously 60-70's. He has been on Metoprolol 12.5qd which presently are on hold.     TTE on this admission revealing LVEF 60%, mod pHTN, RVE w/ decr RV systolic function, elevated MV gradients s/ p MVR.   CT revealing cystitis, also perinephric fat stranding   NOHEMI revealing bioprosthetic MV endocarditis w/ c/f early root abscess. CTS seen and evaluated, deemed not surgical candidate.     1) UTI, complicated.  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

## 2022-12-05 NOTE — CHART NOTE - NSCHARTNOTEFT_GEN_A_CORE
s/p Micra MDT PPM via RFV with Suture to be removed at 1500 by EP ACP.  Continue current antibiotics.  Got nauseas received Zofran in CRS.  Site stable with no hematoma/bleeding.  Device: MC1 AVRI  SN: [id number].  SANGEETHA Mclain from EP took report and will have suture removed at 3PM    Vital Signs Last 24 Hrs  T(C): 36.5 (05 Dec 2022 09:05), Max: 37.2 (04 Dec 2022 11:26)  T(F): 97.7 (05 Dec 2022 09:05), Max: 99 (04 Dec 2022 20:14)  HR: 57 (05 Dec 2022 09:35) (46 - 66)  BP: 124/60 (05 Dec 2022 09:35) (109/55 - 132/62)  BP(mean): 106 (05 Dec 2022 08:09) (106 - 106)  RR: 16 (05 Dec 2022 09:35) (16 - 18)  SpO2: 94% (05 Dec 2022 09:35) (94% - 99%)    Parameters below as of 05 Dec 2022 09:35  Patient On (Oxygen Delivery Method): room air    Pt stable will d/c to floor when fully recovered.    ERICK Hager-BC  [facility] PACU

## 2022-12-05 NOTE — PROGRESS NOTE ADULT - PROBLEM SELECTOR PLAN 2
- Acute onset SOB 12/4  - CXR 12/4 obtained, demonstrating prominent interstitial pulmonary markings with increase in size of   bilateral small pleural effusions incompletely visualized  - Recent NOHEMI with nml LVSF, no overt evidence of HF, euvolemic on examination; pa  - Weaned to RA

## 2022-12-05 NOTE — CONSULT NOTE ADULT - CONVERSATION DETAILS
Met with patient at bedside, introduced self and role.  He was amenable to conversation and is able to explain medically what is going on with himself.  Patient states he has lived at the [facility] for >8 years. He is not  and has no children, he says he is the "end of the line." He mentions that he took care of his father until he , and then his fathers sisters (his Aunts) until the youngest  at the age of 103. I asked him if he had any close friends or extended family and he says "no" but that over the course of his last 8 years at the [facility] he does think of them like a second family and will consider naming someone as a HCP, once he is back at the facility and speak with them. I provided him with a copy of a blank HCP form and explained how to complete.    We next discussed advance directives, and he tells me that he would not find a life of debility acceptable, he wouldn't want "heroic measures" keeping him alive. On further review of this, he says he wouldn't want to be "plugged into a wall and kept that way, that's not living." In summary, we reviewed that in the event he was on a machine and in order to stay alive indefinitely, he would not want a tracheostomy or artificial nutrition to sustain his life. In that instance, he would want to be allowed to die naturally and comfortable.    I asked him how he felt about temporary situations, while investigating if there was a reversible cause or how he would feel about interventions should something emergent happen right now. Mr. Monsivais explained that he would have to think about that before making any decision.    Patient identified as Sabianism. Chaplaincy referral offered but declined; explained that there is ongoing availability of our team or Chaplaincy support, if requested.  Our contact information was provided and primary team updated on the conversation.

## 2022-12-05 NOTE — CONSULT NOTE ADULT - TREATMENT GUIDELINE COMMENT
continue medical care  patient to contemplate HCP completion once back at [facility]  Not interested in filling out MOLST at present time, but made wishes known in the event of serious, critical illness.

## 2022-12-05 NOTE — PROGRESS NOTE ADULT - SUBJECTIVE AND OBJECTIVE BOX
Follow Up:  Aurelia cash prosthetic valve endocarditis    Interval History/ROS:  notes malaise - he understands that he is not surgical candidates    Allergies  No Known Allergies    ANTIMICROBIALS:  abacavir 300 two times a day  ceFAZolin   IVPB 2000 every 12 hours  doravirine 100 daily  lamiVUDine- daily      OTHER MEDS:  MEDICATIONS  (STANDING):  acetaminophen     Tablet .. 650 every 6 hours PRN  aluminum hydroxide/magnesium hydroxide/simethicone Suspension 30 every 6 hours PRN  aspirin enteric coated 81 daily  famotidine    Tablet 20 daily  heparin   Injectable 6500 every 6 hours PRN  heparin   Injectable 3000 every 6 hours PRN  influenza  Vaccine (HIGH DOSE) 0.7 once  senna 2 at bedtime  simethicone 80 four times a day PRN      Vital Signs Last 24 Hrs  T(C): 36.5 (05 Dec 2022 09:05), Max: 37.2 (04 Dec 2022 20:14)  T(F): 97.7 (05 Dec 2022 09:05), Max: 99 (04 Dec 2022 20:14)  HR: 51 (05 Dec 2022 11:17) (46 - 71)  BP: 115/57 (05 Dec 2022 11:17) (104/58 - 132/62)  BP(mean): 106 (05 Dec 2022 08:09) (106 - 106)  RR: 18 (05 Dec 2022 11:17) (16 - 18)  SpO2: 96% (05 Dec 2022 11:17) (94% - 99%)    Parameters below as of 05 Dec 2022 11:17  Patient On (Oxygen Delivery Method): nasal cannula  O2 Flow (L/min): 2      PHYSICAL EXAM:  General:  NAD, Non-toxic  Neurology: A&Ox3, nonfocal  Respiratory: Clear to auscultation bilaterally  CV: RRR, S1S2, no murmurs, rubs or gallops  Abdominal: Soft, Non-tender, non-distended, normal bowel sounds  Extremities: No edema,   Line Sites: Clear  Skin: No rash                        10.5   36.92 )-----------( 128      ( 05 Dec 2022 07:08 )             31.6   WBC Count: 36.92 (12-05 @ 07:08)  WBC Count: 27.70 (12-04 @ 06:50)  WBC Count: 26.57 (12-03 @ 07:16)  WBC Count: 25.46 (12-03 @ 00:15)  WBC Count: 33.57 (12-02 @ 06:21)  WBC Count: 32.24 (12-01 @ 10:48)    12-05    142  |  105  |  53<H>  ----------------------------<  118<H>  4.5   |  22  |  2.65<H>  Creatinine: 2.65 (12-05 @ 07:08)    Creatinine: 2.78 (12-04 @ 06:50)    Creatinine: 2.85 (12-03 @ 07:16)    Creatinine: 3.46 (12-02 @ 06:21)    Creatinine: 3.62 (12-01 @ 10:48)      Ca    8.7      05 Dec 2022 07:08  Phos  3.7     12-05  Mg     2.3     12-05      MICROBIOLOGY:  .Blood Blood-Peripheral  12-03-22   No growth to date.  --  --      .Blood Blood-Peripheral  12-01-22   No growth to date.  --  --      Clean Catch Clean Catch (Midstream)  11-29-22   >100,000 CFU/ml Staphylococcus lugdunensis "Susceptibilities not  performed"  --  --      .Blood Blood-Peripheral  11-29-22   Growth in aerobic and anaerobic bottles: Staphylococcus lugdunensis  See previous culture [id number]  --    Growth in aerobic and anaerobic bottles: Gram Positive Cocci in Clusters      .Blood Blood-Peripheral  11-29-22   Growth in aerobic and anaerobic bottles: Staphylococcus lugdunensis    (11.29.22 @ 20:19)   - Trimethoprim/Sulfamethoxazole: S <=0.5/9.5   - Vancomycin: S 1   - Cefazolin: S <=4   - Clindamycin: S <=0.25   - Erythromycin: S <=0.25   - Gentamicin: S <=1 Should not be used as monotherapy   - Ampicillin/Sulbactam: S <=8/4   - Oxacillin: S <=0.25   - Rifampin: S <=1 Should not be used as monotherapy   - Tetracycline: S <=1   - Staphylococcus lugdunensis: Detec   HIV-1 RNA Quantitative, Viral Load Log: NOT DET. lg /mL (12-01-22 @ 05:49)  HIV-1 RNA Quantitative, Viral Load Log: NOT DET. lg /mL (08-08-22 @ 07:49)    Rapid RVP Result: NotDetec (11-29 @ 20:46)      < from: Transesophageal Echocardiogram w/o TTE (12.02.22 @ 16:18) >  Conclusions:  1. Bioprosthetic mitral valve replacement. Echodense  material seen coating the prosthetic leaflets with mobile  components measuring up to approximately 1.0 cm in length  seen on the atrial side of the valve. 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 There is thickening seen  along the intervalvular fibrosa extending to the aortic  root suspicious for infection/early abscess. Mild mitral  regurgitation. Mean transmitral valve gradient equals 6 mm  Hg, which is elevated even in the setting of a  bioprosthetic mitral valve replacement. (HRabout 60s bpm)  2. Normal trileaflet aortic valve. No aortic valve  regurgitation seen.  3. Normal left ventricular systolic function. No segmental  wall motion abnormalities. Septal motion consistent with  prior cardiac surgery. Mobile echodensity seen within the  LV cavity consistent with retained mitral chordal  apparatus.  4. Normal right ventricular size and function.  5. Left pleural effusion.    < end of copied text >    RADIOLOGY:  < from: CT Abdomen and Pelvis No Cont (12.01.22 @ 14:23) >  IMPRESSION:  Limited noncontrast exam.    Circumferential bladder wall thickening and perivesicular fat stranding,   concerning for cystitis.    Bilateral perinephric fat stranding, nonspecific although can be seen   with urinary tract infection.    Small  bilateral pleural effusions.    < end of copied text >    Beck Ferguson MD; Division of Infectious Disease; Pager: [phone]; nights and weekends: [phone]

## 2022-12-05 NOTE — CONSULT NOTE ADULT - ASSESSMENT
83yo M w/ PMHx of HIV (viral load undetectable 08/22), Afib s/p DVVC and ablation (not on AC), MV endocarditis s/p bioprosthetic valve replacement, BPH (self catheterizes), frequent UTIs, presents with weakness, he reports that over the last 2 weeks he has had weakness, symptoms were associated with occasional body shakes. Patient admitted for UTI and is now s/p micra placement this morning. Palliative consulted to assist with GOC as patient without close family

## 2022-12-05 NOTE — PROGRESS NOTE ADULT - PROBLEM SELECTOR PLAN 1
- Presented with symptomatic bradycardia, converted to AFib with slow ventricular response  - s/p Moni PPM placement on 2/5  - Eliquis to heparin drip during perioperative period --> will restart eliquis once cleared by EP  - Continue to monitor on telemetry; 2nd degree av block hr 50-60's  - C/w Cardiology/EP recs

## 2022-12-05 NOTE — CONSULT NOTE ADULT - PROBLEM SELECTOR RECOMMENDATION 4
conversation held with patient today, similar to conversation held in August 2022 with Palliative care educators. Patient repeated wishes, that he would not want heroic measures indefinitely but current wishes were to continue medical care. He was not interested in completion of MOLST form. he was unable to name anyone to be his HCP as he has no family or close friends. Per conversation in August, he was going to speak with the [facility] to see if anyone was willing/able to speak on his behalf, as of now, he has not been able to name anyone. A blank HCP form was provided. Patient medical wishes are documented in the event he suffers a catastrophic or irreversible injury. For now, patient is FULL CODE and does not have a surrogate decision maker or HCP.    In the event patient loses capacity, 2 PC consent can be used for emergent procedures. In the event of consideration for witholding or withdrawing life sustaining treatments without a surrogate or HCP, providers should refer to the Family Health care Decisions Act, section 2994-g for specific instructions. Can consider reconsultation of palliative care team and/or Ethics consultation at that time.

## 2022-12-05 NOTE — PROGRESS NOTE ADULT - PROBLEM SELECTOR PLAN 4
- Sepsis in setting of UTI, bacteremia (BCx/UCx 11/29 - staph lugdunensis)  - Cefepime and vancomycin --> cefazolin  - Repeat BCx 12/1 &12/3 - NGTD  - Acute on chronic renal failure and complicated UTI likely secondary to obstruction now s/p tovar  - Monitor Tovar output - Urology evaluation appreciated

## 2022-12-05 NOTE — CHART NOTE - NSCHARTNOTEFT_GEN_A_CORE
Chart reviewed, patient with AI on CKD with sepsis, No contraindication for PICC line placement to receive antibiotics.     Full Consult to follow      Case Discussed with Dr. Salcido

## 2022-12-06 NOTE — CONSULT NOTE ADULT - PROBLEM SELECTOR RECOMMENDATION 9
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 Patient still requiring antibiotic therapy, upon revision there are no contraindications for PICC placement. UA with 100 protein.     patient follows up with Dr. Ferguson outpatient

## 2022-12-06 NOTE — PROGRESS NOTE ADULT - ASSESSMENT
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  12/5  s/p Micra MDT PPM via Right Femoral Vein     Antibiotics:  Cefepime 11/29 -> 12/2  Meropenem 11/30  Vancomycin 11/30 ->12/2  Cefazolin 12/2 -->    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  Patient non toxic appearance  marked leukocytosis  thrombocytopenia  CKD    not a surgical candidate - guarded out look      IMPRESSION:  Prosthetic MV endocarditis  Staph lugdunensis bacteremia/bacteruria  Bacteremia cleared as of 12/2/22  HIV well controlled on ARTs  Previous Staph epi endocarditis  Fever  - afebrile since 12/2  Leukocytosis  - marked elevated wbc persists    RECOMMENDATIONS:   IV cefazolin 2gm q12hrs  Continue IV  x 6 weeks through January 10, 2022  Place PICC line  After completion of IV abx, chronic oral antibiotics suppression with Keflex 500 mg po Twice daily indefinitely as tolerated  - Continue PO abacavir 200mg q12hrs, lamivudine 100mg q24hrs and doravirine 1tab q24hrs

## 2022-12-06 NOTE — CONSULT NOTE ADULT - SUBJECTIVE AND OBJECTIVE BOX
[facility] DIVISION OF KIDNEY DISEASES AND HYPERTENSION -- INITIAL CONSULT NOTE  --------------------------------------------------------------------------------    --------------------------------------------------------------------------------  HPI:    This is a 82 year old male with PMH of HIV VLUD, BPH (intermittent cath), Atrial fibrillation, DCCV and ablation and MV endocarditis s/p bioprosthetic valve replacement, patient presenting with weakness noted to have bacteremia, plan for insertion of PICC however given SUDHEER on CKD nephrology consulted for clearance for PICC placement.   Patietn with baseline CKD following with Dr. Ferguson, baseline creatinine between 2-2.6 presenting with cretinine of 4.46 now down trending to 2.65 with uptrend today to 3.18.     PAST HISTORY  --------------------------------------------------------------------------------  PAST MEDICAL & SURGICAL HISTORY:  Unsteady gait      HIV (human immunodeficiency virus infection)      Chronic kidney disease, unspecified stage      Constipation, unspecified constipation type      Seborrheic keratosis      Osteoporosis      Edema of both legs      Bladder mass      Vitamin D deficiency      Orchitis and epididymitis      H/O endocarditis  MV endocarditis      Benign prostatic hyperplasia without lower urinary tract symptoms      COVID-19 vaccine series completed  W #2 boosters      S/P coronary artery stent placement      S/P MVR (mitral valve replacement)  &amp; SHELLIE clip, 8/31/2020        FAMILY HISTORY:  Family history of congestive heart failure (Father)    Family history of cancer (Mother)      PAST SOCIAL HISTORY:    ALLERGIES & MEDICATIONS  --------------------------------------------------------------------------------  Allergies    No Known Allergies    Intolerances      Standing Inpatient Medications  abacavir 300 milliGRAM(s) Oral two times a day  apixaban 2.5 milliGRAM(s) Oral two times a day  ascorbic acid 500 milliGRAM(s) Oral daily  aspirin enteric coated 81 milliGRAM(s) Oral daily  ceFAZolin   IVPB 2000 milliGRAM(s) IV Intermittent every 12 hours  chlorhexidine 2% Cloths 1 Application(s) Topical daily  doravirine 100 milliGRAM(s) Oral daily  famotidine    Tablet 20 milliGRAM(s) Oral daily  ferrous    sulfate 325 milliGRAM(s) Oral daily  folic acid 1 milliGRAM(s) Oral daily  influenza  Vaccine (HIGH DOSE) 0.7 milliLiter(s) IntraMuscular once  lamiVUDine- milliGRAM(s) Oral daily  multivitamin 1 Tablet(s) Oral daily  senna 2 Tablet(s) Oral at bedtime    PRN Inpatient Medications  acetaminophen     Tablet .. 650 milliGRAM(s) Oral every 6 hours PRN  aluminum hydroxide/magnesium hydroxide/simethicone Suspension 30 milliLiter(s) Oral every 6 hours PRN  loperamide 2 milliGRAM(s) Oral daily PRN  simethicone 80 milliGRAM(s) Chew four times a day PRN      REVIEW OF SYSTEMS  --------------------------------------------------------------------------------  Constitutional:No Fever,Chills , Fatigue , Weight change   HEENT:No Blurred vision,Eye Pain ,Headache, Runny nose,Sore Throat   Respiratory: No Cough, Wheezing ,Shortness of breath  Cardiovascular: No Chest Pain, Palpitations, PRESCOTT, PND, Orthopnea  Gastrointestinal:No Abdominal Pain, Diarrhea, Constipation, Hemorrhoids, Nausea,  Vomiting  Genitourinary: No Nocturia, Dysuria, Incontinence  Extremities: No Swelling, Joint Pain  Neurologic:No Focal deficit, Paresthesia,  Syncope  Lymphatic: No Swelling, Lymphadenopathy   Skin: No Rash, Ecchymoses, Wounds, Lesions  Psychiatry: No Depression ,  Suicidal/Homicidal Ideation, Anxiety, Sleep Disturbances        All other systems were reviewed and are negative, except as noted.    VITALS/PHYSICAL EXAM  --------------------------------------------------------------------------------  T(C): 36.8 (12-06-22 @ 11:04), Max: 37.3 (12-05-22 @ 20:55)  HR: 64 (12-06-22 @ 11:04) (64 - 75)  BP: 108/62 (12-06-22 @ 11:04) (108/62 - 127/56)  RR: 18 (12-06-22 @ 11:04) (18 - 18)  SpO2: 95% (12-06-22 @ 11:04) (91% - 95%)  Wt(kg): --    Height (cm): 190.5 (12-05-22 @ 08:09)  Weight (kg): 81.6 (12-05-22 @ 08:09)  BMI (kg/m2): 22.5 (12-05-22 @ 08:09)  BSA (m2): 2.1 (12-05-22 @ 08:09)  Daily     Daily   I&O's Summary    05 Dec 2022 07:01  -  06 Dec 2022 07:00  --------------------------------------------------------  IN: 710 mL / OUT: 1450 mL / NET: -740 mL    06 Dec 2022 07:01  -  06 Dec 2022 13:57  --------------------------------------------------------  IN: 440 mL / OUT: 0 mL / NET: 440 mL          12-05-22 @ 07:01  -  12-06-22 @ 07:00  --------------------------------------------------------  IN: 710 mL / OUT: 1450 mL / NET: -740 mL    12-06-22 @ 07:01  -  12-06-22 @ 13:57  --------------------------------------------------------  IN: 440 mL / OUT: 0 mL / NET: 440 mL        Physical Exam:  Gen: NAD   HEENT: anicteric  Pulm: CTA B/L  CV: RRR, Normal S1 S2  Abd: soft, nontender, nondistended  CNS: AAO x 3, No asterixis  : No Markie  LE: Warm, no chichi  Skin: Warm, without rashes  Vascular access: none        LABS/STUDIES  --------------------------------------------------------------------------------              10.5   35.74 >-----------<  92       [12-06-22 @ 05:21]              32.2     138  |  105  |  62  ----------------------------<  124      [12-06-22 @ 05:21]  5.0   |  21  |  3.18        Ca     8.8     [12-06-22 @ 05:21]      Mg     2.3     [12-05-22 @ 07:08]      Phos  3.7     [12-05-22 @ 07:08]        PTT: 88.4       [12-05-22 @ 07:08]      Creatinine Trend:  SCr 3.18 [12-06 @ 05:21]  SCr 2.65 [12-05 @ 07:08]  SCr 2.78 [12-04 @ 06:50]  SCr 2.85 [12-03 @ 07:16]  SCr 3.46 [12-02 @ 06:21]    Urinalysis - [11-29-22 @ 20:45]      Color Yellow / Appearance Slightly Turbid / SG 1.015 / pH 6.0      Gluc Negative / Ketone Negative  / Bili Negative / Urobili Negative       Blood Moderate / Protein 100 / Leuk Est Large / Nitrite Negative      RBC 6 /  / Hyaline 2 / Gran 1 / Sq Epi  / Non Sq Epi 1 / Bacteria Negative      TSH 2.52      [12-02-22 @ 06:24]          Radiology  --------------------------------------------------------------------------------    --------------------------------------------------------------------------------  Tung Gunter  [id number]

## 2022-12-06 NOTE — PROGRESS NOTE ADULT - SUBJECTIVE AND OBJECTIVE BOX
24H hour events: No over night events.  Denies c/o CP, palpitations or SOB.     MEDICATIONS:  apixaban 2.5 milliGRAM(s) Oral two times a day  aspirin enteric coated 81 milliGRAM(s) Oral daily    abacavir 300 milliGRAM(s) Oral two times a day  ceFAZolin   IVPB 2000 milliGRAM(s) IV Intermittent every 12 hours  doravirine 100 milliGRAM(s) Oral daily  lamiVUDine- milliGRAM(s) Oral daily    acetaminophen     Tablet .. 650 milliGRAM(s) Oral every 6 hours PRN    aluminum hydroxide/magnesium hydroxide/simethicone Suspension 30 milliLiter(s) Oral every 6 hours PRN  famotidine    Tablet 20 milliGRAM(s) Oral daily  loperamide 2 milliGRAM(s) Oral daily PRN  senna 2 Tablet(s) Oral at bedtime  simethicone 80 milliGRAM(s) Chew four times a day PRN    ascorbic acid 500 milliGRAM(s) Oral daily  chlorhexidine 2% Cloths 1 Application(s) Topical daily  ferrous    sulfate 325 milliGRAM(s) Oral daily  folic acid 1 milliGRAM(s) Oral daily  influenza  Vaccine (HIGH DOSE) 0.7 milliLiter(s) IntraMuscular once  multivitamin 1 Tablet(s) Oral daily    REVIEW OF SYSTEMS:  Complete 12point ROS negative.    PHYSICAL EXAM:  T(C): 36.8 (12-06-22 @ 11:04), Max: 37.3 (12-05-22 @ 20:55)  HR: 64 (12-06-22 @ 11:04) (64 - 75)  BP: 108/62 (12-06-22 @ 11:04) (108/62 - 127/56)  RR: 18 (12-06-22 @ 11:04) (18 - 18)  SpO2: 95% (12-06-22 @ 11:04) (91% - 95%)  05 Dec 2022 07:01  -  06 Dec 2022 07:00  --------------------------------------------------------  IN: 710 mL / OUT: 1450 mL / NET: -740 mL    06 Dec 2022 07:01  -  06 Dec 2022 13:46  --------------------------------------------------------  IN: 440 mL / OUT: 0 mL / NET: 440 mL    Appearance: Normal	  HEENT:   Normal oral mucosa, PERRL, EOMI	  Cardiovascular: Normal S1 S2, regular. No JVD, No murmurs, No edema  Respiratory: Lungs clear to auscultation	  Psychiatry: A & O x 3, Mood & affect appropriate  Gastrointestinal:  Soft, Non-tender, + BS	  Skin: right groin open to air, soft, non tender, mild ecchymosis. Non tender.   Extremities: Normal range of motion, No clubbing, cyanosis or edema  Vascular: Peripheral pulses palpable 2+ bilaterally      LABS:	 	    CBC Full  -  ( 06 Dec 2022 05:21 )  WBC Count : 35.74 K/uL  Hemoglobin : 10.5 g/dL  Hematocrit : 32.2 %  Platelet Count - Automated : 92 K/uL  Mean Cell Volume : 105.9 fl  Mean Cell Hemoglobin : 34.5 pg  Mean Cell Hemoglobin Concentration : 32.6 gm/dL  Auto Neutrophil # : x  Auto Lymphocyte # : x  Auto Monocyte # : x  Auto Eosinophil # : x  Auto Basophil # : x  Auto Neutrophil % : x  Auto Lymphocyte % : x  Auto Monocyte % : x  Auto Eosinophil % : x  Auto Basophil % : x    12-06    138  |  105  |  62<H>  ----------------------------<  124<H>  5.0   |  21<L>  |  3.18<H>  12-05    142  |  105  |  53<H>  ----------------------------<  118<H>  4.5   |  22  |  2.65<H>    Ca    8.8      06 Dec 2022 05:21  Ca    8.7      05 Dec 2022 07:08  Phos  3.7     12-05  Mg     2.3     12-05      proBNP: Serum Pro-Brain Natriuretic Peptide: 38105 pg/mL (12-02 @ 06:21)        TELEMETRY: 	  NSR/ AFib ventricular paced at 60bpm   CXRAY:       ACC: [id number] EXAM:  XR CHEST PA LAT 2V                          PROCEDURE DATE:  12/05/2022     INTERPRETATION:  INDICATION: Check PPM leads.    COMPARISON: 12/4/22    Technique: PA and lateral chest films.    FINDINGS:  S/P sternotomy; left atrial appendage clip.  Leadless pacemaker in place overlying right ventricle.  Heart/Vascular: Borderline cardiomegaly.  Pulmonary: Pulmonary vascular congestive changes with bilateral pleural   effusions, left more than right.  No pneumothorax.  Bones: No acute bony finding.    Impression:  Leadless pacemaker in place.

## 2022-12-06 NOTE — CHART NOTE - NSCHARTNOTEFT_GEN_A_CORE
Went to see pt who complained of his throat feeling tight and not being able to sleep. Pt states this started about one half hour ago although nurse states there have been episodes of this in the past. Male denies chest pain, no shortness of breath, no hoarseness, no N/V, no dizziness, no headache, no cough, no nasal congestion, states never felt like this before and is a nonsmoker. States the tightness in his throat is interfering with sleep.     Vital Signs Last 24 Hrs  T(C): 36.8 (06 Dec 2022 22:40), Max: 36.9 (06 Dec 2022 05:04)  T(F): 98.2 (06 Dec 2022 22:40), Max: 98.5 (06 Dec 2022 05:04)  HR: 87 (06 Dec 2022 20:34) (56 - 87)  BP: 128/62 (06 Dec 2022 22:40) (108/55 - 151/74)  BP(mean): --  RR: 18 (06 Dec 2022 22:40) (18 - 18)  SpO2: 95% (06 Dec 2022 22:40) (93% - 96%) on NC 2 l/min intermittent     GENERAL: NAD, does appear slightly tachypneic   HEAD:  Atraumatic, Normocephalic  EYES: EOMI, PERRLA, conjunctiva and sclera clear, slight hoarseness   ENMT:  Moist mucous membranes   NECK: Supple, No JVD noted   NERVOUS SYSTEM:  Alert & Oriented X 4,moves all ext. Strength +5 bilaterally   CHEST/LUNG: tachypneic, CTA bilaterally; No rales, rhonchi, wheezing bilaterally; possible some crackles to R upper lobe  HEART:RRR  ABDOMEN: Soft, Nontender, Nondistended; Bowel sounds present  EXTREMITIES:  2+ Peripheral Pulses, No clubbing, cyanosis, or edema  SKIN: No rashes or lesions     A/P: Shortness of breath, difficulty sleeping  Duoneb via nebulizer  Melatonin 3mg PO  Will follow

## 2022-12-06 NOTE — PROGRESS NOTE ADULT - SUBJECTIVE AND OBJECTIVE BOX
Follow Up:  Aurelia martins Prosthetic mitral valve endocarditis    Interval History/ROS:  ok - notes aches and pain, some esophageal reflux    Allergies  No Known Allergies      ANTIMICROBIALS:  abacavir 300 two times a day  ceFAZolin   IVPB 2000 every 12 hours  doravirine 100 daily  lamiVUDine- daily      OTHER MEDS:  MEDICATIONS  (STANDING):  acetaminophen     Tablet .. 650 every 6 hours PRN  aluminum hydroxide/magnesium hydroxide/simethicone Suspension 30 every 6 hours PRN  apixaban 2.5 two times a day  aspirin enteric coated 81 daily  famotidine    Tablet 20 daily  influenza  Vaccine (HIGH DOSE) 0.7 once  loperamide 2 daily PRN  ondansetron Injectable 4 every 6 hours PRN  senna 2 at bedtime  simethicone 80 four times a day PRN      Vital Signs Last 24 Hrs  T(C): 36.8 (06 Dec 2022 11:04), Max: 37.3 (05 Dec 2022 20:55)  T(F): 98.3 (06 Dec 2022 11:04), Max: 99.1 (05 Dec 2022 20:55)  HR: 56 (06 Dec 2022 17:01) (56 - 75)  BP: 108/55 (06 Dec 2022 17:01) (108/55 - 127/56)  BP(mean): --  RR: 18 (06 Dec 2022 11:04) (18 - 18)  SpO2: 95% (06 Dec 2022 11:04) (91% - 95%)    Parameters below as of 06 Dec 2022 11:04  Patient On (Oxygen Delivery Method): nasal cannula  O2 Flow (L/min): 2      PHYSICAL EXAM:  General: WN/WD NAD, Non-toxic  Neurology: A&Ox3, nonfocal  Respiratory: Clear to auscultation bilaterally  CV: RRR, S1S2, no murmurs, rubs or gallops  Abdominal: Soft, Non-tender, non-distended, normal bowel sounds  Extremities: No edema, + peripheral pulses  Line Sites: Clear  Skin: No rash                          10.5   35.74 )-----------( 92       ( 06 Dec 2022 05:21 )             32.2   WBC Count: 35.74 (12-06 @ 05:21)  WBC Count: 36.92 (12-05 @ 07:08)  WBC Count: 27.70 (12-04 @ 06:50)  WBC Count: 26.57 (12-03 @ 07:16)  WBC Count: 25.46 (12-03 @ 00:15)  WBC Count: 33.57 (12-02 @ 06:21)    12-06    138  |  105  |  62<H>  ----------------------------<  124<H>  5.0   |  21<L>  |  3.18<H>  Creatinine: 3.18 (12-06 @ 05:21)    Creatinine: 2.65 (12-05 @ 07:08)    Creatinine: 2.78 (12-04 @ 06:50)    Creatinine: 2.85 (12-03 @ 07:16)    Creatinine: 3.46 (12-02 @ 06:21)     Ca    8.8      06 Dec 2022 05:21  Phos  3.7     12-05  Mg     2.3     12-05            MICROBIOLOGY:  .Blood Blood-Peripheral  12-03-22   No growth to date.  --  --      .Blood Blood-Peripheral  12-01-22   No Growth Final  --  --      Clean Catch Clean Catch (Midstream)  11-29-22   >100,000 CFU/ml Staphylococcus lugdunensis "Susceptibilities not  performed"  --  --      .Blood Blood-Peripheral  11-29-22   Growth in aerobic and anaerobic bottles: Staphylococcus lugdunensis  See previous culture [id number]  --    Growth in aerobic and anaerobic bottles: Gram Positive Cocci in Clusters      .Blood Blood-Peripheral  11-29-22   Growth in aerobic and anaerobic bottles: Staphylococcus lugdunensis      HIV-1 RNA Quantitative, Viral Load Log: NOT DET. lg /mL (12-01-22 @ 05:49)  HIV-1 RNA Quantitative, Viral Load Log: NOT DET. lg /mL (08-08-22 @ 07:49)    Rapid RVP Result: NotDetec (11-29 @ 20:46)      RADIOLOGY:    Beck Ferguson MD; Division of Infectious Disease; Pager: [phone]; nights and weekends: [phone]

## 2022-12-06 NOTE — CONSULT NOTE ADULT - ASSESSMENT
This is a 82 year old male with PMH of HIV VLUD, BPH (intermittent cath), Atrial fibrillation, DCCV and ablation and MV endocarditis s/p bioprosthetic valve replacement, patient presenting with weakness noted to have bacteremia (Staph Lugdunensis). Now seen to have bacteremia, nephrology consulted for PICC placement clerance. given importance of antibiotic tehrapy, benefit from PICC line placement outweighs risks.

## 2022-12-06 NOTE — PROGRESS NOTE ADULT - PROBLEM SELECTOR PLAN 1
- Presented with symptomatic bradycardia, converted to AFib with slow ventricular response  - s/p Moni PPM placement on 2/5  - Eliquis to heparin drip during perioperative period --> will restart eliquis once cleared by EP  - C/w Cardiology/EP recs

## 2022-12-06 NOTE — PROGRESS NOTE ADULT - ASSESSMENT
81 yo M PMH of HIV (viral load undetectable 08/22),  MV endocarditis s/p MVR-T and SHELLIE clip on 8/31/20 with post-op AFL s/p DCCV and AFL ablation (CTI) 1/2021 with Dr. Agarwal, BPH w/ frequent UTI's (self catheterizes) presenting to the ED with a UTI, + Staph lugdunensis bacteremia, UA+.  He reports that over the last 2 weeks he has had weakness, subjective fevers, +chills, + night sweats. Initial ECG in ED appeared to have a Mobitz I, converted to course slow AF 40's, now back in Mobitz I without  conversion pause.  TTE on this admission revealing LVEF 60%, mod pHTN, RVE w/ decr RV systolic function, elevated MV gradients s/ p MVR. Seen by CT surgery plan for medical management of bioprosthetic MV endocarditis, c/f early root abscess.  Patient is s/p AV Micra on 12/5/22.     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 Lorenz.  Tolerated procedure well.  Post Micra PPM activity and restrictions reviewed with patient. ID Card and PPM booklet provided to patient.   --s/p device interrogation by [employer] Rep, patient is currently in AFib   --s/p Chest xray, leadless pacemaker in good position   --Okay to start DOAC 24 hours post pacemaker procedure for new onset AF/ history of AFL, Eliquis 2.5mg bid (age and elevated creatinine)  --Follow up appointment in EP Clinic on 12/16/22 at 1040am.   --Maintain K+>4.0, Mg+>2.0    L. Galeotafiore AGACNP-BC  [id number]   81 yo M PMH of HIV (viral load undetectable 08/22),  MV endocarditis s/p MVR-T and SHELLIE clip on 8/31/20 with post-op AFL s/p DCCV and AFL ablation (CTI) 1/2021 with Dr. Agarwal, BPH w/ frequent UTI's (self catheterizes) presenting to the ED with a UTI, + Staph lugdunensis bacteremia, UA+.  He reports that over the last 2 weeks he has had weakness, subjective fevers, +chills, + night sweats. Initial ECG in ED appeared to have a Mobitz I, converted to course slow AF 40's, now back in Mobitz I without  conversion pause.  TTE on this admission revealing LVEF 60%, mod pHTN, RVE w/ decr RV systolic function, elevated MV gradients s/ p MVR. Seen by CT surgery plan for medical management of bioprosthetic MV endocarditis, c/f early root abscess.  Patient is s/p AV Micra on 12/5/22.     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 Lorenz.  Tolerated procedure well.  Post Micra PPM activity and restrictions reviewed with patient. ID Card and PPM booklet provided to patient.   --s/p device interrogation by [employer] Rep, patient is currently in AFib   --s/p Chest xray, leadless pacemaker in good position   --Okay to start DOAC 24 hours post pacemaker procedure for new onset AF/ history of AFL, Eliquis 2.5mg bid (age and elevated creatinine)  --Follow up appointment in EP Clinic on 12/16/22 at 1040am.   --Maintain K+>4.0, Mg+>2.0  --EP will sign off, reconsult as needed     LManny Perera [facility]  [id number]

## 2022-12-06 NOTE — PROGRESS NOTE ADULT - PROBLEM SELECTOR PLAN 3
- Noted Hx of endocarditis now with a recurrence  - NOHEMI 12/1 demonstrating Echodense material seen coating the prosthetic leaflets with mobile components measuring up to approximately 1.0 cm in length seen on the atrial side of the valve. Findings are consistent with endocarditis. There is thickening seen along the intervalvular fibrosa extending to the aortic root suspicious for infection/early abscess  - CTS evaluated, not a candidate for surgery. C/w cefazolin  Bld Culture Results: No growth to date. (12.03.22 @ 07:40)  Bld Culture Results: No growth to date. (12.01.22 @ 10:48)    - d/w ID, Dr. Ferguson, pt will need 6 weeks of anceph thru Jan 10th followed by life-long oral keflex.  - PICC line planned for today

## 2022-12-06 NOTE — PROGRESS NOTE ADULT - SUBJECTIVE AND OBJECTIVE BOX
Andre Reyes, M.D.  Pager: [phone]  Office: [phone]    Patient is a 82y old  Male who presents with a chief complaint of UTI (05 Dec 2022 16:35)          SUBJECTIVE / OVERNIGHT EVENTS:    No acute overnight events.    ROS: ( - ) Fever, ( - )Chills,  ( - )Nausea/Vomiting, ( - ) Cough, ( - )Shortness of breath, ( - )Chest Pain    MEDICATIONS  (STANDING):  abacavir 300 milliGRAM(s) Oral two times a day  apixaban 2.5 milliGRAM(s) Oral two times a day  ascorbic acid 500 milliGRAM(s) Oral daily  aspirin enteric coated 81 milliGRAM(s) Oral daily  ceFAZolin   IVPB 2000 milliGRAM(s) IV Intermittent every 12 hours  chlorhexidine 2% Cloths 1 Application(s) Topical daily  doravirine 100 milliGRAM(s) Oral daily  famotidine    Tablet 20 milliGRAM(s) Oral daily  ferrous    sulfate 325 milliGRAM(s) Oral daily  folic acid 1 milliGRAM(s) Oral daily  influenza  Vaccine (HIGH DOSE) 0.7 milliLiter(s) IntraMuscular once  lamiVUDine- milliGRAM(s) Oral daily  multivitamin 1 Tablet(s) Oral daily  senna 2 Tablet(s) Oral at bedtime    MEDICATIONS  (PRN):  acetaminophen     Tablet .. 650 milliGRAM(s) Oral every 6 hours PRN Temp greater or equal to 38C (100.4F), Mild Pain (1 - 3)  aluminum hydroxide/magnesium hydroxide/simethicone Suspension 30 milliLiter(s) Oral every 6 hours PRN Dyspepsia  simethicone 80 milliGRAM(s) Chew four times a day PRN Gas          T(C): 36.9 (12-06 @ 05:04), Max: 37.3 (12-05 @ 20:55)   HR: 65   BP: 125/56   RR: 18   SpO2: 94%    PHYSICAL EXAM:    CONSTITUTIONAL: NAD, well-developed, well-groomed  EYES: PERRLA; conjunctiva and sclera clear  ENMT: Moist oral mucosa, no pharyngeal injection or exudates; normal dentition  NECK: Supple, no palpable masses; no thyromegaly  RESPIRATORY: Normal respiratory effort; lungs are clear to auscultation bilaterally  CARDIOVASCULAR: Regular rate and rhythm, normal S1 and S2, no murmur/rub/gallop; No lower extremity edema; Peripheral pulses are 2+ bilaterally  ABDOMEN: Nontender to palpation, normoactive bowel sounds, no rebound/guarding; No hepatosplenomegaly  MUSCULOSKELETAL:  no clubbing or cyanosis of digits; no joint swelling or tenderness to palpation  PSYCH: A+O to person, place, and time; affect appropriate  NEUROLOGY: CN 2-12 are intact and symmetric; no gross sensory deficits   SKIN: No rashes; no palpable lesions      LABS:                        10.5   35.74 )-----------( 92       ( 06 Dec 2022 05:21 )             32.2      12-06    138  |  105  |  62<H>  ----------------------------<  124<H>  5.0   |  21<L>  |  3.18<H>    Ca    8.8      06 Dec 2022 05:21  Phos  3.7     12-05  Mg     2.3     12-05         CAPILLARY BLOOD GLUCOSE          RADIOLOGY & ADDITIONAL TESTS:    Imaging Personally Reviewed:  Consultant(s) Notes Reviewed:    Care Discussed with Consultants/Other Providers:   Andre Reyes, M.D.  Pager: [phone]  Office: [phone]    Patient is a 82y old  Male who presents with a chief complaint of UTI (05 Dec 2022 16:35)          SUBJECTIVE / OVERNIGHT EVENTS:    No acute overnight events.  no complaints    ROS: ( - ) Fever, ( - )Chills,  ( - )Nausea/Vomiting, ( - ) Cough, ( - )Shortness of breath, ( - )Chest Pain    MEDICATIONS  (STANDING):  abacavir 300 milliGRAM(s) Oral two times a day  apixaban 2.5 milliGRAM(s) Oral two times a day  ascorbic acid 500 milliGRAM(s) Oral daily  aspirin enteric coated 81 milliGRAM(s) Oral daily  ceFAZolin   IVPB 2000 milliGRAM(s) IV Intermittent every 12 hours  chlorhexidine 2% Cloths 1 Application(s) Topical daily  doravirine 100 milliGRAM(s) Oral daily  famotidine    Tablet 20 milliGRAM(s) Oral daily  ferrous    sulfate 325 milliGRAM(s) Oral daily  folic acid 1 milliGRAM(s) Oral daily  influenza  Vaccine (HIGH DOSE) 0.7 milliLiter(s) IntraMuscular once  lamiVUDine- milliGRAM(s) Oral daily  multivitamin 1 Tablet(s) Oral daily  senna 2 Tablet(s) Oral at bedtime    MEDICATIONS  (PRN):  acetaminophen     Tablet .. 650 milliGRAM(s) Oral every 6 hours PRN Temp greater or equal to 38C (100.4F), Mild Pain (1 - 3)  aluminum hydroxide/magnesium hydroxide/simethicone Suspension 30 milliLiter(s) Oral every 6 hours PRN Dyspepsia  simethicone 80 milliGRAM(s) Chew four times a day PRN Gas          T(C): 36.9 (12-06 @ 05:04), Max: 37.3 (12-05 @ 20:55)   HR: 65   BP: 125/56   RR: 18   SpO2: 94%    PHYSICAL EXAM:    CONSTITUTIONAL: NAD, well-developed, well-groomed  EYES: PERRLA; conjunctiva and sclera clear  ENMT: Moist oral mucosa, no pharyngeal injection or exudates; normal dentition  NECK: Supple, no palpable masses; no thyromegaly  RESPIRATORY: Normal respiratory effort; lungs are clear to auscultation bilaterally  CARDIOVASCULAR: Regular rate and rhythm, normal S1 and S2, no murmur/rub/gallop; No lower extremity edema; Peripheral pulses are 2+ bilaterally  ABDOMEN: Nontender to palpation, normoactive bowel sounds, no rebound/guarding; No hepatosplenomegaly  MUSCULOSKELETAL:  no clubbing or cyanosis of digits; no joint swelling or tenderness to palpation  PSYCH: A+O to person, place, and time; affect appropriate  NEUROLOGY: CN 2-12 are intact and symmetric; no gross sensory deficits   SKIN: No rashes; no palpable lesions      LABS:                        10.5   35.74 )-----------( 92       ( 06 Dec 2022 05:21 )             32.2      12-06    138  |  105  |  62<H>  ----------------------------<  124<H>  5.0   |  21<L>  |  3.18<H>    Ca    8.8      06 Dec 2022 05:21  Phos  3.7     12-05  Mg     2.3     12-05         CAPILLARY BLOOD GLUCOSE          RADIOLOGY & ADDITIONAL TESTS:    Imaging Personally Reviewed:  Consultant(s) Notes Reviewed:    Care Discussed with Consultants/Other Providers:

## 2022-12-07 NOTE — PROVIDER CONTACT NOTE (OTHER) - ASSESSMENT
Pt c/o SOB/trouble breathing, no other complaints. VSS temp 98, HR 63, /65, respirations 18, SaO2 93% on 2L NC.

## 2022-12-07 NOTE — PROGRESS NOTE ADULT - SUBJECTIVE AND OBJECTIVE BOX
Andre Reyes, M.D.  Pager: [phone]  Office: [phone]    Patient is a 82y old  Male who presents with a chief complaint of UTI (06 Dec 2022 17:29)          SUBJECTIVE / OVERNIGHT EVENTS:    No acute overnight events.    ROS: ( - ) Fever, ( - )Chills,  ( - )Nausea/Vomiting, ( - ) Cough, ( - )Shortness of breath, ( - )Chest Pain    MEDICATIONS  (STANDING):  abacavir 300 milliGRAM(s) Oral two times a day  apixaban 2.5 milliGRAM(s) Oral two times a day  ascorbic acid 500 milliGRAM(s) Oral daily  aspirin enteric coated 81 milliGRAM(s) Oral daily  ceFAZolin   IVPB 2000 milliGRAM(s) IV Intermittent every 12 hours  chlorhexidine 2% Cloths 1 Application(s) Topical daily  chlorhexidine 4% Liquid 1 Application(s) Topical <User Schedule>  doravirine 100 milliGRAM(s) Oral daily  famotidine    Tablet 20 milliGRAM(s) Oral daily  ferrous    sulfate 325 milliGRAM(s) Oral daily  folic acid 1 milliGRAM(s) Oral daily  influenza  Vaccine (HIGH DOSE) 0.7 milliLiter(s) IntraMuscular once  lactated ringers. 500 milliLiter(s) (50 mL/Hr) IV Continuous <Continuous>  lamiVUDine- milliGRAM(s) Oral daily  multivitamin 1 Tablet(s) Oral daily  senna 2 Tablet(s) Oral at bedtime    MEDICATIONS  (PRN):  acetaminophen     Tablet .. 650 milliGRAM(s) Oral every 6 hours PRN Temp greater or equal to 38C (100.4F), Mild Pain (1 - 3)  aluminum hydroxide/magnesium hydroxide/simethicone Suspension 30 milliLiter(s) Oral every 6 hours PRN Dyspepsia  loperamide 2 milliGRAM(s) Oral daily PRN Diarrhea  ondansetron Injectable 4 milliGRAM(s) IV Push every 6 hours PRN Nausea and/or Vomiting  simethicone 80 milliGRAM(s) Chew four times a day PRN Gas  sodium chloride 0.9% lock flush 10 milliLiter(s) IV Push every 1 hour PRN Pre/post blood products, medications, blood draw, and to maintain line patency          T(C): 36.7 (12-07 @ 12:57), Max: 36.8 (12-06 @ 20:28)   HR: 63   BP: 125/65   RR: 18   SpO2: 93%    PHYSICAL EXAM:    CONSTITUTIONAL: NAD, well-developed, well-groomed  EYES: PERRLA; conjunctiva and sclera clear  ENMT: Moist oral mucosa, no pharyngeal injection or exudates; normal dentition  NECK: Supple, no palpable masses; no thyromegaly  RESPIRATORY: Normal respiratory effort; lungs are clear to auscultation bilaterally  CARDIOVASCULAR: Regular rate and rhythm, normal S1 and S2, no murmur/rub/gallop; No lower extremity edema; Peripheral pulses are 2+ bilaterally  ABDOMEN: Nontender to palpation, normoactive bowel sounds, no rebound/guarding; No hepatosplenomegaly  MUSCULOSKELETAL:  no clubbing or cyanosis of digits; no joint swelling or tenderness to palpation  PSYCH: A+O to person, place, and time; affect appropriate  NEUROLOGY: CN 2-12 are intact and symmetric; no gross sensory deficits   SKIN: No rashes; no palpable lesions      LABS:                        9.7    42.93 )-----------( 82       ( 07 Dec 2022 08:27 )             29.7      12-07    137  |  104  |  75<H>  ----------------------------<  156<H>  5.6<H>   |  19<L>  |  3.41<H>    Ca    8.9      07 Dec 2022 06:54         CAPILLARY BLOOD GLUCOSE          RADIOLOGY & ADDITIONAL TESTS:    Imaging Personally Reviewed:  Consultant(s) Notes Reviewed:    Care Discussed with Consultants/Other Providers:   Andre Reyes, M.D.  Pager: [phone]  Office: [phone]    Patient is a 82y old  Male who presents with a chief complaint of UTI (06 Dec 2022 17:29)          SUBJECTIVE / OVERNIGHT EVENTS:    No acute overnight events.  Feels more short of breath today.    ROS: ( - ) Fever, ( - )Chills,  ( - )Nausea/Vomiting, ( - ) Cough, ( + )Shortness of breath, ( - )Chest Pain    MEDICATIONS  (STANDING):  abacavir 300 milliGRAM(s) Oral two times a day  apixaban 2.5 milliGRAM(s) Oral two times a day  ascorbic acid 500 milliGRAM(s) Oral daily  aspirin enteric coated 81 milliGRAM(s) Oral daily  ceFAZolin   IVPB 2000 milliGRAM(s) IV Intermittent every 12 hours  chlorhexidine 2% Cloths 1 Application(s) Topical daily  chlorhexidine 4% Liquid 1 Application(s) Topical <User Schedule>  doravirine 100 milliGRAM(s) Oral daily  famotidine    Tablet 20 milliGRAM(s) Oral daily  ferrous    sulfate 325 milliGRAM(s) Oral daily  folic acid 1 milliGRAM(s) Oral daily  influenza  Vaccine (HIGH DOSE) 0.7 milliLiter(s) IntraMuscular once  lactated ringers. 500 milliLiter(s) (50 mL/Hr) IV Continuous <Continuous>  lamiVUDine- milliGRAM(s) Oral daily  multivitamin 1 Tablet(s) Oral daily  senna 2 Tablet(s) Oral at bedtime    MEDICATIONS  (PRN):  acetaminophen     Tablet .. 650 milliGRAM(s) Oral every 6 hours PRN Temp greater or equal to 38C (100.4F), Mild Pain (1 - 3)  aluminum hydroxide/magnesium hydroxide/simethicone Suspension 30 milliLiter(s) Oral every 6 hours PRN Dyspepsia  loperamide 2 milliGRAM(s) Oral daily PRN Diarrhea  ondansetron Injectable 4 milliGRAM(s) IV Push every 6 hours PRN Nausea and/or Vomiting  simethicone 80 milliGRAM(s) Chew four times a day PRN Gas  sodium chloride 0.9% lock flush 10 milliLiter(s) IV Push every 1 hour PRN Pre/post blood products, medications, blood draw, and to maintain line patency          T(C): 36.7 (12-07 @ 12:57), Max: 36.8 (12-06 @ 20:28)   HR: 63   BP: 125/65   RR: 18   SpO2: 93%    PHYSICAL EXAM:    CONSTITUTIONAL: NAD, well-developed, well-groomed  EYES: PERRLA; conjunctiva and sclera clear  ENMT: Moist oral mucosa, no pharyngeal injection or exudates; normal dentition  NECK: Supple, no palpable masses; no thyromegaly  RESPIRATORY: Normal respiratory effort; right posterior rales  CARDIOVASCULAR: Regular rate and rhythm, normal S1 and S2, no murmur/rub/gallop; No lower extremity edema; Peripheral pulses are 2+ bilaterally  ABDOMEN: Nontender to palpation, normoactive bowel sounds, no rebound/guarding; No hepatosplenomegaly  MUSCULOSKELETAL:  no clubbing or cyanosis of digits; no joint swelling or tenderness to palpation  PSYCH: A+O to person, place, and time; affect appropriate  NEUROLOGY: CN 2-12 are intact and symmetric; no gross sensory deficits   SKIN: No rashes; no palpable lesions      LABS:                        9.7    42.93 )-----------( 82       ( 07 Dec 2022 08:27 )             29.7      12-07    137  |  104  |  75<H>  ----------------------------<  156<H>  5.6<H>   |  19<L>  |  3.41<H>    Ca    8.9      07 Dec 2022 06:54         CAPILLARY BLOOD GLUCOSE          RADIOLOGY & ADDITIONAL TESTS:    Imaging Personally Reviewed:  Consultant(s) Notes Reviewed:    Care Discussed with Consultants/Other Providers:

## 2022-12-07 NOTE — PROGRESS NOTE ADULT - PROBLEM SELECTOR PLAN 6
- SUDHEER on CKD4 (baseline Cr 2.6-2.8) likely in setting urinary retention  - S/p Aden  - Cr 2.78 baseline)  - Creatinine Trend: 3.41<--, 3.18<--, 2.65<--, 2.78<--, 2.85<--, 3.46<--  - Monitor urine OUP closely, strict I+Os, Cr  - Avoid nephrotoxic agents, renally dose all medications

## 2022-12-07 NOTE — PROGRESS NOTE ADULT - SUBJECTIVE AND OBJECTIVE BOX
[facility] DIVISION OF KIDNEY DISEASES AND HYPERTENSION -- PROGRESS NOTE    Chief complaint: SUDHEER on CKD    24 hour events/subjective: initiated on IVF        PAST HISTORY  --------------------------------------------------------------------------------  No significant changes to PMH, PSH, FHx, SHx, unless otherwise noted    ALLERGIES & MEDICATIONS  --------------------------------------------------------------------------------  Allergies    No Known Allergies    Intolerances      Standing Inpatient Medications  abacavir 300 milliGRAM(s) Oral two times a day  albuterol/ipratropium for Nebulization. 3 milliLiter(s) Nebulizer once  apixaban 2.5 milliGRAM(s) Oral two times a day  ascorbic acid 500 milliGRAM(s) Oral daily  aspirin enteric coated 81 milliGRAM(s) Oral daily  ceFAZolin   IVPB 2000 milliGRAM(s) IV Intermittent every 12 hours  chlorhexidine 2% Cloths 1 Application(s) Topical daily  chlorhexidine 4% Liquid 1 Application(s) Topical <User Schedule>  doravirine 100 milliGRAM(s) Oral daily  famotidine    Tablet 20 milliGRAM(s) Oral daily  ferrous    sulfate 325 milliGRAM(s) Oral daily  folic acid 1 milliGRAM(s) Oral daily  influenza  Vaccine (HIGH DOSE) 0.7 milliLiter(s) IntraMuscular once  lactated ringers. 500 milliLiter(s) IV Continuous <Continuous>  lamiVUDine- milliGRAM(s) Oral daily  multivitamin 1 Tablet(s) Oral daily  senna 2 Tablet(s) Oral at bedtime    PRN Inpatient Medications  acetaminophen     Tablet .. 650 milliGRAM(s) Oral every 6 hours PRN  aluminum hydroxide/magnesium hydroxide/simethicone Suspension 30 milliLiter(s) Oral every 6 hours PRN  loperamide 2 milliGRAM(s) Oral daily PRN  ondansetron Injectable 4 milliGRAM(s) IV Push every 6 hours PRN  simethicone 80 milliGRAM(s) Chew four times a day PRN  sodium chloride 0.9% lock flush 10 milliLiter(s) IV Push every 1 hour PRN      REVIEW OF SYSTEMS  --------------------------------------------------------------------------------  Constitutional: [ ] Fever [ ] Chills [x ] Fatigue [ ] Weight change   HEENT: [ ] Blurred vision [ ] Eye Pain [ ] Headache [ ] Runny nose [ ] Sore Throat   Respiratory: [ ] Cough [ ] Wheezing [ ] Shortness of breath  Cardiovascular: [ ] Chest Pain [ ] Palpitations [ ] PRESCOTT [ ] PND [ ] Orthopnea  Gastrointestinal: [ ] Abdominal Pain [ ] Diarrhea [ ] Constipation [ ] Hemorrhoids [ ] Nausea [ ] Vomiting  Genitourinary: [ ] Nocturia [ ] Dysuria [ ] Incontinence  Extremities: [ ] Swelling [ ] Joint Pain  Neurologic: [ ] Focal deficit [ ] Paresthesias [ ] Syncope  Lymphatic: [ ] Swelling [ ] Lymphadenopathy   Skin: [ ] Rash [ ] Ecchymoses [ ] Wounds [ ] Lesions  Psychiatry: [ ] Depression [ ] Suicidal/Homicidal Ideation [ ] Anxiety [ ] Sleep Disturbances  [ x] 10 point review of systems is otherwise negative except as mentioned above              [ ]Unable to obtain due to   All other systems were reviewed and are negative, except as noted.    VITALS/PHYSICAL EXAM  --------------------------------------------------------------------------------  T(C): 36.7 (12-07-22 @ 12:57), Max: 36.8 (12-06-22 @ 20:28)  HR: 63 (12-07-22 @ 12:57) (52 - 87)  BP: 125/65 (12-07-22 @ 12:57) (101/49 - 151/74)  RR: 18 (12-07-22 @ 12:57) (18 - 18)  SpO2: 93% (12-07-22 @ 12:57) (91% - 96%)  Wt(kg): --        12-06-22 @ 07:01  -  12-07-22 @ 07:00  --------------------------------------------------------  IN: 440 mL / OUT: 1400 mL / NET: -960 mL      Physical Exam:  	Gen: NAD, well-appearing  	HEENT: on room air  	Pulm: CTA B/L  	CV: normal S1S2; no rub  	Abd: soft                      Back : No sacral edema  	: No guy  	LE: + chronic LE ankle edema  	Skin: Warm, without rashes  	    LABS/STUDIES  --------------------------------------------------------------------------------              9.7    42.93 >-----------<  82       [12-07-22 @ 08:27]              29.7     137  |  104  |  75  ----------------------------<  156      [12-07-22 @ 06:54]  5.6   |  19  |  3.41        Ca     8.9     [12-07-22 @ 06:54]            Creatinine Trend:  SCr 3.41 [12-07 @ 06:54]  SCr 3.18 [12-06 @ 05:21]  SCr 2.65 [12-05 @ 07:08]  SCr 2.78 [12-04 @ 06:50]  SCr 2.85 [12-03 @ 07:16]    Urinalysis - [11-29-22 @ 20:45]      Color Yellow / Appearance Slightly Turbid / SG 1.015 / pH 6.0      Gluc Negative / Ketone Negative  / Bili Negative / Urobili Negative       Blood Moderate / Protein 100 / Leuk Est Large / Nitrite Negative      RBC 6 /  / Hyaline 2 / Gran 1 / Sq Epi  / Non Sq Epi 1 / Bacteria Negative      TSH 2.52      [12-02-22 @ 06:24]

## 2022-12-07 NOTE — PROGRESS NOTE ADULT - PROBLEM SELECTOR PLAN 2
Hb near goal, on oral iron. Would hold off on IV iron in the setting of bacteremia/ endocarditis. will consider JASON if Hb remains below goal. Monitor CBC.

## 2022-12-07 NOTE — PROGRESS NOTE ADULT - ASSESSMENT
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  12/5  s/p Micra MDT PPM via Right Femoral Vein     Antibiotics:  Cefepime 11/29 -> 12/2  Meropenem 11/30  Vancomycin 11/30 ->12/2  Cefazolin 12/2 -->    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  Patient non toxic appearance  marked leukocytosis  thrombocytopenia  CKD    not a surgical candidate - guarded out look      IMPRESSION:  Prosthetic MV endocarditis  Staph lugdunensis bacteremia/bacteruria  Bacteremia cleared as of 12/2/22  HIV well controlled on ARTs  Previous Staph epi endocarditis  Fever  - afebrile since 12/2  Leukocytosis  - increasing  renal failure    patient appears to have chf ?cardiac abscess ?emboli    RECOMMENDATIONS:   IV cefazolin 2gm q12hrs  Continue IV  x 6 weeks through January 10, 2022  Place PICC line  After completion of IV abx, chronic oral antibiotics suppression with Keflex 500 mg po Twice daily indefinitely as tolerated  - Continue PO abacavir 200mg q12hrs, lamivudine 100mg q24hrs and doravirine 1tab q24hrs    check blood cultures  consider trial of diuresis    discussed with primary attending

## 2022-12-07 NOTE — CHART NOTE - NSCHARTNOTEFT_GEN_A_CORE
Notified by RN, pt with WBC 45.43. Patient seen and examined. Patient sitting up in bed, eating breakfast, states is comfortable with no complaints. Afebrile. Non-toxic appearing. Normal respiratory effort, on 2LNC. Lungs CTA b/l. VSS. Patient on IV Ancef. Repeat CBC WBC 42.93. Discussed with Dr. Reyes, will contact ID for further recs.     Vital Signs Last 24 Hrs  T(C): 36.7 (07 Dec 2022 11:05), Max: 36.8 (06 Dec 2022 20:28)  T(F): 98 (07 Dec 2022 11:05), Max: 98.3 (06 Dec 2022 20:28)  HR: 59 (07 Dec 2022 11:05) (52 - 87)  BP: 108/63 (07 Dec 2022 11:05) (101/49 - 151/74)  BP(mean): --  RR: 18 (07 Dec 2022 11:05) (18 - 18)  SpO2: 92% (07 Dec 2022 11:05) (91% - 96%)    Parameters below as of 07 Dec 2022 11:05  Patient On (Oxygen Delivery Method): nasal cannula  O2 Flow (L/min): 2

## 2022-12-07 NOTE — PROGRESS NOTE ADULT - PROBLEM SELECTOR PLAN 1
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   Urine studies from 11/29 with blood and protein present in the urine. 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   Abdominal US done on 12/1 showed unilateral hydronephrosis.   Check serological w/up for GN.   Maintain tovar in situ.   HOLD Vit C.   HOLD maalox.   Monitor BMP. Strict I/Os. Avoid nephrotoxins.

## 2022-12-07 NOTE — PROGRESS NOTE ADULT - PROBLEM SELECTOR PLAN 3
- Noted Hx of endocarditis now with a recurrence  - NOHEMI 12/1 demonstrating Echodense material seen coating the prosthetic leaflets with mobile components measuring up to approximately 1.0 cm in length seen on the atrial side of the valve. Findings are consistent with endocarditis. There is thickening seen along the intervalvular fibrosa extending to the aortic root suspicious for infection/early abscess  - CTS evaluated, not a candidate for surgery. C/w cefazolin  Bld Culture Results: No growth to date. (12.03.22 @ 07:40)  Bld Culture Results: No growth to date. (12.01.22 @ 10:48)    - d/w ID, Dr. Ferguson, pt will need 6 weeks of anceph thru Jan 10th followed by life-long oral keflex.  - s/p PICC line placement  - leukocytosis still rising which is concerning for uncontrolled infection despite appropriate abxs. will check repeat blood cultures.

## 2022-12-07 NOTE — PROGRESS NOTE ADULT - PROBLEM SELECTOR PLAN 2
Pt with increasing shortness of breath today. with worsening hypoxemia  CXR showing vascular congestion.  will give Lasix 80mg IV now then will recheck labs in 6 hrs and give another dose of lasix tonight

## 2022-12-07 NOTE — PROVIDER CONTACT NOTE (CRITICAL VALUE NOTIFICATION) - ACTION/TREATMENT ORDERED:
As per Mary Arora, LAUREN, will possibly reach out to hematology. Will continue to monitor. As per Mary Arora ACP, will possibly reach out to Infectious Disease. Will order blood cultures. Will continue to monitor.

## 2022-12-07 NOTE — CHART NOTE - NSCHARTNOTEFT_GEN_A_CORE
Informed by RN that pt with increased work of breathing, and desaturated to 88% on 3 L NC. Pt with shortness of breath this AM, likely in the setting of volume overload, and CXR was ordered. CXR with increased congestion, and pt was given IVP of Lasix 80 mg x 1. Pt now seen and assessed at bedside, with work of breathing, complaining he cannot take a full breath. Pt able to speak in full sentences. Denies chest pain, abd pain, N/V, hemoptysis, and back pain.    Vital Signs Last 24 Hrs  T(C): 36.3 (07 Dec 2022 18:34), Max: 36.8 (06 Dec 2022 20:28)  T(F): 97.4 (07 Dec 2022 18:34), Max: 98.3 (06 Dec 2022 20:28)  HR: 91 (07 Dec 2022 18:34) (52 - 91)  BP: 121/53 (07 Dec 2022 18:34) (101/49 - 151/74)  BP(mean): --  RR: 20 (07 Dec 2022 18:34) (18 - 20)  SpO2: 87% (07 Dec 2022 18:34) (87% - 96%)      Physical Exam:  General: elderly frail male with work of breathing  Neurology: A&O x 4  Head:  Normocephalic, atraumatic  Respiratory: CTA B/L  CV: RRR, S1S2  Abdominal: Soft, NT, ND    Labs:                          9.7    42.93 )-----------( 82       ( 07 Dec 2022 08:27 )             29.7     12-07    137  |  104  |  75<H>  ----------------------------<  156<H>  5.6<H>   |  19<L>  |  3.41<H>    Ca    8.9      07 Dec 2022 06:54      Assessment & Plan:    82M w/ PMHx of HIV (viral load undetectable 08/22), Afib s/p DVVC and ablation (not on AC), MV endocarditis s/p bioprosthetic valve replacement, BPH (self catheterizes), frequent UTIs, presents with UTI and heart block, found to have persistent endocarditis not amenable to cardiac surgery, s/p melina PPM placement on 12/5.     >ABG ordered  >Pt increased to 6 L nasal cannula at this time, saturating 91% when assessed. Pt stated he had improvement with symptoms upon increasing oxygen requirements.  >CXR earlier today with pulmonary congestion. Pt was given IVP of Lasix 80 mg. Per Dr. Reyes, will repeat BMP 6 hrs after prior IVP Lasix dose. If no acute increase in Cr, consider additional dose of IVP Lasix 80 mg x 1.   >F/u ABG results  >Discussed with Dr. Reyes Allison [place], [place]ORDON  [id number]

## 2022-12-08 NOTE — PROGRESS NOTE ADULT - NSPROGADDITIONALINFOA_GEN_ALL_CORE
Malvin Olmos is a 8year old female, who presents for a yearly physical. Pt is going into 6th grade. Diabetes History No; TB risk No    Has warts on her toes. Mom applied compound W. Has had for 2 months.      Pt injured her right big toe 2
conjunctiva are clear, +red reflex bilat  NECK: supple, no adenopathy, no thyromegally  CHEST: no chest tenderness or masses  LUNGS: clear to auscultation  CARDIO: RRR without murmur, distal pulses 2+ bilaterally  GI: good BS's and no masses, HSM or tender
d/w ACP
I reconsulted palliative care to continue to assist with GOC given that the patient is not a operative cardiac candidate

## 2022-12-08 NOTE — PROGRESS NOTE ADULT - PROBLEM SELECTOR PLAN 8
- Hold all kaylan blockers due to bradycardia  - F/u cardiology/EP recommendations  - Currently on heparin drip
#Abdominal bloating - Maalox and simethicone    DVT ppx: Heparin drip  Diet: Regular  Disposition: Pending clinical course (PT to reevaluate patient 12/4)  CODE status: FULL Code
- Hold all kaylan blockers due to bradycardia  - F/u cardiology/EP recommendations  - Currently on heparin drip
- Hold all kaylan blockers due to bradycardia  - F/u cardiology/EP recommendations  - Currently on heparin drip

## 2022-12-08 NOTE — RAPID RESPONSE TEAM SUMMARY - NSADDTLFINDINGSRRT_GEN_ALL_CORE
EKG with Afib (HR in 60s)  CXR with worsening infiltrates and re-demonstrated bilateral pleural effusion  Not responding to bumex

## 2022-12-08 NOTE — PROVIDER CONTACT NOTE (OTHER) - RECOMMENDATIONS
ACP Bethanie Moreno made aware
Notify Provider
Mary Arora, LAUREN made aware.
Notify Provider. Patient repositioned into [facility]
Cyndi Doran, ACP made aware.

## 2022-12-08 NOTE — PROGRESS NOTE ADULT - ASSESSMENT
82M w/ PMHx of HIV (viral load undetectable 08/22), Afib s/p DVVC and ablation (not on AC), MV endocarditis s/p bioprosthetic valve replacement, BPH (self catheterizes), frequent UTIs, presents with UTI and heart block, found to have persistent endocarditis not amenable to cardiac surgery, s/p melina PPM placement on 12/5. Now with worsening Acute Hypoxic Respiratory failure.

## 2022-12-08 NOTE — PROGRESS NOTE ADULT - PROBLEM SELECTOR PROBLEM 8
Need for prophylactic measure
Unspecified atrial fibrillation

## 2022-12-08 NOTE — PROGRESS NOTE ADULT - ASSESSMENT
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  12/5  s/p Micra MDT PPM via Right Femoral Vein     Antibiotics:  Cefepime 11/29 -> 12/2  Meropenem 11/30  Vancomycin 11/30 ->12/2  Cefazolin 12/2 -->    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  this may be related to perivalvular abcess, increasing mitral regurg, expanding left atrium abutting esophagus with inflammation/cytokine release  not surgical candidate - guarded outlook    RECOMMENDATIONS:  Decrease IV cefazolin 1 gm q12hrs      discussed with primary medical attending who has requested Palliative care evaluation

## 2022-12-08 NOTE — PROVIDER CONTACT NOTE (OTHER) - ASSESSMENT
Pt states, "I have chest pressure." Pt also states that he has upper abdominal pain which is traveling up his midsternum area causing the chest pressure. Pt has no other c/o HA, heart palpitations/fluttering, trouble breathing. V/s; temp axillary 97.4, HR 73, /63, SaO2 93% on HFNC 60%/25L RR 19. Pt states, "I have chest pressure." Pt also states that he has upper abdominal pain which is traveling up his midsternum area causing the chest pressure; pt rates the discomfort 8/10 and also notes feeling some reflux. Pt has no other c/o HA, heart palpitations/fluttering, trouble breathing. V/s; temp axillary 97.4, HR 73, /63, SaO2 93% on HFNC 60%/25L RR 19.

## 2022-12-08 NOTE — PROGRESS NOTE ADULT - PROBLEM SELECTOR PROBLEM 6
HIV disease
SUDHEER (acute kidney injury)
Unspecified atrial fibrillation
SUDHEER (acute kidney injury)
SUDHEER (acute kidney injury)
Unspecified atrial fibrillation
SUDHEER (acute kidney injury)
NSTEMI (non-ST elevation myocardial infarction)

## 2022-12-08 NOTE — PROGRESS NOTE ADULT - PROBLEM SELECTOR PLAN 5
- Sepsis in setting of UTI, bacteremia (BCx/UCx 11/29 - staph lugdunensis)  - Cefepime and vancomycin --> cefazolin  - Repeat BCx 12/1 &12/3 - NGTD  - Acute on chronic renal failure and complicated UTI likely secondary to obstruction now s/p tovar

## 2022-12-08 NOTE — CHART NOTE - NSCHARTNOTEFT_GEN_A_CORE
Informed by RN that pt with increased work of breathing and desaturation on High Flow Nasal Cannula. Pt was on high flow 60%/25L, and had desaturation on these settings down to 70s-80s%. Pt stated his breathing was worsening significantly and he c/o 8/10 chest pain starting at approx 12:30. EKG was done stat at the time of start of chest pain, with no acute ST/T wave changes noted from EKG done overnight. EKG shared with Dr. Reyes. Cardiac enzymes done stat with critical troponin level reported at 154. Given desaturation on high flow with work of breathing and concomitant chest pain, RRT team called for assessment.    Vital Signs Last 24 Hrs  T(C): 36.3 (08 Dec 2022 12:17), Max: 36.8 (07 Dec 2022 21:22)  T(F): 97.4 (08 Dec 2022 12:17), Max: 98.2 (07 Dec 2022 21:22)  HR: 76 (08 Dec 2022 13:45) (57 - 91)  BP: 112/67 (08 Dec 2022 13:45) (105/55 - 126/68)  BP(mean): --  RR: 19 (08 Dec 2022 12:17) (18 - 28)  SpO2: 93% (08 Dec 2022 12:17) (87% - 98%)    Physical Exam:  General: frail elderly male in acute respiratory distress with concomitant intercostal muscle usage and accessory muscle use  Neurology: A&Ox4  Head:  Normocephalic, atraumatic  Respiratory: rhonchi in b/l lung fields  CV: irregularly irregular rhythm, V-paced, S1S2  Abdominal: distended abdomen, soft, non-tender to palpation      Labs:                          10.2   54.23 )-----------( 98       ( 08 Dec 2022 15:24 )             31.1     12-08    137  |  101  |  100<H>  ----------------------------<  144<H>  5.8<H>   |  17<L>  |  4.63<H>    Ca    8.6      08 Dec 2022 15:24  Phos  8.8     12-08  Mg     3.4     12-08    TPro  6.7  /  Alb  2.9<L>  /  TBili  0.3  /  DBili  x   /  AST  317<H>  /  ALT  42  /  AlkPhos  224<H>  12-08    CARDIAC MARKERS ( 08 Dec 2022 13:21 )  x     / x     / 47 U/L / x     / 4.9 ng/mL      ABG - ( 08 Dec 2022 15:17 )  pH, Arterial: 7.26  pH, Blood: x     /  pCO2: 39    /  pO2: 90    / HCO3: 18    / Base Excess: -8.9  /  SaO2: 98.5      Assessment and Plan:    81 yo M w/ PMHx of HIV (viral load undetectable 08/22), Afib s/p DCCV and ablation (not on AC), MV endocarditis s/p bioprosthetic valve replacement, BPH (self catheterizes), frequent UTIs, admitted with weakness in the setting of Staph lugdunensis bacteremia c/b infective endocarditis of prosthetic MVR on Ancef (now on Zosyn) with course c/b progressive renal failure c/b respiratory distress 2/2 hypervolemia. RRT team called to bedside for acute hypoxic respiratory failure with chest pain.    >RRT team at bedside    Acute hypoxemic respiratory failure 2/2 vascular congestion  >Pt was on high flow 60%/25L, and had desaturation on these settings down to 70s-80s%. Bedside RN increased settings to 95%/25L and pt with improvement to 90-92%. RRT team called to bedside as pt remained with work of breathing and per pt, only "moderate" improvement in breathing status.  >RRT team at bedside and ordered ABG and stat labs:  CBC, CMP, INR, ABG with lactate, pro-BNP, Mg, PO4.  >ABG noted with metabolic acidosis related to ARF.  >Urgent CXR during RRT with increasing congestion, worsening infiltrates, and pleural effusion b/l.  >MICU consulted at bedside, as well as CCU  >Due to increased work of breathing, decision made by RRT/MICU team to place pt on bipap.  >Pt transferred to MICU    Chest pain and volume overload  >Cardiology Dr. Darby Rocha called for recommendations, given chest pain and need for diuresis despite low urine output. Dr. Rocha recommended for stat echo which was ordered and expedited. EKG done stat and echo done stat during RRT. EKG with no acute ischemic changes from prior EKG. F/u echo results.  >On tele, pt is sinus madonna with Mobitz I and pAF SVR.  >Continue to trend troponin and serial EKGs.  >Prior to RRT, pt was given IVP of Bumex 2 mg, with limited urine output in the setting of acute renal failure. Bladder scan at bedside with no production of urine. As recommended by Dr. Rocha, Bumex gtt @ 2 mg/hr was started. Nephrology Dr. Rosario was called during RRT who is to speak with Dr. Wing regarding next phase of care. Closely monitor urine output with strict I&Os.      >Palliative care eval ordered by attending.    Given work of breathing and tachypnea on bipap, acute renal failure with potential need for UF/HD, and worsening vascular congestion, pt transferred to ICU.    Above discussed with Dr. Reyes at length.    Mary Arora PA-C  [id number] Informed by RN that pt with increased work of breathing and desaturation on High Flow Nasal Cannula. Pt was on high flow 60%/25L, and had desaturation on these settings down to 70s-80s%. Pt stated his breathing was worsening significantly and he c/o 8/10 chest pain starting at approx 12:30. EKG was done stat at the time of start of chest pain, with no acute ST/T wave changes noted from EKG done overnight. EKG shared with Dr. Reyes. Cardiac enzymes done stat with critical troponin level reported at 154. Given desaturation on high flow with work of breathing and concomitant chest pain, RRT team called for assessment.    Vital Signs Last 24 Hrs  T(C): 36.3 (08 Dec 2022 12:17), Max: 36.8 (07 Dec 2022 21:22)  T(F): 97.4 (08 Dec 2022 12:17), Max: 98.2 (07 Dec 2022 21:22)  HR: 76 (08 Dec 2022 13:45) (57 - 91)  BP: 112/67 (08 Dec 2022 13:45) (105/55 - 126/68)  BP(mean): --  RR: 19 (08 Dec 2022 12:17) (18 - 28)  SpO2: 93% (08 Dec 2022 12:17) (87% - 98%)    Physical Exam:  General: frail elderly male in acute respiratory distress with concomitant intercostal muscle usage and accessory muscle use  Neurology: A&Ox4  Head:  Normocephalic, atraumatic  Respiratory: rhonchi in b/l lung fields  CV: irregularly irregular rhythm, V-paced, S1S2  Abdominal: distended abdomen, soft, non-tender to palpation      Labs:                          10.2   54.23 )-----------( 98       ( 08 Dec 2022 15:24 )             31.1     12-08    137  |  101  |  100<H>  ----------------------------<  144<H>  5.8<H>   |  17<L>  |  4.63<H>    Ca    8.6      08 Dec 2022 15:24  Phos  8.8     12-08  Mg     3.4     12-08    TPro  6.7  /  Alb  2.9<L>  /  TBili  0.3  /  DBili  x   /  AST  317<H>  /  ALT  42  /  AlkPhos  224<H>  12-08    CARDIAC MARKERS ( 08 Dec 2022 13:21 )  x     / x     / 47 U/L / x     / 4.9 ng/mL      ABG - ( 08 Dec 2022 15:17 )  pH, Arterial: 7.26  pH, Blood: x     /  pCO2: 39    /  pO2: 90    / HCO3: 18    / Base Excess: -8.9  /  SaO2: 98.5      Assessment and Plan:    83 yo M w/ PMHx of HIV (viral load undetectable 08/22), Afib s/p DCCV and ablation (not on AC), MV endocarditis s/p bioprosthetic valve replacement, BPH (self catheterizes), frequent UTIs, admitted with weakness in the setting of Staph lugdunensis bacteremia c/b infective endocarditis of prosthetic MVR on Ancef (now on Zosyn) with course c/b progressive renal failure c/b respiratory distress 2/2 hypervolemia. RRT team called to bedside for acute hypoxic respiratory failure with chest pain.    Acute hypoxemic respiratory failure 2/2 vascular congestion  >Pt was on high flow 60%/25L, and had desaturation on these settings down to 70s-80s%. Bedside RN increased settings to 95%/25L and pt with improvement to 90-92%. RRT team called to bedside as pt remained with work of breathing and per pt, only "moderate" improvement in breathing status.  >RRT team ordered ABG and stat labs:  CBC, CMP, INR, ABG with lactate, pro-BNP, Mg, PO4.  >ABG noted with metabolic acidosis related to ARF.  >Urgent CXR during RRT with increasing congestion, worsening infiltrates, and pleural effusion b/l.  >MICU consulted at bedside, as well as CCU  >Due to increased work of breathing, decision made by RRT/MICU team to place pt on bipap.  >Pt transferred to MICU    Chest pain and volume overload  >Cardiology Dr. Darby Rocha called for recommendations, given chest pain and need for diuresis despite low urine output. Dr. Rocha recommended for stat echo which was ordered and expedited. EKG done stat and echo done stat during RRT. EKG with no acute ischemic changes from prior EKG. F/u echo results.  >On tele, pt is sinus madonna with Mobitz I and pAF SVR.  >Continue to trend troponin and serial EKGs.  >Prior to RRT, pt was given IVP of Bumex 2 mg, with limited urine output in the setting of acute renal failure. Bladder scan at bedside with no production of urine. As recommended by Dr. Rocha, Bumex gtt @ 2 mg/hr was started. Nephrology Dr. Rosario was called during RRT who is to speak with Dr. Wing regarding next phase of care. Closely monitor urine output with strict I&Os.      >Palliative care eval ordered by attending.    Given work of breathing and tachypnea on bipap, acute renal failure with potential need for urgent UF/HD, and worsening vascular congestion, pt transferred to ICU.    Above discussed with Dr. Reyes at length.    Mary Arora PA-C  [id number]

## 2022-12-08 NOTE — CONSULT NOTE ADULT - ATTENDING COMMENTS
82M with hx of HIV (on ART), AF s/p DCCV/ablation (not on AC), MV endocarditis s/p bioprosthetic MVR 2020 admitted with staph abelnesis bacteremia c/b infective endocarditis of prosthetic MVR on ancef with course c/b progressive renal failure c/b respiratory distress 2/2 hypervolemia for which MICU is consulted.     General: well nourished alert answering questions appropriately   Head: Normocephalic  Eyes: conjunctiva clear, EOM intact  Mouth: Mucous membranes moist, no mucosal lesions.  Neck: Supple  Heart: Regular rate  Lungs: rhonchi/rales noted   Abdomen: Bowel sounds present, soft, no tenderness  Extremities: No amputations or deformities, cyanosis, edema, peripheral pulses intact  Musculoskeletal: No crepitation, defects, decreased range of motion, strength intact   Neurologic: no gross deficits  Skin: Warm,dry.     #hypoxic respiratory failure 2/2 volume overload  #progressive renal failure  #Staph lugdudenesis bacteremia c/b infective endocarditis on cefazolin  #persistent leukocytosis    Recommendations:  - agree with diuresis but would consider aggressive diuresis at this time can consider switching to bumex and may consider metolazone 30 min prior to dose   - can also consider bicarb drip   - reach out to renal for recs at this time   - monitor BMP and correct hyperkalemia   - ABG to assess acid/base status; if additional respiratory acidosis on top of metabolic acidosis, can initiate hiflow versus bipap     Patient does not require MICU level of monitoring or intervention at this time. Please re-consult as needed.
[facility]  I agree with his history and exam as noted above    Patient known to me from prior admissions, long standing HIV/AIDS, enlarged prostate for which he performs home straight catheterization but with recurrent UTIs/pyelonephritis known hydronephrosis in the past.  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 Returns to the ED, febrile, with chills at time of admission but awake alert coherent marked leukocytosis. Setting of abnormal UA with cultures and further imaging pending    Would :  send additional 2 sets of blood cultures  Would increase Cefepime dosing to 1 gram IV q 12hr  Would have a low threshold to broaden antibiotics to Vancomycin 750mg x 1 dose plus Meropenem 500mg q 12hr  Would image with abdominal/renal ultrasound and non contrast abdominal pelvic CT scan  Urology consult    HIV:  continue his out patient ARV meds which are mostly hepatic metabolized  send HIV viral load  check T cells    Discussed with ED team    Tung Almazan MD  Can be called via Teams  After 5pm/weekends [phone]
pt. 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 s/p tovar catheter placement during this hospitalization with decrease in Scr following catheter insertion.   No contraindication from kidney standpoint for patient to undergo PICC line placement.   Monitor BMP. Strict I/Os. Avoid nephrotoxins.
Mr. Monsivais presents with chills, fever, and elevated WBC compatible with UTI and SUDHEER on CKD. He endorses recent PRESCOTT but denies CP at the time of my evaluation in the ED. ECG showed sinus bradycardia with 2nd AVB Mobitz type 1 and subsequent AF with SVR. High sensitivity Troponin T elevated up to 1223. No murmur noted on physical exam or evidence of splinter hemorrhages.    Invasive coronary angiography 8.25.2020 showed angiographically normal coronary arteries.  High sensitivity Troponin T on 8.31.2020 was 1434.    Current troponin elevation likely due to Type II MI in the setting of UTI and SUDHEER on CKD.    Given history of endocarditis, bioprosthetic MVR, and UTI, recurrent endocarditis must be considered.    Recommend TTE. Will consider further evaluation with NOHEMI.    EP evaluation for AF with SVR. AC with heparin gtt if no contraindication.    70 minutes spent on total patient encounter. More than 50% of the encounter was spent counseling and/or coordination care. The necessity of the time spent on this encounter was due to: time spent evaluating the patient as well as time spent reviewing labs, imaging, and discussing the case with a multidisciplinary team.

## 2022-12-08 NOTE — PROGRESS NOTE ADULT - SUBJECTIVE AND OBJECTIVE BOX
Andre Reyes, M.D.  Pager: [phone]  Office: [phone]    Patient is a 82y old  Male who presents with a chief complaint of UTI (08 Dec 2022 05:48)          SUBJECTIVE / OVERNIGHT EVENTS:    No acute overnight events.    ROS: ( - ) Fever, ( - )Chills,  ( - )Nausea/Vomiting, ( - ) Cough, ( - )Shortness of breath, ( - )Chest Pain    MEDICATIONS  (STANDING):  abacavir 300 milliGRAM(s) Oral two times a day  apixaban 2.5 milliGRAM(s) Oral two times a day  aspirin enteric coated 81 milliGRAM(s) Oral daily  buMETAnide Injectable 2 milliGRAM(s) IV Push once  buMETAnide Injectable 2 milliGRAM(s) IV Push two times a day  ceFAZolin   IVPB 1000 milliGRAM(s) IV Intermittent every 12 hours  chlorhexidine 2% Cloths 1 Application(s) Topical daily  chlorhexidine 4% Liquid 1 Application(s) Topical <User Schedule>  doravirine 100 milliGRAM(s) Oral daily  famotidine    Tablet 20 milliGRAM(s) Oral daily  ferrous    sulfate 325 milliGRAM(s) Oral daily  folic acid 1 milliGRAM(s) Oral daily  influenza  Vaccine (HIGH DOSE) 0.7 milliLiter(s) IntraMuscular once  lamiVUDine- milliGRAM(s) Oral daily  multivitamin 1 Tablet(s) Oral daily  senna 2 Tablet(s) Oral at bedtime  sodium bicarbonate 650 milliGRAM(s) Oral three times a day    MEDICATIONS  (PRN):  acetaminophen     Tablet .. 650 milliGRAM(s) Oral every 6 hours PRN Temp greater or equal to 38C (100.4F), Mild Pain (1 - 3)  loperamide 2 milliGRAM(s) Oral daily PRN Diarrhea  ondansetron Injectable 4 milliGRAM(s) IV Push every 6 hours PRN Nausea and/or Vomiting  simethicone 80 milliGRAM(s) Chew four times a day PRN Gas  sodium chloride 0.9% lock flush 10 milliLiter(s) IV Push every 1 hour PRN Pre/post blood products, medications, blood draw, and to maintain line patency          T(C): 36.3 (12-08 @ 04:30), Max: 36.8 (12-07 @ 21:22)   HR: 70   BP: 105/55   RR: 18   SpO2: 98%    PHYSICAL EXAM:    CONSTITUTIONAL: NAD, well-developed, well-groomed  EYES: PERRLA; conjunctiva and sclera clear  ENMT: Moist oral mucosa, no pharyngeal injection or exudates; normal dentition  NECK: Supple, no palpable masses; no thyromegaly  RESPIRATORY: Normal respiratory effort; lungs are clear to auscultation bilaterally  CARDIOVASCULAR: Regular rate and rhythm, normal S1 and S2, no murmur/rub/gallop; No lower extremity edema; Peripheral pulses are 2+ bilaterally  ABDOMEN: Nontender to palpation, normoactive bowel sounds, no rebound/guarding; No hepatosplenomegaly  MUSCULOSKELETAL:  no clubbing or cyanosis of digits; no joint swelling or tenderness to palpation  PSYCH: A+O to person, place, and time; affect appropriate  NEUROLOGY: CN 2-12 are intact and symmetric; no gross sensory deficits   SKIN: No rashes; no palpable lesions      LABS:                        10.0   51.19 )-----------( 77       ( 08 Dec 2022 09:26 )             30.5      12-08    138  |  101  |  96<H>  ----------------------------<  89  5.2   |  17<L>  |  4.45<H>    Ca    9.0      08 Dec 2022 09:26    TPro  6.6  /  Alb  2.8<L>  /  TBili  0.2  /  DBili  x   /  AST  297<H>  /  ALT  65<H>  /  AlkPhos  219<H>  12-08       CAPILLARY BLOOD GLUCOSE      POCT Blood Glucose.: 187 mg/dL (08 Dec 2022 04:33)  POCT Blood Glucose.: 192 mg/dL (07 Dec 2022 23:25)  POCT Blood Glucose.: 230 mg/dL (07 Dec 2022 22:37)      RADIOLOGY & ADDITIONAL TESTS:    Imaging Personally Reviewed:  Consultant(s) Notes Reviewed:    Care Discussed with Consultants/Other Providers:   Andre Reyes, M.D.  Pager: [phone]  Office: [phone]    Patient is a 82y old  Male who presents with a chief complaint of UTI (08 Dec 2022 05:48)          SUBJECTIVE / OVERNIGHT EVENTS:    pt with worsening shortness of breath and hypoxia    ROS: ( - ) Fever, ( - )Chills,  ( - )Nausea/Vomiting, ( - ) Cough, ( + )Shortness of breath, ( - )Chest Pain    MEDICATIONS  (STANDING):  abacavir 300 milliGRAM(s) Oral two times a day  apixaban 2.5 milliGRAM(s) Oral two times a day  aspirin enteric coated 81 milliGRAM(s) Oral daily  buMETAnide Injectable 2 milliGRAM(s) IV Push once  buMETAnide Injectable 2 milliGRAM(s) IV Push two times a day  ceFAZolin   IVPB 1000 milliGRAM(s) IV Intermittent every 12 hours  chlorhexidine 2% Cloths 1 Application(s) Topical daily  chlorhexidine 4% Liquid 1 Application(s) Topical <User Schedule>  doravirine 100 milliGRAM(s) Oral daily  famotidine    Tablet 20 milliGRAM(s) Oral daily  ferrous    sulfate 325 milliGRAM(s) Oral daily  folic acid 1 milliGRAM(s) Oral daily  influenza  Vaccine (HIGH DOSE) 0.7 milliLiter(s) IntraMuscular once  lamiVUDine- milliGRAM(s) Oral daily  multivitamin 1 Tablet(s) Oral daily  senna 2 Tablet(s) Oral at bedtime  sodium bicarbonate 650 milliGRAM(s) Oral three times a day    MEDICATIONS  (PRN):  acetaminophen     Tablet .. 650 milliGRAM(s) Oral every 6 hours PRN Temp greater or equal to 38C (100.4F), Mild Pain (1 - 3)  loperamide 2 milliGRAM(s) Oral daily PRN Diarrhea  ondansetron Injectable 4 milliGRAM(s) IV Push every 6 hours PRN Nausea and/or Vomiting  simethicone 80 milliGRAM(s) Chew four times a day PRN Gas  sodium chloride 0.9% lock flush 10 milliLiter(s) IV Push every 1 hour PRN Pre/post blood products, medications, blood draw, and to maintain line patency          T(C): 36.3 (12-08 @ 04:30), Max: 36.8 (12-07 @ 21:22)   HR: 70   BP: 105/55   RR: 18   SpO2: 98%    PHYSICAL EXAM:    CONSTITUTIONAL: NAD, well-developed, well-groomed  EYES: PERRLA; conjunctiva and sclera clear  ENMT: Moist oral mucosa, no pharyngeal injection or exudates; normal dentition  NECK: Supple, no palpable masses; no thyromegaly  RESPIRATORY: Normal respiratory effort; +Rales  CARDIOVASCULAR: Regular rate and rhythm, normal S1 and S2, no murmur/rub/gallop; 1+ lower extremity edema; Peripheral pulses are 2+ bilaterally  ABDOMEN: Nontender to palpation, normoactive bowel sounds, no rebound/guarding; No hepatosplenomegaly  MUSCULOSKELETAL:  no clubbing or cyanosis of digits; no joint swelling or tenderness to palpation  PSYCH: A+O to person, place, and time; affect appropriate  NEUROLOGY: CN 2-12 are intact and symmetric; no gross sensory deficits   SKIN: No rashes; no palpable lesions      LABS:                        10.0   51.19 )-----------( 77       ( 08 Dec 2022 09:26 )             30.5      12-08    138  |  101  |  96<H>  ----------------------------<  89  5.2   |  17<L>  |  4.45<H>    Ca    9.0      08 Dec 2022 09:26    TPro  6.6  /  Alb  2.8<L>  /  TBili  0.2  /  DBili  x   /  AST  297<H>  /  ALT  65<H>  /  AlkPhos  219<H>  12-08       CAPILLARY BLOOD GLUCOSE      POCT Blood Glucose.: 187 mg/dL (08 Dec 2022 04:33)  POCT Blood Glucose.: 192 mg/dL (07 Dec 2022 23:25)  POCT Blood Glucose.: 230 mg/dL (07 Dec 2022 22:37)      RADIOLOGY & ADDITIONAL TESTS:    Imaging Personally Reviewed:  Consultant(s) Notes Reviewed:    Care Discussed with Consultants/Other Providers:

## 2022-12-08 NOTE — PROGRESS NOTE ADULT - PROBLEM SELECTOR PLAN 1
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   Urine studies from 11/29 with blood and protein present in the urine. 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 Patient with worsening volume status, started on diuresis today however still requiring non invasive positive pressure ventilation. Will perform session of Ultrafiltration in order to improve congestion however ultimately patient is not a good candidate for long term hemodialysis.   Abdominal US done on 12/1 showed unilateral hydronephrosis.   Check serological w/up for GN.   Maintain Tovar in situ.   Monitor BMP. Strict I/Os. Avoid nephrotoxins.

## 2022-12-08 NOTE — CHART NOTE - NSCHARTNOTEFT_GEN_A_CORE
MICU Accept Note  ------------------------    CHIEF COMPLAINT:     HPI / INTERVAL HISTORY:  HPI:  81yo M w/ PMHx of HIV (viral load undetectable 08/22), Afib s/p DVVC and ablation (not on AC), MV endocarditis s/p bioprosthetic valve replacement, BPH (self catheterizes), frequent UTIs, presents with weakness, he reports that over the last 2 weeks he has had weakness, symptoms were associated with occasional body shakes that came every other day, he notes that he also has had recent difficulty performing straight cath, saying he required more force and was at times unable to complete procedure, he called his urologist who then instructed him to head to ED, in the ED, pt was bradycardic, febrile but hemodynamically stable, labs showed leukocytosis, elevated Cr above baseline, elevated procal, grossly positive U/A, EKG showed new Mobitz Type 1, then converted to afib with slow ventricular response, pt was given cefepime, 1L NS bolus, Tylenol x1, admitted to general medicine for further management. While in ED, pt developed acute onset non-radiating chest pain associated with SOB but denies n/v, f/c, diaphoresis, jaw pain, (29 Nov 2022 23:23)        REVIEW OF SYSTEMS:  Constitutional: No fevers, chills, weight loss  Neurological: No headache, dizziness, weakness, numbness  HEENT: No vision problems, eye pain, nasal congestion, rhinorrhea, sore throat, dysphagia  Resp: No cough, dyspnea, wheezing, hemoptysis  CV: No chest pain, orthopnea, palpitations  GI: No nausea, vomiting, diarrhea, constipation, abdominal pain  : No dysuria, hematuria, urinary frequency or urgency  Musculoskeletal: No back pain, myalgias, arthralgias, or BLE edema  Skin: No new rashes, itching  [ ] Unable to assess ROS because ________      PMH/PSH:  PAST MEDICAL & SURGICAL HISTORY:  Unsteady gait      HIV (human immunodeficiency virus infection)      Chronic kidney disease, unspecified stage      Constipation, unspecified constipation type      Seborrheic keratosis      Osteoporosis      Edema of both legs      Bladder mass      Vitamin D deficiency      Orchitis and epididymitis      H/O endocarditis  MV endocarditis      Benign prostatic hyperplasia without lower urinary tract symptoms      COVID-19 vaccine series completed  W #2 boosters      S/P coronary artery stent placement      S/P MVR (mitral valve replacement)  &amp; SHELLIE clip, 8/31/2020          FAMILY HISTORY:  FAMILY HISTORY:  Family history of congestive heart failure (Father)    Family history of cancer (Mother)        SOCIAL HISTORY:  Smoking:  Alcohol:  Drugs:    HOME MEDICATIONS:  Home Medications:  abacavir 300 mg oral tablet: 2 tab(s) orally once a day (29 Nov 2022 23:21)  doravirine 100 mg oral tablet: 1 tab(s) orally once a day (29 Nov 2022 23:21)  Tamar-C 500 mg oral tablet: 1 tab(s) orally once a day (29 Nov 2022 23:21)  famotidine 20 mg oral tablet: 1 tab(s) orally 2 times a day (29 Nov 2022 23:21)  ferrous sulfate 325 mg (65 mg elemental iron) oral tablet: 1 tab(s) orally once a day (29 Nov 2022 23:21)  folic acid 1 mg oral tablet: 1 tab(s) orally once a day (29 Nov 2022 23:21)  lamiVUDine 100 mg oral tablet: 1 tab(s) orally once a day (29 Nov 2022 23:21)  metoprolol succinate 25 mg oral tablet, extended release: 0.5 tab(s) orally once a day (29 Nov 2022 23:21)  Multiple Vitamins oral tablet: 1 tab(s) orally once a day (29 Nov 2022 23:21)  senna leaf extract oral tablet: 2 tab(s) orally once a day (at bedtime) (29 Nov 2022 23:21)  telmisartan 40 mg oral tablet: 1 tab(s) orally once a day (29 Nov 2022 23:21)  Vitamin D3 5000 intl units (125 mcg) oral capsule: 1 cap(s) orally once a day (29 Nov 2022 23:21)      ALLERGIES:  Allergies    No Known Allergies    Intolerances            OBJECTIVE:  ICU Vital Signs Last 24 Hrs  T(C): 36.3 (08 Dec 2022 12:17), Max: 36.8 (07 Dec 2022 21:22)  T(F): 97.4 (08 Dec 2022 12:17), Max: 98.2 (07 Dec 2022 21:22)  HR: 76 (08 Dec 2022 13:45) (57 - 91)  BP: 112/67 (08 Dec 2022 13:45) (105/55 - 126/68)  BP(mean): --  ABP: --  ABP(mean): --  RR: 19 (08 Dec 2022 12:17) (18 - 28)  SpO2: 93% (08 Dec 2022 12:17) (87% - 98%)    O2 Parameters below as of 08 Dec 2022 12:17  Patient On (Oxygen Delivery Method): nasal cannula, high flow  O2 Flow (L/min): 25  O2 Concentration (%): 60        PHYSICAL EXAM  GENERAL: No acute distress  NEURO: A+O x 3, follows commands; spontaneously moving all 4 extremities; strength and sensation grossly intact  HEENT: Pupil equal and reactive to light; extra-ocular movements intact; clear conjunctiva; normal mucus membrane and oropharynx; neck supple  RESP: Normal respiratory effort on room air; clear to auscultation bilateral lung fields  CVS: S1/S2 present, normal rate and rhythm; no murmurs, gallops or rubs appreciated  ABD: Soft, non-tender, non-distended, no masses appreciated; bowel sound normal at all 4 quadrants  EXT: Grossly normal ROM; 2+ pedal pulses bilaterally, no BLE edema  SKIN: Warm, dry, and appropirate color for skin tone; No new rashes    LINES:       HOSPITAL MEDICATIONS:  MEDICATIONS  (STANDING):  abacavir 300 milliGRAM(s) Oral two times a day  apixaban 2.5 milliGRAM(s) Oral two times a day  aspirin enteric coated 81 milliGRAM(s) Oral daily  buMETAnide Infusion 2 mG/Hr (10 mL/Hr) IV Continuous <Continuous>  chlorhexidine 2% Cloths 1 Application(s) Topical daily  chlorhexidine 4% Liquid 1 Application(s) Topical <User Schedule>  doravirine 100 milliGRAM(s) Oral daily  famotidine    Tablet 20 milliGRAM(s) Oral daily  ferrous    sulfate 325 milliGRAM(s) Oral daily  folic acid 1 milliGRAM(s) Oral daily  influenza  Vaccine (HIGH DOSE) 0.7 milliLiter(s) IntraMuscular once  lamiVUDine- milliGRAM(s) Oral daily  multivitamin 1 Tablet(s) Oral daily  piperacillin/tazobactam IVPB. 3.375 Gram(s) IV Intermittent once  piperacillin/tazobactam IVPB.- 3.375 Gram(s) IV Intermittent once  senna 2 Tablet(s) Oral at bedtime  sodium bicarbonate 650 milliGRAM(s) Oral three times a day    MEDICATIONS  (PRN):  acetaminophen     Tablet .. 650 milliGRAM(s) Oral every 6 hours PRN Temp greater or equal to 38C (100.4F), Mild Pain (1 - 3)  lidocaine 2% Jelly 3 milliLiter(s) IntraUrethral two times a day PRN penile pain  loperamide 2 milliGRAM(s) Oral daily PRN Diarrhea  ondansetron Injectable 4 milliGRAM(s) IV Push every 6 hours PRN Nausea and/or Vomiting  simethicone 80 milliGRAM(s) Chew four times a day PRN Gas  sodium chloride 0.9% lock flush 10 milliLiter(s) IV Push every 1 hour PRN Pre/post blood products, medications, blood draw, and to maintain line patency        LABS:                        10.2   54.23 )-----------( 98       ( 08 Dec 2022 15:24 )             31.1     Hgb Trend: 10.2<--, 10.0<--, 9.7<--, 10.0<--, 10.5<--  12-08    137  |  101  |  100<H>  ----------------------------<  144<H>  5.8<H>   |  17<L>  |  4.63<H>    Ca    8.6      08 Dec 2022 15:24  Phos  8.8     12-08  Mg     3.4     12-08    TPro  6.7  /  Alb  2.9<L>  /  TBili  0.3  /  DBili  x   /  AST  317<H>  /  ALT  42  /  AlkPhos  224<H>  12-08    Creatinine Trend: 4.63<--, 4.45<--, 4.15<--, 3.85<--, 3.41<--, 3.18<--      Arterial Blood Gas:  12-08 @ 15:17  7.26/39/90/18/98.5/-8.9  ABG lactate: --  Arterial Blood Gas:  12-08 @ 07:54  7.24/40/65/17/93.6/-9.7  ABG lactate: --    Venous Blood Gas:  12-08 @ 03:25  7.16/50/36/18/57.9  VBG Lactate: 3.6      MICROBIOLOGY:       RADIOLOGY & ADDITIONAL TESTS: Reviewed.      ASSESSMENT:      ======NEUROLOGY======    ======PULMONARY======    ====CARDIOVASCULAR====    ========RENAL========    ===GASTROINTESTINAL====    ===INFECTIOUS DISEASE===    =====HEMATOLOGIC=====    ======ENDOCRINE=======    ===MUSCULOSKELETAL====    =====PROPHYLAXIS====== MICU Accept Note  ------------------------    CHIEF COMPLAINT:     HPI / INTERVAL HISTORY:  HPI:  81yo M w/ PMHx of HIV (viral load undetectable 08/22), Afib s/p DVVC and ablation (not on AC), MV endocarditis s/p bioprosthetic valve replacement, BPH (self catheterizes), frequent UTIs, presents with weakness, he reports that over the last 2 weeks he has had weakness, symptoms were associated with occasional body shakes that came every other day, he notes that he also has had recent difficulty performing straight cath, saying he required more force and was at times unable to complete procedure, he called his urologist who then instructed him to head to ED, in the ED, pt was bradycardic, febrile but hemodynamically stable, labs showed leukocytosis, elevated Cr above baseline, elevated procal, grossly positive U/A, EKG showed new Mobitz Type 1, then converted to afib with slow ventricular response, pt was given cefepime, 1L NS bolus, Tylenol x1, admitted to general medicine for further management. While in ED, pt developed acute onset non-radiating chest pain associated with SOB but denies n/v, f/c, diaphoresis, jaw pain, (29 Nov 2022 23:23)    MICU: RRT was called on the floor for hypoxia, patient satting 80s on HF, responded to 25L (93%). Patient appeared to have dyspnea. EKG showed Afib with rate in 60s, new bundle branch block, but no ischemic changes.  Patient was transferred to the MICU for further management and treatment of his acute respiratory distress, worsening pulmonary edema, functionally anuric status on bumex drip, s/p metalazone and lasix. Pt has been improving on BIPAP. Will likely pursue dialysis, place shiley. Will intubate if needed. Patient does not have a HCP or family/friends to make decisions on his behalf. He had a GOC discussion and expressed a desire to be full code, consenting to intubation and dialysis but not trach/peg.       REVIEW OF SYSTEMS:  Constitutional: No fevers, chills, weight loss  Neurological: No headache, dizziness, weakness, numbness  HEENT: No vision problems, eye pain, nasal congestion, rhinorrhea, sore throat, dysphagia  Resp: No cough, dyspnea, wheezing, hemoptysis  CV: No chest pain, orthopnea, palpitations  GI: No nausea, vomiting, diarrhea, constipation, abdominal pain  : No dysuria, hematuria, urinary frequency or urgency  Musculoskeletal: No back pain, myalgias, arthralgias, or BLE edema  Skin: No new rashes, itching  [ ] Unable to assess ROS because ________      PMH/PSH:  PAST MEDICAL & SURGICAL HISTORY:  Unsteady gait      HIV (human immunodeficiency virus infection)      Chronic kidney disease, unspecified stage      Constipation, unspecified constipation type      Seborrheic keratosis      Osteoporosis      Edema of both legs      Bladder mass      Vitamin D deficiency      Orchitis and epididymitis      H/O endocarditis  MV endocarditis      Benign prostatic hyperplasia without lower urinary tract symptoms      COVID-19 vaccine series completed  W #2 boosters      S/P coronary artery stent placement      S/P MVR (mitral valve replacement)  &amp; SHELLIE clip, 8/31/2020          FAMILY HISTORY:  FAMILY HISTORY:  Family history of congestive heart failure (Father)    Family history of cancer (Mother)        SOCIAL HISTORY:  Smoking:  Alcohol:  Drugs:    HOME MEDICATIONS:  Home Medications:  abacavir 300 mg oral tablet: 2 tab(s) orally once a day (29 Nov 2022 23:21)  doravirine 100 mg oral tablet: 1 tab(s) orally once a day (29 Nov 2022 23:21)  Tamar-C 500 mg oral tablet: 1 tab(s) orally once a day (29 Nov 2022 23:21)  famotidine 20 mg oral tablet: 1 tab(s) orally 2 times a day (29 Nov 2022 23:21)  ferrous sulfate 325 mg (65 mg elemental iron) oral tablet: 1 tab(s) orally once a day (29 Nov 2022 23:21)  folic acid 1 mg oral tablet: 1 tab(s) orally once a day (29 Nov 2022 23:21)  lamiVUDine 100 mg oral tablet: 1 tab(s) orally once a day (29 Nov 2022 23:21)  metoprolol succinate 25 mg oral tablet, extended release: 0.5 tab(s) orally once a day (29 Nov 2022 23:21)  Multiple Vitamins oral tablet: 1 tab(s) orally once a day (29 Nov 2022 23:21)  senna leaf extract oral tablet: 2 tab(s) orally once a day (at bedtime) (29 Nov 2022 23:21)  telmisartan 40 mg oral tablet: 1 tab(s) orally once a day (29 Nov 2022 23:21)  Vitamin D3 5000 intl units (125 mcg) oral capsule: 1 cap(s) orally once a day (29 Nov 2022 23:21)      ALLERGIES:  Allergies    No Known Allergies    Intolerances            OBJECTIVE:  ICU Vital Signs Last 24 Hrs  T(C): 36.3 (08 Dec 2022 12:17), Max: 36.8 (07 Dec 2022 21:22)  T(F): 97.4 (08 Dec 2022 12:17), Max: 98.2 (07 Dec 2022 21:22)  HR: 76 (08 Dec 2022 13:45) (57 - 91)  BP: 112/67 (08 Dec 2022 13:45) (105/55 - 126/68)  BP(mean): --  ABP: --  ABP(mean): --  RR: 19 (08 Dec 2022 12:17) (18 - 28)  SpO2: 93% (08 Dec 2022 12:17) (87% - 98%)    O2 Parameters below as of 08 Dec 2022 12:17  Patient On (Oxygen Delivery Method): nasal cannula, high flow  O2 Flow (L/min): 25  O2 Concentration (%): 60        PHYSICAL EXAM  GENERAL: No acute distress  NEURO: A+O x 3, follows commands; spontaneously moving all 4 extremities; strength and sensation grossly intact  HEENT: Pupil equal and reactive to light; extra-ocular movements intact; clear conjunctiva; normal mucus membrane and oropharynx; neck supple  RESP: Normal respiratory effort on room air; clear to auscultation bilateral lung fields  CVS: S1/S2 present, normal rate and rhythm; no murmurs, gallops or rubs appreciated  ABD: Soft, non-tender, non-distended, no masses appreciated; bowel sound normal at all 4 quadrants  EXT: Grossly normal ROM; 2+ pedal pulses bilaterally, no BLE edema  SKIN: Warm, dry, and appropirate color for skin tone; No new rashes    LINES:       HOSPITAL MEDICATIONS:  MEDICATIONS  (STANDING):  abacavir 300 milliGRAM(s) Oral two times a day  apixaban 2.5 milliGRAM(s) Oral two times a day  aspirin enteric coated 81 milliGRAM(s) Oral daily  buMETAnide Infusion 2 mG/Hr (10 mL/Hr) IV Continuous <Continuous>  chlorhexidine 2% Cloths 1 Application(s) Topical daily  chlorhexidine 4% Liquid 1 Application(s) Topical <User Schedule>  doravirine 100 milliGRAM(s) Oral daily  famotidine    Tablet 20 milliGRAM(s) Oral daily  ferrous    sulfate 325 milliGRAM(s) Oral daily  folic acid 1 milliGRAM(s) Oral daily  influenza  Vaccine (HIGH DOSE) 0.7 milliLiter(s) IntraMuscular once  lamiVUDine- milliGRAM(s) Oral daily  multivitamin 1 Tablet(s) Oral daily  piperacillin/tazobactam IVPB. 3.375 Gram(s) IV Intermittent once  piperacillin/tazobactam IVPB.- 3.375 Gram(s) IV Intermittent once  senna 2 Tablet(s) Oral at bedtime  sodium bicarbonate 650 milliGRAM(s) Oral three times a day    MEDICATIONS  (PRN):  acetaminophen     Tablet .. 650 milliGRAM(s) Oral every 6 hours PRN Temp greater or equal to 38C (100.4F), Mild Pain (1 - 3)  lidocaine 2% Jelly 3 milliLiter(s) IntraUrethral two times a day PRN penile pain  loperamide 2 milliGRAM(s) Oral daily PRN Diarrhea  ondansetron Injectable 4 milliGRAM(s) IV Push every 6 hours PRN Nausea and/or Vomiting  simethicone 80 milliGRAM(s) Chew four times a day PRN Gas  sodium chloride 0.9% lock flush 10 milliLiter(s) IV Push every 1 hour PRN Pre/post blood products, medications, blood draw, and to maintain line patency        LABS:                        10.2   54.23 )-----------( 98       ( 08 Dec 2022 15:24 )             31.1     Hgb Trend: 10.2<--, 10.0<--, 9.7<--, 10.0<--, 10.5<--  12-08    137  |  101  |  100<H>  ----------------------------<  144<H>  5.8<H>   |  17<L>  |  4.63<H>    Ca    8.6      08 Dec 2022 15:24  Phos  8.8     12-08  Mg     3.4     12-08    TPro  6.7  /  Alb  2.9<L>  /  TBili  0.3  /  DBili  x   /  AST  317<H>  /  ALT  42  /  AlkPhos  224<H>  12-08    Creatinine Trend: 4.63<--, 4.45<--, 4.15<--, 3.85<--, 3.41<--, 3.18<--      Arterial Blood Gas:  12-08 @ 15:17  7.26/39/90/18/98.5/-8.9  ABG lactate: --  Arterial Blood Gas:  12-08 @ 07:54  7.24/40/65/17/93.6/-9.7  ABG lactate: --    Venous Blood Gas:  12-08 @ 03:25  7.16/50/36/18/57.9  VBG Lactate: 3.6      MICROBIOLOGY:       RADIOLOGY & ADDITIONAL TESTS: Reviewed.      ASSESSMENT:      ======NEUROLOGY======    ======PULMONARY======    ====CARDIOVASCULAR====    ========RENAL========    ===GASTROINTESTINAL====    ===INFECTIOUS DISEASE===    =====HEMATOLOGIC=====    ======ENDOCRINE=======    ===MUSCULOSKELETAL====    =====PROPHYLAXIS====== MICU Accept Note  ------------------------    CHIEF COMPLAINT:     HPI / INTERVAL HISTORY:  HPI:  81yo M w/ PMHx of HIV (viral load undetectable 08/22), Afib s/p DVVC and ablation (not on AC), MV endocarditis s/p bioprosthetic valve replacement, BPH (self catheterizes), frequent UTIs, presents with weakness, he reports that over the last 2 weeks he has had weakness, symptoms were associated with occasional body shakes that came every other day, he notes that he also has had recent difficulty performing straight cath, saying he required more force and was at times unable to complete procedure, he called his urologist who then instructed him to head to ED, in the ED, pt was bradycardic, febrile but hemodynamically stable, labs showed leukocytosis, elevated Cr above baseline, elevated procal, grossly positive U/A, EKG showed new Mobitz Type 1, then converted to afib with slow ventricular response, pt was given cefepime, 1L NS bolus, Tylenol x1, admitted to general medicine for further management. While in ED, pt developed acute onset non-radiating chest pain associated with SOB but denies n/v, f/c, diaphoresis, jaw pain, (29 Nov 2022 23:23)    UCx + 100,000 staph lugdunensis, bilat perinephric stranding  - but no renal cortical abscess on imaging  BACTEREMIA 11/29 4/4 bottles Staph lugdunensis. 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 12/5  s/p Micra MDT PPM via Right Femoral Vein.     MICU: RRT was called on the floor for hypoxia, patient satting 80s on HF, responded to 25L (93%). Patient appeared to have dyspnea. EKG showed Afib with rate in 60s, new bundle branch block, but no ischemic changes.  Patient was transferred to the MICU for further management and treatment of his acute respiratory distress, worsening pulmonary edema, functionally anuric status on bumex drip, s/p metalazone and lasix. Pt has been improving on BIPAP. Will likely pursue dialysis, place shiley. Will intubate if needed. Patient does not have a HCP or family/friends to make decisions on his behalf. He had a GOC discussion and expressed a desire to be full code, consenting to intubation and dialysis but not trach/peg.       REVIEW OF SYSTEMS:  Constitutional: +Fevers, chills, weight loss  Neurological: No headache, dizziness, weakness, numbness  HEENT: No vision problems, eye pain, nasal congestion, rhinorrhea, sore throat, dysphagia  Resp: No cough, dyspnea, wheezing, hemoptysis  CV: +chest pain, orthopnea, palpitations  GI: No nausea, vomiting, diarrhea, constipation, abdominal pain  : +dysuria, +urinary retention. No hematuria, urinary frequency or urgency  Musculoskeletal: No back pain, myalgias, arthralgias, or BLE edema  Skin: No new rashes, itching        PMH/PSH:  PAST MEDICAL & SURGICAL HISTORY:  Unsteady gait      HIV (human immunodeficiency virus infection)      Chronic kidney disease, unspecified stage      Constipation, unspecified constipation type      Seborrheic keratosis      Osteoporosis      Edema of both legs      Bladder mass      Vitamin D deficiency      Orchitis and epididymitis      H/O endocarditis  MV endocarditis      Benign prostatic hyperplasia without lower urinary tract symptoms      COVID-19 vaccine series completed  W #2 boosters      S/P coronary artery stent placement      S/P MVR (mitral valve replacement)  &amp; SHELLIE clip, 8/31/2020          FAMILY HISTORY:  FAMILY HISTORY:  Family history of congestive heart failure (Father)    Family history of cancer (Mother)        SOCIAL HISTORY:  Smoking:  Alcohol:  Drugs:    HOME MEDICATIONS:  Home Medications:  abacavir 300 mg oral tablet: 2 tab(s) orally once a day (29 Nov 2022 23:21)  doravirine 100 mg oral tablet: 1 tab(s) orally once a day (29 Nov 2022 23:21)  Tamar-C 500 mg oral tablet: 1 tab(s) orally once a day (29 Nov 2022 23:21)  famotidine 20 mg oral tablet: 1 tab(s) orally 2 times a day (29 Nov 2022 23:21)  ferrous sulfate 325 mg (65 mg elemental iron) oral tablet: 1 tab(s) orally once a day (29 Nov 2022 23:21)  folic acid 1 mg oral tablet: 1 tab(s) orally once a day (29 Nov 2022 23:21)  lamiVUDine 100 mg oral tablet: 1 tab(s) orally once a day (29 Nov 2022 23:21)  metoprolol succinate 25 mg oral tablet, extended release: 0.5 tab(s) orally once a day (29 Nov 2022 23:21)  Multiple Vitamins oral tablet: 1 tab(s) orally once a day (29 Nov 2022 23:21)  senna leaf extract oral tablet: 2 tab(s) orally once a day (at bedtime) (29 Nov 2022 23:21)  telmisartan 40 mg oral tablet: 1 tab(s) orally once a day (29 Nov 2022 23:21)  Vitamin D3 5000 intl units (125 mcg) oral capsule: 1 cap(s) orally once a day (29 Nov 2022 23:21)      ALLERGIES:  Allergies    No Known Allergies    Intolerances            OBJECTIVE:  ICU Vital Signs Last 24 Hrs  T(C): 36.3 (08 Dec 2022 12:17), Max: 36.8 (07 Dec 2022 21:22)  T(F): 97.4 (08 Dec 2022 12:17), Max: 98.2 (07 Dec 2022 21:22)  HR: 76 (08 Dec 2022 13:45) (57 - 91)  BP: 112/67 (08 Dec 2022 13:45) (105/55 - 126/68)  BP(mean): --  ABP: --  ABP(mean): --  RR: 19 (08 Dec 2022 12:17) (18 - 28)  SpO2: 93% (08 Dec 2022 12:17) (87% - 98%)    O2 Parameters below as of 08 Dec 2022 12:17  Patient On (Oxygen Delivery Method): nasal cannula, high flow  O2 Flow (L/min): 25  O2 Concentration (%): 60        PHYSICAL EXAM  GENERAL: chronically ill appearing gentleman  NEURO: A+O x 3  HEENT: Pupil equal and reactive to light; extra-ocular movements intact; clear conjunctiva; normal mucus membrane and oropharynx; neck supple  RESP: bilateral crackles heard throughout lung fields, on bipap  CVS: S1/S2 present, normal rate and rhythm; no murmurs, gallops or rubs appreciated  ABD: Soft, non-tender, non-distended, no masses appreciated; bowel sound normal at all 4 quadrants  EXT: Grossly normal ROM; 2+ pedal pulses bilaterally, no BLE edema  SKIN: Warm, dry, and appropirate color for skin tone; No new rashes    LINES:       HOSPITAL MEDICATIONS:  MEDICATIONS  (STANDING):  abacavir 300 milliGRAM(s) Oral two times a day  apixaban 2.5 milliGRAM(s) Oral two times a day  aspirin enteric coated 81 milliGRAM(s) Oral daily  buMETAnide Infusion 2 mG/Hr (10 mL/Hr) IV Continuous <Continuous>  chlorhexidine 2% Cloths 1 Application(s) Topical daily  chlorhexidine 4% Liquid 1 Application(s) Topical <User Schedule>  doravirine 100 milliGRAM(s) Oral daily  famotidine    Tablet 20 milliGRAM(s) Oral daily  ferrous    sulfate 325 milliGRAM(s) Oral daily  folic acid 1 milliGRAM(s) Oral daily  influenza  Vaccine (HIGH DOSE) 0.7 milliLiter(s) IntraMuscular once  lamiVUDine- milliGRAM(s) Oral daily  multivitamin 1 Tablet(s) Oral daily  piperacillin/tazobactam IVPB. 3.375 Gram(s) IV Intermittent once  piperacillin/tazobactam IVPB.- 3.375 Gram(s) IV Intermittent once  senna 2 Tablet(s) Oral at bedtime  sodium bicarbonate 650 milliGRAM(s) Oral three times a day    MEDICATIONS  (PRN):  acetaminophen     Tablet .. 650 milliGRAM(s) Oral every 6 hours PRN Temp greater or equal to 38C (100.4F), Mild Pain (1 - 3)  lidocaine 2% Jelly 3 milliLiter(s) IntraUrethral two times a day PRN penile pain  loperamide 2 milliGRAM(s) Oral daily PRN Diarrhea  ondansetron Injectable 4 milliGRAM(s) IV Push every 6 hours PRN Nausea and/or Vomiting  simethicone 80 milliGRAM(s) Chew four times a day PRN Gas  sodium chloride 0.9% lock flush 10 milliLiter(s) IV Push every 1 hour PRN Pre/post blood products, medications, blood draw, and to maintain line patency        LABS:                        10.2   54.23 )-----------( 98       ( 08 Dec 2022 15:24 )             31.1     Hgb Trend: 10.2<--, 10.0<--, 9.7<--, 10.0<--, 10.5<--  12-08    137  |  101  |  100<H>  ----------------------------<  144<H>  5.8<H>   |  17<L>  |  4.63<H>    Ca    8.6      08 Dec 2022 15:24  Phos  8.8     12-08  Mg     3.4     12-08    TPro  6.7  /  Alb  2.9<L>  /  TBili  0.3  /  DBili  x   /  AST  317<H>  /  ALT  42  /  AlkPhos  224<H>  12-08    Creatinine Trend: 4.63<--, 4.45<--, 4.15<--, 3.85<--, 3.41<--, 3.18<--      Arterial Blood Gas:  12-08 @ 15:17  7.26/39/90/18/98.5/-8.9  ABG lactate: --  Arterial Blood Gas:  12-08 @ 07:54  7.24/40/65/17/93.6/-9.7  ABG lactate: --    Venous Blood Gas:  12-08 @ 03:25  7.16/50/36/18/57.9  VBG Lactate: 3.6      MICROBIOLOGY:       RADIOLOGY & ADDITIONAL TESTS: Reviewed.      ASSESSMENT: 82M w/ PMHx of HIV (viral load undetectable 08/22), Afib s/p DVVC and ablation (not on AC), MV endocarditis s/p bioprosthetic valve replacement, BPH (self catheterizes), frequent UTIs, presents with UTI and heart block, found to have persistent endocarditis not amenable to cardiac surgery, s/p melina PPM placement on 12/5, s/p RRT on 12/8 called for hypoxia, transferred to MICU for further management of worsening acute respiratory failure, functionally anuric status.       ======NEUROLOGY======    ======PULMONARY======    ====CARDIOVASCULAR====    ========RENAL========    ===GASTROINTESTINAL====    ===INFECTIOUS DISEASE===    =====HEMATOLOGIC=====    ======ENDOCRINE=======    ===MUSCULOSKELETAL====    =====PROPHYLAXIS====== MICU Accept Note  ------------------------    CHIEF COMPLAINT:     HPI / INTERVAL HISTORY:  HPI:  83yo M w/ PMHx of HIV (viral load undetectable 08/22), Afib s/p DVVC and ablation (not on AC), MV endocarditis s/p bioprosthetic valve replacement, BPH (self catheterizes), frequent UTIs, presents with weakness, he reports that over the last 2 weeks he has had weakness, symptoms were associated with occasional body shakes that came every other day, he notes that he also has had recent difficulty performing straight cath, saying he required more force and was at times unable to complete procedure, he called his urologist who then instructed him to head to ED, in the ED, pt was bradycardic, febrile but hemodynamically stable, labs showed leukocytosis, elevated Cr above baseline, elevated procal, grossly positive U/A, EKG showed new Mobitz Type 1, then converted to afib with slow ventricular response, pt was given cefepime, 1L NS bolus, Tylenol x1, admitted to general medicine for further management. While in ED, pt developed acute onset non-radiating chest pain associated with SOB but denies n/v, f/c, diaphoresis, jaw pain, (29 Nov 2022 23:23)    UCx + 100,000 staph lugdunensis, bilat perinephric stranding  - but no renal cortical abscess on imaging  On 11/29 4/4 bottles grew Staph lugdunensis. 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 Patient is s/p 12/5 Micra MDT PPM via Right Femoral Vein.     MICU: RRT was called on the floor for hypoxia, patient satting 80s on HF, responded to 25L (93%). Patient appeared to have dyspnea. EKG showed Afib with rate in 60s, new bundle branch block, but no ischemic changes.  Patient was transferred to the MICU for further management and treatment of his acute respiratory distress, worsening pulmonary edema, functionally anuric status on bumex drip, s/p metalazone and lasix. Pt has been improving on BIPAP. Will likely pursue dialysis, place shiley. Will intubate if needed. Patient does not have a HCP or family/friends to make decisions on his behalf. He had a GOC discussion and expressed a desire to be full code, consenting to intubation and dialysis but not trach/peg. Antibiotics for staph lugdunesis endocarditis have been broadened from ancef to Zosyn.       REVIEW OF SYSTEMS:  Constitutional: +Fevers, chills, weight loss  Neurological: No headache, dizziness, weakness, numbness  HEENT: No vision problems, eye pain, nasal congestion, rhinorrhea, sore throat, dysphagia  Resp: No cough, dyspnea, wheezing, hemoptysis  CV: +chest pain, orthopnea, palpitations  GI: No nausea, vomiting, diarrhea, constipation, abdominal pain  : +dysuria, +urinary retention. No hematuria, urinary frequency or urgency  Musculoskeletal: No back pain, myalgias, arthralgias, or BLE edema  Skin: No new rashes, itching        PMH/PSH:  PAST MEDICAL & SURGICAL HISTORY:  Unsteady gait      HIV (human immunodeficiency virus infection)      Chronic kidney disease, unspecified stage      Constipation, unspecified constipation type      Seborrheic keratosis      Osteoporosis      Edema of both legs      Bladder mass      Vitamin D deficiency      Orchitis and epididymitis      H/O endocarditis  MV endocarditis      Benign prostatic hyperplasia without lower urinary tract symptoms      COVID-19 vaccine series completed  W #2 boosters      S/P coronary artery stent placement      S/P MVR (mitral valve replacement)  &amp; SHELLIE clip, 8/31/2020          FAMILY HISTORY:  FAMILY HISTORY:  Family history of congestive heart failure (Father)    Family history of cancer (Mother)        SOCIAL HISTORY:  Smoking:  Alcohol:  Drugs:    HOME MEDICATIONS:  Home Medications:  abacavir 300 mg oral tablet: 2 tab(s) orally once a day (29 Nov 2022 23:21)  doravirine 100 mg oral tablet: 1 tab(s) orally once a day (29 Nov 2022 23:21)  Tamar-C 500 mg oral tablet: 1 tab(s) orally once a day (29 Nov 2022 23:21)  famotidine 20 mg oral tablet: 1 tab(s) orally 2 times a day (29 Nov 2022 23:21)  ferrous sulfate 325 mg (65 mg elemental iron) oral tablet: 1 tab(s) orally once a day (29 Nov 2022 23:21)  folic acid 1 mg oral tablet: 1 tab(s) orally once a day (29 Nov 2022 23:21)  lamiVUDine 100 mg oral tablet: 1 tab(s) orally once a day (29 Nov 2022 23:21)  metoprolol succinate 25 mg oral tablet, extended release: 0.5 tab(s) orally once a day (29 Nov 2022 23:21)  Multiple Vitamins oral tablet: 1 tab(s) orally once a day (29 Nov 2022 23:21)  senna leaf extract oral tablet: 2 tab(s) orally once a day (at bedtime) (29 Nov 2022 23:21)  telmisartan 40 mg oral tablet: 1 tab(s) orally once a day (29 Nov 2022 23:21)  Vitamin D3 5000 intl units (125 mcg) oral capsule: 1 cap(s) orally once a day (29 Nov 2022 23:21)      ALLERGIES:  Allergies    No Known Allergies    Intolerances            OBJECTIVE:  ICU Vital Signs Last 24 Hrs  T(C): 36.3 (08 Dec 2022 12:17), Max: 36.8 (07 Dec 2022 21:22)  T(F): 97.4 (08 Dec 2022 12:17), Max: 98.2 (07 Dec 2022 21:22)  HR: 76 (08 Dec 2022 13:45) (57 - 91)  BP: 112/67 (08 Dec 2022 13:45) (105/55 - 126/68)  BP(mean): --  ABP: --  ABP(mean): --  RR: 19 (08 Dec 2022 12:17) (18 - 28)  SpO2: 93% (08 Dec 2022 12:17) (87% - 98%)    O2 Parameters below as of 08 Dec 2022 12:17  Patient On (Oxygen Delivery Method): nasal cannula, high flow  O2 Flow (L/min): 25  O2 Concentration (%): 60        PHYSICAL EXAM  GENERAL: chronically ill appearing gentleman  NEURO: A+O x 3  HEENT: Pupil equal and reactive to light; extra-ocular movements intact; clear conjunctiva; normal mucus membrane and oropharynx; neck supple  RESP: bilateral crackles heard throughout lung fields, on bipap  CVS: S1/S2 present, normal rate and rhythm; no murmurs, gallops or rubs appreciated  ABD: Soft, non-tender, non-distended, no masses appreciated; bowel sound normal at all 4 quadrants  EXT: Grossly normal ROM; 2+ pedal pulses bilaterally, no BLE edema  SKIN: Warm, dry, and appropirate color for skin tone; No new rashes    LINES:       HOSPITAL MEDICATIONS:  MEDICATIONS  (STANDING):  abacavir 300 milliGRAM(s) Oral two times a day  apixaban 2.5 milliGRAM(s) Oral two times a day  aspirin enteric coated 81 milliGRAM(s) Oral daily  buMETAnide Infusion 2 mG/Hr (10 mL/Hr) IV Continuous <Continuous>  chlorhexidine 2% Cloths 1 Application(s) Topical daily  chlorhexidine 4% Liquid 1 Application(s) Topical <User Schedule>  doravirine 100 milliGRAM(s) Oral daily  famotidine    Tablet 20 milliGRAM(s) Oral daily  ferrous    sulfate 325 milliGRAM(s) Oral daily  folic acid 1 milliGRAM(s) Oral daily  influenza  Vaccine (HIGH DOSE) 0.7 milliLiter(s) IntraMuscular once  lamiVUDine- milliGRAM(s) Oral daily  multivitamin 1 Tablet(s) Oral daily  piperacillin/tazobactam IVPB. 3.375 Gram(s) IV Intermittent once  piperacillin/tazobactam IVPB.- 3.375 Gram(s) IV Intermittent once  senna 2 Tablet(s) Oral at bedtime  sodium bicarbonate 650 milliGRAM(s) Oral three times a day    MEDICATIONS  (PRN):  acetaminophen     Tablet .. 650 milliGRAM(s) Oral every 6 hours PRN Temp greater or equal to 38C (100.4F), Mild Pain (1 - 3)  lidocaine 2% Jelly 3 milliLiter(s) IntraUrethral two times a day PRN penile pain  loperamide 2 milliGRAM(s) Oral daily PRN Diarrhea  ondansetron Injectable 4 milliGRAM(s) IV Push every 6 hours PRN Nausea and/or Vomiting  simethicone 80 milliGRAM(s) Chew four times a day PRN Gas  sodium chloride 0.9% lock flush 10 milliLiter(s) IV Push every 1 hour PRN Pre/post blood products, medications, blood draw, and to maintain line patency        LABS:                        10.2   54.23 )-----------( 98       ( 08 Dec 2022 15:24 )             31.1     Hgb Trend: 10.2<--, 10.0<--, 9.7<--, 10.0<--, 10.5<--  12-08    137  |  101  |  100<H>  ----------------------------<  144<H>  5.8<H>   |  17<L>  |  4.63<H>    Ca    8.6      08 Dec 2022 15:24  Phos  8.8     12-08  Mg     3.4     12-08    TPro  6.7  /  Alb  2.9<L>  /  TBili  0.3  /  DBili  x   /  AST  317<H>  /  ALT  42  /  AlkPhos  224<H>  12-08    Creatinine Trend: 4.63<--, 4.45<--, 4.15<--, 3.85<--, 3.41<--, 3.18<--      Arterial Blood Gas:  12-08 @ 15:17  7.26/39/90/18/98.5/-8.9  ABG lactate: --  Arterial Blood Gas:  12-08 @ 07:54  7.24/40/65/17/93.6/-9.7  ABG lactate: --    Venous Blood Gas:  12-08 @ 03:25  7.16/50/36/18/57.9  VBG Lactate: 3.6      MICROBIOLOGY:       RADIOLOGY & ADDITIONAL TESTS: Reviewed.      ASSESSMENT: 82M w/ PMHx of HIV (viral load undetectable 08/22), Afib s/p DVVC and ablation (not on AC), MV endocarditis s/p bioprosthetic valve replacement, BPH (self catheterizes), frequent UTIs, presents with UTI and heart block, found to have persistent endocarditis not amenable to cardiac surgery, s/p melina PPM placement on 12/5, s/p RRT on 12/8 called for hypoxia, transferred to MICU for further management of worsening acute respiratory failure, functionally anuric status.       ======NEUROLOGY======  AAOx3-4 at baseline  no active issues    ======PULMONARY======  #AHRF  - RRT called 12/8 for hypoxia to 80s, HF improved to 93%, brought up to MICU and put on BIPAP, with saturation >97%  - 2/2 volume overload  - CXR 12/7 showing vascular congestion; exam consistent with volume overload  - VBG 7.16/50; lactate 3.6  - ABG shows signs of respiratory on metabolic acidosis, started bipap  - monitor response to O2 support with VBG    ====CARDIOVASCULAR====  #Staph lugdudenesis bacteremia c/b infective endocarditis   #afib  #Mobitiz I, sinus bradycardia    - s/p cefazolin, broadened to Zosyn  - trended trop (?concern for IE embolization to coronary arteries) - no significant ischemic changes at this time  - Rhythm showed sinus madonna with Mobitz I and pAF SVR  - per CTSx pt not eligible for intervention at this point     ========RENAL========  #progressive renal failure  - metabolic acidemia; hyperkalemia responding to medical management  - s/p lasix 80mg IVP with ~30cc/h output overnight  - inadequate UOP with lasix  - switch lasix to bumex; start metolazone (30m prior to bumex)  - start bicarb tabs  - if necessary start CRRT      ===GASTROINTESTINAL====  - no active issues      ===INFECTIOUS DISEASE===  #endocarditis, staph lugdudenesis bacteremia + in Urine and BCx  - s/p cefazolin --> broadened to Zosyn  - treat until Bcx clear    =====HEMATOLOGIC=====  #hx of afib and endocarditis  - c/w Eliquis 2.5 mg BID      ======ENDOCRINE=======  - no active issues    ===MUSCULOSKELETAL====  - no active issues    =====PROPHYLAXIS======  DVT prophylaxis: Eliquis 2.5 mg BID MICU Accept Note  ------------------------    CHIEF COMPLAINT:     HPI / INTERVAL HISTORY:  HPI:  83yo M w/ PMHx of HIV (viral load undetectable 08/22), Afib s/p DVVC and ablation (not on AC), MV endocarditis s/p bioprosthetic valve replacement, BPH (self catheterizes), frequent UTIs, presents with weakness, he reports that over the last 2 weeks he has had weakness, symptoms were associated with occasional body shakes that came every other day, he notes that he also has had recent difficulty performing straight cath, saying he required more force and was at times unable to complete procedure, he called his urologist who then instructed him to head to ED, in the ED, pt was bradycardic, febrile but hemodynamically stable, labs showed leukocytosis, elevated Cr above baseline, elevated procal, grossly positive U/A, EKG showed new Mobitz Type 1, then converted to afib with slow ventricular response, pt was given cefepime, 1L NS bolus, Tylenol x1, admitted to general medicine for further management. While in ED, pt developed acute onset non-radiating chest pain associated with SOB but denies n/v, f/c, diaphoresis, jaw pain, (29 Nov 2022 23:23)    UCx + 100,000 staph lugdunensis, bilat perinephric stranding  - but no renal cortical abscess on imaging  On 11/29 4/4 bottles grew Staph lugdunensis. 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 Patient is s/p 12/5 Micra MDT PPM via Right Femoral Vein.     MICU: RRT was called on the floor for hypoxia, patient satting 80s on HF, responded to 25L (93%). Patient appeared to have dyspnea. EKG showed Afib with rate in 60s, new bundle branch block, but no ischemic changes.  Patient was transferred to the MICU for further management and treatment of his acute respiratory distress, worsening pulmonary edema, functionally anuric status on bumex drip, s/p metalazone and lasix. Pt has been improving on BIPAP. Will likely pursue dialysis, place shiley. Will intubate if needed. Patient does not have a HCP or family/friends to make decisions on his behalf. He had a GOC discussion and expressed a desire to be full code, consenting to intubation and dialysis but not trach/peg. Antibiotics for staph lugdunesis endocarditis have been broadened from ancef to Zosyn.       REVIEW OF SYSTEMS:  Constitutional: +Fevers, chills, weight loss  Neurological: No headache, dizziness, weakness, numbness  HEENT: No vision problems, eye pain, nasal congestion, rhinorrhea, sore throat, dysphagia  Resp: No cough, dyspnea, wheezing, hemoptysis  CV: +chest pain, orthopnea, palpitations  GI: No nausea, vomiting, diarrhea, constipation, abdominal pain  : +dysuria, +urinary retention. No hematuria, urinary frequency or urgency  Musculoskeletal: No back pain, myalgias, arthralgias, or BLE edema  Skin: No new rashes, itching        PMH/PSH:  PAST MEDICAL & SURGICAL HISTORY:  Unsteady gait      HIV (human immunodeficiency virus infection)      Chronic kidney disease, unspecified stage      Constipation, unspecified constipation type      Seborrheic keratosis      Osteoporosis      Edema of both legs      Bladder mass      Vitamin D deficiency      Orchitis and epididymitis      H/O endocarditis  MV endocarditis      Benign prostatic hyperplasia without lower urinary tract symptoms      COVID-19 vaccine series completed  W #2 boosters      S/P coronary artery stent placement      S/P MVR (mitral valve replacement)  &amp; SHELLIE clip, 8/31/2020          FAMILY HISTORY:  FAMILY HISTORY:  Family history of congestive heart failure (Father)    Family history of cancer (Mother)        SOCIAL HISTORY:  Smoking:  Alcohol:  Drugs:    HOME MEDICATIONS:  Home Medications:  abacavir 300 mg oral tablet: 2 tab(s) orally once a day (29 Nov 2022 23:21)  doravirine 100 mg oral tablet: 1 tab(s) orally once a day (29 Nov 2022 23:21)  Tamar-C 500 mg oral tablet: 1 tab(s) orally once a day (29 Nov 2022 23:21)  famotidine 20 mg oral tablet: 1 tab(s) orally 2 times a day (29 Nov 2022 23:21)  ferrous sulfate 325 mg (65 mg elemental iron) oral tablet: 1 tab(s) orally once a day (29 Nov 2022 23:21)  folic acid 1 mg oral tablet: 1 tab(s) orally once a day (29 Nov 2022 23:21)  lamiVUDine 100 mg oral tablet: 1 tab(s) orally once a day (29 Nov 2022 23:21)  metoprolol succinate 25 mg oral tablet, extended release: 0.5 tab(s) orally once a day (29 Nov 2022 23:21)  Multiple Vitamins oral tablet: 1 tab(s) orally once a day (29 Nov 2022 23:21)  senna leaf extract oral tablet: 2 tab(s) orally once a day (at bedtime) (29 Nov 2022 23:21)  telmisartan 40 mg oral tablet: 1 tab(s) orally once a day (29 Nov 2022 23:21)  Vitamin D3 5000 intl units (125 mcg) oral capsule: 1 cap(s) orally once a day (29 Nov 2022 23:21)      ALLERGIES:  Allergies    No Known Allergies    Intolerances            OBJECTIVE:  ICU Vital Signs Last 24 Hrs  T(C): 36.3 (08 Dec 2022 12:17), Max: 36.8 (07 Dec 2022 21:22)  T(F): 97.4 (08 Dec 2022 12:17), Max: 98.2 (07 Dec 2022 21:22)  HR: 76 (08 Dec 2022 13:45) (57 - 91)  BP: 112/67 (08 Dec 2022 13:45) (105/55 - 126/68)  BP(mean): --  ABP: --  ABP(mean): --  RR: 19 (08 Dec 2022 12:17) (18 - 28)  SpO2: 93% (08 Dec 2022 12:17) (87% - 98%)    O2 Parameters below as of 08 Dec 2022 12:17  Patient On (Oxygen Delivery Method): nasal cannula, high flow  O2 Flow (L/min): 25  O2 Concentration (%): 60        PHYSICAL EXAM  GENERAL: chronically ill appearing gentleman  NEURO: A+O x 3  HEENT: Pupil equal and reactive to light; extra-ocular movements intact; clear conjunctiva; normal mucus membrane and oropharynx; neck supple  RESP: bilateral crackles heard throughout lung fields, on bipap  CVS: S1/S2 present, normal rate and rhythm; no murmurs, gallops or rubs appreciated  ABD: Soft, non-tender, non-distended, no masses appreciated; bowel sound normal at all 4 quadrants  EXT: Grossly normal ROM; 2+ pedal pulses bilaterally, no BLE edema  SKIN: Warm, dry, and appropirate color for skin tone; No new rashes    LINES:       HOSPITAL MEDICATIONS:  MEDICATIONS  (STANDING):  abacavir 300 milliGRAM(s) Oral two times a day  apixaban 2.5 milliGRAM(s) Oral two times a day  aspirin enteric coated 81 milliGRAM(s) Oral daily  buMETAnide Infusion 2 mG/Hr (10 mL/Hr) IV Continuous <Continuous>  chlorhexidine 2% Cloths 1 Application(s) Topical daily  chlorhexidine 4% Liquid 1 Application(s) Topical <User Schedule>  doravirine 100 milliGRAM(s) Oral daily  famotidine    Tablet 20 milliGRAM(s) Oral daily  ferrous    sulfate 325 milliGRAM(s) Oral daily  folic acid 1 milliGRAM(s) Oral daily  influenza  Vaccine (HIGH DOSE) 0.7 milliLiter(s) IntraMuscular once  lamiVUDine- milliGRAM(s) Oral daily  multivitamin 1 Tablet(s) Oral daily  piperacillin/tazobactam IVPB. 3.375 Gram(s) IV Intermittent once  piperacillin/tazobactam IVPB.- 3.375 Gram(s) IV Intermittent once  senna 2 Tablet(s) Oral at bedtime  sodium bicarbonate 650 milliGRAM(s) Oral three times a day    MEDICATIONS  (PRN):  acetaminophen     Tablet .. 650 milliGRAM(s) Oral every 6 hours PRN Temp greater or equal to 38C (100.4F), Mild Pain (1 - 3)  lidocaine 2% Jelly 3 milliLiter(s) IntraUrethral two times a day PRN penile pain  loperamide 2 milliGRAM(s) Oral daily PRN Diarrhea  ondansetron Injectable 4 milliGRAM(s) IV Push every 6 hours PRN Nausea and/or Vomiting  simethicone 80 milliGRAM(s) Chew four times a day PRN Gas  sodium chloride 0.9% lock flush 10 milliLiter(s) IV Push every 1 hour PRN Pre/post blood products, medications, blood draw, and to maintain line patency        LABS:                        10.2   54.23 )-----------( 98       ( 08 Dec 2022 15:24 )             31.1     Hgb Trend: 10.2<--, 10.0<--, 9.7<--, 10.0<--, 10.5<--  12-08    137  |  101  |  100<H>  ----------------------------<  144<H>  5.8<H>   |  17<L>  |  4.63<H>    Ca    8.6      08 Dec 2022 15:24  Phos  8.8     12-08  Mg     3.4     12-08    TPro  6.7  /  Alb  2.9<L>  /  TBili  0.3  /  DBili  x   /  AST  317<H>  /  ALT  42  /  AlkPhos  224<H>  12-08    Creatinine Trend: 4.63<--, 4.45<--, 4.15<--, 3.85<--, 3.41<--, 3.18<--      Arterial Blood Gas:  12-08 @ 15:17  7.26/39/90/18/98.5/-8.9  ABG lactate: --  Arterial Blood Gas:  12-08 @ 07:54  7.24/40/65/17/93.6/-9.7  ABG lactate: --    Venous Blood Gas:  12-08 @ 03:25  7.16/50/36/18/57.9  VBG Lactate: 3.6      MICROBIOLOGY:       RADIOLOGY & ADDITIONAL TESTS: Reviewed.      ASSESSMENT: 82M w/ PMHx of HIV (viral load undetectable 08/22), Afib s/p DVVC and ablation (not on AC), MV endocarditis s/p bioprosthetic valve replacement, BPH (self catheterizes), frequent UTIs, presents with UTI and heart block, found to have persistent endocarditis not amenable to cardiac surgery, s/p melina PPM placement on 12/5, s/p RRT on 12/8 called for hypoxia, transferred to MICU for further management of worsening acute respiratory failure, functionally anuric status.       ======NEUROLOGY======  AAOx3-4 at baseline  no active issues    ======PULMONARY======  #AHRF  - RRT called 12/8 for hypoxia to 80s, HF improved to 93%, brought up to MICU and put on BIPAP, with saturation >97%  - 2/2 volume overload  - CXR 12/7 showing vascular congestion; exam consistent with volume overload  - VBG 7.16/50; lactate 3.6  - ABG shows signs of respiratory on metabolic acidosis, started bipap  - monitor response to O2 support with VBG    ====CARDIOVASCULAR====  #Staph lugdudenesis bacteremia c/b infective endocarditis   #afib  #Mobitiz I, sinus bradycardia    - s/p cefazolin, broadened to Zosyn  - trended trop (?concern for IE embolization to coronary arteries) - no significant ischemic changes at this time  - Rhythm showed sinus madonna with Mobitz I and pAF SVR  - per CTSx pt not eligible for intervention at this point     ========RENAL========  #progressive renal failure  - metabolic acidemia; hyperkalemia responding to medical management  - s/p lasix 80mg IVP with ~30cc/h output overnight  - inadequate UOP with lasix  - switch lasix to bumex; start metolazone (30m prior to bumex)  - start bicarb tabs  - if necessary start CRRT      ===GASTROINTESTINAL====  - no active issues      ===INFECTIOUS DISEASE===  #endocarditis, staph lugdudenesis bacteremia + in Urine and BCx  - s/p cefazolin --> broadened to Zosyn  - treat until Bcx clear    #hx of HIV  - treat with ART home medications  - Continue PO abacavir 200mg q12hrs, lamivudine 100mg q24hrs and doravirine 1tab q24hrs        =====HEMATOLOGIC=====  #hx of afib and endocarditis  - c/w Eliquis 2.5 mg BID      ======ENDOCRINE=======  - no active issues    ===MUSCULOSKELETAL====  - no active issues    =====PROPHYLAXIS======  DVT prophylaxis: Eliquis 2.5 mg BID MICU Accept Note  ------------------------    CHIEF COMPLAINT:     HPI / INTERVAL HISTORY:  HPI:  83yo M w/ PMHx of HIV (viral load undetectable 08/22), Afib s/p DVVC and ablation (not on AC), MV endocarditis s/p bioprosthetic valve replacement, BPH (self catheterizes), frequent UTIs, presents with weakness, he reports that over the last 2 weeks he has had weakness, symptoms were associated with occasional body shakes that came every other day, he notes that he also has had recent difficulty performing straight cath, saying he required more force and was at times unable to complete procedure, he called his urologist who then instructed him to head to ED, in the ED, pt was bradycardic, febrile but hemodynamically stable, labs showed leukocytosis, elevated Cr above baseline, elevated procal, grossly positive U/A, EKG showed new Mobitz Type 1, then converted to afib with slow ventricular response, pt was given cefepime, 1L NS bolus, Tylenol x1, admitted to general medicine for further management. While in ED, pt developed acute onset non-radiating chest pain associated with SOB but denies n/v, f/c, diaphoresis, jaw pain, (29 Nov 2022 23:23)    UCx + 100,000 staph lugdunensis, bilat perinephric stranding  - but no renal cortical abscess on imaging  On 11/29 4/4 bottles grew Staph lugdunensis. 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 Patient is s/p 12/5 Micra MDT PPM via Right Femoral Vein.     MICU: RRT was called on the floor for hypoxia, patient satting 80s on HF, responded to 25L (93%). Patient appeared to have dyspnea. EKG showed Afib with rate in 60s, new bundle branch block, but no ischemic changes.  Patient was transferred to the MICU for further management and treatment of his acute respiratory distress, worsening pulmonary edema, functionally anuric status on bumex drip, s/p metalazone and lasix. Pt has been improving on BIPAP. Will likely pursue dialysis, place shiley. Will intubate if needed. Patient does not have a HCP or family/friends to make decisions on his behalf. He had a GOC discussion and expressed a desire to be full code, consenting to intubation and dialysis but not trach/peg. Antibiotics for staph lugdunesis endocarditis have been broadened from ancef to Zosyn.       REVIEW OF SYSTEMS:  Constitutional: +Fevers, chills, weight loss  Neurological: No headache, dizziness, weakness, numbness  HEENT: No vision problems, eye pain, nasal congestion, rhinorrhea, sore throat, dysphagia  Resp: No cough, dyspnea, wheezing, hemoptysis  CV: +chest pain, orthopnea, palpitations  GI: No nausea, vomiting, diarrhea, constipation, abdominal pain  : +dysuria, +urinary retention. No hematuria, urinary frequency or urgency  Musculoskeletal: No back pain, myalgias, arthralgias, or BLE edema  Skin: No new rashes, itching        PMH/PSH:  PAST MEDICAL & SURGICAL HISTORY:  Unsteady gait      HIV (human immunodeficiency virus infection)      Chronic kidney disease, unspecified stage      Constipation, unspecified constipation type      Seborrheic keratosis      Osteoporosis      Edema of both legs      Bladder mass      Vitamin D deficiency      Orchitis and epididymitis      H/O endocarditis  MV endocarditis      Benign prostatic hyperplasia without lower urinary tract symptoms      COVID-19 vaccine series completed  W #2 boosters      S/P coronary artery stent placement      S/P MVR (mitral valve replacement)  &amp; SHELLIE clip, 8/31/2020          FAMILY HISTORY:  FAMILY HISTORY:  Family history of congestive heart failure (Father)    Family history of cancer (Mother)        SOCIAL HISTORY:  Smoking:  Alcohol:  Drugs:    HOME MEDICATIONS:  Home Medications:  abacavir 300 mg oral tablet: 2 tab(s) orally once a day (29 Nov 2022 23:21)  doravirine 100 mg oral tablet: 1 tab(s) orally once a day (29 Nov 2022 23:21)  Tamar-C 500 mg oral tablet: 1 tab(s) orally once a day (29 Nov 2022 23:21)  famotidine 20 mg oral tablet: 1 tab(s) orally 2 times a day (29 Nov 2022 23:21)  ferrous sulfate 325 mg (65 mg elemental iron) oral tablet: 1 tab(s) orally once a day (29 Nov 2022 23:21)  folic acid 1 mg oral tablet: 1 tab(s) orally once a day (29 Nov 2022 23:21)  lamiVUDine 100 mg oral tablet: 1 tab(s) orally once a day (29 Nov 2022 23:21)  metoprolol succinate 25 mg oral tablet, extended release: 0.5 tab(s) orally once a day (29 Nov 2022 23:21)  Multiple Vitamins oral tablet: 1 tab(s) orally once a day (29 Nov 2022 23:21)  senna leaf extract oral tablet: 2 tab(s) orally once a day (at bedtime) (29 Nov 2022 23:21)  telmisartan 40 mg oral tablet: 1 tab(s) orally once a day (29 Nov 2022 23:21)  Vitamin D3 5000 intl units (125 mcg) oral capsule: 1 cap(s) orally once a day (29 Nov 2022 23:21)      ALLERGIES:  Allergies    No Known Allergies    Intolerances            OBJECTIVE:  ICU Vital Signs Last 24 Hrs  T(C): 36.3 (08 Dec 2022 12:17), Max: 36.8 (07 Dec 2022 21:22)  T(F): 97.4 (08 Dec 2022 12:17), Max: 98.2 (07 Dec 2022 21:22)  HR: 76 (08 Dec 2022 13:45) (57 - 91)  BP: 112/67 (08 Dec 2022 13:45) (105/55 - 126/68)  BP(mean): --  ABP: --  ABP(mean): --  RR: 19 (08 Dec 2022 12:17) (18 - 28)  SpO2: 93% (08 Dec 2022 12:17) (87% - 98%)    O2 Parameters below as of 08 Dec 2022 12:17  Patient On (Oxygen Delivery Method): nasal cannula, high flow  O2 Flow (L/min): 25  O2 Concentration (%): 60        PHYSICAL EXAM  GENERAL: chronically ill appearing gentleman  NEURO: A+O x 3  HEENT: Pupil equal and reactive to light; extra-ocular movements intact; clear conjunctiva; normal mucus membrane and oropharynx; neck supple  RESP: bilateral crackles heard throughout lung fields, on bipap  CVS: S1/S2 present, normal rate and rhythm; no murmurs, gallops or rubs appreciated  ABD: Soft, non-tender, non-distended, no masses appreciated; bowel sound normal at all 4 quadrants  EXT: Grossly normal ROM; 2+ pedal pulses bilaterally, no BLE edema  SKIN: Warm, dry, and appropirate color for skin tone; No new rashes    LINES:       HOSPITAL MEDICATIONS:  MEDICATIONS  (STANDING):  abacavir 300 milliGRAM(s) Oral two times a day  apixaban 2.5 milliGRAM(s) Oral two times a day  aspirin enteric coated 81 milliGRAM(s) Oral daily  buMETAnide Infusion 2 mG/Hr (10 mL/Hr) IV Continuous <Continuous>  chlorhexidine 2% Cloths 1 Application(s) Topical daily  chlorhexidine 4% Liquid 1 Application(s) Topical <User Schedule>  doravirine 100 milliGRAM(s) Oral daily  famotidine    Tablet 20 milliGRAM(s) Oral daily  ferrous    sulfate 325 milliGRAM(s) Oral daily  folic acid 1 milliGRAM(s) Oral daily  influenza  Vaccine (HIGH DOSE) 0.7 milliLiter(s) IntraMuscular once  lamiVUDine- milliGRAM(s) Oral daily  multivitamin 1 Tablet(s) Oral daily  piperacillin/tazobactam IVPB. 3.375 Gram(s) IV Intermittent once  piperacillin/tazobactam IVPB.- 3.375 Gram(s) IV Intermittent once  senna 2 Tablet(s) Oral at bedtime  sodium bicarbonate 650 milliGRAM(s) Oral three times a day    MEDICATIONS  (PRN):  acetaminophen     Tablet .. 650 milliGRAM(s) Oral every 6 hours PRN Temp greater or equal to 38C (100.4F), Mild Pain (1 - 3)  lidocaine 2% Jelly 3 milliLiter(s) IntraUrethral two times a day PRN penile pain  loperamide 2 milliGRAM(s) Oral daily PRN Diarrhea  ondansetron Injectable 4 milliGRAM(s) IV Push every 6 hours PRN Nausea and/or Vomiting  simethicone 80 milliGRAM(s) Chew four times a day PRN Gas  sodium chloride 0.9% lock flush 10 milliLiter(s) IV Push every 1 hour PRN Pre/post blood products, medications, blood draw, and to maintain line patency        LABS:                        10.2   54.23 )-----------( 98       ( 08 Dec 2022 15:24 )             31.1     Hgb Trend: 10.2<--, 10.0<--, 9.7<--, 10.0<--, 10.5<--  12-08    137  |  101  |  100<H>  ----------------------------<  144<H>  5.8<H>   |  17<L>  |  4.63<H>    Ca    8.6      08 Dec 2022 15:24  Phos  8.8     12-08  Mg     3.4     12-08    TPro  6.7  /  Alb  2.9<L>  /  TBili  0.3  /  DBili  x   /  AST  317<H>  /  ALT  42  /  AlkPhos  224<H>  12-08    Creatinine Trend: 4.63<--, 4.45<--, 4.15<--, 3.85<--, 3.41<--, 3.18<--      Arterial Blood Gas:  12-08 @ 15:17  7.26/39/90/18/98.5/-8.9  ABG lactate: --  Arterial Blood Gas:  12-08 @ 07:54  7.24/40/65/17/93.6/-9.7  ABG lactate: --    Venous Blood Gas:  12-08 @ 03:25  7.16/50/36/18/57.9  VBG Lactate: 3.6      MICROBIOLOGY:       RADIOLOGY & ADDITIONAL TESTS: Reviewed.      ASSESSMENT: 82M w/ PMHx of HIV (viral load undetectable 08/22), Afib s/p DVVC and ablation (not on AC), MV endocarditis s/p bioprosthetic valve replacement, BPH (self catheterizes), frequent UTIs, presents with UTI and heart block, found to have persistent endocarditis not amenable to cardiac surgery, s/p melina PPM placement on 12/5, s/p RRT on 12/8 called for hypoxia, transferred to MICU for further management of worsening acute respiratory failure, functionally anuric status.       ======NEUROLOGY======  AAOx3-4 at baseline  no active issues    ======PULMONARY======  #AHRF  - RRT called 12/8 for hypoxia to 80s, HF improved to 93%, brought up to MICU and put on BIPAP, with saturation >97%  - 2/2 volume overload  - CXR 12/7 showing vascular congestion; exam consistent with volume overload  - VBG 7.16/50; lactate 3.6  - ABG shows signs of respiratory on metabolic acidosis, started bipap  - monitor response to O2 support with VBG    ====CARDIOVASCULAR====  #Staph lugdudenesis bacteremia c/b infective endocarditis   #afib  #Mobitiz I, sinus bradycardia    - s/p cefazolin, broadened to Zosyn  - trended trop (?concern for IE embolization to coronary arteries) - no significant ischemic changes at this time  - Rhythm showed sinus madonna with Mobitz I and pAF SVR  - per CTSx pt not eligible for intervention at this point     ========RENAL========  #progressive renal failure  - metabolic acidemia; hyperkalemia responding to medical management  - s/p lasix 80mg IVP with ~30cc/h output overnight  - inadequate UOP with lasix  - switch lasix to bumex; start metolazone (30m prior to bumex)  - start bicarb tabs  - if necessary start CRRT      ===GASTROINTESTINAL====  - no active issues      ===INFECTIOUS DISEASE===  #endocarditis, staph lugdudenesis bacteremia + in Urine and BCx  - s/p cefazolin --> broadened to Zosyn  - treat until Bcx clear    #hx of HIV  - treat with ART home medications  - Continue PO abacavir 200mg q12hrs, lamivudine 100mg q24hrs and doravirine 1tab q24hrs    =====HEMATOLOGIC=====  #hx of afib and endocarditis  - c/w Eliquis 2.5 mg BID      ======ENDOCRINE=======  - no active issues    ===MUSCULOSKELETAL====  - no active issues    =====PROPHYLAXIS======  DVT prophylaxis: Eliquis 2.5 mg BID MICU Accept Note  ------------------------    CHIEF COMPLAINT:     HPI / INTERVAL HISTORY:  HPI:  83yo M w/ PMHx of HIV (viral load undetectable 08/22), Afib s/p DVVC and ablation (not on AC), MV endocarditis s/p bioprosthetic valve replacement, BPH (self catheterizes), frequent UTIs, presents with weakness, he reports that over the last 2 weeks he has had weakness, symptoms were associated with occasional body shakes that came every other day, he notes that he also has had recent difficulty performing straight cath, saying he required more force and was at times unable to complete procedure, he called his urologist who then instructed him to head to ED, in the ED, pt was bradycardic, febrile but hemodynamically stable, labs showed leukocytosis, elevated Cr above baseline, elevated procal, grossly positive U/A, EKG showed new Mobitz Type 1, then converted to afib with slow ventricular response, pt was given cefepime, 1L NS bolus, Tylenol x1, admitted to general medicine for further management. While in ED, pt developed acute onset non-radiating chest pain associated with SOB but denies n/v, f/c, diaphoresis, jaw pain, (29 Nov 2022 23:23)    UCx + 100,000 staph lugdunensis, bilat perinephric stranding  - but no renal cortical abscess on imaging. On 11/29 4/4 bottles grew Staph lugdunensis. 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 Patient is s/p 12/5 Micra MDT PPM via Right Femoral Vein.     MICU: RRT was called on the floor for hypoxia, patient satting 80s on HF, responded to 25L (93%). Patient appeared to have dyspnea. EKG showed Afib with rate in 60s, new bundle branch block, but no ischemic changes.  Patient was transferred to the MICU for further management and treatment of his acute respiratory distress, worsening pulmonary edema, functionally anuric status on bumex drip, s/p metalazone and lasix. Pt has been improving on BIPAP. Will likely pursue dialysis, place shiley. Will intubate if needed. Patient does not have a HCP or family/friends to make decisions on his behalf. He had a GOC discussion and expressed a desire to be full code, consenting to intubation and dialysis but not trach/peg. Antibiotics for staph lugdunesis endocarditis have been broadened from ancef to Zosyn.       REVIEW OF SYSTEMS:  Constitutional: +Fevers, chills, weight loss  Neurological: No headache, dizziness, weakness, numbness  HEENT: No vision problems, eye pain, nasal congestion, rhinorrhea, sore throat, dysphagia  Resp: No cough, dyspnea, wheezing, hemoptysis  CV: +chest pain, orthopnea, palpitations  GI: No nausea, vomiting, diarrhea, constipation, abdominal pain  : +dysuria, +urinary retention. No hematuria, urinary frequency or urgency  Musculoskeletal: No back pain, myalgias, arthralgias, or BLE edema  Skin: No new rashes, itching        PMH/PSH:  PAST MEDICAL & SURGICAL HISTORY:  Unsteady gait      HIV (human immunodeficiency virus infection)      Chronic kidney disease, unspecified stage      Constipation, unspecified constipation type      Seborrheic keratosis      Osteoporosis      Edema of both legs      Bladder mass      Vitamin D deficiency      Orchitis and epididymitis      H/O endocarditis  MV endocarditis      Benign prostatic hyperplasia without lower urinary tract symptoms      COVID-19 vaccine series completed  W #2 boosters      S/P coronary artery stent placement      S/P MVR (mitral valve replacement)  &amp; SHELLIE clip, 8/31/2020      FAMILY HISTORY:  FAMILY HISTORY:  Family history of congestive heart failure (Father)    Family history of cancer (Mother)        SOCIAL HISTORY:  Smoking:  Alcohol:  Drugs:    HOME MEDICATIONS:  Home Medications:  abacavir 300 mg oral tablet: 2 tab(s) orally once a day (29 Nov 2022 23:21)  doravirine 100 mg oral tablet: 1 tab(s) orally once a day (29 Nov 2022 23:21)  Tamar-C 500 mg oral tablet: 1 tab(s) orally once a day (29 Nov 2022 23:21)  famotidine 20 mg oral tablet: 1 tab(s) orally 2 times a day (29 Nov 2022 23:21)  ferrous sulfate 325 mg (65 mg elemental iron) oral tablet: 1 tab(s) orally once a day (29 Nov 2022 23:21)  folic acid 1 mg oral tablet: 1 tab(s) orally once a day (29 Nov 2022 23:21)  lamiVUDine 100 mg oral tablet: 1 tab(s) orally once a day (29 Nov 2022 23:21)  metoprolol succinate 25 mg oral tablet, extended release: 0.5 tab(s) orally once a day (29 Nov 2022 23:21)  Multiple Vitamins oral tablet: 1 tab(s) orally once a day (29 Nov 2022 23:21)  senna leaf extract oral tablet: 2 tab(s) orally once a day (at bedtime) (29 Nov 2022 23:21)  telmisartan 40 mg oral tablet: 1 tab(s) orally once a day (29 Nov 2022 23:21)  Vitamin D3 5000 intl units (125 mcg) oral capsule: 1 cap(s) orally once a day (29 Nov 2022 23:21)      ALLERGIES:  Allergies    No Known Allergies    Intolerances            OBJECTIVE:  ICU Vital Signs Last 24 Hrs  T(C): 36.3 (08 Dec 2022 12:17), Max: 36.8 (07 Dec 2022 21:22)  T(F): 97.4 (08 Dec 2022 12:17), Max: 98.2 (07 Dec 2022 21:22)  HR: 76 (08 Dec 2022 13:45) (57 - 91)  BP: 112/67 (08 Dec 2022 13:45) (105/55 - 126/68)  BP(mean): --  ABP: --  ABP(mean): --  RR: 19 (08 Dec 2022 12:17) (18 - 28)  SpO2: 93% (08 Dec 2022 12:17) (87% - 98%)    O2 Parameters below as of 08 Dec 2022 12:17  Patient On (Oxygen Delivery Method): nasal cannula, high flow  O2 Flow (L/min): 25  O2 Concentration (%): 60        PHYSICAL EXAM  GENERAL: chronically ill appearing gentleman  NEURO: A+O x 3  HEENT: Pupil equal and reactive to light; extra-ocular movements intact; clear conjunctiva; normal mucus membrane and oropharynx; neck supple  RESP: bilateral crackles heard throughout lung fields, on bipap  CVS: S1/S2 present, normal rate and rhythm; no murmurs, gallops or rubs appreciated  ABD: Soft, non-tender, non-distended, no masses appreciated; bowel sound normal at all 4 quadrants  EXT: Grossly normal ROM; 2+ pedal pulses bilaterally, no BLE edema  SKIN: Warm, dry, and appropirate color for skin tone; No new rashes    LINES:       HOSPITAL MEDICATIONS:  MEDICATIONS  (STANDING):  abacavir 300 milliGRAM(s) Oral two times a day  apixaban 2.5 milliGRAM(s) Oral two times a day  aspirin enteric coated 81 milliGRAM(s) Oral daily  buMETAnide Infusion 2 mG/Hr (10 mL/Hr) IV Continuous <Continuous>  chlorhexidine 2% Cloths 1 Application(s) Topical daily  chlorhexidine 4% Liquid 1 Application(s) Topical <User Schedule>  doravirine 100 milliGRAM(s) Oral daily  famotidine    Tablet 20 milliGRAM(s) Oral daily  ferrous    sulfate 325 milliGRAM(s) Oral daily  folic acid 1 milliGRAM(s) Oral daily  influenza  Vaccine (HIGH DOSE) 0.7 milliLiter(s) IntraMuscular once  lamiVUDine- milliGRAM(s) Oral daily  multivitamin 1 Tablet(s) Oral daily  piperacillin/tazobactam IVPB. 3.375 Gram(s) IV Intermittent once  piperacillin/tazobactam IVPB.- 3.375 Gram(s) IV Intermittent once  senna 2 Tablet(s) Oral at bedtime  sodium bicarbonate 650 milliGRAM(s) Oral three times a day    MEDICATIONS  (PRN):  acetaminophen     Tablet .. 650 milliGRAM(s) Oral every 6 hours PRN Temp greater or equal to 38C (100.4F), Mild Pain (1 - 3)  lidocaine 2% Jelly 3 milliLiter(s) IntraUrethral two times a day PRN penile pain  loperamide 2 milliGRAM(s) Oral daily PRN Diarrhea  ondansetron Injectable 4 milliGRAM(s) IV Push every 6 hours PRN Nausea and/or Vomiting  simethicone 80 milliGRAM(s) Chew four times a day PRN Gas  sodium chloride 0.9% lock flush 10 milliLiter(s) IV Push every 1 hour PRN Pre/post blood products, medications, blood draw, and to maintain line patency        LABS:                        10.2   54.23 )-----------( 98       ( 08 Dec 2022 15:24 )             31.1     Hgb Trend: 10.2<--, 10.0<--, 9.7<--, 10.0<--, 10.5<--  12-08    137  |  101  |  100<H>  ----------------------------<  144<H>  5.8<H>   |  17<L>  |  4.63<H>    Ca    8.6      08 Dec 2022 15:24  Phos  8.8     12-08  Mg     3.4     12-08    TPro  6.7  /  Alb  2.9<L>  /  TBili  0.3  /  DBili  x   /  AST  317<H>  /  ALT  42  /  AlkPhos  224<H>  12-08    Creatinine Trend: 4.63<--, 4.45<--, 4.15<--, 3.85<--, 3.41<--, 3.18<--      Arterial Blood Gas:  12-08 @ 15:17  7.26/39/90/18/98.5/-8.9  ABG lactate: --  Arterial Blood Gas:  12-08 @ 07:54  7.24/40/65/17/93.6/-9.7  ABG lactate: --    Venous Blood Gas:  12-08 @ 03:25  7.16/50/36/18/57.9  VBG Lactate: 3.6      MICROBIOLOGY:       RADIOLOGY & ADDITIONAL TESTS: Reviewed.      ASSESSMENT: 82M w/ PMHx of HIV (viral load undetectable 08/22), Afib s/p DVVC and ablation (not on AC), MV endocarditis s/p bioprosthetic valve replacement, BPH (self catheterizes), frequent UTIs, presents with UTI and heart block, found to have persistent endocarditis not amenable to cardiac surgery, s/p melina PPM placement on 12/5, s/p RRT on 12/8 called for hypoxia, transferred to MICU for further management of worsening acute respiratory failure, functionally anuric status.       ======NEUROLOGY======  AAOx3-4 at baseline  no active issues    ======PULMONARY======  #AHRF  - RRT called 12/8 for hypoxia to 80s, HF improved to 93%, brought up to MICU and put on BIPAP, with saturation >97%  - 2/2 volume overload  - CXR 12/7 showing vascular congestion; exam consistent with volume overload  - VBG 7.16/50; lactate 3.6  - ABG shows signs of respiratory on metabolic acidosis, started bipap  - monitor response to O2 support with VBG    ====CARDIOVASCULAR====  #Staph lugdudenesis bacteremia c/b infective endocarditis   #afib  #Mobitiz I, sinus bradycardia    - s/p cefazolin, broadened to Zosyn  - trended trop (?concern for IE embolization to coronary arteries) - no significant ischemic changes at this time  - Rhythm showed sinus madonna with Mobitz I and pAF SVR  - per CTSx pt not eligible for intervention at this point     ========RENAL========  #progressive renal failure  #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 with CKD In the setting of HIV and urinary retention (does self cath at home) s/p tovar insertion on this admission,  - Urine studies from 11/29 with blood and protein present in the urine. 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 Patient with worsening volume status, started on diuresis today however still requiring non invasive positive pressure ventilation.   - Will perform session of Ultrafiltration in order to improve congestion however ultimately patient is not a good candidate for long term hemodialysis.   - Abdominal US done on 12/1 showed unilateral hydronephrosis.   - Check serological w/up for GN.   - Maintain Tovar in situ.   - Monitor BMP. Strict I/Os. Avoid nephrotoxins.    ===GASTROINTESTINAL====  - no active issues      ===INFECTIOUS DISEASE===  #endocarditis, staph lugdudenesis bacteremia + in Urine and BCx  - s/p cefazolin --> broadened to Zosyn  - treat until Bcx clear    #hx of HIV  - treat with ART home medications  - Continue PO abacavir 200mg q12hrs, lamivudine 100mg q24hrs and doravirine 1tab q24hrs    =====HEMATOLOGIC=====  #hx of afib and endocarditis  - c/w Eliquis 2.5 mg BID      ======ENDOCRINE=======  - no active issues    ===MUSCULOSKELETAL====  - no active issues    =====PROPHYLAXIS======  DVT prophylaxis: Eliquis 2.5 mg BID MICU Accept Note  ------------------------    CHIEF COMPLAINT:     HPI / INTERVAL HISTORY:  HPI:  83yo M w/ PMHx of HIV (viral load undetectable 08/22), Afib s/p DVVC and ablation (not on AC), MV endocarditis s/p bioprosthetic valve replacement, BPH (self catheterizes), frequent UTIs, presents with weakness, he reports that over the last 2 weeks he has had weakness, symptoms were associated with occasional body shakes that came every other day, he notes that he also has had recent difficulty performing straight cath, saying he required more force and was at times unable to complete procedure, he called his urologist who then instructed him to head to ED, in the ED, pt was bradycardic, febrile but hemodynamically stable, labs showed leukocytosis, elevated Cr above baseline, elevated procal, grossly positive U/A, EKG showed new Mobitz Type 1, then converted to afib with slow ventricular response, pt was given cefepime, 1L NS bolus, Tylenol x1, admitted to general medicine for further management. While in ED, pt developed acute onset non-radiating chest pain associated with SOB but denies n/v, f/c, diaphoresis, jaw pain, (29 Nov 2022 23:23)    UCx + 100,000 staph lugdunensis, bilat perinephric stranding  - but no renal cortical abscess on imaging. On 11/29 4/4 bottles grew Staph lugdunensis. 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 Patient is s/p 12/5 Micra MDT PPM via Right Femoral Vein.     MICU: RRT was called on the floor for hypoxia, patient satting 80s on HF, responded to 25L (93%). Patient appeared to have dyspnea. EKG showed Afib with rate in 60s, new bundle branch block, but no ischemic changes.  Patient was transferred to the MICU for further management and treatment of his acute respiratory distress, worsening pulmonary edema, functionally anuric status on bumex drip, s/p metalazone and lasix. Pt has been improving on BIPAP. Will likely pursue dialysis, place shiley. Will intubate if needed. Patient does not have a HCP or family/friends to make decisions on his behalf. He had a GOC discussion and expressed a desire to be full code, consenting to intubation and dialysis but not trach/peg. Antibiotics for staph lugdunesis endocarditis have been broadened from ancef to Zosyn.       REVIEW OF SYSTEMS:  Constitutional: +Fevers, chills, weight loss  Neurological: No headache, dizziness, weakness, numbness  HEENT: No vision problems, eye pain, nasal congestion, rhinorrhea, sore throat, dysphagia  Resp: No cough, dyspnea, wheezing, hemoptysis  CV: +chest pain, orthopnea, palpitations  GI: No nausea, vomiting, diarrhea, constipation, abdominal pain  : +dysuria, +urinary retention. No hematuria, urinary frequency or urgency  Musculoskeletal: No back pain, myalgias, arthralgias, or BLE edema  Skin: No new rashes, itching        PMH/PSH:  PAST MEDICAL & SURGICAL HISTORY:  Unsteady gait      HIV (human immunodeficiency virus infection)      Chronic kidney disease, unspecified stage      Constipation, unspecified constipation type      Seborrheic keratosis      Osteoporosis      Edema of both legs      Bladder mass      Vitamin D deficiency      Orchitis and epididymitis      H/O endocarditis  MV endocarditis      Benign prostatic hyperplasia without lower urinary tract symptoms      COVID-19 vaccine series completed  W #2 boosters      S/P coronary artery stent placement      S/P MVR (mitral valve replacement)  &amp; SHELLIE clip, 8/31/2020      FAMILY HISTORY:  FAMILY HISTORY:  Family history of congestive heart failure (Father)    Family history of cancer (Mother)        SOCIAL HISTORY:  Smoking:  Alcohol:  Drugs:    HOME MEDICATIONS:  Home Medications:  abacavir 300 mg oral tablet: 2 tab(s) orally once a day (29 Nov 2022 23:21)  doravirine 100 mg oral tablet: 1 tab(s) orally once a day (29 Nov 2022 23:21)  Tamar-C 500 mg oral tablet: 1 tab(s) orally once a day (29 Nov 2022 23:21)  famotidine 20 mg oral tablet: 1 tab(s) orally 2 times a day (29 Nov 2022 23:21)  ferrous sulfate 325 mg (65 mg elemental iron) oral tablet: 1 tab(s) orally once a day (29 Nov 2022 23:21)  folic acid 1 mg oral tablet: 1 tab(s) orally once a day (29 Nov 2022 23:21)  lamiVUDine 100 mg oral tablet: 1 tab(s) orally once a day (29 Nov 2022 23:21)  metoprolol succinate 25 mg oral tablet, extended release: 0.5 tab(s) orally once a day (29 Nov 2022 23:21)  Multiple Vitamins oral tablet: 1 tab(s) orally once a day (29 Nov 2022 23:21)  senna leaf extract oral tablet: 2 tab(s) orally once a day (at bedtime) (29 Nov 2022 23:21)  telmisartan 40 mg oral tablet: 1 tab(s) orally once a day (29 Nov 2022 23:21)  Vitamin D3 5000 intl units (125 mcg) oral capsule: 1 cap(s) orally once a day (29 Nov 2022 23:21)      ALLERGIES:  Allergies    No Known Allergies    Intolerances            OBJECTIVE:  ICU Vital Signs Last 24 Hrs  T(C): 36.3 (08 Dec 2022 12:17), Max: 36.8 (07 Dec 2022 21:22)  T(F): 97.4 (08 Dec 2022 12:17), Max: 98.2 (07 Dec 2022 21:22)  HR: 76 (08 Dec 2022 13:45) (57 - 91)  BP: 112/67 (08 Dec 2022 13:45) (105/55 - 126/68)  BP(mean): --  ABP: --  ABP(mean): --  RR: 19 (08 Dec 2022 12:17) (18 - 28)  SpO2: 93% (08 Dec 2022 12:17) (87% - 98%)    O2 Parameters below as of 08 Dec 2022 12:17  Patient On (Oxygen Delivery Method): nasal cannula, high flow  O2 Flow (L/min): 25  O2 Concentration (%): 60        PHYSICAL EXAM  GENERAL: chronically ill appearing gentleman  NEURO: A+O x 3  HEENT: Pupil equal and reactive to light; extra-ocular movements intact; clear conjunctiva; normal mucus membrane and oropharynx; neck supple  RESP: bilateral crackles heard throughout lung fields, on bipap  CVS: S1/S2 present, normal rate and rhythm; no murmurs, gallops or rubs appreciated  ABD: Soft, non-tender, non-distended, no masses appreciated; bowel sound normal at all 4 quadrants  EXT: Grossly normal ROM; 2+ pedal pulses bilaterally, no BLE edema  SKIN: Warm, dry, and appropirate color for skin tone; No new rashes    LINES:       HOSPITAL MEDICATIONS:  MEDICATIONS  (STANDING):  abacavir 300 milliGRAM(s) Oral two times a day  apixaban 2.5 milliGRAM(s) Oral two times a day  aspirin enteric coated 81 milliGRAM(s) Oral daily  buMETAnide Infusion 2 mG/Hr (10 mL/Hr) IV Continuous <Continuous>  chlorhexidine 2% Cloths 1 Application(s) Topical daily  chlorhexidine 4% Liquid 1 Application(s) Topical <User Schedule>  doravirine 100 milliGRAM(s) Oral daily  famotidine    Tablet 20 milliGRAM(s) Oral daily  ferrous    sulfate 325 milliGRAM(s) Oral daily  folic acid 1 milliGRAM(s) Oral daily  influenza  Vaccine (HIGH DOSE) 0.7 milliLiter(s) IntraMuscular once  lamiVUDine- milliGRAM(s) Oral daily  multivitamin 1 Tablet(s) Oral daily  piperacillin/tazobactam IVPB. 3.375 Gram(s) IV Intermittent once  piperacillin/tazobactam IVPB.- 3.375 Gram(s) IV Intermittent once  senna 2 Tablet(s) Oral at bedtime  sodium bicarbonate 650 milliGRAM(s) Oral three times a day    MEDICATIONS  (PRN):  acetaminophen     Tablet .. 650 milliGRAM(s) Oral every 6 hours PRN Temp greater or equal to 38C (100.4F), Mild Pain (1 - 3)  lidocaine 2% Jelly 3 milliLiter(s) IntraUrethral two times a day PRN penile pain  loperamide 2 milliGRAM(s) Oral daily PRN Diarrhea  ondansetron Injectable 4 milliGRAM(s) IV Push every 6 hours PRN Nausea and/or Vomiting  simethicone 80 milliGRAM(s) Chew four times a day PRN Gas  sodium chloride 0.9% lock flush 10 milliLiter(s) IV Push every 1 hour PRN Pre/post blood products, medications, blood draw, and to maintain line patency        LABS:                        10.2   54.23 )-----------( 98       ( 08 Dec 2022 15:24 )             31.1     Hgb Trend: 10.2<--, 10.0<--, 9.7<--, 10.0<--, 10.5<--  12-08    137  |  101  |  100<H>  ----------------------------<  144<H>  5.8<H>   |  17<L>  |  4.63<H>    Ca    8.6      08 Dec 2022 15:24  Phos  8.8     12-08  Mg     3.4     12-08    TPro  6.7  /  Alb  2.9<L>  /  TBili  0.3  /  DBili  x   /  AST  317<H>  /  ALT  42  /  AlkPhos  224<H>  12-08    Creatinine Trend: 4.63<--, 4.45<--, 4.15<--, 3.85<--, 3.41<--, 3.18<--      Arterial Blood Gas:  12-08 @ 15:17  7.26/39/90/18/98.5/-8.9  ABG lactate: --  Arterial Blood Gas:  12-08 @ 07:54  7.24/40/65/17/93.6/-9.7  ABG lactate: --    Venous Blood Gas:  12-08 @ 03:25  7.16/50/36/18/57.9  VBG Lactate: 3.6      MICROBIOLOGY:       RADIOLOGY & ADDITIONAL TESTS: Reviewed.      ASSESSMENT: 82M w/ PMHx of HIV (viral load undetectable 08/22), Afib s/p DVVC and ablation (not on AC), MV endocarditis s/p bioprosthetic valve replacement, BPH (self catheterizes), frequent UTIs, presents with UTI and heart block, found to have persistent endocarditis not amenable to cardiac surgery, s/p melina PPM placement on 12/5, s/p RRT on 12/8 called for hypoxia, transferred to MICU for further management of worsening acute respiratory failure, functionally anuric status.       ======NEUROLOGY======  AAOx3-4 at baseline  no active issues    ======PULMONARY======  #AHRF  - RRT called 12/8 for hypoxia to 80s, HF improved to 93%, brought up to MICU and put on BIPAP, with saturation >97%  - 2/2 volume overload  - CXR 12/7 showing vascular congestion; exam consistent with volume overload  - VBG 7.16/50; lactate 3.6  - ABG shows signs of respiratory on metabolic acidosis, started bipap  - monitor response to O2 support with VBG    ====CARDIOVASCULAR====  #Staph lugdudenesis bacteremia c/b infective endocarditis   #afib  #Mobitiz I, sinus bradycardia    - s/p cefazolin, broadened to Zosyn  - trended trop (?concern for IE embolization to coronary arteries) - no significant ischemic changes at this time  - Rhythm showed sinus madonna with Mobitz I and pAF SVR  - per CTSx pt not eligible for intervention at this point     ========RENAL========  #progressive renal failure  #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 with CKD In the setting of HIV and urinary retention (does self cath at home) s/p tovar insertion on this admission,  - Urine studies from 11/29 with blood and protein present in the urine. 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 Patient with worsening volume status, started on diuresis today however still requiring non invasive positive pressure ventilation.   - Will perform session of Ultrafiltration in order to improve congestion however ultimately patient is not a good candidate for long term hemodialysis.   - Abdominal US done on 12/1 showed unilateral hydronephrosis.   - Check serological w/up for GN.   - Maintain Tovar in situ.   - Monitor BMP. Strict I/Os. Avoid nephrotoxins.    ===GASTROINTESTINAL====  - no active issues      ===INFECTIOUS DISEASE===  #endocarditis, staph lugdudenesis bacteremia + in Urine and BCx  - s/p cefazolin --> broadened to Zosyn  - treat until Bcx clear    #hx of HIV  - treat with ART home medications  - Continue PO abacavir 200mg q12hrs, lamivudine 100mg q24hrs and doravirine 1tab q24hrs    =====HEMATOLOGIC=====  #hx of afib and endocarditis  - c/w Eliquis 2.5 mg BID    #anemia  - Hemoglobin near goal, on oral iron.   - Would hold off on IV iron in the setting of bacteremia/ endocarditis.   - Consider JASON if Hb remains below goal.   - Monitor CBC.    ======ENDOCRINE=======  - no active issues    ===MUSCULOSKELETAL====  - no active issues    =====PROPHYLAXIS======  DVT prophylaxis: Eliquis 2.5 mg BID MICU Accept Note  ------------------------    CHIEF COMPLAINT:     HPI / INTERVAL HISTORY:  HPI:  83yo M w/ PMHx of HIV (viral load undetectable 08/22), Afib s/p DVVC and ablation (not on AC), MV endocarditis s/p bioprosthetic valve replacement, BPH (self catheterizes), frequent UTIs, presents with weakness, he reports that over the last 2 weeks he has had weakness, symptoms were associated with occasional body shakes that came every other day, he notes that he also has had recent difficulty performing straight cath, saying he required more force and was at times unable to complete procedure, he called his urologist who then instructed him to head to ED, in the ED, pt was bradycardic, febrile but hemodynamically stable, labs showed leukocytosis, elevated Cr above baseline, elevated procal, grossly positive U/A, EKG showed new Mobitz Type 1, then converted to afib with slow ventricular response, pt was given cefepime, 1L NS bolus, Tylenol x1, admitted to general medicine for further management. While in ED, pt developed acute onset non-radiating chest pain associated with SOB but denies n/v, f/c, diaphoresis, jaw pain, (29 Nov 2022 23:23)    UCx + 100,000 staph lugdunensis, bilat perinephric stranding  - but no renal cortical abscess on imaging. On 11/29 4/4 bottles grew Staph lugdunensis. 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 Patient is s/p 12/5 Micra MDT PPM via Right Femoral Vein.     MICU: RRT was called on the floor for hypoxia, patient satting 80s on HF, responded to 25L (93%). Patient appeared to have dyspnea. EKG showed Afib with rate in 60s, new bundle branch block, but no ischemic changes.  Patient was transferred to the MICU for further management and treatment of his acute respiratory distress, worsening pulmonary edema, functionally anuric status on bumex drip, s/p metalazone and lasix. Pt has been improving on BIPAP. Will likely pursue dialysis, place shiley. Will intubate if needed. Patient does not have a HCP or family/friends to make decisions on his behalf. He had a GOC discussion and expressed a desire to be full code, consenting to intubation and dialysis but not trach/peg. Antibiotics for staph lugdunesis endocarditis have been broadened from ancef to Zosyn.       REVIEW OF SYSTEMS:  Constitutional: +Fevers, chills, weight loss  Neurological: No headache, dizziness, weakness, numbness  HEENT: No vision problems, eye pain, nasal congestion, rhinorrhea, sore throat, dysphagia  Resp: No cough, dyspnea, wheezing, hemoptysis  CV: +chest pain, orthopnea, palpitations  GI: No nausea, vomiting, diarrhea, constipation, abdominal pain  : +dysuria, +urinary retention. No hematuria, urinary frequency or urgency  Musculoskeletal: No back pain, myalgias, arthralgias, or BLE edema  Skin: No new rashes, itching        PMH/PSH:  PAST MEDICAL & SURGICAL HISTORY:  Unsteady gait      HIV (human immunodeficiency virus infection)      Chronic kidney disease, unspecified stage      Constipation, unspecified constipation type      Seborrheic keratosis      Osteoporosis      Edema of both legs      Bladder mass      Vitamin D deficiency      Orchitis and epididymitis      H/O endocarditis  MV endocarditis      Benign prostatic hyperplasia without lower urinary tract symptoms      COVID-19 vaccine series completed  W #2 boosters      S/P coronary artery stent placement      S/P MVR (mitral valve replacement)  &amp; SHELLIE clip, 8/31/2020      FAMILY HISTORY:  FAMILY HISTORY:  Family history of congestive heart failure (Father)    Family history of cancer (Mother)        SOCIAL HISTORY:  Smoking:  Alcohol:  Drugs:    HOME MEDICATIONS:  Home Medications:  abacavir 300 mg oral tablet: 2 tab(s) orally once a day (29 Nov 2022 23:21)  doravirine 100 mg oral tablet: 1 tab(s) orally once a day (29 Nov 2022 23:21)  Tamar-C 500 mg oral tablet: 1 tab(s) orally once a day (29 Nov 2022 23:21)  famotidine 20 mg oral tablet: 1 tab(s) orally 2 times a day (29 Nov 2022 23:21)  ferrous sulfate 325 mg (65 mg elemental iron) oral tablet: 1 tab(s) orally once a day (29 Nov 2022 23:21)  folic acid 1 mg oral tablet: 1 tab(s) orally once a day (29 Nov 2022 23:21)  lamiVUDine 100 mg oral tablet: 1 tab(s) orally once a day (29 Nov 2022 23:21)  metoprolol succinate 25 mg oral tablet, extended release: 0.5 tab(s) orally once a day (29 Nov 2022 23:21)  Multiple Vitamins oral tablet: 1 tab(s) orally once a day (29 Nov 2022 23:21)  senna leaf extract oral tablet: 2 tab(s) orally once a day (at bedtime) (29 Nov 2022 23:21)  telmisartan 40 mg oral tablet: 1 tab(s) orally once a day (29 Nov 2022 23:21)  Vitamin D3 5000 intl units (125 mcg) oral capsule: 1 cap(s) orally once a day (29 Nov 2022 23:21)      ALLERGIES:  Allergies    No Known Allergies    Intolerances            OBJECTIVE:  ICU Vital Signs Last 24 Hrs  T(C): 36.3 (08 Dec 2022 12:17), Max: 36.8 (07 Dec 2022 21:22)  T(F): 97.4 (08 Dec 2022 12:17), Max: 98.2 (07 Dec 2022 21:22)  HR: 76 (08 Dec 2022 13:45) (57 - 91)  BP: 112/67 (08 Dec 2022 13:45) (105/55 - 126/68)  BP(mean): --  ABP: --  ABP(mean): --  RR: 19 (08 Dec 2022 12:17) (18 - 28)  SpO2: 93% (08 Dec 2022 12:17) (87% - 98%)    O2 Parameters below as of 08 Dec 2022 12:17  Patient On (Oxygen Delivery Method): nasal cannula, high flow  O2 Flow (L/min): 25  O2 Concentration (%): 60        PHYSICAL EXAM  GENERAL: chronically ill appearing gentleman  NEURO: A+O x 3  HEENT: Pupil equal and reactive to light; extra-ocular movements intact; clear conjunctiva; normal mucus membrane and oropharynx; neck supple  RESP: bilateral crackles heard throughout lung fields, on bipap  CVS: S1/S2 present, normal rate and rhythm; no murmurs, gallops or rubs appreciated  ABD: Soft, non-tender, non-distended, no masses appreciated; bowel sound normal at all 4 quadrants  EXT: Grossly normal ROM; 2+ pedal pulses bilaterally, no BLE edema  SKIN: Warm, dry, and appropirate color for skin tone; No new rashes    LINES:       HOSPITAL MEDICATIONS:  MEDICATIONS  (STANDING):  abacavir 300 milliGRAM(s) Oral two times a day  apixaban 2.5 milliGRAM(s) Oral two times a day  aspirin enteric coated 81 milliGRAM(s) Oral daily  buMETAnide Infusion 2 mG/Hr (10 mL/Hr) IV Continuous <Continuous>  chlorhexidine 2% Cloths 1 Application(s) Topical daily  chlorhexidine 4% Liquid 1 Application(s) Topical <User Schedule>  doravirine 100 milliGRAM(s) Oral daily  famotidine    Tablet 20 milliGRAM(s) Oral daily  ferrous    sulfate 325 milliGRAM(s) Oral daily  folic acid 1 milliGRAM(s) Oral daily  influenza  Vaccine (HIGH DOSE) 0.7 milliLiter(s) IntraMuscular once  lamiVUDine- milliGRAM(s) Oral daily  multivitamin 1 Tablet(s) Oral daily  piperacillin/tazobactam IVPB. 3.375 Gram(s) IV Intermittent once  piperacillin/tazobactam IVPB.- 3.375 Gram(s) IV Intermittent once  senna 2 Tablet(s) Oral at bedtime  sodium bicarbonate 650 milliGRAM(s) Oral three times a day    MEDICATIONS  (PRN):  acetaminophen     Tablet .. 650 milliGRAM(s) Oral every 6 hours PRN Temp greater or equal to 38C (100.4F), Mild Pain (1 - 3)  lidocaine 2% Jelly 3 milliLiter(s) IntraUrethral two times a day PRN penile pain  loperamide 2 milliGRAM(s) Oral daily PRN Diarrhea  ondansetron Injectable 4 milliGRAM(s) IV Push every 6 hours PRN Nausea and/or Vomiting  simethicone 80 milliGRAM(s) Chew four times a day PRN Gas  sodium chloride 0.9% lock flush 10 milliLiter(s) IV Push every 1 hour PRN Pre/post blood products, medications, blood draw, and to maintain line patency        LABS:                        10.2   54.23 )-----------( 98       ( 08 Dec 2022 15:24 )             31.1     Hgb Trend: 10.2<--, 10.0<--, 9.7<--, 10.0<--, 10.5<--  12-08    137  |  101  |  100<H>  ----------------------------<  144<H>  5.8<H>   |  17<L>  |  4.63<H>    Ca    8.6      08 Dec 2022 15:24  Phos  8.8     12-08  Mg     3.4     12-08    TPro  6.7  /  Alb  2.9<L>  /  TBili  0.3  /  DBili  x   /  AST  317<H>  /  ALT  42  /  AlkPhos  224<H>  12-08    Creatinine Trend: 4.63<--, 4.45<--, 4.15<--, 3.85<--, 3.41<--, 3.18<--      Arterial Blood Gas:  12-08 @ 15:17  7.26/39/90/18/98.5/-8.9  ABG lactate: --  Arterial Blood Gas:  12-08 @ 07:54  7.24/40/65/17/93.6/-9.7  ABG lactate: --    Venous Blood Gas:  12-08 @ 03:25  7.16/50/36/18/57.9  VBG Lactate: 3.6      MICROBIOLOGY:       RADIOLOGY & ADDITIONAL TESTS: Reviewed.      ASSESSMENT: 82M w/ PMHx of HIV (viral load undetectable 08/22), Afib s/p DVVC and ablation (not on AC), MV endocarditis s/p bioprosthetic valve replacement, BPH (self catheterizes), frequent UTIs, presents with UTI and heart block, found to have persistent endocarditis not amenable to cardiac surgery, s/p melina PPM placement on 12/5, s/p RRT on 12/8 called for hypoxia, transferred to MICU for further management of worsening acute respiratory failure, functionally anuric status.       ======NEUROLOGY======  AAOx3-4 at baseline  no active issues    ======PULMONARY======  #AHRF  - RRT called 12/8 for hypoxia to 80s, HF improved to 93%, brought up to MICU and put on BIPAP, with saturation >97%  - 2/2 volume overload  - CXR 12/7 showing vascular congestion; exam consistent with volume overload  - VBG 7.16/50; lactate 3.6  - ABG shows signs of respiratory on metabolic acidosis, started bipap  - monitor response to O2 support with VBG    ====CARDIOVASCULAR====  #Staph lugdudenesis bacteremia c/b infective endocarditis   #afib  #Mobitiz I, sinus bradycardia    - s/p cefazolin, broadened to Zosyn  - trended trop (?concern for IE embolization to coronary arteries) - no significant ischemic changes at this time  - Rhythm showed sinus madonna with Mobitz I and pAF SVR  - per CTSx pt not eligible for intervention at this point     ========RENAL========  #progressive renal failure  #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 with CKD In the setting of HIV and urinary retention (does self cath at home) s/p tovar insertion on this admission,  - Urine studies from 11/29 with blood and protein present in the urine. 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 Patient with worsening volume status, started on diuresis today however still requiring non invasive positive pressure ventilation.   - Will perform session of Ultrafiltration in order to improve congestion however ultimately patient is not a good candidate for long term hemodialysis.   - Abdominal US done on 12/1 showed unilateral hydronephrosis.   - Check serological w/up for GN.   - Maintain Tovar in situ.   - Monitor BMP. Strict I/Os. Avoid nephrotoxins.    ===GASTROINTESTINAL====  - no active issues      ===INFECTIOUS DISEASE===  #endocarditis, staph lugdudenesis bacteremia + in Urine and BCx  - s/p cefazolin --> broadened to Zosyn  - treat until Bcx clear    #hx of HIV  - treat with ART home medications  - Continue PO abacavir 200mg q12hrs, lamivudine 100mg q24hrs and doravirine 1tab q24hrs    =====HEMATOLOGIC=====  #hx of afib and endocarditis  - c/w Eliquis 2.5 mg BID    #anemia  - Hemoglobin near goal, on oral iron.   - Would hold off on IV iron in the setting of bacteremia/ endocarditis.   - Consider JASON if Hb remains below goal.   - Monitor CBC.    ======ENDOCRINE=======  - no active issues    ===MUSCULOSKELETAL====  - no active issues    =====PROPHYLAXIS======  DVT prophylaxis: Eliquis 2.5 mg BID    -----------------------------------  Agree with above  82M PMH HIV/ART, endocarditis s/p bioprosthetic valve replacement, BPH a/w urinary retention, now with Staph lugdunensis bacteremia c/b progressive renal failure and volume overload  RRT called for increased WoB  Placed on NIPPV and pulling good volumes on 10/5, but still with significant tachypnea and accessory muscle / abdominal breathing  Attempts have been made to improve urine output including Lasix / metolazone / Bumex gtt with minimal output  CXR now shows worsening pulmonary edema / vascular congestion  Nephro plans to PUF and remove 2L  Transfer to ICU for placement of HD catheter, urgent removal of fluid, and close monitoring of airway and hemodynamics  Patient is currently full code    I have personally provided 45 minutes of critical care time. MICU Accept Note  ------------------------    CHIEF COMPLAINT:     HPI / INTERVAL HISTORY:  HPI:  83yo M w/ PMHx of HIV (viral load undetectable 08/22), Afib s/p DVVC and ablation (not on AC), MV endocarditis s/p bioprosthetic valve replacement, BPH (self catheterizes), frequent UTIs, presents with weakness, he reports that over the last 2 weeks he has had weakness, symptoms were associated with occasional body shakes that came every other day, he notes that he also has had recent difficulty performing straight cath, saying he required more force and was at times unable to complete procedure, he called his urologist who then instructed him to head to ED, in the ED, pt was bradycardic, febrile but hemodynamically stable, labs showed leukocytosis, elevated Cr above baseline, elevated procal, grossly positive U/A, EKG showed new Mobitz Type 1, then converted to afib with slow ventricular response, pt was given cefepime, 1L NS bolus, Tylenol x1, admitted to general medicine for further management. While in ED, pt developed acute onset non-radiating chest pain associated with SOB but denies n/v, f/c, diaphoresis, jaw pain, (29 Nov 2022 23:23)    UCx + 100,000 staph lugdunensis, bilat perinephric stranding  - but no renal cortical abscess on imaging. On 11/29 4/4 bottles grew Staph lugdunensis. 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 Patient is s/p 12/5 Micra MDT PPM via Right Femoral Vein.     MICU: RRT was called on the floor for hypoxia, patient satting 80s on HF, responded to 25L (93%). Patient appeared to have dyspnea. EKG showed Afib with rate in 60s, new bundle branch block, but no ischemic changes.  Patient was transferred to the MICU for further management and treatment of his acute respiratory distress, worsening pulmonary edema, functionally anuric status on bumex drip, s/p metalazone and lasix. Pt has been improving on BIPAP. Will likely pursue dialysis, place shiley. Will intubate if needed. Patient does not have a HCP or family/friends to make decisions on his behalf. He had a GOC discussion and expressed a desire to be full code, consenting to intubation and dialysis but not trach/peg. Antibiotics for staph lugdunesis endocarditis have been broadened from ancef to Zosyn.       REVIEW OF SYSTEMS:  Constitutional: +Fevers, chills, weight loss  Neurological: No headache, dizziness, weakness, numbness  HEENT: No vision problems, eye pain, nasal congestion, rhinorrhea, sore throat, dysphagia  Resp: No cough, dyspnea, wheezing, hemoptysis  CV: +chest pain, orthopnea, palpitations  GI: No nausea, vomiting, diarrhea, constipation, abdominal pain  : +dysuria, +urinary retention. No hematuria, urinary frequency or urgency  Musculoskeletal: No back pain, myalgias, arthralgias, or BLE edema  Skin: No new rashes, itching        PMH/PSH:  PAST MEDICAL & SURGICAL HISTORY:  Unsteady gait      HIV (human immunodeficiency virus infection)      Chronic kidney disease, unspecified stage      Constipation, unspecified constipation type      Seborrheic keratosis      Osteoporosis      Edema of both legs      Bladder mass      Vitamin D deficiency      Orchitis and epididymitis      H/O endocarditis  MV endocarditis      Benign prostatic hyperplasia without lower urinary tract symptoms      COVID-19 vaccine series completed  W #2 boosters      S/P coronary artery stent placement      S/P MVR (mitral valve replacement)  &amp; SHELLIE clip, 8/31/2020      FAMILY HISTORY:  FAMILY HISTORY:  Family history of congestive heart failure (Father)    Family history of cancer (Mother)        SOCIAL HISTORY:  Smoking:  Alcohol:  Drugs:    HOME MEDICATIONS:  Home Medications:  abacavir 300 mg oral tablet: 2 tab(s) orally once a day (29 Nov 2022 23:21)  doravirine 100 mg oral tablet: 1 tab(s) orally once a day (29 Nov 2022 23:21)  Tamar-C 500 mg oral tablet: 1 tab(s) orally once a day (29 Nov 2022 23:21)  famotidine 20 mg oral tablet: 1 tab(s) orally 2 times a day (29 Nov 2022 23:21)  ferrous sulfate 325 mg (65 mg elemental iron) oral tablet: 1 tab(s) orally once a day (29 Nov 2022 23:21)  folic acid 1 mg oral tablet: 1 tab(s) orally once a day (29 Nov 2022 23:21)  lamiVUDine 100 mg oral tablet: 1 tab(s) orally once a day (29 Nov 2022 23:21)  metoprolol succinate 25 mg oral tablet, extended release: 0.5 tab(s) orally once a day (29 Nov 2022 23:21)  Multiple Vitamins oral tablet: 1 tab(s) orally once a day (29 Nov 2022 23:21)  senna leaf extract oral tablet: 2 tab(s) orally once a day (at bedtime) (29 Nov 2022 23:21)  telmisartan 40 mg oral tablet: 1 tab(s) orally once a day (29 Nov 2022 23:21)  Vitamin D3 5000 intl units (125 mcg) oral capsule: 1 cap(s) orally once a day (29 Nov 2022 23:21)      ALLERGIES:  Allergies    No Known Allergies    Intolerances            OBJECTIVE:  ICU Vital Signs Last 24 Hrs  T(C): 36.3 (08 Dec 2022 12:17), Max: 36.8 (07 Dec 2022 21:22)  T(F): 97.4 (08 Dec 2022 12:17), Max: 98.2 (07 Dec 2022 21:22)  HR: 76 (08 Dec 2022 13:45) (57 - 91)  BP: 112/67 (08 Dec 2022 13:45) (105/55 - 126/68)  BP(mean): --  ABP: --  ABP(mean): --  RR: 19 (08 Dec 2022 12:17) (18 - 28)  SpO2: 93% (08 Dec 2022 12:17) (87% - 98%)    O2 Parameters below as of 08 Dec 2022 12:17  Patient On (Oxygen Delivery Method): nasal cannula, high flow  O2 Flow (L/min): 25  O2 Concentration (%): 60        PHYSICAL EXAM  GENERAL: chronically ill appearing gentleman  NEURO: A+O x 3  HEENT: Pupil equal and reactive to light; extra-ocular movements intact; clear conjunctiva; normal mucus membrane and oropharynx; neck supple  RESP: bilateral crackles heard throughout lung fields, on bipap  CVS: S1/S2 present, normal rate and rhythm; no murmurs, gallops or rubs appreciated  ABD: Soft, non-tender, non-distended, no masses appreciated; bowel sound normal at all 4 quadrants  EXT: Grossly normal ROM; 2+ pedal pulses bilaterally, no BLE edema  SKIN: Warm, dry, and appropirate color for skin tone; No new rashes    LINES:       HOSPITAL MEDICATIONS:  MEDICATIONS  (STANDING):  abacavir 300 milliGRAM(s) Oral two times a day  apixaban 2.5 milliGRAM(s) Oral two times a day  aspirin enteric coated 81 milliGRAM(s) Oral daily  buMETAnide Infusion 2 mG/Hr (10 mL/Hr) IV Continuous <Continuous>  chlorhexidine 2% Cloths 1 Application(s) Topical daily  chlorhexidine 4% Liquid 1 Application(s) Topical <User Schedule>  doravirine 100 milliGRAM(s) Oral daily  famotidine    Tablet 20 milliGRAM(s) Oral daily  ferrous    sulfate 325 milliGRAM(s) Oral daily  folic acid 1 milliGRAM(s) Oral daily  influenza  Vaccine (HIGH DOSE) 0.7 milliLiter(s) IntraMuscular once  lamiVUDine- milliGRAM(s) Oral daily  multivitamin 1 Tablet(s) Oral daily  piperacillin/tazobactam IVPB. 3.375 Gram(s) IV Intermittent once  piperacillin/tazobactam IVPB.- 3.375 Gram(s) IV Intermittent once  senna 2 Tablet(s) Oral at bedtime  sodium bicarbonate 650 milliGRAM(s) Oral three times a day    MEDICATIONS  (PRN):  acetaminophen     Tablet .. 650 milliGRAM(s) Oral every 6 hours PRN Temp greater or equal to 38C (100.4F), Mild Pain (1 - 3)  lidocaine 2% Jelly 3 milliLiter(s) IntraUrethral two times a day PRN penile pain  loperamide 2 milliGRAM(s) Oral daily PRN Diarrhea  ondansetron Injectable 4 milliGRAM(s) IV Push every 6 hours PRN Nausea and/or Vomiting  simethicone 80 milliGRAM(s) Chew four times a day PRN Gas  sodium chloride 0.9% lock flush 10 milliLiter(s) IV Push every 1 hour PRN Pre/post blood products, medications, blood draw, and to maintain line patency        LABS:                        10.2   54.23 )-----------( 98       ( 08 Dec 2022 15:24 )             31.1     Hgb Trend: 10.2<--, 10.0<--, 9.7<--, 10.0<--, 10.5<--  12-08    137  |  101  |  100<H>  ----------------------------<  144<H>  5.8<H>   |  17<L>  |  4.63<H>    Ca    8.6      08 Dec 2022 15:24  Phos  8.8     12-08  Mg     3.4     12-08    TPro  6.7  /  Alb  2.9<L>  /  TBili  0.3  /  DBili  x   /  AST  317<H>  /  ALT  42  /  AlkPhos  224<H>  12-08    Creatinine Trend: 4.63<--, 4.45<--, 4.15<--, 3.85<--, 3.41<--, 3.18<--      Arterial Blood Gas:  12-08 @ 15:17  7.26/39/90/18/98.5/-8.9  ABG lactate: --  Arterial Blood Gas:  12-08 @ 07:54  7.24/40/65/17/93.6/-9.7  ABG lactate: --    Venous Blood Gas:  12-08 @ 03:25  7.16/50/36/18/57.9  VBG Lactate: 3.6      MICROBIOLOGY:       RADIOLOGY & ADDITIONAL TESTS: Reviewed.      ASSESSMENT: 82M w/ PMHx of HIV (viral load undetectable 08/22), Afib s/p DVVC and ablation (not on AC), MV endocarditis s/p bioprosthetic valve replacement, BPH (self catheterizes), frequent UTIs, presents with UTI and heart block, found to have persistent endocarditis not amenable to cardiac surgery, s/p melina PPM placement on 12/5, s/p RRT on 12/8 called for hypoxia, transferred to MICU for further management of worsening acute respiratory failure, functionally anuric status.       ======NEUROLOGY======  AAOx3-4 at baseline  - Occasionally agitated and pulling at masks and lines this admission  - Can trial precedex for sedation for procedure    ======PULMONARY======  #AHRF  - RRT called 12/8 for hypoxia to 80s, HF improved to 93%, brought up to MICU and put on BIPAP, with saturation >97%  - 2/2 volume overload  - CXR 12/7 showing vascular congestion; exam consistent with volume overload  - VBG 7.16/50; lactate 3.6  - ABG shows signs of respiratory on metabolic acidosis, started bipap  - monitor response to O2 support with VBG    ====CARDIOVASCULAR====  #Staph lugdudenesis bacteremia c/b infective endocarditis   #afib  #Mobitiz I, sinus bradycardia    - s/p cefazolin, broadened to Zosyn (12/8)  - trended trop (?concern for IE embolization to coronary arteries) - no significant ischemic changes at this time  - Rhythm showed sinus madonna with Mobitz I and pAF SVR  - per CTSx pt not eligible for intervention at this point     ========RENAL========  #progressive renal failure  #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 with CKD In the setting of HIV and urinary retention (does self cath at home) s/p tovar insertion on this admission,  - Urine studies from 11/29 with blood and protein present in the urine. 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 Patient with worsening volume status, started on diuresis today however still requiring non invasive positive pressure ventilation.   - Will perform session of Ultrafiltration in order to improve congestion however ultimately patient is not a good candidate for long term hemodialysis.   - Abdominal US done on 12/1 showed unilateral hydronephrosis.   - Check serological w/up for GN.   - Maintain Tovar in situ.   - Monitor BMP. Strict I/Os. Avoid nephrotoxins.    ===GASTROINTESTINAL====  - no active issues      ===INFECTIOUS DISEASE===  #endocarditis, staph lugdudenesis bacteremia + in Urine and BCx  - s/p cefazolin --> broadened to Zosyn  - treat until Bcx clear    #hx of HIV  - treat with ART home medications  - Continue PO abacavir 200mg q12hrs, lamivudine 100mg q24hrs and doravirine 1tab q24hrs    =====HEMATOLOGIC=====  #hx of afib and endocarditis  - c/w Eliquis 2.5 mg BID    #anemia  - Hemoglobin near goal, on oral iron.   - Would hold off on IV iron in the setting of bacteremia/ endocarditis.   - Consider JASON if Hb remains below goal.   - Monitor CBC.    ======ENDOCRINE=======  - no active issues    ===MUSCULOSKELETAL====  - no active issues    =====PROPHYLAXIS======  DVT prophylaxis: Eliquis 2.5 mg BID  -----------------------------------  Agree with above  82M PMH HIV/ART, endocarditis s/p bioprosthetic valve replacement, BPH a/w urinary retention, now with Staph lugdunensis bacteremia c/b progressive renal failure and volume overload  RRT called for increased WoB  Placed on NIPPV and pulling good volumes on 10/5, but still with significant tachypnea and accessory muscle / abdominal breathing  Attempts have been made to improve urine output including Lasix / metolazone / Bumex gtt with minimal output  CXR now shows worsening pulmonary edema / vascular congestion  Nephro plans to PUF and remove 2L  Transfer to ICU for placement of HD catheter, urgent removal of fluid, and close monitoring of airway and hemodynamics  Patient is currently full code    I have personally provided 45 minutes of critical care time. MICU Accept Note  ------------------------    CHIEF COMPLAINT:     HPI / INTERVAL HISTORY:  HPI:  83yo M w/ PMHx of HIV (viral load undetectable 08/22), Afib s/p DVVC and ablation (not on AC), MV endocarditis s/p bioprosthetic valve replacement, BPH (self catheterizes), frequent UTIs, presents with weakness, he reports that over the last 2 weeks he has had weakness, symptoms were associated with occasional body shakes that came every other day, he notes that he also has had recent difficulty performing straight cath, saying he required more force and was at times unable to complete procedure, he called his urologist who then instructed him to head to ED, in the ED, pt was bradycardic, febrile but hemodynamically stable, labs showed leukocytosis, elevated Cr above baseline, elevated procal, grossly positive U/A, EKG showed new Mobitz Type 1, then converted to afib with slow ventricular response, pt was given cefepime, 1L NS bolus, Tylenol x1, admitted to general medicine for further management. While in ED, pt developed acute onset non-radiating chest pain associated with SOB but denies n/v, f/c, diaphoresis, jaw pain, (29 Nov 2022 23:23)    UCx + 100,000 staph lugdunensis, bilat perinephric stranding  - but no renal cortical abscess on imaging. On 11/29 4/4 bottles grew Staph lugdunensis. 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 Patient is s/p 12/5 Micra MDT PPM via Right Femoral Vein.     MICU: RRT was called on the floor for hypoxia, patient satting 80s on HF, responded to 25L (93%). Patient appeared to have dyspnea. EKG showed Afib with rate in 60s, new bundle branch block, but no ischemic changes.  Patient was transferred to the MICU for further management and treatment of his acute respiratory distress, worsening pulmonary edema, functionally anuric status on bumex drip, s/p metalazone and lasix. Pt has been improving on BIPAP. Will likely pursue dialysis, place shiley. Will intubate if needed. Patient does not have a HCP or family/friends to make decisions on his behalf. He had a GOC discussion and expressed a desire to be full code, consenting to intubation and dialysis but not trach/peg. Antibiotics for staph lugdunesis endocarditis have been broadened from ancef to Zosyn.       REVIEW OF SYSTEMS:  Constitutional: +Fevers, chills, weight loss  Neurological: No headache, dizziness, weakness, numbness  HEENT: No vision problems, eye pain, nasal congestion, rhinorrhea, sore throat, dysphagia  Resp: No cough, dyspnea, wheezing, hemoptysis  CV: +chest pain, orthopnea, palpitations  GI: No nausea, vomiting, diarrhea, constipation, abdominal pain  : +dysuria, +urinary retention. No hematuria, urinary frequency or urgency  Musculoskeletal: No back pain, myalgias, arthralgias, or BLE edema  Skin: No new rashes, itching        PMH/PSH:  PAST MEDICAL & SURGICAL HISTORY:  Unsteady gait      HIV (human immunodeficiency virus infection)      Chronic kidney disease, unspecified stage      Constipation, unspecified constipation type      Seborrheic keratosis      Osteoporosis      Edema of both legs      Bladder mass      Vitamin D deficiency      Orchitis and epididymitis      H/O endocarditis  MV endocarditis      Benign prostatic hyperplasia without lower urinary tract symptoms      COVID-19 vaccine series completed  W #2 boosters      S/P coronary artery stent placement      S/P MVR (mitral valve replacement)  &amp; SHELLIE clip, 8/31/2020      FAMILY HISTORY:  FAMILY HISTORY:  Family history of congestive heart failure (Father)    Family history of cancer (Mother)        SOCIAL HISTORY:  Smoking:  Alcohol:  Drugs:    HOME MEDICATIONS:  Home Medications:  abacavir 300 mg oral tablet: 2 tab(s) orally once a day (29 Nov 2022 23:21)  doravirine 100 mg oral tablet: 1 tab(s) orally once a day (29 Nov 2022 23:21)  Tamar-C 500 mg oral tablet: 1 tab(s) orally once a day (29 Nov 2022 23:21)  famotidine 20 mg oral tablet: 1 tab(s) orally 2 times a day (29 Nov 2022 23:21)  ferrous sulfate 325 mg (65 mg elemental iron) oral tablet: 1 tab(s) orally once a day (29 Nov 2022 23:21)  folic acid 1 mg oral tablet: 1 tab(s) orally once a day (29 Nov 2022 23:21)  lamiVUDine 100 mg oral tablet: 1 tab(s) orally once a day (29 Nov 2022 23:21)  metoprolol succinate 25 mg oral tablet, extended release: 0.5 tab(s) orally once a day (29 Nov 2022 23:21)  Multiple Vitamins oral tablet: 1 tab(s) orally once a day (29 Nov 2022 23:21)  senna leaf extract oral tablet: 2 tab(s) orally once a day (at bedtime) (29 Nov 2022 23:21)  telmisartan 40 mg oral tablet: 1 tab(s) orally once a day (29 Nov 2022 23:21)  Vitamin D3 5000 intl units (125 mcg) oral capsule: 1 cap(s) orally once a day (29 Nov 2022 23:21)      ALLERGIES:  Allergies    No Known Allergies    Intolerances            OBJECTIVE:  ICU Vital Signs Last 24 Hrs  T(C): 36.3 (08 Dec 2022 12:17), Max: 36.8 (07 Dec 2022 21:22)  T(F): 97.4 (08 Dec 2022 12:17), Max: 98.2 (07 Dec 2022 21:22)  HR: 76 (08 Dec 2022 13:45) (57 - 91)  BP: 112/67 (08 Dec 2022 13:45) (105/55 - 126/68)  BP(mean): --  ABP: --  ABP(mean): --  RR: 19 (08 Dec 2022 12:17) (18 - 28)  SpO2: 93% (08 Dec 2022 12:17) (87% - 98%)    O2 Parameters below as of 08 Dec 2022 12:17  Patient On (Oxygen Delivery Method): nasal cannula, high flow  O2 Flow (L/min): 25  O2 Concentration (%): 60        PHYSICAL EXAM  GENERAL: chronically ill appearing gentleman  NEURO: A+O x 3  HEENT: Pupil equal and reactive to light; extra-ocular movements intact; clear conjunctiva; normal mucus membrane and oropharynx; neck supple  RESP: bilateral crackles heard throughout lung fields, on bipap  CVS: S1/S2 present, normal rate and rhythm; no murmurs, gallops or rubs appreciated  ABD: Soft, non-tender, non-distended, no masses appreciated; bowel sound normal at all 4 quadrants  EXT: Grossly normal ROM; 2+ pedal pulses bilaterally, no BLE edema  SKIN: Warm, dry, and appropirate color for skin tone; No new rashes    LINES:       HOSPITAL MEDICATIONS:  MEDICATIONS  (STANDING):  abacavir 300 milliGRAM(s) Oral two times a day  apixaban 2.5 milliGRAM(s) Oral two times a day  aspirin enteric coated 81 milliGRAM(s) Oral daily  buMETAnide Infusion 2 mG/Hr (10 mL/Hr) IV Continuous <Continuous>  chlorhexidine 2% Cloths 1 Application(s) Topical daily  chlorhexidine 4% Liquid 1 Application(s) Topical <User Schedule>  doravirine 100 milliGRAM(s) Oral daily  famotidine    Tablet 20 milliGRAM(s) Oral daily  ferrous    sulfate 325 milliGRAM(s) Oral daily  folic acid 1 milliGRAM(s) Oral daily  influenza  Vaccine (HIGH DOSE) 0.7 milliLiter(s) IntraMuscular once  lamiVUDine- milliGRAM(s) Oral daily  multivitamin 1 Tablet(s) Oral daily  piperacillin/tazobactam IVPB. 3.375 Gram(s) IV Intermittent once  piperacillin/tazobactam IVPB.- 3.375 Gram(s) IV Intermittent once  senna 2 Tablet(s) Oral at bedtime  sodium bicarbonate 650 milliGRAM(s) Oral three times a day    MEDICATIONS  (PRN):  acetaminophen     Tablet .. 650 milliGRAM(s) Oral every 6 hours PRN Temp greater or equal to 38C (100.4F), Mild Pain (1 - 3)  lidocaine 2% Jelly 3 milliLiter(s) IntraUrethral two times a day PRN penile pain  loperamide 2 milliGRAM(s) Oral daily PRN Diarrhea  ondansetron Injectable 4 milliGRAM(s) IV Push every 6 hours PRN Nausea and/or Vomiting  simethicone 80 milliGRAM(s) Chew four times a day PRN Gas  sodium chloride 0.9% lock flush 10 milliLiter(s) IV Push every 1 hour PRN Pre/post blood products, medications, blood draw, and to maintain line patency        LABS:                        10.2   54.23 )-----------( 98       ( 08 Dec 2022 15:24 )             31.1     Hgb Trend: 10.2<--, 10.0<--, 9.7<--, 10.0<--, 10.5<--  12-08    137  |  101  |  100<H>  ----------------------------<  144<H>  5.8<H>   |  17<L>  |  4.63<H>    Ca    8.6      08 Dec 2022 15:24  Phos  8.8     12-08  Mg     3.4     12-08    TPro  6.7  /  Alb  2.9<L>  /  TBili  0.3  /  DBili  x   /  AST  317<H>  /  ALT  42  /  AlkPhos  224<H>  12-08    Creatinine Trend: 4.63<--, 4.45<--, 4.15<--, 3.85<--, 3.41<--, 3.18<--      Arterial Blood Gas:  12-08 @ 15:17  7.26/39/90/18/98.5/-8.9  ABG lactate: --  Arterial Blood Gas:  12-08 @ 07:54  7.24/40/65/17/93.6/-9.7  ABG lactate: --    Venous Blood Gas:  12-08 @ 03:25  7.16/50/36/18/57.9  VBG Lactate: 3.6      MICROBIOLOGY:       RADIOLOGY & ADDITIONAL TESTS: Reviewed.      ASSESSMENT: 82M w/ PMHx of HIV (viral load undetectable 08/22), Afib s/p DVVC and ablation (not on AC), MV endocarditis s/p bioprosthetic valve replacement, BPH (self catheterizes), frequent UTIs, presents with UTI and heart block, found to have persistent endocarditis not amenable to cardiac surgery, s/p melina PPM placement on 12/5, s/p RRT on 12/8 called for hypoxia, transferred to MICU for further management of worsening acute respiratory failure, functionally anuric status.       ======NEUROLOGY======  AAOx3-4 at baseline  - Occasionally agitated and pulling at masks and lines this admission  - Can trial precedex for sedation for procedure    ======PULMONARY======  #AHRF  - RRT called 12/8 for hypoxia to 80s, HF improved to 93%, brought up to MICU and put on BIPAP, with saturation >97%  - 2/2 volume overload  - CXR 12/7 showing vascular congestion; exam consistent with volume overload  - VBG 7.16/50; lactate 3.6  - ABG shows signs of respiratory on metabolic acidosis, started bipap  - monitor response to O2 support with VBG    ====CARDIOVASCULAR====  #Staph lugdudenesis bacteremia c/b infective endocarditis   #afib  #Mobitiz I, sinus bradycardia    - s/p cefazolin, broadened to Zosyn (12/8)  - trended trop (?concern for IE embolization to coronary arteries) - no significant ischemic changes at this time  - Rhythm showed sinus madonna with Mobitz I and pAF SVR  - per CTSx pt not eligible for intervention at this point     ========RENAL========  #progressive renal failure  #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 with CKD In the setting of HIV and urinary retention (does self cath at home) s/p tovar insertion on this admission,  - Urine studies from 11/29 with blood and protein present in the urine. 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 Patient with worsening volume status, started on diuresis today however still requiring non invasive positive pressure ventilation.   - Will perform session of Ultrafiltration in order to improve congestion however ultimately patient is not a good candidate for long term hemodialysis.   - Abdominal US done on 12/1 showed unilateral hydronephrosis.   - Check serological w/up for GN.   - Maintain Tovar in situ.   - Monitor BMP. Strict I/Os. Avoid nephrotoxins.    ===GASTROINTESTINAL====  - no active issues      ===INFECTIOUS DISEASE===  #endocarditis, staph lugdudenesis bacteremia + in Urine and BCx  - s/p cefazolin --> broadened to Zosyn  - treat until Bcx clear    #hx of HIV  - treat with ART home medications  - Continue PO abacavir 200mg q12hrs, lamivudine 100mg q24hrs and doravirine 1tab q24hrs    =====HEMATOLOGIC=====  #hx of afib and endocarditis  - For now, hold Eliquis 2.5 mg BID given possible procedure in future    #anemia  - Hemoglobin near goal, on oral iron.   - Would hold off on IV iron in the setting of bacteremia/ endocarditis.   - Consider JASON if Hb remains below goal.   - Monitor CBC.    ======ENDOCRINE=======  - no active issues    ===MUSCULOSKELETAL====  - no active issues    =====PROPHYLAXIS======  DVT prophylaxis: Eliquis 2.5 mg BID  -----------------------------------  Agree with above  82M PMH HIV/ART, endocarditis s/p bioprosthetic valve replacement, BPH a/w urinary retention, now with Staph lugdunensis bacteremia c/b progressive renal failure and volume overload  RRT called for increased WoB  Placed on NIPPV and pulling good volumes on 10/5, but still with significant tachypnea and accessory muscle / abdominal breathing  Attempts have been made to improve urine output including Lasix / metolazone / Bumex gtt with minimal output  CXR now shows worsening pulmonary edema / vascular congestion  Nephro plans to PUF and remove 2L  Transfer to ICU for placement of HD catheter, urgent removal of fluid, and close monitoring of airway and hemodynamics  Patient is currently full code    I have personally provided 45 minutes of critical care time.

## 2022-12-08 NOTE — CONSULT NOTE ADULT - ASSESSMENT
82M with hx of HIV (on ART), AF s/p DCCV/ablation (not on AC), MV endocarditis s/p bioprosthetic MVR 2020 admitted with staph lugdudenesis bacteremia c/b infective endocarditis of prosthetic MVR on ancef with course c/b progressive renal failure c/b respiratory distress 2/2 hypervolemia for which MICU is consulted    Impression:  #hypoxic respiratory failure 2/2 volume overload  - SaO2 88% 6LNC -> 92+ on NRB  - CXR 12/7 showing vascular congestion; exam consistent with volume overload  - VBG 7.16/50; lactate 3.6  #progressive renal failure  - metabolic acidemia; hyperkalemia responding to medical management  - s/p lasix 80mg IVP with ~30cc/h output overnight  #Staph lugdudenesis bacteremia c/b infective endocarditis on cefazolin  #persistent leukocytosis    Recommendations:  - agree with diuresis but inadequate UOP with lasix  - switch lasix to bumex; start metolazone (30m prior to bumex)  - start bicarb tabs  - monitor BMP and correct hyperkalemia  - ABG to assess acid/base status; if additional respiratory acidosis on top of metabolic acidosis, would start BiPAP  - follow up nephrology recommendations    Patient does not require MICU level of monitoring or intervention at this time. Please re-consult as needed.

## 2022-12-08 NOTE — CHART NOTE - NSCHARTNOTEFT_GEN_A_CORE
Upon obtaining shiley catheter placement consent pt noted to be lethargic and altered. A few hours earlier, pt had consented himself for urgent dialysis with the Nephrology team, understanding the risks and benefits of undergoing dialysis. When I had asked him who to speak to regarding medical decisions if he was not able to speak for himself he stated "God." In his paper and electronic chart there is no listed contacts. Plan is to place shiley catheter at bedside and undergo dialysis as pt had consented for, Dr. Tapia aware. Upon obtaining shiley catheter placement consent pt noted to be lethargic and altered. A few hours earlier, pt had consented himself for urgent dialysis with the Nephrology team, understanding the risks and benefits of undergoing dialysis. When I had asked him who to speak to regarding medical decisions if he was not able to speak for himself he stated "God." In his paper and electronic chart there is no listed contacts. Plan is to place shiley catheter at bedside and undergo dialysis as pt had consented for, ICU Attending Dr. Tapia aware.

## 2022-12-08 NOTE — PROGRESS NOTE ADULT - PROBLEM SELECTOR PROBLEM 7
HIV disease
HIV disease
Need for prophylactic measure
HIV disease
Unspecified atrial fibrillation
HIV disease
HIV disease
Need for prophylactic measure

## 2022-12-08 NOTE — PROGRESS NOTE ADULT - PROBLEM SELECTOR PLAN 4
- Presented with symptomatic bradycardia, converted to AFib with slow ventricular response  - s/p Moni PPM placement on 2/5  - Eliquis for a/c  - C/w Cardiology/EP recs

## 2022-12-08 NOTE — PROVIDER CONTACT NOTE (CRITICAL VALUE NOTIFICATION) - BACKGROUND
pt admitted for sepsis. pt has hx of endocarditis, a fib, BPH
dx: Sepsis 2/2 UTI , persistent prosthetic valve endocarditis, SUDHEER on CKD  hx: Endocarditis, BPH, CKD,HIV
Pt is a 83 y/o male admitted for sepsis with a PMH of HIV, afib, frequent UTIs, etc.
Pt admitted for sepsis. pt has hx of endocarditis, a fib, BPH, etc.
Patient admitted with sepsis with CXR on 12/7 noted pulmonary congestion, S/p 80mg IV Lasix.
Pt admitted for sepsis. pt has hx of endocarditis, a fib, BPH, etc.
Pt admitted with dx. Sepsis 2/2 UTI, currently on Cefepime.

## 2022-12-08 NOTE — PROGRESS NOTE ADULT - ASSESSMENT
This is a 82 year old male with PMH of HIV VLUD, BPH (intermittent cath), Atrial fibrillation, DCCV and ablation and MV endocarditis s/p bioprosthetic valve replacement, patient presenting with weakness noted to have bacteremia (Staph Lugdunensis). Now seen to have bacteremia, nephrology consulted for PICC placement clearance  Patient renal function has been worsening and has been notably having worsening respiratory status. Today had RRT for respiratory distress.

## 2022-12-08 NOTE — RAPID RESPONSE TEAM SUMMARY - NSSITUATIONBACKGROUNDRRT_GEN_ALL_CORE
82M w/ PMHx of HIV (viral load undetectable 08/22), Afib s/p DVVC and ablation (not on AC), MV endocarditis s/p bioprosthetic valve replacement, BPH (self catheterizes), frequent UTIs, presents with UTI and heart block, found to have persistent endocarditis not amenable to cardiac surgery, s/p melina PPM placement on 12/5.    RRT was called for hypoxia, 80s on HF, responded to 25L (93%). Patient appeared to have dyspnea.    82M w/ PMHx of HIV (viral load undetectable 08/22), Afib s/p DVVC and ablation (not on AC), MV endocarditis s/p bioprosthetic valve replacement, BPH (self catheterizes), frequent UTIs, presents with UTI and heart block, found to have persistent endocarditis not amenable to cardiac surgery, s/p melina PPM placement on 12/5.    RRT was called for hypoxia, 80s on HF, responded to 25L (93%). Patient appeared to have dyspnea. EKG showed Afib with rate in 60s, new bundle branch block, but no ischemic changes. Labs was drawn include: CBC, CMP, INR, ABG with lactate, pro-BNP, Mg, PO4. CXR    82M w/ PMHx of HIV (viral load undetectable 08/22), Afib s/p DVVC and ablation (not on AC), MV endocarditis s/p bioprosthetic valve replacement, BPH (self catheterizes), frequent UTIs, presents with UTI and heart block, found to have persistent endocarditis not amenable to cardiac surgery, s/p melina PPM placement on 12/5.    RRT was called for hypoxia, 80s on HF, responded to 25L (93%). Patient appeared to have dyspnea. EKG showed Afib with rate in 60s, new bundle branch block, but no ischemic changes. Labs was drawn include: CBC, CMP, INR, ABG with lactate, pro-BNP, Mg, PO4. Repeat CXR showed increased infiltrates and bilateral pleural effusion, c/f aspiration PNA. Antibiotics was broadened to Zosyn. Patient did not respond to Lasix overnight. Trialed on bumex 2mg x 2 this morning but did not respond. Repeat ABG was unchanged, still showed metabolic acidosis without retained CO2, likely due to ARF. Cr continues to trend up. CCU was consulted, suggesting to trend pro-BNP, get stat ECHO and start bumex gtt. Bumex gtt was ordered.  no family. Discussed with patient and he agreed to full code and dialysis if need to. Nephro has been following and updated. MICU was called in the setting of possible urgent HD need and increased WOB needing intubation. Patient is accepted and transferred to MICU.

## 2022-12-08 NOTE — PROVIDER CONTACT NOTE (CRITICAL VALUE NOTIFICATION) - TEST AND RESULT REPORTED:
Ph is 7.16 and lactate is 3.6
WBC = 51.19
WBC Count 54.23
Blood cx 11/29
Trops = 154
WBC 45.43
WBC = 42.93

## 2022-12-08 NOTE — PROGRESS NOTE ADULT - ASSESSMENT
82M with history of HIV, AFIb s/p DCCV, ablation, and SHELLIE clip, MV endocarditis s/p bioprosthetic valve replacement, CKD p/w fevers, chills, elevated WBC found to have evidence of bioprosthetic MV endocarditis on NOHEMI now with worsening respiratoru status - on high flow oxygen support    #Endocarditis  #AFIB   #Staph lugdunensis bacteremia   -Seen by CT surgery previously plan for medical management  - in light of the respiratory status, concern for worsening valvular pathology in setting of the IE - would obtain a stat echo - discussed with the RRT at bedside  - serial ECGs  - trend the trop (?concern for IE embolization to coronary arteries) - no significant ischmic changes at this time  -pt has not responded so far to the diuretic regiment - may need a bumex drip and a set up for CVVHD  - team had a prolonged d/w pt in regards to advance life support - he is a full code

## 2022-12-08 NOTE — PROVIDER CONTACT NOTE (OTHER) - ASSESSMENT
Patient AxOx4, RR-28, Spo2 90-92% on 6L via NC, Bp - 126/88, HR 77, Right side crackles noted. Abdominal breathing. Audible rhonchi.

## 2022-12-08 NOTE — PROVIDER CONTACT NOTE (CRITICAL VALUE NOTIFICATION) - PERSON GIVING RESULT:
Molina Benitez (zofia)
Mihaela Lopez, laboratory
Lab, Kel Richards
Molina Benitez (zofia)
Cristina Dunaway (zofia)
Adal Johnson/Laboratory
Gallo Funes

## 2022-12-08 NOTE — PROGRESS NOTE ADULT - CONVERSATION DETAILS
We spoke about his diagnoses, and potential treatment options. He wants to pursue aggressive medical care at this time.     Of note, the patient states that he has no health care proxy, no family or friends. I asked him to try to find someone as soon as possible in case of an emergency.
I readdressed CPR and intubation with the patient given his worsening respiratory condition. Pt states that he was not sure what to decide he wants to continue to think about it. He did also state that he does not want to suffer and he would not want to be stuck on a breathing machine.

## 2022-12-08 NOTE — CHART NOTE - NSCHARTNOTEFT_GEN_A_CORE
Notified by RN; pt with increased tachypnea and accessory work of breathing.    Pt is    Pt seen and evaluated at bedside. Pt with moderate tachypnea    Vital Signs Last 24 Hrs  T(C): 36.8 (07 Dec 2022 21:22), Max: 36.8 (07 Dec 2022 21:22)  T(F): 98.2 (07 Dec 2022 21:22), Max: 98.2 (07 Dec 2022 21:22)  HR: 77 (07 Dec 2022 21:22) (52 - 91)  BP: 126/68 (07 Dec 2022 21:22) (101/49 - 126/68)  BP(mean): --  RR: 17 (07 Dec 2022 21:22) (17 - 20)    Labs 12/7/2022                        9.7    42.93 )-----------( 82       ( 07 Dec 2022 08:27 )             29.7     07 Dec 2022 21:42    136    |  103    |  85     ----------------------------<  226    5.8     |  18     |  3.85     Ca    8.8        07 Dec 2022 21:42    CAPILLARY BLOOD GLUCOSE    POCT Blood Glucose.: 192 mg/dL (07 Dec 2022 23:25)  POCT Blood Glucose.: 230 mg/dL (07 Dec 2022 22:37)          SpO2: 92% (07 Dec 2022 21:22) (87% - 93%)    Parameters below as of 07 Dec 2022 21:22  Patient On (Oxygen Delivery Method): nasal cannula  O2 Flow (L/min): 2 Notified by RN; pt with increased tachypnea and accessory work of breathing.    Pt is 83 yo male pmhx HIV (viral load not detectable 08/22), afib s/p DVVC and ablation, MV endocarditis s/p bioprosthetic valve replacement, BPH (self catheterizes), frequent UTIs presents to the ED c/o difficulty catheterizing x 1 week and generalized weakness today. Today noted increasing fatigue while walking with reported overall weakness, called urologist Dr. Memo Silver's office and was advised to come to ED.    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    Pt seen and evaluated at bedside. Pt with moderate tachypnea and complaints of shortness of breath. O2 saturation 89% on 6LNC. Pt with no other acute complaints; denying active chest pain, palpitations, abdominal pain, nausea, vomiting, headache, dizziness, or fevers.    12/7/2022 Of note, pt with increased work of breathing, and desaturated to 88% on 3 L NC. Lasix 80mg IV x1 given and nasal canula up-titrated to 6LNC.     Vital Signs Last 24 Hrs  T(C): 36.8 (07 Dec 2022 21:22), Max: 36.8 (07 Dec 2022 21:22)  T(F): 98.2 (07 Dec 2022 21:22), Max: 98.2 (07 Dec 2022 21:22)  HR: 77 (07 Dec 2022 21:22) (52 - 91)  BP: 126/68 (07 Dec 2022 21:22) (101/49 - 126/68)  BP(mean): --  RR: 17 (07 Dec 2022 21:22) (17 - 20)    Labs 12/7/2022                        9.7    42.93 )-----------( 82       ( 07 Dec 2022 08:27 )             29.7     07 Dec 2022 21:42    136    |  103    |  85     ----------------------------<  226    5.8     |  18     |  3.85     Ca    8.8        07 Dec 2022 21:42    CAPILLARY BLOOD GLUCOSE    POCT Blood Glucose.: 192 mg/dL (07 Dec 2022 23:25)  POCT Blood Glucose.: 230 mg/dL (07 Dec 2022 22:37)    SpO2: 92% (07 Dec 2022 21:22) (87% - 93%)    Parameters below as of 07 Dec 2022 21:22  Patient On (Oxygen Delivery Method): nasal cannula  O2 Flow (L/min): 2    Physical Examination Notified by RN; pt with increased tachypnea and accessory work of breathing.    Pt is 81 yo male pmhx HIV (viral load not detectable 08/22), afib s/p DVVC and ablation, MV endocarditis s/p bioprosthetic valve replacement, BPH (self catheterizes), frequent UTIs presents to the ED c/o difficulty catheterizing x 1 week and generalized weakness today. Today noted increasing fatigue while walking with reported overall weakness, called urologist Dr. Memo Silver's office and was advised to come to ED.    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    Pt seen and evaluated at bedside. Pt with moderate tachypnea and complaints of shortness of breath. O2 saturation 89% on 6LNC. Pt with no other acute complaints; denying active chest pain, palpitations, abdominal pain, nausea, vomiting, headache, dizziness, or fevers.    12/7/2022 Of note, pt with increased work of breathing, and desaturated to 88% on 3 L NC. Lasix 80mg IV x1 given and nasal canula up-titrated to 6LNC.     Vital Signs Last 24 Hrs  T(C): 36.8 (07 Dec 2022 21:22), Max: 36.8 (07 Dec 2022 21:22)  T(F): 98.2 (07 Dec 2022 21:22), Max: 98.2 (07 Dec 2022 21:22)  HR: 77 (07 Dec 2022 21:22) (52 - 91)  BP: 126/68 (07 Dec 2022 21:22) (101/49 - 126/68)  BP(mean): --  RR: 17 (07 Dec 2022 21:22) (17 - 20)    Labs 12/7/2022                        9.7    42.93 )-----------( 82       ( 07 Dec 2022 08:27 )             29.7     07 Dec 2022 21:42    136    |  103    |  85     ----------------------------<  226    5.8     |  18     |  3.85     Ca    8.8        07 Dec 2022 21:42    CAPILLARY BLOOD GLUCOSE    POCT Blood Glucose.: 192 mg/dL (07 Dec 2022 23:25)  POCT Blood Glucose.: 230 mg/dL (07 Dec 2022 22:37)    SpO2: 92% (07 Dec 2022 21:22) (87% - 93%)    Parameters below as of 07 Dec 2022 21:22  Patient On (Oxygen Delivery Method): nasal cannula  O2 Flow (L/min): 2    Physical Examination  General: Elderly male in mild acute respiratory distress, intercostal muscle usage, SpO2 89% on 6LNC, reported SOB  CV: +S1, S2, Irregularly Irregular Rhythm,   Pulm: Rhonchi bilateral lung fields,   GI  Ext Notified by RN; pt with increased tachypnea and accessory work of breathing.    Pt is 81 yo male pmhx HIV (viral load not detectable 08/22), afib s/p DVVC and ablation, MV endocarditis s/p bioprosthetic valve replacement, BPH (self catheterizes), frequent UTIs presents to the ED c/o difficulty catheterizing x 1 week and generalized weakness today. Today noted increasing fatigue while walking with reported overall weakness, called urologist Dr. Memo Silver's office and was advised to come to ED.    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    Pt seen and evaluated at bedside. Pt with moderate tachypnea and complaints of shortness of breath. O2 saturation 89% on 6LNC. Pt with no other acute complaints; denying active chest pain, palpitations, abdominal pain, nausea, vomiting, headache, dizziness, or fevers.    12/7/2022 Of note, pt with increased work of breathing, and desaturated to 88% on 3 L NC. Lasix 80mg IV x1 given and nasal canula up-titrated to 6LNC.     Vital Signs Last 24 Hrs  T(C): 36.8 (07 Dec 2022 21:22), Max: 36.8 (07 Dec 2022 21:22)  T(F): 98.2 (07 Dec 2022 21:22), Max: 98.2 (07 Dec 2022 21:22)  HR: 77 (07 Dec 2022 21:22) (52 - 91)  BP: 126/68 (07 Dec 2022 21:22) (101/49 - 126/68)  BP(mean): --  RR: 17 (07 Dec 2022 21:22) (17 - 20)    Labs 12/7/2022                        9.7    42.93 )-----------( 82       ( 07 Dec 2022 08:27 )             29.7     07 Dec 2022 21:42    136    |  103    |  85     ----------------------------<  226    5.8     |  18     |  3.85     Ca    8.8        07 Dec 2022 21:42    CAPILLARY BLOOD GLUCOSE    POCT Blood Glucose.: 192 mg/dL (07 Dec 2022 23:25)  POCT Blood Glucose.: 230 mg/dL (07 Dec 2022 22:37)    SpO2: 92% (07 Dec 2022 21:22) (87% - 93%)    Parameters below as of 07 Dec 2022 21:22  Patient On (Oxygen Delivery Method): nasal cannula  O2 Flow (L/min): 2    Physical Examination  General: Elderly male in mild acute respiratory distress, intercostal muscle usage, SpO2 89% on 6LNC, reported SOB  CV: +S1, S2, Irregularly Irregular Rhythm,   Pulm: Rhonchi bilateral lung fields >R  GI: + BS in all 4 quadrants, abdomen soft, non-tender, non-dsi  Ext Notified by RN; pt with increased tachypnea and accessory work of breathing.    Pt is 81 yo male pmhx HIV (viral load not detectable 08/22), afib s/p DVVC and ablation, MV endocarditis s/p bioprosthetic valve replacement, BPH (self catheterizes), frequent UTIs presents to the ED c/o difficulty catheterizing x 1 week and generalized weakness today. Today noted increasing fatigue while walking with reported overall weakness, called urologist Dr. Memo Silver's office and was advised to come to ED.    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    Pt seen and evaluated at bedside. Pt with moderate tachypnea and complaints of shortness of breath. O2 saturation 89% on 6LNC. Pt with no other acute complaints; denying active chest pain, palpitations, abdominal pain, nausea, vomiting, headache, dizziness, or fevers.    12/7/2022 Of note, pt with increased work of breathing, and desaturated to 88% on 3 L NC. Lasix 80mg IV x1 given and nasal canula up-titrated to 6LNC.     Vital Signs Last 24 Hrs  T(C): 36.8 (07 Dec 2022 21:22), Max: 36.8 (07 Dec 2022 21:22)  T(F): 98.2 (07 Dec 2022 21:22), Max: 98.2 (07 Dec 2022 21:22)  HR: 77 (07 Dec 2022 21:22) (52 - 91)  BP: 126/68 (07 Dec 2022 21:22) (101/49 - 126/68)  BP(mean): --  RR: 17 (07 Dec 2022 21:22) (17 - 20)    Labs 12/7/2022                        9.7    42.93 )-----------( 82       ( 07 Dec 2022 08:27 )             29.7     07 Dec 2022 21:42    136    |  103    |  85     ----------------------------<  226    5.8     |  18     |  3.85     Ca    8.8        07 Dec 2022 21:42    CAPILLARY BLOOD GLUCOSE    POCT Blood Glucose.: 192 mg/dL (07 Dec 2022 23:25)  POCT Blood Glucose.: 230 mg/dL (07 Dec 2022 22:37)    SpO2: 92% (07 Dec 2022 21:22) (87% - 93%)    Parameters below as of 07 Dec 2022 21:22  Patient On (Oxygen Delivery Method): nasal cannula  O2 Flow (L/min): 2    Physical Examination  General: Elderly male in mild acute respiratory distress, intercostal muscle usage, SpO2 89% on 6LNC, reported SOB  CV: +S1, S2, Irregularly Irregular Rhythm,   Pulm: Rhonchi bilateral lung fields >R  GI: + BS in all 4 quadrants, abdomen soft, non-tender  Ext: Trace pedal edema, 2+ LE pulses bilaterally    #AHRF likely 2ry to increased pulmonary vascular congestion  - Lasix 80mg IV x1 given earlier 12/7/2022 for increased work of breathing, and desaturated to 88% on 3 L NC. 6LNC applied  - Lasix 40mg IV x1 given; BMP drawn in setting of acute respiratory distress with intercostal muscle usage and tachypnea.  - Repeat BMP as above; Cr up-trending to 3.85 from 3.41; would avoid further diuresis with IV lasix, can consider prn Bumex  - SpO2 to 88% on 6LNC; Ventimask applied; pt unable to Notified by RN; pt with increased tachypnea and accessory work of breathing.    Pt is 83 yo male pmhx HIV (viral load not detectable 08/22), afib s/p DVVC and ablation, MV endocarditis s/p bioprosthetic valve replacement, BPH (self catheterizes), frequent UTIs presents to the ED c/o difficulty catheterizing x 1 week and generalized weakness today. Today noted increasing fatigue while walking with reported overall weakness, called urologist Dr. Memo Silver's office and was advised to come to ED.    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    Pt seen and evaluated at bedside. Pt with moderate tachypnea and complaints of shortness of breath. O2 saturation 89% on 6LNC. Pt with no other acute complaints; denying active chest pain, palpitations, abdominal pain, nausea, vomiting, headache, dizziness, or fevers.    12/7/2022 Of note, pt with increased work of breathing, and desaturated to 88% on 3 L NC. Lasix 80mg IV x1 given and nasal canula up-titrated to 6LNC.     Vital Signs Last 24 Hrs  T(C): 36.8 (07 Dec 2022 21:22), Max: 36.8 (07 Dec 2022 21:22)  T(F): 98.2 (07 Dec 2022 21:22), Max: 98.2 (07 Dec 2022 21:22)  HR: 77 (07 Dec 2022 21:22) (52 - 91)  BP: 126/68 (07 Dec 2022 21:22) (101/49 - 126/68)  BP(mean): --  RR: 17 (07 Dec 2022 21:22) (17 - 20)    Labs 12/7/2022                        9.7    42.93 )-----------( 82       ( 07 Dec 2022 08:27 )             29.7     07 Dec 2022 21:42    136    |  103    |  85     ----------------------------<  226    5.8     |  18     |  3.85     Ca    8.8        07 Dec 2022 21:42    CAPILLARY BLOOD GLUCOSE    POCT Blood Glucose.: 192 mg/dL (07 Dec 2022 23:25)  POCT Blood Glucose.: 230 mg/dL (07 Dec 2022 22:37)    SpO2: 92% (07 Dec 2022 21:22) (87% - 93%)    Parameters below as of 07 Dec 2022 21:22  Patient On (Oxygen Delivery Method): nasal cannula  O2 Flow (L/min): 2    Physical Examination  General: Elderly male in mild acute respiratory distress, intercostal muscle usage, SpO2 89% on 6LNC, reported SOB  CV: +S1, S2, Irregularly Irregular Rhythm,   Pulm: Rhonchi bilateral lung fields >R  GI: + BS in all 4 quadrants, abdomen soft, non-tender  Ext: Trace pedal edema, 2+ LE pulses bilaterally    #AHRF likely 2ry to increased pulmonary vascular congestion  - Lasix 80mg IV x1 given earlier 12/7/2022 for increased work of breathing, and desaturated to 88% on 3 L NC. 6LNC applied  - Lasix 40mg IV x1 given in setting of acute respiratory distress with intercostal muscle usage and tachypnea.   - STAT BMP as above  - Repeat BMP as above; Cr up-trending to 3.85 from 3.41; would avoid further diuresis with IV lasix, can consider prn Bumex  - SpO2 to 88% on 6LNC; Ventimask applied; pt unable to tolerate ventimask; 6LNC re-applied  - ABG unable to be obtained as per RT  - Serum K 5.8; earlier 5.6 with Lokelma 10g X1 given; Insulin 10 units, Dextrose, and additional 10g Lokelma x1 given.  - VBG with lactate and BMP ordered stat pending  - EKG stat demonstrating atrial fibrillation with occasional V paced complexes, 67 bpm, QTC 426ms, right bundle branch block, no hyperacute t waves.  - Pt with markedly decreased accessory muscle usage and lessened tachypnea, O2 saturation 90-91% on 6LNC. Continue to monitor closely  - Pt is A&O x4; refusing telemetry application and continuous pulse ox. Re-education applied by both myself and both RN's; pt adamantly refusing reporting that the telemetry ecg leads are bothering him. No evidence of excoriations or redness where leads lie. Tylenol 1g IV x1 given, will-reattempt telemetry monitoring application. Pulse ox continuous via vital signs machine at bedside in place.  - Pt additionally with passive feelings of hopelessness and despair; frequently taking off nasal canula application despite continuous re-education and re-orientation. A&O x4; will obtain Psych Consult 12/8/2022 AM. Consider 1:1 should patient verbalize suicidal ideation/intent and removal of equipment that may induce harm to pt in room. Endorsed to RN  - Will endorse to Am team Notified by RN; pt with increased tachypnea and accessory work of breathing.    Pt is 81 yo male pmhx HIV (viral load not detectable 08/22), afib s/p DVVC and ablation, MV endocarditis s/p bioprosthetic valve replacement, BPH (self catheterizes), frequent UTIs presents to the ED c/o difficulty catheterizing x 1 week and generalized weakness today. Today noted increasing fatigue while walking with reported overall weakness, called urologist Dr. Memo Silver's office and was advised to come to ED.    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    Pt seen and evaluated at bedside. Pt with moderate tachypnea and complaints of shortness of breath. O2 saturation 89% on 6LNC. Pt with no other acute complaints; denying active chest pain, palpitations, abdominal pain, nausea, vomiting, headache, dizziness, or fevers.    12/7/2022 Of note, pt with increased work of breathing, and desaturated to 88% on 3 L NC. Lasix 80mg IV x1 given and nasal canula up-titrated to 6LNC.     Vital Signs Last 24 Hrs  T(C): 36.8 (07 Dec 2022 21:22), Max: 36.8 (07 Dec 2022 21:22)  T(F): 98.2 (07 Dec 2022 21:22), Max: 98.2 (07 Dec 2022 21:22)  HR: 77 (07 Dec 2022 21:22) (52 - 91)  BP: 126/68 (07 Dec 2022 21:22) (101/49 - 126/68)  BP(mean): --  RR: 17 (07 Dec 2022 21:22) (17 - 20)    Labs 12/7/2022                        9.7    42.93 )-----------( 82       ( 07 Dec 2022 08:27 )             29.7     07 Dec 2022 21:42    136    |  103    |  85     ----------------------------<  226    5.8     |  18     |  3.85     Ca    8.8        07 Dec 2022 21:42    CAPILLARY BLOOD GLUCOSE    POCT Blood Glucose.: 192 mg/dL (07 Dec 2022 23:25)  POCT Blood Glucose.: 230 mg/dL (07 Dec 2022 22:37)    SpO2: 92% (07 Dec 2022 21:22) (87% - 93%)    Parameters below as of 07 Dec 2022 21:22  Patient On (Oxygen Delivery Method): nasal cannula  O2 Flow (L/min): 2    Physical Examination  General: Elderly male in mild acute respiratory distress, intercostal muscle usage, SpO2 89% on 6LNC, reported SOB  CV: +S1, S2, Irregularly Irregular Rhythm,   Pulm: Rhonchi bilateral lung fields >R  GI: + BS in all 4 quadrants, abdomen soft, non-tender  Ext: Trace pedal edema, 2+ LE pulses bilaterally    #AHRF likely 2ry to increased pulmonary vascular congestion  - Lasix 80mg IV x1 given earlier 12/7/2022 for increased work of breathing, and desaturated to 88% on 3 L NC. 6LNC applied  - Lasix 40mg IV x1 given in setting of acute respiratory distress with intercostal muscle usage and tachypnea.   - STAT BMP as above  - Repeat BMP as above; Cr up-trending to 3.85 from 3.41; would avoid further diuresis with IV lasix, can consider prn Bumex  - SpO2 to 88% on 6LNC; Ventimask applied; pt unable to tolerate ventimask; 6LNC re-applied  - ABG unable to be obtained as per RT  - Serum K 5.8; earlier 5.6 with Lokelma 10g X1 given; Insulin 10 units, Dextrose, and additional 10g Lokelma x1 given.  - VBG with lactate and BMP ordered stat pending  - EKG stat demonstrating atrial fibrillation with occasional V paced complexes, 67 bpm, QTC 426ms, right bundle branch block, no hyperacute t waves.  - Pt with markedly decreased accessory muscle usage and lessened tachypnea, O2 saturation 90-91% on 6LNC. Continue to monitor closely  - Pt is A&O x4; refusing telemetry application and continuous pulse ox. Re-education applied by both myself and both RN's; pt adamantly refusing reporting that the telemetry ecg leads are bothering him. No evidence of excoriations or redness where leads lie. Tylenol 1g IV x1 given, will-reattempt telemetry monitoring application. Pulse ox continuous via vital signs machine at bedside in place.  - Pt additionally with passive feelings of hopelessness and despair; frequently taking off nasal canula application despite continuous re-education and re-orientation. A&O x4; will obtain Psych Consult 12/8/2022 AM. Consider 1:1 should patient verbalize suicidal ideation/intent and removal of equipment that may induce harm to pt in room. Endorsed to RN  - AM Repeat CXR  - Will endorse to Am team Notified by RN; pt with increased tachypnea and accessory work of breathing.    Pt is 83 yo male pmhx HIV (viral load not detectable 08/22), afib s/p DVVC and ablation, MV endocarditis s/p bioprosthetic valve replacement, BPH (self catheterizes), frequent UTIs presents to the ED c/o difficulty catheterizing x 1 week and generalized weakness today. Today noted increasing fatigue while walking with reported overall weakness, called urologist Dr. Memo Silver's office and was advised to come to ED.    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    Pt seen and evaluated at bedside. Pt with moderate tachypnea and complaints of shortness of breath. O2 saturation 89% on 6LNC. Pt with no other acute complaints; denying active chest pain, palpitations, abdominal pain, nausea, vomiting, headache, dizziness, or fevers.    12/7/2022 Of note, pt with increased work of breathing, and desaturated to 88% on 3 L NC. Lasix 80mg IV x1 given and nasal canula up-titrated to 6LNC.     Vital Signs Last 24 Hrs  T(C): 36.8 (07 Dec 2022 21:22), Max: 36.8 (07 Dec 2022 21:22)  T(F): 98.2 (07 Dec 2022 21:22), Max: 98.2 (07 Dec 2022 21:22)  HR: 77 (07 Dec 2022 21:22) (52 - 91)  BP: 126/68 (07 Dec 2022 21:22) (101/49 - 126/68)  BP(mean): --  RR: 17 (07 Dec 2022 21:22) (17 - 20)    Labs 12/7/2022                        9.7    42.93 )-----------( 82       ( 07 Dec 2022 08:27 )             29.7     07 Dec 2022 21:42    136    |  103    |  85     ----------------------------<  226    5.8     |  18     |  3.85     Ca    8.8        07 Dec 2022 21:42    CAPILLARY BLOOD GLUCOSE    POCT Blood Glucose.: 192 mg/dL (07 Dec 2022 23:25)  POCT Blood Glucose.: 230 mg/dL (07 Dec 2022 22:37)    SpO2: 92% (07 Dec 2022 21:22) (87% - 93%)    Parameters below as of 07 Dec 2022 21:22  Patient On (Oxygen Delivery Method): nasal cannula  O2 Flow (L/min): 2    Physical Examination  General: Elderly male in mild acute respiratory distress, intercostal muscle usage, SpO2 89% on 6LNC, reported SOB  CV: +S1, S2, Irregularly Irregular Rhythm,   Pulm: Rhonchi bilateral lung fields >R  GI: + BS in all 4 quadrants, abdomen soft, non-tender  Ext: Trace pedal edema, 2+ LE pulses bilaterally    #AHRF likely 2ry to increased pulmonary vascular congestion  - Lasix 80mg IV x1 given earlier 12/7/2022 for increased work of breathing, and desaturated to 88% on 3 L NC. 6LNC applied  - Lasix 40mg IV x1 given in setting of acute respiratory distress with intercostal muscle usage and tachypnea.   - STAT BMP as above  - Repeat BMP as above; Cr up-trending to 3.85 from 3.41; would avoid further diuresis with IV lasix, can consider prn Bumex  - SpO2 to 88% on 6LNC; Ventimask applied; pt unable to tolerate ventimask; 6LNC re-applied  - ABG unable to be obtained as per RT  - Serum K 5.8; earlier 5.6 with Lokelma 10g X1 given; Insulin 10 units, Dextrose, and additional 10g Lokelma x1 given.  - VBG with lactate and BMP ordered stat pending  - EKG stat demonstrating atrial fibrillation with occasional V paced complexes, 67 bpm, QTC 426ms, right bundle branch block, no hyperacute t waves.  - Pt with markedly decreased accessory muscle usage and lessened tachypnea, O2 saturation 90-91% on 6LNC. Continue to monitor closely  - Pt is A&O x4; refusing telemetry application and continuous pulse ox. Re-education applied by both myself and both RN's; pt adamantly refusing reporting that the telemetry ecg leads are bothering him. No evidence of excoriations or redness where leads lie. Tylenol 1g IV x1 given, will-reattempt telemetry monitoring application. Pulse ox continuous via vital signs machine at bedside in place.  - Pt additionally with passive feelings of hopelessness and despair; frequently taking off nasal canula application despite continuous re-education and re-orientation. A&O x4; will obtain Psych Consult 12/8/2022 AM. Consider 1:1 should patient verbalize suicidal ideation/intent and removal of equipment that may induce harm to pt in room. Endorsed to RN  - AM Repeat CXR  - Will endorse to Am team    Addendum: VBG pH 7.16, pCO2 50, HCO3 18, Lactate 3.6; could be mixed primary respiratory acidosis with secondary metabolic acidosis in setting of worsening renal failure, MICU consulted; case discussed will evaluate at bedside. Cr worsening 4.15, Serum K 5.5; anticipate treating K again, Notified by RN; pt with increased tachypnea and accessory work of breathing.    Pt is 83 yo male pmhx HIV (viral load not detectable 08/22), afib s/p DVVC and ablation, MV endocarditis s/p bioprosthetic valve replacement, BPH (self catheterizes), frequent UTIs presents to the ED c/o difficulty catheterizing x 1 week and generalized weakness today. Today noted increasing fatigue while walking with reported overall weakness, called urologist Dr. Memo Silver's office and was advised to come to ED.    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    Pt seen and evaluated at bedside. Pt with moderate tachypnea and complaints of shortness of breath. O2 saturation 89% on 6LNC. Pt with no other acute complaints; denying active chest pain, palpitations, abdominal pain, nausea, vomiting, headache, dizziness, or fevers.    12/7/2022 Of note, pt with increased work of breathing, and desaturated to 88% on 3 L NC. Lasix 80mg IV x1 given and nasal canula up-titrated to 6LNC.     Vital Signs Last 24 Hrs  T(C): 36.8 (07 Dec 2022 21:22), Max: 36.8 (07 Dec 2022 21:22)  T(F): 98.2 (07 Dec 2022 21:22), Max: 98.2 (07 Dec 2022 21:22)  HR: 77 (07 Dec 2022 21:22) (52 - 91)  BP: 126/68 (07 Dec 2022 21:22) (101/49 - 126/68)  BP(mean): --  RR: 17 (07 Dec 2022 21:22) (17 - 20)    Labs 12/7/2022                        9.7    42.93 )-----------( 82       ( 07 Dec 2022 08:27 )             29.7     07 Dec 2022 21:42    136    |  103    |  85     ----------------------------<  226    5.8     |  18     |  3.85     Ca    8.8        07 Dec 2022 21:42    CAPILLARY BLOOD GLUCOSE    POCT Blood Glucose.: 192 mg/dL (07 Dec 2022 23:25)  POCT Blood Glucose.: 230 mg/dL (07 Dec 2022 22:37)    SpO2: 92% (07 Dec 2022 21:22) (87% - 93%)    Parameters below as of 07 Dec 2022 21:22  Patient On (Oxygen Delivery Method): nasal cannula  O2 Flow (L/min): 2    Physical Examination  General: Elderly male in mild acute respiratory distress, intercostal muscle usage, SpO2 89% on 6LNC, reported SOB  CV: +S1, S2, Irregularly Irregular Rhythm,   Pulm: Rhonchi bilateral lung fields >R  GI: + BS in all 4 quadrants, abdomen soft, non-tender  Ext: Trace pedal edema, 2+ LE pulses bilaterally    #AHRF likely 2ry to increased pulmonary vascular congestion  - Lasix 80mg IV x1 given earlier 12/7/2022 for increased work of breathing, and desaturated to 88% on 3 L NC. 6LNC applied  - Lasix 40mg IV x1 given in setting of acute respiratory distress with intercostal muscle usage and tachypnea.   - STAT BMP as above  - Repeat BMP as above; Cr up-trending to 3.85 from 3.41; would avoid further diuresis with IV lasix, can consider prn Bumex  - SpO2 to 88% on 6LNC; Ventimask applied; pt unable to tolerate ventimask; 6LNC re-applied  - ABG unable to be obtained as per RT  - Serum K 5.8; earlier 5.6 with Lokelma 10g X1 given; Insulin 10 units, Dextrose, and additional 10g Lokelma x1 given.  - VBG with lactate and BMP ordered stat pending  - EKG stat demonstrating atrial fibrillation with occasional V paced complexes, 67 bpm, QTC 426ms, right bundle branch block, no hyperacute t waves.  - Pt with markedly decreased accessory muscle usage and lessened tachypnea, O2 saturation 90-91% on 6LNC. Continue to monitor closely  - Pt is A&O x4; refusing telemetry application and continuous pulse ox. Re-education applied by both myself and both RN's; pt adamantly refusing reporting that the telemetry ecg leads are bothering him. No evidence of excoriations or redness where leads lie. Tylenol 1g IV x1 given, will-reattempt telemetry monitoring application. Pulse ox continuous via vital signs machine at bedside in place.  - Pt additionally with passive feelings of hopelessness and despair; frequently taking off nasal canula application despite continuous re-education and re-orientation. A&O x4; will obtain Psych Consult 12/8/2022 AM. Consider 1:1 should patient verbalize suicidal ideation/intent and removal of equipment that may induce harm to pt in room. Endorsed to RN  - AM Repeat CXR  - Will endorse to Am team    Addendum: VBG pH 7.16, pCO2 50, HCO3 18, Lactate 3.6; could be mixed primary respiratory acidosis with secondary metabolic acidosis in setting of worsening renal failure, MICU consulted; case discussed will evaluate at bedside. Cr worsening 4.15, Serum K 5.5; anticipate treating K again. Bicarb gtt? with Venturi Mask and or upgrade to possible HFNC. Notified by RN; pt with increased tachypnea and accessory work of breathing.    Pt is 81 yo male pmhx HIV (viral load not detectable 08/22), afib s/p DVVC and ablation, MV endocarditis s/p bioprosthetic valve replacement, BPH (self catheterizes), frequent UTIs presents to the ED c/o difficulty catheterizing x 1 week and generalized weakness today. Today noted increasing fatigue while walking with reported overall weakness, called urologist Dr. Memo Silver's office and was advised to come to ED.    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    Pt seen and evaluated at bedside. Pt with moderate tachypnea and complaints of shortness of breath. O2 saturation 89% on 6LNC. Pt with no other acute complaints; denying active chest pain, palpitations, abdominal pain, nausea, vomiting, headache, dizziness, or fevers.    12/7/2022 Of note, pt with increased work of breathing, and desaturated to 88% on 3 L NC. Lasix 80mg IV x1 given and nasal canula up-titrated to 6LNC.     Vital Signs Last 24 Hrs  T(C): 36.8 (07 Dec 2022 21:22), Max: 36.8 (07 Dec 2022 21:22)  T(F): 98.2 (07 Dec 2022 21:22), Max: 98.2 (07 Dec 2022 21:22)  HR: 77 (07 Dec 2022 21:22) (52 - 91)  BP: 126/68 (07 Dec 2022 21:22) (101/49 - 126/68)  BP(mean): --  RR: 17 (07 Dec 2022 21:22) (17 - 20)    Labs 12/7/2022                        9.7    42.93 )-----------( 82       ( 07 Dec 2022 08:27 )             29.7     07 Dec 2022 21:42    136    |  103    |  85     ----------------------------<  226    5.8     |  18     |  3.85     Ca    8.8        07 Dec 2022 21:42    CAPILLARY BLOOD GLUCOSE    POCT Blood Glucose.: 192 mg/dL (07 Dec 2022 23:25)  POCT Blood Glucose.: 230 mg/dL (07 Dec 2022 22:37)    SpO2: 92% (07 Dec 2022 21:22) (87% - 93%)    Parameters below as of 07 Dec 2022 21:22  Patient On (Oxygen Delivery Method): nasal cannula  O2 Flow (L/min): 2    Physical Examination  General: Elderly male in mild acute respiratory distress, intercostal muscle usage, SpO2 89% on 6LNC, reported SOB  CV: +S1, S2, Irregularly Irregular Rhythm,   Pulm: Rhonchi bilateral lung fields >R  GI: + BS in all 4 quadrants, abdomen soft, non-tender  Ext: Trace pedal edema, 2+ LE pulses bilaterally    #AHRF likely 2ry to increased pulmonary vascular congestion  - Lasix 80mg IV x1 given earlier 12/7/2022 for increased work of breathing, and desaturated to 88% on 3 L NC. 6LNC applied  - Lasix 40mg IV x1 given in setting of acute respiratory distress with intercostal muscle usage and tachypnea.   - STAT BMP as above  - Repeat BMP as above; Cr up-trending to 3.85 from 3.41; would avoid further diuresis with IV lasix, can consider prn Bumex  - SpO2 to 88% on 6LNC; Ventimask applied; pt unable to tolerate ventimask; 6LNC re-applied  - ABG unable to be obtained as per RT  - Serum K 5.8; earlier 5.6 with Lokelma 10g X1 given; Insulin 10 units, Dextrose, and additional 10g Lokelma x1 given.  - VBG with lactate and BMP ordered stat pending  - EKG stat demonstrating atrial fibrillation with occasional V paced complexes, 67 bpm, QTC 426ms, right bundle branch block, no hyperacute t waves.  - Pt with markedly decreased accessory muscle usage and lessened tachypnea, O2 saturation 90-91% on 6LNC. Continue to monitor closely  - Pt is A&O x4; refusing telemetry application and continuous pulse ox. Re-education applied by both myself and both RN's; pt adamantly refusing reporting that the telemetry ecg leads are bothering him. No evidence of excoriations or redness where leads lie. Tylenol 1g IV x1 given, will-reattempt telemetry monitoring application. Pulse ox continuous via vital signs machine at bedside in place.  - Pt additionally with passive feelings of hopelessness and despair; frequently taking off nasal canula application despite continuous re-education and re-orientation. A&O x4; will obtain Psych Consult 12/8/2022 AM. Consider 1:1 should patient verbalize suicidal ideation/intent and removal of equipment that may induce harm to pt in room. Endorsed to RN  - AM Repeat CXR  - Will endorse to Am team    Addendum: VBG pH 7.16, pCO2 50, HCO3 18, Lactate 3.6; could be mixed primary respiratory acidosis with secondary metabolic acidosis in setting of worsening renal failure, MICU consulted; case discussed will evaluate at bedside. Cr worsening 4.15, Serum K 5.5; anticipate treating K again. Bicarb gtt? with Venturi Mask and or upgrade to possible HFNC.    Addendum: Nephrology paged by writer; Dr. Jovany Foster [phone] and phoned [phone]. Awaiting callback, MICU at bedside. HFNC ordered, pending Notified by RN; pt with increased tachypnea and accessory work of breathing.    Pt is 83 yo male pmhx HIV (viral load not detectable 08/22), afib s/p DVVC and ablation, MV endocarditis s/p bioprosthetic valve replacement, BPH (self catheterizes), frequent UTIs presents to the ED c/o difficulty catheterizing x 1 week and generalized weakness today. Today noted increasing fatigue while walking with reported overall weakness, called urologist Dr. Memo Silver's office and was advised to come to ED.    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    Pt seen and evaluated at bedside. Pt with moderate tachypnea and complaints of shortness of breath. O2 saturation 89% on 6LNC. Pt with no other acute complaints; denying active chest pain, palpitations, abdominal pain, nausea, vomiting, headache, dizziness, or fevers.    12/7/2022 Of note, pt with increased work of breathing, and desaturated to 88% on 3 L NC. Lasix 80mg IV x1 given and nasal canula up-titrated to 6LNC.     Vital Signs Last 24 Hrs  T(C): 36.8 (07 Dec 2022 21:22), Max: 36.8 (07 Dec 2022 21:22)  T(F): 98.2 (07 Dec 2022 21:22), Max: 98.2 (07 Dec 2022 21:22)  HR: 77 (07 Dec 2022 21:22) (52 - 91)  BP: 126/68 (07 Dec 2022 21:22) (101/49 - 126/68)  BP(mean): --  RR: 17 (07 Dec 2022 21:22) (17 - 20)    Labs 12/7/2022                        9.7    42.93 )-----------( 82       ( 07 Dec 2022 08:27 )             29.7     07 Dec 2022 21:42    136    |  103    |  85     ----------------------------<  226    5.8     |  18     |  3.85     Ca    8.8        07 Dec 2022 21:42    CAPILLARY BLOOD GLUCOSE    POCT Blood Glucose.: 192 mg/dL (07 Dec 2022 23:25)  POCT Blood Glucose.: 230 mg/dL (07 Dec 2022 22:37)    SpO2: 92% (07 Dec 2022 21:22) (87% - 93%)    Parameters below as of 07 Dec 2022 21:22  Patient On (Oxygen Delivery Method): nasal cannula  O2 Flow (L/min): 2    Physical Examination  General: Elderly male in mild acute respiratory distress, intercostal muscle usage, SpO2 89% on 6LNC, reported SOB  CV: +S1, S2, Irregularly Irregular Rhythm,   Pulm: Rhonchi bilateral lung fields >R  GI: + BS in all 4 quadrants, abdomen soft, non-tender  Ext: Trace pedal edema, 2+ LE pulses bilaterally    #AHRF likely 2ry to increased pulmonary vascular congestion  - Lasix 80mg IV x1 given earlier 12/7/2022 for increased work of breathing, and desaturated to 88% on 3 L NC. 6LNC applied  - Lasix 40mg IV x1 given in setting of acute respiratory distress with intercostal muscle usage and tachypnea.   - STAT BMP as above  - Repeat BMP as above; Cr up-trending to 3.85 from 3.41; would avoid further diuresis with IV lasix, can consider prn Bumex  - SpO2 to 88% on 6LNC; Ventimask applied; pt unable to tolerate ventimask; 6LNC re-applied  - ABG unable to be obtained as per RT  - Serum K 5.8; earlier 5.6 with Lokelma 10g X1 given; Insulin 10 units, Dextrose, and additional 10g Lokelma x1 given.  - VBG with lactate and BMP ordered stat pending  - EKG stat demonstrating atrial fibrillation with occasional V paced complexes, 67 bpm, QTC 426ms, right bundle branch block, no hyperacute t waves.  - Pt with markedly decreased accessory muscle usage and lessened tachypnea, O2 saturation 90-91% on 6LNC. Continue to monitor closely  - Pt is A&O x4; refusing telemetry application and continuous pulse ox. Re-education applied by both myself and both RN's; pt adamantly refusing reporting that the telemetry ecg leads are bothering him. No evidence of excoriations or redness where leads lie. Tylenol 1g IV x1 given, will-reattempt telemetry monitoring application. Pulse ox continuous via vital signs machine at bedside in place.  - Pt additionally with passive feelings of hopelessness and despair; frequently taking off nasal canula application despite continuous re-education and re-orientation. A&O x4; will obtain Psych Consult 12/8/2022 AM. Consider 1:1 should patient verbalize suicidal ideation/intent and removal of equipment that may induce harm to pt in room. Endorsed to RN  - AM Repeat CXR  - Will endorse to Am team    Addendum: VBG pH 7.16, pCO2 50, HCO3 18, Lactate 3.6; could be mixed primary respiratory acidosis with secondary metabolic acidosis in setting of worsening renal failure, MICU consulted; case discussed will evaluate at bedside. Cr worsening 4.15, Serum K 5.5; anticipate treating K again. Bicarb gtt? with Venturi Mask and or upgrade to possible HFNC.    Addendum: Nephrology paged by writer; Dr. Jovany Foster [phone] and phoned [phone]. Awaiting callback, MICU at bedside. HFNC ordered, pending. Contacted back by Dr. Foster; discussed case at length, will give 1 ampule of bicarbonate IV push, initiate 650mg TID bicarb tabs, f/u VBG q4hrs Notified by RN; pt with increased tachypnea and accessory work of breathing.    Pt is 83 yo male pmhx HIV (viral load not detectable 08/22), afib s/p DVVC and ablation, MV endocarditis s/p bioprosthetic valve replacement, BPH (self catheterizes), frequent UTIs presents to the ED c/o difficulty catheterizing x 1 week and generalized weakness today. Today noted increasing fatigue while walking with reported overall weakness, called urologist Dr. Memo Silver's office and was advised to come to ED.    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    Pt seen and evaluated at bedside. Pt with moderate tachypnea and complaints of shortness of breath. O2 saturation 89% on 6LNC. Pt with no other acute complaints; denying active chest pain, palpitations, abdominal pain, nausea, vomiting, headache, dizziness, or fevers.    12/7/2022 Of note, pt with increased work of breathing, and desaturated to 88% on 3 L NC. Lasix 80mg IV x1 given and nasal canula up-titrated to 6LNC.     Vital Signs Last 24 Hrs  T(C): 36.8 (07 Dec 2022 21:22), Max: 36.8 (07 Dec 2022 21:22)  T(F): 98.2 (07 Dec 2022 21:22), Max: 98.2 (07 Dec 2022 21:22)  HR: 77 (07 Dec 2022 21:22) (52 - 91)  BP: 126/68 (07 Dec 2022 21:22) (101/49 - 126/68)  BP(mean): --  RR: 17 (07 Dec 2022 21:22) (17 - 20)    Labs 12/7/2022                        9.7    42.93 )-----------( 82       ( 07 Dec 2022 08:27 )             29.7     07 Dec 2022 21:42    136    |  103    |  85     ----------------------------<  226    5.8     |  18     |  3.85     Ca    8.8        07 Dec 2022 21:42    CAPILLARY BLOOD GLUCOSE    POCT Blood Glucose.: 192 mg/dL (07 Dec 2022 23:25)  POCT Blood Glucose.: 230 mg/dL (07 Dec 2022 22:37)    SpO2: 92% (07 Dec 2022 21:22) (87% - 93%)    Parameters below as of 07 Dec 2022 21:22  Patient On (Oxygen Delivery Method): nasal cannula  O2 Flow (L/min): 2    Physical Examination  General: Elderly male in mild acute respiratory distress, intercostal muscle usage, SpO2 89% on 6LNC, reported SOB  CV: +S1, S2, Irregularly Irregular Rhythm,   Pulm: Rhonchi bilateral lung fields >R  GI: + BS in all 4 quadrants, abdomen soft, non-tender  Ext: Trace pedal edema, 2+ LE pulses bilaterally    #AHRF likely 2ry to increased pulmonary vascular congestion  - Lasix 80mg IV x1 given earlier 12/7/2022 for increased work of breathing, and desaturated to 88% on 3 L NC. 6LNC applied  - Lasix 40mg IV x1 given in setting of acute respiratory distress with intercostal muscle usage and tachypnea.   - STAT BMP as above  - Repeat BMP as above; Cr up-trending to 3.85 from 3.41; would avoid further diuresis with IV lasix, can consider prn Bumex  - SpO2 to 88% on 6LNC; Ventimask applied; pt unable to tolerate ventimask; 6LNC re-applied  - ABG unable to be obtained as per RT  - Serum K 5.8; earlier 5.6 with Lokelma 10g X1 given; Insulin 10 units, Dextrose, and additional 10g Lokelma x1 given.  - VBG with lactate and BMP ordered stat pending  - EKG stat demonstrating atrial fibrillation with occasional V paced complexes, 67 bpm, QTC 426ms, right bundle branch block, no hyperacute t waves.  - Pt with markedly decreased accessory muscle usage and lessened tachypnea, O2 saturation 90-91% on 6LNC. Continue to monitor closely  - Pt is A&O x4; refusing telemetry application and continuous pulse ox. Re-education applied by both myself and both RN's; pt adamantly refusing reporting that the telemetry ecg leads are bothering him. No evidence of excoriations or redness where leads lie. Tylenol 1g IV x1 given, will-reattempt telemetry monitoring application. Pulse ox continuous via vital signs machine at bedside in place.  - Pt additionally with passive feelings of hopelessness and despair; frequently taking off nasal canula application despite continuous re-education and re-orientation. A&O x4; will obtain Psych Consult 12/8/2022 AM. Consider 1:1 should patient verbalize suicidal ideation/intent and removal of equipment that may induce harm to pt in room. Endorsed to RN  - AM Repeat CXR  - Will endorse to Am team    Addendum: VBG pH 7.16, pCO2 50, HCO3 18, Lactate 3.6; could be mixed primary respiratory acidosis with secondary metabolic acidosis in setting of worsening renal failure, MICU consulted; case discussed will evaluate at bedside. Cr worsening 4.15, Serum K 5.5; anticipate treating K again. Bicarb gtt? with Venturi Mask and or upgrade to possible HFNC.    Addendum: Nephrology paged by writer; Dr. Jovany Foster [phone] and phoned [phone]. Awaiting callback, MICU at bedside. HFNC ordered, pending. Contacted back by Dr. Foster; discussed case at length, will give 1 ampule of bicarbonate IV push, initiate 650mg TID bicarb tabs, f/u VBG q4hrs. 25L/60% FiO2; O2 saturation 93%; Consider BIPAP if not improving, obtain ABG. Notified by RN; pt with increased tachypnea and accessory work of breathing.    Pt is 83 yo male pmhx HIV (viral load not detectable 08/22), afib s/p DVVC and ablation, MV endocarditis s/p bioprosthetic valve replacement, BPH (self catheterizes), frequent UTIs presents to the ED c/o difficulty catheterizing x 1 week and generalized weakness today. Today noted increasing fatigue while walking with reported overall weakness, called urologist Dr. Memo Silver's office and was advised to come to ED.    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    Pt seen and evaluated at bedside. Pt with moderate tachypnea and complaints of shortness of breath. O2 saturation 89% on 6LNC. Pt with no other acute complaints; denying active chest pain, palpitations, abdominal pain, nausea, vomiting, headache, dizziness, or fevers.    12/7/2022 Of note, pt with increased work of breathing, and desaturated to 88% on 3 L NC. Lasix 80mg IV x1 given and nasal canula up-titrated to 6LNC.     Vital Signs Last 24 Hrs  T(C): 36.8 (07 Dec 2022 21:22), Max: 36.8 (07 Dec 2022 21:22)  T(F): 98.2 (07 Dec 2022 21:22), Max: 98.2 (07 Dec 2022 21:22)  HR: 77 (07 Dec 2022 21:22) (52 - 91)  BP: 126/68 (07 Dec 2022 21:22) (101/49 - 126/68)  BP(mean): --  RR: 17 (07 Dec 2022 21:22) (17 - 20)    Labs 12/7/2022                        9.7    42.93 )-----------( 82       ( 07 Dec 2022 08:27 )             29.7     07 Dec 2022 21:42    136    |  103    |  85     ----------------------------<  226    5.8     |  18     |  3.85     Ca    8.8        07 Dec 2022 21:42    CAPILLARY BLOOD GLUCOSE    POCT Blood Glucose.: 192 mg/dL (07 Dec 2022 23:25)  POCT Blood Glucose.: 230 mg/dL (07 Dec 2022 22:37)    SpO2: 92% (07 Dec 2022 21:22) (87% - 93%)    Parameters below as of 07 Dec 2022 21:22  Patient On (Oxygen Delivery Method): nasal cannula  O2 Flow (L/min): 2    Physical Examination  General: Elderly male in mild acute respiratory distress, intercostal muscle usage, SpO2 89% on 6LNC, reported SOB  CV: +S1, S2, Irregularly Irregular Rhythm,   Pulm: Rhonchi bilateral lung fields >R  GI: + BS in all 4 quadrants, abdomen soft, non-tender  Ext: Trace pedal edema, 2+ LE pulses bilaterally    #AHRF likely 2ry to increased pulmonary vascular congestion  - Lasix 80mg IV x1 given earlier 12/7/2022 for increased work of breathing, and desaturated to 88% on 3 L NC. 6LNC applied  - Lasix 40mg IV x1 given in setting of acute respiratory distress with intercostal muscle usage and tachypnea.   - STAT BMP as above  - Repeat BMP as above; Cr up-trending to 3.85 from 3.41; would avoid further diuresis with IV lasix, can consider prn Bumex  - SpO2 to 88% on 6LNC; Ventimask applied; pt unable to tolerate ventimask; 6LNC re-applied  - ABG unable to be obtained as per RT  - Serum K 5.8; earlier 5.6 with Lokelma 10g X1 given; Insulin 10 units, Dextrose, and additional 10g Lokelma x1 given.  - VBG with lactate and BMP ordered stat pending  - EKG stat demonstrating atrial fibrillation with occasional V paced complexes, 67 bpm, QTC 426ms, right bundle branch block, no hyperacute t waves.  - Pt with markedly decreased accessory muscle usage and lessened tachypnea, O2 saturation 90-91% on 6LNC. Continue to monitor closely  - Pt is A&O x4; refusing telemetry application and continuous pulse ox. Re-education applied by both myself and both RN's; pt adamantly refusing reporting that the telemetry ecg leads are bothering him. No evidence of excoriations or redness where leads lie. Tylenol 1g IV x1 given, will-reattempt telemetry monitoring application. Pulse ox continuous via vital signs machine at bedside in place.  - Pt additionally with passive feelings of hopelessness and despair; frequently taking off nasal canula application despite continuous re-education and re-orientation. A&O x4; will obtain Psych Consult 12/8/2022 AM. Consider 1:1 should patient verbalize suicidal ideation/intent and removal of equipment that may induce harm to pt in room. Endorsed to RN  - AM Repeat CXR  - Will endorse to Am team    Addendum: VBG pH 7.16, pCO2 50, HCO3 18, Lactate 3.6; could be mixed primary respiratory acidosis with secondary metabolic acidosis in setting of worsening renal failure, MICU consulted; case discussed will evaluate at bedside. Cr worsening 4.15, Serum K 5.5; anticipate treating K again. Bicarb gtt? with Venturi Mask and or upgrade to possible HFNC.    Addendum: Nephrology paged by writer; Dr. Jovany Foster [phone] and phoned [phone]. Awaiting callback, MICU at bedside. HFNC ordered, pending. Contacted back by Dr. Foster; discussed case at length, will give 1 ampule of bicarbonate IV push, initiate 650mg TID bicarb tabs, f/u VBG q4hrs. 25L/60% FiO2; O2 saturation 93%; Consider BIPAP if not improving, obtain ABG.    Addendum: 7:50am; ABG drawn by writer. Will await results Notified by RN; pt with increased tachypnea and accessory work of breathing.    Pt is 81 yo male pmhx HIV (viral load not detectable 08/22), afib s/p DVVC and ablation, MV endocarditis s/p bioprosthetic valve replacement, BPH (self catheterizes), frequent UTIs presents to the ED c/o difficulty catheterizing x 1 week and generalized weakness today. Today noted increasing fatigue while walking with reported overall weakness, called urologist Dr. Memo Silver's office and was advised to come to ED.    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    Pt seen and evaluated at bedside. Pt with moderate tachypnea and complaints of shortness of breath. O2 saturation 89% on 6LNC. Pt with no other acute complaints; denying active chest pain, palpitations, abdominal pain, nausea, vomiting, headache, dizziness, or fevers.    12/7/2022 Of note, pt with increased work of breathing, and desaturated to 88% on 3 L NC. Lasix 80mg IV x1 given and nasal canula up-titrated to 6LNC.     Vital Signs Last 24 Hrs  T(C): 36.8 (07 Dec 2022 21:22), Max: 36.8 (07 Dec 2022 21:22)  T(F): 98.2 (07 Dec 2022 21:22), Max: 98.2 (07 Dec 2022 21:22)  HR: 77 (07 Dec 2022 21:22) (52 - 91)  BP: 126/68 (07 Dec 2022 21:22) (101/49 - 126/68)  BP(mean): --  RR: 17 (07 Dec 2022 21:22) (17 - 20)    Labs 12/7/2022                        9.7    42.93 )-----------( 82       ( 07 Dec 2022 08:27 )             29.7     07 Dec 2022 21:42    136    |  103    |  85     ----------------------------<  226    5.8     |  18     |  3.85     Ca    8.8        07 Dec 2022 21:42    CAPILLARY BLOOD GLUCOSE    POCT Blood Glucose.: 192 mg/dL (07 Dec 2022 23:25)  POCT Blood Glucose.: 230 mg/dL (07 Dec 2022 22:37)    SpO2: 92% (07 Dec 2022 21:22) (87% - 93%)    Parameters below as of 07 Dec 2022 21:22  Patient On (Oxygen Delivery Method): nasal cannula  O2 Flow (L/min): 2    Physical Examination  General: Elderly male in mild acute respiratory distress, intercostal muscle usage, SpO2 89% on 6LNC, reported SOB  CV: +S1, S2, Irregularly Irregular Rhythm,   Pulm: Rhonchi bilateral lung fields >R  GI: + BS in all 4 quadrants, abdomen soft, non-tender  Ext: Trace pedal edema, 2+ LE pulses bilaterally    #AHRF likely 2ry to increased pulmonary vascular congestion  - Lasix 80mg IV x1 given earlier 12/7/2022 for increased work of breathing, and desaturated to 88% on 3 L NC. 6LNC applied  - Lasix 40mg IV x1 given in setting of acute respiratory distress with intercostal muscle usage and tachypnea.   - STAT BMP as above  - Repeat BMP as above; Cr up-trending to 3.85 from 3.41; would avoid further diuresis with IV lasix, can consider prn Bumex  - SpO2 to 88% on 6LNC; Ventimask applied; pt unable to tolerate ventimask; 6LNC re-applied  - ABG unable to be obtained as per RT  - Serum K 5.8; earlier 5.6 with Lokelma 10g X1 given; Insulin 10 units, Dextrose, and additional 10g Lokelma x1 given.  - VBG with lactate and BMP ordered stat pending  - EKG stat demonstrating atrial fibrillation with occasional V paced complexes, 67 bpm, QTC 426ms, right bundle branch block, no hyperacute t waves.  - Pt with markedly decreased accessory muscle usage and lessened tachypnea, O2 saturation 90-91% on 6LNC. Continue to monitor closely  - Pt is A&O x4; refusing telemetry application and continuous pulse ox. Re-education applied by both myself and both RN's; pt adamantly refusing reporting that the telemetry ecg leads are bothering him. No evidence of excoriations or redness where leads lie. Tylenol 1g IV x1 given, will-reattempt telemetry monitoring application. Pulse ox continuous via vital signs machine at bedside in place.  - Pt additionally with passive feelings of hopelessness and despair; frequently taking off nasal canula application despite continuous re-education and re-orientation. A&O x4; will obtain Psych Consult 12/8/2022 AM. Consider 1:1 should patient verbalize suicidal ideation/intent and removal of equipment that may induce harm to pt in room. Endorsed to RN  - AM Repeat CXR  - Will endorse to Am team    Addendum: VBG pH 7.16, pCO2 50, HCO3 18, Lactate 3.6; could be mixed primary respiratory acidosis with secondary metabolic acidosis in setting of worsening renal failure, MICU consulted; case discussed will evaluate at bedside. Cr worsening 4.15, Serum K 5.5; anticipate treating K again. Bicarb gtt? with Venturi Mask and or upgrade to possible HFNC.    Addendum: Nephrology paged by writer; Dr. Jovany Foster [phone] and phoned [phone]. Awaiting callback, MICU at bedside. HFNC ordered, pending. Contacted back by Dr. Foster; discussed case at length, will give 1 ampule of bicarbonate IV push, initiate 650mg TID bicarb tabs, f/u VBG q4hrs. 25L/60% FiO2; O2 saturation 93%; Consider BIPAP if not improving, obtain ABG.    Addendum: 7:50am; ABG drawn by writer. Will await results; pH 7.24, pCO2 40, HCO3 17; primary metabolic acidosis with secondary respiratory acidosis. Notified by RN; pt with increased tachypnea and accessory work of breathing.    Pt is 83 yo male pmhx HIV (viral load not detectable 08/22), afib s/p DVVC and ablation, MV endocarditis s/p bioprosthetic valve replacement, BPH (self catheterizes), frequent UTIs presents to the ED c/o difficulty catheterizing x 1 week and generalized weakness today. Today noted increasing fatigue while walking with reported overall weakness, called urologist Dr. Memo Silver's office and was advised to come to ED.    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    Pt seen and evaluated at bedside. Pt with moderate tachypnea and complaints of shortness of breath. O2 saturation 89% on 6LNC. Pt with no other acute complaints; denying active chest pain, palpitations, abdominal pain, nausea, vomiting, headache, dizziness, or fevers.    12/7/2022 Of note, pt with increased work of breathing, and desaturated to 88% on 3 L NC. Lasix 80mg IV x1 given and nasal canula up-titrated to 6LNC.     Vital Signs Last 24 Hrs  T(C): 36.8 (07 Dec 2022 21:22), Max: 36.8 (07 Dec 2022 21:22)  T(F): 98.2 (07 Dec 2022 21:22), Max: 98.2 (07 Dec 2022 21:22)  HR: 77 (07 Dec 2022 21:22) (52 - 91)  BP: 126/68 (07 Dec 2022 21:22) (101/49 - 126/68)  BP(mean): --  RR: 17 (07 Dec 2022 21:22) (17 - 20)    Labs 12/7/2022                        9.7    42.93 )-----------( 82       ( 07 Dec 2022 08:27 )             29.7     07 Dec 2022 21:42    136    |  103    |  85     ----------------------------<  226    5.8     |  18     |  3.85     Ca    8.8        07 Dec 2022 21:42    CAPILLARY BLOOD GLUCOSE    POCT Blood Glucose.: 192 mg/dL (07 Dec 2022 23:25)  POCT Blood Glucose.: 230 mg/dL (07 Dec 2022 22:37)    SpO2: 92% (07 Dec 2022 21:22) (87% - 93%)    Parameters below as of 07 Dec 2022 21:22  Patient On (Oxygen Delivery Method): nasal cannula  O2 Flow (L/min): 2    Physical Examination  General: Elderly male in mild acute respiratory distress, intercostal muscle usage, SpO2 89% on 6LNC, reported SOB  CV: +S1, S2, Irregularly Irregular Rhythm,   Pulm: Rhonchi bilateral lung fields >R  GI: + BS in all 4 quadrants, abdomen soft, non-tender  Ext: Trace pedal edema, 2+ LE pulses bilaterally    #AHRF likely 2ry to increased pulmonary vascular congestion  - Lasix 80mg IV x1 given earlier 12/7/2022 for increased work of breathing, and desaturated to 88% on 3 L NC. 6LNC applied  - Lasix 40mg IV x1 given in setting of acute respiratory distress with intercostal muscle usage and tachypnea.   - STAT BMP as above  - Repeat BMP as above; Cr up-trending to 3.85 from 3.41; would avoid further diuresis with IV lasix, can consider prn Bumex  - SpO2 to 88% on 6LNC; Ventimask applied; pt unable to tolerate ventimask; 6LNC re-applied  - ABG unable to be obtained as per RT  - Serum K 5.8; earlier 5.6 with Lokelma 10g X1 given; Insulin 10 units, Dextrose, and additional 10g Lokelma x1 given.  - VBG with lactate and BMP ordered stat pending  - EKG stat demonstrating atrial fibrillation with occasional V paced complexes, 67 bpm, QTC 426ms, right bundle branch block, no hyperacute t waves.  - Pt with markedly decreased accessory muscle usage and lessened tachypnea, O2 saturation 90-91% on 6LNC. Continue to monitor closely  - Pt is A&O x4; refusing telemetry application and continuous pulse ox. Re-education applied by both myself and both RN's; pt adamantly refusing reporting that the telemetry ecg leads are bothering him. No evidence of excoriations or redness where leads lie. Tylenol 1g IV x1 given, will-reattempt telemetry monitoring application. Pulse ox continuous via vital signs machine at bedside in place.  - Pt additionally with passive feelings of hopelessness and despair; frequently taking off nasal canula application despite continuous re-education and re-orientation. A&O x4; will obtain Psych Consult 12/8/2022 AM. Consider 1:1 should patient verbalize suicidal ideation/intent and removal of equipment that may induce harm to pt in room. Endorsed to RN  - AM Repeat CXR  - Will endorse to Am team    Addendum: VBG pH 7.16, pCO2 50, HCO3 18, Lactate 3.6; could be mixed primary respiratory acidosis with secondary metabolic acidosis in setting of worsening renal failure, MICU consulted; case discussed will evaluate at bedside. Cr worsening 4.15, Serum K 5.5; anticipate treating K again. Bicarb gtt? with Venturi Mask and or upgrade to possible HFNC.    Addendum: Nephrology paged by writer; Dr. Jovany Foster [phone] and phoned [phone]. Awaiting callback, MICU at bedside. HFNC ordered, pending. Contacted back by Dr. Foster; discussed case at length, will give 1 ampule of bicarbonate IV push, initiate 650mg TID bicarb tabs, f/u VBG q4hrs. 25L/60% FiO2; O2 saturation 93%; Consider BIPAP if not improving, obtain ABG.    Addendum: 7:50am; ABG drawn by writer. Will await results; pH 7.24, pCO2 40, HCO3 17; primary metabolic acidosis with secondary respiratory acidosis. Endorsed to AM ACP to f/u with nephrology and primary team for further recs.

## 2022-12-08 NOTE — CONSULT NOTE ADULT - CONSULT REQUESTED DATE/TIME
06-Dec-2022 13:56
08-Dec-2022 05:48
30-Nov-2022 01:39
30-Nov-2022 14:18
01-Dec-2022 18:56
30-Nov-2022 02:02
01-Dec-2022
05-Dec-2022

## 2022-12-08 NOTE — PROGRESS NOTE ADULT - PROBLEM SELECTOR PLAN 3
- Noted Hx of endocarditis now with a recurrence  - NOHEMI 12/1 demonstrating Echodense material seen coating the prosthetic leaflets with mobile components measuring up to approximately 1.0 cm in length seen on the atrial side of the valve. Findings are consistent with endocarditis. There is thickening seen along the intervalvular fibrosa extending to the aortic root suspicious for infection/early abscess  - CTS evaluated, not a candidate for surgery. C/w cefazolin  Bld Culture Results: No growth to date. (12.03.22 @ 07:40)  Bld Culture Results: No growth to date. (12.01.22 @ 10:48)    - d/w ID, Dr. Ferguson, pt will need 6 weeks of anceph thru Jan 10th followed by life-long oral keflex.  - s/p PICC line placement  - leukocytosis still rising which is concerning for uncontrolled infection despite appropriate abxs. Repeat blood Cx from 12/7 are NGTD.

## 2022-12-08 NOTE — PROGRESS NOTE ADULT - SUBJECTIVE AND OBJECTIVE BOX
Follow Up:  Aurelia barillas prosthetic mitral valve endocarditis    Interval History/ROS:  uncomfortable, sob on BiPAP, difficulty swallowing    Allergies  No Known Allergies    ANTIMICROBIALS:  abacavir 300 two times a day  doravirine 100 daily  lamiVUDine- daily  piperacillin/tazobactam IVPB. 3.375 once  piperacillin/tazobactam IVPB.- 3.375 once      OTHER MEDS:  MEDICATIONS  (STANDING):  acetaminophen     Tablet .. 650 every 6 hours PRN  apixaban 2.5 two times a day  aspirin enteric coated 81 daily  buMETAnide Infusion 2 <Continuous>  buMETAnide Injectable 2 two times a day  famotidine    Tablet 20 daily  influenza  Vaccine (HIGH DOSE) 0.7 once  loperamide 2 daily PRN  ondansetron Injectable 4 every 6 hours PRN  senna 2 at bedtime  simethicone 80 four times a day PRN      Vital Signs Last 24 Hrs  T(C): 36.3 (08 Dec 2022 12:17), Max: 36.8 (07 Dec 2022 21:22)  T(F): 97.4 (08 Dec 2022 12:17), Max: 98.2 (07 Dec 2022 21:22)  HR: 76 (08 Dec 2022 13:45) (57 - 91)  BP: 112/67 (08 Dec 2022 13:45) (105/55 - 126/68)  BP(mean): --  RR: 19 (08 Dec 2022 12:17) (18 - 28)  SpO2: 93% (08 Dec 2022 12:17) (87% - 98%)    Parameters below as of 08 Dec 2022 12:17  Patient On (Oxygen Delivery Method): nasal cannula, high flow  O2 Flow (L/min): 25  O2 Concentration (%): 60    PHYSICAL EXAM:  General: ill appearing  Neurology: Alert &Ox3, nonfocal  Respiratory: rhonchi diffus  CV: RRR, S1S2, no murmurs, rubs or gallops  Abdominal: Soft, Non-tender, non-distended, normal bowel sounds  Extremities: No edema, + peripheral pulses  Line Sites: Clear  Skin: No rash                          10.2   x     )-----------( x        ( 08 Dec 2022 15:24 )             31.1   WBC Count: 51.19 (12-08 @ 09:26)  WBC Count: 42.93 (12-07 @ 08:27)  WBC Count: 45.43 (12-07 @ 06:46)  WBC Count: 35.74 (12-06 @ 05:21)  WBC Count: 36.92 (12-05 @ 07:08)  WBC Count: 27.70 (12-04 @ 06:50)    12-08    138  |  101  |  96<H>  ----------------------------<  89  5.2   |  17<L>  |  4.45<H>  Creatinine: 4.45 (12-08 @ 09:26)    Creatinine: 4.15 (12-08 @ 03:04)    Creatinine: 3.85 (12-07 @ 21:42)    Creatinine: 3.41 (12-07 @ 06:54)    Creatinine: 3.18 (12-06 @ 05:21)    Creatinine: 2.65 (12-05 @ 07:08)    Creatinine: 2.78 (12-04 @ 06:50)   Ca    9.0      08 Dec 2022 09:26    TPro  6.6  /  Alb  2.8<L>  /  TBili  0.2  /  DBili  x   /  AST  297<H>  /  ALT  65<H>  /  AlkPhos  219<H>  12-08      MICROBIOLOGY:  .Blood Blood-Peripheral  12-03-22   No Growth Final  --  --      .Blood Blood-Peripheral  12-01-22   No Growth Final  --  --      Clean Catch Clean Catch (Midstream)  11-29-22   >100,000 CFU/ml Staphylococcus lugdunensis "Susceptibilities not  performed"  --  --      .Blood Blood-Peripheral  11-29-22   Growth in aerobic and anaerobic bottles: Staphylococcus lugdunensis  See previous culture [id number]  --    Growth in aerobic and anaerobic bottles: Gram Positive Cocci in Clusters      .Blood Blood-Peripheral  11-29-22   Growth in aerobic and anaerobic bottles: Staphylococcus lugdunensis      HIV-1 RNA Quantitative, Viral Load Log: NOT DET. lg /mL (12-01-22 @ 05:49)  HIV-1 RNA Quantitative, Viral Load Log: NOT DET. lg /mL (08-08-22 @ 07:49)      < from: Transesophageal Echocardiogram w/o TTE (12.02.22 @ 16:18) >  Conclusions:  1. Bioprosthetic mitral valve replacement. Echodense  material seen coating the prosthetic leaflets with mobile  components measuring up to approximately 1.0 cm in length  seen on the atrial side of the valve. 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 There is thickening seen  along the intervalvular fibrosa extending to the aortic  root suspicious for infection/early abscess. Mild mitral  regurgitation. Mean transmitral valve gradient equals 6 mm  Hg, which is elevated even in the setting of a  bioprosthetic mitral valve replacement. (HRabout 60s bpm)  2. Normal trileaflet aortic valve. No aortic valve  regurgitation seen.  3. Normal left ventricular systolic function. No segmental  wall motion abnormalities. Septal motion consistent with  prior cardiac surgery. Mobile echodensity seen within the  LV cavity consistent with retained mitral chordal  apparatus.  4. Normal right ventricular size and function.  5. Left pleural effusion.    < end of copied text >      RADIOLOGY:    Beck Ferguson MD; Division of Infectious Disease; Pager: [phone]; nights and weekends: [phone]

## 2022-12-08 NOTE — PROGRESS NOTE ADULT - SUBJECTIVE AND OBJECTIVE BOX
[facility] DIVISION OF KIDNEY DISEASES AND HYPERTENSION -- FOLLOW UP NOTE  --------------------------------------------------------------------------------  Chief Complaint:    24 hour events/subjective:    Patient seen by MICU this morning for hypoxic respiratory failure   Started Patient on Bumetamide and metolazone for diuresis however reportedly low urine output.     PAST HISTORY  --------------------------------------------------------------------------------  No significant changes to PMH, PSH, FHx, SHx, unless otherwise noted    ALLERGIES & MEDICATIONS  --------------------------------------------------------------------------------  Allergies    No Known Allergies    Intolerances      Standing Inpatient Medications  abacavir 300 milliGRAM(s) Oral two times a day  apixaban 2.5 milliGRAM(s) Oral two times a day  aspirin enteric coated 81 milliGRAM(s) Oral daily  buMETAnide Infusion 2 mG/Hr IV Continuous <Continuous>  chlorhexidine 2% Cloths 1 Application(s) Topical daily  chlorhexidine 4% Liquid 1 Application(s) Topical <User Schedule>  doravirine 100 milliGRAM(s) Oral daily  famotidine    Tablet 20 milliGRAM(s) Oral daily  ferrous    sulfate 325 milliGRAM(s) Oral daily  folic acid 1 milliGRAM(s) Oral daily  influenza  Vaccine (HIGH DOSE) 0.7 milliLiter(s) IntraMuscular once  lamiVUDine- milliGRAM(s) Oral daily  multivitamin 1 Tablet(s) Oral daily  piperacillin/tazobactam IVPB. 3.375 Gram(s) IV Intermittent once  piperacillin/tazobactam IVPB.- 3.375 Gram(s) IV Intermittent once  senna 2 Tablet(s) Oral at bedtime  sodium bicarbonate 650 milliGRAM(s) Oral three times a day    PRN Inpatient Medications  acetaminophen     Tablet .. 650 milliGRAM(s) Oral every 6 hours PRN  lidocaine 2% Jelly 3 milliLiter(s) IntraUrethral two times a day PRN  loperamide 2 milliGRAM(s) Oral daily PRN  ondansetron Injectable 4 milliGRAM(s) IV Push every 6 hours PRN  simethicone 80 milliGRAM(s) Chew four times a day PRN  sodium chloride 0.9% lock flush 10 milliLiter(s) IV Push every 1 hour PRN      REVIEW OF SYSTEMS  --------------------------------------------------------------------------------      All other systems were reviewed and are negative, except as noted.    VITALS/PHYSICAL EXAM  --------------------------------------------------------------------------------  T(C): 36.3 (12-08-22 @ 12:17), Max: 36.8 (12-07-22 @ 21:22)  HR: 76 (12-08-22 @ 13:45) (57 - 91)  BP: 112/67 (12-08-22 @ 13:45) (105/55 - 126/68)  RR: 19 (12-08-22 @ 12:17) (18 - 28)  SpO2: 93% (12-08-22 @ 12:17) (87% - 98%)  Wt(kg): --        12-07-22 @ 07:01  -  12-08-22 @ 07:00  --------------------------------------------------------  IN: 730 mL / OUT: 1000 mL / NET: -270 mL    12-08-22 @ 07:01  -  12-08-22 @ 16:24  --------------------------------------------------------  IN: 100 mL / OUT: 100 mL / NET: 0 mL        Physical Exam:  	Gen: In distress   	HEENT: Mask  	Pulm: Crackles , respiratory distress   	CV: S1S2, Tachycardic   	Abd: Soft, +BS   	Ext: No LE edema B/L  	Neuro: Awake  	Skin: Warm and dry  	Vascular access: PICC line       LABS/STUDIES  --------------------------------------------------------------------------------              10.2   54.23 >-----------<  98       [12-08-22 @ 15:24]              31.1     137  |  101  |  100  ----------------------------<  144      [12-08-22 @ 15:24]  5.8   |  17  |  4.63        Ca     8.6     [12-08-22 @ 15:24]      Mg     3.4     [12-08-22 @ 15:24]      Phos  8.8     [12-08-22 @ 15:24]    TPro  6.7  /  Alb  2.9  /  TBili  0.3  /  DBili  x   /  AST  317  /  ALT  42  /  AlkPhos  224  [12-08-22 @ 15:24]        CK 47      [12-08-22 @ 13:21]    Creatinine Trend:  SCr 4.63 [12-08 @ 15:24]  SCr 4.45 [12-08 @ 09:26]  SCr 4.15 [12-08 @ 03:04]  SCr 3.85 [12-07 @ 21:42]  SCr 3.41 [12-07 @ 06:54]    Urinalysis - [11-29-22 @ 20:45]      Color Yellow / Appearance Slightly Turbid / SG 1.015 / pH 6.0      Gluc Negative / Ketone Negative  / Bili Negative / Urobili Negative       Blood Moderate / Protein 100 / Leuk Est Large / Nitrite Negative      RBC 6 /  / Hyaline 2 / Gran 1 / Sq Epi  / Non Sq Epi 1 / Bacteria Negative      TSH 2.52      [12-02-22 @ 06:24]

## 2022-12-08 NOTE — PROGRESS NOTE ADULT - PROBLEM SELECTOR PLAN 1
Cr continue to worsen likely from Cardiorenal syndrome  Creatinine Trend: 4.45<--, 4.15<--, 3.85<--, 3.41<--, 3.18<--  - pt with poor urine output despite aggressive diuresis. Monitor urine OUP closely, strict I+Os, Cr  - will start bumex 2mg bid, metalazone 10mg, if no improvement then will increase to bumex 4mg bid.  - Avoid nephrotoxic agents, renally dose all medications  - Renal f/u appreciate

## 2022-12-08 NOTE — CONSULT NOTE ADULT - SUBJECTIVE AND OBJECTIVE BOX
CHIEF COMPLAINT:    HPI:  81yo M w/ PMHx of HIV (viral load undetectable 08/22), Afib s/p DVVC and ablation (not on AC), MV endocarditis s/p bioprosthetic valve replacement, BPH (self catheterizes), frequent UTIs, presents with weakness, he reports that over the last 2 weeks he has had weakness, symptoms were associated with occasional body shakes that came every other day, he notes that he also has had recent difficulty performing straight cath, saying he required more force and was at times unable to complete procedure, he called his urologist who then instructed him to head to ED, in the ED, pt was bradycardic, febrile but hemodynamically stable, labs showed leukocytosis, elevated Cr above baseline, elevated procal, grossly positive U/A, EKG showed new Mobitz Type 1, then converted to afib with slow ventricular response, pt was given cefepime, 1L NS bolus, Tylenol x1, admitted to general medicine for further management. While in ED, pt developed acute onset non-radiating chest pain associated with SOB but denies n/v, f/c, diaphoresis, jaw pain, (29 Nov 2022 23:23)    Patient admitted with staph lugdudenesis bacteremia complicated by infective endocarditis of prosthetic MVR for which patient is being treated with ancef with subsequent blood cultures demonstrating clearance of infection. Patient evaluated by surgery and noted to not be a surgical candidate for infective endocarditis. MICU consulted for respiratory distress    PAST MEDICAL & SURGICAL HISTORY:  Unsteady gait      HIV (human immunodeficiency virus infection)      Chronic kidney disease, unspecified stage      Constipation, unspecified constipation type      Seborrheic keratosis      Osteoporosis      Edema of both legs      Bladder mass      Vitamin D deficiency      Orchitis and epididymitis      H/O endocarditis  MV endocarditis      Benign prostatic hyperplasia without lower urinary tract symptoms      COVID-19 vaccine series completed  W #2 boosters      S/P coronary artery stent placement      S/P MVR (mitral valve replacement)  &amp; SHELLIE clip, 8/31/2020          FAMILY HISTORY:  Family history of congestive heart failure (Father)    Family history of cancer (Mother)  Allergies    No Known Allergies    Intolerances        HOME MEDICATIONS:    OBJECTIVE:  ICU Vital Signs Last 24 Hrs  T(C): 36.3 (08 Dec 2022 04:30), Max: 36.8 (07 Dec 2022 21:22)  T(F): 97.4 (08 Dec 2022 04:30), Max: 98.2 (07 Dec 2022 21:22)  HR: 82 (08 Dec 2022 04:30) (59 - 91)  BP: 105/55 (08 Dec 2022 04:30) (105/55 - 126/68)  BP(mean): --  ABP: --  ABP(mean): --  RR: 22 (08 Dec 2022 04:30) (18 - 28)  SpO2: 87% (08 Dec 2022 04:30) (87% - 93%)    O2 Parameters below as of 08 Dec 2022 04:30  Patient On (Oxygen Delivery Method): nasal cannula  O2 Flow (L/min): 6    GENERAL: NAD, resting in bed comfortably  EYES: EOMI  ENT: Moist mucous membranes  NECK: Supple  CHEST/LUNG: breathing on NRB; Speaking in full sentences; diffuse crackles throughout; no wheezing  HEART: Regular rate and rhythm  ABDOMEN: BSx4; Soft, nontender, nondistended  EXTREMITIES:  2+ Peripheral Pulses, brisk capillary refill. 1+ bilateral pitting edema  NERVOUS SYSTEM:  A&Ox3  SKIN: No rashes        12-06 @ 07:01  -  12-07 @ 07:00  --------------------------------------------------------  IN: 440 mL / OUT: 1400 mL / NET: -960 mL    12-07 @ 07:01  -  12-08 @ 05:48  --------------------------------------------------------  IN: 730 mL / OUT: 1000 mL / NET: -270 mL      CAPILLARY BLOOD GLUCOSE      POCT Blood Glucose.: 187 mg/dL (08 Dec 2022 04:33)      PHYSICAL EXAM:  GENERAL: NAD, well-groomed, well-developed  HEAD:  Atraumatic, Normocephalic  EYES: EOMI, PERRLA, conjunctiva and sclera clear  ENMT: No tonsillar erythema, exudates, or enlargement; Moist mucous membranes, Good dentition, No lesions  NECK: Supple, No JVD, Normal thyroid  CHEST/LUNG: Clear to auscultation bilaterally; No rales, rhonchi, wheezing, or rubs  HEART: Regular rate and rhythm; No murmurs, rubs, or gallops  ABDOMEN: Soft, Nontender, Nondistended; Bowel sounds present  EXTREMITIES:  2+ Peripheral Pulses, No clubbing, cyanosis, or edema  LYMPH: No lymphadenopathy noted  SKIN: No rashes or lesions  NERVOUS SYSTEM:  Alert & Oriented X4, Good concentration  PSYCH: Normal Affect. Speaking in Full Sentences. Laying in bed comfortably; not agitated     HOSPITAL MEDICATIONS:  MEDICATIONS  (STANDING):  abacavir 300 milliGRAM(s) Oral two times a day  apixaban 2.5 milliGRAM(s) Oral two times a day  ascorbic acid 500 milliGRAM(s) Oral daily  aspirin enteric coated 81 milliGRAM(s) Oral daily  ceFAZolin   IVPB 2000 milliGRAM(s) IV Intermittent every 12 hours  chlorhexidine 2% Cloths 1 Application(s) Topical daily  chlorhexidine 4% Liquid 1 Application(s) Topical <User Schedule>  doravirine 100 milliGRAM(s) Oral daily  famotidine    Tablet 20 milliGRAM(s) Oral daily  ferrous    sulfate 325 milliGRAM(s) Oral daily  folic acid 1 milliGRAM(s) Oral daily  influenza  Vaccine (HIGH DOSE) 0.7 milliLiter(s) IntraMuscular once  lamiVUDine- milliGRAM(s) Oral daily  multivitamin 1 Tablet(s) Oral daily  senna 2 Tablet(s) Oral at bedtime  sodium bicarbonate 650 milliGRAM(s) Oral three times a day    MEDICATIONS  (PRN):  acetaminophen     Tablet .. 650 milliGRAM(s) Oral every 6 hours PRN Temp greater or equal to 38C (100.4F), Mild Pain (1 - 3)  aluminum hydroxide/magnesium hydroxide/simethicone Suspension 30 milliLiter(s) Oral every 6 hours PRN Dyspepsia  loperamide 2 milliGRAM(s) Oral daily PRN Diarrhea  ondansetron Injectable 4 milliGRAM(s) IV Push every 6 hours PRN Nausea and/or Vomiting  simethicone 80 milliGRAM(s) Chew four times a day PRN Gas  sodium chloride 0.9% lock flush 10 milliLiter(s) IV Push every 1 hour PRN Pre/post blood products, medications, blood draw, and to maintain line patency      LABS:                        9.7    42.93 )-----------( 82       ( 07 Dec 2022 08:27 )             29.7     12-08    136  |  102  |  90<H>  ----------------------------<  171<H>  5.5<H>   |  16<L>  |  4.15<H>    Ca    8.8      08 Dec 2022 03:04            Venous Blood Gas:  12-08 @ 03:25  7.16/50/36/18/57.9  VBG Lactate: 3.6

## 2022-12-08 NOTE — PROGRESS NOTE ADULT - PROBLEM SELECTOR PLAN 2
Acute HfpEf due to valvular dysfunction in the setting of MV endocarditis  - will start bumex 2mg bid, metalazone 10mg, if no improvement then will increase to bumex 4mg bid.

## 2022-12-08 NOTE — PROGRESS NOTE ADULT - PROBLEM SELECTOR PLAN 2
Hemoglobin near goal, on oral iron. Would hold off on IV iron in the setting of bacteremia/ endocarditis. will consider JASON if Hb remains below goal. Monitor CBC.

## 2022-12-08 NOTE — CONSULT NOTE ADULT - CONSULT REASON
history of CKD
respiratory distress
UTI
bradycardia
troponin elevation
uti/daysi
endocarditis
ACP in unbefriended patient

## 2022-12-08 NOTE — PROVIDER CONTACT NOTE (CRITICAL VALUE NOTIFICATION) - ASSESSMENT
RRT ongoing at bedside. pt is A&Ox4, pt is A&Ox4, pt complains of SOB & pt tachypneic. RRT ongoing at bedside.

## 2022-12-08 NOTE — PROGRESS NOTE ADULT - SUBJECTIVE AND OBJECTIVE BOX
S/24 Events: Patient seen and examined. With the rapid response team at bedside due to hypoxemia    Telemetry : SB/NSR  O:  T(C): 36.3 (12-08-22 @ 12:17), Max: 36.8 (12-07-22 @ 21:22)  HR: 76 (12-08-22 @ 13:45) (57 - 91)  BP: 112/67 (12-08-22 @ 13:45) (105/55 - 126/68)  RR: 19 (12-08-22 @ 12:17) (18 - 28)  SpO2: 93% (12-08-22 @ 12:17) (87% - 98%)    Daily     Daily   Gen: respiratory distress on high flow O2 therapy  HEENT: EOMI  CV: RRR, normal S1 + S2, no m/r/g  Lungs: bilateral crackles (usinf abd/accessory muscles to breathe)  Abd: soft, non-tender  Ext: No edema    Labs:                        10.0   51.19 )-----------( 77       ( 08 Dec 2022 09:26 )             30.5     12-08    138  |  101  |  96<H>  ----------------------------<  89  5.2   |  17<L>  |  4.45<H>    Ca    9.0      08 Dec 2022 09:26    TPro  6.6  /  Alb  2.8<L>  /  TBili  0.2  /  DBili  x   /  AST  297<H>  /  ALT  65<H>  /  AlkPhos  219<H>  12-08      CARDIAC MARKERS ( 08 Dec 2022 13:21 )  x     / x     / 47 U/L / x     / 4.9 ng/mL       DIagnostics :       Meds:  MEDICATIONS  (STANDING):  abacavir 300 milliGRAM(s) Oral two times a day  apixaban 2.5 milliGRAM(s) Oral two times a day  aspirin enteric coated 81 milliGRAM(s) Oral daily  buMETAnide Injectable 2 milliGRAM(s) IV Push two times a day  buMETAnide Injectable 2 milliGRAM(s) IV Push once  chlorhexidine 2% Cloths 1 Application(s) Topical daily  chlorhexidine 4% Liquid 1 Application(s) Topical <User Schedule>  doravirine 100 milliGRAM(s) Oral daily  famotidine    Tablet 20 milliGRAM(s) Oral daily  ferrous    sulfate 325 milliGRAM(s) Oral daily  folic acid 1 milliGRAM(s) Oral daily  influenza  Vaccine (HIGH DOSE) 0.7 milliLiter(s) IntraMuscular once  lamiVUDine- milliGRAM(s) Oral daily  multivitamin 1 Tablet(s) Oral daily  piperacillin/tazobactam IVPB. 3.375 Gram(s) IV Intermittent once  piperacillin/tazobactam IVPB.- 3.375 Gram(s) IV Intermittent once  senna 2 Tablet(s) Oral at bedtime  sodium bicarbonate 650 milliGRAM(s) Oral three times a day      A/P:

## 2022-12-09 NOTE — DIETITIAN INITIAL EVALUATION ADULT - NSFNSGIASSESSMENTFT_GEN_A_CORE
Per chart, last BM 12/7, RN confirmed. Ordered for antibiotics, imodium prn, and senna. Denies N/V, ordered for zofran prn and pepcid.

## 2022-12-09 NOTE — PROVIDER CONTACT NOTE (OTHER) - REASON
Pt c/o SOB/trouble breathing
patient short of breath and presenting with rigors
Pt SOB lying in bed
Pt states, "I have chest pressure."
Pt removed Shiley
Acute respiratory distress
Hyperkalemia

## 2022-12-09 NOTE — PROGRESS NOTE ADULT - TIME BILLING
hypoxic respiratory distress  diuresis  endocarditis
worsening heart dailure  dehiscence of mv  recurrent IE

## 2022-12-09 NOTE — PROVIDER CONTACT NOTE (OTHER) - NAME OF MD/NP/PA/DO NOTIFIED:
Cyndi Doran, ACP
LAUREN Latham
Mary Arora, ACP
Bethanie Moreno
MD Tapia
SANGEETHA Buck
SANGEETHA Buck

## 2022-12-09 NOTE — DIETITIAN INITIAL EVALUATION ADULT - NSFNSGIIOFT_GEN_A_CORE
I&O's Detail    08 Dec 2022 07:01  -  09 Dec 2022 07:00  --------------------------------------------------------  IN:    Bumetanide: 40 mL    Dexmedetomidine: 256.4 mL    IV PiggyBack: 100 mL    IV PiggyBack: 350 mL    Norepinephrine: 261.6 mL  Total IN: 1008 mL    OUT:    Indwelling Catheter - Urethral (mL): 400 mL    Other (mL): 3000 mL  Total OUT: 3400 mL    Total NET: -2392 mL      09 Dec 2022 07:01  -  09 Dec 2022 12:14  --------------------------------------------------------  IN:    Dexmedetomidine: 38.8 mL    Norepinephrine: 55 mL  Total IN: 93.8 mL    OUT:    Indwelling Catheter - Urethral (mL): 25 mL    Oral Fluid: 0 mL  Total OUT: 25 mL    Total NET: 68.8 mL

## 2022-12-09 NOTE — PROVIDER CONTACT NOTE (OTHER) - SITUATION
Patient c/o SOB, with hypoxia, tachypnea, labored breathing
Pt agitated, self-removed IJ SHARON Birch.
BMP resulted, K+ 5.8.
Pt c/o SOB/trouble breathing.
patient complaining of shortness of breath and shaking
Pt A&Ox4, alerted RN that pt was SOB, denies any pain, pt coughing with tremor B/L
Pt states, "I have chest pressure."

## 2022-12-09 NOTE — DIETITIAN INITIAL EVALUATION ADULT - PERTINENT MEDS FT
MEDICATIONS  (STANDING):  abacavir 300 milliGRAM(s) Oral two times a day  aspirin enteric coated 81 milliGRAM(s) Oral daily  buMETAnide Infusion 2 mG/Hr (10 mL/Hr) IV Continuous <Continuous>  ceFAZolin   IVPB 1000 milliGRAM(s) IV Intermittent every 12 hours  chlorhexidine 4% Liquid 1 Application(s) Topical <User Schedule>  dexMEDEtomidine Infusion 0.2 MICROgram(s)/kG/Hr (4.08 mL/Hr) IV Continuous <Continuous>  doravirine 100 milliGRAM(s) Oral daily  famotidine    Tablet 20 milliGRAM(s) Oral daily  ferrous    sulfate 325 milliGRAM(s) Oral daily  folic acid 1 milliGRAM(s) Oral daily  heparin   Injectable 5000 Unit(s) SubCutaneous every 8 hours  influenza  Vaccine (HIGH DOSE) 0.7 milliLiter(s) IntraMuscular once  lamiVUDine- milliGRAM(s) Oral daily  metolazone 2.5 milliGRAM(s) Oral <User Schedule>  multivitamin 1 Tablet(s) Oral daily  norepinephrine Infusion 0.05 MICROgram(s)/kG/Min (7.65 mL/Hr) IV Continuous <Continuous>  senna 2 Tablet(s) Oral at bedtime  sodium bicarbonate 650 milliGRAM(s) Oral three times a day  sodium zirconium cyclosilicate 10 Gram(s) Oral every 8 hours    MEDICATIONS  (PRN):  acetaminophen     Tablet .. 650 milliGRAM(s) Oral every 6 hours PRN Temp greater or equal to 38C (100.4F), Mild Pain (1 - 3)  lidocaine 2% Jelly 3 milliLiter(s) IntraUrethral two times a day PRN penile pain  loperamide 2 milliGRAM(s) Oral daily PRN Diarrhea  ondansetron Injectable 4 milliGRAM(s) IV Push every 6 hours PRN Nausea and/or Vomiting  simethicone 80 milliGRAM(s) Chew four times a day PRN Gas  sodium chloride 0.9% lock flush 10 milliLiter(s) IV Push every 1 hour PRN Pre/post blood products, medications, blood draw, and to maintain line patency

## 2022-12-09 NOTE — DIETITIAN INITIAL EVALUATION ADULT - DIET TYPE
Patient chart was reviewed by High Risk Transitions in Care RN for potential inclusion in [facility] program RN has determined patient is not a suitable candidate at this time. Discharge planned to [facility]    
If pt to continue po diet recommend renal diet in setting of elevated potassium and phos, continue Nepro supplement 2x/day, defer consistency to team/provider

## 2022-12-09 NOTE — PROVIDER CONTACT NOTE (OTHER) - BACKGROUND
Pt admitted for sepsis 2/2 (+) UTI, (+) Bcx staph aurous in aerobic and anerobic bottles
Patient admitted for sepsis, S/p lasix IVP 40mg
Pt is a 83 y/o male admitted for sepsis with a PMH of HIV, afib, frequent UTIs, etc.
Pt is a 83 y/o male admitted for sepsis with a PMH of HIV, afib, frequent UTIs, etc.
HX: BPH, endocarditis, a fib  DX: sepsis sec to UTI, heart block, inc troponins
IJ DL Shiley placed on 12/8/22 for HD
Patient admitted with sepsis with CXR on 12/7 noted pulmonary congestion, S/p 80mg IV Lasix at 16:50.

## 2022-12-09 NOTE — DIETITIAN INITIAL EVALUATION ADULT - ENERGY INTAKE
Fair (50-75%) Per flowsheet, pt with fair to good intake during admission. Per RN, pt coughing while drinking water. If warranted, EN recommendations available below.

## 2022-12-09 NOTE — PROGRESS NOTE ADULT - SUBJECTIVE AND OBJECTIVE BOX
[facility] DIVISION OF KIDNEY DISEASES AND HYPERTENSION -- FOLLOW UP NOTE  --------------------------------------------------------------------------------  Chief Complaint:    24 hour events/subjective:    [facility]  Patient had session of dialysis       PAST HISTORY  --------------------------------------------------------------------------------  No significant changes to PMH, PSH, FHx, SHx, unless otherwise noted    ALLERGIES & MEDICATIONS  --------------------------------------------------------------------------------  Allergies    No Known Allergies    Intolerances      Standing Inpatient Medications  abacavir 300 milliGRAM(s) Oral two times a day  aspirin enteric coated 81 milliGRAM(s) Oral daily  chlorhexidine 4% Liquid 1 Application(s) Topical <User Schedule>  dexMEDEtomidine Infusion 0.2 MICROgram(s)/kG/Hr IV Continuous <Continuous>  doravirine 100 milliGRAM(s) Oral daily  famotidine    Tablet 20 milliGRAM(s) Oral daily  ferrous    sulfate 325 milliGRAM(s) Oral daily  folic acid 1 milliGRAM(s) Oral daily  influenza  Vaccine (HIGH DOSE) 0.7 milliLiter(s) IntraMuscular once  lamiVUDine- milliGRAM(s) Oral daily  multivitamin 1 Tablet(s) Oral daily  norepinephrine Infusion 0.05 MICROgram(s)/kG/Min IV Continuous <Continuous>  piperacillin/tazobactam IVPB.. 3.375 Gram(s) IV Intermittent every 12 hours  senna 2 Tablet(s) Oral at bedtime  sodium bicarbonate 650 milliGRAM(s) Oral three times a day  sodium zirconium cyclosilicate 10 Gram(s) Oral every 8 hours    PRN Inpatient Medications  acetaminophen     Tablet .. 650 milliGRAM(s) Oral every 6 hours PRN  lidocaine 2% Jelly 3 milliLiter(s) IntraUrethral two times a day PRN  loperamide 2 milliGRAM(s) Oral daily PRN  ondansetron Injectable 4 milliGRAM(s) IV Push every 6 hours PRN  simethicone 80 milliGRAM(s) Chew four times a day PRN  sodium chloride 0.9% lock flush 10 milliLiter(s) IV Push every 1 hour PRN      REVIEW OF SYSTEMS  --------------------------------------------------------------------------------  Gen: No fevers/chills  Skin: No rashes  Head/Eyes/Ears: Normal hearing,   Respiratory: No dyspnea, cough  CV: No chest pain  GI: No abdominal pain, diarrhea  : No dysuria, hematuria  MSK: No  edema  Heme: No easy bruising or bleeding  Psych: No significant depression      All other systems were reviewed and are negative, except as noted.    VITALS/PHYSICAL EXAM  --------------------------------------------------------------------------------  T(C): 36.8 (12-09-22 @ 04:00), Max: 36.8 (12-09-22 @ 04:00)  HR: 74 (12-09-22 @ 06:45) (49 - 98)  BP: 103/47 (12-09-22 @ 06:45) (68/51 - 168/67)  RR: 18 (12-09-22 @ 06:45) (17 - 35)  SpO2: 100% (12-09-22 @ 06:45) (91% - 100%)  Wt(kg): --        12-08-22 @ 07:01  -  12-09-22 @ 07:00  --------------------------------------------------------  IN: 1008 mL / OUT: 3400 mL / NET: -2392 mL        Physical Exam:  	Gen: NAD  	HEENT: MMM  	Pulm: CTA B/L  	CV: S1S2  	Abd: Soft, +BS   	Ext: No LE edema B/L  	Neuro: Awake  	Skin: Warm and dry  	Vascular access:      LABS/STUDIES  --------------------------------------------------------------------------------              10.6   56.83 >-----------<  102      [12-08-22 @ 23:50]              32.4     137  |  101  |  106  ----------------------------<  212      [12-09-22 @ 02:36]  5.5   |  18  |  5.08        Ca     8.3     [12-09-22 @ 02:36]      iCa    1.10     [12-08 @ 23:50]      Mg     3.5     [12-09-22 @ 02:36]      Phos  9.1     [12-09-22 @ 02:36]    TPro  6.3  /  Alb  2.8  /  TBili  0.4  /  DBili  x   /  AST  218  /  ALT  22  /  AlkPhos  209  [12-09-22 @ 02:36]        CK 47      [12-08-22 @ 13:21]    Creatinine Trend:  SCr 5.08 [12-09 @ 02:36]  SCr 5.06 [12-08 @ 23:50]  SCr 4.63 [12-08 @ 15:24]  SCr 4.45 [12-08 @ 09:26]  SCr 4.15 [12-08 @ 03:04]    Urinalysis - [11-29-22 @ 20:45]      Color Yellow / Appearance Slightly Turbid / SG 1.015 / pH 6.0      Gluc Negative / Ketone Negative  / Bili Negative / Urobili Negative       Blood Moderate / Protein 100 / Leuk Est Large / Nitrite Negative      RBC 6 /  / Hyaline 2 / Gran 1 / Sq Epi  / Non Sq Epi 1 / Bacteria Negative      TSH 2.52      [12-02-22 @ 06:24]    HBsAb <3.0      [12-08-22 @ 20:44]  HBsAg Nonreact      [12-08-22 @ 20:44]  HCV 0.12, Nonreact      [12-08-22 @ 20:44]

## 2022-12-09 NOTE — PROGRESS NOTE ADULT - ASSESSMENT
ASSESSMENT: 82M w/ PMHx of HIV (viral load undetectable 08/22), Afib s/p DVVC and ablation (not on AC), MV endocarditis s/p bioprosthetic valve replacement, BPH (self catheterizes), frequent UTIs, presents with UTI and heart block, found to have persistent endocarditis not amenable to cardiac surgery, s/p melina PPM placement on 12/5, s/p RRT on 12/8 called for hypoxia, transferred to MICU for further management of worsening acute respiratory failure, functionally anuric status.       ======NEUROLOGY======  AAOx3-4 at baseline  - Occasionally agitated and pulling at masks and lines this admission  - Can trial precedex for sedation for procedure    ======PULMONARY======  #AHRF  - RRT called 12/8 for hypoxia to 80s, HF improved to 93%, brought up to MICU and put on BIPAP, with saturation >97%  - 2/2 volume overload  - CXR 12/7 showing vascular congestion; exam consistent with volume overload  - VBG 7.16/50; lactate 3.6  - ABG shows signs of respiratory on metabolic acidosis, started bipap  - monitor response to O2 support with VBG    ====CARDIOVASCULAR====  #Staph lugdudenesis bacteremia c/b infective endocarditis   #afib  #Mobitiz I, sinus bradycardia    - s/p cefazolin, broadened to Zosyn (12/8)  - trended trop (?concern for IE embolization to coronary arteries) - no significant ischemic changes at this time  - Rhythm showed sinus madonna with Mobitz I and pAF SVR  - per CTSx pt not eligible for intervention at this point     ========RENAL========  #progressive renal failure  #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 with CKD In the setting of HIV and urinary retention (does self cath at home) s/p tovar insertion on this admission,  - Urine studies from 11/29 with blood and protein present in the urine. 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 Patient with worsening volume status, started on diuresis today however still requiring non invasive positive pressure ventilation.   - Will perform session of Ultrafiltration in order to improve congestion however ultimately patient is not a good candidate for long term hemodialysis.   - Abdominal US done on 12/1 showed unilateral hydronephrosis.   - Check serological w/up for GN.   - Maintain Tovar in situ.   - Monitor BMP. Strict I/Os. Avoid nephrotoxins.    ===GASTROINTESTINAL====  - no active issues      ===INFECTIOUS DISEASE===  #endocarditis, staph lugdudenesis bacteremia + in Urine and BCx  - s/p cefazolin --> broadened to Zosyn  - treat until Bcx clear    #hx of HIV  - treat with ART home medications  - Continue PO abacavir 200mg q12hrs, lamivudine 100mg q24hrs and doravirine 1tab q24hrs    =====HEMATOLOGIC=====  #hx of afib and endocarditis  - For now, hold Eliquis 2.5 mg BID given possible procedure in future    #anemia  - Hemoglobin near goal, on oral iron.   - Would hold off on IV iron in the setting of bacteremia/ endocarditis.   - Consider JASON if Hb remains below goal.   - Monitor CBC.    ======ENDOCRINE=======  - no active issues    ===MUSCULOSKELETAL====  - no active issues    =====PROPHYLAXIS======  DVT prophylaxis: Eliquis 2.5 mg BID   ASSESSMENT: 82M w/ PMHx of HIV (viral load undetectable 08/22), Afib s/p DVVC and ablation (not on AC), MV endocarditis s/p bioprosthetic valve replacement, BPH (self catheterizes), frequent UTIs, presents with UTI and heart block, found to have persistent endocarditis not amenable to cardiac surgery, s/p melina PPM placement on 12/5, s/p RRT on 12/8 called for hypoxia, transferred to MICU for further management of worsening acute respiratory failure, functionally anuric status.       ======NEUROLOGY======  AAOx3-4 at baseline  - Occasionally agitated and pulling at masks and lines this admission  - On precedex gtt  - s/p 1x Zyprexa    ======PULMONARY======  #AHRF  - RRT called 12/8 for hypoxia to 80s, HF improved to 93%, brought up to MICU and put on BIPAP, with saturation >97%  - 2/2 volume overload  - CXR 12/7 showing vascular congestion; exam consistent with volume overload  - VBG 7.16/50; lactate 3.6  - ABG shows signs of respiratory on metabolic acidosis, started bipap  - now on HFNC, 40L, FiO2 80%  - monitor response to O2 support with VBG    ====CARDIOVASCULAR====  #Staph lugdudenesis bacteremia c/b infective endocarditis   #afib  #Mobitiz I, sinus bradycardia    - s/p cefazolin, changed to Zosyn during RRT --> changed back to Ancef 12/8  - trended trop (?concern for IE embolization to coronary arteries) - no significant ischemic changes at this time  - Rhythm showed sinus madonna with Mobitz I and pAF SVR  - per CTSx pt not eligible for intervention at this point     ========RENAL========  #progressive renal failure  #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 with CKD In the setting of HIV and urinary retention (does self cath at home) s/p tovar insertion on this admission,  - Urine studies from 11/29 with blood and protein present in the urine. 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 Patient with worsening volume status, started on diuresis today however still requiring non invasive positive pressure ventilation.   -S/p 1x Ultrafiltration, to improve congestion however ultimately patient is not a good candidate for long term hemodialysis.   - Abdominal US done on 12/1 showed unilateral hydronephrosis.   - Check serological w/up for GN.   - Maintain Tovar in situ.   - Monitor BMP. Strict I/Os. Avoid nephrotoxins.    ===GASTROINTESTINAL====  - no active issues      ===INFECTIOUS DISEASE===  #endocarditis, staph lugdudenesis bacteremia + in Urine and BCx  - s/p cefazolin --> broadened to Zosyn  - treat until Bcx clear  - ID following, recs appreciated    #hx of HIV  - treat with ART home medications  - Continue PO abacavir 200mg q12hrs, lamivudine 100mg q24hrs and doravirine 1tab q24hrs (renally dosed per pharm)    =====HEMATOLOGIC=====  #hx of afib and endocarditis  - For now, hold Eliquis 2.5 mg BID given possible procedure in future    #anemia  - Hemoglobin near goal, on oral iron.   - Would hold off on IV iron in the setting of bacteremia/ endocarditis.   - Consider JASON if Hb remains below goal.   - Monitor CBC.    ======ENDOCRINE=======  - no active issues    ===MUSCULOSKELETAL====  - no active issues    =====PROPHYLAXIS======  DVT prophylaxis: Heparin Subq   ASSESSMENT: 82M w/ PMHx of HIV (viral load undetectable 08/22), Afib s/p DVVC and ablation (not on AC), MV endocarditis s/p bioprosthetic valve replacement, BPH (self catheterizes), frequent UTIs, presents with UTI and heart block, found to have persistent endocarditis not amenable to cardiac surgery, s/p melina PPM placement on 12/5, s/p RRT on 12/8 called for hypoxia, transferred to MICU for further management of worsening acute respiratory failure, functionally anuric status.       ======NEUROLOGY======  AAOx3-4 at baseline  - Occasionally agitated and pulling at masks and lines this admission  - On precedex gtt  - s/p 1x Zyprexa    ======PULMONARY======  #AHRF  - RRT called 12/8 for hypoxia to 80s, HF improved to 93%, brought up to MICU and put on BIPAP, with saturation >97%  - 2/2 volume overload  - CXR 12/7 showing vascular congestion; exam consistent with volume overload  - VBG 7.16/50; lactate 3.6  - ABG shows signs of respiratory on metabolic acidosis, started bipap  - now on HFNC, 40L, FiO2 80%  - monitor response to O2 support with VBG    ====CARDIOVASCULAR====  #Staph lugdudenesis bacteremia c/b infective endocarditis   #afib  #Mobitiz I, sinus bradycardia    - s/p cefazolin, changed to Zosyn during RRT --> changed back to Ancef 12/8  - trended trop (?concern for IE embolization to coronary arteries) - no significant ischemic changes at this time  - Rhythm showed sinus madonna with Mobitz I and pAF SVR  - per CTSx pt not eligible for intervention at this point     ========RENAL========  #progressive renal failure  #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 Patient with worsening volume status, started on diuresis today however still requiring non invasive positive pressure ventilation.   -S/p 1x Ultrafiltration, to improve congestion however ultimately patient is not a good candidate for long term hemodialysis.   - Abdominal US done on 12/1 showed unilateral hydronephrosis.   - Check serological w/up for GN.   - Maintain Tovar in situ.   - Monitor BMP. Strict I/Os. Avoid nephrotoxins.    ===GASTROINTESTINAL====  - no active issues      ===INFECTIOUS DISEASE===  #endocarditis, staph lugdudenesis bacteremia + in Urine and BCx  - s/p cefazolin --> broadened to Zosyn  - treat until Bcx clear  - ID following, recs appreciated    #hx of HIV  - treat with ART home medications  - Continue PO abacavir 200mg q12hrs, lamivudine 100mg q24hrs and doravirine 1tab q24hrs (renally dosed per pharm)    =====HEMATOLOGIC=====  #hx of afib and endocarditis  - For now, hold Eliquis 2.5 mg BID given possible procedure in future    #anemia  - Hemoglobin near goal, on oral iron.   - Would hold off on IV iron in the setting of bacteremia/ endocarditis.   - Consider JASON if Hb remains below goal.   - Monitor CBC.    ======ENDOCRINE=======  - no active issues    ===MUSCULOSKELETAL====  - no active issues    =====PROPHYLAXIS======  DVT prophylaxis: Heparin Subq

## 2022-12-09 NOTE — DIETITIAN INITIAL EVALUATION ADULT - PROBLEM/PLAN-4
DISPLAY PLAN FREE TEXT
Implemented All Universal Safety Interventions:  [place] to call system. Call bell, personal items and telephone within reach. Instruct patient to call for assistance. Room bathroom lighting operational. Non-slip footwear when patient is off stretcher. Physically safe environment: no spills, clutter or unnecessary equipment. Stretcher in lowest position, wheels locked, appropriate side rails in place.

## 2022-12-09 NOTE — DIETITIAN INITIAL EVALUATION ADULT - REASON
nutrition focused physical exam deferred, pt confused and disoriented, pulling at wires, not appropriate at this time; observed moderate temporal, buccal, shoulder, and clavicle wasting nutrition focused physical exam deferred, pt confused and disoriented, pulling at wires, not appropriate at this time

## 2022-12-09 NOTE — DIETITIAN INITIAL EVALUATION ADULT - ADD RECOMMEND
1) Defer nutrition source to team, if warranted EN recommendations above  2) Continue multivitamin, folic acid, and iron daily as medically feasible  3)  1) Defer nutrition source to team, if warranted, EN recommendations above  2) Continue folic acid, and iron daily as medically feasible, if able consider changing multivitamin to nephrovite  3) Monitor diet tolerance, labs, hydration, GI function, skin integrity and wt trends

## 2022-12-09 NOTE — DIETITIAN INITIAL EVALUATION ADULT - REASON INDICATOR FOR ASSESSMENT
Assessment indicated for length of stay   Sources: Medical Record, RN; patient fatigued and unarousable during visit

## 2022-12-09 NOTE — PROGRESS NOTE ADULT - SUBJECTIVE AND OBJECTIVE BOX
Follow Up:  Aurelia aguilar prosthetic Mitral Valve endocarditis    Interval History/ROS: somnolent, tachypneic on high flow    Allergies  No Known Allergies        ANTIMICROBIALS:  abacavir 300 two times a day  ceFAZolin   IVPB 1000 every 12 hours  doravirine 100 daily  lamiVUDine- daily      OTHER MEDS:  MEDICATIONS  (STANDING):  acetaminophen     Tablet .. 650 every 6 hours PRN  aspirin enteric coated 81 daily  buMETAnide Infusion 2 <Continuous>  dexMEDEtomidine Infusion 0.2 <Continuous>  famotidine    Tablet 20 daily  heparin   Injectable 5000 every 8 hours  influenza  Vaccine (HIGH DOSE) 0.7 once  loperamide 2 daily PRN  LORazepam   Injectable 0.5 every 4 hours PRN  metolazone 2.5 <User Schedule>  norepinephrine Infusion 0.05 <Continuous>  ondansetron Injectable 4 every 6 hours PRN  senna 2 at bedtime  simethicone 80 four times a day PRN      Vital Signs Last 24 Hrs  T(C): 36.3 (09 Dec 2022 16:00), Max: 36.8 (09 Dec 2022 04:00)  T(F): 97.3 (09 Dec 2022 16:00), Max: 98.2 (09 Dec 2022 04:00)  HR: 84 (09 Dec 2022 17:45) (49 - 95)  BP: 111/56 (09 Dec 2022 17:45) (68/51 - 168/67)  BP(mean): 81 (09 Dec 2022 17:45) (54 - 98)  RR: 15 (09 Dec 2022 17:45) (15 - 57)  SpO2: 100% (09 Dec 2022 17:45) (88% - 100%)    Parameters below as of 09 Dec 2022 16:00  Patient On (Oxygen Delivery Method): HFNC  O2 Flow (L/min): 40  O2 Concentration (%): 70    PHYSICAL EXAM:  General: somnolent, tachypneic on high flow  Neurology: unresponsive  Respiratory: diffuse crackles  CV: hyperdynamic chest,  S1S2, no murmurs, rubs or gallops  I do not apprecite MR murmur  Abdominal: Soft, Non-tender, non-distended, normal bowel sounds  Line Sites: Clear  Skin: No rash                        10.6   56.83 )-----------( 102      ( 08 Dec 2022 23:50 )             32.4   WBC Count: 56.83 (12-08 @ 23:50)  WBC Count: 54.23 (12-08 @ 15:24)  87.5% Neut 3.3% [place]  2.5 Myelo  WBC Count: 51.19 (12-08 @ 09:26)  WBC Count: 42.93 (12-07 @ 08:27)  WBC Count: 45.43 (12-07 @ 06:46)  WBC Count: 35.74 (12-06 @ 05:21)  WBC Count: 36.92 (12-05 @ 07:08)    12-09    138  |  101  |  120<H>  ----------------------------<  179<H>  5.8<H>   |  18<L>  |  5.35<H>  Creatinine: 5.35 (12-09 @ 15:11)    Creatinine: 5.08 (12-09 @ 02:36)    Creatinine: 5.06 (12-08 @ 23:50)    Creatinine: 4.63 (12-08 @ 15:24)    Creatinine: 4.45 (12-08 @ 09:26)    Creatinine: 4.15 (12-08 @ 03:04)    Creatinine: 3.85 (12-07 @ 21:42)    Creatinine: 3.41 (12-07 @ 06:54)    Creatinine: 3.18 (12-06 @ 05:21)    Creatinine: 2.65 (12-05 @ 07:08)    Ca    8.5      09 Dec 2022 15:11  Phos  9.9     12-09  Mg     3.6     12-09    TPro  6.3  /  Alb  2.8<L>  /  TBili  0.4  /  DBili  x   /  AST  218<H>  /  ALT  22  /  AlkPhos  209<H>  12-09      MICROBIOLOGY:  .Blood Blood-Peripheral  12-07-22   No growth to date.  --  --      .Blood Blood-Peripheral  12-03-22   No Growth Final  --  --      .Blood Blood-Peripheral  12-01-22   No Growth Final  --  --      Clean Catch Clean Catch (Midstream)  11-29-22   >100,000 CFU/ml Staphylococcus lugdunensis "Susceptibilities not  performed"  --  --      .Blood Blood-Peripheral  11-29-22   Growth in aerobic and anaerobic bottles: Staphylococcus lugdunensis  See previous culture [id number]  --    Growth in aerobic and anaerobic bottles: Gram Positive Cocci in Clusters      .Blood Blood-Peripheral  11-29-22   Growth in aerobic and anaerobic bottles: Staphylococcus lugdunensis    HIV-1 RNA Quantitative, Viral Load Log: NOT DET. lg /mL (12-01-22 @ 05:49)  HIV-1 RNA Quantitative, Viral Load Log: NOT DET. lg /mL (08-08-22 @ 07:49)      < from: Limited Transthoracic Echo (12.08.22 @ 15:15) >  Conclusions:  1. Bioprosthetic mitral valve replacement that is rocking.  Echogenic structure is seen on the mitral valve consistent  with vegetation. The mitral valve is dehisced.  There is  severe paravalvular regurgitation. Mean transmitral valve  gradient equals 14 mm Hg, which is elevated even in the  setting of a bioprosthetic mitral valve replacement. There  is an increase in gradient probably due to a combination of  stenosis and regurgitation.  2. Endocardium not well visualized; grossly normal left  ventricular systolic function. Visually, LVEF is 60-65%.  3. Moderate right atrial enlargement.  4. The right ventricle is not well visualized; grossly  normal right ventricular systolic function.  5. Tricuspid valvenot well visualized. Moderate-severe  tricuspid regurgitation. Difficult to determine the  mechanism of regurgitation. RVSP is at least 64 mmHg.  6. No pericardial effusion seen.  Discussed with Dr. Reyes at 4:40 pm.  *** Compared with echocardiogramof 12/2/2022, there is now  MV dehiscence and severe paravalvular regurgitation,  moderate to severe TR.    < end of copied text >      RADIOLOGY:    Beck Ferguson MD; Division of Infectious Disease; Pager: [phone]; nights and weekends: [phone]

## 2022-12-09 NOTE — CHART NOTE - NSCHARTNOTEFT_GEN_A_CORE
:  Gottesman/LeJeune    INDICATION:  Shock    PROCEDURE:  [x ] LIMITED ECHO  [x ] LIMITED CHEST  [ ] LIMITED RETROPERITONEAL  [ ] LIMITED ABDOMINAL  [ ] LIMITED DVT  [ ] NEEDLE GUIDANCE VASCULAR  [ ] NEEDLE GUIDANCE THORACENTESIS  [ ] NEEDLE GUIDANCE PARACENTESIS  [ ] NEEDLE GUIDANCE PERICARDIOCENTESIS  [ ] OTHER    FINDINGS:  few b lines that do not completely obstruct all visualization of a-lines. small-moderate b/l pleural effusions.    Normal appearing biventricular systolic function. IVC >50% respirophasic variation    INTERPRETATION:  b/l pleural effusions with some b lines throughout  Normal biv systolic function    Images uploaded to [employer]    Time spent on the procedure was separate from the critical care time spent for patient care. :  Juju/LeJeune    INDICATION:  Shock    PROCEDURE:  [x ] LIMITED ECHO  [x ] LIMITED CHEST  [ ] LIMITED RETROPERITONEAL  [ ] LIMITED ABDOMINAL  [ ] LIMITED DVT  [ ] NEEDLE GUIDANCE VASCULAR  [ ] NEEDLE GUIDANCE THORACENTESIS  [ ] NEEDLE GUIDANCE PARACENTESIS  [ ] NEEDLE GUIDANCE PERICARDIOCENTESIS  [ ] OTHER    FINDINGS:  few b lines that do not completely obstruct all visualization of a-lines. small-moderate b/l pleural effusions.    Normal appearing biventricular systolic function. IVC >50% respirophasic variation    INTERPRETATION:  b/l pleural effusions with some b lines throughout  Normal biv systolic function  I have assisted and supervised the entire procedure.             Robbie Matute MD  Images uploaded to [employer]    Time spent on the procedure was separate from the critical care time spent for patient care.

## 2022-12-09 NOTE — PROGRESS NOTE ADULT - ASSESSMENT
This is a 82 year old male with PMH of HIV VLUD, BPH (intermittent cath), Atrial fibrillation, DCCV and ablation and MV endocarditis s/p bioprosthetic valve replacement, patient presenting with weakness noted to have bacteremia (Staph Lugdunensis). Now seen to have bacteremia, nephrology consulted for PICC placement clearance  Patient renal function worsening despite diuresis, patient had RRT for hypoxic respiratory distress. session of dialysis was performed to improve congestion, HD later performed for Hyperkalemia

## 2022-12-09 NOTE — CHART NOTE - NSCHARTNOTEFT_GEN_A_CORE
Case discussed with MICU resident; palliative consult cancelled  in the event patient loses capacity, will need to defer to 2PC consent for emergent decisions.  For any considering of withholding or withdrawing of life sustaining treatments in a patient without a surrogate, please reconsult our team.  [phone]

## 2022-12-09 NOTE — PROGRESS NOTE ADULT - PROBLEM SELECTOR PLAN 3
Patient with hyperkalemia, continue Lokelma, Calcium gluconate, and insulin  Patient with only 400 cc of urine output yesterday   WIll perform next HD session with clearance for hyperkalemia and acidosis.

## 2022-12-09 NOTE — PROGRESS NOTE ADULT - PROBLEM SELECTOR PLAN 1
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   Urine studies from 11/29 with blood and protein present in the urine. 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 Patient with worsening volume status, started on diuresis 12/9 however with little urine production.  Patient required non invasive positive pressure ventilation and RRT was called for hypoxic respiratory distress. Performed session of HD for UF in an attempt to improve venous congestion and volume status.   Despite azotemia patient not uremic, noted to be  hyperkalemic, will perform clearance on next HD session.   Abdominal US done on 12/1 showed unilateral hydronephrosis.   Check serological w/up for GN.   Maintain Tovar in situ.   Monitor BMP. Strict I/Os. Avoid nephrotoxins. 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   Urine studies from 11/29 with blood and protein present in the urine. 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 Patient with worsening volume status, started on diuresis 12/9 however with little urine production.  Patient required non invasive positive pressure ventilation and RRT was called for hypoxic respiratory distress. Performed session of HD for UF in an attempt to improve venous congestion and volume status.   Despite azotemia patient not uremic, noted to be  hyperkalemic, will perform clearance on next HD session.   Abdominal US done on 12/1 showed unilateral hydronephrosis.   Check serological w/up for GN.   Maintain Tovar in situ.   Monitor BMP. Strict I/Os. Avoid nephrotoxins.  Please start bumetamide drip with metolazone augmentation.

## 2022-12-09 NOTE — DIETITIAN INITIAL EVALUATION ADULT - ENTERAL
If within GOC, consider initiating Nepro @10 mL and titrate up to goal rate of 80mL x 18hrs, provides 1440 formula/day, 2592 kcal/day, 117 g pro/day, 1047 mL/day. Meets 32 kcal/kg, 1.4 g pro/kg based on dosing wt (81.6kg)

## 2022-12-09 NOTE — DIETITIAN INITIAL EVALUATION ADULT - PERTINENT LABORATORY DATA
12-09    137  |  101  |  106<H>  ----------------------------<  212<H>  5.5<H>   |  18<L>  |  5.08<H>    Ca    8.3<L>      09 Dec 2022 02:36  Phos  9.1     12-09  Mg     3.5     12-09    TPro  6.3  /  Alb  2.8<L>  /  TBili  0.4  /  DBili  x   /  AST  218<H>  /  ALT  22  /  AlkPhos  209<H>  12-09  POCT Blood Glucose.: 212 mg/dL (12-09-22 @ 00:50)

## 2022-12-09 NOTE — PROGRESS NOTE ADULT - SUBJECTIVE AND OBJECTIVE BOX
MICU Progress Note    Radha Mae MD  PGY1   Available on TEAMS    INTERVAL HPI/OVERNIGHT EVENTS:    SUBJECTIVE: Patient seen and examined at bedside.       OBJECTIVE:    VITAL SIGNS:  ICU Vital Signs Last 24 Hrs  T(C): 36.8 (09 Dec 2022 04:00), Max: 36.8 (09 Dec 2022 04:00)  T(F): 98.2 (09 Dec 2022 04:00), Max: 98.2 (09 Dec 2022 04:00)  HR: 74 (09 Dec 2022 06:45) (49 - 98)  BP: 103/47 (09 Dec 2022 06:45) (68/51 - 168/67)  BP(mean): 68 (09 Dec 2022 06:45) (54 - 100)  ABP: --  ABP(mean): --  RR: 18 (09 Dec 2022 06:45) (17 - 35)  SpO2: 100% (09 Dec 2022 06:45) (91% - 100%)    O2 Parameters below as of 09 Dec 2022 03:16  Patient On (Oxygen Delivery Method): nasal cannula, high flow  O2 Flow (L/min): 40  O2 Concentration (%): 100          12-08 @ 07:01  -  12-09 @ 07:00  --------------------------------------------------------  IN: 1008 mL / OUT: 3400 mL / NET: -2392 mL      CAPILLARY BLOOD GLUCOSE      POCT Blood Glucose.: 212 mg/dL (09 Dec 2022 00:50)      PHYSICAL EXAM:  General: sedated  HEENT: NCAT, PERRL, clear conjunctiva, sclera anicteric  Neck: supple, no JVD  Respiratory: CTAB  Cardiovascular: RRR, S1S2, no m/r/g  Abdomen: soft, nontender, nondistended, normal bowel sounds  Extremities: no edema, no cyanosis  Skin: warm, perfused  Neurological: nonfocal    MEDICATIONS:  MEDICATIONS  (STANDING):  abacavir 300 milliGRAM(s) Oral two times a day  aspirin enteric coated 81 milliGRAM(s) Oral daily  chlorhexidine 4% Liquid 1 Application(s) Topical <User Schedule>  dexMEDEtomidine Infusion 0.2 MICROgram(s)/kG/Hr (4.08 mL/Hr) IV Continuous <Continuous>  doravirine 100 milliGRAM(s) Oral daily  famotidine    Tablet 20 milliGRAM(s) Oral daily  ferrous    sulfate 325 milliGRAM(s) Oral daily  folic acid 1 milliGRAM(s) Oral daily  influenza  Vaccine (HIGH DOSE) 0.7 milliLiter(s) IntraMuscular once  lamiVUDine- milliGRAM(s) Oral daily  multivitamin 1 Tablet(s) Oral daily  norepinephrine Infusion 0.05 MICROgram(s)/kG/Min (7.65 mL/Hr) IV Continuous <Continuous>  piperacillin/tazobactam IVPB.. 3.375 Gram(s) IV Intermittent every 12 hours  senna 2 Tablet(s) Oral at bedtime  sodium bicarbonate 650 milliGRAM(s) Oral three times a day  sodium zirconium cyclosilicate 10 Gram(s) Oral every 8 hours    MEDICATIONS  (PRN):  acetaminophen     Tablet .. 650 milliGRAM(s) Oral every 6 hours PRN Temp greater or equal to 38C (100.4F), Mild Pain (1 - 3)  lidocaine 2% Jelly 3 milliLiter(s) IntraUrethral two times a day PRN penile pain  loperamide 2 milliGRAM(s) Oral daily PRN Diarrhea  ondansetron Injectable 4 milliGRAM(s) IV Push every 6 hours PRN Nausea and/or Vomiting  simethicone 80 milliGRAM(s) Chew four times a day PRN Gas  sodium chloride 0.9% lock flush 10 milliLiter(s) IV Push every 1 hour PRN Pre/post blood products, medications, blood draw, and to maintain line patency      ALLERGIES:  Allergies    No Known Allergies    Intolerances        LABS:                        10.6   56.83 )-----------( 102      ( 08 Dec 2022 23:50 )             32.4     12-09    137  |  101  |  106<H>  ----------------------------<  212<H>  5.5<H>   |  18<L>  |  5.08<H>    Ca    8.3<L>      09 Dec 2022 02:36  Phos  9.1     12-09  Mg     3.5     12-09    TPro  6.3  /  Alb  2.8<L>  /  TBili  0.4  /  DBili  x   /  AST  218<H>  /  ALT  22  /  AlkPhos  209<H>  12-09          RADIOLOGY & ADDITIONAL TESTS: Reviewed. MICU Progress Note    Radha Mae MD  PGY1   Available on TEAMS    INTERVAL HPI/OVERNIGHT EVENTS: Overnight patient had ultrafiltration done. Pulled out right shiley. Had episode of agitation. Zyprexa was given. Patient also was placed on Levophed during Ultrafiltration at bedside. Patient still on 0.21 of levophed and 1 of Precedex.     SUBJECTIVE: Patient seen and examined at bedside.   Very sleepy at times. Has intermittent confusion and wanting to pull off NC mask but redirectable.     OBJECTIVE:    VITAL SIGNS:  ICU Vital Signs Last 24 Hrs  T(C): 36.8 (09 Dec 2022 04:00), Max: 36.8 (09 Dec 2022 04:00)  T(F): 98.2 (09 Dec 2022 04:00), Max: 98.2 (09 Dec 2022 04:00)  HR: 74 (09 Dec 2022 06:45) (49 - 98)  BP: 103/47 (09 Dec 2022 06:45) (68/51 - 168/67)  BP(mean): 68 (09 Dec 2022 06:45) (54 - 100)  ABP: --  ABP(mean): --  RR: 18 (09 Dec 2022 06:45) (17 - 35)  SpO2: 100% (09 Dec 2022 06:45) (91% - 100%)    O2 Parameters below as of 09 Dec 2022 03:16  Patient On (Oxygen Delivery Method): nasal cannula, high flow  O2 Flow (L/min): 40  O2 Concentration (%): 100    12-08 @ 07:01  -  12-09 @ 07:00  --------------------------------------------------------  IN: 1008 mL / OUT: 3400 mL / NET: -2392 mL      CAPILLARY BLOOD GLUCOSE  POCT Blood Glucose.: 212 mg/dL (09 Dec 2022 00:50)    PHYSICAL EXAM:  General: sedated  HEENT: NCAT, PERRL, clear conjunctiva, sclera anicteric  Neck: supple, no JVD  Respiratory: notable bilateral wheezing on auscultation  Cardiovascular: RRR, S1S2, no m/r/g  Abdomen: soft, nontender, nondistended, normal bowel sounds  Extremities: pitting edema, ecchymosis in all extremities  Skin: warm, perfused  Neurological: nonfocal    MEDICATIONS:  MEDICATIONS  (STANDING):  abacavir 300 milliGRAM(s) Oral two times a day  aspirin enteric coated 81 milliGRAM(s) Oral daily  chlorhexidine 4% Liquid 1 Application(s) Topical <User Schedule>  dexMEDEtomidine Infusion 0.2 MICROgram(s)/kG/Hr (4.08 mL/Hr) IV Continuous <Continuous>  doravirine 100 milliGRAM(s) Oral daily  famotidine    Tablet 20 milliGRAM(s) Oral daily  ferrous    sulfate 325 milliGRAM(s) Oral daily  folic acid 1 milliGRAM(s) Oral daily  influenza  Vaccine (HIGH DOSE) 0.7 milliLiter(s) IntraMuscular once  lamiVUDine- milliGRAM(s) Oral daily  multivitamin 1 Tablet(s) Oral daily  norepinephrine Infusion 0.05 MICROgram(s)/kG/Min (7.65 mL/Hr) IV Continuous <Continuous>  piperacillin/tazobactam IVPB.. 3.375 Gram(s) IV Intermittent every 12 hours  senna 2 Tablet(s) Oral at bedtime  sodium bicarbonate 650 milliGRAM(s) Oral three times a day  sodium zirconium cyclosilicate 10 Gram(s) Oral every 8 hours    MEDICATIONS  (PRN):  acetaminophen     Tablet .. 650 milliGRAM(s) Oral every 6 hours PRN Temp greater or equal to 38C (100.4F), Mild Pain (1 - 3)  lidocaine 2% Jelly 3 milliLiter(s) IntraUrethral two times a day PRN penile pain  loperamide 2 milliGRAM(s) Oral daily PRN Diarrhea  ondansetron Injectable 4 milliGRAM(s) IV Push every 6 hours PRN Nausea and/or Vomiting  simethicone 80 milliGRAM(s) Chew four times a day PRN Gas  sodium chloride 0.9% lock flush 10 milliLiter(s) IV Push every 1 hour PRN Pre/post blood products, medications, blood draw, and to maintain line patency      ALLERGIES:  Allergies    No Known Allergies    Intolerances        LABS:                        10.6   56.83 )-----------( 102      ( 08 Dec 2022 23:50 )             32.4     12-09    137  |  101  |  106<H>  ----------------------------<  212<H>  5.5<H>   |  18<L>  |  5.08<H>    Ca    8.3<L>      09 Dec 2022 02:36  Phos  9.1     12-09  Mg     3.5     12-09    TPro  6.3  /  Alb  2.8<L>  /  TBili  0.4  /  DBili  x   /  AST  218<H>  /  ALT  22  /  AlkPhos  209<H>  12-09          RADIOLOGY & ADDITIONAL TESTS: Reviewed.

## 2022-12-09 NOTE — PROGRESS NOTE ADULT - SUBJECTIVE AND OBJECTIVE BOX
S/24 Events: Patient seen and examined. Denies CP, SOB, dizziness, LH, near syncope or sycope.   No acute events overnight.   Telemetry : -  O:  T(C): 36.2 (12-09-22 @ 12:00), Max: 36.8 (12-09-22 @ 04:00)  HR: 84 (12-09-22 @ 14:30) (49 - 98)  BP: 92/45 (12-09-22 @ 14:30) (68/51 - 168/67)  RR: 16 (12-09-22 @ 14:30) (15 - 57)  SpO2: 100% (12-09-22 @ 14:30) (88% - 100%)    Daily     Daily   Gen: NAD  HEENT: EOMI  CV: RRR, normal S1 + S2, no m/r/g  Lungs: CTAB  Abd: soft, non-tender  Ext: No edema    Labs:                        10.6   56.83 )-----------( 102      ( 08 Dec 2022 23:50 )             32.4     12-09    137  |  101  |  106<H>  ----------------------------<  212<H>  5.5<H>   |  18<L>  |  5.08<H>    Ca    8.3<L>      09 Dec 2022 02:36  Phos  9.1     12-09  Mg     3.5     12-09    TPro  6.3  /  Alb  2.8<L>  /  TBili  0.4  /  DBili  x   /  AST  218<H>  /  ALT  22  /  AlkPhos  209<H>  12-09      CARDIAC MARKERS ( 08 Dec 2022 15:24 )  x     / x     / x     / x     / 6.8 ng/mL  CARDIAC MARKERS ( 08 Dec 2022 13:21 )  x     / x     / 47 U/L / x     / 4.9 ng/mL       DIagnostics :       Meds:  MEDICATIONS  (STANDING):  abacavir 300 milliGRAM(s) Oral two times a day  aspirin enteric coated 81 milliGRAM(s) Oral daily  buMETAnide Infusion 2 mG/Hr (10 mL/Hr) IV Continuous <Continuous>  ceFAZolin   IVPB 1000 milliGRAM(s) IV Intermittent every 12 hours  chlorhexidine 4% Liquid 1 Application(s) Topical <User Schedule>  dexMEDEtomidine Infusion 0.2 MICROgram(s)/kG/Hr (4.08 mL/Hr) IV Continuous <Continuous>  doravirine 100 milliGRAM(s) Oral daily  famotidine    Tablet 20 milliGRAM(s) Oral daily  ferrous    sulfate 325 milliGRAM(s) Oral daily  folic acid 1 milliGRAM(s) Oral daily  heparin   Injectable 5000 Unit(s) SubCutaneous every 8 hours  influenza  Vaccine (HIGH DOSE) 0.7 milliLiter(s) IntraMuscular once  lamiVUDine- milliGRAM(s) Oral daily  metolazone 2.5 milliGRAM(s) Oral <User Schedule>  multivitamin 1 Tablet(s) Oral daily  norepinephrine Infusion 0.05 MICROgram(s)/kG/Min (7.65 mL/Hr) IV Continuous <Continuous>  senna 2 Tablet(s) Oral at bedtime  sodium bicarbonate 650 milliGRAM(s) Oral three times a day  sodium zirconium cyclosilicate 10 Gram(s) Oral every 8 hours      A/P:         No

## 2022-12-09 NOTE — DIETITIAN INITIAL EVALUATION ADULT - PHYSCIAL ASSESSMENT
Wt hx per [facility] HIE: 177 pounds 10/18, 182 pounds 8/5, 173 pounds 7/11. Current dosing wt 180 pounds (12/5).  - Hx depicts relatively stable wts

## 2022-12-09 NOTE — DIETITIAN INITIAL EVALUATION ADULT - ORAL INTAKE PTA/DIET HISTORY
Unable to obtain subjective information as pt unarousable at time of visit Unable to obtain subjective information as pt unarousable at time of visit. NKFA.

## 2022-12-09 NOTE — DIETITIAN INITIAL EVALUATION ADULT - REASON FOR ADMISSION
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

## 2022-12-09 NOTE — DIETITIAN INITIAL EVALUATION ADULT - OTHER INFO
- Pt on levophed in setting of hypotension  - Ordered for precedex in setting of agitation, pt pulling at wires and HFNC per chart  - Pt with SUDHEER on CKD4, nephrology following, noted hyperkalemic, ordered for lokelma, hypermagnesemic, hyperphosphatemic, last HD session 12/8  - Serum glucose and POCT fingersticks elevated, continue to monitor - Ordered for levophed in setting of hypotension  - Ordered for precedex, pt pulling at wires and high flow nasal cannula per chart  - Pt with SUDHEER on CKD4 related to HIV, nephrology following, noted hyperkalemic (ordered for lokelma), hypermagnesemic, hyperphosphatemic, last HD session 12/8  - Serum glucose and POCT fingersticks elevated, continue to monitor

## 2022-12-09 NOTE — PROVIDER CONTACT NOTE (OTHER) - DATE AND TIME:
07-Dec-2022 21:42
09-Dec-2022 05:10
01-Dec-2022 23:45
08-Dec-2022 12:25
07-Dec-2022 21:00
07-Dec-2022 13:00
01-Dec-2022 23:30

## 2022-12-09 NOTE — PROVIDER CONTACT NOTE (OTHER) - ACTION/TREATMENT ORDERED:
ACP at bedside, pts back warm on palpation, 650mg PO tylenol admin per RN, ROSMERY Hong added to AM labs, no further actions at this time, will recheck VS in AM with lab work, call bell within reach.
Provider notified. Place patient on Venturi Mask 31% 8L. Stat BMP, Stat IV Lasix 40mg
RN applied pressure to site. Site dressed w/gauze and tape. 5mg Zyprexa given.
As per Mary Arora, ACP get an EKG. Will continue to monitor.
Provider notified, Ordered EKG, IVP Dextrose 50% , Insulin- Humulin R 10 units  and Lokelma 10 grams,  Repeat BMP and draw VBG at 0200
As per Cyndi Doran, LAUREN, give one time dose of Duoneb. Will continue to monitor.
IV Tylenol ordered and given

## 2022-12-09 NOTE — PROGRESS NOTE ADULT - ASSESSMENT
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  12/5  s/p Micra MDT PPM via Right Femoral Vein     Antibiotics:  Cefepime 11/29 -> 12/2  Meropenem 11/30  Vancomycin 11/30 ->12/2  Cefazolin 12/2 -->  Zosyn 12/8-->9      IMPRESSION:  Prosthetic MV endocarditis  Staph lugdunensis bacteremia/bacteruria  Bacteremia cleared as of 12/2/22  HIV well controlled on ARTs  Previous Staph epi endocarditis  Fever  - afebrile since 12/2  Leukocytosis  - increasing  renal failure  CHF    Mitral Valve dehiscence 12/8 echo demonstrating new MV dehiscence and severe paravalvular regurgitation,    not a surgical candidate - guarded out look  unfortunate - in my opinion, resuscitation would be medically futile    RECOMMENDATIONS:  Continue IV cefazolin 1 gm q12hrs      discussed with  MICU residents  Please call ID if needed over weekend

## 2022-12-09 NOTE — PROGRESS NOTE ADULT - ASSESSMENT
82M w/ PMHx of HIV (viral load undetectable 08/22), Afib s/p DVVC and ablation (not on AC), MV endocarditis s/p bioprosthetic valve replacement, BPH (self catheterizes), frequent UTIs, presents with UTI and heart block, found to have persistent endocarditis not amenable to cardiac surgery, s/p melina PPM placement on 12/5, s/p RRT on 12/8  for hypoxia, transferred to MICU for further management of worsening acute respiratory failure, functionally anuric status.     #Endocarditis  #AFIB   #Staph lugdunensis bacteremia   - repeat TTE reviewed - dehiscence of the MV noted with severe pravalvular regurgitation - spoke with the resident on the MICU team and advised to get CTS involved ASAP  - pt was Seen by CT surgery previously for recurrent IE - plan for medical management  - in light of the respiratory status, concern for worsening valvular pathology in setting of the IE - would obtain a stat echo - discussed with the RRT at bedside  - serial ECGs - worsening block and arrhythmias may all be related to the progression of his IE, in addition to the metabolic issues  - team had a prolonged d/w pt on 12/8 in regards to advance life support - he is a full code  - poor prognosis

## 2022-12-09 NOTE — DIETITIAN INITIAL EVALUATION ADULT - NSFNSNUTRHOMESUPPLEMENTFT_GEN_A_CORE
Per chart, pt takes a multivitamin, folic acid, vitamin D, and iron supplements at home. Per chart, pt takes a multivitamin, folic acid, vitamin D, and iron supplements at home. Unknown if pt took oral nutrition supplements.

## 2022-12-10 NOTE — PROGRESS NOTE ADULT - SUBJECTIVE AND OBJECTIVE BOX
[facility] DIVISION OF KIDNEY DISEASES AND HYPERTENSION -- FOLLOW UP NOTE  --------------------------------------------------------------------------------  Chief Complaint:    24 hour events/subjective:    Patient intubated   Patient pocused, IVC 2.4 cm   noted to have frothy blood during intubation  900 cc UOP.     PAST HISTORY  --------------------------------------------------------------------------------  No significant changes to PMH, PSH, FHx, SHx, unless otherwise noted    ALLERGIES & MEDICATIONS  --------------------------------------------------------------------------------  Allergies    No Known Allergies    Intolerances      Standing Inpatient Medications  abacavir 300 milliGRAM(s) Oral two times a day  aspirin enteric coated 81 milliGRAM(s) Oral daily  buMETAnide Infusion 3 mG/Hr IV Continuous <Continuous>  ceFAZolin   IVPB 1000 milliGRAM(s) IV Intermittent every 12 hours  chlorhexidine 0.12% Liquid 15 milliLiter(s) Oral Mucosa every 12 hours  chlorhexidine 4% Liquid 1 Application(s) Topical <User Schedule>  citric acid/sodium citrate Solution 30 milliLiter(s) Oral every 6 hours  dexMEDEtomidine Infusion 0.2 MICROgram(s)/kG/Hr IV Continuous <Continuous>  doravirine 100 milliGRAM(s) Oral daily  famotidine    Tablet 20 milliGRAM(s) Oral daily  fentaNYL    Injectable 100 MICROGram(s) IV Push once  ferrous    sulfate 325 milliGRAM(s) Oral daily  folic acid 1 milliGRAM(s) Oral daily  heparin   Injectable 5000 Unit(s) SubCutaneous every 8 hours  influenza  Vaccine (HIGH DOSE) 0.7 milliLiter(s) IntraMuscular once  lamiVUDine- milliGRAM(s) Oral daily  metolazone 10 milliGRAM(s) Oral every 24 hours  multivitamin 1 Tablet(s) Oral daily  norepinephrine Infusion 0.05 MICROgram(s)/kG/Min IV Continuous <Continuous>  propofol Infusion 30 MICROgram(s)/kG/Min IV Continuous <Continuous>  senna 2 Tablet(s) Oral at bedtime  sodium bicarbonate 650 milliGRAM(s) Oral three times a day    PRN Inpatient Medications  lidocaine 2% Jelly 3 milliLiter(s) IntraUrethral two times a day PRN  simethicone 80 milliGRAM(s) Chew four times a day PRN      REVIEW OF SYSTEMS  --------------------------------------------------------------------------------      All other systems were reviewed and are negative, except as noted.    VITALS/PHYSICAL EXAM  --------------------------------------------------------------------------------  T(C): 37.6 (12-10-22 @ 08:00), Max: 38 (12-10-22 @ 06:00)  HR: 95 (12-10-22 @ 10:00) (50 - 98)  BP: 127/50 (12-10-22 @ 10:00) (85/38 - 183/69)  RR: 18 (12-10-22 @ 10:00) (13 - 62)  SpO2: 100% (12-10-22 @ 10:00) (89% - 100%)  Wt(kg): --        12-09-22 @ 07:01  -  12-10-22 @ 07:00  --------------------------------------------------------  IN: 1761.4 mL / OUT: 975 mL / NET: 786.3 mL    12-10-22 @ 07:01  -  12-10-22 @ 10:39  --------------------------------------------------------  IN: 389.5 mL / OUT: 80 mL / NET: 309.5 mL        Physical Exam:  	Gen: Appears ILL   	HEENT: Orally intubated   	Pulm: mechanically ventilated   	CV: S1S2. RRR  	Abd: Soft, +BS   	Ext: No LE edema B/L  	Neuro: Sedated   	Skin: Warm and dry  	Vascular access:  pending Central venous access       LABS/STUDIES  --------------------------------------------------------------------------------              9.4    48.02 >-----------<  103      [12-09-22 @ 23:45]              29.9     139  |  99  |  132  ----------------------------<  135      [12-10-22 @ 09:02]  6.0   |  20  |  6.05        Ca     8.3     [12-10-22 @ 09:02]      iCa    1.07     [12-09 @ 23:45]      Mg     3.8     [12-10-22 @ 09:02]      Phos  12.4     [12-10-22 @ 09:02]    TPro  6.3  /  Alb  2.9  /  TBili  0.4  /  DBili  x   /  AST  91  /  ALT  6   /  AlkPhos  209  [12-10-22 @ 09:02]        CK 47      [12-08-22 @ 13:21]    Creatinine Trend:  SCr 6.05 [12-10 @ 09:02]  SCr 5.65 [12-09 @ 23:45]  SCr 5.35 [12-09 @ 15:11]  SCr 5.08 [12-09 @ 02:36]  SCr 5.06 [12-08 @ 23:50]    Urinalysis - [11-29-22 @ 20:45]      Color Yellow / Appearance Slightly Turbid / SG 1.015 / pH 6.0      Gluc Negative / Ketone Negative  / Bili Negative / Urobili Negative       Blood Moderate / Protein 100 / Leuk Est Large / Nitrite Negative      RBC 6 /  / Hyaline 2 / Gran 1 / Sq Epi  / Non Sq Epi 1 / Bacteria Negative      TSH 2.52      [12-02-22 @ 06:24]    HBsAb <3.0      [12-08-22 @ 20:44]  HBsAg Nonreact      [12-08-22 @ 20:44]  HCV 0.12, Nonreact      [12-08-22 @ 20:44]

## 2022-12-10 NOTE — PROGRESS NOTE ADULT - PROBLEM SELECTOR PLAN 3
will peform HD session today (with clearance), can medicate with lokelma, calcium gluconate, insulin/Dextrose if persistent hyperkalemia.

## 2022-12-10 NOTE — PROGRESS NOTE ADULT - PROBLEM SELECTOR PLAN 2
hemoglobin below goal, No need for transfusion however will defer need to primary team, patient will likely need volume optimization in setting of transfusion.   Can consider Epo if persistently anemic, will require iron stuidies (iron, TIBC, Ferritin) prior to initiation of ESAs

## 2022-12-10 NOTE — PROGRESS NOTE ADULT - SUBJECTIVE AND OBJECTIVE BOX
Debbi Pradhan  PGY-1 Resident Physician   Pager [phone]/ [phone]    Patient is a 82y old  Male who presents with a chief complaint of UTI (09 Dec 2022 18:03)      SUBJECTIVE / OVERNIGHT EVENTS:  Patient seen and evaluated at bedside.    Denies any fevers, chills, CP, or SOB.    Vital Signs Last 24 Hrs  T(C): 38 (10 Dec 2022 06:00), Max: 38 (10 Dec 2022 06:00)  T(F): 100.4 (10 Dec 2022 06:00), Max: 100.4 (10 Dec 2022 06:00)  HR: 97 (10 Dec 2022 06:30) (50 - 97)  BP: 110/53 (10 Dec 2022 06:30) (85/38 - 183/69)  BP(mean): 77 (10 Dec 2022 06:30) (55 - 99)  RR: 20 (10 Dec 2022 06:30) (13 - 62)  SpO2: 100% (10 Dec 2022 06:30) (88% - 100%)    Parameters below as of 10 Dec 2022 00:05  Patient On (Oxygen Delivery Method): ventilator    O2 Concentration (%): 80    PHYSICAL EXAM:  GENERAL: NAD, well-developed  CHEST/LUNG: Clear to auscultation bilaterally; No wheeze  HEART: Regular rate and rhythm; Normal S1 S2, No murmurs, rubs, or gallops  ABDOMEN: Soft, Nontender, Nondistended; Bowel sounds present  EXTREMITIES:  2+ Peripheral Pulses, No clubbing, cyanosis, or edema  PSYCH: AAOx3    LABS:                        9.4    48.02 )-----------( 103      ( 09 Dec 2022 23:45 )             29.9     Hgb Trend: 9.4<--, 10.6<--, 10.2<--, 10.0<--, 9.7<--  12-09    138  |  97  |  121<H>  ----------------------------<  340<H>  5.8<H>   |  25  |  5.65<H>    Ca    8.1<L>      09 Dec 2022 23:45  Phos  13.5     12-09  Mg     3.8     12-09    TPro  6.2  /  Alb  2.7<L>  /  TBili  0.3  /  DBili  x   /  AST  74<H>  /  ALT  7<L>  /  AlkPhos  188<H>  12-09    Creatinine Trend: 5.65<--, 5.35<--, 5.08<--, 5.06<--, 4.63<--, 4.45<--  LIVER FUNCTIONS - ( 09 Dec 2022 23:45 )  Alb: 2.7 g/dL / Pro: 6.2 g/dL / ALK PHOS: 188 U/L / ALT: 7 U/L / AST: 74 U/L / GGT: x             CARDIAC MARKERS ( 08 Dec 2022 15:24 )  x     / x     / x     / x     / 6.8 ng/mL  CARDIAC MARKERS ( 08 Dec 2022 13:21 )  x     / x     / 47 U/L / x     / 4.9 ng/mL       Debbi Pradhan  PGY-1 Resident Physician   Pager [phone]/ [phone]    Patient is a 82y old  Male who presents with a chief complaint of UTI (09 Dec 2022 18:03)      SUBJECTIVE / OVERNIGHT EVENTS:  Patient seen and evaluated at bedside. Intubated thia AM with clear breath sounds bilaterally.  Overnight with bloody secretions so decision made to intubate.   Also given another 3 mg bumex IVP to further diurese.    Vital Signs Last 24 Hrs  T(C): 38 (10 Dec 2022 06:00), Max: 38 (10 Dec 2022 06:00)  T(F): 100.4 (10 Dec 2022 06:00), Max: 100.4 (10 Dec 2022 06:00)  HR: 97 (10 Dec 2022 06:30) (50 - 97)  BP: 110/53 (10 Dec 2022 06:30) (85/38 - 183/69)  BP(mean): 77 (10 Dec 2022 06:30) (55 - 99)  RR: 20 (10 Dec 2022 06:30) (13 - 62)  SpO2: 100% (10 Dec 2022 06:30) (88% - 100%)    Parameters below as of 10 Dec 2022 00:05  Patient On (Oxygen Delivery Method): ventilator    O2 Concentration (%): 80    PHYSICAL EXAM:  GENERAL: intubated; tovar draining 20 cc clear urine  CHEST/LUNG: Clear to auscultation bilaterally; No wheeze noted  HEART: Regular rate and rhythm; Normal S1 S2, No murmurs, rubs, or gallops  ABDOMEN: Soft, Nontender, Nondistended; Bowel sounds present  EXTREMITIES:  2+ edema to bilateral shins  PSYCH: AAOx0    LABS:                        9.4    48.02 )-----------( 103      ( 09 Dec 2022 23:45 )             29.9     Hgb Trend: 9.4<--, 10.6<--, 10.2<--, 10.0<--, 9.7<--  12-09    138  |  97  |  121<H>  ----------------------------<  340<H>  5.8<H>   |  25  |  5.65<H>    Ca    8.1<L>      09 Dec 2022 23:45  Phos  13.5     12-09  Mg     3.8     12-09    TPro  6.2  /  Alb  2.7<L>  /  TBili  0.3  /  DBili  x   /  AST  74<H>  /  ALT  7<L>  /  AlkPhos  188<H>  12-09    Creatinine Trend: 5.65<--, 5.35<--, 5.08<--, 5.06<--, 4.63<--, 4.45<--  LIVER FUNCTIONS - ( 09 Dec 2022 23:45 )  Alb: 2.7 g/dL / Pro: 6.2 g/dL / ALK PHOS: 188 U/L / ALT: 7 U/L / AST: 74 U/L / GGT: x             CARDIAC MARKERS ( 08 Dec 2022 15:24 )  x     / x     / x     / x     / 6.8 ng/mL  CARDIAC MARKERS ( 08 Dec 2022 13:21 )  x     / x     / 47 U/L / x     / 4.9 ng/mL       Debbi Pradhan  PGY-1 Resident Physician   Pager [phone]/ [phone]    Patient is a 82y old  Male who presents with a chief complaint of UTI (09 Dec 2022 18:03)      SUBJECTIVE / OVERNIGHT EVENTS:  Patient seen and evaluated at bedside. Intubated thia AM with clear breath sounds bilaterally.  Overnight with bloody secretions so decision made to intubate.   Also given another 3 mg bumex IVP to further diurese.    Vital Signs Last 24 Hrs  T(C): 38 (10 Dec 2022 06:00), Max: 38 (10 Dec 2022 06:00)  T(F): 100.4 (10 Dec 2022 06:00), Max: 100.4 (10 Dec 2022 06:00)  HR: 97 (10 Dec 2022 06:30) (50 - 97)  BP: 110/53 (10 Dec 2022 06:30) (85/38 - 183/69)  BP(mean): 77 (10 Dec 2022 06:30) (55 - 99)  RR: 20 (10 Dec 2022 06:30) (13 - 62)  SpO2: 100% (10 Dec 2022 06:30) (88% - 100%)    Parameters below as of 10 Dec 2022 00:05  Patient On (Oxygen Delivery Method): ventilator    O2 Concentration (%): 80    PHYSICAL EXAM:  GENERAL: intubated; tovar draining 20 cc clear urine  CHEST/LUNG: Clear to auscultation on b/l apices. crackles on b/l bases  HEART: Regular rate and rhythm; Normal S1 S2, No murmurs, rubs, or gallops  ABDOMEN: Soft, Nontender, Nondistended; Bowel sounds present  EXTREMITIES:  2+ edema to bilateral shins  PSYCH: AAOx0    LABS:                        9.4    48.02 )-----------( 103      ( 09 Dec 2022 23:45 )             29.9     Hgb Trend: 9.4<--, 10.6<--, 10.2<--, 10.0<--, 9.7<--  12-09    138  |  97  |  121<H>  ----------------------------<  340<H>  5.8<H>   |  25  |  5.65<H>    Ca    8.1<L>      09 Dec 2022 23:45  Phos  13.5     12-09  Mg     3.8     12-09    TPro  6.2  /  Alb  2.7<L>  /  TBili  0.3  /  DBili  x   /  AST  74<H>  /  ALT  7<L>  /  AlkPhos  188<H>  12-09    Creatinine Trend: 5.65<--, 5.35<--, 5.08<--, 5.06<--, 4.63<--, 4.45<--  LIVER FUNCTIONS - ( 09 Dec 2022 23:45 )  Alb: 2.7 g/dL / Pro: 6.2 g/dL / ALK PHOS: 188 U/L / ALT: 7 U/L / AST: 74 U/L / GGT: x             CARDIAC MARKERS ( 08 Dec 2022 15:24 )  x     / x     / x     / x     / 6.8 ng/mL  CARDIAC MARKERS ( 08 Dec 2022 13:21 )  x     / x     / 47 U/L / x     / 4.9 ng/mL

## 2022-12-10 NOTE — PROGRESS NOTE ADULT - ASSESSMENT
Assessment and Recommendations:  82M w/ PMHx of HIV (viral load undetectable 08/22), Afib s/p DVVC and ablation (not on AC), MV endocarditis s/p bioprosthetic valve replacement, BPH (self catheterizes), frequent UTIs, presents with UTI and heart block, found to have persistent endocarditis not amenable to cardiac surgery, s/p micra PPM placement on 12/5, s/p RRT on 12/8  for hypoxia, transferred to MICU for further management of worsening acute respiratory failure, functionally anuric status.     #Endocarditis  #AFIB   #Staph lugdunensis bacteremia   - repeat TTE - dehiscence of the MV noted with severe paravalvular regurgitation - MICU team discussed with CTS, not candidate for intervention at this point  - pt was Seen by CT surgery previously for recurrent IE - plan for medical management  - serial ECGs - worsening block and arrhythmias may all be related to the progression of his IE, in addition to the metabolic issues  - continue diuresis and fluid removal per renal  - team had a prolonged d/w pt on 12/8 in regards to advance life support - he is a full code. Poor overall prognosis      All Cardiology service information can be found 24/7 on [email or web address], password: [email or web address]

## 2022-12-10 NOTE — CHART NOTE - NSCHARTNOTEFT_GEN_A_CORE
: Kendra Simon    INDICATION: Respiratory failure     PROCEDURE:  [x ] LIMITED ECHO  [ x] LIMITED CHEST  [ ] LIMITED RETROPERITONEAL  [ ] LIMITED ABDOMINAL  [ ] LIMITED DVT  [ ] NEEDLE GUIDANCE VASCULAR  [ ] NEEDLE GUIDANCE THORACENTESIS  [ ] NEEDLE GUIDANCE PARACENTESIS  [ ] NEEDLE GUIDANCE PERICARDIOCENTESIS  [ ] OTHER    FINDINGS:  Echo  RV similar in size to LV   RV and LV with moderate decrease in systolic function   IVC 2.4   No pericardial effusion   (+) calcifications noted to mitral valve (+) vegetation    Chest    B lines to anterior chest wall bilaterally   Left pleural base with moderate effusion and consolidation (+) sediments noted in Pleural fluid   Right Pleural Base with consolidation and moderate effusion            INTERPRETATION:  Cardiomyopathy in the setting of profound acidosis   IVC 2.4 (+) vegetation noted on mitral valve   Bilateral Pleural base consolidation with effusion Left effusion with sediment   B lines to bilateral Anterior chest wall

## 2022-12-10 NOTE — PROGRESS NOTE ADULT - SUBJECTIVE AND OBJECTIVE BOX
Overnight Events: Intubated overnight for respiratory failure and worsening hypoxia    Telemetry: NSR 90s, 1st degree AVB    Review Of Systems: unable to obtain due to intubated status    Current Meds:  abacavir 300 milliGRAM(s) Oral two times a day  aspirin enteric coated 81 milliGRAM(s) Oral daily  buMETAnide Infusion 3 mG/Hr IV Continuous <Continuous>  ceFAZolin   IVPB 1000 milliGRAM(s) IV Intermittent every 12 hours  chlorhexidine 0.12% Liquid 15 milliLiter(s) Oral Mucosa every 12 hours  chlorhexidine 4% Liquid 1 Application(s) Topical <User Schedule>  citric acid/sodium citrate Solution 30 milliLiter(s) Oral every 6 hours  dexMEDEtomidine Infusion 0.2 MICROgram(s)/kG/Hr IV Continuous <Continuous>  doravirine 100 milliGRAM(s) Oral daily  famotidine    Tablet 20 milliGRAM(s) Oral daily  fentaNYL    Injectable 100 MICROGram(s) IV Push once  ferrous    sulfate 325 milliGRAM(s) Oral daily  folic acid 1 milliGRAM(s) Oral daily  heparin   Injectable 5000 Unit(s) SubCutaneous every 8 hours  influenza  Vaccine (HIGH DOSE) 0.7 milliLiter(s) IntraMuscular once  lamiVUDine- milliGRAM(s) Oral daily  lidocaine 2% Jelly 3 milliLiter(s) IntraUrethral two times a day PRN  metolazone 10 milliGRAM(s) Oral every 24 hours  multivitamin 1 Tablet(s) Oral daily  norepinephrine Infusion 0.05 MICROgram(s)/kG/Min IV Continuous <Continuous>  propofol Infusion 30 MICROgram(s)/kG/Min IV Continuous <Continuous>  senna 2 Tablet(s) Oral at bedtime  simethicone 80 milliGRAM(s) Chew four times a day PRN  sodium bicarbonate 650 milliGRAM(s) Oral three times a day      Vitals:  T(F): 99.7 (12-10), Max: 100.4 (12-10)  HR: 95 (12-10) (50 - 98)  BP: 127/50 (12-10) (85/38 - 183/69)  RR: 18 (12-10)  SpO2: 100% (12-10)  I&O's Summary    09 Dec 2022 07:01  -  10 Dec 2022 07:00  --------------------------------------------------------  IN: 1761.4 mL / OUT: 975 mL / NET: 786.3 mL    10 Dec 2022 07:01  -  10 Dec 2022 10:58  --------------------------------------------------------  IN: 389.5 mL / OUT: 80 mL / NET: 309.5 mL    Physical Exam:  Appearance: Intubated male  Cardiovascular: RRR, S1, S2, no murmurs, rubs, or gallops; JVD+  Respiratory: Clear to auscultation bilaterally  Gastrointestinal: soft, non-tender, non-distended with normal bowel sounds  Extremities: 2+ edema to mid-thighs  Musculoskeletal: No clubbing; no joint deformity   Psychiatry: sedated                          9.4    48.02 )-----------( 103      ( 09 Dec 2022 23:45 )             29.9     12-10    139  |  99  |  132<H>  ----------------------------<  135<H>  6.0<H>   |  20<L>  |  6.05<H>    Ca    8.3<L>      10 Dec 2022 09:02  Phos  12.4     12-10  Mg     3.8     12-10    TPro  6.3  /  Alb  2.9<L>  /  TBili  0.4  /  DBili  x   /  AST  91<H>  /  ALT  6<L>  /  AlkPhos  209<H>  12-10      CARDIAC MARKERS ( 08 Dec 2022 15:24 )  194 ng/L / x     / x     / x     / x     / 6.8 ng/mL  CARDIAC MARKERS ( 08 Dec 2022 13:21 )  154 ng/L / x     / x     / 47 U/L / x     / 4.9 ng/mL      Serum Pro-Brain Natriuretic Peptide: 21680 pg/mL (12-08 @ 15:24)

## 2022-12-10 NOTE — PROCEDURE NOTE - NSPOSTPRCRAD_GEN_A_CORE
central line located in the/central line located in the superior vena cava/depth of insertion/line adjusted to depth of insertion/no pneumothorax/post procedure radiography not performed/post-procedure radiography performed
feeding tube in correct gastric position/post procedure radiography not performed
guidewire ascertained in Rt IJ via POCUS, catheter filtered over guidewire, guidewire removed in entirety. Lung sliding ascertained via POCUS/central line located in the

## 2022-12-10 NOTE — PROGRESS NOTE ADULT - ASSESSMENT
ASSESSMENT: 82M w/ PMHx of HIV (viral load undetectable 08/22), Afib s/p DVVC and ablation (not on AC), MV endocarditis s/p bioprosthetic valve replacement, BPH (self catheterizes), frequent UTIs, presents with UTI and heart block, found to have persistent endocarditis not amenable to cardiac surgery, s/p melina PPM placement on 12/5, s/p RRT on 12/8 called for hypoxia, transferred to MICU for further management of worsening acute respiratory failure, functionally anuric status.       ======NEUROLOGY======  AAOx3-4 at baseline  - Occasionally agitated and pulling at masks and lines this admission  - On precedex gtt  - s/p 1x Zyprexa    ======PULMONARY======  #AHRF  - RRT called 12/8 for hypoxia to 80s, HF improved to 93%, brought up to MICU and put on BIPAP, with saturation >97%  - 2/2 volume overload  - CXR 12/7 showing vascular congestion; exam consistent with volume overload  - VBG 7.16/50; lactate 3.6  - ABG shows signs of respiratory on metabolic acidosis, started bipap  - now on HFNC, 40L, FiO2 80%  - monitor response to O2 support with VBG    ====CARDIOVASCULAR====  #Staph lugdudenesis bacteremia c/b infective endocarditis   #afib  #Mobitiz I, sinus bradycardia    - s/p cefazolin, changed to Zosyn during RRT --> changed back to Ancef 12/8  - trended trop (?concern for IE embolization to coronary arteries) - no significant ischemic changes at this time  - Rhythm showed sinus madonna with Mobitz I and pAF SVR  - per CTSx pt not eligible for intervention at this point     ========RENAL========  #progressive renal failure  #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 Patient with worsening volume status, started on diuresis today however still requiring non invasive positive pressure ventilation.   -S/p 1x Ultrafiltration, to improve congestion however ultimately patient is not a good candidate for long term hemodialysis.   - Abdominal US done on 12/1 showed unilateral hydronephrosis.   - Check serological w/up for GN.   - Maintain Tovar in situ.   - Monitor BMP. Strict I/Os. Avoid nephrotoxins.    ===GASTROINTESTINAL====  - no active issues      ===INFECTIOUS DISEASE===  #endocarditis, staph lugdudenesis bacteremia + in Urine and BCx  - s/p cefazolin --> broadened to Zosyn  - treat until Bcx clear  - ID following, recs appreciated    #hx of HIV  - treat with ART home medications  - Continue PO abacavir 200mg q12hrs, lamivudine 100mg q24hrs and doravirine 1tab q24hrs (renally dosed per pharm)    =====HEMATOLOGIC=====  #hx of afib and endocarditis  - For now, hold Eliquis 2.5 mg BID given possible procedure in future    #anemia  - Hemoglobin near goal, on oral iron.   - Would hold off on IV iron in the setting of bacteremia/ endocarditis.   - Consider JASON if Hb remains below goal.   - Monitor CBC.    ======ENDOCRINE=======  - no active issues    ===MUSCULOSKELETAL====  - no active issues    =====PROPHYLAXIS======  DVT prophylaxis: Heparin Subq   ASSESSMENT: 82M w/ PMHx of HIV (viral load undetectable 08/22), Afib s/p DVVC and ablation (not on AC), MV endocarditis s/p bioprosthetic valve replacement, BPH (self catheterizes), frequent UTIs, presents with UTI and heart block, found to have persistent endocarditis not amenable to cardiac surgery, s/p melina PPM placement on 12/5, s/p RRT on 12/8 called for hypoxia, transferred to MICU for further management of worsening acute respiratory failure, functionally anuric status.       ======NEUROLOGY======  - intubated 12/10 overnight due to bloody secretions noted   - on propofol 30; patient having spontaneous leg movements  - prior to intubation, AAOx3/4    ======PULMONARY======  #AHRF  - RRT called 12/8 for hypoxia to 80s, HF improved to 93%, brought up to MICU and put on BIPAP, with saturation >97% likely 2/2 volume overload  - CXR 12/7 showing vascular congestion; exam consistent with volume overload  - ABG w signs of respiratory on metabolic acidosis and was on BiPAP -> HFNC 40L 80%   - overnight with worsening acidosis and bloody secretions so intubated with improvement in acidosis but persistent hypercarbia pH 7.21 abd pCO2 54  - current vent settings: , RR 18, FiO2 80%, PEEP 5    ====CARDIOVASCULAR====  #Staph lugdudenesis bacteremia c/b infective endocarditis   #afib  #Mobitiz I, sinus bradycardia    - s/p cefazolin, changed to Zosyn during RRT --> changed back to Ancef 12/8  - trended trop (?concern for IE embolization to coronary arteries) - no significant ischemic changes at this time  - Rhythm showed sinus madonna with Mobitz I and pAF SVR  - per CTSx pt not eligible for intervention at this point   - TTE 12/8 demonstrating MV dehiscence and severe paravalvular regurgitation     ========RENAL========  #progressive renal failure  #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 Patient with worsening volume status, started on diuresis today however still requiring non invasive positive pressure ventilation.   - s/p 1x Ultrafiltration, to improve congestion however ultimately patient is not a good candidate for long term hemodialysis.   - Abdominal US done on 12/1 showed unilateral hydronephrosis.   - Check serological w/up for GN.   - Maintain Tovar in situ.   - Monitor BMP. Strict I/Os. Avoid nephrotoxins.    ===GASTROINTESTINAL====  - no active issues      ===INFECTIOUS DISEASE===  #endocarditis, staph lugdudenesis bacteremia + in Urine and BCx  - currently on cefazolin (12/8 - )  - blood culture 11/29 positive; repeat cultures since negative  - ID following, recs appreciated    #hx of HIV  - treat with ART home medications  - Continue PO abacavir 200mg q12hrs, lamivudine 100mg q24hrs and doravirine 1tab q24hrs (renally dosed per pharm)    =====HEMATOLOGIC=====  #hx of afib and endocarditis  - For now, hold Eliquis 2.5 mg BID given possible procedure in future    #anemia  - Hemoglobin near goal, on oral iron.   - Would hold off on IV iron in the setting of bacteremia/ endocarditis.   - Consider JASON if Hb remains below goal.   - Monitor CBC.    ======ENDOCRINE=======  - no active issues    ===MUSCULOSKELETAL====  - no active issues    =====PROPHYLAXIS======  DVT prophylaxis: Heparin Subq   ASSESSMENT: 82M w/ PMHx of HIV (viral load undetectable 08/22), Afib s/p DVVC and ablation (not on AC), MV endocarditis s/p bioprosthetic valve replacement, BPH (self catheterizes), frequent UTIs, presents with UTI and heart block, found to have persistent endocarditis not amenable to cardiac surgery, s/p melina PPM placement on 12/5, s/p RRT on 12/8 called for hypoxia, transferred to MICU for further management of worsening acute respiratory failure, functionally anuric status.       ======NEUROLOGY======  - intubated 12/10 overnight due to bloody secretions noted   - on propofol 30; patient having spontaneous leg movements  - prior to intubation, AAOx3/4    ======PULMONARY======  #AHRF  - RRT called 12/8 for hypoxia to 80s, HF improved to 93%, brought up to MICU and put on BIPAP, with saturation >97% likely 2/2 volume overload  - CXR 12/7 showing vascular congestion; exam consistent with volume overload  - ABG w signs of respiratory on metabolic acidosis and was on BiPAP -> HFNC 40L 80%   - overnight with worsening acidosis and bloody secretions so intubated with improvement in acidosis but persistent hypercarbia pH 7.21 abd pCO2 54  - current vent settings: , RR 18, FiO2 80%, PEEP 5    ====CARDIOVASCULAR====  #Staph lugdudenesis bacteremia c/b infective endocarditis   #afib  #Mobitiz I, sinus bradycardia    - s/p cefazolin, changed to Zosyn during RRT --> changed back to Ancef 12/8  - trended trop (?concern for IE embolization to coronary arteries) - no significant ischemic changes at this time  - Rhythm showed sinus madonna with Mobitz I and pAF SVR  - per CTSx pt not eligible for intervention at this point   - TTE 12/8 demonstrating MV dehiscence and severe paravalvular regurgitation     ========RENAL========  #progressive renal failure  #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 Patient with worsening volume status, started on diuresis today however still requiring non invasive positive pressure ventilation.   - s/p 1x Ultrafiltration, to improve congestion however ultimately patient is not a good candidate for long term hemodialysis.   - Abdominal US done on 12/1 showed unilateral hydronephrosis.   - Check serological w/up for GN.   - Maintain Tovar in situ.   - Monitor BMP. Strict I/Os. Avoid nephrotoxins.  - shiley placed in R IJ 12/10 for dialysis with clearance     ===GASTROINTESTINAL====  - no active issues      ===INFECTIOUS DISEASE===  #endocarditis, staph lugdudenesis bacteremia + in Urine and BCx  - currently on cefazolin (12/8 - )  - blood culture 11/29 positive; repeat cultures since negative  - ID following, recs appreciated    #hx of HIV  - treat with ART home medications  - Continue PO abacavir 200mg q12hrs, lamivudine 100mg q24hrs and doravirine 1tab q24hrs (renally dosed per pharm)    =====HEMATOLOGIC=====  #hx of afib and endocarditis  - For now, hold Eliquis 2.5 mg BID given possible procedure in future    #anemia  - Hemoglobin near goal, on oral iron.   - Would hold off on IV iron in the setting of bacteremia/ endocarditis.   - Consider JASON if Hb remains below goal.   - Monitor CBC.    ======ENDOCRINE=======  - no active issues    ===MUSCULOSKELETAL====  - no active issues    =====PROPHYLAXIS======  DVT prophylaxis: Heparin Subq

## 2022-12-10 NOTE — PROGRESS NOTE ADULT - PROBLEM SELECTOR PLAN 1
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   Urine studies from 11/29 with blood and protein present in the urine. 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 Patient with worsening volume status, started on diuresis 12/9 however with little urine production.  Patient required non invasive positive pressure ventilation and RRT was called for hypoxic respiratory distress. Performed session of HD for UF in an attempt to improve venous congestion and volume status. Patient despite this developed worsening dyspnea.   Despite azotemia patient not uremic, noted to be  hyperkalemic with only 900 cc UOP (despite Bumex/ Metolazone) , will perform repeat session of Hemodialysis (2 hours, 2 liter removal).   Abdominal US done on 12/1 showed unilateral hydronephrosis.   Unknown benefit of dialyzing patient long term, will perform dialysis acutely until decision can be made for long term treatment   Maintain Tovar in situ.   Monitor BMP. Strict I/Os. Avoid nephrotoxins.    Patietn with baseline CKD following with Dr. Ferguson, baseline creatinine between 2-2.6 presenting with cretinine of 4.46, creatinine initially down trended to 2.65 however began uptrending on 12/6 to the point of requiring HD/UF on 12/8. patient scheduled today 12/10 for HD session.

## 2022-12-10 NOTE — PROCEDURE NOTE - ADDITIONAL PROCEDURE DETAILS
82 yr old male with PMHx HIV, Afib, s/p DVVC and ablation, Mitral valve endocarditis s/p bioprosthetic valve replacement, BPH, heart block s/p melina PPM, frequent UTI found to have persistent endocarditis, currently with hypoxic resp failure worsening SUDHEER requiring H.D. therapy. Rt IJ Double Lumen Dialysis Catheter placed for H.D. therapy

## 2022-12-11 NOTE — PROGRESS NOTE ADULT - PROBLEM SELECTOR PLAN 3
Improved following HD session, most recently hypokalemic, would hold off on on further HD for today, if becoming hyperkalemic woul opt for medical management.

## 2022-12-11 NOTE — PROGRESS NOTE ADULT - SUBJECTIVE AND OBJECTIVE BOX
[facility] DIVISION OF KIDNEY DISEASES AND HYPERTENSION -- FOLLOW UP NOTE  --------------------------------------------------------------------------------  Chief Complaint:    24 hour events/subjective:    Phos 8.6 <-8.4   BUN: 103 <- 100   K: 3.2 <- 3   Patient still sedated, Mechanically ventilated     PAST HISTORY  --------------------------------------------------------------------------------  No significant changes to PMH, PSH, FHx, SHx, unless otherwise noted    ALLERGIES & MEDICATIONS  --------------------------------------------------------------------------------  Allergies    No Known Allergies    Intolerances      Standing Inpatient Medications  abacavir 300 milliGRAM(s) Oral two times a day  aspirin enteric coated 81 milliGRAM(s) Oral daily  buMETAnide Infusion 3 mG/Hr IV Continuous <Continuous>  ceFAZolin   IVPB 1000 milliGRAM(s) IV Intermittent every 12 hours  chlorhexidine 0.12% Liquid 15 milliLiter(s) Oral Mucosa every 12 hours  chlorhexidine 4% Liquid 1 Application(s) Topical <User Schedule>  citric acid/sodium citrate Solution 30 milliLiter(s) Oral every 6 hours  dexMEDEtomidine Infusion 0.2 MICROgram(s)/kG/Hr IV Continuous <Continuous>  doravirine 100 milliGRAM(s) Oral daily  famotidine    Tablet 20 milliGRAM(s) Oral daily  ferrous    sulfate 325 milliGRAM(s) Oral daily  folic acid 1 milliGRAM(s) Oral daily  heparin   Injectable 5000 Unit(s) SubCutaneous every 8 hours  influenza  Vaccine (HIGH DOSE) 0.7 milliLiter(s) IntraMuscular once  lamiVUDine- milliGRAM(s) Oral daily  metolazone 10 milliGRAM(s) Oral every 24 hours  multivitamin 1 Tablet(s) Oral daily  norepinephrine Infusion 0.05 MICROgram(s)/kG/Min IV Continuous <Continuous>  propofol Infusion 30.025 MICROgram(s)/kG/Min IV Continuous <Continuous>  senna 2 Tablet(s) Oral at bedtime  sodium bicarbonate 650 milliGRAM(s) Oral three times a day    PRN Inpatient Medications  lidocaine 2% Jelly 3 milliLiter(s) IntraUrethral two times a day PRN  simethicone 80 milliGRAM(s) Chew four times a day PRN      REVIEW OF SYSTEMS  --------------------------------------------------------------------------------    Unable to assess due to sedation     VITALS/PHYSICAL EXAM  --------------------------------------------------------------------------------  T(C): 37.4 (12-11-22 @ 08:00), Max: 37.4 (12-11-22 @ 04:00)  HR: 72 (12-11-22 @ 10:57) (53 - 101)  BP: 114/58 (12-11-22 @ 10:30) (83/43 - 144/64)  RR: 33 (12-11-22 @ 10:30) (5 - 73)  SpO2: 97% (12-11-22 @ 10:57) (85% - 100%)  Wt(kg): --        12-10-22 @ 07:01  -  12-11-22 @ 07:00  --------------------------------------------------------  IN: 3052.4 mL / OUT: 3595 mL / NET: -542.6 mL    12-11-22 @ 07:01  -  12-11-22 @ 11:12  --------------------------------------------------------  IN: 164.1 mL / OUT: 325 mL / NET: -160.9 mL        Physical Exam:  	Gen: NAD  	HEENT: Orally intubated    	Pulm: Mechanically ventilated   	CV: S1S2, RRR   	Abd: Soft, +BS   	Ext: No LE edema B/L  	Neuro: Sedated   	Skin: Warm and dry  	Vascular access: right IJ non tunneled       LABS/STUDIES  --------------------------------------------------------------------------------              8.3    47.59 >-----------<  64       [12-10-22 @ 23:38]              24.2     140  |  98  |  103  ----------------------------<  211      [12-10-22 @ 23:38]  4.1   |  23  |  4.94        Ca     8.0     [12-10-22 @ 23:38]      iCa    1.07     [12-09 @ 23:45]      Mg     3.2     [12-10-22 @ 23:38]      Phos  8.6     [12-10-22 @ 23:38]    TPro  5.6  /  Alb  2.4  /  TBili  0.4  /  DBili  x   /  AST  81  /  ALT  <5  /  AlkPhos  264  [12-10-22 @ 23:38]          Creatinine Trend:  SCr 4.94 [12-10 @ 23:38]  SCr 4.74 [12-10 @ 20:41]  SCr 6.05 [12-10 @ 09:02]  SCr 5.65 [12-09 @ 23:45]  SCr 5.35 [12-09 @ 15:11]    Urinalysis - [11-29-22 @ 20:45]      Color Yellow / Appearance Slightly Turbid / SG 1.015 / pH 6.0      Gluc Negative / Ketone Negative  / Bili Negative / Urobili Negative       Blood Moderate / Protein 100 / Leuk Est Large / Nitrite Negative      RBC 6 /  / Hyaline 2 / Gran 1 / Sq Epi  / Non Sq Epi 1 / Bacteria Negative      TSH 2.52      [12-02-22 @ 06:24]    HBsAb <3.0      [12-08-22 @ 20:44]  HBsAg Nonreact      [12-08-22 @ 20:44]  HCV 0.12, Nonreact      [12-08-22 @ 20:44]

## 2022-12-11 NOTE — PROGRESS NOTE ADULT - ASSESSMENT
ASSESSMENT: 82M w/ PMHx of HIV (viral load undetectable 08/22), Afib s/p DVVC and ablation (not on AC), MV endocarditis s/p bioprosthetic valve replacement, BPH (self catheterizes), frequent UTIs, presents with UTI and heart block, found to have persistent endocarditis not amenable to cardiac surgery, s/p melina PPM placement on 12/5, s/p RRT on 12/8 called for hypoxia, transferred to MICU for further management of worsening acute respiratory failure, functionally anuric status.       ======NEUROLOGY======  - intubated 12/10 overnight due to bloody secretions noted   - on propofol 30; patient having spontaneous leg movements  - prior to intubation, AAOx3/4    ======PULMONARY======  #AHRF  - RRT called 12/8 for hypoxia to 80s, HF improved to 93%, brought up to MICU and put on BIPAP, with saturation >97% likely 2/2 volume overload  - CXR 12/7 showing vascular congestion; exam consistent with volume overload  - ABG w signs of respiratory on metabolic acidosis and was on BiPAP -> HFNC 40L 80%   - overnight with worsening acidosis and bloody secretions so intubated with improvement in acidosis but persistent hypercarbia pH 7.21 abd pCO2 54  - current vent settings: , RR 18, FiO2 80%, PEEP 5    ====CARDIOVASCULAR====  #Staph lugdudenesis bacteremia c/b infective endocarditis   #afib  #Mobitiz I, sinus bradycardia    - s/p cefazolin, changed to Zosyn during RRT --> changed back to Ancef 12/8  - trended trop (?concern for IE embolization to coronary arteries) - no significant ischemic changes at this time  - Rhythm showed sinus madonna with Mobitz I and pAF SVR  - per CTSx pt not eligible for intervention at this point   - TTE 12/8 demonstrating MV dehiscence and severe paravalvular regurgitation     ========RENAL========  #progressive renal failure  #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 Patient with worsening volume status, started on diuresis today however still requiring non invasive positive pressure ventilation.   - s/p 1x Ultrafiltration, to improve congestion however ultimately patient is not a good candidate for long term hemodialysis.   - Abdominal US done on 12/1 showed unilateral hydronephrosis.   - Check serological w/up for GN.   - Maintain Tovar in situ.   - Monitor BMP. Strict I/Os. Avoid nephrotoxins.  - shiley placed in R IJ 12/10 for dialysis with clearance     ===GASTROINTESTINAL====  - no active issues      ===INFECTIOUS DISEASE===  #endocarditis, staph lugdudenesis bacteremia + in Urine and BCx  - currently on cefazolin (12/8 - )  - blood culture 11/29 positive; repeat cultures since negative  - ID following, recs appreciated    #hx of HIV  - treat with ART home medications  - Continue PO abacavir 200mg q12hrs, lamivudine 100mg q24hrs and doravirine 1tab q24hrs (renally dosed per pharm)    =====HEMATOLOGIC=====  #hx of afib and endocarditis  - For now, hold Eliquis 2.5 mg BID given possible procedure in future    #anemia  - Hemoglobin near goal, on oral iron.   - Would hold off on IV iron in the setting of bacteremia/ endocarditis.   - Consider JASON if Hb remains below goal.   - Monitor CBC.    ======ENDOCRINE=======  - no active issues    ===MUSCULOSKELETAL====  - no active issues    =====PROPHYLAXIS======  DVT prophylaxis: Heparin Subq   ASSESSMENT: 82M w/ PMHx of HIV (viral load undetectable 08/22), Afib s/p DVVC and ablation (not on AC), MV endocarditis s/p bioprosthetic valve replacement, BPH (self catheterizes), frequent UTIs, presents with UTI and heart block, found to have persistent endocarditis not amenable to cardiac surgery, s/p melina PPM placement on 12/5, s/p RRT on 12/8 called for hypoxia, transferred to MICU for further management of worsening acute respiratory failure, functionally anuric status.       ======NEUROLOGY======  - intubated 12/10 overnight due to bloody secretions noted   - on propofol 30; patient having spontaneous leg movements  - prior to intubation, AAOx3/4    ======PULMONARY======  #AHRF  - RRT called 12/8 for hypoxia to 80s, HF improved to 93%, brought up to MICU and put on BIPAP, with saturation >97% likely 2/2 volume overload  - CXR 12/7 showing vascular congestion; exam consistent with volume overload  - ABG w signs of respiratory on metabolic acidosis and was on BiPAP -> HFNC 40L 80%   - overnight with worsening acidosis and bloody secretions so intubated with improvement in acidosis but persistent hypercarbia pH 7.21 abd pCO2 54  - current vent settings: , RR 18, FiO2 30%, PEEP 5    ====CARDIOVASCULAR====  #Staph lugdudenesis bacteremia c/b infective endocarditis   #afib  #Mobitiz I, sinus bradycardia    - s/p cefazolin, changed to Zosyn during RRT --> changed back to Ancef 12/8  - trended trop (?concern for IE embolization to coronary arteries) - no significant ischemic changes at this time  - Rhythm showed sinus madonna with Mobitz I and pAF SVR  - per CTSx pt not eligible for intervention at this point   - TTE 12/8 demonstrating MV dehiscence and severe paravalvular regurgitation     ========RENAL========  #progressive renal failure  #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 Patient with worsening volume status, started on diuresis today however still requiring non invasive positive pressure ventilation.   - s/p 1x Ultrafiltration, to improve congestion however ultimately patient is not a good candidate for long term hemodialysis.   - Abdominal US done on 12/1 showed unilateral hydronephrosis.   - Check serological w/up for GN.   - Maintain Tovar in situ.   - Monitor BMP. Strict I/Os. Avoid nephrotoxins.  - shiley placed in R IJ 12/10 for dialysis with clearance  - 12/11: per nephro, continue with bumex 3 gtt and metolazone - no need for urgent dialysis today    ===GASTROINTESTINAL====  - no active issues      ===INFECTIOUS DISEASE===  #endocarditis, staph lugdudenesis bacteremia + in Urine and BCx  - currently on cefazolin (12/8 - )  - blood culture 11/29 positive; repeat cultures since negative  - ID following, recs appreciated    #hx of HIV  - treat with ART home medications  - Continue PO abacavir 200mg q12hrs, lamivudine 100mg q24hrs and doravirine 1tab q24hrs (renally dosed per pharm)    =====HEMATOLOGIC=====  #hx of afib and endocarditis  - For now, hold Eliquis 2.5 mg BID given possible procedure in future    #anemia  - Hemoglobin near goal, on oral iron.   - Would hold off on IV iron in the setting of bacteremia/ endocarditis.   - Consider JASON if Hb remains below goal.   - Monitor CBC.    ======ENDOCRINE=======  - no active issues    ===MUSCULOSKELETAL====  - no active issues    =====PROPHYLAXIS======  DVT prophylaxis: Heparin Subq

## 2022-12-11 NOTE — PROGRESS NOTE ADULT - PROBLEM SELECTOR PLAN 1
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   Urine studies from 11/29 with blood and protein present in the urine. 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 Patient with worsening volume status, started on diuresis 12/9 however with little urine production.    Patient required non invasive positive pressure ventilation and RRT was called for hypoxic respiratory distress. Performed session of HD for UF in an attempt to improve venous congestion and volume status. Patient despite this developed worsening dyspnea.   Despite azotemia patient not uremic, noted to be  hyperkalemic with only (12/10) 900 cc UOP (despite Bumex/ Metolazone) for which he was dialyzed. Most recently UOP: 1,595 which can indicate some form of renal recovery. Agree with current Bumetamide infusion with metolazone augmentation. patient was dialyzed on 12/10 due to hyperkalemia however today patient renal function seems to be improving on Loop/Thiazide combination. Patient does not require urgent dialysis today, will continue to monitor renal function and urine outoput for now.

## 2022-12-11 NOTE — PROGRESS NOTE ADULT - SUBJECTIVE AND OBJECTIVE BOX
MICU Progress Note    Radha Mae MD  PGY1   Available on TEAMS    INTERVAL HPI/OVERNIGHT EVENTS:    SUBJECTIVE: Patient seen and examined at bedside.       OBJECTIVE:    VITAL SIGNS:  ICU Vital Signs Last 24 Hrs  T(C): 37.4 (11 Dec 2022 04:00), Max: 37.4 (11 Dec 2022 04:00)  T(F): 99.3 (11 Dec 2022 04:00), Max: 99.3 (11 Dec 2022 04:00)  HR: 95 (11 Dec 2022 07:00) (53 - 101)  BP: 118/56 (11 Dec 2022 07:00) (83/43 - 144/64)  BP(mean): 80 (11 Dec 2022 07:00) (60 - 95)  ABP: --  ABP(mean): --  RR: 20 (11 Dec 2022 07:00) (5 - 73)  SpO2: 94% (11 Dec 2022 07:00) (85% - 100%)    O2 Parameters below as of 10 Dec 2022 19:00  Patient On (Oxygen Delivery Method): ventilator          Mode: AC/ CMV (Assist Control/ Continuous Mandatory Ventilation), RR (machine): 20, TV (machine): 500, FiO2: 30, PEEP: 5, ITime: 0.8, MAP: 11, PIP: 24    12-10 @ 07:01  -  12-11 @ 07:00  --------------------------------------------------------  IN: 3052.4 mL / OUT: 3595 mL / NET: -542.6 mL      CAPILLARY BLOOD GLUCOSE          PHYSICAL EXAM:  General: sedated  HEENT: NCAT, PERRL, clear conjunctiva, sclera anicteric  Neck: supple, no JVD  Respiratory: CTAB  Cardiovascular: RRR, S1S2, no m/r/g  Abdomen: soft, nontender, nondistended, normal bowel sounds  Extremities: no edema, no cyanosis  Skin: warm, perfused  Neurological: nonfocal    MEDICATIONS:  MEDICATIONS  (STANDING):  abacavir 300 milliGRAM(s) Oral two times a day  aspirin enteric coated 81 milliGRAM(s) Oral daily  buMETAnide Infusion 3 mG/Hr (15 mL/Hr) IV Continuous <Continuous>  ceFAZolin   IVPB 1000 milliGRAM(s) IV Intermittent every 12 hours  chlorhexidine 0.12% Liquid 15 milliLiter(s) Oral Mucosa every 12 hours  chlorhexidine 4% Liquid 1 Application(s) Topical <User Schedule>  citric acid/sodium citrate Solution 30 milliLiter(s) Oral every 6 hours  dexMEDEtomidine Infusion 0.2 MICROgram(s)/kG/Hr (4.08 mL/Hr) IV Continuous <Continuous>  doravirine 100 milliGRAM(s) Oral daily  famotidine    Tablet 20 milliGRAM(s) Oral daily  ferrous    sulfate 325 milliGRAM(s) Oral daily  folic acid 1 milliGRAM(s) Oral daily  heparin   Injectable 5000 Unit(s) SubCutaneous every 8 hours  influenza  Vaccine (HIGH DOSE) 0.7 milliLiter(s) IntraMuscular once  lamiVUDine- milliGRAM(s) Oral daily  metolazone 10 milliGRAM(s) Oral every 24 hours  multivitamin 1 Tablet(s) Oral daily  norepinephrine Infusion 0.05 MICROgram(s)/kG/Min (7.65 mL/Hr) IV Continuous <Continuous>  propofol Infusion 30.025 MICROgram(s)/kG/Min (14.7 mL/Hr) IV Continuous <Continuous>  senna 2 Tablet(s) Oral at bedtime  sodium bicarbonate 650 milliGRAM(s) Oral three times a day    MEDICATIONS  (PRN):  lidocaine 2% Jelly 3 milliLiter(s) IntraUrethral two times a day PRN penile pain  simethicone 80 milliGRAM(s) Chew four times a day PRN Gas      ALLERGIES:  Allergies    No Known Allergies    Intolerances        LABS:                        8.3    47.59 )-----------( 64       ( 10 Dec 2022 23:38 )             24.2     12-10    140  |  98  |  103<H>  ----------------------------<  211<H>  4.1   |  23  |  4.94<H>    Ca    8.0<L>      10 Dec 2022 23:38  Phos  8.6     12-10  Mg     3.2     12-10    TPro  5.6<L>  /  Alb  2.4<L>  /  TBili  0.4  /  DBili  x   /  AST  81<H>  /  ALT  <5<L>  /  AlkPhos  264<H>  12-10          RADIOLOGY & ADDITIONAL TESTS: Reviewed. MICU Progress Note    Radha Mae MD  PGY1   Available on TEAMS    INTERVAL HPI/OVERNIGHT EVENTS: Had HD performed yesterday, 2L removed.     SUBJECTIVE: Patient seen and examined at bedside. Intubated, sedated on propofol 30, on levophed 0.1634, bumex 3 gtt, and on ventilator.       OBJECTIVE:    VITAL SIGNS:  ICU Vital Signs Last 24 Hrs  T(C): 37.4 (11 Dec 2022 04:00), Max: 37.4 (11 Dec 2022 04:00)  T(F): 99.3 (11 Dec 2022 04:00), Max: 99.3 (11 Dec 2022 04:00)  HR: 95 (11 Dec 2022 07:00) (53 - 101)  BP: 118/56 (11 Dec 2022 07:00) (83/43 - 144/64)  BP(mean): 80 (11 Dec 2022 07:00) (60 - 95)  ABP: --  ABP(mean): --  RR: 20 (11 Dec 2022 07:00) (5 - 73)  SpO2: 94% (11 Dec 2022 07:00) (85% - 100%)    O2 Parameters below as of 10 Dec 2022 19:00  Patient On (Oxygen Delivery Method): ventilator          Mode: AC/ CMV (Assist Control/ Continuous Mandatory Ventilation), RR (machine): 20, TV (machine): 500, FiO2: 30, PEEP: 5, ITime: 0.8, MAP: 11, PIP: 24    12-10 @ 07:01  -  12-11 @ 07:00  --------------------------------------------------------  IN: 3052.4 mL / OUT: 3595 mL / NET: -542.6 mL      CAPILLARY BLOOD GLUCOSE          PHYSICAL EXAM:  General: sedated  HEENT: NCAT, PERRL, clear conjunctiva, sclera anicteric, orally intubated  Neck: supple, no JVD  Respiratory: mechanically ventilated, CTA on bilateral apices, crackles on bilateral bases  Cardiovascular: RRR, S1S2, no m/r/g  Abdomen: soft, nontender, nondistended, normal bowel sounds  Extremities: 2+ edema up to bilateral shins  Skin: warm, perfused  Neurological: nonfocal    MEDICATIONS:  MEDICATIONS  (STANDING):  abacavir 300 milliGRAM(s) Oral two times a day  aspirin enteric coated 81 milliGRAM(s) Oral daily  buMETAnide Infusion 3 mG/Hr (15 mL/Hr) IV Continuous <Continuous>  ceFAZolin   IVPB 1000 milliGRAM(s) IV Intermittent every 12 hours  chlorhexidine 0.12% Liquid 15 milliLiter(s) Oral Mucosa every 12 hours  chlorhexidine 4% Liquid 1 Application(s) Topical <User Schedule>  citric acid/sodium citrate Solution 30 milliLiter(s) Oral every 6 hours  dexMEDEtomidine Infusion 0.2 MICROgram(s)/kG/Hr (4.08 mL/Hr) IV Continuous <Continuous>  doravirine 100 milliGRAM(s) Oral daily  famotidine    Tablet 20 milliGRAM(s) Oral daily  ferrous    sulfate 325 milliGRAM(s) Oral daily  folic acid 1 milliGRAM(s) Oral daily  heparin   Injectable 5000 Unit(s) SubCutaneous every 8 hours  influenza  Vaccine (HIGH DOSE) 0.7 milliLiter(s) IntraMuscular once  lamiVUDine- milliGRAM(s) Oral daily  metolazone 10 milliGRAM(s) Oral every 24 hours  multivitamin 1 Tablet(s) Oral daily  norepinephrine Infusion 0.05 MICROgram(s)/kG/Min (7.65 mL/Hr) IV Continuous <Continuous>  propofol Infusion 30.025 MICROgram(s)/kG/Min (14.7 mL/Hr) IV Continuous <Continuous>  senna 2 Tablet(s) Oral at bedtime  sodium bicarbonate 650 milliGRAM(s) Oral three times a day    MEDICATIONS  (PRN):  lidocaine 2% Jelly 3 milliLiter(s) IntraUrethral two times a day PRN penile pain  simethicone 80 milliGRAM(s) Chew four times a day PRN Gas      ALLERGIES:  Allergies    No Known Allergies    Intolerances        LABS:                        8.3    47.59 )-----------( 64       ( 10 Dec 2022 23:38 )             24.2     12-10    140  |  98  |  103<H>  ----------------------------<  211<H>  4.1   |  23  |  4.94<H>    Ca    8.0<L>      10 Dec 2022 23:38  Phos  8.6     12-10  Mg     3.2     12-10    TPro  5.6<L>  /  Alb  2.4<L>  /  TBili  0.4  /  DBili  x   /  AST  81<H>  /  ALT  <5<L>  /  AlkPhos  264<H>  12-10          RADIOLOGY & ADDITIONAL TESTS: Reviewed.

## 2022-12-11 NOTE — PROGRESS NOTE ADULT - PROBLEM SELECTOR PLAN 2
hemoglobin below goal, No need for transfusion however will defer need to primary team, patient will likely need volume optimization in setting of transfusion.   Can consider Epo if persistently anemic, will require iron stuidies (iron, TIBC, Ferritin) prior to initiation of ESAs.

## 2022-12-12 NOTE — CHART NOTE - NSCHARTNOTEFT_GEN_A_CORE
: Chadwick Foley/ Hieu Minaya/ Lucas Minaya    INDICATION: Critical Illness    PROCEDURE:  [X] LIMITED ECHO  [ X] LIMITED CHEST  [ ] LIMITED RETROPERITONEAL  [ ] LIMITED ABDOMINAL  [ ] LIMITED DVT  [ ] NEEDLE GUIDANCE VASCULAR  [ ] NEEDLE GUIDANCE THORACENTESIS  [ ] NEEDLE GUIDANCE PARACENTESIS  [ ] NEEDLE GUIDANCE PERICARDIOCENTESIS  [ ] OTHER    FINDINGS:    Anterior leaflet vegetation. Mitral stenosis. Aortic insufficiency. RV wall thickening. B lines bilaterally with interstitial abnormalities.       INTERPRETATION:    Known anterior leaflet vegetation with secondary mitral valve stenosis. Pulmonary abnormalities of indefinite etiology. : Chadwick Foley/ Hieu Minaya/ Lucas Minaya    INDICATION: Critical Illness    PROCEDURE:  [X] LIMITED ECHO  [ X] LIMITED CHEST  [ ] LIMITED RETROPERITONEAL  [ ] LIMITED ABDOMINAL  [ ] LIMITED DVT  [ ] NEEDLE GUIDANCE VASCULAR  [ ] NEEDLE GUIDANCE THORACENTESIS  [ ] NEEDLE GUIDANCE PARACENTESIS  [ ] NEEDLE GUIDANCE PERICARDIOCENTESIS  [ ] OTHER    FINDINGS:    Anterior leaflet mobile vegetation with stenotic inflow pattern. RV wall thickening. B lines bilaterally with moderate scattered B lines  Normal LV fxn RV moderatley dilated      INTERPRETATION:    Anterior MV leaflet vegetation with secondary mitral valve stenosis. Moderate interstitial abnormalities    I was present at the bedside throughout the procedure  Rei ESCOBAR

## 2022-12-12 NOTE — PROGRESS NOTE ADULT - ASSESSMENT
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  12/5  s/p Micra MDT PPM via Right Femoral Vein     Antibiotics:  Cefepime 11/29 -> 12/2  Meropenem 11/30  Vancomycin 11/30 ->12/2  Cefazolin 12/2 -->  Zosyn 12/8-->9      IMPRESSION:  Prosthetic MV endocarditis  Staph lugdunensis bacteremia/bacteruria  Bacteremia cleared as of 12/2/22  HIV well controlled on ARTs  Previous Staph epi endocarditis  Fever  - afebrile since 12/2  Leukocytosis  - increasing  renal failure  CHF    Mitral Valve dehiscence 12/8 echo demonstrating new MV dehiscence and severe paravalvular regurgitation,    not a surgical candidate - guarded out look  unfortunate - in my opinion, resuscitation would be medically futile    RECOMMENDATIONS:  Continue IV cefazolin 1 gm q12hrs for now    Would ask for palliative care involvement as I am not aware of any next of kin, without CT surgery that he was deemed not a suitable candidate for this is an incurable infection and a palliative approach seems warranted    Tung Almazan MD  Can be called via Teams  After 5pm/weekends [phone]

## 2022-12-12 NOTE — PROGRESS NOTE ADULT - ASSESSMENT
ASSESSMENT: 82M w/ PMHx of HIV (viral load undetectable 08/22), Afib s/p DVVC and ablation (not on AC), MV endocarditis s/p bioprosthetic valve replacement, BPH (self catheterizes), frequent UTIs, presents with UTI and heart block, found to have persistent endocarditis not amenable to cardiac surgery, s/p melina PPM placement on 12/5, s/p RRT on 12/8 called for hypoxia, transferred to MICU for further management of worsening acute respiratory failure, functionally anuric status.       ======NEUROLOGY======  - intubated 12/10 overnight due to bloody secretions noted   - on propofol 30; patient having spontaneous leg movements  - prior to intubation, AAOx3/4    ======PULMONARY======  #AHRF  - RRT called 12/8 for hypoxia to 80s, HF improved to 93%, brought up to MICU and put on BIPAP, with saturation >97% likely 2/2 volume overload  - CXR 12/7 showing vascular congestion; exam consistent with volume overload  - ABG w signs of respiratory on metabolic acidosis and was on BiPAP -> HFNC 40L 80%   - overnight with worsening acidosis and bloody secretions so intubated with improvement in acidosis but persistent hypercarbia pH 7.21 abd pCO2 54  - current vent settings: , RR 18, FiO2 30%, PEEP 5    ====CARDIOVASCULAR====  #Staph lugdudenesis bacteremia c/b infective endocarditis   #afib  #Mobitiz I, sinus bradycardia    - s/p cefazolin, changed to Zosyn during RRT --> changed back to Ancef 12/8  - trended trop (?concern for IE embolization to coronary arteries) - no significant ischemic changes at this time  - Rhythm showed sinus madonna with Mobitz I and pAF SVR  - per CTSx pt not eligible for intervention at this point   - TTE 12/8 demonstrating MV dehiscence and severe paravalvular regurgitation     ========RENAL========  #progressive renal failure  #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 Patient with worsening volume status, started on diuresis today however still requiring non invasive positive pressure ventilation.   - s/p 1x Ultrafiltration, to improve congestion however ultimately patient is not a good candidate for long term hemodialysis.   - Abdominal US done on 12/1 showed unilateral hydronephrosis.   - Check serological w/up for GN.   - Maintain Tovar in situ.   - Monitor BMP. Strict I/Os. Avoid nephrotoxins.  - shiley placed in R IJ 12/10 for dialysis with clearance  - 12/11: per nephro, continue with bumex 3 gtt and metolazone - no need for urgent dialysis today    ===GASTROINTESTINAL====  - no active issues      ===INFECTIOUS DISEASE===  #endocarditis, staph lugdudenesis bacteremia + in Urine and BCx  - currently on cefazolin (12/8 - )  - blood culture 11/29 positive; repeat cultures since negative  - ID following, recs appreciated    #hx of HIV  - treat with ART home medications  - Continue PO abacavir 200mg q12hrs, lamivudine 100mg q24hrs and doravirine 1tab q24hrs (renally dosed per pharm)    =====HEMATOLOGIC=====  #hx of afib and endocarditis  - For now, hold Eliquis 2.5 mg BID given possible procedure in future    #anemia  - Hemoglobin near goal, on oral iron.   - Would hold off on IV iron in the setting of bacteremia/ endocarditis.   - Consider JASON if Hb remains below goal.   - Monitor CBC.    ======ENDOCRINE=======  - no active issues    ===MUSCULOSKELETAL====  - no active issues    =====PROPHYLAXIS======  DVT prophylaxis: Heparin Subq

## 2022-12-12 NOTE — PROGRESS NOTE ADULT - PROBLEM SELECTOR PLAN 2
Likely In the setting of CKD. Check iron studies. Blood transfusion as per primary team. Will consider JASON if Hb remains low. Monitor Hb.

## 2022-12-12 NOTE — PROGRESS NOTE ADULT - PROBLEM SELECTOR PLAN 3
In the setting of SUDHEER. Received HD session. Serum potassium Wnl (3.4) today. Monitor serum potassium.     If you have any questions, please feel free to contact me  Marek Thorpe  Nephrology Fellow  [phone]/ [employer] Teams(Preferred)  (After 5pm or on weekends please page the on-call fellow).

## 2022-12-12 NOTE — PROGRESS NOTE ADULT - SUBJECTIVE AND OBJECTIVE BOX
[facility] DIVISION OF KIDNEY DISEASES AND HYPERTENSION -- FOLLOW UP NOTE  --------------------------------------------------------------------------------  Chief Complaint: Pt is a 82 year old male with PMH of HIV VLUD, BPH (intermittent cath), Atrial fibrillation, DCCV and ablation and MV endocarditis s/p bioprosthetic valve replacement admitted at [facility] on 11/29/22 with weakness noted to have bacteremia (Staph Lumaggiunensis). Nephrology team following for SUDHEER on CKD.     24 hour events/subjective:  Pt. seen and examined in MICU. [facility] Unable to obtain ROS.       PAST HISTORY  --------------------------------------------------------------------------------  No significant changes to PMH, PSH, FHx, SHx, unless otherwise noted    ALLERGIES & MEDICATIONS  --------------------------------------------------------------------------------  Allergies    No Known Allergies    Intolerances    Standing Inpatient Medications  abacavir 300 milliGRAM(s) Oral two times a day  aspirin enteric coated 81 milliGRAM(s) Oral daily  buMETAnide Infusion 3 mG/Hr IV Continuous <Continuous>  ceFAZolin   IVPB 1000 milliGRAM(s) IV Intermittent every 12 hours  chlorhexidine 0.12% Liquid 15 milliLiter(s) Oral Mucosa every 12 hours  chlorhexidine 4% Liquid 1 Application(s) Topical <User Schedule>  dexMEDEtomidine Infusion 0.2 MICROgram(s)/kG/Hr IV Continuous <Continuous>  doravirine 100 milliGRAM(s) Oral daily  famotidine    Tablet 20 milliGRAM(s) Oral daily  ferrous    sulfate 325 milliGRAM(s) Oral daily  folic acid 1 milliGRAM(s) Oral daily  heparin   Injectable 5000 Unit(s) SubCutaneous every 8 hours  influenza  Vaccine (HIGH DOSE) 0.7 milliLiter(s) IntraMuscular once  lamiVUDine- milliGRAM(s) Oral daily  metolazone 10 milliGRAM(s) Oral every 24 hours  multivitamin 1 Tablet(s) Oral daily  norepinephrine Infusion 0.05 MICROgram(s)/kG/Min IV Continuous <Continuous>  propofol Infusion 30.025 MICROgram(s)/kG/Min IV Continuous <Continuous>  senna 2 Tablet(s) Oral at bedtime    PRN Inpatient Medications  lidocaine 2% Jelly 3 milliLiter(s) IntraUrethral two times a day PRN  simethicone 80 milliGRAM(s) Chew four times a day PRN    REVIEW OF SYSTEMS  --------------------------------------------------------------------------------  Unable to obtain ROS     VITALS/PHYSICAL EXAM  --------------------------------------------------------------------------------  T(C): 37.4 (12-12-22 @ 04:00), Max: 37.4 (12-11-22 @ 08:00)  HR: 92 (12-12-22 @ 06:45) (59 - 130)  BP: 129/60 (12-12-22 @ 06:45) (99/54 - 138/61)  RR: 68 (12-12-22 @ 06:45) (4 - 68)  SpO2: 97% (12-12-22 @ 06:45) (88% - 99%)  Wt(kg): --    12-11-22 @ 07:01  -  12-12-22 @ 07:00  --------------------------------------------------------  IN: 2379.6 mL / OUT: 4570 mL / NET: -2190.4 mL    Physical Exam:  	Gen: Critically ill  	HEENT: ETT+  	Pulm: Decreased AE B/L  	CV: S1S2  	Abd: Soft, +BS   	Ext: No LE edema B/L  	Neuro: Sedated  	Skin: Warm and dry  	Vascular access: IV peripheral nina, R IJ non tunneled HD catheter +    LABS/STUDIES  --------------------------------------------------------------------------------              8.8    51.83 >-----------<  80       [12-12-22 @ 02:01]              25.7     145  |  95  |  123  ----------------------------<  167      [12-12-22 @ 02:01]  3.4   |  31  |  4.92        Ca     9.3     [12-12-22 @ 02:01]      Mg     3.8     [12-12-22 @ 02:01]      Phos  7.8     [12-12-22 @ 02:01]    TPro  6.1  /  Alb  2.7  /  TBili  0.4  /  DBili  x   /  AST  68  /  ALT  <5  /  AlkPhos  270  [12-12-22 @ 02:01]    Creatinine Trend:  SCr 4.92 [12-12 @ 02:01]  SCr 4.94 [12-10 @ 23:38]  SCr 4.74 [12-10 @ 20:41]  SCr 6.05 [12-10 @ 09:02]  SCr 5.65 [12-09 @ 23:45]    Urinalysis - [11-29-22 @ 20:45]      Color Yellow / Appearance Slightly Turbid / SG 1.015 / pH 6.0      Gluc Negative / Ketone Negative  / Bili Negative / Urobili Negative       Blood Moderate / Protein 100 / Leuk Est Large / Nitrite Negative      RBC 6 /  / Hyaline 2 / Gran 1 / Sq Epi  / Non Sq Epi 1 / Bacteria Negative    TSH 2.52      [12-02-22 @ 06:24]    HBsAb <3.0      [12-08-22 @ 20:44]  HBsAg Nonreact      [12-08-22 @ 20:44]  HCV 0.12, Nonreact      [12-08-22 @ 20:44]

## 2022-12-12 NOTE — PROGRESS NOTE ADULT - SUBJECTIVE AND OBJECTIVE BOX
INFECTIOUS DISEASES FOLLOW UP-- Dorita Almazan  [phone]    This is a follow up note for this  82yMale with  Sepsis  staph lugdunensis endocarditis  intubated, sedated        ROS:  CONSTITUTIONAL:  intubated, sedated    Allergies    No Known Allergies    Intolerances        ANTIBIOTICS/RELEVANT:  antimicrobials  abacavir 300 milliGRAM(s) Oral two times a day  ceFAZolin   IVPB 1000 milliGRAM(s) IV Intermittent every 12 hours  doravirine 100 milliGRAM(s) Oral daily  lamiVUDine- milliGRAM(s) Oral daily    immunologic:  influenza  Vaccine (HIGH DOSE) 0.7 milliLiter(s) IntraMuscular once    OTHER:  aspirin enteric coated 81 milliGRAM(s) Oral daily  buMETAnide Infusion 3 mG/Hr IV Continuous <Continuous>  chlorhexidine 0.12% Liquid 15 milliLiter(s) Oral Mucosa every 12 hours  chlorhexidine 4% Liquid 1 Application(s) Topical <User Schedule>  dexMEDEtomidine Infusion 0.2 MICROgram(s)/kG/Hr IV Continuous <Continuous>  famotidine    Tablet 20 milliGRAM(s) Oral daily  ferrous    sulfate 325 milliGRAM(s) Oral daily  folic acid 1 milliGRAM(s) Oral daily  heparin   Injectable 5000 Unit(s) SubCutaneous every 8 hours  lidocaine 2% Jelly 3 milliLiter(s) IntraUrethral two times a day PRN  metolazone 10 milliGRAM(s) Oral every 24 hours  multivitamin 1 Tablet(s) Oral daily  norepinephrine Infusion 0.05 MICROgram(s)/kG/Min IV Continuous <Continuous>  propofol Infusion 30.025 MICROgram(s)/kG/Min IV Continuous <Continuous>  senna 2 Tablet(s) Oral at bedtime  simethicone 80 milliGRAM(s) Chew four times a day PRN      Objective:  Vital Signs Last 24 Hrs  T(C): 37.2 (12 Dec 2022 16:00), Max: 37.4 (11 Dec 2022 20:00)  T(F): 99 (12 Dec 2022 16:00), Max: 99.3 (11 Dec 2022 20:00)  HR: 57 (12 Dec 2022 17:30) (50 - 130)  BP: 107/52 (12 Dec 2022 17:15) (87/43 - 138/61)  BP(mean): 75 (12 Dec 2022 17:15) (62 - 88)  RR: 18 (12 Dec 2022 17:30) (4 - 32)  SpO2: 100% (12 Dec 2022 17:30) (88% - 100%)    Parameters below as of 12 Dec 2022 08:00  Patient On (Oxygen Delivery Method): ventilator    O2 Concentration (%): 30    PHYSICAL EXAM:  Constitutional:no acute distress  Eyes:EMILY, EOMI  Ear/Nose/Throat: no oral lesions feeding tube in place, 	  Respiratory: diminished at both bases  Cardiovascular: S1S2 systolic murmur  Gastrointestinal:soft, (+) BS, no tenderness  Extremities:no e/e/c  No Lymphadenopathy  IV sites not inflammed.    LABS:                        8.8    51.83 )-----------( 80       ( 12 Dec 2022 02:01 )             25.7     12-12    145  |  95<L>  |  133<H>  ----------------------------<  177<H>  3.4<L>   |  31  |  4.88<H>    Ca    9.5      12 Dec 2022 14:24  Phos  8.4     12-12  Mg     3.9     12-12    TPro  6.2  /  Alb  2.6<L>  /  TBili  0.4  /  DBili  x   /  AST  62<H>  /  ALT  <5<L>  /  AlkPhos  252<H>  12-12          MICROBIOLOGY:            RECENT CULTURES:  12-07 @ 12:10  .Blood Blood-Peripheral  --  --  --    No Growth Final  --      RADIOLOGY & ADDITIONAL STUDIES:    < from: Limited Transthoracic Echo (12.08.22 @ 15:15) >  Conclusions:  1. Bioprosthetic mitral valve replacement that is rocking.  Echogenic structure is seen on the mitral valve consistent  with vegetation. The mitral valve is dehisced.  There is  severe paravalvular regurgitation. Mean transmitral valve  gradient equals 14 mm Hg, which is elevated even in the  setting of a bioprosthetic mitral valve replacement. There  is an increase in gradient probably due to a combination of  stenosis and regurgitation.  2. Endocardium not well visualized; grossly normal left  ventricular systolic function. Visually, LVEF is 60-65%.  3. Moderate right atrial enlargement.  4. The right ventricle is not well visualized; grossly  normal right ventricular systolic function.  5. Tricuspid valvenot well visualized. Moderate-severe  tricuspid regurgitation. Difficult to determine the  mechanism of regurgitation. RVSP is at least 64 mmHg.  6. No pericardial effusion seen.  Discussed with Dr. Reyes at 4:40 pm.  *** Compared with echocardiogramof 12/2/2022, there is now  MV dehiscence and severe paravalvular regurgitation,  moderate to severe TR.    < end of copied text >

## 2022-12-12 NOTE — PROGRESS NOTE ADULT - SUBJECTIVE AND OBJECTIVE BOX
INTERVAL EVENTS/SUBJ:  intubated and sedated    Home Medications:  abacavir 300 mg oral tablet: 2 tab(s) orally once a day (29 Nov 2022 23:21)  doravirine 100 mg oral tablet: 1 tab(s) orally once a day (29 Nov 2022 23:21)  Tamar-C 500 mg oral tablet: 1 tab(s) orally once a day (29 Nov 2022 23:21)  famotidine 20 mg oral tablet: 1 tab(s) orally 2 times a day (29 Nov 2022 23:21)  ferrous sulfate 325 mg (65 mg elemental iron) oral tablet: 1 tab(s) orally once a day (29 Nov 2022 23:21)  folic acid 1 mg oral tablet: 1 tab(s) orally once a day (29 Nov 2022 23:21)  lamiVUDine 100 mg oral tablet: 1 tab(s) orally once a day (29 Nov 2022 23:21)  metoprolol succinate 25 mg oral tablet, extended release: 0.5 tab(s) orally once a day (29 Nov 2022 23:21)  Multiple Vitamins oral tablet: 1 tab(s) orally once a day (29 Nov 2022 23:21)  senna leaf extract oral tablet: 2 tab(s) orally once a day (at bedtime) (29 Nov 2022 23:21)  telmisartan 40 mg oral tablet: 1 tab(s) orally once a day (29 Nov 2022 23:21)  Vitamin D3 5000 intl units (125 mcg) oral capsule: 1 cap(s) orally once a day (29 Nov 2022 23:21)      MEDICATIONS  (STANDING):  abacavir 300 milliGRAM(s) Oral two times a day  aspirin enteric coated 81 milliGRAM(s) Oral daily  buMETAnide Infusion 3 mG/Hr (15 mL/Hr) IV Continuous <Continuous>  ceFAZolin   IVPB 1000 milliGRAM(s) IV Intermittent every 12 hours  chlorhexidine 0.12% Liquid 15 milliLiter(s) Oral Mucosa every 12 hours  chlorhexidine 4% Liquid 1 Application(s) Topical <User Schedule>  dexMEDEtomidine Infusion 0.2 MICROgram(s)/kG/Hr (4.08 mL/Hr) IV Continuous <Continuous>  doravirine 100 milliGRAM(s) Oral daily  famotidine    Tablet 20 milliGRAM(s) Oral daily  ferrous    sulfate 325 milliGRAM(s) Oral daily  folic acid 1 milliGRAM(s) Oral daily  heparin   Injectable 5000 Unit(s) SubCutaneous every 8 hours  influenza  Vaccine (HIGH DOSE) 0.7 milliLiter(s) IntraMuscular once  lamiVUDine- milliGRAM(s) Oral daily  metolazone 10 milliGRAM(s) Oral every 24 hours  multivitamin 1 Tablet(s) Oral daily  norepinephrine Infusion 0.05 MICROgram(s)/kG/Min (7.65 mL/Hr) IV Continuous <Continuous>  propofol Infusion 30.025 MICROgram(s)/kG/Min (14.7 mL/Hr) IV Continuous <Continuous>  senna 2 Tablet(s) Oral at bedtime    MEDICATIONS  (PRN):  lidocaine 2% Jelly 3 milliLiter(s) IntraUrethral two times a day PRN penile pain  simethicone 80 milliGRAM(s) Chew four times a day PRN Gas      Vital Signs Last 24 Hrs  T(C): 37.4 (12 Dec 2022 04:00), Max: 37.4 (11 Dec 2022 20:00)  T(F): 99.3 (12 Dec 2022 04:00), Max: 99.3 (11 Dec 2022 20:00)  HR: 92 (12 Dec 2022 06:45) (59 - 130)  BP: 129/60 (12 Dec 2022 06:45) (99/54 - 138/61)  BP(mean): 87 (12 Dec 2022 06:45) (74 - 88)  RR: 68 (12 Dec 2022 06:45) (4 - 68)  SpO2: 97% (12 Dec 2022 06:45) (88% - 99%)    Parameters below as of 11 Dec 2022 20:00  Patient On (Oxygen Delivery Method): ventilator    O2 Concentration (%): 30    REVIEW OF SYSTEMS:  As per HPI, otherwise unremarkable.     PHYSICAL EXAM:  Constitutional/Appearance: Normal, Well-developed  HEENT:   Normal oral mucosa, no drainage or redness, supple neck  Lymphatic: No lymphadenopathy  Cardiovascular: Normal S1 S2, No edema, II/VI ASHUTOSH  Respiratory: Lungs clear to auscultation, respirations non-labored  Psychiatry: A & O x 3, appropriate affect.   Gastrointestinal:  Soft, Non-tender, no distention  Skin: No rashes, No ecchymoses, No cyanosis	  Neurologic: Non-focal, Alert and oriented x 3  Extremities: Normal range of motion  Vascular: Peripheral pulses palpable 2+ bilaterally (radial)    LABS:  CBC Full  -  ( 12 Dec 2022 02:01 )  WBC Count : 51.83 K/uL  RBC Count : 2.40 M/uL  Hemoglobin : 8.8 g/dL  Hematocrit : 25.7 %  Platelet Count - Automated : 80 K/uL  Mean Cell Volume : 107.1 fl  Mean Cell Hemoglobin : 36.7 pg  Mean Cell Hemoglobin Concentration : 34.2 gm/dL  Auto Neutrophil # : x  Auto Lymphocyte # : x  Auto Monocyte # : x  Auto Eosinophil # : x  Auto Basophil # : x  Auto Neutrophil % : x  Auto Lymphocyte % : x  Auto Monocyte % : x  Auto Eosinophil % : x  Auto Basophil % : x      12-12    145  |  95<L>  |  123<H>  ----------------------------<  167<H>  3.4<L>   |  31  |  4.92<H>    Ca    9.3      12 Dec 2022 02:01  Phos  7.8     12-12  Mg     3.8     12-12    TPro  6.1  /  Alb  2.7<L>  /  TBili  0.4  /  DBili  x   /  AST  68<H>  /  ALT  <5<L>  /  AlkPhos  270<H>  12-12    IMPRESSION AND PLAN: 82M w HIV, AF s/p DCCV and ablation (no AC), MV endocarditis s/p bioprosthetic MVR, BPH, frequent UTIs here with persistent endocarditis not amenable to cardiac surgery, s/p micra PPM 12/5 and acute respiratory failure.   -not candidate for cardiac intervention on MV  -remains on tele with serial ECGs for heart block  -ongoing diuresis and fluid removal per renal  -poor prognosis overall    ***    Jacob Bauman MD, MPhil, [facility]  Cardiologist, [facility]  ; [employer] at [facility]  email: [email or web address] on-service contact/call information, go to [email or web address] and use "[email or web address]" to login.  Outpatient Cardiology appointments, call  [phone] to arrange with a colleague; I do not have outpatient Cardiology clinic.

## 2022-12-12 NOTE — PROGRESS NOTE ADULT - PROBLEM SELECTOR PLAN 1
Pt with SUDHEER on CKD likely in the setting of sepsis/bacteremia and hypotension.   Pt. with CKD In the setting of HIV and urinary retention (does self cath at home) s/p tovar insertion on this admission.   Urine studies from 11/29 with blood and protein present in the urine. Pt received UF and then HD on 12/8 and 12/10 for fluid overload. Tolerated HD well. Currently intubated and on mech vent. Currently receiving IV Bumex gtt and IV vasopressor support. Labs reviewed. Pt non oliguric, UOP ~ 4.5 L in last 24 hrs. Agree with IV Bumex. No urgent indication for HD. Monitor labs and urine output. Avoid any potential nephrotoxins. Dose medications as per eGFR. Pt with SUDHEER on CKD in the setting of sepsis/bacteremia and hypotension.   Pt. with CKD In the setting of HIV and urinary retention (does self cath at home) s/p tovar insertion on this admission.   Urine studies from 11/29 with blood and protein present in the urine. Pt received UF and then HD on 12/8 and 12/10 for fluid overload. Tolerated HD well. Currently intubated and on mech vent. Currently receiving IV Bumex gtt and IV vasopressor support. Labs reviewed. Pt non oliguric, UOP ~ 4.5 L in last 24 hrs. Agree with IV Bumex. No urgent indication for HD. Monitor labs and urine output. Avoid any potential nephrotoxins. Dose medications as per eGFR.

## 2022-12-12 NOTE — PROGRESS NOTE ADULT - ASSESSMENT
82 year old male with PMH of HIV VLUD, BPH (intermittent cath), Atrial fibrillation, DCCV and ablation and MV endocarditis s/p bioprosthetic valve replacement, patient presenting with weakness noted to have bacteremia (Staph Lugdunensis). Now seen to have bacteremia, nephrology consulted for PICC placement clearance  Patient renal function worsening despite diuresis, patient had RRT for hypoxic respiratory distress. Session of dialysis was performed to improve congestion, HD later performed for Hyperkalemia

## 2022-12-12 NOTE — PROGRESS NOTE ADULT - SUBJECTIVE AND OBJECTIVE BOX
MICU Progress Note    Radha Mae MD  PGY1   Available on TEAMS    INTERVAL HPI/OVERNIGHT EVENTS:    SUBJECTIVE: Patient seen and examined at bedside.       OBJECTIVE:    VITAL SIGNS:  ICU Vital Signs Last 24 Hrs  T(C): 37.4 (12 Dec 2022 04:00), Max: 37.4 (11 Dec 2022 08:00)  T(F): 99.3 (12 Dec 2022 04:00), Max: 99.3 (11 Dec 2022 08:00)  HR: 92 (12 Dec 2022 06:45) (59 - 130)  BP: 129/60 (12 Dec 2022 06:45) (99/54 - 138/61)  BP(mean): 87 (12 Dec 2022 06:45) (69 - 88)  ABP: --  ABP(mean): --  RR: 68 (12 Dec 2022 06:45) (4 - 68)  SpO2: 97% (12 Dec 2022 06:45) (88% - 99%)    O2 Parameters below as of 11 Dec 2022 20:00  Patient On (Oxygen Delivery Method): ventilator    O2 Concentration (%): 30      Mode: AC/ CMV (Assist Control/ Continuous Mandatory Ventilation), RR (machine): 18, TV (machine): 500, FiO2: 30, PEEP: 5, ITime: 1, MAP: 10, PIP: 26    12-11 @ 07:01  -  12-12 @ 07:00  --------------------------------------------------------  IN: 2379.6 mL / OUT: 4570 mL / NET: -2190.4 mL      CAPILLARY BLOOD GLUCOSE          PHYSICAL EXAM:  General: sedated  HEENT: NCAT, PERRL, clear conjunctiva, sclera anicteric  Neck: supple, no JVD  Respiratory: CTAB  Cardiovascular: RRR, S1S2, no m/r/g  Abdomen: soft, nontender, nondistended, normal bowel sounds  Extremities: no edema, no cyanosis  Skin: warm, perfused  Neurological: nonfocal    MEDICATIONS:  MEDICATIONS  (STANDING):  abacavir 300 milliGRAM(s) Oral two times a day  aspirin enteric coated 81 milliGRAM(s) Oral daily  buMETAnide Infusion 3 mG/Hr (15 mL/Hr) IV Continuous <Continuous>  ceFAZolin   IVPB 1000 milliGRAM(s) IV Intermittent every 12 hours  chlorhexidine 0.12% Liquid 15 milliLiter(s) Oral Mucosa every 12 hours  chlorhexidine 4% Liquid 1 Application(s) Topical <User Schedule>  dexMEDEtomidine Infusion 0.2 MICROgram(s)/kG/Hr (4.08 mL/Hr) IV Continuous <Continuous>  doravirine 100 milliGRAM(s) Oral daily  famotidine    Tablet 20 milliGRAM(s) Oral daily  ferrous    sulfate 325 milliGRAM(s) Oral daily  folic acid 1 milliGRAM(s) Oral daily  heparin   Injectable 5000 Unit(s) SubCutaneous every 8 hours  influenza  Vaccine (HIGH DOSE) 0.7 milliLiter(s) IntraMuscular once  lamiVUDine- milliGRAM(s) Oral daily  metolazone 10 milliGRAM(s) Oral every 24 hours  multivitamin 1 Tablet(s) Oral daily  norepinephrine Infusion 0.05 MICROgram(s)/kG/Min (7.65 mL/Hr) IV Continuous <Continuous>  propofol Infusion 30.025 MICROgram(s)/kG/Min (14.7 mL/Hr) IV Continuous <Continuous>  senna 2 Tablet(s) Oral at bedtime    MEDICATIONS  (PRN):  lidocaine 2% Jelly 3 milliLiter(s) IntraUrethral two times a day PRN penile pain  simethicone 80 milliGRAM(s) Chew four times a day PRN Gas      ALLERGIES:  Allergies    No Known Allergies    Intolerances        LABS:                        8.8    51.83 )-----------( 80       ( 12 Dec 2022 02:01 )             25.7     12-12    145  |  95<L>  |  123<H>  ----------------------------<  167<H>  3.4<L>   |  31  |  4.92<H>    Ca    9.3      12 Dec 2022 02:01  Phos  7.8     12-12  Mg     3.8     12-12    TPro  6.1  /  Alb  2.7<L>  /  TBili  0.4  /  DBili  x   /  AST  68<H>  /  ALT  <5<L>  /  AlkPhos  270<H>  12-12          RADIOLOGY & ADDITIONAL TESTS: Reviewed.

## 2022-12-13 NOTE — PROGRESS NOTE ADULT - PROBLEM SELECTOR PLAN 1
Pt with SUDHEER on CKD in the setting of sepsis/bacteremia and hypotension.   Pt. with CKD In the setting of HIV and urinary retention (does self cath at home) s/p tovar insertion on this admission.   Urine studies from 11/29 with blood and protein present in the urine. Pt received UF and then HD on 12/8 and 12/10 for fluid overload. Tolerated HD well. Currently intubated and on mech vent. Currently receiving IV Bumex gtt and IV vasopressor support, patient noted to be sheela (developing alkalosis) consider holding diuresis . Labs reviewed. Pt non oliguric, UOP 3 L in last 24 hrs.   No urgent indication for HD. Monitor labs and urine output. Avoid any potential nephrotoxins. Dose medications as per eGFR. Pt with SUDHEER on CKD in the setting of sepsis/bacteremia and hypotension.   Pt. with CKD In the setting of HIV and urinary retention (does self cath at home) s/p tovar insertion on this admission.   Urine studies from 11/29 with blood and protein present in the urine. Pt received UF and then HD on 12/8 and 12/10 for fluid overload. Tolerated HD well. Currently intubated and on mech vent. Currently receiving IV Bumex gtt and IV vasopressor support, patient noted to be sheela (developing alkalosis) consider decreasing the dose of bumex. Labs reviewed. Pt non oliguric, UOP 3 L in last 24 hrs.   No plan for HD today Monitor labs and urine output. Avoid any potential nephrotoxins. Dose medications as per eGFR.

## 2022-12-13 NOTE — CHART NOTE - NSCHARTNOTEFT_GEN_A_CORE
Nutrition Follow Up Note  Patient seen for: length of stay follow up. Chart reviewed, events noted.    Per Chart: Pt is a 83 y/o M w/ PMHx of HIV (viral load undetectable ), Afib s/p DVVC and ablation (not on AC), MV endocarditis s/p bioprosthetic valve replacement, BPH (self catheterizes), frequent UTIs, presents with UTI and new Mobitz Type 1     Source: [] Patient       [x] Medical Record        [x] RN        [] Family at bedside       [] Other:    -If unable to interview patient: [x] Trach/Vent/BiPAP  [] Disoriented/confused/inappropriate to interview    Nutrition-Related Events:   - Pressors:  [] Yes    [x] No   - Propofol:  [] Yes    [x] No         - Rate: __mL/hr. If maintained x 24 hours, propofol will provide:     Diet Order:   Diet, NPO with Tube Feed:   Tube Feeding Modality: Orogastric  Nepro with Carb Steady (NEPRORTH)  Total Volume for 24 Hours (mL): 720  Continuous  Starting Tube Feed Rate {mL per Hour}: 10  Increase Tube Feed Rate by (mL): 10  Until Goal Tube Feed Rate (mL per Hour): 40  Tube Feed Duration (in Hours): 18  Tube Feed Start Time: 11:00 (12-10-22)    EN Order Provides: total volume 720 mL, 1296 kcals, 58 gm protein, and 523 mL free water. Meets   kcals/kG and  gm protein/kG based on   Current Pump Rate: Currently on hold per MICU 18 hr policy. Was at 40 mL/hr  5-Day EN Average Provision per RN flowsheet: 587 mL, 1057 kcals, and 47.5 gm protein    Is current diet order appropriate/adequate? See recommendations below    PO intake :   [] >75%  Adequate    [] 50-75%  Fair       [] <50%  Poor   [x] N/A    Nutrition-related concerns:  - Intubated 12/10. Not currently on sedatives or pressors   - HIV disease  - Progressive renal failure; per chart "patient is not a good candidate for long term hemodialysis."  -> Phos trending up with elevated BUN and Cr. Last HD 12/10  - Ordered for ferrous sulfate, folic acid, and multivitamin  - Low K s/p KCl    GI: No known issues thus far to EN.  Last BM .   Bowel Regimen? [x] Yes   [] No  -> Ordered for Pepcid    Weights:   Daily Weight in k.1 (12-12), 74.4 (12-11), 81.7 (12-10), 83.7 (12-10)  Wt downtrending, likely secondary to fluid losses as pt being diuresed   Nutrition focused physical exam conducted with RN present reveals severe muscle wasting to shoulders, clavicles, temples, and scapula; moderate fat wasting to buccals     Drug Dosing Weight  Height (cm): 190.5 (05 Dec 2022 08:09)  Weight (kg): 81.6 (05 Dec 2022 08:09)  BMI (kg/m2): 22.5 (05 Dec 2022 08:09); BMI based on daily wt from () 72.1 k.9 kG/m2    MEDICATIONS  (STANDING):  abacavir 300 milliGRAM(s) Oral two times a day  aspirin  chewable 81 milliGRAM(s) Oral daily  buMETAnide Infusion 3 mG/Hr (15 mL/Hr) IV Continuous <Continuous>  ceFAZolin   IVPB 1000 milliGRAM(s) IV Intermittent every 12 hours  dexMEDEtomidine Infusion 0.2 MICROgram(s)/kG/Hr (4.08 mL/Hr) IV Continuous <Continuous>  doravirine 100 milliGRAM(s) Oral daily  famotidine    Tablet 20 milliGRAM(s) Oral daily  ferrous    sulfate 325 milliGRAM(s) Oral daily  folic acid 1 milliGRAM(s) Oral daily  heparin   Injectable 5000 Unit(s) SubCutaneous every 8 hours  influenza  Vaccine (HIGH DOSE) 0.7 milliLiter(s) IntraMuscular once  lamiVUDine- milliGRAM(s) Oral daily  metolazone 10 milliGRAM(s) Oral every 24 hours  multivitamin 1 Tablet(s) Oral daily  norepinephrine Infusion 0.05 MICROgram(s)/kG/Min (7.65 mL/Hr) IV Continuous <Continuous>  propofol Infusion 30.025 MICROgram(s)/kG/Min (14.7 mL/Hr) IV Continuous <Continuous>  senna 2 Tablet(s) Oral at bedtime    Pertinent Labs:  @ 01:49: Na 146<H>, <H>, Cr 4.72<H>, <H>, K+ 3.3<L>, Phos 9.6<H>, Mg 4.0<H>, Alk Phos 246<H>, ALT/SGPT <5<L>, AST/SGOT 60<H>  12-12 @ 14:24: Na 145, <H>, Cr 4.88<H>, <H>, K+ 3.4<L>, Phos 8.4<H>, Mg 3.9<H>, Alk Phos 252<H>, ALT/SGPT <5<L>, AST/SGOT 62<H>    Finger Sticks:  POCT Blood Glucose.: 165 mg/dL (12-12 @ 17:08)    Skin per nursing documentation: No pressure injuries noted.  Edema per nursing documentation: 2+ generalized     Estimated Energy Needs:  Estimated Protein Needs:  Estimated Fluid Needs:   Michael State Equation:    Previous Nutrition Diagnosis: Increased Nutrient Needs  Nutrition Diagnosis is: [x] ongoing  [] resolved [] not applicable     Nutrition Care Plan:  [x] In Progress  [] Achieved  [] Not applicable    New Nutrition Diagnosis: Severe chronic malnutrition related to suspected Hx of inadequate intake with inability to meet increased nutrient needs as evidenced by severe muscle and moderate fat wasting.     Nutrition Interventions:     Education Provided:       [] Yes:  [x] No: Not appropriate at this time.     Recommendations:        Monitoring and Evaluation:   Continue to monitor nutritional intake, tolerance to diet prescription, weights, labs, skin integrity    RD remains available upon request and will follow up per protocol  Jennifer Munroe RD, MS, CDN, [facility] Pager #[phone] Nutrition Follow Up Note  Patient seen for: length of stay follow up. Chart reviewed, events noted.    Per Chart: Pt is a 81 y/o M w/ PMHx of HIV (viral load undetectable ), Afib s/p DVVC and ablation (not on AC), MV endocarditis s/p bioprosthetic valve replacement, BPH (self catheterizes), frequent UTIs, presents with UTI and new Mobitz Type 1     Source: [] Patient       [x] Medical Record        [x] RN        [] Family at bedside       [] Other:    -If unable to interview patient: [x] Trach/Vent/BiPAP  [] Disoriented/confused/inappropriate to interview    Nutrition-Related Events:   - Pressors:  [] Yes    [x] No   - Propofol:  [] Yes    [x] No         - Rate: __mL/hr. If maintained x 24 hours, propofol will provide:     Diet Order:   Diet, NPO with Tube Feed:   Tube Feeding Modality: Orogastric  Nepro with Carb Steady (NEPRORTH)  Total Volume for 24 Hours (mL): 720  Continuous  Starting Tube Feed Rate {mL per Hour}: 10  Increase Tube Feed Rate by (mL): 10  Until Goal Tube Feed Rate (mL per Hour): 40  Tube Feed Duration (in Hours): 18  Tube Feed Start Time: 11:00 (12-10-22)    EN Order Provides: total volume 720 mL, 1296 kcals, 58 gm protein, and 523 mL free water. Meets 18 kcals/kG and 0.8 gm protein/kG based on lowest wt thus far 72.1 kG.   Current Pump Rate: Currently on hold per MICU 18 hr policy. Was at 40 mL/hr  5-Day EN Average Provision per RN flowsheet: 587 mL, 1057 kcals, and 47.5 gm protein    Is current diet order appropriate/adequate? See recommendations below    PO intake :   [] >75%  Adequate    [] 50-75%  Fair       [] <50%  Poor   [x] N/A    Nutrition-related concerns:  - Intubated 12/10. Not currently on sedatives or pressors   - HIV disease  - Progressive renal failure; per chart "patient is not a good candidate for long term hemodialysis."  -> Phos trending up with elevated BUN and Cr. Last HD 12/10  - Ordered for ferrous sulfate, folic acid, and multivitamin  - Low K s/p KCl    GI: No known issues thus far to EN.  Last BM .   Bowel Regimen? [x] Yes   [] No  -> Ordered for Pepcid    Weights:   Daily Weight in k.1 (-12), 74.4 (12-11), 81.7 (12-10), 83.7 (12-10)  Wt downtrending, likely secondary to fluid losses as pt being diuresed   Nutrition focused physical exam conducted with RN present reveals severe muscle wasting to shoulders, clavicles, temples, and scapula; moderate fat wasting to buccals     Drug Dosing Weight  Height (cm): 190.5 (05 Dec 2022 08:09)  Weight (kg): 81.6 (05 Dec 2022 08:09)  BMI (kg/m2): 22.5 (05 Dec 2022 08:09); BMI based on daily wt from () 72.1 k.9 kG/m2    MEDICATIONS  (STANDING):  abacavir 300 milliGRAM(s) Oral two times a day  aspirin  chewable 81 milliGRAM(s) Oral daily  buMETAnide Infusion 3 mG/Hr (15 mL/Hr) IV Continuous <Continuous>  ceFAZolin   IVPB 1000 milliGRAM(s) IV Intermittent every 12 hours  dexMEDEtomidine Infusion 0.2 MICROgram(s)/kG/Hr (4.08 mL/Hr) IV Continuous <Continuous>  doravirine 100 milliGRAM(s) Oral daily  famotidine    Tablet 20 milliGRAM(s) Oral daily  ferrous    sulfate 325 milliGRAM(s) Oral daily  folic acid 1 milliGRAM(s) Oral daily  heparin   Injectable 5000 Unit(s) SubCutaneous every 8 hours  influenza  Vaccine (HIGH DOSE) 0.7 milliLiter(s) IntraMuscular once  lamiVUDine- milliGRAM(s) Oral daily  metolazone 10 milliGRAM(s) Oral every 24 hours  multivitamin 1 Tablet(s) Oral daily  norepinephrine Infusion 0.05 MICROgram(s)/kG/Min (7.65 mL/Hr) IV Continuous <Continuous>  propofol Infusion 30.025 MICROgram(s)/kG/Min (14.7 mL/Hr) IV Continuous <Continuous>  senna 2 Tablet(s) Oral at bedtime    Pertinent Labs:  @ 01:49: Na 146<H>, <H>, Cr 4.72<H>, <H>, K+ 3.3<L>, Phos 9.6<H>, Mg 4.0<H>, Alk Phos 246<H>, ALT/SGPT <5<L>, AST/SGOT 60<H>  12-12 @ 14:24: Na 145, <H>, Cr 4.88<H>, <H>, K+ 3.4<L>, Phos 8.4<H>, Mg 3.9<H>, Alk Phos 252<H>, ALT/SGPT <5<L>, AST/SGOT 62<H>    Finger Sticks:  POCT Blood Glucose.: 165 mg/dL (12-12 @ 17:08)    Skin per nursing documentation: No pressure injuries noted.  Edema per nursing documentation: 2+ generalized     Based on lowest wt thus far 72.1 kG  Estimated Energy Needs:  Estimated Protein Needs:  Estimated Fluid Needs:   Michael State Equation:     Previous Nutrition Diagnosis: Increased Nutrient Needs  Nutrition Diagnosis is: [x] ongoing  [] resolved [] not applicable     Nutrition Care Plan:  [x] In Progress  [] Achieved  [] Not applicable    New Nutrition Diagnosis: Severe chronic malnutrition related to suspected Hx of inadequate intake with inability to meet increased nutrient needs as evidenced by severe muscle and moderate fat wasting.     Nutrition Interventions:     Education Provided:       [] Yes:  [x] No: Not appropriate at this time.     Recommendations:      1) : total volume 720 mL, 1296 kcals, 58 gm protein, and 523 mL free water. Meets 18 kcals/kG and 0.8 gm protein/kG based on lowest wt thus far 72.1 kG.   2) As medically feasible, continue to provide micronutrients.   3) Malnutrition alert placed in chart.     Monitoring and Evaluation:   Continue to monitor nutritional intake, tolerance to diet prescription, weights, labs, skin integrity    RD remains available upon request and will follow up per protocol  Jennifer Munroe RD, MS, CDN, [facility] Pager #[phone] Nutrition Follow Up Note  Patient seen for: length of stay follow up. Chart reviewed, events noted.    Per Chart: Pt is a 83 y/o M w/ PMHx of HIV (viral load undetectable ), Afib s/p DVVC and ablation (not on AC), MV endocarditis s/p bioprosthetic valve replacement, BPH (self catheterizes), frequent UTIs, presents with UTI and new Mobitz Type 1     Source: [] Patient       [x] Medical Record        [x] RN        [] Family at bedside       [] Other:    -If unable to interview patient: [x] Trach/Vent/BiPAP  [] Disoriented/confused/inappropriate to interview    Nutrition-Related Events:   - Pressors:  [] Yes    [x] No   - Propofol:  [] Yes    [x] No         - Rate: __mL/hr. If maintained x 24 hours, propofol will provide:     Diet Order:   Diet, NPO with Tube Feed:   Tube Feeding Modality: Orogastric  Nepro with Carb Steady (NEPRORTH)  Total Volume for 24 Hours (mL): 720  Continuous  Starting Tube Feed Rate {mL per Hour}: 10  Increase Tube Feed Rate by (mL): 10  Until Goal Tube Feed Rate (mL per Hour): 40  Tube Feed Duration (in Hours): 18  Tube Feed Start Time: 11:00 (12-10-22)    EN Order Provides: total volume 720 mL, 1296 kcals, 58 gm protein, and 523 mL free water. Meets 18 kcals/kG and 0.8 gm protein/kG based on lowest wt thus far 72.1 kG.   Current Pump Rate: Currently on hold per MICU 18 hr policy. Was at 40 mL/hr  5-Day EN Average Provision per RN flowsheet: 587 mL, 1057 kcals, and 47.5 gm protein    Is current diet order appropriate/adequate? See recommendations below    PO intake :   [] >75%  Adequate    [] 50-75%  Fair       [] <50%  Poor   [x] N/A    Nutrition-related concerns:  - Intubated 12/10. Not currently on sedatives or pressors   - HIV disease  - Progressive renal failure; per chart "patient is not a good candidate for long term hemodialysis."  -> Phos trending up with elevated BUN and Cr. Last HD 12/10  - Ordered for ferrous sulfate, folic acid, and multivitamin  - Low K s/p KCl    GI: No known issues thus far to EN.  Last BM .   Bowel Regimen? [x] Yes   [] No  -> Ordered for Pepcid    Weights:   Daily Weight in k.1 (-12), 74.4 (12-11), 81.7 (12-10), 83.7 (12-10)  Wt downtrending, likely secondary to fluid losses as pt being diuresed   Nutrition focused physical exam conducted with RN present reveals severe muscle wasting to shoulders, clavicles, temples, and scapula; moderate fat wasting to buccals     Drug Dosing Weight  Height (cm): 190.5 (05 Dec 2022 08:09)  Weight (kg): 81.6 (05 Dec 2022 08:09)  BMI (kg/m2): 22.5 (05 Dec 2022 08:09); BMI based on daily wt from () 72.1 k.9 kG/m2    MEDICATIONS  (STANDING):  abacavir 300 milliGRAM(s) Oral two times a day  aspirin  chewable 81 milliGRAM(s) Oral daily  buMETAnide Infusion 3 mG/Hr (15 mL/Hr) IV Continuous <Continuous>  ceFAZolin   IVPB 1000 milliGRAM(s) IV Intermittent every 12 hours  dexMEDEtomidine Infusion 0.2 MICROgram(s)/kG/Hr (4.08 mL/Hr) IV Continuous <Continuous>  doravirine 100 milliGRAM(s) Oral daily  famotidine    Tablet 20 milliGRAM(s) Oral daily  ferrous    sulfate 325 milliGRAM(s) Oral daily  folic acid 1 milliGRAM(s) Oral daily  heparin   Injectable 5000 Unit(s) SubCutaneous every 8 hours  influenza  Vaccine (HIGH DOSE) 0.7 milliLiter(s) IntraMuscular once  lamiVUDine- milliGRAM(s) Oral daily  metolazone 10 milliGRAM(s) Oral every 24 hours  multivitamin 1 Tablet(s) Oral daily  norepinephrine Infusion 0.05 MICROgram(s)/kG/Min (7.65 mL/Hr) IV Continuous <Continuous>  propofol Infusion 30.025 MICROgram(s)/kG/Min (14.7 mL/Hr) IV Continuous <Continuous>  senna 2 Tablet(s) Oral at bedtime    Pertinent Labs:  @ 01:49: Na 146<H>, <H>, Cr 4.72<H>, <H>, K+ 3.3<L>, Phos 9.6<H>, Mg 4.0<H>, Alk Phos 246<H>, ALT/SGPT <5<L>, AST/SGOT 60<H>  12-12 @ 14:24: Na 145, <H>, Cr 4.88<H>, <H>, K+ 3.4<L>, Phos 8.4<H>, Mg 3.9<H>, Alk Phos 252<H>, ALT/SGPT <5<L>, AST/SGOT 62<H>    Finger Sticks:  POCT Blood Glucose.: 165 mg/dL (12 @ 17:08)    Skin per nursing documentation: No pressure injuries noted.  Edema per nursing documentation: 2+ generalized     Based on lowest wt thus far 72.1 kG  Estimated Energy Needs: (23-28 kcals/kG) 5280-6027 kcals   Estimated Protein Needs: (1.2-1.6 gm/kG) 86..36 gm  Fluid needs deferred to provider.   [place] State Equation: 1770 kcals ()    Previous Nutrition Diagnosis: Increased Nutrient Needs  Nutrition Diagnosis is: [x] ongoing  [] resolved [] not applicable     Nutrition Care Plan:  [x] In Progress  [] Achieved  [] Not applicable    New Nutrition Diagnosis: Severe chronic malnutrition related to suspected Hx of inadequate intake with inability to meet increased nutrient needs as evidenced by severe muscle and moderate fat wasting.     Nutrition Interventions:     Education Provided:       [] Yes:  [x] No: Not appropriate at this time.     Recommendations:      1) Given uptrend in Phos, continue Nepro and increase goal rate to 55 mL/hr x18 hrs with ProSource NoCarb TF x1 daily to provide total volume 990 mL, 1822 kcals, 91 gm protein, and 720 mL free water. Meets 25 kcals/kG and ~1.3 gm protein/kG based on lowest wt thus far 72.1 kG.   2) As medically feasible, continue to provide micronutrients.   3) Malnutrition alert placed in chart.     Monitoring and Evaluation:   Continue to monitor nutritional intake, tolerance to diet prescription, weights, labs, skin integrity    RD remains available upon request and will follow up per protocol  Jennifer Munroe RD, MS, CDN, [facility] Pager #[phone]

## 2022-12-13 NOTE — PROCEDURE NOTE - NSTRACHPOSTINTU_RESP_A_CORE
Breath sounds equal/Chest excursion noted/Chest X-Ray/Positive end tidal Co2 noted
visualized with bronch

## 2022-12-13 NOTE — CHART NOTE - NSCHARTNOTEFT_GEN_A_CORE
12 minutes  5 epi  1 calium chloride  1 bicarb  300 amio  defib for vfib     respi ICU attending was notified for desaturation while intubated. pt was being bagged for suspected mucus plug when he went pulseless into PEA arrest. CPR was started. Pt underwent ~12-15 minutes of CPR and received epi x5, calcium chloride x1, bicarb x1, 300mg of Amiodarone, and was defibrillated x2 for vfib rhythm with ROSC obtained. ET tube was replaced under VL guidance for suspected plugging, blood clots noted on end of original ET tube. Post- ROSC Bronchoscopy performed to ensure no further plugging or abnormalities. No further plugging or abnormalities seen. ICU attending was notified for desaturation. Pt was being bagged for suspected mucus plug when he went pulseless into PEA arrest. CPR was immediately started. Pt underwent ~12-15 minutes of CPR and received epi x5, calcium chloride x1, bicarb x1, 300mg of Amiodarone, and was defibrillated x2 for vfib rhythm with ROSC obtained. ET tube was replaced under VL guidance for suspected plugging, blood clots noted on end of original ET tube. Post- ROSC Bronchoscopy performed to ensure no further plugging or abnormalities. Bronch was unremarkable.

## 2022-12-13 NOTE — PROGRESS NOTE ADULT - SUBJECTIVE AND OBJECTIVE BOX
MICU Progress Note    Radha Mae MD  PGY1   Available on TEAMS    INTERVAL HPI/OVERNIGHT EVENTS:    SUBJECTIVE: Patient seen and examined at bedside.       OBJECTIVE:    VITAL SIGNS:  ICU Vital Signs Last 24 Hrs  T(C): 37 (13 Dec 2022 04:00), Max: 37.2 (12 Dec 2022 12:00)  T(F): 98.6 (13 Dec 2022 04:00), Max: 99 (12 Dec 2022 12:00)  HR: 102 (13 Dec 2022 07:45) (50 - 102)  BP: 156/65 (13 Dec 2022 07:45) (87/43 - 160/66)  BP(mean): 92 (13 Dec 2022 07:45) (62 - 95)  ABP: --  ABP(mean): --  RR: 86 (13 Dec 2022 07:45) (18 - 92)  SpO2: 100% (13 Dec 2022 07:45) (92% - 100%)    O2 Parameters below as of 12 Dec 2022 20:00  Patient On (Oxygen Delivery Method): ventilator    O2 Concentration (%): 30      Mode: AC/ CMV (Assist Control/ Continuous Mandatory Ventilation), RR (machine): 18, TV (machine): 500, FiO2: 30, PEEP: 5, ITime: 1, MAP: 10, PIP: 24    12-12 @ 07:01  -  12-13 @ 07:00  --------------------------------------------------------  IN: 2258.8 mL / OUT: 3355 mL / NET: -1096.2 mL    12-13 @ 07:01  -  12-13 @ 08:02  --------------------------------------------------------  IN: 25 mL / OUT: 0 mL / NET: 25 mL      CAPILLARY BLOOD GLUCOSE      POCT Blood Glucose.: 165 mg/dL (12 Dec 2022 17:08)      PHYSICAL EXAM:  General: sedated  HEENT: NCAT, PERRL, clear conjunctiva, sclera anicteric  Neck: supple, no JVD  Respiratory: CTAB  Cardiovascular: RRR, S1S2, no m/r/g  Abdomen: soft, nontender, nondistended, normal bowel sounds  Extremities: no edema, no cyanosis  Skin: warm, perfused  Neurological: nonfocal    MEDICATIONS:  MEDICATIONS  (STANDING):  abacavir 300 milliGRAM(s) Oral two times a day  aspirin enteric coated 81 milliGRAM(s) Oral daily  buMETAnide Infusion 3 mG/Hr (15 mL/Hr) IV Continuous <Continuous>  ceFAZolin   IVPB 1000 milliGRAM(s) IV Intermittent every 12 hours  chlorhexidine 0.12% Liquid 15 milliLiter(s) Oral Mucosa every 12 hours  chlorhexidine 4% Liquid 1 Application(s) Topical <User Schedule>  dexMEDEtomidine Infusion 0.2 MICROgram(s)/kG/Hr (4.08 mL/Hr) IV Continuous <Continuous>  doravirine 100 milliGRAM(s) Oral daily  famotidine    Tablet 20 milliGRAM(s) Oral daily  ferrous    sulfate 325 milliGRAM(s) Oral daily  folic acid 1 milliGRAM(s) Oral daily  heparin   Injectable 5000 Unit(s) SubCutaneous every 8 hours  influenza  Vaccine (HIGH DOSE) 0.7 milliLiter(s) IntraMuscular once  lamiVUDine- milliGRAM(s) Oral daily  metolazone 10 milliGRAM(s) Oral every 24 hours  multivitamin 1 Tablet(s) Oral daily  norepinephrine Infusion 0.05 MICROgram(s)/kG/Min (7.65 mL/Hr) IV Continuous <Continuous>  propofol Infusion 30.025 MICROgram(s)/kG/Min (14.7 mL/Hr) IV Continuous <Continuous>  senna 2 Tablet(s) Oral at bedtime    MEDICATIONS  (PRN):  lidocaine 2% Jelly 3 milliLiter(s) IntraUrethral two times a day PRN penile pain  simethicone 80 milliGRAM(s) Chew four times a day PRN Gas      ALLERGIES:  Allergies    No Known Allergies    Intolerances        LABS:                        8.7    39.50 )-----------( 67       ( 13 Dec 2022 01:49 )             26.4     12-13    146<H>  |  93<L>  |  140<H>  ----------------------------<  169<H>  3.3<L>   |  32<H>  |  4.72<H>    Ca    9.8      13 Dec 2022 01:49  Phos  9.6     12-13  Mg     4.0     12-13    TPro  6.2  /  Alb  2.5<L>  /  TBili  0.4  /  DBili  x   /  AST  60<H>  /  ALT  <5<L>  /  AlkPhos  246<H>  12-13          RADIOLOGY & ADDITIONAL TESTS: Reviewed. MICU Progress Note    Radha Mae MD  PGY1   Available on TEAMS    INTERVAL HPI/OVERNIGHT EVENTS: NAEON.     SUBJECTIVE: Patient seen and examined at bedside.       OBJECTIVE:    VITAL SIGNS:  ICU Vital Signs Last 24 Hrs  T(C): 37 (13 Dec 2022 04:00), Max: 37.2 (12 Dec 2022 12:00)  T(F): 98.6 (13 Dec 2022 04:00), Max: 99 (12 Dec 2022 12:00)  HR: 102 (13 Dec 2022 07:45) (50 - 102)  BP: 156/65 (13 Dec 2022 07:45) (87/43 - 160/66)  BP(mean): 92 (13 Dec 2022 07:45) (62 - 95)  ABP: --  ABP(mean): --  RR: 86 (13 Dec 2022 07:45) (18 - 92)  SpO2: 100% (13 Dec 2022 07:45) (92% - 100%)    O2 Parameters below as of 12 Dec 2022 20:00  Patient On (Oxygen Delivery Method): ventilator    O2 Concentration (%): 30      Mode: AC/ CMV (Assist Control/ Continuous Mandatory Ventilation), RR (machine): 18, TV (machine): 500, FiO2: 30, PEEP: 5, ITime: 1, MAP: 10, PIP: 24    12-12 @ 07:01 - 12-13 @ 07:00  --------------------------------------------------------  IN: 2258.8 mL / OUT: 3355 mL / NET: -1096.2 mL    12-13 @ 07:01  -  12-13 @ 08:02  --------------------------------------------------------  IN: 25 mL / OUT: 0 mL / NET: 25 mL      CAPILLARY BLOOD GLUCOSE      POCT Blood Glucose.: 165 mg/dL (12 Dec 2022 17:08)      PHYSICAL EXAM:  General: sedated  HEENT: NCAT, PERRL, clear conjunctiva, sclera anicteric  Neck: supple, no JVD  Respiratory: CTAB  Cardiovascular: RRR, S1S2, no m/r/g  Abdomen: soft, nontender, nondistended, normal bowel sounds  Extremities: no edema, no cyanosis  Skin: warm, perfused  Neurological: nonfocal    MEDICATIONS:  MEDICATIONS  (STANDING):  abacavir 300 milliGRAM(s) Oral two times a day  aspirin enteric coated 81 milliGRAM(s) Oral daily  buMETAnide Infusion 3 mG/Hr (15 mL/Hr) IV Continuous <Continuous>  ceFAZolin   IVPB 1000 milliGRAM(s) IV Intermittent every 12 hours  chlorhexidine 0.12% Liquid 15 milliLiter(s) Oral Mucosa every 12 hours  chlorhexidine 4% Liquid 1 Application(s) Topical <User Schedule>  dexMEDEtomidine Infusion 0.2 MICROgram(s)/kG/Hr (4.08 mL/Hr) IV Continuous <Continuous>  doravirine 100 milliGRAM(s) Oral daily  famotidine    Tablet 20 milliGRAM(s) Oral daily  ferrous    sulfate 325 milliGRAM(s) Oral daily  folic acid 1 milliGRAM(s) Oral daily  heparin   Injectable 5000 Unit(s) SubCutaneous every 8 hours  influenza  Vaccine (HIGH DOSE) 0.7 milliLiter(s) IntraMuscular once  lamiVUDine- milliGRAM(s) Oral daily  metolazone 10 milliGRAM(s) Oral every 24 hours  multivitamin 1 Tablet(s) Oral daily  norepinephrine Infusion 0.05 MICROgram(s)/kG/Min (7.65 mL/Hr) IV Continuous <Continuous>  propofol Infusion 30.025 MICROgram(s)/kG/Min (14.7 mL/Hr) IV Continuous <Continuous>  senna 2 Tablet(s) Oral at bedtime    MEDICATIONS  (PRN):  lidocaine 2% Jelly 3 milliLiter(s) IntraUrethral two times a day PRN penile pain  simethicone 80 milliGRAM(s) Chew four times a day PRN Gas      ALLERGIES:  Allergies    No Known Allergies    Intolerances        LABS:                        8.7    39.50 )-----------( 67       ( 13 Dec 2022 01:49 )             26.4     12-13    146<H>  |  93<L>  |  140<H>  ----------------------------<  169<H>  3.3<L>   |  32<H>  |  4.72<H>    Ca    9.8      13 Dec 2022 01:49  Phos  9.6     12-13  Mg     4.0     12-13    TPro  6.2  /  Alb  2.5<L>  /  TBili  0.4  /  DBili  x   /  AST  60<H>  /  ALT  <5<L>  /  AlkPhos  246<H>  12-13          RADIOLOGY & ADDITIONAL TESTS: Reviewed. MICU Progress Note    Radha Mae MD  PGY1   Available on TEAMS    INTERVAL HPI/OVERNIGHT EVENTS: NAEON.     SUBJECTIVE: Patient seen and examined at bedside. ABG demonstrating metabolic alkalosis. Will d/c Bumex drip. Remains off sedation and opens eyes intermittently to voice and painful stimuli.       OBJECTIVE:    VITAL SIGNS:  ICU Vital Signs Last 24 Hrs  T(C): 37 (13 Dec 2022 04:00), Max: 37.2 (12 Dec 2022 12:00)  T(F): 98.6 (13 Dec 2022 04:00), Max: 99 (12 Dec 2022 12:00)  HR: 102 (13 Dec 2022 07:45) (50 - 102)  BP: 156/65 (13 Dec 2022 07:45) (87/43 - 160/66)  BP(mean): 92 (13 Dec 2022 07:45) (62 - 95)  ABP: --  ABP(mean): --  RR: 86 (13 Dec 2022 07:45) (18 - 92)  SpO2: 100% (13 Dec 2022 07:45) (92% - 100%)    O2 Parameters below as of 12 Dec 2022 20:00  Patient On (Oxygen Delivery Method): ventilator    O2 Concentration (%): 30      Mode: AC/ CMV (Assist Control/ Continuous Mandatory Ventilation), RR (machine): 18, TV (machine): 500, FiO2: 30, PEEP: 5, ITime: 1, MAP: 10, PIP: 24    12-12 @ 07:01  -  12-13 @ 07:00  --------------------------------------------------------  IN: 2258.8 mL / OUT: 3355 mL / NET: -1096.2 mL    12-13 @ 07:01  -  12-13 @ 08:02  --------------------------------------------------------  IN: 25 mL / OUT: 0 mL / NET: 25 mL      CAPILLARY BLOOD GLUCOSE      POCT Blood Glucose.: 165 mg/dL (12 Dec 2022 17:08)      PHYSICAL EXAM:  General: off sedation, intubated, on ventilator  HEENT: NCAT, PERRL, clear conjunctiva, sclera anicteric  Neck: supple, no JVD  Respiratory: CTAB  Cardiovascular: RRR, S1S2, no m/r/g  Abdomen: soft, nontender, nondistended, normal bowel sounds  Extremities: +1 pitting edema in bilateral lower extremities up to knees.   Skin: warm, perfused  Neurological: nonfocal    MEDICATIONS:  MEDICATIONS  (STANDING):  abacavir 300 milliGRAM(s) Oral two times a day  aspirin enteric coated 81 milliGRAM(s) Oral daily  buMETAnide Infusion 3 mG/Hr (15 mL/Hr) IV Continuous <Continuous>  ceFAZolin   IVPB 1000 milliGRAM(s) IV Intermittent every 12 hours  chlorhexidine 0.12% Liquid 15 milliLiter(s) Oral Mucosa every 12 hours  chlorhexidine 4% Liquid 1 Application(s) Topical <User Schedule>  dexMEDEtomidine Infusion 0.2 MICROgram(s)/kG/Hr (4.08 mL/Hr) IV Continuous <Continuous>  doravirine 100 milliGRAM(s) Oral daily  famotidine    Tablet 20 milliGRAM(s) Oral daily  ferrous    sulfate 325 milliGRAM(s) Oral daily  folic acid 1 milliGRAM(s) Oral daily  heparin   Injectable 5000 Unit(s) SubCutaneous every 8 hours  influenza  Vaccine (HIGH DOSE) 0.7 milliLiter(s) IntraMuscular once  lamiVUDine- milliGRAM(s) Oral daily  metolazone 10 milliGRAM(s) Oral every 24 hours  multivitamin 1 Tablet(s) Oral daily  norepinephrine Infusion 0.05 MICROgram(s)/kG/Min (7.65 mL/Hr) IV Continuous <Continuous>  propofol Infusion 30.025 MICROgram(s)/kG/Min (14.7 mL/Hr) IV Continuous <Continuous>  senna 2 Tablet(s) Oral at bedtime    MEDICATIONS  (PRN):  lidocaine 2% Jelly 3 milliLiter(s) IntraUrethral two times a day PRN penile pain  simethicone 80 milliGRAM(s) Chew four times a day PRN Gas      ALLERGIES:  Allergies    No Known Allergies    Intolerances        LABS:                        8.7    39.50 )-----------( 67       ( 13 Dec 2022 01:49 )             26.4     12-13    146<H>  |  93<L>  |  140<H>  ----------------------------<  169<H>  3.3<L>   |  32<H>  |  4.72<H>    Ca    9.8      13 Dec 2022 01:49  Phos  9.6     12-13  Mg     4.0     12-13    TPro  6.2  /  Alb  2.5<L>  /  TBili  0.4  /  DBili  x   /  AST  60<H>  /  ALT  <5<L>  /  AlkPhos  246<H>  12-13          RADIOLOGY & ADDITIONAL TESTS: Reviewed.

## 2022-12-13 NOTE — PROGRESS NOTE ADULT - SUBJECTIVE AND OBJECTIVE BOX
INTERVAL EVENTS/SUBJ:  intubated/sedated    Home Medications:  abacavir 300 mg oral tablet: 2 tab(s) orally once a day (29 Nov 2022 23:21)  doravirine 100 mg oral tablet: 1 tab(s) orally once a day (29 Nov 2022 23:21)  Tamar-C 500 mg oral tablet: 1 tab(s) orally once a day (29 Nov 2022 23:21)  famotidine 20 mg oral tablet: 1 tab(s) orally 2 times a day (29 Nov 2022 23:21)  ferrous sulfate 325 mg (65 mg elemental iron) oral tablet: 1 tab(s) orally once a day (29 Nov 2022 23:21)  folic acid 1 mg oral tablet: 1 tab(s) orally once a day (29 Nov 2022 23:21)  lamiVUDine 100 mg oral tablet: 1 tab(s) orally once a day (29 Nov 2022 23:21)  metoprolol succinate 25 mg oral tablet, extended release: 0.5 tab(s) orally once a day (29 Nov 2022 23:21)  Multiple Vitamins oral tablet: 1 tab(s) orally once a day (29 Nov 2022 23:21)  senna leaf extract oral tablet: 2 tab(s) orally once a day (at bedtime) (29 Nov 2022 23:21)  telmisartan 40 mg oral tablet: 1 tab(s) orally once a day (29 Nov 2022 23:21)  Vitamin D3 5000 intl units (125 mcg) oral capsule: 1 cap(s) orally once a day (29 Nov 2022 23:21)      MEDICATIONS  (STANDING):  abacavir 300 milliGRAM(s) Oral two times a day  aspirin enteric coated 81 milliGRAM(s) Oral daily  buMETAnide Infusion 3 mG/Hr (15 mL/Hr) IV Continuous <Continuous>  ceFAZolin   IVPB 1000 milliGRAM(s) IV Intermittent every 12 hours  chlorhexidine 0.12% Liquid 15 milliLiter(s) Oral Mucosa every 12 hours  chlorhexidine 4% Liquid 1 Application(s) Topical <User Schedule>  dexMEDEtomidine Infusion 0.2 MICROgram(s)/kG/Hr (4.08 mL/Hr) IV Continuous <Continuous>  doravirine 100 milliGRAM(s) Oral daily  famotidine    Tablet 20 milliGRAM(s) Oral daily  ferrous    sulfate 325 milliGRAM(s) Oral daily  folic acid 1 milliGRAM(s) Oral daily  heparin   Injectable 5000 Unit(s) SubCutaneous every 8 hours  influenza  Vaccine (HIGH DOSE) 0.7 milliLiter(s) IntraMuscular once  lamiVUDine- milliGRAM(s) Oral daily  metolazone 10 milliGRAM(s) Oral every 24 hours  multivitamin 1 Tablet(s) Oral daily  norepinephrine Infusion 0.05 MICROgram(s)/kG/Min (7.65 mL/Hr) IV Continuous <Continuous>  propofol Infusion 30.025 MICROgram(s)/kG/Min (14.7 mL/Hr) IV Continuous <Continuous>  senna 2 Tablet(s) Oral at bedtime    MEDICATIONS  (PRN):  lidocaine 2% Jelly 3 milliLiter(s) IntraUrethral two times a day PRN penile pain  simethicone 80 milliGRAM(s) Chew four times a day PRN Gas      Vital Signs Last 24 Hrs  T(C): 37.5 (13 Dec 2022 08:00), Max: 37.5 (13 Dec 2022 08:00)  T(F): 99.5 (13 Dec 2022 08:00), Max: 99.5 (13 Dec 2022 08:00)  HR: 101 (13 Dec 2022 08:44) (50 - 102)  BP: 157/68 (13 Dec 2022 08:30) (87/43 - 160/66)  BP(mean): 98 (13 Dec 2022 08:30) (62 - 100)  RR: 18 (13 Dec 2022 08:30) (18 - 32)  SpO2: 97% (13 Dec 2022 08:44) (92% - 100%)    Parameters below as of 13 Dec 2022 08:00  Patient On (Oxygen Delivery Method): ventilator    O2 Concentration (%): 30    REVIEW OF SYSTEMS:  As per HPI, otherwise unremarkable.     PHYSICAL EXAM:  Constitutional/Appearance: Normal, Well-developed  HEENT:   Normal oral mucosa, no drainage or redness, supple neck  Lymphatic: No lymphadenopathy  Cardiovascular: Normal S1 S2, No edema, II/VI ASHUTOSH  Respiratory: Lungs clear to auscultation, respirations non-labored  Psychiatry: A & O x 3, appropriate affect.   Gastrointestinal:  Soft, Non-tender, no distention  Skin: No rashes, No ecchymoses, No cyanosis	  Neurologic: Non-focal, Alert and oriented x 3  Extremities: Normal range of motion  Vascular: Peripheral pulses palpable 2+ bilaterally (radial)    LABS:  CBC Full  -  ( 13 Dec 2022 01:49 )  WBC Count : 39.50 K/uL  RBC Count : 2.42 M/uL  Hemoglobin : 8.7 g/dL  Hematocrit : 26.4 %  Platelet Count - Automated : 67 K/uL  Mean Cell Volume : 109.1 fl  Mean Cell Hemoglobin : 36.0 pg  Mean Cell Hemoglobin Concentration : 33.0 gm/dL  Auto Neutrophil # : x  Auto Lymphocyte # : x  Auto Monocyte # : x  Auto Eosinophil # : x  Auto Basophil # : x  Auto Neutrophil % : x  Auto Lymphocyte % : x  Auto Monocyte % : x  Auto Eosinophil % : x  Auto Basophil % : x      12-13    146<H>  |  93<L>  |  140<H>  ----------------------------<  169<H>  3.3<L>   |  32<H>  |  4.72<H>    Ca    9.8      13 Dec 2022 01:49  Phos  9.6     12-13  Mg     4.0     12-13    TPro  6.2  /  Alb  2.5<L>  /  TBili  0.4  /  DBili  x   /  AST  60<H>  /  ALT  <5<L>  /  AlkPhos  246<H>  12-13      IMPRESSION AND PLAN: 82M w HIV, AF s/p DCCV and ablation (no AC), MV endocarditis s/p bioprosthetic MVR, BPH, frequent UTIs here with persistent endocarditis not amenable to cardiac surgery, s/p micra PPM 12/5 and acute respiratory failure.   -not candidate for cardiac intervention on MV  -remains on tele with serial ECGs for heart block  -ongoing diuresis and fluid removal per renal  -poor prognosis overall      ***    Jacob Bauman MD, MPhil, [facility]  Cardiologist, [facility]  ; [employer] at [facility]  email: [email or web address] on-service contact/call information, go to [email or web address] and use "NEWLINE SOFTWARE" to login.  Outpatient Cardiology appointments, call  [phone] to arrange with a colleague; I do not have outpatient Cardiology clinic.

## 2022-12-13 NOTE — CHART NOTE - NSCHARTNOTEFT_GEN_A_CORE
: Chadwick Foley/Hieu Minaya/Lucas Minaya MD     INDICATION: Critical illness and Endocarditis    PROCEDURE:  [X ] LIMITED ECHO  [X] LIMITED CHEST  [ ] LIMITED RETROPERITONEAL  [ ] LIMITED ABDOMINAL  [ ] LIMITED DVT  [ ] NEEDLE GUIDANCE VASCULAR  [ ] NEEDLE GUIDANCE THORACENTESIS  [ ] NEEDLE GUIDANCE PARACENTESIS  [ ] NEEDLE GUIDANCE PERICARDIOCENTESIS  [ ] OTHER    FINDINGS:    Occasional B lines observed bilaterally. Pleural effusion at the right base.     Re-observed vegetation anterior leaflet of mitral valve.     Possible vegetation of aortic valve observed.       INTERPRETATION:    Interstitial abnormalities noted. Pleural effusion right base.     Known vegetation anterior leaflet of mitral valve with secondary mitral valve stenosis.     Possible vegetation of aortic valve which would require confirmatory NOHEMI. : Chadwick Foley/Hieu Minaya/Lucas Minaya MD     INDICATION: Critical illness and Endocarditis    PROCEDURE:  [X ] LIMITED ECHO  [X] LIMITED CHEST  [ ] LIMITED RETROPERITONEAL  [ ] LIMITED ABDOMINAL  [ ] LIMITED DVT  [ ] NEEDLE GUIDANCE VASCULAR  [ ] NEEDLE GUIDANCE THORACENTESIS  [ ] NEEDLE GUIDANCE PARACENTESIS  [ ] NEEDLE GUIDANCE PERICARDIOCENTESIS  [ ] OTHER    FINDINGS:    Occasional B lines observed bilaterally. Pleural effusion at the right base.     Re-observed vegetation anterior leaflet of mitral valve. LV fxn normal On RV dilation No sig PEF     Possible vegetation of aortic valve observed.       INTERPRETATION:    Mild Interstitial abnormalities noted. Moderate anechoic PLEFF on right.    Known vegetation anterior leaflet of mitral valve with secondary mitral valve stenosis (seen with CW of inflow).     Possible vegetation of aortic valve    I was present at the bedside throughout the procedure  Rei ESCOBAR    I have personally provided 35+ minutes of critical care time concurrently with the resident/fellow; this excludes time spent on separate procedures.  I have personally provided an additional 70+ minutes of critical care time concurrently with the resident/fellow; this excludes time spent on separate procedures.  Pt had goal directed echo within 24 hours of MICU admission  Goals of care discussion had with family within 24 hours of MICU admission  Patient on DVT prophylaxis within 24 hours of MICU admission    I was present at the bedside throughout the procedure  Rei

## 2022-12-13 NOTE — AIRWAY PLACEMENT NOTE ADULT - POST AIRWAY PLACEMENT ASSESSMENT:
breath sounds bilateral/breath sounds equal/positive end tidal CO2 noted/CXR pending
breath sounds bilateral

## 2022-12-13 NOTE — PROGRESS NOTE ADULT - PROBLEM SELECTOR PLAN 3
In the setting of SUDHEER. Received HD session. Serum potassium Wnl In the setting of SUDHEER. Received HD session. Serum potassium Wnl on AM labs.

## 2022-12-13 NOTE — PROGRESS NOTE ADULT - SUBJECTIVE AND OBJECTIVE BOX
[facility] DIVISION OF KIDNEY DISEASES AND HYPERTENSION -- FOLLOW UP NOTE  --------------------------------------------------------------------------------  Chief Complaint:    24 hour events/subjective:    Patient Sodium uptrending and Potassium down trending   UOP: 3355    PAST HISTORY  --------------------------------------------------------------------------------  No significant changes to PMH, PSH, FHx, SHx, unless otherwise noted    ALLERGIES & MEDICATIONS  --------------------------------------------------------------------------------  Allergies    No Known Allergies    Intolerances      Standing Inpatient Medications  abacavir 300 milliGRAM(s) Oral two times a day  aspirin enteric coated 81 milliGRAM(s) Oral daily  buMETAnide Infusion 3 mG/Hr IV Continuous <Continuous>  ceFAZolin   IVPB 1000 milliGRAM(s) IV Intermittent every 12 hours  chlorhexidine 0.12% Liquid 15 milliLiter(s) Oral Mucosa every 12 hours  chlorhexidine 4% Liquid 1 Application(s) Topical <User Schedule>  dexMEDEtomidine Infusion 0.2 MICROgram(s)/kG/Hr IV Continuous <Continuous>  doravirine 100 milliGRAM(s) Oral daily  famotidine    Tablet 20 milliGRAM(s) Oral daily  ferrous    sulfate 325 milliGRAM(s) Oral daily  folic acid 1 milliGRAM(s) Oral daily  heparin   Injectable 5000 Unit(s) SubCutaneous every 8 hours  influenza  Vaccine (HIGH DOSE) 0.7 milliLiter(s) IntraMuscular once  lamiVUDine- milliGRAM(s) Oral daily  metolazone 10 milliGRAM(s) Oral every 24 hours  multivitamin 1 Tablet(s) Oral daily  norepinephrine Infusion 0.05 MICROgram(s)/kG/Min IV Continuous <Continuous>  propofol Infusion 30.025 MICROgram(s)/kG/Min IV Continuous <Continuous>  senna 2 Tablet(s) Oral at bedtime    PRN Inpatient Medications  lidocaine 2% Jelly 3 milliLiter(s) IntraUrethral two times a day PRN  simethicone 80 milliGRAM(s) Chew four times a day PRN      REVIEW OF SYSTEMS  --------------------------------------------------------------------------------      All other systems were reviewed and are negative, except as noted.    VITALS/PHYSICAL EXAM  --------------------------------------------------------------------------------  T(C): 37 (12-13-22 @ 04:00), Max: 37.2 (12-12-22 @ 08:00)  HR: 92 (12-13-22 @ 06:45) (50 - 102)  BP: 128/59 (12-13-22 @ 06:45) (87/43 - 135/64)  RR: 22 (12-13-22 @ 06:45) (18 - 32)  SpO2: 99% (12-13-22 @ 06:45) (92% - 100%)  Wt(kg): --        12-12-22 @ 07:01  -  12-13-22 @ 07:00  --------------------------------------------------------  IN: 2258.8 mL / OUT: 3355 mL / NET: -1096.2 mL        Physical Exam:  	Gen: Critically Ill  	HEENT: MMM  	Pulm: mechanically ventilated   	CV: S1S2  	Abd: Soft, +BS   	Ext: No LE edema B/L  	Neuro: Sedated   	Skin: Warm and dry  	Vascular access: Right IJ non tunneled cathter       LABS/STUDIES  --------------------------------------------------------------------------------              8.7    39.50 >-----------<  67       [12-13-22 @ 01:49]              26.4     146  |  93  |  140  ----------------------------<  169      [12-13-22 @ 01:49]  3.3   |  32  |  4.72        Ca     9.8     [12-13-22 @ 01:49]      iCa    1.21     [12-13 @ 01:49]      Mg     4.0     [12-13-22 @ 01:49]      Phos  9.6     [12-13-22 @ 01:49]    TPro  6.2  /  Alb  2.5  /  TBili  0.4  /  DBili  x   /  AST  60  /  ALT  <5  /  AlkPhos  246  [12-13-22 @ 01:49]          Creatinine Trend:  SCr 4.72 [12-13 @ 01:49]  SCr 4.88 [12-12 @ 14:24]  SCr 4.92 [12-12 @ 02:01]  SCr 4.94 [12-10 @ 23:38]  SCr 4.74 [12-10 @ 20:41]    Urinalysis - [11-29-22 @ 20:45]      Color Yellow / Appearance Slightly Turbid / SG 1.015 / pH 6.0      Gluc Negative / Ketone Negative  / Bili Negative / Urobili Negative       Blood Moderate / Protein 100 / Leuk Est Large / Nitrite Negative      RBC 6 /  / Hyaline 2 / Gran 1 / Sq Epi  / Non Sq Epi 1 / Bacteria Negative      TSH 2.52      [12-02-22 @ 06:24]    HBsAb <3.0      [12-08-22 @ 20:44]  HBsAg Nonreact      [12-08-22 @ 20:44]  HCV 0.12, Nonreact      [12-08-22 @ 20:44]

## 2022-12-13 NOTE — CHART NOTE - NSCHARTNOTEFT_GEN_A_CORE
Indication: mucous plugging     Operators: Dr. Santiago     Pre-op Dx: acute hypoxic resp arrest   Post op: same   Findings:  Bronchoscope inserted through ETT. ETT noted to be in good position. No obvious mucous plugging or secretions noted. Due to emergent nature of procedure after brief surveillance bronchoscope was then withdrawn from ETT.

## 2022-12-13 NOTE — DIETITIAN NUTRITION RISK NOTIFICATION - TREATMENT: THE FOLLOWING DIET HAS BEEN RECOMMENDED
Diet, NPO with Tube Feed:   Tube Feeding Modality: Orogastric  Nepro with Carb Steady (NEPRORTH)  Total Volume for 24 Hours (mL): 720  Continuous  Starting Tube Feed Rate {mL per Hour}: 10  Increase Tube Feed Rate by (mL): 10  Until Goal Tube Feed Rate (mL per Hour): 40  Tube Feed Duration (in Hours): 18  Tube Feed Start Time: 11:00 (12-10-22 @ 12:42) [Active]

## 2022-12-13 NOTE — PROGRESS NOTE ADULT - PROBLEM SELECTOR PLAN 1
-acute on chronic renal failure and complicated UTI likely secondary to obstruction now improved s/p tovar  -no history of resistant organism but given high risk features will continue empiric cefepime  -c/w cefepime 1g q24h (renally dosed)  -f/u blood and urine cultures   -monitor Tovar output

## 2022-12-13 NOTE — PROVIDER CONTACT NOTE (CHANGE IN STATUS NOTIFICATION) - SITUATION
pt being transported to different room became difficult to bag plug suspected. ambu lavage performed x6. thick blood tinged mucus removed from inline. pt remained difficult to bag. pt bradycardic to 50 lost pulse at 20:45. PEA arrest. received acls protocol ROSC achieved at 11 minutes.
no

## 2022-12-13 NOTE — PROGRESS NOTE ADULT - PROBLEM SELECTOR PLAN 2
-now with symptomatic bradycardia, converted to afib with slow ventricular response  -monitor on telemetry   -personally discussed case with on-call cardiology consultant, discussed possibility for ACS protocol, pt had previous troponin elevation ~1400 in 8/2020 in setting of renal failure and can hold on ACS protocol for now, will trend troponin to peak, if continues to have symptoms or troponin rises above prior baseline can consider initiating ACS protocol  -keeps pad on, atropine at bedside   -trend troponin and EKG for repeat episodes of chest pain  -hold home telmisartan due to soft blood pressures  -hold home metoprolol

## 2022-12-13 NOTE — PROGRESS NOTE ADULT - ASSESSMENT
81yo M w/ PMHx of HIV (viral load undetectable 08/22), Afib s/p DVVC and ablation (not on AC), MV endocarditis s/p bioprosthetic valve replacement, BPH (self catheterizes), frequent UTIs, presents with UTI and new Mobitz Type 1  ASSESSMENT: 82M w/ PMHx of HIV (viral load undetectable 08/22), Afib s/p DVVC and ablation (not on AC), MV endocarditis s/p bioprosthetic valve replacement, BPH (self catheterizes), frequent UTIs, presents with UTI and heart block, found to have persistent endocarditis not amenable to cardiac surgery, s/p melina PPM placement on 12/5, s/p RRT on 12/8 called for hypoxia, transferred to MICU for further management of worsening acute respiratory failure, functionally anuric status.       ======NEUROLOGY======  - intubated 12/10 overnight due to bloody secretions noted   - off propofol and sedation; patient having spontaneous leg movements  - prior to intubation, AAOx3/4  - F/u MR Brain non con    ======PULMONARY======  #AHRF  - RRT called 12/8 for hypoxia to 80s, HF improved to 93%, brought up to MICU and put on BIPAP, with saturation >97% likely 2/2 volume overload  - CXR 12/7 showing vascular congestion; exam consistent with volume overload  - ABG w signs of respiratory on metabolic acidosis and was on BiPAP -> HFNC 40L 80%   - overnight with worsening acidosis and bloody secretions so intubated with improvement in acidosis but persistent hypercarbia pH 7.21 abd pCO2 54  - current vent settings: , RR 18, FiO2 30%, PEEP 5    ====CARDIOVASCULAR====  #Staph lugdudenesis bacteremia c/b infective endocarditis   #afib  #Mobitiz I, sinus bradycardia    - s/p cefazolin, changed to Zosyn during RRT --> changed back to Ancef 12/8  - trended trop (?concern for IE embolization to coronary arteries) - no significant ischemic changes at this time  - Rhythm showed sinus madonna with Mobitz I and pAF SVR  - per CTSx pt not eligible for intervention at this point   - TTE 12/8 demonstrating MV dehiscence and severe paravalvular regurgitation     ========RENAL========  #progressive renal failure  #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 with CKD In the setting of HIV and urinary retention (does self cath at home) s/p otvar insertion on this admission  - Urine studies from 11/29 with blood and protein present in the urine. 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 Patient with worsening volume status, started on diuresis today however still requiring non invasive positive pressure ventilation.   - s/p 1x Ultrafiltration, to improve congestion however ultimately patient is not a good candidate for long term hemodialysis.   - Abdominal US done on 12/1 showed unilateral hydronephrosis.   - Check serological w/up for GN.   - Maintain Tovar in situ.   - Monitor BMP. Strict I/Os. Avoid nephrotoxins.  - shiley placed in R IJ 12/10 for dialysis with clearance  - 12/11: per nephro, continue with bumex 3 gtt and metolazone - no need for urgent dialysis today  - 12/13: Metabolic alkalosis pH 7.55 HCO3- 34, per nephro, will hold bumex and metolazone today. will decrease bicarb sufficiently before restarting diuresis and monitor I&Os - goal of net -500 mL to 1 L in 24 hours    ===GASTROINTESTINAL====  - npo      ===INFECTIOUS DISEASE===  #endocarditis, staph lugdudenesis bacteremia + in Urine and BCx  - currently on cefazolin (12/8 - )  - blood culture 11/29 positive; repeat cultures since negative  - ID following, recs appreciated    #hx of HIV  - treat with ART home medications  - Continue PO abacavir 200mg q12hrs, lamivudine 100mg q24hrs and doravirine 1tab q24hrs (renally dosed per pharm)    =====HEMATOLOGIC=====  #hx of afib and endocarditis  - holding AC for now pending MR Brain non con - if no bleed can start AC  - on eliquis 2.5 mg bid at home    #anemia  - Hemoglobin near goal, on oral iron.   - Would hold off on IV iron in the setting of bacteremia/ endocarditis.   - Consider JASON if Hb remains below goal.   - Monitor CBC.    ======ENDOCRINE=======  - no active issues    ===MUSCULOSKELETAL====  - no active issues    =====PROPHYLAXIS======  DVT prophylaxis: Heparin Subq

## 2022-12-13 NOTE — PROCEDURE NOTE - SUPERVISORY STATEMENT
Medical record and interdisciplinary notes reviewed for the purpose of inpatient case management, facilitation of plan of care and utilization review.  Patient is a readmission from [facility] due to cellulitis of lower legs.  Wound care on consult.  Possible discharge back to [facility] today or tomorrow.       RN case manager will follow clinical progress throughout hospital stay and be available to assist as needed.    Malena Holguin RN  Inpatient           
I performed the procedure.
OGT placed, required for administration of meds and feeds while patient is intubated.
I performed this procedure.

## 2022-12-14 NOTE — PROGRESS NOTE ADULT - REASON FOR ADMISSION
UTI

## 2022-12-14 NOTE — PROGRESS NOTE ADULT - PROBLEM SELECTOR PROBLEM 2
Anemia secondary to renal failure
Complicated urinary tract infection
SOB (shortness of breath)
Anemia secondary to renal failure
Second degree Mobitz I AV block
Second degree Mobitz I AV block
Infectious endocarditis
SOB (shortness of breath)
Second degree Mobitz I AV block
SOB (shortness of breath)
Heart failure
SOB (shortness of breath)

## 2022-12-14 NOTE — PROGRESS NOTE ADULT - PROBLEM SELECTOR PROBLEM 1
Acute kidney injury superimposed on CKD
Debility
Second degree Mobitz I AV block
Acute kidney injury superimposed on CKD
Complicated urinary tract infection
Acute kidney injury superimposed on CKD
Acute kidney injury superimposed on CKD
Complicated urinary tract infection
Second degree Mobitz I AV block
Complicated urinary tract infection
Second degree Mobitz I AV block
Second degree Mobitz I AV block
Complicated urinary tract infection
SUDHEER (acute kidney injury)
Second degree Mobitz I AV block

## 2022-12-14 NOTE — PROCEDURE NOTE - NSPROCDETAILS_GEN_ALL_CORE
location identified, draped/prepped, sterile technique used, needle inserted/introduced/positive blood return obtained via catheter/connected to a pressurized flush line/sutured in place/hemostasis with direct pressure, dressing applied/Seldinger technique/all materials/supplies accounted for at end of procedure
guidewire recovered/lumen(s) aspirated and flushed/sterile dressing applied/sterile technique, catheter placed/ultrasound guidance with use of sterile gel and probe cove
patient pre-oxygenated, tube inserted, placement confirmed
guidewire recovered/lumen(s) aspirated and flushed/sterile dressing applied/sterile technique, catheter placed/ultrasound guidance with use of sterile gel and probe cove
guidewire recovered/lumen(s) aspirated and flushed/sterile dressing applied/sterile technique, catheter placed/ultrasound guidance with use of sterile gel and probe cove
Seldinger technique/dressing applied/secured in place/sterile dressing applied/supine position/percutaneous/thoracostomy tube placed percutaneously

## 2022-12-14 NOTE — CHART NOTE - NSCHARTNOTEFT_GEN_A_CORE
On 12/14, at 11:50 patient was pronounced dead based on Cardiopulmonary criteria including absent breath sounds, pulselessness and/or asystole. On physical exam, patient did not respond to verbal or noxious stimuli.  Absent heart sounds.  Absent radial pulses. Pupils are fixed and dilated. Attending Dr. Minaya notified.

## 2022-12-14 NOTE — PROGRESS NOTE ADULT - SUBJECTIVE AND OBJECTIVE BOX
Indication for Geriatrics and Palliative Care Services/INTERVAL HPI: goals of care  SUBJECTIVE AND OBJECTIVE: patient actively dying, remains intubated, on pressors in multiorgan failure.  given patients previously stated wishes and imminent dying, a 2 PC DNR is appropriate.    OVERNIGHT EVENTS: actively dying    DNR on chart:  Allergies    No Known Allergies    Intolerances    MEDICATIONS  (STANDING):  abacavir 300 milliGRAM(s) Oral two times a day  ceFAZolin   IVPB 1000 milliGRAM(s) IV Intermittent every 12 hours  chlorhexidine 0.12% Liquid 15 milliLiter(s) Oral Mucosa every 12 hours  chlorhexidine 4% Liquid 1 Application(s) Topical <User Schedule>  doravirine 100 milliGRAM(s) Oral daily  famotidine    Tablet 20 milliGRAM(s) Oral daily  ferrous    sulfate 325 milliGRAM(s) Oral daily  folic acid 1 milliGRAM(s) Oral daily  influenza  Vaccine (HIGH DOSE) 0.7 milliLiter(s) IntraMuscular once  lamiVUDine- milliGRAM(s) Oral daily  multivitamin 1 Tablet(s) Oral daily  norepinephrine Infusion 0.05 MICROgram(s)/kG/Min (3.83 mL/Hr) IV Continuous <Continuous>  phenylephrine    Infusion 0.1 MICROgram(s)/kG/Min (3.06 mL/Hr) IV Continuous <Continuous>  propofol Infusion 10 MICROgram(s)/kG/Min (4.9 mL/Hr) IV Continuous <Continuous>  senna 2 Tablet(s) Oral at bedtime  vasopressin Infusion 0.04 Unit(s)/Min (6 mL/Hr) IV Continuous <Continuous>    MEDICATIONS  (PRN):  lidocaine 2% Jelly 3 milliLiter(s) IntraUrethral two times a day PRN penile pain  simethicone 80 milliGRAM(s) Chew four times a day PRN Gas      ITEMS UNCHECKED ARE NOT PRESENT    PRESENT SYMPTOMS: [x ]Unable to self-report - see [ ] CPOT [ x] PAINADS [x ] RDOS  Source if other than patient:  [ ]Family   [ ]Team     Pain:  [ ]yes [ ]no  QOL impact -   [facility] -                    Aggravating factors -  Quality -  Radiation -  Timing-  Severity (0-10 scale):  Minimal acceptable level (0-10 scale):     CPOT:    https://www.[facility].org/getattachment/[email or web address]-[email or web address]-[email or web address]/Critical-Care-Pain-Observation-Tool-(CPOT)    Dyspnea:                           [ ]Mild [ ]Moderate [ ]Severe  Anxiety:                             [ ]Mild [ ]Moderate [ ]Severe  Fatigue:                             [ ]Mild [ ]Moderate [ ]Severe  Nausea:                             [ ]Mild [ ]Moderate [ ]Severe  Loss of appetite:              [ ]Mild [ ]Moderate [ ]Severe  Constipation:                    [ ]Mild [ ]Moderate [ ]Severe    PCSSQ[Palliative Care Spiritual Screening Question]   Severity (0-10):  Score of 4 or > indicate consideration of Chaplaincy referral.  Chaplaincy Referral: [ ] yes [ ] refused [ ] following [ x] Deferred     Caregiver [place]? : [ ] yes [ ] no [ x] Deferred [ ] Declined             Social work referral [ ] Patient & Family Centered Care Referral [ ]     Anticipatory Grief present?:  [ ] yes [ ] no  [ x] Deferred                  Social work referral [ ] Chaplaincy Referral[ ]      Other Symptoms:  [ ]All other review of systems negative   [ x]Unable to obtain due to poor mentation    Palliative Performance Status Version 2:       10  %      http://npcrc.org/files/news/palliative_performance_scale_ppsv2.pdf  PHYSICAL EXAM:  Vital Signs Last 24 Hrs  T(C): 35 (14 Dec 2022 08:00), Max: 37.7 (13 Dec 2022 20:00)  T(F): 95 (14 Dec 2022 08:00), Max: 99.9 (13 Dec 2022 20:00)  HR: 49 (14 Dec 2022 11:45) (0 - 104)  BP: 85/49 (14 Dec 2022 02:30) (85/49 - 211/121)  BP(mean): 61 (14 Dec 2022 02:30) (61 - 132)  RR: 30 (14 Dec 2022 11:45) (19 - 129)  SpO2: 94% (14 Dec 2022 11:45) (73% - 100%)    Parameters below as of 13 Dec 2022 20:00  Patient On (Oxygen Delivery Method): ventilator    O2 Concentration (%): 30 I&O's Summary    13 Dec 2022 07:01  -  14 Dec 2022 07:00  --------------------------------------------------------  IN: 3343.9 mL / OUT: 1815 mL / NET: 1528.9 mL    14 Dec 2022 07:01  -  14 Dec 2022 14:58  --------------------------------------------------------  IN: 330 mL / OUT: 25 mL / NET: 305 mL       GENERAL: [ ]Cachexia    [ ]Alert  [ ]Oriented x   [ ]Lethargic  [x ]Unarousable  [ ]Verbal  [ ]Non-Verbal  Behavioral:   [ ]Anxiety  [ ]Delirium [ ]Agitation [ ]Other  HEENT:  [ ]Normal   [ ]Dry mouth   [x ]ET Tube/Trach  [ ]Oral lesions  PULMONARY: vent sounds  [ x]Clear [ ]Tachypnea  [ ]Audible excessive secretions   [ ]Rhonchi        [ ]Right [ ]Left [ ]Bilateral  [ ]Crackles        [ ]Right [ ]Left [ ]Bilateral  [ ]Wheezing     [ ]Right [ ]Left [ ]Bilateral  [ ]Diminished BS [ ] Right [ ]Left [ ]Bilateral  CARDIOVASCULAR:    [ x]Regular [ ]Irregular [ ]Tachy  [ ]Jaime [ ]Murmur [ ]Other  GASTROINTESTINAL:  [x ]Soft  [ ]Distended   [ ]+BS  [ ]Non tender [ ]Tender  [ ]Other [ ]PEG [x ]OGT/ NGT   Last BM:   GENITOURINARY:  [ ]Normal [x ]Incontinent   [ x]Oliguria/Anuria   [ x]Aden  MUSCULOSKELETAL:   [ ]Normal   [ ]Weakness  [ x]Bed/Wheelchair bound [ ]Edema  NEUROLOGIC: unarousable  [ ]No focal deficits  [ ] Cognitive impairment  [ ] Dysphagia [ ]Dysarthria [ ] Paresis [x ]Other   SKIN:   [ ]Normal  [ ]Rash  [ ]Other  [ ]Pressure ulcer(s) [ ]y [ ]n present on admission    CRITICAL CARE:  [ ]Shock Present  [ ]Septic [ ]Cardiogenic [ ]Neurologic [ ]Hypovolemic  [ ]Vasopressors [ ]Inotropes  [ ]Respiratory failure present [ ]Mechanical Ventilation [ ]Non-invasive ventilatory support [ ]High-Flow Mode: AC/ CMV (Assist Control/ Continuous Mandatory Ventilation), RR (machine): 30, TV (machine): 500, FiO2: 80, PEEP: 5, ITime: 1, MAP: 12, PIP: 23  [ ]Acute  [ ]Chronic [ ]Hypoxic  [ ]Hypercarbic [ ]Other  [ ]Other organ failure     LABS:                        8.4    68.99 )-----------( 39       ( 14 Dec 2022 06:36 )             28.2   12-14    145  |  92<L>  |  153<H>  ----------------------------<  63<L>  7.3<HH>   |  <10<LL>  |  5.60<H>    Ca    8.8      14 Dec 2022 06:36  Phos  22.3     12-14  Mg     5.5     12-14    TPro  5.7<L>  /  Alb  2.3<L>  /  TBili  2.4<H>  /  DBili  x   /  AST  4204<H>  /  ALT  619<H>  /  AlkPhos  277<H>  12-14        RADIOLOGY & ADDITIONAL STUDIES:  < from: Xray Chest 1 View- PORTABLE-Urgent (Xray Chest 1 View- PORTABLE-Urgent .) (12.14.22 @ 06:39) >    ACC: [id number] EXAM:  XR CHEST PORTABLE URGENT 1V                        ACC: [id number] EXAM:  XR CHEST PORTABLE URGENT 1V                        ACC: [id number] EXAM:  XR CHEST PORTABLE URGENT 1V                          PROCEDURE DATE:  12/13/2022          INTERPRETATION:  EXAMINATION: XR CHEST URGENT, XR CHEST URGENT, XR CHEST   URGENT    CLINICAL INDICATION: Cardiac arrest. Status post chest tube placement.   Evaluate pneumothorax.    TECHNIQUE: Single frontal, portable view of the chest was obtained.    COMPARISON: Chest x-ray 12/10/2022.    FINDINGS:  12/13/2022  Endotracheal tube terminates above the lidia. Enteric tube courses below   the diaphragm but the tip is not visualized. Right IJ central venous   catheter with tip in the SVC. Right-sided PICC line with tip in the SVC.  The heart size is not accurately assessed in this projection. Median   sternotomy. Left atrial appendage clip. Micra pacemaker.  Bilateral perihilar opacities, unchanged, likely represents mild   pulmonary edema.  Small-to-moderate right apical pneumothorax with compressive atelectasis   of the right upper lobe.  The bilateral costophrenic angles are not visualized.  Diffuse subcutaneous emphysema along the chest wall with extension into   the left neck.    12/14/2022 3:00 AM  Lines and tubes are unchanged in position.  The heart is stable in size.  Enlarging moderate right apical pneumothorax with compressive atelectasis   of the right upper and middle lobes.  Left perihilar opacities, unchanged. No left pneumothorax or pleural   effusion.  Redemonstrated diffuse subcutaneous emphysema.    Dr. Jaime discussed these findings with Dr. Colby on 12/14/2022   at 4:59 AM.    12/14/2022 5:15 AM  Interval placement of pigtail catheter in the periphery of the right   hemithorax. Remaining lines and tubes are unchanged in position.  Heart size is stable.  Bilateral perihilar opacities likely represent mild pulmonary edema.   Right upper lung field linear atelectasis.  Interval decrease in size of right apical pneumothorax, now small.  No left pneumothorax. No pleural effusions.  Redemonstrated diffuse subcutaneous emphysema.    IMPRESSION:  Development of right apical pneumothorax with interval decrease in size   after chest tube placement.  Right upper lung field linear atelectasis.  Mild pulmonary edema.  Diffuse subcutaneous emphysema along the chest wall and left neck.    --- End of Report ---           VERONICA RODRIGUEZ MD; Resident Radiologist  This document has been electronically signed.  CHUCK RENE MD; Attending Radiologist  This document has been electronically signed. Dec 14 2022 12:54PM    < end of copied text >      Protein Calorie Malnutrition Present: [ ]mild [ ]moderate [ ]severe [ ]underweight [ ]morbid obesity  https://www.andeal.org/vault/2440/web/files/ONC/Table_Clinical%20Characteristics%20to%20Document%20Malnutrition-White%20JV%20et%20al%202012.pdf    Height (cm): 190.5 (12-05-22 @ 08:09), 188 (08-06-22 @ 18:59)  Weight (kg): 81.6 (12-05-22 @ 08:09), 82.554 (08-06-22 @ 18:59)  BMI (kg/m2): 22.5 (12-05-22 @ 08:09), 23.4 (08-06-22 @ 18:59)    [x ]PPSV2 < or = 30%  [ ]significant weight loss [ x]poor nutritional intake [ ]anasarcaPrealbumin, Serum: 5 mg/dL (12-02-22 @ 06:24)  [ ]Artificial Nutrition    Other REFERRALS:  [ ]Hospice  [ ]Child Life  [x ]Social Work  [ ]Case management [ ]Holistic Therapy     Goals of Care Document:SRINIVASAN Bernard (08-09-22 @ 10:00)  Goals of Care Conversation:   Participants:  · Participants  Patient    Advance Directives:  · Does patient have Advance Directive  No  · Do you want to complete the HCP and name a Gerard Care Agent  No  · Does Patient Have a Surrogate  No  · Caregiver:  no    Conversation Discussion:  · Conversation  HCP/Code Status  · Conversation Details  This alert and oriented x 4 patient was seen today for goals of care planning and conversation. Introduced self and role of PCE. Patient states understanding of health status  and prognosis. Patient states he  lives at the [place] and has been independent with all aspects of ADLs prior to admission. Patient states he has no spouse, children or families. States he has friends and associates at the [place] however he's not sure if they would be comfortable  being his health care agent. Patient states he will need to speak to the administrative team at the [place] to discuss same. HCP form given to patient at his request.    CPR , intubation, artificial nutrition  procedures and possible complications explained. Patient watched informational video on GOC.    Patient expressed wish to be DNR, states he does not want any heroic measures to save his life. Patient also states he does not want artificial nutrition to sustain his life. Patient educated on MOLST form however he states he would not like to complete same until he speak with the administrative staff at the [place]. Patient states he took care of his elderly aunts who lived to be 103.5 years and 93 years. Patient states they had a good quality of life because he cared for them at home however he does not see himself in the same situation as he does not have anyone to care for him. Patient aware he will  remain FULL CODE and will discuss with HCP, son/daughter      Contact information for further needs provided.  No Alevism exemptions noted.      Alexandra Bernard  MSN -ED RN [facility]   [phone]    Personal Advance Directives Treatment Guidelines:   Treatment Guidelines:  · Treatment Guideline Comments  Full Code    [facility] of Discussion:   Time Spent on Advance Care Planning:  I spent 25 (in minutes) on advance care planning services with the patient.  This time is separate and distinct from any other care management services provided on this date.    [facility] of Discussion:  · [facility] of discussion  Face to face      Electronic Signatures:  Alexandra Bernard (MILLICENT)  (Signed 09-Aug-2022 13:26)  	Authored: Goals of Care Conversation, Personal Advance Directives Treatment Guidelines, [facility] of Discussion      Last Updated: 09-Aug-2022 13:26 by Alexandra Bernard (MILLICENT)

## 2022-12-14 NOTE — PROGRESS NOTE ADULT - SUBJECTIVE AND OBJECTIVE BOX
MICU Progress Note    Radha Mae MD  PGY1   Available on TEAMS    INTERVAL HPI/OVERNIGHT EVENTS:    SUBJECTIVE: Patient seen and examined at bedside.       OBJECTIVE:    VITAL SIGNS:  ICU Vital Signs Last 24 Hrs  T(C): 36.3 (14 Dec 2022 04:00), Max: 37.7 (13 Dec 2022 20:00)  T(F): 97.3 (14 Dec 2022 04:00), Max: 99.9 (13 Dec 2022 20:00)  HR: 50 (14 Dec 2022 07:00) (0 - 104)  BP: 85/49 (14 Dec 2022 02:30) (85/49 - 211/121)  BP(mean): 61 (14 Dec 2022 02:30) (61 - 132)  ABP: 88/25 (14 Dec 2022 07:00) (88/25 - 186/56)  ABP(mean): 36 (14 Dec 2022 07:00) (36 - 86)  RR: 30 (14 Dec 2022 07:00) (18 - 129)  SpO2: 100% (14 Dec 2022 07:00) (73% - 100%)    O2 Parameters below as of 13 Dec 2022 20:00  Patient On (Oxygen Delivery Method): ventilator    O2 Concentration (%): 30      Mode: AC/ CMV (Assist Control/ Continuous Mandatory Ventilation), RR (machine): 24, TV (machine): 500, FiO2: 100, PEEP: 5, ITime: 1, MAP: 12, PIP: 26    12-13 @ 07:01  -  12-14 @ 07:00  --------------------------------------------------------  IN: 3334.1 mL / OUT: 1815 mL / NET: 1519.1 mL      CAPILLARY BLOOD GLUCOSE      POCT Blood Glucose.: 216 mg/dL (13 Dec 2022 20:56)      PHYSICAL EXAM:  General: sedated  HEENT: NCAT, PERRL, clear conjunctiva, sclera anicteric  Neck: supple, no JVD  Respiratory: CTAB  Cardiovascular: RRR, S1S2, no m/r/g  Abdomen: soft, nontender, nondistended, normal bowel sounds  Extremities: no edema, no cyanosis  Skin: warm, perfused  Neurological: nonfocal    MEDICATIONS:  MEDICATIONS  (STANDING):  abacavir 300 milliGRAM(s) Oral two times a day  aspirin  chewable 81 milliGRAM(s) Oral daily  ceFAZolin   IVPB 1000 milliGRAM(s) IV Intermittent every 12 hours  chlorhexidine 0.12% Liquid 15 milliLiter(s) Oral Mucosa every 12 hours  chlorhexidine 4% Liquid 1 Application(s) Topical <User Schedule>  doravirine 100 milliGRAM(s) Oral daily  famotidine    Tablet 20 milliGRAM(s) Oral daily  ferrous    sulfate 325 milliGRAM(s) Oral daily  folic acid 1 milliGRAM(s) Oral daily  heparin   Injectable 5000 Unit(s) SubCutaneous every 8 hours  influenza  Vaccine (HIGH DOSE) 0.7 milliLiter(s) IntraMuscular once  lamiVUDine- milliGRAM(s) Oral daily  multivitamin 1 Tablet(s) Oral daily  norepinephrine Infusion 0.05 MICROgram(s)/kG/Min (3.83 mL/Hr) IV Continuous <Continuous>  phenylephrine    Infusion 0.1 MICROgram(s)/kG/Min (3.06 mL/Hr) IV Continuous <Continuous>  propofol Infusion 10 MICROgram(s)/kG/Min (4.9 mL/Hr) IV Continuous <Continuous>  senna 2 Tablet(s) Oral at bedtime  vasopressin Infusion 0.04 Unit(s)/Min (6 mL/Hr) IV Continuous <Continuous>    MEDICATIONS  (PRN):  lidocaine 2% Jelly 3 milliLiter(s) IntraUrethral two times a day PRN penile pain  simethicone 80 milliGRAM(s) Chew four times a day PRN Gas      ALLERGIES:  Allergies    No Known Allergies    Intolerances        LABS:                        8.4    68.99 )-----------( 39       ( 14 Dec 2022 06:36 )             28.2     12-13    148<H>  |  92<L>  |  167<H>  ----------------------------<  309<H>  4.4   |  21<L>  |  5.23<H>    Ca    11.7<H>      13 Dec 2022 21:27  Phos  16.8     12-13  Mg     5.0     12-13    TPro  6.0  /  Alb  2.6<L>  /  TBili  0.4  /  DBili  x   /  AST  228<H>  /  ALT  27  /  AlkPhos  251<H>  12-13          RADIOLOGY & ADDITIONAL TESTS: Reviewed. MICU Progress Note    Radha Mae MD  PGY1   Available on TEAMS    INTERVAL HPI/OVERNIGHT EVENTS: Went into PEA arrest -> vtach arrest overnight. Chest compressions and shocks were performed. ROSC. Patient had chest tube placed.     SUBJECTIVE: Patient seen and examined at bedside.   Appears extremely lethargic.     OBJECTIVE:    VITAL SIGNS:  ICU Vital Signs Last 24 Hrs  T(C): 36.3 (14 Dec 2022 04:00), Max: 37.7 (13 Dec 2022 20:00)  T(F): 97.3 (14 Dec 2022 04:00), Max: 99.9 (13 Dec 2022 20:00)  HR: 50 (14 Dec 2022 07:00) (0 - 104)  BP: 85/49 (14 Dec 2022 02:30) (85/49 - 211/121)  BP(mean): 61 (14 Dec 2022 02:30) (61 - 132)  ABP: 88/25 (14 Dec 2022 07:00) (88/25 - 186/56)  ABP(mean): 36 (14 Dec 2022 07:00) (36 - 86)  RR: 30 (14 Dec 2022 07:00) (18 - 129)  SpO2: 100% (14 Dec 2022 07:00) (73% - 100%)    O2 Parameters below as of 13 Dec 2022 20:00  Patient On (Oxygen Delivery Method): ventilator    O2 Concentration (%): 30      Mode: AC/ CMV (Assist Control/ Continuous Mandatory Ventilation), RR (machine): 24, TV (machine): 500, FiO2: 100, PEEP: 5, ITime: 1, MAP: 12, PIP: 26    12-13 @ 07:01  -  12-14 @ 07:00  --------------------------------------------------------  IN: 3334.1 mL / OUT: 1815 mL / NET: 1519.1 mL      CAPILLARY BLOOD GLUCOSE      POCT Blood Glucose.: 216 mg/dL (13 Dec 2022 20:56)      PHYSICAL EXAM:  General: sedated  HEENT: NCAT, PERRL, clear conjunctiva, sclera anicteric  Neck: supple, no JVD  Respiratory: CTAB  Cardiovascular: RRR, S1S2, no m/r/g  Abdomen: soft, nontender, nondistended, normal bowel sounds  Extremities: no edema, no cyanosis  Skin: warm, perfused  Neurological: nonfocal    MEDICATIONS:  MEDICATIONS  (STANDING):  abacavir 300 milliGRAM(s) Oral two times a day  aspirin  chewable 81 milliGRAM(s) Oral daily  ceFAZolin   IVPB 1000 milliGRAM(s) IV Intermittent every 12 hours  chlorhexidine 0.12% Liquid 15 milliLiter(s) Oral Mucosa every 12 hours  chlorhexidine 4% Liquid 1 Application(s) Topical <User Schedule>  doravirine 100 milliGRAM(s) Oral daily  famotidine    Tablet 20 milliGRAM(s) Oral daily  ferrous    sulfate 325 milliGRAM(s) Oral daily  folic acid 1 milliGRAM(s) Oral daily  heparin   Injectable 5000 Unit(s) SubCutaneous every 8 hours  influenza  Vaccine (HIGH DOSE) 0.7 milliLiter(s) IntraMuscular once  lamiVUDine- milliGRAM(s) Oral daily  multivitamin 1 Tablet(s) Oral daily  norepinephrine Infusion 0.05 MICROgram(s)/kG/Min (3.83 mL/Hr) IV Continuous <Continuous>  phenylephrine    Infusion 0.1 MICROgram(s)/kG/Min (3.06 mL/Hr) IV Continuous <Continuous>  propofol Infusion 10 MICROgram(s)/kG/Min (4.9 mL/Hr) IV Continuous <Continuous>  senna 2 Tablet(s) Oral at bedtime  vasopressin Infusion 0.04 Unit(s)/Min (6 mL/Hr) IV Continuous <Continuous>    MEDICATIONS  (PRN):  lidocaine 2% Jelly 3 milliLiter(s) IntraUrethral two times a day PRN penile pain  simethicone 80 milliGRAM(s) Chew four times a day PRN Gas      ALLERGIES:  Allergies    No Known Allergies    Intolerances        LABS:                        8.4    68.99 )-----------( 39       ( 14 Dec 2022 06:36 )             28.2     12-13    148<H>  |  92<L>  |  167<H>  ----------------------------<  309<H>  4.4   |  21<L>  |  5.23<H>    Ca    11.7<H>      13 Dec 2022 21:27  Phos  16.8     12-13  Mg     5.0     12-13    TPro  6.0  /  Alb  2.6<L>  /  TBili  0.4  /  DBili  x   /  AST  228<H>  /  ALT  27  /  AlkPhos  251<H>  12-13          RADIOLOGY & ADDITIONAL TESTS: Reviewed.

## 2022-12-14 NOTE — CHART NOTE - NSCHARTNOTEFT_GEN_A_CORE
: Chadwick Foley/Hieu Minaya/Lucas Minaya MD     INDICATION: Critical Illness     PROCEDURE:  [ ] LIMITED ECHO  [X ] LIMITED CHEST  [ ] LIMITED RETROPERITONEAL  [ ] LIMITED ABDOMINAL  [ ] LIMITED DVT  [ ] NEEDLE GUIDANCE VASCULAR  [ ] NEEDLE GUIDANCE THORACENTESIS  [ ] NEEDLE GUIDANCE PARACENTESIS  [ ] NEEDLE GUIDANCE PERICARDIOCENTESIS  [ ] OTHER    FINDINGS:    B lines and lung sliding visualized bilaterally.      INTERPRETATION:    No pneumothorax.

## 2022-12-14 NOTE — PROCEDURE NOTE - NSSITEPREP_SKIN_A_CORE
chlorhexidine
povidone iodine (if allergic to chlorhexidine)
chlorhexidine

## 2022-12-14 NOTE — PROCEDURE NOTE - PROCEDURE DATE TIME, MLM
13-Dec-2022 21:26
13-Dec-2022 22:00
10-Dec-2022 11:00
10-Dec-2022 00:20
13-Dec-2022 21:21
14-Dec-2022 04:15
08-Dec-2022 19:00
10-Dec-2022 00:17

## 2022-12-14 NOTE — PROGRESS NOTE ADULT - ATTENDING COMMENTS
1. Acute hypoxemic  hypercapnic respiratory failure from fluid overload and sepsis. Pt intubated this am. Continue current AC vent settings. FIO2 requirements minimal   2.Acute renal failure in pt with septic shock and HIV+. HD today. Continue Bumex drip.  3. ID Staph bacteremia and  bioprosthetic mitral valve  endocarditis. Continue Ancef. Follow up blood cx.  4. Septic shock requiring levophed for BP . Goal MAP 60-65.  5.Chronic afib. Currently rate controlled. Hold AC .  6.DVT prophylaxis: Sq heparin  7. HIV : Continue 3 HIV meds.  8. GOC: Full code.
Mr. Monsivais is an 82-year-old man with bioprosthetic MV endocarditis with plan for medical management. Possible leadless PM next week.    35 minutes spent on total patient encounter. More than 50% of the encounter was spent counseling and/or coordination care. The necessity of the time spent on this encounter was due to: time spent evaluating the patient as well as time spent reviewing labs, imaging, and discussing the case with a multidisciplinary team.
Patient examined and care reviewed in detail on bedside rounds  Critically ill and unstable on vent/pressors with severe MV endocarditis Not a surgical candidate High probability of death will D/W pal care  Frequent bedside visits with therapy change today.   I have personally provided 35+ minutes of critical care time concurrently with the resident/fellow; this excludes time spent on separate procedures.
Patient seen and examined  Case discussed with Dr Gerber    I agree with his interval history exam and plans as noted above  Marked leukocytosis, fevers and high grade staph lugdenensis- suggestive of possible intra vascular focus  Patient with a prosthetic valve from prior gram + endocarditis    Lungs- clear  Cor- reg  no peripheral stigmata of SBE noted  tovar in place with yellow urine  Would obtain TTE and if non diagnostic then NOHEMI  Would repeat blood cultures  Vancomycin dosing based upon trough level <15ucg/ml  Check ECG  Cardiology consult    Folllow up urine cultures  continue Cefepime    Check HIV VL and Tcells    Tung Almazan MD  Can be called via Teams  After 5pm/weekends [phone]
1. Acute hypoxemic  hypercapnic respiratory failure from fluid overload and sepsis. Pt intubated this am. Continue current AC vent settings. Will need urgent HD for fluid removal and clearance.  2.Acute renal failure in pt with septic shock and HIV+. HD today. Continue Bumex drip.  3. ID Staph bacteremia and  bioprosthetic mitral valve  endocarditis. Continue Ancef. Follow up blood cx.  4. Septic shock requiring levophed for BP . Goal MAP 60-65.  5.Chronic afib. Currently rate controlled. Hold AC .  6.DVT prophylaxis: Sq heparin  7. HIV : Continue 3 HIV meds.  8. GOC: Full code.
Patient seen and examined with Dr Gerber    I agree with his interval history exam and plans as noted above    Patient with high grade staph lugdenensis bacteremia with organism growing in both urine culture and blood cultures  Hydronephrosis of renal collecting system noted  Vegetation noted on prosthetic valve    Agree with changing antibiotics to Cefazolin 2gm Q 12hr based on sensitivity pattern  Would ask Urology to see patient again to determine if Urological surgery is indicated prior to possible need for second replacement of mitral valve  CT surgery following  Repeat blood cultures for evidence of clearing    Tung Almazan MD  Can be called via Teams  After 5pm/weekends [phone]
1. Acute hypoxemic respiratory failure from fluid overload and sepsis. Pt requiring high Flow 02. Decreased to 80% from 100% B lines on POCUS today.  2.Acute renal failure in pt with septic shock and HIV+. Pt s/p 2 liters ultrafiltration. Pulled out HD catheter.  Start Bumex drip and metolazone.  3. ID Staph bacteremia and  bioprosthetic mitral valve  endocarditis. Continue Ancef. Follow up blood cx.  4. Septic shock requiring levophed for BP . Goal MAP 60-65.  5.Chronic afib. Currently rate controlled. Hold AC for potential procedures.   6.DVT prophylaxis: Sq heparin  7. HIV : Continue 3 HIV meds.  8. GOC: Full code.
Patient examined and care reviewed in detail on bedside rounds  Critically ill and unstable on vent/pressors s/p cardiac arrest will D/W pal care re DNR status  Frequent bedside visits with therapy change today.   I have personally provided 35+ minutes of critical care time concurrently with the resident/fellow; this excludes time spent on separate procedures.
This is a 82 year old male with PMH of HIV VLUD, BPH (intermittent cath), Atrial fibrillation, DCCV and ablation and MV endocarditis s/p bioprosthetic valve replacement, patient presenting with weakness noted to have bacteremia (Staph Lugdunensis). Now seen to have bacteremia, nephrology consulted for PICC placement clearance  Patient renal function has been worsening and has been notably having worsening respiratory status. Today had RRT for respiratory distress. Seen in am on high flow, remained with respiratory distress, worsening in pm.  Reviewed with attending, no family to date identified.  Patient provided consent for UF  1.  Respiratory failure, acute, hypoxic--volume overload despite high dose diuretics.  Major limitation to BIPAP, nitrates, oral diuretics is current borderline BP which may require pressor support as pre-load reduced  2.  ARF on CKD--given advanced morbidities would not be a good candidate for chronic HD.  NO requirement for clearance at this time given lack of uremic symptoms, acidosis, hyperkalemia.  IF successful, supported UF can --> decreased venous congeation and improved renal function not requiring dialysis support.  Major goals of care discussion key to guide continued aggressive renal replacement rx support    discussed with med team and attending  Daljit Wing MD  Contact me on TEAMS
#daysi    dialyzed yesterday for hyperkalemia/vol OL  now lytes stable  good UO, nonoliguric  cont bumex/metolazone  no urgent indication for hd today; monitor for renal recovery  #hyperkalemia -K stable  #met acidosis- monitor bicarb trend  #hypotension- pressors per micu
Nanda Salcido MD  Office : [phone]  Contact me on [employer] Teams.
Nanda Salcido MD  Office : [phone]  Contact me on [employer] Teams.
now intubated  #daysi with hyperkalemia  atn likely, cr increased  plan HD today  place shiley  #UF as BP tolerates  #hyperkalemia -HD  d/w MICU
Nanda Salcido MD  Office : [phone]  Contact me on [employer] Teams.
This is a 82 year old male with PMH of HIV VLUD, BPH (intermittent cath), Atrial fibrillation, DCCV and ablation and MV endocarditis s/p bioprosthetic valve replacement, patient presenting with weakness noted to have bacteremia (Staph Lugdunensis). Now seen to have bacteremia, nephrology consulted for PICC placement clearance  Patient renal function has been worsening and has been notably having worsening respiratory status. Today had RRT for respiratory distress. Seen in am on high flow, remained with respiratory distress, worsening in pm.  Reviewed with attending, no family to date identified.  Patient provided consent for UF but transferred to MICU with worsening respiratory failure.  Seen 12/9 am on BIPAP  1.  Respiratory failure, acute, hypoxic--volume overload despite high dose diuretics.  Major limitation to BIPAP, nitrates, oral diuretics is current borderline BP which requires pressor support.  Pulled catheter and may require additional UF if not responding to high dose diuretics  2.  ARF on CKD--given advanced morbidities would not be a good candidate for chronic HD.  NO requirement for clearance at this time given lack of uremic symptoms, acidosis, hyperkalemia.  IF successful, supported UF can --> decreased venous congestion and improved renal function not requiring dialysis support.  Major goals of care discussion key to guide continued aggressive renal replacement rx support.  Until sorted out which is complicated by lack of family input, HD may be utilized in short term    discussed with MICU team and attending  Daljit Wing MD  Contact me on TEAMS
Patient examined and care reviewed in detail on bedside rounds  Critically ill and unstable on vent/pressors with severe MV endocarditis without surgical remedy Before SBT, the severe metabolic acidosis will need to corrected   Frequent bedside visits with therapy change today.   I have personally provided 35+ minutes of critical care time concurrently with the resident/fellow; this excludes time spent on separate procedures.

## 2022-12-14 NOTE — PROGRESS NOTE ADULT - PROBLEM SELECTOR PROBLEM 5
SUDHEER (acute kidney injury)
HIV disease
HIV disease
Palliative care encounter
NSTEMI (non-ST elevation myocardial infarction)
Need for prophylactic measure
NSTEMI (non-ST elevation myocardial infarction)
NSTEMI (non-ST elevation myocardial infarction)
Complicated urinary tract infection
NSTEMI (non-ST elevation myocardial infarction)

## 2022-12-14 NOTE — PROGRESS NOTE ADULT - ASSESSMENT
ASSESSMENT: 82M w/ PMHx of HIV (viral load undetectable 08/22), Afib s/p DVVC and ablation (not on AC), MV endocarditis s/p bioprosthetic valve replacement, BPH (self catheterizes), frequent UTIs, presents with UTI and heart block, found to have persistent endocarditis not amenable to cardiac surgery, s/p melina PPM placement on 12/5, s/p RRT on 12/8 called for hypoxia, transferred to MICU for further management of worsening acute respiratory failure, functionally anuric status.       ======NEUROLOGY======  - intubated 12/10 overnight due to bloody secretions noted   - off propofol and sedation; patient having spontaneous leg movements  - prior to intubation, AAOx3/4  - F/u MR Brain non con    ======PULMONARY======  #AHRF  - RRT called 12/8 for hypoxia to 80s, HF improved to 93%, brought up to MICU and put on BIPAP, with saturation >97% likely 2/2 volume overload  - CXR 12/7 showing vascular congestion; exam consistent with volume overload  - ABG w signs of respiratory on metabolic acidosis and was on BiPAP -> HFNC 40L 80%   - overnight with worsening acidosis and bloody secretions so intubated with improvement in acidosis but persistent hypercarbia pH 7.21 abd pCO2 54  - current vent settings: , RR 18, FiO2 30%, PEEP 5    ====CARDIOVASCULAR====  #Staph lugdudenesis bacteremia c/b infective endocarditis   #afib  #Mobitiz I, sinus bradycardia    - s/p cefazolin, changed to Zosyn during RRT --> changed back to Ancef 12/8  - trended trop (?concern for IE embolization to coronary arteries) - no significant ischemic changes at this time  - Rhythm showed sinus madonna with Mobitz I and pAF SVR  - per CTSx pt not eligible for intervention at this point   - TTE 12/8 demonstrating MV dehiscence and severe paravalvular regurgitation     ========RENAL========  #progressive renal failure  #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 Patient with worsening volume status, started on diuresis today however still requiring non invasive positive pressure ventilation.   - s/p 1x Ultrafiltration, to improve congestion however ultimately patient is not a good candidate for long term hemodialysis.   - Abdominal US done on 12/1 showed unilateral hydronephrosis.   - Check serological w/up for GN.   - Maintain Tovar in situ.   - Monitor BMP. Strict I/Os. Avoid nephrotoxins.  - shiley placed in R IJ 12/10 for dialysis with clearance  - 12/11: per nephro, continue with bumex 3 gtt and metolazone - no need for urgent dialysis today  - 12/13: Metabolic alkalosis pH 7.55 HCO3- 34, per nephro, will hold bumex and metolazone today. will decrease bicarb sufficiently before restarting diuresis and monitor I&Os - goal of net -500 mL to 1 L in 24 hours    ===GASTROINTESTINAL====  - npo      ===INFECTIOUS DISEASE===  #endocarditis, staph lugdudenesis bacteremia + in Urine and BCx  - currently on cefazolin (12/8 - )  - blood culture 11/29 positive; repeat cultures since negative  - ID following, recs appreciated    #hx of HIV  - treat with ART home medications  - Continue PO abacavir 200mg q12hrs, lamivudine 100mg q24hrs and doravirine 1tab q24hrs (renally dosed per pharm)    =====HEMATOLOGIC=====  #hx of afib and endocarditis  - holding AC for now pending MR Brain non con - if no bleed can start AC  - on eliquis 2.5 mg bid at home    #anemia  - Hemoglobin near goal, on oral iron.   - Would hold off on IV iron in the setting of bacteremia/ endocarditis.   - Consider JASON if Hb remains below goal.   - Monitor CBC.    ======ENDOCRINE=======  - no active issues    ===MUSCULOSKELETAL====  - no active issues    =====PROPHYLAXIS======  DVT prophylaxis: Heparin Subq

## 2022-12-14 NOTE — PROGRESS NOTE ADULT - PROBLEM SELECTOR PROBLEM 4
Unspecified atrial fibrillation
Complicated urinary tract infection
SUDHEER (acute kidney injury)
NSTEMI (non-ST elevation myocardial infarction)
ACP (advance care planning)
Second degree Mobitz I AV block
Complicated urinary tract infection
SUDHEER (acute kidney injury)

## 2022-12-14 NOTE — CHART NOTE - NSCHARTNOTESELECT_GEN_ALL_CORE
Electrophysiology/Event Note
Event Note
POCUS/Event Note
Queta Catheter Consent/Event Note
Cardiac Arrest/Event Note
Death Note/Event Note
EP/Event Note
Event Note
GAP/Event Note
Hospitalist
IR/Event Note
MICU Accept/Transfer Note
Nutrition Services
POCUS/Event Note
bronchoscopy/Event Note
micu pocus note
s/p Micra CRS PACU ACP Note/Event Note

## 2022-12-14 NOTE — PROCEDURE NOTE - NSPROCNAME_GEN_A_CORE
Feeding Tube Placement
Tracheal Intubation
Central Line Insertion
Arterial Puncture/Cannulation
Chest Tube
Central Line Insertion
Central Line Insertion
Tracheal Intubation

## 2022-12-14 NOTE — PROGRESS NOTE ADULT - SUBJECTIVE AND OBJECTIVE BOX
INTERVAL EVENTS/SUBJ:  PEA arrest    Home Medications:  abacavir 300 mg oral tablet: 2 tab(s) orally once a day (29 Nov 2022 23:21)  doravirine 100 mg oral tablet: 1 tab(s) orally once a day (29 Nov 2022 23:21)  Tamar-C 500 mg oral tablet: 1 tab(s) orally once a day (29 Nov 2022 23:21)  famotidine 20 mg oral tablet: 1 tab(s) orally 2 times a day (29 Nov 2022 23:21)  ferrous sulfate 325 mg (65 mg elemental iron) oral tablet: 1 tab(s) orally once a day (29 Nov 2022 23:21)  folic acid 1 mg oral tablet: 1 tab(s) orally once a day (29 Nov 2022 23:21)  lamiVUDine 100 mg oral tablet: 1 tab(s) orally once a day (29 Nov 2022 23:21)  metoprolol succinate 25 mg oral tablet, extended release: 0.5 tab(s) orally once a day (29 Nov 2022 23:21)  Multiple Vitamins oral tablet: 1 tab(s) orally once a day (29 Nov 2022 23:21)  senna leaf extract oral tablet: 2 tab(s) orally once a day (at bedtime) (29 Nov 2022 23:21)  telmisartan 40 mg oral tablet: 1 tab(s) orally once a day (29 Nov 2022 23:21)  Vitamin D3 5000 intl units (125 mcg) oral capsule: 1 cap(s) orally once a day (29 Nov 2022 23:21)      MEDICATIONS  (STANDING):  abacavir 300 milliGRAM(s) Oral two times a day  ceFAZolin   IVPB 1000 milliGRAM(s) IV Intermittent every 12 hours  chlorhexidine 0.12% Liquid 15 milliLiter(s) Oral Mucosa every 12 hours  chlorhexidine 4% Liquid 1 Application(s) Topical <User Schedule>  doravirine 100 milliGRAM(s) Oral daily  famotidine    Tablet 20 milliGRAM(s) Oral daily  ferrous    sulfate 325 milliGRAM(s) Oral daily  folic acid 1 milliGRAM(s) Oral daily  influenza  Vaccine (HIGH DOSE) 0.7 milliLiter(s) IntraMuscular once  lamiVUDine- milliGRAM(s) Oral daily  multivitamin 1 Tablet(s) Oral daily  norepinephrine Infusion 0.05 MICROgram(s)/kG/Min (3.83 mL/Hr) IV Continuous <Continuous>  phenylephrine    Infusion 0.1 MICROgram(s)/kG/Min (3.06 mL/Hr) IV Continuous <Continuous>  propofol Infusion 10 MICROgram(s)/kG/Min (4.9 mL/Hr) IV Continuous <Continuous>  senna 2 Tablet(s) Oral at bedtime  vasopressin Infusion 0.04 Unit(s)/Min (6 mL/Hr) IV Continuous <Continuous>    MEDICATIONS  (PRN):  lidocaine 2% Jelly 3 milliLiter(s) IntraUrethral two times a day PRN penile pain  simethicone 80 milliGRAM(s) Chew four times a day PRN Gas      Vital Signs Last 24 Hrs  T(C): 35 (14 Dec 2022 08:00), Max: 37.7 (13 Dec 2022 20:00)  T(F): 95 (14 Dec 2022 08:00), Max: 99.9 (13 Dec 2022 20:00)  HR: 49 (14 Dec 2022 08:40) (0 - 104)  BP: 85/49 (14 Dec 2022 02:30) (85/49 - 211/121)  BP(mean): 61 (14 Dec 2022 02:30) (61 - 132)  RR: 30 (14 Dec 2022 08:15) (18 - 129)  SpO2: 98% (14 Dec 2022 08:40) (73% - 100%)    Parameters below as of 13 Dec 2022 20:00  Patient On (Oxygen Delivery Method): ventilator    O2 Concentration (%): 30    REVIEW OF SYSTEMS:  As per HPI, otherwise unremarkable.     PHYSICAL EXAM:  Constitutional/Appearance: Normal, Well-developed  HEENT:   Normal oral mucosa, no drainage or redness, supple neck  Lymphatic: No lymphadenopathy  Cardiovascular: Normal S1 S2, No edema, II/VI ASHUTOSH  Respiratory: Lungs clear to auscultation, respirations non-labored  Psychiatry: A & O x 3, appropriate affect.   Gastrointestinal:  Soft, Non-tender, no distention  Skin: No rashes, No ecchymoses, No cyanosis	  Neurologic: Non-focal, Alert and oriented x 3  Extremities: Normal range of motion  Vascular: Peripheral pulses palpable 2+ bilaterally (radial)    LABS:  CBC Full  -  ( 14 Dec 2022 06:36 )  WBC Count : 68.99 K/uL  RBC Count : 2.26 M/uL  Hemoglobin : 8.4 g/dL  Hematocrit : 28.2 %  Platelet Count - Automated : 39 K/uL  Mean Cell Volume : 124.8 fl  Mean Cell Hemoglobin : 37.2 pg  Mean Cell Hemoglobin Concentration : 29.8 gm/dL  Auto Neutrophil # : x  Auto Lymphocyte # : x  Auto Monocyte # : x  Auto Eosinophil # : x  Auto Basophil # : x  Auto Neutrophil % : x  Auto Lymphocyte % : x  Auto Monocyte % : x  Auto Eosinophil % : x  Auto Basophil % : x      12-14    145  |  92<L>  |  153<H>  ----------------------------<  63<L>  7.3<HH>   |  <10<LL>  |  5.60<H>    Ca    8.8      14 Dec 2022 06:36  Phos  22.3     12-14  Mg     5.5     12-14    TPro  5.7<L>  /  Alb  2.3<L>  /  TBili  2.4<H>  /  DBili  x   /  AST  4204<H>  /  ALT  619<H>  /  AlkPhos  277<H>  12-14      IMPRESSION AND PLAN:  82M w HIV, AF s/p DCCV and ablation (no AC), MV endocarditis s/p bioprosthetic MVR, BPH, frequent UTIs here with persistent endocarditis not amenable to cardiac surgery, s/p micra PPM 12/5 and acute respiratory failure s/p PEA arrest  -not candidate for cardiac intervention on MV  -remains on tele with serial ECGs for heart block  -ongoing diuresis and fluid removal per renal  -poor prognosis overall      ***    Jacob Bauman MD, MPhil, [facility]  Cardiologist, [facility]  ; [employer] at [facility]  email: [email or web address] and Cardiovascular Surgery on-service contact/call information, go to [email or web address] and use "[email or web address]" to login.  Outpatient Cardiology appointments, call  [phone] to arrange with a colleague; I do not have outpatient Cardiology clinic.

## 2022-12-14 NOTE — PROGRESS NOTE ADULT - NUTRITIONAL ASSESSMENT
This patient has been assessed with a concern for Malnutrition and has been determined to have a diagnosis/diagnoses of Severe protein-calorie malnutrition.    This patient is being managed with:   Diet NPO with Tube Feed-  Tube Feeding Modality: Orogastric  Nepro with Carb Steady (NEPRORTH)  Total Volume for 24 Hours (mL): 720  Continuous  Starting Tube Feed Rate {mL per Hour}: 10  Increase Tube Feed Rate by (mL): 10  Until Goal Tube Feed Rate (mL per Hour): 40  Tube Feed Duration (in Hours): 18  Tube Feed Start Time: 11:00  Entered: Dec 10 2022 12:42PM

## 2022-12-14 NOTE — PROGRESS NOTE ADULT - PROVIDER SPECIALTY LIST ADULT
Cardiology
Hospitalist
Infectious Disease
Cardiology
Electrophysiology
Hospitalist
Cardiology
Electrophysiology
Hospitalist
Infectious Disease
MICU
Infectious Disease
MICU
MICU
Nephrology
Cardiology
Nephrology
MICU
Nephrology
Nephrology
Internal Medicine
Palliative Care
Internal Medicine
Internal Medicine

## 2022-12-14 NOTE — PROGRESS NOTE ADULT - ASSESSMENT
81yo M w/ PMHx of HIV (viral load undetectable 08/22), Afib s/p DVVC and ablation (not on AC), MV endocarditis s/p bioprosthetic valve replacement, BPH (self catheterizes), frequent UTIs, presents with weakness, he reports that over the last 2 weeks he has had weakness, symptoms were associated with occasional body shakes. Patient admitted for UTI and is now s/p micra placement this morning. Palliative consulted to assist with GOC as patient without close family

## 2022-12-14 NOTE — PROGRESS NOTE ADULT - SUBJECTIVE AND OBJECTIVE BOX
[facility] DIVISION OF KIDNEY DISEASES AND HYPERTENSION -- FOLLOW UP NOTE  --------------------------------------------------------------------------------  Chief Complaint:    24 hour events/subjective:    Patient had cardiac arrest (PEA) had chest tube placed   UOP: 1670 ml  noted to be hyperkalemic     PAST HISTORY  --------------------------------------------------------------------------------  No significant changes to PMH, PSH, FHx, SHx, unless otherwise noted    ALLERGIES & MEDICATIONS  --------------------------------------------------------------------------------  Allergies    No Known Allergies    Intolerances      Standing Inpatient Medications  abacavir 300 milliGRAM(s) Oral two times a day  ceFAZolin   IVPB 1000 milliGRAM(s) IV Intermittent every 12 hours  chlorhexidine 0.12% Liquid 15 milliLiter(s) Oral Mucosa every 12 hours  chlorhexidine 4% Liquid 1 Application(s) Topical <User Schedule>  doravirine 100 milliGRAM(s) Oral daily  famotidine    Tablet 20 milliGRAM(s) Oral daily  ferrous    sulfate 325 milliGRAM(s) Oral daily  folic acid 1 milliGRAM(s) Oral daily  influenza  Vaccine (HIGH DOSE) 0.7 milliLiter(s) IntraMuscular once  lamiVUDine- milliGRAM(s) Oral daily  multivitamin 1 Tablet(s) Oral daily  norepinephrine Infusion 0.05 MICROgram(s)/kG/Min IV Continuous <Continuous>  phenylephrine    Infusion 0.1 MICROgram(s)/kG/Min IV Continuous <Continuous>  propofol Infusion 10 MICROgram(s)/kG/Min IV Continuous <Continuous>  senna 2 Tablet(s) Oral at bedtime  vasopressin Infusion 0.04 Unit(s)/Min IV Continuous <Continuous>    PRN Inpatient Medications  lidocaine 2% Jelly 3 milliLiter(s) IntraUrethral two times a day PRN  simethicone 80 milliGRAM(s) Chew four times a day PRN      REVIEW OF SYSTEMS  --------------------------------------------------------------------------------    Unable to assess       VITALS/PHYSICAL EXAM  --------------------------------------------------------------------------------  T(C): 35 (12-14-22 @ 08:00), Max: 37.7 (12-13-22 @ 20:00)  HR: 49 (12-14-22 @ 09:45) (0 - 104)  BP: 85/49 (12-14-22 @ 02:30) (85/49 - 211/121)  RR: 30 (12-14-22 @ 09:45) (18 - 129)  SpO2: 100% (12-14-22 @ 09:45) (73% - 100%)  Wt(kg): --        12-13-22 @ 07:01  -  12-14-22 @ 07:00  --------------------------------------------------------  IN: 3343.9 mL / OUT: 1815 mL / NET: 1528.9 mL    12-14-22 @ 07:01  -  12-14-22 @ 09:59  --------------------------------------------------------  IN: 165 mL / OUT: 25 mL / NET: 140 mL        Physical Exam:  	Gen: NAD  	HEENT: orally intubated   	Pulm: mechanically ventilated   	CV: S1S2, Bradycardic   	Abd: Soft, +BS   	Ext: No LE edema B/L  	Neuro: Sedated   	Skin: Warm and dry  	Vascular access: Right IJ       LABS/STUDIES  --------------------------------------------------------------------------------              8.4    68.99 >-----------<  39       [12-14-22 @ 06:36]              28.2     145  |  92  |  153  ----------------------------<  63      [12-14-22 @ 06:36]  7.3   |  <10  |  5.60        Ca     8.8     [12-14-22 @ 06:36]      iCa    1.21     [12-13 @ 01:49]      Mg     5.5     [12-14-22 @ 06:36]      Phos  22.3     [12-14-22 @ 06:36]    TPro  5.7  /  Alb  2.3  /  TBili  2.4  /  DBili  x   /  AST  4204  /  ALT  619  /  AlkPhos  277  [12-14-22 @ 06:36]          Creatinine Trend:  SCr 5.60 [12-14 @ 06:36]  SCr 5.23 [12-13 @ 21:27]  SCr 5.18 [12-13 @ 17:01]  SCr 4.72 [12-13 @ 01:49]  SCr 4.88 [12-12 @ 14:24]    Urinalysis - [11-29-22 @ 20:45]      Color Yellow / Appearance Slightly Turbid / SG 1.015 / pH 6.0      Gluc Negative / Ketone Negative  / Bili Negative / Urobili Negative       Blood Moderate / Protein 100 / Leuk Est Large / Nitrite Negative      RBC 6 /  / Hyaline 2 / Gran 1 / Sq Epi  / Non Sq Epi 1 / Bacteria Negative      TSH 2.52      [12-02-22 @ 06:24]    HBsAb <3.0      [12-08-22 @ 20:44]  HBsAg Nonreact      [12-08-22 @ 20:44]  HCV 0.12, Nonreact      [12-08-22 @ 20:44]

## 2022-12-14 NOTE — PROCEDURE NOTE - NSINDICATIONS_GEN_A_CORE
dialysis/CRRT
respiratory distress/respiratory failure
feeding tube replacement
arterial puncture to obtain ABG's/critical patient/monitoring purposes
pneumothorax
critical illness/dialysis/CRRT
critical illness/emergency venous access
suspect resp arrest therefore ETT replaced during code/cardiac arrest

## 2022-12-14 NOTE — PROGRESS NOTE ADULT - ASSESSMENT
82 year old male with PMH of HIV VLUD, BPH (intermittent cath), Atrial fibrillation, DCCV and ablation and MV endocarditis s/p bioprosthetic valve replacement, patient presenting with weakness noted to have bacteremia (Staph Lugdunensis). Now seen to have bacteremia, nephrology consulted for PICC placement clearance  Patient renal function worsening despite diuresis, patient had RRT for hypoxic respiratory distress. Session of dialysis was performed to improve congestion, HD later performed for Hyperkalemia   Patient now hyperkalemic/ Acidotic again following cardiac arrest

## 2022-12-14 NOTE — PROGRESS NOTE ADULT - PROBLEM SELECTOR PLAN 3
in the setting of SUDHEER and recent CPR. Overall poor prognosis, patient is not a candidate for RRT.

## 2022-12-14 NOTE — PROCEDURE NOTE - NSPOSTCAREGUIDE_GEN_A_CORE
Instructed patient/caregiver to follow-up with primary care physician
Verbal/written post procedure instructions were given to patient/caregiver/Care for catheter as per unit/ICU protocols
Verbal/written post procedure instructions were given to patient/caregiver/Keep the cast/splint/dressing clean and dry/Care for catheter as per unit/ICU protocols
Verbal/written post procedure instructions were given to patient/caregiver
Care for catheter as per unit/ICU protocols

## 2022-12-14 NOTE — PROGRESS NOTE ADULT - PROBLEM SELECTOR PLAN 4
Patient seen and examined at approximately 10:40am. He was unarousable and in multiorgan failure.  He does not have a surrogate decision maker, but previously established wishes by patient were not to live indefinitely on machines and if there was no hope for meaningful recovery, he would not want to receive heroic measures.    based on this, and [facility] [id number], decision made for 2PC DNR to respect patients wishes and to respect, that even if CPR were to be administered, patient imminently expiring despite this intervention given multiorgan failure.   DNR should be entered in chart, MOLST form started and to be signed by Dr. Minaya, attending of record.  This provider is in concurrence with a 2PC DNR and no providing any intervention that will cause more risk than benefit at this time.

## 2022-12-14 NOTE — PROGRESS NOTE ADULT - ATTENDING SUPERVISION STATEMENT
Resident
Fellow
Resident
Fellow
Fellow
Resident
Fellow
Resident
Resident
Fellow
Resident

## 2022-12-14 NOTE — DISCHARGE NOTE FOR THE EXPIRED PATIENT - HOSPITAL COURSE
81yo M w/ PMHx of HIV (viral load undetectable 08/22), Afib s/p DVVC and ablation (not on AC), MV endocarditis s/p bioprosthetic valve replacement, BPH (self catheterizes), frequent UTIs, presents with weakness, he reports that over the last 2 weeks he has had weakness, symptoms were associated with occasional body shakes that came every other day, he notes that he also has had recent difficulty performing straight cath, saying he required more force and was at times unable to complete procedure, he called his urologist who then instructed him to head to ED, in the ED, pt was bradycardic, febrile but hemodynamically stable, labs showed leukocytosis, elevated Cr above baseline, elevated procal, grossly positive U/A, EKG showed new Mobitz Type 1, then converted to afib with slow ventricular response, pt was given cefepime, 1L NS bolus, Tylenol x1, admitted to general medicine for further management. While in ED, pt developed acute onset non-radiating chest pain associated with SOB but denies n/v, f/c, diaphoresis, jaw pain, (29 Nov 2022 23:23)    UCx + 100,000 staph lugdunensis, bilat perinephric stranding  - but no renal cortical abscess on imaging. On 11/29 4/4 bottles grew Staph lugdunensis. 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 Patient is s/p 12/5 Micra MDT PPM via Right Femoral Vein.     MICU: RRT was called on the floor for hypoxia, patient satting 80s on HF, responded to 25L (93%). Patient appeared to have dyspnea. EKG showed Afib with rate in 60s, new bundle branch block, but no ischemic changes.  Patient was transferred to the MICU for further management and treatment of his acute respiratory distress, worsening pulmonary edema, functionally anuric status on bumex drip, s/p metalazone and lasix. Pt has been improving on BIPAP. Will likely pursue dialysis, place shiley. Will intubate if needed. Patient does not have a HCP or family/friends to make decisions on his behalf. He had a GOC discussion and expressed a desire to be full code, consenting to intubation and dialysis but not trach/peg. Antibiotics for staph lugdunesis endocarditis have been broadened from ancef to Zosyn. On rediscussion and reconsult with palliative care, patient was made comfort care. A MOLST form was completed which made patient DNR. On 12/14, at 11:50 patient was pronounced dead based on    81yo M w/ PMHx of HIV (viral load undetectable 08/22), Afib s/p DVVC and ablation (not on AC), MV endocarditis s/p bioprosthetic valve replacement, BPH (self catheterizes), frequent UTIs, presents with weakness, he reports that over the last 2 weeks he has had weakness, symptoms were associated with occasional body shakes that came every other day, he notes that he also has had recent difficulty performing straight cath, saying he required more force and was at times unable to complete procedure, he called his urologist who then instructed him to head to ED, in the ED, pt was bradycardic, febrile but hemodynamically stable, labs showed leukocytosis, elevated Cr above baseline, elevated procal, grossly positive U/A, EKG showed new Mobitz Type 1, then converted to afib with slow ventricular response, pt was given cefepime, 1L NS bolus, Tylenol x1, admitted to general medicine for further management. While in ED, pt developed acute onset non-radiating chest pain associated with SOB but denies n/v, f/c, diaphoresis, jaw pain, (29 Nov 2022 23:23)    UCx + 100,000 staph lugdunensis, bilat perinephric stranding  - but no renal cortical abscess on imaging. On 11/29 4/4 bottles grew Staph lugdunensis. 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 Patient is s/p 12/5 Micra MDT PPM via Right Femoral Vein.     MICU: RRT was called on the floor for hypoxia, patient satting 80s on HF, responded to 25L (93%). Patient appeared to have dyspnea. EKG showed Afib with rate in 60s, new bundle branch block, but no ischemic changes.  Patient was transferred to the MICU for further management and treatment of his acute respiratory distress, worsening pulmonary edema, functionally anuric status on bumex drip, s/p metalazone and lasix. Pt has been improving on BIPAP. Will likely pursue dialysis, place shiley. Will intubate if needed. Patient does not have a HCP or family/friends to make decisions on his behalf. He had a GOC discussion and expressed a desire to be full code, consenting to intubation and dialysis but not trach/peg. Antibiotics for staph lugdunesis endocarditis have been broadened from ancef to Zosyn. On rediscussion and reconsult with palliative care, patient was made comfort care. A MOLST form was completed which made patient DNR. On 12/14, at 11:50 patient was pronounced dead based on Cardiopulmonary criteria including absent breath sounds, pulselessness and/or asystole. On physical exam, patient did not respond to verbal or noxious stimuli.  Absent heart sounds.  Absent radial pulses. Pupils are fixed and dilated. Attending Dr. Minaya notified.

## 2022-12-14 NOTE — PROGRESS NOTE ADULT - PROBLEM SELECTOR PLAN 1
Pt with SUDHEER on CKD in the setting of sepsis/bacteremia and hypotension.   Pt. with CKD In the setting of HIV and urinary retention (does self cath at home) s/p tovar insertion on this admission.   Urine studies from 11/29 with blood and protein present in the urine. Pt received UF and then HD on 12/8 and 12/10 for fluid overload. Tolerated HD well. Currently intubated and on mechanically vent. Patient was started on diuresis with bumetamide infusion as well as metolazone augmentation, patient noted to be sheela (developing alkalosis) yesterday.  UOP: 1670 ml yesterday.   Labs reviewed, now hyperkalemic/ Acidotic   No plan for HD today Monitor labs and urine output. Avoid any potential nephrotoxins. Dose medications as per eGFR.

## 2022-12-14 NOTE — PROCEDURE NOTE - NSCOMPLICATION_GEN_A_CORE
no complications
no complications
no complications/arrhythmia
no complications

## 2022-12-14 NOTE — PROGRESS NOTE ADULT - PROBLEM SELECTOR PROBLEM 3
Hyperkalemia
Hyperkalemia
NSTEMI (non-ST elevation myocardial infarction)
Hyperkalemia
Infectious endocarditis
Second degree Mobitz I AV block
Hyperkalemia
Infectious endocarditis
HIV disease
Complicated urinary tract infection
NSTEMI (non-ST elevation myocardial infarction)

## 2022-12-16 ENCOUNTER — APPOINTMENT (OUTPATIENT)
Dept: ELECTROPHYSIOLOGY | Facility: CLINIC | Age: 82
End: 2022-12-16

## 2023-01-12 ENCOUNTER — APPOINTMENT (OUTPATIENT)
Dept: INTERNAL MEDICINE | Facility: CLINIC | Age: 83
End: 2023-01-12

## 2023-01-18 NOTE — PROVIDER CONTACT NOTE (CRITICAL VALUE NOTIFICATION) - RECOMMENDATIONS
Mary Arora, LAUREN made aware.
Patient understands condition, prognosis and need for follow up care.
PA made aware.
Provider aware
Mary Arora, LAUREN made aware.
Mary Arora, LAUREN made aware.
Notify ACP Mary Arora

## 2023-01-25 NOTE — PROGRESS NOTE ADULT - SUBJECTIVE AND OBJECTIVE BOX
Follow Up:  Aurelia barillas prosthetic mitral valve endocarditis    Interval History/ROS:  uncomfortable, spent night in chair, has marked SOB on slight exertion    Allergies  No Known Allergies    ANTIMICROBIALS:  abacavir 300 two times a day  ceFAZolin   IVPB 2000 every 12 hours  doravirine 100 daily  lamiVUDine- daily      OTHER MEDS:  MEDICATIONS  (STANDING):  acetaminophen     Tablet .. 650 every 6 hours PRN  albuterol/ipratropium for Nebulization. 3 once  aluminum hydroxide/magnesium hydroxide/simethicone Suspension 30 every 6 hours PRN  apixaban 2.5 two times a day  aspirin enteric coated 81 daily  famotidine    Tablet 20 daily  influenza  Vaccine (HIGH DOSE) 0.7 once  loperamide 2 daily PRN  ondansetron Injectable 4 every 6 hours PRN  senna 2 at bedtime  simethicone 80 four times a day PRN      Vital Signs Last 24 Hrs  T(C): 36.7 (07 Dec 2022 12:57), Max: 36.8 (06 Dec 2022 20:28)  T(F): 98 (07 Dec 2022 12:57), Max: 98.3 (06 Dec 2022 20:28)  HR: 63 (07 Dec 2022 12:57) (52 - 87)  BP: 125/65 (07 Dec 2022 12:57) (101/49 - 151/74)  BP(mean): --  RR: 18 (07 Dec 2022 12:57) (18 - 18)  SpO2: 93% (07 Dec 2022 12:57) (91% - 96%)    Parameters below as of 07 Dec 2022 12:57  Patient On (Oxygen Delivery Method): nasal cannula  O2 Flow (L/min): 2      PHYSICAL EXAM:  General: ill appearing, WN/WD NAD,   Neurology: A&Ox3, nonfocal  Respiratory: Clear to auscultation bilaterally, tachypneia  CV: RRR, S1S2, no murmurs, rubs or gallops  Abdominal: Soft, Non-tender, non-distended, protuberant abd  Extremities: No edema, + peripheral pulses  Line Sites: Clear  Skin: No rash                          9.7    42.93 )-----------( 82       ( 07 Dec 2022 08:27 )             29.7   WBC Count: 42.93 (12-07 @ 08:27)  WBC Count: 45.43 (12-07 @ 06:46)  WBC Count: 35.74 (12-06 @ 05:21)  WBC Count: 36.92 (12-05 @ 07:08)  WBC Count: 27.70 (12-04 @ 06:50)  WBC Count: 26.57 (12-03 @ 07:16)  WBC Count: 25.46 (12-03 @ 00:15)    12-07    137  |  104  |  75<H>  ----------------------------<  156<H>  5.6<H>   |  19<L>  |  3.41<H>  Creatinine: 3.41 (12-07 @ 06:54)    Creatinine: 3.18 (12-06 @ 05:21)    Creatinine: 2.65 (12-05 @ 07:08)    Creatinine: 2.78 (12-04 @ 06:50)    Creatinine: 2.85 (12-03 @ 07:16)    Ca    8.9      07 Dec 2022 06:54      MICROBIOLOGY:  .Blood Blood-Peripheral  12-03-22   No growth to date.  --  --      .Blood Blood-Peripheral  12-01-22   No Growth Final  --  --      Clean Catch Clean Catch (Midstream)  11-29-22   >100,000 CFU/ml Staphylococcus lugdunensis "Susceptibilities not  performed"  --  --      .Blood Blood-Peripheral  11-29-22   Growth in aerobic and anaerobic bottles: Staphylococcus lugdunensis  See previous culture [id number]  --    Growth in aerobic and anaerobic bottles: Gram Positive Cocci in Clusters      .Blood Blood-Peripheral  11-29-22   Growth in aerobic and anaerobic bottles: Staphylococcus lugdunensis    HIV-1 RNA Quantitative, Viral Load Log: NOT DET. lg /mL (12-01-22 @ 05:49)  HIV-1 RNA Quantitative, Viral Load Log: NOT DET. lg /mL (08-08-22 @ 07:49)    < from: Transesophageal Echocardiogram w/o TTE (12.02.22 @ 16:18) >  Conclusions:  1. Bioprosthetic mitral valve replacement. Echodense  material seen coating the prosthetic leaflets with mobile  components measuring up to approximately 1.0 cm in length  seen on the atrial side of the valve. 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 There is thickening seen  along the intervalvular fibrosa extending to the aortic  root suspicious for infection/early abscess. Mild mitral  regurgitation. Mean transmitral valve gradient equals 6 mm  Hg, which is elevated even in the setting of a  bioprosthetic mitral valve replacement. (HRabout 60s bpm)  2. Normal trileaflet aortic valve. No aortic valve  regurgitation seen.  3. Normal left ventricular systolic function. No segmental  wall motion abnormalities. Septal motion consistent with  prior cardiac surgery. Mobile echodensity seen within the  LV cavity consistent with retained mitral chordal  apparatus.  4. Normal right ventricular size and function.  5. Left pleural effusion.    < end of copied text >    RADIOLOGY:  < from: Xray Chest 1 View- PORTABLE-Urgent (Xray Chest 1 View- PORTABLE-Urgent .) (12.06.22 @ 13:41) >  IMPRESSION:  Interval placement of right PICCline with the tip in the SVC.  Redemonstrated pulmonary vascular congestion with small bilateral pleural   effusions.    < end of copied text >    Beck Ferguson MD; Division of Infectious Disease; Pager: [phone]; nights and weekends: [phone] (4) rarely moist

## 2023-02-02 NOTE — SBIRT NOTE ADULT - NSSBIRTUNABLESTOP_GEN_A_CORE
I reviewed the H&P note done by Dr. Pretty Chaney with [facility] on 1/24/2023. I examined the patient, and there are no changes in the patient's condition.     Never verbal cues/nonverbal cues (demo/gestures)

## 2023-02-03 ENCOUNTER — APPOINTMENT (OUTPATIENT)
Dept: INFECTIOUS DISEASE | Facility: CLINIC | Age: 83
End: 2023-02-03

## 2023-02-19 NOTE — ASU PREOP CHECKLIST - DENTURES
Patient needs additional views which are scheduled for the 22nd.   Please return to me with those views.   no

## 2023-03-16 ENCOUNTER — APPOINTMENT (OUTPATIENT)
Dept: ELECTROPHYSIOLOGY | Facility: CLINIC | Age: 83
End: 2023-03-16

## 2023-07-25 NOTE — PROGRESS NOTE ADULT - ASSESSMENT
80M hx of HIV(QS1=707, VL undet), detrusor muscle weakness with self straight cath, CAD presented after a fall at home.  Labs noted for WBC 16.5, Cr 2.2, CPK 3857, lactate 3.1, positive UA.  On exam, found to have mild left cheek tenderness and redness. He has significant left knee redness and there is an eschar, likely from the fall, conerning that he has a left knee cellulitis.    #GPC bacteremia:  - BCx: Staph epi 2/2 *2, could be contamination, but could be true pathogen if persistent bacteremia and endocarditis+  - TTE: possible vegegations, please obtain NOHEMI  - Follow up BCx (8/20)  - Vancomycin 1g q24h, please obtain random vancomycin level daily (add on today's morning lab), re-dose if level<15    #HIV:  - AE0=368, VL undet  - c/w Abacavir 300mg BID  - c/w Lamivudine 100mg daily  - Pharmacist ordered Doravirine 100mg daily, but will not get it until Friday, will do Tivicay 50mg daily for now until we have Doravirine here    #Left knee cellulitis:  - Will do Zosyn 3.375g q12h infuse over 4 hr    #Pyuria:  - UCx<1000 normal  oswaldo  - Likely colonization  - Aden should be removed before discharge from hospital, pt will do straight cath at home  -  following inpt    Vannessa Gamble MD, PGY4  Fellow, Infectious Diseases  Pager: [phone]  If no response, after 5pm and on Weekends: Call [phone]    d/w Dr. Almazan 80M hx of HIV(XN7=766, VL undet), detrusor muscle weakness with self straight cath, CAD presented after a fall at home.  Labs noted for WBC 16.5, Cr 2.2, CPK 3857, lactate 3.1, positive UA.  On exam, found to have mild left cheek tenderness and redness. He has significant left knee redness and there is an eschar, likely from the fall, conerning that he has a left knee cellulitis.  Pansystolic murmur at apex on exam.  WBC trending down to 9.46 with vanc/zosyn.    #GPC bacteremia:  - BCx: Staph epi 2/2 *2, could be contamination, but could be true pathogen if persistent bacteremia and endocarditis+  - TTE: possible vegegations, please obtain NOHEMI  - Follow up BCx (8/20)  - Vancomycin 1g q24h, please obtain random vancomycin level daily (add on today's morning lab), re-dose if level<15    #HIV:  - RI8=174, VL undet  - c/w Abacavir 300mg BID  - c/w Lamivudine 100mg daily  - Pharmacist ordered Doravirine 100mg daily, but will not get it until Friday, will do Tivicay 50mg daily for now until we have Doravirine here    #Left knee cellulitis:  - Will do Zosyn 3.375g q12h infuse over 4 hr    #Pyuria:  - UCx<1000 normal  oswaldo  - Likely colonization  - Aden should be removed before discharge from hospital, pt will do straight cath at home  -  following inpt    Vannessa Gamble MD, PGY4  Fellow, Infectious Diseases  Pager: [phone]  If no response, after 5pm and on Weekends: Call [phone]    d/w Dr. Almazan 80M hx of HIV(BF5=055, VL undet), detrusor muscle weakness with self straight cath, CAD presented after a fall at home.  Labs noted for WBC 16.5, Cr 2.2, CPK 3857, lactate 3.1, positive UA.  On exam, found to have mild left cheek tenderness and redness. He has significant left knee redness and there is an eschar, likely from the fall, conerning that he has a left knee cellulitis.  Pansystolic murmur at apex on exam.  WBC trending down to 9.46 with vanc/zosyn.    #GPC bacteremia:  - BCx: Staph epi 2/2 *2, could be contamination, but could be true pathogen if persistent bacteremia and endocarditis+  - TTE: possible vegegations, please obtain NOHEMI  - Follow up BCx (8/20): NGTD  - Vancomycin 1g q24h, please obtain random vancomycin level daily (add on today's morning lab), re-dose if level<15  - Will change Zosyn to Cefazolin    #HIV:  - EU4=593, VL undet  - c/w ABC/3TC/HUEY  - Can d/c Tivicay since he is on Doravirine (order as free text)    #Left knee cellulitis:  - D/C Zosyn  - Will start Cefazolin 1g q12h    #Pyuria:  - UCx<1000 normal  oswaldo  - Likely colonization  - Aden should be removed before discharge from hospital, pt will do straight cath at home  -  following inpt    Vannessa Gamble MD, PGY4  Fellow, Infectious Diseases  Pager: [phone]  If no response, after 5pm and on Weekends: Call [phone]    d/w Dr. Almazan No

## 2024-02-01 NOTE — REVIEW OF SYSTEMS
[facility]             [facility] ZIA Parks Shawn Shadell, MD  Joel Galo  1961 02/01/2024    Patient Active Problem List   Diagnosis    Urinary retention due to benign prostatic hyperplasia    Dizziness    Syncope and collapse    Paroxysmal atrial fibrillation    Obstructive sleep apnea    Autonomic orthostatic hypotension    Hypothyroidism (acquired)    GERD (gastroesophageal reflux disease)    Chronic anticoagulation    Long term current use of antiarrhythmic drug    DM (diabetes mellitus), type 2    Mixed hyperlipidemia    Abnormal nuclear stress test    Palpitations       Dear Edmundo Boston MD:    Subjective     Chief Complaint   Patient presents with    Follow-up     A-fib on loop recorder    Med Management     Pt didn't bring a list, but states no changes.        HPI:     This is a 62 y.o. male with known past medical history of syncope, paroxysmal atrial fibrillation and abnormal stress test.       Joel Galo presents today for routine cardiology follow up.  Patient had remote transmission where he had 3 tachycardia episodes with rates running from 180 to 200 bpm and his loop recorder.  Was suspected that he was having atrial fibrillation with RVR.  Does have symptomatic episodes with passive palpitations and lightheadedness.  Patient has been following with EP for which ablation was discussed at his last visit.  States he has not really thought about ablation but would like to discuss further with Dr. Fishman.     Diagnostic Testing  Echocardiogram 9/2020: EF 55% moderate aortic valve sclerosis  Cardiac event monitor 8/2021: Predominantly normal sinus rhythm with no arrhythmias noted  Nuclear stress test 9/2021: Small size mild to moderate degree of ischemia in the lateral wall  Carotid ultrasound 3/2023: No acute findings  Echocardiogram 3/2023: EF 61 to 65%  Loop recorder insertion with Potter device 4/2023     All other systems were  reviewed and were negative.    Patient Active Problem List   Diagnosis    Urinary retention due to benign prostatic hyperplasia    Dizziness    Syncope and collapse    Paroxysmal atrial fibrillation    Obstructive sleep apnea    Autonomic orthostatic hypotension    Hypothyroidism (acquired)    GERD (gastroesophageal reflux disease)    Chronic anticoagulation    Long term current use of antiarrhythmic drug    DM (diabetes mellitus), type 2    Mixed hyperlipidemia    Abnormal nuclear stress test    Palpitations       family history includes Diabetes in his father; Heart disease in his mother; Kidney disease in his father.     reports that he has never smoked. He has never used smokeless tobacco. He reports that he does not drink alcohol and does not use drugs.    No Known Allergies      Current Outpatient Medications:     apixaban (ELIQUIS) 5 MG tablet tablet, Take 1 tablet by mouth 2 (Two) Times a Day., Disp: 60 tablet, Rfl: 4    aspirin 81 MG EC tablet, Take 1 tablet by mouth Daily., Disp: , Rfl:     buPROPion XL (WELLBUTRIN XL) 300 MG 24 hr tablet, Take 1 tablet by mouth Daily., Disp: , Rfl:     DULoxetine (CYMBALTA) 60 MG capsule, Take 1 capsule by mouth Daily., Disp: , Rfl:     empagliflozin (JARDIANCE) 25 MG tablet tablet, Take  by mouth Daily., Disp: , Rfl:     finasteride (PROSCAR) 5 MG tablet, TAKE 1 TABLET EVERY DAY, Disp: 90 tablet, Rfl: 0    gabapentin (NEURONTIN) 300 MG capsule, Take 1 capsule by mouth 3 (Three) Times a Day., Disp: , Rfl:     glimepiride (AMARYL) 2 MG tablet, Take 1 tablet by mouth 2 (Two) Times a Day., Disp: , Rfl:     HYDROcodone-acetaminophen (NORCO)  MG per tablet, Take 1 tablet by mouth Every 8 (Eight) Hours As Needed for Moderate Pain., Disp: , Rfl:     insulin lispro protamine-insulin lispro (humaLOG 75-25) (75-25) 100 UNIT/ML suspension injection, Inject 32 Units under the skin into the appropriate area as directed 2 (Two) Times a Day With Meals., Disp: , Rfl:      levothyroxine (SYNTHROID, LEVOTHROID) 25 MCG tablet, Take 1 tablet by mouth Daily., Disp: , Rfl:     polyethylene glycol (MIRALAX) 17 g packet, Take 17 g by mouth Daily., Disp: , Rfl:     rosuvastatin (CRESTOR) 10 MG tablet, Take 1 tablet by mouth Daily., Disp: , Rfl:     Sotalol HCl AF 80 MG tablet, Take 1.5 tablets by mouth 2 (Two) Times a Day., Disp: 270 tablet, Rfl: 3    tamsulosin (FLOMAX) 0.4 MG capsule 24 hr capsule, TAKE 1 CAPSULE EVERY DAY, Disp: 90 capsule, Rfl: 3    indapamide (LOZOL) 2.5 MG tablet, Take 1 tablet by mouth Every Morning. (Patient not taking: Reported on 7/7/2023), Disp: , Rfl:     insulin regular (humuLIN R,novoLIN R) 100 UNIT/ML injection, Inject  under the skin into the appropriate area as directed 3 (Three) Times a Day Before Meals. (Patient not taking: Reported on 11/16/2023), Disp: , Rfl:     NOVOLIN N 100 UNIT/ML injection, INJECT 15 TO 25 UNITS SC HS AS DIRECTED (Patient not taking: Reported on 7/7/2023), Disp: , Rfl: 0      Physical Exam:  I have reviewed the patient's current vital signs as documented in the patient's EMR.   Vitals:    02/01/24 1009   BP: 139/89   Pulse:    Resp:    SpO2:      Body mass index is 34.82 kg/m².       02/01/24  0938   Weight: 104 kg (229 lb)      Physical Exam  Constitutional:       General: He is not in acute distress.     Appearance: Normal appearance. He is well-developed.   HENT:      Head: Normocephalic and atraumatic.      Mouth/Throat:      Mouth: Mucous membranes are moist.   Eyes:      Extraocular Movements: Extraocular movements intact.      Pupils: Pupils are equal, round, and reactive to light.   Neck:      Vascular: No JVD.   Cardiovascular:      Rate and Rhythm: Normal rate and regular rhythm.      Heart sounds: Normal heart sounds. No murmur heard.     No S3 or S4 sounds.   Pulmonary:      Effort: Pulmonary effort is normal. No respiratory distress.      Breath sounds: Normal breath sounds. No wheezing.   Abdominal:      General: Bowel  "sounds are normal. There is no distension.      Palpations: Abdomen is soft. There is no hepatomegaly.      Tenderness: There is no abdominal tenderness.   Musculoskeletal:         General: Normal range of motion.      Cervical back: Normal range of motion and neck supple.   Skin:     General: Skin is warm and dry.      Coloration: Skin is not jaundiced or pale.   Neurological:      General: No focal deficit present.      Mental Status: He is alert and oriented to person, place, and time. Mental status is at baseline.   Psychiatric:         Mood and Affect: Mood normal.         Behavior: Behavior normal.         Thought Content: Thought content normal.         Judgment: Judgment normal.            DATA REVIEWED:     TTE/DARIAN:  Results for orders placed during the hospital encounter of 03/10/23    Adult Transthoracic Echo Complete w/ Color, Spectral and Contrast if necessary per protocol    Interpretation Summary    Left ventricular systolic function is normal. Left ventricular ejection fraction appears to be 61 - 65%.    Left ventricular wall thickness is consistent with mild to moderate concentric hypertrophy. Sigmoid-shaped ventricular septum is present.    Left ventricular diastolic function is consistent with (grade I) impaired relaxation.    Estimated right ventricular systolic pressure from tricuspid regurgitation is mildly elevated (35-45 mmHg).      Laboratory evaluations:    Lab Results   Component Value Date    GLUCOSE 223 (H) 12/15/2021    BUN 19 12/15/2021    CREATININE 0.97 12/15/2021    EGFRIFNONA 86 12/15/2021    EGFRIFAFRI 99 12/15/2021    BCR 20 12/15/2021    K 4.3 12/15/2021    CO2 20 12/15/2021    CALCIUM 9.4 12/15/2021     No results found for: \"WBC\", \"HGB\", \"HCT\", \"MCV\", \"PLT\"  No results found for: \"CHOL\", \"CHLPL\", \"TRIG\", \"HDL\", \"LDL\", \"LDLDIRECT\"  No results found for: \"TSH\", \"G3PRBPZ\", \"A1WAVUJ\", \"THYROIDAB\"  No results found for: \"HGBA1C\"  No results found for: \"ALT\"  No results found for: " "\"HGBA1C\"  Lab Results   Component Value Date    CREATININE 0.97 12/15/2021     No results found for: \"IRON\", \"TIBC\", \"FERRITIN\"  No results found for: \"INR\", \"PROTIME\"          ECG 12 Lead    Date/Time: 2/1/2024 10:09 AM  Performed by: Blossom Parks APRN    Authorized by: Blossom Parks APRN  Comparison: compared with previous ECG   Rhythm: sinus rhythm  Rate: normal  Other findings: non-specific ST-T wave changes    Clinical impression: non-specific ECG         --------------------------------------------------------------------------------------------------------------------------    ASSESSMENT/PLAN:      Diagnosis Plan   1. Paroxysmal atrial fibrillation        2. Obstructive sleep apnea        3. Palpitations          Paroxysmal atrial fibrillation  Patient had remote transmission showing around 3 tachycardia episodes with rates running from 180 to 200 bpm on his loop recorder with possible episodes of atrial fibrillation with RVR.  Patient is symptomatic to these episodes with palpitations and dizziness.  He did see EP who also discussed ablation with him as he is on high-dose sotalol with breakthrough episodes.  He is scheduled to have an appointment with EP for further discussion of possible ablation.  Advised follow-up.  Continue Eliquis for stroke prevention.      This document has been @Electronically signed by ZIA Melendez, 02/01/24, 9:22 AM EST.       Dictated Utilizing Dragon Dictation: Part of this note may be an electronic transcription/translation of spoken language to printed text using the Dragon Dictation System.    Follow-up appointment and medication changes provided in hand delivered After Visit Summary as well as reviewed in the room.     " [Fever] : no fever [Vision Problems] : no vision problems [Nasal Discharge] : no nasal discharge [Chest Pain] : no chest pain [Shortness Of Breath] : no shortness of breath [Abdominal Pain] : no abdominal pain [Headache] : no headache

## 2024-02-28 NOTE — ED PROVIDER NOTE - NSICDXPASTSURGICALHX_GEN_ALL_CORE_FT
PAST SURGICAL HISTORY:  S/P coronary artery stent placement     S/P MVR (mitral valve replacement) & SHELLIE clip, 8/31/2020     Detail Level: Detailed Add 16851 Cpt? (Important Note: In 2017 The Use Of 70863 Is Being Tracked By Cms To Determine Future Global Period Reimbursement For Global Periods): no Wound Evaluated By: Ange SANCHEZ Additional Comments: Site is healing well and erythema has significantly improved. Patient reports completing course of doxycycline. Advised patient to continue Mupirocin to site bid for an additional week.

## 2024-09-14 NOTE — PROGRESS NOTE ADULT - PROBLEM SELECTOR PLAN 9
[facility]    Medicine Progress Note - Hospitalist Service, GOLD TEAM 18    Date of Admission:  9/8/2024    Assessment & Plan    Kobe Buchanan is a 65 year old male with PMH of HTN, HLD, prior smoking hx, bicuspid AV s/p valve sparing repair with Gelweave graft (2016), NICM (LVEF 30-35%, now resolved), SAVITA, anemia, BPPV, T2DM, GERD, CKD, chronic pain, MDD, THERESA, degenerative joint disease s/p bilateral shoulder replacement c/b bilateral prosthetic joint infection s/p hardware removal and reimplantation who was scheduled for cervical laminoplasty with Dr. Prajapati on 9/11 but p/w worsening sxs and inability to care for himself at home.  Patient is now s/p C3 and C7 dome laminectomy and C4-6 laminoplasty on 9/11 with Dr. Prajapati      # Cervical stenosis   # Cervical radiculopathy  # Hx of C3 and C7 laminectomies and C4-6 laminoplasty with medtronic plates  # Chronic opioid use  - Patient follows with Ortho as an outpatient.    - Was scheduled for cervical procedure on 9/11.    - Began having shocklike shooting pains down his neck to his lower back starting this morning.    - Reports intermittent numbness and tingling down BUEs, denies weakness or sensory changes in BLEs.    - Was seen by Ortho in ED, plans to start Decadron.    - Patient is on chronic opiates with significant opiate tolerance.    - Now s/p C3 and C7 dome laminectomy and C4-6 laminoplasty on 9/11 with Dr. Prajapati   - Extensive PTA pain regimen including: fentanyl patch 25 mcg/h every 48 hours, as needed oxycodone 30 mg bid.   - Ortho primary  - Pain consult, defer for assistance with pain mgt   - PT/OT     # Hx of nonischemic cardiomyopathy  - Per chart hx it is suspected this was stress induced.   - Home meds of coreg 50 mg bid, amlodipine-benazepril 5-20 mg bid.   - Last TTE 7/2023 with recovered LVEF 55-60%, normal LV wall thickness, LV size, LV diastolic function, no WMAs, no significant valvular  "abnormalities.  -TTE obtained this admission, normal EF, no significant change from echocardiogram from July 2023.  - Seen by cardiology this admission, deemed fully optimized for surgery   - Continue carvedilol 50 mg bid with holding parameters  - Okay to resume PTA amlodipine and lisinopril post-op     # Bradycardia - asymptomatic, with chronotropic response. Resolved   - Sinus bradycardia on admission with HR high-50s.   - Appears stable. No chest pain.  Appears chronic while on Coreg.  - monitor  - EKG here showing nsr     # T2DM   Per chart review, with prior Hgb A1c >6.5%, but most recent was 08/2023 with A1c 5.6%. Not on meds at baseline.  - monitor CBGS for now, no insulin or SSI ordered     # CKD stage 3a   Cr baseline 1.1-1.3. Cr currently 1.32.  - monitor     # Chronic pain   On fentanyl patch 50 mcg/h 72 h patch, naproxen 500 mg bid, oxycodone 10 mg q4h prn.  - defer pain mgmt to primary as above     # MDD  # THERESA  On lexapro 30 mg daily and wellbutrin 200 mg bid. Also on clonazepam 0.5 mg tid prn     # HLD -continue home atorvastatin 40 mg nightly  # HTN - home anti-hypertensives as above  # HLD - on atorvastatin 40 mg daily  # SAVITA - does not use a CPAP at home       Diet: Advance Diet as Tolerated: Regular Diet Adult  Snacks/Supplements Adult: Chase; With Meals  Discharge Instruction - Regular Diet Adult    DVT Prophylaxis: Pneumatic Compression Devices  Serrano Catheter: Not present  Lines: None     Cardiac Monitoring: None  Code Status: Full Code      Clinically Significant Risk Factors                  # Hypertension: Noted on problem list           # Obesity: Estimated body mass index is 33.58 kg/m  as calculated from the following:    Height as of this encounter: 1.778 m (5' 10\").    Weight as of this encounter: 106.1 kg (234 lb).      # Financial/Environmental Concerns: none        Disposition Plan     Medically Ready for Discharge: Anticipated Today      ADELIA ESCOBEDO MD  Hospitalist Service, " GOLD TEAM 18  [facility]  Securely message with [employer] (more info)  Text page via [employer] Paging/Directory   See signed in provider for up to date coverage information  ______________________________________________________________________    Interval History   -Charts reviewed and patient was examined  -Afebrile and hemodynamically stable  -Plan for discharge later today      Physical Exam   Vital Signs: Temp: 97.9  F (36.6  C) Temp src: Oral BP: (!) 148/75 Pulse: 86   Resp: 18 SpO2: 98 % O2 Device: None (Room air)    Weight: 234 lbs 0 oz    General Appearance: Awake, alert and not in distress  Respiratory: Clear breath sounds bilaterally   Cardiovascular: Normal heart sounds.  Systolic murmur, normal rate.  GI: Soft, non tender. Normal bowel sounds   Skin: No bruising or bleeding   Other:Awake, alert and orientated X 3       Medical Decision Making       35   MINUTES SPENT BY ME on the date of service doing chart review, history, exam, documentation & further activities per the note.  MANAGEMENT DISCUSSED with the following over the past 24 hours: patient, bedside RN, care management and orthopedic team        Data          [ ] continue heparin subq daily for DVT ppx  [ ] continue DASH diet, ensure enlive

## 2025-04-08 NOTE — DISCHARGE NOTE NURSING/CASE MANAGEMENT/SOCIAL WORK - BRAND OF COVID-19 VACCINATION
[facility] is calling to confirm this selected medications are being approved by doctor. They want to verify and get a refill if possible .     Pfizer dose 1 and 2
